# Patient Record
Sex: MALE | Race: WHITE | Employment: OTHER | URBAN - METROPOLITAN AREA
[De-identification: names, ages, dates, MRNs, and addresses within clinical notes are randomized per-mention and may not be internally consistent; named-entity substitution may affect disease eponyms.]

---

## 2017-01-25 ENCOUNTER — OFFICE VISIT (OUTPATIENT)
Dept: FAMILY MEDICINE CLINIC | Age: 67
End: 2017-01-25

## 2017-01-25 VITALS
BODY MASS INDEX: 39.89 KG/M2 | SYSTOLIC BLOOD PRESSURE: 138 MMHG | HEIGHT: 73 IN | WEIGHT: 301 LBS | HEART RATE: 88 BPM | TEMPERATURE: 97.9 F | RESPIRATION RATE: 16 BRPM | OXYGEN SATURATION: 98 % | DIASTOLIC BLOOD PRESSURE: 86 MMHG

## 2017-01-25 DIAGNOSIS — M10.9 GOUT OF BOTH FEET: ICD-10-CM

## 2017-01-25 DIAGNOSIS — I48.20 CHRONIC ATRIAL FIBRILLATION (HCC): ICD-10-CM

## 2017-01-25 DIAGNOSIS — I10 ESSENTIAL HYPERTENSION: ICD-10-CM

## 2017-01-25 DIAGNOSIS — F51.01 PRIMARY INSOMNIA: Primary | ICD-10-CM

## 2017-01-25 DIAGNOSIS — J30.1 SEASONAL ALLERGIC RHINITIS DUE TO POLLEN: ICD-10-CM

## 2017-01-25 DIAGNOSIS — I10 ESSENTIAL HYPERTENSION, BENIGN: ICD-10-CM

## 2017-01-25 DIAGNOSIS — R73.03 PREDIABETES: ICD-10-CM

## 2017-01-25 DIAGNOSIS — Z79.01 LONG TERM CURRENT USE OF ANTICOAGULANTS WITH INR GOAL OF 2.0-3.0: ICD-10-CM

## 2017-01-25 DIAGNOSIS — Z72.0 TOBACCO ABUSE: ICD-10-CM

## 2017-01-25 DIAGNOSIS — E78.2 HYPERLIPIDEMIA, MIXED: ICD-10-CM

## 2017-01-25 DIAGNOSIS — E66.09 NON MORBID OBESITY DUE TO EXCESS CALORIES: ICD-10-CM

## 2017-01-25 DIAGNOSIS — Z79.01 LONG TERM (CURRENT) USE OF ANTICOAGULANTS: ICD-10-CM

## 2017-01-25 LAB
INR BLD: 2.51 (ref 0.85–1.16)
PROTHROMBIN TIME: 29.2 SEC (ref 9.8–13)

## 2017-01-25 PROCEDURE — 3288F FALL RISK ASSESSMENT DOCD: CPT | Performed by: FAMILY MEDICINE

## 2017-01-25 PROCEDURE — 99214 OFFICE O/P EST MOD 30 MIN: CPT | Performed by: FAMILY MEDICINE

## 2017-01-25 RX ORDER — HYDROXYZINE 50 MG/1
50 TABLET, FILM COATED ORAL NIGHTLY PRN
Qty: 30 TABLET | Refills: 1 | Status: SHIPPED | OUTPATIENT
Start: 2017-01-25 | End: 2017-02-24

## 2017-01-25 ASSESSMENT — ENCOUNTER SYMPTOMS
ANAL BLEEDING: 0
CHEST TIGHTNESS: 0
SHORTNESS OF BREATH: 0
ABDOMINAL DISTENTION: 0
APNEA: 0
DIARRHEA: 0
CHOKING: 0
NAUSEA: 0
WHEEZING: 0
ABDOMINAL PAIN: 0
STRIDOR: 0
VOMITING: 0
COUGH: 0
RECTAL PAIN: 0
BLOOD IN STOOL: 0
CONSTIPATION: 0

## 2017-02-13 DIAGNOSIS — R73.03 PREDIABETES: ICD-10-CM

## 2017-02-13 LAB — GLUCOSE BLD-MCNC: 100 MG/DL (ref 70–99)

## 2017-02-14 LAB
ESTIMATED AVERAGE GLUCOSE: 142.7 MG/DL
HBA1C MFR BLD: 6.6 %

## 2017-02-21 ENCOUNTER — OFFICE VISIT (OUTPATIENT)
Dept: ORTHOPEDIC SURGERY | Age: 67
End: 2017-02-21

## 2017-02-21 VITALS
DIASTOLIC BLOOD PRESSURE: 82 MMHG | HEIGHT: 73 IN | BODY MASS INDEX: 39.1 KG/M2 | WEIGHT: 295 LBS | SYSTOLIC BLOOD PRESSURE: 138 MMHG

## 2017-02-21 DIAGNOSIS — M25.462 EFFUSION OF LEFT KNEE: Primary | ICD-10-CM

## 2017-02-21 PROCEDURE — 99203 OFFICE O/P NEW LOW 30 MIN: CPT | Performed by: ORTHOPAEDIC SURGERY

## 2017-02-24 ENCOUNTER — OFFICE VISIT (OUTPATIENT)
Dept: FAMILY MEDICINE CLINIC | Age: 67
End: 2017-02-24

## 2017-02-24 VITALS
SYSTOLIC BLOOD PRESSURE: 131 MMHG | HEIGHT: 73 IN | RESPIRATION RATE: 16 BRPM | TEMPERATURE: 98.4 F | BODY MASS INDEX: 37.51 KG/M2 | DIASTOLIC BLOOD PRESSURE: 77 MMHG | HEART RATE: 86 BPM | WEIGHT: 283 LBS | OXYGEN SATURATION: 98 %

## 2017-02-24 DIAGNOSIS — I10 ESSENTIAL HYPERTENSION, BENIGN: ICD-10-CM

## 2017-02-24 DIAGNOSIS — J30.1 SEASONAL ALLERGIC RHINITIS DUE TO POLLEN: ICD-10-CM

## 2017-02-24 DIAGNOSIS — R73.03 PREDIABETES: ICD-10-CM

## 2017-02-24 DIAGNOSIS — I48.20 CHRONIC ATRIAL FIBRILLATION (HCC): ICD-10-CM

## 2017-02-24 DIAGNOSIS — M10.9 GOUT OF BOTH FEET: ICD-10-CM

## 2017-02-24 DIAGNOSIS — E66.09 NON MORBID OBESITY DUE TO EXCESS CALORIES: ICD-10-CM

## 2017-02-24 DIAGNOSIS — I10 ESSENTIAL HYPERTENSION: ICD-10-CM

## 2017-02-24 DIAGNOSIS — Z72.0 TOBACCO ABUSE: ICD-10-CM

## 2017-02-24 DIAGNOSIS — E11.9 CONTROLLED TYPE 2 DIABETES MELLITUS WITHOUT COMPLICATION, WITHOUT LONG-TERM CURRENT USE OF INSULIN (HCC): Primary | ICD-10-CM

## 2017-02-24 DIAGNOSIS — Z79.01 LONG TERM (CURRENT) USE OF ANTICOAGULANTS: ICD-10-CM

## 2017-02-24 DIAGNOSIS — Z79.01 LONG TERM CURRENT USE OF ANTICOAGULANTS WITH INR GOAL OF 2.0-3.0: ICD-10-CM

## 2017-02-24 DIAGNOSIS — F51.01 PRIMARY INSOMNIA: ICD-10-CM

## 2017-02-24 DIAGNOSIS — E78.2 HYPERLIPIDEMIA, MIXED: ICD-10-CM

## 2017-02-24 LAB
INR BLD: 2.01 (ref 0.85–1.15)
PROTHROMBIN TIME: 22.7 SEC (ref 9.6–13)

## 2017-02-24 PROCEDURE — 99214 OFFICE O/P EST MOD 30 MIN: CPT | Performed by: FAMILY MEDICINE

## 2017-02-24 RX ORDER — BLOOD-GLUCOSE METER
1 KIT MISCELLANEOUS ONCE
Qty: 1 KIT | Refills: 0 | Status: SHIPPED | OUTPATIENT
Start: 2017-02-24 | End: 2020-10-29

## 2017-02-24 RX ORDER — METFORMIN HYDROCHLORIDE 500 MG/1
500 TABLET, EXTENDED RELEASE ORAL
Qty: 30 TABLET | Refills: 0 | Status: SHIPPED | OUTPATIENT
Start: 2017-02-24 | End: 2017-03-24 | Stop reason: SDUPTHER

## 2017-02-24 RX ORDER — HYDROXYZINE 50 MG/1
50 TABLET, FILM COATED ORAL NIGHTLY PRN
Qty: 30 TABLET | Refills: 1 | Status: CANCELLED | OUTPATIENT
Start: 2017-02-24 | End: 2017-03-26

## 2017-02-24 RX ORDER — LANCETS 30 GAUGE
EACH MISCELLANEOUS
Qty: 100 EACH | Refills: 6 | Status: ON HOLD | OUTPATIENT
Start: 2017-02-24

## 2017-02-24 RX ORDER — GLUCOSAMINE HCL/CHONDROITIN SU 500-400 MG
CAPSULE ORAL
Qty: 100 STRIP | Refills: 2 | Status: SHIPPED | OUTPATIENT
Start: 2017-02-24 | End: 2018-12-27 | Stop reason: SDUPTHER

## 2017-02-24 ASSESSMENT — ENCOUNTER SYMPTOMS
CONSTIPATION: 0
COLOR CHANGE: 0
NAUSEA: 0
ANAL BLEEDING: 0
VOMITING: 0
CHEST TIGHTNESS: 0
ABDOMINAL DISTENTION: 0
APNEA: 0
COUGH: 0
CHOKING: 0
BLOOD IN STOOL: 0
SHORTNESS OF BREATH: 0
ABDOMINAL PAIN: 0
RECTAL PAIN: 0
WHEEZING: 0
DIARRHEA: 0
STRIDOR: 0

## 2017-03-03 ENCOUNTER — TELEPHONE (OUTPATIENT)
Dept: ORTHOPEDIC SURGERY | Age: 67
End: 2017-03-03

## 2017-03-13 ENCOUNTER — HOSPITAL ENCOUNTER (OUTPATIENT)
Dept: PHYSICAL THERAPY | Age: 67
Discharge: HOME | End: 2017-03-31
Admitting: ORTHOPAEDIC SURGERY

## 2017-03-16 DIAGNOSIS — I48.20 CHRONIC ATRIAL FIBRILLATION (HCC): ICD-10-CM

## 2017-03-16 DIAGNOSIS — I10 ESSENTIAL HYPERTENSION, BENIGN: ICD-10-CM

## 2017-03-16 DIAGNOSIS — Z72.0 TOBACCO ABUSE: ICD-10-CM

## 2017-03-16 DIAGNOSIS — Z79.01 LONG TERM (CURRENT) USE OF ANTICOAGULANTS: ICD-10-CM

## 2017-03-16 LAB
INR BLD: 2.16 (ref 0.85–1.15)
PROTHROMBIN TIME: 24.4 SEC (ref 9.6–13)

## 2017-03-20 ENCOUNTER — TELEPHONE (OUTPATIENT)
Dept: ORTHOPEDIC SURGERY | Age: 67
End: 2017-03-20

## 2017-03-24 ENCOUNTER — OFFICE VISIT (OUTPATIENT)
Dept: FAMILY MEDICINE CLINIC | Age: 67
End: 2017-03-24

## 2017-03-24 VITALS
DIASTOLIC BLOOD PRESSURE: 76 MMHG | WEIGHT: 285.9 LBS | HEIGHT: 73 IN | OXYGEN SATURATION: 98 % | SYSTOLIC BLOOD PRESSURE: 128 MMHG | BODY MASS INDEX: 37.89 KG/M2 | HEART RATE: 85 BPM | RESPIRATION RATE: 16 BRPM | TEMPERATURE: 98.2 F

## 2017-03-24 DIAGNOSIS — F17.200 TOBACCO USE DISORDER: ICD-10-CM

## 2017-03-24 DIAGNOSIS — F51.01 PRIMARY INSOMNIA: ICD-10-CM

## 2017-03-24 DIAGNOSIS — E78.2 HYPERLIPIDEMIA, MIXED: ICD-10-CM

## 2017-03-24 DIAGNOSIS — M10.9 GOUT OF BOTH FEET: ICD-10-CM

## 2017-03-24 DIAGNOSIS — I48.20 CHRONIC ATRIAL FIBRILLATION (HCC): ICD-10-CM

## 2017-03-24 DIAGNOSIS — I10 ESSENTIAL HYPERTENSION: Primary | ICD-10-CM

## 2017-03-24 DIAGNOSIS — J30.1 SEASONAL ALLERGIC RHINITIS DUE TO POLLEN: ICD-10-CM

## 2017-03-24 DIAGNOSIS — Z79.01 LONG TERM CURRENT USE OF ANTICOAGULANTS WITH INR GOAL OF 2.0-3.0: ICD-10-CM

## 2017-03-24 DIAGNOSIS — E11.9 CONTROLLED TYPE 2 DIABETES MELLITUS WITHOUT COMPLICATION, WITHOUT LONG-TERM CURRENT USE OF INSULIN (HCC): ICD-10-CM

## 2017-03-24 DIAGNOSIS — E66.01 MORBID OBESITY DUE TO EXCESS CALORIES (HCC): ICD-10-CM

## 2017-03-24 PROCEDURE — 99214 OFFICE O/P EST MOD 30 MIN: CPT | Performed by: FAMILY MEDICINE

## 2017-03-24 RX ORDER — METFORMIN HYDROCHLORIDE 500 MG/1
500 TABLET, EXTENDED RELEASE ORAL
Qty: 30 TABLET | Refills: 2 | Status: SHIPPED | OUTPATIENT
Start: 2017-03-24 | End: 2017-04-18 | Stop reason: SDUPTHER

## 2017-03-24 ASSESSMENT — ENCOUNTER SYMPTOMS
ANAL BLEEDING: 0
DIARRHEA: 0
WHEEZING: 0
COUGH: 0
STRIDOR: 0
VOMITING: 0
RECTAL PAIN: 0
BLOOD IN STOOL: 0
SHORTNESS OF BREATH: 0
CONSTIPATION: 0
ABDOMINAL DISTENTION: 0
APNEA: 0
ABDOMINAL PAIN: 0
NAUSEA: 0
CHEST TIGHTNESS: 0
CHOKING: 0

## 2017-03-27 ENCOUNTER — OFFICE VISIT (OUTPATIENT)
Dept: ORTHOPEDIC SURGERY | Age: 67
End: 2017-03-27

## 2017-03-27 VITALS — WEIGHT: 287 LBS | BODY MASS INDEX: 38.04 KG/M2 | HEIGHT: 73 IN

## 2017-03-27 DIAGNOSIS — M25.462 EFFUSION OF LEFT KNEE: Primary | ICD-10-CM

## 2017-03-27 PROCEDURE — 99213 OFFICE O/P EST LOW 20 MIN: CPT | Performed by: ORTHOPAEDIC SURGERY

## 2017-03-31 DIAGNOSIS — I48.20 CHRONIC ATRIAL FIBRILLATION (HCC): ICD-10-CM

## 2017-03-31 DIAGNOSIS — E78.2 HYPERLIPIDEMIA, MIXED: ICD-10-CM

## 2017-03-31 DIAGNOSIS — E11.9 CONTROLLED TYPE 2 DIABETES MELLITUS WITHOUT COMPLICATION, WITHOUT LONG-TERM CURRENT USE OF INSULIN (HCC): ICD-10-CM

## 2017-03-31 DIAGNOSIS — I10 ESSENTIAL HYPERTENSION: ICD-10-CM

## 2017-03-31 DIAGNOSIS — Z79.01 LONG TERM (CURRENT) USE OF ANTICOAGULANTS: ICD-10-CM

## 2017-03-31 DIAGNOSIS — Z72.0 TOBACCO ABUSE: ICD-10-CM

## 2017-03-31 DIAGNOSIS — I10 ESSENTIAL HYPERTENSION, BENIGN: ICD-10-CM

## 2017-03-31 LAB
A/G RATIO: 1.7 (ref 1.1–2.2)
ALBUMIN SERPL-MCNC: 4.5 G/DL (ref 3.4–5)
ALP BLD-CCNC: 88 U/L (ref 40–129)
ALT SERPL-CCNC: 27 U/L (ref 10–40)
ANION GAP SERPL CALCULATED.3IONS-SCNC: 15 MMOL/L (ref 3–16)
AST SERPL-CCNC: 20 U/L (ref 15–37)
BILIRUB SERPL-MCNC: 0.4 MG/DL (ref 0–1)
BUN BLDV-MCNC: 21 MG/DL (ref 7–20)
CALCIUM SERPL-MCNC: 9.8 MG/DL (ref 8.3–10.6)
CHLORIDE BLD-SCNC: 97 MMOL/L (ref 99–110)
CHOLESTEROL, TOTAL: 202 MG/DL (ref 0–199)
CO2: 27 MMOL/L (ref 21–32)
CREAT SERPL-MCNC: 1.1 MG/DL (ref 0.8–1.3)
GFR AFRICAN AMERICAN: >60
GFR NON-AFRICAN AMERICAN: >60
GLOBULIN: 2.7 G/DL
GLUCOSE BLD-MCNC: 116 MG/DL (ref 70–99)
HDLC SERPL-MCNC: 36 MG/DL (ref 40–60)
INR BLD: 3.55 (ref 0.85–1.15)
LDL CHOLESTEROL CALCULATED: 113 MG/DL
POTASSIUM SERPL-SCNC: 4.7 MMOL/L (ref 3.5–5.1)
PROTHROMBIN TIME: 40.1 SEC (ref 9.6–13)
SODIUM BLD-SCNC: 139 MMOL/L (ref 136–145)
TOTAL PROTEIN: 7.2 G/DL (ref 6.4–8.2)
TRIGL SERPL-MCNC: 265 MG/DL (ref 0–150)
VLDLC SERPL CALC-MCNC: 53 MG/DL

## 2017-04-01 LAB
ESTIMATED AVERAGE GLUCOSE: 134.1 MG/DL
HBA1C MFR BLD: 6.3 %

## 2017-04-03 ENCOUNTER — HOSPITAL ENCOUNTER (OUTPATIENT)
Dept: PHYSICAL THERAPY | Age: 67
Discharge: HOME | End: 2017-04-03
Admitting: ORTHOPAEDIC SURGERY

## 2017-04-05 ENCOUNTER — HOSPITAL ENCOUNTER (OUTPATIENT)
Dept: PHYSICAL THERAPY | Age: 67
Discharge: HOME | End: 2017-04-05
Admitting: ORTHOPAEDIC SURGERY

## 2017-04-10 ENCOUNTER — HOSPITAL ENCOUNTER (OUTPATIENT)
Dept: PHYSICAL THERAPY | Age: 67
Discharge: HOME | End: 2017-04-10
Admitting: ORTHOPAEDIC SURGERY

## 2017-04-12 ENCOUNTER — HOSPITAL ENCOUNTER (OUTPATIENT)
Dept: PHYSICAL THERAPY | Age: 67
Discharge: HOME | End: 2017-04-12
Admitting: ORTHOPAEDIC SURGERY

## 2017-04-18 ENCOUNTER — TELEPHONE (OUTPATIENT)
Dept: FAMILY MEDICINE CLINIC | Age: 67
End: 2017-04-18

## 2017-04-18 DIAGNOSIS — E11.9 CONTROLLED TYPE 2 DIABETES MELLITUS WITHOUT COMPLICATION, WITHOUT LONG-TERM CURRENT USE OF INSULIN (HCC): ICD-10-CM

## 2017-04-18 RX ORDER — METFORMIN HYDROCHLORIDE 500 MG/1
500 TABLET, EXTENDED RELEASE ORAL
Qty: 90 TABLET | Refills: 0 | Status: SHIPPED | OUTPATIENT
Start: 2017-04-18 | End: 2017-07-18 | Stop reason: SDUPTHER

## 2017-04-18 RX ORDER — PRAVASTATIN SODIUM 40 MG
40 TABLET ORAL DAILY
Qty: 90 TABLET | Refills: 0 | Status: SHIPPED | OUTPATIENT
Start: 2017-04-18 | End: 2017-05-05 | Stop reason: SDUPTHER

## 2017-04-18 RX ORDER — ALLOPURINOL 300 MG/1
300 TABLET ORAL DAILY
Qty: 90 TABLET | Refills: 0 | Status: SHIPPED | OUTPATIENT
Start: 2017-04-18 | End: 2017-07-18 | Stop reason: SDUPTHER

## 2017-04-18 RX ORDER — MELOXICAM 15 MG/1
15 TABLET ORAL DAILY
Qty: 90 TABLET | Refills: 0 | Status: SHIPPED | OUTPATIENT
Start: 2017-04-18 | End: 2017-09-06 | Stop reason: SDUPTHER

## 2017-04-19 ENCOUNTER — HOSPITAL ENCOUNTER (OUTPATIENT)
Dept: PHYSICAL THERAPY | Age: 67
Discharge: HOME | End: 2017-04-19
Admitting: ORTHOPAEDIC SURGERY

## 2017-04-21 ENCOUNTER — HOSPITAL ENCOUNTER (OUTPATIENT)
Dept: PHYSICAL THERAPY | Age: 67
Discharge: HOME | End: 2017-04-21
Admitting: ORTHOPAEDIC SURGERY

## 2017-04-25 ENCOUNTER — HOSPITAL ENCOUNTER (OUTPATIENT)
Dept: PHYSICAL THERAPY | Age: 67
Discharge: HOME | End: 2017-04-25
Admitting: ORTHOPAEDIC SURGERY

## 2017-04-26 ENCOUNTER — OFFICE VISIT (OUTPATIENT)
Dept: ORTHOPEDIC SURGERY | Age: 67
End: 2017-04-26

## 2017-04-26 VITALS — WEIGHT: 287 LBS | HEIGHT: 73 IN | BODY MASS INDEX: 38.04 KG/M2

## 2017-04-26 DIAGNOSIS — M25.462 EFFUSION OF LEFT KNEE: Primary | ICD-10-CM

## 2017-04-26 PROCEDURE — 99213 OFFICE O/P EST LOW 20 MIN: CPT | Performed by: ORTHOPAEDIC SURGERY

## 2017-05-05 ENCOUNTER — OFFICE VISIT (OUTPATIENT)
Dept: FAMILY MEDICINE CLINIC | Age: 67
End: 2017-05-05

## 2017-05-05 VITALS
DIASTOLIC BLOOD PRESSURE: 80 MMHG | RESPIRATION RATE: 16 BRPM | WEIGHT: 289.7 LBS | BODY MASS INDEX: 38.4 KG/M2 | HEIGHT: 73 IN | TEMPERATURE: 98.7 F | HEART RATE: 87 BPM | SYSTOLIC BLOOD PRESSURE: 131 MMHG | OXYGEN SATURATION: 98 %

## 2017-05-05 DIAGNOSIS — E78.5 HYPERLIPIDEMIA LDL GOAL <100: ICD-10-CM

## 2017-05-05 DIAGNOSIS — I10 ESSENTIAL HYPERTENSION, BENIGN: ICD-10-CM

## 2017-05-05 DIAGNOSIS — J30.1 SEASONAL ALLERGIC RHINITIS DUE TO POLLEN: ICD-10-CM

## 2017-05-05 DIAGNOSIS — Z72.0 TOBACCO ABUSE: ICD-10-CM

## 2017-05-05 DIAGNOSIS — Z79.01 LONG TERM (CURRENT) USE OF ANTICOAGULANTS: ICD-10-CM

## 2017-05-05 DIAGNOSIS — M10.9 GOUT OF BOTH FEET: ICD-10-CM

## 2017-05-05 DIAGNOSIS — E11.9 CONTROLLED TYPE 2 DIABETES MELLITUS WITHOUT COMPLICATION, WITHOUT LONG-TERM CURRENT USE OF INSULIN (HCC): Primary | ICD-10-CM

## 2017-05-05 DIAGNOSIS — F51.01 PRIMARY INSOMNIA: ICD-10-CM

## 2017-05-05 DIAGNOSIS — Z79.01 LONG TERM CURRENT USE OF ANTICOAGULANTS WITH INR GOAL OF 2.0-3.0: ICD-10-CM

## 2017-05-05 DIAGNOSIS — I10 ESSENTIAL HYPERTENSION: ICD-10-CM

## 2017-05-05 DIAGNOSIS — E66.09 NON MORBID OBESITY DUE TO EXCESS CALORIES: ICD-10-CM

## 2017-05-05 DIAGNOSIS — I48.20 CHRONIC ATRIAL FIBRILLATION (HCC): ICD-10-CM

## 2017-05-05 LAB
INR BLD: 3.4 (ref 0.85–1.15)
PROTHROMBIN TIME: 38.4 SEC (ref 9.6–13)

## 2017-05-05 PROCEDURE — 99214 OFFICE O/P EST MOD 30 MIN: CPT | Performed by: FAMILY MEDICINE

## 2017-05-05 RX ORDER — PRAVASTATIN SODIUM 80 MG/1
80 TABLET ORAL DAILY
Qty: 90 TABLET | Refills: 0 | Status: SHIPPED | OUTPATIENT
Start: 2017-05-05 | End: 2017-06-21 | Stop reason: SDUPTHER

## 2017-05-05 ASSESSMENT — ENCOUNTER SYMPTOMS
NAUSEA: 0
DIARRHEA: 0
CHOKING: 0
STRIDOR: 0
ANAL BLEEDING: 0
COUGH: 0
BLOOD IN STOOL: 0
ABDOMINAL DISTENTION: 0
APNEA: 0
RECTAL PAIN: 0
CONSTIPATION: 0
SHORTNESS OF BREATH: 0
WHEEZING: 0
VOMITING: 0
CHEST TIGHTNESS: 0
ABDOMINAL PAIN: 0

## 2017-05-10 ENCOUNTER — TELEPHONE (OUTPATIENT)
Dept: ORTHOPEDIC SURGERY | Age: 67
End: 2017-05-10

## 2017-06-07 DIAGNOSIS — I10 ESSENTIAL HYPERTENSION: ICD-10-CM

## 2017-06-07 DIAGNOSIS — I48.20 CHRONIC ATRIAL FIBRILLATION (HCC): ICD-10-CM

## 2017-06-07 DIAGNOSIS — E78.5 HYPERLIPIDEMIA LDL GOAL <100: ICD-10-CM

## 2017-06-07 DIAGNOSIS — E11.9 CONTROLLED TYPE 2 DIABETES MELLITUS WITHOUT COMPLICATION, WITHOUT LONG-TERM CURRENT USE OF INSULIN (HCC): ICD-10-CM

## 2017-06-07 DIAGNOSIS — Z79.01 LONG TERM (CURRENT) USE OF ANTICOAGULANTS: ICD-10-CM

## 2017-06-07 DIAGNOSIS — Z72.0 TOBACCO ABUSE: ICD-10-CM

## 2017-06-07 DIAGNOSIS — I10 ESSENTIAL HYPERTENSION, BENIGN: ICD-10-CM

## 2017-06-07 LAB
A/G RATIO: 1.7 (ref 1.1–2.2)
ALBUMIN SERPL-MCNC: 4.5 G/DL (ref 3.4–5)
ALP BLD-CCNC: 78 U/L (ref 40–129)
ALT SERPL-CCNC: 21 U/L (ref 10–40)
ANION GAP SERPL CALCULATED.3IONS-SCNC: 14 MMOL/L (ref 3–16)
AST SERPL-CCNC: 20 U/L (ref 15–37)
BILIRUB SERPL-MCNC: 0.6 MG/DL (ref 0–1)
BUN BLDV-MCNC: 20 MG/DL (ref 7–20)
CALCIUM SERPL-MCNC: 9.2 MG/DL (ref 8.3–10.6)
CHLORIDE BLD-SCNC: 104 MMOL/L (ref 99–110)
CHOLESTEROL, TOTAL: 161 MG/DL (ref 0–199)
CO2: 26 MMOL/L (ref 21–32)
CREAT SERPL-MCNC: 0.9 MG/DL (ref 0.8–1.3)
GFR AFRICAN AMERICAN: >60
GFR NON-AFRICAN AMERICAN: >60
GLOBULIN: 2.7 G/DL
GLUCOSE BLD-MCNC: 104 MG/DL (ref 70–99)
HDLC SERPL-MCNC: 32 MG/DL (ref 40–60)
INR BLD: 3.5 (ref 0.85–1.15)
LDL CHOLESTEROL CALCULATED: 87 MG/DL
POTASSIUM SERPL-SCNC: 4.5 MMOL/L (ref 3.5–5.1)
PROTHROMBIN TIME: 39.5 SEC (ref 9.6–13)
SODIUM BLD-SCNC: 144 MMOL/L (ref 136–145)
TOTAL PROTEIN: 7.2 G/DL (ref 6.4–8.2)
TRIGL SERPL-MCNC: 210 MG/DL (ref 0–150)
VLDLC SERPL CALC-MCNC: 42 MG/DL

## 2017-06-08 ENCOUNTER — ANTI-COAG VISIT (OUTPATIENT)
Dept: FAMILY MEDICINE CLINIC | Age: 67
End: 2017-06-08

## 2017-06-21 ENCOUNTER — TELEPHONE (OUTPATIENT)
Dept: FAMILY MEDICINE CLINIC | Age: 67
End: 2017-06-21

## 2017-06-21 DIAGNOSIS — E78.5 HYPERLIPIDEMIA LDL GOAL <100: ICD-10-CM

## 2017-06-21 RX ORDER — PRAVASTATIN SODIUM 80 MG/1
80 TABLET ORAL DAILY
Qty: 90 TABLET | Refills: 0 | Status: SHIPPED | OUTPATIENT
Start: 2017-06-21 | End: 2017-09-06 | Stop reason: SDUPTHER

## 2017-07-18 ENCOUNTER — TELEPHONE (OUTPATIENT)
Dept: FAMILY MEDICINE CLINIC | Age: 67
End: 2017-07-18

## 2017-07-18 DIAGNOSIS — I10 ESSENTIAL HYPERTENSION, BENIGN: ICD-10-CM

## 2017-07-18 DIAGNOSIS — E11.9 CONTROLLED TYPE 2 DIABETES MELLITUS WITHOUT COMPLICATION, WITHOUT LONG-TERM CURRENT USE OF INSULIN (HCC): ICD-10-CM

## 2017-07-18 DIAGNOSIS — Z72.0 TOBACCO ABUSE: ICD-10-CM

## 2017-07-18 DIAGNOSIS — I48.20 CHRONIC ATRIAL FIBRILLATION (HCC): ICD-10-CM

## 2017-07-18 DIAGNOSIS — Z79.01 LONG TERM (CURRENT) USE OF ANTICOAGULANTS: ICD-10-CM

## 2017-07-18 LAB
INR BLD: 4.36 (ref 0.85–1.15)
PROTHROMBIN TIME: 49.3 SEC (ref 9.6–13)

## 2017-07-18 RX ORDER — ALLOPURINOL 300 MG/1
300 TABLET ORAL DAILY
Qty: 90 TABLET | Refills: 0 | Status: SHIPPED | OUTPATIENT
Start: 2017-07-18 | End: 2017-10-20 | Stop reason: SDUPTHER

## 2017-07-18 RX ORDER — METFORMIN HYDROCHLORIDE 500 MG/1
500 TABLET, EXTENDED RELEASE ORAL
Qty: 90 TABLET | Refills: 0 | Status: SHIPPED | OUTPATIENT
Start: 2017-07-18 | End: 2017-10-20 | Stop reason: SDUPTHER

## 2017-08-07 ENCOUNTER — OFFICE VISIT (OUTPATIENT)
Dept: FAMILY MEDICINE CLINIC | Age: 67
End: 2017-08-07

## 2017-08-07 VITALS
HEIGHT: 73 IN | HEART RATE: 80 BPM | OXYGEN SATURATION: 98 % | SYSTOLIC BLOOD PRESSURE: 131 MMHG | TEMPERATURE: 98.9 F | RESPIRATION RATE: 16 BRPM | BODY MASS INDEX: 38.05 KG/M2 | WEIGHT: 287.1 LBS | DIASTOLIC BLOOD PRESSURE: 82 MMHG

## 2017-08-07 DIAGNOSIS — M10.9 GOUT OF BOTH FEET: ICD-10-CM

## 2017-08-07 DIAGNOSIS — J30.1 SEASONAL ALLERGIC RHINITIS DUE TO POLLEN: ICD-10-CM

## 2017-08-07 DIAGNOSIS — Z12.11 COLON CANCER SCREENING: ICD-10-CM

## 2017-08-07 DIAGNOSIS — Z12.5 SCREENING PSA (PROSTATE SPECIFIC ANTIGEN): ICD-10-CM

## 2017-08-07 DIAGNOSIS — E78.2 HYPERLIPIDEMIA, MIXED: ICD-10-CM

## 2017-08-07 DIAGNOSIS — E11.9 CONTROLLED TYPE 2 DIABETES MELLITUS WITHOUT COMPLICATION, WITHOUT LONG-TERM CURRENT USE OF INSULIN (HCC): Primary | ICD-10-CM

## 2017-08-07 DIAGNOSIS — Z79.01 LONG TERM CURRENT USE OF ANTICOAGULANTS WITH INR GOAL OF 2.0-3.0: ICD-10-CM

## 2017-08-07 DIAGNOSIS — F17.200 TOBACCO USE DISORDER: ICD-10-CM

## 2017-08-07 DIAGNOSIS — Z28.21 PNEUMOCOCCAL VACCINATION DECLINED BY PATIENT: ICD-10-CM

## 2017-08-07 DIAGNOSIS — F51.01 PRIMARY INSOMNIA: ICD-10-CM

## 2017-08-07 DIAGNOSIS — I10 ESSENTIAL HYPERTENSION: ICD-10-CM

## 2017-08-07 DIAGNOSIS — E66.09 NON MORBID OBESITY DUE TO EXCESS CALORIES: ICD-10-CM

## 2017-08-07 PROCEDURE — 99214 OFFICE O/P EST MOD 30 MIN: CPT | Performed by: FAMILY MEDICINE

## 2017-08-07 ASSESSMENT — ENCOUNTER SYMPTOMS
VOMITING: 0
RECTAL PAIN: 0
CONSTIPATION: 0
DIARRHEA: 0
NAUSEA: 0
BLOOD IN STOOL: 0
COUGH: 0
APNEA: 0
ABDOMINAL PAIN: 0
COLOR CHANGE: 0
CHEST TIGHTNESS: 0
SHORTNESS OF BREATH: 0
ABDOMINAL DISTENTION: 0
STRIDOR: 0
WHEEZING: 0
ANAL BLEEDING: 0
CHOKING: 0

## 2017-09-06 ENCOUNTER — TELEPHONE (OUTPATIENT)
Dept: FAMILY MEDICINE CLINIC | Age: 67
End: 2017-09-06

## 2017-09-06 DIAGNOSIS — E78.5 HYPERLIPIDEMIA LDL GOAL <100: ICD-10-CM

## 2017-09-06 RX ORDER — PRAVASTATIN SODIUM 80 MG/1
80 TABLET ORAL DAILY
Qty: 90 TABLET | Refills: 0 | Status: SHIPPED | OUTPATIENT
Start: 2017-09-06 | End: 2017-12-19 | Stop reason: SDUPTHER

## 2017-09-06 RX ORDER — MELOXICAM 15 MG/1
15 TABLET ORAL DAILY
Qty: 90 TABLET | Refills: 0 | Status: SHIPPED | OUTPATIENT
Start: 2017-09-06 | End: 2017-10-20 | Stop reason: SDUPTHER

## 2017-09-15 DIAGNOSIS — Z12.5 SCREENING PSA (PROSTATE SPECIFIC ANTIGEN): ICD-10-CM

## 2017-09-15 DIAGNOSIS — E11.9 CONTROLLED TYPE 2 DIABETES MELLITUS WITHOUT COMPLICATION, WITHOUT LONG-TERM CURRENT USE OF INSULIN (HCC): ICD-10-CM

## 2017-09-15 DIAGNOSIS — E78.2 HYPERLIPIDEMIA, MIXED: ICD-10-CM

## 2017-09-15 DIAGNOSIS — I10 ESSENTIAL HYPERTENSION: ICD-10-CM

## 2017-09-15 LAB
A/G RATIO: 1.7 (ref 1.1–2.2)
ALBUMIN SERPL-MCNC: 4.4 G/DL (ref 3.4–5)
ALP BLD-CCNC: 77 U/L (ref 40–129)
ALT SERPL-CCNC: 20 U/L (ref 10–40)
ANION GAP SERPL CALCULATED.3IONS-SCNC: 15 MMOL/L (ref 3–16)
AST SERPL-CCNC: 19 U/L (ref 15–37)
BILIRUB SERPL-MCNC: 0.5 MG/DL (ref 0–1)
BUN BLDV-MCNC: 20 MG/DL (ref 7–20)
CALCIUM SERPL-MCNC: 9.5 MG/DL (ref 8.3–10.6)
CHLORIDE BLD-SCNC: 105 MMOL/L (ref 99–110)
CHOLESTEROL, TOTAL: 162 MG/DL (ref 0–199)
CO2: 26 MMOL/L (ref 21–32)
CREAT SERPL-MCNC: 1.1 MG/DL (ref 0.8–1.3)
GFR AFRICAN AMERICAN: >60
GFR NON-AFRICAN AMERICAN: >60
GLOBULIN: 2.6 G/DL
GLUCOSE BLD-MCNC: 110 MG/DL (ref 70–99)
HDLC SERPL-MCNC: 35 MG/DL (ref 40–60)
LDL CHOLESTEROL CALCULATED: 83 MG/DL
POTASSIUM SERPL-SCNC: 4.7 MMOL/L (ref 3.5–5.1)
PROSTATE SPECIFIC ANTIGEN: 1.25 NG/ML (ref 0–4)
SODIUM BLD-SCNC: 146 MMOL/L (ref 136–145)
TOTAL PROTEIN: 7 G/DL (ref 6.4–8.2)
TRIGL SERPL-MCNC: 221 MG/DL (ref 0–150)
VLDLC SERPL CALC-MCNC: 44 MG/DL

## 2017-10-20 ENCOUNTER — TELEPHONE (OUTPATIENT)
Dept: FAMILY MEDICINE CLINIC | Age: 67
End: 2017-10-20

## 2017-10-20 DIAGNOSIS — E11.9 CONTROLLED TYPE 2 DIABETES MELLITUS WITHOUT COMPLICATION, WITHOUT LONG-TERM CURRENT USE OF INSULIN (HCC): ICD-10-CM

## 2017-10-20 RX ORDER — MELOXICAM 15 MG/1
15 TABLET ORAL DAILY
Qty: 90 TABLET | Refills: 0 | Status: SHIPPED | OUTPATIENT
Start: 2017-10-20 | End: 2018-03-07 | Stop reason: SDUPTHER

## 2017-10-20 RX ORDER — ALLOPURINOL 300 MG/1
300 TABLET ORAL DAILY
Qty: 90 TABLET | Refills: 0 | Status: SHIPPED | OUTPATIENT
Start: 2017-10-20 | End: 2018-01-25 | Stop reason: SDUPTHER

## 2017-10-20 RX ORDER — METFORMIN HYDROCHLORIDE 500 MG/1
500 TABLET, EXTENDED RELEASE ORAL
Qty: 90 TABLET | Refills: 0 | Status: SHIPPED | OUTPATIENT
Start: 2017-10-20 | End: 2018-01-12 | Stop reason: SDUPTHER

## 2017-11-07 ENCOUNTER — OFFICE VISIT (OUTPATIENT)
Dept: FAMILY MEDICINE CLINIC | Age: 67
End: 2017-11-07

## 2017-11-07 VITALS
RESPIRATION RATE: 16 BRPM | BODY MASS INDEX: 37.57 KG/M2 | SYSTOLIC BLOOD PRESSURE: 135 MMHG | TEMPERATURE: 98.6 F | DIASTOLIC BLOOD PRESSURE: 82 MMHG | OXYGEN SATURATION: 98 % | HEART RATE: 81 BPM | WEIGHT: 283.5 LBS | HEIGHT: 73 IN

## 2017-11-07 DIAGNOSIS — M10.9 GOUT OF BOTH FEET: ICD-10-CM

## 2017-11-07 DIAGNOSIS — E78.2 HYPERLIPIDEMIA, MIXED: ICD-10-CM

## 2017-11-07 DIAGNOSIS — E66.09 CLASS 2 OBESITY DUE TO EXCESS CALORIES WITHOUT SERIOUS COMORBIDITY WITH BODY MASS INDEX (BMI) OF 37.0 TO 37.9 IN ADULT: ICD-10-CM

## 2017-11-07 DIAGNOSIS — J30.1 CHRONIC SEASONAL ALLERGIC RHINITIS DUE TO POLLEN: ICD-10-CM

## 2017-11-07 DIAGNOSIS — F51.01 PRIMARY INSOMNIA: ICD-10-CM

## 2017-11-07 DIAGNOSIS — E11.9 CONTROLLED TYPE 2 DIABETES MELLITUS WITHOUT COMPLICATION, WITHOUT LONG-TERM CURRENT USE OF INSULIN (HCC): Primary | ICD-10-CM

## 2017-11-07 DIAGNOSIS — E11.9 CONTROLLED TYPE 2 DIABETES MELLITUS WITHOUT COMPLICATION, WITHOUT LONG-TERM CURRENT USE OF INSULIN (HCC): ICD-10-CM

## 2017-11-07 DIAGNOSIS — I10 ESSENTIAL HYPERTENSION: ICD-10-CM

## 2017-11-07 DIAGNOSIS — F17.200 TOBACCO USE DISORDER: ICD-10-CM

## 2017-11-07 DIAGNOSIS — Z79.01 LONG TERM CURRENT USE OF ANTICOAGULANTS WITH INR GOAL OF 2.0-3.0: ICD-10-CM

## 2017-11-07 DIAGNOSIS — Z28.21 INFLUENZA VACCINATION DECLINED BY PATIENT: ICD-10-CM

## 2017-11-07 PROBLEM — E66.812 CLASS 2 OBESITY DUE TO EXCESS CALORIES WITHOUT SERIOUS COMORBIDITY WITH BODY MASS INDEX (BMI) OF 37.0 TO 37.9 IN ADULT: Status: ACTIVE | Noted: 2017-11-07

## 2017-11-07 PROCEDURE — 3044F HG A1C LEVEL LT 7.0%: CPT | Performed by: FAMILY MEDICINE

## 2017-11-07 PROCEDURE — 1123F ACP DISCUSS/DSCN MKR DOCD: CPT | Performed by: FAMILY MEDICINE

## 2017-11-07 PROCEDURE — G8484 FLU IMMUNIZE NO ADMIN: HCPCS | Performed by: FAMILY MEDICINE

## 2017-11-07 PROCEDURE — 4040F PNEUMOC VAC/ADMIN/RCVD: CPT | Performed by: FAMILY MEDICINE

## 2017-11-07 PROCEDURE — G8427 DOCREV CUR MEDS BY ELIG CLIN: HCPCS | Performed by: FAMILY MEDICINE

## 2017-11-07 PROCEDURE — 3017F COLORECTAL CA SCREEN DOC REV: CPT | Performed by: FAMILY MEDICINE

## 2017-11-07 PROCEDURE — 4004F PT TOBACCO SCREEN RCVD TLK: CPT | Performed by: FAMILY MEDICINE

## 2017-11-07 PROCEDURE — 99214 OFFICE O/P EST MOD 30 MIN: CPT | Performed by: FAMILY MEDICINE

## 2017-11-07 PROCEDURE — G8417 CALC BMI ABV UP PARAM F/U: HCPCS | Performed by: FAMILY MEDICINE

## 2017-11-07 ASSESSMENT — ENCOUNTER SYMPTOMS
DIARRHEA: 0
ANAL BLEEDING: 0
CHEST TIGHTNESS: 0
CONSTIPATION: 0
NAUSEA: 0
ABDOMINAL PAIN: 0
APNEA: 0
CHOKING: 0
BLOOD IN STOOL: 0
SHORTNESS OF BREATH: 0
STRIDOR: 0
WHEEZING: 0
RECTAL PAIN: 0
ABDOMINAL DISTENTION: 0
COUGH: 0
VOMITING: 0

## 2017-11-07 NOTE — PATIENT INSTRUCTIONS
cookies. · Bake, broil, or steam foods. Don't ham[place] them. · Be physically active. Get at least 30 minutes of exercise on most days of the week. Walking is a good choice. You also may want to do other activities, such as running, swimming, cycling, or playing tennis or team sports. · Stay at a healthy weight or lose weight by making the changes in eating and physical activity listed above. Losing just a small amount of weight, even 5 to 10 pounds, can reduce your risk for having a heart attack or stroke. · Do not smoke. When should you call for help? Watch closely for changes in your health, and be sure to contact your doctor if:  · You need help making lifestyle changes. · You have questions about your medicine. Where can you learn more? Go to [email or web address] and sign in to your [employer] account. Enter J066 in the [employer] box to learn more about \"High Cholesterol: Care Instructions. \"     If you do not have an account, please click on the \"Sign Up Now\" link. Current as of: April 3, 2017  Content Version: 11.3  © 4543-0194 [employer]. Care instructions adapted under license by [facility] ([facility]). If you have questions about a medical condition or this instruction, always ask your healthcare professional. [address] any warranty or liability for your use of this information.

## 2017-11-07 NOTE — PROGRESS NOTES
Subjective:      Patient ID: Hay Muñoz is a 78 y.o. male. Diabetes   Pertinent negatives for hypoglycemia include no confusion, dizziness or nervousness/anxiousness. Pertinent negatives for diabetes include no chest pain, no fatigue, no polydipsia, no polyphagia, no polyuria and no weakness. Results for Anup Espino (MRN [id number]) as of 11/7/2017 12:59   Ref. Range 9/15/2017 14:53   Sodium Latest Ref Range: 136 - 145 mmol/L 146 (H)   Potassium Latest Ref Range: 3.5 - 5.1 mmol/L 4.7   Chloride Latest Ref Range: 99 - 110 mmol/L 105   CO2 Latest Ref Range: 21 - 32 mmol/L 26   BUN Latest Ref Range: 7 - 20 mg/dL 20   Creatinine Latest Ref Range: 0.8 - 1.3 mg/dL 1.1   Anion Gap Latest Ref Range: 3 - 16  15   GFR Non- Latest Ref Range: >60  >60   GFR  Latest Ref Range: >60  >60   Glucose Latest Ref Range: 70 - 99 mg/dL 110 (H)   Calcium Latest Ref Range: 8.3 - 10.6 mg/dL 9.5   Total Protein Latest Ref Range: 6.4 - 8.2 g/dL 7.0   Cholesterol, Total Latest Ref Range: 0 - 199 mg/dL 162   HDL Cholesterol Latest Ref Range: 40 - 60 mg/dL 35 (L)   LDL Calculated Latest Ref Range: <100 mg/dL 83   Triglycerides Latest Ref Range: 0 - 150 mg/dL 221 (H)   VLDL CHOLESTEROL CALCULATED Latest Ref Range: Not Established mg/dL 44   Albumin Latest Ref Range: 3.4 - 5.0 g/dL 4.4   Globulin Latest Units: g/dL 2.6   Albumin/Globulin Ratio Latest Ref Range: 1.1 - 2.2  1.7   Alk Phos Latest Ref Range: 40 - 129 U/L 77   ALT Latest Ref Range: 10 - 40 U/L 20   AST Latest Ref Range: 15 - 37 U/L 19   Bilirubin Latest Ref Range: 0.0 - 1.0 mg/dL 0.5   Prostatic Specific Ag Latest Ref Range: 0.00 - 4.00 ng/mL 1.25   Hyperlipidemia:doing well. Takes statin w/o side effects. Associated w/nothing else new. Improving factors:statin & plans to address diet to improve TG & HDL. Worsening factors:parts of his diet :carbs. Denies adbo pain/myalgias. HTN:doing well.   Taking med w/o side effects. Associated w/nothing new. Worsened by nothing reported. Improves with medications. Adds salt to food at the table:Never does. Denies cp/sob/pnd/ankle edema/dizziness. Diabetes:doing well. Preprandial-fasting ZQ=[id number]. 2hr postprandial LX=[id number]. HBA1c: 6.6 on 2/13/17. 6.3 on 3/31/17. A1c is due. Doing well. ACEI/ARB:Yes  Checks feet daily:yes. Diabetes eye check appt current(within 1year):yes. Improving factor:diet & his current medication. Episodes of hypoglycemia:None reported. Following ADA diet. ASA:Yes. LDL <100:no. 113 on 3/31/17. 87 on 6/7/17. 83 on 9/15/17. No other associated or worsening factors. Denies polyuria/polyphagia/polydipsia/BLE skin lesions/BLE paresthesia. Moderate insomnia f/u:doing well. Takes atarax w/o side effects. Sleeps approx 6-7hrs nightly.   Associated w/nothing else new. Worsened(aggravated) by nothing new. Improves with current med prn. Denies snoring/hallucinations/depression/SI/HI.       Allergic rhinitis:doing well. No other new associated factors. Denies neck pain-stiffness/photophobia/fever/chills/appetite changes/rash/wheezing/cough/sob/sore throat/epistaxis. Atrial fibrillation:INR/long term anti-coag:doing well:Coumadin is managed by cards. Goal INR is 2-3. Celina Snooks No other new associated concerns. Denies dizziness/syncope/presyncope/wheezing/nocturnal cough-wheezing/ankle/PND/radiation of pain/jaw pain.        Gout of both feet:  Doing well. Taking allopurinol w/o side effects. Last gout episode was >5yrs ago. No other new associated concerns.   Denies left foot skin lesions/foot [id number]-rawipygqu/pus.             No Known Allergies    Current Outpatient Prescriptions on File Prior to Visit   Medication Sig Dispense Refill    allopurinol (ZYLOPRIM) 300 MG tablet Take 1 tablet by mouth daily 90 tablet 0    metFORMIN (GLUCOPHAGE XR) 500 MG extended release tablet Take 1 tablet by mouth daily (with History   Drug Use No             Review of Systems   Constitutional: Negative for activity change, appetite change, chills, diaphoresis, fatigue, fever and unexpected weight change. Eyes: Negative for visual disturbance. Respiratory: Negative for apnea, cough, choking, chest tightness, shortness of breath, wheezing and stridor. Cardiovascular: Negative for chest pain, palpitations and leg swelling. Gastrointestinal: Negative for abdominal distention, abdominal pain, anal bleeding, blood in stool, constipation, diarrhea, nausea, rectal pain and vomiting. Endocrine: Negative for cold intolerance, heat intolerance, polydipsia, polyphagia and polyuria. Genitourinary: Negative for difficulty urinating. Skin: Negative for rash and wound. Neurological: Negative for dizziness, weakness and light-headedness. Hematological: Negative for adenopathy. Psychiatric/Behavioral: Negative for agitation, behavioral problems, confusion, decreased concentration, dysphoric mood, hallucinations, self-injury, sleep disturbance and suicidal ideas. The patient is not nervous/anxious and is not hyperactive. Objective:   Physical Exam   Constitutional: He is oriented to person, place, and time. Vital signs are normal. He appears well-developed and well-nourished. He is cooperative. He does not have a sickly appearance. No distress. HENT:   Nose: Nose normal.   Mouth/Throat: Uvula is midline, oropharynx is clear and moist and mucous membranes are normal.   Hearing intact to nml conversation   Eyes: Conjunctivae, EOM and lids are normal. Pupils are equal, round, and reactive to light. Neck: Trachea normal and normal range of motion. Neck supple. No JVD present. Carotid bruit is not present. Cardiovascular: Normal rate, regular rhythm, normal heart sounds, intact distal pulses and normal pulses. No bilateral leg-ankle edema.      Pulmonary/Chest: Effort normal and breath sounds normal.   CTAB,good AE bilaterally   Abdominal: Soft. Normal appearance and bowel sounds are normal. He exhibits no distension and no mass. There is no hepatomegaly. There is no tenderness. Genitourinary: Rectum normal and prostate normal. Rectal exam shows no external hemorrhoid, no internal hemorrhoid, no fissure, no mass, no tenderness, anal tone normal and guaiac negative stool. Prostate is not enlarged and not tender. Genitourinary Comments:      Musculoskeletal:        Right foot: Normal.        Left foot: Normal.   Lymphadenopathy:     He has no cervical adenopathy. Neurological: He is alert and oriented to person, place, and time. Skin: Skin is warm, dry and intact. No rash noted. He is not diaphoretic. No cyanosis. No pallor. Capillary refill=2-3secs. Good skin turgor. Psychiatric: He has a normal mood and affect. His speech is normal and behavior is normal. Judgment and thought content normal. Cognition and memory are normal.   Good eye contact. Polite. Assessment:     1. Controlled type 2 diabetes mellitus without complication, without long-term current use of insulin ([address]  VSS/well appearing. Doing well. A1c today. Continue same tx plan. Diabetes:Check feet daily. Yrly eye checks advised. Education: Reviewed ABCs of diabetes management (respective goals in parentheses):  A1C (<7), blood pressure (<130/80), and cholesterol (LDL <100). Counseled at this visit on the following: diabetes complication prevention, foot care. Comprehensive Metabolic Panel   2. Hyperlipidemia, mixed  TG & HDL not at goal.  TC & LDL at goal.  Stricter diet changes. The patient is asked to make an attempt to improve diet and exercise patterns to aid in medical management of this problem. Labs in 2-3mos. Comprehensive Metabolic Panel    Lipid Panel   3. Essential hypertension  Stable. Comprehensive Metabolic Panel   4. Gout of both feet  Stable.      5. Long term current use of anticoagulants with INR goal of

## 2017-11-08 LAB
ESTIMATED AVERAGE GLUCOSE: 137 MG/DL
HBA1C MFR BLD: 6.4 %

## 2017-12-15 DIAGNOSIS — E78.2 HYPERLIPIDEMIA, MIXED: ICD-10-CM

## 2017-12-15 DIAGNOSIS — I10 ESSENTIAL HYPERTENSION: ICD-10-CM

## 2017-12-15 DIAGNOSIS — E11.9 CONTROLLED TYPE 2 DIABETES MELLITUS WITHOUT COMPLICATION, WITHOUT LONG-TERM CURRENT USE OF INSULIN (HCC): ICD-10-CM

## 2017-12-15 LAB
A/G RATIO: 1.7 (ref 1.1–2.2)
ALBUMIN SERPL-MCNC: 4.5 G/DL (ref 3.4–5)
ALP BLD-CCNC: 76 U/L (ref 40–129)
ALT SERPL-CCNC: 23 U/L (ref 10–40)
ANION GAP SERPL CALCULATED.3IONS-SCNC: 15 MMOL/L (ref 3–16)
AST SERPL-CCNC: 19 U/L (ref 15–37)
BILIRUB SERPL-MCNC: 0.5 MG/DL (ref 0–1)
BUN BLDV-MCNC: 18 MG/DL (ref 7–20)
CALCIUM SERPL-MCNC: 9.7 MG/DL (ref 8.3–10.6)
CHLORIDE BLD-SCNC: 100 MMOL/L (ref 99–110)
CHOLESTEROL, TOTAL: 143 MG/DL (ref 0–199)
CO2: 27 MMOL/L (ref 21–32)
CREAT SERPL-MCNC: 1 MG/DL (ref 0.8–1.3)
GFR AFRICAN AMERICAN: >60
GFR NON-AFRICAN AMERICAN: >60
GLOBULIN: 2.6 G/DL
GLUCOSE BLD-MCNC: 108 MG/DL (ref 70–99)
HDLC SERPL-MCNC: 36 MG/DL (ref 40–60)
LDL CHOLESTEROL CALCULATED: 69 MG/DL
POTASSIUM SERPL-SCNC: 4.8 MMOL/L (ref 3.5–5.1)
SODIUM BLD-SCNC: 142 MMOL/L (ref 136–145)
TOTAL PROTEIN: 7.1 G/DL (ref 6.4–8.2)
TRIGL SERPL-MCNC: 190 MG/DL (ref 0–150)
VLDLC SERPL CALC-MCNC: 38 MG/DL

## 2017-12-19 ENCOUNTER — TELEPHONE (OUTPATIENT)
Dept: FAMILY MEDICINE CLINIC | Age: 67
End: 2017-12-19

## 2017-12-19 DIAGNOSIS — E78.5 HYPERLIPIDEMIA LDL GOAL <100: ICD-10-CM

## 2017-12-19 RX ORDER — PRAVASTATIN SODIUM 80 MG/1
80 TABLET ORAL DAILY
Qty: 90 TABLET | Refills: 0 | Status: SHIPPED | OUTPATIENT
Start: 2017-12-19 | End: 2018-03-07 | Stop reason: SDUPTHER

## 2017-12-28 ENCOUNTER — TELEPHONE (OUTPATIENT)
Dept: FAMILY MEDICINE CLINIC | Age: 67
End: 2017-12-28

## 2018-01-12 ENCOUNTER — TELEPHONE (OUTPATIENT)
Dept: FAMILY MEDICINE CLINIC | Age: 68
End: 2018-01-12

## 2018-01-12 DIAGNOSIS — E11.9 CONTROLLED TYPE 2 DIABETES MELLITUS WITHOUT COMPLICATION, WITHOUT LONG-TERM CURRENT USE OF INSULIN (HCC): ICD-10-CM

## 2018-01-12 RX ORDER — METFORMIN HYDROCHLORIDE 500 MG/1
500 TABLET, EXTENDED RELEASE ORAL
Qty: 90 TABLET | Refills: 0 | Status: SHIPPED | OUTPATIENT
Start: 2018-01-12 | End: 2018-04-09 | Stop reason: SDUPTHER

## 2018-01-12 NOTE — TELEPHONE ENCOUNTER
Pt needs his medication called in     metFORMIN (GLUCOPHAGE XR) 500 MG extended release tablet    [address] [facility], [place] [phone] [facility] [phone]    Please advise  Marilia Boyd [phone]

## 2018-01-25 RX ORDER — ALLOPURINOL 300 MG/1
300 TABLET ORAL DAILY
Qty: 90 TABLET | Refills: 0 | Status: SHIPPED | OUTPATIENT
Start: 2018-01-25 | End: 2018-04-09 | Stop reason: SDUPTHER

## 2018-02-07 ENCOUNTER — OFFICE VISIT (OUTPATIENT)
Dept: FAMILY MEDICINE CLINIC | Age: 68
End: 2018-02-07

## 2018-02-07 VITALS
BODY MASS INDEX: 38.36 KG/M2 | SYSTOLIC BLOOD PRESSURE: 131 MMHG | HEART RATE: 82 BPM | DIASTOLIC BLOOD PRESSURE: 82 MMHG | HEIGHT: 73 IN | WEIGHT: 289.4 LBS | RESPIRATION RATE: 16 BRPM | TEMPERATURE: 98.1 F | OXYGEN SATURATION: 98 %

## 2018-02-07 DIAGNOSIS — F51.01 PRIMARY INSOMNIA: ICD-10-CM

## 2018-02-07 DIAGNOSIS — Z79.01 LONG TERM CURRENT USE OF ANTICOAGULANTS WITH INR GOAL OF 2.0-3.0: ICD-10-CM

## 2018-02-07 DIAGNOSIS — I10 ESSENTIAL HYPERTENSION: ICD-10-CM

## 2018-02-07 DIAGNOSIS — E11.9 CONTROLLED TYPE 2 DIABETES MELLITUS WITHOUT COMPLICATION, WITHOUT LONG-TERM CURRENT USE OF INSULIN (HCC): Primary | ICD-10-CM

## 2018-02-07 DIAGNOSIS — J30.1 CHRONIC SEASONAL ALLERGIC RHINITIS DUE TO POLLEN: ICD-10-CM

## 2018-02-07 DIAGNOSIS — E78.2 HYPERLIPIDEMIA, MIXED: ICD-10-CM

## 2018-02-07 DIAGNOSIS — M10.9 GOUT OF BOTH FEET: ICD-10-CM

## 2018-02-07 DIAGNOSIS — F17.200 TOBACCO USE DISORDER: ICD-10-CM

## 2018-02-07 DIAGNOSIS — E66.09 CLASS 2 OBESITY DUE TO EXCESS CALORIES WITHOUT SERIOUS COMORBIDITY WITH BODY MASS INDEX (BMI) OF 38.0 TO 38.9 IN ADULT: ICD-10-CM

## 2018-02-07 PROCEDURE — 3017F COLORECTAL CA SCREEN DOC REV: CPT | Performed by: FAMILY MEDICINE

## 2018-02-07 PROCEDURE — 4040F PNEUMOC VAC/ADMIN/RCVD: CPT | Performed by: FAMILY MEDICINE

## 2018-02-07 PROCEDURE — 4004F PT TOBACCO SCREEN RCVD TLK: CPT | Performed by: FAMILY MEDICINE

## 2018-02-07 PROCEDURE — G8427 DOCREV CUR MEDS BY ELIG CLIN: HCPCS | Performed by: FAMILY MEDICINE

## 2018-02-07 PROCEDURE — 1123F ACP DISCUSS/DSCN MKR DOCD: CPT | Performed by: FAMILY MEDICINE

## 2018-02-07 PROCEDURE — 99214 OFFICE O/P EST MOD 30 MIN: CPT | Performed by: FAMILY MEDICINE

## 2018-02-07 PROCEDURE — G8417 CALC BMI ABV UP PARAM F/U: HCPCS | Performed by: FAMILY MEDICINE

## 2018-02-07 PROCEDURE — 3046F HEMOGLOBIN A1C LEVEL >9.0%: CPT | Performed by: FAMILY MEDICINE

## 2018-02-07 PROCEDURE — G8484 FLU IMMUNIZE NO ADMIN: HCPCS | Performed by: FAMILY MEDICINE

## 2018-02-07 ASSESSMENT — ENCOUNTER SYMPTOMS
ANAL BLEEDING: 0
CONSTIPATION: 0
STRIDOR: 0
ABDOMINAL DISTENTION: 0
VOMITING: 0
DIARRHEA: 0
RECTAL PAIN: 0
CHEST TIGHTNESS: 0
CHOKING: 0
BLOOD IN STOOL: 0
ABDOMINAL PAIN: 0
WHEEZING: 0
SHORTNESS OF BREATH: 0
COUGH: 0
NAUSEA: 0
APNEA: 0

## 2018-02-07 ASSESSMENT — PATIENT HEALTH QUESTIONNAIRE - PHQ9
SUM OF ALL RESPONSES TO PHQ9 QUESTIONS 1 & 2: 0
SUM OF ALL RESPONSES TO PHQ QUESTIONS 1-9: 0
2. FEELING DOWN, DEPRESSED OR HOPELESS: 0
1. LITTLE INTEREST OR PLEASURE IN DOING THINGS: 0

## 2018-02-07 NOTE — PROGRESS NOTES
daily:yes. Diabetes eye check appt current(within 1year):yes. Improving factor:current diet & medication. Episodes of hypoglycemia:None reported. Following ADA diet. ASA:Yes. LDL <100:no. 113 on 3/31/17. 87 on 6/7/17. 83 on 9/15/17. 69 om 12/15/17. No other associated or worsening factors. Denies polyuria/polyphagia/polydipsia/BLE skin lesions/BLE paresthesia. HTN:is doing well. Taking med w/o side effects. Associated w/nothing new. Worsened by nothing reported. Improves with medications. Adds salt to food at the table:No.  Denies cp/sob/pnd/ankle edema/dizziness. Hyperlipidemia:see labs above:is doing well. Taking statin w/o side effects. Associated w/nothing new. Improving factors:statin & better diet to address his TG. Worsening factors:carbs. Denies adbo pain/myalgias. Moderate insomnia f/u:doing well. Taking atarax w/o side effects. Sleeping around  6-7hrs nightly.   Associated w/nothing else new. Worsened(aggravated) by nothing new. Improves with  med prn. Denies snoring/hallucinations/depression/SI/HI.       Allergic rhinitis:mild:doing well. No other new associated factors. Denies neck pain-stiffness/photophobia/fever/chills/appetite changes/rash/wheezing/cough/sob/sore throat/epistaxis.          Gout of both feet:reports doing well. Takes allopurinol w/o side effects. Last gout episode was >5yrs ago. No other new associated concerns. Denies left foot skin lesions/foot zvrejdyklpo-ickafzkh-czsfmkdcd/pus.       Atrial fibrillation:INR/long term anti-coag:doing well:Coumadin is per cards. Goal INR=2-3. Darral Anon No other associated concerns. Denies dizziness/syncope/presyncope/wheezing/nocturnal cough-wheezing/ankle/PND/radiation of pain/jaw pain.       No Known Allergies    Current Outpatient Prescriptions on File Prior to Visit   Medication Sig Dispense Refill    allopurinol (ZYLOPRIM) 300 MG tablet Take 1 tablet by mouth daily 90 tablet 0    metFORMIN

## 2018-03-07 ENCOUNTER — TELEPHONE (OUTPATIENT)
Dept: FAMILY MEDICINE CLINIC | Age: 68
End: 2018-03-07

## 2018-03-07 DIAGNOSIS — E78.5 HYPERLIPIDEMIA LDL GOAL <100: ICD-10-CM

## 2018-03-07 RX ORDER — MELOXICAM 15 MG/1
15 TABLET ORAL DAILY
Qty: 90 TABLET | Refills: 0 | Status: SHIPPED | OUTPATIENT
Start: 2018-03-07 | End: 2018-04-09 | Stop reason: SDUPTHER

## 2018-03-07 RX ORDER — PRAVASTATIN SODIUM 80 MG/1
80 TABLET ORAL DAILY
Qty: 90 TABLET | Refills: 0 | Status: SHIPPED | OUTPATIENT
Start: 2018-03-07 | End: 2018-06-22 | Stop reason: SDUPTHER

## 2018-03-22 DIAGNOSIS — E78.2 HYPERLIPIDEMIA, MIXED: ICD-10-CM

## 2018-03-22 DIAGNOSIS — E11.9 CONTROLLED TYPE 2 DIABETES MELLITUS WITHOUT COMPLICATION, WITHOUT LONG-TERM CURRENT USE OF INSULIN (HCC): ICD-10-CM

## 2018-03-22 DIAGNOSIS — I10 ESSENTIAL HYPERTENSION: ICD-10-CM

## 2018-03-22 LAB
A/G RATIO: 1.9 (ref 1.1–2.2)
ALBUMIN SERPL-MCNC: 4.4 G/DL (ref 3.4–5)
ALP BLD-CCNC: 83 U/L (ref 40–129)
ALT SERPL-CCNC: 19 U/L (ref 10–40)
ANION GAP SERPL CALCULATED.3IONS-SCNC: 14 MMOL/L (ref 3–16)
AST SERPL-CCNC: 18 U/L (ref 15–37)
BILIRUB SERPL-MCNC: 0.4 MG/DL (ref 0–1)
BUN BLDV-MCNC: 15 MG/DL (ref 7–20)
CALCIUM SERPL-MCNC: 8.9 MG/DL (ref 8.3–10.6)
CHLORIDE BLD-SCNC: 102 MMOL/L (ref 99–110)
CHOLESTEROL, TOTAL: 139 MG/DL (ref 0–199)
CO2: 29 MMOL/L (ref 21–32)
CREAT SERPL-MCNC: 1 MG/DL (ref 0.8–1.3)
GFR AFRICAN AMERICAN: >60
GFR NON-AFRICAN AMERICAN: >60
GLOBULIN: 2.3 G/DL
GLUCOSE BLD-MCNC: 117 MG/DL (ref 70–99)
HDLC SERPL-MCNC: 34 MG/DL (ref 40–60)
LDL CHOLESTEROL CALCULATED: 71 MG/DL
POTASSIUM SERPL-SCNC: 5 MMOL/L (ref 3.5–5.1)
SODIUM BLD-SCNC: 145 MMOL/L (ref 136–145)
TOTAL PROTEIN: 6.7 G/DL (ref 6.4–8.2)
TRIGL SERPL-MCNC: 171 MG/DL (ref 0–150)
VLDLC SERPL CALC-MCNC: 34 MG/DL

## 2018-03-23 LAB
ESTIMATED AVERAGE GLUCOSE: 157.1 MG/DL
HBA1C MFR BLD: 7.1 %

## 2018-04-09 ENCOUNTER — TELEPHONE (OUTPATIENT)
Dept: FAMILY MEDICINE CLINIC | Age: 68
End: 2018-04-09

## 2018-04-09 DIAGNOSIS — E11.9 CONTROLLED TYPE 2 DIABETES MELLITUS WITHOUT COMPLICATION, WITHOUT LONG-TERM CURRENT USE OF INSULIN (HCC): ICD-10-CM

## 2018-04-09 RX ORDER — METFORMIN HYDROCHLORIDE 500 MG/1
500 TABLET, EXTENDED RELEASE ORAL
Qty: 90 TABLET | Refills: 0 | Status: SHIPPED | OUTPATIENT
Start: 2018-04-09 | End: 2018-08-06 | Stop reason: SDUPTHER

## 2018-04-09 RX ORDER — ALLOPURINOL 300 MG/1
300 TABLET ORAL DAILY
Qty: 90 TABLET | Refills: 0 | Status: SHIPPED | OUTPATIENT
Start: 2018-04-09 | End: 2018-08-06 | Stop reason: SDUPTHER

## 2018-04-09 RX ORDER — MELOXICAM 15 MG/1
15 TABLET ORAL DAILY
Qty: 90 TABLET | Refills: 0 | Status: SHIPPED | OUTPATIENT
Start: 2018-04-09 | End: 2018-08-22 | Stop reason: SDUPTHER

## 2018-05-08 ENCOUNTER — OFFICE VISIT (OUTPATIENT)
Dept: FAMILY MEDICINE CLINIC | Age: 68
End: 2018-05-08

## 2018-05-08 VITALS
BODY MASS INDEX: 38.51 KG/M2 | RESPIRATION RATE: 16 BRPM | SYSTOLIC BLOOD PRESSURE: 132 MMHG | DIASTOLIC BLOOD PRESSURE: 81 MMHG | HEART RATE: 83 BPM | TEMPERATURE: 98.7 F | OXYGEN SATURATION: 98 % | HEIGHT: 73 IN | WEIGHT: 290.6 LBS

## 2018-05-08 DIAGNOSIS — I10 ESSENTIAL HYPERTENSION: ICD-10-CM

## 2018-05-08 DIAGNOSIS — F17.200 TOBACCO USE DISORDER: ICD-10-CM

## 2018-05-08 DIAGNOSIS — Z12.11 COLON CANCER SCREENING: ICD-10-CM

## 2018-05-08 DIAGNOSIS — M10.9 GOUT OF BOTH FEET: ICD-10-CM

## 2018-05-08 DIAGNOSIS — E78.2 HYPERLIPIDEMIA, MIXED: ICD-10-CM

## 2018-05-08 DIAGNOSIS — F51.01 PRIMARY INSOMNIA: ICD-10-CM

## 2018-05-08 DIAGNOSIS — Z79.01 LONG TERM CURRENT USE OF ANTICOAGULANTS WITH INR GOAL OF 2.0-3.0: ICD-10-CM

## 2018-05-08 DIAGNOSIS — J30.1 CHRONIC SEASONAL ALLERGIC RHINITIS DUE TO POLLEN: ICD-10-CM

## 2018-05-08 PROCEDURE — G8427 DOCREV CUR MEDS BY ELIG CLIN: HCPCS | Performed by: FAMILY MEDICINE

## 2018-05-08 PROCEDURE — 3045F PR MOST RECENT HEMOGLOBIN A1C LEVEL 7.0-9.0%: CPT | Performed by: FAMILY MEDICINE

## 2018-05-08 PROCEDURE — 1123F ACP DISCUSS/DSCN MKR DOCD: CPT | Performed by: FAMILY MEDICINE

## 2018-05-08 PROCEDURE — G8417 CALC BMI ABV UP PARAM F/U: HCPCS | Performed by: FAMILY MEDICINE

## 2018-05-08 PROCEDURE — 4040F PNEUMOC VAC/ADMIN/RCVD: CPT | Performed by: FAMILY MEDICINE

## 2018-05-08 PROCEDURE — 2022F DILAT RTA XM EVC RTNOPTHY: CPT | Performed by: FAMILY MEDICINE

## 2018-05-08 PROCEDURE — 4004F PT TOBACCO SCREEN RCVD TLK: CPT | Performed by: FAMILY MEDICINE

## 2018-05-08 PROCEDURE — 99214 OFFICE O/P EST MOD 30 MIN: CPT | Performed by: FAMILY MEDICINE

## 2018-05-08 PROCEDURE — 3017F COLORECTAL CA SCREEN DOC REV: CPT | Performed by: FAMILY MEDICINE

## 2018-05-08 PROCEDURE — 82274 ASSAY TEST FOR BLOOD FECAL: CPT | Performed by: FAMILY MEDICINE

## 2018-05-08 ASSESSMENT — ENCOUNTER SYMPTOMS
VOMITING: 0
RECTAL PAIN: 0
DIARRHEA: 0
BLOOD IN STOOL: 0
CHOKING: 0
SHORTNESS OF BREATH: 0
APNEA: 0
CHEST TIGHTNESS: 0
STRIDOR: 0
COUGH: 0
CONSTIPATION: 0
NAUSEA: 0
WHEEZING: 0
ABDOMINAL DISTENTION: 0
ANAL BLEEDING: 0
ABDOMINAL PAIN: 0

## 2018-06-22 DIAGNOSIS — E78.5 HYPERLIPIDEMIA LDL GOAL <100: ICD-10-CM

## 2018-06-22 RX ORDER — PRAVASTATIN SODIUM 80 MG/1
80 TABLET ORAL DAILY
Qty: 90 TABLET | Refills: 0 | Status: SHIPPED | OUTPATIENT
Start: 2018-06-22 | End: 2018-09-17 | Stop reason: SDUPTHER

## 2018-08-03 ENCOUNTER — TELEPHONE (OUTPATIENT)
Dept: FAMILY MEDICINE CLINIC | Age: 68
End: 2018-08-03

## 2018-08-03 DIAGNOSIS — R19.5 POSITIVE FIT (FECAL IMMUNOCHEMICAL TEST): Primary | ICD-10-CM

## 2018-08-03 LAB
CONTROL: NORMAL
HEMOCCULT STL QL: POSITIVE

## 2018-08-06 ENCOUNTER — TELEPHONE (OUTPATIENT)
Dept: FAMILY MEDICINE CLINIC | Age: 68
End: 2018-08-06

## 2018-08-06 DIAGNOSIS — E11.9 CONTROLLED TYPE 2 DIABETES MELLITUS WITHOUT COMPLICATION, WITHOUT LONG-TERM CURRENT USE OF INSULIN (HCC): ICD-10-CM

## 2018-08-06 RX ORDER — METFORMIN HYDROCHLORIDE 500 MG/1
500 TABLET, EXTENDED RELEASE ORAL
Qty: 90 TABLET | Refills: 0 | Status: SHIPPED | OUTPATIENT
Start: 2018-08-06 | End: 2018-10-05 | Stop reason: SDUPTHER

## 2018-08-06 RX ORDER — ALLOPURINOL 300 MG/1
300 TABLET ORAL DAILY
Qty: 90 TABLET | Refills: 0 | Status: SHIPPED | OUTPATIENT
Start: 2018-08-06 | End: 2018-10-11 | Stop reason: SDUPTHER

## 2018-08-08 ENCOUNTER — OFFICE VISIT (OUTPATIENT)
Dept: FAMILY MEDICINE CLINIC | Age: 68
End: 2018-08-08

## 2018-08-08 VITALS
HEART RATE: 83 BPM | RESPIRATION RATE: 16 BRPM | BODY MASS INDEX: 38.4 KG/M2 | HEIGHT: 73 IN | DIASTOLIC BLOOD PRESSURE: 81 MMHG | WEIGHT: 289.7 LBS | TEMPERATURE: 98.8 F | OXYGEN SATURATION: 100 % | SYSTOLIC BLOOD PRESSURE: 132 MMHG

## 2018-08-08 DIAGNOSIS — I10 ESSENTIAL HYPERTENSION: ICD-10-CM

## 2018-08-08 DIAGNOSIS — Z79.01 LONG TERM CURRENT USE OF ANTICOAGULANTS WITH INR GOAL OF 2.0-3.0: ICD-10-CM

## 2018-08-08 DIAGNOSIS — R19.5 POSITIVE FIT (FECAL IMMUNOCHEMICAL TEST): ICD-10-CM

## 2018-08-08 DIAGNOSIS — J30.1 SEASONAL ALLERGIC RHINITIS DUE TO POLLEN: ICD-10-CM

## 2018-08-08 DIAGNOSIS — M10.9 GOUT OF BOTH FEET: ICD-10-CM

## 2018-08-08 DIAGNOSIS — E66.09 CLASS 2 OBESITY DUE TO EXCESS CALORIES WITHOUT SERIOUS COMORBIDITY WITH BODY MASS INDEX (BMI) OF 37.0 TO 37.9 IN ADULT: ICD-10-CM

## 2018-08-08 DIAGNOSIS — E78.2 HYPERLIPIDEMIA, MIXED: ICD-10-CM

## 2018-08-08 PROCEDURE — 4004F PT TOBACCO SCREEN RCVD TLK: CPT | Performed by: FAMILY MEDICINE

## 2018-08-08 PROCEDURE — 2022F DILAT RTA XM EVC RTNOPTHY: CPT | Performed by: FAMILY MEDICINE

## 2018-08-08 PROCEDURE — 99214 OFFICE O/P EST MOD 30 MIN: CPT | Performed by: FAMILY MEDICINE

## 2018-08-08 PROCEDURE — 3017F COLORECTAL CA SCREEN DOC REV: CPT | Performed by: FAMILY MEDICINE

## 2018-08-08 PROCEDURE — G8427 DOCREV CUR MEDS BY ELIG CLIN: HCPCS | Performed by: FAMILY MEDICINE

## 2018-08-08 PROCEDURE — 4040F PNEUMOC VAC/ADMIN/RCVD: CPT | Performed by: FAMILY MEDICINE

## 2018-08-08 PROCEDURE — 1101F PT FALLS ASSESS-DOCD LE1/YR: CPT | Performed by: FAMILY MEDICINE

## 2018-08-08 PROCEDURE — 3045F PR MOST RECENT HEMOGLOBIN A1C LEVEL 7.0-9.0%: CPT | Performed by: FAMILY MEDICINE

## 2018-08-08 PROCEDURE — G8417 CALC BMI ABV UP PARAM F/U: HCPCS | Performed by: FAMILY MEDICINE

## 2018-08-08 PROCEDURE — 1123F ACP DISCUSS/DSCN MKR DOCD: CPT | Performed by: FAMILY MEDICINE

## 2018-08-08 ASSESSMENT — PATIENT HEALTH QUESTIONNAIRE - PHQ9
2. FEELING DOWN, DEPRESSED OR HOPELESS: 0
SUM OF ALL RESPONSES TO PHQ QUESTIONS 1-9: 0
SUM OF ALL RESPONSES TO PHQ9 QUESTIONS 1 & 2: 0
SUM OF ALL RESPONSES TO PHQ QUESTIONS 1-9: 0
1. LITTLE INTEREST OR PLEASURE IN DOING THINGS: 0

## 2018-08-08 ASSESSMENT — ENCOUNTER SYMPTOMS
BLOOD IN STOOL: 0
RECTAL PAIN: 0
VOMITING: 0
ABDOMINAL DISTENTION: 0
DIARRHEA: 0
SHORTNESS OF BREATH: 0
ABDOMINAL PAIN: 0
STRIDOR: 0
NAUSEA: 0
CHEST TIGHTNESS: 0
CHOKING: 0
ANAL BLEEDING: 0
WHEEZING: 0
APNEA: 0
COUGH: 0
CONSTIPATION: 0

## 2018-08-08 NOTE — PROGRESS NOTES
cough-wheezing/ankle/PND/radiation of pain/jaw pain. Allergic rhinitis:mild:doing well. No other new associated factors. Denies neck pain-stiffness/photophobia/fever/chills/appetite changes/rash/wheezing/cough/sob/sore throat/epistaxis. No Known Allergies    Current Outpatient Prescriptions on File Prior to Visit   Medication Sig Dispense Refill    glucose monitoring kit (FREESTYLE) monitoring kit 1 each by Does not apply route once for 1 dose Glucometer(patient's choice). BID testing. 1 kit 0    allopurinol (ZYLOPRIM) 300 MG tablet Take 1 tablet by mouth daily 90 tablet 0    metFORMIN (GLUCOPHAGE XR) 500 MG extended release tablet Take 1 tablet by mouth daily (with breakfast) 90 tablet 0    pravastatin (PRAVACHOL) 80 MG tablet Take 1 tablet by mouth daily For cholesterol 90 tablet 0    meloxicam (MOBIC) 15 MG tablet Take 1 tablet by mouth daily 90 tablet 0    Glucose Blood (BLOOD GLUCOSE TEST STRIPS) STRP BID testing 100 strip 2    Lancets MISC BID testing 100 each 6    lisinopril (PRINIVIL;ZESTRIL) 20 MG tablet Take 30 mg by mouth daily       furosemide (LASIX) 40 MG tablet Take 40 mg by mouth daily.  digoxin (LANOXIN) 0.25 MG tablet Take 250 mcg by mouth daily.  warfarin (COUMADIN) 10 MG tablet Take 10 mg by mouth daily Pt takes Mon, Thurs, and Sat= 10mg; 15 mg all other days      sotalol (BETAPACE) 80 MG tablet Take 80 mg by mouth 2 times daily.  aspirin 81 MG chewable tablet Take 81 mg by mouth daily. No current facility-administered medications on file prior to visit.         Past Medical History:   Diagnosis Date    Allergic rhinitis 7/11/2016    Atrial fibrillation ([facility]     under care of cardiology:Dr. Ardia Skeans Behrens([facility])    Class 2 obesity due to excess calories without serious comorbidity with body mass index (BMI) of 37.0 to 37.9 in adult 11/7/2017    Controlled type 2 diabetes mellitus without complication, without long-term current use of insulin ([facility] 2/24/2017    Gout     Hyperlipidemia, mixed 07/11/2016    Hypertension     under care of cardiology:Dr. Selma Larch Behrens([facility])    Long term current use of anticoagulants with INR goal of 2.0-3.0     under care of cardiology:Dr. Selma Larch Behrens([facility])    Parkinson disease [facility])     9/2016:Per neurologist dx is tremor & not consistent with Parkison's:Dr. Lauren Jansen Prediabetes 10/28/2016    Primary insomnia 1/25/2017             Social History   Substance Use Topics    Smoking status: Current Every Day Smoker     Packs/day: 1.00     Years: 30.00     Types: Cigarettes    Smokeless tobacco: Never Used    Alcohol use Yes      Comment: rarely     History   Drug Use No             Review of Systems   Constitutional: Negative for activity change, appetite change, chills, diaphoresis, fatigue, fever and unexpected weight change. Respiratory: Negative for apnea, cough, choking, chest tightness, shortness of breath, wheezing and stridor. Cardiovascular: Negative for chest pain, palpitations and leg swelling. Gastrointestinal: Negative for abdominal distention, abdominal pain, anal bleeding, blood in stool, constipation, diarrhea, nausea, rectal pain and vomiting. Endocrine: Negative for cold intolerance, heat intolerance, polydipsia, polyphagia and polyuria. Genitourinary: Negative for difficulty urinating. Skin: Negative for rash and wound. Neurological: Negative for dizziness and light-headedness. Hematological: Negative for adenopathy. Psychiatric/Behavioral: Negative for sleep disturbance. Objective:   Physical Exam   Constitutional: He is oriented to person, place, and time. Vital signs are normal. He appears well-developed and well-nourished. He is cooperative. He does not have a sickly appearance. No distress.    HENT:   Nose: Nose normal.   Mouth/Throat: Uvula is midline, oropharynx is clear and moist and mucous membranes are normal.   Hearing intact to nml conversation   Eyes: Stable. 5. Seasonal allergic rhinitis due to pollen  Stable. 6. Long term current use of anticoagulants with INR goal of 2.0-3.0  Per cards. 7. Class 2 obesity due to excess calories without serious comorbidity with body mass index (BMI) of 37.0 to 37.9 in adult  Diet & exercise regime reviewed. 8. +FIT test:pt' states he will be calling GI to schedule colonoscopy this month. Plan:           Pt' left office in good condition. Obtain labs/diagnostic tests as discussed today & call back for results within 2-7days. Advised to go to local ER or call 911 for any worrisome signs/sx.

## 2018-08-22 ENCOUNTER — TELEPHONE (OUTPATIENT)
Dept: FAMILY MEDICINE CLINIC | Age: 68
End: 2018-08-22

## 2018-08-22 RX ORDER — MELOXICAM 15 MG/1
15 TABLET ORAL DAILY
Qty: 90 TABLET | Refills: 0 | Status: SHIPPED | OUTPATIENT
Start: 2018-08-22 | End: 2018-11-21 | Stop reason: SDUPTHER

## 2018-08-22 NOTE — TELEPHONE ENCOUNTER
[facility] [place]  meloxicam (MOBIC) 15 MG tablet 90 day supply  Please advise

## 2018-08-30 DIAGNOSIS — I10 ESSENTIAL HYPERTENSION: ICD-10-CM

## 2018-08-30 DIAGNOSIS — E78.2 HYPERLIPIDEMIA, MIXED: ICD-10-CM

## 2018-08-31 LAB
A/G RATIO: 1.7 (ref 1.1–2.2)
ALBUMIN SERPL-MCNC: 4.7 G/DL (ref 3.4–5)
ALP BLD-CCNC: 94 U/L (ref 40–129)
ALT SERPL-CCNC: 27 U/L (ref 10–40)
ANION GAP SERPL CALCULATED.3IONS-SCNC: 15 MMOL/L (ref 3–16)
AST SERPL-CCNC: 24 U/L (ref 15–37)
BILIRUB SERPL-MCNC: 0.7 MG/DL (ref 0–1)
BUN BLDV-MCNC: 21 MG/DL (ref 7–20)
CALCIUM SERPL-MCNC: 9.9 MG/DL (ref 8.3–10.6)
CHLORIDE BLD-SCNC: 101 MMOL/L (ref 99–110)
CHOLESTEROL, TOTAL: 157 MG/DL (ref 0–199)
CO2: 29 MMOL/L (ref 21–32)
CREAT SERPL-MCNC: 1.1 MG/DL (ref 0.8–1.3)
CREATININE URINE: 133.6 MG/DL (ref 39–259)
ESTIMATED AVERAGE GLUCOSE: 159.9 MG/DL
GFR AFRICAN AMERICAN: >60
GFR NON-AFRICAN AMERICAN: >60
GLOBULIN: 2.8 G/DL
GLUCOSE BLD-MCNC: 123 MG/DL (ref 70–99)
HBA1C MFR BLD: 7.2 %
HDLC SERPL-MCNC: 31 MG/DL (ref 40–60)
LDL CHOLESTEROL CALCULATED: 82 MG/DL
MICROALBUMIN UR-MCNC: 11.8 MG/DL
MICROALBUMIN/CREAT UR-RTO: 88.3 MG/G (ref 0–30)
POTASSIUM SERPL-SCNC: 4.9 MMOL/L (ref 3.5–5.1)
SODIUM BLD-SCNC: 145 MMOL/L (ref 136–145)
TOTAL PROTEIN: 7.5 G/DL (ref 6.4–8.2)
TRIGL SERPL-MCNC: 220 MG/DL (ref 0–150)
VLDLC SERPL CALC-MCNC: 44 MG/DL

## 2018-09-17 ENCOUNTER — TELEPHONE (OUTPATIENT)
Dept: FAMILY MEDICINE CLINIC | Age: 68
End: 2018-09-17

## 2018-09-17 DIAGNOSIS — E78.5 HYPERLIPIDEMIA LDL GOAL <100: ICD-10-CM

## 2018-09-17 RX ORDER — PRAVASTATIN SODIUM 80 MG/1
80 TABLET ORAL DAILY
Qty: 90 TABLET | Refills: 0 | Status: SHIPPED | OUTPATIENT
Start: 2018-09-17 | End: 2018-10-11 | Stop reason: SDUPTHER

## 2018-10-05 ENCOUNTER — OFFICE VISIT (OUTPATIENT)
Dept: FAMILY MEDICINE CLINIC | Age: 68
End: 2018-10-05
Payer: MEDICARE

## 2018-10-05 VITALS
DIASTOLIC BLOOD PRESSURE: 82 MMHG | RESPIRATION RATE: 16 BRPM | OXYGEN SATURATION: 97 % | SYSTOLIC BLOOD PRESSURE: 131 MMHG | HEIGHT: 73 IN | BODY MASS INDEX: 38.04 KG/M2 | TEMPERATURE: 98.6 F | HEART RATE: 82 BPM | WEIGHT: 287 LBS

## 2018-10-05 DIAGNOSIS — Z12.2 ENCOUNTER FOR SCREENING FOR LUNG CANCER: ICD-10-CM

## 2018-10-05 DIAGNOSIS — E78.2 HYPERLIPIDEMIA, MIXED: ICD-10-CM

## 2018-10-05 DIAGNOSIS — R19.5 POSITIVE FIT (FECAL IMMUNOCHEMICAL TEST): ICD-10-CM

## 2018-10-05 DIAGNOSIS — F17.200 TOBACCO USE DISORDER: ICD-10-CM

## 2018-10-05 DIAGNOSIS — I65.22 CAROTID STENOSIS, LEFT: ICD-10-CM

## 2018-10-05 DIAGNOSIS — E66.09 CLASS 2 OBESITY DUE TO EXCESS CALORIES WITHOUT SERIOUS COMORBIDITY WITH BODY MASS INDEX (BMI) OF 37.0 TO 37.9 IN ADULT: ICD-10-CM

## 2018-10-05 DIAGNOSIS — I10 ESSENTIAL HYPERTENSION: ICD-10-CM

## 2018-10-05 DIAGNOSIS — J30.1 SEASONAL ALLERGIC RHINITIS DUE TO POLLEN: ICD-10-CM

## 2018-10-05 DIAGNOSIS — F51.01 PRIMARY INSOMNIA: ICD-10-CM

## 2018-10-05 DIAGNOSIS — Z79.01 LONG TERM CURRENT USE OF ANTICOAGULANTS WITH INR GOAL OF 2.0-3.0: ICD-10-CM

## 2018-10-05 DIAGNOSIS — M10.9 GOUT OF BOTH FEET: ICD-10-CM

## 2018-10-05 DIAGNOSIS — R06.2 WHEEZING: ICD-10-CM

## 2018-10-05 PROCEDURE — 99214 OFFICE O/P EST MOD 30 MIN: CPT | Performed by: FAMILY MEDICINE

## 2018-10-05 RX ORDER — METFORMIN HYDROCHLORIDE 500 MG/1
1000 TABLET, EXTENDED RELEASE ORAL
Qty: 180 TABLET | Refills: 0 | Status: SHIPPED | OUTPATIENT
Start: 2018-10-05 | End: 2018-12-04 | Stop reason: SDUPTHER

## 2018-10-05 RX ORDER — METFORMIN HYDROCHLORIDE 1000 MG/1
1000 TABLET, FILM COATED, EXTENDED RELEASE ORAL
Qty: 90 TABLET | Refills: 0 | Status: SHIPPED | OUTPATIENT
Start: 2018-10-05 | End: 2018-12-04

## 2018-10-05 RX ORDER — ALBUTEROL SULFATE 90 UG/1
2 AEROSOL, METERED RESPIRATORY (INHALATION) EVERY 6 HOURS PRN
Qty: 3 INHALER | Refills: 0 | Status: SHIPPED | OUTPATIENT
Start: 2018-10-05 | End: 2020-05-15

## 2018-10-05 ASSESSMENT — ENCOUNTER SYMPTOMS
WHEEZING: 1
ANAL BLEEDING: 0
ABDOMINAL DISTENTION: 0
CHOKING: 0
ABDOMINAL PAIN: 0
COUGH: 0
CONSTIPATION: 0
BLOOD IN STOOL: 0
RECTAL PAIN: 0
SHORTNESS OF BREATH: 0
VOMITING: 0
APNEA: 0
NAUSEA: 0
STRIDOR: 0
DIARRHEA: 0
CHEST TIGHTNESS: 0

## 2018-10-05 NOTE — PROGRESS NOTES
not hyperactive. Objective:   Physical Exam   Constitutional: He is oriented to person, place, and time. He appears well-developed and well-nourished. He is cooperative. He does not have a sickly appearance. No distress. HENT:   Nose: Nose normal.   Mouth/Throat: Uvula is midline, oropharynx is clear and moist and mucous membranes are normal.   Hearing intact to nml conversation   Eyes: Pupils are equal, round, and reactive to light. Conjunctivae, EOM and lids are normal.   Neck: Trachea normal and normal range of motion. Neck supple. Cardiovascular: Normal rate, regular rhythm, normal heart sounds, intact distal pulses and normal pulses. Pulmonary/Chest: Effort normal and breath sounds normal.   CTAB,good AE bilaterally   Abdominal: Soft. Normal appearance and bowel sounds are normal. He exhibits no distension and no mass. There is no splenomegaly or hepatomegaly. There is no tenderness. There is no rebound and no guarding. Lymphadenopathy:     He has no cervical adenopathy. No supraclavicular LAD. Neurological: He is alert and oriented to person, place, and time. Skin: Skin is warm, dry and intact. Good skin turgor. Capillary refill=2-3 secs. Psychiatric: He has a normal mood and affect. Assessment:       Diagnosis Orders   1. Uncontrolled type 2 diabetes mellitus with microalbuminuria, without long-term current use of insulin ([facility])  VSS/well appearing. Not at goal.  Increase glucophage XR to 1000mg. A1c in 2mos. Diabetes:Check feet daily. Yrly eye checks advised. Education: Reviewed ABCs of diabetes management (respective goals in parentheses):  A1C (<7), blood pressure (<130/80), and cholesterol (LDL <100). Counseled at this visit on the following: diabetes complication prevention, foot care. metFORMIN, MOD, (GLUMETZA) 1000 MG extended release tablet:cancelled script not covered. metFORMIN (GLUCOPHAGE XR) 500 MG x2tabs extended release tablet   2.  Essential

## 2018-10-10 ENCOUNTER — TELEPHONE (OUTPATIENT)
Dept: FAMILY MEDICINE CLINIC | Age: 68
End: 2018-10-10

## 2018-10-11 DIAGNOSIS — E78.5 HYPERLIPIDEMIA LDL GOAL <100: ICD-10-CM

## 2018-10-11 RX ORDER — ALLOPURINOL 300 MG/1
TABLET ORAL
Qty: 90 TABLET | Refills: 0 | Status: SHIPPED | OUTPATIENT
Start: 2018-10-11 | End: 2019-01-15 | Stop reason: SDUPTHER

## 2018-10-11 RX ORDER — PRAVASTATIN SODIUM 80 MG/1
TABLET ORAL
Qty: 90 TABLET | Refills: 0 | Status: SHIPPED | OUTPATIENT
Start: 2018-10-11 | End: 2019-03-04 | Stop reason: SDUPTHER

## 2018-10-15 DIAGNOSIS — J32.9 RECURRENT SINUSITIS: Primary | ICD-10-CM

## 2018-10-16 ENCOUNTER — HOSPITAL ENCOUNTER (OUTPATIENT)
Dept: CT IMAGING | Age: 68
Discharge: HOME | End: 2018-10-16
Payer: MEDICARE

## 2018-10-16 ENCOUNTER — HOSPITAL ENCOUNTER (OUTPATIENT)
Dept: VASCULAR LAB | Age: 68
Discharge: HOME | End: 2018-10-16
Payer: MEDICARE

## 2018-10-16 ENCOUNTER — HOSPITAL ENCOUNTER (OUTPATIENT)
Dept: PULMONOLOGY | Age: 68
Discharge: HOME | End: 2018-10-16
Payer: MEDICARE

## 2018-10-16 VITALS — OXYGEN SATURATION: 94 %

## 2018-10-16 DIAGNOSIS — I65.22 CAROTID STENOSIS, LEFT: ICD-10-CM

## 2018-10-16 DIAGNOSIS — Z12.2 ENCOUNTER FOR SCREENING FOR LUNG CANCER: ICD-10-CM

## 2018-10-16 DIAGNOSIS — R06.2 WHEEZING: ICD-10-CM

## 2018-10-16 DIAGNOSIS — F17.200 TOBACCO USE DISORDER: ICD-10-CM

## 2018-10-16 PROCEDURE — 93880 EXTRACRANIAL BILAT STUDY: CPT

## 2018-10-16 PROCEDURE — 6370000000 HC RX 637 (ALT 250 FOR IP): Performed by: FAMILY MEDICINE

## 2018-10-16 PROCEDURE — 94664 DEMO&/EVAL PT USE INHALER: CPT

## 2018-10-16 PROCEDURE — 94760 N-INVAS EAR/PLS OXIMETRY 1: CPT

## 2018-10-16 PROCEDURE — 94726 PLETHYSMOGRAPHY LUNG VOLUMES: CPT

## 2018-10-16 PROCEDURE — 94060 EVALUATION OF WHEEZING: CPT

## 2018-10-16 PROCEDURE — G0297 LDCT FOR LUNG CA SCREEN: HCPCS

## 2018-10-16 PROCEDURE — 94729 DIFFUSING CAPACITY: CPT

## 2018-10-16 PROCEDURE — 94010 BREATHING CAPACITY TEST: CPT

## 2018-10-16 RX ORDER — ALBUTEROL SULFATE 90 UG/1
4 AEROSOL, METERED RESPIRATORY (INHALATION) ONCE
Status: COMPLETED | OUTPATIENT
Start: 2018-10-16 | End: 2018-10-16

## 2018-10-16 RX ADMIN — Medication 4 PUFF: at 15:19

## 2018-10-17 ENCOUNTER — TELEPHONE (OUTPATIENT)
Dept: CASE MANAGEMENT | Age: 68
End: 2018-10-17

## 2018-10-17 ENCOUNTER — OFFICE VISIT (OUTPATIENT)
Dept: ENT CLINIC | Age: 68
End: 2018-10-17
Payer: MEDICARE

## 2018-10-17 VITALS — OXYGEN SATURATION: 96 % | SYSTOLIC BLOOD PRESSURE: 130 MMHG | DIASTOLIC BLOOD PRESSURE: 70 MMHG | HEART RATE: 79 BPM

## 2018-10-17 DIAGNOSIS — J30.9 ALLERGIC SINUSITIS: ICD-10-CM

## 2018-10-17 DIAGNOSIS — J01.01 ACUTE RECURRENT MAXILLARY SINUSITIS: Primary | ICD-10-CM

## 2018-10-17 PROCEDURE — G8417 CALC BMI ABV UP PARAM F/U: HCPCS | Performed by: OTOLARYNGOLOGY

## 2018-10-17 PROCEDURE — 4004F PT TOBACCO SCREEN RCVD TLK: CPT | Performed by: OTOLARYNGOLOGY

## 2018-10-17 PROCEDURE — 4040F PNEUMOC VAC/ADMIN/RCVD: CPT | Performed by: OTOLARYNGOLOGY

## 2018-10-17 PROCEDURE — 99203 OFFICE O/P NEW LOW 30 MIN: CPT | Performed by: OTOLARYNGOLOGY

## 2018-10-17 PROCEDURE — 3017F COLORECTAL CA SCREEN DOC REV: CPT | Performed by: OTOLARYNGOLOGY

## 2018-10-17 PROCEDURE — G8427 DOCREV CUR MEDS BY ELIG CLIN: HCPCS | Performed by: OTOLARYNGOLOGY

## 2018-10-17 PROCEDURE — 1101F PT FALLS ASSESS-DOCD LE1/YR: CPT | Performed by: OTOLARYNGOLOGY

## 2018-10-17 PROCEDURE — G8598 ASA/ANTIPLAT THER USED: HCPCS | Performed by: OTOLARYNGOLOGY

## 2018-10-17 PROCEDURE — G8484 FLU IMMUNIZE NO ADMIN: HCPCS | Performed by: OTOLARYNGOLOGY

## 2018-10-17 PROCEDURE — 1123F ACP DISCUSS/DSCN MKR DOCD: CPT | Performed by: OTOLARYNGOLOGY

## 2018-10-17 RX ORDER — MONTELUKAST SODIUM 10 MG/1
10 TABLET ORAL NIGHTLY
Qty: 30 TABLET | Refills: 1 | Status: SHIPPED | OUTPATIENT
Start: 2018-10-17 | End: 2018-11-14 | Stop reason: SDUPTHER

## 2018-10-17 RX ORDER — AMOXICILLIN AND CLAVULANATE POTASSIUM 875; 125 MG/1; MG/1
1 TABLET, FILM COATED ORAL 2 TIMES DAILY
Qty: 28 TABLET | Refills: 0 | Status: SHIPPED | OUTPATIENT
Start: 2018-10-17 | End: 2018-10-31

## 2018-10-17 RX ORDER — FLUTICASONE PROPIONATE 50 MCG
2 SPRAY, SUSPENSION (ML) NASAL DAILY
Qty: 1 BOTTLE | Refills: 1 | Status: SHIPPED | OUTPATIENT
Start: 2018-10-17 | End: 2018-11-14 | Stop reason: SDUPTHER

## 2018-10-17 ASSESSMENT — ENCOUNTER SYMPTOMS
SORE THROAT: 0
FACIAL SWELLING: 0
TROUBLE SWALLOWING: 0
SINUS PRESSURE: 1
EYES NEGATIVE: 1
RHINORRHEA: 1
RESPIRATORY NEGATIVE: 1
ALLERGIC/IMMUNOLOGIC NEGATIVE: 1
VOICE CHANGE: 0
SINUS PAIN: 1

## 2018-11-05 ENCOUNTER — OFFICE VISIT (OUTPATIENT)
Dept: FAMILY MEDICINE CLINIC | Age: 68
End: 2018-11-05
Payer: MEDICARE

## 2018-11-05 VITALS
WEIGHT: 286.4 LBS | BODY MASS INDEX: 37.96 KG/M2 | HEART RATE: 84 BPM | SYSTOLIC BLOOD PRESSURE: 132 MMHG | DIASTOLIC BLOOD PRESSURE: 81 MMHG | HEIGHT: 73 IN | OXYGEN SATURATION: 98 % | RESPIRATION RATE: 16 BRPM

## 2018-11-05 DIAGNOSIS — F17.200 TOBACCO USE DISORDER: ICD-10-CM

## 2018-11-05 DIAGNOSIS — I10 ESSENTIAL HYPERTENSION: ICD-10-CM

## 2018-11-05 DIAGNOSIS — J43.8 OTHER EMPHYSEMA (HCC): Primary | ICD-10-CM

## 2018-11-05 PROCEDURE — 2022F DILAT RTA XM EVC RTNOPTHY: CPT | Performed by: FAMILY MEDICINE

## 2018-11-05 PROCEDURE — 3045F PR MOST RECENT HEMOGLOBIN A1C LEVEL 7.0-9.0%: CPT | Performed by: FAMILY MEDICINE

## 2018-11-05 PROCEDURE — 3017F COLORECTAL CA SCREEN DOC REV: CPT | Performed by: FAMILY MEDICINE

## 2018-11-05 PROCEDURE — 99213 OFFICE O/P EST LOW 20 MIN: CPT | Performed by: FAMILY MEDICINE

## 2018-11-05 PROCEDURE — 3023F SPIROM DOC REV: CPT | Performed by: FAMILY MEDICINE

## 2018-11-05 PROCEDURE — 1123F ACP DISCUSS/DSCN MKR DOCD: CPT | Performed by: FAMILY MEDICINE

## 2018-11-05 PROCEDURE — 4004F PT TOBACCO SCREEN RCVD TLK: CPT | Performed by: FAMILY MEDICINE

## 2018-11-05 PROCEDURE — G8484 FLU IMMUNIZE NO ADMIN: HCPCS | Performed by: FAMILY MEDICINE

## 2018-11-05 PROCEDURE — 1101F PT FALLS ASSESS-DOCD LE1/YR: CPT | Performed by: FAMILY MEDICINE

## 2018-11-05 PROCEDURE — G8427 DOCREV CUR MEDS BY ELIG CLIN: HCPCS | Performed by: FAMILY MEDICINE

## 2018-11-05 PROCEDURE — G8598 ASA/ANTIPLAT THER USED: HCPCS | Performed by: FAMILY MEDICINE

## 2018-11-05 PROCEDURE — G8417 CALC BMI ABV UP PARAM F/U: HCPCS | Performed by: FAMILY MEDICINE

## 2018-11-05 PROCEDURE — 4040F PNEUMOC VAC/ADMIN/RCVD: CPT | Performed by: FAMILY MEDICINE

## 2018-11-05 PROCEDURE — G8926 SPIRO NO PERF OR DOC: HCPCS | Performed by: FAMILY MEDICINE

## 2018-11-05 RX ORDER — FLUTICASONE FUROATE AND VILANTEROL 100; 25 UG/1; UG/1
1 POWDER RESPIRATORY (INHALATION) DAILY
Qty: 3 EACH | Refills: 0 | Status: SHIPPED | OUTPATIENT
Start: 2018-11-05 | End: 2020-05-15

## 2018-11-05 ASSESSMENT — ENCOUNTER SYMPTOMS
CHEST TIGHTNESS: 0
ANAL BLEEDING: 0
WHEEZING: 0
ABDOMINAL PAIN: 0
ABDOMINAL DISTENTION: 0
SHORTNESS OF BREATH: 0
STRIDOR: 0
BLOOD IN STOOL: 0
COUGH: 0
DIARRHEA: 0
CONSTIPATION: 0
NAUSEA: 0
VOMITING: 0
RECTAL PAIN: 0
CHOKING: 0
APNEA: 0

## 2018-11-05 ASSESSMENT — PATIENT HEALTH QUESTIONNAIRE - PHQ9
SUM OF ALL RESPONSES TO PHQ QUESTIONS 1-9: 0
SUM OF ALL RESPONSES TO PHQ QUESTIONS 1-9: 0
SUM OF ALL RESPONSES TO PHQ9 QUESTIONS 1 & 2: 0
2. FEELING DOWN, DEPRESSED OR HOPELESS: 0
1. LITTLE INTEREST OR PLEASURE IN DOING THINGS: 0

## 2018-11-05 NOTE — PATIENT INSTRUCTIONS
Patient Education        Learning About Diabetes Food Guidelines  Your Care Instructions    Meal planning is important to manage diabetes. It helps keep your blood sugar at a target level (which you set with your doctor). You don't have to eat special foods. You can eat what your family eats, including sweets once in a while. But you do have to pay attention to how often you eat and how much you eat of certain foods. You may want to work with a dietitian or a certified diabetes educator (CDE) to help you plan meals and snacks. A dietitian or CDE can also help you lose weight if that is one of your goals. What should you know about eating carbs? Managing the amount of carbohydrate (carbs) you eat is an important part of healthy meals when you have diabetes. Carbohydrate is found in many foods. · Learn which foods have carbs. And learn the amounts of carbs in different foods. ¨ Bread, cereal, pasta, and rice have about 15 grams of carbs in a serving. A serving is 1 slice of bread (1 ounce), ½ cup of cooked cereal, or 1/3 cup of cooked pasta or rice. ¨ Fruits have 15 grams of carbs in a serving. A serving is 1 small fresh fruit, such as an apple or orange; ½ of a banana; ½ cup of cooked or canned fruit; ½ cup of fruit juice; 1 cup of melon or raspberries; or 2 tablespoons of dried fruit. ¨ Milk and no-sugar-added yogurt have 15 grams of carbs in a serving. A serving is 1 cup of milk or 2/3 cup of no-sugar-added yogurt. ¨ Starchy vegetables have 15 grams of carbs in a serving. A serving is ½ cup of mashed potatoes or sweet potato; 1 cup winter squash; ½ of a small baked potato; ½ cup of cooked beans; or ½ cup cooked corn or green peas. · Learn how much carbs to eat each day and at each meal. A dietitian or CDE can teach you how to keep track of the amount of carbs you eat. This is called carbohydrate counting. · If you are not sure how to count carbohydrate grams, use the Plate Method to plan meals.  It is a part of healthy meals when you have diabetes. Carbohydrate is found in many foods. · Learn which foods have carbs. And learn the amounts of carbs in different foods. ¨ Bread, cereal, pasta, and rice have about 15 grams of carbs in a serving. A serving is 1 slice of bread (1 ounce), ½ cup of cooked cereal, or 1/3 cup of cooked pasta or rice. ¨ Fruits have 15 grams of carbs in a serving. A serving is 1 small fresh fruit, such as an apple or orange; ½ of a banana; ½ cup of cooked or canned fruit; ½ cup of fruit juice; 1 cup of melon or raspberries; or 2 tablespoons of dried fruit. ¨ Milk and no-sugar-added yogurt have 15 grams of carbs in a serving. A serving is 1 cup of milk or 2/3 cup of no-sugar-added yogurt. ¨ Starchy vegetables have 15 grams of carbs in a serving. A serving is ½ cup of mashed potatoes or sweet potato; 1 cup winter squash; ½ of a small baked potato; ½ cup of cooked beans; or ½ cup cooked corn or green peas. · Learn how much carbs to eat each day and at each meal. A dietitian or CDE can teach you how to keep track of the amount of carbs you eat. This is called carbohydrate counting. · If you are not sure how to count carbohydrate grams, use the Plate Method to plan meals. It is a good, quick way to make sure that you have a balanced meal. It also helps you spread carbs throughout the day. ¨ Divide your plate by types of foods. Put non-starchy vegetables on half the plate, meat or other protein food on one-quarter of the plate, and a grain or starchy vegetable in the final quarter of the plate. To this you can add a small piece of fruit and 1 cup of milk or yogurt, depending on how many carbs you are supposed to eat at a meal.  · Try to eat about the same amount of carbs at each meal. Do not \"save up\" your daily allowance of carbs to eat at one meal.  · Proteins have very little or no carbs per serving.  Examples of proteins are beef, chicken, turkey, fish, eggs, tofu, cheese, cottage cheese, high altitudes. Also avoid cold, dry air and hot, humid air. Stay at home with your windows closed when air pollution is bad.    Medicines and oxygen therapy    · Take your medicines exactly as prescribed. Call your doctor if you think you are having a problem with your medicine.     · You may be taking medicines such as:  ¨ Bronchodilators. These help open your airways and make breathing easier. Bronchodilators are either short-acting (work for 6 to 9 hours) or long-acting (work for 24 hours). You inhale most bronchodilators, so they start to act quickly. Always carry your quick-relief inhaler with you in case you need it while you are away from home. ¨ Corticosteroids (prednisone, budesonide). These reduce airway inflammation. They come in pill or inhaled form. You must take these medicines every day for them to work well.     · A spacer may help you get more inhaled medicine to your lungs. Ask your doctor or pharmacist if a spacer is right for you. If it is, ask how to use it properly.     · Do not take any vitamins, over-the-counter medicine, or herbal products without talking to your doctor first.     · If your doctor prescribed antibiotics, take them as directed. Do not stop taking them just because you feel better. You need to take the full course of antibiotics.     · Oxygen therapy boosts the amount of oxygen in your blood and helps you breathe easier. Use the flow rate your doctor has recommended, and do not change it without talking to your doctor first.   Activity    · Get regular exercise. Walking is an easy way to get exercise. Start out slowly, and walk a little more each day.     · Pay attention to your breathing.  You are exercising too hard if you cannot talk while you are exercising.     · Take short rest breaks when doing household chores and other activities.     · Learn breathing methods-such as breathing through pursed lips-to help you become less short of breath.     · If your doctor has not [employer]. Care instructions adapted under license by [facility] ([facility]). If you have questions about a medical condition or this instruction, always ask your healthcare professional. [address] any warranty or liability for your use of this information.

## 2018-11-07 ENCOUNTER — OFFICE VISIT (OUTPATIENT)
Dept: ENT CLINIC | Age: 68
End: 2018-11-07
Payer: MEDICARE

## 2018-11-07 VITALS — DIASTOLIC BLOOD PRESSURE: 74 MMHG | SYSTOLIC BLOOD PRESSURE: 152 MMHG | OXYGEN SATURATION: 94 % | HEART RATE: 78 BPM

## 2018-11-07 DIAGNOSIS — J32.9 RECURRENT SINUSITIS: Primary | ICD-10-CM

## 2018-11-07 PROCEDURE — 99213 OFFICE O/P EST LOW 20 MIN: CPT | Performed by: OTOLARYNGOLOGY

## 2018-11-07 PROCEDURE — G8427 DOCREV CUR MEDS BY ELIG CLIN: HCPCS | Performed by: OTOLARYNGOLOGY

## 2018-11-07 PROCEDURE — G8598 ASA/ANTIPLAT THER USED: HCPCS | Performed by: OTOLARYNGOLOGY

## 2018-11-07 PROCEDURE — 4040F PNEUMOC VAC/ADMIN/RCVD: CPT | Performed by: OTOLARYNGOLOGY

## 2018-11-07 PROCEDURE — 1101F PT FALLS ASSESS-DOCD LE1/YR: CPT | Performed by: OTOLARYNGOLOGY

## 2018-11-07 PROCEDURE — 1123F ACP DISCUSS/DSCN MKR DOCD: CPT | Performed by: OTOLARYNGOLOGY

## 2018-11-07 PROCEDURE — 3017F COLORECTAL CA SCREEN DOC REV: CPT | Performed by: OTOLARYNGOLOGY

## 2018-11-07 PROCEDURE — 4004F PT TOBACCO SCREEN RCVD TLK: CPT | Performed by: OTOLARYNGOLOGY

## 2018-11-07 PROCEDURE — G8484 FLU IMMUNIZE NO ADMIN: HCPCS | Performed by: OTOLARYNGOLOGY

## 2018-11-07 PROCEDURE — G8417 CALC BMI ABV UP PARAM F/U: HCPCS | Performed by: OTOLARYNGOLOGY

## 2018-11-07 RX ORDER — DOXYCYCLINE 100 MG/1
100 TABLET ORAL 2 TIMES DAILY
Qty: 20 TABLET | Refills: 0 | Status: SHIPPED | OUTPATIENT
Start: 2018-11-07 | End: 2018-11-17

## 2018-11-14 DIAGNOSIS — J01.01 ACUTE RECURRENT MAXILLARY SINUSITIS: ICD-10-CM

## 2018-11-14 DIAGNOSIS — J30.9 ALLERGIC SINUSITIS: ICD-10-CM

## 2018-11-14 RX ORDER — MONTELUKAST SODIUM 10 MG/1
10 TABLET ORAL NIGHTLY
Qty: 90 TABLET | Refills: 1 | Status: SHIPPED | OUTPATIENT
Start: 2018-11-14 | End: 2019-12-31

## 2018-11-14 RX ORDER — FLUTICASONE PROPIONATE 50 MCG
2 SPRAY, SUSPENSION (ML) NASAL DAILY
Qty: 3 BOTTLE | Refills: 1 | Status: SHIPPED | OUTPATIENT
Start: 2018-11-14 | End: 2019-12-31

## 2018-11-21 RX ORDER — MELOXICAM 15 MG/1
15 TABLET ORAL DAILY
Qty: 90 TABLET | Refills: 0 | Status: SHIPPED | OUTPATIENT
Start: 2018-11-21 | End: 2019-02-13 | Stop reason: SDUPTHER

## 2018-12-04 ENCOUNTER — OFFICE VISIT (OUTPATIENT)
Dept: FAMILY MEDICINE CLINIC | Age: 68
End: 2018-12-04
Payer: MEDICARE

## 2018-12-04 VITALS
OXYGEN SATURATION: 98 % | HEART RATE: 77 BPM | RESPIRATION RATE: 16 BRPM | DIASTOLIC BLOOD PRESSURE: 83 MMHG | HEIGHT: 73 IN | WEIGHT: 295 LBS | TEMPERATURE: 98.5 F | BODY MASS INDEX: 39.1 KG/M2 | SYSTOLIC BLOOD PRESSURE: 131 MMHG

## 2018-12-04 DIAGNOSIS — J43.8 OTHER EMPHYSEMA (HCC): ICD-10-CM

## 2018-12-04 DIAGNOSIS — M10.9 GOUT OF BOTH FEET: ICD-10-CM

## 2018-12-04 DIAGNOSIS — I10 ESSENTIAL HYPERTENSION: ICD-10-CM

## 2018-12-04 DIAGNOSIS — F51.01 PRIMARY INSOMNIA: ICD-10-CM

## 2018-12-04 DIAGNOSIS — Z28.21 PNEUMOCOCCAL VACCINATION DECLINED BY PATIENT: ICD-10-CM

## 2018-12-04 DIAGNOSIS — Z12.5 SCREENING PSA (PROSTATE SPECIFIC ANTIGEN): ICD-10-CM

## 2018-12-04 DIAGNOSIS — E78.2 HYPERLIPIDEMIA, MIXED: ICD-10-CM

## 2018-12-04 DIAGNOSIS — F17.200 TOBACCO USE DISORDER: ICD-10-CM

## 2018-12-04 DIAGNOSIS — E66.01 CLASS 2 SEVERE OBESITY DUE TO EXCESS CALORIES WITH SERIOUS COMORBIDITY AND BODY MASS INDEX (BMI) OF 38.0 TO 38.9 IN ADULT (HCC): ICD-10-CM

## 2018-12-04 DIAGNOSIS — Z28.21 INFLUENZA VACCINATION DECLINED BY PATIENT: ICD-10-CM

## 2018-12-04 LAB
ESTIMATED AVERAGE GLUCOSE: 162.8 MG/DL
HBA1C MFR BLD: 7.3 %

## 2018-12-04 PROCEDURE — 1101F PT FALLS ASSESS-DOCD LE1/YR: CPT | Performed by: FAMILY MEDICINE

## 2018-12-04 PROCEDURE — G8484 FLU IMMUNIZE NO ADMIN: HCPCS | Performed by: FAMILY MEDICINE

## 2018-12-04 PROCEDURE — G8926 SPIRO NO PERF OR DOC: HCPCS | Performed by: FAMILY MEDICINE

## 2018-12-04 PROCEDURE — 2022F DILAT RTA XM EVC RTNOPTHY: CPT | Performed by: FAMILY MEDICINE

## 2018-12-04 PROCEDURE — 4004F PT TOBACCO SCREEN RCVD TLK: CPT | Performed by: FAMILY MEDICINE

## 2018-12-04 PROCEDURE — 3017F COLORECTAL CA SCREEN DOC REV: CPT | Performed by: FAMILY MEDICINE

## 2018-12-04 PROCEDURE — 1123F ACP DISCUSS/DSCN MKR DOCD: CPT | Performed by: FAMILY MEDICINE

## 2018-12-04 PROCEDURE — 4040F PNEUMOC VAC/ADMIN/RCVD: CPT | Performed by: FAMILY MEDICINE

## 2018-12-04 PROCEDURE — 3045F PR MOST RECENT HEMOGLOBIN A1C LEVEL 7.0-9.0%: CPT | Performed by: FAMILY MEDICINE

## 2018-12-04 PROCEDURE — 99214 OFFICE O/P EST MOD 30 MIN: CPT | Performed by: FAMILY MEDICINE

## 2018-12-04 PROCEDURE — G8427 DOCREV CUR MEDS BY ELIG CLIN: HCPCS | Performed by: FAMILY MEDICINE

## 2018-12-04 PROCEDURE — 3023F SPIROM DOC REV: CPT | Performed by: FAMILY MEDICINE

## 2018-12-04 PROCEDURE — G8598 ASA/ANTIPLAT THER USED: HCPCS | Performed by: FAMILY MEDICINE

## 2018-12-04 PROCEDURE — G8417 CALC BMI ABV UP PARAM F/U: HCPCS | Performed by: FAMILY MEDICINE

## 2018-12-04 RX ORDER — METFORMIN HYDROCHLORIDE 500 MG/1
1500 TABLET, EXTENDED RELEASE ORAL
Qty: 270 TABLET | Refills: 0 | Status: SHIPPED | OUTPATIENT
Start: 2018-12-04 | End: 2019-02-27 | Stop reason: SDUPTHER

## 2018-12-04 ASSESSMENT — ENCOUNTER SYMPTOMS
RECTAL PAIN: 0
STRIDOR: 0
BLOOD IN STOOL: 0
DIARRHEA: 0
VOMITING: 0
ANAL BLEEDING: 0
CHEST TIGHTNESS: 0
NAUSEA: 0
CONSTIPATION: 0
CHOKING: 0
COUGH: 0
SHORTNESS OF BREATH: 0
WHEEZING: 0
ABDOMINAL PAIN: 0
APNEA: 0
ABDOMINAL DISTENTION: 0

## 2018-12-04 ASSESSMENT — COPD QUESTIONNAIRES: COPD: 1

## 2018-12-04 NOTE — PATIENT INSTRUCTIONS
when cooking. · Don't skip meals. Your blood sugar may drop too low if you skip meals and take insulin or certain medicines for diabetes. · Check with your doctor before you drink alcohol. Alcohol can cause your blood sugar to drop too low. Alcohol can also cause a bad reaction if you take certain diabetes medicines. Follow-up care is a key part of your treatment and safety. Be sure to make and go to all appointments, and call your doctor if you are having problems. It's also a good idea to know your test results and keep a list of the medicines you take. Where can you learn more? Go to [email or web address] org and sign in to your [employer] account. Enter J261 in the [employer] box to learn more about \"Learning About Diabetes Food Guidelines. \"     If you do not have an account, please click on the \"Sign Up Now\" link. Current as of: December 7, 2017  Content Version: 11.8  © 1166-6333 [employer]. Care instructions adapted under license by [facility] [place] ([facility]). If you have questions about a medical condition or this instruction, always ask your healthcare professional. [address] any warranty or liability for your use of this information. Patient Education        Learning About Meal Planning for Diabetes  Why plan your meals? Meal planning can be a key part of managing diabetes. Planning meals and snacks with the right balance of carbohydrate, protein, and fat can help you keep your blood sugar at the target level you set with your doctor. You don't have to eat special foods. You can eat what your family eats, including sweets once in a while. But you do have to pay attention to how often you eat and how much you eat of certain foods. You may want to work with a dietitian or a certified diabetes educator. He or she can give you tips and meal ideas and can answer your questions about meal planning.  This health professional can also help you your blood sugar levels. A registered dietitian or diabetes educator can help you plan how much carbohydrate to include in each meal and snack. A guideline for your daily amount of carbohydrate is:  · 45 to 60 grams at each meal. That's about the same as 3 to 4 carbohydrate servings. · 15 to 20 grams at each snack. That's about the same as 1 carbohydrate serving. The Nutrition Facts label on packaged foods tells you how much carbohydrate is in a serving of the food. First, look at the serving size on the food label. Is that the amount you eat in a serving? All of the nutrition information on a food label is based on that serving size. So if you eat more or less than that, you'll need to adjust the other numbers. Total carbohydrate is the next thing you need to look for on the label. If you count carbohydrate servings, one serving of carbohydrate is 15 grams. For foods that don't come with labels, such as fresh fruits and vegetables, you'll need a guide that lists carbohydrate in these foods. Ask your doctor, dietitian, or diabetes educator about books or other nutrition guides you can use. If you take insulin, you need to know how many grams of carbohydrate are in a meal. This lets you know how much rapid-acting insulin to take before you eat. If you use an insulin pump, you get a constant rate of insulin during the day. So the pump must be programmed at meals to give you extra insulin to cover the rise in blood sugar after meals. When you know how much carbohydrate you will eat, you can take the right amount of insulin. Or, if you always use the same amount of insulin, you need to make sure that you eat the same amount of carbohydrate at meals. If you need more help to understand carbohydrate counting and food labels, ask your doctor, dietitian, or diabetes educator. How do you get started with meal planning? Here are some tips to get started:  · Plan your meals a week at a time.  Don't forget to include

## 2018-12-04 NOTE — PROGRESS NOTES
current facility-administered medications on file prior to visit. Past Medical History:   Diagnosis Date    Allergic rhinitis 7/11/2016    Atrial fibrillation ([address]     under care of cardiology:Dr. Mellissa Ivans Behrens([facility])    Class 2 obesity due to excess calories without serious comorbidity with body mass index (BMI) of 37.0 to 37.9 in adult 11/7/2017    Controlled type 2 diabetes mellitus without complication, without long-term current use of insulin ([facility] [address] 2/24/2017    COPD     Gout     Hyperlipidemia, mixed 07/11/2016    Hypertension     under care of cardiology:Dr. Mellissa Ivans Behrens([facility])    Long term current use of anticoagulants with INR goal of 2.0-3.0     under care of cardiology:Dr. Mellissa Ivans Behrens([facility])    Parkinson disease [facility])     9/2016:Per neurologist dx is tremor & not consistent with Parkison's:Dr. Kiley Rose Prediabetes 10/28/2016    Primary insomnia 1/25/2017           Social History   Substance Use Topics    Smoking status: Current Every Day Smoker     Packs/day: 1.00     Years: 30.00     Types: Cigarettes    Smokeless tobacco: Never Used    Alcohol use Yes      Comment: rarely     History   Drug Use No               Review of Systems   Constitutional: Negative for activity change, appetite change, chills, diaphoresis, fatigue, fever and unexpected weight change. Eyes: Negative for visual disturbance. Respiratory: Negative for apnea, cough, choking, chest tightness, shortness of breath, wheezing and stridor. Cardiovascular: Negative for chest pain, palpitations and leg swelling. Gastrointestinal: Negative for abdominal distention, abdominal pain, anal bleeding, blood in stool, constipation, diarrhea, nausea, rectal pain and vomiting. Endocrine: Negative for cold intolerance, heat intolerance, polydipsia, polyphagia and polyuria. Genitourinary: Negative for difficulty urinating. Skin: Negative for pallor, rash and wound.    Neurological: Negative for

## 2018-12-27 DIAGNOSIS — E11.9 CONTROLLED TYPE 2 DIABETES MELLITUS WITHOUT COMPLICATION, WITHOUT LONG-TERM CURRENT USE OF INSULIN (HCC): ICD-10-CM

## 2018-12-27 RX ORDER — BLOOD SUGAR DIAGNOSTIC, DRUM
STRIP MISCELLANEOUS
Qty: 102 STRIP | Refills: 1 | Status: SHIPPED | OUTPATIENT
Start: 2018-12-27 | End: 2020-06-23

## 2019-01-07 ENCOUNTER — OFFICE VISIT (OUTPATIENT)
Dept: FAMILY MEDICINE CLINIC | Age: 69
End: 2019-01-07
Payer: MEDICARE

## 2019-01-07 VITALS
OXYGEN SATURATION: 99 % | SYSTOLIC BLOOD PRESSURE: 131 MMHG | RESPIRATION RATE: 16 BRPM | BODY MASS INDEX: 38.31 KG/M2 | DIASTOLIC BLOOD PRESSURE: 82 MMHG | TEMPERATURE: 98.3 F | HEART RATE: 87 BPM | WEIGHT: 289.1 LBS | HEIGHT: 73 IN

## 2019-01-07 DIAGNOSIS — J30.1 SEASONAL ALLERGIC RHINITIS DUE TO POLLEN: ICD-10-CM

## 2019-01-07 DIAGNOSIS — I10 ESSENTIAL HYPERTENSION: ICD-10-CM

## 2019-01-07 DIAGNOSIS — E66.01 CLASS 2 SEVERE OBESITY DUE TO EXCESS CALORIES WITH SERIOUS COMORBIDITY AND BODY MASS INDEX (BMI) OF 38.0 TO 38.9 IN ADULT (HCC): ICD-10-CM

## 2019-01-07 DIAGNOSIS — F51.01 PRIMARY INSOMNIA: ICD-10-CM

## 2019-01-07 DIAGNOSIS — F17.200 TOBACCO USE DISORDER: ICD-10-CM

## 2019-01-07 DIAGNOSIS — M10.9 GOUT OF BOTH FEET: ICD-10-CM

## 2019-01-07 DIAGNOSIS — E78.2 HYPERLIPIDEMIA, MIXED: ICD-10-CM

## 2019-01-07 DIAGNOSIS — J43.8 OTHER EMPHYSEMA (HCC): Primary | ICD-10-CM

## 2019-01-07 PROCEDURE — G8926 SPIRO NO PERF OR DOC: HCPCS | Performed by: FAMILY MEDICINE

## 2019-01-07 PROCEDURE — 3023F SPIROM DOC REV: CPT | Performed by: FAMILY MEDICINE

## 2019-01-07 PROCEDURE — 1101F PT FALLS ASSESS-DOCD LE1/YR: CPT | Performed by: FAMILY MEDICINE

## 2019-01-07 PROCEDURE — 3017F COLORECTAL CA SCREEN DOC REV: CPT | Performed by: FAMILY MEDICINE

## 2019-01-07 PROCEDURE — 3046F HEMOGLOBIN A1C LEVEL >9.0%: CPT | Performed by: FAMILY MEDICINE

## 2019-01-07 PROCEDURE — G8484 FLU IMMUNIZE NO ADMIN: HCPCS | Performed by: FAMILY MEDICINE

## 2019-01-07 PROCEDURE — 4004F PT TOBACCO SCREEN RCVD TLK: CPT | Performed by: FAMILY MEDICINE

## 2019-01-07 PROCEDURE — G8427 DOCREV CUR MEDS BY ELIG CLIN: HCPCS | Performed by: FAMILY MEDICINE

## 2019-01-07 PROCEDURE — 4040F PNEUMOC VAC/ADMIN/RCVD: CPT | Performed by: FAMILY MEDICINE

## 2019-01-07 PROCEDURE — G8417 CALC BMI ABV UP PARAM F/U: HCPCS | Performed by: FAMILY MEDICINE

## 2019-01-07 PROCEDURE — 2022F DILAT RTA XM EVC RTNOPTHY: CPT | Performed by: FAMILY MEDICINE

## 2019-01-07 PROCEDURE — 99214 OFFICE O/P EST MOD 30 MIN: CPT | Performed by: FAMILY MEDICINE

## 2019-01-07 PROCEDURE — 1123F ACP DISCUSS/DSCN MKR DOCD: CPT | Performed by: FAMILY MEDICINE

## 2019-01-07 ASSESSMENT — ENCOUNTER SYMPTOMS
NAUSEA: 0
VOMITING: 0
CONSTIPATION: 0
STRIDOR: 0
DIARRHEA: 0
SHORTNESS OF BREATH: 0
ABDOMINAL DISTENTION: 0
BLOOD IN STOOL: 0
ABDOMINAL PAIN: 0
CHOKING: 0
WHEEZING: 0
ANAL BLEEDING: 0
CHEST TIGHTNESS: 0
RECTAL PAIN: 0
APNEA: 0
COUGH: 0

## 2019-01-07 ASSESSMENT — PATIENT HEALTH QUESTIONNAIRE - PHQ9
SUM OF ALL RESPONSES TO PHQ9 QUESTIONS 1 & 2: 0
SUM OF ALL RESPONSES TO PHQ QUESTIONS 1-9: 0
1. LITTLE INTEREST OR PLEASURE IN DOING THINGS: 0
2. FEELING DOWN, DEPRESSED OR HOPELESS: 0
SUM OF ALL RESPONSES TO PHQ QUESTIONS 1-9: 0

## 2019-01-07 ASSESSMENT — COPD QUESTIONNAIRES: COPD: 1

## 2019-01-15 RX ORDER — ALLOPURINOL 300 MG/1
TABLET ORAL
Qty: 90 TABLET | Refills: 0 | Status: SHIPPED | OUTPATIENT
Start: 2019-01-15 | End: 2019-04-24 | Stop reason: SDUPTHER

## 2019-02-07 DIAGNOSIS — I10 ESSENTIAL HYPERTENSION: ICD-10-CM

## 2019-02-07 DIAGNOSIS — Z12.5 SCREENING PSA (PROSTATE SPECIFIC ANTIGEN): ICD-10-CM

## 2019-02-07 DIAGNOSIS — E78.2 HYPERLIPIDEMIA, MIXED: ICD-10-CM

## 2019-02-07 LAB
A/G RATIO: 1.8 (ref 1.1–2.2)
ALBUMIN SERPL-MCNC: 4.5 G/DL (ref 3.4–5)
ALP BLD-CCNC: 79 U/L (ref 40–129)
ALT SERPL-CCNC: 22 U/L (ref 10–40)
ANION GAP SERPL CALCULATED.3IONS-SCNC: 13 MMOL/L (ref 3–16)
AST SERPL-CCNC: 17 U/L (ref 15–37)
BILIRUB SERPL-MCNC: 0.5 MG/DL (ref 0–1)
BUN BLDV-MCNC: 20 MG/DL (ref 7–20)
CALCIUM SERPL-MCNC: 9.5 MG/DL (ref 8.3–10.6)
CHLORIDE BLD-SCNC: 108 MMOL/L (ref 99–110)
CHOLESTEROL, TOTAL: 148 MG/DL (ref 0–199)
CO2: 26 MMOL/L (ref 21–32)
CREAT SERPL-MCNC: 1.1 MG/DL (ref 0.8–1.3)
GFR AFRICAN AMERICAN: >60
GFR NON-AFRICAN AMERICAN: >60
GLOBULIN: 2.5 G/DL
GLUCOSE BLD-MCNC: 124 MG/DL (ref 70–99)
HDLC SERPL-MCNC: 33 MG/DL (ref 40–60)
LDL CHOLESTEROL CALCULATED: 67 MG/DL
POTASSIUM SERPL-SCNC: 5.1 MMOL/L (ref 3.5–5.1)
PROSTATE SPECIFIC ANTIGEN: 1.42 NG/ML (ref 0–4)
SODIUM BLD-SCNC: 147 MMOL/L (ref 136–145)
TOTAL PROTEIN: 7 G/DL (ref 6.4–8.2)
TRIGL SERPL-MCNC: 239 MG/DL (ref 0–150)
VLDLC SERPL CALC-MCNC: 48 MG/DL

## 2019-02-08 LAB
ESTIMATED AVERAGE GLUCOSE: 165.7 MG/DL
HBA1C MFR BLD: 7.4 %

## 2019-02-13 RX ORDER — MELOXICAM 15 MG/1
TABLET ORAL
Qty: 90 TABLET | Refills: 0 | Status: SHIPPED | OUTPATIENT
Start: 2019-02-13 | End: 2019-05-30 | Stop reason: ALTCHOICE

## 2019-02-27 RX ORDER — METFORMIN HYDROCHLORIDE 500 MG/1
1500 TABLET, EXTENDED RELEASE ORAL
Qty: 270 TABLET | Refills: 0 | Status: SHIPPED | OUTPATIENT
Start: 2019-02-27 | End: 2019-03-04 | Stop reason: SDUPTHER

## 2019-03-04 ENCOUNTER — OFFICE VISIT (OUTPATIENT)
Dept: FAMILY MEDICINE CLINIC | Age: 69
End: 2019-03-04
Payer: MEDICARE

## 2019-03-04 VITALS
DIASTOLIC BLOOD PRESSURE: 81 MMHG | TEMPERATURE: 98.4 F | HEIGHT: 73 IN | RESPIRATION RATE: 20 BRPM | WEIGHT: 293.5 LBS | OXYGEN SATURATION: 96 % | BODY MASS INDEX: 38.9 KG/M2 | SYSTOLIC BLOOD PRESSURE: 130 MMHG | HEART RATE: 79 BPM

## 2019-03-04 DIAGNOSIS — E78.5 HYPERLIPIDEMIA LDL GOAL <100: ICD-10-CM

## 2019-03-04 DIAGNOSIS — I10 ESSENTIAL HYPERTENSION: ICD-10-CM

## 2019-03-04 DIAGNOSIS — Z79.01 LONG TERM CURRENT USE OF ANTICOAGULANTS WITH INR GOAL OF 2.0-3.0: ICD-10-CM

## 2019-03-04 DIAGNOSIS — F17.200 TOBACCO USE DISORDER: ICD-10-CM

## 2019-03-04 DIAGNOSIS — F51.01 PRIMARY INSOMNIA: ICD-10-CM

## 2019-03-04 DIAGNOSIS — Z12.11 COLON CANCER SCREENING: ICD-10-CM

## 2019-03-04 DIAGNOSIS — M10.9 GOUT OF BOTH FEET: ICD-10-CM

## 2019-03-04 DIAGNOSIS — J30.1 SEASONAL ALLERGIC RHINITIS DUE TO POLLEN: ICD-10-CM

## 2019-03-04 DIAGNOSIS — E66.01 CLASS 2 SEVERE OBESITY DUE TO EXCESS CALORIES WITH SERIOUS COMORBIDITY AND BODY MASS INDEX (BMI) OF 38.0 TO 38.9 IN ADULT (HCC): ICD-10-CM

## 2019-03-04 DIAGNOSIS — J43.8 OTHER EMPHYSEMA (HCC): ICD-10-CM

## 2019-03-04 PROCEDURE — 2022F DILAT RTA XM EVC RTNOPTHY: CPT | Performed by: FAMILY MEDICINE

## 2019-03-04 PROCEDURE — G8417 CALC BMI ABV UP PARAM F/U: HCPCS | Performed by: FAMILY MEDICINE

## 2019-03-04 PROCEDURE — 3045F PR MOST RECENT HEMOGLOBIN A1C LEVEL 7.0-9.0%: CPT | Performed by: FAMILY MEDICINE

## 2019-03-04 PROCEDURE — 3017F COLORECTAL CA SCREEN DOC REV: CPT | Performed by: FAMILY MEDICINE

## 2019-03-04 PROCEDURE — 1123F ACP DISCUSS/DSCN MKR DOCD: CPT | Performed by: FAMILY MEDICINE

## 2019-03-04 PROCEDURE — 3023F SPIROM DOC REV: CPT | Performed by: FAMILY MEDICINE

## 2019-03-04 PROCEDURE — 1101F PT FALLS ASSESS-DOCD LE1/YR: CPT | Performed by: FAMILY MEDICINE

## 2019-03-04 PROCEDURE — G8484 FLU IMMUNIZE NO ADMIN: HCPCS | Performed by: FAMILY MEDICINE

## 2019-03-04 PROCEDURE — G8926 SPIRO NO PERF OR DOC: HCPCS | Performed by: FAMILY MEDICINE

## 2019-03-04 PROCEDURE — 4040F PNEUMOC VAC/ADMIN/RCVD: CPT | Performed by: FAMILY MEDICINE

## 2019-03-04 PROCEDURE — 4004F PT TOBACCO SCREEN RCVD TLK: CPT | Performed by: FAMILY MEDICINE

## 2019-03-04 PROCEDURE — G8427 DOCREV CUR MEDS BY ELIG CLIN: HCPCS | Performed by: FAMILY MEDICINE

## 2019-03-04 PROCEDURE — 99214 OFFICE O/P EST MOD 30 MIN: CPT | Performed by: FAMILY MEDICINE

## 2019-03-04 RX ORDER — PRAVASTATIN SODIUM 80 MG/1
TABLET ORAL
Qty: 90 TABLET | Refills: 0 | Status: SHIPPED | OUTPATIENT
Start: 2019-03-04 | End: 2019-05-22 | Stop reason: SDUPTHER

## 2019-03-04 RX ORDER — METFORMIN HYDROCHLORIDE 500 MG/1
2000 TABLET, EXTENDED RELEASE ORAL
Qty: 360 TABLET | Refills: 0 | Status: SHIPPED | OUTPATIENT
Start: 2019-03-04 | End: 2019-05-23 | Stop reason: SDUPTHER

## 2019-03-04 ASSESSMENT — ENCOUNTER SYMPTOMS
APNEA: 0
STRIDOR: 0
VOMITING: 0
ABDOMINAL PAIN: 0
NAUSEA: 0
CONSTIPATION: 0
CHEST TIGHTNESS: 0
ABDOMINAL DISTENTION: 0
ANAL BLEEDING: 0
RECTAL PAIN: 0
CHOKING: 0
WHEEZING: 0
COUGH: 0
BLOOD IN STOOL: 0
SHORTNESS OF BREATH: 0
DIARRHEA: 0

## 2019-03-04 ASSESSMENT — PATIENT HEALTH QUESTIONNAIRE - PHQ9
SUM OF ALL RESPONSES TO PHQ QUESTIONS 1-9: 0
2. FEELING DOWN, DEPRESSED OR HOPELESS: 0
SUM OF ALL RESPONSES TO PHQ QUESTIONS 1-9: 0
SUM OF ALL RESPONSES TO PHQ9 QUESTIONS 1 & 2: 0
1. LITTLE INTEREST OR PLEASURE IN DOING THINGS: 0

## 2019-03-04 ASSESSMENT — COPD QUESTIONNAIRES: COPD: 1

## 2019-03-12 DIAGNOSIS — J43.8 OTHER EMPHYSEMA (HCC): ICD-10-CM

## 2019-03-12 RX ORDER — TIOTROPIUM BROMIDE 18 UG/1
CAPSULE ORAL; RESPIRATORY (INHALATION)
Qty: 30 CAPSULE | Refills: 2 | Status: SHIPPED | OUTPATIENT
Start: 2019-03-12 | End: 2019-05-20 | Stop reason: CLARIF

## 2019-04-24 RX ORDER — ALLOPURINOL 300 MG/1
TABLET ORAL
Qty: 90 TABLET | Refills: 0 | Status: SHIPPED | OUTPATIENT
Start: 2019-04-24 | End: 2019-08-01 | Stop reason: SDUPTHER

## 2019-05-03 DIAGNOSIS — I10 ESSENTIAL HYPERTENSION: ICD-10-CM

## 2019-05-03 DIAGNOSIS — E78.5 HYPERLIPIDEMIA LDL GOAL <100: ICD-10-CM

## 2019-05-04 LAB
A/G RATIO: 1.6 (ref 1.1–2.2)
ALBUMIN SERPL-MCNC: 4.7 G/DL (ref 3.4–5)
ALP BLD-CCNC: 88 U/L (ref 40–129)
ALT SERPL-CCNC: 24 U/L (ref 10–40)
ANION GAP SERPL CALCULATED.3IONS-SCNC: 12 MMOL/L (ref 3–16)
AST SERPL-CCNC: 20 U/L (ref 15–37)
BILIRUB SERPL-MCNC: 0.5 MG/DL (ref 0–1)
BUN BLDV-MCNC: 32 MG/DL (ref 7–20)
CALCIUM SERPL-MCNC: 10.5 MG/DL (ref 8.3–10.6)
CHLORIDE BLD-SCNC: 103 MMOL/L (ref 99–110)
CHOLESTEROL, TOTAL: 156 MG/DL (ref 0–199)
CO2: 26 MMOL/L (ref 21–32)
CREAT SERPL-MCNC: 1.6 MG/DL (ref 0.8–1.3)
ESTIMATED AVERAGE GLUCOSE: 154.2 MG/DL
GFR AFRICAN AMERICAN: 52
GFR NON-AFRICAN AMERICAN: 43
GLOBULIN: 3 G/DL
GLUCOSE BLD-MCNC: 133 MG/DL (ref 70–99)
HBA1C MFR BLD: 7 %
HDLC SERPL-MCNC: 40 MG/DL (ref 40–60)
LDL CHOLESTEROL CALCULATED: 89 MG/DL
POTASSIUM SERPL-SCNC: 4.9 MMOL/L (ref 3.5–5.1)
SODIUM BLD-SCNC: 141 MMOL/L (ref 136–145)
TOTAL PROTEIN: 7.7 G/DL (ref 6.4–8.2)
TRIGL SERPL-MCNC: 135 MG/DL (ref 0–150)
VLDLC SERPL CALC-MCNC: 27 MG/DL

## 2019-05-20 ENCOUNTER — OFFICE VISIT (OUTPATIENT)
Dept: FAMILY MEDICINE CLINIC | Age: 69
End: 2019-05-20
Payer: MEDICARE

## 2019-05-20 VITALS
RESPIRATION RATE: 18 BRPM | BODY MASS INDEX: 38.91 KG/M2 | OXYGEN SATURATION: 94 % | SYSTOLIC BLOOD PRESSURE: 131 MMHG | WEIGHT: 293.6 LBS | DIASTOLIC BLOOD PRESSURE: 82 MMHG | TEMPERATURE: 98.2 F | HEIGHT: 73 IN | HEART RATE: 83 BPM

## 2019-05-20 DIAGNOSIS — I10 ESSENTIAL HYPERTENSION: ICD-10-CM

## 2019-05-20 DIAGNOSIS — R94.4 KIDNEY FUNCTION TEST ABNORMAL: ICD-10-CM

## 2019-05-20 DIAGNOSIS — R80.9 CONTROLLED TYPE 2 DIABETES MELLITUS WITH MICROALBUMINURIA, WITHOUT LONG-TERM CURRENT USE OF INSULIN (HCC): Primary | ICD-10-CM

## 2019-05-20 DIAGNOSIS — J43.8 OTHER EMPHYSEMA (HCC): ICD-10-CM

## 2019-05-20 DIAGNOSIS — F51.01 PRIMARY INSOMNIA: ICD-10-CM

## 2019-05-20 DIAGNOSIS — E11.29 CONTROLLED TYPE 2 DIABETES MELLITUS WITH MICROALBUMINURIA, WITHOUT LONG-TERM CURRENT USE OF INSULIN (HCC): Primary | ICD-10-CM

## 2019-05-20 DIAGNOSIS — M10.9 GOUT OF BOTH FEET: ICD-10-CM

## 2019-05-20 DIAGNOSIS — E78.5 HYPERLIPIDEMIA LDL GOAL <100: ICD-10-CM

## 2019-05-20 DIAGNOSIS — J30.1 SEASONAL ALLERGIC RHINITIS DUE TO POLLEN: ICD-10-CM

## 2019-05-20 LAB
ALBUMIN SERPL-MCNC: 4.2 G/DL (ref 3.4–5)
ANION GAP SERPL CALCULATED.3IONS-SCNC: 16 MMOL/L (ref 3–16)
BUN BLDV-MCNC: 25 MG/DL (ref 7–20)
CALCIUM SERPL-MCNC: 9.6 MG/DL (ref 8.3–10.6)
CHLORIDE BLD-SCNC: 102 MMOL/L (ref 99–110)
CO2: 26 MMOL/L (ref 21–32)
CREAT SERPL-MCNC: 1.4 MG/DL (ref 0.8–1.3)
GFR AFRICAN AMERICAN: >60
GFR NON-AFRICAN AMERICAN: 50
GLUCOSE BLD-MCNC: 106 MG/DL (ref 70–99)
PHOSPHORUS: 3.6 MG/DL (ref 2.5–4.9)
POTASSIUM SERPL-SCNC: 4.8 MMOL/L (ref 3.5–5.1)
SODIUM BLD-SCNC: 144 MMOL/L (ref 136–145)

## 2019-05-20 PROCEDURE — G8926 SPIRO NO PERF OR DOC: HCPCS | Performed by: FAMILY MEDICINE

## 2019-05-20 PROCEDURE — G8417 CALC BMI ABV UP PARAM F/U: HCPCS | Performed by: FAMILY MEDICINE

## 2019-05-20 PROCEDURE — 1123F ACP DISCUSS/DSCN MKR DOCD: CPT | Performed by: FAMILY MEDICINE

## 2019-05-20 PROCEDURE — 3023F SPIROM DOC REV: CPT | Performed by: FAMILY MEDICINE

## 2019-05-20 PROCEDURE — 4004F PT TOBACCO SCREEN RCVD TLK: CPT | Performed by: FAMILY MEDICINE

## 2019-05-20 PROCEDURE — 3045F PR MOST RECENT HEMOGLOBIN A1C LEVEL 7.0-9.0%: CPT | Performed by: FAMILY MEDICINE

## 2019-05-20 PROCEDURE — G8427 DOCREV CUR MEDS BY ELIG CLIN: HCPCS | Performed by: FAMILY MEDICINE

## 2019-05-20 PROCEDURE — 3017F COLORECTAL CA SCREEN DOC REV: CPT | Performed by: FAMILY MEDICINE

## 2019-05-20 PROCEDURE — 2022F DILAT RTA XM EVC RTNOPTHY: CPT | Performed by: FAMILY MEDICINE

## 2019-05-20 PROCEDURE — 4040F PNEUMOC VAC/ADMIN/RCVD: CPT | Performed by: FAMILY MEDICINE

## 2019-05-20 PROCEDURE — 99214 OFFICE O/P EST MOD 30 MIN: CPT | Performed by: FAMILY MEDICINE

## 2019-05-20 ASSESSMENT — ENCOUNTER SYMPTOMS
CONSTIPATION: 0
COLOR CHANGE: 0
ABDOMINAL DISTENTION: 0
ABDOMINAL PAIN: 0
BLOOD IN STOOL: 0
COUGH: 0
VOMITING: 0
ANAL BLEEDING: 0
STRIDOR: 0
DIARRHEA: 0
APNEA: 0
CHEST TIGHTNESS: 0
CHOKING: 0
RECTAL PAIN: 0
NAUSEA: 0
WHEEZING: 0
SHORTNESS OF BREATH: 0

## 2019-05-20 ASSESSMENT — PATIENT HEALTH QUESTIONNAIRE - PHQ9
SUM OF ALL RESPONSES TO PHQ9 QUESTIONS 1 & 2: 0
SUM OF ALL RESPONSES TO PHQ QUESTIONS 1-9: 0
SUM OF ALL RESPONSES TO PHQ QUESTIONS 1-9: 0
1. LITTLE INTEREST OR PLEASURE IN DOING THINGS: 0
2. FEELING DOWN, DEPRESSED OR HOPELESS: 0

## 2019-05-20 ASSESSMENT — COPD QUESTIONNAIRES: COPD: 1

## 2019-05-20 NOTE — PROGRESS NOTES
identified. Protein intake:not excessive. Denies urinary complaints/abdo pain/ficbidw-zrlzfwsvt-clihvqe/decreased urinary flow/sensation of incomplete voiding/excessive NSAIDs use. HTN check:is doing well. Taking medication w/o side effects. Associated w/nothing new. Worsened by nothing new. Improves with his medications & low salt diet. Adds salt to food at the table:Never does. Denies cp/sob/pnd/ankle edema/dizziness. Diabetes:  Is doing well. Taking 1500mg metformin w/o side effects. Preprandial-fasting HL=[id number]. 2hr postprandial BS:139-150s. HBA1c: 6.6 on 2/13/17. 6.3 on 3/31/17. 6.4 on 11/18/17. 7.1 on 3/22/18. 7.2 on 8/30/18. 7.3 on 12/3/18. 7.4 on 2/7/19. 7.0 on 5/3/19. ACEI/ARB:Yes  Checks feet daily:yes. Diabetes eye check appt current(within 1year):yes. Improving factor:is addressing diet to improve A1c:continues medication. .  Episodes of hypoglycemia:No.  ASA:Yes. LDL <100:no. 113 on 3/31/17. 87 on 6/7/17. 83 on 9/15/17. 69 om 12/15/17. 71 on 3/22/18. 82 on 8/30/18. 67 on 2/7/19. 89 on 5/4/19. No other new associated or worsening factors. Denies polyuria/polyphagia/polydipsia/BLE skin lesions/BLE paresthesia. Moderate insomnia check: doing well. Sleeping approx  6-8hrs nightly.   Associated w/nothing else new. Worsened(aggravated) by nothing else new. Improving factor:taking med prn. Denies snoring/hallucinations/depression/SI/HI. COPD:doing well;using breo & spiriva w/o side effects. No other new associated concerns. Albuterol inhaler using approx 1xday.    Shayna Perish well. Takes statin w/o side effects. Associated w/nothing new. Improving factors:medication & diet:see labs above. Worsening factors:nothing new. Denies adbo pain/myalgias.        Gout of both feet:is doing well. No recurrences reported. Takes allopurinol w/o side effects. Last gout episode per pt' was >5yrs ago. No other associated concerns.   Denies left foot Diagnosis Date    Allergic rhinitis 7/11/2016    Atrial fibrillation ([facility] [address]     under care of cardiology:Dr. Marieta Blare Behrens([facility])    Class 2 obesity due to excess calories without serious comorbidity with body mass index (BMI) of 37.0 to 37.9 in adult 11/7/2017    Controlled type 2 diabetes mellitus without complication, without long-term current use of insulin ([facility] [address] 2/24/2017    COPD     Gout     Hyperlipidemia, mixed 07/11/2016    Hypertension     under care of cardiology:Dr. Marieta Blare Behrens([facility])    Long term current use of anticoagulants with INR goal of 2.0-3.0     under care of cardiology:Dr. Marieta Blare Behrens([facility])    Parkinson disease [facility])     9/2016:Per neurologist dx is tremor & not consistent with Parkison's:Dr. Wright Hem Prediabetes 10/28/2016    Primary insomnia 1/25/2017           Social History     Tobacco Use    Smoking status: Current Every Day Smoker     Packs/day: 1.00     Years: 30.00     Pack years: 30.00     Types: Cigarettes    Smokeless tobacco: Never Used   Substance Use Topics    Alcohol use: Yes     Comment: rarely    Drug use: No     Social History     Substance and Sexual Activity   Drug Use No               Review of Systems   Constitutional: Negative for activity change, appetite change, chills, diaphoresis, fatigue, fever and unexpected weight change. Eyes: Negative for visual disturbance. Respiratory: Negative for apnea, cough, choking, chest tightness, shortness of breath, wheezing and stridor. Cardiovascular: Negative for chest pain, palpitations and leg swelling. Gastrointestinal: Negative for abdominal distention, abdominal pain, anal bleeding, blood in stool, constipation, diarrhea, nausea, rectal pain and vomiting. Endocrine: Negative for cold intolerance, heat intolerance, polydipsia, polyphagia and polyuria. Genitourinary: Negative for difficulty urinating. Skin: Negative for color change, pallor, rash and wound.    Neurological: Negative for dizziness and light-headedness. Hematological: Negative for adenopathy. Psychiatric/Behavioral: Negative for agitation, behavioral problems, confusion, decreased concentration, dysphoric mood, hallucinations, self-injury, sleep disturbance and suicidal ideas. The patient is not nervous/anxious and is not hyperactive. Objective:   Physical Exam   Constitutional: He is oriented to person, place, and time. He appears well-developed and well-nourished. He is cooperative. He does not have a sickly appearance. No distress. HENT:   Nose: Nose normal.   Mouth/Throat: Uvula is midline, oropharynx is clear and moist and mucous membranes are normal.   Hearing intact to nml conversation   Eyes: Pupils are equal, round, and reactive to light. Conjunctivae, EOM and lids are normal.   Neck: Trachea normal and normal range of motion. Neck supple. Cardiovascular: Normal rate, regular rhythm, normal heart sounds, intact distal pulses and normal pulses. No murmur heard. No ankle edema. Pulmonary/Chest: Effort normal and breath sounds normal.   CTAB,good AE bilaterally   Abdominal: Soft. Normal appearance and bowel sounds are normal. He exhibits no distension and no mass. There is no hepatomegaly. There is no tenderness. Musculoskeletal:        Right foot: Normal.        Left foot: Normal.   Lymphadenopathy:     He has no cervical adenopathy. Neurological: He is alert and oriented to person, place, and time. Skin: Skin is warm, dry and intact. Capillary refill takes less than 2 seconds. No rash noted. He is not diaphoretic. No cyanosis. No pallor. Good skin turgor. Psychiatric: He has a normal mood and affect. His behavior is normal. Judgment and thought content normal. His speech is not rapid and/or pressured. He is not actively hallucinating. Cognition and memory are normal.   Good eye contact. He is attentive. Assessment:      Diagnosis Orders   1.  Controlled type 2 diabetes

## 2019-05-21 DIAGNOSIS — R94.4 KIDNEY FUNCTION TEST ABNORMAL: Primary | ICD-10-CM

## 2019-05-22 DIAGNOSIS — E78.5 HYPERLIPIDEMIA LDL GOAL <100: ICD-10-CM

## 2019-05-22 RX ORDER — MELOXICAM 15 MG/1
TABLET ORAL
Qty: 90 TABLET | Refills: 0 | OUTPATIENT
Start: 2019-05-22

## 2019-05-22 RX ORDER — PRAVASTATIN SODIUM 80 MG/1
TABLET ORAL
Qty: 90 TABLET | Refills: 0 | Status: SHIPPED | OUTPATIENT
Start: 2019-05-22 | End: 2019-09-15 | Stop reason: SDUPTHER

## 2019-05-23 RX ORDER — METFORMIN HYDROCHLORIDE 500 MG/1
2000 TABLET, EXTENDED RELEASE ORAL
Qty: 360 TABLET | Refills: 0 | Status: SHIPPED | OUTPATIENT
Start: 2019-05-23 | End: 2019-09-03 | Stop reason: SDUPTHER

## 2019-05-23 RX ORDER — METFORMIN HYDROCHLORIDE 500 MG/1
1500 TABLET, EXTENDED RELEASE ORAL
Qty: 270 TABLET | Refills: 0 | OUTPATIENT
Start: 2019-05-23

## 2019-06-04 ENCOUNTER — TELEPHONE (OUTPATIENT)
Dept: FAMILY MEDICINE CLINIC | Age: 69
End: 2019-06-04

## 2019-06-04 DIAGNOSIS — G47.39 OTHER SLEEP APNEA: Primary | ICD-10-CM

## 2019-06-04 NOTE — TELEPHONE ENCOUNTER
Pt dropped off [employer] form to be filled out by Dr. Noah Child. Pt states that he has COPD and sleep apnea. Pt only uses CPAP machine for sleep apnea. Pt states he needs his air conditioner running for his COPD in order to breath. Pt is unsure who his sleep apnea specialist was. Say >10 years ago. Hasn't needed to see him back unless pt needed to. Pt states that he can try to locate the specialist name and call us back just incase that is needed. Please advise.

## 2019-06-07 ENCOUNTER — HOSPITAL ENCOUNTER (OUTPATIENT)
Dept: ULTRASOUND IMAGING | Age: 69
Discharge: HOME | End: 2019-06-07
Payer: MEDICARE

## 2019-06-07 DIAGNOSIS — N17.9 AKI (ACUTE KIDNEY INJURY) (HCC): ICD-10-CM

## 2019-06-07 PROCEDURE — 76770 US EXAM ABDO BACK WALL COMP: CPT

## 2019-06-18 ENCOUNTER — OFFICE VISIT (OUTPATIENT)
Dept: PULMONOLOGY | Age: 69
End: 2019-06-18
Payer: MEDICARE

## 2019-06-18 VITALS
HEART RATE: 85 BPM | OXYGEN SATURATION: 98 % | BODY MASS INDEX: 38.7 KG/M2 | HEIGHT: 73 IN | SYSTOLIC BLOOD PRESSURE: 122 MMHG | WEIGHT: 292 LBS | DIASTOLIC BLOOD PRESSURE: 70 MMHG

## 2019-06-18 DIAGNOSIS — E66.09 CLASS 2 OBESITY DUE TO EXCESS CALORIES WITHOUT SERIOUS COMORBIDITY WITH BODY MASS INDEX (BMI) OF 38.0 TO 38.9 IN ADULT: Chronic | ICD-10-CM

## 2019-06-18 DIAGNOSIS — J43.8 OTHER EMPHYSEMA (HCC): Chronic | ICD-10-CM

## 2019-06-18 DIAGNOSIS — I10 ESSENTIAL HYPERTENSION: Chronic | ICD-10-CM

## 2019-06-18 DIAGNOSIS — I48.91 ATRIAL FIBRILLATION, UNSPECIFIED TYPE (HCC): Chronic | ICD-10-CM

## 2019-06-18 DIAGNOSIS — G47.33 OBSTRUCTIVE SLEEP APNEA SYNDROME: Primary | ICD-10-CM

## 2019-06-18 PROBLEM — E66.812 CLASS 2 OBESITY DUE TO EXCESS CALORIES WITHOUT SERIOUS COMORBIDITY WITH BODY MASS INDEX (BMI) OF 38.0 TO 38.9 IN ADULT: Chronic | Status: ACTIVE | Noted: 2017-11-07

## 2019-06-18 PROCEDURE — 3045F PR MOST RECENT HEMOGLOBIN A1C LEVEL 7.0-9.0%: CPT | Performed by: INTERNAL MEDICINE

## 2019-06-18 PROCEDURE — 2022F DILAT RTA XM EVC RTNOPTHY: CPT | Performed by: INTERNAL MEDICINE

## 2019-06-18 PROCEDURE — G8926 SPIRO NO PERF OR DOC: HCPCS | Performed by: INTERNAL MEDICINE

## 2019-06-18 PROCEDURE — 4040F PNEUMOC VAC/ADMIN/RCVD: CPT | Performed by: INTERNAL MEDICINE

## 2019-06-18 PROCEDURE — G8417 CALC BMI ABV UP PARAM F/U: HCPCS | Performed by: INTERNAL MEDICINE

## 2019-06-18 PROCEDURE — 3017F COLORECTAL CA SCREEN DOC REV: CPT | Performed by: INTERNAL MEDICINE

## 2019-06-18 PROCEDURE — G8427 DOCREV CUR MEDS BY ELIG CLIN: HCPCS | Performed by: INTERNAL MEDICINE

## 2019-06-18 PROCEDURE — 3023F SPIROM DOC REV: CPT | Performed by: INTERNAL MEDICINE

## 2019-06-18 PROCEDURE — 4004F PT TOBACCO SCREEN RCVD TLK: CPT | Performed by: INTERNAL MEDICINE

## 2019-06-18 PROCEDURE — 99214 OFFICE O/P EST MOD 30 MIN: CPT | Performed by: INTERNAL MEDICINE

## 2019-06-18 PROCEDURE — 1123F ACP DISCUSS/DSCN MKR DOCD: CPT | Performed by: INTERNAL MEDICINE

## 2019-06-18 ASSESSMENT — ENCOUNTER SYMPTOMS
EYE PAIN: 0
VOMITING: 0
PHOTOPHOBIA: 0
RHINORRHEA: 0
NAUSEA: 0
ABDOMINAL PAIN: 0
CHOKING: 0
ALLERGIC/IMMUNOLOGIC NEGATIVE: 1
APNEA: 1
ABDOMINAL DISTENTION: 0
CHEST TIGHTNESS: 0
SHORTNESS OF BREATH: 1

## 2019-06-18 ASSESSMENT — SLEEP AND FATIGUE QUESTIONNAIRES
HOW LIKELY ARE YOU TO NOD OFF OR FALL ASLEEP WHILE WATCHING TV: 3
HOW LIKELY ARE YOU TO NOD OFF OR FALL ASLEEP IN A CAR, WHILE STOPPED FOR A FEW MINUTES IN TRAFFIC: 1
HOW LIKELY ARE YOU TO NOD OFF OR FALL ASLEEP WHILE LYING DOWN TO REST IN THE AFTERNOON WHEN CIRCUMSTANCES PERMIT: 3
HOW LIKELY ARE YOU TO NOD OFF OR FALL ASLEEP WHILE SITTING AND TALKING TO SOMEONE: 1
HOW LIKELY ARE YOU TO NOD OFF OR FALL ASLEEP WHILE SITTING QUIETLY AFTER LUNCH WITHOUT ALCOHOL: 1
HOW LIKELY ARE YOU TO NOD OFF OR FALL ASLEEP WHEN YOU ARE A PASSENGER IN A CAR FOR AN HOUR WITHOUT A BREAK: 2
NECK CIRCUMFERENCE (INCHES): 18.5
HOW LIKELY ARE YOU TO NOD OFF OR FALL ASLEEP WHILE SITTING AND READING: 3
ESS TOTAL SCORE: 16
HOW LIKELY ARE YOU TO NOD OFF OR FALL ASLEEP WHILE SITTING INACTIVE IN A PUBLIC PLACE: 2

## 2019-06-18 NOTE — PROGRESS NOTES
Mick Lopez MD, FAA, [address]  [facility]  3rd Floor,  [address] [facility], [facility] Helen Burris E [phone]   [address] [place] [address] Dez Barcenas Nimo Sexton 37 [phone]     [address] [facility]  [address] [place] [id number] [phone]    Assessment:      Visit Diagnoses and Associated Orders     Obstructive sleep apnea syndrome   (New Problem)  -  Primary    needs w/u and Tx    Baseline Diagnostic Sleep Study [75163 Custom]   - Future Order    Sleep Study with PAP Titration [[id number] Custom]   - Future Order         Other emphysema ([address]   (Stable)      Baseline Diagnostic Sleep Study [[id number] Custom]   - Future Order    Sleep Study with PAP Titration [[id number] Custom]   - Future Order         Essential hypertension   (Stable)           Atrial fibrillation, unspecified type ([address]   (Stable)           Uncontrolled type 2 diabetes mellitus with microalbuminuria, without long-term current use of insulin (HCC)   (Stable)           Class 2 obesity due to excess calories without serious comorbidity with body mass index (BMI) of 38.0 to 38.9 in adult   (Stable)                  Plan: Will attempt to get old records. ,.Reviewed compliance download with pt. Supplies and parts as needed for his machine. These are medically necessary. Continue medications per his PCP and other physicians. Limit caffeine use after 3pm.  Encouraged him to work on weight loss through diet and exercise. The primary encounter diagnosis was Obstructive sleep apnea syndrome. Diagnoses of COPD, Essential hypertension, Atrial fibrillation, unspecified type ([address] Uncontrolled type 2 diabetes mellitus with microalbuminuria, without long-term current use of insulin ([address] and Class 2 obesity due to excess calories without serious comorbidity with body mass index (BMI) of 38.0 to 38.9 in adult were also pertinent to this visit. cup(s) per day    Social History     Socioeconomic History    Marital status:      Spouse name: Not on file    Number of children: Not on file    Years of education: Not on file    Highest education level: Not on file   Occupational History    Occupation: Retired:(PT car prep)   Social Needs    Financial resource strain: Not on file    Food insecurity:     Worry: Not on file     Inability: Not on file    Transportation needs:     Medical: Not on file     Non-medical: Not on file   Tobacco Use    Smoking status: Current Every Day Smoker     Packs/day: 1.00     Years: 30.00     Pack years: 30.00     Types: Cigarettes    Smokeless tobacco: Never Used    Tobacco comment: working on reducing   Substance and Sexual Activity    Alcohol use: Yes     Comment: rarely    Drug use: No    Sexual activity: Yes     Comment: -6 children    Lifestyle    Physical activity:     Days per week: Not on file     Minutes per session: Not on file    Stress: Not on file   Relationships    Social connections:     Talks on phone: Not on file     Gets together: Not on file     Attends Yazidism service: Not on file     Active member of club or organization: Not on file     Attends meetings of clubs or organizations: Not on file     Relationship status: Not on file    Intimate partner violence:     Fear of current or ex partner: Not on file     Emotionally abused: Not on file     Physically abused: Not on file     Forced sexual activity: Not on file   Other Topics Concern    Not on file   Social History Narrative    Not on file        Current Outpatient Medications   Medication Sig Dispense Refill    metFORMIN (GLUCOPHAGE XR) 500 MG extended release tablet Take 4 tablets by mouth daily (with breakfast) 360 tablet 0    pravastatin (PRAVACHOL) 80 MG tablet TAKE 1 TABLET EVERY DAY FOR CHOLESTEROL 90 tablet 0    allopurinol (ZYLOPRIM) 300 MG tablet TAKE 1 TABLET EVERY DAY 90 tablet 0    tiotropium (SPIRIVA HANDIHALER) 18 MCG inhalation capsule Inhale 1 capsule into the lungs daily 90 capsule 0    ACCU-CHEK COMPACT PLUS strip USE TO TEST 2 TIMES DAILY 102 strip 1    fluticasone (FLONASE) 50 MCG/ACT nasal spray 2 sprays by Nasal route daily 3 Bottle 1    montelukast (SINGULAIR) 10 MG tablet Take 1 tablet by mouth nightly 90 tablet 1    fluticasone-vilanterol (BREO ELLIPTA) 100-25 MCG/INH AEPB inhaler Inhale 1 puff into the lungs daily 3 each 0    albuterol sulfate HFA (VENTOLIN HFA) 108 (90 Base) MCG/ACT inhaler Inhale 2 puffs into the lungs every 6 hours as needed for Wheezing 3 Inhaler 0    Lancets MISC BID testing 100 each 6    lisinopril (PRINIVIL;ZESTRIL) 20 MG tablet Take 30 mg by mouth daily       furosemide (LASIX) 40 MG tablet Take 40 mg by mouth daily.  warfarin (COUMADIN) 10 MG tablet Take 10 mg by mouth daily Pt takes Mon, Thurs, and Sat= 10mg; 15 mg all other days      sotalol (BETAPACE) 80 MG tablet Take 80 mg by mouth 2 times daily.  aspirin 81 MG chewable tablet Take 81 mg by mouth daily.  glucose monitoring kit (FREESTYLE) monitoring kit 1 each by Does not apply route once for 1 dose Glucometer(patient's choice). BID testing. 1 kit 0     No current facility-administered medications for this visit.         Allergies as of 06/18/2019    (No Known Allergies)       Patient Active Problem List   Diagnosis    Essential hypertension    Hyperlipidemia, mixed    Long term current use of anticoagulants with INR goal of 2.0-3.0    Allergic rhinitis    Gout of both feet    Prediabetes    Primary insomnia    Effusion of left knee    Uncontrolled type 2 diabetes mellitus with microalbuminuria, without long-term current use of insulin ([facility])    Class 2 obesity due to excess calories without serious comorbidity with body mass index (BMI) of 38.0 to 38.9 in adult    Acute recurrent maxillary sinusitis    Allergic sinusitis    COPD    Obstructive sleep apnea syndrome       Past Medical History:   Diagnosis Date    Allergic rhinitis 7/11/2016    Atrial fibrillation ([address]     under care of cardiology:Dr. [address] Behrens([facility])    Class 2 obesity due to excess calories without serious comorbidity with body mass index (BMI) of 37.0 to 37.9 in adult 11/7/2017    Controlled type 2 diabetes mellitus without complication, without long-term current use of insulin ([facility] 2/24/2017    COPD     Gout     Hyperlipidemia, mixed 07/11/2016    Hypertension     under care of cardiology:Dr. [address] Behrens([facility])    Long term current use of anticoagulants with INR goal of 2.0-3.0     under care of cardiology:Dr. Scott Behrens([facility])    Obstructive sleep apnea syndrome 6/18/2019    Parkinson disease [facility])     9/2016:Per neurologist dx is tremor & not consistent with Parkison's:Dr. Renetta Dalal Prediabetes 10/28/2016    Primary insomnia 1/25/2017    Sleep apnea     CPAP       Past Surgical History:   Procedure Laterality Date    AORTIC VALVE REPLACEMENT  10/1996:[facility]    x3    PACEMAKER INSERTION  1996       Family History   Problem Relation Age of Onset    Asthma Mother     Cancer Father     No Known Problems Sister     No Known Problems Brother     No Known Problems Maternal Grandmother     No Known Problems Maternal Grandfather     No Known Problems Paternal Grandmother     No Known Problems Paternal Grandfather        Review of Systems   Constitutional: Positive for fatigue. Negative for activity change and appetite change. HENT: Positive for congestion. Negative for nosebleeds, postnasal drip, rhinorrhea and sneezing. Eyes: Negative for photophobia, pain and visual disturbance. Respiratory: Positive for apnea and shortness of breath. Negative for choking and chest tightness. Cardiovascular: Negative. Gastrointestinal: Negative for abdominal distention, abdominal pain, nausea and vomiting. Endocrine: Negative for cold intolerance and heat intolerance. Genitourinary: Negative for difficulty urinating, dysuria, frequency and urgency. Musculoskeletal: Negative. Negative for neck pain and neck stiffness. Skin: Negative. Allergic/Immunologic: Negative. Neurological: Negative for tremors, seizures, syncope and weakness. Hematological: Negative for adenopathy. Does not bruise/bleed easily. Psychiatric/Behavioral: Positive for sleep disturbance. Negative for agitation, behavioral problems and confusion. Objective:     Vitals:  Weight BMI   Wt Readings from Last 3 Encounters:   06/18/19 292 lb (132.5 kg)   05/30/19 290 lb 9.6 oz (131.8 kg)   05/20/19 293 lb 9.6 oz (133.2 kg)    Body mass index is 38.52 kg/m². BP HR SaO2   BP Readings from Last 3 Encounters:   06/18/19 122/70   05/30/19 (!) 142/83   05/20/19 131/82    Pulse Readings from Last 3 Encounters:   06/18/19 85   05/30/19 74   05/20/19 83    SpO2 Readings from Last 3 Encounters:   06/18/19 98%   05/20/19 94%   03/04/19 96%        Physical Exam   Constitutional: He is oriented to person, place, and time. Vital signs are normal. He appears well-developed and well-nourished. Non-toxic appearance. He does not have a sickly appearance. He is not intubated. HENT:   Head: Normocephalic and atraumatic. Not macrocephalic and not microcephalic. Right Ear: External ear normal.   Left Ear: External ear normal.   Nose: Mucosal edema and septal deviation present. Mouth/Throat: Uvula is midline and mucous membranes are normal. Posterior oropharyngeal edema present. No oropharyngeal exudate or tonsillar abscesses. Tonsils:   normal size, normal appearance  Post. Pharynx:   normal mucosa  Neck circumference: 18.5  Inches     Eyes: Pupils are equal, round, and reactive to light. Conjunctivae, EOM and lids are normal.   Neck: Trachea normal and normal range of motion. Neck supple. No tracheal deviation present. No thyroid mass and no thyromegaly present.    Cardiovascular: Normal rate, regular rhythm, S1 normal, S2 normal and normal heart sounds. Mechanical click   Pulmonary/Chest: Effort normal. No accessory muscle usage. No apnea, no tachypnea and no bradypnea. He is not intubated. No respiratory distress. He has decreased breath sounds in the right upper field, the right middle field, the right lower field, the left upper field, the left middle field and the left lower field. He has no wheezes. He has no rhonchi. He has no rales. He exhibits no mass, no tenderness and no deformity. Abdominal: Soft. Bowel sounds are normal. He exhibits no distension. There is no tenderness. Musculoskeletal: He exhibits no edema. Gait - normal  No evidence of cyanosis or clubbing of nails   Lymphadenopathy:        Head (right side): No submental, no submandibular and no tonsillar adenopathy present. Head (left side): No submental, no submandibular and no tonsillar adenopathy present. Neurological: He is alert and oriented to person, place, and time. No cranial nerve deficit. Skin: Skin is warm, dry and intact. No cyanosis. Nails show no clubbing. Psychiatric: He has a normal mood and affect. His speech is normal and behavior is normal. Judgment and thought content normal.   Nursing note and vitals reviewed.       Electronically signed by Jhoan Scott MD on6/18/2019 at 11:16 AM

## 2019-06-18 NOTE — LETTER
[facility] Sleep Medicine  [address] [place] [address] Drive  Phone: [phone]  Fax: [phone]      June 18, 2019       Patient: Bryce Valadez   MR Number: [id number]   YOB: 1950   Date of Visit: 6/18/2019     Thank you for allowing me to participate in the care of Vanessa Brady. Here is my assessment and plan. Also attached is a copy of his consult note:    ASSESSMENT:  Visit Diagnoses and Associated Orders     Obstructive sleep apnea syndrome   (New Problem)  -  Primary    needs w/u and Tx    Baseline Diagnostic Sleep Study [[id number] Custom]   - Future Order    Sleep Study with PAP Titration [[id number] Custom]   - Future Order         Other emphysema ([address]   (Stable)      Baseline Diagnostic Sleep Study [[id number] Custom]   - Future Order    Sleep Study with PAP Titration [[id number] Custom]   - Future Order         Essential hypertension   (Stable)           Atrial fibrillation, unspecified type ([address]   (Stable)           Uncontrolled type 2 diabetes mellitus with microalbuminuria, without long-term current use of insulin (HCC)   (Stable)           Class 2 obesity due to excess calories without serious comorbidity with body mass index (BMI) of 38.0 to 38.9 in adult   (Stable)                 Plan: Will attempt to get old records. ,.Reviewed compliance download with pt. Supplies and parts as needed for his machine. These are medically necessary. Continue medications per his PCP and other physicians. Limit caffeine use after 3pm.  Encouraged him to work on weight loss through diet and exercise. The primary encounter diagnosis was Obstructive sleep apnea syndrome.  Diagnoses of COPD, Essential hypertension, Atrial fibrillation, unspecified type ([address] Uncontrolled type 2 diabetes mellitus with microalbuminuria, without long-term current use of insulin ([address] and Class 2 obesity due to excess calories without serious comorbidity with body mass index (BMI) of 38.0 to 38.9 in adult were also pertinent to this visit. The chronic medical conditions listed are directly related to the primary diagnosis listed above. The management of the primary diagnosis affects the secondary diagnosis and vice versa. Given no old records will need repeat studies and a new machine, most likely will need bilevel. Given COPD is not a candidate for HST nor APAP trial.    Continue meds for: HTN, a fib, DM, and COPD. Pt would medically benefit from wt loss for ROSETTA (diet, exercise, surgical). Encouraged to quit tobacco in all forms, discussed different techniques to quit. Orders Placed This Encounter   Procedures    Baseline Diagnostic Sleep Study    Sleep Study with PAP Titration         If you have questions or concerns, please do not hesitate to call me. I look forward to following Brianda Ramos along with you. Sincerely,      Chanel Hernandez MD    CC providers:  MD Hanna Manriquebrittney [facility] 93, [address] [facility].   [address] [place] [address]

## 2019-06-24 DIAGNOSIS — N17.9 AKI (ACUTE KIDNEY INJURY) (HCC): ICD-10-CM

## 2019-06-24 LAB
ALBUMIN SERPL-MCNC: 4.6 G/DL (ref 3.4–5)
ANION GAP SERPL CALCULATED.3IONS-SCNC: 14 MMOL/L (ref 3–16)
BUN BLDV-MCNC: 24 MG/DL (ref 7–20)
CALCIUM SERPL-MCNC: 9.8 MG/DL (ref 8.3–10.6)
CHLORIDE BLD-SCNC: 107 MMOL/L (ref 99–110)
CO2: 25 MMOL/L (ref 21–32)
CREAT SERPL-MCNC: 1.4 MG/DL (ref 0.8–1.3)
CREATININE URINE: 101 MG/DL (ref 39–259)
EPITHELIAL CELLS, UA: 1 /HPF (ref 0–5)
GFR AFRICAN AMERICAN: >60
GFR NON-AFRICAN AMERICAN: 50
GLUCOSE BLD-MCNC: 124 MG/DL (ref 70–99)
HYALINE CASTS: 0 /LPF (ref 0–8)
MICROALBUMIN UR-MCNC: 2 MG/DL
MICROALBUMIN/CREAT UR-RTO: 19.8 MG/G (ref 0–30)
PHOSPHORUS: 3.9 MG/DL (ref 2.5–4.9)
POTASSIUM SERPL-SCNC: 4.7 MMOL/L (ref 3.5–5.1)
RBC UA: 2 /HPF (ref 0–4)
SODIUM BLD-SCNC: 146 MMOL/L (ref 136–145)
WBC UA: 1 /HPF (ref 0–5)

## 2019-06-25 LAB
KAPPA, FREE LIGHT CHAINS, SERUM: 48.58 MG/L (ref 3.3–19.4)
KAPPA/LAMBDA RATIO: 1.78 (ref 0.26–1.65)
KAPPA/LAMBDA TEST COMMENT: ABNORMAL
LAMBDA, FREE LIGHT CHAINS, SERUM: 27.34 MG/L (ref 5.71–26.3)

## 2019-07-01 ENCOUNTER — OFFICE VISIT (OUTPATIENT)
Dept: FAMILY MEDICINE CLINIC | Age: 69
End: 2019-07-01
Payer: MEDICARE

## 2019-07-01 VITALS
HEIGHT: 73 IN | TEMPERATURE: 98.3 F | BODY MASS INDEX: 37.91 KG/M2 | SYSTOLIC BLOOD PRESSURE: 117 MMHG | RESPIRATION RATE: 17 BRPM | HEART RATE: 74 BPM | DIASTOLIC BLOOD PRESSURE: 77 MMHG | WEIGHT: 286 LBS | OXYGEN SATURATION: 95 %

## 2019-07-01 DIAGNOSIS — M10.9 GOUT OF BOTH FEET: ICD-10-CM

## 2019-07-01 DIAGNOSIS — I10 ESSENTIAL HYPERTENSION: ICD-10-CM

## 2019-07-01 DIAGNOSIS — F51.01 PRIMARY INSOMNIA: ICD-10-CM

## 2019-07-01 DIAGNOSIS — F17.200 TOBACCO USE DISORDER: ICD-10-CM

## 2019-07-01 DIAGNOSIS — J43.8 OTHER EMPHYSEMA (HCC): ICD-10-CM

## 2019-07-01 DIAGNOSIS — E66.9 NON MORBID OBESITY: ICD-10-CM

## 2019-07-01 DIAGNOSIS — Z79.01 LONG TERM CURRENT USE OF ANTICOAGULANTS WITH INR GOAL OF 2.0-3.0: ICD-10-CM

## 2019-07-01 DIAGNOSIS — R80.9 CONTROLLED TYPE 2 DIABETES MELLITUS WITH MICROALBUMINURIA, WITHOUT LONG-TERM CURRENT USE OF INSULIN (HCC): Primary | ICD-10-CM

## 2019-07-01 DIAGNOSIS — E11.29 CONTROLLED TYPE 2 DIABETES MELLITUS WITH MICROALBUMINURIA, WITHOUT LONG-TERM CURRENT USE OF INSULIN (HCC): Primary | ICD-10-CM

## 2019-07-01 DIAGNOSIS — J30.1 SEASONAL ALLERGIC RHINITIS DUE TO POLLEN: ICD-10-CM

## 2019-07-01 DIAGNOSIS — G47.39 OTHER SLEEP APNEA: ICD-10-CM

## 2019-07-01 DIAGNOSIS — N17.9 AKI (ACUTE KIDNEY INJURY) (HCC): ICD-10-CM

## 2019-07-01 DIAGNOSIS — E78.5 HYPERLIPIDEMIA LDL GOAL <100: ICD-10-CM

## 2019-07-01 PROCEDURE — 3045F PR MOST RECENT HEMOGLOBIN A1C LEVEL 7.0-9.0%: CPT | Performed by: FAMILY MEDICINE

## 2019-07-01 PROCEDURE — G8926 SPIRO NO PERF OR DOC: HCPCS | Performed by: FAMILY MEDICINE

## 2019-07-01 PROCEDURE — 2022F DILAT RTA XM EVC RTNOPTHY: CPT | Performed by: FAMILY MEDICINE

## 2019-07-01 PROCEDURE — G8417 CALC BMI ABV UP PARAM F/U: HCPCS | Performed by: FAMILY MEDICINE

## 2019-07-01 PROCEDURE — 1123F ACP DISCUSS/DSCN MKR DOCD: CPT | Performed by: FAMILY MEDICINE

## 2019-07-01 PROCEDURE — 99214 OFFICE O/P EST MOD 30 MIN: CPT | Performed by: FAMILY MEDICINE

## 2019-07-01 PROCEDURE — 4040F PNEUMOC VAC/ADMIN/RCVD: CPT | Performed by: FAMILY MEDICINE

## 2019-07-01 PROCEDURE — 1036F TOBACCO NON-USER: CPT | Performed by: FAMILY MEDICINE

## 2019-07-01 PROCEDURE — 3017F COLORECTAL CA SCREEN DOC REV: CPT | Performed by: FAMILY MEDICINE

## 2019-07-01 PROCEDURE — G8427 DOCREV CUR MEDS BY ELIG CLIN: HCPCS | Performed by: FAMILY MEDICINE

## 2019-07-01 PROCEDURE — 3023F SPIROM DOC REV: CPT | Performed by: FAMILY MEDICINE

## 2019-07-01 ASSESSMENT — ENCOUNTER SYMPTOMS
ANAL BLEEDING: 0
CONSTIPATION: 0
ABDOMINAL DISTENTION: 0
VOMITING: 0
SHORTNESS OF BREATH: 0
DIARRHEA: 0
STRIDOR: 0
CHEST TIGHTNESS: 0
APNEA: 0
COUGH: 0
RECTAL PAIN: 0
WHEEZING: 0
BLOOD IN STOOL: 0
ABDOMINAL PAIN: 0
NAUSEA: 0
CHOKING: 0

## 2019-07-01 ASSESSMENT — PATIENT HEALTH QUESTIONNAIRE - PHQ9
1. LITTLE INTEREST OR PLEASURE IN DOING THINGS: 0
SUM OF ALL RESPONSES TO PHQ QUESTIONS 1-9: 0
2. FEELING DOWN, DEPRESSED OR HOPELESS: 0
SUM OF ALL RESPONSES TO PHQ QUESTIONS 1-9: 0
SUM OF ALL RESPONSES TO PHQ9 QUESTIONS 1 & 2: 0

## 2019-07-01 ASSESSMENT — COPD QUESTIONNAIRES: COPD: 1

## 2019-07-01 NOTE — PROGRESS NOTES
baseline;uses breo & spiriva w/o side effects. No other associated concerns. Albuterol inhaler using approx 1xday.     Hyperlipidemia:is doing well. Taking statin w/o side effects. Associated w/nothing new. Improving factors:medication & good diet regime. Worsening factors:nothing new. Denies adbo pain/myalgias.        Gout of both feet: doing well. No recent recurrences reported. Taking allopurinol w/o side effects. Last gout episode per pt'>5yrs ago. No other new associated concerns. Denies left foot skin lesions/foot ioibzlybkxo-ldcntryf-ednueteev/pus. No Known Allergies    Current Outpatient Medications on File Prior to Visit   Medication Sig Dispense Refill    metFORMIN (GLUCOPHAGE XR) 500 MG extended release tablet Take 4 tablets by mouth daily (with breakfast) 360 tablet 0    pravastatin (PRAVACHOL) 80 MG tablet TAKE 1 TABLET EVERY DAY FOR CHOLESTEROL 90 tablet 0    allopurinol (ZYLOPRIM) 300 MG tablet TAKE 1 TABLET EVERY DAY 90 tablet 0    tiotropium (SPIRIVA HANDIHALER) 18 MCG inhalation capsule Inhale 1 capsule into the lungs daily 90 capsule 0    ACCU-CHEK COMPACT PLUS strip USE TO TEST 2 TIMES DAILY 102 strip 1    fluticasone (FLONASE) 50 MCG/ACT nasal spray 2 sprays by Nasal route daily 3 Bottle 1    montelukast (SINGULAIR) 10 MG tablet Take 1 tablet by mouth nightly 90 tablet 1    fluticasone-vilanterol (BREO ELLIPTA) 100-25 MCG/INH AEPB inhaler Inhale 1 puff into the lungs daily 3 each 0    albuterol sulfate HFA (VENTOLIN HFA) 108 (90 Base) MCG/ACT inhaler Inhale 2 puffs into the lungs every 6 hours as needed for Wheezing 3 Inhaler 0    glucose monitoring kit (FREESTYLE) monitoring kit 1 each by Does not apply route once for 1 dose Glucometer(patient's choice). BID testing. 1 kit 0    Lancets MISC BID testing 100 each 6    lisinopril (PRINIVIL;ZESTRIL) 20 MG tablet Take 30 mg by mouth daily       furosemide (LASIX) 40 MG tablet Take 40 mg by mouth daily.       warfarin

## 2019-07-08 ENCOUNTER — HOSPITAL ENCOUNTER (OUTPATIENT)
Dept: SLEEP CENTER | Age: 69
Discharge: HOME | End: 2019-07-08
Payer: MEDICARE

## 2019-07-08 DIAGNOSIS — J43.8 OTHER EMPHYSEMA (HCC): Chronic | ICD-10-CM

## 2019-07-08 DIAGNOSIS — G47.33 OBSTRUCTIVE SLEEP APNEA SYNDROME: ICD-10-CM

## 2019-07-08 PROCEDURE — 95810 POLYSOM 6/> YRS 4/> PARAM: CPT

## 2019-07-11 PROCEDURE — 95810 POLYSOM 6/> YRS 4/> PARAM: CPT | Performed by: INTERNAL MEDICINE

## 2019-07-12 ENCOUNTER — TELEPHONE (OUTPATIENT)
Dept: PULMONOLOGY | Age: 69
End: 2019-07-12

## 2019-07-29 ENCOUNTER — HOSPITAL ENCOUNTER (OUTPATIENT)
Dept: SLEEP CENTER | Age: 69
Discharge: HOME | End: 2019-07-29
Payer: MEDICARE

## 2019-07-29 DIAGNOSIS — J43.8 OTHER EMPHYSEMA (HCC): Chronic | ICD-10-CM

## 2019-07-29 DIAGNOSIS — G47.33 OBSTRUCTIVE SLEEP APNEA SYNDROME: ICD-10-CM

## 2019-07-29 PROCEDURE — 95811 POLYSOM 6/>YRS CPAP 4/> PARM: CPT

## 2019-07-31 PROCEDURE — 95811 POLYSOM 6/>YRS CPAP 4/> PARM: CPT | Performed by: INTERNAL MEDICINE

## 2019-08-01 RX ORDER — ALLOPURINOL 300 MG/1
TABLET ORAL
Qty: 90 TABLET | Refills: 0 | Status: SHIPPED | OUTPATIENT
Start: 2019-08-01 | End: 2019-10-20 | Stop reason: SDUPTHER

## 2019-08-05 ENCOUNTER — TELEPHONE (OUTPATIENT)
Dept: PULMONOLOGY | Age: 69
End: 2019-08-05

## 2019-08-05 NOTE — TELEPHONE ENCOUNTER
Spoke with pt to order unit. Order to be sent to [facility] due to location. .  F/U scheduled and pt was asked to bring unit.

## 2019-08-15 DIAGNOSIS — E78.5 HYPERLIPIDEMIA LDL GOAL <100: ICD-10-CM

## 2019-08-15 DIAGNOSIS — R80.9 CONTROLLED TYPE 2 DIABETES MELLITUS WITH MICROALBUMINURIA, WITHOUT LONG-TERM CURRENT USE OF INSULIN (HCC): ICD-10-CM

## 2019-08-15 DIAGNOSIS — I10 ESSENTIAL HYPERTENSION: ICD-10-CM

## 2019-08-15 DIAGNOSIS — E11.29 CONTROLLED TYPE 2 DIABETES MELLITUS WITH MICROALBUMINURIA, WITHOUT LONG-TERM CURRENT USE OF INSULIN (HCC): ICD-10-CM

## 2019-08-15 LAB
A/G RATIO: 1.7 (ref 1.1–2.2)
ALBUMIN SERPL-MCNC: 4.3 G/DL (ref 3.4–5)
ALP BLD-CCNC: 83 U/L (ref 40–129)
ALT SERPL-CCNC: 17 U/L (ref 10–40)
ANION GAP SERPL CALCULATED.3IONS-SCNC: 13 MMOL/L (ref 3–16)
AST SERPL-CCNC: 16 U/L (ref 15–37)
BILIRUB SERPL-MCNC: 0.4 MG/DL (ref 0–1)
BUN BLDV-MCNC: 25 MG/DL (ref 7–20)
CALCIUM SERPL-MCNC: 9.6 MG/DL (ref 8.3–10.6)
CHLORIDE BLD-SCNC: 103 MMOL/L (ref 99–110)
CHOLESTEROL, TOTAL: 142 MG/DL (ref 0–199)
CO2: 27 MMOL/L (ref 21–32)
CREAT SERPL-MCNC: 1.3 MG/DL (ref 0.8–1.3)
GFR AFRICAN AMERICAN: >60
GFR NON-AFRICAN AMERICAN: 55
GLOBULIN: 2.6 G/DL
GLUCOSE BLD-MCNC: 104 MG/DL (ref 70–99)
HDLC SERPL-MCNC: 38 MG/DL (ref 40–60)
LDL CHOLESTEROL CALCULATED: 69 MG/DL
POTASSIUM SERPL-SCNC: 4.8 MMOL/L (ref 3.5–5.1)
SODIUM BLD-SCNC: 143 MMOL/L (ref 136–145)
TOTAL PROTEIN: 6.9 G/DL (ref 6.4–8.2)
TRIGL SERPL-MCNC: 173 MG/DL (ref 0–150)
VLDLC SERPL CALC-MCNC: 35 MG/DL

## 2019-08-16 LAB
ESTIMATED AVERAGE GLUCOSE: 134.1 MG/DL
HBA1C MFR BLD: 6.3 %

## 2019-08-19 ENCOUNTER — OFFICE VISIT (OUTPATIENT)
Dept: FAMILY MEDICINE CLINIC | Age: 69
End: 2019-08-19
Payer: MEDICARE

## 2019-08-19 VITALS
TEMPERATURE: 98.5 F | HEIGHT: 73 IN | BODY MASS INDEX: 38.37 KG/M2 | DIASTOLIC BLOOD PRESSURE: 81 MMHG | RESPIRATION RATE: 20 BRPM | WEIGHT: 289.5 LBS | OXYGEN SATURATION: 95 % | HEART RATE: 81 BPM | SYSTOLIC BLOOD PRESSURE: 127 MMHG

## 2019-08-19 DIAGNOSIS — Z79.01 LONG TERM CURRENT USE OF ANTICOAGULANTS WITH INR GOAL OF 2.0-3.0: ICD-10-CM

## 2019-08-19 DIAGNOSIS — F17.200 TOBACCO USE DISORDER: ICD-10-CM

## 2019-08-19 DIAGNOSIS — J43.8 OTHER EMPHYSEMA (HCC): ICD-10-CM

## 2019-08-19 DIAGNOSIS — M10.9 GOUT OF BOTH FEET: ICD-10-CM

## 2019-08-19 DIAGNOSIS — E78.5 HYPERLIPIDEMIA LDL GOAL <100: ICD-10-CM

## 2019-08-19 DIAGNOSIS — N18.30 STAGE 3 CHRONIC KIDNEY DISEASE (HCC): ICD-10-CM

## 2019-08-19 DIAGNOSIS — F51.01 PRIMARY INSOMNIA: ICD-10-CM

## 2019-08-19 DIAGNOSIS — G47.39 OTHER SLEEP APNEA: ICD-10-CM

## 2019-08-19 DIAGNOSIS — E11.29 CONTROLLED TYPE 2 DIABETES MELLITUS WITH MICROALBUMINURIA, WITHOUT LONG-TERM CURRENT USE OF INSULIN (HCC): Primary | ICD-10-CM

## 2019-08-19 DIAGNOSIS — E66.9 NON MORBID OBESITY: ICD-10-CM

## 2019-08-19 DIAGNOSIS — J30.1 SEASONAL ALLERGIC RHINITIS DUE TO POLLEN: ICD-10-CM

## 2019-08-19 DIAGNOSIS — R80.9 CONTROLLED TYPE 2 DIABETES MELLITUS WITH MICROALBUMINURIA, WITHOUT LONG-TERM CURRENT USE OF INSULIN (HCC): Primary | ICD-10-CM

## 2019-08-19 DIAGNOSIS — I10 ESSENTIAL HYPERTENSION: ICD-10-CM

## 2019-08-19 PROCEDURE — 3017F COLORECTAL CA SCREEN DOC REV: CPT | Performed by: FAMILY MEDICINE

## 2019-08-19 PROCEDURE — G8427 DOCREV CUR MEDS BY ELIG CLIN: HCPCS | Performed by: FAMILY MEDICINE

## 2019-08-19 PROCEDURE — 4040F PNEUMOC VAC/ADMIN/RCVD: CPT | Performed by: FAMILY MEDICINE

## 2019-08-19 PROCEDURE — 2022F DILAT RTA XM EVC RTNOPTHY: CPT | Performed by: FAMILY MEDICINE

## 2019-08-19 PROCEDURE — 1036F TOBACCO NON-USER: CPT | Performed by: FAMILY MEDICINE

## 2019-08-19 PROCEDURE — 3044F HG A1C LEVEL LT 7.0%: CPT | Performed by: FAMILY MEDICINE

## 2019-08-19 PROCEDURE — 1123F ACP DISCUSS/DSCN MKR DOCD: CPT | Performed by: FAMILY MEDICINE

## 2019-08-19 PROCEDURE — G8417 CALC BMI ABV UP PARAM F/U: HCPCS | Performed by: FAMILY MEDICINE

## 2019-08-19 PROCEDURE — 3023F SPIROM DOC REV: CPT | Performed by: FAMILY MEDICINE

## 2019-08-19 PROCEDURE — G8926 SPIRO NO PERF OR DOC: HCPCS | Performed by: FAMILY MEDICINE

## 2019-08-19 PROCEDURE — 99214 OFFICE O/P EST MOD 30 MIN: CPT | Performed by: FAMILY MEDICINE

## 2019-08-19 ASSESSMENT — ENCOUNTER SYMPTOMS
APNEA: 0
COLOR CHANGE: 0
NAUSEA: 0
RECTAL PAIN: 0
VOMITING: 0
SHORTNESS OF BREATH: 0
COUGH: 0
WHEEZING: 0
ABDOMINAL DISTENTION: 0
CHOKING: 0
DIARRHEA: 0
CHEST TIGHTNESS: 0
ABDOMINAL PAIN: 0
BLOOD IN STOOL: 0
CONSTIPATION: 0
ANAL BLEEDING: 0
STRIDOR: 0

## 2019-08-19 ASSESSMENT — COPD QUESTIONNAIRES: COPD: 1

## 2019-08-19 NOTE — PROGRESS NOTES
Subjective:      Patient ID: Robert Sauceda is a 77 y.o. male. Diabetes   Pertinent negatives for hypoglycemia include no confusion, dizziness, nervousness/anxiousness or pallor. Pertinent negatives for diabetes include no chest pain, no fatigue, no polydipsia, no polyphagia and no polyuria. COPD   There is no cough, shortness of breath or wheezing. Pertinent negatives include no appetite change, chest pain or fever. Results for Carlos Rawls \"BILL\" (MRN [id number]) as of 8/19/2019 12:24   Ref. Range 8/15/2019 11:58   Sodium Latest Ref Range: 136 - 145 mmol/L 143   Potassium Latest Ref Range: 3.5 - 5.1 mmol/L 4.8   Chloride Latest Ref Range: 99 - 110 mmol/L 103   CO2 Latest Ref Range: 21 - 32 mmol/L 27   BUN Latest Ref Range: 7 - 20 mg/dL 25 (H)   Creatinine Latest Ref Range: 0.8 - 1.3 mg/dL 1.3   Anion Gap Latest Ref Range: 3 - 16  13   GFR Non- Latest Ref Range: >60  55 (A)   GFR  Latest Ref Range: >60  >60   Glucose Latest Ref Range: 70 - 99 mg/dL 104 (H)   Calcium Latest Ref Range: 8.3 - 10.6 mg/dL 9.6   Total Protein Latest Ref Range: 6.4 - 8.2 g/dL 6.9   Cholesterol, Total Latest Ref Range: 0 - 199 mg/dL 142   HDL Cholesterol Latest Ref Range: 40 - 60 mg/dL 38 (L)   LDL Calculated Latest Ref Range: <100 mg/dL 69   Triglycerides Latest Ref Range: 0 - 150 mg/dL 173 (H)   VLDL Cholesterol Calculated Latest Ref Range: Not Established mg/dL 35   Albumin Latest Ref Range: 3.4 - 5.0 g/dL 4.3   Globulin Latest Units: g/dL 2.6   Albumin/Globulin Ratio Latest Ref Range: 1.1 - 2.2  1.7   Alk Phos Latest Ref Range: 40 - 129 U/L 83   ALT Latest Ref Range: 10 - 40 U/L 17   AST Latest Ref Range: 15 - 37 U/L 16   Bilirubin Latest Ref Range: 0.0 - 1.0 mg/dL 0.4   Hemoglobin A1C Latest Ref Range: See comment % 6.3   eAG (mg/dL) Latest Units: mg/dL 134.1     HTN check:is doing well. Taking medication w/o side effects. Associated w/nothing new. Worsened by nothing new.   Improves with Tindni:stage 3    Class 2 obesity due to excess calories without serious comorbidity with body mass index (BMI) of 37.0 to 37.9 in adult 11/7/2017    Controlled type 2 diabetes mellitus without complication, without long-term current use of insulin ([facility] 2/24/2017    COPD     Gout     Hyperlipidemia, mixed 07/11/2016    Hypertension     under care of cardiology:Dr. Caroleen Sawyers Behrens([facility])    Long term current use of anticoagulants with INR goal of 2.0-3.0     under care of cardiology:Dr. Scott Behrens([facility])    Obstructive sleep apnea syndrome 6/18/2019    Parkinson disease ([facility] [address]     9/2016:Per neurologist dx is tremor & not consistent with Parkison's:Dr. Ashkan Holliday Prediabetes 10/28/2016    Primary insomnia 1/25/2017    Sleep apnea     CPAP           Social History     Tobacco Use    Smoking status: Former Smoker     Packs/day: 1.00     Years: 30.00     Pack years: 30.00     Types: Cigarettes    Smokeless tobacco: Never Used    Tobacco comment: working on reducing   Substance Use Topics    Alcohol use: Yes     Comment: rarely    Drug use: No     Social History     Substance and Sexual Activity   Drug Use No               Review of Systems   Constitutional: Negative for activity change, appetite change, chills, diaphoresis, fatigue, fever and unexpected weight change. Eyes: Negative for visual disturbance. Respiratory: Negative for apnea, cough, choking, chest tightness, shortness of breath, wheezing and stridor. Cardiovascular: Negative for chest pain, palpitations and leg swelling. Gastrointestinal: Negative for abdominal distention, abdominal pain, anal bleeding, blood in stool, constipation, diarrhea, nausea, rectal pain and vomiting. Endocrine: Negative for cold intolerance, heat intolerance, polydipsia, polyphagia and polyuria. Genitourinary: Negative for difficulty urinating. Skin: Negative for color change, pallor, rash and wound.    Neurological: Negative for dizziness and light-headedness. Hematological: Negative for adenopathy. Psychiatric/Behavioral: Negative for agitation, behavioral problems, confusion, decreased concentration, dysphoric mood, hallucinations, self-injury, sleep disturbance and suicidal ideas. The patient is not nervous/anxious and is not hyperactive. Objective:   Physical Exam   Constitutional: He is oriented to person, place, and time. He appears well-developed and well-nourished. He is cooperative. He does not have a sickly appearance. No distress. HENT:   Nose: Nose normal.   Mouth/Throat: Uvula is midline, oropharynx is clear and moist and mucous membranes are normal.   Hearing intact to nml conversation   Eyes: Pupils are equal, round, and reactive to light. Conjunctivae, EOM and lids are normal.   Neck: Trachea normal and normal range of motion. Neck supple. Cardiovascular: Normal rate, regular rhythm, normal heart sounds, intact distal pulses and normal pulses. No murmur heard. No leg-ankle edema. Pulmonary/Chest: Effort normal and breath sounds normal.   CTAB,good AE bilaterally   Abdominal: Soft. Normal appearance and bowel sounds are normal. He exhibits no distension and no mass. There is no hepatomegaly. There is no tenderness. Lymphadenopathy:     He has no cervical adenopathy. Neurological: He is alert and oriented to person, place, and time. Skin: Skin is warm, dry and intact. Capillary refill takes less than 2 seconds. No rash noted. He is not diaphoretic. No cyanosis. No pallor. Good skin turgor. Psychiatric: He has a normal mood and affect. His behavior is normal. Judgment and thought content normal. His speech is not rapid and/or pressured. Cognition and memory are normal.   Good eye contact. Polite. Assessment:      Diagnosis Orders   1. Controlled type 2 diabetes mellitus with microalbuminuria, without long-term current use of insulin ([address]  VSS/well appearing. Stable/controlled.   A1c in

## 2019-08-22 ENCOUNTER — TELEPHONE (OUTPATIENT)
Dept: PULMONOLOGY | Age: 69
End: 2019-08-22

## 2019-09-03 RX ORDER — METFORMIN HYDROCHLORIDE 500 MG/1
2000 TABLET, EXTENDED RELEASE ORAL
Qty: 360 TABLET | Refills: 0 | Status: SHIPPED | OUTPATIENT
Start: 2019-09-03 | End: 2019-09-03 | Stop reason: SDUPTHER

## 2019-09-03 RX ORDER — METFORMIN HYDROCHLORIDE 500 MG/1
2000 TABLET, EXTENDED RELEASE ORAL
Qty: 360 TABLET | Refills: 0 | Status: SHIPPED | OUTPATIENT
Start: 2019-09-03 | End: 2019-09-09 | Stop reason: SDUPTHER

## 2019-09-09 RX ORDER — METFORMIN HYDROCHLORIDE 500 MG/1
2000 TABLET, EXTENDED RELEASE ORAL
Qty: 360 TABLET | Refills: 0 | Status: SHIPPED | OUTPATIENT
Start: 2019-09-09 | End: 2020-05-15

## 2019-09-15 DIAGNOSIS — E78.5 HYPERLIPIDEMIA LDL GOAL <100: ICD-10-CM

## 2019-09-15 RX ORDER — PRAVASTATIN SODIUM 80 MG/1
TABLET ORAL
Qty: 90 TABLET | Refills: 0 | Status: SHIPPED | OUTPATIENT
Start: 2019-09-15 | End: 2019-12-01 | Stop reason: SDUPTHER

## 2019-10-07 ENCOUNTER — OFFICE VISIT (OUTPATIENT)
Dept: PULMONOLOGY | Age: 69
End: 2019-10-07
Payer: MEDICARE

## 2019-10-07 VITALS
BODY MASS INDEX: 38.17 KG/M2 | DIASTOLIC BLOOD PRESSURE: 72 MMHG | HEIGHT: 73 IN | SYSTOLIC BLOOD PRESSURE: 130 MMHG | WEIGHT: 288 LBS | HEART RATE: 87 BPM | OXYGEN SATURATION: 98 %

## 2019-10-07 DIAGNOSIS — G47.33 OBSTRUCTIVE SLEEP APNEA SYNDROME: Primary | Chronic | ICD-10-CM

## 2019-10-07 DIAGNOSIS — E66.09 CLASS 2 OBESITY DUE TO EXCESS CALORIES WITHOUT SERIOUS COMORBIDITY WITH BODY MASS INDEX (BMI) OF 38.0 TO 38.9 IN ADULT: Chronic | ICD-10-CM

## 2019-10-07 DIAGNOSIS — I10 ESSENTIAL HYPERTENSION: Chronic | ICD-10-CM

## 2019-10-07 DIAGNOSIS — J43.8 OTHER EMPHYSEMA (HCC): Chronic | ICD-10-CM

## 2019-10-07 DIAGNOSIS — I48.91 ATRIAL FIBRILLATION, UNSPECIFIED TYPE (HCC): Chronic | ICD-10-CM

## 2019-10-07 PROCEDURE — G8427 DOCREV CUR MEDS BY ELIG CLIN: HCPCS | Performed by: NURSE PRACTITIONER

## 2019-10-07 PROCEDURE — 3044F HG A1C LEVEL LT 7.0%: CPT | Performed by: NURSE PRACTITIONER

## 2019-10-07 PROCEDURE — G8484 FLU IMMUNIZE NO ADMIN: HCPCS | Performed by: NURSE PRACTITIONER

## 2019-10-07 PROCEDURE — G8417 CALC BMI ABV UP PARAM F/U: HCPCS | Performed by: NURSE PRACTITIONER

## 2019-10-07 PROCEDURE — G8926 SPIRO NO PERF OR DOC: HCPCS | Performed by: NURSE PRACTITIONER

## 2019-10-07 PROCEDURE — 2022F DILAT RTA XM EVC RTNOPTHY: CPT | Performed by: NURSE PRACTITIONER

## 2019-10-07 PROCEDURE — 3017F COLORECTAL CA SCREEN DOC REV: CPT | Performed by: NURSE PRACTITIONER

## 2019-10-07 PROCEDURE — 1123F ACP DISCUSS/DSCN MKR DOCD: CPT | Performed by: NURSE PRACTITIONER

## 2019-10-07 PROCEDURE — 4040F PNEUMOC VAC/ADMIN/RCVD: CPT | Performed by: NURSE PRACTITIONER

## 2019-10-07 PROCEDURE — 99214 OFFICE O/P EST MOD 30 MIN: CPT | Performed by: NURSE PRACTITIONER

## 2019-10-07 PROCEDURE — 1036F TOBACCO NON-USER: CPT | Performed by: NURSE PRACTITIONER

## 2019-10-07 PROCEDURE — 3023F SPIROM DOC REV: CPT | Performed by: NURSE PRACTITIONER

## 2019-10-07 ASSESSMENT — ENCOUNTER SYMPTOMS
RHINORRHEA: 0
COUGH: 0
ABDOMINAL DISTENTION: 0
EYE REDNESS: 0
EYE PAIN: 0
ABDOMINAL PAIN: 0
APNEA: 0
SHORTNESS OF BREATH: 0
SINUS PRESSURE: 0

## 2019-10-07 ASSESSMENT — SLEEP AND FATIGUE QUESTIONNAIRES
HOW LIKELY ARE YOU TO NOD OFF OR FALL ASLEEP IN A CAR, WHILE STOPPED FOR A FEW MINUTES IN TRAFFIC: 0
HOW LIKELY ARE YOU TO NOD OFF OR FALL ASLEEP WHEN YOU ARE A PASSENGER IN A CAR FOR AN HOUR WITHOUT A BREAK: 2
HOW LIKELY ARE YOU TO NOD OFF OR FALL ASLEEP WHILE SITTING QUIETLY AFTER LUNCH WITHOUT ALCOHOL: 1
HOW LIKELY ARE YOU TO NOD OFF OR FALL ASLEEP WHILE SITTING INACTIVE IN A PUBLIC PLACE: 2
ESS TOTAL SCORE: 12
HOW LIKELY ARE YOU TO NOD OFF OR FALL ASLEEP WHILE SITTING AND READING: 2
HOW LIKELY ARE YOU TO NOD OFF OR FALL ASLEEP WHILE WATCHING TV: 2
HOW LIKELY ARE YOU TO NOD OFF OR FALL ASLEEP WHILE LYING DOWN TO REST IN THE AFTERNOON WHEN CIRCUMSTANCES PERMIT: 3
HOW LIKELY ARE YOU TO NOD OFF OR FALL ASLEEP WHILE SITTING AND TALKING TO SOMEONE: 0

## 2019-10-20 RX ORDER — ALLOPURINOL 300 MG/1
TABLET ORAL
Qty: 90 TABLET | Refills: 0 | Status: SHIPPED | OUTPATIENT
Start: 2019-10-20 | End: 2020-01-21

## 2019-11-04 DIAGNOSIS — I10 ESSENTIAL HYPERTENSION: ICD-10-CM

## 2019-11-04 DIAGNOSIS — R80.9 CONTROLLED TYPE 2 DIABETES MELLITUS WITH MICROALBUMINURIA, WITHOUT LONG-TERM CURRENT USE OF INSULIN (HCC): ICD-10-CM

## 2019-11-04 DIAGNOSIS — E78.5 HYPERLIPIDEMIA LDL GOAL <100: ICD-10-CM

## 2019-11-04 DIAGNOSIS — E87.5 HYPERKALEMIA: Primary | ICD-10-CM

## 2019-11-04 DIAGNOSIS — E11.29 CONTROLLED TYPE 2 DIABETES MELLITUS WITH MICROALBUMINURIA, WITHOUT LONG-TERM CURRENT USE OF INSULIN (HCC): ICD-10-CM

## 2019-11-04 LAB
A/G RATIO: 1.6 (ref 1.1–2.2)
ALBUMIN SERPL-MCNC: 4.2 G/DL (ref 3.4–5)
ALP BLD-CCNC: 86 U/L (ref 40–129)
ALT SERPL-CCNC: <5 U/L (ref 10–40)
ANION GAP SERPL CALCULATED.3IONS-SCNC: 12 MMOL/L (ref 3–16)
AST SERPL-CCNC: 25 U/L (ref 15–37)
BILIRUB SERPL-MCNC: 0.5 MG/DL (ref 0–1)
BUN BLDV-MCNC: 22 MG/DL (ref 7–20)
CALCIUM SERPL-MCNC: 9.1 MG/DL (ref 8.3–10.6)
CHLORIDE BLD-SCNC: 107 MMOL/L (ref 99–110)
CHOLESTEROL, TOTAL: 135 MG/DL (ref 0–199)
CO2: 27 MMOL/L (ref 21–32)
CREAT SERPL-MCNC: 1.4 MG/DL (ref 0.8–1.3)
GFR AFRICAN AMERICAN: >60
GFR NON-AFRICAN AMERICAN: 50
GLOBULIN: 2.7 G/DL
GLUCOSE BLD-MCNC: 136 MG/DL (ref 70–99)
HDLC SERPL-MCNC: 34 MG/DL (ref 40–60)
LDL CHOLESTEROL CALCULATED: 68 MG/DL
POTASSIUM SERPL-SCNC: 5.7 MMOL/L (ref 3.5–5.1)
SODIUM BLD-SCNC: 146 MMOL/L (ref 136–145)
TOTAL PROTEIN: 6.9 G/DL (ref 6.4–8.2)
TRIGL SERPL-MCNC: 167 MG/DL (ref 0–150)
VLDLC SERPL CALC-MCNC: 33 MG/DL

## 2019-11-04 PROCEDURE — 36415 COLL VENOUS BLD VENIPUNCTURE: CPT | Performed by: FAMILY MEDICINE

## 2019-11-08 DIAGNOSIS — E87.5 HYPERKALEMIA: ICD-10-CM

## 2019-11-08 LAB — POTASSIUM SERPL-SCNC: 5 MMOL/L (ref 3.5–5.1)

## 2019-11-12 ENCOUNTER — TELEPHONE (OUTPATIENT)
Dept: PULMONOLOGY | Age: 69
End: 2019-11-12

## 2019-11-18 ENCOUNTER — OFFICE VISIT (OUTPATIENT)
Dept: FAMILY MEDICINE CLINIC | Age: 69
End: 2019-11-18
Payer: MEDICARE

## 2019-11-18 VITALS
DIASTOLIC BLOOD PRESSURE: 81 MMHG | RESPIRATION RATE: 20 BRPM | OXYGEN SATURATION: 95 % | SYSTOLIC BLOOD PRESSURE: 130 MMHG | HEART RATE: 83 BPM | TEMPERATURE: 98.6 F | WEIGHT: 290.3 LBS | BODY MASS INDEX: 38.47 KG/M2 | HEIGHT: 73 IN

## 2019-11-18 DIAGNOSIS — F17.200 TOBACCO USE DISORDER: ICD-10-CM

## 2019-11-18 DIAGNOSIS — R80.9 CONTROLLED TYPE 2 DIABETES MELLITUS WITH MICROALBUMINURIA, WITHOUT LONG-TERM CURRENT USE OF INSULIN (HCC): ICD-10-CM

## 2019-11-18 DIAGNOSIS — J43.8 OTHER EMPHYSEMA (HCC): ICD-10-CM

## 2019-11-18 DIAGNOSIS — G47.39 OTHER SLEEP APNEA: ICD-10-CM

## 2019-11-18 DIAGNOSIS — N18.30 STAGE 3 CHRONIC KIDNEY DISEASE (HCC): ICD-10-CM

## 2019-11-18 DIAGNOSIS — Z79.01 LONG TERM CURRENT USE OF ANTICOAGULANTS WITH INR GOAL OF 2.0-3.0: ICD-10-CM

## 2019-11-18 DIAGNOSIS — Z28.21 INFLUENZA VACCINATION DECLINED BY PATIENT: ICD-10-CM

## 2019-11-18 DIAGNOSIS — E11.29 CONTROLLED TYPE 2 DIABETES MELLITUS WITH MICROALBUMINURIA, WITHOUT LONG-TERM CURRENT USE OF INSULIN (HCC): ICD-10-CM

## 2019-11-18 DIAGNOSIS — M10.9 GOUT OF BOTH FEET: ICD-10-CM

## 2019-11-18 DIAGNOSIS — F51.01 PRIMARY INSOMNIA: ICD-10-CM

## 2019-11-18 DIAGNOSIS — J30.1 SEASONAL ALLERGIC RHINITIS DUE TO POLLEN: ICD-10-CM

## 2019-11-18 DIAGNOSIS — E87.5 HYPERKALEMIA: ICD-10-CM

## 2019-11-18 DIAGNOSIS — E78.5 HYPERLIPIDEMIA LDL GOAL <100: ICD-10-CM

## 2019-11-18 DIAGNOSIS — I10 ESSENTIAL HYPERTENSION: Primary | ICD-10-CM

## 2019-11-18 DIAGNOSIS — Z28.21 PNEUMOCOCCAL VACCINATION DECLINED BY PATIENT: ICD-10-CM

## 2019-11-18 PROCEDURE — G8926 SPIRO NO PERF OR DOC: HCPCS | Performed by: FAMILY MEDICINE

## 2019-11-18 PROCEDURE — 99214 OFFICE O/P EST MOD 30 MIN: CPT | Performed by: FAMILY MEDICINE

## 2019-11-18 PROCEDURE — 3023F SPIROM DOC REV: CPT | Performed by: FAMILY MEDICINE

## 2019-11-18 PROCEDURE — G8417 CALC BMI ABV UP PARAM F/U: HCPCS | Performed by: FAMILY MEDICINE

## 2019-11-18 PROCEDURE — 3017F COLORECTAL CA SCREEN DOC REV: CPT | Performed by: FAMILY MEDICINE

## 2019-11-18 PROCEDURE — 2022F DILAT RTA XM EVC RTNOPTHY: CPT | Performed by: FAMILY MEDICINE

## 2019-11-18 PROCEDURE — 3044F HG A1C LEVEL LT 7.0%: CPT | Performed by: FAMILY MEDICINE

## 2019-11-18 PROCEDURE — 4040F PNEUMOC VAC/ADMIN/RCVD: CPT | Performed by: FAMILY MEDICINE

## 2019-11-18 PROCEDURE — 1123F ACP DISCUSS/DSCN MKR DOCD: CPT | Performed by: FAMILY MEDICINE

## 2019-11-18 PROCEDURE — 1036F TOBACCO NON-USER: CPT | Performed by: FAMILY MEDICINE

## 2019-11-18 PROCEDURE — G8484 FLU IMMUNIZE NO ADMIN: HCPCS | Performed by: FAMILY MEDICINE

## 2019-11-18 PROCEDURE — G8427 DOCREV CUR MEDS BY ELIG CLIN: HCPCS | Performed by: FAMILY MEDICINE

## 2019-11-18 ASSESSMENT — ENCOUNTER SYMPTOMS
STRIDOR: 0
BLOOD IN STOOL: 0
DIARRHEA: 0
ABDOMINAL DISTENTION: 0
CHEST TIGHTNESS: 0
APNEA: 0
RECTAL PAIN: 0
NAUSEA: 0
ABDOMINAL PAIN: 0
SHORTNESS OF BREATH: 0
COUGH: 0
ANAL BLEEDING: 0
CONSTIPATION: 0
CHOKING: 0
VOMITING: 0
WHEEZING: 0

## 2019-11-18 ASSESSMENT — COPD QUESTIONNAIRES: COPD: 1

## 2019-12-01 DIAGNOSIS — E78.5 HYPERLIPIDEMIA LDL GOAL <100: ICD-10-CM

## 2019-12-02 RX ORDER — PRAVASTATIN SODIUM 80 MG/1
TABLET ORAL
Qty: 90 TABLET | Refills: 0 | Status: SHIPPED | OUTPATIENT
Start: 2019-12-02 | End: 2020-03-04

## 2019-12-30 ENCOUNTER — HOSPITAL ENCOUNTER (INPATIENT)
Age: 69
LOS: 2 days | Discharge: HOME | DRG: 291 | End: 2020-01-02
Attending: EMERGENCY MEDICINE | Admitting: INTERNAL MEDICINE
Payer: MEDICARE

## 2019-12-30 ENCOUNTER — APPOINTMENT (OUTPATIENT)
Dept: GENERAL RADIOLOGY | Age: 69
DRG: 291 | End: 2019-12-30
Payer: MEDICARE

## 2019-12-30 PROBLEM — I50.9 ACUTE CONGESTIVE HEART FAILURE (HCC): Status: ACTIVE | Noted: 2019-12-30

## 2019-12-30 LAB
ANION GAP SERPL CALCULATED.3IONS-SCNC: 13 MMOL/L (ref 3–16)
BASOPHILS ABSOLUTE: 0.1 K/UL (ref 0–0.2)
BASOPHILS RELATIVE PERCENT: 0.9 %
BUN BLDV-MCNC: 30 MG/DL (ref 7–20)
CALCIUM SERPL-MCNC: 10.1 MG/DL (ref 8.3–10.6)
CHLORIDE BLD-SCNC: 101 MMOL/L (ref 99–110)
CO2: 27 MMOL/L (ref 21–32)
CREAT SERPL-MCNC: 1.3 MG/DL (ref 0.8–1.3)
EOSINOPHILS ABSOLUTE: 0.1 K/UL (ref 0–0.6)
EOSINOPHILS RELATIVE PERCENT: 1.3 %
GFR AFRICAN AMERICAN: >60
GFR NON-AFRICAN AMERICAN: 55
GLUCOSE BLD-MCNC: 109 MG/DL (ref 70–99)
HCT VFR BLD CALC: 40.2 % (ref 40.5–52.5)
HEMOGLOBIN: 13.4 G/DL (ref 13.5–17.5)
INR BLD: 2.32 (ref 0.86–1.14)
LYMPHOCYTES ABSOLUTE: 1.5 K/UL (ref 1–5.1)
LYMPHOCYTES RELATIVE PERCENT: 21.8 %
MCH RBC QN AUTO: 31.1 PG (ref 26–34)
MCHC RBC AUTO-ENTMCNC: 33.3 G/DL (ref 31–36)
MCV RBC AUTO: 93.5 FL (ref 80–100)
MONOCYTES ABSOLUTE: 0.8 K/UL (ref 0–1.3)
MONOCYTES RELATIVE PERCENT: 11.5 %
NEUTROPHILS ABSOLUTE: 4.3 K/UL (ref 1.7–7.7)
NEUTROPHILS RELATIVE PERCENT: 64.5 %
PDW BLD-RTO: 15.2 % (ref 12.4–15.4)
PLATELET # BLD: 142 K/UL (ref 135–450)
PMV BLD AUTO: 9 FL (ref 5–10.5)
POTASSIUM SERPL-SCNC: 4.5 MMOL/L (ref 3.5–5.1)
PRO-BNP: 2125 PG/ML (ref 0–124)
PROTHROMBIN TIME: 27.2 SEC (ref 10–13.2)
RBC # BLD: 4.3 M/UL (ref 4.2–5.9)
SODIUM BLD-SCNC: 141 MMOL/L (ref 136–145)
TROPONIN: <0.01 NG/ML
WBC # BLD: 6.7 K/UL (ref 4–11)

## 2019-12-30 PROCEDURE — 85610 PROTHROMBIN TIME: CPT

## 2019-12-30 PROCEDURE — 96374 THER/PROPH/DIAG INJ IV PUSH: CPT

## 2019-12-30 PROCEDURE — 83880 ASSAY OF NATRIURETIC PEPTIDE: CPT

## 2019-12-30 PROCEDURE — 80048 BASIC METABOLIC PNL TOTAL CA: CPT

## 2019-12-30 PROCEDURE — 2500000003 HC RX 250 WO HCPCS: Performed by: EMERGENCY MEDICINE

## 2019-12-30 PROCEDURE — 2500000003 HC RX 250 WO HCPCS

## 2019-12-30 PROCEDURE — 99285 EMERGENCY DEPT VISIT HI MDM: CPT

## 2019-12-30 PROCEDURE — 96375 TX/PRO/DX INJ NEW DRUG ADDON: CPT

## 2019-12-30 PROCEDURE — 6360000002 HC RX W HCPCS: Performed by: EMERGENCY MEDICINE

## 2019-12-30 PROCEDURE — 84484 ASSAY OF TROPONIN QUANT: CPT

## 2019-12-30 PROCEDURE — 93005 ELECTROCARDIOGRAM TRACING: CPT | Performed by: EMERGENCY MEDICINE

## 2019-12-30 PROCEDURE — 85025 COMPLETE CBC W/AUTO DIFF WBC: CPT

## 2019-12-30 PROCEDURE — 71046 X-RAY EXAM CHEST 2 VIEWS: CPT

## 2019-12-30 RX ORDER — DILTIAZEM HYDROCHLORIDE 5 MG/ML
10 INJECTION INTRAVENOUS ONCE
Status: COMPLETED | OUTPATIENT
Start: 2019-12-30 | End: 2019-12-30

## 2019-12-30 RX ORDER — FUROSEMIDE 10 MG/ML
40 INJECTION INTRAMUSCULAR; INTRAVENOUS ONCE
Status: COMPLETED | OUTPATIENT
Start: 2019-12-30 | End: 2019-12-30

## 2019-12-30 RX ADMIN — DILTIAZEM HYDROCHLORIDE 10 MG: 5 INJECTION INTRAVENOUS at 22:46

## 2019-12-30 RX ADMIN — FUROSEMIDE 40 MG: 10 INJECTION, SOLUTION INTRAMUSCULAR; INTRAVENOUS at 22:45

## 2019-12-31 LAB
ANION GAP SERPL CALCULATED.3IONS-SCNC: 13 MMOL/L (ref 3–16)
BUN BLDV-MCNC: 32 MG/DL (ref 7–20)
CALCIUM SERPL-MCNC: 9.3 MG/DL (ref 8.3–10.6)
CHLORIDE BLD-SCNC: 99 MMOL/L (ref 99–110)
CO2: 27 MMOL/L (ref 21–32)
CREAT SERPL-MCNC: 1.5 MG/DL (ref 0.8–1.3)
EKG ATRIAL RATE: 108 BPM
EKG DIAGNOSIS: NORMAL
EKG Q-T INTERVAL: 394 MS
EKG QRS DURATION: 160 MS
EKG QTC CALCULATION (BAZETT): 527 MS
EKG R AXIS: 112 DEGREES
EKG T AXIS: -43 DEGREES
EKG VENTRICULAR RATE: 108 BPM
GFR AFRICAN AMERICAN: 56
GFR NON-AFRICAN AMERICAN: 46
GLUCOSE BLD-MCNC: 114 MG/DL (ref 70–99)
GLUCOSE BLD-MCNC: 121 MG/DL (ref 70–99)
GLUCOSE BLD-MCNC: 149 MG/DL (ref 70–99)
GLUCOSE BLD-MCNC: 179 MG/DL (ref 70–99)
GLUCOSE BLD-MCNC: 215 MG/DL (ref 70–99)
HCT VFR BLD CALC: 39.8 % (ref 40.5–52.5)
HEMOGLOBIN: 12.9 G/DL (ref 13.5–17.5)
INR BLD: 1.98 (ref 0.86–1.14)
LV EF: 43 %
LVEF MODALITY: NORMAL
MCH RBC QN AUTO: 30.6 PG (ref 26–34)
MCHC RBC AUTO-ENTMCNC: 32.5 G/DL (ref 31–36)
MCV RBC AUTO: 94.1 FL (ref 80–100)
PDW BLD-RTO: 15.2 % (ref 12.4–15.4)
PERFORMED ON: ABNORMAL
PLATELET # BLD: 142 K/UL (ref 135–450)
PMV BLD AUTO: 9.2 FL (ref 5–10.5)
POTASSIUM SERPL-SCNC: 4 MMOL/L (ref 3.5–5.1)
PROTHROMBIN TIME: 23.1 SEC (ref 10–13.2)
RBC # BLD: 4.23 M/UL (ref 4.2–5.9)
SODIUM BLD-SCNC: 139 MMOL/L (ref 136–145)
TROPONIN: <0.01 NG/ML
TROPONIN: <0.01 NG/ML
TSH SERPL DL<=0.05 MIU/L-ACNC: 0.8 UIU/ML (ref 0.27–4.2)
WBC # BLD: 6.7 K/UL (ref 4–11)

## 2019-12-31 PROCEDURE — 2580000003 HC RX 258: Performed by: PHYSICIAN ASSISTANT

## 2019-12-31 PROCEDURE — 2700000000 HC OXYGEN THERAPY PER DAY

## 2019-12-31 PROCEDURE — 6370000000 HC RX 637 (ALT 250 FOR IP): Performed by: PHYSICIAN ASSISTANT

## 2019-12-31 PROCEDURE — 93306 TTE W/DOPPLER COMPLETE: CPT

## 2019-12-31 PROCEDURE — 93010 ELECTROCARDIOGRAM REPORT: CPT | Performed by: INTERNAL MEDICINE

## 2019-12-31 PROCEDURE — 6360000002 HC RX W HCPCS: Performed by: PHYSICIAN ASSISTANT

## 2019-12-31 PROCEDURE — 94660 CPAP INITIATION&MGMT: CPT

## 2019-12-31 PROCEDURE — 94640 AIRWAY INHALATION TREATMENT: CPT

## 2019-12-31 PROCEDURE — 85610 PROTHROMBIN TIME: CPT

## 2019-12-31 PROCEDURE — 84484 ASSAY OF TROPONIN QUANT: CPT

## 2019-12-31 PROCEDURE — 99223 1ST HOSP IP/OBS HIGH 75: CPT | Performed by: INTERNAL MEDICINE

## 2019-12-31 PROCEDURE — 94761 N-INVAS EAR/PLS OXIMETRY MLT: CPT

## 2019-12-31 PROCEDURE — 36415 COLL VENOUS BLD VENIPUNCTURE: CPT

## 2019-12-31 PROCEDURE — 80048 BASIC METABOLIC PNL TOTAL CA: CPT

## 2019-12-31 PROCEDURE — 85027 COMPLETE CBC AUTOMATED: CPT

## 2019-12-31 PROCEDURE — 6360000002 HC RX W HCPCS: Performed by: INTERNAL MEDICINE

## 2019-12-31 PROCEDURE — 1200000000 HC SEMI PRIVATE

## 2019-12-31 PROCEDURE — 96376 TX/PRO/DX INJ SAME DRUG ADON: CPT

## 2019-12-31 PROCEDURE — 84443 ASSAY THYROID STIM HORMONE: CPT

## 2019-12-31 RX ORDER — INSULIN LISPRO 100 [IU]/ML
0-3 INJECTION, SOLUTION INTRAVENOUS; SUBCUTANEOUS NIGHTLY
Status: DISCONTINUED | OUTPATIENT
Start: 2019-12-31 | End: 2020-01-02 | Stop reason: HOSPADM

## 2019-12-31 RX ORDER — SODIUM CHLORIDE 0.9 % (FLUSH) 0.9 %
10 SYRINGE (ML) INJECTION EVERY 12 HOURS SCHEDULED
Status: DISCONTINUED | OUTPATIENT
Start: 2019-12-31 | End: 2020-01-02 | Stop reason: HOSPADM

## 2019-12-31 RX ORDER — WARFARIN SODIUM 5 MG/1
15 TABLET ORAL
Status: DISCONTINUED | OUTPATIENT
Start: 2019-12-31 | End: 2020-01-02 | Stop reason: HOSPADM

## 2019-12-31 RX ORDER — ACETAMINOPHEN 325 MG/1
650 TABLET ORAL EVERY 4 HOURS PRN
Status: DISCONTINUED | OUTPATIENT
Start: 2019-12-31 | End: 2020-01-02 | Stop reason: HOSPADM

## 2019-12-31 RX ORDER — FLUTICASONE PROPIONATE 50 MCG
2 SPRAY, SUSPENSION (ML) NASAL DAILY
Status: DISCONTINUED | OUTPATIENT
Start: 2019-12-31 | End: 2020-01-02 | Stop reason: HOSPADM

## 2019-12-31 RX ORDER — DIGOXIN 0.25 MG/ML
250 INJECTION INTRAMUSCULAR; INTRAVENOUS EVERY 6 HOURS
Status: COMPLETED | OUTPATIENT
Start: 2019-12-31 | End: 2020-01-01

## 2019-12-31 RX ORDER — MONTELUKAST SODIUM 10 MG/1
10 TABLET ORAL EVERY EVENING
Status: DISCONTINUED | OUTPATIENT
Start: 2019-12-31 | End: 2020-01-02 | Stop reason: HOSPADM

## 2019-12-31 RX ORDER — LISINOPRIL 10 MG/1
30 TABLET ORAL DAILY
Status: DISCONTINUED | OUTPATIENT
Start: 2019-12-31 | End: 2020-01-02 | Stop reason: HOSPADM

## 2019-12-31 RX ORDER — ASPIRIN 81 MG/1
81 TABLET, CHEWABLE ORAL DAILY
Status: DISCONTINUED | OUTPATIENT
Start: 2019-12-31 | End: 2020-01-02 | Stop reason: HOSPADM

## 2019-12-31 RX ORDER — ONDANSETRON 2 MG/ML
4 INJECTION INTRAMUSCULAR; INTRAVENOUS EVERY 6 HOURS PRN
Status: DISCONTINUED | OUTPATIENT
Start: 2019-12-31 | End: 2020-01-02 | Stop reason: HOSPADM

## 2019-12-31 RX ORDER — DEXTROSE MONOHYDRATE 50 MG/ML
100 INJECTION, SOLUTION INTRAVENOUS PRN
Status: DISCONTINUED | OUTPATIENT
Start: 2019-12-31 | End: 2020-01-02 | Stop reason: HOSPADM

## 2019-12-31 RX ORDER — ALBUTEROL SULFATE 90 UG/1
2 AEROSOL, METERED RESPIRATORY (INHALATION) EVERY 6 HOURS PRN
Status: DISCONTINUED | OUTPATIENT
Start: 2019-12-31 | End: 2020-01-02 | Stop reason: HOSPADM

## 2019-12-31 RX ORDER — FAMOTIDINE 20 MG/1
20 TABLET, FILM COATED ORAL 2 TIMES DAILY
Status: DISCONTINUED | OUTPATIENT
Start: 2019-12-31 | End: 2020-01-02 | Stop reason: HOSPADM

## 2019-12-31 RX ORDER — NICOTINE POLACRILEX 4 MG
15 LOZENGE BUCCAL PRN
Status: DISCONTINUED | OUTPATIENT
Start: 2019-12-31 | End: 2020-01-02 | Stop reason: HOSPADM

## 2019-12-31 RX ORDER — FUROSEMIDE 10 MG/ML
40 INJECTION INTRAMUSCULAR; INTRAVENOUS 2 TIMES DAILY
Status: DISCONTINUED | OUTPATIENT
Start: 2019-12-31 | End: 2020-01-01

## 2019-12-31 RX ORDER — SODIUM CHLORIDE 0.9 % (FLUSH) 0.9 %
10 SYRINGE (ML) INJECTION PRN
Status: DISCONTINUED | OUTPATIENT
Start: 2019-12-31 | End: 2020-01-02 | Stop reason: HOSPADM

## 2019-12-31 RX ORDER — PRAVASTATIN SODIUM 80 MG/1
80 TABLET ORAL EVERY EVENING
Status: DISCONTINUED | OUTPATIENT
Start: 2019-12-31 | End: 2020-01-02 | Stop reason: HOSPADM

## 2019-12-31 RX ORDER — ALLOPURINOL 300 MG/1
300 TABLET ORAL DAILY
Status: DISCONTINUED | OUTPATIENT
Start: 2019-12-31 | End: 2020-01-02 | Stop reason: HOSPADM

## 2019-12-31 RX ORDER — SOTALOL HYDROCHLORIDE 80 MG/1
80 TABLET ORAL 2 TIMES DAILY
Status: DISCONTINUED | OUTPATIENT
Start: 2019-12-31 | End: 2020-01-02 | Stop reason: HOSPADM

## 2019-12-31 RX ORDER — INSULIN LISPRO 100 [IU]/ML
0-6 INJECTION, SOLUTION INTRAVENOUS; SUBCUTANEOUS
Status: DISCONTINUED | OUTPATIENT
Start: 2019-12-31 | End: 2020-01-02 | Stop reason: HOSPADM

## 2019-12-31 RX ORDER — WARFARIN SODIUM 5 MG/1
10 TABLET ORAL
Status: DISCONTINUED | OUTPATIENT
Start: 2019-12-31 | End: 2020-01-02 | Stop reason: HOSPADM

## 2019-12-31 RX ORDER — DEXTROSE MONOHYDRATE 25 G/50ML
12.5 INJECTION, SOLUTION INTRAVENOUS PRN
Status: DISCONTINUED | OUTPATIENT
Start: 2019-12-31 | End: 2020-01-02 | Stop reason: HOSPADM

## 2019-12-31 RX ADMIN — LISINOPRIL 30 MG: 10 TABLET ORAL at 08:15

## 2019-12-31 RX ADMIN — ALLOPURINOL 300 MG: 300 TABLET ORAL at 08:15

## 2019-12-31 RX ADMIN — MONTELUKAST SODIUM 10 MG: 10 TABLET, FILM COATED ORAL at 21:22

## 2019-12-31 RX ADMIN — ENOXAPARIN SODIUM 135 MG: 150 INJECTION SUBCUTANEOUS at 18:30

## 2019-12-31 RX ADMIN — WARFARIN SODIUM 10 MG: 5 TABLET ORAL at 03:38

## 2019-12-31 RX ADMIN — ASPIRIN 81 MG 81 MG: 81 TABLET ORAL at 08:15

## 2019-12-31 RX ADMIN — WARFARIN SODIUM 15 MG: 5 TABLET ORAL at 17:24

## 2019-12-31 RX ADMIN — DIGOXIN 250 MCG: 0.25 INJECTION INTRAMUSCULAR; INTRAVENOUS at 15:37

## 2019-12-31 RX ADMIN — Medication 10 ML: at 17:24

## 2019-12-31 RX ADMIN — FAMOTIDINE 20 MG: 20 TABLET, FILM COATED ORAL at 01:07

## 2019-12-31 RX ADMIN — FUROSEMIDE 40 MG: 10 INJECTION, SOLUTION INTRAMUSCULAR; INTRAVENOUS at 08:15

## 2019-12-31 RX ADMIN — PRAVASTATIN SODIUM 80 MG: 80 TABLET ORAL at 21:22

## 2019-12-31 RX ADMIN — Medication 10 ML: at 08:15

## 2019-12-31 RX ADMIN — FAMOTIDINE 20 MG: 20 TABLET, FILM COATED ORAL at 21:22

## 2019-12-31 RX ADMIN — Medication 2 PUFF: at 09:20

## 2019-12-31 RX ADMIN — DIGOXIN 250 MCG: 0.25 INJECTION INTRAMUSCULAR; INTRAVENOUS at 21:22

## 2019-12-31 RX ADMIN — FAMOTIDINE 20 MG: 20 TABLET, FILM COATED ORAL at 08:15

## 2019-12-31 RX ADMIN — SOTALOL HYDROCHLORIDE 80 MG: 80 TABLET ORAL at 21:22

## 2019-12-31 RX ADMIN — FUROSEMIDE 40 MG: 10 INJECTION, SOLUTION INTRAMUSCULAR; INTRAVENOUS at 17:24

## 2019-12-31 RX ADMIN — Medication 10 ML: at 21:22

## 2019-12-31 RX ADMIN — MONTELUKAST SODIUM 10 MG: 10 TABLET, FILM COATED ORAL at 03:38

## 2019-12-31 RX ADMIN — PRAVASTATIN SODIUM 80 MG: 80 TABLET ORAL at 03:39

## 2019-12-31 RX ADMIN — SOTALOL HYDROCHLORIDE 80 MG: 80 TABLET ORAL at 03:38

## 2019-12-31 RX ADMIN — TIOTROPIUM BROMIDE INHALATION SPRAY 2 PUFF: 3.12 SPRAY, METERED RESPIRATORY (INHALATION) at 09:20

## 2019-12-31 RX ADMIN — FLUTICASONE PROPIONATE 2 SPRAY: 50 SPRAY, METERED NASAL at 08:15

## 2019-12-31 RX ADMIN — INSULIN LISPRO 1 UNITS: 100 INJECTION, SOLUTION INTRAVENOUS; SUBCUTANEOUS at 11:50

## 2019-12-31 RX ADMIN — Medication 2 PUFF: at 20:05

## 2019-12-31 RX ADMIN — INSULIN LISPRO 1 UNITS: 100 INJECTION, SOLUTION INTRAVENOUS; SUBCUTANEOUS at 17:25

## 2019-12-31 ASSESSMENT — PAIN SCALES - GENERAL
PAINLEVEL_OUTOF10: 0

## 2019-12-31 NOTE — H&P
(Oli Starring) 18 MCG inhalation capsule Inhale 1 capsule into the lungs daily 11/18/19  Yes Benny Salas MD   allopurinol (ZYLOPRIM) 300 MG tablet TAKE 1 TABLET EVERY DAY 10/20/19  Yes Arelis Jiang MD   metFORMIN (GLUCOPHAGE XR) 500 MG extended release tablet Take 4 tablets by mouth daily (with breakfast) 9/9/19  Yes Arelis Jiang MD   ACCU-CHEK COMPACT PLUS strip USE TO TEST 2 TIMES DAILY 12/27/18  Yes Arelis Jiang MD   fluticasone (FLONASE) 50 MCG/ACT nasal spray 2 sprays by Nasal route daily 11/14/18  Yes Rivas Child MD   montelukast (SINGULAIR) 10 MG tablet Take 1 tablet by mouth nightly 11/14/18  Yes Rivas Child MD   fluticasone-vilanterol (BREO ELLIPTA) 100-25 MCG/INH AEPB inhaler Inhale 1 puff into the lungs daily 11/5/18  Yes Arelis Jiang MD   albuterol sulfate HFA (VENTOLIN HFA) 108 (90 Base) MCG/ACT inhaler Inhale 2 puffs into the lungs every 6 hours as needed for Wheezing 10/5/18  Yes Arelis Jiang MD   Lancets MISC BID testing 2/24/17  Yes Arelis Jiang MD   lisinopril (PRINIVIL;ZESTRIL) 20 MG tablet Take 30 mg by mouth daily    Yes Historical Provider, MD   furosemide (LASIX) 40 MG tablet Take 40 mg by mouth daily. 2/23/11  Yes Historical Provider, MD   warfarin (COUMADIN) 10 MG tablet Take 10 mg by mouth daily Pt takes Mon, Thurs, and Sat= 10mg; 15 mg all other days   Yes Historical Provider, MD   sotalol (BETAPACE) 80 MG tablet Take 80 mg by mouth 2 times daily. Yes Historical Provider, MD   aspirin 81 MG chewable tablet Take 81 mg by mouth daily. Yes Historical Provider, MD   glucose monitoring kit (FREESTYLE) monitoring kit 1 each by Does not apply route once for 1 dose Glucometer(patient's choice). BID testing. 2/24/17 8/19/19  Arelis Jiang MD       Allergies:  Patient has no known allergies. Social History:      TOBACCO:   reports that he quit smoking about 6 months ago. His smoking use included cigarettes. He has a 30.00 pack-year smoking history. home PO meds   Accuchecks, SSI  Hypoglycemia protocol      HTN  Continue home sotalol and lisinopril    HLD  Continue home statin    Obesity due to excess calories (Body mass index is 62.61 kg/m².)   Complicating assessment and treatment. Obesity places the patient at risk for multiple co-morbidities as well as early death and may be contributing to the patient's presentation. Supportive care. Encourage therapeutic lifestyle changes. DVT prophylaxis: Coumadin  GI prophylaxis: Pepcid  Probiotic if on abx: N/A    Diet: DIET LOW SODIUM 2 GM;  Code Status: Full Code    Consults:  IP CONSULT TO HOSPITALIST  PHARMACY TO DOSE WARFARIN  IP CONSULT TO HEART FAILURE NURSE/COORDINATOR  IP CONSULT TO DIETITIAN  IP CONSULT TO CARDIOLOGY    Disposition: Admit to Inpatient  ELOS: Greater than two midnights due to medical therapy    Blaine Lopes PA-C    Thank you Rey Dia MD for the opportunity to be involved in this patient's care. If you have any questions or concerns please feel free to contact me at [phone].

## 2019-12-31 NOTE — ED PROVIDER NOTES
violence:     Fear of current or ex partner: Not on file     Emotionally abused: Not on file     Physically abused: Not on file     Forced sexual activity: Not on file   Other Topics Concern    Not on file   Social History Narrative    Not on file     No current facility-administered medications for this encounter. Current Outpatient Medications   Medication Sig Dispense Refill    pravastatin (PRAVACHOL) 80 MG tablet TAKE 1 TABLET EVERY DAY FOR CHOLESTEROL 90 tablet 0    tiotropium (SPIRIVA HANDIHALER) 18 MCG inhalation capsule Inhale 1 capsule into the lungs daily 90 capsule 0    allopurinol (ZYLOPRIM) 300 MG tablet TAKE 1 TABLET EVERY DAY 90 tablet 0    metFORMIN (GLUCOPHAGE XR) 500 MG extended release tablet Take 4 tablets by mouth daily (with breakfast) 360 tablet 0    ACCU-CHEK COMPACT PLUS strip USE TO TEST 2 TIMES DAILY 102 strip 1    fluticasone (FLONASE) 50 MCG/ACT nasal spray 2 sprays by Nasal route daily 3 Bottle 1    montelukast (SINGULAIR) 10 MG tablet Take 1 tablet by mouth nightly 90 tablet 1    fluticasone-vilanterol (BREO ELLIPTA) 100-25 MCG/INH AEPB inhaler Inhale 1 puff into the lungs daily 3 each 0    albuterol sulfate HFA (VENTOLIN HFA) 108 (90 Base) MCG/ACT inhaler Inhale 2 puffs into the lungs every 6 hours as needed for Wheezing 3 Inhaler 0    Lancets MISC BID testing 100 each 6    lisinopril (PRINIVIL;ZESTRIL) 20 MG tablet Take 30 mg by mouth daily       furosemide (LASIX) 40 MG tablet Take 40 mg by mouth daily.  warfarin (COUMADIN) 10 MG tablet Take 10 mg by mouth daily Pt takes Mon, Thurs, and Sat= 10mg; 15 mg all other days      sotalol (BETAPACE) 80 MG tablet Take 80 mg by mouth 2 times daily.  aspirin 81 MG chewable tablet Take 81 mg by mouth daily.  glucose monitoring kit (FREESTYLE) monitoring kit 1 each by Does not apply route once for 1 dose Glucometer(patient's choice). BID testing.  1 kit 0     No Known Allergies    REVIEW OF SYSTEMS  10 systems lobe pulmonary vessels. Fluid in the minor fissure. Slight blunting of the costophrenic angles. Atelectasis in the lung bases     Cardiomegaly with pulmonary vascular congestion and small pleural effusions with bibasilar atelectasis     ED COURSE/MDM  Patient seen and evaluated. Old records reviewed. Labs and imaging reviewed and results discussed with patient. After initial evaluation, differential diagnostic considerations included: acute coronary syndrome, pulmonary embolism, COPD/asthma, pneumonia, sepsis, pericardial tamponade, pneumothorax, CHF, thoracic aortic dissection, anxiety    The patient's ED workup was notable for rapid atrial fibrillation with congestive heart failure exacerbation. INR is therapeutic lowering concern for PE. Symptoms not consistent with an infectious process. BNP is elevated and renal function is fairly stable. Patient will be given Lasix and a bolus of diltiazem. Blood pressure is stable. Patient is tachypneic and tachycardic on supplemental oxygen and therefore will be admitted. During the patient's ED course, the patient was given:  Medications   diltiazem injection 10 mg (10 mg Intravenous Given 12/30/19 2246)   furosemide (LASIX) injection 40 mg (40 mg Intravenous Given 12/30/19 2245)        CLINICAL IMPRESSION  1. Acute on chronic congestive heart failure, unspecified heart failure type ([address]    2. Anticoagulated on Coumadin    3. Rapid atrial fibrillation (HCC)        Blood pressure (!) 161/115, pulse 106, temperature 97.9 °F (36.6 °C), temperature source Oral, resp. rate 26, height 6' 1\" (1.854 m), weight 298 lb (135.2 kg), SpO2 100 %. DISPOSITION  Blossom Hermosillo was admitted in fair condition. I have discussed the findings of today's workup with the patient and addressed the patient's questions and concerns. The plan is to admit to the hospital at this time under the hospitalist service.   I spoke with Dariana Kendall with hospitalist service who accepted the

## 2019-12-31 NOTE — ED NOTES
C/o trouble breathing. Pt placed on 2L O2 for comfort. Will continue to monitor.         Leopoldo Beaulieu RN  12/30/19 0295

## 2019-12-31 NOTE — PROGRESS NOTES
Report given to 3A RNRoxanna. All questions answered and agreeable to transfer. Patient placed on tele by receiving RN, transported via wheelchair with oxygen in place.

## 2020-01-01 LAB
ANION GAP SERPL CALCULATED.3IONS-SCNC: 12 MMOL/L (ref 3–16)
BUN BLDV-MCNC: 29 MG/DL (ref 7–20)
CALCIUM SERPL-MCNC: 9.5 MG/DL (ref 8.3–10.6)
CHLORIDE BLD-SCNC: 100 MMOL/L (ref 99–110)
CO2: 29 MMOL/L (ref 21–32)
CREAT SERPL-MCNC: 1.6 MG/DL (ref 0.8–1.3)
GFR AFRICAN AMERICAN: 52
GFR NON-AFRICAN AMERICAN: 43
GLUCOSE BLD-MCNC: 109 MG/DL (ref 70–99)
GLUCOSE BLD-MCNC: 118 MG/DL (ref 70–99)
GLUCOSE BLD-MCNC: 123 MG/DL (ref 70–99)
GLUCOSE BLD-MCNC: 137 MG/DL (ref 70–99)
GLUCOSE BLD-MCNC: 167 MG/DL (ref 70–99)
INR BLD: 2.05 (ref 0.86–1.14)
PERFORMED ON: ABNORMAL
POTASSIUM SERPL-SCNC: 4.3 MMOL/L (ref 3.5–5.1)
PRO-BNP: 1391 PG/ML (ref 0–124)
PROTHROMBIN TIME: 24 SEC (ref 10–13.2)
SODIUM BLD-SCNC: 141 MMOL/L (ref 136–145)

## 2020-01-01 PROCEDURE — 85610 PROTHROMBIN TIME: CPT

## 2020-01-01 PROCEDURE — 94660 CPAP INITIATION&MGMT: CPT

## 2020-01-01 PROCEDURE — 2580000003 HC RX 258: Performed by: PHYSICIAN ASSISTANT

## 2020-01-01 PROCEDURE — 80048 BASIC METABOLIC PNL TOTAL CA: CPT

## 2020-01-01 PROCEDURE — 83880 ASSAY OF NATRIURETIC PEPTIDE: CPT

## 2020-01-01 PROCEDURE — 6360000002 HC RX W HCPCS: Performed by: INTERNAL MEDICINE

## 2020-01-01 PROCEDURE — 6360000002 HC RX W HCPCS: Performed by: PHYSICIAN ASSISTANT

## 2020-01-01 PROCEDURE — 5A2204Z RESTORATION OF CARDIAC RHYTHM, SINGLE: ICD-10-PCS | Performed by: INTERNAL MEDICINE

## 2020-01-01 PROCEDURE — 6370000000 HC RX 637 (ALT 250 FOR IP): Performed by: PHYSICIAN ASSISTANT

## 2020-01-01 PROCEDURE — 94640 AIRWAY INHALATION TREATMENT: CPT

## 2020-01-01 PROCEDURE — 1200000000 HC SEMI PRIVATE

## 2020-01-01 PROCEDURE — 99223 1ST HOSP IP/OBS HIGH 75: CPT | Performed by: INTERNAL MEDICINE

## 2020-01-01 PROCEDURE — 99233 SBSQ HOSP IP/OBS HIGH 50: CPT | Performed by: INTERNAL MEDICINE

## 2020-01-01 PROCEDURE — 2700000000 HC OXYGEN THERAPY PER DAY

## 2020-01-01 PROCEDURE — 94761 N-INVAS EAR/PLS OXIMETRY MLT: CPT

## 2020-01-01 RX ADMIN — Medication 10 ML: at 08:17

## 2020-01-01 RX ADMIN — SOTALOL HYDROCHLORIDE 80 MG: 80 TABLET ORAL at 21:06

## 2020-01-01 RX ADMIN — FUROSEMIDE 40 MG: 10 INJECTION, SOLUTION INTRAMUSCULAR; INTRAVENOUS at 08:17

## 2020-01-01 RX ADMIN — FAMOTIDINE 20 MG: 20 TABLET, FILM COATED ORAL at 08:16

## 2020-01-01 RX ADMIN — Medication 2 PUFF: at 09:01

## 2020-01-01 RX ADMIN — FAMOTIDINE 20 MG: 20 TABLET, FILM COATED ORAL at 21:06

## 2020-01-01 RX ADMIN — DIGOXIN 250 MCG: 0.25 INJECTION INTRAMUSCULAR; INTRAVENOUS at 08:17

## 2020-01-01 RX ADMIN — LISINOPRIL 30 MG: 10 TABLET ORAL at 08:16

## 2020-01-01 RX ADMIN — TIOTROPIUM BROMIDE INHALATION SPRAY 2 PUFF: 3.12 SPRAY, METERED RESPIRATORY (INHALATION) at 09:01

## 2020-01-01 RX ADMIN — Medication 10 ML: at 02:04

## 2020-01-01 RX ADMIN — PRAVASTATIN SODIUM 80 MG: 80 TABLET ORAL at 21:06

## 2020-01-01 RX ADMIN — Medication 2 PUFF: at 20:02

## 2020-01-01 RX ADMIN — WARFARIN SODIUM 10 MG: 5 TABLET ORAL at 17:10

## 2020-01-01 RX ADMIN — INSULIN LISPRO 1 UNITS: 100 INJECTION, SOLUTION INTRAVENOUS; SUBCUTANEOUS at 21:05

## 2020-01-01 RX ADMIN — ASPIRIN 81 MG 81 MG: 81 TABLET ORAL at 08:17

## 2020-01-01 RX ADMIN — Medication 10 ML: at 21:05

## 2020-01-01 RX ADMIN — MONTELUKAST SODIUM 10 MG: 10 TABLET, FILM COATED ORAL at 21:06

## 2020-01-01 RX ADMIN — SOTALOL HYDROCHLORIDE 80 MG: 80 TABLET ORAL at 08:16

## 2020-01-01 RX ADMIN — DIGOXIN 250 MCG: 0.25 INJECTION INTRAMUSCULAR; INTRAVENOUS at 02:04

## 2020-01-01 RX ADMIN — ALLOPURINOL 300 MG: 300 TABLET ORAL at 08:16

## 2020-01-01 RX ADMIN — FLUTICASONE PROPIONATE 2 SPRAY: 50 SPRAY, METERED NASAL at 08:17

## 2020-01-01 ASSESSMENT — PAIN SCALES - GENERAL
PAINLEVEL_OUTOF10: 0

## 2020-01-01 NOTE — PROGRESS NOTES
[facility] PROGRESS NOTE    1/1/2020 9:44 AM        Name: Belén Keen .               Admitted: 12/30/2019  Primary Care Provider: Alda Amanda MD (Tel: [phone])         Subjective:    Feeling much better swelling on feet has greatly improvedno chest pain shortness of breath nausea vomiting    Reviewed interval ancillary notes    Current Medications  glucose (GLUTOSE) 40 % oral gel 15 g, PRN  dextrose 50 % IV solution, PRN  glucagon (rDNA) injection 1 mg, PRN  dextrose 5 % solution, PRN  insulin lispro (1 Unit Dial) 0-6 Units, TID WC  insulin lispro (1 Unit Dial) 0-3 Units, Nightly  sodium chloride flush 0.9 % injection 10 mL, 2 times per day  sodium chloride flush 0.9 % injection 10 mL, PRN  magnesium hydroxide (MILK OF MAGNESIA) 400 MG/5ML suspension 30 mL, Daily PRN  ondansetron (ZOFRAN) injection 4 mg, Q6H PRN  famotidine (PEPCID) tablet 20 mg, BID  acetaminophen (TYLENOL) tablet 650 mg, Q4H PRN  albuterol sulfate  (90 Base) MCG/ACT inhaler 2 puff, Q6H PRN  allopurinol (ZYLOPRIM) tablet 300 mg, Daily  aspirin chewable tablet 81 mg, Daily  fluticasone (FLONASE) 50 MCG/ACT nasal spray 2 spray, Daily  mometasone-formoterol (DULERA) 200-5 MCG/ACT inhaler 2 puff, BID  lisinopril (PRINIVIL;ZESTRIL) tablet 30 mg, Daily  montelukast (SINGULAIR) tablet 10 mg, QPM  pravastatin (PRAVACHOL) tablet 80 mg, QPM  sotalol (BETAPACE) tablet 80 mg, BID  tiotropium (SPIRIVA RESPIMAT) 2.5 MCG/ACT inhaler 2 puff, Daily  warfarin (COUMADIN) tablet 15 mg, Once per day on Tue  warfarin (COUMADIN) tablet 10 mg, Once per day on Sun Mon Wed Thu Fri Sat        Objective:  BP (!) 133/93   Pulse 106   Temp 98 °F (36.7 °C) (Temporal)   Resp 20   Ht 6' 1\" (1.854 m)   Wt 280 lb 9.6 oz (127.3 kg)   SpO2 92%   BMI 37.02 kg/m²     Intake/Output Summary (Last 24 hours) at 1/1/2020 0966  Last data filed at 1/1/2020 0834  Gross per 24 hour Intake 660 ml   Output 3800 ml   Net -3140 ml      Wt Readings from Last 3 Encounters:   01/01/20 280 lb 9.6 oz (127.3 kg)   11/18/19 290 lb 4.8 oz (131.7 kg)   10/15/19 286 lb 3.2 oz (129.8 kg)       General appearance:  Appears comfortable. AAOx3  HEENT: atraumatic, Pupils equal, muscous membranes moist, no masses appreciated  Cardiovascular: irregulary irregular, no jvd appreciated  Respiratory: no wheezing CTAB  Gastrointestinal: Abdomen soft, non-tender, BS+  EXT: 1+ BLE  Neurology: no gross focal deficts  Psychiatry: Appropriate affect. Not agitated  Skin: Warm, dry, no rashes appreciated    Labs and Tests:  CBC:   Recent Labs     12/30/19  2143 12/31/19  0230   WBC 6.7 6.7   HGB 13.4* 12.9*    142     BMP:    Recent Labs     12/30/19  2144 12/31/19  0230 01/01/20  0526    139 141   K 4.5 4.0 4.3    99 100   CO2 27 27 29   BUN 30* 32* 29*   CREATININE 1.3 1.5* 1.6*   GLUCOSE 109* 215* 137*     Hepatic: No results for input(s): AST, ALT, ALB, BILITOT, ALKPHOS in the last 72 hours.   XR CHEST STANDARD (2 VW)   Final Result   Cardiomegaly with pulmonary vascular congestion and small pleural effusions   with bibasilar atelectasis             Recent imaging reviewed        Assessment & Plan:   Acute on chronic Systolic heart failure Last ECHO on 11/23/16 noted EF of 60-65%, Repeat Echo 12/31 with ef of 40-45% with global hypokinesis and abnormal septal motion  - decrease lasix to once today IV 40, recheck cr in am slowly kreeping up  - on acei and bb  - will likely need ischemic work up as new reduced function discuss with cards     PAF  Rate 100-110  Continue home sotalol and coumadin  - on digoxin rate slightly better today     History of aortic valve disease  S/p mechanical AVR  - inr imrpoving >2 now will touch base with pharmacy regarding dose     ROSETTA  Continue home BiPAP     Complete AV block  S/p dual chamber pacemaker     COPD without exacerbation  Continue home meds     DM2  Last HbA1c

## 2020-01-01 NOTE — PROGRESS NOTES
effort  · Resp Auscultation: Clear to auscultation bilaterally   Cardiovascular:  · Auscultation: irregular rhythm, normal prosthetic heart sounds  · Palpation:  Nl PMI  · JVP:  normal  · Extremities: No Edema  Abdomen:  · Soft, non-tender  · Normal bowel sounds  Extremities:  ·  No Cyanosis or Clubbing  Neurological/Psychiatric:  · Oriented to time, place, and person  · Non-anxious  Skin Warm and dry    Assessment:    Active Problems:    Acute congestive heart failure (HCC)  Plan: primarily diastolic,also with likely recent onset of recurrent atrial fibrillation    S/p AVR-mechanical      Plan:  1.  Continue IV lasix,consider cardioversion  I have spent 35  minutes of face to face time with the patient with more than 50%  spent  counseling and coordinating care for Zev Will

## 2020-01-01 NOTE — PROGRESS NOTES
Assessment and med pass complete. Meds taken whole with water. Ambulates independently, informed to call if needing assist. Request hospital bi-pap for the night since he uses one at home, will message hospitalist. Denies other needs, call light in reach.

## 2020-01-02 VITALS
DIASTOLIC BLOOD PRESSURE: 77 MMHG | HEART RATE: 105 BPM | OXYGEN SATURATION: 93 % | RESPIRATION RATE: 16 BRPM | TEMPERATURE: 96.7 F | BODY MASS INDEX: 36.71 KG/M2 | HEIGHT: 73 IN | WEIGHT: 277 LBS | SYSTOLIC BLOOD PRESSURE: 111 MMHG

## 2020-01-02 PROBLEM — Z99.89 OSA ON CPAP: Chronic | Status: ACTIVE | Noted: 2019-06-18

## 2020-01-02 PROBLEM — E66.01 CLASS 3 SEVERE OBESITY DUE TO EXCESS CALORIES WITH SERIOUS COMORBIDITY IN ADULT (HCC): Chronic | Status: ACTIVE | Noted: 2017-11-07

## 2020-01-02 PROBLEM — E66.813 CLASS 3 SEVERE OBESITY DUE TO EXCESS CALORIES WITH SERIOUS COMORBIDITY IN ADULT: Chronic | Status: ACTIVE | Noted: 2017-11-07

## 2020-01-02 LAB
ANION GAP SERPL CALCULATED.3IONS-SCNC: 10 MMOL/L (ref 3–16)
BUN BLDV-MCNC: 29 MG/DL (ref 7–20)
CALCIUM SERPL-MCNC: 9.5 MG/DL (ref 8.3–10.6)
CHLORIDE BLD-SCNC: 97 MMOL/L (ref 99–110)
CO2: 29 MMOL/L (ref 21–32)
CREAT SERPL-MCNC: 1.5 MG/DL (ref 0.8–1.3)
EKG ATRIAL RATE: 74 BPM
EKG DIAGNOSIS: NORMAL
EKG P-R INTERVAL: 204 MS
EKG Q-T INTERVAL: 452 MS
EKG QRS DURATION: 164 MS
EKG QTC CALCULATION (BAZETT): 501 MS
EKG R AXIS: -63 DEGREES
EKG T AXIS: 110 DEGREES
EKG VENTRICULAR RATE: 74 BPM
GFR AFRICAN AMERICAN: 56
GFR NON-AFRICAN AMERICAN: 46
GLUCOSE BLD-MCNC: 123 MG/DL (ref 70–99)
GLUCOSE BLD-MCNC: 132 MG/DL (ref 70–99)
INR BLD: 2.24 (ref 0.86–1.14)
PERFORMED ON: ABNORMAL
POTASSIUM SERPL-SCNC: 4.1 MMOL/L (ref 3.5–5.1)
PROTHROMBIN TIME: 26.2 SEC (ref 10–13.2)
SODIUM BLD-SCNC: 136 MMOL/L (ref 136–145)

## 2020-01-02 PROCEDURE — 80048 BASIC METABOLIC PNL TOTAL CA: CPT

## 2020-01-02 PROCEDURE — 92960 CARDIOVERSION ELECTRIC EXT: CPT | Performed by: INTERNAL MEDICINE

## 2020-01-02 PROCEDURE — 99233 SBSQ HOSP IP/OBS HIGH 50: CPT | Performed by: INTERNAL MEDICINE

## 2020-01-02 PROCEDURE — 93280 PM DEVICE PROGR EVAL DUAL: CPT | Performed by: INTERNAL MEDICINE

## 2020-01-02 PROCEDURE — 36415 COLL VENOUS BLD VENIPUNCTURE: CPT

## 2020-01-02 PROCEDURE — 93010 ELECTROCARDIOGRAM REPORT: CPT | Performed by: INTERNAL MEDICINE

## 2020-01-02 PROCEDURE — 85610 PROTHROMBIN TIME: CPT

## 2020-01-02 PROCEDURE — 93005 ELECTROCARDIOGRAM TRACING: CPT | Performed by: INTERNAL MEDICINE

## 2020-01-02 PROCEDURE — 6370000000 HC RX 637 (ALT 250 FOR IP): Performed by: PHYSICIAN ASSISTANT

## 2020-01-02 PROCEDURE — 2580000003 HC RX 258: Performed by: PHYSICIAN ASSISTANT

## 2020-01-02 PROCEDURE — 94640 AIRWAY INHALATION TREATMENT: CPT

## 2020-01-02 PROCEDURE — 92960 CARDIOVERSION ELECTRIC EXT: CPT

## 2020-01-02 PROCEDURE — 99232 SBSQ HOSP IP/OBS MODERATE 35: CPT | Performed by: CLINICAL NURSE SPECIALIST

## 2020-01-02 PROCEDURE — 94760 N-INVAS EAR/PLS OXIMETRY 1: CPT

## 2020-01-02 RX ORDER — MONTELUKAST SODIUM 10 MG/1
10 TABLET ORAL EVERY EVENING
Qty: 30 TABLET | Refills: 0 | Status: SHIPPED | OUTPATIENT
Start: 2020-01-02 | End: 2020-08-17

## 2020-01-02 RX ORDER — FUROSEMIDE 40 MG/1
40 TABLET ORAL DAILY
Status: DISCONTINUED | OUTPATIENT
Start: 2020-01-02 | End: 2020-01-02 | Stop reason: HOSPADM

## 2020-01-02 RX ADMIN — TIOTROPIUM BROMIDE INHALATION SPRAY 2 PUFF: 3.12 SPRAY, METERED RESPIRATORY (INHALATION) at 08:35

## 2020-01-02 RX ADMIN — ASPIRIN 81 MG 81 MG: 81 TABLET ORAL at 08:40

## 2020-01-02 RX ADMIN — SOTALOL HYDROCHLORIDE 80 MG: 80 TABLET ORAL at 08:41

## 2020-01-02 RX ADMIN — Medication 2 PUFF: at 08:35

## 2020-01-02 RX ADMIN — FLUTICASONE PROPIONATE 2 SPRAY: 50 SPRAY, METERED NASAL at 08:42

## 2020-01-02 RX ADMIN — LISINOPRIL 30 MG: 10 TABLET ORAL at 08:40

## 2020-01-02 RX ADMIN — FAMOTIDINE 20 MG: 20 TABLET, FILM COATED ORAL at 08:40

## 2020-01-02 RX ADMIN — Medication 10 ML: at 08:42

## 2020-01-02 RX ADMIN — ALLOPURINOL 300 MG: 300 TABLET ORAL at 08:40

## 2020-01-02 NOTE — CONSULTS
[facility]      Christine Taylor 1950    History:  Past Medical History:   Diagnosis Date    Acute congestive heart failure ([facility] 12/30/2019    Allergic rhinitis 7/11/2016    Atrial fibrillation ([facility]     under care of cardiology:Dr. Jomarie Rockers Behrens([facility])    CKD (chronic kidney disease)     Nephro:Dr. Parker:stage 3    Class 2 obesity due to excess calories without serious comorbidity with body mass index (BMI) of 37.0 to 37.9 in adult 11/7/2017    Controlled type 2 diabetes mellitus without complication, without long-term current use of insulin ([facility] 2/24/2017    COPD     Gout     Hyperlipidemia, mixed 07/11/2016    Hypertension     under care of cardiology:Dr. Jomarie Rockers Behrens([facility])    Long term current use of anticoagulants with INR goal of 2.0-3.0     under care of cardiology:Dr. Jomarie Rockers Behrens([facility])    Obstructive sleep apnea syndrome 06/18/2019    per pulmo    Parkinson disease [facility])     9/2016:Per neurologist dx is tremor & not consistent with Parkison's:Dr. Atul Jurado Prediabetes 10/28/2016    Primary insomnia 1/25/2017    Sleep apnea     CPAP    Stage 2 chronic kidney disease        ECHO:     Conclusions      Summary   -Global hypokinesis with EF 40-45%. -Abnormal septal motion.   -The aortic root and the ascending aorta are mildly dilated. -The right ventricle appears to be dilated. -RV systolic function appears to be reduced.   -The right atrium appears to be dilated. -The mechanical artificial aortic valve appears well seated with a maximum   velocity of 2.40 m/s and a mean gradient of 12 mmHg. The aortic valve area   is estimated at 1.19 cm^2. No significant regurgitation noted. -Thickened mitral valve without evidence of stenosis. There is   mild-to-moderate mitral regurgitation.   -There is moderate tricuspid regurgitation with a RVSP estimation of 43   mmHg.   -Pacer / ICD wire is visualized in the right heart. -Indeterminate diastolic function. Signature      ------------------------------------------------------------------   Electronically signed by Dandre Wallace MD, [facility] (Interpreting   physician) on 12/31/2019 at 02:42 PM   ------------------------------------------------------------------        ACE/ARB: .Lisinopril 30 mg daily  BB: Sotalol 80 mg bid  Aldosterone Antagonist: No aldosterone antagonsist    History of Sleep Apnea: yes  Wears CPAP at home    [address] for bronchitis  Code Status: Full   Discharge plans: Patient to return home. Lives independently  Family Present: Wife    Mr. Judith Iqbal was seen for heart failure which is not new for him. He follows with Dr. Viv Nelson at [facility]. Has a history of valve disorder and was admitted in atrial fibrillation. Cardioverted and is in sinus rhythm. Patient states he does not weigh daily, follow a low sodium or fluid restriction diet. \"I did not know I had too. \" Instructed to weigh daily and call according to parameters for weight loss or weight gain. He currently adds salt to his food and enjoys pickles with his sandwich for lunch. He goes well over his 64 ounces of fluid a day. Drinks several 20 ounce cokes a day. Instructed to read labels and to measure his fluid intake. Medications and activity discussed. Patient provided with both written and verbal education on CHF signs/ symptoms, causes, discharge medications, smoking cessation, daily weights, low sodium diet, activity, and follow-up. Pt to call if gains 3 pounds in one day or 5 pounds in one week. Mutually agreed upon goals were discussed such as calling the MD as soon as they recognize symptoms and weight gain, maintaining his proper diet, taking medications as prescribed, joining rehab when able. Patient provided with CHF Zone Management tool and CHF symptoms magnet.     Discussed importance of lifestyle changes: daily weights, low sodium and fluid restriction diet    PATIENT/CAREGIVER TEACHING:    Level of patient/caregiver understanding able to:   [x ] Verbalize understanding [ ] Demonstrate understanding [ ] Teach back   [ ] Needs reinforcement [ ] Other:       Time spent teachin minutes    1. WEIGHT: Admit Weight: 298 lb (135.2 kg)      Today  Weight: 277 lb (125.6 kg)   2. I/O     Intake/Output Summary (Last 24 hours) at 2020 1226  Last data filed at 2020 0420  Gross per 24 hour   Intake 480 ml   Output 1725 ml   Net -1245 ml       Recommendations:   1. Patient will need a follow up appointment  2. Educate further on fluid restriction 48 oz- 64 oz during inpatient stay so he can understand how to measure intake at home. 3. Continue to educate on S/S.   4. Emphasize daily weights, diet, and knowing when and who to call  5. Provided patient with CHF Resource Line for questions and concerns.        Familia Garsia 2020 12:26 PM

## 2020-01-02 NOTE — DISCHARGE INSTR - COC
in adult E66.09, Z68.38    Acute recurrent maxillary sinusitis J01.01    Allergic sinusitis J30.9    COPD J43.8    ROSETTA (obstructive sleep apnea) G47.33    Rapid atrial fibrillation (HCC) I48.91    Acute congestive heart failure (HCC) I50.9    Atrial flutter (HCC) I48.92    Cardiomyopathy, dilated (HCC) I42.0    Obesity (BMI 30-39. 9) E66.9       Isolation/Infection:   Isolation          No Isolation        Patient Infection Status     None to display          Nurse Assessment:  Last Vital Signs: /77   Pulse 105   Temp 96.7 °F (35.9 °C) (Temporal)   Resp 16   Ht 6' 1\" (1.854 m)   Wt 277 lb (125.6 kg)   SpO2 94%   BMI 36.55 kg/m²     Last documented pain score (0-10 scale): Pain Level: 0  Last Weight:   Wt Readings from Last 1 Encounters:   20 277 lb (125.6 kg)     Mental Status:  {IP PT MENTAL STATUS:}    IV Access:  { MARYES IV ACCESS:540870602}    Nursing Mobility/ADLs:  Walking   {P DME KZIQ:764375119}  Transfer  {P DME PVQL:002522945}  Bathing  {P DME OWAD:074025574}  Dressing  {CHP DME OQUU:208512603}  Toileting  {CHP DME LSJL:795547439}  Feeding  {CHP DME BBJF:580135640}  Med Admin  {P DME ZFZ}  Med Delivery   {[facility] MED Delivery:491628567}    Wound Care Documentation and Therapy:        Elimination:  Continence:   · Bowel: {YES / FK:83830}  · Bladder: {YES / VN:13898}  Urinary Catheter: {Urinary Catheter:674452527}   Colostomy/Ileostomy/Ileal Conduit: {YES / IB:88122}       Date of Last BM: ***    Intake/Output Summary (Last 24 hours) at 2020 1054  Last data filed at 2020 0420  Gross per 24 hour   Intake 480 ml   Output 1725 ml   Net -1245 ml     I/O last 3 completed shifts:   In: 900 [P.O.:900]  Out: 2825 [Urine:2825]    Safety Concerns:     [address] MARYSE Safety Concerns:229573780}    Impairments/Disabilities:      [address] SERRA Impairments/Disabilities:604724389}    Nutrition Therapy:  Current Nutrition Therapy:   [address] SERRA Diet List:151326001}    Routes of Feeding: {CHP DME Other Feedings:510669464}  Liquids: {Slp liquid thickness:87743}  Daily Fluid Restriction: {CHP DME Yes amt example:346067067}  Last Modified Barium Swallow with Video (Video Swallowing Test): {Done Not Done JHLN:735988705}    Treatments at the Time of Hospital Discharge:   Respiratory Treatments: ***  Oxygen Therapy:  {Therapy; copd oxygen:53210}  Ventilator:    {[facility] Vent IXMJ:484916751}    Rehab Therapies: {THERAPEUTIC INTERVENTION:1988199963}  Weight Bearing Status/Restrictions: { CC Weight Bearin}  Other Medical Equipment (for information only, NOT a DME order):  {EQUIPMENT:108825739}  Other Treatments: ***    Patient's personal belongings (please select all that are sent with patient):  {CHP DME Belongings:561643393}    RN SIGNATURE:  {Esignature:271326024}    CASE MANAGEMENT/SOCIAL WORK SECTION    Inpatient Status Date: ***    Readmission Risk Assessment Score:  Readmission Risk              Risk of Unplanned Readmission:        17           Discharging to Facility/ Agency   · Name:   · Address:  · Phone:  · Fax:    Dialysis Facility (if applicable)   · Name:  · Address:  · Dialysis Schedule:  · Phone:  · Fax:    / signature: {Esignature:842088725}    PHYSICIAN SECTION    Prognosis: {Prognosis:8841287359}    Condition at Discharge: [facility] Patient Condition:220917331}    Rehab Potential (if transferring to Rehab): {Prognosis:0518173361}    Recommended Labs or Other Treatments After Discharge: ***    Physician Certification: I certify the above information and transfer of Chastity Us  is necessary for the continuing treatment of the diagnosis listed and that he requires {Admit to Appropriate Level of Care:24162} for {GREATER/LESS:490949691} 30 days.      Update Admission H&P: {CHP DME Changes in UMPMX:312254422}    PHYSICIAN SIGNATURE:  {Esignature:560533171}

## 2020-01-02 NOTE — DISCHARGE SUMMARY
Hospital Medicine Discharge Summary    Patient: Kiera Amado     Gender: male  : 1950   Age: 77 y.o. MRN: [id number]    Admitting Physician: Loyd Guevara MD  Discharge Physician: Christiano Laird MD     Code Status: Full Code     Admit Date: 2019   Discharge Date:   2020    Disposition:  Home    Discharge Diagnoses: Active Hospital Problems    Diagnosis Date Noted    Atrial flutter ([address] [I48.92]     Cardiomyopathy, dilated ([address] [I42.0]     Obesity (BMI 30-39. 9) [E66.9]     Acute congestive heart failure ([address] [I50.9] 2019    Rapid atrial fibrillation ([address] [I48.91] 2019    ROSETTA (obstructive sleep apnea) [G47.33] 2019    Benign essential HTN [I10] 2016       Follow-up appointments:  one week    Outpatient to do list: Follow-up with cardiology    Condition at Discharge:  [facility] Course: Kiera Amado is a 77 y.o. male  who was admitted with shortness of breath ongoing for 1 week prior to presentation. Patient was found to be in acute decompensated systolic heart failure he was treated with IV diuresis. Repeat echocardiogram on  showed an EF of 40 to 45% with global hypokinesis and abnormal septal wall motion. He was evaluated by cardiology and recommended ischemic work-up likely in outpatient setting. During his hospitalization patient went into atrial a flutter and underwent cardioversion. Patient was continued on Coumadin for anticoagulation, he was continued on digoxin and sotalol for rhythm control. Patient diuresed appropriately, symptoms began to improve and patient was discharged home in stable condition. He was advised to follow-up with cardiology for possible ablation as well as his primary care physician within 5 to 7 days.     Discharge Medications:   Current Discharge Medication List        Current Discharge Medication List        Current Discharge Medication List      CONTINUE these medications which have NOT CHANGED Details   pravastatin (PRAVACHOL) 80 MG tablet TAKE 1 TABLET EVERY DAY FOR CHOLESTEROL  Qty: 90 tablet, Refills: 0    Associated Diagnoses: Hyperlipidemia LDL goal <100      allopurinol (ZYLOPRIM) 300 MG tablet TAKE 1 TABLET EVERY DAY  Qty: 90 tablet, Refills: 0      metFORMIN (GLUCOPHAGE XR) 500 MG extended release tablet Take 4 tablets by mouth daily (with breakfast)  Qty: 360 tablet, Refills: 0    Associated Diagnoses: Uncontrolled type 2 diabetes mellitus with microalbuminuria, without long-term current use of insulin ([facility])      ACCU-CHEK COMPACT PLUS strip USE TO TEST 2 TIMES DAILY  Qty: 102 strip, Refills: 1    Associated Diagnoses: Controlled type 2 diabetes mellitus without complication, without long-term current use of insulin ([facility])      fluticasone-vilanterol (BREO ELLIPTA) 100-25 MCG/INH AEPB inhaler Inhale 1 puff into the lungs daily  Qty: 3 each, Refills: 0    Associated Diagnoses: Other emphysema ([facility])      albuterol sulfate HFA (VENTOLIN HFA) 108 (90 Base) MCG/ACT inhaler Inhale 2 puffs into the lungs every 6 hours as needed for Wheezing  Qty: 3 Inhaler, Refills: 0    Associated Diagnoses: Wheezing; Tobacco use disorder      Lancets MISC BID testing  Qty: 100 each, Refills: 6    Associated Diagnoses: Controlled type 2 diabetes mellitus without complication, without long-term current use of insulin ([facility])      lisinopril (PRINIVIL;ZESTRIL) 20 MG tablet Take 30 mg by mouth daily       furosemide (LASIX) 40 MG tablet Take 40 mg by mouth daily. warfarin (COUMADIN) 10 MG tablet Take 10 mg by mouth daily Pt takes 15 mg on Tuesdays, 10 mg every other day of the week      sotalol (BETAPACE) 80 MG tablet Take 80 mg by mouth 2 times daily. aspirin 81 MG chewable tablet Take 81 mg by mouth daily. glucose monitoring kit (FREESTYLE) monitoring kit 1 each by Does not apply route once for 1 dose Glucometer(patient's choice). BID testing.   Qty: 1 kit, Refills: 0    Associated Diagnoses: Controlled type 2 diabetes mellitus without complication, without long-term current use of insulin ([facility]           Current Discharge Medication List      STOP taking these medications       tiotropium (Morales [place]) 18 MCG inhalation capsule Comments:   Reason for Stopping:         fluticasone (FLONASE) 50 MCG/ACT nasal spray Comments:   Reason for Stopping:         montelukast (SINGULAIR) 10 MG tablet Comments:   Reason for Stopping:               Discharge Exam:    /77   Pulse 105   Temp 96.7 °F (35.9 °C) (Temporal)   Resp 16   Ht 6' 1\" (1.854 m)   Wt 277 lb (125.6 kg)   SpO2 94%   BMI 36.55 kg/m²   General appearance:  Appears comfortable  Eyes: Sclera clear. Pupils equal.  ENT: Moist oral mucosa. Trachea midline, no adenopathy. Cardiovascular: Regular rhythm, normal S1, S2. No murmur. Trace edema in lower extremities  Respiratory: Not using accessory muscles. Good inspiratory effort. Clear to auscultation bilaterally, no wheeze or crackles. GI: Abdomen soft, no tenderness, not distended, normal bowel sounds  Musculoskeletal: No cyanosis in digits, neck supple  Neurology: CN 2-12 grossly intact. No speech or motor deficits  Psych: Normal affect. Alert and oriented in time, place and person  Skin: Warm, dry, normal turgor  Extremity exam shows brisk capillary refill. Peripheral pulses are palpable in lower extremities     Labs:  For convenience and continuity at follow-up the following most recent labs are provided:    Lab Results   Component Value Date    WBC 6.7 12/31/2019    HGB 12.9 12/31/2019    HCT 39.8 12/31/2019    MCV 94.1 12/31/2019     12/31/2019     01/02/2020    K 4.1 01/02/2020    CL 97 01/02/2020    CO2 29 01/02/2020    BUN 29 01/02/2020    CREATININE 1.5 01/02/2020    CALCIUM 9.5 01/02/2020    PHOS 3.8 10/14/2019    ALKPHOS 86 11/04/2019    ALT <5 11/04/2019    AST 25 11/04/2019    BILITOT 0.5 11/04/2019    BILIDIR 0.1 10/21/2014    LABALBU 4.2 11/04/2019    LDLCALC 68

## 2020-01-02 NOTE — PROGRESS NOTES
[address]   Daily Progress Note      Admit Date:  12/30/2019    HPI:    Mr. 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 He follows with Dr Bridger Huffman with [facility]. PAF, COPD, ROSETTA, AV block and pacemaker. He follows with Dr Lawrence Keen    He is admitted with worsening leg swelling and right heel/foot pain that is intermittent. He was found to be in AF in the ER. He complained of SOB. Subjective:  Patient is being seen for chronic sHF. There were no acute overnight cardiac events. Weight down 16 pounds creat 1.5 potassium 4.1 probnp 2125->1391  Given IV lasix and held today due to increased creat yesterday. Sob resolved  He was just cardioverted this morning. His weight is good at 277. Objective:   /77   Pulse 105   Temp 96.7 °F (35.9 °C) (Temporal)   Resp 16   Ht 6' 1\" (1.854 m)   Wt 277 lb (125.6 kg)   SpO2 94%   BMI 36.55 kg/m²       Intake/Output Summary (Last 24 hours) at 1/2/2020 1027  Last data filed at 1/2/2020 0420  Gross per 24 hour   Intake 480 ml   Output 1725 ml   Net -1245 ml          Physical Exam:  General:  Awake, alert, oriented in NAD  Skin:  Warm and dry. No unusual bruising or rash  Neck:  Supple. No JVD or carotid bruit appreciated  Chest:  Normal effort.   Clear to auscultation, no wheezes/rhonchi/rales  Cardiovascular:  RRR, S1/S2, no murmur/gallop/rub  Abdomen:  Soft, nontender, +bowel sounds, obese  Extremities:  No edema  Neurological: No focal deficits  Psychological: Normal mood and affect      Medications:    insulin lispro  0-6 Units Subcutaneous TID WC    insulin lispro  0-3 Units Subcutaneous Nightly    sodium chloride flush  10 mL Intravenous 2 times per day    famotidine  20 mg Oral BID    allopurinol  300 mg Oral Daily    aspirin  81 mg Oral Daily    fluticasone  2 spray Nasal Daily    mometasone-formoterol  2 puff Inhalation BID    lisinopril  30 mg Oral Daily    montelukast  10 mg Oral QPM    pravastatin  80 mg Oral QPM    sotalol  80 mg Oral BID    tiotropium  2 puff Inhalation Daily    warfarin  15 mg Oral Once per day on Tue    warfarin  10 mg Oral Once per day on Sun Mon Wed Thu Fri Sat      dextrose         Lab Data:  CBC:   Recent Labs     12/30/19  2143 12/31/19  0230   WBC 6.7 6.7   HGB 13.4* 12.9*    142     BMP:    Recent Labs     12/31/19  0230 01/01/20  0526 01/02/20  0459    141 136   K 4.0 4.3 4.1   CO2 27 29 29   BUN 32* 29* 29*   CREATININE 1.5* 1.6* 1.5*     INR:    Recent Labs     12/31/19  0742 01/01/20  0526 01/02/20  0459   INR 1.98* 2.05* 2.24*     BNP:    Recent Labs     12/30/19 2144 01/01/20  0526   PROBNP 2,125* 1,391*         Diagnostics:    Echo 12/31/19  Summary   -Global hypokinesis with EF 40-45%. -Abnormal septal motion.   -The aortic root and the ascending aorta are mildly dilated. -The right ventricle appears to be dilated. -RV systolic function appears to be reduced.   -The right atrium appears to be dilated. -The mechanical artificial aortic valve appears well seated with a maximum   velocity of 2.40 m/s and a mean gradient of 12 mmHg. The aortic valve area   is estimated at 1.19 cm^2. No significant regurgitation noted. -Thickened mitral valve without evidence of stenosis. There is   mild-to-moderate mitral regurgitation.   -There is moderate tricuspid regurgitation with a RVSP estimation of 43   mmHg.   -Pacer / ICD wire is visualized in the right heart.   -Indeterminate diastolic function. Assessment:    1.  AF vs Aflutter, EP following  2. Acute on chronic systolic heart failure, on ace and bb, compensated  3. ROSETTA on cpap  4. Obesity  5. Essential hypertension  6. CKD  7. S/p AVR on coumadin per pharmacy    Plan:    1. Continue lisinopril 30 mg po daily  2. On sotalol  3.   Will resume lasix 40 mg once a day    He follows with Dr Gabriel Dee and has an appt on 1/13/20. Discussed with HF nurse and will change appt for 7 days. He would benefit from aldosterone antagonist if he would follow up here. Discussed with patient and bedside nurse who are agreeable with plan of care. Thank you for allowing me to participate in the care of your patient.     Shanique Hollingsworth, CNS

## 2020-01-02 NOTE — PLAN OF CARE
Problem: Falls - Risk of:  Goal: Will remain free from falls  Description  Will remain free from falls  Outcome: Ongoing  Note:   Fall precautions in place- bed in lowest position and locked, bed alarm engaged, non slip socks on. Pt's possessions within reach. Pt instructed to call when needing to get up. Problem: OXYGENATION/RESPIRATORY FUNCTION  Goal: Patient will achieve/maintain normal respiratory rate/effort  Description  Respiratory rate and effort will be within normal limits for the patient  Outcome: Ongoing  Note:   Does not require O2 during the day but has ROSETTA and uses bipap at night. Problem: FLUID AND ELECTROLYTE IMBALANCE  Goal: Fluid and electrolyte balance are achieved/maintained  Outcome: Ongoing  Note:   Admitted for CHF- Cardiology following- IV lasix. Strict I/O and daily weights being followed.

## 2020-01-02 NOTE — CONSULTS
[address]   Electrophysiology Consultation   Date: 1/2/2020  Reason for Consultation: Atrial fibrillation   Consult Requesting Physician: Estela Hart MD     Chief Complaint   Patient presents with    Shortness of Breath     c/o sob and BLE edema x 1 week      HPI: Kisha Arredondo is a 77 y.o. male with ROSETTA, Hx of AV block and pacemaker  And HTN, s/p AVR for congenital AS on coumadin was admitted for HF. He has had worsening of HF and shortness of breath and leg swelling        Past Medical History:   Diagnosis Date    Acute congestive heart failure ([facility] 12/30/2019    Allergic rhinitis 7/11/2016    Atrial fibrillation ([facility]     under care of cardiology:Dr. May Parchment Behrens([facility])    CKD (chronic kidney disease)     Nephro:Dr. Parker:stage 3    Class 2 obesity due to excess calories without serious comorbidity with body mass index (BMI) of 37.0 to 37.9 in adult 11/7/2017    Controlled type 2 diabetes mellitus without complication, without long-term current use of insulin ([facility] 2/24/2017    COPD     Gout     Hyperlipidemia, mixed 07/11/2016    Hypertension     under care of cardiology:Dr. May Parchment Behrens([facility])    Long term current use of anticoagulants with INR goal of 2.0-3.0     under care of cardiology:Dr. Scott Behrens([facility])    Obstructive sleep apnea syndrome 06/18/2019    per pulmo    Parkinson disease [facility])     9/2016:Per neurologist dx is tremor & not consistent with Parkison's:Dr. Martin Jones Prediabetes 10/28/2016    Primary insomnia 1/25/2017    Sleep apnea     CPAP        Past Surgical History:   Procedure Laterality Date    AORTIC VALVE REPLACEMENT  10/1996:[facility]    x3    PACEMAKER INSERTION  1996       No Known Allergies    Social History:  Reviewed. reports that he quit smoking about 6 months ago. His smoking use included cigarettes. He has a 30.00 pack-year smoking history. He has never used smokeless tobacco. He reports current alcohol use.  He reports that he does not use drugs. Family History:  Reviewed. family history includes Asthma in his mother; Cancer in his father; No Known Problems in his brother, maternal grandfather, maternal grandmother, paternal grandfather, paternal grandmother, and sister. Review of System:  All other systems reviewed and are negative except for that noted above. Pertinent negatives are:     · General: negative for fever, chills   · Ophthalmic ROS: negative for - eye pain or loss of vision  · ENT ROS: negative for - headaches, sore throat   · Respiratory: negative for - cough, sputum  · Cardiovascular: Reviewed in HPI  · Gastrointestinal: negative for - abdominal pain, diarrhea, N/V  · Hematology: negative for - bleeding, blood clots, bruising or jaundice  · Genito-Urinary:  negative for - Dysuria or incontinence  · Musculoskeletal: negative for - Joint swelling, muscle pain  · Neurological: negative for - confusion, dizziness, headaches   · Psychiatric: No anxiety, no depression. · Dermatological: negative for - rash    Physical Examination:  Vitals:    20 0600   BP:    Pulse: 100   Resp:    Temp:    SpO2:       In: 480 [P.O.:480]  Out: 1725    Wt Readings from Last 3 Encounters:   20 277 lb (125.6 kg)   19 290 lb 4.8 oz (131.7 kg)   10/15/19 286 lb 3.2 oz (129.8 kg)     Temp  Av.2 °F (36.2 °C)  Min: 96.6 °F (35.9 °C)  Max: 98 °F (36.7 °C)  Pulse  Av.1  Min: 87  Max: 108  BP  Min: 100/65  Max: 133/93  SpO2  Av.9 %  Min: 92 %  Max: 98 %  FiO2   Av %  Min: 25 %  Max: 25 %    Intake/Output Summary (Last 24 hours) at 2020 0804  Last data filed at 2020 0420  Gross per 24 hour   Intake 900 ml   Output 2825 ml   Net -1925 ml       · Telemetry: paced  · Constitutional: Oriented. No distress. obese  · Head: Normocephalic and atraumatic. · Mouth/Throat: Oropharynx is clear and moist.   · Eyes: Conjunctivae normal. EOM are normal.   · Neck: Neck supple. No rigidity. No JVD present. regurgitation with a RVSP estimation of 43   mmHg.   -Pacer / ICD wire is visualized in the right heart.   -Indeterminate diastolic function.        Cath:     Scheduled Meds:   insulin lispro  0-6 Units Subcutaneous TID     insulin lispro  0-3 Units Subcutaneous Nightly    sodium chloride flush  10 mL Intravenous 2 times per day    famotidine  20 mg Oral BID    allopurinol  300 mg Oral Daily    aspirin  81 mg Oral Daily    fluticasone  2 spray Nasal Daily    mometasone-formoterol  2 puff Inhalation BID    lisinopril  30 mg Oral Daily    montelukast  10 mg Oral QPM    pravastatin  80 mg Oral QPM    sotalol  80 mg Oral BID    tiotropium  2 puff Inhalation Daily    warfarin  15 mg Oral Once per day on Tue    warfarin  10 mg Oral Once per day on Sun Mon Wed Thu Fri Sat     Continuous Infusions:   dextrose       PRN Meds:.glucose, dextrose, glucagon (rDNA), dextrose, sodium chloride flush, magnesium hydroxide, ondansetron, acetaminophen, albuterol sulfate HFA     Patient Active Problem List    Diagnosis Date Noted    Acute congestive heart failure ([facility] 12/30/2019    Obstructive sleep apnea syndrome 06/18/2019    Rapid [facility] fibrillation ([facility] 06/18/2019    COPD 11/05/2018    Acute recurrent maxillary sinusitis 10/17/2018    Allergic sinusitis 10/17/2018    Class 2 obesity due to excess calories without serious comorbidity with body mass index (BMI) of 38.0 to 38.9 in adult 11/07/2017    Uncontrolled type 2 diabetes mellitus with microalbuminuria, without long-term current use of insulin ([facility] 02/24/2017    Effusion of left knee 02/21/2017    Primary insomnia 01/25/2017    Prediabetes 10/28/2016    Gout of both feet 09/19/2016    Essential hypertension 07/11/2016    Hyperlipidemia, mixed 07/11/2016    Long term current use of anticoagulants with INR goal of 2.0-3.0 07/11/2016    Allergic rhinitis 07/11/2016      Active Hospital Problems    Diagnosis Date Noted    Acute congest[facility] heart failure ([facility] [I50.9] 12/30/2019    Rapid atrial fibrillation ([facility] [I48.91] 06/18/2019       Assessment:       Plan:    - Atrial fibrillation vs flutter    On coumadin   Will interrogate the device to see when it started and consider cardioversion with or without transesophageal echocardiogram([facility]ON) based on duration of therapeutic INR      - cardiomyopathy   On medical therapy   Depending on % pacing , he may need CRT due to low EF        - ROSETTA   CPAP    - Obesity   Excessive weight is complicating assessment and treatment. It is placing patient at risk for multiple co-morbidities as well as early death and contributing to the patient's presentation. - discussed weight management with diet and exercise     - HTN   BP is well controlled. Continue current meds. - CKD   Stable      - Permanent pacemaker   Will interrogate    I independently reviewed     Thank you for allowing me to participate in the care of Jennyfer Ivett     NOTE: This report was transcribed using voice recognition software. Every effort was made to ensure accuracy, however, inadvertent computerized transcription errors may be present.

## 2020-01-02 NOTE — PROGRESS NOTES
[facility]   Electrophysiology Progress Note     Admit Date: 2019     Reason for follow up: atrial flutter     HPI and Interval History:   Patient seen and examined. Clinical notes reviewed. Telemetry reviewed. No new complaint today. No major events overnight. Denies having chest pain, shortness of breath, dyspnea on exertion, Orthopnea, PND at the time of this visit. Feels better  Review of System:  All other systems reviewed and are negative except for that noted above. Pertinent negatives are:     · General: negative for fever, chills   · Ophthalmic ROS: negative for - eye pain or loss of vision  · ENT ROS: negative for - headaches, sore throat   · Respiratory: negative for - cough, sputum  · Cardiovascular: Reviewed in HPI  · Gastrointestinal: negative for - abdominal pain, diarrhea, N/V  · Hematology: negative for - bleeding, blood clots, bruising or jaundice  · Genito-Urinary:  negative for - Dysuria or incontinence  · Musculoskeletal: negative for - Joint swelling, muscle pain  · Neurological: negative for - confusion, dizziness, headaches   · Psychiatric: No anxiety, no depression. · Dermatological: negative for - rash      Physical Examination:  Vitals:    20 0843   BP:    Pulse:    Resp: 16   Temp:    SpO2: 94%      In: 480 [P.O.:480]  Out: 1725    Wt Readings from Last 3 Encounters:   20 277 lb (125.6 kg)   19 290 lb 4.8 oz (131.7 kg)   10/15/19 286 lb 3.2 oz (129.8 kg)     Temp  Av °F (36.1 °C)  Min: 96.6 °F (35.9 °C)  Max: 97.9 °F (36.6 °C)  Pulse  Av  Min: 87  Max: 108  BP  Min: 100/65  Max: 124/74  SpO2  Av.1 %  Min: 93 %  Max: 98 %  FiO2   Av %  Min: 25 %  Max: 25 %    Intake/Output Summary (Last 24 hours) at 2020 0919  Last data filed at 2020 0420  Gross per 24 hour   Intake 480 ml   Output 2525 ml   Net -2045 ml       · Telemetry: paced  · Constitutional: Oriented. No distress. · Head: Normocephalic and atraumatic.

## 2020-01-03 ENCOUNTER — CARE COORDINATION (OUTPATIENT)
Dept: CASE MANAGEMENT | Age: 70
End: 2020-01-03

## 2020-01-03 ENCOUNTER — TELEPHONE (OUTPATIENT)
Dept: OTHER | Age: 70
End: 2020-01-03

## 2020-01-03 NOTE — CARE COORDINATION
[address] [facility] Transitions Initial Follow Up Call    Call within 2 business days of discharge: Yes    Patient: Yo Gordon Patient : 1950   MRN: [id number]  Reason for Admission:   Discharge Date: 20 RARS: Readmission Risk Score: 17      Last Discharge [facility]       Complaint Diagnosis Description Type Department Provider    19 Shortness of Breath Acute on chronic congestive heart failure, unspecified heart failure type ([facility] . .. ED to Hosp-Admission (Discharged) (ADMITTED) SHIRA 3A Eh Grace MD; Mulu WHITMORE..            Initial 24 hr call attempted, could not leave contact info, vm has not been set up    Follow Up  Future Appointments   Date Time Provider Jerry Connelly   2020  3:45 PM MEENU Bonner - CNP [facility] IM MMA   1/15/2020  8:00 AM Tiff Sesay MD FF Cardio MMA   2020 11:15 AM Laura Edward MD [facility]   2020  1:15 PM Larry Reilly MD AFL NEPH [facility] Nephrolo   10/12/2020  1:40 PM MEENU Alcala CNP FF SLEEP MED [facility]       Arpan Baldwin RN

## 2020-01-04 ENCOUNTER — CARE COORDINATION (OUTPATIENT)
Dept: CASE MANAGEMENT | Age: 70
End: 2020-01-04

## 2020-01-04 NOTE — CARE COORDINATION
[address] Transitions Initial Follow Up Call    Call within 2 business days of discharge: Yes    Patient: Agustín Llanes Patient : 1950   MRN: <[id number]>  Reason for Admission: Ac CHF  Discharge Date: 20 RARS: Readmission Risk Score: 17      Last Discharge [facility]       Complaint Diagnosis Description Type Department Provider    19 Shortness of Breath Acute on chronic congestive heart failure, unspecified heart failure type ([facility] . .. ED to Hosp-Admission (Discharged) (ADMITTED) SHIRA 3A Trae Esteves MD; Son WHITOMRE.. Facility:   2nd attempt to reach for initial Care Transition discharge call unsuccessful; no voicemail option.    Vinita Bonilla RN, CTN

## 2020-01-06 ENCOUNTER — TELEPHONE (OUTPATIENT)
Dept: CARDIOLOGY CLINIC | Age: 70
End: 2020-01-06

## 2020-01-06 ENCOUNTER — CARE COORDINATION (OUTPATIENT)
Dept: CASE MANAGEMENT | Age: 70
End: 2020-01-06

## 2020-01-06 ENCOUNTER — OFFICE VISIT (OUTPATIENT)
Dept: INTERNAL MEDICINE CLINIC | Age: 70
End: 2020-01-06
Payer: MEDICARE

## 2020-01-06 VITALS
SYSTOLIC BLOOD PRESSURE: 122 MMHG | DIASTOLIC BLOOD PRESSURE: 88 MMHG | WEIGHT: 275 LBS | HEART RATE: 70 BPM | BODY MASS INDEX: 36.28 KG/M2 | OXYGEN SATURATION: 96 %

## 2020-01-06 PROCEDURE — 99496 TRANSJ CARE MGMT HIGH F2F 7D: CPT | Performed by: NURSE PRACTITIONER

## 2020-01-06 PROCEDURE — 1111F DSCHRG MED/CURRENT MED MERGE: CPT | Performed by: NURSE PRACTITIONER

## 2020-01-06 ASSESSMENT — ENCOUNTER SYMPTOMS
RHINORRHEA: 0
CONSTIPATION: 0
DIARRHEA: 0
WHEEZING: 0
CHEST TIGHTNESS: 0
EYE ITCHING: 0
BLOOD IN STOOL: 0
SORE THROAT: 0
EYE REDNESS: 0
VOMITING: 0
ABDOMINAL PAIN: 0
COUGH: 0
SINUS PRESSURE: 0
NAUSEA: 0
COLOR CHANGE: 0
SHORTNESS OF BREATH: 0
BACK PAIN: 0

## 2020-01-06 NOTE — ASSESSMENT & PLAN NOTE
He is lower than his dry weight  He has not had any shortness of breath  He is doing well   Continue current meds at this time   Will refer to Dr. Kristen Peng for ongoing CHF management.

## 2020-01-06 NOTE — PROGRESS NOTES
External ear normal.      Left Ear: External ear normal.   Eyes:      Conjunctiva/sclera: Conjunctivae normal.      Pupils: Pupils are equal, round, and reactive to light. Neck:      Musculoskeletal: Normal range of motion and neck supple. Vascular: No JVD. Cardiovascular:      Rate and Rhythm: Normal rate and regular rhythm. Heart sounds: No murmur. No friction rub. No gallop. Pulmonary:      Effort: Pulmonary effort is normal. No respiratory distress. Breath sounds: Normal breath sounds. No wheezing. Abdominal:      General: Bowel sounds are normal. There is no distension. Palpations: Abdomen is soft. There is no mass. Tenderness: There is no tenderness. There is no guarding or rebound. Musculoskeletal: Normal range of motion. General: No tenderness. Skin:     General: Skin is warm and dry. Neurological:      Mental Status: He is alert and oriented to person, place, and time. Deep Tendon Reflexes: Reflexes are normal and symmetric. Psychiatric:         Behavior: Behavior normal.             Assessment/Plan:  1. Acute diastolic congestive heart failure [facility])    - Yareli Figueroa MD, [facility], [facility]    2. Acute on chronic systolic heart failure due to valvular disease ([address]      3. Atrial flutter, unspecified type ([address]      4. Pacemaker  Acute on chronic systolic heart failure due to valvular disease ([address]  He is lower than his dry weight  He has not had any shortness of breath  He is doing well   Continue current meds at this time   Will refer to Dr. Genaro Hoff for ongoing CHF management. Atrial flutter ([address]  Sinus in office today   He is recommended to have ablation outpatient   He currently is on coumadin managed by cardiology   He is doing well with medications at this time    Pacemaker  Per hospital report he will likely need a new pacemaker, he will be following up with cardiology for this.            Medical Decision Making: high

## 2020-01-07 DIAGNOSIS — E11.29 CONTROLLED TYPE 2 DIABETES MELLITUS WITH MICROALBUMINURIA, WITHOUT LONG-TERM CURRENT USE OF INSULIN (HCC): ICD-10-CM

## 2020-01-07 DIAGNOSIS — E78.5 HYPERLIPIDEMIA LDL GOAL <100: ICD-10-CM

## 2020-01-07 DIAGNOSIS — I10 ESSENTIAL HYPERTENSION: ICD-10-CM

## 2020-01-07 DIAGNOSIS — N18.30 STAGE 3 CHRONIC KIDNEY DISEASE (HCC): ICD-10-CM

## 2020-01-07 DIAGNOSIS — R80.9 CONTROLLED TYPE 2 DIABETES MELLITUS WITH MICROALBUMINURIA, WITHOUT LONG-TERM CURRENT USE OF INSULIN (HCC): ICD-10-CM

## 2020-01-07 LAB
A/G RATIO: 1.5 (ref 1.1–2.2)
ALBUMIN SERPL-MCNC: 4.8 G/DL (ref 3.4–5)
ALP BLD-CCNC: 125 U/L (ref 40–129)
ALT SERPL-CCNC: 27 U/L (ref 10–40)
ANION GAP SERPL CALCULATED.3IONS-SCNC: 15 MMOL/L (ref 3–16)
AST SERPL-CCNC: 26 U/L (ref 15–37)
BILIRUB SERPL-MCNC: 0.8 MG/DL (ref 0–1)
BUN BLDV-MCNC: 47 MG/DL (ref 7–20)
CALCIUM SERPL-MCNC: 10.3 MG/DL (ref 8.3–10.6)
CHLORIDE BLD-SCNC: 100 MMOL/L (ref 99–110)
CHOLESTEROL, TOTAL: 152 MG/DL (ref 0–199)
CO2: 25 MMOL/L (ref 21–32)
CREAT SERPL-MCNC: 1.7 MG/DL (ref 0.8–1.3)
CREATININE URINE: 99.7 MG/DL (ref 39–259)
GFR AFRICAN AMERICAN: 49
GFR NON-AFRICAN AMERICAN: 40
GLOBULIN: 3.2 G/DL
GLUCOSE BLD-MCNC: 133 MG/DL (ref 70–99)
HDLC SERPL-MCNC: 39 MG/DL (ref 40–60)
LDL CHOLESTEROL CALCULATED: 88 MG/DL
MICROALBUMIN UR-MCNC: 1.4 MG/DL
MICROALBUMIN/CREAT UR-RTO: 14 MG/G (ref 0–30)
POTASSIUM SERPL-SCNC: 5.8 MMOL/L (ref 3.5–5.1)
SODIUM BLD-SCNC: 140 MMOL/L (ref 136–145)
TOTAL PROTEIN: 8 G/DL (ref 6.4–8.2)
TRIGL SERPL-MCNC: 127 MG/DL (ref 0–150)
VLDLC SERPL CALC-MCNC: 25 MG/DL

## 2020-01-07 PROCEDURE — 36415 COLL VENOUS BLD VENIPUNCTURE: CPT | Performed by: FAMILY MEDICINE

## 2020-01-08 LAB
ESTIMATED AVERAGE GLUCOSE: 134.1 MG/DL
HBA1C MFR BLD: 6.3 %

## 2020-01-09 DIAGNOSIS — E87.5 HYPERKALEMIA: ICD-10-CM

## 2020-01-09 LAB — POTASSIUM SERPL-SCNC: 5.7 MMOL/L (ref 3.5–5.1)

## 2020-01-09 PROCEDURE — 36415 COLL VENOUS BLD VENIPUNCTURE: CPT | Performed by: FAMILY MEDICINE

## 2020-01-10 RX ORDER — SODIUM POLYSTYRENE SULFONATE 15 G/60ML
15 SUSPENSION ORAL; RECTAL ONCE
Qty: 1 BOTTLE | Refills: 0 | Status: SHIPPED | OUTPATIENT
Start: 2020-01-10 | End: 2020-01-23 | Stop reason: SDUPTHER

## 2020-01-13 ENCOUNTER — TELEPHONE (OUTPATIENT)
Dept: FAMILY MEDICINE CLINIC | Age: 70
End: 2020-01-13

## 2020-01-13 NOTE — TELEPHONE ENCOUNTER
Patient is calling waiting on the pharmacy to get his potassium medication in they had to order it and it could be as late as Wednesday before patient gets this medication and patient is worried and unsure what he should do.  Please advise  Peggy Bhakta [phone]

## 2020-01-13 NOTE — TELEPHONE ENCOUNTER
No other pharmacy has the medication. Patient will have to wait on the ordered medication that should arrive by Wednesday. Patient has contacted five different other pharmacies. Please advise.

## 2020-01-13 NOTE — TELEPHONE ENCOUNTER
Advise to have k+ retested within 1day to check recent level. If severe elevation then may need tx in ER. Otherwise, may wait for medication to arrive.

## 2020-01-14 DIAGNOSIS — E87.5 HYPERKALEMIA: ICD-10-CM

## 2020-01-14 LAB — POTASSIUM SERPL-SCNC: 6 MMOL/L (ref 3.5–5.1)

## 2020-01-14 NOTE — PROGRESS NOTES
[facility]   Electrophysiology Hospital Follow Up  Date: 1/15/2020      CC: Atrial Flutter  HPI: Julia Zhou is a 77 y.o.  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 He had worsening of HF and shortness of breath and leg swelling. S/p DCCV 1/2/20 for atrial flutter    Today Robert Lake presents to the office for a hospital follow up. He states he is feeling well from a cardiac standpoint. Patient denies lightheadedness, dizziness, chest pain, palpitations, orthopnea, edema, presyncope or syncope. Past Medical History:   Diagnosis Date    Acute congestive heart failure ([facility] 12/30/2019    Allergic rhinitis 7/11/2016    Atrial fibrillation ([facility]     under care of cardiology:Dr. Howell Mais Behrens([facility])    CKD (chronic kidney disease)     Nephro:Dr. Parker:stage 3    Class 2 obesity due to excess calories without serious comorbidity with body mass index (BMI) of 37.0 to 37.9 in adult 11/7/2017    Controlled type 2 diabetes mellitus without complication, without long-term current use of insulin ([facility] 2/24/2017    COPD     Gout     Hyperlipidemia, mixed 07/11/2016    Hypertension     under care of cardiology:Dr. Howell Mais Behrens([facility])    Long term current use of anticoagulants with INR goal of 2.0-3.0     under care of cardiology:Dr. Scott Behrens([facility])    Obstructive sleep apnea syndrome 06/18/2019    per pulmo    Parkinson disease [facility])     9/2016:Per neurologist dx is tremor & not consistent with Parkison's:Dr. Pederson Precise Prediabetes 10/28/2016    Primary insomnia 1/25/2017    Sleep apnea     CPAP    Stage 2 chronic kidney disease         Past Surgical History:   Procedure Laterality Date    AORTIC VALVE REPLACEMENT  10/1996:[employer]    x3    PACEMAKER INSERTION  1996       Allergies:  No Known Allergies    Social History:   reports that he quit smoking about 6 months ago.  His smoking use included · Neurological: Alert and oriented. Cranial nerve appears intact, No Gross deficit   · Skin: Skin is warm and dry. No rash noted. · Psychiatric: Has a normal behavior       Labs, diagnostic and imaging results reviewed. Reviewed. Lab Results   Component Value Date    TSH 0.80 2019    CREATININE 1.6 2020    CREATININE 1.7 2020    AST 26 2020    ALT 27 2020       EC/15/20  paced      Echo: 19   Summary   -Global hypokinesis with EF 40-45%. -Abnormal septal motion.   -The aortic root and the ascending aorta are mildly dilated. -The right ventricle appears to be dilated. -RV systolic function appears to be reduced.   -The right atrium appears to be dilated. -The mechanical artificial aortic valve appears well seated with a maximum   velocity of 2.40 m/s and a mean gradient of 12 mmHg. The aortic valve area   is estimated at 1.19 cm^2. No significant regurgitation noted. -Thickened mitral valve without evidence of stenosis. There is   mild-to-moderate mitral regurgitation.   -There is moderate tricuspid regurgitation with a RVSP estimation of 43   mmHg.   -Pacer / ICD wire is visualized in the right heart.   -Indeterminate diastolic function.     Cath:     Medication:  Current Outpatient Medications   Medication Sig Dispense Refill    tiotropium (SPIRIVA RESPIMAT) 2.5 MCG/ACT AERS inhaler Inhale 2 puffs into the lungs daily 1 Inhaler 0    montelukast (SINGULAIR) 10 MG tablet Take 1 tablet by mouth every evening 30 tablet 0    pravastatin (PRAVACHOL) 80 MG tablet TAKE 1 TABLET EVERY DAY FOR CHOLESTEROL 90 tablet 0    allopurinol (ZYLOPRIM) 300 MG tablet TAKE 1 TABLET EVERY DAY 90 tablet 0    metFORMIN (GLUCOPHAGE XR) 500 MG extended release tablet Take 4 tablets by mouth daily (with breakfast) (Patient taking differently: Take 1,000 mg by mouth 2 times daily (with meals) ) 360 tablet 0    ACCU-CHEK COMPACT PLUS strip USE TO TEST 2 TIMES DAILY 102 strip 1    fluticasone-vilanterol (BREO ELLIPTA) 100-25 MCG/INH AEPB inhaler Inhale 1 puff into the lungs daily 3 each 0    albuterol sulfate HFA (VENTOLIN HFA) 108 (90 Base) MCG/ACT inhaler Inhale 2 puffs into the lungs every 6 hours as needed for Wheezing 3 Inhaler 0    Lancets MISC BID testing 100 each 6    lisinopril (PRINIVIL;ZESTRIL) 20 MG tablet Take 30 mg by mouth daily       furosemide (LASIX) 40 MG tablet Take 40 mg by mouth daily.  warfarin (COUMADIN) 10 MG tablet Take 10 mg by mouth daily Pt takes 15 mg on Tuesdays, 10 mg every other day of the week.  sotalol (BETAPACE) 80 MG tablet Take 80 mg by mouth 2 times daily.  aspirin 81 MG chewable tablet Take 81 mg by mouth daily.  sodium polystyrene (SPS) 15 GM/60ML suspension Take 60 mLs by mouth once for 1 dose For high potassium 1 Bottle 0    glucose monitoring kit (FREESTYLE) monitoring kit 1 each by Does not apply route once for 1 dose Glucometer(patient's choice). BID testing. 1 kit 0     No current facility-administered medications for this visit. Assessment and plan:        - Atrial fibrillation/ flutter               [facility]              [facility]   Afib risk factors including age, HTN, obesity, inactivity and ROSETTA were discussed with patient. Risk factor modification recommended. All questions were answered. - Treatment options including cardioversion, rate control strategy, antiarrhythmics, anticoagulation and possible ablation were discussed with patient. Risks, benefits and alternative of each treatment options were explained. All questions answered   Discussion of doing an ablation for atrial flutter then doing the ablation for fib at a later time if still high burden     - cardiomyopathy              On medical therapy   EF on echo 40-45%              Discussed upgrade to biv PPM d/t high V-pacing causing cardiomyopathy  The risks, benefits and alternatives of the procedure were discussed with the patient.  The understanding and agreed with the plan. NOTE: This report was transcribed using voice recognition software. Every effort was made to ensure accuracy, however, inadvertent computerized transcription errors may be present. Ana Pedraza MD, [address] [facility]   Office: [phone]     Scribe attestation:  This note was scribed in the presence of Ana Pedraza MD by Raegan Krishnan RN    I, Dr. Ana Pedraza personally performed the services described in this documentation as scribed by RN in my presence, and it is both accurate and complete.

## 2020-01-15 ENCOUNTER — TELEPHONE (OUTPATIENT)
Dept: FAMILY MEDICINE CLINIC | Age: 70
End: 2020-01-15

## 2020-01-15 ENCOUNTER — OFFICE VISIT (OUTPATIENT)
Dept: CARDIOLOGY CLINIC | Age: 70
End: 2020-01-15
Payer: MEDICARE

## 2020-01-15 ENCOUNTER — NURSE ONLY (OUTPATIENT)
Dept: CARDIOLOGY CLINIC | Age: 70
End: 2020-01-15
Payer: MEDICARE

## 2020-01-15 VITALS
BODY MASS INDEX: 36.44 KG/M2 | HEART RATE: 66 BPM | WEIGHT: 269 LBS | HEIGHT: 72 IN | SYSTOLIC BLOOD PRESSURE: 108 MMHG | DIASTOLIC BLOOD PRESSURE: 74 MMHG

## 2020-01-15 PROCEDURE — G8417 CALC BMI ABV UP PARAM F/U: HCPCS | Performed by: INTERNAL MEDICINE

## 2020-01-15 PROCEDURE — G8484 FLU IMMUNIZE NO ADMIN: HCPCS | Performed by: INTERNAL MEDICINE

## 2020-01-15 PROCEDURE — 93280 PM DEVICE PROGR EVAL DUAL: CPT | Performed by: INTERNAL MEDICINE

## 2020-01-15 PROCEDURE — 4040F PNEUMOC VAC/ADMIN/RCVD: CPT | Performed by: INTERNAL MEDICINE

## 2020-01-15 PROCEDURE — 99215 OFFICE O/P EST HI 40 MIN: CPT | Performed by: INTERNAL MEDICINE

## 2020-01-15 PROCEDURE — 1123F ACP DISCUSS/DSCN MKR DOCD: CPT | Performed by: INTERNAL MEDICINE

## 2020-01-15 PROCEDURE — 1111F DSCHRG MED/CURRENT MED MERGE: CPT | Performed by: INTERNAL MEDICINE

## 2020-01-15 PROCEDURE — 1036F TOBACCO NON-USER: CPT | Performed by: INTERNAL MEDICINE

## 2020-01-15 PROCEDURE — G8427 DOCREV CUR MEDS BY ELIG CLIN: HCPCS | Performed by: INTERNAL MEDICINE

## 2020-01-15 PROCEDURE — 3017F COLORECTAL CA SCREEN DOC REV: CPT | Performed by: INTERNAL MEDICINE

## 2020-01-15 RX ORDER — SODIUM POLYSTYRENE SULFONATE 15 G/60ML
15 SUSPENSION ORAL; RECTAL ONCE
Qty: 1 BOTTLE | Refills: 0 | Status: SHIPPED | OUTPATIENT
Start: 2020-01-15 | End: 2020-01-21 | Stop reason: SDUPTHER

## 2020-01-15 NOTE — PATIENT INSTRUCTIONS
You are scheduled for a pacemaker upgrade    Our  will call you to discuss a date for you procedure. The Cath Lab will call you a week before your procedure. The night before your procedure you will need to scrub with Hibiclens wash. The day of your procedure you will need to check in at the registration desk, which is in the main lobby at [address]   1. You will need to fast for at least 8 hours prior to you procedure. 2.  You will need  to hold coumadin for 1 day before   3. You may take all other medications with a sip of water the morning of your procedure. 4.  You will be staying overnight. 5.  Please have a responsible adult to drive you home upon discharge. 6.  The discharging unit will be giving you discharge instructions. If you have any questions regarding your procedure itself or your medications, please call [phone] and ask to talk to an EP nurse. You will be seen in the office in 1 week for a wound check and then 3 months following implantation. ATRIAL FLUTTER ABLATION INFORMATION    You will be called by our  to discuss a date for your ablation. You will need to check in at the registration desk in the main lobby at [facility].        [id number] will be called with a time to arrive for you ablation. 1.  Do not eat or drink anything after midnight the night before your ablation. 2.  You will need to hold your coumadin for 1 day before   3. You may take all of your other medications with a sip of water. 4.  You will be staying overnight in CVU after you ablation. 5.  You will need to have a responsible adult to drive you home the next morning. 6.  CVU will provide you with discharge instructions. You will be scheduled for a 6-8 week follow up with cardiology. This will be done prior to your discharge instructions.     If you have any questions regarding the procedure itself or medications, please call [phone] and ask to speak to an EP nurse.

## 2020-01-20 DIAGNOSIS — E87.5 HYPERKALEMIA: ICD-10-CM

## 2020-01-20 LAB — POTASSIUM SERPL-SCNC: 6 MMOL/L (ref 3.5–5.1)

## 2020-01-20 PROCEDURE — 36415 COLL VENOUS BLD VENIPUNCTURE: CPT | Performed by: FAMILY MEDICINE

## 2020-01-21 RX ORDER — SODIUM POLYSTYRENE SULFONATE 15 G/60ML
15 SUSPENSION ORAL; RECTAL 2 TIMES DAILY
Qty: 2 BOTTLE | Refills: 0 | Status: SHIPPED | OUTPATIENT
Start: 2020-01-21 | End: 2020-01-23

## 2020-01-21 RX ORDER — ALLOPURINOL 300 MG/1
TABLET ORAL
Qty: 90 TABLET | Refills: 0 | Status: SHIPPED | OUTPATIENT
Start: 2020-01-21 | End: 2020-04-10

## 2020-01-22 DIAGNOSIS — E87.5 HYPERKALEMIA: ICD-10-CM

## 2020-01-22 LAB — POTASSIUM SERPL-SCNC: 5.7 MMOL/L (ref 3.5–5.1)

## 2020-01-23 ENCOUNTER — HOSPITAL ENCOUNTER (OUTPATIENT)
Age: 70
Discharge: HOME | End: 2020-01-23
Payer: MEDICARE

## 2020-01-23 ENCOUNTER — OFFICE VISIT (OUTPATIENT)
Dept: CARDIOLOGY CLINIC | Age: 70
End: 2020-01-23
Payer: MEDICARE

## 2020-01-23 ENCOUNTER — TELEPHONE (OUTPATIENT)
Dept: CARDIOLOGY CLINIC | Age: 70
End: 2020-01-23

## 2020-01-23 VITALS
DIASTOLIC BLOOD PRESSURE: 60 MMHG | BODY MASS INDEX: 36.35 KG/M2 | HEIGHT: 73 IN | SYSTOLIC BLOOD PRESSURE: 128 MMHG | OXYGEN SATURATION: 97 % | RESPIRATION RATE: 18 BRPM | WEIGHT: 274.3 LBS | HEART RATE: 70 BPM

## 2020-01-23 LAB
ANION GAP SERPL CALCULATED.3IONS-SCNC: 13 MMOL/L (ref 3–16)
BUN BLDV-MCNC: 68 MG/DL (ref 7–20)
CALCIUM SERPL-MCNC: 9.9 MG/DL (ref 8.3–10.6)
CHLORIDE BLD-SCNC: 105 MMOL/L (ref 99–110)
CO2: 19 MMOL/L (ref 21–32)
CREAT SERPL-MCNC: 1.5 MG/DL (ref 0.8–1.3)
GFR AFRICAN AMERICAN: 56
GFR NON-AFRICAN AMERICAN: 46
GLUCOSE BLD-MCNC: 110 MG/DL (ref 70–99)
POTASSIUM SERPL-SCNC: 5 MMOL/L (ref 3.5–5.1)
PRO-BNP: 384 PG/ML (ref 0–124)
SODIUM BLD-SCNC: 137 MMOL/L (ref 136–145)

## 2020-01-23 PROCEDURE — 1123F ACP DISCUSS/DSCN MKR DOCD: CPT | Performed by: CLINICAL NURSE SPECIALIST

## 2020-01-23 PROCEDURE — 80048 BASIC METABOLIC PNL TOTAL CA: CPT

## 2020-01-23 PROCEDURE — 83880 ASSAY OF NATRIURETIC PEPTIDE: CPT

## 2020-01-23 PROCEDURE — G8484 FLU IMMUNIZE NO ADMIN: HCPCS | Performed by: CLINICAL NURSE SPECIALIST

## 2020-01-23 PROCEDURE — 3017F COLORECTAL CA SCREEN DOC REV: CPT | Performed by: CLINICAL NURSE SPECIALIST

## 2020-01-23 PROCEDURE — 1036F TOBACCO NON-USER: CPT | Performed by: CLINICAL NURSE SPECIALIST

## 2020-01-23 PROCEDURE — G8427 DOCREV CUR MEDS BY ELIG CLIN: HCPCS | Performed by: CLINICAL NURSE SPECIALIST

## 2020-01-23 PROCEDURE — G8417 CALC BMI ABV UP PARAM F/U: HCPCS | Performed by: CLINICAL NURSE SPECIALIST

## 2020-01-23 PROCEDURE — 99214 OFFICE O/P EST MOD 30 MIN: CPT | Performed by: CLINICAL NURSE SPECIALIST

## 2020-01-23 PROCEDURE — 1111F DSCHRG MED/CURRENT MED MERGE: CPT | Performed by: CLINICAL NURSE SPECIALIST

## 2020-01-23 PROCEDURE — 36415 COLL VENOUS BLD VENIPUNCTURE: CPT

## 2020-01-23 PROCEDURE — 4040F PNEUMOC VAC/ADMIN/RCVD: CPT | Performed by: CLINICAL NURSE SPECIALIST

## 2020-01-23 RX ORDER — SODIUM POLYSTYRENE SULFONATE 15 G/60ML
15 SUSPENSION ORAL; RECTAL 2 TIMES DAILY
Qty: 2 BOTTLE | Refills: 0 | Status: SHIPPED | OUTPATIENT
Start: 2020-01-23 | End: 2020-02-27 | Stop reason: ALTCHOICE

## 2020-01-23 RX ORDER — LISINOPRIL 20 MG/1
20 TABLET ORAL DAILY
Qty: 30 TABLET | Status: SHIPPED | COMMUNITY
Start: 2020-01-23 | End: 2020-01-23 | Stop reason: SDUPTHER

## 2020-01-23 RX ORDER — FUROSEMIDE 40 MG/1
20 TABLET ORAL DAILY
Qty: 30 TABLET | Refills: 0
Start: 2020-01-23 | End: 2020-03-20 | Stop reason: SDUPTHER

## 2020-01-23 RX ORDER — LISINOPRIL 20 MG/1
20 TABLET ORAL DAILY
Qty: 30 TABLET | Refills: 1 | Status: SHIPPED | OUTPATIENT
Start: 2020-01-23 | End: 2020-02-27 | Stop reason: SDUPTHER

## 2020-01-23 NOTE — PROGRESS NOTES
[address]  Progress Note    Primary Care Doctor:  Sheryl Kraus MD    Chief Complaint   Patient presents with    Congestive Heart Failure     No complaints         History of Present Illness:  77 y.o. 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 He follows with Dr Clary Chanel with [facility]. PAF, COPD, ROSETTA, AV block and pacemaker. He follows with Dr Jacqueline Escamilla     I had the pleasure of seeing Kiera Amado in follow up for hospitalization 12/30-1/2/2020 for worsening heart failure, sob and leg swelling. He was found to AFlutter and CV 1/2/20. He was following with Dr Clary Chanel with [facility]. He is to have an ablation and BiV upgrade (awaiting scheduling). His weight has gone from 290->266. He feels much improved no leg swelling, chest pain or sob. probnp 2125->1391. He has been having issues with elevated potassium which PCP has been treating with kayexalate    Past Medical History:   has a past medical history of Acute congestive heart failure ([address] Allergic rhinitis, Atrial fibrillation ([address] CKD (chronic kidney disease), Class 2 obesity due to excess calories without serious comorbidity with body mass index (BMI) of 37.0 to 37.9 in adult, Controlled type 2 diabetes mellitus without complication, without long-term current use of insulin ([address] COPD, Gout, Hyperlipidemia, mixed, Hypertension, Long term current use of anticoagulants with INR goal of 2.0-3.0, Obstructive sleep apnea syndrome, Parkinson disease ([address] Prediabetes, Primary insomnia, Sleep apnea, and Stage 2 chronic kidney disease. Surgical History:   has a past surgical history that includes Aortic valve replacement (10/1996:[facility] [employer]) and Pacemaker insertion (1996). Social History:   reports that he quit smoking about 7 months ago. His smoking use included cigarettes. He has a 30.00 pack-year smoking history.  He has never used smokeless tobacco. He reports current alcohol use. He reports that he does not use drugs. Family History:   Family History   Problem Relation Age of Onset    Asthma Mother     Cancer Father     No Known Problems Sister     No Known Problems Brother     No Known Problems Maternal Grandmother     No Known Problems Maternal Grandfather     No Known Problems Paternal Grandmother     No Known Problems Paternal Grandfather        Home Medications:  Prior to Admission medications    Medication Sig Start Date End Date Taking? Authorizing Provider   lisinopril (PRINIVIL;ZESTRIL) 20 MG tablet Take 1 tablet by mouth daily 1/23/20  Yes MEENU Warren - CNS   furosemide (LASIX) 40 MG tablet Take 0.5 tablets by mouth daily 1/23/20  Yes MEENU Warren - CNS   allopurinol (ZYLOPRIM) 300 MG tablet TAKE 1 TABLET EVERY DAY 1/21/20  Yes Jairon Reza MD   tiotropium (SPIRIVA RESPIMAT) 2.5 MCG/ACT AERS inhaler Inhale 2 puffs into the lungs daily 1/3/20 2/2/20 Yes Charly Yuen MD   montelukast (SINGULAIR) 10 MG tablet Take 1 tablet by mouth every evening 1/2/20 2/1/20 Yes Charly Yuen MD   pravastatin (PRAVACHOL) 80 MG tablet TAKE 1 TABLET EVERY DAY FOR CHOLESTEROL 12/2/19  Yes Jairon eRza MD   metFORMIN (GLUCOPHAGE XR) 500 MG extended release tablet Take 4 tablets by mouth daily (with breakfast)  Patient taking differently: Take 1,000 mg by mouth 2 times daily (with meals)  9/9/19  Yes Jairon Reza MD   ACCU-CHEK COMPACT PLUS strip USE TO TEST 2 TIMES DAILY 12/27/18  Yes Jairon Reza MD   fluticasone-vilanterol (BREO ELLIPTA) 100-25 MCG/INH AEPB inhaler Inhale 1 puff into the lungs daily 11/5/18  Yes Benny Salas MD   albuterol sulfate HFA (VENTOLIN HFA) 108 (90 Base) MCG/ACT inhaler Inhale 2 puffs into the lungs every 6 hours as needed for Wheezing 10/5/18  Yes Benny Salas MD   glucose monitoring kit (FREESTYLE) monitoring kit 1 each by Does not apply route once for 1 dose Glucometer(patient's choice).  BID Adult   Pulse: 70   Resp: 18   SpO2: 97%   Weight: 274 lb 4.8 oz (124.4 kg)   Height: 6' 1\" (1.854 m)        Constitutional and General Appearance: Warm and dry, no apparent distress, normal coloration  HEENT:  Normocephalic, atraumatic  Respiratory:  · Normal excursion and expansion without use of accessory muscles  · Resp Auscultation: Normal breath sounds without dullness  Cardiovascular:  · The apical impulses not displaced  · Heart tones are crisp and normal  · JVP normal cm H2O  · Regular rate and rhythm  · Peripheral pulses are symmetrical and full  · There is no clubbing, cyanosis of the extremities.   · no edema  · Pedal Pulses: 2+ and equal   Abdomen:  · No masses or tenderness  · Liver/Spleen: No Abnormalities Noted  Neurological/Psychiatric:  · Alert and oriented in all spheres  · Moves all extremities well  · Exhibits normal gait balance and coordination  · No abnormalities of mood, affect, memory, mentation, or behavior are noted    Lab Data:    CBC:   Lab Results   Component Value Date    WBC 6.7 12/31/2019    WBC 6.7 12/30/2019    WBC 8.1 09/11/2018    RBC 4.23 12/31/2019    RBC 4.30 12/30/2019    RBC 4.10 09/11/2018    HGB 12.9 12/31/2019    HGB 13.4 12/30/2019    HGB 13.1 09/11/2018    HCT 39.8 12/31/2019    HCT 40.2 12/30/2019    HCT 38.6 09/11/2018    MCV 94.1 12/31/2019    MCV 93.5 12/30/2019    MCV 94.2 09/11/2018    RDW 15.2 12/31/2019    RDW 15.2 12/30/2019    RDW 14.6 09/11/2018     12/31/2019     12/30/2019     09/11/2018     BMP:  Lab Results   Component Value Date     01/23/2020     01/07/2020     01/07/2020    K 5.0 01/23/2020    K 5.7 01/22/2020    K 6.0 01/20/2020     01/23/2020    CL 97 01/07/2020     01/07/2020    CO2 19 01/23/2020    CO2 22 01/07/2020    CO2 25 01/07/2020    PHOS 4.2 01/07/2020    PHOS 3.8 10/14/2019    PHOS 3.9 06/24/2019    BUN 68 01/23/2020    BUN 47 01/07/2020    BUN 47 01/07/2020    CREATININE 1.5 01/23/2020

## 2020-01-28 DIAGNOSIS — I50.22 CHRONIC SYSTOLIC CHF (CONGESTIVE HEART FAILURE), NYHA CLASS 2 (HCC): ICD-10-CM

## 2020-01-28 LAB
ANION GAP SERPL CALCULATED.3IONS-SCNC: 12 MMOL/L (ref 3–16)
BUN BLDV-MCNC: 33 MG/DL (ref 7–20)
CALCIUM SERPL-MCNC: 9.6 MG/DL (ref 8.3–10.6)
CHLORIDE BLD-SCNC: 104 MMOL/L (ref 99–110)
CO2: 24 MMOL/L (ref 21–32)
CREAT SERPL-MCNC: 1.3 MG/DL (ref 0.8–1.3)
GFR AFRICAN AMERICAN: >60
GFR NON-AFRICAN AMERICAN: 55
GLUCOSE BLD-MCNC: 98 MG/DL (ref 70–99)
POTASSIUM SERPL-SCNC: 4.8 MMOL/L (ref 3.5–5.1)
PRO-BNP: 504 PG/ML (ref 0–124)
SODIUM BLD-SCNC: 140 MMOL/L (ref 136–145)

## 2020-01-29 ENCOUNTER — TELEPHONE (OUTPATIENT)
Dept: CARDIOLOGY CLINIC | Age: 70
End: 2020-01-29

## 2020-01-29 NOTE — TELEPHONE ENCOUNTER
Called and spoke with patient and informed him of message below. He states that his blood pressure is doing better. It's been running around 120-130's.

## 2020-01-29 NOTE — TELEPHONE ENCOUNTER
----- Message from MEENU Coreas - CNS sent at 1/29/2020  8:31 AM EST -----  Labs are good  Potassium is normal  Ask him how his BP is as I cut his lisinopril down a little  thanks

## 2020-01-30 ENCOUNTER — TELEPHONE (OUTPATIENT)
Dept: PHARMACY | Age: 70
End: 2020-01-30

## 2020-01-30 NOTE — TELEPHONE ENCOUNTER
Received signed referral from Dr Sean Irving . Called pt to schedule initial appt , was unable to leave message v/m has not been set up yet.

## 2020-01-30 NOTE — TELEPHONE ENCOUNTER
Pt called back. Explained CC and stated received signed referral to come to clinic from Dr. Scott Bruner. Pt states that he has been on warfarin for few years and was previously managed by other cardiologist, Dr. Neeru Horne. States that since he has switched cardiologists, will be establishing care with clinic. Explained how clinic is different than lab draw for MD office. Scheduled pt initial appt for wed 2/5 at 1 pm. Asked pt to arrive 15-20 min early in order to register. Explained where clinic was located. Asked to bring medication list. Pt verified understanding.

## 2020-01-31 ENCOUNTER — TELEPHONE (OUTPATIENT)
Dept: PHARMACY | Age: 70
End: 2020-01-31

## 2020-02-05 ENCOUNTER — TELEPHONE (OUTPATIENT)
Dept: CARDIOLOGY CLINIC | Age: 70
End: 2020-02-05

## 2020-02-05 ENCOUNTER — ANTI-COAG VISIT (OUTPATIENT)
Dept: PHARMACY | Age: 70
End: 2020-02-05
Payer: MEDICARE

## 2020-02-05 LAB — INTERNATIONAL NORMALIZATION RATIO, POC: 1.8

## 2020-02-05 PROCEDURE — 85610 PROTHROMBIN TIME: CPT

## 2020-02-05 PROCEDURE — 99213 OFFICE O/P EST LOW 20 MIN: CPT

## 2020-02-05 PROCEDURE — 99212 OFFICE O/P EST SF 10 MIN: CPT

## 2020-02-05 NOTE — PROGRESS NOTES
Mr. Jennyfer Root is a 77 y.o. y/o male with history of Atrial Fibrillation who presents today for anticoagulation monitoring and adjustment. Pt is retired and works part time at [email or web address]. Patient reports he has been on warfarin for almost 30 years now and was managed by his cardiologist who recently retired. Pt was then managed by Dr. Louana Skiff prior to being established in Clinic. He is now enrolled in the clinic under Dr. Gorge Castillo. He states he has been taking   15 mg every Sun, Tue and Fri; and 10 mg all other days. Pertinent PMH: HTN, DM, HLD, COPD, CHF    Patient Reported Findings:  Yes     No  [x]   []       Patient verifies current dosing regimen as listed  []   [x]       S/S bleeding/bruising/swelling/SOB - reports historic occasional nose bleeds - last nose bleed was on Sunday / monday. Reports it took a few hours to resolve with manual compression. States he is going follow up with PCP if continues. []   [x]       Blood in urine or stool  []   [x]       Procedures scheduled in the future at this time -  Pacemaker replacement and ablation scheduled for March 23rd. Holding warfarin 2 days prior. []   [x]       Missed Dose - held warfarin dose on Sunday evening d/t nose bleed. []   [x]       Extra Dose  []   [x]       Change in medications  []   [x]       Change in health/diet/appetite - reports he eats about 2 meals/day. And states he will have salads about 1-2x/wk. (not a big fan of spinach or brocolli but may have other greens)  []   [x]       Change in alcohol use - reports occasionally drinking beer (once every few months) but happens very infrequently.   []   [x]       Change in activity  []   [x]       Hospital admission  []   [x]       Emergency department visit  []   [x]       Other complaints     Clinical Outcomes:  Yes     No  []   [x]       Major bleeding event  []   [x]       Thromboembolic event    Duration of warfarin Therapy: Indefinite   INR Range:  2.0-3.0       INR 1.8 today - d/t held dose on Sunday evening. .  Patient had a prolonged nose bleed on Sunday evening and also a slight nose bleed on Monday. He states he has had no other bleeds and denies other s/s of bleed. As such will not boost dose tonight. Counseled him to go to ER / Follow up with PCP if nose bleeds persist.  Continue with same weekly dose of 15 mg every Sun, Tue and Fri; and 10 mg all other days    RTC in 2 weeks on 2/19 - Patient works part time at [employer] and can only make appointments between 1:00pm and 1:45 pm. Could not bring him in to clinic sooner due to time constraint. Earliest day that fit his schedule is 2/19    Counseled and re-educated patient on warfarin. Provided welcome packet including warfarin handout. Patient had no further questions. Encouraged patient to maintain a consistent diet.     Referring physician is Dr. Carlos Doran    INR (no units)   Date Value   02/05/2020 1.8   01/20/2020 3.94 (H)   01/07/2020 1.42 (H)   01/02/2020 2.24 (H)   01/01/2020 2.05 (H)

## 2020-02-05 NOTE — TELEPHONE ENCOUNTER
Pt asking how long he will need be off after ablation and asking what day to stop coumadin. Please call to advise.

## 2020-02-17 ENCOUNTER — OFFICE VISIT (OUTPATIENT)
Dept: FAMILY MEDICINE CLINIC | Age: 70
End: 2020-02-17
Payer: MEDICARE

## 2020-02-17 VITALS
HEART RATE: 82 BPM | WEIGHT: 278.2 LBS | SYSTOLIC BLOOD PRESSURE: 132 MMHG | HEIGHT: 73 IN | TEMPERATURE: 98.1 F | DIASTOLIC BLOOD PRESSURE: 77 MMHG | OXYGEN SATURATION: 94 % | RESPIRATION RATE: 16 BRPM | BODY MASS INDEX: 36.87 KG/M2

## 2020-02-17 PROCEDURE — G8427 DOCREV CUR MEDS BY ELIG CLIN: HCPCS | Performed by: FAMILY MEDICINE

## 2020-02-17 PROCEDURE — G8417 CALC BMI ABV UP PARAM F/U: HCPCS | Performed by: FAMILY MEDICINE

## 2020-02-17 PROCEDURE — G8926 SPIRO NO PERF OR DOC: HCPCS | Performed by: FAMILY MEDICINE

## 2020-02-17 PROCEDURE — 1036F TOBACCO NON-USER: CPT | Performed by: FAMILY MEDICINE

## 2020-02-17 PROCEDURE — 99214 OFFICE O/P EST MOD 30 MIN: CPT | Performed by: FAMILY MEDICINE

## 2020-02-17 PROCEDURE — 3023F SPIROM DOC REV: CPT | Performed by: FAMILY MEDICINE

## 2020-02-17 PROCEDURE — 4040F PNEUMOC VAC/ADMIN/RCVD: CPT | Performed by: FAMILY MEDICINE

## 2020-02-17 PROCEDURE — G8484 FLU IMMUNIZE NO ADMIN: HCPCS | Performed by: FAMILY MEDICINE

## 2020-02-17 PROCEDURE — 1123F ACP DISCUSS/DSCN MKR DOCD: CPT | Performed by: FAMILY MEDICINE

## 2020-02-17 PROCEDURE — 2022F DILAT RTA XM EVC RTNOPTHY: CPT | Performed by: FAMILY MEDICINE

## 2020-02-17 PROCEDURE — 3017F COLORECTAL CA SCREEN DOC REV: CPT | Performed by: FAMILY MEDICINE

## 2020-02-17 PROCEDURE — 3044F HG A1C LEVEL LT 7.0%: CPT | Performed by: FAMILY MEDICINE

## 2020-02-17 RX ORDER — LISINOPRIL 30 MG/1
30 TABLET ORAL DAILY
Status: ON HOLD | COMMUNITY
Start: 2019-12-19 | End: 2020-03-24 | Stop reason: HOSPADM

## 2020-02-17 ASSESSMENT — PATIENT HEALTH QUESTIONNAIRE - PHQ9
SUM OF ALL RESPONSES TO PHQ QUESTIONS 1-9: 0
SUM OF ALL RESPONSES TO PHQ QUESTIONS 1-9: 0
SUM OF ALL RESPONSES TO PHQ9 QUESTIONS 1 & 2: 0
1. LITTLE INTEREST OR PLEASURE IN DOING THINGS: 0
2. FEELING DOWN, DEPRESSED OR HOPELESS: 0

## 2020-02-17 ASSESSMENT — ENCOUNTER SYMPTOMS
APNEA: 0
SHORTNESS OF BREATH: 0
WHEEZING: 0
ANAL BLEEDING: 0
VOMITING: 0
STRIDOR: 0
CONSTIPATION: 0
RECTAL PAIN: 0
ABDOMINAL DISTENTION: 0
CHOKING: 0
COLOR CHANGE: 0
BLOOD IN STOOL: 0
ABDOMINAL PAIN: 0
CHEST TIGHTNESS: 0
NAUSEA: 0
DIARRHEA: 0
COUGH: 0

## 2020-02-17 NOTE — PROGRESS NOTES
Subjective:      Patient ID: Simran Clement is a 77 y.o. male. Results for Roosevelt Terry \"BILL\" (MRN [id number]) as of 2/17/2020 11:15   Ref. Range 1/7/2020 10:22   Sodium Latest Ref Range: 136 - 145 mmol/L 140   Potassium Latest Ref Range: 3.5 - 5.1 mmol/L 5.8 (H)   Chloride Latest Ref Range: 99 - 110 mmol/L 100   CO2 Latest Ref Range: 21 - 32 mmol/L 25   BUN Latest Ref Range: 7 - 20 mg/dL 47 (H)   Creatinine Latest Ref Range: 0.8 - 1.3 mg/dL 1.7 (H)   Anion Gap Latest Ref Range: 3 - 16  15   GFR Non- Latest Ref Range: >60  40 (A)   GFR  Latest Ref Range: >60  49 (A)   Glucose Latest Ref Range: 70 - 99 mg/dL 133 (H)   Calcium Latest Ref Range: 8.3 - 10.6 mg/dL 10.3   Total Protein Latest Ref Range: 6.4 - 8.2 g/dL 8.0   Cholesterol, Total Latest Ref Range: 0 - 199 mg/dL 152   HDL Cholesterol Latest Ref Range: 40 - 60 mg/dL 39 (L)   LDL Calculated Latest Ref Range: <100 mg/dL 88   Triglycerides Latest Ref Range: 0 - 150 mg/dL 127   VLDL Cholesterol Calculated Latest Ref Range: Not Established mg/dL 25   Albumin Latest Ref Range: 3.4 - 5.0 g/dL 4.8   Globulin Latest Units: g/dL 3.2   Albumin/Globulin Ratio Latest Ref Range: 1.1 - 2.2  1.5   Alk Phos Latest Ref Range: 40 - 129 U/L 125   ALT Latest Ref Range: 10 - 40 U/L 27   AST Latest Ref Range: 15 - 37 U/L 26   Bilirubin Latest Ref Range: 0.0 - 1.0 mg/dL 0.8   Hemoglobin A1C Latest Ref Range: See comment % 6.3   eAG (mg/dL) Latest Units: mg/dL 134.1   Creatinine, Ur Latest Ref Range: 39.0 - 259.0 mg/dL 99.7   Microalbumin Creatinine Ratio Latest Ref Range: 0.0 - 30.0 mg/g 14.0   Microalbumin, Random Urine Latest Ref Range: <2.0 mg/dL 1.40       1/28/20 K+=4.8. Seen by cardiology on 1/23/20:office note via EPIC reviewed today. Diabetes check:doing well. Takes 2000mg metformin w/o side effects. Preprandial-fasting BS=90-120s. 2hr postprandial BS:130-150s. HBA1c: 6.6 on 2/13/17. 6.3 on 3/31/17. 6.4 on 11/18/17. 7.1 on 3/22/18. 7.2 on 8/30/18. 7.3 on 12/3/18. 7.4 on 2/7/19.  7.0 on 5/3/19. 6.3 on 8/15/19. 6.3 on 1/7/20. ACEI/ARB:Yes  Checks feet daily:yes. Diabetes eye check appt current(within 1year):yes. Improving factor:diet & exercise regime. Episodes of hypoglycemia:None reported. ASA:Yes. LDL <100:no. 113 on 3/31/17. 87 on 6/7/17. 83 on 9/15/17. 69 om 12/15/17. 71 on 3/22/18. 82 on 8/30/18. 67 on 2/7/19. 89 on 5/4/19. 69 on 8/15/19. 68 on 11/4/19. 88 on 1/7/20. No other associated or worsening factors. Denies polyuria/polyphagia/polydipsia/BLE skin lesions/BLE paresthesia. HTN f/u:is doing well. Taking medication w/o side effects. Associated w/nothing new. Worsened by nothing else new. Improves with his medications & low salt diet. Adds salt to food at the table:No.  Denies cp/sob/pnd/ankle edema/dizziness. CKD:Seen by nephro on 10/15/19:.  Reports doing well. Denies urinary complaints/abdo pain/mmnzjqc-cwhelopqz-jpwyejp/decreased urinary flow/sensation of incomplete voiding/excessive NSAIDs use. Moderate insomnia: doing well. Sleeps around 6-8hrs nightly.   Associated w/nothing new. Worsened(aggravated) by nothing else new. Improving factor:taking med prn. Denies snoring/hallucinations/depression/SI/HI. COPD check:breathing well;using breo & spiriva w/o any side effects. No other associated concerns. Albuterol inhaler use is approx 1xday.    Jaimecharlettene Hurl well. See labs above. Taking statin w/o side effects. Associated w/nothing new. Improving factors:medication & will address diet to improve TG. Worsening factors:nothing new. Denies adbo pain/myalgias.      ROSETTA:managed sleep specialist.  Seen by specialist on 10/7/19. Gout of both feet: reports doing well. No recent recurrences reported. Taking allopurinol w/o side effects. Last gout episode >5yrs ago. No other new associated concerns. Denies bilateral foot skin lesions/foot vpaxkdhcgzr-gieclxfq-zmewrcekr/pus.      No

## 2020-02-19 ENCOUNTER — ANTI-COAG VISIT (OUTPATIENT)
Dept: PHARMACY | Age: 70
End: 2020-02-19
Payer: MEDICARE

## 2020-02-19 LAB — INTERNATIONAL NORMALIZATION RATIO, POC: 3.3

## 2020-02-19 PROCEDURE — 99211 OFF/OP EST MAY X REQ PHY/QHP: CPT

## 2020-02-19 PROCEDURE — 85610 PROTHROMBIN TIME: CPT

## 2020-02-27 ENCOUNTER — HOSPITAL ENCOUNTER (OUTPATIENT)
Age: 70
Discharge: HOME | End: 2020-02-27
Payer: MEDICARE

## 2020-02-27 ENCOUNTER — OFFICE VISIT (OUTPATIENT)
Dept: CARDIOLOGY CLINIC | Age: 70
End: 2020-02-27
Payer: MEDICARE

## 2020-02-27 VITALS
HEIGHT: 73 IN | DIASTOLIC BLOOD PRESSURE: 68 MMHG | SYSTOLIC BLOOD PRESSURE: 122 MMHG | OXYGEN SATURATION: 97 % | BODY MASS INDEX: 37.24 KG/M2 | HEART RATE: 72 BPM | WEIGHT: 281 LBS

## 2020-02-27 LAB
ANION GAP SERPL CALCULATED.3IONS-SCNC: 13 MMOL/L (ref 3–16)
BUN BLDV-MCNC: 29 MG/DL (ref 7–20)
CALCIUM SERPL-MCNC: 9.7 MG/DL (ref 8.3–10.6)
CHLORIDE BLD-SCNC: 104 MMOL/L (ref 99–110)
CO2: 23 MMOL/L (ref 21–32)
CREAT SERPL-MCNC: 1.2 MG/DL (ref 0.8–1.3)
GFR AFRICAN AMERICAN: >60
GFR NON-AFRICAN AMERICAN: >60
GLUCOSE BLD-MCNC: 122 MG/DL (ref 70–99)
POTASSIUM SERPL-SCNC: 4.6 MMOL/L (ref 3.5–5.1)
PRO-BNP: 560 PG/ML (ref 0–124)
SODIUM BLD-SCNC: 140 MMOL/L (ref 136–145)

## 2020-02-27 PROCEDURE — 80048 BASIC METABOLIC PNL TOTAL CA: CPT

## 2020-02-27 PROCEDURE — 4040F PNEUMOC VAC/ADMIN/RCVD: CPT | Performed by: CLINICAL NURSE SPECIALIST

## 2020-02-27 PROCEDURE — G8484 FLU IMMUNIZE NO ADMIN: HCPCS | Performed by: CLINICAL NURSE SPECIALIST

## 2020-02-27 PROCEDURE — G8417 CALC BMI ABV UP PARAM F/U: HCPCS | Performed by: CLINICAL NURSE SPECIALIST

## 2020-02-27 PROCEDURE — 83880 ASSAY OF NATRIURETIC PEPTIDE: CPT

## 2020-02-27 PROCEDURE — 99214 OFFICE O/P EST MOD 30 MIN: CPT | Performed by: CLINICAL NURSE SPECIALIST

## 2020-02-27 PROCEDURE — 3017F COLORECTAL CA SCREEN DOC REV: CPT | Performed by: CLINICAL NURSE SPECIALIST

## 2020-02-27 PROCEDURE — G8427 DOCREV CUR MEDS BY ELIG CLIN: HCPCS | Performed by: CLINICAL NURSE SPECIALIST

## 2020-02-27 PROCEDURE — 1123F ACP DISCUSS/DSCN MKR DOCD: CPT | Performed by: CLINICAL NURSE SPECIALIST

## 2020-02-27 PROCEDURE — 36415 COLL VENOUS BLD VENIPUNCTURE: CPT

## 2020-02-27 PROCEDURE — 1036F TOBACCO NON-USER: CPT | Performed by: CLINICAL NURSE SPECIALIST

## 2020-02-27 RX ORDER — LISINOPRIL 20 MG/1
20 TABLET ORAL DAILY
Qty: 90 TABLET | Refills: 1 | Status: SHIPPED | OUTPATIENT
Start: 2020-02-27 | End: 2020-05-15 | Stop reason: SDUPTHER

## 2020-02-27 NOTE — PROGRESS NOTES
reports current alcohol use. He reports that he does not use drugs. Family History:   Family History   Problem Relation Age of Onset    Asthma Mother     Cancer Father     No Known Problems Sister     No Known Problems Brother     No Known Problems Maternal Grandmother     No Known Problems Maternal Grandfather     No Known Problems Paternal Grandmother     No Known Problems Paternal Grandfather        Home Medications:  Prior to Admission medications    Medication Sig Start Date End Date Taking? Authorizing Provider   lisinopril (PRINIVIL;ZESTRIL) 20 MG tablet Take 1 tablet by mouth daily 2/27/20  Yes MEENU Osullivan   lisinopril (PRINIVIL;ZESTRIL) 30 MG tablet Take 30 mg by mouth daily 12/19/19  Yes Historical Provider, MD   furosemide (LASIX) 40 MG tablet Take 0.5 tablets by mouth daily 1/23/20  Yes MEENU Arizmendi   allopurinol (ZYLOPRIM) 300 MG tablet TAKE 1 TABLET EVERY DAY 1/21/20  Yes Inez Cain MD   pravastatin (PRAVACHOL) 80 MG tablet TAKE 1 TABLET EVERY DAY FOR CHOLESTEROL 12/2/19  Yes Inez Cain MD   metFORMIN (GLUCOPHAGE XR) 500 MG extended release tablet Take 4 tablets by mouth daily (with breakfast)  Patient taking differently: Take 1,000 mg by mouth 2 times daily (with meals)  9/9/19  Yes Inez Cain MD   ACCU-CHEK COMPACT PLUS strip USE TO TEST 2 TIMES DAILY 12/27/18  Yes Inez Cain MD   Lancets MISC BID testing 2/24/17  Yes Inez Cain MD   warfarin (COUMADIN) 10 MG tablet Take 10 mg by mouth daily Pt takes 15 mg on Tuesdays, 10 mg every other day of the week. Yes Historical Provider, MD   sotalol (BETAPACE) 80 MG tablet Take 80 mg by mouth 2 times daily. Yes Historical Provider, MD   aspirin 81 MG chewable tablet Take 81 mg by mouth daily.    Yes Historical Provider, MD   tiotropium (SPIRIVA RESPIMAT) 2.5 MCG/ACT AERS inhaler Inhale 2 puffs into the lungs daily 1/3/20 2/17/20  April Hall MD   montelukast (SINGULAIR) 10 MG tablet Upper Arm   Position: Sitting   Cuff Size: Medium Adult   Pulse: 72   SpO2: 97%   Weight: 281 lb (127.5 kg)   Height: 6' 1\" (1.854 m)        Constitutional and General Appearance: Warm and dry, no apparent distress, normal coloration  HEENT:  Normocephalic, atraumatic  Respiratory:  · Normal excursion and expansion without use of accessory muscles  · Resp Auscultation: Normal breath sounds without dullness  Cardiovascular:  · The apical impulses not displaced  · Heart tones are crisp and normal  · JVP normal cm H2O  · Regular rate and rhythm  · Peripheral pulses are symmetrical and full  · There is no clubbing, cyanosis of the extremities.   · no edema  · Pedal Pulses: 2+ and equal   Abdomen:  · No masses or tenderness  · Liver/Spleen: No Abnormalities Noted  Neurological/Psychiatric:  · Alert and oriented in all spheres  · Moves all extremities well  · Exhibits normal gait balance and coordination  · No abnormalities of mood, affect, memory, mentation, or behavior are noted    Lab Data:    CBC:   Lab Results   Component Value Date    WBC 6.7 12/31/2019    WBC 6.7 12/30/2019    WBC 8.1 09/11/2018    RBC 4.23 12/31/2019    RBC 4.30 12/30/2019    RBC 4.10 09/11/2018    HGB 12.9 12/31/2019    HGB 13.4 12/30/2019    HGB 13.1 09/11/2018    HCT 39.8 12/31/2019    HCT 40.2 12/30/2019    HCT 38.6 09/11/2018    MCV 94.1 12/31/2019    MCV 93.5 12/30/2019    MCV 94.2 09/11/2018    RDW 15.2 12/31/2019    RDW 15.2 12/30/2019    RDW 14.6 09/11/2018     12/31/2019     12/30/2019     09/11/2018     BMP:  Lab Results   Component Value Date     01/28/2020     01/23/2020     01/07/2020    K 4.8 01/28/2020    K 5.0 01/23/2020    K 5.7 01/22/2020     01/28/2020     01/23/2020    CL 97 01/07/2020    CO2 24 01/28/2020    CO2 19 01/23/2020    CO2 22 01/07/2020    PHOS 4.2 01/07/2020    PHOS 3.8 10/14/2019    PHOS 3.9 06/24/2019    BUN 33 01/28/2020    BUN 68 01/23/2020    BUN 47 01/07/2020 CREATININE 1.3 01/28/2020    CREATININE 1.5 01/23/2020    CREATININE 1.6 01/07/2020     BNP:   Lab Results   Component Value Date    PROBNP 504 01/28/2020    PROBNP 384 01/23/2020    PROBNP 1,391 01/01/2020     Cardiac Imaging:  Echo: 12/31/19   Summary   -Global hypokinesis with EF 40-45%. -Abnormal septal motion.   -The aortic root and the ascending aorta are mildly dilated. -The right ventricle appears to be dilated. -RV systolic function appears to be reduced.   -The right atrium appears to be dilated. -The mechanical artificial aortic valve appears well seated with a maximum   velocity of 2.40 m/s and a mean gradient of 12 mmHg. The aortic valve area   is estimated at 1.19 cm^2. No significant regurgitation noted. -Thickened mitral valve without evidence of stenosis. There is   mild-to-moderate mitral regurgitation.   -There is moderate tricuspid regurgitation with a RVSP estimation of 43   mmHg.   -Pacer / ICD wire is visualized in the right heart.   -Indeterminate diastolic function. Assessment:    1. Chronic systolic CHF (congestive heart failure), NYHA class 2 (HCC) on ace and bb; no aldosterone antagonist due to hyperkalemia   2. ROSETTA (obstructive sleep apnea) on bipap   3. AV block    4. S/P AVR on coumadin   5. Hyperkalemia        Plan:   Patient Instructions   1. Check blood work today  2. Refilled lisinopril today  3. Continue all current medications  4. RTO in April    I appreciate the opportunity of cooperating in the care of this individual.    JANICE Morgan, 2/27/2020, 8:44 AM    QUALITY MEASURES  1. Tobacco Cessation Counseling: NA  2. Retake of BP if >140/90:   NA  3. Documentation to PCP/referring for new patient:  Sent to PCP at close of office visit  4. CAD patient on anti-platelet: NA  5. CAD patient on STATIN therapy:  Yes  6.  Patient with CHF and aFib on anticoagulation:  Yes

## 2020-02-27 NOTE — PATIENT INSTRUCTIONS
1. Check blood work today  2. Refilled lisinopril today  3. Continue all current medications  4.   RTO in April

## 2020-03-04 ENCOUNTER — ANTI-COAG VISIT (OUTPATIENT)
Dept: PHARMACY | Age: 70
End: 2020-03-04
Payer: MEDICARE

## 2020-03-04 LAB — INTERNATIONAL NORMALIZATION RATIO, POC: 2.4

## 2020-03-04 PROCEDURE — 99211 OFF/OP EST MAY X REQ PHY/QHP: CPT

## 2020-03-04 PROCEDURE — 85610 PROTHROMBIN TIME: CPT

## 2020-03-04 RX ORDER — PRAVASTATIN SODIUM 80 MG/1
TABLET ORAL
Qty: 90 TABLET | Refills: 0 | Status: SHIPPED | OUTPATIENT
Start: 2020-03-04 | End: 2020-06-04

## 2020-03-04 NOTE — PROGRESS NOTES
Mr. Maria Eugenia Beach is a 77 y.o. y/o male with history of Atrial Fibrillation who presents today for anticoagulation monitoring and adjustment. Pt is retired and works part time at [employer]. Patient reports he has been on warfarin for almost 30 years now and was managed by his cardiologist who recently retired. Pt was then managed by Dr. Ronda Camacho prior to being established in Clinic. He is now enrolled in the clinic under Dr. Cyril oMsqueda. He states he has been taking   15 mg every Sun, Tue and Fri; and 10 mg all other days. Pertinent PMH: HTN, DM, HLD, COPD, CHF    Patient Reported Findings:  Yes     No  [x]   []       Patient verifies current dosing regimen as listed  []   [x]       S/S bleeding/bruising/swelling/SOB -denies    []   [x]       Blood in urine or stool- denies  []   [x]       Procedures scheduled in the future at this time -  Pacemaker replacement and ablation scheduled for March 23rd. Holding warfarin 2 days prior. --> Patient told now to only hold day of, he will double check with cardiology---> March 23 will have to hold for 2 days (21/22 hold)  []   [x]       Missed Dose-  []   [x]       Extra Dose - denies  []   [x]       Change in medications- denies  []   [x]       Change in health/diet/appetite - reports he eats about 2 meals/day. And states he will have salads about 1-2x/wk. (not a big fan of spinach or brocolli but may have other greens)--> Patient states 3 salads in past 2 weeks---> states no changes, no NVD   []   [x]       Change in alcohol use - reports occasionally drinking beer (once every few months) but happens very infrequently. --> Patient reports no alcohol in past two weeks  []   [x]       Change in activity  []   [x]       Hospital admission  []   [x]       Emergency department visit  []   [x]       Other complaints     Clinical Outcomes:  Yes     No  []   [x]       Major bleeding event  []   [x]       Thromboembolic event    Duration of warfarin Therapy: Indefinite   INR Range:  2.0-3.0     INR 2.4 today   Continue with same weekly dose of 15 mg Sun, Tue and Fri; and 10 mg all other days. Encouraged patient to maintain a consistent diet. Procedure 3/23 has to hold 2 days.   RTC in 2 weeks on 3/18    Referring physician is Dr. Carlos Doran  INR (no units)   Date Value   03/04/2020 2.4   02/19/2020 3.3   02/05/2020 1.8   01/20/2020 3.94 (H)   01/07/2020 1.42 (H)   01/02/2020 2.24 (H)   01/01/2020 2.05 (H)

## 2020-03-18 ENCOUNTER — ANTI-COAG VISIT (OUTPATIENT)
Dept: PHARMACY | Age: 70
End: 2020-03-18
Payer: MEDICARE

## 2020-03-18 ENCOUNTER — ANESTHESIA EVENT (OUTPATIENT)
Dept: CARDIAC CATH/INVASIVE PROCEDURES | Age: 70
End: 2020-03-18
Payer: MEDICARE

## 2020-03-18 LAB — INTERNATIONAL NORMALIZATION RATIO, POC: 2.2

## 2020-03-18 PROCEDURE — 85610 PROTHROMBIN TIME: CPT

## 2020-03-18 PROCEDURE — 99211 OFF/OP EST MAY X REQ PHY/QHP: CPT

## 2020-03-18 NOTE — PROGRESS NOTES
Mr. Jeanne Ramirez is a 77 y.o. y/o male with history of Atrial Fibrillation who presents today for anticoagulation monitoring and adjustment. Pt is retired and works part time at [employer]. Patient reports he has been on warfarin for almost 30 years now and was managed by his cardiologist who recently retired. Pt was then managed by Dr. Jacquelin Kevin prior to being established in Clinic. He is now enrolled in the clinic under Dr. Neo Goldstein. He states he has been taking   15 mg every Sun, Tue and Fri; and 10 mg all other days. Pertinent PMH: HTN, DM, HLD, COPD, CHF    Patient Reported Findings:  Yes     No  [x]   []       Patient verifies current dosing regimen as listed  []   [x]       S/S bleeding/bruising/swelling/SOB -denies    []   [x]       Blood in urine or stool- denies  [x]   []       Procedures scheduled in the future at this time -  Pacemaker replacement and ablation scheduled for March 23rd. Holding warfarin 2 days prior. --> Patient told now to only hold day of, he will double check with cardiology---> March 23 will have to hold for 2 days (21/22 hold)--> pt states that he spoke with MD yesterday and plan to still have procedure   []   [x]       Missed Dose-  []   [x]       Extra Dose - denies  []   [x]       Change in medications- denies  []   [x]       Change in health/diet/appetite - reports he eats about 2 meals/day. And states he will have salads about 1-2x/wk. (not a big fan of spinach or brocolli but may have other greens)--> Patient states 3 salads in past 2 weeks---> states no changes, no NVD   []   [x]       Change in alcohol use - reports occasionally drinking beer (once every few months) but happens very infrequently. --> Patient reports no alcohol in past two weeks  []   [x]       Change in activity  []   [x]       Hospital admission  []   [x]       Emergency department visit  []   [x]       Other complaints     Clinical Outcomes:  Yes     No  []   [x]       Major bleeding event  []   [x]

## 2020-03-19 ENCOUNTER — TELEPHONE (OUTPATIENT)
Dept: CARDIOLOGY CLINIC | Age: 70
End: 2020-03-19

## 2020-03-19 NOTE — TELEPHONE ENCOUNTER
Medication Refill    Medication needing refilled:    furosemide (LASIX) 40 MG tablet    Doseage of the medication: 20 mg    How are you taking this medication (QD, BID, TID, QID, PRN):     30 or 90 day supply called in: 80    Which Pharmacy are we sending the medication to?: [facility] [phone] - F [phone]      PT IS ASKING SINCE HE ONLY TAKES 20 MG A DAY IF THE SCRIPT CAN BE WRITTEN FOR 1 20 MG TABLET - SO HE DOESN'T HAVE TO CUT THE 40 MG TABLET IN HALF. THANK YOU.

## 2020-03-19 NOTE — TELEPHONE ENCOUNTER
PT IS ASKING SINCE HE ONLY TAKES 20 MG A DAY IF THE SCRIPT CAN BE WRITTEN FOR 1 20 MG TABLET - SO HE DOESN'T HAVE TO CUT THE 40 MG TABLET IN HALF. THANK YOU.       Last OV with NPRG 2/27/2020  1. Chronic systolic CHF (congestive heart failure), NYHA class 2 (HCC) on ace and bb; no aldosterone antagonist due to hyperkalemia   2. ROSETTA (obstructive sleep apnea) on bipap   3. AV block    4. S/P AVR on coumadin   5.  Hyperkalemia

## 2020-03-20 RX ORDER — FUROSEMIDE 20 MG/1
20 TABLET ORAL DAILY
Qty: 90 TABLET | Refills: 0 | Status: SHIPPED | OUTPATIENT
Start: 2020-03-20 | End: 2020-05-26 | Stop reason: SDUPTHER

## 2020-03-23 ENCOUNTER — APPOINTMENT (OUTPATIENT)
Dept: GENERAL RADIOLOGY | Age: 70
End: 2020-03-23
Attending: INTERNAL MEDICINE
Payer: MEDICARE

## 2020-03-23 ENCOUNTER — HOSPITAL ENCOUNTER (OUTPATIENT)
Dept: CARDIAC CATH/INVASIVE PROCEDURES | Age: 70
Discharge: HOME | End: 2020-03-24
Attending: INTERNAL MEDICINE | Admitting: INTERNAL MEDICINE
Payer: MEDICARE

## 2020-03-23 ENCOUNTER — ANESTHESIA (OUTPATIENT)
Dept: CARDIAC CATH/INVASIVE PROCEDURES | Age: 70
End: 2020-03-23
Payer: MEDICARE

## 2020-03-23 VITALS
SYSTOLIC BLOOD PRESSURE: 168 MMHG | RESPIRATION RATE: 4 BRPM | OXYGEN SATURATION: 100 % | DIASTOLIC BLOOD PRESSURE: 79 MMHG | TEMPERATURE: 99.3 F

## 2020-03-23 PROBLEM — J01.01 ACUTE RECURRENT MAXILLARY SINUSITIS: Status: RESOLVED | Noted: 2018-10-17 | Resolved: 2020-03-23

## 2020-03-23 PROBLEM — I48.3 TYPICAL ATRIAL FLUTTER (HCC): Status: ACTIVE | Noted: 2020-03-23

## 2020-03-23 LAB
ANION GAP SERPL CALCULATED.3IONS-SCNC: 12 MMOL/L (ref 3–16)
BUN BLDV-MCNC: 28 MG/DL (ref 7–20)
CALCIUM SERPL-MCNC: 10 MG/DL (ref 8.3–10.6)
CHLORIDE BLD-SCNC: 105 MMOL/L (ref 99–110)
CO2: 25 MMOL/L (ref 21–32)
CREAT SERPL-MCNC: 1.2 MG/DL (ref 0.8–1.3)
EKG ATRIAL RATE: 73 BPM
EKG DIAGNOSIS: NORMAL
EKG P AXIS: -29 DEGREES
EKG P-R INTERVAL: 192 MS
EKG Q-T INTERVAL: 468 MS
EKG QRS DURATION: 162 MS
EKG QTC CALCULATION (BAZETT): 515 MS
EKG R AXIS: -59 DEGREES
EKG T AXIS: 103 DEGREES
EKG VENTRICULAR RATE: 73 BPM
GFR AFRICAN AMERICAN: >60
GFR NON-AFRICAN AMERICAN: >60
GLUCOSE BLD-MCNC: 121 MG/DL (ref 70–99)
GLUCOSE BLD-MCNC: 137 MG/DL (ref 70–99)
GLUCOSE BLD-MCNC: 159 MG/DL (ref 70–99)
HCT VFR BLD CALC: 40.4 % (ref 40.5–52.5)
HEMOGLOBIN: 13.3 G/DL (ref 13.5–17.5)
INR BLD: 1.6 (ref 0.86–1.14)
MCH RBC QN AUTO: 30.7 PG (ref 26–34)
MCHC RBC AUTO-ENTMCNC: 32.9 G/DL (ref 31–36)
MCV RBC AUTO: 93.3 FL (ref 80–100)
PDW BLD-RTO: 16.6 % (ref 12.4–15.4)
PERFORMED ON: ABNORMAL
PERFORMED ON: ABNORMAL
PLATELET # BLD: 168 K/UL (ref 135–450)
PMV BLD AUTO: 8.9 FL (ref 5–10.5)
POC ACT LR: 258 SEC
POC ACT LR: 326 SEC
POC ACT LR: 342 SEC
POTASSIUM SERPL-SCNC: 5 MMOL/L (ref 3.5–5.1)
RBC # BLD: 4.33 M/UL (ref 4.2–5.9)
SODIUM BLD-SCNC: 142 MMOL/L (ref 136–145)
WBC # BLD: 6.6 K/UL (ref 4–11)

## 2020-03-23 PROCEDURE — 3700000001 HC ADD 15 MINUTES (ANESTHESIA)

## 2020-03-23 PROCEDURE — 93010 ELECTROCARDIOGRAM REPORT: CPT | Performed by: INTERNAL MEDICINE

## 2020-03-23 PROCEDURE — 6360000002 HC RX W HCPCS: Performed by: NURSE ANESTHETIST, CERTIFIED REGISTERED

## 2020-03-23 PROCEDURE — C1900 LEAD, CORONARY VENOUS: HCPCS

## 2020-03-23 PROCEDURE — 93623 PRGRMD STIMJ&PACG IV RX NFS: CPT

## 2020-03-23 PROCEDURE — 93621 COMP EP EVL L PAC&REC C SINS: CPT

## 2020-03-23 PROCEDURE — 85027 COMPLETE CBC AUTOMATED: CPT

## 2020-03-23 PROCEDURE — 93623 PRGRMD STIMJ&PACG IV RX NFS: CPT | Performed by: INTERNAL MEDICINE

## 2020-03-23 PROCEDURE — 33225 L VENTRIC PACING LEAD ADD-ON: CPT

## 2020-03-23 PROCEDURE — 93005 ELECTROCARDIOGRAM TRACING: CPT | Performed by: INTERNAL MEDICINE

## 2020-03-23 PROCEDURE — 33225 L VENTRIC PACING LEAD ADD-ON: CPT | Performed by: INTERNAL MEDICINE

## 2020-03-23 PROCEDURE — 93662 INTRACARDIAC ECG (ICE): CPT | Performed by: INTERNAL MEDICINE

## 2020-03-23 PROCEDURE — 7100000000 HC PACU RECOVERY - FIRST 15 MIN

## 2020-03-23 PROCEDURE — 7100000001 HC PACU RECOVERY - ADDTL 15 MIN

## 2020-03-23 PROCEDURE — 2500000003 HC RX 250 WO HCPCS

## 2020-03-23 PROCEDURE — 2580000003 HC RX 258: Performed by: NURSE ANESTHETIST, CERTIFIED REGISTERED

## 2020-03-23 PROCEDURE — C1732 CATH, EP, DIAG/ABL, 3D/VECT: HCPCS

## 2020-03-23 PROCEDURE — 2500000003 HC RX 250 WO HCPCS: Performed by: NURSE ANESTHETIST, CERTIFIED REGISTERED

## 2020-03-23 PROCEDURE — 2580000003 HC RX 258: Performed by: ANESTHESIOLOGY

## 2020-03-23 PROCEDURE — 6370000000 HC RX 637 (ALT 250 FOR IP): Performed by: INTERNAL MEDICINE

## 2020-03-23 PROCEDURE — 3700000000 HC ANESTHESIA ATTENDED CARE

## 2020-03-23 PROCEDURE — 2580000003 HC RX 258

## 2020-03-23 PROCEDURE — 93653 COMPRE EP EVAL TX SVT: CPT | Performed by: INTERNAL MEDICINE

## 2020-03-23 PROCEDURE — 33229 REMV&REPLC PM GEN MULT LEADS: CPT

## 2020-03-23 PROCEDURE — C2621 PMKR, OTHER THAN SING/DUAL: HCPCS

## 2020-03-23 PROCEDURE — 85610 PROTHROMBIN TIME: CPT

## 2020-03-23 PROCEDURE — 93613 INTRACARDIAC EPHYS 3D MAPG: CPT

## 2020-03-23 PROCEDURE — C1769 GUIDE WIRE: HCPCS

## 2020-03-23 PROCEDURE — 36415 COLL VENOUS BLD VENIPUNCTURE: CPT

## 2020-03-23 PROCEDURE — 2780000010 HC IMPLANT OTHER

## 2020-03-23 PROCEDURE — 93653 COMPRE EP EVAL TX SVT: CPT

## 2020-03-23 PROCEDURE — 2580000003 HC RX 258: Performed by: INTERNAL MEDICINE

## 2020-03-23 PROCEDURE — 2709999900 HC NON-CHARGEABLE SUPPLY

## 2020-03-23 PROCEDURE — 85347 COAGULATION TIME ACTIVATED: CPT

## 2020-03-23 PROCEDURE — C1894 INTRO/SHEATH, NON-LASER: HCPCS

## 2020-03-23 PROCEDURE — C1759 CATH, INTRA ECHOCARDIOGRAPHY: HCPCS

## 2020-03-23 PROCEDURE — 93662 INTRACARDIAC ECG (ICE): CPT

## 2020-03-23 PROCEDURE — C1730 CATH, EP, 19 OR FEW ELECT: HCPCS

## 2020-03-23 PROCEDURE — 93621 COMP EP EVL L PAC&REC C SINS: CPT | Performed by: INTERNAL MEDICINE

## 2020-03-23 PROCEDURE — 71045 X-RAY EXAM CHEST 1 VIEW: CPT

## 2020-03-23 PROCEDURE — 6360000004 HC RX CONTRAST MEDICATION: Performed by: INTERNAL MEDICINE

## 2020-03-23 PROCEDURE — 33229 REMV&REPLC PM GEN MULT LEADS: CPT | Performed by: INTERNAL MEDICINE

## 2020-03-23 PROCEDURE — C1887 CATHETER, GUIDING: HCPCS

## 2020-03-23 PROCEDURE — 6360000002 HC RX W HCPCS

## 2020-03-23 PROCEDURE — 93286 PERI-PX EVAL PM/LDLS PM IP: CPT | Performed by: INTERNAL MEDICINE

## 2020-03-23 PROCEDURE — 80048 BASIC METABOLIC PNL TOTAL CA: CPT

## 2020-03-23 PROCEDURE — 93613 INTRACARDIAC EPHYS 3D MAPG: CPT | Performed by: INTERNAL MEDICINE

## 2020-03-23 RX ORDER — ASPIRIN 81 MG/1
81 TABLET, CHEWABLE ORAL DAILY
Status: DISCONTINUED | OUTPATIENT
Start: 2020-03-23 | End: 2020-03-24 | Stop reason: HOSPADM

## 2020-03-23 RX ORDER — HYDROMORPHONE HCL 110MG/55ML
0.5 PATIENT CONTROLLED ANALGESIA SYRINGE INTRAVENOUS EVERY 5 MIN PRN
Status: DISCONTINUED | OUTPATIENT
Start: 2020-03-23 | End: 2020-03-23

## 2020-03-23 RX ORDER — SODIUM CHLORIDE 0.9 % (FLUSH) 0.9 %
10 SYRINGE (ML) INJECTION EVERY 12 HOURS SCHEDULED
Status: DISCONTINUED | OUTPATIENT
Start: 2020-03-23 | End: 2020-03-24 | Stop reason: HOSPADM

## 2020-03-23 RX ORDER — SODIUM CHLORIDE 0.9 % (FLUSH) 0.9 %
10 SYRINGE (ML) INJECTION PRN
Status: DISCONTINUED | OUTPATIENT
Start: 2020-03-23 | End: 2020-03-24 | Stop reason: HOSPADM

## 2020-03-23 RX ORDER — SUCCINYLCHOLINE/SOD CL,ISO/PF 200MG/10ML
SYRINGE (ML) INTRAVENOUS PRN
Status: DISCONTINUED | OUTPATIENT
Start: 2020-03-23 | End: 2020-03-23 | Stop reason: SDUPTHER

## 2020-03-23 RX ORDER — PROMETHAZINE HYDROCHLORIDE 25 MG/ML
6.25 INJECTION, SOLUTION INTRAMUSCULAR; INTRAVENOUS
Status: DISCONTINUED | OUTPATIENT
Start: 2020-03-23 | End: 2020-03-23

## 2020-03-23 RX ORDER — BUDESONIDE AND FORMOTEROL FUMARATE DIHYDRATE 80; 4.5 UG/1; UG/1
2 AEROSOL RESPIRATORY (INHALATION) 2 TIMES DAILY
Status: DISCONTINUED | OUTPATIENT
Start: 2020-03-23 | End: 2020-03-24 | Stop reason: HOSPADM

## 2020-03-23 RX ORDER — BLOOD-GLUCOSE METER
1 KIT MISCELLANEOUS ONCE
Status: DISCONTINUED | OUTPATIENT
Start: 2020-03-23 | End: 2020-03-23 | Stop reason: RX

## 2020-03-23 RX ORDER — FENTANYL CITRATE 50 UG/ML
INJECTION, SOLUTION INTRAMUSCULAR; INTRAVENOUS PRN
Status: DISCONTINUED | OUTPATIENT
Start: 2020-03-23 | End: 2020-03-23 | Stop reason: SDUPTHER

## 2020-03-23 RX ORDER — ACETAMINOPHEN 325 MG/1
650 TABLET ORAL EVERY 4 HOURS PRN
Status: DISCONTINUED | OUTPATIENT
Start: 2020-03-23 | End: 2020-03-24 | Stop reason: HOSPADM

## 2020-03-23 RX ORDER — WARFARIN SODIUM 5 MG/1
10 TABLET ORAL
Status: COMPLETED | OUTPATIENT
Start: 2020-03-23 | End: 2020-03-23

## 2020-03-23 RX ORDER — LISINOPRIL 20 MG/1
20 TABLET ORAL DAILY
Status: DISCONTINUED | OUTPATIENT
Start: 2020-03-24 | End: 2020-03-24 | Stop reason: HOSPADM

## 2020-03-23 RX ORDER — ONDANSETRON 2 MG/ML
4 INJECTION INTRAMUSCULAR; INTRAVENOUS EVERY 6 HOURS PRN
Status: DISCONTINUED | OUTPATIENT
Start: 2020-03-23 | End: 2020-03-24 | Stop reason: HOSPADM

## 2020-03-23 RX ORDER — LIDOCAINE HYDROCHLORIDE 20 MG/ML
INJECTION, SOLUTION EPIDURAL; INFILTRATION; INTRACAUDAL; PERINEURAL PRN
Status: DISCONTINUED | OUTPATIENT
Start: 2020-03-23 | End: 2020-03-23 | Stop reason: SDUPTHER

## 2020-03-23 RX ORDER — OXYCODONE HYDROCHLORIDE AND ACETAMINOPHEN 5; 325 MG/1; MG/1
1 TABLET ORAL EVERY 4 HOURS PRN
Status: DISCONTINUED | OUTPATIENT
Start: 2020-03-23 | End: 2020-03-24 | Stop reason: HOSPADM

## 2020-03-23 RX ORDER — WARFARIN SODIUM 5 MG/1
10 TABLET ORAL DAILY
Status: DISCONTINUED | OUTPATIENT
Start: 2020-03-23 | End: 2020-03-24 | Stop reason: HOSPADM

## 2020-03-23 RX ORDER — PROPOFOL 10 MG/ML
INJECTION, EMULSION INTRAVENOUS PRN
Status: DISCONTINUED | OUTPATIENT
Start: 2020-03-23 | End: 2020-03-23 | Stop reason: SDUPTHER

## 2020-03-23 RX ORDER — IPRATROPIUM BROMIDE AND ALBUTEROL SULFATE 2.5; .5 MG/3ML; MG/3ML
1 SOLUTION RESPIRATORY (INHALATION) EVERY 4 HOURS PRN
Status: DISCONTINUED | OUTPATIENT
Start: 2020-03-23 | End: 2020-03-24 | Stop reason: HOSPADM

## 2020-03-23 RX ORDER — FENTANYL CITRATE 50 UG/ML
25 INJECTION, SOLUTION INTRAMUSCULAR; INTRAVENOUS EVERY 5 MIN PRN
Status: DISCONTINUED | OUTPATIENT
Start: 2020-03-23 | End: 2020-03-23

## 2020-03-23 RX ORDER — SODIUM CHLORIDE 9 MG/ML
INJECTION, SOLUTION INTRAVENOUS CONTINUOUS PRN
Status: DISCONTINUED | OUTPATIENT
Start: 2020-03-23 | End: 2020-03-23 | Stop reason: SDUPTHER

## 2020-03-23 RX ORDER — ONDANSETRON 2 MG/ML
INJECTION INTRAMUSCULAR; INTRAVENOUS PRN
Status: DISCONTINUED | OUTPATIENT
Start: 2020-03-23 | End: 2020-03-23 | Stop reason: SDUPTHER

## 2020-03-23 RX ORDER — SOTALOL HYDROCHLORIDE 80 MG/1
80 TABLET ORAL 2 TIMES DAILY
Status: DISCONTINUED | OUTPATIENT
Start: 2020-03-23 | End: 2020-03-24 | Stop reason: HOSPADM

## 2020-03-23 RX ORDER — DEXAMETHASONE SODIUM PHOSPHATE 4 MG/ML
INJECTION, SOLUTION INTRA-ARTICULAR; INTRALESIONAL; INTRAMUSCULAR; INTRAVENOUS; SOFT TISSUE PRN
Status: DISCONTINUED | OUTPATIENT
Start: 2020-03-23 | End: 2020-03-23 | Stop reason: SDUPTHER

## 2020-03-23 RX ORDER — FUROSEMIDE 20 MG/1
20 TABLET ORAL DAILY
Status: DISCONTINUED | OUTPATIENT
Start: 2020-03-23 | End: 2020-03-24 | Stop reason: HOSPADM

## 2020-03-23 RX ORDER — LABETALOL HYDROCHLORIDE 5 MG/ML
5 INJECTION, SOLUTION INTRAVENOUS EVERY 10 MIN PRN
Status: DISCONTINUED | OUTPATIENT
Start: 2020-03-23 | End: 2020-03-23

## 2020-03-23 RX ORDER — PRAVASTATIN SODIUM 40 MG
80 TABLET ORAL NIGHTLY
Status: DISCONTINUED | OUTPATIENT
Start: 2020-03-24 | End: 2020-03-24 | Stop reason: HOSPADM

## 2020-03-23 RX ORDER — SODIUM CHLORIDE 9 MG/ML
INJECTION, SOLUTION INTRAVENOUS CONTINUOUS
Status: DISCONTINUED | OUTPATIENT
Start: 2020-03-23 | End: 2020-03-24 | Stop reason: HOSPADM

## 2020-03-23 RX ORDER — CEFAZOLIN SODIUM 1 G/3ML
INJECTION, POWDER, FOR SOLUTION INTRAMUSCULAR; INTRAVENOUS PRN
Status: DISCONTINUED | OUTPATIENT
Start: 2020-03-23 | End: 2020-03-23 | Stop reason: SDUPTHER

## 2020-03-23 RX ORDER — ALLOPURINOL 300 MG/1
300 TABLET ORAL DAILY
Status: DISCONTINUED | OUTPATIENT
Start: 2020-03-23 | End: 2020-03-24 | Stop reason: HOSPADM

## 2020-03-23 RX ORDER — HEPARIN SODIUM 1000 [USP'U]/ML
INJECTION, SOLUTION INTRAVENOUS; SUBCUTANEOUS PRN
Status: DISCONTINUED | OUTPATIENT
Start: 2020-03-23 | End: 2020-03-23 | Stop reason: SDUPTHER

## 2020-03-23 RX ORDER — MONTELUKAST SODIUM 10 MG/1
10 TABLET ORAL EVERY EVENING
Status: DISCONTINUED | OUTPATIENT
Start: 2020-03-23 | End: 2020-03-24 | Stop reason: HOSPADM

## 2020-03-23 RX ADMIN — ISOPROTERENOL HYDROCHLORIDE 2 MCG/MIN: 0.2 INJECTION, SOLUTION INTRAMUSCULAR; INTRAVENOUS at 13:30

## 2020-03-23 RX ADMIN — ASPIRIN 81 MG 81 MG: 81 TABLET ORAL at 18:52

## 2020-03-23 RX ADMIN — PROPOFOL 20 MG: 10 INJECTION, EMULSION INTRAVENOUS at 12:23

## 2020-03-23 RX ADMIN — HEPARIN SODIUM 12000 UNITS: 1000 INJECTION INTRAVENOUS; SUBCUTANEOUS at 12:57

## 2020-03-23 RX ADMIN — ALLOPURINOL 300 MG: 300 TABLET ORAL at 18:52

## 2020-03-23 RX ADMIN — SODIUM CHLORIDE: 9 INJECTION, SOLUTION INTRAVENOUS at 12:08

## 2020-03-23 RX ADMIN — SODIUM CHLORIDE: 9 INJECTION, SOLUTION INTRAVENOUS at 18:53

## 2020-03-23 RX ADMIN — LIDOCAINE HYDROCHLORIDE 100 MG: 20 INJECTION, SOLUTION EPIDURAL; INFILTRATION; INTRACAUDAL; PERINEURAL at 12:22

## 2020-03-23 RX ADMIN — PHENYLEPHRINE HYDROCHLORIDE 100 MCG: 10 INJECTION INTRAVENOUS at 12:27

## 2020-03-23 RX ADMIN — FENTANYL CITRATE 50 MCG: 50 INJECTION INTRAMUSCULAR; INTRAVENOUS at 12:21

## 2020-03-23 RX ADMIN — FUROSEMIDE 20 MG: 20 TABLET ORAL at 18:52

## 2020-03-23 RX ADMIN — IOPAMIDOL 10 ML: 755 INJECTION, SOLUTION INTRAVENOUS at 14:25

## 2020-03-23 RX ADMIN — FENTANYL CITRATE 50 MCG: 50 INJECTION INTRAMUSCULAR; INTRAVENOUS at 15:49

## 2020-03-23 RX ADMIN — Medication 140 MG: at 12:23

## 2020-03-23 RX ADMIN — DEXAMETHASONE SODIUM PHOSPHATE 4 MG: 4 INJECTION, SOLUTION INTRAMUSCULAR; INTRAVENOUS at 12:33

## 2020-03-23 RX ADMIN — IOPAMIDOL 20 ML: 755 INJECTION, SOLUTION INTRAVENOUS at 15:51

## 2020-03-23 RX ADMIN — FENTANYL CITRATE 50 MCG: 50 INJECTION INTRAMUSCULAR; INTRAVENOUS at 15:56

## 2020-03-23 RX ADMIN — WARFARIN SODIUM 10 MG: 5 TABLET ORAL at 18:52

## 2020-03-23 RX ADMIN — FENTANYL CITRATE 50 MCG: 50 INJECTION INTRAMUSCULAR; INTRAVENOUS at 15:05

## 2020-03-23 RX ADMIN — SOTALOL HYDROCHLORIDE 80 MG: 80 TABLET ORAL at 20:06

## 2020-03-23 RX ADMIN — WARFARIN SODIUM 10 MG: 5 TABLET ORAL at 18:53

## 2020-03-23 RX ADMIN — CEFAZOLIN 3000 MG: 1 INJECTION, POWDER, FOR SOLUTION INTRAVENOUS at 14:46

## 2020-03-23 RX ADMIN — PHENYLEPHRINE HYDROCHLORIDE 50 MCG/MIN: 10 INJECTION INTRAVENOUS at 12:30

## 2020-03-23 RX ADMIN — HEPARIN SODIUM 4000 UNITS: 1000 INJECTION INTRAVENOUS; SUBCUTANEOUS at 13:41

## 2020-03-23 RX ADMIN — PHENYLEPHRINE HYDROCHLORIDE 100 MCG: 10 INJECTION INTRAVENOUS at 12:30

## 2020-03-23 RX ADMIN — PROPOFOL 200 MG: 10 INJECTION, EMULSION INTRAVENOUS at 12:22

## 2020-03-23 RX ADMIN — ONDANSETRON 4 MG: 2 INJECTION INTRAMUSCULAR; INTRAVENOUS at 12:33

## 2020-03-23 RX ADMIN — Medication 10 ML: at 20:07

## 2020-03-23 ASSESSMENT — PULMONARY FUNCTION TESTS
PIF_VALUE: 1
PIF_VALUE: 19
PIF_VALUE: 21
PIF_VALUE: 22
PIF_VALUE: 3
PIF_VALUE: 23
PIF_VALUE: 22
PIF_VALUE: 19
PIF_VALUE: 22
PIF_VALUE: 0
PIF_VALUE: 23
PIF_VALUE: 0
PIF_VALUE: 23
PIF_VALUE: 22
PIF_VALUE: 1
PIF_VALUE: 22
PIF_VALUE: 23
PIF_VALUE: 18
PIF_VALUE: 11
PIF_VALUE: 19
PIF_VALUE: 22
PIF_VALUE: 22
PIF_VALUE: 23
PIF_VALUE: 22
PIF_VALUE: 24
PIF_VALUE: 21
PIF_VALUE: 19
PIF_VALUE: 22
PIF_VALUE: 21
PIF_VALUE: 0
PIF_VALUE: 22
PIF_VALUE: 20
PIF_VALUE: 23
PIF_VALUE: 22
PIF_VALUE: 22
PIF_VALUE: 21
PIF_VALUE: 22
PIF_VALUE: 22
PIF_VALUE: 0
PIF_VALUE: 19
PIF_VALUE: 22
PIF_VALUE: 3
PIF_VALUE: 21
PIF_VALUE: 22
PIF_VALUE: 6
PIF_VALUE: 23
PIF_VALUE: 21
PIF_VALUE: 22
PIF_VALUE: 25
PIF_VALUE: 21
PIF_VALUE: 23
PIF_VALUE: 23
PIF_VALUE: 22
PIF_VALUE: 0
PIF_VALUE: 23
PIF_VALUE: 22
PIF_VALUE: 1
PIF_VALUE: 22
PIF_VALUE: 16
PIF_VALUE: 22
PIF_VALUE: 1
PIF_VALUE: 3
PIF_VALUE: 0
PIF_VALUE: 22
PIF_VALUE: 22
PIF_VALUE: 0
PIF_VALUE: 23
PIF_VALUE: 22
PIF_VALUE: 19
PIF_VALUE: 0
PIF_VALUE: 3
PIF_VALUE: 0
PIF_VALUE: 23
PIF_VALUE: 16
PIF_VALUE: 15
PIF_VALUE: 22
PIF_VALUE: 23
PIF_VALUE: 16
PIF_VALUE: 16
PIF_VALUE: 3
PIF_VALUE: 22
PIF_VALUE: 6
PIF_VALUE: 22
PIF_VALUE: 2
PIF_VALUE: 23
PIF_VALUE: 1
PIF_VALUE: 2
PIF_VALUE: 16
PIF_VALUE: 21
PIF_VALUE: 21
PIF_VALUE: 4
PIF_VALUE: 19
PIF_VALUE: 21
PIF_VALUE: 16
PIF_VALUE: 19
PIF_VALUE: 22
PIF_VALUE: 19
PIF_VALUE: 22
PIF_VALUE: 22
PIF_VALUE: 19
PIF_VALUE: 16
PIF_VALUE: 1
PIF_VALUE: 22
PIF_VALUE: 23
PIF_VALUE: 19
PIF_VALUE: 1
PIF_VALUE: 0
PIF_VALUE: 0
PIF_VALUE: 22
PIF_VALUE: 22
PIF_VALUE: 2
PIF_VALUE: 20
PIF_VALUE: 15
PIF_VALUE: 19
PIF_VALUE: 15
PIF_VALUE: 19
PIF_VALUE: 22
PIF_VALUE: 0
PIF_VALUE: 3
PIF_VALUE: 21
PIF_VALUE: 16
PIF_VALUE: 22
PIF_VALUE: 16
PIF_VALUE: 20
PIF_VALUE: 0
PIF_VALUE: 0
PIF_VALUE: 21
PIF_VALUE: 22
PIF_VALUE: 23
PIF_VALUE: 22
PIF_VALUE: 4
PIF_VALUE: 23
PIF_VALUE: 4
PIF_VALUE: 0
PIF_VALUE: 16
PIF_VALUE: 21
PIF_VALUE: 22
PIF_VALUE: 1
PIF_VALUE: 21
PIF_VALUE: 22
PIF_VALUE: 29
PIF_VALUE: 0
PIF_VALUE: 22
PIF_VALUE: 19
PIF_VALUE: 22
PIF_VALUE: 22
PIF_VALUE: 2
PIF_VALUE: 22
PIF_VALUE: 22
PIF_VALUE: 1
PIF_VALUE: 22
PIF_VALUE: 3
PIF_VALUE: 21
PIF_VALUE: 23
PIF_VALUE: 22
PIF_VALUE: 20
PIF_VALUE: 21
PIF_VALUE: 22
PIF_VALUE: 23
PIF_VALUE: 3
PIF_VALUE: 16
PIF_VALUE: 21
PIF_VALUE: 22
PIF_VALUE: 19
PIF_VALUE: 22
PIF_VALUE: 23
PIF_VALUE: 22
PIF_VALUE: 23
PIF_VALUE: 22
PIF_VALUE: 19
PIF_VALUE: 23
PIF_VALUE: 22
PIF_VALUE: 0
PIF_VALUE: 23
PIF_VALUE: 22
PIF_VALUE: 3
PIF_VALUE: 23
PIF_VALUE: 21
PIF_VALUE: 0
PIF_VALUE: 23
PIF_VALUE: 22
PIF_VALUE: 23
PIF_VALUE: 22
PIF_VALUE: 0
PIF_VALUE: 21
PIF_VALUE: 23
PIF_VALUE: 23
PIF_VALUE: 21
PIF_VALUE: 16
PIF_VALUE: 1
PIF_VALUE: 19
PIF_VALUE: 1
PIF_VALUE: 0
PIF_VALUE: 19
PIF_VALUE: 23
PIF_VALUE: 2
PIF_VALUE: 22
PIF_VALUE: 19
PIF_VALUE: 22
PIF_VALUE: 1
PIF_VALUE: 22
PIF_VALUE: 23
PIF_VALUE: 19
PIF_VALUE: 22
PIF_VALUE: 15
PIF_VALUE: 0
PIF_VALUE: 22
PIF_VALUE: 16
PIF_VALUE: 19
PIF_VALUE: 20
PIF_VALUE: 21
PIF_VALUE: 3
PIF_VALUE: 20
PIF_VALUE: 16
PIF_VALUE: 16

## 2020-03-23 ASSESSMENT — PAIN SCALES - GENERAL: PAINLEVEL_OUTOF10: 0

## 2020-03-23 NOTE — ANESTHESIA PRE PROCEDURE
[facility] Department of Anesthesiology  Pre-Anesthesia Evaluation/Consultation       Name:  Maria Eugenia Beach  : 1950  Age:  77 y.o. MRN:  [id number]  Date: 3/23/2020           Surgeon: Cyril Mosqueda    Procedure: Lata Perches ablation, pacer replacement     No Known Allergies  Patient Active Problem List   Diagnosis    Benign essential HTN    Hyperlipidemia, mixed    Long term current use of anticoagulants with INR goal of 2.0-3.0    Allergic rhinitis    Gout of both feet    Prediabetes    Primary insomnia    Effusion of left knee    Uncontrolled type 2 diabetes mellitus with microalbuminuria, without long-term current use of insulin (HCC)    Class 3 severe obesity due to excess calories with serious comorbidity in adult [facility])    Acute recurrent maxillary sinusitis    Allergic sinusitis    COPD    ROSETTA (obstructive sleep apnea)    Rapid atrial fibrillation (HCC)    Acute congestive heart failure (HCC)    Atrial flutter (HCC)    Cardiomyopathy, dilated (HCC)    Obesity (BMI 30-39. 9)    Acute on chronic systolic heart failure due to valvular disease ([address]    Stage 2 chronic kidney disease    S/P AVR    Pacemaker     Past Medical History:   Diagnosis Date    Acute congestive heart failure ([address] 2019    Allergic rhinitis 2016    Atrial fibrillation ([address]     under care of cardiology:Dr. Blenda Kinder Behrens([facility])    CKD (chronic kidney disease)     Nephro:Dr. Parker:stage 3    Class 2 obesity due to excess calories without serious comorbidity with body mass index (BMI) of 37.0 to 37.9 in adult 2017    Controlled type 2 diabetes mellitus without complication, without long-term current use of insulin ([address] 2017    COPD     Gout     Hyperlipidemia, mixed 2016    Hypertension     under care of cardiology:Dr. Blenda Kinder Behrens([facility])    Long term current use of anticoagulants with INR goal of 2.0-3.0     under care of Results   Component Value Date     02/27/2020    K 4.6 02/27/2020     02/27/2020    CO2 23 02/27/2020    BUN 29 02/27/2020    CREATININE 1.2 02/27/2020    GFRAA >60 02/27/2020    GFRAA >60 02/23/2011    AGRATIO 1.5 01/07/2020    LABGLOM >60 02/27/2020    GLUCOSE 122 02/27/2020    PROT 8.0 01/07/2020    CALCIUM 9.7 02/27/2020    BILITOT 0.8 01/07/2020    ALKPHOS 125 01/07/2020    AST 26 01/07/2020    ALT 27 01/07/2020     BMP    Lab Results   Component Value Date     02/27/2020    K 4.6 02/27/2020     02/27/2020    CO2 23 02/27/2020    BUN 29 02/27/2020    CREATININE 1.2 02/27/2020    CALCIUM 9.7 02/27/2020    GFRAA >60 02/27/2020    GFRAA >60 02/23/2011    LABGLOM >60 02/27/2020    GLUCOSE 122 02/27/2020     POCGlucose  No results for input(s): GLUCOSE in the last 72 hours.    Coags    Lab Results   Component Value Date    PROTIME 46.3 01/20/2020    PROTIME 24.5 12/08/2009    INR 2.2 03/18/2020    INR 3.94 52/55/4650     HCG (If Applicable) No results found for: PREGTESTUR, PREGSERUM, HCG, HCGQUANT   ABGs No results found for: PHART, PO2ART, MUN4OVX, DHI9PDN, BEART, O4ZYMFOT   Type & Screen (If Applicable)  No results found for: LABABO, LABRH                         BMI: Wt Readings from Last 3 Encounters:       NPO Status: 8 hours                          Anesthesia Evaluation  Patient summary reviewed no history of anesthetic complications:   Airway: Mallampati: III  TM distance: >3 FB   Neck ROM: full   Dental:    (+) partials      Pulmonary:normal exam    (+) COPD:  sleep apnea:                             Cardiovascular:  Exercise tolerance: good (>4 METS),   (+) hypertension:, valvular problems/murmurs (S/P AVR mechanical):, pacemaker (pacer placed 9 years ago):, dysrhythmias: atrial fibrillation and atrial flutter, CHF (EF 40):, murmur,       ECG reviewed  Rhythm: irregular  Rate: normal  Echocardiogram reviewed         Beta Blocker:  Dose within 24 Hrs         Neuro/Psych:   (+) neuromuscular disease: Parkinson's disease,             GI/Hepatic/Renal:   (+) renal disease: CRI,           Endo/Other:    (+) DiabetesType II DM, , blood dyscrasia (warfarin 3 days ago): anticoagulation therapy:., .                 Abdominal:   (+) obese,         Vascular: negative vascular ROS. Anesthesia Plan      general     ASA 4       Induction: intravenous. MIPS: Postoperative opioids intended and Prophylactic antiemetics administered. Anesthetic plan and risks discussed with patient. Plan discussed with CRNA. This pre-anesthesia assessment may be used as a history and physical.    DOS STAFF ADDENDUM:    Pt seen and examined, chart reviewed (including anesthesia, drug and allergy history). No interval changes to history and physical examination. Anesthetic plan, risks, benefits, alternatives, and personnel involved discussed with patient. Questions and concerns addressed. Patient(family) verbalized an understanding and agrees to proceed.       Demetrio Heard MD  March 23, 2020  11:11 AM

## 2020-03-23 NOTE — PROCEDURES
[address]     Electrophysiology Procedure Note       Date of Procedure: 3/23/2020  Patient's Name: David Harmon  YOB: 1950   Medical Record Number: [id number]  Referring Physician: Rey Dia MD   Procedure Performed by: Desire Mccollum MD    Procedure performed:  · Comprehensive electrophysiological study with attempted induction of arrhythmia at baseline. · Attempted induction of arrhythmia after IV drug infusion. · Three-dimensional electroanatomic mapping of the right atrium  · Left atrial recording and mapping via coronary sinus   ·   · Radiofrequency ablation of SVT, Cavo tricuspid isthmus dependent atrial flutter  · Intracardiac echo  · Ultrasound for access  · Programming of pacemaker before and after procedure  ·     Mallampati3  ASA 3    Indications for procedure: Atrial flutter   David Harmon 77 y.o. male with PMH of atrial flutter and Permanent pacemaker and cardiomyopathy   He has had palpitations in the past suggestive of atrial flutter . Due to his risk of recurrence, options were discussed and he preferred to proceed with ablation  Details of procedure: The risks, benefits and alternatives of the ablation procedure were discussed with the patient. The risks including, but not limited to, the risks of bleeding, infection, radiation exposure, injury to vascular, cardiac and surrounding structures (including pneumothorax), stroke, cardiac perforation, tamponade, need for emergent open heart surgery, need for pacemaker implantation, myocardial infarction and death were discussed in detail. The patient opted to proceed with the ablation. Written informed consent was signed and placed in the chart. The patient was brought to the electrophysiology lab in a fasting nonsedated state. The patient was prepped and draped in a sterile fashion. A timeout protocol was completed to identify the patient and the procedure being performed.  Pre-sedation evaluation and airway assessment (Mallampatti classification, class llI) was completed. GA was used  After injection of 2% lidocaine in the right groin, right femoral vein access was obtained using modified seldinger technique without difficulty under ultrasound. Pacemaker was programmed to VVI 40  A SRO sheath and a long 6F sheath was introduced to the right femoral for CS . A long 9 F sheath was introduced for intracardiac echo catheter. Procedure was done without fluoroscopy   Using 3-D mapping system and ICE, we advanced ICE catheter in RA through Sr0 sheath and  then we advanced a decapolar catheter into the coronary sinus so the distal poles were in the coronary sinus for left atrial recording and mapping. Then we exchanged and advanced an irrigated ablation catheter through the SRO sheath which was placed into the right atrium. We induced atrial flutter with a CL of 308     The diagnosis of typical cavotricuspid isthmus dependent flutter was made. Using three dimensional electroanatomical mapping of the cavotricuspid isthmus which was created by Carto system, an irrigated Smart touch [employer] SF ablation cathter was advanced into position along the ventricular septal aspect of the cavotricuspid isthmus under fluoroscopic guidance and 3-D mapping. catheter location and contact was monitored with ICE    Radiofrequency lesions were delivered in a linear fashion until the tachycardia terminated. Then while maintaining pacing from the proximal coronary sinus, additional lesions were delivered to the isthmus until bidirectional block was observed. Bidirectional block was confirmed by noting split potentials along the ablation line (> 100 ms). Differential pacing from medial and lateral side of the line also confirmed bidirectional block.      Then we did our electrophysiologic studies, programmed stimulation, by using our CS catheter in addition to our ablation catheter which was place into the right atrium

## 2020-03-23 NOTE — DISCHARGE SUMMARY
 COPD    ROSETTA (obstructive sleep apnea)    Rapid atrial fibrillation (HCC)    Acute congestive heart failure (HCC)    Atrial flutter (HCC)    Cardiomyopathy, dilated (HCC)    Stage 2 chronic kidney disease    S/P AVR    Pacemaker        Assessment & Plan:    1. Paroxysmal Atrial Fibrillation/flutter   - S/p DCCV (1/2/20)   - S/p CTI dependent atrial flutter ablation (3/23/20)  - Right groin stable, no hematoma/swelling/oozing  - Will start PPI daily x1 month s/p ablation  - Educated on post ablation discharge instructions/restrictions, pt VU    - Currently in NSR, AV paced  - Continue sotalol 80 mg BID    - [id number] score: 4 (Age, HTN, CHF, DM) ; [id number] Vasc score and anticoagulation discussed. High risk for stroke and thromboembolism. Anticoagulation is recommended. Risk of bleeding was discussed.  ~ On coumadin; INR 1.53    - Afib risk factors including age, HTN, obesity, inactivity and ROSETTA were discussed with patient. Risk factor modification recommended   ~ TSH 0.80 (12/19)      - Treatment options including cardioversion, rate control strategy, antiarrhythmics, anticoagulation and possible ablation were discussed with patient. Risks, benefits and alternative of each treatment options were explained. All questions answered    2. AV Block  - S/p Dual chamber Medtronic PPM (original in 1996 by Dr. Sedrick Krueger); generator change (2009)  - S/p Biv upgrade (3/23/20)  - I reviewed device interrogation today. Device functioning normally.   ~ A-paced 0.3% V-paced 0.9%  - Discussed with patient  - Follow up with device clinic as scheduled    3.  Chronic systolic heart failure (NYHA Class II), dilated cardiomyopathy  - Appears compensated   ~ EF 40-45% per echo (12/31/19)  - Continue with lisinopril 20 mg QD, lasix 20 mg QD, sotalol 80 mg BID  ~ No aldosterone antagonist due to hyperkalemia  - Aggressive medical therapy with risk factor modification  - Discussed with patient importance of monitoring weight, low sodium diet and fluid restriction    4. ROSETTA  - Stable: Uses CPAP  - Encourage to use CPAP to prevent long term effects of untreated ROSETTA    5. HTN  - Controlled: Goal <130/80  - Continue current medications  - Encouraged to monitor and log BP readings at home, then bring log to next visit  - Discussed importance of low sodium diet, weight control and exercise    6. Aortic Stenosis   - S/p [employer] mechanical AVR (1996)   - Stable   - On coumadin    ~ INR 1.5 this AM; plan discussed with Dr. Cecile Warren and pharmacy team    ~ Will give coumadin 10 mg BID today with SQ lovenox now/tonight. Instructed to have INR checked tomorrow at anti-coagulation clinic. If it remains <2, will need BID lovenox until his INR is therapeutic    Overall the patient is stable for discharge from a CV standpoint after he ambulates and voids following monk catheter removal.    Diet & Exercise:   The patient is counseled to follow a low salt diet to assure blood pressure remains controlled for cardiovascular risk factor modification   The patient is counseled to avoid excess caffeine, and energy drinks as this may exacerbated ectopy and arrhythmia   The patient is counseled to lose weight to control cardiovascular risk factors   Exercise program discussed: To improve overall cardiovascular health, the patient is instructed to increase cardiovascular related activities with a goal of 150 min/week of moderate level activity or 10,000 steps per day. Encouraged to perform as much activity as tolerated    EF of 15%  ACEi for systolic HF: Yes  ASA and Statin for CAD: N/A  Anticoagulation for AF and heart failure: Yes (Coumadin)  Tobacco use was discussed with the patient and education provided: N/A  Documentation sent to PCP: Note sent to PCP office    Activity: See discharge instructions  Diet: cardiac diet  Wound Care: See discharge instructions    F/U:   1. Follow-up with EP NP in 4 weeks and Dr. Cecile Warren in 3 months  2.  Follow up with device

## 2020-03-23 NOTE — H&P
pack-year smoking history. He has never used smokeless tobacco. He reports current alcohol use. He reports that he does not use drugs. Family History:  family history includes Asthma in his mother; Cancer in his father; No Known Problems in his brother, maternal grandfather, maternal grandmother, paternal grandfather, paternal grandmother, and sister. Reviewed. Denies family history of sudden cardiac death, arrhythmia, premature CAD    Review of System:  All other systems reviewed and are negative except for that noted above. Pertinent negatives are:   General: negative for fever, chills   Ophthalmic ROS: negative for - eye pain or loss of vision  ENT ROS: negative for - headaches, sore throat   Respiratory: negative for - cough, sputum  Cardiovascular: Reviewed in HPI  Gastrointestinal: negative for - abdominal pain, diarrhea, N/V  Hematology: negative for - bleeding, blood clots, bruising or jaundice  Genito-Urinary:  negative for - Dysuria or incontinence  Musculoskeletal: negative for - Joint swelling, muscle pain  Neurological: negative for - confusion, dizziness, headaches   Psychiatric: No anxiety, no depression. Dermatological: negative for - rash    Physical Examination:  Vitals:    01/15/20 0810   BP: 108/74   Pulse: 66      Wt Readings from Last 3 Encounters:   01/15/20 269 lb (122 kg)   01/06/20 275 lb (124.7 kg)   01/02/20 277 lb (125.6 kg)       · Constitutional: Oriented. No distress. · Head: Normocephalic and atraumatic. · Mouth/Throat: Oropharynx is clear and moist.   · Eyes: Conjunctivae normal. EOM are normal.   · Neck: Neck supple. No rigidity. No JVD present. · Cardiovascular: Normal rate, regular rhythm, S1&S2. · Pulmonary/Chest: Bilateral respiratory sounds. No wheezes, No rhonchi. · Abdominal: Soft. Bowel sounds present. No distension, No tenderness. · Musculoskeletal: No tenderness. No edema    · Lymphadenopathy: Has no cervical adenopathy.    · Neurological: Alert and oriented. Cranial nerve appears intact, No Gross deficit   · Skin: Skin is warm and dry. No rash noted. · Psychiatric: Has a normal behavior       Labs, diagnostic and imaging results reviewed. Reviewed. Lab Results   Component Value Date    TSH 0.80 2019    CREATININE 1.6 2020    CREATININE 1.7 2020    AST 26 2020    ALT 27 2020       EC/15/20  paced      Echo: 19   Summary   -Global hypokinesis with EF 40-45%. -Abnormal septal motion.   -The aortic root and the ascending aorta are mildly dilated. -The right ventricle appears to be dilated. -RV systolic function appears to be reduced.   -The right atrium appears to be dilated. -The mechanical artificial aortic valve appears well seated with a maximum   velocity of 2.40 m/s and a mean gradient of 12 mmHg. The aortic valve area   is estimated at 1.19 cm^2. No significant regurgitation noted. -Thickened mitral valve without evidence of stenosis. There is   mild-to-moderate mitral regurgitation.   -There is moderate tricuspid regurgitation with a RVSP estimation of 43   mmHg.   -Pacer / ICD wire is visualized in the right heart.   -Indeterminate diastolic function.     Cath:     Medication:  Current Outpatient Medications   Medication Sig Dispense Refill    tiotropium (SPIRIVA RESPIMAT) 2.5 MCG/ACT AERS inhaler Inhale 2 puffs into the lungs daily 1 Inhaler 0    montelukast (SINGULAIR) 10 MG tablet Take 1 tablet by mouth every evening 30 tablet 0    pravastatin (PRAVACHOL) 80 MG tablet TAKE 1 TABLET EVERY DAY FOR CHOLESTEROL 90 tablet 0    allopurinol (ZYLOPRIM) 300 MG tablet TAKE 1 TABLET EVERY DAY 90 tablet 0    metFORMIN (GLUCOPHAGE XR) 500 MG extended release tablet Take 4 tablets by mouth daily (with breakfast) (Patient taking differently: Take 1,000 mg by mouth 2 times daily (with meals) ) 360 tablet 0    ACCU-CHEK COMPACT PLUS strip USE TO TEST 2 TIMES DAILY 102 strip 1    fluticasone-vilanterol (BREO limited to, the risks of bleeding, infection, pain, device malfunction, lead dislodgement, radiation exposure, injury to cardiac and surrounding structures (including pneumothorax), stroke, cardiac perforation, tamponade, need for emergent heart surgery, myocardial infarction and death were discussed in detail. Dual vs single chamber device, including risks and benefits were discussed with patient. Printed information about ablation including risks were given. Patient was encouraged to review information given, and call back with any questions about risks, benefits and alternative of procedure. The patient opted to proceed with the device implantation.      - Permanent pacemaker              The CIED was interrogated and programmed and I supervised and reviewed all the data. All findings and changes are in device interrogation sheat and reflect my personal interpretation and changes and is scanned to Epic. 13 months remaining battery    99%     - ROSETTA              CPAP     - Obesity   Body mass index is 36.48 kg/m². -Excessive weight is complicating assessment and treatment. It is placing patient at risk for multiple co-morbidities as well as early death and contributing to the patient's presentation. - discussed weight management with diet and exercise      - HTN     Vitals:    01/15/20 0810   BP: 108/74   Pulse: 66                 BP is well controlled. Continue current meds.        - CKD              Stable          Schedule a flutter ablation and Bi upgrade        NOTE: This report was transcribed using voice recognition software. Every effort was made to ensure accuracy, however, inadvertent computerized transcription errors may be present.        Beba Parra MD, Sh[facility] Edward [address] [place] [address]   Office: [phone]

## 2020-03-23 NOTE — PROGRESS NOTES
Pt arrived from cath lab to PACU, awakens to voice, denies pain. VSS, O2 sats 100% on 6 L simple mask. Right groin dressing dry and intact, right groin soft. Right pedal pulses palpable, foot warm. Left chest pressure dressing in place, skin surrouding dressing soft. Will monitor.

## 2020-03-23 NOTE — PROCEDURES
[address]     Electrophysiology Procedure Note       Date of Procedure: 3/23/2020  Patient's Name: Ander Dorman  YOB: 1950   Medical Record Number: [id number]  Procedure Performed by: Beba Parra MD    Procedures performed:    · Selective venography of left   upper extremity. · Removal of pacemaker generator  ·    · Insertion of MRI compatible left ventricular lead under fluoroscopy  · Implantation of a MRI compatible  . BIV pacemaker  ·    · Electronic analysis of lead and device. · IV sedation       Blood loss: 10 cc  Complications: none     Indication of the procedure: Ander Dorman is a 77 y.o. male with  non-ischemic cardiomyopathy, systolic heart failure, NYHA class III, EF of 40 %. On GDMT and with >40% pacing    Details of procedure: The patient was brought to the electrophysiology laboratory in stable condition. The patient was in a fasting and non-sedated state. The risks, benefits and alternatives of the procedure were discussed with the patient. The risks including, but not limited to, the risks of vascular injury, bleeding, infection, device malfunction, lead dislodgement, radiation exposure, injury to cardiac and surrounding structures (including pneumothorax), stroke, myocardial infarction and death were discussed in detail. The patient opted to proceed with the device implantation. Written informed consent was signed and placed in the chart. Prophylactic antibiotic was given. Selective venography of the left upper extremity was done to rule out compression of the vein, and because of difficult access, which showed patent vein. GA was used    The patient was prepped and draped in a sterile fashion. A timeout protocol was completed to identify the patient and the procedure being performed. An incision was made in the left pectoral area after administration of lidocaine. Using electrocautery and blunt dissection,  pocket was opened .  Central

## 2020-03-23 NOTE — PROGRESS NOTES
Pt admitted to room 3311 from cath lab/PACU. VSS. Pt A&Ox4. Pacemaker site in L upper chest CDI. R groin site from ablation CDI-no signs of swelling or hematoma formation. POC updated with pt, all questions answered. Oriented pt to room and call light. Call light and bedside table within reach. Instructed to call out with any needs, v/u. Will monitor.

## 2020-03-24 ENCOUNTER — TELEPHONE (OUTPATIENT)
Dept: PHARMACY | Age: 70
End: 2020-03-24

## 2020-03-24 VITALS
RESPIRATION RATE: 16 BRPM | HEART RATE: 82 BPM | SYSTOLIC BLOOD PRESSURE: 123 MMHG | DIASTOLIC BLOOD PRESSURE: 75 MMHG | TEMPERATURE: 97.4 F | BODY MASS INDEX: 37.32 KG/M2 | HEIGHT: 73 IN | OXYGEN SATURATION: 95 % | WEIGHT: 281.6 LBS

## 2020-03-24 LAB
ANION GAP SERPL CALCULATED.3IONS-SCNC: 14 MMOL/L (ref 3–16)
BUN BLDV-MCNC: 28 MG/DL (ref 7–20)
CALCIUM SERPL-MCNC: 9.1 MG/DL (ref 8.3–10.6)
CHLORIDE BLD-SCNC: 106 MMOL/L (ref 99–110)
CO2: 22 MMOL/L (ref 21–32)
CREAT SERPL-MCNC: 1.3 MG/DL (ref 0.8–1.3)
GFR AFRICAN AMERICAN: >60
GFR NON-AFRICAN AMERICAN: 55
GLUCOSE BLD-MCNC: 118 MG/DL (ref 70–99)
GLUCOSE BLD-MCNC: 125 MG/DL (ref 70–99)
GLUCOSE BLD-MCNC: 194 MG/DL (ref 70–99)
INR BLD: 1.45 (ref 0.86–1.14)
PERFORMED ON: ABNORMAL
PERFORMED ON: ABNORMAL
POTASSIUM SERPL-SCNC: 4.5 MMOL/L (ref 3.5–5.1)
PROTHROMBIN TIME: 16.9 SEC (ref 10–13.2)
SODIUM BLD-SCNC: 142 MMOL/L (ref 136–145)

## 2020-03-24 PROCEDURE — 85610 PROTHROMBIN TIME: CPT

## 2020-03-24 PROCEDURE — 36415 COLL VENOUS BLD VENIPUNCTURE: CPT

## 2020-03-24 PROCEDURE — 99217 PR OBSERVATION CARE DISCHARGE MANAGEMENT: CPT | Performed by: NURSE PRACTITIONER

## 2020-03-24 PROCEDURE — 6370000000 HC RX 637 (ALT 250 FOR IP): Performed by: NURSE PRACTITIONER

## 2020-03-24 PROCEDURE — 6370000000 HC RX 637 (ALT 250 FOR IP): Performed by: INTERNAL MEDICINE

## 2020-03-24 PROCEDURE — 80048 BASIC METABOLIC PNL TOTAL CA: CPT

## 2020-03-24 PROCEDURE — 2580000003 HC RX 258: Performed by: INTERNAL MEDICINE

## 2020-03-24 PROCEDURE — 6360000002 HC RX W HCPCS: Performed by: INTERNAL MEDICINE

## 2020-03-24 PROCEDURE — 6360000002 HC RX W HCPCS: Performed by: NURSE PRACTITIONER

## 2020-03-24 RX ORDER — WARFARIN SODIUM 5 MG/1
5 TABLET ORAL
Status: COMPLETED | OUTPATIENT
Start: 2020-03-24 | End: 2020-03-24

## 2020-03-24 RX ORDER — WARFARIN SODIUM 5 MG/1
5 TABLET ORAL
Status: DISCONTINUED | OUTPATIENT
Start: 2020-03-24 | End: 2020-03-24

## 2020-03-24 RX ADMIN — LISINOPRIL 20 MG: 20 TABLET ORAL at 09:48

## 2020-03-24 RX ADMIN — FUROSEMIDE 20 MG: 20 TABLET ORAL at 09:49

## 2020-03-24 RX ADMIN — SOTALOL HYDROCHLORIDE 80 MG: 80 TABLET ORAL at 09:49

## 2020-03-24 RX ADMIN — WARFARIN SODIUM 10 MG: 5 TABLET ORAL at 09:48

## 2020-03-24 RX ADMIN — ENOXAPARIN SODIUM 135 MG: 150 INJECTION SUBCUTANEOUS at 10:14

## 2020-03-24 RX ADMIN — ALLOPURINOL 300 MG: 300 TABLET ORAL at 09:49

## 2020-03-24 RX ADMIN — ENOXAPARIN SODIUM 135 MG: 150 INJECTION SUBCUTANEOUS at 02:33

## 2020-03-24 RX ADMIN — Medication 10 ML: at 10:15

## 2020-03-24 RX ADMIN — WARFARIN SODIUM 5 MG: 5 TABLET ORAL at 10:15

## 2020-03-24 RX ADMIN — CEFAZOLIN 1 G: 1 INJECTION, POWDER, FOR SOLUTION INTRAMUSCULAR; INTRAVENOUS at 01:01

## 2020-03-24 RX ADMIN — ASPIRIN 81 MG 81 MG: 81 TABLET ORAL at 09:49

## 2020-03-24 RX ADMIN — CEFAZOLIN 1 G: 1 INJECTION, POWDER, FOR SOLUTION INTRAMUSCULAR; INTRAVENOUS at 09:49

## 2020-03-24 ASSESSMENT — PAIN SCALES - GENERAL
PAINLEVEL_OUTOF10: 0
PAINLEVEL_OUTOF10: 0

## 2020-03-24 NOTE — PROGRESS NOTES
Data- discharge order received, pt verbalized agreement to discharge, disposition to previous residence, no needs for HHC/DME. Action- discharge instructions prepared and given to pt, pt verbalized understanding. Medication information packet given r/t NEW and/or CHANGED prescriptions emphasizing name/purpose/side effects, pt verbalized understanding. Discharge instruction summary: Diet- cardiac, Activity- as tolerated, Primary Care Physician as follows: Troy Herrera -[phone] f/u appointment 1 week, prescription medications filled N/A. Inpatient surgical procedure precautions reviewed: aflutter ablation and BiV pacemaker placement. CHF Education reviewed. Pt/ Family has had a total of 60 minutes CHF education this admission encounter. Response- Pt belongings gathered, IV removed. Disposition is home (no HHC/DME needs), transported with belongings, taken to lobby via w/c w/ this RN, no complications.

## 2020-03-24 NOTE — CARE COORDINATION
Patient admitted as OPIB with an anticipated short hospitalization length of stay. Chart reviewed and it appears that patient has minimal needs for discharge at this time. Discussed with patients nurse and requested that case management be notified if discharge needs are identified. *Case management will continue to follow progress and update discharge plan as needed.     Hunter Mendoza MSW, [address]

## 2020-03-24 NOTE — TELEPHONE ENCOUNTER
Caretha Bloch called to say patient would be discharging today and cardiology would like patient seen before our clinic closes. Wants patient to have an apt tomorrow, Wed 2/25. Patient's warfarin was on hold for procedure. He got 20mg of Warfarin total on 3/23 and got 10mg on 3/24 with an additional 5mg scheduled for 3/24 before discharge. Patient's INR today is 1.45. Pt NOT discharging on lovenox per cardiology, but they want it ordered BID if INR is <2 tomorrow.     Blanca Epps, PharmD, [facility]

## 2020-03-25 ENCOUNTER — ANTI-COAG VISIT (OUTPATIENT)
Dept: PHARMACY | Age: 70
End: 2020-03-25
Payer: MEDICARE

## 2020-03-25 LAB — INTERNATIONAL NORMALIZATION RATIO, POC: 2

## 2020-03-25 PROCEDURE — 85610 PROTHROMBIN TIME: CPT

## 2020-03-25 PROCEDURE — 99211 OFF/OP EST MAY X REQ PHY/QHP: CPT

## 2020-03-30 ENCOUNTER — NURSE ONLY (OUTPATIENT)
Dept: CARDIOLOGY CLINIC | Age: 70
End: 2020-03-30
Payer: MEDICARE

## 2020-03-30 PROBLEM — I48.91 RAPID ATRIAL FIBRILLATION (HCC): Chronic | Status: RESOLVED | Noted: 2019-06-18 | Resolved: 2020-03-30

## 2020-03-30 PROCEDURE — 93281 PM DEVICE PROGR EVAL MULTI: CPT | Performed by: INTERNAL MEDICINE

## 2020-03-30 NOTE — PROGRESS NOTES
Patient comes in for programming evaluation for his pacemaker. All sensing and pacing parameters are within normal range. His incision is healing nicely. No changes need to be made at this time. Please see interrogation for more detail. Patient will follow up in 3 months in office or remotely.

## 2020-04-06 ENCOUNTER — OFFICE VISIT (OUTPATIENT)
Dept: CARDIOLOGY CLINIC | Age: 70
End: 2020-04-06
Payer: MEDICARE

## 2020-04-06 VITALS
HEIGHT: 73 IN | DIASTOLIC BLOOD PRESSURE: 78 MMHG | HEART RATE: 82 BPM | OXYGEN SATURATION: 98 % | SYSTOLIC BLOOD PRESSURE: 128 MMHG | BODY MASS INDEX: 37.77 KG/M2 | WEIGHT: 285 LBS

## 2020-04-06 PROCEDURE — G8417 CALC BMI ABV UP PARAM F/U: HCPCS | Performed by: CLINICAL NURSE SPECIALIST

## 2020-04-06 PROCEDURE — 1036F TOBACCO NON-USER: CPT | Performed by: CLINICAL NURSE SPECIALIST

## 2020-04-06 PROCEDURE — 99214 OFFICE O/P EST MOD 30 MIN: CPT | Performed by: CLINICAL NURSE SPECIALIST

## 2020-04-06 PROCEDURE — 3017F COLORECTAL CA SCREEN DOC REV: CPT | Performed by: CLINICAL NURSE SPECIALIST

## 2020-04-06 PROCEDURE — G8427 DOCREV CUR MEDS BY ELIG CLIN: HCPCS | Performed by: CLINICAL NURSE SPECIALIST

## 2020-04-06 PROCEDURE — 4040F PNEUMOC VAC/ADMIN/RCVD: CPT | Performed by: CLINICAL NURSE SPECIALIST

## 2020-04-06 PROCEDURE — 1123F ACP DISCUSS/DSCN MKR DOCD: CPT | Performed by: CLINICAL NURSE SPECIALIST

## 2020-04-06 NOTE — PATIENT INSTRUCTIONS
1.  Continue all current medications  2. Check blood work in 2 months  3. RTO in 4 months  4.   Call coumadin clinic regarding where to have INR checked

## 2020-04-06 NOTE — PROGRESS NOTES
puffs into the lungs daily 1/3/20 2/17/20  Karlee Fabian MD   montelukast (SINGULAIR) 10 MG tablet Take 1 tablet by mouth every evening 1/2/20 2/17/20  Karlee Fabian MD   fluticasone-vilanterol (BREO ELLIPTA) 100-25 MCG/INH AEPB inhaler Inhale 1 puff into the lungs daily  Patient not taking: Reported on 2/27/2020 11/5/18   Randi Guerin MD   albuterol sulfate HFA (VENTOLIN HFA) 108 (90 Base) MCG/ACT inhaler Inhale 2 puffs into the lungs every 6 hours as needed for Wheezing  Patient not taking: Reported on 2/27/2020 10/5/18   Brady Salas MD   glucose monitoring kit (FREESTYLE) monitoring kit 1 each by Does not apply route once for 1 dose Glucometer(patient's choice). BID testing. 2/24/17 2/17/20  Randi Guerin MD        Allergies:  Patient has no known allergies. Review of Systems:   · Constitutional: there has been no unanticipated weight loss. There's been no change in energy level, sleep pattern, or activity level. · Eyes: No visual changes or diplopia. No scleral icterus. · ENT: No Headaches, hearing loss or vertigo. No mouth sores or sore throat. · Cardiovascular: Reviewed in HPI  · Respiratory: No cough or wheezing, no sputum production. No hematemesis. · Gastrointestinal: No abdominal pain, appetite loss, blood in stools. No change in bowel or bladder habits. · Genitourinary: No dysuria, trouble voiding, or hematuria. · Musculoskeletal:  No gait disturbance, weakness or joint complaints. · Integumentary: No rash or pruritis. · Neurological: No headache, diplopia, change in muscle strength, numbness or tingling. No change in gait, balance, coordination, mood, affect, memory, mentation, behavior. · Psychiatric: No anxiety, no depression. · Endocrine: No malaise, fatigue or temperature intolerance. No excessive thirst, fluid intake, or urination. No tremor. · Hematologic/Lymphatic: No abnormal bruising or bleeding, blood clots or swollen lymph nodes.   · Allergic/Immunologic: No nasal

## 2020-04-08 ENCOUNTER — TELEPHONE (OUTPATIENT)
Dept: PHARMACY | Age: 70
End: 2020-04-08

## 2020-04-10 RX ORDER — ALLOPURINOL 300 MG/1
TABLET ORAL
Qty: 90 TABLET | Refills: 0 | Status: SHIPPED | OUTPATIENT
Start: 2020-04-10 | End: 2020-07-30

## 2020-04-14 ENCOUNTER — ANTI-COAG VISIT (OUTPATIENT)
Dept: PHARMACY | Age: 70
End: 2020-04-14
Payer: MEDICARE

## 2020-04-14 DIAGNOSIS — I48.91 RAPID ATRIAL FIBRILLATION (HCC): ICD-10-CM

## 2020-04-14 LAB
INR BLD: 2.43 (ref 0.86–1.14)
PROTHROMBIN TIME: 28.5 SEC (ref 10–13.2)

## 2020-04-14 PROCEDURE — 99211 OFF/OP EST MAY X REQ PHY/QHP: CPT

## 2020-04-14 NOTE — PROGRESS NOTES
Mr. Flory Perez is a 77 y.o. y/o male with history of Atrial Fibrillation who presents today for anticoagulation monitoring and adjustment. Pt is retired and works part time at [employer]. Patient reports he has been on warfarin for almost 30 years now and was managed by his cardiologist who recently retired. Pt was then managed by Dr. Kendra Goldstein prior to being established in Clinic. He is now enrolled in the clinic under Dr. Liliam Escobar. He states he has been taking   15 mg every Sun, Tue and Fri; and 10 mg all other days. Pertinent PMH: HTN, DM, HLD, COPD, CHF    Patient Reported Findings:  Yes     No  [x]   []       Patient verifies current dosing regimen as listed  []   [x]       S/S bleeding/bruising/swelling/SOB -denies    []   [x]       Blood in urine or stool- denies  [x]   []       Procedures scheduled in the future at this time -  Pacemaker replacement and ablation scheduled for March 23rd. Held 2 days prior. Was given 20 mg 3/23 then 15 mg last night. MD would like pt to have lovenox if INR <2.   []   [x]       Missed Dose-denies   []   [x]       Extra Dose - denies  []   [x]       Change in medications- denies  []   [x]       Change in health/diet/appetite - reports he eats about 2 meals/day. And states he will have salads about 1-2x/wk. (not a big fan of spinach or brocolli but may have other greens)--> Patient states 3 salads in past 2 weeks---> states no changes, no NVD   []   [x]       Change in alcohol use - reports occasionally drinking beer (once every few months) but happens very infrequently. --> Patient reports no alcohol in past two weeks  []   [x]       Change in activity  []   [x]       Hospital admission  []   [x]       Emergency department visit  []   [x]       Other complaints     Clinical Outcomes:  Yes     No  []   [x]       Major bleeding event  []   [x]       Thromboembolic event    Duration of warfarin Therapy: Indefinite   INR Range:  2.0-3.0     INR 2.43 today via lab at [facility]. Continue with same weekly dose of 15 mg Sun, Tue and Fri; and 10 mg all other days. Encouraged patient to maintain a consistent diet.   Recheck INR again in 3 weeks on 5/5 at [facility]     Referring physician is Dr. Mary Powell  INR (no units)   Date Value   04/14/2020 2.43 (H)   03/25/2020 2   03/24/2020 1.45 (H)   03/23/2020 1.60 (H)   03/18/2020 2.2   03/04/2020 2.4   02/19/2020 3.3   01/20/2020 3.94 (H)

## 2020-04-28 ENCOUNTER — TELEPHONE (OUTPATIENT)
Dept: CARDIOLOGY CLINIC | Age: 70
End: 2020-04-28

## 2020-04-29 PROBLEM — J30.9 ALLERGIC SINUSITIS: Status: RESOLVED | Noted: 2018-10-17 | Resolved: 2020-04-29

## 2020-05-05 ENCOUNTER — ANTI-COAG VISIT (OUTPATIENT)
Dept: PHARMACY | Age: 70
End: 2020-05-05
Payer: MEDICARE

## 2020-05-05 DIAGNOSIS — I48.91 RAPID ATRIAL FIBRILLATION (HCC): ICD-10-CM

## 2020-05-05 LAB
INR BLD: 2.17 (ref 0.86–1.14)
PROTHROMBIN TIME: 25.4 SEC (ref 10–13.2)

## 2020-05-05 PROCEDURE — 99211 OFF/OP EST MAY X REQ PHY/QHP: CPT

## 2020-05-14 ENCOUNTER — TELEPHONE (OUTPATIENT)
Dept: CARDIOLOGY CLINIC | Age: 70
End: 2020-05-14

## 2020-05-14 NOTE — TELEPHONE ENCOUNTER
Needs to cancel appt this afternoon because he needs a morning appt. Can he be worked in tomorrow in the morning?

## 2020-05-14 NOTE — PROGRESS NOTES
Past Medical History:  Past Medical History:   Diagnosis Date    Acute congestive heart failure ([facility] 12/30/2019    Allergic rhinitis 7/11/2016    Atrial fibrillation ([facility]     under care of cardiology:Dr. Payton Maltese Behrens([facility])    CKD (chronic kidney disease)     Nephro:Dr. Parker:stage 3    Class 2 obesity due to excess calories without serious comorbidity with body mass index (BMI) of 37.0 to 37.9 in adult 11/7/2017    Controlled type 2 diabetes mellitus without complication, without long-term current use of insulin [facility] 2/24/2017    COPD     Gout     Hyperlipidemia, mixed 07/11/2016    Hypertension     under care of cardiology:Dr. Payton Maltese Behrens([facility])    Long term current use of anticoagulants with INR goal of 2.0-3.0     under care of cardiology:Dr. Scott Behrens([facility])    Obstructive sleep apnea syndrome 06/18/2019    per pulmo    Parkinson disease [facility])     9/2016:Per neurologist dx is tremor & not consistent with Parkison's:Dr. Jovany Jurado Prediabetes 10/28/2016    Primary insomnia 1/25/2017    Sleep apnea     CPAP    Stage 2 chronic kidney disease      Past Surgical History:    has a past surgical history that includes Aortic valve replacement (10/1996:[employer]ique); Pacemaker insertion (1996); Atrial ablation surgery (03/23/2020); Atrial ablation surgery (03/23/2020); and Cardiac pacemaker placement (03/23/2020). Social History:  Reviewed. reports that he quit smoking about 10 months ago. His smoking use included cigarettes. He has a 30.00 pack-year smoking history. He has never used smokeless tobacco. He reports current alcohol use. He reports that he does not use drugs. Family History:  Reviewed. family history includes Asthma in his mother; Cancer in his father; No Known Problems in his brother, maternal grandfather, maternal grandmother, paternal grandfather, paternal grandmother, and sister.      Review of System:  · Constitutional: Negative for fever, night sweats, chills, weight changes, or weakness  · Skin: Negative for rash, dry skin, pruritus, bruising, bleeding, blood clots, or changes in skin pigment  · HEENT: Negative for vision changes, ringing in the ears, sore throat, dysphagia, or swollen lymph nodes  · Respiratory: Reviewed in HPI  · Cardiovascular: Reviewed in HPI  · Gastrointestinal: Negative for abdominal pain, N/V/D, constipation, or black/tarry stools  · Genito-Urinary: Negative for dysuria, incontinence, urgency, or hematuria  · Musculoskeletal: Negative for joint swelling, muscle pain, or injuries  · Neurological/Psych: Negative for confusion, seizures, dizziness, headaches, balance issues or TIA-like symptoms. No anxiety, depression, or insomnia    Physical Examination:  Vitals:    05/15/20 1101   BP: 133/86   Pulse: 70      Wt Readings from Last 3 Encounters:   05/15/20 292 lb (132.5 kg)   04/06/20 285 lb (129.3 kg)   03/24/20 281 lb 9.6 oz (127.7 kg)     Constitutional: Cooperative and in no apparent distress, and appears well nourished  Skin: Warm and pink; no pallor, cyanosis, bruising, or clubbing  HEENT: Symmetric and normocephalic. PERRL, EOM intact. + Glasses. Conjunctiva pink with clear sclera. Mucus membranes pink and moist. Teeth intact. Thyroid smooth without nodules or goiter  Respiratory: Respirations symmetric and unlabored. Lungs clear to auscultation bilaterally, no wheezing, rhonchi, or crackles  Cardiovascular:  Regular rate and rhythm. S1/S2 present without murmurs, rubs, or gallops. + Mechanical valve click. Peripheral pulses 2+, capillary refill < 3 seconds. No elevation of JVP. No peripheral edema  Gastrointestinal: Abdomen soft and round. Bowel sounds normoactive in all quadrants without tenderness or masses. + Obese  Musculoskeletal: Bilateral upper and lower extremity strength 5/5 with full ROM. Neurological/Psych: Awake and orientated to person, place and time. Calm affect, appropriate mood.      Pertinent labs, diagnostic,

## 2020-05-15 ENCOUNTER — OFFICE VISIT (OUTPATIENT)
Dept: CARDIOLOGY CLINIC | Age: 70
End: 2020-05-15
Payer: MEDICARE

## 2020-05-15 ENCOUNTER — NURSE ONLY (OUTPATIENT)
Dept: CARDIOLOGY CLINIC | Age: 70
End: 2020-05-15
Payer: MEDICARE

## 2020-05-15 VITALS
DIASTOLIC BLOOD PRESSURE: 86 MMHG | SYSTOLIC BLOOD PRESSURE: 133 MMHG | HEIGHT: 73 IN | HEART RATE: 70 BPM | WEIGHT: 292 LBS | BODY MASS INDEX: 38.7 KG/M2

## 2020-05-15 PROCEDURE — 4040F PNEUMOC VAC/ADMIN/RCVD: CPT | Performed by: NURSE PRACTITIONER

## 2020-05-15 PROCEDURE — 2022F DILAT RTA XM EVC RTNOPTHY: CPT | Performed by: NURSE PRACTITIONER

## 2020-05-15 PROCEDURE — 93296 REM INTERROG EVL PM/IDS: CPT | Performed by: INTERNAL MEDICINE

## 2020-05-15 PROCEDURE — 99214 OFFICE O/P EST MOD 30 MIN: CPT | Performed by: NURSE PRACTITIONER

## 2020-05-15 PROCEDURE — 3044F HG A1C LEVEL LT 7.0%: CPT | Performed by: NURSE PRACTITIONER

## 2020-05-15 PROCEDURE — 93294 REM INTERROG EVL PM/LDLS PM: CPT | Performed by: INTERNAL MEDICINE

## 2020-05-15 PROCEDURE — 93000 ELECTROCARDIOGRAM COMPLETE: CPT | Performed by: NURSE PRACTITIONER

## 2020-05-15 PROCEDURE — 3017F COLORECTAL CA SCREEN DOC REV: CPT | Performed by: NURSE PRACTITIONER

## 2020-05-15 PROCEDURE — 1036F TOBACCO NON-USER: CPT | Performed by: NURSE PRACTITIONER

## 2020-05-15 PROCEDURE — G8417 CALC BMI ABV UP PARAM F/U: HCPCS | Performed by: NURSE PRACTITIONER

## 2020-05-15 PROCEDURE — 1123F ACP DISCUSS/DSCN MKR DOCD: CPT | Performed by: NURSE PRACTITIONER

## 2020-05-15 PROCEDURE — G8427 DOCREV CUR MEDS BY ELIG CLIN: HCPCS | Performed by: NURSE PRACTITIONER

## 2020-05-15 RX ORDER — WARFARIN SODIUM 10 MG/1
TABLET ORAL
Qty: 90 TABLET | Refills: 3 | Status: SHIPPED | OUTPATIENT
Start: 2020-05-15 | End: 2021-01-13

## 2020-05-15 RX ORDER — LISINOPRIL 20 MG/1
20 TABLET ORAL DAILY
Qty: 90 TABLET | Refills: 1 | Status: SHIPPED | OUTPATIENT
Start: 2020-05-15 | End: 2020-08-17 | Stop reason: DRUGHIGH

## 2020-05-15 RX ORDER — METFORMIN HYDROCHLORIDE 500 MG/1
1000 TABLET, EXTENDED RELEASE ORAL 2 TIMES DAILY WITH MEALS
Qty: 60 TABLET | Refills: 0
Start: 2020-05-15 | End: 2020-05-26

## 2020-05-15 NOTE — PROGRESS NOTES
Carelink transmission shows normal sensing and pacing function. Effective Bi-V pacing is very low. EP to review. See interrogation for more details. Optivol is within normal range.

## 2020-05-26 RX ORDER — METFORMIN HYDROCHLORIDE 500 MG/1
TABLET, EXTENDED RELEASE ORAL
Qty: 360 TABLET | Refills: 0 | Status: SHIPPED | OUTPATIENT
Start: 2020-05-26 | End: 2020-08-10

## 2020-05-26 RX ORDER — FUROSEMIDE 20 MG/1
20 TABLET ORAL DAILY
Qty: 90 TABLET | Refills: 0 | Status: SHIPPED | OUTPATIENT
Start: 2020-05-26 | End: 2020-06-16

## 2020-06-02 ENCOUNTER — HOSPITAL ENCOUNTER (OUTPATIENT)
Age: 70
Discharge: HOME | End: 2020-06-02
Payer: MEDICARE

## 2020-06-02 ENCOUNTER — ANTI-COAG VISIT (OUTPATIENT)
Dept: PHARMACY | Age: 70
End: 2020-06-02
Payer: MEDICARE

## 2020-06-02 VITALS — TEMPERATURE: 97.5 F

## 2020-06-02 LAB
A/G RATIO: 1.3 (ref 1.1–2.2)
ALBUMIN SERPL-MCNC: 4.1 G/DL (ref 3.4–5)
ALP BLD-CCNC: 89 U/L (ref 40–129)
ALT SERPL-CCNC: 17 U/L (ref 10–40)
ANION GAP SERPL CALCULATED.3IONS-SCNC: 11 MMOL/L (ref 3–16)
AST SERPL-CCNC: 16 U/L (ref 15–37)
BILIRUB SERPL-MCNC: 0.3 MG/DL (ref 0–1)
BUN BLDV-MCNC: 30 MG/DL (ref 7–20)
CALCIUM SERPL-MCNC: 9.5 MG/DL (ref 8.3–10.6)
CHLORIDE BLD-SCNC: 103 MMOL/L (ref 99–110)
CHOLESTEROL, TOTAL: 159 MG/DL (ref 0–199)
CO2: 25 MMOL/L (ref 21–32)
CREAT SERPL-MCNC: 1.4 MG/DL (ref 0.8–1.3)
ESTIMATED AVERAGE GLUCOSE: 119.8 MG/DL
GFR AFRICAN AMERICAN: >60
GFR NON-AFRICAN AMERICAN: 50
GLOBULIN: 3.2 G/DL
GLUCOSE BLD-MCNC: 118 MG/DL (ref 70–99)
HBA1C MFR BLD: 5.8 %
HDLC SERPL-MCNC: 38 MG/DL (ref 40–60)
INTERNATIONAL NORMALIZATION RATIO, POC: 2
LDL CHOLESTEROL CALCULATED: 87 MG/DL
POTASSIUM SERPL-SCNC: 4.9 MMOL/L (ref 3.5–5.1)
PRO-BNP: 254 PG/ML (ref 0–124)
PROSTATE SPECIFIC ANTIGEN: 1.35 NG/ML (ref 0–4)
SODIUM BLD-SCNC: 139 MMOL/L (ref 136–145)
TOTAL PROTEIN: 7.3 G/DL (ref 6.4–8.2)
TRIGL SERPL-MCNC: 169 MG/DL (ref 0–150)
VLDLC SERPL CALC-MCNC: 34 MG/DL

## 2020-06-02 PROCEDURE — 99211 OFF/OP EST MAY X REQ PHY/QHP: CPT

## 2020-06-02 PROCEDURE — 80053 COMPREHEN METABOLIC PANEL: CPT

## 2020-06-02 PROCEDURE — 85610 PROTHROMBIN TIME: CPT

## 2020-06-02 PROCEDURE — 80061 LIPID PANEL: CPT

## 2020-06-02 PROCEDURE — 83880 ASSAY OF NATRIURETIC PEPTIDE: CPT

## 2020-06-02 PROCEDURE — 83036 HEMOGLOBIN GLYCOSYLATED A1C: CPT

## 2020-06-02 PROCEDURE — 84153 ASSAY OF PSA TOTAL: CPT

## 2020-06-02 PROCEDURE — 36415 COLL VENOUS BLD VENIPUNCTURE: CPT

## 2020-06-04 RX ORDER — PRAVASTATIN SODIUM 80 MG/1
TABLET ORAL
Qty: 90 TABLET | Refills: 0 | Status: SHIPPED | OUTPATIENT
Start: 2020-06-04 | End: 2020-09-08

## 2020-06-23 RX ORDER — BLOOD SUGAR DIAGNOSTIC, DRUM
STRIP MISCELLANEOUS
Qty: 102 STRIP | Refills: 1 | Status: SHIPPED | OUTPATIENT
Start: 2020-06-23

## 2020-06-30 ENCOUNTER — TELEPHONE (OUTPATIENT)
Dept: FAMILY MEDICINE CLINIC | Age: 70
End: 2020-06-30

## 2020-06-30 RX ORDER — BLOOD-GLUCOSE METER
KIT MISCELLANEOUS
Qty: 1 KIT | Refills: 0 | Status: SHIPPED | OUTPATIENT
Start: 2020-06-30 | End: 2020-07-04 | Stop reason: SDUPTHER

## 2020-06-30 RX ORDER — GLUCOSAMINE HCL/CHONDROITIN SU 500-400 MG
CAPSULE ORAL
Qty: 100 STRIP | Refills: 5 | Status: SHIPPED | OUTPATIENT
Start: 2020-06-30 | End: 2020-07-04 | Stop reason: SDUPTHER

## 2020-06-30 RX ORDER — LANCETS 30 GAUGE
EACH MISCELLANEOUS
Qty: 100 EACH | Refills: 3 | Status: SHIPPED | OUTPATIENT
Start: 2020-06-30 | End: 2020-06-30 | Stop reason: SDUPTHER

## 2020-06-30 RX ORDER — LANCETS 30 GAUGE
EACH MISCELLANEOUS
Qty: 100 EACH | Refills: 3 | Status: SHIPPED | OUTPATIENT
Start: 2020-06-30 | End: 2020-07-04 | Stop reason: SDUPTHER

## 2020-06-30 NOTE — TELEPHONE ENCOUNTER
Patient is calling needing a 6 month follow up appointment, and has no way of doing a virtual visit      Patient also stating that he needs a new rx for dm meter and supplies they are no longer making the one he uses. He would like this called into his mail order.   [phone]

## 2020-07-01 ENCOUNTER — ANTI-COAG VISIT (OUTPATIENT)
Dept: PHARMACY | Age: 70
End: 2020-07-01
Payer: MEDICARE

## 2020-07-01 ENCOUNTER — OFFICE VISIT (OUTPATIENT)
Dept: CARDIOLOGY CLINIC | Age: 70
End: 2020-07-01
Payer: MEDICARE

## 2020-07-01 ENCOUNTER — NURSE ONLY (OUTPATIENT)
Dept: CARDIOLOGY CLINIC | Age: 70
End: 2020-07-01
Payer: MEDICARE

## 2020-07-01 VITALS
DIASTOLIC BLOOD PRESSURE: 85 MMHG | SYSTOLIC BLOOD PRESSURE: 132 MMHG | RESPIRATION RATE: 20 BRPM | HEIGHT: 73 IN | BODY MASS INDEX: 38.43 KG/M2 | WEIGHT: 290 LBS

## 2020-07-01 VITALS — TEMPERATURE: 97.5 F

## 2020-07-01 LAB — INTERNATIONAL NORMALIZATION RATIO, POC: 2.7

## 2020-07-01 PROCEDURE — 1036F TOBACCO NON-USER: CPT | Performed by: INTERNAL MEDICINE

## 2020-07-01 PROCEDURE — 99211 OFF/OP EST MAY X REQ PHY/QHP: CPT

## 2020-07-01 PROCEDURE — 1123F ACP DISCUSS/DSCN MKR DOCD: CPT | Performed by: INTERNAL MEDICINE

## 2020-07-01 PROCEDURE — 3017F COLORECTAL CA SCREEN DOC REV: CPT | Performed by: INTERNAL MEDICINE

## 2020-07-01 PROCEDURE — G8427 DOCREV CUR MEDS BY ELIG CLIN: HCPCS | Performed by: INTERNAL MEDICINE

## 2020-07-01 PROCEDURE — 4040F PNEUMOC VAC/ADMIN/RCVD: CPT | Performed by: INTERNAL MEDICINE

## 2020-07-01 PROCEDURE — 85610 PROTHROMBIN TIME: CPT

## 2020-07-01 PROCEDURE — 93281 PM DEVICE PROGR EVAL MULTI: CPT | Performed by: INTERNAL MEDICINE

## 2020-07-01 PROCEDURE — 93000 ELECTROCARDIOGRAM COMPLETE: CPT | Performed by: INTERNAL MEDICINE

## 2020-07-01 PROCEDURE — 99214 OFFICE O/P EST MOD 30 MIN: CPT | Performed by: INTERNAL MEDICINE

## 2020-07-01 PROCEDURE — G8417 CALC BMI ABV UP PARAM F/U: HCPCS | Performed by: INTERNAL MEDICINE

## 2020-07-01 PROCEDURE — 93290 INTERROG DEV EVAL ICPMS IP: CPT | Performed by: INTERNAL MEDICINE

## 2020-07-01 NOTE — PROGRESS NOTES
[address]   [facility] Follow Up  Date: 7/1/2020      CC: Atrial Flutter  HPI: Nicole Muñoz is a 72 y.o.  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 He had worsening of HF and shortness of breath and leg swelling. S/p DCCV 1/2/20 for atrial flutter. 3/23/20 RF ablation of SVT and implantation of BiV pacemaker    He reports feeling the best he has felt in years. His energy levels and breathing have improved. Interval history:  He is feeling well. He is able to do activities without difficulty. He was furloughed up to 3 weeks ago. His Optival is WNL.           Past Medical History:   Diagnosis Date    Acute congestive heart failure ([facility] 12/30/2019    Allergic rhinitis 7/11/2016    Atrial fibrillation ([facility]     under care of cardiology:Dr. Delton Bank Behrens([facility])    CKD (chronic kidney disease)     Nephro:Dr. Parker:stage 3    Class 2 obesity due to excess calories without serious comorbidity with body mass index (BMI) of 37.0 to 37.9 in adult 11/7/2017    Controlled type 2 diabetes mellitus without complication, without long-term current use of insulin ([facility] [facility] 2/24/2017    COPD     Gout     Hyperlipidemia, mixed 07/11/2016    Hypertension     under care of cardiology:Dr. Delton Bank Behrens([facility])    Long term current use of anticoagulants with INR goal of 2.0-3.0     under care of cardiology:Dr. Scott Behrens([facility])    Obstructive sleep apnea syndrome 06/18/2019    per pulmo    Parkinson disease [facility])     9/2016:Per neurologist dx is tremor & not consistent with Parkison's:Dr. Beatriz Rivero Prediabetes 10/28/2016    Primary insomnia 1/25/2017    Sleep apnea     CPAP    Stage 2 chronic kidney disease         Past Surgical History:   Procedure Laterality Date    AORTIC VALVE REPLACEMENT  10/1996:[facility] [employer]    x3    ATRIAL ABLATION SURGERY  03/23/2020    ATRIAL ABLATION SURGERY 03/23/2020    CTI dependent atrial flutter ablation (Dr. Tara Walsh)   [address]  03/23/2020    BIV upgrade    PACEMAKER INSERTION  1996       Allergies:  No Known Allergies    Social History:   reports that he quit smoking about a year ago. His smoking use included cigarettes. He has a 30.00 pack-year smoking history. He has never used smokeless tobacco. He reports current alcohol use. He reports that he does not use drugs. Family History:  family history includes Asthma in his mother; Cancer in his father; No Known Problems in his brother, maternal grandfather, maternal grandmother, paternal grandfather, paternal grandmother, and sister. Reviewed. Denies family history of sudden cardiac death, arrhythmia, premature CAD    Review of System:  All other systems reviewed and are negative except for that noted above. Pertinent negatives are:   General: negative for fever, chills   Ophthalmic ROS: negative for - eye pain or loss of vision  ENT ROS: negative for - headaches, sore throat   Respiratory: negative for - cough, sputum  Cardiovascular: Reviewed in HPI  Gastrointestinal: negative for - abdominal pain, diarrhea, N/V  Hematology: negative for - bleeding, blood clots, bruising or jaundice  Genito-Urinary:  negative for - Dysuria or incontinence  Musculoskeletal: negative for - Joint swelling, muscle pain  Neurological: negative for - confusion, dizziness, headaches   Psychiatric: No anxiety, no depression. Dermatological: negative for - rash    Physical Examination:  Vitals:    07/01/20 1400   BP: 132/85   Resp: 20      Wt Readings from Last 3 Encounters:   07/01/20 290 lb (131.5 kg)   05/15/20 292 lb (132.5 kg)   04/06/20 285 lb (129.3 kg)       · Constitutional: Oriented. No distress. · Head: Normocephalic and atraumatic. · Mouth/Throat: Oropharynx is clear and moist.   · Eyes: Conjunctivae normal. EOM are normal.   · Neck: Neck supple. No rigidity. No JVD present. Ok to dispense what is covered by insurance 100 strip 5    Lancets MISC BID testing. Ok to dispense what is covered by insurance 100 each 3    ACCU-CHEK COMPACT PLUS strip USE TO TEST 2 TIMES DAILY 102 strip 1    furosemide (LASIX) 20 MG tablet TAKE 1 TABLET BY MOUTH EVERY DAY 90 tablet 0    pravastatin (PRAVACHOL) 80 MG tablet TAKE 1 TABLET EVERY DAY FOR CHOLESTEROL 90 tablet 0    metFORMIN (GLUCOPHAGE-XR) 500 MG extended release tablet TAKE 4 TABLETS EVERY DAY WITH BREAKFAST 360 tablet 0    lisinopril (PRINIVIL;ZESTRIL) 20 MG tablet Take 1 tablet by mouth daily 90 tablet 1    warfarin (COUMADIN) 10 MG tablet Take 1 tablet by mouth daily, besides Tuesday take 1.5 tablets (15 mg) 90 tablet 3    allopurinol (ZYLOPRIM) 300 MG tablet TAKE 1 TABLET EVERY DAY 90 tablet 0    Lancets MISC BID testing 100 each 6    sotalol (BETAPACE) 80 MG tablet Take 80 mg by mouth 2 times daily.  aspirin 81 MG chewable tablet Take 81 mg by mouth daily.  montelukast (SINGULAIR) 10 MG tablet Take 1 tablet by mouth every evening 30 tablet 0    glucose monitoring kit (FREESTYLE) monitoring kit 1 each by Does not apply route once for 1 dose Glucometer(patient's choice). BID testing. 1 kit 0     No current facility-administered medications for this visit. Assessment and plan:        - Atrial fibrillation/ flutter               On coumadin              S/p [facility] 1/2/20   Afib risk factors including age, HTN, obesity, inactivity and ROSETTA were discussed with patient. Risk factor modification recommended. All questions were answered. - Treatment options including cardioversion, rate control strategy, antiarrhythmics, anticoagulation and possible ablation were discussed with patient. Risks, benefits and alternative of each treatment options were explained. All questions answered       S/p atrial flutter ablation.  No recurrence   No Atrial fibrillation      - cardiomyopathy              On medical therapy   EF on echo 40-45%             S/p  upgrade to biv PPM       Feels fine     - Permanent pacemaker, Cardiac resynchronization therapy(CRT)                The CIED was interrogated and programmed and I supervised and reviewed all the data. All findings and changes are in device interrogation sheat and reflect my personal interpretation and changes and is scanned to Epic.         - ROSETTA              CPAP     - Obesity   Body mass index is 38.26 kg/m². -Excessive weight is complicating assessment and treatment. It is placing patient at risk for multiple co-morbidities as well as early death and contributing to the patient's presentation. - discussed weight management with diet and exercise      - HTN     Vitals:    07/01/20 1400   BP: 132/85   Resp: 20                 BP is well controlled. Continue current meds.        - CKD              Stable        Plan:    Weight reduction  Echo next time  Stay active  Follow up in 6 months with EP NP    I independently reviewed echo  device check interrogation     Thank you for allowing me to participate in the care of Li Luu. Further evaluation will be based upon the patient's clinical course and testing results. All questions and concerns were addressed to the patient/family. Alternatives to my treatment were discussed. I have discussed the above stated plan and the patient verbalized understanding and agreed with the plan. NOTE: This report was transcribed using voice recognition software. Every effort was made to ensure accuracy, however, inadvertent computerized transcription errors may be present. Nikita Medeiros MD, [address]   Office: [phone]     Scribe attestation:  This note was scribed in the presence of Nikita Medeiros MD by Maye Ye RN    I, Dr. Nikita Medeiros personally performed the services described in this documentation as scribed by RN in my presence, and it is both accurate and complete.

## 2020-07-02 ENCOUNTER — TELEPHONE (OUTPATIENT)
Dept: FAMILY MEDICINE CLINIC | Age: 70
End: 2020-07-02

## 2020-07-02 NOTE — TELEPHONE ENCOUNTER
[address] are having issues with clarifying what needs to be ordered and prescribed for the patient.      [address]: [phone]    [phone] (H  OV: 2/17/2020    Please advise,

## 2020-07-02 NOTE — PROGRESS NOTES
Patient comes in for programming evaluation for his pacemaker. All sensing and pacing parameters are within normal range. Battery life 12.9 years. AP 23.0%.  5.8%. Bi-V 99.3%. Patient parameters are Nonadaptive CRT LV->RV V-V 0 ms Paced  ms Sensed  ms. Patient will only BiV pace when he needs to RV pace. No episodes/events noted. No changes need to be made at this time. Please see interrogation for more detail. Optivol is within normal range. Patient will see Dr. Filiberto Hughes today and follow up in 3 months in office or remotely.

## 2020-07-04 RX ORDER — BLOOD-GLUCOSE METER
KIT MISCELLANEOUS
Qty: 1 KIT | Refills: 0 | Status: SHIPPED | OUTPATIENT
Start: 2020-07-04 | End: 2020-07-07

## 2020-07-04 RX ORDER — LANCETS 30 GAUGE
EACH MISCELLANEOUS
Qty: 100 EACH | Refills: 3 | Status: SHIPPED | OUTPATIENT
Start: 2020-07-04 | End: 2020-07-07

## 2020-07-04 RX ORDER — LANCETS 30 GAUGE
EACH MISCELLANEOUS
Qty: 100 EACH | Refills: 3 | Status: SHIPPED | OUTPATIENT
Start: 2020-07-04 | End: 2020-07-04 | Stop reason: SDUPTHER

## 2020-07-04 RX ORDER — GLUCOSAMINE HCL/CHONDROITIN SU 500-400 MG
CAPSULE ORAL
Qty: 100 STRIP | Refills: 5 | Status: SHIPPED | OUTPATIENT
Start: 2020-07-04

## 2020-07-04 RX ORDER — BLOOD-GLUCOSE METER
KIT MISCELLANEOUS
Qty: 1 KIT | Refills: 0 | Status: SHIPPED | OUTPATIENT
Start: 2020-07-04 | End: 2020-07-04 | Stop reason: SDUPTHER

## 2020-07-04 RX ORDER — GLUCOSAMINE HCL/CHONDROITIN SU 500-400 MG
CAPSULE ORAL
Qty: 100 STRIP | Refills: 5 | Status: SHIPPED | OUTPATIENT
Start: 2020-07-04 | End: 2020-07-04 | Stop reason: SDUPTHER

## 2020-07-07 ENCOUNTER — OFFICE VISIT (OUTPATIENT)
Dept: FAMILY MEDICINE CLINIC | Age: 70
End: 2020-07-07
Payer: MEDICARE

## 2020-07-07 VITALS
RESPIRATION RATE: 16 BRPM | TEMPERATURE: 98.1 F | DIASTOLIC BLOOD PRESSURE: 82 MMHG | BODY MASS INDEX: 38.04 KG/M2 | HEART RATE: 80 BPM | SYSTOLIC BLOOD PRESSURE: 131 MMHG | WEIGHT: 287 LBS | OXYGEN SATURATION: 97 % | HEIGHT: 73 IN

## 2020-07-07 PROCEDURE — 3017F COLORECTAL CA SCREEN DOC REV: CPT | Performed by: FAMILY MEDICINE

## 2020-07-07 PROCEDURE — G8427 DOCREV CUR MEDS BY ELIG CLIN: HCPCS | Performed by: FAMILY MEDICINE

## 2020-07-07 PROCEDURE — 99214 OFFICE O/P EST MOD 30 MIN: CPT | Performed by: FAMILY MEDICINE

## 2020-07-07 PROCEDURE — 2022F DILAT RTA XM EVC RTNOPTHY: CPT | Performed by: FAMILY MEDICINE

## 2020-07-07 PROCEDURE — G8926 SPIRO NO PERF OR DOC: HCPCS | Performed by: FAMILY MEDICINE

## 2020-07-07 PROCEDURE — 4040F PNEUMOC VAC/ADMIN/RCVD: CPT | Performed by: FAMILY MEDICINE

## 2020-07-07 PROCEDURE — 1036F TOBACCO NON-USER: CPT | Performed by: FAMILY MEDICINE

## 2020-07-07 PROCEDURE — 3023F SPIROM DOC REV: CPT | Performed by: FAMILY MEDICINE

## 2020-07-07 PROCEDURE — 3044F HG A1C LEVEL LT 7.0%: CPT | Performed by: FAMILY MEDICINE

## 2020-07-07 PROCEDURE — G8417 CALC BMI ABV UP PARAM F/U: HCPCS | Performed by: FAMILY MEDICINE

## 2020-07-07 PROCEDURE — 1123F ACP DISCUSS/DSCN MKR DOCD: CPT | Performed by: FAMILY MEDICINE

## 2020-07-07 ASSESSMENT — ENCOUNTER SYMPTOMS
CHEST TIGHTNESS: 0
NAUSEA: 0
ABDOMINAL PAIN: 0
SHORTNESS OF BREATH: 0
VOMITING: 0
CONSTIPATION: 0
ABDOMINAL DISTENTION: 0
RECTAL PAIN: 0
ANAL BLEEDING: 0
DIARRHEA: 0
STRIDOR: 0
COUGH: 0
WHEEZING: 0
BLOOD IN STOOL: 0
APNEA: 0
CHOKING: 0

## 2020-07-07 NOTE — PROGRESS NOTES
Subjective:      Patient ID: Srini Palacio is a 72 y.o. male. Results for Merced Randolph \"BILL\" (MRN [id number]) as of 7/7/2020 14:08   Ref. Range 6/2/2020 08:55   Sodium Latest Ref Range: 136 - 145 mmol/L 139   Potassium Latest Ref Range: 3.5 - 5.1 mmol/L 4.9   Chloride Latest Ref Range: 99 - 110 mmol/L 103   CO2 Latest Ref Range: 21 - 32 mmol/L 25   BUN Latest Ref Range: 7 - 20 mg/dL 30 (H)   Creatinine Latest Ref Range: 0.8 - 1.3 mg/dL 1.4 (H)   Anion Gap Latest Ref Range: 3 - 16  11   GFR Non- Latest Ref Range: >60  50 (A)   GFR  Latest Ref Range: >60  >60   Glucose Latest Ref Range: 70 - 99 mg/dL 118 (H)   Calcium Latest Ref Range: 8.3 - 10.6 mg/dL 9.5   Total Protein Latest Ref Range: 6.4 - 8.2 g/dL 7.3   Pro-BNP Latest Ref Range: 0 - 124 pg/mL 254 (H)   Cholesterol, Total Latest Ref Range: 0 - 199 mg/dL 159   HDL Cholesterol Latest Ref Range: 40 - 60 mg/dL 38 (L)   LDL Calculated Latest Ref Range: <100 mg/dL 87   Triglycerides Latest Ref Range: 0 - 150 mg/dL 169 (H)   VLDL Cholesterol Calculated Latest Ref Range: Not Established mg/dL 34   Albumin Latest Ref Range: 3.4 - 5.0 g/dL 4.1   Globulin Latest Units: g/dL 3.2   Albumin/Globulin Ratio Latest Ref Range: 1.1 - 2.2  1.3   Alk Phos Latest Ref Range: 40 - 129 U/L 89   ALT Latest Ref Range: 10 - 40 U/L 17   AST Latest Ref Range: 15 - 37 U/L 16   Bilirubin Latest Ref Range: 0.0 - 1.0 mg/dL 0.3   Hemoglobin A1C Latest Ref Range: See comment % 5.8   eAG (mg/dL) Latest Units: mg/dL 119.8   Prostatic Specific Ag Latest Ref Range: 0.00 - 4.00 ng/mL 1.35           Seen by cardiology on 7/1/20:office note via EPIC reviewed today. No new associated concerns. Diabetes check:doing well. Takes 2000mg metformin w/o side effects. Preprandial-fasting BS=95-120s. 2hr postprandial BS:130-140s. HBA1c: 6.6 on 2/13/17. 6.3 on 3/31/17. 6.4 on 11/18/17. 7.1 on 3/22/18. 7.2 on 8/30/18. 7.3 on 12/3/18. 7.4 on 2/7/19.  7.0 on 5/3/19. 6.3 on 8/15/19. 6.3 on 1/7/20. 5.8 on 6/2/20. ACEI/ARB:Yes  Checks feet daily:yes. Diabetes eye check appt current(within 1year):yes. Improving factor:diet & exercise regime. Episodes of hypoglycemia:No   ASA:Yes. LDL <100:no. 113 on 3/31/17. 87 on 6/7/17. 83 on 9/15/17. 69 om 12/15/17. 71 on 3/22/18. 82 on 8/30/18. 67 on 2/7/19. 89 on 5/4/19. 69 on 8/15/19. 68 on 11/4/19. 88 on 1/7/20. 87 on 6/2/20. No other new associated or worsening factors. Denies polyuria/polyphagia/polydipsia/BLE skin lesions/BLE paresthesia. HTN check:doing well. Takes medication w/o side effects. Associated w/nothing new. Worsened by nothing else new. Improves with his medications & low salt diet. Adds salt to food at the table:No.  Denies cp/sob/pnd/ankle edema/dizziness. CKD:Sees nephro:see labs above. Reports doing well. Denies urinary complaints/abdo pain/yugpqij-rjyjyiekg-dhzujbf/decreased urinary flow/sensation of incomplete voiding/excessive NSAIDs use. Moderate insomnia: doing well. Sleeping around 6-8hrs nightly.   Associated w/nothing new. Worsened(aggravated) by nothing new. Improving factor:taking med prn. Denies snoring/hallucinations/depression/SI/HI. COPD check:breathing well per his baseline;takes meds w/o any side effects. No other associated concerns. Albuterol inhaler use is approx 1xday.    Malave Burrs well. See labs above. Taking statin w/o side effects. Associated w/nothing new. Improving factors:medication & will address diet to improve TG. Worsening factors:nothing new. Denies adbo pain/myalgias.      ROSETTA:per sleep specialist.  No new associated concerns. Gout of both feet: doing well. No recent recurrences reported. Takes allopurinol w/o side effects. Last gout episode >5.5yrs ago. No other associated concerns. Denies bilateral foot skin lesions/foot udelgddzigd-rmreowho-qppfenibh/pus.      No Known Allergies    Current Outpatient Medications on File Prior to Visit   Medication Sig Dispense Refill    montelukast (SINGULAIR) 10 MG tablet Take 1 tablet by mouth every evening 30 tablet 0    glucose monitoring kit (FREESTYLE) monitoring kit 1 each by Does not apply route once for 1 dose Glucometer(patient's choice). BID testing. 1 kit 0    blood glucose monitor strips Test 2 times a day & as needed for symptoms of irregular blood glucose. Dispense sufficient amount for indicated testing frequency plus additional to accommodate PRN testing needs. Ok to dispense what is covered by insurance 100 strip 5    ACCU-CHEK COMPACT PLUS strip USE TO TEST 2 TIMES DAILY 102 strip 1    furosemide (LASIX) 20 MG tablet TAKE 1 TABLET BY MOUTH EVERY DAY 90 tablet 0    pravastatin (PRAVACHOL) 80 MG tablet TAKE 1 TABLET EVERY DAY FOR CHOLESTEROL 90 tablet 0    metFORMIN (GLUCOPHAGE-XR) 500 MG extended release tablet TAKE 4 TABLETS EVERY DAY WITH BREAKFAST 360 tablet 0    lisinopril (PRINIVIL;ZESTRIL) 20 MG tablet Take 1 tablet by mouth daily 90 tablet 1    warfarin (COUMADIN) 10 MG tablet Take 1 tablet by mouth daily, besides Tuesday take 1.5 tablets (15 mg) 90 tablet 3    allopurinol (ZYLOPRIM) 300 MG tablet TAKE 1 TABLET EVERY DAY 90 tablet 0    Lancets MISC BID testing 100 each 6    sotalol (BETAPACE) 80 MG tablet Take 80 mg by mouth 2 times daily.  aspirin 81 MG chewable tablet Take 81 mg by mouth daily. No current facility-administered medications on file prior to visit.         Past Medical History:   Diagnosis Date    Acute congestive heart failure ([facility] [address] 12/30/2019    Allergic rhinitis 7/11/2016    Atrial fibrillation (HCC)     under care of cardiology:Dr. Reynaldo Heller Behrens([facility])    CKD (chronic kidney disease)     Nephro:Dr. Parker:stage 3    Class 2 obesity due to excess calories without serious comorbidity with body mass index (BMI) of 37.0 to 37.9 in adult 11/7/2017    Controlled type 2 diabetes mellitus without complication, without long-term current use of insulin ([facility] [address] 2/24/2017    COPD     Gout     Hyperlipidemia, mixed 07/11/2016    Hypertension     under care of cardiology:Dr. Levert Gaskin Behrens([facility])    Long term current use of anticoagulants with INR goal of 2.0-3.0     under care of cardiology:Dr. Levert Gaskin Behrens([facility])    Obstructive sleep apnea syndrome 06/18/2019    per pulmo    Parkinson disease [facility])     9/2016:Per neurologist dx is tremor & not consistent with Parkison's:Dr. Paddy Carolina Prediabetes 10/28/2016    Primary insomnia 1/25/2017    Sleep apnea     CPAP    Stage 2 chronic kidney disease            Social History     Tobacco Use    Smoking status: Former Smoker     Packs/day: 1.00     Years: 30.00     Pack years: 30.00     Types: Cigarettes    Smokeless tobacco: Never Used    Tobacco comment: working on reducing   Substance Use Topics    Alcohol use: Yes     Comment: rarely    Drug use: No     Social History     Substance and Sexual Activity   Drug Use No               Review of Systems   Constitutional: Negative for activity change, appetite change, chills, diaphoresis, fatigue, fever and unexpected weight change. Eyes: Negative for visual disturbance. Respiratory: Negative for apnea, cough, choking, chest tightness, shortness of breath, wheezing and stridor. Cardiovascular: Negative for chest pain, palpitations and leg swelling. Gastrointestinal: Negative for abdominal distention, abdominal pain, anal bleeding, blood in stool, constipation, diarrhea, nausea, rectal pain and vomiting. Endocrine: Negative for cold intolerance, heat intolerance, polydipsia, polyphagia and polyuria. Genitourinary: Negative for difficulty urinating. Skin: Negative for pallor, rash and wound. Neurological: Negative for dizziness, weakness and light-headedness. Hematological: Negative for adenopathy.    Psychiatric/Behavioral: Negative for agitation, behavioral problems, confusion, decreased concentration, dysphoric mood, hallucinations, self-injury, sleep disturbance and suicidal ideas. The patient is not nervous/anxious and is not hyperactive. Objective:   Physical Exam  Constitutional:       General: He is not in acute distress. Appearance: Normal appearance. He is well-developed. He is not diaphoretic. HENT:      Nose: Nose normal.      Mouth/Throat:      Pharynx: Uvula midline. Eyes:      General: Lids are normal.      Conjunctiva/sclera: Conjunctivae normal.      Pupils: Pupils are equal, round, and reactive to light. Neck:      Musculoskeletal: Normal range of motion and neck supple. Trachea: Trachea normal.   Cardiovascular:      Rate and Rhythm: Normal rate and regular rhythm. Pulses: Normal pulses. Heart sounds: Normal heart sounds. Comments: No bilateral leg-ankle edema. Pulmonary:      Effort: Pulmonary effort is normal.      Breath sounds: Normal breath sounds and air entry. Abdominal:      General: Bowel sounds are normal. There is no distension. Palpations: Abdomen is soft. There is no hepatomegaly or mass. Tenderness: There is no abdominal tenderness. Musculoskeletal:      Right foot: Normal.      Left foot: Normal.   Lymphadenopathy:      Cervical: No cervical adenopathy. Skin:     General: Skin is warm and dry. Capillary Refill: Capillary refill takes less than 2 seconds. Coloration: Skin is not pale. Findings: No rash. Comments: Good skin turgor. Neurological:      Mental Status: He is alert and oriented to person, place, and time. Psychiatric:         Attention and Perception: Attention and perception normal. He is attentive. He does not perceive auditory or visual hallucinations. Mood and Affect: Mood and affect normal. Mood is not anxious, depressed or elated. Affect is not labile, blunt, flat, angry, tearful or inappropriate. Speech: Speech normal. He is communicative.  Speech is not rapid and pressured, delayed, slurred or tangential.         Behavior: Behavior normal. Behavior is not agitated, slowed, aggressive, withdrawn, hyperactive or combative. Behavior is cooperative. Thought Content: Thought content normal. Thought content is not paranoid or delusional. Thought content does not include homicidal or suicidal ideation. Cognition and Memory: Cognition and memory normal.         Judgment: Judgment normal.      Comments: Good eye contact. Assessment:      Diagnosis Orders   1. Essential hypertension  VSS/well appearing. Stable. Low salt diet advised. Comprehensive Metabolic Panel   2. Controlled type 2 diabetes mellitus without complication, without long-term current use of insulin (HCC)  Stable. Comprehensive Metabolic Panel    Hemoglobin A1C in 6mos. Diabetes:Check feet daily. Yrly eye checks advised. Education: Reviewed ABCs of diabetes management (respective goals in parentheses):  A1C (<7), blood pressure (<130/80), and cholesterol (LDL <100). Counseled at this visit on the following: diabetes complication prevention, foot care. Microalbumin / Creatinine Urine Ratio   3. Hyperlipidemia LDL goal <100  Stable/controlled except TG & HDL. The patient is asked to make an attempt to improve diet and exercise patterns to aid in medical management of this problem. Medication continued. Comprehensive Metabolic Panel    Lipid Panel   4. Stage 3 chronic kidney disease (HCC)  Stable. Per nephro. Comprehensive Metabolic Panel   5. COPD  Stable. 6. Primary insomnia  Stable. 7. Sleep apnea  Stable. Per specialist.     8. Long term current use of anticoagulants with INR goal of 2.0-3.0  Per specialist.     9. Typical atrial flutter ([address]  Per cards. 10. Permanent cardiac pacemaker  Per cards. 11. Cardiomyopathy, dilated ([address]  Per cards. 12. Non morbid obesity  Diet & exercise regime reviewed. 15. Former smoker  CT Lung Screen (Initial or Annual)   14.

## 2020-07-07 NOTE — PATIENT INSTRUCTIONS
Patient Education        Learning About Diabetes Food Guidelines  Your Care Instructions     Meal planning is important to manage diabetes. It helps keep your blood sugar at a target level (which you set with your doctor). You don't have to eat special foods. You can eat what your family eats, including sweets once in a while. But you do have to pay attention to how often you eat and how much you eat of certain foods. You may want to work with a dietitian or a certified diabetes educator (CDE) to help you plan meals and snacks. A dietitian or CDE can also help you lose weight if that is one of your goals. What should you know about eating carbs? Managing the amount of carbohydrate (carbs) you eat is an important part of healthy meals when you have diabetes. Carbohydrate is found in many foods. · Learn which foods have carbs. And learn the amounts of carbs in different foods. ? Bread, cereal, pasta, and rice have about 15 grams of carbs in a serving. A serving is 1 slice of bread (1 ounce), ½ cup of cooked cereal, or 1/3 cup of cooked pasta or rice. ? Fruits have 15 grams of carbs in a serving. A serving is 1 small fresh fruit, such as an apple or orange; ½ of a banana; ½ cup of cooked or canned fruit; ½ cup of fruit juice; 1 cup of melon or raspberries; or 2 tablespoons of dried fruit. ? Milk and no-sugar-added yogurt have 15 grams of carbs in a serving. A serving is 1 cup of milk or 2/3 cup of no-sugar-added yogurt. ? Starchy vegetables have 15 grams of carbs in a serving. A serving is ½ cup of mashed potatoes or sweet potato; 1 cup winter squash; ½ of a small baked potato; ½ cup of cooked beans; or ½ cup cooked corn or green peas. · Learn how much carbs to eat each day and at each meal. A dietitian or CDE can teach you how to keep track of the amount of carbs you eat. This is called carbohydrate counting. · If you are not sure how to count carbohydrate grams, use the Plate Method to plan meals.  It is a good, quick way to make sure that you have a balanced meal. It also helps you spread carbs throughout the day. ? Divide your plate by types of foods. Put non-starchy vegetables on half the plate, meat or other protein food on one-quarter of the plate, and a grain or starchy vegetable in the final quarter of the plate. To this you can add a small piece of fruit and 1 cup of milk or yogurt, depending on how many carbs you are supposed to eat at a meal.  · Try to eat about the same amount of carbs at each meal. Do not \"save up\" your daily allowance of carbs to eat at one meal.  · Proteins have very little or no carbs per serving. Examples of proteins are beef, chicken, turkey, fish, eggs, tofu, cheese, cottage cheese, and peanut butter. A serving size of meat is 3 ounces, which is about the size of a deck of cards. Examples of meat substitute serving sizes (equal to 1 ounce of meat) are 1/4 cup of cottage cheese, 1 egg, 1 tablespoon of peanut butter, and ½ cup of tofu. How can you eat out and still eat healthy? · Learn to estimate the serving sizes of foods that have carbohydrate. If you measure food at home, it will be easier to estimate the amount in a serving of restaurant food. · If the meal you order has too much carbohydrate (such as potatoes, corn, or baked beans), ask to have a low-carbohydrate food instead. Ask for a salad or green vegetables. · If you use insulin, check your blood sugar before and after eating out to help you plan how much to eat in the future. · If you eat more carbohydrate at a meal than you had planned, take a walk or do other exercise. This will help lower your blood sugar. What else should you know? · Limit saturated fat, such as the fat from meat and dairy products. This is a healthy choice because people who have diabetes are at higher risk of heart disease. So choose lean cuts of meat and nonfat or low-fat dairy products.  Use olive or canola oil instead of butter or shortening when cooking. · Don't skip meals. Your blood sugar may drop too low if you skip meals and take insulin or certain medicines for diabetes. · Check with your doctor before you drink alcohol. Alcohol can cause your blood sugar to drop too low. Alcohol can also cause a bad reaction if you take certain diabetes medicines. Follow-up care is a key part of your treatment and safety. Be sure to make and go to all appointments, and call your doctor if you are having problems. It's also a good idea to know your test results and keep a list of the medicines you take. Where can you learn more? Go to [email or web address] and sign in to your [employer] account. Enter U207 in the [employer] box to learn more about \"Learning About Diabetes Food Guidelines. \"     If you do not have an account, please click on the \"Sign Up Now\" link. Current as of: December 20, 2019               Content Version: 12.5  © 1195-1510 [employer]. Care instructions adapted under license by [facility] ([facility]). If you have questions about a medical condition or this instruction, always ask your healthcare professional. [place] [address] any warranty or liability for your use of this information. Patient Education        Patient Education        High Cholesterol: Care Instructions  Your Care Instructions     Cholesterol is a type of fat in your blood. It is needed for many body functions, such as making new cells. Cholesterol is made by your body. It also comes from food you eat. High cholesterol means that you have too much of the fat in your blood. This raises your risk of a heart attack and stroke. LDL and HDL are part of your total cholesterol. LDL is the \"bad\" cholesterol. High LDL can raise your risk for heart disease, heart attack, and stroke. HDL is the \"good\" cholesterol. It helps clear bad cholesterol from the body.  High HDL is linked with a lower risk of heart disease, heart attack, and stroke. Your cholesterol levels help your doctor find out your risk for having a heart attack or stroke. You and your doctor can talk about whether you need to lower your risk and what treatment is best for you. A heart-healthy lifestyle along with medicines can help lower your cholesterol and your risk. The way you choose to lower your risk will depend on how high your risk is for heart attack and stroke. It will also depend on how you feel about taking medicines. Follow-up care is a key part of your treatment and safety. Be sure to make and go to all appointments, and call your doctor if you are having problems. It's also a good idea to know your test results and keep a list of the medicines you take. How can you care for yourself at home? · Eat a variety of foods every day. Good choices include fruits, vegetables, whole grains (like oatmeal), dried beans and peas, nuts and seeds, soy products (like tofu), and fat-free or low-fat dairy products. · Replace butter, margarine, and hydrogenated or partially hydrogenated oils with olive and canola oils. (Canola oil margarine without trans fat is fine.)  · Replace red meat with fish, poultry, and soy protein (like tofu). · Limit processed and packaged foods like chips, crackers, and cookies. · Bake, broil, or steam foods. Don't hampton them. · Be physically active. Get at least 30 minutes of exercise on most days of the week. Walking is a good choice. You also may want to do other activities, such as running, swimming, cycling, or playing tennis or team sports. · Stay at a healthy weight or lose weight by making the changes in eating and physical activity listed above. Losing just a small amount of weight, even 5 to 10 pounds, can reduce your risk for having a heart attack or stroke. · Do not smoke. When should you call for help?   Watch closely for changes in your health, and be sure to contact your doctor if:  · You need help making lifestyle

## 2020-07-09 ENCOUNTER — TELEPHONE (OUTPATIENT)
Dept: FAMILY MEDICINE CLINIC | Age: 70
End: 2020-07-09

## 2020-07-09 RX ORDER — BLOOD-GLUCOSE METER
KIT MISCELLANEOUS
Qty: 1 KIT | Refills: 0 | Status: SHIPPED | OUTPATIENT
Start: 2020-07-09

## 2020-07-09 RX ORDER — GLUCOSAMINE HCL/CHONDROITIN SU 500-400 MG
CAPSULE ORAL
Qty: 100 STRIP | Refills: 3 | Status: SHIPPED | OUTPATIENT
Start: 2020-07-09 | End: 2020-10-29

## 2020-07-09 RX ORDER — LANCETS 30 GAUGE
EACH MISCELLANEOUS
Qty: 100 EACH | Refills: 3 | Status: SHIPPED | OUTPATIENT
Start: 2020-07-09 | End: 2020-10-29

## 2020-07-09 NOTE — TELEPHONE ENCOUNTER
Patient needs all DM testing supplies to be sent to [facility] order and not a local pharmacy please resend all testing supply    Please advise  Shakila Sanchez [phone] (home)

## 2020-07-21 ENCOUNTER — HOSPITAL ENCOUNTER (OUTPATIENT)
Dept: CT IMAGING | Age: 70
Discharge: HOME | End: 2020-07-21
Payer: MEDICARE

## 2020-07-21 PROCEDURE — G0297 LDCT FOR LUNG CA SCREEN: HCPCS

## 2020-07-29 ENCOUNTER — ANTI-COAG VISIT (OUTPATIENT)
Dept: PHARMACY | Age: 70
End: 2020-07-29
Payer: MEDICARE

## 2020-07-29 VITALS — TEMPERATURE: 97.4 F

## 2020-07-29 LAB — INTERNATIONAL NORMALIZATION RATIO, POC: 1.5

## 2020-07-29 PROCEDURE — 99212 OFFICE O/P EST SF 10 MIN: CPT

## 2020-07-29 PROCEDURE — 85610 PROTHROMBIN TIME: CPT

## 2020-07-29 NOTE — PROGRESS NOTES
consistent diet.   Recheck INR again in 2 weeks on 8/12    Referring physician is Dr. Franklyn Isaacs  INR (no units)   Date Value   07/29/2020 1.5   07/01/2020 2.7   06/02/2020 2   05/05/2020 2.17 (H)   04/14/2020 2.43 (H)   03/25/2020 2   03/24/2020 1.45 (H)   03/23/2020 1.60 (H)       CLINICAL PHARMACY CONSULT: MED RECONCILIATION/REVIEW ADDENDUM    For Pharmacy Admin Tracking Only    PHSO: Yes  Total # of Interventions Recommended: 1  - Increased Dose #: 1  - Maintenance Safety Lab Monitoring #: 1  Total Interventions Accepted: 1  Time Spent (min): 15    Marylene Self, PharmD

## 2020-07-30 RX ORDER — ALLOPURINOL 300 MG/1
TABLET ORAL
Qty: 90 TABLET | Refills: 0 | Status: SHIPPED | OUTPATIENT
Start: 2020-07-30 | End: 2020-10-28

## 2020-08-10 RX ORDER — METFORMIN HYDROCHLORIDE 500 MG/1
TABLET, EXTENDED RELEASE ORAL
Qty: 360 TABLET | Refills: 0 | Status: SHIPPED | OUTPATIENT
Start: 2020-08-10 | End: 2020-11-30

## 2020-08-12 ENCOUNTER — ANTI-COAG VISIT (OUTPATIENT)
Dept: PHARMACY | Age: 70
End: 2020-08-12
Payer: MEDICARE

## 2020-08-12 VITALS — TEMPERATURE: 97.5 F

## 2020-08-12 LAB — INTERNATIONAL NORMALIZATION RATIO, POC: 3.3

## 2020-08-12 PROCEDURE — 85610 PROTHROMBIN TIME: CPT

## 2020-08-12 PROCEDURE — 99211 OFF/OP EST MAY X REQ PHY/QHP: CPT

## 2020-08-12 NOTE — PROGRESS NOTES
Mr. Yuri Herrera is a 72 y.o. y/o male with history of Atrial Fibrillation who presents today for anticoagulation monitoring and adjustment. Pt is retired and works part time at [employer]. Patient reports he has been on warfarin for almost 30 years now and was managed by his cardiologist who recently retired. Pt was then managed by Dr. Cate Singleton prior to being established in Clinic. He is now enrolled in the clinic under Dr. Kiesha Kemp. He states he has been taking   15 mg every Sun, Tue and Fri; and 10 mg all other days. Pertinent PMH: HTN, DM, HLD, COPD, CHF    Patient Reported Findings:  Yes     No  [x]   []       Patient verifies current dosing regimen as listed  []   [x]       S/S bleeding/bruising/swelling/SOB -denies    []   [x]       Blood in urine or stool- denies  []   [x]       Procedures scheduled in the future at this time -   []   [x]       Missed Dose-denies   []   [x]       Extra Dose - denies  []   [x]       Change in medications- denies---> taking tylenol recently   [x]   []       Change in health/diet/appetite - reports he eats about 2 meals/day. And states he will have salads about 1-2x/wk. (not a big fan of spinach or brocolli but may have other greens)--> Patient states 3 salads in past 2 weeks---> ate salad once since last visit---> no greens, wants to add green beans in twice weekly, no NVD   []   [x]       Change in alcohol use - reports occasionally drinking beer (once every few months) but happens very infrequently. --> Patient reports no alcohol in past two weeks---> denies   []   [x]       Change in activity  []   [x]       Hospital admission  []   [x]       Emergency department visit  []   [x]       Other complaints     Clinical Outcomes:  Yes     No  []   [x]       Major bleeding event  []   [x]       Thromboembolic event    Duration of warfarin Therapy: Indefinite   INR Range:  2.0-3.0     INR today is 3.3  Dose increased at last visit.  Pt states no greens but wants to add them in twice weekly. States had some tylenol recently as well. Since adding greens in will continue on increased dose but dec today's. Take 10mg tonight then continue taking weekly dose of 10 mg Mon, Thurs and Sat and 15 mg all other days. Encouraged patient to maintain a consistent diet.   Recheck INR again in 3 weeks on 9/1    Referring physician is Dr. María Elena Brand  INR (no units)   Date Value   08/12/2020 3.3   07/29/2020 1.5   07/01/2020 2.7   06/02/2020 2   05/05/2020 2.17 (H)   04/14/2020 2.43 (H)   03/24/2020 1.45 (H)   03/23/2020 1.60 (H)       CLINICAL PHARMACY CONSULT: MED RECONCILIATION/REVIEW ADDENDUM    For Pharmacy Admin Tracking Only    PHSO: Yes  Total # of Interventions Recommended: 1  - Decreased Dose #: 1  - Maintenance Safety Lab Monitoring #: 1  Total Interventions Accepted: 1  Time Spent (min): [address] Chago Cabral, PharmD

## 2020-08-17 ENCOUNTER — OFFICE VISIT (OUTPATIENT)
Dept: CARDIOLOGY CLINIC | Age: 70
End: 2020-08-17
Payer: MEDICARE

## 2020-08-17 VITALS
SYSTOLIC BLOOD PRESSURE: 132 MMHG | DIASTOLIC BLOOD PRESSURE: 80 MMHG | WEIGHT: 293 LBS | HEIGHT: 73 IN | HEART RATE: 83 BPM | OXYGEN SATURATION: 94 % | BODY MASS INDEX: 38.83 KG/M2

## 2020-08-17 PROCEDURE — 3017F COLORECTAL CA SCREEN DOC REV: CPT | Performed by: CLINICAL NURSE SPECIALIST

## 2020-08-17 PROCEDURE — 1036F TOBACCO NON-USER: CPT | Performed by: CLINICAL NURSE SPECIALIST

## 2020-08-17 PROCEDURE — 1123F ACP DISCUSS/DSCN MKR DOCD: CPT | Performed by: CLINICAL NURSE SPECIALIST

## 2020-08-17 PROCEDURE — 99214 OFFICE O/P EST MOD 30 MIN: CPT | Performed by: CLINICAL NURSE SPECIALIST

## 2020-08-17 PROCEDURE — G8417 CALC BMI ABV UP PARAM F/U: HCPCS | Performed by: CLINICAL NURSE SPECIALIST

## 2020-08-17 PROCEDURE — G8427 DOCREV CUR MEDS BY ELIG CLIN: HCPCS | Performed by: CLINICAL NURSE SPECIALIST

## 2020-08-17 PROCEDURE — 93290 INTERROG DEV EVAL ICPMS IP: CPT | Performed by: CLINICAL NURSE SPECIALIST

## 2020-08-17 PROCEDURE — 4040F PNEUMOC VAC/ADMIN/RCVD: CPT | Performed by: CLINICAL NURSE SPECIALIST

## 2020-08-17 RX ORDER — LISINOPRIL 20 MG/1
30 TABLET ORAL DAILY
Qty: 120 TABLET | Refills: 0 | Status: SHIPPED | OUTPATIENT
Start: 2020-08-17 | End: 2020-09-02

## 2020-08-17 NOTE — PATIENT INSTRUCTIONS
1.  Increase lisinopril to 30 mg once a day  2. Check blood work in 2 weeks  3. Check echo with appt in 4 months  4.   Continue all other medications

## 2020-08-17 NOTE — PROGRESS NOTES
[address]  Progress Note    Primary Care Doctor:  Santiago Tillman MD    Chief Complaint   Patient presents with    Follow-up     4 month f/u    Congestive Heart Failure    Cardiomyopathy    Shortness of Breath        History of Present Illness:  72 y.o. 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 He follows with Dr Roxanna Riojas with [facility]. 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 He follows with Dr Alejandro Angel   12/30-1/2/2020 for worsening heart failure, sob and leg swelling. He was found to AFlutter and CV 1/2/20. BiV lead implant 3/23/2020    I had the pleasure of seeing Gissellelizapriscilla Wilson in follow up for sHF. He is ambulatory and by his self today. He complains that his SOB due to his COPD. His optival is with normal impedence. His weight is up 6 pounds. His BP is up some today. He denies any edema, palpitations, lightheadedness or chest pain. He is faithful in wearing cpap. Past Medical History:   has a past medical history of Acute congestive heart failure ([address] Allergic rhinitis, Atrial fibrillation ([address] CKD (chronic kidney disease), Class 2 obesity due to excess calories without serious comorbidity with body mass index (BMI) of 37.0 to 37.9 in adult, Controlled type 2 diabetes mellitus without complication, without long-term current use of insulin ([address] COPD, Gout, Hyperlipidemia, mixed, Hypertension, Long term current use of anticoagulants with INR goal of 2.0-3.0, Obstructive sleep apnea syndrome, Parkinson disease ([address] Prediabetes, Primary insomnia, Sleep apnea, and Stage 2 chronic kidney disease. Surgical History:   has a past surgical history that includes Aortic valve replacement (10/1996:[facility]); Pacemaker insertion (1996); Atrial ablation surgery (03/23/2020); Atrial ablation surgery (03/23/2020); and Cardiac pacemaker placement (03/23/2020).    Social History:   reports that he tablet TAKE 1 TABLET BY MOUTH EVERY DAY 6/16/20  Yes Zoraida Lawrence, APRN - CNS   pravastatin (PRAVACHOL) 80 MG tablet TAKE 1 TABLET EVERY DAY FOR CHOLESTEROL 6/4/20  Yes Samantha Atkins MD   warfarin (COUMADIN) 10 MG tablet Take 1 tablet by mouth daily, besides Tuesday take 1.5 tablets (15 mg)  Patient taking differently: Take 1 tablet by mouth daily, besides Tuesday, Friday and [place] take 1.5 tablets (15 mg) 5/15/20  Yes MEENU Méndez - CNP   glucose monitoring kit (FREESTYLE) monitoring kit 1 each by Does not apply route once for 1 dose Glucometer(patient's choice). BID testing. 2/24/17 8/17/20 Yes Samantha Atkins MD   Lancets MISC BID testing 2/24/17  Yes Samantha Atkins MD   sotalol (BETAPACE) 80 MG tablet Take 80 mg by mouth 2 times daily. Yes Historical Provider, MD   aspirin 81 MG chewable tablet Take 81 mg by mouth daily. Yes Historical Provider, MD        Allergies:  Patient has no known allergies. Review of Systems:   · Constitutional: there has been no unanticipated weight loss. There's been no change in energy level, sleep pattern, or activity level. · Eyes: No visual changes or diplopia. No scleral icterus. · ENT: No Headaches, hearing loss or vertigo. No mouth sores or sore throat. · Cardiovascular: Reviewed in HPI  · Respiratory: No cough or wheezing, no sputum production. No hematemesis. · Gastrointestinal: No abdominal pain, appetite loss, blood in stools. No change in bowel or bladder habits. · Genitourinary: No dysuria, trouble voiding, or hematuria. · Musculoskeletal:  No gait disturbance, weakness or joint complaints. · Integumentary: No rash or pruritis. · Neurological: No headache, diplopia, change in muscle strength, numbness or tingling. No change in gait, balance, coordination, mood, affect, memory, mentation, behavior. · Psychiatric: No anxiety, no depression. · Endocrine: No malaise, fatigue or temperature intolerance.  No excessive thirst, fluid intake, or urination. No tremor. · Hematologic/Lymphatic: No abnormal bruising or bleeding, blood clots or swollen lymph nodes. · Allergic/Immunologic: No nasal congestion or hives. Physical Examination:    Vitals:    08/17/20 1458   BP: 132/80   Site: Left Upper Arm   Position: Sitting   Cuff Size: Medium Adult   Pulse: 83   SpO2: 94%   Weight: 293 lb (132.9 kg)   Height: 6' 1\" (1.854 m)        Constitutional and General Appearance: Warm and dry, no apparent distress, normal coloration  HEENT:  Normocephalic, atraumatic  Respiratory:  · Normal excursion and expansion without use of accessory muscles  · Resp Auscultation: Normal breath sounds without dullness  Cardiovascular:  · The apical impulses not displaced  · Heart tones are crisp and normal  · JVP normal cm H2O  · Regular rate and rhythm  · Peripheral pulses are symmetrical and full  · There is no clubbing, cyanosis of the extremities.   · no edema  · Pedal Pulses: 2+ and equal   Abdomen:  · No masses or tenderness  · Liver/Spleen: No Abnormalities Noted  Neurological/Psychiatric:  · Alert and oriented in all spheres  · Moves all extremities well  · Exhibits normal gait balance and coordination  · No abnormalities of mood, affect, memory, mentation, or behavior are noted    Lab Data:    CBC:   Lab Results   Component Value Date    WBC 6.6 03/23/2020    WBC 6.7 12/31/2019    WBC 6.7 12/30/2019    RBC 4.33 03/23/2020    RBC 4.23 12/31/2019    RBC 4.30 12/30/2019    HGB 13.3 03/23/2020    HGB 12.9 12/31/2019    HGB 13.4 12/30/2019    HCT 40.4 03/23/2020    HCT 39.8 12/31/2019    HCT 40.2 12/30/2019    MCV 93.3 03/23/2020    MCV 94.1 12/31/2019    MCV 93.5 12/30/2019    RDW 16.6 03/23/2020    RDW 15.2 12/31/2019    RDW 15.2 12/30/2019     03/23/2020     12/31/2019     12/30/2019     BMP:  Lab Results   Component Value Date     06/02/2020     03/24/2020     03/23/2020    K 4.9 06/02/2020    K 4.5 03/24/2020    K 5.0 03/23/2020  06/02/2020     03/24/2020     03/23/2020    CO2 25 06/02/2020    CO2 22 03/24/2020    CO2 25 03/23/2020    PHOS 4.2 01/07/2020    PHOS 3.8 10/14/2019    PHOS 3.9 06/24/2019    BUN 30 06/02/2020    BUN 28 03/24/2020    BUN 28 03/23/2020    CREATININE 1.4 06/02/2020    CREATININE 1.3 03/24/2020    CREATININE 1.2 03/23/2020     BNP:   Lab Results   Component Value Date    PROBNP 254 06/02/2020    PROBNP 560 02/27/2020    PROBNP 504 01/28/2020     Cardiac Imaging:  Echo: 12/31/19   Summary   -Global hypokinesis with EF 40-45%. -Abnormal septal motion.   -The aortic root and the ascending aorta are mildly dilated. -The right ventricle appears to be dilated. -RV systolic function appears to be reduced.   -The right atrium appears to be dilated. -The mechanical artificial aortic valve appears well seated with a maximum   velocity of 2.40 m/s and a mean gradient of 12 mmHg. The aortic valve area   is estimated at 1.19 cm^2. No significant regurgitation noted. -Thickened mitral valve without evidence of stenosis. There is   mild-to-moderate mitral regurgitation.   -There is moderate tricuspid regurgitation with a RVSP estimation of 43   mmHg.   -Pacer / ICD wire is visualized in the right heart.   -Indeterminate diastolic function. Assessment:    1. Chronic systolic congestive heart failure (HCC) on ace and BB; no aldosterone antagonist due to hyperkalemia   2. Essential hypertension    3. ROSETTA (obstructive sleep apnea)    4. Obesity (BMI 30-39. 9)      Plan:   Patient Instructions   1. Increase lisinopril to 30 mg once a day  2. Check blood work in 2 weeks  3. Check echo with appt in 4 months  4. Continue all other medications    I appreciate the opportunity of cooperating in the care of this individual.    JANICE Mayers, 8/17/2020, 4:13 PM    QUALITY MEASURES  1. Tobacco Cessation Counseling: NA  2. Retake of BP if >140/90:   NA  3.  Documentation to PCP/referring for new patient: Sent to PCP at close of office visit  4. CAD patient on anti-platelet: NA  5. CAD patient on STATIN therapy:  Yes  6.  Patient with CHF and aFib on anticoagulation:  Yes

## 2020-09-01 ENCOUNTER — ANTI-COAG VISIT (OUTPATIENT)
Dept: PHARMACY | Age: 70
End: 2020-09-01
Payer: MEDICARE

## 2020-09-01 ENCOUNTER — TELEPHONE (OUTPATIENT)
Dept: CARDIOLOGY CLINIC | Age: 70
End: 2020-09-01

## 2020-09-01 VITALS — TEMPERATURE: 97.1 F

## 2020-09-01 LAB — INTERNATIONAL NORMALIZATION RATIO, POC: 2.6

## 2020-09-01 PROCEDURE — 99211 OFF/OP EST MAY X REQ PHY/QHP: CPT

## 2020-09-01 PROCEDURE — 85610 PROTHROMBIN TIME: CPT

## 2020-09-01 NOTE — PROGRESS NOTES
Mr. Belem Clemens is a 72 y.o. y/o male with history of Atrial Fibrillation who presents today for anticoagulation monitoring and adjustment. Pt is retired and works part time at [employer]. Patient reports he has been on warfarin for almost 30 years now and was managed by his cardiologist who recently retired. Pt was then managed by Dr. Marce Thomas prior to being established in Clinic. He is now enrolled in the clinic under Dr. Antony Turner. He states he has been taking   15 mg every Sun, Tue and Fri; and 10 mg all other days. Pertinent PMH: HTN, DM, HLD, COPD, CHF    Patient Reported Findings:  Yes     No  [x]   []       Patient verifies current dosing regimen as listed  []   [x]       S/S bleeding/bruising/swelling/SOB -denies    []   [x]       Blood in urine or stool- denies  []   [x]       Procedures scheduled in the future at this time -   []   [x]       Missed Dose-denies   []   [x]       Extra Dose - denies  [x]   []       Change in medications- taking tylenol recently --> stopped tylenol daily, only takes prn now. Took advil because couldn't find other meds   [x]   []       Change in health/diet/appetite - reports he eats about 2 meals/day. And states he will have salads about 1-2x/wk. (not a big fan of spinach or brocolli but may have other greens)--> Patient states 3 salads in past 2 weeks---> ate salad once since last visit---> no greens, wants to add green beans in twice weekly, no NVD --> returned to vit k a few times a week (green beans and salad)  []   [x]       Change in alcohol use - reports occasionally drinking beer (once every few months) but happens very infrequently. --> Patient reports no alcohol in past two weeks---> denies   []   [x]       Change in activity  []   [x]       Hospital admission  []   [x]       Emergency department visit  []   [x]       Other complaints     Clinical Outcomes:  Yes     No  []   [x]       Major bleeding event  []   [x]       Thromboembolic event    Duration of warfarin Therapy: Indefinite   INR Range:  2.0-3.0     INR today is 2.6  Continue taking weekly dose of 10 mg Mon, Thurs and Sat and 15 mg all other days. Encouraged patient to maintain a consistent diet.   Recheck INR again in 4 weeks on 9/29    Referring physician is Dr. Lissy Cavazos  INR (no units)   Date Value   08/12/2020 3.3   07/29/2020 1.5   07/01/2020 2.7   06/02/2020 2   05/05/2020 2.17 (H)   04/14/2020 2.43 (H)   03/24/2020 1.45 (H)   03/23/2020 1.60 (H)       CLINICAL PHARMACY CONSULT: MED RECONCILIATION/REVIEW ADDENDUM    For Pharmacy Admin Tracking Only    PHSO: Yes  Total # of Interventions Recommended: 0  - Maintenance Safety Lab Monitoring #: 1  Total Interventions Accepted: 0  Time Spent (min): 15    Shelbie Kawasaki, PharmD

## 2020-09-01 NOTE — TELEPHONE ENCOUNTER
Pt states when he tries cutting lisinopril in half it crumbles. Can he get a 30 mg tab or 10 mg to go with his 20 mg. Please send to [address] [place] [address] Drive, [address] [place] [address] [phone]. Pt states his bp is staying in high  160/ high 90.  Please advise

## 2020-09-02 RX ORDER — LISINOPRIL 20 MG/1
20 TABLET ORAL 2 TIMES DAILY
Qty: 180 TABLET | Refills: 1 | Status: SHIPPED | OUTPATIENT
Start: 2020-09-02 | End: 2021-04-12

## 2020-09-08 RX ORDER — PRAVASTATIN SODIUM 80 MG/1
TABLET ORAL
Qty: 90 TABLET | Refills: 0 | Status: SHIPPED | OUTPATIENT
Start: 2020-09-08 | End: 2020-12-14

## 2020-09-14 ENCOUNTER — TELEPHONE (OUTPATIENT)
Dept: CARDIOLOGY CLINIC | Age: 70
End: 2020-09-14

## 2020-09-14 RX ORDER — FUROSEMIDE 20 MG/1
TABLET ORAL
Qty: 90 TABLET | Refills: 0 | Status: SHIPPED | OUTPATIENT
Start: 2020-09-14 | End: 2020-12-29 | Stop reason: SDUPTHER

## 2020-09-14 RX ORDER — AMLODIPINE BESYLATE 5 MG/1
5 TABLET ORAL DAILY
Qty: 30 TABLET | Refills: 1 | Status: SHIPPED | OUTPATIENT
Start: 2020-09-14 | End: 2020-11-06 | Stop reason: SDUPTHER

## 2020-09-14 NOTE — TELEPHONE ENCOUNTER
Spoke to patient he is not using any table salt and he is drinking close to 64 oz a day. He will start the norvasc tomorrow afternoon. He will start reading the packages about the sodium content.

## 2020-09-14 NOTE — TELEPHONE ENCOUNTER
Prescription refill    Last OV:08/17/20    Last Refill:06/16/20    Labs:06/02/20    Future Appt:12/11/20

## 2020-09-14 NOTE — TELEPHONE ENCOUNTER
Will need to add norvasc 5 mg daily   I sent script to his pharmacy  Make sure he is not drinking more then 64 ounces fluid and eating less than 2000 mg sodium  thanks

## 2020-09-28 DIAGNOSIS — E78.5 HYPERLIPIDEMIA LDL GOAL <100: ICD-10-CM

## 2020-09-28 DIAGNOSIS — I10 ESSENTIAL HYPERTENSION: ICD-10-CM

## 2020-09-28 DIAGNOSIS — N18.30 STAGE 3 CHRONIC KIDNEY DISEASE (HCC): ICD-10-CM

## 2020-09-28 DIAGNOSIS — E11.9 CONTROLLED TYPE 2 DIABETES MELLITUS WITHOUT COMPLICATION, WITHOUT LONG-TERM CURRENT USE OF INSULIN (HCC): ICD-10-CM

## 2020-09-28 PROCEDURE — 36415 COLL VENOUS BLD VENIPUNCTURE: CPT | Performed by: FAMILY MEDICINE

## 2020-09-29 ENCOUNTER — ANTI-COAG VISIT (OUTPATIENT)
Dept: PHARMACY | Age: 70
End: 2020-09-29
Payer: MEDICARE

## 2020-09-29 VITALS — TEMPERATURE: 96.9 F

## 2020-09-29 LAB
A/G RATIO: 1.8 (ref 1.1–2.2)
ALBUMIN SERPL-MCNC: 4.5 G/DL (ref 3.4–5)
ALP BLD-CCNC: 101 U/L (ref 40–129)
ALT SERPL-CCNC: 18 U/L (ref 10–40)
ANION GAP SERPL CALCULATED.3IONS-SCNC: 11 MMOL/L (ref 3–16)
AST SERPL-CCNC: 17 U/L (ref 15–37)
BILIRUB SERPL-MCNC: 0.5 MG/DL (ref 0–1)
BUN BLDV-MCNC: 28 MG/DL (ref 7–20)
CALCIUM SERPL-MCNC: 9.8 MG/DL (ref 8.3–10.6)
CHLORIDE BLD-SCNC: 107 MMOL/L (ref 99–110)
CHOLESTEROL, TOTAL: 153 MG/DL (ref 0–199)
CO2: 24 MMOL/L (ref 21–32)
CREAT SERPL-MCNC: 1.3 MG/DL (ref 0.8–1.3)
CREATININE URINE: 102.7 MG/DL (ref 39–259)
ESTIMATED AVERAGE GLUCOSE: 131.2 MG/DL
GFR AFRICAN AMERICAN: >60
GFR NON-AFRICAN AMERICAN: 55
GLOBULIN: 2.5 G/DL
GLUCOSE BLD-MCNC: 114 MG/DL (ref 70–99)
HBA1C MFR BLD: 6.2 %
HDLC SERPL-MCNC: 40 MG/DL (ref 40–60)
INTERNATIONAL NORMALIZATION RATIO, POC: 2.6
LDL CHOLESTEROL CALCULATED: 75 MG/DL
MICROALBUMIN UR-MCNC: 2.1 MG/DL
MICROALBUMIN/CREAT UR-RTO: 20.4 MG/G (ref 0–30)
POTASSIUM SERPL-SCNC: 4.8 MMOL/L (ref 3.5–5.1)
SODIUM BLD-SCNC: 142 MMOL/L (ref 136–145)
TOTAL PROTEIN: 7 G/DL (ref 6.4–8.2)
TRIGL SERPL-MCNC: 188 MG/DL (ref 0–150)
VLDLC SERPL CALC-MCNC: 38 MG/DL

## 2020-09-29 PROCEDURE — 99211 OFF/OP EST MAY X REQ PHY/QHP: CPT

## 2020-09-29 PROCEDURE — 85610 PROTHROMBIN TIME: CPT

## 2020-09-29 NOTE — PROGRESS NOTES
Thromboembolic event    Duration of warfarin Therapy: Indefinite   INR Range:  2.0-3.0     INR today is 2.6  Continue taking weekly dose of 10 mg Mon, Thurs and Sat and 15 mg all other days. Encouraged patient to maintain a consistent diet.   Recheck INR again in 4 weeks on 10/27    Referring physician is Dr. Alesia Sandoval  INR (no units)   Date Value   09/01/2020 2.6   08/12/2020 3.3   07/29/2020 1.5   07/01/2020 2.7   05/05/2020 2.17 (H)   04/14/2020 2.43 (H)   03/24/2020 1.45 (H)   03/23/2020 1.60 (H)       CLINICAL PHARMACY CONSULT: MED RECONCILIATION/REVIEW ADDENDUM    For Pharmacy Admin Tracking Only    PHSO: Yes  Total # of Interventions Recommended: 0  - Maintenance Safety Lab Monitoring #: 1  Total Interventions Accepted: 0  Time Spent (min): 15    Heather Menjivar, NovaD

## 2020-09-30 ENCOUNTER — NURSE ONLY (OUTPATIENT)
Dept: CARDIOLOGY CLINIC | Age: 70
End: 2020-09-30
Payer: MEDICARE

## 2020-09-30 PROCEDURE — 93297 REM INTERROG DEV EVAL ICPMS: CPT | Performed by: INTERNAL MEDICINE

## 2020-09-30 PROCEDURE — 93296 REM INTERROG EVL PM/IDS: CPT | Performed by: INTERNAL MEDICINE

## 2020-09-30 PROCEDURE — 93294 REM INTERROG EVL PM/LDLS PM: CPT | Performed by: INTERNAL MEDICINE

## 2020-09-30 NOTE — PROGRESS NOTES
We received remote transmission from patient's monitor at home. Transmission shows normal sensing and pacing function. EP physician will review. See interrogation under cardiology tab in the [address] [facility] [address] field for more details. Optivol is within normal range.

## 2020-09-30 NOTE — LETTER
[address] [facility] [phone]  Luige Santos [address] [facility] [phone]    Pacemaker/Defibrillator Clinic          09/30/20        [address] [place] [address] [address] [address]        Dear Saul Rausch    This letter is to inform you that we received the transmission from your monitor at home that checks your implanted heart device. The next date your monitor will automatically transmit will be 1-4-21. If your report needs attention we will notify you. Your device and monitor are wireless and most transmit cellularly, but please periodically check your monitor is still plugged in to the electrical outlet. If you still use the telephone land line to send please ensure the connection to the phone robbie is secure. This will help to ensure successful automatic transmissions in the future. Also, the monitor needs to be close to you while sleeping at night. Please be aware that the remote device transmission sites are periodically monitored only during regular business hours during which simultaneous in-office device clinics are being run. If your transmission requires attention, we will contact you as soon as possible. Thank you.             [facility]

## 2020-10-19 ENCOUNTER — HOSPITAL ENCOUNTER (OUTPATIENT)
Age: 70
Discharge: HOME | End: 2020-10-19
Payer: MEDICARE

## 2020-10-19 LAB — PRO-BNP: 592 PG/ML (ref 0–124)

## 2020-10-19 PROCEDURE — 36415 COLL VENOUS BLD VENIPUNCTURE: CPT

## 2020-10-19 PROCEDURE — 83880 ASSAY OF NATRIURETIC PEPTIDE: CPT

## 2020-10-27 ENCOUNTER — ANTI-COAG VISIT (OUTPATIENT)
Dept: PHARMACY | Age: 70
End: 2020-10-27
Payer: MEDICARE

## 2020-10-27 VITALS — TEMPERATURE: 96.8 F

## 2020-10-27 LAB — INTERNATIONAL NORMALIZATION RATIO, POC: 2.1

## 2020-10-27 PROCEDURE — 99211 OFF/OP EST MAY X REQ PHY/QHP: CPT

## 2020-10-27 PROCEDURE — 85610 PROTHROMBIN TIME: CPT

## 2020-10-27 NOTE — PROGRESS NOTES
Mr. Karyn Maciel is a 72 y.o. y/o male with history of Atrial Fibrillation who presents today for anticoagulation monitoring and adjustment. Pt is retired and works part time at [employer]. Patient reports he has been on warfarin for almost 30 years now and was managed by his cardiologist who recently retired. Pt was then managed by Dr. Chuck Carroll prior to being established in Clinic. He is now enrolled in the clinic under Dr. Sarah Jacobsen. He states he has been taking   15 mg every Sun, Tue and Fri; and 10 mg all other days. Pertinent PMH: HTN, DM, HLD, COPD, CHF    Patient Reported Findings:  Yes     No  [x]   []       Patient verifies current dosing regimen as listed  []   [x]       S/S bleeding/bruising/swelling/SOB -denies    []   [x]       Blood in urine or stool- denies  []   [x]       Procedures scheduled in the future at this time -   []   [x]       Missed Dose-denies   []   [x]       Extra Dose - denies  []   [x]       Change in medications- taking tylenol recently --> stopped tylenol daily, only takes prn now. Took advil because couldn't find other meds --> started amlodipine. Pain meds acutely --> no changes    []   [x]       Change in health/diet/appetite - reports he eats about 2 meals/day. And states he will have salads about 1-2x/wk. (not a big fan of spinach or brocolli but may have other greens)--> Patient states 3 salads in past 2 weeks---> ate salad once since last visit---> no greens, wants to add green beans in twice weekly, no NVD --> returned to vit k a few times a week (green beans and salad) --> no changes   []   [x]       Change in alcohol use - reports occasionally drinking beer (once every few months) but happens very infrequently. --> Patient reports no alcohol in past two weeks---> denies   []   [x]       Change in activity  []   [x]       Hospital admission  []   [x]       Emergency department visit  []   [x]       Other complaints     Clinical Outcomes:  Yes     No  []   [x] Major bleeding event  []   [x]       Thromboembolic event    Duration of warfarin Therapy: Indefinite   INR Range:  2.0-3.0     INR today is 2.1   Continue taking weekly dose of 10 mg Mon, Thurs and Sat and 15 mg all other days. Encouraged patient to maintain a consistent diet.   Recheck INR again in 5 weeks on 12/1 d/t being out of town for the holiday     Referring physician is Dr. Jovana Saucedo  INR (no units)   Date Value   09/29/2020 2.6   09/01/2020 2.6   08/12/2020 3.3   07/29/2020 1.5   05/05/2020 2.17 (H)   04/14/2020 2.43 (H)   03/24/2020 1.45 (H)   03/23/2020 1.60 (H)       CLINICAL PHARMACY CONSULT: MED RECONCILIATION/REVIEW ADDENDUM    For Pharmacy Admin Tracking Only    PHSO: Yes  Total # of Interventions Recommended: 0  - Maintenance Safety Lab Monitoring #: 1  Total Interventions Accepted: 0  Time Spent (min): 15    Demetrice Ingram, NovaD

## 2020-10-28 ENCOUNTER — TELEPHONE (OUTPATIENT)
Dept: CARDIOLOGY CLINIC | Age: 70
End: 2020-10-28

## 2020-10-28 RX ORDER — ALLOPURINOL 300 MG/1
TABLET ORAL
Qty: 90 TABLET | Refills: 0 | Status: SHIPPED | OUTPATIENT
Start: 2020-10-28 | End: 2021-01-26

## 2020-10-28 NOTE — TELEPHONE ENCOUNTER
----- Message from Milton Heimlich, APRN - CNS sent at 10/19/2020  3:08 PM EDT -----  Ask lab to add bmp to labs done  Order in computer

## 2020-10-29 ENCOUNTER — OFFICE VISIT (OUTPATIENT)
Dept: FAMILY MEDICINE CLINIC | Age: 70
End: 2020-10-29
Payer: MEDICARE

## 2020-10-29 VITALS
DIASTOLIC BLOOD PRESSURE: 86 MMHG | OXYGEN SATURATION: 97 % | RESPIRATION RATE: 20 BRPM | HEIGHT: 73 IN | HEART RATE: 79 BPM | SYSTOLIC BLOOD PRESSURE: 134 MMHG | TEMPERATURE: 96.8 F | BODY MASS INDEX: 38.3 KG/M2 | WEIGHT: 289 LBS

## 2020-10-29 PROCEDURE — 1036F TOBACCO NON-USER: CPT | Performed by: FAMILY MEDICINE

## 2020-10-29 PROCEDURE — 99214 OFFICE O/P EST MOD 30 MIN: CPT | Performed by: FAMILY MEDICINE

## 2020-10-29 PROCEDURE — G8417 CALC BMI ABV UP PARAM F/U: HCPCS | Performed by: FAMILY MEDICINE

## 2020-10-29 PROCEDURE — G8926 SPIRO NO PERF OR DOC: HCPCS | Performed by: FAMILY MEDICINE

## 2020-10-29 PROCEDURE — G8427 DOCREV CUR MEDS BY ELIG CLIN: HCPCS | Performed by: FAMILY MEDICINE

## 2020-10-29 PROCEDURE — G8484 FLU IMMUNIZE NO ADMIN: HCPCS | Performed by: FAMILY MEDICINE

## 2020-10-29 PROCEDURE — 3017F COLORECTAL CA SCREEN DOC REV: CPT | Performed by: FAMILY MEDICINE

## 2020-10-29 PROCEDURE — 3023F SPIROM DOC REV: CPT | Performed by: FAMILY MEDICINE

## 2020-10-29 PROCEDURE — 2022F DILAT RTA XM EVC RTNOPTHY: CPT | Performed by: FAMILY MEDICINE

## 2020-10-29 PROCEDURE — 1123F ACP DISCUSS/DSCN MKR DOCD: CPT | Performed by: FAMILY MEDICINE

## 2020-10-29 PROCEDURE — 4040F PNEUMOC VAC/ADMIN/RCVD: CPT | Performed by: FAMILY MEDICINE

## 2020-10-29 PROCEDURE — 3044F HG A1C LEVEL LT 7.0%: CPT | Performed by: FAMILY MEDICINE

## 2020-10-29 ASSESSMENT — ENCOUNTER SYMPTOMS
ANAL BLEEDING: 0
APNEA: 0
STRIDOR: 0
WHEEZING: 0
NAUSEA: 0
DIARRHEA: 0
BLOOD IN STOOL: 0
CONSTIPATION: 0
RECTAL PAIN: 0
CHOKING: 0
ABDOMINAL DISTENTION: 0
CHEST TIGHTNESS: 0
COLOR CHANGE: 0
COUGH: 0
ABDOMINAL PAIN: 0
SHORTNESS OF BREATH: 0
VOMITING: 0

## 2020-10-29 NOTE — PROGRESS NOTES
Subjective:      Patient ID: Sapna Jung is a 72 y.o. male. Results for Lalitha Mendoza \"BILL\" (MRN [id number]) as of 10/29/2020 14:38   Ref. Range 9/28/2020 11:18   Sodium Latest Ref Range: 136 - 145 mmol/L 142   Potassium Latest Ref Range: 3.5 - 5.1 mmol/L 4.8   Chloride Latest Ref Range: 99 - 110 mmol/L 107   CO2 Latest Ref Range: 21 - 32 mmol/L 24   BUN Latest Ref Range: 7 - 20 mg/dL 28 (H)   Creatinine Latest Ref Range: 0.8 - 1.3 mg/dL 1.3   Anion Gap Latest Ref Range: 3 - 16  11   GFR Non- Latest Ref Range: >60  55 (A)   GFR  Latest Ref Range: >60  >60   Glucose Latest Ref Range: 70 - 99 mg/dL 114 (H)   Calcium Latest Ref Range: 8.3 - 10.6 mg/dL 9.8   Total Protein Latest Ref Range: 6.4 - 8.2 g/dL 7.0   Cholesterol, Total Latest Ref Range: 0 - 199 mg/dL 153   HDL Cholesterol Latest Ref Range: 40 - 60 mg/dL 40   LDL Calculated Latest Ref Range: <100 mg/dL 75   Triglycerides Latest Ref Range: 0 - 150 mg/dL 188 (H)   VLDL Cholesterol Calculated Latest Ref Range: Not Established mg/dL 38   Albumin Latest Ref Range: 3.4 - 5.0 g/dL 4.5   Globulin Latest Units: g/dL 2.5   Albumin/Globulin Ratio Latest Ref Range: 1.1 - 2.2  1.8   Alk Phos Latest Ref Range: 40 - 129 U/L 101   ALT Latest Ref Range: 10 - 40 U/L 18   AST Latest Ref Range: 15 - 37 U/L 17   Bilirubin Latest Ref Range: 0.0 - 1.0 mg/dL 0.5   Hemoglobin A1C Latest Ref Range: See comment % 6.2   eAG (mg/dL) Latest Units: mg/dL 131.2         Seen by cardiology on 8/17//20:office note via EPIC reviewed today. No new associated concerns. Diabetes:doing well. Taking 2000mg metformin w/o side effects. Preprandial-fasting WL=[phone]. 2hr postprandial BS:131-140s. HBA1c: 6.6 on 2/13/17. 6.3 on 3/31/17. 6.4 on 11/18/17. 7.1 on 3/22/18. 7.2 on 8/30/18. 7.3 on 12/3/18. 7.4 on 2/7/19.  7.0 on 5/3/19. 6.3 on 8/15/19. 6.3 on 1/7/20. 5.8 on 6/2/20. 6.2 on 9/29/20. ACEI/ARB:Yes  Checks feet daily:yes.   Diabetes eye check appt current(within 1year):yes. Improving factor:diet & exercise regime. Episodes of hypoglycemia:None reported. ASA:Yes. LDL <100:no. 113 on 3/31/17. 87 on 6/7/17. 83 on 9/15/17. 69 om 12/15/17. 71 on 3/22/18. 82 on 8/30/18. 67 on 2/7/19. 89 on 5/4/19. 69 on 8/15/19. 68 on 11/4/19. 88 on 1/7/20. 87 on 6/2/20. 75 on 9/29/20. No other associated or worsening factors. Denies polyuria/polyphagia/polydipsia/BLE skin lesions/BLE paresthesia. HTN:doing well. Taking medication w/o side effects. Associated w/nothing new. Worsened by nothing else new. Improving factors:medications & low salt diet. Adds salt to food at the table:No does not. Denies cp/sob/pnd/ankle edema/dizziness. CKD:per nephro:see recent labs above. Is doing well. Denies urinary complaints/abdo pain/iclakuh-pgunkozxk-nvxgduq/decreased urinary flow/sensation of incomplete voiding/excessive NSAIDs use. Moderate insomnia: reports doing well. Sleeps approx 6-8hrs nightly.   Associated w/nothing new. Worsened(aggravated) by nothing new. Improving factor:taking med prn. Denies snoring/hallucinations/depression/SI/HI. COPD:breathing well per baseline;takes meds w/o any side effects. No other associated concerns. Albuterol inhaler use [id number].    Boo [place] well. See recent labs above. Taking statin w/o side effects. Associated w/nothing new. Improving factors:medication & will continue to address diet to improve TG. Worsening factors:nothing else new. Denies adbo pain/myalgias.      ROSETTA:sees sleep specialist.  No new associated concerns. Gout of both feet: doing well. No recent recurrences. Takes allopurinol w/o side effects. Last gout episode >6yrs ago. No other associated concerns. Denies bilateral foot skin lesions/foot [id number]-ykuuscbhj/pus.      No Known Allergies    Current Outpatient Medications on File Prior to Visit   Medication Sig Dispense Refill    allopurinol (ZYLOPRIM) 300 MG tablet TAKE 1 TABLET EVERY DAY 90 tablet 0    furosemide (LASIX) 20 MG tablet TAKE 1 TABLET EVERY DAY 90 tablet 0    amLODIPine (NORVASC) 5 MG tablet Take 1 tablet by mouth daily 30 tablet 1    pravastatin (PRAVACHOL) 80 MG tablet TAKE 1 TABLET EVERY DAY FOR CHOLESTEROL 90 tablet 0    lisinopril (PRINIVIL;ZESTRIL) 20 MG tablet Take 1 tablet by mouth 2 times daily 180 tablet 1    metFORMIN (GLUCOPHAGE-XR) 500 MG extended release tablet TAKE 4 TABLETS EVERY DAY WITH BREAKFAST 360 tablet 0    glucose monitoring kit (FREESTYLE) monitoring kit Ok to dispense what is covered by insurance. 1 kit 0    blood glucose monitor strips Test 2-3 times a day & as needed for symptoms of irregular blood glucose. Dispense sufficient amount for indicated testing frequency plus additional to accommodate PRN testing needs. Ok to dispense what is covered by insurance. 100 strip 3    Lancets MISC Ok to dispense what is covered by insurance. 100 each 3    blood glucose monitor strips Test 2 times a day & as needed for symptoms of irregular blood glucose. Dispense sufficient amount for indicated testing frequency plus additional to accommodate PRN testing needs. Ok to dispense what is covered by insurance 100 strip 5    ACCU-CHEK COMPACT PLUS strip USE TO TEST 2 TIMES DAILY 102 strip 1    warfarin (COUMADIN) 10 MG tablet Take 1 tablet by mouth daily, besides Tuesday take 1.5 tablets (15 mg) (Patient taking differently: Take 1 tablet by mouth daily, besides Tuesday, Friday and [place] take 1.5 tablets (15 mg)) 90 tablet 3    glucose monitoring kit (FREESTYLE) monitoring kit 1 each by Does not apply route once for 1 dose Glucometer(patient's choice). BID testing. 1 kit 0    Lancets MISC BID testing 100 each 6    sotalol (BETAPACE) 80 MG tablet Take 80 mg by mouth 2 times daily.  aspirin 81 MG chewable tablet Take 81 mg by mouth daily. No current facility-administered medications on file prior to visit. Past Medical History:   Diagnosis Date    Acute congestive heart failure ([facility] [address] 12/30/2019    Allergic rhinitis 7/11/2016    [facility]     under care of cardiology:Dr. Lezlie Martens Behrens([facility]    CKD (chronic kidney disease)     Nephro:Dr. Parker:stage 3    Class 2 obesity due to excess calories without serious comorbidity with body mass index (BMI) of 37.0 to 37.9 in adult 11/7/2017    Controlled type 2 diabetes mellitus without complication, without long-term current use of insulin ([facility] 2/24/2017    COPD     Gout     Hyperlipidemia, mixed 07/11/2016    Hypertension     under care of cardiology:Dr. Lezlie Martens Behrens([facility])    Long term current use of anticoagulants with INR goal of 2.0-3.0     under care of cardiology:Dr. Lezlie Martens Behrens([facility])    Obstructive sleep apnea syndrome 06/18/2019    per pulmo    Parkinson disease [facility])     9/2016:Per neurologist dx is tremor & not consistent with Parkison's:Dr. Ángel Gutierrez Prediabetes 10/28/2016    Primary insomnia 1/25/2017    Sleep apnea     CPAP    Stage 2 chronic kidney disease            Social History     Tobacco Use    Smoking status: Former Smoker     Packs/day: 1.00     Years: 30.00     Pack years: 30.00     Types: Cigarettes    Smokeless tobacco: Never Used    Tobacco comment: working on reducing   Substance Use Topics    Alcohol use: Yes     Comment: rarely    Drug use: No     Social History     Substance and Sexual Activity   Drug Use No               Review of Systems   Constitutional: Negative for activity change, appetite change, chills, diaphoresis, fatigue, fever and unexpected weight change. Eyes: Negative for visual disturbance. Respiratory: Negative for apnea, cough, choking, chest tightness, shortness of breath, wheezing and stridor. Cardiovascular: Negative for chest pain, palpitations and leg swelling.    Gastrointestinal: Negative for abdominal distention, abdominal pain, anal bleeding, blood in stool, constipation, diarrhea, nausea, rectal pain and vomiting. Endocrine: Negative for cold intolerance, heat intolerance, polydipsia, polyphagia and polyuria. Genitourinary: Negative for difficulty urinating. Skin: Negative for color change, pallor, rash and wound. Neurological: Negative for dizziness and light-headedness. Hematological: Negative for adenopathy. Psychiatric/Behavioral: Negative for agitation, behavioral problems, confusion, decreased concentration, dysphoric mood, hallucinations, self-injury, sleep disturbance and suicidal ideas. The patient is not nervous/anxious and is not hyperactive. Objective: RR=17. Repeat bp 131/82   Physical Exam  Constitutional:       General: He is not in acute distress. Appearance: Normal appearance. He is well-developed. He is not diaphoretic. HENT:      Nose: Nose normal.      Mouth/Throat:      Pharynx: Uvula midline. Eyes:      General: Lids are normal.      Conjunctiva/sclera: Conjunctivae normal.      Pupils: Pupils are equal, round, and reactive to light. Neck:      Musculoskeletal: Normal range of motion and neck supple. Trachea: Trachea normal.   Cardiovascular:      Rate and Rhythm: Normal rate and regular rhythm. Pulses: Normal pulses. Heart sounds: Normal heart sounds. Comments: No bilateral leg-ankle edema noted. Pulmonary:      Effort: Pulmonary effort is normal.      Breath sounds: Normal breath sounds and air entry. Abdominal:      General: Bowel sounds are normal. There is no distension. Palpations: Abdomen is soft. There is no hepatomegaly or mass. Tenderness: There is no abdominal tenderness. Musculoskeletal:      Right foot: Normal.      Left foot: Normal.   Lymphadenopathy:      Cervical: No cervical adenopathy. Skin:     General: Skin is warm and dry. Capillary Refill: Capillary refill takes less than 2 seconds. Coloration: Skin is not pale. Findings: No rash. Comments: Good skin turgor. Neurological:      Mental Status: He is alert and oriented to person, place, and time. Psychiatric:         Attention and Perception: Attention and perception normal. He is attentive. He does not perceive auditory or visual hallucinations. Mood and Affect: Mood and affect normal. Mood is not anxious, depressed or elated. Affect is not labile, blunt, flat, angry, tearful or inappropriate. Speech: Speech normal. He is communicative. Speech is not rapid and pressured, delayed, slurred or tangential.         Behavior: Behavior normal. Behavior is not agitated, slowed, aggressive, withdrawn, hyperactive or combative. Behavior is cooperative. Thought Content: Thought content normal. Thought content is not paranoid or delusional. Thought content does not include homicidal or suicidal ideation. Cognition and Memory: Cognition and memory normal.         Judgment: Judgment normal.      Comments: Good eye contact. Polite. Assessment:        Diagnosis Orders   1. Controlled type 2 diabetes mellitus without complication, without long-term current use of insulin ([facility] [address]  VSS/well appearing. Stable/controlled. A1c in 5-6mos. Diabetes:Check feet daily. Yrly eye checks advised. Education: Reviewed ABCs of diabetes management (respective goals in parentheses):  A1C (<7), blood pressure (<130/80), and cholesterol (LDL <100). Counseled at this visit on the following: diabetes complication prevention, foot care. Hemoglobin A1C    Comprehensive Metabolic Panel   2. Essential hypertension  Stable. Comprehensive Metabolic Panel   3. Hyperlipidemia LDL goal <100  Stable/controlled except Tg. The patient is asked to make an attempt to improve diet and exercise patterns to aid in medical management of this problem. Labs in 3mos. Comprehensive Metabolic Panel    Lipid Panel   4. Stage 3chronic kidney disease  Stable. Per nephro.   Comprehensive Metabolic Panel 5. COPD  Stable. 6. Primary insomnia  Stable. 7. Sleep apnea  Stable. Per specialist   8. Long term current use of anticoagulants with INR goal of 2.0-3.0  Per specialist.     9. Typical atrial flutter ([address]  Per cards. 10. Permanent cardiac pacemaker  Per cards. 11. Cardiomyopathy, dilated ([address]  Per cards. 12. Non morbid obesity  Diet regime reviewed. 13. Need for influenza vaccination  Declined by pt'. Plan:           Pneumonia vaccine advised. Obtain labs/diagnostic tests as discussed today & call back for results within 2-7days. Pt' left office in good condition.           Rachid Bullock MD

## 2020-11-06 ENCOUNTER — TELEPHONE (OUTPATIENT)
Dept: CARDIOLOGY CLINIC | Age: 70
End: 2020-11-06

## 2020-11-06 RX ORDER — AMLODIPINE BESYLATE 5 MG/1
5 TABLET ORAL DAILY
Qty: 30 TABLET | Refills: 1 | Status: SHIPPED | OUTPATIENT
Start: 2020-11-06 | End: 2020-12-11 | Stop reason: SDUPTHER

## 2020-11-06 NOTE — TELEPHONE ENCOUNTER
Medication Refill    Medication needing refilled:  amLODIPine (NORVASC) 5 MG tablet    Dosage of the medication: 5 mg    How are you taking this medication (QD, BID, TID, QID, PRN): 1 tablet daily    30 or 90 day supply called in: 30    Which Pharmacy are we sending the medication to?:    [facility] [place], [place] - [address] [facility] [phone]

## 2020-11-30 RX ORDER — METFORMIN HYDROCHLORIDE 500 MG/1
TABLET, EXTENDED RELEASE ORAL
Qty: 360 TABLET | Refills: 0 | Status: SHIPPED | OUTPATIENT
Start: 2020-11-30 | End: 2021-03-05

## 2020-12-01 ENCOUNTER — ANTI-COAG VISIT (OUTPATIENT)
Dept: PHARMACY | Age: 70
End: 2020-12-01
Payer: MEDICARE

## 2020-12-01 LAB — INTERNATIONAL NORMALIZATION RATIO, POC: 3.4

## 2020-12-01 PROCEDURE — 85610 PROTHROMBIN TIME: CPT

## 2020-12-01 PROCEDURE — 99211 OFF/OP EST MAY X REQ PHY/QHP: CPT

## 2020-12-01 NOTE — PROGRESS NOTES
INR Range:  2.0-3.0     INR today is 3.4   Take 10 mg tonight then continue taking weekly dose of 10 mg Mon, Thurs and Sat and 15 mg all other days. Encouraged patient to maintain a consistent diet.   Recheck INR again in 3 weeks on 12/22    Referring physician is Dr. Christa Singh  INR (no units)   Date Value   10/27/2020 2.1   09/29/2020 2.6   09/01/2020 2.6   08/12/2020 3.3   05/05/2020 2.17 (H)   04/14/2020 2.43 (H)   03/24/2020 1.45 (H)   03/23/2020 1.60 (H)       CLINICAL PHARMACY CONSULT: MED RECONCILIATION/REVIEW ADDENDUM    For Pharmacy Admin Tracking Only    PHSO: Yes  Total # of Interventions Recommended: 1  - Decreased Dose #: 1  - Maintenance Safety Lab Monitoring #: 1  Total Interventions Accepted: 1  Time Spent (min): 15    Nova SuD

## 2020-12-11 ENCOUNTER — HOSPITAL ENCOUNTER (OUTPATIENT)
Dept: NON INVASIVE DIAGNOSTICS | Age: 70
Discharge: HOME | End: 2020-12-11
Payer: MEDICARE

## 2020-12-11 ENCOUNTER — OFFICE VISIT (OUTPATIENT)
Dept: CARDIOLOGY CLINIC | Age: 70
End: 2020-12-11
Payer: MEDICARE

## 2020-12-11 VITALS
OXYGEN SATURATION: 94 % | HEART RATE: 79 BPM | DIASTOLIC BLOOD PRESSURE: 80 MMHG | HEIGHT: 73 IN | BODY MASS INDEX: 38.04 KG/M2 | WEIGHT: 287 LBS | SYSTOLIC BLOOD PRESSURE: 142 MMHG

## 2020-12-11 LAB
LV EF: 45 %
LVEF MODALITY: NORMAL

## 2020-12-11 PROCEDURE — 3017F COLORECTAL CA SCREEN DOC REV: CPT | Performed by: CLINICAL NURSE SPECIALIST

## 2020-12-11 PROCEDURE — G8484 FLU IMMUNIZE NO ADMIN: HCPCS | Performed by: CLINICAL NURSE SPECIALIST

## 2020-12-11 PROCEDURE — G8417 CALC BMI ABV UP PARAM F/U: HCPCS | Performed by: CLINICAL NURSE SPECIALIST

## 2020-12-11 PROCEDURE — 1123F ACP DISCUSS/DSCN MKR DOCD: CPT | Performed by: CLINICAL NURSE SPECIALIST

## 2020-12-11 PROCEDURE — 4040F PNEUMOC VAC/ADMIN/RCVD: CPT | Performed by: CLINICAL NURSE SPECIALIST

## 2020-12-11 PROCEDURE — G8427 DOCREV CUR MEDS BY ELIG CLIN: HCPCS | Performed by: CLINICAL NURSE SPECIALIST

## 2020-12-11 PROCEDURE — 93306 TTE W/DOPPLER COMPLETE: CPT

## 2020-12-11 PROCEDURE — 99214 OFFICE O/P EST MOD 30 MIN: CPT | Performed by: CLINICAL NURSE SPECIALIST

## 2020-12-11 PROCEDURE — 1036F TOBACCO NON-USER: CPT | Performed by: CLINICAL NURSE SPECIALIST

## 2020-12-11 RX ORDER — AMLODIPINE BESYLATE 5 MG/1
5 TABLET ORAL DAILY
Qty: 90 TABLET | Refills: 1 | Status: SHIPPED | OUTPATIENT
Start: 2020-12-11 | End: 2021-01-19 | Stop reason: SDUPTHER

## 2020-12-11 NOTE — PROGRESS NOTES
[address]  Progress Note    Primary Care Doctor:  Ruben Whitney MD    Chief Complaint   Patient presents with    Follow-up     optivol    Congestive Heart Failure        History of Present Illness:  72 y.o. 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 He follows with Dr Rajni Arias with [facility]. 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 He follows with Dr Amirah Reynoso   12/30-1/2/2020 for worsening heart failure, sob and leg swelling. He was found to AFlutter and CV 1/2/20. BiV lead implant 3/23/2020    I had the pleasure of seeing Catrachita Bryant in follow up for sHF. He is ambulatory and by his self today. He had an echo done today which is pending. His optival shows normal impedence and increase patient activity. He admits to doing more and denies any sob, chest pain, palpitations, lightheadedness or edema. Last labs in sept    Past Medical History:   has a past medical history of Acute congestive heart failure (HCC), Allergic rhinitis, Atrial fibrillation (HCC), CKD (chronic kidney disease), Class 2 obesity due to excess calories without serious comorbidity with body mass index (BMI) of 37.0 to 37.9 in adult, Controlled type 2 diabetes mellitus without complication, without long-term current use of insulin ([address] COPD, Gout, Hyperlipidemia, mixed, Hypertension, Long term current use of anticoagulants with INR goal of 2.0-3.0, Obstructive sleep apnea syndrome, Parkinson disease ([address] Prediabetes, Primary insomnia, Sleep apnea, and Stage 2 chronic kidney disease. Surgical History:   has a past surgical history that includes Aortic valve replacement (10/1996:[facility]); Pacemaker insertion (1996); Atrial ablation surgery (03/23/2020); Atrial ablation surgery (03/23/2020); and Cardiac pacemaker placement (03/23/2020). Social History:   reports that he quit smoking about 17 months ago.  His smoking use included cigarettes. He has a 30.00 pack-year smoking history. He has never used smokeless tobacco. He reports current alcohol use. He reports that he does not use drugs. Family History:   Family History   Problem Relation Age of Onset    Asthma Mother     Cancer Father     No Known Problems Sister     No Known Problems Brother     No Known Problems Maternal Grandmother     No Known Problems Maternal Grandfather     No Known Problems Paternal Grandmother     No Known Problems Paternal Grandfather        Home Medications:  Prior to Admission medications    Medication Sig Start Date End Date Taking? Authorizing Provider   amLODIPine (NORVASC) 5 MG tablet Take 1 tablet by mouth daily 12/11/20  Yes MEENU Faye   metFORMIN (GLUCOPHAGE-XR) 500 MG extended release tablet TAKE 4 TABLETS EVERY DAY WITH BREAKFAST 11/30/20  Yes Fortunato Gutierres MD   allopurinol (ZYLOPRIM) 300 MG tablet TAKE 1 TABLET EVERY DAY 10/28/20  Yes Fortunato Gutierres MD   furosemide (LASIX) 20 MG tablet TAKE 1 TABLET EVERY DAY 9/14/20  Yes MEENU Ward CNP   pravastatin (PRAVACHOL) 80 MG tablet TAKE 1 TABLET EVERY DAY FOR CHOLESTEROL 9/8/20  Yes Fortunato Gutierres MD   lisinopril (PRINIVIL;ZESTRIL) 20 MG tablet Take 1 tablet by mouth 2 times daily 9/2/20  Yes MEENU Stein   glucose monitoring kit (FREESTYLE) monitoring kit Ok to dispense what is covered by insurance. 7/9/20  Yes Fortunato Gutierres MD   blood glucose monitor strips Test 2 times a day & as needed for symptoms of irregular blood glucose. Dispense sufficient amount for indicated testing frequency plus additional to accommodate PRN testing needs.  Ok to dispense what is covered by insurance 7/4/20  Yes Fortunato Gutierres MD   ACCU-CHEK COMPACT PLUS strip USE TO TEST 2 TIMES DAILY 6/23/20  Yes Fortunato Gutierres MD   warfarin (COUMADIN) 10 MG tablet Take 1 tablet by mouth daily, besides Tuesday take 1.5 tablets (15 mg)  Patient taking differently: Take 1 tablet by mouth daily, besides Tuesday, Friday and [place] take 1.5 tablets (15 mg) 5/15/20  Yes MEENU Farley - CNP   Lancets MISC BID testing 2/24/17  Yes Armando Ordoñez MD   sotalol (BETAPACE) 80 MG tablet Take 80 mg by mouth 2 times daily. Yes Historical Provider, MD   aspirin 81 MG chewable tablet Take 81 mg by mouth daily. Yes Historical Provider, MD        Allergies:  Patient has no known allergies. Review of Systems:   · Constitutional: there has been no unanticipated weight loss. There's been no change in energy level, sleep pattern, or activity level. · Eyes: No visual changes or diplopia. No scleral icterus. · ENT: No Headaches, hearing loss or vertigo. No mouth sores or sore throat. · Cardiovascular: Reviewed in HPI  · Respiratory: No cough or wheezing, no sputum production. No hematemesis. · Gastrointestinal: No abdominal pain, appetite loss, blood in stools. No change in bowel or bladder habits. · Genitourinary: No dysuria, trouble voiding, or hematuria. · Musculoskeletal:  No gait disturbance, weakness or joint complaints. · Integumentary: No rash or pruritis. · Neurological: No headache, diplopia, change in muscle strength, numbness or tingling. No change in gait, balance, coordination, mood, affect, memory, mentation, behavior. · Psychiatric: No anxiety, no depression. · Endocrine: No malaise, fatigue or temperature intolerance. No excessive thirst, fluid intake, or urination. No tremor. · Hematologic/Lymphatic: No abnormal bruising or bleeding, blood clots or swollen lymph nodes. · Allergic/Immunologic: No nasal congestion or hives.     Physical Examination:    Vitals:    12/11/20 1356   BP: (!) 142/80   Site: Left Upper Arm   Position: Sitting   Cuff Size: Medium Adult   Pulse: 79   SpO2: 94%   Weight: 287 lb (130.2 kg)   Height: 6' 1\" (1.854 m)        Constitutional and General Appearance: Warm and dry, no apparent distress, normal coloration  HEENT:  Normocephalic, atraumatic  Respiratory:  · Normal excursion and expansion without use of accessory muscles  · Resp Auscultation: Normal breath sounds without dullness  Cardiovascular:  · The apical impulses not displaced  · Heart tones are crisp and normal  · JVP normal cm H2O  · Regular rate and rhythm  · Peripheral pulses are symmetrical and full  · There is no clubbing, cyanosis of the extremities.   · no edema  · Pedal Pulses: 2+ and equal   Abdomen:  · No masses or tenderness  · Liver/Spleen: No Abnormalities Noted  Neurological/Psychiatric:  · Alert and oriented in all spheres  · Moves all extremities well  · Exhibits normal gait balance and coordination  · No abnormalities of mood, affect, memory, mentation, or behavior are noted    Lab Data:    CBC:   Lab Results   Component Value Date    WBC 6.6 03/23/2020    WBC 6.7 12/31/2019    WBC 6.7 12/30/2019    RBC 4.33 03/23/2020    RBC 4.23 12/31/2019    RBC 4.30 12/30/2019    HGB 13.3 03/23/2020    HGB 12.9 12/31/2019    HGB 13.4 12/30/2019    HCT 40.4 03/23/2020    HCT 39.8 12/31/2019    HCT 40.2 12/30/2019    MCV 93.3 03/23/2020    MCV 94.1 12/31/2019    MCV 93.5 12/30/2019    RDW 16.6 03/23/2020    RDW 15.2 12/31/2019    RDW 15.2 12/30/2019     03/23/2020     12/31/2019     12/30/2019     BMP:  Lab Results   Component Value Date     09/28/2020     06/02/2020     03/24/2020    K 4.8 09/28/2020    K 4.9 06/02/2020    K 4.5 03/24/2020     09/28/2020     06/02/2020     03/24/2020    CO2 24 09/28/2020    CO2 25 06/02/2020    CO2 22 03/24/2020    PHOS 4.2 01/07/2020    PHOS 3.8 10/14/2019    PHOS 3.9 06/24/2019    BUN 28 09/28/2020    BUN 30 06/02/2020    BUN 28 03/24/2020    CREATININE 1.3 09/28/2020    CREATININE 1.4 06/02/2020    CREATININE 1.3 03/24/2020     BNP:   Lab Results   Component Value Date    PROBNP 592 10/19/2020    PROBNP 254 06/02/2020    PROBNP 560 02/27/2020     Cardiac Imaging:  Echo 12/11/2020 pending Echo: 12/31/19   Summary   -Global hypokinesis with EF 40-45%. -Abnormal septal motion.   -The aortic root and the ascending aorta are mildly dilated. -The right ventricle appears to be dilated. -RV systolic function appears to be reduced.   -The right atrium appears to be dilated. -The mechanical artificial aortic valve appears well seated with a maximum   velocity of 2.40 m/s and a mean gradient of 12 mmHg. The aortic valve area   is estimated at 1.19 cm^2. No significant regurgitation noted. -Thickened mitral valve without evidence of stenosis. There is   mild-to-moderate mitral regurgitation.   -There is moderate tricuspid regurgitation with a RVSP estimation of 43   mmHg.   -Pacer / ICD wire is visualized in the right heart.   -Indeterminate diastolic function. Assessment:    1. Chronic systolic congestive heart failure (HCC) on ace and BB; no aldosterone antagonist due to hyperkalemia   2. Essential hypertension    3. ROSETTA (obstructive sleep apnea)    4. Obesity (BMI 30-39. 9)      Plan:   Patient Instructions   1. Refilled norvasc  2. Check blood work in 2 months  3. Continue all current medications  4. Will call with echo results  5. RTO 4-5 months    I appreciate the opportunity of cooperating in the care of this individual.    JANICE Goldman, 12/11/2020, 4:10 PM    QUALITY MEASURES  1. Tobacco Cessation Counseling: NA  2. Retake of BP if >140/90:   NA  3. Documentation to PCP/referring for new patient:  Sent to PCP at close of office visit  4. CAD patient on anti-platelet: NA  5. CAD patient on STATIN therapy:  Yes  6.  Patient with CHF and aFib on anticoagulation:  Yes

## 2020-12-11 NOTE — PATIENT INSTRUCTIONS
1.  Refilled norvasc  2. Check blood work in 2 months  3. Continue all current medications  4. Will call with echo results  5.   RTO 4-5 months

## 2020-12-14 RX ORDER — PRAVASTATIN SODIUM 80 MG/1
TABLET ORAL
Qty: 90 TABLET | Refills: 0 | Status: SHIPPED | OUTPATIENT
Start: 2020-12-14 | End: 2021-03-14

## 2020-12-22 ENCOUNTER — ANTI-COAG VISIT (OUTPATIENT)
Dept: PHARMACY | Age: 70
End: 2020-12-22
Payer: MEDICARE

## 2020-12-22 VITALS — TEMPERATURE: 96.9 F

## 2020-12-22 LAB — INTERNATIONAL NORMALIZATION RATIO, POC: 2.2

## 2020-12-22 PROCEDURE — 99211 OFF/OP EST MAY X REQ PHY/QHP: CPT

## 2020-12-22 PROCEDURE — 85610 PROTHROMBIN TIME: CPT

## 2020-12-22 NOTE — PROGRESS NOTES
Mr. Jazzmine Hubbard is a 72 y.o. y/o male with history of Atrial Fibrillation who presents today for anticoagulation monitoring and adjustment. Pt is retired and works part time at [employer]. Patient reports he has been on warfarin for almost 30 years now and was managed by his cardiologist who recently retired. Pt was then managed by Dr. Stephany Prescott prior to being established in Clinic. He is now enrolled in the clinic under Dr. Manuel Santana. He states he has been taking   15 mg every Sun, Tue and Fri; and 10 mg all other days. Pertinent PMH: HTN, DM, HLD, COPD, CHF    Patient Reported Findings:  Yes     No  [x]   []       Patient verifies current dosing regimen as listed  []   [x]       S/S bleeding/bruising/swelling/SOB -denies    []   [x]       Blood in urine or stool- denies  []   [x]       Procedures scheduled in the future at this time -   []   [x]       Missed Dose-denies   []   [x]       Extra Dose - denies  []   [x]       Change in medications-  tylenol prn--> started amlodipine. Pain meds acutely --> no changes    []   [x]       Change in health/diet/appetite - reports he eats about 2 meals/day. And states he will have salads about 1-2x/wk. (not a big fan of spinach or brocolli but may have other greens)--> Patient states 3 salads in past 2 weeks---> ate salad once since last visit---> no greens, wants to add green beans in twice weekly, no NVD --> returned to vit k a few times a week (green beans and salad) --> no changes   []   [x]       Change in alcohol use - reports occasionally drinking beer (once every few months) but happens very infrequently. --> Patient reports no alcohol in past two weeks---> denies   []   [x]       Change in activity  []   [x]       Hospital admission  []   [x]       Emergency department visit  []   [x]       Other complaints     Clinical Outcomes:  Yes     No  []   [x]       Major bleeding event  []   [x]       Thromboembolic event    Duration of warfarin Therapy: Indefinite INR Range:  2.0-3.0     INR today is 2.2   Continue taking weekly dose of 10 mg Mon, Thurs and Sat and 15 mg all other days. Encouraged patient to maintain a consistent diet.   Recheck INR again in 4 weeks on 1/20/21    Referring physician is Dr. Linda Lamb  INR (no units)   Date Value   12/01/2020 3.4   10/27/2020 2.1   09/29/2020 2.6   09/01/2020 2.6   05/05/2020 2.17 (H)   04/14/2020 2.43 (H)   03/24/2020 1.45 (H)   03/23/2020 1.60 (H)       CLINICAL PHARMACY CONSULT: MED RECONCILIATION/REVIEW ADDENDUM    For Pharmacy Admin Tracking Only    PHSO: Yes  Total # of Interventions Recommended: 0  - Maintenance Safety Lab Monitoring #: 1  Total Interventions Accepted: 0  Time Spent (min): 15    Felicia Michael, NovaD

## 2020-12-29 ENCOUNTER — TELEPHONE (OUTPATIENT)
Dept: CARDIOLOGY CLINIC | Age: 70
End: 2020-12-29

## 2020-12-30 RX ORDER — FUROSEMIDE 20 MG/1
20 TABLET ORAL DAILY
Qty: 90 TABLET | Refills: 2 | Status: SHIPPED | OUTPATIENT
Start: 2020-12-30 | End: 2021-05-10 | Stop reason: DRUGHIGH

## 2021-01-04 ENCOUNTER — NURSE ONLY (OUTPATIENT)
Dept: CARDIOLOGY CLINIC | Age: 71
End: 2021-01-04
Payer: MEDICARE

## 2021-01-04 DIAGNOSIS — I50.22 CHRONIC SYSTOLIC HEART FAILURE (HCC): ICD-10-CM

## 2021-01-04 DIAGNOSIS — I42.0 CARDIOMYOPATHY, DILATED (HCC): ICD-10-CM

## 2021-01-04 DIAGNOSIS — Z95.0 PACEMAKER: ICD-10-CM

## 2021-01-04 PROCEDURE — 93297 REM INTERROG DEV EVAL ICPMS: CPT | Performed by: INTERNAL MEDICINE

## 2021-01-04 PROCEDURE — 93294 REM INTERROG EVL PM/LDLS PM: CPT | Performed by: INTERNAL MEDICINE

## 2021-01-04 PROCEDURE — 93296 REM INTERROG EVL PM/IDS: CPT | Performed by: INTERNAL MEDICINE

## 2021-01-04 NOTE — LETTER
[address] [phone]  [address] [phone]    Pacemaker/Defibrillator Clinic          01/04/21        [address] [place] [address] [address] [address]        Dear Gerhardt Current    This letter is to inform you that we received the transmission from your monitor at home that checks your implanted heart device. The next date your monitor will automatically transmit will be 4-6-21. If your report needs attention we will notify you. Your device and monitor are wireless and most transmit cellularly, but please periodically check your monitor is still plugged in to the electrical outlet. If you still use the telephone land line to send please ensure the connection to the phone robbie is secure. This will help to ensure successful automatic transmissions in the future. Also, the monitor needs to be close to you while sleeping at night. Please be aware that the remote device transmission sites are periodically monitored only during regular business hours during which simultaneous in-office device clinics are being run. If your transmission requires attention, we will contact you as soon as possible. Thank you.             [facility]

## 2021-01-04 NOTE — PROGRESS NOTES
We received remote transmission from patient's monitor at home. Transmission shows normal sensing and pacing function. Noted NSVT. EP physician will review. See interrogation under cardiology tab in the [address] [facility] [address] field for more details. Optivol is within normal range.

## 2021-01-13 RX ORDER — WARFARIN SODIUM 10 MG/1
TABLET ORAL
Qty: 98 TABLET | Refills: 1 | Status: SHIPPED | OUTPATIENT
Start: 2021-01-13 | End: 2021-04-02

## 2021-01-18 NOTE — TELEPHONE ENCOUNTER
Prescription refill    Last OV:12/11/20    Last Refill:12/11/20    Labs:12/11/20    Future Appt:05/10/21

## 2021-01-19 RX ORDER — AMLODIPINE BESYLATE 5 MG/1
5 TABLET ORAL DAILY
Qty: 90 TABLET | Refills: 1 | Status: SHIPPED | OUTPATIENT
Start: 2021-01-19 | End: 2021-09-13

## 2021-01-20 ENCOUNTER — HOSPITAL ENCOUNTER (OUTPATIENT)
Age: 71
Discharge: HOME | End: 2021-01-20
Payer: MEDICARE

## 2021-01-20 ENCOUNTER — ANTI-COAG VISIT (OUTPATIENT)
Dept: PHARMACY | Age: 71
End: 2021-01-20
Payer: MEDICARE

## 2021-01-20 DIAGNOSIS — I50.22 CHRONIC SYSTOLIC CONGESTIVE HEART FAILURE (HCC): ICD-10-CM

## 2021-01-20 DIAGNOSIS — N18.31 STAGE 3A CHRONIC KIDNEY DISEASE (HCC): ICD-10-CM

## 2021-01-20 DIAGNOSIS — I10 ESSENTIAL HYPERTENSION: ICD-10-CM

## 2021-01-20 DIAGNOSIS — I48.3 TYPICAL ATRIAL FLUTTER (HCC): ICD-10-CM

## 2021-01-20 DIAGNOSIS — E78.5 HYPERLIPIDEMIA LDL GOAL <100: ICD-10-CM

## 2021-01-20 DIAGNOSIS — E11.9 CONTROLLED TYPE 2 DIABETES MELLITUS WITHOUT COMPLICATION, WITHOUT LONG-TERM CURRENT USE OF INSULIN (HCC): ICD-10-CM

## 2021-01-20 LAB
A/G RATIO: 1.3 (ref 1.1–2.2)
ALBUMIN SERPL-MCNC: 4.2 G/DL (ref 3.4–5)
ALP BLD-CCNC: 91 U/L (ref 40–129)
ALT SERPL-CCNC: 14 U/L (ref 10–40)
ANION GAP SERPL CALCULATED.3IONS-SCNC: 12 MMOL/L (ref 3–16)
AST SERPL-CCNC: 17 U/L (ref 15–37)
BILIRUB SERPL-MCNC: 0.4 MG/DL (ref 0–1)
BUN BLDV-MCNC: 18 MG/DL (ref 7–20)
CALCIUM SERPL-MCNC: 9.6 MG/DL (ref 8.3–10.6)
CHLORIDE BLD-SCNC: 103 MMOL/L (ref 99–110)
CHOLESTEROL, TOTAL: 123 MG/DL (ref 0–199)
CO2: 27 MMOL/L (ref 21–32)
CREAT SERPL-MCNC: 1 MG/DL (ref 0.8–1.3)
GFR AFRICAN AMERICAN: >60
GFR NON-AFRICAN AMERICAN: >60
GLOBULIN: 3.2 G/DL
GLUCOSE BLD-MCNC: 100 MG/DL (ref 70–99)
HDLC SERPL-MCNC: 37 MG/DL (ref 40–60)
INTERNATIONAL NORMALIZATION RATIO, POC: 3.6
LDL CHOLESTEROL CALCULATED: 58 MG/DL
POTASSIUM SERPL-SCNC: 4.6 MMOL/L (ref 3.5–5.1)
PRO-BNP: 600 PG/ML (ref 0–124)
SODIUM BLD-SCNC: 142 MMOL/L (ref 136–145)
TOTAL PROTEIN: 7.4 G/DL (ref 6.4–8.2)
TRIGL SERPL-MCNC: 141 MG/DL (ref 0–150)
VLDLC SERPL CALC-MCNC: 28 MG/DL

## 2021-01-20 PROCEDURE — 83880 ASSAY OF NATRIURETIC PEPTIDE: CPT

## 2021-01-20 PROCEDURE — 99211 OFF/OP EST MAY X REQ PHY/QHP: CPT

## 2021-01-20 PROCEDURE — 80053 COMPREHEN METABOLIC PANEL: CPT

## 2021-01-20 PROCEDURE — 85610 PROTHROMBIN TIME: CPT

## 2021-01-20 PROCEDURE — 36415 COLL VENOUS BLD VENIPUNCTURE: CPT

## 2021-01-20 PROCEDURE — 80061 LIPID PANEL: CPT

## 2021-01-20 NOTE — PROGRESS NOTES
Mr. Lynsey Field is a 72 y.o. y/o male with history of Atrial Fibrillation who presents today for anticoagulation monitoring and adjustment. Pt is retired and works part time at [employer]. Patient reports he has been on warfarin for almost 30 years now and was managed by his cardiologist who recently retired. Pt was then managed by Dr. Gurdeep Sadler prior to being established in Clinic. He is now enrolled in the clinic under Dr. Gwen Garcia. He states he has been taking   15 mg every Sun, Tue and Fri; and 10 mg all other days. Pertinent PMH: HTN, DM, HLD, COPD, CHF    Patient Reported Findings:  Yes     No  [x]   []       Patient verifies current dosing regimen as listed  []   [x]       S/S bleeding/bruising/swelling/SOB -denies    []   [x]       Blood in urine or stool- denies  []   [x]       Procedures scheduled in the future at this time -   []   [x]       Missed Dose-denies   []   [x]       Extra Dose - denies  []   [x]       Change in medications-  tylenol prn--> started amlodipine. Pain meds acutely --> no changes---> taking APAP recently     []   [x]       Change in health/diet/appetite - reports he eats about 2 meals/day. And states he will have salads about 1-2x/wk. (not a big fan of spinach or brocolli but may have other greens)--> Patient states 3 salads in past 2 weeks---> ate salad once since last visit---> no greens, wants to add green beans in twice weekly, no NVD --> returned to vit k a few times a week (green beans and salad) --> no changes, no NVD   []   [x]       Change in alcohol use - reports occasionally drinking beer (once every few months) but happens very infrequently. --> Patient reports no alcohol in past two weeks---> denies   []   [x]       Change in activity  []   [x]       Hospital admission  []   [x]       Emergency department visit  []   [x]       Other complaints     Clinical Outcomes:  Yes     No  []   [x]       Major bleeding event  []   [x]       Thromboembolic event    Duration of warfarin Therapy: Indefinite   INR Range:  2.0-3.0     INR today is 3.6 dt acetaminophen   Take 5mg tonight then continue taking weekly dose of 10 mg Mon, Thurs and Sat and 15 mg all other days  Encouraged patient to maintain a consistent diet.   Recheck INR again in 4 weeks on 2/18    Referring physician is Dr. Leilani Gonzalez  INR (no units)   Date Value   01/20/2021 3.6   12/22/2020 2.2   12/01/2020 3.4   10/27/2020 2.1   05/05/2020 2.17 (H)   04/14/2020 2.43 (H)   03/24/2020 1.45 (H)   03/23/2020 1.60 (H)       CLINICAL PHARMACY CONSULT: MED RECONCILIATION/REVIEW ADDENDUM    For Pharmacy Admin Tracking Only    PHSO: Yes  Total # of Interventions Recommended: 1  - Decreased Dose #: 1  - Maintenance Safety Lab Monitoring #: 1  Total Interventions Accepted: 1  Time Spent (min): [address] Cabral, PharmD

## 2021-01-26 RX ORDER — ALLOPURINOL 300 MG/1
TABLET ORAL
Qty: 90 TABLET | Refills: 0 | Status: SHIPPED | OUTPATIENT
Start: 2021-01-26 | End: 2021-04-28

## 2021-01-27 ENCOUNTER — TELEPHONE (OUTPATIENT)
Dept: FAMILY MEDICINE CLINIC | Age: 71
End: 2021-01-27

## 2021-01-27 NOTE — TELEPHONE ENCOUNTER
----- Message from Naga Guerra sent at 1/27/2021 10:25 AM EST -----  Subject: Message to Provider    QUESTIONS  Information for Provider? PT called on to remind  that appt tomorrow is   over the phone   not VV or in person. TELEPHONE? [phone]? blood work results/med   check  ---------------------------------------------------------------------------  --------------  [address]  What is the best way for the office to contact you? OK to leave message on   voicemail  Preferred Call Back Phone Number? [phone]  ---------------------------------------------------------------------------  --------------  SCRIPT ANSWERS  Relationship to Patient?  Self

## 2021-01-28 ENCOUNTER — VIRTUAL VISIT (OUTPATIENT)
Dept: FAMILY MEDICINE CLINIC | Age: 71
End: 2021-01-28
Payer: MEDICARE

## 2021-01-28 DIAGNOSIS — Z95.0 PRESENCE OF PERMANENT CARDIAC PACEMAKER: ICD-10-CM

## 2021-01-28 DIAGNOSIS — I10 ESSENTIAL HYPERTENSION: ICD-10-CM

## 2021-01-28 DIAGNOSIS — E78.5 HYPERLIPIDEMIA LDL GOAL <100: ICD-10-CM

## 2021-01-28 DIAGNOSIS — Z79.01 LONG TERM CURRENT USE OF ANTICOAGULANTS WITH INR GOAL OF 2.0-3.0: ICD-10-CM

## 2021-01-28 DIAGNOSIS — F51.01 PRIMARY INSOMNIA: ICD-10-CM

## 2021-01-28 DIAGNOSIS — E11.9 CONTROLLED TYPE 2 DIABETES MELLITUS WITHOUT COMPLICATION, WITHOUT LONG-TERM CURRENT USE OF INSULIN (HCC): Primary | ICD-10-CM

## 2021-01-28 DIAGNOSIS — I42.0 CARDIOMYOPATHY, DILATED (HCC): ICD-10-CM

## 2021-01-28 DIAGNOSIS — G47.39 OTHER SLEEP APNEA: ICD-10-CM

## 2021-01-28 DIAGNOSIS — J43.8 OTHER EMPHYSEMA (HCC): ICD-10-CM

## 2021-01-28 DIAGNOSIS — Z53.20 COLONOSCOPY REFUSED: ICD-10-CM

## 2021-01-28 DIAGNOSIS — N18.31 STAGE 3A CHRONIC KIDNEY DISEASE (HCC): ICD-10-CM

## 2021-01-28 DIAGNOSIS — E66.9 NON MORBID OBESITY: ICD-10-CM

## 2021-01-28 PROCEDURE — 99443 PR PHYS/QHP TELEPHONE EVALUATION 21-30 MIN: CPT | Performed by: FAMILY MEDICINE

## 2021-01-28 ASSESSMENT — ENCOUNTER SYMPTOMS
SHORTNESS OF BREATH: 0
ABDOMINAL PAIN: 0
ABDOMINAL DISTENTION: 0
WHEEZING: 0
COUGH: 0
STRIDOR: 0
VOMITING: 0
DIARRHEA: 0
CHOKING: 0
ANAL BLEEDING: 0
CONSTIPATION: 0
NAUSEA: 0
APNEA: 0
RECTAL PAIN: 0
BLOOD IN STOOL: 0
CHEST TIGHTNESS: 0

## 2021-01-28 ASSESSMENT — PATIENT HEALTH QUESTIONNAIRE - PHQ9
2. FEELING DOWN, DEPRESSED OR HOPELESS: 0
SUM OF ALL RESPONSES TO PHQ9 QUESTIONS 1 & 2: 0
1. LITTLE INTEREST OR PLEASURE IN DOING THINGS: 0

## 2021-01-28 NOTE — PROGRESS NOTES
Subjective:      Patient ID: Yuri Chavez is a 72 y.o. male. HPI  Yuri Chavez is a 72 y.o. male evaluated via telephone on 1/28/2021. Consent:  He and/or health care decision maker is aware that that he may receive a bill for this telephone service, depending on his insurance coverage, and has provided verbal consent to proceed:yes-Consent obtained within past 12 months:yes      Documentation:  I communicated with the patient and/or health care decision maker about test results/chronic medical conditions. Details of this discussion including any medical advice provided: yes      I affirm this is a Patient Initiated Episode with a Patient who has not had a related appointment within my department in the past 7 days or scheduled within the next 24 hours. Patient identification was verified at the start of the visit: yes. Total Time:22minutes        Benny Salas   Results for Wilian Sandoval \"BILL\" (MRN [id number]) as of 1/28/2021 14:59   Ref.  Range 1/20/2021 12:50   Sodium Latest Ref Range: 136 - 145 mmol/L 142   Potassium Latest Ref Range: 3.5 - 5.1 mmol/L 4.6   Chloride Latest Ref Range: 99 - 110 mmol/L 103   CO2 Latest Ref Range: 21 - 32 mmol/L 27   BUN Latest Ref Range: 7 - 20 mg/dL 18   Creatinine Latest Ref Range: 0.8 - 1.3 mg/dL 1.0   Anion Gap Latest Ref Range: 3 - 16  12   GFR Non- Latest Ref Range: >60  >60   GFR  Latest Ref Range: >60  >60   Glucose Latest Ref Range: 70 - 99 mg/dL 100 (H)   Calcium Latest Ref Range: 8.3 - 10.6 mg/dL 9.6   Total Protein Latest Ref Range: 6.4 - 8.2 g/dL 7.4        Cholesterol, Total Latest Ref Range: 0 - 199 mg/dL 123   HDL Cholesterol Latest Ref Range: 40 - 60 mg/dL 37 (L)   LDL Calculated Latest Ref Range: <100 mg/dL 58   Triglycerides Latest Ref Range: 0 - 150 mg/dL 141   VLDL Cholesterol Calculated Latest Ref Range: Not Established mg/dL 28   Albumin Latest Ref Range: 3.4 - 5.0 g/dL 4.2   Globulin Latest Units: g/dL well.  See recent labs above. HDL will address with exercise regime. Taking statin w/o side effects. Associated w/nothing new. Improving factors:medication/diet. Worsening factors:nothing else new. Denies adbo pain/myalgias.      ROSETTA:per sleep specialist.  No other new associated concerns. Gout of both feet: reports doing well. No recent recurrences reported. Taking allopurinol w/o side effects. Last gout episode >6yrs ago. No other new associated concerns. Denies bilateral foot skin lesions/foot [id number]-[id number]-dwhvtzekw/pus. No Known Allergies    Current Outpatient Medications on File Prior to Visit   Medication Sig Dispense Refill    allopurinol (ZYLOPRIM) 300 MG tablet TAKE 1 TABLET EVERY DAY 90 tablet 0    amLODIPine (NORVASC) 5 MG tablet Take 1 tablet by mouth daily 90 tablet 1    warfarin (COUMADIN) 10 MG tablet Take 1 tablet by mouth daily, besides Tuesday, Friday and [place] take 1.5 tablets (15 mg) 98 tablet 1    furosemide (LASIX) 20 MG tablet Take 1 tablet by mouth daily 90 tablet 2    pravastatin (PRAVACHOL) 80 MG tablet TAKE 1 TABLET EVERY DAY FOR CHOLESTEROL 90 tablet 0    metFORMIN (GLUCOPHAGE-XR) 500 MG extended release tablet TAKE 4 TABLETS EVERY DAY WITH BREAKFAST 360 tablet 0    lisinopril (PRINIVIL;ZESTRIL) 20 MG tablet Take 1 tablet by mouth 2 times daily 180 tablet 1    glucose monitoring kit (FREESTYLE) monitoring kit Ok to dispense what is covered by insurance. 1 kit 0    blood glucose monitor strips Test 2 times a day & as needed for symptoms of irregular blood glucose. Dispense sufficient amount for indicated testing frequency plus additional to accommodate PRN testing needs. Ok to dispense what is covered by insurance 100 strip 5    ACCU-CHEK COMPACT PLUS strip USE TO TEST 2 TIMES DAILY 102 strip 1    Lancets MISC BID testing 100 each 6    sotalol (BETAPACE) 80 MG tablet Take 80 mg by mouth 2 times daily.       aspirin 81 MG chewable tablet Take 81 mg by mouth daily. No current facility-administered medications on file prior to visit. Past Medical History:   Diagnosis Date    Acute congestive heart failure ([facility] [address] [facility] 2019    Allergic rhinitis 2016    Atrial fibrillation ([facility]     under care of cardiology:Dr. Fleeta Benes Behrens([facility])    CKD (chronic kidney disease)     Nephro:Dr. Parker:stage 3    Class 2 obesity due to excess calories without serious comorbidity with body mass index (BMI) of 37.0 to 37.9 in adult 2017    Controlled type 2 diabetes mellitus without complication, without long-term current use of insulin ([facility] 2017    COPD     Gout     Hyperlipidemia, mixed 2016    Hypertension     under care of cardiology:Dr. Fleeta Benes Behrens([facility])    Long term current use of anticoagulants with INR goal of 2.0-3.0     under care of cardiology:Dr. Scott Behrens([facility])    Obstructive sleep apnea syndrome 2019    per pulmo    Parkinson disease [facility])     2016:Per neurologist dx is tremor & not consistent with Parkison's:Dr. Zoraida Cerda Prediabetes 10/28/2016    Primary insomnia 2017    Sleep apnea     CPAP    Stage 2 chronic kidney disease            Social History     Tobacco Use    Smoking status: Former Smoker     Packs/day: 1.00     Years: 30.00     Pack years: 30.00     Types: Cigarettes     Quit date: 2019     Years since quittin.6    Smokeless tobacco: Never Used    Tobacco comment:     Substance Use Topics    Alcohol use: Yes     Comment: rarely    Drug use: No     Social History     Substance and Sexual Activity   Drug Use No           Review of Systems   Constitutional: Negative for activity change, appetite change, chills, diaphoresis, fatigue, fever and unexpected weight change. Eyes: Negative for visual disturbance. Respiratory: Negative for apnea, cough, choking, chest tightness, shortness of breath, wheezing and stridor.     Cardiovascular: Negative for chest pain, palpitations and leg swelling. Gastrointestinal: Negative for abdominal distention, abdominal pain, anal bleeding, blood in stool, constipation, diarrhea, nausea, rectal pain and vomiting. Endocrine: Negative for cold intolerance, heat intolerance, polydipsia, polyphagia and polyuria. Genitourinary: Negative for difficulty urinating. Skin: Negative for pallor, rash and wound. Neurological: Negative for dizziness and light-headedness. Hematological: Negative for adenopathy. Psychiatric/Behavioral: Negative for agitation, behavioral problems, confusion, decreased concentration, dysphoric mood, hallucinations, self-injury and suicidal ideas. The patient is not nervous/anxious and is not hyperactive. Objective:   Physical Exam  Constitutional:       Comments: No audible acute distress during phone visit. Pulmonary:      Comments: No audible wheezing/cough/sob. Psychiatric:         Behavior: Behavior is cooperative. Comments: Polite. Assessment:       Diagnosis Orders   1. Controlled type 2 diabetes mellitus without complication, without long-term current use of insulin (HCC)  VSS per limited vitals obtainable via virtual visit(VV)/well appearing. Stable. A1c due 3/2021. Diabetes:Check feet daily. Yrly eye checks advised. Education: Reviewed ABCs of diabetes management (respective goals in parentheses):  A1C (<7), blood pressure (<130/80), and cholesterol (LDL <100). Counseled at this visit on the following: diabetes complication prevention, foot care. Comprehensive Metabolic Panel    Hemoglobin A1C    Microalbumin / Creatinine Urine Ratio   2. Hyperlipidemia LDL goal <100  Stable. Labs in [address] [place] [address]. Comprehensive Metabolic Panel    Lipid Panel   3. Essential hypertension  Stable. Comprehensive Metabolic Panel   4. COPD  Stable. 5. Stage 3chronic kidney disease  Stable. Per specialist.  Comprehensive Metabolic Panel   6. Primary insomnia  Stable.      7. Sleep apnea  Stable. Per specialist.     8. Long term current use of anticoagulants with INR goal of 2.0-3.0  Per specialist.     9. Permanent cardiac pacemaker  Per specialist.     10. Cardiomyopathy, dilated ([address]  Per specialist.     11. Non morbid obesity  Diet & exercise regime reviewed. 12. Colonoscopy/FIT test/occult stool refused             Plan:        Pt' ended call in good condition. Obtain labs/diagnostic tests as discussed today & call back for results within 2-7days.           Tyler Reece MD

## 2021-02-18 ENCOUNTER — ANTI-COAG VISIT (OUTPATIENT)
Dept: PHARMACY | Age: 71
End: 2021-02-18
Payer: MEDICARE

## 2021-02-18 DIAGNOSIS — I48.3 TYPICAL ATRIAL FLUTTER (HCC): ICD-10-CM

## 2021-02-18 LAB — INTERNATIONAL NORMALIZATION RATIO, POC: 2.4

## 2021-02-18 PROCEDURE — 85610 PROTHROMBIN TIME: CPT

## 2021-02-18 PROCEDURE — 99211 OFF/OP EST MAY X REQ PHY/QHP: CPT

## 2021-02-18 NOTE — PROGRESS NOTES
Mr. Yuri Chavez is a 72 y.o. y/o male with history of Atrial Fibrillation who presents today for anticoagulation monitoring and adjustment. Pt is retired and works part time at [employer]. Patient reports he has been on warfarin for almost 30 years now and was managed by his cardiologist who recently retired. Pt was then managed by Dr. Tsering Proctor prior to being established in Clinic. He is now enrolled in the clinic under Dr. Ramsey Ryan. He states he has been taking   15 mg every Sun, Tue and Fri; and 10 mg all other days. Pertinent PMH: HTN, DM, HLD, COPD, CHF    Patient Reported Findings:  Yes     No  [x]   []       Patient verifies current dosing regimen as listed  []   [x]       S/S bleeding/bruising/swelling/SOB -denies    []   [x]       Blood in urine or stool- denies  []   [x]       Procedures scheduled in the future at this time -   []   [x]       Missed Dose-denies   []   [x]       Extra Dose - denies  []   [x]       Change in medications-  tylenol prn--> started amlodipine. Pain meds acutely --> no changes---> taking APAP recently---> no changes      []   [x]       Change in health/diet/appetite - reports he eats about 2 meals/day. And states he will have salads about 1-2x/wk. (not a big fan of spinach or brocolli but may have other greens)--> Patient states 3 salads in past 2 weeks---> ate salad once since last visit---> no greens, wants to add green beans in twice weekly, no NVD --> returned to vit k a few times a week (green beans and salad) --> no changes, no NVD   []   [x]       Change in alcohol use - reports occasionally drinking beer (once every few months) but happens very infrequently. --> Patient reports no alcohol in past two weeks---> denies   []   [x]       Change in activity  []   [x]       Hospital admission  []   [x]       Emergency department visit  []   [x]       Other complaints     Clinical Outcomes:  Yes     No  []   [x]       Major bleeding event  []   [x]       Thromboembolic

## 2021-03-05 RX ORDER — METFORMIN HYDROCHLORIDE 500 MG/1
TABLET, EXTENDED RELEASE ORAL
Qty: 360 TABLET | Refills: 0 | Status: SHIPPED | OUTPATIENT
Start: 2021-03-05 | End: 2021-05-28

## 2021-03-12 DIAGNOSIS — E78.5 HYPERLIPIDEMIA LDL GOAL <100: ICD-10-CM

## 2021-03-14 RX ORDER — PRAVASTATIN SODIUM 80 MG/1
TABLET ORAL
Qty: 90 TABLET | Refills: 0 | Status: SHIPPED | OUTPATIENT
Start: 2021-03-14 | End: 2021-06-07

## 2021-03-18 ENCOUNTER — TELEPHONE (OUTPATIENT)
Dept: PHARMACY | Age: 71
End: 2021-03-18

## 2021-03-23 ENCOUNTER — ANTI-COAG VISIT (OUTPATIENT)
Dept: PHARMACY | Age: 71
End: 2021-03-23
Payer: MEDICARE

## 2021-03-23 DIAGNOSIS — I48.3 TYPICAL ATRIAL FLUTTER (HCC): ICD-10-CM

## 2021-03-23 LAB — INTERNATIONAL NORMALIZATION RATIO, POC: 2.5

## 2021-03-23 PROCEDURE — 85610 PROTHROMBIN TIME: CPT

## 2021-03-23 PROCEDURE — 99211 OFF/OP EST MAY X REQ PHY/QHP: CPT

## 2021-03-23 NOTE — PROGRESS NOTES
Mr. Kimberly Hart is a 72 y.o. y/o male with history of Atrial Fibrillation who presents today for anticoagulation monitoring and adjustment. Pt is retired and works part time at [employer]. Patient reports he has been on warfarin for almost 30 years now and was managed by his cardiologist who recently retired. Pt was then managed by Dr. Anthony Valladares prior to being established in Clinic  Pertinent PMH: HTN, DM, HLD, COPD, CHF    Patient Reported Findings:  Yes     No  [x]   []       Patient verifies current dosing regimen as listed  []   [x]       S/S bleeding/bruising/swelling/SOB -denies    []   [x]       Blood in urine or stool- denies  []   [x]       Procedures scheduled in the future at this time -   []   [x]       Missed Dose-denies   []   [x]       Extra Dose - denies  []   [x]       Change in medications-  tylenol prn--> no changes      []   [x]       Change in health/diet/appetite - reports he eats about 2 meals/day. And states he will have salads about 1-2x/wk. (not a big fan of spinach or brocolli but may have other greens)--> Patient states 3 salads in past 2 weeks---> ate salad once since last visit---> no greens, wants to add green beans in twice weekly, no NVD --> returned to vit k a few times a week (green beans and salad) --> no changes, no NVD   []   [x]       Change in alcohol use - reports occasionally drinking beer (once every few months) but happens very infrequently. --> Patient reports no alcohol in past two weeks---> denies   []   [x]       Change in activity  []   [x]       Hospital admission  []   [x]       Emergency department visit  []   [x]       Other complaints     Clinical Outcomes:  Yes     No  []   [x]       Major bleeding event  []   [x]       Thromboembolic event    Duration of warfarin Therapy: Indefinite   INR Range:  2.0-3.0     INR today is 2.5  Continue taking weekly dose of 10 mg Mon, Thurs and Sat and 15 mg all other days.   Encouraged patient to maintain a consistent

## 2021-04-02 RX ORDER — WARFARIN SODIUM 10 MG/1
TABLET ORAL
Qty: 111 TABLET | Refills: 1 | Status: SHIPPED | OUTPATIENT
Start: 2021-04-02 | End: 2021-10-14

## 2021-04-02 NOTE — TELEPHONE ENCOUNTER
Lov 12/11/2020  Labs   Component Collected Lab   INR 03/23/2021  1:20 PM Unknown   2.5    Lab and Collection    POCT INR - 3/23/2021    Lrf 1/13/2021  Appt 5/10/2021 please advise thanks

## 2021-04-05 ENCOUNTER — TELEPHONE (OUTPATIENT)
Dept: FAMILY MEDICINE CLINIC | Age: 71
End: 2021-04-05

## 2021-04-05 NOTE — TELEPHONE ENCOUNTER
Pt called wanting to set up an appt for his A1c to be drawn. I told him that he would need to go to the [facility] lab. He is VERY upset that he can't come here for the blood work.     Please advise    Last OV:  1-28-21

## 2021-04-06 ENCOUNTER — NURSE ONLY (OUTPATIENT)
Dept: CARDIOLOGY CLINIC | Age: 71
End: 2021-04-06
Payer: MEDICARE

## 2021-04-06 DIAGNOSIS — Z95.0 PACEMAKER: ICD-10-CM

## 2021-04-06 DIAGNOSIS — I10 BENIGN ESSENTIAL HTN: Chronic | ICD-10-CM

## 2021-04-06 DIAGNOSIS — E78.2 HYPERLIPIDEMIA, MIXED: Chronic | ICD-10-CM

## 2021-04-06 DIAGNOSIS — I42.0 CARDIOMYOPATHY, DILATED (HCC): ICD-10-CM

## 2021-04-06 DIAGNOSIS — I50.22 CHRONIC SYSTOLIC CONGESTIVE HEART FAILURE (HCC): ICD-10-CM

## 2021-04-06 PROCEDURE — 36415 COLL VENOUS BLD VENIPUNCTURE: CPT | Performed by: FAMILY MEDICINE

## 2021-04-06 NOTE — LETTER
[address] [facility] [phone]  [address] [phone]    Pacemaker/Defibrillator Clinic          04/06/21        [address] [place] [address] [address] [address]        Dear Jamar Talley    This letter is to inform you that we received the transmission from your monitor at home that checks your implanted heart device. The next date your monitor will automatically transmit will be 7-7-21. If your report needs attention we will notify you. Your device and monitor are wireless and most transmit cellularly, but please periodically check your monitor is still plugged in to the electrical outlet. If you still use the telephone land line to send please ensure the connection to the phone robbie is secure. This will help to ensure successful automatic transmissions in the future. Also, the monitor needs to be close to you while sleeping at night. Please be aware that the remote device transmission sites are periodically monitored only during regular business hours during which simultaneous in-office device clinics are being run. If your transmission requires attention, we will contact you as soon as possible. Thank you.             [facility]

## 2021-04-07 LAB
A/G RATIO: 1.6 (ref 1.1–2.2)
ALBUMIN SERPL-MCNC: 4.6 G/DL (ref 3.4–5)
ALP BLD-CCNC: 97 U/L (ref 40–129)
ALT SERPL-CCNC: 14 U/L (ref 10–40)
ANION GAP SERPL CALCULATED.3IONS-SCNC: 13 MMOL/L (ref 3–16)
AST SERPL-CCNC: 17 U/L (ref 15–37)
BILIRUB SERPL-MCNC: 0.4 MG/DL (ref 0–1)
BUN BLDV-MCNC: 28 MG/DL (ref 7–20)
CALCIUM SERPL-MCNC: 9.3 MG/DL (ref 8.3–10.6)
CHLORIDE BLD-SCNC: 102 MMOL/L (ref 99–110)
CHOLESTEROL, TOTAL: 155 MG/DL (ref 0–199)
CO2: 24 MMOL/L (ref 21–32)
CREAT SERPL-MCNC: 1.3 MG/DL (ref 0.8–1.3)
ESTIMATED AVERAGE GLUCOSE: 131.2 MG/DL
GFR AFRICAN AMERICAN: >60
GFR NON-AFRICAN AMERICAN: 55
GLOBULIN: 2.8 G/DL
GLUCOSE BLD-MCNC: 107 MG/DL (ref 70–99)
HBA1C MFR BLD: 6.2 %
HDLC SERPL-MCNC: 37 MG/DL (ref 40–60)
LDL CHOLESTEROL CALCULATED: 90 MG/DL
POTASSIUM SERPL-SCNC: 5.1 MMOL/L (ref 3.5–5.1)
SODIUM BLD-SCNC: 139 MMOL/L (ref 136–145)
TOTAL PROTEIN: 7.4 G/DL (ref 6.4–8.2)
TRIGL SERPL-MCNC: 138 MG/DL (ref 0–150)
VLDLC SERPL CALC-MCNC: 28 MG/DL

## 2021-04-12 ENCOUNTER — TELEPHONE (OUTPATIENT)
Dept: CARDIOLOGY CLINIC | Age: 71
End: 2021-04-12

## 2021-04-19 ENCOUNTER — ANTI-COAG VISIT (OUTPATIENT)
Dept: PHARMACY | Age: 71
End: 2021-04-19
Payer: MEDICARE

## 2021-04-19 DIAGNOSIS — I48.3 TYPICAL ATRIAL FLUTTER (HCC): ICD-10-CM

## 2021-04-19 LAB — INTERNATIONAL NORMALIZATION RATIO, POC: 2.4

## 2021-04-19 PROCEDURE — 85610 PROTHROMBIN TIME: CPT

## 2021-04-19 PROCEDURE — 99211 OFF/OP EST MAY X REQ PHY/QHP: CPT

## 2021-04-19 NOTE — PROGRESS NOTES
Mr. Amira Evans is a 72 y.o. y/o male with history of Atrial Fibrillation who presents today for anticoagulation monitoring and adjustment. Pt is retired and works part time at [employer]. Patient reports he has been on warfarin for almost 30 years now and was managed by his cardiologist who recently retired. Pt was then managed by Dr. Arian Garcia prior to being established in Clinic  Pertinent PMH: HTN, DM, HLD, COPD, CHF    Patient Reported Findings:  Yes     No  [x]   []       Patient verifies current dosing regimen as listed  []   [x]       S/S bleeding/bruising/swelling/SOB -denies    []   [x]       Blood in urine or stool- denies  []   [x]       Procedures scheduled in the future at this time -   []   [x]       Missed Dose-denies   []   [x]       Extra Dose - denies  []   [x]       Change in medications-  tylenol prn--> no changes      []   [x]       Change in health/diet/appetite - reports he eats about 2 meals/day. And states he will have salads about 1-2x/wk. (not a big fan of spinach or brocolli but may have other greens)--> Patient states 3 salads in past 2 weeks---> ate salad once since last visit---> no greens, wants to add green beans in twice weekly, no NVD --> returned to vit k a few times a week (green beans and salad) --> no changes, no NVD   []   [x]       Change in alcohol use - reports occasionally drinking beer (once every few months) but happens very infrequently. --> Patient reports no alcohol in past two weeks---> denies   []   [x]       Change in activity  []   [x]       Hospital admission  []   [x]       Emergency department visit  []   [x]       Other complaints     Clinical Outcomes:  Yes     No  []   [x]       Major bleeding event  []   [x]       Thromboembolic event    Duration of warfarin Therapy: Indefinite   INR Range:  2.0-3.0     INR today is 2.4  Continue taking weekly dose of 10 mg Mon, Thurs and Sat and 15 mg all other days.   Encouraged patient to maintain a consistent diet.  Recheck INR again in 3 weeks on 5/10 since has appt that day     Referring physician is Dr. Ben Malave  INR (no units)   Date Value   03/23/2021 2.5   02/18/2021 2.4   01/20/2021 3.6   12/22/2020 2.2   05/05/2020 2.17 (H)   04/14/2020 2.43 (H)   03/24/2020 1.45 (H)   03/23/2020 1.60 (H)       CLINICAL PHARMACY CONSULT: MED RECONCILIATION/REVIEW ADDENDUM    For Pharmacy Admin Tracking Only    PHSO: Yes  Total # of Interventions Recommended: 0  - Maintenance Safety Lab Monitoring #: 1  Total Interventions Accepted: 0  Time Spent (min): 15    Elisabeth Leon, NovaD

## 2021-04-28 RX ORDER — ALLOPURINOL 300 MG/1
TABLET ORAL
Qty: 90 TABLET | Refills: 0 | Status: SHIPPED | OUTPATIENT
Start: 2021-04-28 | End: 2021-07-23

## 2021-04-29 ENCOUNTER — OFFICE VISIT (OUTPATIENT)
Dept: FAMILY MEDICINE CLINIC | Age: 71
End: 2021-04-29
Payer: MEDICARE

## 2021-04-29 VITALS
BODY MASS INDEX: 38.53 KG/M2 | TEMPERATURE: 97.6 F | RESPIRATION RATE: 16 BRPM | HEIGHT: 73 IN | HEART RATE: 81 BPM | SYSTOLIC BLOOD PRESSURE: 126 MMHG | WEIGHT: 290.7 LBS | OXYGEN SATURATION: 92 % | DIASTOLIC BLOOD PRESSURE: 81 MMHG

## 2021-04-29 DIAGNOSIS — I10 ESSENTIAL HYPERTENSION: ICD-10-CM

## 2021-04-29 DIAGNOSIS — E78.5 HYPERLIPIDEMIA LDL GOAL <100: ICD-10-CM

## 2021-04-29 DIAGNOSIS — N18.31 STAGE 3A CHRONIC KIDNEY DISEASE (HCC): ICD-10-CM

## 2021-04-29 DIAGNOSIS — I42.0 CARDIOMYOPATHY, DILATED (HCC): ICD-10-CM

## 2021-04-29 DIAGNOSIS — Z79.01 LONG TERM CURRENT USE OF ANTICOAGULANTS WITH INR GOAL OF 2.0-3.0: ICD-10-CM

## 2021-04-29 DIAGNOSIS — E66.9 NON MORBID OBESITY: ICD-10-CM

## 2021-04-29 DIAGNOSIS — E11.9 CONTROLLED TYPE 2 DIABETES MELLITUS WITHOUT COMPLICATION, WITHOUT LONG-TERM CURRENT USE OF INSULIN (HCC): Primary | ICD-10-CM

## 2021-04-29 DIAGNOSIS — J43.8 OTHER EMPHYSEMA (HCC): ICD-10-CM

## 2021-04-29 DIAGNOSIS — Z95.0 PRESENCE OF PERMANENT CARDIAC PACEMAKER: ICD-10-CM

## 2021-04-29 DIAGNOSIS — F51.01 PRIMARY INSOMNIA: ICD-10-CM

## 2021-04-29 DIAGNOSIS — G47.39 OTHER SLEEP APNEA: ICD-10-CM

## 2021-04-29 DIAGNOSIS — Z53.20 COLONOSCOPY REFUSED: ICD-10-CM

## 2021-04-29 PROCEDURE — 3044F HG A1C LEVEL LT 7.0%: CPT | Performed by: FAMILY MEDICINE

## 2021-04-29 PROCEDURE — 3017F COLORECTAL CA SCREEN DOC REV: CPT | Performed by: FAMILY MEDICINE

## 2021-04-29 PROCEDURE — 1123F ACP DISCUSS/DSCN MKR DOCD: CPT | Performed by: FAMILY MEDICINE

## 2021-04-29 PROCEDURE — G8427 DOCREV CUR MEDS BY ELIG CLIN: HCPCS | Performed by: FAMILY MEDICINE

## 2021-04-29 PROCEDURE — 1036F TOBACCO NON-USER: CPT | Performed by: FAMILY MEDICINE

## 2021-04-29 PROCEDURE — G8417 CALC BMI ABV UP PARAM F/U: HCPCS | Performed by: FAMILY MEDICINE

## 2021-04-29 PROCEDURE — 99214 OFFICE O/P EST MOD 30 MIN: CPT | Performed by: FAMILY MEDICINE

## 2021-04-29 PROCEDURE — 3023F SPIROM DOC REV: CPT | Performed by: FAMILY MEDICINE

## 2021-04-29 PROCEDURE — G8926 SPIRO NO PERF OR DOC: HCPCS | Performed by: FAMILY MEDICINE

## 2021-04-29 PROCEDURE — 4040F PNEUMOC VAC/ADMIN/RCVD: CPT | Performed by: FAMILY MEDICINE

## 2021-04-29 PROCEDURE — 2022F DILAT RTA XM EVC RTNOPTHY: CPT | Performed by: FAMILY MEDICINE

## 2021-04-29 ASSESSMENT — PATIENT HEALTH QUESTIONNAIRE - PHQ9
SUM OF ALL RESPONSES TO PHQ QUESTIONS 1-9: 0

## 2021-04-29 ASSESSMENT — ENCOUNTER SYMPTOMS
VOMITING: 0
ABDOMINAL DISTENTION: 0
SHORTNESS OF BREATH: 0
APNEA: 0
ABDOMINAL PAIN: 0
WHEEZING: 0
NAUSEA: 0
CHEST TIGHTNESS: 0
CONSTIPATION: 0
STRIDOR: 0
RECTAL PAIN: 0
ANAL BLEEDING: 0
CHOKING: 0
DIARRHEA: 0
COUGH: 0
BLOOD IN STOOL: 0

## 2021-04-29 NOTE — PROGRESS NOTES
Subjective:      Patient ID: Ayush Guerrero is a 72 y.o. male. Results for Ximena Dorantes \"BILL\" (MRN [id number]) as of 4/29/2021 14:17   Ref. Range 4/6/2021 12:07   Sodium Latest Ref Range: 136 - 145 mmol/L 139   Potassium Latest Ref Range: 3.5 - 5.1 mmol/L 5.1   Chloride Latest Ref Range: 99 - 110 mmol/L 102   CO2 Latest Ref Range: 21 - 32 mmol/L 24   BUN Latest Ref Range: 7 - 20 mg/dL 28 (H)   Creatinine Latest Ref Range: 0.8 - 1.3 mg/dL 1.3   Anion Gap Latest Ref Range: 3 - 16  13   GFR Non- Latest Ref Range: >60  55 (A)   GFR  Latest Ref Range: >60  >60   Glucose Latest Ref Range: 70 - 99 mg/dL 107 (H)   Calcium Latest Ref Range: 8.3 - 10.6 mg/dL 9.3   Total Protein Latest Ref Range: 6.4 - 8.2 g/dL 7.4   Cholesterol, Total Latest Ref Range: 0 - 199 mg/dL 155   HDL Cholesterol Latest Ref Range: 40 - 60 mg/dL 37 (L)   LDL Calculated Latest Ref Range: <100 mg/dL 90   Triglycerides Latest Ref Range: 0 - 150 mg/dL 138   VLDL Cholesterol Calculated Latest Ref Range: Not Established mg/dL 28   Albumin Latest Ref Range: 3.4 - 5.0 g/dL 4.6   Globulin Latest Units: g/dL 2.8   Albumin/Globulin Ratio Latest Ref Range: 1.1 - 2.2  1.6   Alk Phos Latest Ref Range: 40 - 129 U/L 97   ALT Latest Ref Range: 10 - 40 U/L 14   AST Latest Ref Range: 15 - 37 U/L 17   Bilirubin Latest Ref Range: 0.0 - 1.0 mg/dL 0.4   Hemoglobin A1C Latest Ref Range: See comment % 6.2   eAG (mg/dL) Latest Units: mg/dL 131.2     Immunization History   Administered Date(s) Administered    Tdap (Boostrix, Adacel) 02/14/2017       Diabetes f/u:doing well. Taking 2000mg metformin w/o side effects. Preprandial-fasting YN=[id number]. 2hr postprandial BS:120ss. HBA1c: 6.6 on 2/13/17. 6.3 on 3/31/17. 6.4 on 11/18/17. 7.1 on 3/22/18. 7.2 on 8/30/18. 7.3 on 12/3/18. 7.4 on 2/7/19.  7.0 on 5/3/19. 6.3 on 8/15/19. 6.3 on 1/7/20. 5.8 on 6/2/20. 6.2 on 9/29/20. 6.2 on 4/6/21. ACEI/ARB:Yes  Checks feet daily:yes.   Diabetes eye cardiology:Dr. Wendy Asal Behrens([facility])    Long term current use of anticoagulants with INR goal of 2.0-3.0     under care of cardiology:Dr. Scott Behrens([facility])    Obstructive sleep apnea syndrome 06/18/2019    per pulmo    Parkinson disease [facility])     9/2016:Per neurologist dx is tremor & not consistent with Parkison's:Dr. Bob Gill Prediabetes 10/28/2016    Primary insomnia 1/25/2017    Sleep apnea     CPAP    Stage 2 chronic kidney disease            Social History     Tobacco Use    Smoking status: Former Smoker     Packs/day: 1.00     Years: 30.00     Pack years: 30.00     Types: Cigarettes    Smokeless tobacco: Never Used    Tobacco comment: working on reducing   Substance Use Topics    Alcohol use: Yes     Comment: rarely    Drug use: No     Social History     Substance and Sexual Activity   Drug Use No               Review of Systems   Constitutional: Negative for activity change, appetite change, chills, diaphoresis, fatigue, fever and unexpected weight change. Eyes: Negative for visual disturbance. Respiratory: Negative for apnea, cough, choking, chest tightness, shortness of breath, wheezing and stridor. Cardiovascular: Negative for chest pain, palpitations and leg swelling. Gastrointestinal: Negative for abdominal distention, abdominal pain, anal bleeding, blood in stool, constipation, diarrhea, nausea, rectal pain and vomiting. Endocrine: Negative for cold intolerance, heat intolerance, polydipsia, polyphagia and polyuria. Genitourinary: Negative for difficulty urinating. Skin: Negative for pallor, rash and wound. Neurological: Negative for dizziness and light-headedness. Hematological: Negative for adenopathy. Psychiatric/Behavioral: Negative for agitation, behavioral problems, confusion, decreased concentration, dysphoric mood, hallucinations, self-injury, sleep disturbance and suicidal ideas. The patient is not nervous/anxious and is not hyperactive. Objective:    Physical Exam  Constitutional:       General: He is not in acute distress. Appearance: Normal appearance. He is well-developed. He is not diaphoretic. HENT:      Nose: Nose normal.      Mouth/Throat:      Pharynx: Uvula midline. Eyes:      General: Lids are normal.      Conjunctiva/sclera: Conjunctivae normal.      Pupils: Pupils are equal, round, and reactive to light. Neck:      Musculoskeletal: Normal range of motion and neck supple. Trachea: Trachea normal.   Cardiovascular:      Rate and Rhythm: Normal rate and regular rhythm. Pulses: Normal pulses. Heart sounds: Normal heart sounds. Comments: No bilateral leg-ankle edema. Pulmonary:      Effort: Pulmonary effort is normal.      Breath sounds: Normal breath sounds and air entry. Abdominal:      General: Bowel sounds are normal. There is no distension. Palpations: Abdomen is soft. There is no hepatomegaly or mass. Tenderness: There is no abdominal tenderness. Musculoskeletal:      Right foot: Normal.      Left foot: Normal.   Lymphadenopathy:      Cervical: No cervical adenopathy. Skin:     General: Skin is warm and dry. Capillary Refill: Capillary refill takes less than 2 seconds. Coloration: Skin is not cyanotic or pale. Findings: No rash. Comments: Good skin turgor. Neurological:      Mental Status: He is alert and oriented to person, place, and time. Psychiatric:         Attention and Perception: Attention and perception normal. He is attentive. He does not perceive auditory or visual hallucinations. Mood and Affect: Mood and affect normal. Mood is not anxious, depressed or elated. Affect is not labile, blunt, flat, angry, tearful or inappropriate. Speech: Speech normal. He is communicative.  Speech is not rapid and pressured, delayed, slurred or tangential.         Behavior: Behavior normal. Behavior is not agitated, slowed, aggressive, withdrawn, hyperactive or combative. Behavior is cooperative. Thought Content: Thought content normal. Thought content is not paranoid or delusional. Thought content does not include homicidal or suicidal ideation. Cognition and Memory: Cognition and memory normal.         Judgment: Judgment normal.      Comments: Good eye contact. Polite. Assessment:        Diagnosis Orders   1. Controlled type 2 diabetes mellitus without complication, without long-term current use of insulin ([facility] [address]  VSS/well appearing. Stable. Diabetes:Check feet daily. Yrly eye checks advised. Education: Reviewed ABCs of diabetes management (respective goals in parentheses):  A1C (<7), blood pressure (<130/80), and cholesterol (LDL <100). Counseled at this visit on the following: diabetes complication prevention, foot care. 6mos:Hemoglobin A1C    Microalbumin / Creatinine Urine Ratio    Comprehensive Metabolic Panel   2. Essential hypertension  Stable. Comprehensive Metabolic Panel   3. Hyperlipidemia LDL goal <100  Stable. Labs in [address] [place] [address]. Lipid Panel   4. COPD  Stable. 5. Stage 3chronic kidney disease  Stable. Per nephro. 6. Primary insomnia  Stable. 7. Sleep apnea  Stable. 8. Long term current use of anticoagulants with INR goal of 2.0-3.0  Per specialist.     9. Permanent cardiac pacemaker  Per cards   10. Cardiomyopathy, dilated ([address]  Per cards   11. Colonoscopy/FIT test/occult stool refused     12. Non morbid obesity  Diet & exercise regime reviewed. Plan:         Shingles/pneumonia vaccines advised:pt' will think about this. Obtain labs/diagnostic tests as discussed today & call back for results within 2-7days. Pt' left office in good condition.           Jesenia Gaytan MD

## 2021-04-29 NOTE — PATIENT INSTRUCTIONS

## 2021-05-05 PROCEDURE — 93297 REM INTERROG DEV EVAL ICPMS: CPT | Performed by: INTERNAL MEDICINE

## 2021-05-05 PROCEDURE — 93296 REM INTERROG EVL PM/IDS: CPT | Performed by: INTERNAL MEDICINE

## 2021-05-05 PROCEDURE — 93294 REM INTERROG EVL PM/LDLS PM: CPT | Performed by: INTERNAL MEDICINE

## 2021-05-10 ENCOUNTER — ANTI-COAG VISIT (OUTPATIENT)
Dept: PHARMACY | Age: 71
End: 2021-05-10
Payer: MEDICARE

## 2021-05-10 ENCOUNTER — OFFICE VISIT (OUTPATIENT)
Dept: CARDIOLOGY CLINIC | Age: 71
End: 2021-05-10
Payer: MEDICARE

## 2021-05-10 VITALS
WEIGHT: 293 LBS | BODY MASS INDEX: 38.83 KG/M2 | HEIGHT: 73 IN | OXYGEN SATURATION: 94 % | SYSTOLIC BLOOD PRESSURE: 140 MMHG | DIASTOLIC BLOOD PRESSURE: 80 MMHG | HEART RATE: 81 BPM

## 2021-05-10 DIAGNOSIS — I10 ESSENTIAL HYPERTENSION: ICD-10-CM

## 2021-05-10 DIAGNOSIS — E66.9 OBESITY (BMI 30-39.9): ICD-10-CM

## 2021-05-10 DIAGNOSIS — G47.33 OSA (OBSTRUCTIVE SLEEP APNEA): ICD-10-CM

## 2021-05-10 DIAGNOSIS — E55.9 VITAMIN D DEFICIENCY: ICD-10-CM

## 2021-05-10 DIAGNOSIS — I50.22 CHRONIC SYSTOLIC CONGESTIVE HEART FAILURE (HCC): Primary | ICD-10-CM

## 2021-05-10 DIAGNOSIS — I48.3 TYPICAL ATRIAL FLUTTER (HCC): ICD-10-CM

## 2021-05-10 LAB — INTERNATIONAL NORMALIZATION RATIO, POC: 3.8

## 2021-05-10 PROCEDURE — 3017F COLORECTAL CA SCREEN DOC REV: CPT | Performed by: CLINICAL NURSE SPECIALIST

## 2021-05-10 PROCEDURE — 1123F ACP DISCUSS/DSCN MKR DOCD: CPT | Performed by: CLINICAL NURSE SPECIALIST

## 2021-05-10 PROCEDURE — G8427 DOCREV CUR MEDS BY ELIG CLIN: HCPCS | Performed by: CLINICAL NURSE SPECIALIST

## 2021-05-10 PROCEDURE — 1036F TOBACCO NON-USER: CPT | Performed by: CLINICAL NURSE SPECIALIST

## 2021-05-10 PROCEDURE — 99211 OFF/OP EST MAY X REQ PHY/QHP: CPT

## 2021-05-10 PROCEDURE — 4040F PNEUMOC VAC/ADMIN/RCVD: CPT | Performed by: CLINICAL NURSE SPECIALIST

## 2021-05-10 PROCEDURE — 99214 OFFICE O/P EST MOD 30 MIN: CPT | Performed by: CLINICAL NURSE SPECIALIST

## 2021-05-10 PROCEDURE — 85610 PROTHROMBIN TIME: CPT

## 2021-05-10 PROCEDURE — G8417 CALC BMI ABV UP PARAM F/U: HCPCS | Performed by: CLINICAL NURSE SPECIALIST

## 2021-05-10 RX ORDER — FUROSEMIDE 40 MG/1
40 TABLET ORAL DAILY
Qty: 90 TABLET | Refills: 1 | Status: SHIPPED | OUTPATIENT
Start: 2021-05-10 | End: 2021-11-10

## 2021-05-10 NOTE — PROGRESS NOTES
[facility]  Progress Note    Primary Care Doctor:  Lucinda Mehta MD    Chief Complaint   Patient presents with    Follow-up     Optivol    Congestive Heart Failure        History of Present Illness:  80 y.o. 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 He follows with Dr Yanci Contreras with [facility]. 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 He follows with Dr Desi Simmons   12/30-1/2/2020 for worsening heart failure, sob and leg swelling. He was found to AFlutter and CV 1/2/20. BiV lead implant 3/23/2020    I had the pleasure of seeing Carli Garrett in follow up for sHF. He is ambulatory and by his self today. His optival is starting to show increased thoracic impedence. His BP is elevated today. Weight has gone from [phone]. He is taking all his medications including lasix 20 mg once a day. He denies any chest pain, palpitations, lightheadedness or sob. 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 He is using his bipap every night. Past Medical History:   has a past medical history of Acute congestive heart failure ([address] Allergic rhinitis, Atrial fibrillation ([address] CKD (chronic kidney disease), Class 2 obesity due to excess calories without serious comorbidity with body mass index (BMI) of 37.0 to 37.9 in adult, Controlled type 2 diabetes mellitus without complication, without long-term current use of insulin ([address] COPD, Gout, Hyperlipidemia, mixed, Hypertension, Long term current use of anticoagulants with INR goal of 2.0-3.0, Obstructive sleep apnea syndrome, Parkinson disease ([address] Prediabetes, Primary insomnia, Sleep apnea, and Stage 2 chronic kidney disease. Surgical History:   has a past surgical history that includes Aortic valve replacement (10/1996:[facility]); Pacemaker insertion (1996); Atrial ablation surgery (03/23/2020);  Atrial ablation surgery (03/23/2020); and Cardiac pacemaker placement (03/23/2020). Social History:   reports that he quit smoking about 22 months ago. His smoking use included cigarettes. He has a 30.00 pack-year smoking history. He has never used smokeless tobacco. He reports current alcohol use. He reports that he does not use drugs. Family History:   Family History   Problem Relation Age of Onset    Asthma Mother     Cancer Father     No Known Problems Sister     No Known Problems Brother     No Known Problems Maternal Grandmother     No Known Problems Maternal Grandfather     No Known Problems Paternal Grandmother     No Known Problems Paternal Grandfather        Home Medications:  Prior to Admission medications    Medication Sig Start Date End Date Taking? Authorizing Provider   furosemide (LASIX) 40 MG tablet Take 1 tablet by mouth daily 5/10/21  Yes MEENU Ayala - CNS   allopurinol (ZYLOPRIM) 300 MG tablet TAKE 1 TABLET EVERY DAY 4/28/21  Yes Elana Salas MD   lisinopril (PRINIVIL;ZESTRIL) 20 MG tablet TAKE 1 TABLET TWICE DAILY 4/12/21  Yes Zoraida Dee APRN - JANICE   warfarin (COUMADIN) 10 MG tablet TAKE 1 TABLET DAILY EXCEPT TAKE 1 AND 1/2 TABLETS (15 MG) ON TUESDAY, FRIDAY AND SUNDAY 4/2/21  Yes MEENU Grijalva - CNP   pravastatin (PRAVACHOL) 80 MG tablet TAKE 1 TABLET EVERY DAY FOR CHOLESTEROL 3/14/21  Yes Juan A Shell MD   metFORMIN (GLUCOPHAGE-XR) 500 MG extended release tablet TAKE 4 TABLETS EVERY DAY WITH BREAKFAST 3/5/21  Yes Juan A Shell MD   amLODIPine (NORVASC) 5 MG tablet Take 1 tablet by mouth daily 1/19/21  Yes MEENU Ayala - JANICE   glucose monitoring kit (FREESTYLE) monitoring kit Ok to dispense what is covered by insurance. 7/9/20  Yes Juan A Shell MD   blood glucose monitor strips Test 2 times a day & as needed for symptoms of irregular blood glucose. Dispense sufficient amount for indicated testing frequency plus additional to accommodate PRN testing needs.  Ok to dispense what is covered by insurance 7/4/20  Yes Tahmina Tejeda MD   ACCU-CHEK COMPACT PLUS strip USE TO TEST 2 TIMES DAILY 6/23/20  Yes Tahmina Tejeda MD   Lancets MISC BID testing 2/24/17  Yes Tahmina Tejeda MD   sotalol (BETAPACE) 80 MG tablet Take 80 mg by mouth 2 times daily. Yes Historical Provider, MD   aspirin 81 MG chewable tablet Take 81 mg by mouth daily. Yes Historical Provider, MD        Allergies:  Patient has no known allergies. Review of Systems:   · Constitutional: there has been no unanticipated weight loss. There's been no change in energy level, sleep pattern, or activity level. · Eyes: No visual changes or diplopia. No scleral icterus. · ENT: No Headaches, hearing loss or vertigo. No mouth sores or sore throat. · Cardiovascular: Reviewed in HPI  · Respiratory: No cough or wheezing, no sputum production. No hematemesis. · Gastrointestinal: No abdominal pain, appetite loss, blood in stools. No change in bowel or bladder habits. · Genitourinary: No dysuria, trouble voiding, or hematuria. · Musculoskeletal:  No gait disturbance, weakness or joint complaints. · Integumentary: No rash or pruritis. · Neurological: No headache, diplopia, change in muscle strength, numbness or tingling. No change in gait, balance, coordination, mood, affect, memory, mentation, behavior. · Psychiatric: No anxiety, no depression. · Endocrine: No malaise, fatigue or temperature intolerance. No excessive thirst, fluid intake, or urination. No tremor. · Hematologic/Lymphatic: No abnormal bruising or bleeding, blood clots or swollen lymph nodes. · Allergic/Immunologic: No nasal congestion or hives.     Physical Examination:    Vitals:    05/10/21 1050 05/10/21 1100   BP: (!) 150/88 (!) 140/80   Site: Left Upper Arm    Position: Sitting    Cuff Size: Large Adult    Pulse: 81    SpO2: 94%    Weight: 293 lb (132.9 kg)    Height: 6' 1\" (1.854 m)         Constitutional and General Appearance: Warm and dry, no apparent distress, normal coloration  HEENT:  Normocephalic, atraumatic  Respiratory:  · Normal excursion and expansion without use of accessory muscles  · Resp Auscultation: Normal breath sounds without dullness  Cardiovascular:  · The apical impulses not displaced  · Heart tones are crisp and normal  · JVP normal cm H2O  · Regular rate and rhythm  · Peripheral pulses are symmetrical and full  · There is no clubbing, cyanosis of the extremities.   · no edema  · Pedal Pulses: 2+ and equal   Abdomen:obese  · No masses or tenderness  · Liver/Spleen: No Abnormalities Noted  Neurological/Psychiatric:  · Alert and oriented in all spheres  · Moves all extremities well  · Exhibits normal gait balance and coordination  · No abnormalities of mood, affect, memory, mentation, or behavior are noted    Lab Data:    CBC:   Lab Results   Component Value Date    WBC 6.6 03/23/2020    WBC 6.7 12/31/2019    WBC 6.7 12/30/2019    RBC 4.33 03/23/2020    RBC 4.23 12/31/2019    RBC 4.30 12/30/2019    HGB 13.3 03/23/2020    HGB 12.9 12/31/2019    HGB 13.4 12/30/2019    HCT 40.4 03/23/2020    HCT 39.8 12/31/2019    HCT 40.2 12/30/2019    MCV 93.3 03/23/2020    MCV 94.1 12/31/2019    MCV 93.5 12/30/2019    RDW 16.6 03/23/2020    RDW 15.2 12/31/2019    RDW 15.2 12/30/2019     03/23/2020     12/31/2019     12/30/2019     BMP:  Lab Results   Component Value Date     04/06/2021     01/20/2021     09/28/2020    K 5.1 04/06/2021    K 4.6 01/20/2021    K 4.8 09/28/2020     04/06/2021     01/20/2021     09/28/2020    CO2 24 04/06/2021    CO2 27 01/20/2021    CO2 24 09/28/2020    PHOS 4.2 01/07/2020    PHOS 3.8 10/14/2019    PHOS 3.9 06/24/2019    BUN 28 04/06/2021    BUN 18 01/20/2021    BUN 28 09/28/2020    CREATININE 1.3 04/06/2021    CREATININE 1.0 01/20/2021    CREATININE 1.3 09/28/2020     BNP:   Lab Results   Component Value Date    PROBNP 600 01/20/2021    PROBNP 592 10/19/2020    PROBNP 254 06/02/2020 Cardiac Imaging:  Echo 12/11/2020   Summary   -Left ventricular cavity size is normal.   -Overall left ventricular systolic function appears mildly reduced.   -Ejection fraction is visually estimated to be 45%. -There is abnormal septal motion/bounce noted. -Indeterminate diastolic function E/e' = 70.5. Bettey Grad -A mechanical artificial aortic valve appears well seated with a maximum   velocity of 2.1m/s and a mean gradient of 9mmHg.   -No evidence of aortic valve regurgitation.   -Trivial-mild mitral regurgitation.   -Mild tricuspid regurgitation.   -The right ventricle is normal in size with reduces function. TAPSE 1.9cm. RVS velocity is 6.94 cm/s.   -Pacer/ICD right heart    Echo: 12/31/19   Summary   -Global hypokinesis with EF 40-45%. -Abnormal septal motion.   -The aortic root and the ascending aorta are mildly dilated. -The right ventricle appears to be dilated. -RV systolic function appears to be reduced.   -The right atrium appears to be dilated. -The mechanical artificial aortic valve appears well seated with a maximum   velocity of 2.40 m/s and a mean gradient of 12 mmHg. The aortic valve area   is estimated at 1.19 cm^2. No significant regurgitation noted. -Thickened mitral valve without evidence of stenosis. There is   mild-to-moderate mitral regurgitation.   -There is moderate tricuspid regurgitation with a RVSP estimation of 43   mmHg.   -Pacer / ICD wire is visualized in the right heart.   -Indeterminate diastolic function. Assessment:    1. Chronic systolic congestive heart failure (HCC) on ace and BB; no aldosterone antagonist due to hyperkalemia   2. Essential hypertension    3. ROSETTA (obstructive sleep apnea)    4. Obesity (BMI 30-39.9)    5. Vitamin d deficiency    Plan:   Patient Instructions   1. Increase lasix to 40 mg once a day  2. Check blood work in 2 weeks including vitamin d and thyroid  3.   Recommend covid vaccine which we can schedule for you, please call if you would like us to do this  4. Keep monitoring BP and if >140s call  5. RTO in 6 months    I appreciate the opportunity of cooperating in the care of this individual.    JANICE Dixon, 5/10/2021, 12:29 PM    QUALITY MEASURES  1. Tobacco Cessation Counseling: NA  2. Retake of BP if >140/90:   NA  3. Documentation to PCP/referring for new patient:  Sent to PCP at close of office visit  4. CAD patient on anti-platelet: NA  5. CAD patient on STATIN therapy:  Yes  6.  Patient with CHF and aFib on anticoagulation:  Yes

## 2021-05-10 NOTE — PATIENT INSTRUCTIONS
1.  Increase lasix to 40 mg once a day  2. Check blood work in 2 weeks including vitamin d and thyroid  3. Recommend covid vaccine which we can schedule for you, please call if you would like us to do this  4. Keep monitoring BP and if >140s call  5.   RTO in 6 months

## 2021-05-10 NOTE — PROGRESS NOTES
Mr. Radha Ontiveros is a 80 y.o. y/o male with history of Atrial Fibrillation who presents today for anticoagulation monitoring and adjustment. Pt is retired and works part time at [employer]. Patient reports he has been on warfarin for almost 30 years now and was managed by his cardiologist who recently retired. Pt was then managed by Dr. Yoa Snow prior to being established in Clinic  Pertinent PMH: HTN, DM, HLD, COPD, CHF    Patient Reported Findings:  Yes     No  [x]   []       Patient verifies current dosing regimen as listed  []   [x]       S/S bleeding/bruising/swelling/SOB -denies    []   [x]       Blood in urine or stool- denies  []   [x]       Procedures scheduled in the future at this time -   []   [x]       Missed Dose-denies   []   [x]       Extra Dose - denies  []   [x]       Change in medications-  tylenol prn--> no changes---> using tylenol       []   [x]       Change in health/diet/appetite - reports he eats about 2 meals/day. And states he will have salads about 1-2x/wk. (not a big fan of spinach or brocolli but may have other greens)--> Patient states 3 salads in past 2 weeks---> ate salad once since last visit---> no greens, wants to add green beans in twice weekly, no NVD --> returned to vit k a few times a week (green beans and salad) --> no changes, no NVD---> less greens recently, no NVD   []   [x]       Change in alcohol use - reports occasionally drinking beer (once every few months) but happens very infrequently. --> Patient reports no alcohol in past two weeks---> denies   []   [x]       Change in activity  []   [x]       Hospital admission  []   [x]       Emergency department visit  []   [x]       Other complaints     Clinical Outcomes:  Yes     No  []   [x]       Major bleeding event  []   [x]       Thromboembolic event    Duration of warfarin Therapy: Indefinite   INR Range:  2.0-3.0     INR today is 3.8 dt less greens and couldn't confirm dose- thought maybe he was doing 15mg 4 days a week instead of 3 days. Reviewed AVS several times. Hold tonight then continue taking weekly dose of 10 mg Mon, Thurs and Sat and 15 mg all other days  Encouraged patient to maintain a consistent diet.   Recheck INR again in 2 weeks     Referring physician is Dr. Latoya Parker  INR (no units)   Date Value   05/10/2021 3.8   2021 2.4   2021 2.5   2021 2.4   2020 2.17 (H)   2020 2.43 (H)   2020 1.45 (H)   2020 1.60 (H)       For Pharmacy Admin Tracking Only     Intervention Detail: Adherence Monitorin and Dose Adjustment: 1: reason: Therapy Optimization   Total # of Interventions Recommended: 2   Total # of Interventions Accepted: 2   Time Spent (min): 15

## 2021-05-24 ENCOUNTER — HOSPITAL ENCOUNTER (OUTPATIENT)
Age: 71
Discharge: HOME | End: 2021-05-24
Payer: MEDICARE

## 2021-05-24 ENCOUNTER — ANTI-COAG VISIT (OUTPATIENT)
Dept: PHARMACY | Age: 71
End: 2021-05-24
Payer: MEDICARE

## 2021-05-24 DIAGNOSIS — I48.3 TYPICAL ATRIAL FLUTTER (HCC): Primary | ICD-10-CM

## 2021-05-24 DIAGNOSIS — I50.22 CHRONIC SYSTOLIC CONGESTIVE HEART FAILURE (HCC): ICD-10-CM

## 2021-05-24 DIAGNOSIS — E55.9 VITAMIN D DEFICIENCY: ICD-10-CM

## 2021-05-24 LAB
ANION GAP SERPL CALCULATED.3IONS-SCNC: 11 MMOL/L (ref 3–16)
BUN BLDV-MCNC: 28 MG/DL (ref 7–20)
CALCIUM SERPL-MCNC: 9.7 MG/DL (ref 8.3–10.6)
CHLORIDE BLD-SCNC: 102 MMOL/L (ref 99–110)
CO2: 26 MMOL/L (ref 21–32)
CREAT SERPL-MCNC: 1.2 MG/DL (ref 0.8–1.3)
GFR AFRICAN AMERICAN: >60
GFR NON-AFRICAN AMERICAN: 60
GLUCOSE BLD-MCNC: 98 MG/DL (ref 70–99)
INTERNATIONAL NORMALIZATION RATIO, POC: 2.4
POTASSIUM SERPL-SCNC: 4.5 MMOL/L (ref 3.5–5.1)
PRO-BNP: 447 PG/ML (ref 0–124)
SODIUM BLD-SCNC: 139 MMOL/L (ref 136–145)
TSH SERPL DL<=0.05 MIU/L-ACNC: 0.27 UIU/ML (ref 0.27–4.2)
VITAMIN D 25-HYDROXY: 13 NG/ML

## 2021-05-24 PROCEDURE — 99211 OFF/OP EST MAY X REQ PHY/QHP: CPT

## 2021-05-24 PROCEDURE — 84443 ASSAY THYROID STIM HORMONE: CPT

## 2021-05-24 PROCEDURE — 85610 PROTHROMBIN TIME: CPT

## 2021-05-24 PROCEDURE — 82306 VITAMIN D 25 HYDROXY: CPT

## 2021-05-24 PROCEDURE — 36415 COLL VENOUS BLD VENIPUNCTURE: CPT

## 2021-05-24 PROCEDURE — 83880 ASSAY OF NATRIURETIC PEPTIDE: CPT

## 2021-05-24 PROCEDURE — 80048 BASIC METABOLIC PNL TOTAL CA: CPT

## 2021-05-24 NOTE — PROGRESS NOTES
Mon, Thurs and Sat and 15 mg all other days. Encouraged patient to maintain a consistent diet.   Recheck INR again in 4 weeks, 6/21    Referring physician is Dr. Dalia De La Cruz  INR (no units)   Date Value   05/24/2021 2.4   05/10/2021 3.8   04/19/2021 2.4   03/23/2021 2.5   05/05/2020 2.17 (H)   04/14/2020 2.43 (H)   03/24/2020 1.45 (H)   03/23/2020 1.60 (H)       For Pharmacy Admin Tracking Only     Total # of Interventions Recommended: 0   Total # of Interventions Accepted: 0   Time Spent (min): 15

## 2021-05-25 ENCOUNTER — TELEPHONE (OUTPATIENT)
Dept: CARDIOLOGY CLINIC | Age: 71
End: 2021-05-25

## 2021-05-25 RX ORDER — ERGOCALCIFEROL 1.25 MG/1
50000 CAPSULE ORAL WEEKLY
Qty: 12 CAPSULE | Refills: 1 | Status: SHIPPED | OUTPATIENT
Start: 2021-05-25 | End: 2021-11-10 | Stop reason: ALTCHOICE

## 2021-05-25 NOTE — TELEPHONE ENCOUNTER
----- Message from MEENU Azar - CNS sent at 5/25/2021  8:34 AM EDT -----  Labs are great  Continue current medications  Vitamin d is low he needs to start vitamin d [phone] once a week, I have sent script to pharmacy [facility])  thanks

## 2021-05-25 NOTE — TELEPHONE ENCOUNTER
I spoke to patient with Lab results per NPRG. Patient verbalized understanding. Advised patient to call back with any questions.

## 2021-05-28 RX ORDER — METFORMIN HYDROCHLORIDE 500 MG/1
TABLET, EXTENDED RELEASE ORAL
Qty: 360 TABLET | Refills: 0 | Status: SHIPPED | OUTPATIENT
Start: 2021-05-28 | End: 2021-08-12

## 2021-05-28 NOTE — TELEPHONE ENCOUNTER
Medication:   Requested Prescriptions     Pending Prescriptions Disp Refills    metFORMIN (GLUCOPHAGE-XR) 500 MG extended release tablet [Pharmacy Med Name: Neeru Tom  MG Tablet Extended Release 24 Hour] 360 tablet 0     Sig: TAKE 4 TABLETS EVERY DAY WITH BREAKFAST        Last Filled: 3/5/2021 360 tabs 0 refills     Patient Phone Number: [phone] (home)     Last appt: 4/29/2021   Next appt: Visit date not found    Last OARRS: No flowsheet data found.

## 2021-06-04 DIAGNOSIS — E78.5 HYPERLIPIDEMIA LDL GOAL <100: ICD-10-CM

## 2021-06-07 RX ORDER — PRAVASTATIN SODIUM 80 MG/1
TABLET ORAL
Qty: 90 TABLET | Refills: 0 | Status: SHIPPED | OUTPATIENT
Start: 2021-06-07 | End: 2021-09-07

## 2021-06-18 ENCOUNTER — TELEPHONE (OUTPATIENT)
Dept: FAMILY MEDICINE CLINIC | Age: 71
End: 2021-06-18

## 2021-06-18 DIAGNOSIS — G89.29 CHRONIC PAIN OF BOTH KNEES: Primary | ICD-10-CM

## 2021-06-18 DIAGNOSIS — M25.562 CHRONIC PAIN OF BOTH KNEES: Primary | ICD-10-CM

## 2021-06-18 DIAGNOSIS — M25.561 CHRONIC PAIN OF BOTH KNEES: Primary | ICD-10-CM

## 2021-06-18 NOTE — TELEPHONE ENCOUNTER
Pt referred to Dr Julian Lyles, referral placed in Epic  He will call back and let me know which exact Dr he was scheduled with so I can do his [facility] referral

## 2021-06-18 NOTE — TELEPHONE ENCOUNTER
Pt called in stating that having pain in both knees, but right knee the most. Pt needs a referral to see [facility] Orthopedic doctor.  Per Pt insurance plan referral hs to be approved through [facility].

## 2021-06-21 ENCOUNTER — ANTI-COAG VISIT (OUTPATIENT)
Dept: PHARMACY | Age: 71
End: 2021-06-21
Payer: MEDICARE

## 2021-06-21 DIAGNOSIS — I48.3 TYPICAL ATRIAL FLUTTER (HCC): Primary | ICD-10-CM

## 2021-06-21 LAB — INTERNATIONAL NORMALIZATION RATIO, POC: 3.8

## 2021-06-21 PROCEDURE — 99212 OFFICE O/P EST SF 10 MIN: CPT

## 2021-06-21 PROCEDURE — 85610 PROTHROMBIN TIME: CPT

## 2021-06-21 NOTE — PROGRESS NOTES
Mr. Dante Lowe is a 80 y.o. y/o male with history of Atrial Fibrillation who presents today for anticoagulation monitoring and adjustment. Pt is retired and works part time at [employer]. Patient reports he has been on warfarin for almost 30 years now and was managed by his cardiologist who recently retired. Pt was then managed by Dr. Hebert Warren prior to being established in Clinic  Pertinent PMH: HTN, DM, HLD, COPD, CHF    Patient Reported Findings:  Yes     No  [x]   []       Patient verifies current dosing regimen as listed  []   [x]       S/S bleeding/bruising/swelling/SOB -denies    []   [x]       Blood in urine or stool- denies  []   [x]       Procedures scheduled in the future at this time -   []   [x]       Missed Dose-denies   []   [x]       Extra Dose - denies  [x]   []       Change in medications-  tylenol prn---> inc lasix --> started vit d weekly     [x]   []       Change in health/diet/appetite - reports he eats about 2 meals/day. And states he will have salads about 1-2x/wk. (not a big fan of spinach or brocolli but may have other greens)--> Patient states 3 salads in past 2 weeks---> ate salad once since last visit---> no greens, wants to add green beans in twice weekly, no NVD --> returned to vit k a few times a week (green beans and salad) --> no changes, no NVD---> less greens recently, no NVD---> denies changes, but has not had vit k in a while (only eats salads that have vit k)  []   [x]       Change in alcohol use - reports occasionally drinking beer (once every few months) but happens very infrequently. --> Patient reports no alcohol in past two weeks---> denies   []   [x]       Change in activity  []   [x]       Hospital admission  []   [x]       Emergency department visit  []   [x]       Other complaints     Clinical Outcomes:  Yes     No  []   [x]       Major bleeding event  []   [x]       Thromboembolic event    Duration of warfarin Therapy: Indefinite   INR Range:  2.0-3.0     INR today is 3.8  Hold dose tonight then continue taking weekly dose of 10 mg Mon, Thurs and Sat and 15 mg all other days. Encouraged patient to maintain a consistent diet.   Recheck INR again in 2 weeks, 7/8    Referring physician is Dr. Nicole Buenrostro  INR (no units)   Date Value   05/24/2021 2.4   05/10/2021 3.8   04/19/2021 2.4   03/23/2021 2.5   05/05/2020 2.17 (H)   04/14/2020 2.43 (H)   03/24/2020 1.45 (H)   03/23/2020 1.60 (H)         For Pharmacy Admin Tracking Only     Intervention Detail: Dose Adjustment: 1, reason: Therapy Optimization   Total # of Interventions Recommended: 1   Total # of Interventions Accepted: 1   Time Spent (min): 15

## 2021-06-29 ENCOUNTER — OFFICE VISIT (OUTPATIENT)
Dept: ORTHOPEDIC SURGERY | Age: 71
End: 2021-06-29
Payer: MEDICARE

## 2021-06-29 VITALS
HEIGHT: 73 IN | DIASTOLIC BLOOD PRESSURE: 72 MMHG | SYSTOLIC BLOOD PRESSURE: 124 MMHG | BODY MASS INDEX: 38.43 KG/M2 | WEIGHT: 290 LBS | HEART RATE: 64 BPM

## 2021-06-29 DIAGNOSIS — G89.29 CHRONIC PAIN OF RIGHT KNEE: ICD-10-CM

## 2021-06-29 DIAGNOSIS — G89.29 CHRONIC PAIN OF LEFT KNEE: Primary | ICD-10-CM

## 2021-06-29 DIAGNOSIS — M25.561 CHRONIC PAIN OF RIGHT KNEE: ICD-10-CM

## 2021-06-29 DIAGNOSIS — M17.0 PRIMARY OSTEOARTHRITIS OF BOTH KNEES: ICD-10-CM

## 2021-06-29 DIAGNOSIS — M25.562 CHRONIC PAIN OF LEFT KNEE: Primary | ICD-10-CM

## 2021-06-29 PROCEDURE — G8427 DOCREV CUR MEDS BY ELIG CLIN: HCPCS | Performed by: PHYSICIAN ASSISTANT

## 2021-06-29 PROCEDURE — 99204 OFFICE O/P NEW MOD 45 MIN: CPT | Performed by: PHYSICIAN ASSISTANT

## 2021-06-29 PROCEDURE — 1036F TOBACCO NON-USER: CPT | Performed by: PHYSICIAN ASSISTANT

## 2021-06-29 PROCEDURE — 20610 DRAIN/INJ JOINT/BURSA W/O US: CPT | Performed by: PHYSICIAN ASSISTANT

## 2021-06-29 PROCEDURE — 4040F PNEUMOC VAC/ADMIN/RCVD: CPT | Performed by: PHYSICIAN ASSISTANT

## 2021-06-29 PROCEDURE — 1123F ACP DISCUSS/DSCN MKR DOCD: CPT | Performed by: PHYSICIAN ASSISTANT

## 2021-06-29 PROCEDURE — 3017F COLORECTAL CA SCREEN DOC REV: CPT | Performed by: PHYSICIAN ASSISTANT

## 2021-06-29 PROCEDURE — L1812 KO ELASTIC W/JOINTS PRE OTS: HCPCS | Performed by: PHYSICIAN ASSISTANT

## 2021-06-29 PROCEDURE — G8417 CALC BMI ABV UP PARAM F/U: HCPCS | Performed by: PHYSICIAN ASSISTANT

## 2021-06-29 RX ORDER — ROPIVACAINE HYDROCHLORIDE 5 MG/ML
30 INJECTION, SOLUTION EPIDURAL; INFILTRATION; PERINEURAL ONCE
Status: COMPLETED | OUTPATIENT
Start: 2021-06-29 | End: 2021-06-29

## 2021-06-29 RX ORDER — TRIAMCINOLONE ACETONIDE 40 MG/ML
40 INJECTION, SUSPENSION INTRA-ARTICULAR; INTRAMUSCULAR ONCE
Status: COMPLETED | OUTPATIENT
Start: 2021-06-29 | End: 2021-06-29

## 2021-06-29 RX ORDER — LIDOCAINE HYDROCHLORIDE 10 MG/ML
20 INJECTION, SOLUTION INFILTRATION; PERINEURAL ONCE
Status: COMPLETED | OUTPATIENT
Start: 2021-06-29 | End: 2021-06-29

## 2021-06-29 RX ADMIN — LIDOCAINE HYDROCHLORIDE 20 ML: 10 INJECTION, SOLUTION INFILTRATION; PERINEURAL at 11:28

## 2021-06-29 RX ADMIN — TRIAMCINOLONE ACETONIDE 40 MG: 40 INJECTION, SUSPENSION INTRA-ARTICULAR; INTRAMUSCULAR at 11:29

## 2021-06-29 RX ADMIN — ROPIVACAINE HYDROCHLORIDE 30 ML: 5 INJECTION, SOLUTION EPIDURAL; INFILTRATION; PERINEURAL at 11:29

## 2021-06-29 NOTE — PROGRESS NOTES
Patient Name: Srini Palacio  : 1950  DOS: 2021        Chief Complaint:   Chief Complaint   Patient presents with    Knee Pain     BIKATERAL KNEE-Ongoing pain for years. History of Present Illness:  Srini Palacio is a 80 y.o. male who presents with a chief complaint of continued complaints of pain in the knee with irritation with walking, stairs and with overuse. Pain in the knee regularly with swelling and discomfort. Increase in pain over the past several years time. Patient is here for evaluation regarding bilateral knee pain that has been ongoing for several years. He notes his overall pain level is an 8 out of 10 with the right knee being worse than the left. He notes that the knee is buckling give out on him on occasion with right doing it more than the left. Denies utilization of any braces or assistive walking devices at present time. He has not pursued physical therapy at present time. He denies numbness burning tingling radiating pains down the leg denies any fall trauma or injury. He notes mostly pain in the anterior aspect of the knees but after periods of standing or walking the pain radiates around the entire knee. 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 So he does a lot of stooping bending crouching kneeling as he is trying to get in and out of these vehicles. Notes a lot of difficulty with these activities as well as stairs long periods of standing and walking as well as transitions. Is hopeful to ascertain what is going on in regards to his knees and figure out what can be done to help reduce irritation and provide some relief. Medical History  Current Medications:   Prior to Admission medications    Medication Sig Start Date End Date Taking?  Authorizing Provider   pravastatin (PRAVACHOL) 80 MG tablet TAKE 1 TABLET EVERY DAY FOR CHOLESTEROL 21   Mindi Dhillon MD   metFORMIN consciousness.  ______________  All other systems reviewed and negative     Past Medical History:   Diagnosis Date    Acute congestive heart failure ([facility] 2019    Allergic rhinitis 2016    Atrial fibrillation ([facility]     under care of cardiology:Dr. Marlena Pollard Behrens([facility])    CKD (chronic kidney disease)     Nephro:Dr. Parker:stage 3    Class 2 obesity due to excess calories without serious comorbidity with body mass index (BMI) of 37.0 to 37.9 in adult 2017    Controlled type 2 diabetes mellitus without complication, without long-term current use of insulin ([facility] 2017    COPD     Gout     Hyperlipidemia, mixed 2016    Hypertension     under care of cardiology:Dr. Marlena Pollard Behrens([facility])    Long term current use of anticoagulants with INR goal of 2.0-3.0     under care of cardiology:Dr. Scott Behrens([facility])    Obstructive sleep apnea syndrome 2019    per pulmo    Parkinson disease [facility])     2016:Per neurologist dx is tremor & not consistent with Parkison's:Dr. Parminder Robledo Prediabetes 10/28/2016    Primary insomnia 2017    Sleep apnea     CPAP    Stage 2 chronic kidney disease      Family History   Problem Relation Age of Onset    Asthma Mother     Cancer Father     No Known Problems Sister     No Known Problems Brother     No Known Problems Maternal Grandmother     No Known Problems Maternal Grandfather     No Known Problems Paternal Grandmother     No Known Problems Paternal Grandfather      Social History     Socioeconomic History    Marital status:      Spouse name: Not on file    Number of children: Not on file    Years of education: Not on file    Highest education level: Not on file   Occupational History    Occupation: Retired:(PT car prep)   Tobacco Use    Smoking status: Former Smoker     Packs/day: 1.00     Years: 30.00     Pack years: 30.00     Types: Cigarettes     Quit date: 2019     Years since quittin.0    palpable osteophytes. Inspection: Moderate anterior swelling worse on the right. Swelling is present with moderate effusion on the right and mild on the left. The posterior aspect of the knee appears to be full with tenderness. There is no erythema, rash, or ecchymosis. Range of Motion:  Right 0 to 120 degrees left 0 to 120 degrees     Pain with varus and valgus testing    There is no noted varus or valgus deformity    Strength:  Hamstrings rated: 4/5. Quadriceps rated: 5/5    Palpation: There is severe tenderness along the lateral patellofemoral joint line bilaterally. Moderate tenderness to medial lateral joint lines bilaterally    Special Tests: Patellar Compression test is positive. Valgus & Varus test is positive. Skin: There are no rashes, ulcerations or lesions. Gait: Gait pattern is antalgic  Skin shows no rashes/ecchymosis to the affected area, no hyperesthesias, no discoloration, no temperature or color discrepancies. NEUROLOGICALLY: There is no evidence for sensory or motor deficits in the extremity. Coordination appears full with no spacticity or rigidity. Reflexes appear to be symmetric. Distal circulation intact. No signs of RSD. Additional Comments: Hip range of motion intact laterally        Procedure(s):   Injection Procedure Note  Procedure Details     Verbal consent was obtained for the procedure. The right knee(s) was prepped with iodine and the skin was anesthetized. Using a 22 gauge needle the right knee(s) joint is injected with 2 ml 1% lidocaine and 2 ml of triamcinolone (KENALOG) 40mg/ml under the lateral aspect of the knee. The injection site was cleansed with topical isopropyl alcohol and a dressing was applied. Complications:  None; patient tolerated the procedure well.       Diagnostic Test Findings:   Xray   Have reviewed the xrays above from 06/29/21   4 view x-ray of the right knee AP, lateral, sunrise and tunnel views were performed and reviewed today revealing moderate medial and lateral compartment osteoarthritic bone spurring and joint space decrease with equal wear no evidence of acute fracture dislocation severe patellar arthritic changes especially laterally and significant joint space decrease the lateral patellofemoral compartment. 4 view x-ray of the left knee AP, lateral, sunrise and tunnel views were performed and reviewed today revealing moderate medial and lateral compartment osteoarthritic bone spurring and joint space decrease with equal wear no evidence of acute fracture dislocation. Severe patellar arthritic changes especially laterally with significant joint space decrease in the lateral patellofemoral compartment. Assessment and Plan:       Diagnosis Orders   1. Chronic pain of left knee  XR KNEE LEFT (MIN 4 VIEWS)   2. Chronic pain of right knee  XR KNEE RIGHT (MIN 4 VIEWS)    Breg Hinged Lateral Stabilizer Knee Brace    NH ARTHROCENTESIS ASPIR&/INJ MAJOR JT/BURSA W/O US    triamcinolone acetonide (KENALOG-40) injection 40 mg    lidocaine 1 % injection 20 mL    ropivacaine (NAROPIN) 0.5% injection 30 mL   3.  Primary osteoarthritis of both knees  Ambulatory referral to Physical Therapy    NH ARTHROCENTESIS ASPIR&/INJ MAJOR JT/BURSA W/O US    triamcinolone acetonide (KENALOG-40) injection 40 mg    lidocaine 1 % injection 20 mL    ropivacaine (NAROPIN) 0.5% injection 30 mL              The orders below, if any, were placed during this visit:   Orders Placed This Encounter   Procedures    XR KNEE LEFT (MIN 4 VIEWS)     Standing Status:   Future     Number of Occurrences:   1     Standing Expiration Date:   7/29/2021     Order Specific Question:   Reason for exam:     Answer:   Pain    XR KNEE RIGHT (MIN 4 VIEWS)     Standing Status:   Future     Number of Occurrences:   1     Standing Expiration Date:   7/29/2021     Order Specific Question:   Reason for exam:     Answer:   Pain    Ambulatory referral to Physical Therapy     Referral Priority:   Routine     Referral Type:   Eval and Treat     Requested Specialty:   Physical Therapy     Number of Visits Requested:   1    [place] ARTHROCENTESIS ASPIR&/INJ MAJOR JT/BURSA W/O US    Breg Hinged Lateral Stabilizer Knee Brace     Patient was prescribed a Breg Hinged Lateral Stabilizer. The right knee will require stabilization / immobilization from this semi-rigid / rigid orthosis to improve their function. The orthosis will assist in protecting the affected area, provide functional support and facilitate healing. The patient was educated and fit by a healthcare professional with expert knowledge and specialization in brace application while under the direct supervision of the physician. Verbal and written instructions for the use of and application of this item were provided. They were instructed to contact the office immediately should the brace result in increased pain, decreased sensation, increased swelling or worsening of the condition. Treatment Plan:   -Discussed with patient that most of the patient's pain and irritation is coming from the patellofemoral compartment of his knees and that if we can help to reduce irritation increase the strength across anterior aspect of the knees that will help significantly. -Gave patient order for physical therapy for evaluation and treatment of bilateral knees with focus to patellofemoral compartment with utilization of BFR training.  -Gave patient a Breg hinged knee brace with lateral stabilization of the patella to help reduce irritation across the patella and provide stabilization of the knee. Advised him utilization of this brace while at work  -Gave patient corticosteroid injection of the right knee at today's visit no complaints or concerns arisen.   -Due to the patient's cardiac status cannot move forward with anti-inflammatories at present time.  -Advised the patient to follow-up in 3 months for repeat evaluation  -Spent 45 minutes in discussion with patient regarding current condition and future care.          LUIS F Douglas

## 2021-06-29 NOTE — PROGRESS NOTES
Administrations This Visit     lidocaine 1 % injection 20 mL     Admin Date  06/29/2021  11:28 Action  Given Dose  20 mL Route  Other Site  Knee Right Administered By  Gerhardt Heading    Ordering Provider: LUIS F Krishna    NDC: 2462-4950-94    Lot#: [id number]    : Art Cancel    Patient Supplied?: No          ropivacaine (NAROPIN) 0.5% injection 30 mL     Admin Date  06/29/2021  11:29 Action  Given Dose  30 mL Route  Other Site  Knee Right Administered By  Gerhardt Heading    Ordering Provider: LUIS F Krishna    NDC: 90233-903-04    Lot#: [id number]    : [facility]    Patient Supplied?: No          triamcinolone acetonide (KENALOG-40) injection 40 mg     Admin Date  06/29/2021  11:29 Action  Given Dose  40 mg Route  Intra-articular Site  Knee Right Administered By  Gerhardt Heading    Ordering Provider: LUIS F Krishna    NDC: 0078-7451-68    Lot#: [id number]    : [facility] (PRIMARY [facility])    Patient Supplied?: No

## 2021-07-07 ENCOUNTER — TELEPHONE (OUTPATIENT)
Dept: CASE MANAGEMENT | Age: 71
End: 2021-07-07

## 2021-07-07 ENCOUNTER — NURSE ONLY (OUTPATIENT)
Dept: CARDIOLOGY CLINIC | Age: 71
End: 2021-07-07

## 2021-07-07 DIAGNOSIS — I50.22 CHRONIC SYSTOLIC HEART FAILURE (HCC): ICD-10-CM

## 2021-07-07 DIAGNOSIS — I42.0 CARDIOMYOPATHY, DILATED (HCC): ICD-10-CM

## 2021-07-07 DIAGNOSIS — Z95.0 PACEMAKER: ICD-10-CM

## 2021-07-07 NOTE — TELEPHONE ENCOUNTER
Patient overdue for lung cancer screening, reminder letter sent. Please remind the patient at the next scheduled appointment.      [address], [address] done

## 2021-07-07 NOTE — LETTER
[address] [facility] [phone]  [address] [phone]    Pacemaker/Defibrillator Clinic    07/08/21      [address] [place] [address] [address] [address]      Dear Denia Lyle    This letter is to inform you that we received the transmission from your monitor at home that checks your implanted heart device. The next date your monitor will automatically transmit will be 10-6-21. If your report needs attention we will notify you. Your device and monitor are wireless and most transmit cellularly, but please periodically check your monitor is still plugged in to the electrical outlet. If you still use the telephone land line to send please ensure the connection to the phone robbie is secure. This will help to ensure successful automatic transmissions in the future. Also, the monitor needs to be close to you while sleeping at night. Please be aware that the remote device transmission sites are periodically monitored only during regular business hours during which simultaneous in-office device clinics are being run. If your transmission requires attention, we will contact you as soon as possible. **PLEASE NOTE** that our [facility] policy and processes are changing to ensure a more seamless approach for all parties involved, allowing more time for our nurses to address patient issues and concerns. We will no longer be sending letters for NORMAL remote transmissions. You will be contacted by phone if your transmission requires attention (as previously done), and letters will only be sent regarding monitor disconnections or missed transmissions if you are unable to be reached by phone. Please do not be alarmed by this new process, as we will continue to contact you if your transmission report requires attention. This will be your final \"remote received\" letter.   From this point forward, the [facility] will be utilizing the no news is good news approach. As always, please feel free to contact your nurse with any questions or concerns. Thank you.     [facility]

## 2021-07-08 ENCOUNTER — APPOINTMENT (OUTPATIENT)
Dept: PHARMACY | Age: 71
End: 2021-07-08
Payer: MEDICARE

## 2021-07-08 NOTE — PROGRESS NOTES
We received remote transmission from patient's monitor at home. Transmission shows normal sensing and pacing function. Noted NSVT. Bi-V pacing is low. Programming changes done last year. EP physician will review.  See interrogation under cardiology tab in the [address] [place] [address] [address] field for more details.     Optivol is within normal range.

## 2021-07-10 PROCEDURE — 93296 REM INTERROG EVL PM/IDS: CPT | Performed by: INTERNAL MEDICINE

## 2021-07-10 PROCEDURE — 93294 REM INTERROG EVL PM/LDLS PM: CPT | Performed by: INTERNAL MEDICINE

## 2021-07-10 PROCEDURE — 93297 REM INTERROG DEV EVAL ICPMS: CPT | Performed by: INTERNAL MEDICINE

## 2021-07-16 ENCOUNTER — ANTI-COAG VISIT (OUTPATIENT)
Dept: PHARMACY | Age: 71
End: 2021-07-16
Payer: MEDICARE

## 2021-07-16 DIAGNOSIS — I48.3 TYPICAL ATRIAL FLUTTER (HCC): Primary | ICD-10-CM

## 2021-07-16 LAB — INTERNATIONAL NORMALIZATION RATIO, POC: 2.9

## 2021-07-16 PROCEDURE — 85610 PROTHROMBIN TIME: CPT

## 2021-07-16 PROCEDURE — 99211 OFF/OP EST MAY X REQ PHY/QHP: CPT

## 2021-07-16 NOTE — PROGRESS NOTES
Mr. Jany Rainey is a 80 y.o. y/o male with history of Atrial Fibrillation who presents today for anticoagulation monitoring and adjustment. Pt is retired and works part time at [employer]. Patient reports he has been on warfarin for almost 30 years now and was managed by his cardiologist who recently retired. Pt was then managed by Dr. Amisha Waller prior to being established in Clinic  Pertinent PMH: HTN, DM, HLD, COPD, CHF    Patient Reported Findings:  Yes     No  [x]   []       Patient verifies current dosing regimen as listed  []   [x]       S/S bleeding/bruising/swelling/SOB -denies    []   [x]       Blood in urine or stool- denies  []   [x]       Procedures scheduled in the future at this time -   []   [x]       Missed Dose-denies   []   [x]       Extra Dose - denies  [x]   []       Change in medications-  tylenol prn---> inc lasix --> started vit d weekly---> some tylenol      [x]   []       Change in health/diet/appetite - reports he eats about 2 meals/day. And states he will have salads about 1-2x/wk. (not a big fan of spinach or brocolli but may have other greens)--> Patient states 3 salads in past 2 weeks---> ate salad once since last visit---> no greens, wants to add green beans in twice weekly, no NVD --> returned to vit k a few times a week (green beans and salad) --> no changes, no NVD---> less greens recently, no NVD---> denies changes, but has not had vit k in a while (only eats salads that have vit k)---> no changes, no NVD  []   [x]       Change in alcohol use - reports occasionally drinking beer (once every few months) but happens very infrequently. --> Patient reports no alcohol in past two weeks---> denies   []   [x]       Change in activity  []   [x]       Hospital admission  []   [x]       Emergency department visit  []   [x]       Other complaints     Clinical Outcomes:  Yes     No  []   [x]       Major bleeding event  []   [x]       Thromboembolic event    Duration of warfarin Therapy: Indefinite   INR Range:  2.0-3.0     INR today is 2.9  Continue taking weekly dose of 10 mg Mon, Thurs and Sat and 15 mg all other days. Encouraged patient to maintain a consistent diet.   Recheck INR again in 4 weeks, 8/13    Referring physician is Dr. Corinne Jin  INR (no units)   Date Value   07/16/2021 2.9   06/21/2021 3.8   05/24/2021 2.4   05/10/2021 3.8   05/05/2020 2.17 (H)   04/14/2020 2.43 (H)   03/24/2020 1.45 (H)   03/23/2020 1.60 (H)         For Pharmacy Admin Tracking Only     Total # of Interventions Recommended: 0   Total # of Interventions Accepted: 0   Time Spent (min): 15

## 2021-07-22 NOTE — TELEPHONE ENCOUNTER
Medication:   Requested Prescriptions     Pending Prescriptions Disp Refills    allopurinol (ZYLOPRIM) 300 MG tablet [Pharmacy Med Name: ALLOPURINOL 300 MG Tablet] 90 tablet 0     Sig: TAKE 1 TABLET EVERY DAY        Patient Phone Number: [phone] (home)     Last appt: 4/29/2021   Next appt: Visit date not found    Last OARRS: No flowsheet data found.

## 2021-07-23 RX ORDER — ALLOPURINOL 300 MG/1
TABLET ORAL
Qty: 90 TABLET | Refills: 0 | Status: SHIPPED | OUTPATIENT
Start: 2021-07-23 | End: 2021-10-20

## 2021-07-26 RX ORDER — LISINOPRIL 20 MG/1
TABLET ORAL
Qty: 180 TABLET | Refills: 0 | Status: SHIPPED | OUTPATIENT
Start: 2021-07-26 | End: 2021-10-20

## 2021-07-26 NOTE — TELEPHONE ENCOUNTER
Patient calling to request refill on Lisinopril 20 mg to be sent to THE [facility].

## 2021-07-30 ENCOUNTER — TELEPHONE (OUTPATIENT)
Dept: FAMILY MEDICINE CLINIC | Age: 71
End: 2021-07-30

## 2021-07-30 NOTE — TELEPHONE ENCOUNTER
----- Message from Corinne Gallegos sent at 7/30/2021  4:33 PM EDT -----  Subject: Message to Provider    QUESTIONS  Information for Provider? pt. states knees have really been bothering him. especially the right one. pt. heard about a medication that helps with the   pain. He wants to know if he would be able to be subscripted this   medication with his other medications. The medication is call Meloxicim.   ---------------------------------------------------------------------------  --------------  CALL BACK INFO  What is the best way for the office to contact you? Do not leave any   message, patient will call back for answer  Preferred Call Back Phone Number? [phone]  ---------------------------------------------------------------------------  --------------  SCRIPT ANSWERS  Relationship to Patient?  Self

## 2021-08-02 ENCOUNTER — TELEPHONE (OUTPATIENT)
Dept: FAMILY MEDICINE CLINIC | Age: 71
End: 2021-08-02

## 2021-08-05 NOTE — TELEPHONE ENCOUNTER
----- Message from Alexys Valentin sent at 8/4/2021  4:36 PM EDT -----  Subject: Message to Provider    QUESTIONS  Information for Provider? Vonna Gosselin said that he missed a call from the   office this afternoon, he has no VM set up so he is not sure what the call   is about. He did talk to someone a few days ago but thought everything was   taken care of already. He is requesting another call back. ---------------------------------------------------------------------------  --------------  Tyra KRAFT  What is the best way for the office to contact you? Do not leave any   message, patient will call back for answer  Preferred Call Back Phone Number? [phone]  ---------------------------------------------------------------------------  --------------  SCRIPT ANSWERS  Relationship to Patient?  Self

## 2021-08-12 RX ORDER — METFORMIN HYDROCHLORIDE 500 MG/1
TABLET, EXTENDED RELEASE ORAL
Qty: 360 TABLET | Refills: 0 | Status: SHIPPED | OUTPATIENT
Start: 2021-08-12 | End: 2021-11-01

## 2021-08-12 NOTE — TELEPHONE ENCOUNTER
Medication:   Requested Prescriptions     Pending Prescriptions Disp Refills    metFORMIN (GLUCOPHAGE-XR) 500 MG extended release tablet [Pharmacy Med Name: Carly Singh  MG Tablet Extended Release 24 Hour] 360 tablet 0     Sig: TAKE [address] [facility] BREAKFAST       Patient Phone Number: [phone] (home)     Last appt: 4/29/2021   Next appt: Visit date not found    Last OARRS: No flowsheet data found.

## 2021-08-16 ENCOUNTER — TELEPHONE (OUTPATIENT)
Dept: PHARMACY | Age: 71
End: 2021-08-16

## 2021-08-19 ENCOUNTER — ANTI-COAG VISIT (OUTPATIENT)
Dept: PHARMACY | Age: 71
End: 2021-08-19
Payer: MEDICARE

## 2021-08-19 ENCOUNTER — APPOINTMENT (OUTPATIENT)
Dept: PHARMACY | Age: 71
End: 2021-08-19
Payer: MEDICARE

## 2021-08-19 DIAGNOSIS — I48.3 TYPICAL ATRIAL FLUTTER (HCC): Primary | ICD-10-CM

## 2021-08-19 LAB
INR BLD: 8.44 (ref 0.88–1.12)
INTERNATIONAL NORMALIZATION RATIO, POC: 8
PROTHROMBIN TIME: 104.3 SEC (ref 9.9–12.7)

## 2021-08-19 PROCEDURE — 99211 OFF/OP EST MAY X REQ PHY/QHP: CPT

## 2021-08-19 PROCEDURE — 85610 PROTHROMBIN TIME: CPT

## 2021-08-19 PROCEDURE — 36415 COLL VENOUS BLD VENIPUNCTURE: CPT

## 2021-08-19 PROCEDURE — 99212 OFFICE O/P EST SF 10 MIN: CPT

## 2021-08-19 NOTE — PROGRESS NOTES
Mr. Thomas Arroyo is a 80 y.o. y/o male with history of Atrial Fibrillation who presents today for anticoagulation monitoring and adjustment. Pt is retired and works part time at [employer]. Patient reports he has been on warfarin for almost 30 years now and was managed by his cardiologist who recently retired. Pt was then managed by Dr. Renee Wallis prior to being established in Clinic  Pertinent PMH: HTN, DM, HLD, COPD, CHF    Patient Reported Findings:  Yes     No  [x]   []       Patient verifies current dosing regimen as listed  []   [x]       S/S bleeding/bruising/swelling/SOB -denies    []   [x]       Blood in urine or stool- denies  []   [x]       Procedures scheduled in the future at this time -   []   [x]       Missed Dose-denies   []   [x]       Extra Dose - denies  [x]   []       Change in medications-  tylenol prn---> inc lasix --> started vit d weekly---> some tylenol      [x]   []       Change in health/diet/appetite - reports he eats about 2 meals/day. And states he will have salads about 1-2x/wk. (not a big fan of spinach or brocolli but may have other greens)--> Patient states 3 salads in past 2 weeks---> ate salad once since last visit---> no greens, wants to add green beans in twice weekly, no NVD --> returned to vit k a few times a week (green beans and salad) --> no changes, no NVD---> less greens recently, no NVD---> denies changes, but has not had vit k in a while (only eats salads that have vit k)---> no changes, no NVD --> some greens, no more than normal, no NVD  []   [x]       Change in alcohol use - reports occasionally drinking beer (once every few months) but happens very infrequently. --> Patient reports no alcohol in past two weeks---> denies   []   [x]       Change in activity  []   [x]       Hospital admission  []   [x]       Emergency department visit  []   [x]       Other complaints     Clinical Outcomes:  Yes     No  []   [x]       Major bleeding event  []   [x] Thromboembolic event    Duration of warfarin Therapy: Indefinite   INR Range:  2.0-3.0     INR today is >8.0 on POCT, 8.44 on lab draw. Patient states no changes in medications but has been feeling a bit sick lately, denies NVD, denies blood in stool / urine / vomit. Did have three \"doses\" of pepto-bismol over the last three days for upset stomach. Instructed patient to stop taking. Hold today, tomorrow, and Saturday, then continue taking weekly dose of 10 mg Mon, Thurs and Sat and 15 mg all other days. Encouraged patient to maintain a consistent diet. Recheck INR again in 10 days, 8/30 first day patient will be able to come in d/t work schedule.     Referring physician is Dr. Sarah Jacobsen  INR (no units)   Date Value   07/16/2021 2.9   06/21/2021 3.8   05/24/2021 2.4   05/10/2021 3.8   05/05/2020 2.17 (H)   04/14/2020 2.43 (H)   03/24/2020 1.45 (H)   03/23/2020 1.60 (H)     For Pharmacy Admin Tracking Only     Intervention Detail: Dose Adjustment: 1, reason: Therapy Optimization   Total # of Interventions Recommended: 1   Total # of Interventions Accepted: 1   Time Spent (min): 30

## 2021-08-30 ENCOUNTER — ANTI-COAG VISIT (OUTPATIENT)
Dept: PHARMACY | Age: 71
End: 2021-08-30
Payer: MEDICARE

## 2021-08-30 DIAGNOSIS — I48.3 TYPICAL ATRIAL FLUTTER (HCC): Primary | ICD-10-CM

## 2021-08-30 LAB — INTERNATIONAL NORMALIZATION RATIO, POC: 3.8

## 2021-08-30 PROCEDURE — 85610 PROTHROMBIN TIME: CPT

## 2021-08-30 PROCEDURE — 99212 OFFICE O/P EST SF 10 MIN: CPT

## 2021-08-30 NOTE — PROGRESS NOTES
Outcomes:  Yes     No  []   [x]       Major bleeding event  []   [x]       Thromboembolic event    Duration of warfarin Therapy: Indefinite   INR Range:  2.0-3.0     INR today is 3.8 after holding warfarin for 3 days for supratherapeutic INR   Hold tonight then decrease weekly dose to 15 mg Wed and Fri and 10 mg all other days (11% dec)  Encouraged patient to maintain a consistent diet.   Recheck INR again in 2 weeks, 9/14    Referring physician is Dr. Montana Sun  INR (no units)   Date Value   08/19/2021 8.44 (HH)   08/19/2021 8.0   07/16/2021 2.9   06/21/2021 3.8   05/24/2021 2.4   05/05/2020 2.17 (H)   04/14/2020 2.43 (H)   03/24/2020 1.45 (H)     For Pharmacy Admin Tracking Only     Intervention Detail: Dose Adjustment: 1, reason: Therapy Optimization   Total # of Interventions Recommended: 1   Total # of Interventions Accepted: 1   Time Spent (min): 15

## 2021-09-04 DIAGNOSIS — E78.5 HYPERLIPIDEMIA LDL GOAL <100: ICD-10-CM

## 2021-09-07 RX ORDER — PRAVASTATIN SODIUM 80 MG/1
TABLET ORAL
Qty: 90 TABLET | Refills: 0 | Status: SHIPPED | OUTPATIENT
Start: 2021-09-07 | End: 2021-12-07

## 2021-09-07 NOTE — TELEPHONE ENCOUNTER
Medication:   Requested Prescriptions     Pending Prescriptions Disp Refills    pravastatin (PRAVACHOL) 80 MG tablet [Pharmacy Med Name: PRAVASTATIN SODIUM 80 MG Tablet] 90 tablet 0     Sig: TAKE 1 TABLET EVERY DAY FOR CHOLESTEROL        Last Filled:      Patient Phone Number: [phone] (home)     Last appt: 4/29/2021   Next appt: Visit date not found    Last OARRS: No flowsheet data found.

## 2021-09-09 ENCOUNTER — TELEPHONE (OUTPATIENT)
Dept: ORTHOPEDIC SURGERY | Age: 71
End: 2021-09-09

## 2021-09-09 NOTE — TELEPHONE ENCOUNTER
General Question     Subject: patient is calling about the pain he is in he can't work or sleep. He wanted to be seen asap.       Patient Estephania Oblong Number: [phone]

## 2021-09-14 ENCOUNTER — APPOINTMENT (OUTPATIENT)
Dept: PHARMACY | Age: 71
End: 2021-09-14
Payer: MEDICARE

## 2021-09-14 ENCOUNTER — OFFICE VISIT (OUTPATIENT)
Dept: ORTHOPEDIC SURGERY | Age: 71
End: 2021-09-14
Payer: MEDICARE

## 2021-09-14 ENCOUNTER — TELEPHONE (OUTPATIENT)
Dept: PHARMACY | Age: 71
End: 2021-09-14

## 2021-09-14 VITALS
HEIGHT: 73 IN | WEIGHT: 290 LBS | HEART RATE: 70 BPM | DIASTOLIC BLOOD PRESSURE: 85 MMHG | SYSTOLIC BLOOD PRESSURE: 151 MMHG | BODY MASS INDEX: 38.43 KG/M2

## 2021-09-14 DIAGNOSIS — M25.561 CHRONIC PAIN OF RIGHT KNEE: ICD-10-CM

## 2021-09-14 DIAGNOSIS — G89.29 CHRONIC PAIN OF RIGHT KNEE: ICD-10-CM

## 2021-09-14 DIAGNOSIS — E66.01 SEVERE OBESITY (BMI 35.0-35.9 WITH COMORBIDITY) (HCC): ICD-10-CM

## 2021-09-14 DIAGNOSIS — M17.0 PRIMARY OSTEOARTHRITIS OF BOTH KNEES: ICD-10-CM

## 2021-09-14 DIAGNOSIS — G89.29 CHRONIC PAIN OF LEFT KNEE: Primary | ICD-10-CM

## 2021-09-14 DIAGNOSIS — M25.562 CHRONIC PAIN OF LEFT KNEE: Primary | ICD-10-CM

## 2021-09-14 PROCEDURE — 3017F COLORECTAL CA SCREEN DOC REV: CPT | Performed by: ORTHOPAEDIC SURGERY

## 2021-09-14 PROCEDURE — 1123F ACP DISCUSS/DSCN MKR DOCD: CPT | Performed by: ORTHOPAEDIC SURGERY

## 2021-09-14 PROCEDURE — 99214 OFFICE O/P EST MOD 30 MIN: CPT | Performed by: ORTHOPAEDIC SURGERY

## 2021-09-14 PROCEDURE — 4040F PNEUMOC VAC/ADMIN/RCVD: CPT | Performed by: ORTHOPAEDIC SURGERY

## 2021-09-14 PROCEDURE — G8417 CALC BMI ABV UP PARAM F/U: HCPCS | Performed by: ORTHOPAEDIC SURGERY

## 2021-09-14 PROCEDURE — 1036F TOBACCO NON-USER: CPT | Performed by: ORTHOPAEDIC SURGERY

## 2021-09-14 PROCEDURE — G8427 DOCREV CUR MEDS BY ELIG CLIN: HCPCS | Performed by: ORTHOPAEDIC SURGERY

## 2021-09-14 RX ORDER — TRAMADOL HYDROCHLORIDE 50 MG/1
50 TABLET ORAL EVERY 4 HOURS PRN
Qty: 42 TABLET | Refills: 0 | Status: SHIPPED | OUTPATIENT
Start: 2021-09-14 | End: 2021-09-21

## 2021-09-14 NOTE — LETTER
[address] [place] [address]  Kirsten Chiang [address] [facility],First Floor [address]  Phone: [phone]  Fax: [phone]    Barb Bloom MD        September 14, 2021     Patient: Aviva Tavares   YOB: 1950   Date of Visit: 9/14/2021       To Whom It May Concern: It is my medical opinion that Farideh Fitzgerald should remain out of work until 10/15/21. If you have any questions or concerns, please don't hesitate to call.     Sincerely,      Barb Bloom MD

## 2021-09-14 NOTE — TELEPHONE ENCOUNTER
Patient called and rescheduled for 9/15. He is stuck at an ortho appt and doesn't think he will be able to make it here in time.

## 2021-09-14 NOTE — PROGRESS NOTES
Patient Name: Karolina Horowitz  : 1950  DOS: 2021        Chief Complaint:   Chief Complaint   Patient presents with    Knee Pain     Bilateral knee-Right knee injection in , pain level now is a 10   pain in the bilateral knees is severe and worsening. Prior injection with only 3 days of relief in pain. Not taking any pain medications for the pain. History of Present Illness:  Karolina Horowitz is a 80 y.o. male who presents with a chief complaint of continued complaints of pain in the knee with irritation with walking, stairs and with overuse. Pain in the knee regularly with swelling and discomfort. Increase in pain over the past several years time. Patient is here for evaluation regarding bilateral knee pain that has been ongoing for several years. He notes his overall pain level is an 8 out of 10 with the right knee being worse than the left. He notes that the knee is buckling give out on him on occasion with right doing it more than the left. Denies utilization of any braces or assistive walking devices at present time. He has not pursued physical therapy at present time. He denies numbness burning tingling radiating pains down the leg denies any fall trauma or injury. He notes mostly pain in the anterior aspect of the knees but after periods of standing or walking the pain radiates around the entire knee. 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 So he does a lot of stooping bending crouching kneeling as he is trying to get in and out of these vehicles. Notes a lot of difficulty with these activities as well as stairs long periods of standing and walking as well as transitions. Is hopeful to ascertain what is going on in regards to his knees and figure out what can be done to help reduce irritation and provide some relief.          Medical History  Current Medications:   Prior to Admission medications Medication Sig Start Date End Date Taking? Authorizing Provider   Diclofenac Sodium POWD 2 g by Does not apply route 4 times daily Formula #8E Baclo 2% Diclo 3% DMSO 5% Denisse 6% Lido 2% Prilo 2% 9/14/21  Yes Noelle Minor MD   amLODIPine (NORVASC) 5 MG tablet TAKE 1 TABLET EVERY DAY 9/13/21   MEENU Starr   pravastatin (PRAVACHOL) 80 MG tablet TAKE 1 TABLET EVERY DAY FOR CHOLESTEROL 9/7/21   MEENU Parks CNP   furosemide (LASIX) 20 MG tablet TAKE 1 TABLET EVERY DAY 8/13/21   MEENU Starr   metFORMIN (GLUCOPHAGE-XR) 500 MG extended release tablet TAKE 4 TABLETS EVERY DAY WITH BREAKFAST 8/12/21   MEENU Parks CNP   lisinopril (PRINIVIL;ZESTRIL) 20 MG tablet TAKE 1 TABLET TWICE DAILY 7/26/21   MEENU Carter   allopurinol (ZYLOPRIM) 300 MG tablet TAKE 1 TABLET EVERY DAY 7/23/21   Diana Montenegro MD   vitamin D (ERGOCALCIFEROL) 1.25 MG (78237 UT) CAPS capsule Take 1 capsule by mouth once a week 5/25/21   MEENU Carter   furosemide (LASIX) 40 MG tablet Take 1 tablet by mouth daily 5/10/21   MEENU Carter   warfarin (COUMADIN) 10 MG tablet TAKE 1 TABLET DAILY EXCEPT TAKE 1 AND 1/2 TABLETS (15 MG) ON TUESDAY, FRIDAY AND SUNDAY 4/2/21   MEENU Red CNP   glucose monitoring kit (FREESTYLE) monitoring kit Ok to dispense what is covered by insurance. 7/9/20   Andrew Galo MD   blood glucose monitor strips Test 2 times a day & as needed for symptoms of irregular blood glucose. Dispense sufficient amount for indicated testing frequency plus additional to accommodate PRN testing needs. Ok to dispense what is covered by insurance 7/4/20   Andrew Galo MD   ACCU-CHEK COMPACT PLUS strip USE TO TEST 2 TIMES DAILY 6/23/20   Andrew Galo MD   Lancets MISC BID testing 2/24/17   Andrew Galo MD   sotalol (BETAPACE) 80 MG tablet Take 80 mg by mouth 2 times daily.     Historical Provider, MD   aspirin 81 MG chewable tablet Take 81 mg by mouth daily. Historical Provider, MD     Allergies: No Known Allergies    Review of Systems:     Review of Systems ______  Constitutional: Negative for fever and diaphoresis. ____________  Respiratory: Negative for shortness of breath.  ________  Gastrointestinal: Negative for abdominal pain. ________  Musculoskeletal: Positive for joint pain. ____  Skin: Negative for itching. ____  Neurological: Negative for loss of consciousness.  ______________  All other systems reviewed and negative     Past Medical History:   Diagnosis Date    Acute congestive heart failure ([facility] 12/30/2019    Allergic rhinitis 7/11/2016    Atrial fibrillation ([facility]     under care of cardiology:Dr. Michae Rainbow Behrens([facility])    CKD (chronic kidney disease)     Nephro:Dr. Parker:stage 3    Class 2 obesity due to excess calories without serious comorbidity with body mass index (BMI) of 37.0 to 37.9 in adult 11/7/2017    Controlled type 2 diabetes mellitus without complication, without long-term current use of insulin ([facility] 2/24/2017    COPD     Gout     Hyperlipidemia, mixed 07/11/2016    Hypertension     under care of cardiology:Dr. Michae Rainbow Behrens([facility])    Long term current use of anticoagulants with INR goal of 2.0-3.0     under care of cardiology:Dr. Scott Behrens([facility])    Obstructive sleep apnea syndrome 06/18/2019    per pulmo    Parkinson disease [facility])     9/2016:Per neurologist dx is tremor & not consistent with Parkison's:Dr. Wei Garland Prediabetes 10/28/2016    Primary insomnia 1/25/2017    Primary osteoarthritis of both knees 9/14/2021    Sleep apnea     CPAP    Stage 2 chronic kidney disease      Family History   Problem Relation Age of Onset    Asthma Mother     Cancer Father     No Known Problems Sister     No Known Problems Brother     No Known Problems Maternal Grandmother     No Known Problems Maternal Grandfather     No Known Problems Paternal Grandmother     No Known Problems Paternal Grandfather      Social History     Socioeconomic History    Marital status:      Spouse name: Not on file    Number of children: Not on file    Years of education: Not on file    Highest education level: Not on file   Occupational History    Occupation: Retired:(PT car prep)   Tobacco Use    Smoking status: Former Smoker     Packs/day: 1.00     Years: 30.00     Pack years: 30.00     Types: Cigarettes     Quit date: 2019     Years since quittin.2    Smokeless tobacco: Never Used    Tobacco comment:     Vaping Use    Vaping Use: Never used   Substance and Sexual Activity    Alcohol use: Yes     Comment: rarely    Drug use: No    Sexual activity: Yes     Comment: -6 children    Other Topics Concern    Not on file   Social History Narrative    Not on file     Social Determinants of Health     Financial Resource Strain:     Difficulty of Paying Living Expenses:    Food Insecurity:     Worried About Running Out of Food in the Last Year:     [address] in the Last Year:    Transportation Needs:     Lack of Transportation (Medical):  Lack of Transportation (Non-Medical):    Physical Activity:     Days of Exercise per Week:     Minutes of Exercise per Session:    Stress:     Feeling of Stress :    Social Connections:     Frequency of Communication with Friends and Family:     Frequency of Social Gatherings with Friends and Family:     Attends Synagogue Services:     Active Member of Clubs or Organizations:     Attends Club or Organization Meetings:     Marital Status:    Intimate Partner Violence:     Fear of Current or Ex-Partner:     Emotionally Abused:     Physically Abused:     Sexually Abused:          Physical Exam __  Constitutional: She is oriented to person, place, and time and well-developed, well-nourished, and in no distress. No distress.  ____  HENT:   Head: Normocephalic and atraumatic. ____  Eyes: Conjunctivae are normal. ________  Cardiovascular: Intact distal pulses. ____  Pulmonary/Chest: Effort normal. ________________________  Neurological: She is alert and oriented to person, place, and time. ____  Skin: Skin is dry. She is not diaphoretic. ____  Psychiatric: Mood, affect and judgment normal. ______          Assessment   Vital Signs:      Vitals:    09/14/21 1218   BP: (!) 151/85   Pulse: 70       bilateral Knee shows evidence for DJD with no obvious pseudolaxity, pain with weight bearing, antalgic gait and palpable osteophytes. Inspection: Moderate anterior swelling worse on the right. Swelling is present with moderate effusion on the right and mild on the left. The posterior aspect of the knee appears to be full with tenderness. There is no erythema, rash, or ecchymosis. Range of Motion:  Right 0 to 120 degrees left 0 to 120 degrees     Pain with varus and valgus testing    There is no noted varus or valgus deformity    Strength:  Hamstrings rated: 4/5. Quadriceps rated: 5/5    Palpation: There is severe tenderness along the lateral patellofemoral joint line bilaterally. Moderate tenderness to medial lateral joint lines bilaterally    Special Tests: Patellar Compression test is positive. Valgus & Varus test is positive. Skin: There are no rashes, ulcerations or lesions. Gait: Gait pattern is antalgic  Skin shows no rashes/ecchymosis to the affected area, no hyperesthesias, no discoloration, no temperature or color discrepancies. NEUROLOGICALLY: There is no evidence for sensory or motor deficits in the extremity. Coordination appears full with no spacticity or rigidity. Reflexes appear to be symmetric. Distal circulation intact. No signs of RSD.        Additional Comments: Hip range of motion intact laterally               Diagnostic Test Findings:   Xray   Have reviewed the xrays above from 09/14/21   4 view x-ray of the right knee AP, lateral, sunrise and tunnel views were performed and reviewed today revealing moderate medial and lateral compartment osteoarthritic bone spurring and joint space decrease with equal wear no evidence of acute fracture dislocation severe patellar arthritic changes especially laterally and significant joint space decrease the lateral patellofemoral compartment. 4 view x-ray of the left knee AP, lateral, sunrise and tunnel views were performed and reviewed today revealing moderate medial and lateral compartment osteoarthritic bone spurring and joint space decrease with equal wear no evidence of acute fracture dislocation. Severe patellar arthritic changes especially laterally with significant joint space decrease in the lateral patellofemoral compartment. Assessment and Plan:       Diagnosis Orders   1. Chronic pain of left knee     2. Chronic pain of right knee  MRI KNEE RIGHT WO CONTRAST    EUFLEXXA INJECTION (For Auth/Precert)   3. Primary osteoarthritis of both knees  MRI KNEE RIGHT WO CONTRAST   4. Severe obesity (BMI 35.0-35.9 with comorbidity) ([facility]                The orders below, if any, were placed during this visit:   Orders Placed This Encounter   Procedures    MRI KNEE RIGHT WO CONTRAST     Right knee mri at [address] [place] [address]. Standing Status:   Future     Standing Expiration Date:   9/14/2022    EUFLEXXA INJECTION (For Auth/Precert)     Standing Status:   Future     Standing Expiration Date:   9/14/2022              Treatment Plan:   -Discussed with patient that most of the patient's pain and irritation is coming from the patellofemoral compartment of his knees and that if we can help to reduce irritation increase the strength across anterior aspect of the knees that will help significantly. -Gave patient order for physical therapy for evaluation and treatment of bilateral knees with focus to patellofemoral compartment with utilization of BFR training.          -Due to the patient's cardiac status cannot move forward with anti-inflammatories at present time. have ordered gel injection to see if this can address osme of the residual pain. MRI also ordered to evalaute for additional areas of stress reaction or mechanical reasons for the pain. Started on Ultram intermittently for some of the pain issues to see if this can allow more active therapies on his own. -Spent 45 minutes in discussion with patient regarding current condition and future care.          Vernon Singh MD

## 2021-09-15 ENCOUNTER — ANTI-COAG VISIT (OUTPATIENT)
Dept: PHARMACY | Age: 71
End: 2021-09-15
Payer: MEDICARE

## 2021-09-15 ENCOUNTER — TELEPHONE (OUTPATIENT)
Dept: ORTHOPEDIC SURGERY | Age: 71
End: 2021-09-15

## 2021-09-15 DIAGNOSIS — I48.3 TYPICAL ATRIAL FLUTTER (HCC): Primary | ICD-10-CM

## 2021-09-15 LAB — INTERNATIONAL NORMALIZATION RATIO, POC: 2.3

## 2021-09-15 PROCEDURE — 85610 PROTHROMBIN TIME: CPT

## 2021-09-15 PROCEDURE — 99211 OFF/OP EST MAY X REQ PHY/QHP: CPT

## 2021-09-15 NOTE — TELEPHONE ENCOUNTER
General Question     Subject: PATIENT MRI   Patient and /or Facility Request: [id number] THAT PATIENT IS NOT SAFE TO HAVE MRI DUE TO OLD LEAD FROM OLD PACEMAKER.    Contact Number: Vanita Jose Martin [phone]

## 2021-09-15 NOTE — TELEPHONE ENCOUNTER
I called and explained that the cream does not go to his regular pharmacy. It will go to [facility] and they should be in touch with him.

## 2021-09-15 NOTE — PROGRESS NOTES
Mr. Emiliano Mason is a 80 y.o. y/o male with history of Atrial Fibrillation who presents today for anticoagulation monitoring and adjustment. Pt is retired and works part time at [employer]. Patient reports he has been on warfarin for almost 30 years now and was managed by his cardiologist who recently retired. Pt was then managed by Dr. Jason Espinosa prior to being established in Clinic  Pertinent PMH: HTN, DM, HLD, COPD, CHF    Patient Reported Findings:  Yes     No  [x]   []       Patient verifies current dosing regimen as listed  []   [x]       S/S bleeding/bruising/swelling/SOB -denies    []   [x]       Blood in urine or stool- denies  []   [x]       Procedures scheduled in the future at this time -   []   [x]       Missed Dose-denies   []   [x]       Extra Dose - denies  [x]   []       Change in medications-  tylenol prn---> inc lasix --> started vit d weekly---> some tylenol --> prescribed diclofenac gel and tramadol (prn, 7 daily supply prescribed), has not picked up yet, picking up today   []   [x]       Change in health/diet/appetite - reports he eats about 2 meals/day. And states he will have salads about 1-2x/wk. (not a big fan of spinach or brocolli but may have other greens)--> Patient states 3 salads in past 2 weeks---> ate salad once since last visit---> no greens, wants to add green beans in twice weekly, no NVD --> returned to vit k a few times a week (green beans and salad) --> no changes, no NVD---> less greens recently, no NVD---> denies changes, but has not had vit k in a while (only eats salads that have vit k)---> no changes, no NVD --> some greens, no more than normal, no NVD --> diarrhea resolved. Had small salad last week, no other vit k --> no changes, no NVD  []   [x]       Change in alcohol use - reports occasionally drinking beer (once every few months) but happens very infrequently. --> Patient reports no alcohol in past two weeks---> denies   []   [x]       Change in activity  [] [x]       Hospital admission  []   [x]       Emergency department visit  []   [x]       Other complaints     Clinical Outcomes:  Yes     No  []   [x]       Major bleeding event  []   [x]       Thromboembolic event    Duration of warfarin Therapy: Indefinite   INR Range:  2.0-3.0     INR today is 2.3   Weekly dose dec at last appt. Pt picking up diclofenac gel and tramadol today for knee pain. Tramadol scheduled prn and 7 days. Pt says will only take when needed. INR on lower end of range so will cont current weekly dose. Continue weekly dose to 15 mg Wed and Fri and 10 mg all other days. Encouraged patient to maintain a consistent diet.   Recheck INR again in 2 weeks, 9/29    Referring physician is Dr. Sarah Jacobsen  INR (no units)   Date Value   09/15/2021 2.3   08/30/2021 3.8   08/19/2021 8.44 (HH)   08/19/2021 8.0   07/16/2021 2.9   05/05/2020 2.17 (H)   04/14/2020 2.43 (H)   03/24/2020 1.45 (H)     For Pharmacy Admin Tracking Only     Total # of Interventions Recommended: 0   Total # of Interventions Accepted: 0   Time Spent (min): 15

## 2021-09-15 NOTE — TELEPHONE ENCOUNTER
Prescription Refill     Medication Name:  SOME TYPE OF OINTMENT OR VERO AS DESCRIBED BY PATIENT - USED FOR PAIN - HE SAID THE PHARMACY DID NOT RECORD OF THIS PRESCRIPTION  Pharmacy: [address] [facility]  Patient Contact Number: [phone]

## 2021-09-16 NOTE — TELEPHONE ENCOUNTER
I called the patient and let him know that Dr. Rocio Hansen states not to worry about the MRI. We will wait and see if the injections get approved.

## 2021-09-20 ENCOUNTER — TELEPHONE (OUTPATIENT)
Dept: ORTHOPEDIC SURGERY | Age: 71
End: 2021-09-20

## 2021-09-20 NOTE — TELEPHONE ENCOUNTER
General Question     Subject: PT WANTS TO KNOW IF HIS GEL SHOTS HAVE BEEN APPROVED  Patient and /or Facility Request: [facility]  Contact Number: [phone]

## 2021-09-21 ENCOUNTER — TELEPHONE (OUTPATIENT)
Dept: ORTHOPEDIC SURGERY | Age: 71
End: 2021-09-21

## 2021-09-24 ENCOUNTER — TELEPHONE (OUTPATIENT)
Dept: ORTHOPEDIC SURGERY | Age: 71
End: 2021-09-24

## 2021-09-24 NOTE — TELEPHONE ENCOUNTER
General Question     Subject: EUFLEXXA INJECTION  Patient and /or Facility Request: [facility]\"  Contact Number: [phone]    PATIENT CALLING TO FOLLOW UP ON IF THE EUFLEXXA INJECTIONS HAS BEEN APPROVED. PATIENT STATED THAT HE'S SUFFERING WITH THE PAIN. PLEASE CALL BACK AT THE ABOVE NUMBER.

## 2021-09-24 NOTE — TELEPHONE ENCOUNTER
General Question     Subject: pt is calling to see if we can resend his request for effulex back to his insurance they said they didn't get it.      Patient [address] Number: [phone]

## 2021-09-29 ENCOUNTER — ANTI-COAG VISIT (OUTPATIENT)
Dept: PHARMACY | Age: 71
End: 2021-09-29
Payer: MEDICARE

## 2021-09-29 ENCOUNTER — TELEPHONE (OUTPATIENT)
Dept: ORTHOPEDIC SURGERY | Age: 71
End: 2021-09-29

## 2021-09-29 DIAGNOSIS — I48.3 TYPICAL ATRIAL FLUTTER (HCC): Primary | ICD-10-CM

## 2021-09-29 LAB — INTERNATIONAL NORMALIZATION RATIO, POC: 2.4

## 2021-09-29 PROCEDURE — 99211 OFF/OP EST MAY X REQ PHY/QHP: CPT

## 2021-09-29 PROCEDURE — 85610 PROTHROMBIN TIME: CPT

## 2021-09-29 NOTE — PROGRESS NOTES
Mr. Vivienne Randolph is a 80 y.o. y/o male with history of Atrial Fibrillation who presents today for anticoagulation monitoring and adjustment. Pt is retired and works part time at [employer]. Patient reports he has been on warfarin for almost 30 years now and was managed by his cardiologist who recently retired. Pt was then managed by Dr. García Cutler prior to being established in Clinic  Pertinent PMH: HTN, DM, HLD, COPD, CHF    Patient Reported Findings:  Yes     No  [x]   []       Patient verifies current dosing regimen as listed  []   [x]       S/S bleeding/bruising/swelling/SOB -denies    []   [x]       Blood in urine or stool- denies  []   [x]       Procedures scheduled in the future at this time -   []   [x]       Missed Dose-denies   []   [x]       Extra Dose - denies  [x]   []       Change in medications-  tylenol prn---> inc lasix --> started vit d weekly---> some tylenol --> prescribed diclofenac gel and tramadol (prn, 7 daily supply prescribed), has not picked up yet, picking up today --> never picked up diclofenac gel or tramadol d/t cost, euflexxa injection on 10/5 (second shot is not scheduled yet, will be a total of three shots) per pt does not have to hold warf for these injections  []   [x]       Change in health/diet/appetite - reports he eats about 2 meals/day. And states he will have salads about 1-2x/wk. (not a big fan of spinach or brocolli but may have other greens)--> Patient states 3 salads in past 2 weeks---> ate salad once since last visit---> no greens, wants to add green beans in twice weekly, no NVD --> returned to vit k a few times a week (green beans and salad) --> no changes, no NVD---> less greens recently, no NVD---> denies changes, but has not had vit k in a while (only eats salads that have vit k)---> no changes, no NVD --> some greens, no more than normal, no NVD --> diarrhea resolved.  Had small salad last week, no other vit k --> no changes, no NVD  []   [x]       Change in alcohol use - reports occasionally drinking beer (once every few months) but happens very infrequently. --> Patient reports no alcohol in past two weeks---> denies   []   [x]       Change in activity  []   [x]       Hospital admission  []   [x]       Emergency department visit  []   [x]       Other complaints     Clinical Outcomes:  Yes     No  []   [x]       Major bleeding event  []   [x]       Thromboembolic event    Duration of warfarin Therapy: Indefinite   INR Range:  2.0-3.0     INR today is 2.4  Eulfexxa injection 1/3 on 10/5 does not interact w/ warfarin and per pt does not have to hold warfarin. Continue weekly dose to 15 mg Wed and Fri and 10 mg all other days. Encouraged patient to maintain a consistent diet.   Recheck INR again in 2 weeks, 10/13    Referring physician is Dr. Fer Xiao  INR (no units)   Date Value   09/29/2021 2.4   09/15/2021 2.3   08/30/2021 3.8   08/19/2021 8.44 (HH)   08/19/2021 8.0   05/05/2020 2.17 (H)   04/14/2020 2.43 (H)   03/24/2020 1.45 (H)     For Pharmacy Admin Tracking Only     Total # of Interventions Recommended: 0   Total # of Interventions Accepted: 0   Time Spent (min): 15

## 2021-09-29 NOTE — TELEPHONE ENCOUNTER
Patient is wanting to know if he will need a ride after getting injection. Please call patient to advise.

## 2021-10-05 ENCOUNTER — NURSE ONLY (OUTPATIENT)
Dept: ORTHOPEDIC SURGERY | Age: 71
End: 2021-10-05
Payer: MEDICARE

## 2021-10-05 VITALS
BODY MASS INDEX: 38.43 KG/M2 | SYSTOLIC BLOOD PRESSURE: 126 MMHG | DIASTOLIC BLOOD PRESSURE: 78 MMHG | WEIGHT: 290 LBS | HEART RATE: 73 BPM | HEIGHT: 73 IN

## 2021-10-05 DIAGNOSIS — M17.0 PRIMARY OSTEOARTHRITIS OF BOTH KNEES: Primary | ICD-10-CM

## 2021-10-05 PROCEDURE — 20610 DRAIN/INJ JOINT/BURSA W/O US: CPT | Performed by: PHYSICIAN ASSISTANT

## 2021-10-05 NOTE — PROGRESS NOTES
Administrations This Visit     hyaluronan (ORTHOVISC) 30 MG/2ML injection 30 mg     Admin Date  10/05/2021  09:14 Action  Given Dose  30 mg Route  Intra-articular Site  Knee Right Administered By  Estefani Templeton    Ordering Provider: LUIS F Dhaliwal    NDC: 73828-547-14    Lot#: [id number]    : [address]    Patient Supplied?: No

## 2021-10-05 NOTE — PROGRESS NOTES
Patient Name: Agata Lion  : 1950  DOS: 10/5/2021        Chief Complaint:   Chief Complaint   Patient presents with    Knee Pain     RIGHT Kelly Mac #1   pain in the bilateral knees is severe and worsening. Prior injection with only 3 days of relief in pain. Not taking any pain medications for the pain. History of Present Illness:  Agata Lion is a 80 y.o. male who presents with a chief complaint of continued complaints of pain in the knee with irritation with walking, stairs and with overuse. Pain in the knee regularly with swelling and discomfort. Currently: 10/5/21  Patient is here for follow up regarding right knee osteoarthritis. Patient notes his pain levels well 8 or 9 out of 10 at today's visit he is hopeful about the Orthovisc injections that he was approved for today's visit will be helpful in improving his overall symptoms. Patient denies any fall trauma or injury utilizing cane to help with ambulation. Denies numbness burning tingling radiating pains down the leg. Previously: 21  Increase in pain over the past several years time. Patient is here for evaluation regarding bilateral knee pain that has been ongoing for several years. He notes his overall pain level is an 8 out of 10 with the right knee being worse than the left. He notes that the knee is buckling give out on him on occasion with right doing it more than the left. Denies utilization of any braces or assistive walking devices at present time. He has not pursued physical therapy at present time. He denies numbness burning tingling radiating pains down the leg denies any fall trauma or injury. He notes mostly pain in the anterior aspect of the knees but after periods of standing or walking the pain radiates around the entire knee. 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   So he does a lot of stooping bending crouching kneeling as he is trying to get in and out of these vehicles. Notes a lot of difficulty with these activities as well as stairs long periods of standing and walking as well as transitions. Is hopeful to ascertain what is going on in regards to his knees and figure out what can be done to help reduce irritation and provide some relief. Medical History  Current Medications:   Prior to Admission medications    Medication Sig Start Date End Date Taking? Authorizing Provider   Diclofenac Sodium POWD 2 g by Does not apply route 4 times daily Formula #8E Baclo 2% Diclo 3% DMSO 5% Denisse 6% Lido 2% Prilo 2% 9/14/21   Charli Sandoval MD   amLODIPine (NORVASC) 5 MG tablet TAKE 1 TABLET EVERY DAY 9/13/21   MEENU Shabazz   pravastatin (PRAVACHOL) 80 MG tablet TAKE 1 TABLET EVERY DAY FOR CHOLESTEROL 9/7/21   MEENU Heath CNP   furosemide (LASIX) 20 MG tablet TAKE 1 TABLET EVERY DAY 8/13/21   MEENU Shabazz   metFORMIN (GLUCOPHAGE-XR) 500 MG extended release tablet TAKE 4 TABLETS EVERY DAY WITH BREAKFAST 8/12/21   MEENU Heath CNP   lisinopril (PRINIVIL;ZESTRIL) 20 MG tablet TAKE 1 TABLET TWICE DAILY 7/26/21   [facility]   allopurinol (ZYLOPRIM) 300 MG tablet TAKE 1 TABLET EVERY DAY 7/23/21   Oswald Luna MD   vitamin D (ERGOCALCIFEROL) 1.25 MG (06678 UT) CAPS capsule Take 1 capsule by mouth once a week 5/25/21   Seth Hives, APRN - CNS   furosemide (LASIX) 40 MG tablet Take 1 tablet by mouth daily 5/10/21   [facility] Hives, APRN - CNS   warfarin (COUMADIN) 10 MG tablet TAKE 1 TABLET DAILY EXCEPT TAKE 1 AND 1/2 TABLETS (15 MG) ON TUESDAY, FRIDAY AND SUNDAY 4/2/21   [facility] Diesel, APRN - CNP   glucose monitoring kit (FREESTYLE) monitoring kit Ok to dispense what is covered by insurance. 7/9/20   Hayley Best MD   blood glucose monitor strips Test 2 times a day & as needed for symptoms of irregular blood glucose.  Dispense sufficient amount for indicated testing frequency plus additional to accommodate PRN testing needs. Ok to dispense what is covered by insurance 7/4/20   Hemal Huston MD   ACCU-CHEK COMPACT PLUS strip USE TO TEST 2 TIMES DAILY 6/23/20   Hemal Huston MD   Lancets MISC BID testing 2/24/17   Hemal Huston MD   sotalol (BETAPACE) 80 MG tablet Take 80 mg by mouth 2 times daily. Historical Provider, MD   aspirin 81 MG chewable tablet Take 81 mg by mouth daily. Historical Provider, MD     Allergies: No Known Allergies    Review of Systems:     Review of Systems ______  Constitutional: Negative for fever and diaphoresis. ____________  Respiratory: Negative for shortness of breath.  ________  Gastrointestinal: Negative for abdominal pain. ________  Musculoskeletal: Positive for joint pain. ____  Skin: Negative for itching. ____  Neurological: Negative for loss of consciousness.  ______________  All other systems reviewed and negative     Past Medical History:   Diagnosis Date    Acute congestive heart failure ([facility] [address] 12/30/2019    Allergic rhinitis 7/11/2016    Atrial fibrillation ([facility] [address]     under care of cardiology:Dr. Abagail Gull Behrens([facility])    CKD (chronic kidney disease)     Nephro:Dr. Parker:stage 3    Class 2 obesity due to excess calories without serious comorbidity with body mass index (BMI) of 37.0 to 37.9 in adult 11/7/2017    Controlled type 2 diabetes mellitus without complication, without long-term current use of insulin ([facility] [address] 2/24/2017    COPD     Gout     Hyperlipidemia, mixed 07/11/2016    Hypertension     under care of cardiology:Dr. Abagail Gull Behrens([facility])    Long term current use of anticoagulants with INR goal of 2.0-3.0     under care of cardiology:Dr. Abagail Gull Behrens([facility])    Obstructive sleep apnea syndrome 06/18/2019    per pulmo    Parkinson disease [facility])     9/2016:Per neurologist dx is tremor & not consistent with Parkison's:Dr. Mohamud Deng Prediabetes 10/28/2016    Primary insomnia 2017    Primary osteoarthritis of both knees 2021    Sleep apnea     CPAP    Stage 2 chronic kidney disease      Family History   Problem Relation Age of Onset    Asthma Mother     Cancer Father     No Known Problems Sister     No Known Problems Brother     No Known Problems Maternal Grandmother     No Known Problems Maternal Grandfather     No Known Problems Paternal Grandmother     No Known Problems Paternal Grandfather      Social History     Socioeconomic History    Marital status:      Spouse name: Not on file    Number of children: Not on file    Years of education: Not on file    Highest education level: Not on file   Occupational History    Occupation: Retired:(PT car prep)   Tobacco Use    Smoking status: Former Smoker     Packs/day: 1.00     Years: 30.00     Pack years: 30.00     Types: Cigarettes     Quit date: 2019     Years since quittin.2    Smokeless tobacco: Never Used    Tobacco comment:     Vaping Use    Vaping Use: Never used   Substance and Sexual Activity    Alcohol use: Yes     Comment: rarely    Drug use: No    Sexual activity: Yes     Comment: -6 children    Other Topics Concern    Not on file   Social History Narrative    Not on file     Social Determinants of Health     Financial Resource Strain:     Difficulty of Paying Living Expenses:    Food Insecurity:     Worried About Running Out of Food in the Last Year:     Ran Out of Food in the Last Year:    Transportation Needs:     Lack of Transportation (Medical):      Lack of Transportation (Non-Medical):    Physical Activity:     Days of Exercise per Week:     Minutes of Exercise per Session:    Stress:     Feeling of Stress :    Social Connections:     Frequency of Communication with Friends and Family:     Frequency of Social Gatherings with Friends and Family:     Attends [employer] Services:     Active Member of Clubs or Organizations:     Attends Club or Organization Meetings:     Marital Status:    Intimate Partner Violence:     Fear of Current or Ex-Partner:     Emotionally Abused:     Physically Abused:     Sexually Abused:          Physical Exam __  Constitutional: She is oriented to person, place, and time and well-developed, well-nourished, and in no distress. No distress. ____  HENT:   Head: Normocephalic and atraumatic. ____  Eyes: Conjunctivae are normal. ________  Cardiovascular: Intact distal pulses. ____  Pulmonary/Chest: Effort normal. ________________________  Neurological: She is alert and oriented to person, place, and time. ____  Skin: Skin is dry. She is not diaphoretic. ____  Psychiatric: Mood, affect and judgment normal. ______          Assessment   Vital Signs:      Vitals:    10/05/21 0847   BP: 126/78   Pulse: 73       bilateral Knee shows evidence for DJD with no obvious pseudolaxity, pain with weight bearing, antalgic gait and palpable osteophytes. Inspection: Moderate anterior swelling worse on the right. Swelling is present with moderate effusion on the right and mild on the left. The posterior aspect of the knee appears to be full with tenderness. There is no erythema, rash, or ecchymosis. Range of Motion:  Right 0 to 120 degrees left 0 to 120 degrees     Pain with varus and valgus testing    There is no noted varus or valgus deformity    Strength:  Hamstrings rated: 4/5. Quadriceps rated: 5/5    Palpation: There is severe tenderness along the lateral patellofemoral joint line bilaterally. Moderate tenderness to medial lateral joint lines bilaterally    Special Tests: Patellar Compression test is positive. Valgus & Varus test is positive. Skin: There are no rashes, ulcerations or lesions. Gait: Gait pattern is antalgic  Skin shows no rashes/ecchymosis to the affected area, no hyperesthesias, no discoloration, no temperature or color discrepancies.    NEUROLOGICALLY: There is no evidence for sensory or motor deficits in the extremity. Coordination appears full with no spacticity or rigidity. Reflexes appear to be symmetric. Distal circulation intact. No signs of RSD. Additional Comments: Hip range of motion intact laterally        Procedures  Injection Procedure Note  Procedure Details     Verbal consent was obtained for the procedure. The right knee(s) was prepped with iodine and the skin was anesthetized. Using a 22 gauge needle the right knee(s) joint is injected with 2 ml Orthovisc viscosupplementation under the lateral aspect of the knee. The injection site was cleansed with topical isopropyl alcohol and a dressing was applied. Complications:  None; patient tolerated the procedure well. Diagnostic Test Findings:   Xray   Have reviewed the xrays above from 6/29/2021  4 view x-ray of the right knee AP, lateral, sunrise and tunnel views were performed on 6/29/2021 and reviewed today revealing moderate medial and lateral compartment osteoarthritic bone spurring and joint space decrease with equal wear no evidence of acute fracture dislocation severe patellar arthritic changes especially laterally and significant joint space decrease the lateral patellofemoral compartment. 4 view x-ray of the left knee AP, lateral, sunrise and tunnel views were performed on 6/29/2021 and reviewed today revealing moderate medial and lateral compartment osteoarthritic bone spurring and joint space decrease with equal wear no evidence of acute fracture dislocation. Severe patellar arthritic changes especially laterally with significant joint space decrease in the lateral patellofemoral compartment. Assessment and Plan:       Diagnosis Orders   1.  Primary osteoarthritis of both knees  MD ARTHROCENTESIS ASPIR&/INJ MAJOR JT/BURSA W/O US    hyaluronan (ORTHOVISC) 30 MG/2ML injection 30 mg              The orders below, if any, were placed during this visit:   Orders Placed This Encounter   Procedures    MD ARTHROCENTESIS ASPIR&/INJ MAJOR JT/BURSA W/O US              Treatment Plan:   -Discussed with patient that most of the patient's pain and irritation is coming from the patellofemoral compartment of his knees and that if we can help to reduce irritation increase the strength across anterior aspect of the knees that will help significantly.    -Advised patient to continue with physical therapy for evaluation and treatment of bilateral knees with focus to patellofemoral compartment with utilization of BFR training.    -Due to the patient's cardiac status cannot move forward with anti-inflammatories at present time. -Gave injection of Orthovisc viscosupplementation at today's visit to the right knee no complaints or concerns arisen.  -Advised patient to rest ice and elevate the knee for next 24 to 48 hours take it easy avoid heavy or strenuous activities and can return to activities as tolerated. -Advised for follow-up in 1 week for repeat injection of Orthovisc. MRI also ordered to evalaute for additional areas of stress reaction or mechanical reasons for the pain. Started on Ultram intermittently for some of the pain issues to see if this can allow more active therapies on his own. -Spent 30 minutes in discussion with patient regarding current condition and future care.          LUIS F Guillen

## 2021-10-06 ENCOUNTER — NURSE ONLY (OUTPATIENT)
Dept: CARDIOLOGY CLINIC | Age: 71
End: 2021-10-06
Payer: MEDICARE

## 2021-10-06 DIAGNOSIS — I42.0 CARDIOMYOPATHY, DILATED (HCC): ICD-10-CM

## 2021-10-06 DIAGNOSIS — Z95.0 PACEMAKER: Primary | ICD-10-CM

## 2021-10-06 DIAGNOSIS — I50.22 CHRONIC SYSTOLIC HEART FAILURE (HCC): ICD-10-CM

## 2021-10-10 PROCEDURE — 93294 REM INTERROG EVL PM/LDLS PM: CPT | Performed by: INTERNAL MEDICINE

## 2021-10-10 PROCEDURE — 93296 REM INTERROG EVL PM/IDS: CPT | Performed by: INTERNAL MEDICINE

## 2021-10-10 PROCEDURE — 93297 REM INTERROG DEV EVAL ICPMS: CPT | Performed by: INTERNAL MEDICINE

## 2021-10-12 ENCOUNTER — NURSE ONLY (OUTPATIENT)
Dept: ORTHOPEDIC SURGERY | Age: 71
End: 2021-10-12
Payer: MEDICARE

## 2021-10-12 VITALS
BODY MASS INDEX: 38.43 KG/M2 | WEIGHT: 290 LBS | DIASTOLIC BLOOD PRESSURE: 84 MMHG | HEART RATE: 82 BPM | HEIGHT: 73 IN | SYSTOLIC BLOOD PRESSURE: 145 MMHG

## 2021-10-12 DIAGNOSIS — M17.0 PRIMARY OSTEOARTHRITIS OF BOTH KNEES: Primary | ICD-10-CM

## 2021-10-12 PROCEDURE — 20610 DRAIN/INJ JOINT/BURSA W/O US: CPT | Performed by: PHYSICIAN ASSISTANT

## 2021-10-12 PROCEDURE — 99024 POSTOP FOLLOW-UP VISIT: CPT | Performed by: PHYSICIAN ASSISTANT

## 2021-10-12 NOTE — PROGRESS NOTES
tingling radiating pains down the leg denies any fall trauma or injury. He notes mostly pain in the anterior aspect of the knees but after periods of standing or walking the pain radiates around the entire knee. 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 So he does a lot of stooping bending crouching kneeling as he is trying to get in and out of these vehicles. Notes a lot of difficulty with these activities as well as stairs long periods of standing and walking as well as transitions. Is hopeful to ascertain what is going on in regards to his knees and figure out what can be done to help reduce irritation and provide some relief. Medical History  Current Medications:   Prior to Admission medications    Medication Sig Start Date End Date Taking?  Authorizing Provider   Diclofenac Sodium POWD 2 g by Does not apply route 4 times daily Formula #8E Baclo 2% Diclo 3% DMSO 5% Denisse 6% Lido 2% Prilo 2% 9/14/21   Felix Cuellar MD   amLODIPine (NORVASC) 5 MG tablet TAKE 1 TABLET EVERY DAY 9/13/21   MEENU Earl   pravastatin (PRAVACHOL) 80 MG tablet TAKE 1 TABLET EVERY DAY FOR CHOLESTEROL 9/7/21   MEENU eHck CNP   furosemide (LASIX) 20 MG tablet TAKE 1 TABLET EVERY DAY 8/13/21   MEENU Earl   metFORMIN (GLUCOPHAGE-XR) 500 MG extended release tablet TAKE 4 TABLETS EVERY DAY WITH BREAKFAST 8/12/21   MEENU Heck CNP   lisinopril (PRINIVIL;ZESTRIL) 20 MG tablet TAKE 1 TABLET TWICE DAILY 7/26/21   MEENU Long   allopurinol (ZYLOPRIM) 300 MG tablet TAKE 1 TABLET EVERY DAY 7/23/21   Oliver Richmond MD   vitamin D (ERGOCALCIFEROL) 1.25 MG (20308 UT) CAPS capsule Take 1 capsule by mouth once a week 5/25/21   MEENU Long   furosemide (LASIX) 40 MG tablet Take 1 tablet by mouth daily 5/10/21   MEENU Earl   warfarin (COUMADIN) 10 MG tablet TAKE 1 TABLET DAILY EXCEPT TAKE 1 AND 1/2 TABLETS (15 MG) ON TUESDAY, FRIDAY AND SUNDAY 4/2/21   MEENU Ceron - CNP   glucose monitoring kit (FREESTYLE) monitoring kit Ok to dispense what is covered by insurance. 7/9/20   Shahana Tran MD   blood glucose monitor strips Test 2 times a day & as needed for symptoms of irregular blood glucose. Dispense sufficient amount for indicated testing frequency plus additional to accommodate PRN testing needs. Ok to dispense what is covered by insurance 7/4/20   Shahana Tran MD   ACCU-CHEK COMPACT PLUS strip USE TO TEST 2 TIMES DAILY 6/23/20   Shahana Tran MD   Lancets MISC BID testing 2/24/17   Shahana Tran MD   sotalol (BETAPACE) 80 MG tablet Take 80 mg by mouth 2 times daily. Historical Provider, MD   aspirin 81 MG chewable tablet Take 81 mg by mouth daily. Historical Provider, MD     Allergies: No Known Allergies    Review of Systems:     Review of Systems ______  Constitutional: Negative for fever and diaphoresis. ____________  Respiratory: Negative for shortness of breath.  ________  Gastrointestinal: Negative for abdominal pain. ________  Musculoskeletal: Positive for joint pain. ____  Skin: Negative for itching. ____  Neurological: Negative for loss of consciousness.  ______________  All other systems reviewed and negative     Past Medical History:   Diagnosis Date    Acute congestive heart failure ([facility] 12/30/2019    Allergic rhinitis 7/11/2016    Atrial fibrillation ([facility]     under care of cardiology:Dr. Geofm Maze Behrens([facility])    CKD (chronic kidney disease)     Nephro:Dr. Parker:stage 3    Class 2 obesity due to excess calories without serious comorbidity with body mass index (BMI) of 37.0 to 37.9 in adult 11/7/2017    Controlled type 2 diabetes mellitus without complication, without long-term current use of insulin ([facility] 2/24/2017    COPD     Gout     Hyperlipidemia, mixed 07/11/2016    Hypertension     under care of cardiology:Dr. Miguel Richards Behrens([facility])    Long term current use of anticoagulants with INR goal of 2.0-3.0     under care of cardiology:Dr. Scott Behrens([facility])    Obstructive sleep apnea syndrome 2019    per pulmo    Parkinson disease [facility])     2016:Per neurologist dx is tremor & not consistent with Parkison's:Dr. Cindy Elliott Prediabetes 10/28/2016    Primary insomnia 2017    Primary osteoarthritis of both knees 2021    Sleep apnea     CPAP    Stage 2 chronic kidney disease      Family History   Problem Relation Age of Onset    Asthma Mother     Cancer Father     No Known Problems Sister     No Known Problems Brother     No Known Problems Maternal Grandmother     No Known Problems Maternal Grandfather     No Known Problems Paternal Grandmother     No Known Problems Paternal Grandfather      Social History     Socioeconomic History    Marital status:      Spouse name: Not on file    Number of children: Not on file    Years of education: Not on file    Highest education level: Not on file   Occupational History    Occupation: Retired:(PT car prep)   Tobacco Use    Smoking status: Former Smoker     Packs/day: 1.00     Years: 30.00     Pack years: 30.00     Types: Cigarettes     Quit date: 2019     Years since quittin.3    Smokeless tobacco: Never Used    Tobacco comment:     Vaping Use    Vaping Use: Never used   Substance and Sexual Activity    Alcohol use: Yes     Comment: rarely    Drug use: No    Sexual activity: Yes     Comment: -6 children    Other Topics Concern    Not on file   Social History Narrative    Not on file     Social Determinants of Health     Financial Resource Strain:     Difficulty of Paying Living Expenses:    Food Insecurity:     Worried About Running Out of Food in the Last Year:     [address] in the Last Year:    Transportation Needs:     Lack of Transportation (Medical):      Lack of Transportation (Non-Medical):    Physical Activity:     Days of Exercise per Week:     Minutes of Exercise per Session:    Stress:     Feeling of Stress :    Social Connections:     Frequency of Communication with Friends and Family:     Frequency of Social Gatherings with Friends and Family:     Attends [employer] Services:     Active Member of Clubs or Organizations:     Attends Club or Organization Meetings:     Marital Status:    Intimate Partner Violence:     Fear of Current or Ex-Partner:     Emotionally Abused:     Physically Abused:     Sexually Abused:          Physical Exam __  Constitutional: She is oriented to person, place, and time and well-developed, well-nourished, and in no distress. No distress. ____  HENT:   Head: Normocephalic and atraumatic. ____  Eyes: Conjunctivae are normal. ________  Cardiovascular: Intact distal pulses. ____  Pulmonary/Chest: Effort normal. ________________________  Neurological: She is alert and oriented to person, place, and time. ____  Skin: Skin is dry. She is not diaphoretic. ____  Psychiatric: Mood, affect and judgment normal. ______          Assessment   Vital Signs:      Vitals:    10/12/21 0835   BP: (!) 145/84   Pulse: 82       bilateral Knee shows evidence for DJD with no obvious pseudolaxity, pain with weight bearing, antalgic gait and palpable osteophytes. Inspection: Moderate anterior swelling worse on the right. Swelling is present with moderate effusion on the right and mild on the left. The posterior aspect of the knee appears to be full with tenderness. There is no erythema, rash, or ecchymosis. Range of Motion:  Right 0 to 120 degrees left 0 to 120 degrees     Pain with varus and valgus testing    There is no noted varus or valgus deformity    Strength:  Hamstrings rated: 4/5. Quadriceps rated: 5/5    Palpation: There is severe tenderness along the lateral patellofemoral joint line bilaterally.   Moderate tenderness to medial lateral joint lines bilaterally    Special Tests: Patellar Compression test is positive. Valgus & Varus test is positive. Skin: There are no rashes, ulcerations or lesions. Gait: Gait pattern is antalgic  Skin shows no rashes/ecchymosis to the affected area, no hyperesthesias, no discoloration, no temperature or color discrepancies. NEUROLOGICALLY: There is no evidence for sensory or motor deficits in the extremity. Coordination appears full with no spacticity or rigidity. Reflexes appear to be symmetric. Distal circulation intact. No signs of RSD. Additional Comments: Hip range of motion intact laterally        Procedures  Injection Procedure Note  Procedure Details     Verbal consent was obtained for the procedure. The right knee(s) was prepped with iodine and the skin was anesthetized. Using a 22 gauge needle the right knee(s) joint is injected with 2 ml Orthovisc viscosupplementation under the lateral aspect of the knee. The injection site was cleansed with topical isopropyl alcohol and a dressing was applied. Complications:  None; patient tolerated the procedure well. Diagnostic Test Findings:   Xray   Have reviewed the xrays above from 6/29/2021  4 view x-ray of the right knee AP, lateral, sunrise and tunnel views were performed on 6/29/2021 and reviewed today revealing moderate medial and lateral compartment osteoarthritic bone spurring and joint space decrease with equal wear no evidence of acute fracture dislocation severe patellar arthritic changes especially laterally and significant joint space decrease the lateral patellofemoral compartment. 4 view x-ray of the left knee AP, lateral, sunrise and tunnel views were performed on 6/29/2021 and reviewed today revealing moderate medial and lateral compartment osteoarthritic bone spurring and joint space decrease with equal wear no evidence of acute fracture dislocation.   Severe patellar arthritic changes especially laterally with significant joint space decrease in the lateral patellofemoral compartment. Assessment and Plan:       Diagnosis Orders   1. Primary osteoarthritis of both knees  OR ARTHROCENTESIS ASPIR&/INJ MAJOR JT/BURSA W/O US    hyaluronan (ORTHOVISC) 30 MG/2ML injection 30 mg              The orders below, if any, were placed during this visit:   No orders of the defined types were placed in this encounter. Treatment Plan:   -Discussed with patient that most of the patient's pain and irritation is coming from the patellofemoral compartment of his knees and that if we can help to reduce irritation increase the strength across anterior aspect of the knees that will help significantly.    -Advised patient to continue with physical therapy for evaluation and treatment of bilateral knees with focus to patellofemoral compartment with utilization of BFR training.    -Due to the patient's cardiac status cannot move forward with anti-inflammatories at present time. -Gave injection of Orthovisc viscosupplementation at today's visit to the right knee no complaints or concerns arisen.  -Advised patient to rest ice and elevate the knee for next 24 to 48 hours take it easy avoid heavy or strenuous activities and can return to activities as tolerated. -Advised for follow-up in 1 week for repeat injection of Orthovisc. MRI also ordered to evalaute for additional areas of stress reaction or mechanical reasons for the pain. Started on Ultram intermittently for some of the pain issues to see if this can allow more active therapies on his own.               LUIS F Garcia

## 2021-10-12 NOTE — PROGRESS NOTES
Administrations This Visit     hyaluronan (ORTHOVISC) 30 MG/2ML injection 30 mg     Admin Date  10/12/2021  09:45 Action  Given Dose  30 mg Route  Intra-articular Site  Knee Right Administered By  Jake Rutherford    Ordering Provider: LUIS F Ash    NDC: 77743-955-54    Lot#: [id number]    : [address]    Patient Supplied?: No

## 2021-10-12 NOTE — LETTER
[address] [place] [address]  Kirsten Chiang [address] [facility] [address]  Phone: [phone]  Fax: [phone]    Paulina Randle [place]        October 12, 2021     Patient: Jenniffer Stephenson   YOB: 1950   Date of Visit: 10/12/2021       To Whom It May Concern: It is my medical opinion that Tiburcio Patel may return to work on 11/03/21. If you have any questions or concerns, please don't hesitate to call.     Sincerely,      MD Paulina Linder PA

## 2021-10-13 ENCOUNTER — ANTI-COAG VISIT (OUTPATIENT)
Dept: PHARMACY | Age: 71
End: 2021-10-13
Payer: MEDICARE

## 2021-10-13 DIAGNOSIS — I48.3 TYPICAL ATRIAL FLUTTER (HCC): Primary | ICD-10-CM

## 2021-10-13 LAB — INTERNATIONAL NORMALIZATION RATIO, POC: 2.1

## 2021-10-13 PROCEDURE — 99211 OFF/OP EST MAY X REQ PHY/QHP: CPT

## 2021-10-13 PROCEDURE — 85610 PROTHROMBIN TIME: CPT

## 2021-10-13 NOTE — PROGRESS NOTES
Mr. Jamie Regan is a 80 y.o. y/o male with history of Atrial Fibrillation who presents today for anticoagulation monitoring and adjustment. Pt is retired and works part time at [employer]. Patient reports he has been on warfarin for almost 30 years now and was managed by his cardiologist who recently retired. Pt was then managed by Dr. Maco Dunne prior to being established in Clinic  Pertinent PMH: HTN, DM, HLD, COPD, CHF    Patient Reported Findings:  Yes     No  [x]   []       Patient verifies current dosing regimen as listed  []   [x]       S/S bleeding/bruising/swelling/SOB -denies    []   [x]       Blood in urine or stool- denies  []   [x]       Procedures scheduled in the future at this time -   []   [x]       Missed Dose-denies   []   [x]       Extra Dose - denies  []   [x]       Change in medications-  tylenol prn---> inc lasix --> started vit d weekly---> some tylenol --> prescribed diclofenac gel and tramadol (prn, 7 daily supply prescribed), has not picked up yet, picking up today --> never picked up diclofenac gel or tramadol d/t cost, euflexxa injection on 10/5 (second shot is not scheduled yet, will be a total of three shots) per pt does not have to hold warf for these injections --> euflexxa injection 10/5, 10/12, 10/19 is the plan for 3/3 doses  []   [x]       Change in health/diet/appetite - reports he eats about 2 meals/day. And states he will have salads about 1-2x/wk. (not a big fan of spinach or brocolli but may have other greens)--> Patient states 3 salads in past 2 weeks---> ate salad once since last visit---> no greens, wants to add green beans in twice weekly, no NVD --> returned to vit k a few times a week (green beans and salad) --> no changes, no NVD---> less greens recently, no NVD---> denies changes, but has not had vit k in a while (only eats salads that have vit k)---> no changes, no NVD --> some greens, no more than normal, no NVD --> diarrhea resolved.  Had small salad last week, no other vit k --> no changes, no NVD  []   [x]       Change in alcohol use - reports occasionally drinking beer (once every few months) but happens very infrequently. --> Patient reports no alcohol in past two weeks---> denies   []   [x]       Change in activity  []   [x]       Hospital admission  []   [x]       Emergency department visit  []   [x]       Other complaints     Clinical Outcomes:  Yes     No  []   [x]       Major bleeding event  []   [x]       Thromboembolic event    Duration of warfarin Therapy: Indefinite   INR Range:  2.0-3.0     INR today is 2.1   Eulfexxa injection 2/3 on 10/5, 10/12 and last dose on 10/19 does not interact w/ warfarin and per pt does not have to hold warfarin. Continue weekly dose to 15 mg Wed and Fri and 10 mg all other days. Encouraged patient to maintain a consistent diet. INR in range for 3 appt can push out to 4 weeks.    Recheck INR again in 4 weeks,     Referring physician is Dr. Penni Halsted  INR (no units)   Date Value   10/13/2021 2.1   09/29/2021 2.4   09/15/2021 2.3   08/30/2021 3.8   08/19/2021 8.44 (HH)   05/05/2020 2.17 (H)   04/14/2020 2.43 (H)   03/24/2020 1.45 (H)     For Pharmacy Admin Tracking Only     Total # of Interventions Recommended: 0   Total # of Interventions Accepted: 0   Time Spent (min): 15

## 2021-10-15 ENCOUNTER — TELEPHONE (OUTPATIENT)
Dept: ORTHOPEDIC SURGERY | Age: 71
End: 2021-10-15

## 2021-10-20 RX ORDER — ALLOPURINOL 300 MG/1
TABLET ORAL
Qty: 90 TABLET | Refills: 0 | Status: SHIPPED | OUTPATIENT
Start: 2021-10-20 | End: 2022-01-17

## 2021-10-20 RX ORDER — FUROSEMIDE 20 MG/1
TABLET ORAL
Qty: 90 TABLET | Refills: 0 | Status: SHIPPED | OUTPATIENT
Start: 2021-10-20 | End: 2021-11-10

## 2021-10-20 NOTE — TELEPHONE ENCOUNTER
Medication:   Requested Prescriptions     Pending Prescriptions Disp Refills    allopurinol (ZYLOPRIM) 300 MG tablet [Pharmacy Med Name: ALLOPURINOL 300 MG Tablet] 90 tablet 0     Sig: TAKE 1 TABLET EVERY DAY        Last Filled:      Patient Phone Number: [phone] (home)     Last appt: 4/29/2021   Next appt: Visit date not found    Last OARRS: No flowsheet data found.

## 2021-10-20 NOTE — TELEPHONE ENCOUNTER
Medication Refill    Medication needing refilled:furosemide (LASIX) 20 MG     Dosage of the medication:     How are you taking this medication (QD, BID, TID, QID, PRN): increased to 2 tabs daily    30 or 90 day supply called in: 90 day supply    When will you run out of your medication:    Which Pharmacy are we sending the medication to?: [facility] [phone] - F [phone]   [address] [facility]   Phone:  [phone]  Fax:  [phone]

## 2021-10-20 NOTE — TELEPHONE ENCOUNTER
Received refill request for furosemide from [facility] mail order pharmacy.     Last ov: 5/10/2021 NPRG    Last labs:bmp, bnp 5/24/2021    Last Refill:8/13/2021 #90 with 0 refills    Next appointment:11/10/2021 NPRG    NPRG is OOT

## 2021-10-27 ENCOUNTER — NURSE ONLY (OUTPATIENT)
Dept: ORTHOPEDIC SURGERY | Age: 71
End: 2021-10-27
Payer: MEDICARE

## 2021-10-27 ENCOUNTER — TELEPHONE (OUTPATIENT)
Dept: ORTHOPEDIC SURGERY | Age: 71
End: 2021-10-27

## 2021-10-27 VITALS
HEART RATE: 86 BPM | DIASTOLIC BLOOD PRESSURE: 89 MMHG | BODY MASS INDEX: 38.43 KG/M2 | HEIGHT: 73 IN | SYSTOLIC BLOOD PRESSURE: 154 MMHG | WEIGHT: 290 LBS

## 2021-10-27 DIAGNOSIS — M17.0 PRIMARY OSTEOARTHRITIS OF BOTH KNEES: Primary | ICD-10-CM

## 2021-10-27 PROCEDURE — 20610 DRAIN/INJ JOINT/BURSA W/O US: CPT | Performed by: PHYSICIAN ASSISTANT

## 2021-10-27 NOTE — PROGRESS NOTES
Patient Name: Papa Thomas  : 1950  DOS: 10/27/2021        Chief Complaint:   Chief Complaint   Patient presents with    Knee Pain     RT KNEE-ORTHOVISC #3         History of Present Illness:  Papa Thomas is a 80 y.o. male who presents with a chief complaint of continued complaints of pain in the knee with irritation with walking, stairs and with overuse. Pain in the knee regularly with swelling and discomfort. Currently: 10/27/2021  Patient is here for right knee osteoarthritis and for his final Orthovisc injection of the 3 part series. He notes no significant improvements overall in regards to his pain he rates his pain level is 7-8 out of 10 so he does note some mild improvements but still has swelling difficulty with ambulating and difficulty with standing transitions and periods of walking and sitting. Notes increasing difficulty with getting in and out of the vehicle. He is wondering what else can be and should be done to help pursue the cause of this persistent pain. Denies any fall trauma or injury is still utilizing cane to help with ambulation. Previously:  pain in the bilateral knees is severe and worsening. Prior injection with only 3 days of relief in pain. Not taking any pain medications for the pain. Previously 10/12/2021  Patient is here for follow-up regarding right knee osteoarthritis and pain. He is still noting pain levels as an 8-9 out of 10. He is here for the second Orthovisc injection hopeful for continued improvement with continued utilization of these injections. Still utilizing cane to help with ambulation denies any fall trauma or injury. Previously: 10/5/21  Patient is here for follow up regarding right knee osteoarthritis. Patient notes his pain levels well 8 or 9 out of 10 at today's visit he is hopeful about the Orthovisc injections that he was approved for today's visit will be helpful in improving his overall symptoms.   Patient denies any fall trauma or injury utilizing cane to help with ambulation. Denies numbness burning tingling radiating pains down the leg. Previously: 9/14/21  Increase in pain over the past several years time. Patient is here for evaluation regarding bilateral knee pain that has been ongoing for several years. He notes his overall pain level is an 8 out of 10 with the right knee being worse than the left. He notes that the knee is buckling give out on him on occasion with right doing it more than the left. Denies utilization of any braces or assistive walking devices at present time. He has not pursued physical therapy at present time. He denies numbness burning tingling radiating pains down the leg denies any fall trauma or injury. He notes mostly pain in the anterior aspect of the knees but after periods of standing or walking the pain radiates around the entire knee. 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 So he does a lot of stooping bending crouching kneeling as he is trying to get in and out of these vehicles. Notes a lot of difficulty with these activities as well as stairs long periods of standing and walking as well as transitions. Is hopeful to ascertain what is going on in regards to his knees and figure out what can be done to help reduce irritation and provide some relief. Medical History  Current Medications:   Prior to Admission medications    Medication Sig Start Date End Date Taking? Authorizing Provider   lisinopril (PRINIVIL;ZESTRIL) 20 MG tablet TAKE 1 TABLET TWICE DAILY 10/20/21   MEENU Wild CNP   allopurinol (ZYLOPRIM) 300 MG tablet TAKE 1 TABLET EVERY DAY 10/20/21   MEENU Alexandra   furosemide (LASIX) 20 MG tablet TAKE 1 TABLET EVERY DAY 10/20/21   MEENU Wild CNP   warfarin (COUMADIN) 10 MG tablet Take 1.5 tablets by mouth Twice a Week AND 1 tablet Five times weekly.  TAKE 1 TABLET DAILY EXCEPT TAKE 1 AND 1/2 TABLETS (15 MG) ON WEDNESDAY AND FRIDAY. 10/14/21 1/12/22  Manpreet Vargas MD   Diclofenac Sodium POWD 2 g by Does not apply route 4 times daily Formula #8E Baclo 2% Diclo 3% DMSO 5% Denisse 6% Lido 2% Prilo 2% 9/14/21   Alexandrea Walker MD   amLODIPine (NORVASC) 5 MG tablet TAKE 1 TABLET EVERY DAY 9/13/21   MEENU Aranda - CNS   pravastatin (PRAVACHOL) 80 MG tablet TAKE 1 TABLET EVERY DAY FOR CHOLESTEROL 9/7/21   MEENU Browning - CNP   metFORMIN (GLUCOPHAGE-XR) 500 MG extended release tablet TAKE 4 TABLETS EVERY DAY WITH BREAKFAST 8/12/21   MEENU Browning - CNP   vitamin D (ERGOCALCIFEROL) 1.25 MG (35682 UT) CAPS capsule Take 1 capsule by mouth once a week 5/25/21   LindMEENU Almanzar - CNS   furosemide (LASIX) 40 MG tablet Take 1 tablet by mouth daily 5/10/21   MEENU Aranda - CNS   glucose monitoring kit (FREESTYLE) monitoring kit Ok to dispense what is covered by insurance. 7/9/20   Ronald Russell MD   blood glucose monitor strips Test 2 times a day & as needed for symptoms of irregular blood glucose. Dispense sufficient amount for indicated testing frequency plus additional to accommodate PRN testing needs. Ok to dispense what is covered by insurance 7/4/20   Ronald Russell MD   ACCU-CHEK COMPACT PLUS strip USE TO TEST 2 TIMES DAILY 6/23/20   Ronald Russell MD   Lancets MISC BID testing 2/24/17   Ronadl Russell MD   sotalol (BETAPACE) 80 MG tablet Take 80 mg by mouth 2 times daily. Historical Provider, MD   aspirin 81 MG chewable tablet Take 81 mg by mouth daily. Historical Provider, MD     Allergies: No Known Allergies    Review of Systems:     Review of Systems ______  Constitutional: Negative for fever and diaphoresis. ____________  Respiratory: Negative for shortness of breath.  ________  Gastrointestinal: Negative for abdominal pain. ________  Musculoskeletal: Positive for joint pain. ____  Skin: Negative for itching. ____  Neurological: Negative for loss of consciousness.  ______________  All other systems reviewed and negative     Past Medical History:   Diagnosis Date    Acute congestive heart failure ([facility] 12/30/2019    Allergic rhinitis 7/11/2016    Atrial fibrillation ([facility]     under care of cardiology:Dr. Tasia Gate Behrens([facility])    CKD (chronic kidney disease)     Nephro:Dr. Parker:stage 3    Class 2 obesity due to excess calories without serious comorbidity with body mass index (BMI) of 37.0 to 37.9 in adult 11/7/2017    Controlled type 2 diabetes mellitus without complication, without long-term current use of insulin ([facility] 2/24/2017    COPD     Gout     Hyperlipidemia, mixed 07/11/2016    Hypertension     under care of cardiology:Dr. Tasia Gate Behrens([facility])    Long term current use of anticoagulants with INR goal of 2.0-3.0     under care of cardiology:Dr. Scott Behrens([facility])    Obstructive sleep apnea syndrome 06/18/2019    per pulmo    Parkinson disease [facility])     9/2016:Per neurologist dx is tremor & not consistent with Parkison's:Dr. Margaret Baker Prediabetes 10/28/2016    Primary insomnia 1/25/2017    Primary osteoarthritis of both knees 9/14/2021    Sleep apnea     CPAP    Stage 2 chronic kidney disease      Family History   Problem Relation Age of Onset    Asthma Mother     Cancer Father     No Known Problems Sister     No Known Problems Brother     No Known Problems Maternal Grandmother     No Known Problems Maternal Grandfather     No Known Problems Paternal Grandmother     No Known Problems Paternal Grandfather      Social History     Socioeconomic History    Marital status:      Spouse name: Not on file    Number of children: Not on file    Years of education: Not on file    Highest education level: Not on file   Occupational History    Occupation: Retired:(PT car prep)   Tobacco Use    Smoking status: Former Smoker     Packs/day: 1.00     Years: 30.00     Pack years: 30. 00     Types: Cigarettes     Quit date: 2019     Years since quittin.3    Smokeless tobacco: Never Used    Tobacco comment:     Vaping Use    Vaping Use: Never used   Substance and Sexual Activity    Alcohol use: Yes     Comment: rarely    Drug use: No    Sexual activity: Yes     Comment: -6 children    Other Topics Concern    Not on file   Social History Narrative    Not on file     Social Determinants of Health     Financial Resource Strain:     Difficulty of Paying Living Expenses:    Food Insecurity:     Worried About Running Out of Food in the Last Year:     Ran Out of Food in the Last Year:    Transportation Needs:     Lack of Transportation (Medical):  Lack of Transportation (Non-Medical):    Physical Activity:     Days of Exercise per Week:     Minutes of Exercise per Session:    Stress:     Feeling of Stress :    Social Connections:     Frequency of Communication with Friends and Family:     Frequency of Social Gatherings with Friends and Family:     Attends Buddhist Services:     Active Member of Clubs or Organizations:     Attends Club or Organization Meetings:     Marital Status:    Intimate Partner Violence:     Fear of Current or Ex-Partner:     Emotionally Abused:     Physically Abused:     Sexually Abused:          Physical Exam __  Constitutional: She is oriented to person, place, and time and well-developed, well-nourished, and in no distress. No distress. ____  HENT:   Head: Normocephalic and atraumatic. ____  Eyes: Conjunctivae are normal. ________  Cardiovascular: Intact distal pulses. ____  Pulmonary/Chest: Effort normal. ________________________  Neurological: She is alert and oriented to person, place, and time. ____  Skin: Skin is dry.  She is not diaphoretic. ____  Psychiatric: Mood, affect and judgment normal. ______          Assessment   Vital Signs:      Vitals:    10/27/21 0939   BP: (!) 154/89   Pulse: 86       bilateral Knee shows evidence for DJD with no obvious pseudolaxity, pain with weight bearing, antalgic gait and palpable osteophytes. Inspection: Moderate anterior swelling worse on the right. Swelling is present with moderate effusion on the right and mild on the left. The posterior aspect of the knee appears to be full with tenderness. There is no erythema, rash, or ecchymosis. Range of Motion:  Right 0 to 120 degrees left 0 to 120 degrees     Pain with varus and valgus testing    There is no noted varus or valgus deformity    Strength:  Hamstrings rated: 4/5. Quadriceps rated: 5/5    Palpation: There is severe tenderness along the lateral patellofemoral joint line bilaterally. Moderate tenderness to medial lateral joint lines bilaterally    Special Tests: Patellar Compression test is positive. Valgus & Varus test is positive. Skin: There are no rashes, ulcerations or lesions. Gait: Gait pattern is antalgic  Skin shows no rashes/ecchymosis to the affected area, no hyperesthesias, no discoloration, no temperature or color discrepancies. NEUROLOGICALLY: There is no evidence for sensory or motor deficits in the extremity. Coordination appears full with no spacticity or rigidity. Reflexes appear to be symmetric. Distal circulation intact. No signs of RSD. Additional Comments: Hip range of motion intact laterally        Procedures  Injection Procedure Note  Procedure Details     Verbal consent was obtained for the procedure. The right knee(s) was prepped with iodine and the skin was anesthetized. Using a 22 gauge needle the right knee(s) joint is injected with 2 ml Orthovisc viscosupplementation under the lateral aspect of the knee. The injection site was cleansed with topical isopropyl alcohol and a dressing was applied. Complications:  None; patient tolerated the procedure well.         Diagnostic Test Findings:   Xray   Have reviewed the xrays above from 6/29/2021  4 view x-ray of the right knee AP, lateral, sunrise and tunnel views were performed on 6/29/2021 and reviewed today revealing moderate medial and lateral compartment osteoarthritic bone spurring and joint space decrease with equal wear no evidence of acute fracture dislocation severe patellar arthritic changes especially laterally and significant joint space decrease the lateral patellofemoral compartment. 4 view x-ray of the left knee AP, lateral, sunrise and tunnel views were performed on 6/29/2021 and reviewed today revealing moderate medial and lateral compartment osteoarthritic bone spurring and joint space decrease with equal wear no evidence of acute fracture dislocation. Severe patellar arthritic changes especially laterally with significant joint space decrease in the lateral patellofemoral compartment. Assessment and Plan:       Diagnosis Orders   1.  Primary osteoarthritis of both knees  IA ARTHROCENTESIS ASPIR&/INJ MAJOR JT/BURSA W/O US    hyaluronan (ORTHOVISC) 30 MG/2ML injection 30 mg    CT KNEE RIGHT WO CONTRAST              The orders below, if any, were placed during this visit:   Orders Placed This Encounter   Procedures    CT KNEE RIGHT WO CONTRAST     Standing Status:   Future     Standing Expiration Date:   10/27/2022     Scheduling Instructions:      [facility]     Order Specific Question:   Reason for exam:     Answer:   rule out fracture unable to get MRI due to pacemaker leads    IA ARTHROCENTESIS ASPIR&/INJ MAJOR JT/BURSA W/O US              Treatment Plan:   -Discussed with patient that most of the patient's pain and irritation is coming from the patellofemoral compartment of his knees and that if we can help to reduce irritation increase the strength across anterior aspect of the knees that will help significantly.    -Advised patient to continue with physical therapy for evaluation and treatment of bilateral knees with focus to patellofemoral compartment with utilization of BFR training.    -Due to the patient's cardiac status cannot move forward with anti-inflammatories at present time.    -Discussed potential pursuance of MRI however with patient has a old lead from her previous pacemaker that is not MRI compatible so he was not able to pursue an MRI so we will order CT scan of the right knee to evaluate for fracture of the knee. -Gave injection of Orthovisc viscosupplementation at today's visit to the right knee no complaints or concerns arisen.  -Advised patient to rest ice and elevate the knee for next 24 to 48 hours take it easy avoid heavy or strenuous activities and can return to activities as tolerated. -Advised for follow-up after CT scan to discuss results further treatment management options.    -Due to limited improvement in patient's pain and overall function especially with his ability to transition from seated to standing position and standing to seated position would recommend maintaining off work status until 11/17/2021 until further evaluation can be performed and ensure patient's overall safety and to avoid further aggravation of patient's condition. Started on Ultram intermittently for some of the pain issues to see if this can allow more active therapies on his own.               LUIS F Russell

## 2021-11-01 RX ORDER — METFORMIN HYDROCHLORIDE 500 MG/1
TABLET, EXTENDED RELEASE ORAL
Qty: 360 TABLET | Refills: 0 | Status: SHIPPED | OUTPATIENT
Start: 2021-11-01 | End: 2022-01-17

## 2021-11-01 NOTE — TELEPHONE ENCOUNTER
Medication:   Requested Prescriptions     Pending Prescriptions Disp Refills    metFORMIN (GLUCOPHAGE-XR) 500 MG extended release tablet [Pharmacy Med Name: Desean Mercado  MG Tablet Extended Release 24 Hour] 360 tablet 0     Sig: TAKE 4 TABLETS EVERY DAY WITH BREAKFAST        Last Filled:  4/29/21    Patient Phone Number: [phone] (home)     Last appt:   Next appt: Visit date not found    Last OARRS: No flowsheet data found.

## 2021-11-02 ENCOUNTER — TELEPHONE (OUTPATIENT)
Dept: ORTHOPEDIC SURGERY | Age: 71
End: 2021-11-02

## 2021-11-05 ENCOUNTER — HOSPITAL ENCOUNTER (OUTPATIENT)
Dept: CT IMAGING | Age: 71
Discharge: HOME | End: 2021-11-05
Payer: MEDICARE

## 2021-11-05 DIAGNOSIS — M17.0 PRIMARY OSTEOARTHRITIS OF BOTH KNEES: ICD-10-CM

## 2021-11-05 PROCEDURE — 73700 CT LOWER EXTREMITY W/O DYE: CPT

## 2021-11-06 NOTE — PROGRESS NOTES
[address]   Electrophysiology  MEENU Randall-DANIELA  Attending EP: Dr. Alicja Underwood    Date: 12/6/2021  I had the privilege of visiting James Michael in the office. Chief Complaint:   Chief Complaint   Patient presents with    6 Month Follow-Up    Atrial Flutter     History of Present Illness: History obtained from patient and medical record. James Michael is 80 y.o. male with a past medical history of HTN, ROSETTA, CHF, CKD, and aortic stenosis s/p AVR (1996). In December of 2019, he was admitted for CHF exacerbation and found to be in atrial flutter. S/p DCCV (1/2/20). S/p CTI dependent atrial flutter ablation with Biv AICD upgrade with addition of LV lead (3/23/20). -Interval history: Today, James Michael is being seen for routine follow up. He is doing well. He declined EKG today (recent EKG on 12/2 shows sinus rhythm. His device interrogation shows normal function. No significant arrhythmia. He is off coumadin for knee surgery on Friday. His BP is elevated, 159/95. He states it is elevated due to his knee pain and the long walk in from the parking lot. When he checks it at home, it runs in the \"130s\". His weight is stable. He does admit to intermittent leg swelling (R>L). He is compliant with his CPAP and tolerates it well. Denies having chest pain, palpitations, shortness of breath, orthopnea/PND, cough, or dizziness at the time of this visit. With regard to medication therapy the patient has been compliant with prescribed regimen. They have tolerated therapy to date. Allergies:  No Known Allergies    Home Medications:  Prior to Visit Medications    Medication Sig Taking?  Authorizing Provider   furosemide (LASIX) 40 MG tablet Take 1 tablet by mouth daily Yes Zoraida Eisenberg APRN - CNS   vitamin D3 (CHOLECALCIFEROL) 25 MCG (1000 UT) TABS tablet Take 1 tablet by mouth daily Yes Zoraida Eisenberg APRN - CNS   metFORMIN (GLUCOPHAGE-XR) 500 MG extended release tablet TAKE 4 TABLETS EVERY DAY WITH BREAKFAST Yes MEENU Rogers   lisinopril (PRINIVIL;ZESTRIL) 20 MG tablet TAKE 1 TABLET TWICE DAILY Yes Ele Phipps APRN - CNP   allopurinol (ZYLOPRIM) 300 MG tablet TAKE 1 TABLET EVERY DAY Yes MEENU Hernandez   amLODIPine (NORVASC) 5 MG tablet TAKE 1 TABLET EVERY DAY Yes Zoraida Uribe APRN - CNS   pravastatin (PRAVACHOL) 80 MG tablet TAKE 1 TABLET EVERY DAY FOR CHOLESTEROL Yes Shante Alston APRN - CNP   glucose monitoring kit (FREESTYLE) monitoring kit Ok to dispense what is covered by insurance. Yes Alli Peters MD   blood glucose monitor strips Test 2 times a day & as needed for symptoms of irregular blood glucose. Dispense sufficient amount for indicated testing frequency plus additional to accommodate PRN testing needs. Ok to dispense what is covered by insurance Yes Alli Peters MD   ACCU-CHEK COMPACT PLUS strip USE TO TEST 2 TIMES DAILY Yes Benny Salas MD   Lancets MISC BID testing Yes Alli Peters MD   sotalol (BETAPACE) 80 MG tablet Take 80 mg by mouth 2 times daily. Yes Historical Provider, MD   aspirin 81 MG chewable tablet Take 81 mg by mouth daily. Yes Historical Provider, MD   warfarin (COUMADIN) 10 MG tablet Take 1.5 tablets by mouth Twice a Week AND 1 tablet Five times weekly. TAKE 1 TABLET DAILY EXCEPT TAKE 1 AND 1/2 TABLETS (15 MG) ON WEDNESDAY AND FRIDAY.   Patient not taking: Reported on 12/6/2021  Eden Bernard MD      Past Medical History:  Past Medical History:   Diagnosis Date    Acute congestive heart failure ([facility] [address] 12/30/2019    Allergic rhinitis 7/11/2016    Atrial fibrillation ([facility] [address]     under care of cardiology:Dr. Kerin Amass Behrens([facility])    CKD (chronic kidney disease)     Nephro:Dr. Parker:stage 3    Class 2 obesity due to excess calories without serious comorbidity with body mass index (BMI) of 37.0 to 37.9 in adult 11/7/2017    Controlled type 2 diabetes mellitus without complication, without long-term current use of hematuria  · Musculoskeletal: Positive for right knee pain. Negative for joint swelling, muscle pain, or injuries  · Neurological/Psych: Negative for confusion, seizures, dizziness, headaches, balance issues or TIA-like symptoms. No anxiety, depression, or insomnia    Physical Examination:  Vitals:    12/06/21 1328   BP: (!) 154/86   Pulse: 70   SpO2:       Wt Readings from Last 3 Encounters:   12/06/21 287 lb 6.4 oz (130.4 kg)   12/02/21 294 lb (133.4 kg)   11/16/21 285 lb (129.3 kg)     Constitutional: Cooperative and in no apparent distress, and appears well nourished  Skin: Warm and pink; no pallor, cyanosis, bruising, or clubbing  HEENT: Symmetric and normocephalic. PERRL, EOM intact. + Glasses. Conjunctiva pink with clear sclera. Mucus membranes pink and moist. Teeth intact. Thyroid smooth without nodules or goiter  Respiratory: Respirations symmetric and unlabored. Lungs clear to auscultation bilaterally, no wheezing, rhonchi, or crackles  Cardiovascular:  Regular rate and rhythm. S1/S2 present without murmurs, rubs, or gallops. + Mechanical valve click. Peripheral pulses 2+, capillary refill < 3 seconds. No elevation of JVP. Trace BLE edema (R>L)  Gastrointestinal: Abdomen soft and round. Bowel sounds normoactive in all quadrants without tenderness or masses. + Obese  Musculoskeletal: Bilateral upper and lower extremity strength 5/5 with full ROM. Neurological/Psych: Awake and orientated to person, place and time. Calm affect, appropriate mood.      Pertinent labs, diagnostic, device, and imaging results reviewed as a part of this visit    LABS    CBC:   Lab Results   Component Value Date    WBC 6.7 12/02/2021    HGB 13.3 (L) 12/02/2021    HCT 40.9 12/02/2021    MCV 92.0 12/02/2021     12/02/2021     BMP:   Lab Results   Component Value Date    CREATININE 1.4 (H) 12/02/2021    BUN 25 (H) 12/02/2021     (H) 12/02/2021    K 5.6 (H) 12/02/2021     12/02/2021    CO2 27 12/02/2021     Estimated Creatinine Clearance: 70 mL/min (A) (based on SCr of 1.4 mg/dL (H)). Thyroid:   Lab Results   Component Value Date    TSH 0.27 2021     Lipid Panel:   Lab Results   Component Value Date    CHOL 155 2021    HDL 38 2021    TRIG 138 2021     LFTs:  Lab Results   Component Value Date    ALT 17 2021    AST 21 2021    ALKPHOS 96 2021    BILITOT 0.3 2021     Coags:   Lab Results   Component Value Date    PROTIME 20.7 (H) 2021    INR 1.79 (H) 2021       EC2021 (From 21)  - NSR with RBBB and LAFB    Echo: 19   -Global hypokinesis with EF 40-45%. -Abnormal septal motion.   -The aortic root and the ascending aorta are mildly dilated. -The right ventricle appears to be dilated. -RV systolic function appears to be reduced.   -The right atrium appears to be dilated. -The mechanical artificial aortic valve appears well seated with a maximum   velocity of 2.40 m/s and a mean gradient of 12 mmHg. The aortic valve area   is estimated at 1.19 cm^2. No significant regurgitation noted. -Thickened mitral valve without evidence of stenosis. There is   mild-to-moderate mitral regurgitation.   -There is moderate tricuspid regurgitation with a RVSP estimation of 43 mmHg.   -Pacer / ICD wire is visualized in the right heart.   -Indeterminate diastolic function. Echo:    -Left ventricular cavity size is normal.   -Overall left ventricular systolic function appears mildly reduced.   -Ejection fraction is visually estimated to be 45%. -There is abnormal septal motion/bounce noted. -Indeterminate diastolic function E/e' = 92.0. Trula Blew -A mechanical artificial aortic valve appears well seated with a maximum   velocity of 2.1m/s and a mean gradient of 9mmHg.   -No evidence of aortic valve regurgitation.   -Trivial-mild mitral regurgitation.   -Mild tricuspid regurgitation.   -The right ventricle is normal in size with reduces function.  TAPSE 1.9cm.   RVS velocity is 6.94 cm/s.   -Pacer/ICD right heart    GXT: None    Assessment:    1. Paroxysmal Atrial Fibrillation/flutter              - S/p DCCV (1/2/20)              - S/p CTI dependent atrial flutter ablation (3/23/20)      - Currently in NSR   - Continue sotalol 80 mg BID     ~ QTc 461     - [id number] score: 4 (Age, HTN, CHF, DM) ; [id number] Vasc score and anticoagulation discussed. High risk for stroke and thromboembolism. Anticoagulation is recommended. Risk of bleeding was discussed. ~ Coumadin on hold for knee surgery; instructed to resume when ok with surgeon. No need to bridge per Dr. Adrianna Gonsales      - Afib risk factors including age, HTN, obesity, inactivity and ROSETTA were discussed with patient. Risk factor modification recommended               ~ TSH 0.80 (12/19)                 - Treatment options including cardioversion, rate control strategy, antiarrhythmics, anticoagulation and possible ablation were discussed with patient. Risks, benefits and alternative of each treatment options were explained. All questions answered     2. AV Block   - S/p Dual chamber Medtronic PPM (original in 1996 by Dr. Flory Deng); s/p generator change (2009); S/p Biv upgrade (3/23/20)    - I reviewed device interrogation today. Device functioning normally.   ~ A-paced 22.6% V-paced 2.4  ~ 11.7 years left on battery  - Discussed with patient  - Follow up with device clinic as scheduled    3. Chronic systolic heart failure (NYHA Class II), dilated cardiomyopathy   - Appears compensated               ~ EF 45% per echo (12/20)   - Continue with lisinopril 20 mg QD, lasix 20 mg QD, sotalol 80 mg BID    ~ No aldosterone antagonist due to hyperkalemia   - Aggressive medical therapy with risk factor modification   - Discussed with patient importance of monitoring weight, low sodium diet and fluid restriction     4. ROSETTA   - Stable: Uses CPAP   - Encourage to use CPAP to prevent long term effects of untreated ROSETTA     5.  HTN   - Uncontrolled: Goal <130/80   - Continue current medications   - Encouraged to monitor and log BP readings at home, then bring log to next visit    ~ Instructed to call if consistently >140 at home   - Discussed importance of low sodium diet, weight control and exercise     6. Aortic Stenosis              - S/p [place] mechanical AVR (1996)              - Stable              - On coumadin    Plan:  1. No changes  2. Call office if blood pressure elevated at home. >140/80  3. Resume coumadin as instructed by coumadin clinic following your procedure    F/U: Follow-up with [facility] as scheduled and NPSR in 6 months  -Follow up with device clinic as scheduled  -Call [facility] at [phone] with any questions    Diet & Exercise:   The patient is counseled to follow a low salt diet to assure blood pressure remains controlled for cardiovascular risk factor modification   The patient is counseled to avoid excess caffeine, and energy drinks as this may exacerbated ectopy and arrhythmia   The patient is counseled to lose weight to control cardiovascular risk factors   Exercise program discussed: To improve overall cardiovascular health, the patient is instructed to increase cardiovascular related activities with a goal of 150 min/week of moderate level activity or 10,000 steps per day. Encouraged to perform as much activity as tolerated    I have addressed the patient's cardiac risk factors and adjusted pharmacologic treatment as needed. In addition, I have reinforced the need for patient directed risk factor modification. I independently reviewed the device check interrogation and ECG    All questions and concerns were addressed with the patient. Alternatives to treatment were discussed.      Thank you for allowing to us to participate in the care of Sameer Perez     32 minutes spent preparing to see patient including reviewing patient history/prior tests/prior consults, performing a medical exam, counseling and educating patient/family/caregiver, ordering medications/tests/procedures, referring and communicating with PCPs and other pertinent consultants, documenting information in the EMR, independently interpreting results and communicating to family and coordination of patient care.      MEENU Ceron-CNP  [facility]   Office: [phone]

## 2021-11-10 ENCOUNTER — ANTI-COAG VISIT (OUTPATIENT)
Dept: PHARMACY | Age: 71
End: 2021-11-10
Payer: MEDICARE

## 2021-11-10 ENCOUNTER — OFFICE VISIT (OUTPATIENT)
Dept: CARDIOLOGY CLINIC | Age: 71
End: 2021-11-10
Payer: MEDICARE

## 2021-11-10 ENCOUNTER — HOSPITAL ENCOUNTER (OUTPATIENT)
Age: 71
Discharge: HOME | End: 2021-11-10
Payer: MEDICARE

## 2021-11-10 VITALS
HEART RATE: 76 BPM | DIASTOLIC BLOOD PRESSURE: 84 MMHG | WEIGHT: 285 LBS | HEIGHT: 73 IN | SYSTOLIC BLOOD PRESSURE: 138 MMHG | BODY MASS INDEX: 37.77 KG/M2 | OXYGEN SATURATION: 95 %

## 2021-11-10 DIAGNOSIS — E55.9 VITAMIN D DEFICIENCY: ICD-10-CM

## 2021-11-10 DIAGNOSIS — I50.22 CHRONIC SYSTOLIC HEART FAILURE (HCC): ICD-10-CM

## 2021-11-10 DIAGNOSIS — G47.33 OSA (OBSTRUCTIVE SLEEP APNEA): ICD-10-CM

## 2021-11-10 DIAGNOSIS — I50.22 CHRONIC SYSTOLIC HEART FAILURE (HCC): Primary | ICD-10-CM

## 2021-11-10 DIAGNOSIS — I48.3 TYPICAL ATRIAL FLUTTER (HCC): Primary | ICD-10-CM

## 2021-11-10 DIAGNOSIS — I10 ESSENTIAL HYPERTENSION: ICD-10-CM

## 2021-11-10 DIAGNOSIS — E66.9 OBESITY (BMI 30-39.9): ICD-10-CM

## 2021-11-10 LAB
ANION GAP SERPL CALCULATED.3IONS-SCNC: 14 MMOL/L (ref 3–16)
BUN BLDV-MCNC: 29 MG/DL (ref 7–20)
CALCIUM SERPL-MCNC: 9.3 MG/DL (ref 8.3–10.6)
CHLORIDE BLD-SCNC: 102 MMOL/L (ref 99–110)
CO2: 24 MMOL/L (ref 21–32)
CREAT SERPL-MCNC: 1.3 MG/DL (ref 0.8–1.3)
GFR AFRICAN AMERICAN: >60
GFR NON-AFRICAN AMERICAN: 54
GLUCOSE BLD-MCNC: 96 MG/DL (ref 70–99)
HCT VFR BLD CALC: 39.9 % (ref 40.5–52.5)
HEMOGLOBIN: 13.3 G/DL (ref 13.5–17.5)
INTERNATIONAL NORMALIZATION RATIO, POC: 2.1
IRON SATURATION: 22 % (ref 20–50)
IRON: 64 UG/DL (ref 59–158)
MCH RBC QN AUTO: 30.5 PG (ref 26–34)
MCHC RBC AUTO-ENTMCNC: 33.2 G/DL (ref 31–36)
MCV RBC AUTO: 91.9 FL (ref 80–100)
PDW BLD-RTO: 14.8 % (ref 12.4–15.4)
PLATELET # BLD: 162 K/UL (ref 135–450)
PMV BLD AUTO: 9.7 FL (ref 5–10.5)
POTASSIUM SERPL-SCNC: 4.1 MMOL/L (ref 3.5–5.1)
PRO-BNP: 452 PG/ML (ref 0–124)
RBC # BLD: 4.35 M/UL (ref 4.2–5.9)
SODIUM BLD-SCNC: 140 MMOL/L (ref 136–145)
TOTAL IRON BINDING CAPACITY: 295 UG/DL (ref 260–445)
VITAMIN D 25-HYDROXY: 37.5 NG/ML
WBC # BLD: 7.2 K/UL (ref 4–11)

## 2021-11-10 PROCEDURE — 85610 PROTHROMBIN TIME: CPT

## 2021-11-10 PROCEDURE — 3017F COLORECTAL CA SCREEN DOC REV: CPT | Performed by: CLINICAL NURSE SPECIALIST

## 2021-11-10 PROCEDURE — 4040F PNEUMOC VAC/ADMIN/RCVD: CPT | Performed by: CLINICAL NURSE SPECIALIST

## 2021-11-10 PROCEDURE — 83550 IRON BINDING TEST: CPT

## 2021-11-10 PROCEDURE — G8417 CALC BMI ABV UP PARAM F/U: HCPCS | Performed by: CLINICAL NURSE SPECIALIST

## 2021-11-10 PROCEDURE — 82306 VITAMIN D 25 HYDROXY: CPT

## 2021-11-10 PROCEDURE — 85027 COMPLETE CBC AUTOMATED: CPT

## 2021-11-10 PROCEDURE — 1123F ACP DISCUSS/DSCN MKR DOCD: CPT | Performed by: CLINICAL NURSE SPECIALIST

## 2021-11-10 PROCEDURE — 36415 COLL VENOUS BLD VENIPUNCTURE: CPT

## 2021-11-10 PROCEDURE — 1036F TOBACCO NON-USER: CPT | Performed by: CLINICAL NURSE SPECIALIST

## 2021-11-10 PROCEDURE — G8484 FLU IMMUNIZE NO ADMIN: HCPCS | Performed by: CLINICAL NURSE SPECIALIST

## 2021-11-10 PROCEDURE — 99211 OFF/OP EST MAY X REQ PHY/QHP: CPT

## 2021-11-10 PROCEDURE — G8427 DOCREV CUR MEDS BY ELIG CLIN: HCPCS | Performed by: CLINICAL NURSE SPECIALIST

## 2021-11-10 PROCEDURE — 83880 ASSAY OF NATRIURETIC PEPTIDE: CPT

## 2021-11-10 PROCEDURE — 80048 BASIC METABOLIC PNL TOTAL CA: CPT

## 2021-11-10 PROCEDURE — 83540 ASSAY OF IRON: CPT

## 2021-11-10 PROCEDURE — 99214 OFFICE O/P EST MOD 30 MIN: CPT | Performed by: CLINICAL NURSE SPECIALIST

## 2021-11-10 RX ORDER — MELATONIN
1000 DAILY
Qty: 90 TABLET | Refills: 0
Start: 2021-11-10 | End: 2022-08-31 | Stop reason: SDUPTHER

## 2021-11-10 RX ORDER — FUROSEMIDE 20 MG/1
40 TABLET ORAL DAILY
COMMUNITY
End: 2021-11-10

## 2021-11-10 RX ORDER — FUROSEMIDE 40 MG/1
40 TABLET ORAL DAILY
Qty: 90 TABLET | Refills: 1 | Status: SHIPPED | OUTPATIENT
Start: 2021-11-10 | End: 2022-05-18 | Stop reason: SDUPTHER

## 2021-11-10 NOTE — PATIENT INSTRUCTIONS
1.  Refilled lasix script  2. Check blood work today  3. Continue all current medications  4. Recommend colonoscopy  5.   RTO in 6 months

## 2021-11-10 NOTE — PROGRESS NOTES
Mr. Eliud Barber is a 80 y.o. y/o male with history of Atrial Fibrillation who presents today for anticoagulation monitoring and adjustment. Pt is retired and works part time at [employer]. Patient reports he has been on warfarin for almost 30 years now and was managed by his cardiologist who recently retired. Pt was then managed by Dr. Vi Kim prior to being established in Clinic  Pertinent PMH: HTN, DM, HLD, COPD, CHF    Patient Reported Findings:  Yes     No  [x]   []       Patient verifies current dosing regimen as listed  []   [x]       S/S bleeding/bruising/swelling/SOB -denies    []   [x]       Blood in urine or stool- denies  []   [x]       Procedures scheduled in the future at this time -   []   [x]       Missed Dose-denies   []   [x]       Extra Dose - denies  []   [x]       Change in medications-  tylenol prn---> inc lasix --> started vit d weekly---> some tylenol --> prescribed diclofenac gel and tramadol (prn, 7 daily supply prescribed), has not picked up yet, picking up today --> never picked up diclofenac gel or tramadol d/t cost, euflexxa injection on 10/5 (second shot is not scheduled yet, will be a total of three shots) per pt does not have to hold warf for these injections --> euflexxa injection 10/5, 10/12, 10/19 is the plan for 3/3 doses---> no changes   []   [x]       Change in health/diet/appetite - reports he eats about 2 meals/day. And states he will have salads about 1-2x/wk. (not a big fan of spinach or brocolli but may have other greens)--> Patient states 3 salads in past 2 weeks---> ate salad once since last visit---> no greens, wants to add green beans in twice weekly, no NVD --> returned to vit k a few times a week (green beans and salad) --> no changes, no NVD---> less greens recently, no NVD---> denies changes, but has not had vit k in a while (only eats salads that have vit k)---> no changes, no NVD --> some greens, no more than normal, no NVD --> diarrhea resolved.  Had small salad last week, no other vit k --> no changes, no NVD  []   [x]       Change in alcohol use - reports occasionally drinking beer (once every few months) but happens very infrequently. --> Patient reports no alcohol in past two weeks---> denies   []   [x]       Change in activity  []   [x]       Hospital admission  []   [x]       Emergency department visit  []   [x]       Other complaints     Clinical Outcomes:  Yes     No  []   [x]       Major bleeding event  []   [x]       Thromboembolic event    Duration of warfarin Therapy: Indefinite   INR Range:  2.0-3.0     INR today is 2.1  Continue weekly dose to 15 mg Wed and Fri and 10 mg all other days. Encouraged patient to maintain a consistent diet.   Recheck INR again in 4 weeks, 12/8    **consent form signed 11/10/2021    Referring physician is Dr. Puja Franklin  INR (no units)   Date Value   11/10/2021 2.1   10/13/2021 2.1   09/29/2021 2.4   09/15/2021 2.3   08/19/2021 8.44 (HH)   05/05/2020 2.17 (H)   04/14/2020 2.43 (H)   03/24/2020 1.45 (H)     For Pharmacy Admin Tracking Only     Total # of Interventions Recommended: 0   Total # of Interventions Accepted: 0   Time Spent (min): 15

## 2021-11-10 NOTE — PROGRESS NOTES
[address]  Progress Note    Primary Care Doctor:  Hayley Best MD    Chief Complaint   Patient presents with    Congestive Heart Failure        History of Present Illness:  80 y.o. 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 He follows with Dr Lia Bolton with [facility]. 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 He follows with Dr Rene Kline   12/30-1/2/2020 for worsening heart failure, sob and leg swelling. He was found to AFlutter and CV 1/2/20. BiV lead implant 3/23/2020    I had the pleasure of seeing Ricky Hancock in follow up for sHF. He is ambulatory and by his self today. His optival is showed increased impedence end of sept to beginning of October but is now back to normal.  He denies any chest pain, palpitations, lightheadedness or edema. He has had 3 injections into his right knee. Weight is down 5 pounds. Past Medical History:   has a past medical history of Acute congestive heart failure ([address] Allergic rhinitis, Atrial fibrillation ([address] CKD (chronic kidney disease), Class 2 obesity due to excess calories without serious comorbidity with body mass index (BMI) of 37.0 to 37.9 in adult, Controlled type 2 diabetes mellitus without complication, without long-term current use of insulin ([address] COPD, Gout, Hyperlipidemia, mixed, Hypertension, Long term current use of anticoagulants with INR goal of 2.0-3.0, Obstructive sleep apnea syndrome, Parkinson disease ([address] Prediabetes, Primary insomnia, Primary osteoarthritis of both knees, Sleep apnea, and Stage 2 chronic kidney disease. Surgical History:   has a past surgical history that includes Aortic valve replacement (10/1996:[facility]); Pacemaker insertion (1996); Atrial ablation surgery (03/23/2020); Atrial ablation surgery (03/23/2020); and Cardiac pacemaker placement (03/23/2020).    Social History:   reports that he quit smoking about 2 years ago. His smoking use included cigarettes. He has a 30.00 pack-year smoking history. He has never used smokeless tobacco. He reports current alcohol use. He reports that he does not use drugs. Family History:   Family History   Problem Relation Age of Onset    Asthma Mother     Cancer Father     No Known Problems Sister     No Known Problems Brother     No Known Problems Maternal Grandmother     No Known Problems Maternal Grandfather     No Known Problems Paternal Grandmother     No Known Problems Paternal Grandfather        Home Medications:  Prior to Admission medications    Medication Sig Start Date End Date Taking? Authorizing Provider   furosemide (LASIX) 40 MG tablet Take 1 tablet by mouth daily 11/10/21  Yes MEENU Dougherty   metFORMIN (GLUCOPHAGE-XR) 500 MG extended release tablet TAKE 4 TABLETS EVERY DAY WITH BREAKFAST 11/1/21  Yes MEENU Agarwal   lisinopril (PRINIVIL;ZESTRIL) 20 MG tablet TAKE 1 TABLET TWICE DAILY 10/20/21  Yes MEENU Montes CNP   allopurinol (ZYLOPRIM) 300 MG tablet TAKE 1 TABLET EVERY DAY 10/20/21  Yes MEENU Agarwal   warfarin (COUMADIN) 10 MG tablet Take 1.5 tablets by mouth Twice a Week AND 1 tablet Five times weekly. TAKE 1 TABLET DAILY EXCEPT TAKE 1 AND 1/2 TABLETS (15 MG) ON WEDNESDAY AND FRIDAY. 10/14/21 1/12/22 Yes Jasper Mccormick MD   amLODIPine (NORVASC) 5 MG tablet TAKE 1 TABLET EVERY DAY 9/13/21  Yes MEENU Dougherty   pravastatin (PRAVACHOL) 80 MG tablet TAKE 1 TABLET EVERY DAY FOR CHOLESTEROL 9/7/21  Yes MEENU Blanchard CNP   vitamin D (ERGOCALCIFEROL) 1.25 MG (50738 UT) CAPS capsule Take 1 capsule by mouth once a week 5/25/21  Yes MEENU Dougherty   sotalol (BETAPACE) 80 MG tablet Take 80 mg by mouth 2 times daily. Yes Historical Provider, MD   aspirin 81 MG chewable tablet Take 81 mg by mouth daily.    Yes Historical Provider, MD   glucose monitoring kit (FREESTYLE) monitoring kit Ok to dispense what is covered by insurance. 7/9/20   Nadya Simon MD   blood glucose monitor strips Test 2 times a day & as needed for symptoms of irregular blood glucose. Dispense sufficient amount for indicated testing frequency plus additional to accommodate PRN testing needs. Ok to dispense what is covered by insurance 7/4/20   Nadya Simon MD   ACCU-CHEK COMPACT PLUS strip USE TO TEST 2 TIMES DAILY 6/23/20   Nadya Simon MD   Lancets MISC BID testing 2/24/17   Nadya Simon MD        Allergies:  Patient has no known allergies. Review of Systems:   · Constitutional: there has been no unanticipated weight loss. There's been no change in energy level, sleep pattern, or activity level. · Eyes: No visual changes or diplopia. No scleral icterus. · ENT: No Headaches, hearing loss or vertigo. No mouth sores or sore throat. · Cardiovascular: Reviewed in HPI  · Respiratory: No cough or wheezing, no sputum production. No hematemesis. · Gastrointestinal: No abdominal pain, appetite loss, blood in stools. No change in bowel or bladder habits. · Genitourinary: No dysuria, trouble voiding, or hematuria. · Musculoskeletal:  No gait disturbance, weakness or joint complaints. · Integumentary: No rash or pruritis. · Neurological: No headache, diplopia, change in muscle strength, numbness or tingling. No change in gait, balance, coordination, mood, affect, memory, mentation, behavior. · Psychiatric: No anxiety, no depression. · Endocrine: No malaise, fatigue or temperature intolerance. No excessive thirst, fluid intake, or urination. No tremor. · Hematologic/Lymphatic: No abnormal bruising or bleeding, blood clots or swollen lymph nodes. · Allergic/Immunologic: No nasal congestion or hives.     Physical Examination:    Vitals:    11/10/21 1309   BP: 138/84   Pulse: 76   SpO2: 95%   Weight: 285 lb (129.3 kg)   Height: 6' 1\" (1.854 m)        Constitutional and General Appearance: Warm and dry, no apparent distress, normal coloration  HEENT:  Normocephalic, atraumatic  Respiratory:  · Normal excursion and expansion without use of accessory muscles  · Resp Auscultation: Normal breath sounds without dullness  Cardiovascular:  · The apical impulses not displaced  · Heart tones are crisp and normal  · JVP normal cm H2O  · Regular rate and rhythm  · Peripheral pulses are symmetrical and full  · There is no clubbing, cyanosis of the extremities.   · no edema; right knee brace  · Pedal Pulses: 2+ and equal   Abdomen:obese  · No masses or tenderness  · Liver/Spleen: No Abnormalities Noted  Neurological/Psychiatric:  · Alert and oriented in all spheres  · Moves all extremities well  · Exhibits normal gait balance and coordination  · No abnormalities of mood, affect, memory, mentation, or behavior are noted    Lab Data:    CBC:   Lab Results   Component Value Date    WBC 6.6 03/23/2020    WBC 6.7 12/31/2019    WBC 6.7 12/30/2019    RBC 4.33 03/23/2020    RBC 4.23 12/31/2019    RBC 4.30 12/30/2019    HGB 13.3 03/23/2020    HGB 12.9 12/31/2019    HGB 13.4 12/30/2019    HCT 40.4 03/23/2020    HCT 39.8 12/31/2019    HCT 40.2 12/30/2019    MCV 93.3 03/23/2020    MCV 94.1 12/31/2019    MCV 93.5 12/30/2019    RDW 16.6 03/23/2020    RDW 15.2 12/31/2019    RDW 15.2 12/30/2019     03/23/2020     12/31/2019     12/30/2019     BMP:  Lab Results   Component Value Date     05/24/2021     04/06/2021     01/20/2021    K 4.5 05/24/2021    K 5.1 04/06/2021    K 4.6 01/20/2021     05/24/2021     04/06/2021     01/20/2021    CO2 26 05/24/2021    CO2 24 04/06/2021    CO2 27 01/20/2021    PHOS 4.2 01/07/2020    PHOS 3.8 10/14/2019    PHOS 3.9 06/24/2019    BUN 28 05/24/2021    BUN 28 04/06/2021    BUN 18 01/20/2021    CREATININE 1.2 05/24/2021    CREATININE 1.3 04/06/2021    CREATININE 1.0 01/20/2021     BNP:   Lab Results   Component Value Date    PROBNP 447 05/24/2021    PROBNP 600 01/20/2021    PROBNP 592 10/19/2020     Cardiac Imaging:  Echo 12/11/2020   Summary   -Left ventricular cavity size is normal.   -Overall left ventricular systolic function appears mildly reduced.   -Ejection fraction is visually estimated to be 45%. -There is abnormal septal motion/bounce noted. -Indeterminate diastolic function E/e' = 40.9. Deborah Christopher -A mechanical artificial aortic valve appears well seated with a maximum   velocity of 2.1m/s and a mean gradient of 9mmHg.   -No evidence of aortic valve regurgitation.   -Trivial-mild mitral regurgitation.   -Mild tricuspid regurgitation.   -The right ventricle is normal in size with reduces function. TAPSE 1.9cm. RVS velocity is 6.94 cm/s.   -Pacer/ICD right heart    Echo: 12/31/19   Summary   -Global hypokinesis with EF 40-45%. -Abnormal septal motion.   -The aortic root and the ascending aorta are mildly dilated. -The right ventricle appears to be dilated. -RV systolic function appears to be reduced.   -The right atrium appears to be dilated. -The mechanical artificial aortic valve appears well seated with a maximum   velocity of 2.40 m/s and a mean gradient of 12 mmHg. The aortic valve area   is estimated at 1.19 cm^2. No significant regurgitation noted. -Thickened mitral valve without evidence of stenosis. There is   mild-to-moderate mitral regurgitation.   -There is moderate tricuspid regurgitation with a RVSP estimation of 43   mmHg.   -Pacer / ICD wire is visualized in the right heart.   -Indeterminate diastolic function. Assessment:    1. Chronic systolic congestive heart failure (HCC) on ace and BB; no aldosterone antagonist due to hyperkalemia   2. Essential hypertension    3. ROSETTA (obstructive sleep apnea)    4. Obesity (BMI 30-39.9)    5. Vitamin d deficiency    Plan:   Patient Instructions   1. Refilled lasix script  2. Check blood work today  3. Continue all current medications  4. Recommend colonoscopy  5.   RTO in 6 months    I appreciate the opportunity of cooperating in the care of this individual.    MEENU Hussein - CNS, CNS, 11/10/2021, 1:33 PM    QUALITY MEASURES  1. Tobacco Cessation Counseling: NA  2. Retake of BP if >140/90:   NA  3. Documentation to PCP/referring for new patient:  Sent to PCP at close of office visit  4. CAD patient on anti-platelet: NA  5. CAD patient on STATIN therapy:  Yes  6.  Patient with CHF and aFib on anticoagulation:  Yes

## 2021-11-11 ENCOUNTER — TELEPHONE (OUTPATIENT)
Dept: CARDIOLOGY CLINIC | Age: 71
End: 2021-11-11

## 2021-11-16 ENCOUNTER — OFFICE VISIT (OUTPATIENT)
Dept: ORTHOPEDIC SURGERY | Age: 71
End: 2021-11-16
Payer: MEDICARE

## 2021-11-16 ENCOUNTER — TELEPHONE (OUTPATIENT)
Dept: CARDIOLOGY CLINIC | Age: 71
End: 2021-11-16

## 2021-11-16 VITALS — HEIGHT: 73 IN | BODY MASS INDEX: 37.77 KG/M2 | WEIGHT: 285 LBS

## 2021-11-16 DIAGNOSIS — M25.561 CHRONIC PAIN OF RIGHT KNEE: Primary | ICD-10-CM

## 2021-11-16 DIAGNOSIS — G89.29 CHRONIC PAIN OF RIGHT KNEE: Primary | ICD-10-CM

## 2021-11-16 DIAGNOSIS — M17.0 PRIMARY OSTEOARTHRITIS OF BOTH KNEES: ICD-10-CM

## 2021-11-16 PROCEDURE — 1036F TOBACCO NON-USER: CPT | Performed by: PHYSICIAN ASSISTANT

## 2021-11-16 PROCEDURE — G8484 FLU IMMUNIZE NO ADMIN: HCPCS | Performed by: PHYSICIAN ASSISTANT

## 2021-11-16 PROCEDURE — 1123F ACP DISCUSS/DSCN MKR DOCD: CPT | Performed by: PHYSICIAN ASSISTANT

## 2021-11-16 PROCEDURE — 4040F PNEUMOC VAC/ADMIN/RCVD: CPT | Performed by: PHYSICIAN ASSISTANT

## 2021-11-16 PROCEDURE — 99214 OFFICE O/P EST MOD 30 MIN: CPT | Performed by: PHYSICIAN ASSISTANT

## 2021-11-16 PROCEDURE — G8427 DOCREV CUR MEDS BY ELIG CLIN: HCPCS | Performed by: PHYSICIAN ASSISTANT

## 2021-11-16 PROCEDURE — G8417 CALC BMI ABV UP PARAM F/U: HCPCS | Performed by: PHYSICIAN ASSISTANT

## 2021-11-16 PROCEDURE — 3017F COLORECTAL CA SCREEN DOC REV: CPT | Performed by: PHYSICIAN ASSISTANT

## 2021-11-16 NOTE — TELEPHONE ENCOUNTER
CARDIAC CLEARANCE     What type of procedure are you having? Knee scope  Which physician is performing your procedure? Dr Kirt Chase    When is your procedure scheduled for?  12/10/21  Where are you having this procedure? [facility]    Are you taking Blood Thinners? warfarin   If so what? (Name/dose/frequesncy)     Does the surgeon want you to stop your blood thinner? Yes  If so for how long?      Phone Number and Contact Name for Physicians office:     Fax number to send information:  [phone]

## 2021-11-16 NOTE — Clinical Note
[address] [place] [address] [address] [id number]  Phone: [phone]  Fax: [phone] MEENU Gonzalez        November 17, 2021     Patient: Chris Whitfiled   YOB: 1950   Date of Visit: 11/16/2021       To Whom It May Concern: It is my medical opinion that Kaylene Townsend {Work release (duty restriction):99489}. If you have any questions or concerns, please don't hesitate to call.     Sincerely,        MEENU Brody CNS

## 2021-11-16 NOTE — TELEPHONE ENCOUNTER
Right diagnostic arthroscope of the knee 12/10/21    On Coumadin/ASA    Systolic CHF; EF 97% 34/5293 TTE  Hx AVR (Mechanical on coumadin) 1996  Hx AV block s/p S/p CTI dependent atrial flutter ablation with Biv AICD upgrade with addition of LV lead (3/23/20)  PAF, CHADsVASC 4  ROSETTA  HTN  CKD  Former tobacco

## 2021-11-16 NOTE — Clinical Note
[address] Cardiology [address]  [address]  Phone: [phone]  Fax: [phone] [facility] DO Radha        November 18, 2021    [address] [place] [address]      Dear April Jurado:    ***    If you have any questions or concerns, please don't hesitate to call.     Sincerely,        Juju Durant DO

## 2021-11-16 NOTE — LETTER
[address] [place] [address]  Kirsten Chiang [address] [place] [address]  Phone: [phone]  Fax: [phone] RAYMUNDO Nicholson, [address]        November 16, 2021     Patient: Natasha Danielle   YOB: 1950   Date of Visit: 11/16/2021       To Whom It May Concern: It is my medical opinion that Emy Martinez should remain out of work until 01/07/2022. If you have any questions or concerns, please don't hesitate to call.     Sincerely,      MD Bhumika Capps , PA

## 2021-11-16 NOTE — LETTER
[address] Cardiology [address] [place] [address]  [address]  Phone: [phone]  Fax: [phone]    Goyo Cook DO        November 17, 2021     Patient: Ricky Hancock   YOB: 1950   Date of Visit: 11/16/2021       To Whom It May Concern: It is my medical opinion that Aleena Hollingsworth is stable for this procedure. There are no apparent cardiac contraindications to the proposed procedure using standard anesthetic technique. There are no apparent interventions to ameliorate the cardiac risk. OK to proceed without Lovenox bridge. This clinical assessment assumes a full Anesthesia evaluation for overall risk of airway management, type and route of anesthetic, and other relevant anesthesia-specific considerations. If you have any questions or concerns, please don't hesitate to call.     Sincerely,        Goyo Cook DO

## 2021-11-16 NOTE — PROGRESS NOTES
Patient Name: Leeanna Tabares  : 1950  DOS: 2021        Chief Complaint:   Chief Complaint   Patient presents with    Knee Pain     RT KNEE-CT RESULTS         History of Present Illness:  Leeanna Tabares is a 80 y.o. male who presents with a chief complaint of continued complaints of pain in the knee with irritation with walking, stairs and with overuse. Pain in the knee regularly with swelling and discomfort. Currently: 2021  Patient is here for follow-up regarding right knee pain and osteoarthritis. Patient notes progressively continued pain on the medial aspect of the right knee notes he feels it with weightbearing and inactivity. Denies any catching or locking symptoms but does note increasing inability to fully extend the knee and fully bend the knee. Rating overall pain levels a 7 out of 10 he noted that the Orthovisc it did provide little relief in regards to his overall pain rating it as a 7 out of 10 at today's visit. Denies any fall trauma or injury does admit to buckling and give way of the knee utilizing a cane to help with ambulation. He is here to also discuss results of CT scan on the right knee dated and his inability to get an MRI because of an old cardiac lead that was implanted that was not removed when the defibrillator was replaced recently. Is hopeful that the CT scan will show something and allow for something to be done as he is noting continued irritation and pain across the knee and inability to be able to return to work safely. Previously: 10/27/2021  Patient is here for right knee osteoarthritis and for his final Orthovisc injection of the 3 part series. He notes no significant improvements overall in regards to his pain he rates his pain level is 7-8 out of 10 so he does note some mild improvements but still has swelling difficulty with ambulating and difficulty with standing transitions and periods of walking and sitting.   Notes increasing difficulty with getting in and out of the vehicle. He is wondering what else can be and should be done to help pursue the cause of this persistent pain. Denies any fall trauma or injury is still utilizing cane to help with ambulation. Previously:  pain in the bilateral knees is severe and worsening. Prior injection with only 3 days of relief in pain. Not taking any pain medications for the pain. Previously 10/12/2021  Patient is here for follow-up regarding right knee osteoarthritis and pain. He is still noting pain levels as an 8-9 out of 10. He is here for the second Orthovisc injection hopeful for continued improvement with continued utilization of these injections. Still utilizing cane to help with ambulation denies any fall trauma or injury. Previously: 10/5/21  Patient is here for follow up regarding right knee osteoarthritis. Patient notes his pain levels well 8 or 9 out of 10 at today's visit he is hopeful about the Orthovisc injections that he was approved for today's visit will be helpful in improving his overall symptoms. Patient denies any fall trauma or injury utilizing cane to help with ambulation. Denies numbness burning tingling radiating pains down the leg. Previously: 9/14/21  Increase in pain over the past several years time. Patient is here for evaluation regarding bilateral knee pain that has been ongoing for several years. He notes his overall pain level is an 8 out of 10 with the right knee being worse than the left. He notes that the knee is buckling give out on him on occasion with right doing it more than the left. Denies utilization of any braces or assistive walking devices at present time. He has not pursued physical therapy at present time. He denies numbness burning tingling radiating pains down the leg denies any fall trauma or injury.   He notes mostly pain in the anterior aspect of the knees but after periods of standing or walking the pain radiates around the entire knee. 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 So he does a lot of stooping bending crouching kneeling as he is trying to get in and out of these vehicles. Notes a lot of difficulty with these activities as well as stairs long periods of standing and walking as well as transitions. Is hopeful to ascertain what is going on in regards to his knees and figure out what can be done to help reduce irritation and provide some relief. Medical History  Current Medications:   Prior to Admission medications    Medication Sig Start Date End Date Taking? Authorizing Provider   furosemide (LASIX) 40 MG tablet Take 1 tablet by mouth daily 11/10/21   MEENU Moctezuma   vitamin D3 (CHOLECALCIFEROL) 25 MCG (1000 UT) TABS tablet Take 1 tablet by mouth daily 11/10/21   MEENU Hopkins   metFORMIN (GLUCOPHAGE-XR) 500 MG extended release tablet TAKE 4 TABLETS EVERY DAY WITH BREAKFAST 11/1/21   MEENU [place]   lisinopril (PRINIVIL;ZESTRIL) 20 MG tablet TAKE 1 TABLET TWICE DAILY 10/20/21   MEENU Olsen CNP   allopurinol (ZYLOPRIM) 300 MG tablet TAKE 1 TABLET EVERY DAY 10/20/21   MEENU [place]   warfarin (COUMADIN) 10 MG tablet Take 1.5 tablets by mouth Twice a Week AND 1 tablet Five times weekly. TAKE 1 TABLET DAILY EXCEPT TAKE 1 AND 1/2 TABLETS (15 MG) ON WEDNESDAY AND FRIDAY. 10/14/21 1/12/22  Jose Bartlett MD   amLODIPine (NORVASC) 5 MG tablet TAKE 1 TABLET EVERY DAY 9/13/21   MEENU Hopkins   pravastatin (PRAVACHOL) 80 MG tablet TAKE 1 TABLET EVERY DAY FOR CHOLESTEROL 9/7/21   MEENU Copeland CNP   glucose monitoring kit (FREESTYLE) monitoring kit Ok to dispense what is covered by insurance. 7/9/20   Nadya Simon MD   blood glucose monitor strips Test 2 times a day & as needed for symptoms of irregular blood glucose.  Dispense sufficient amount for indicated testing frequency plus additional to accommodate PRN testing needs. Ok to dispense what is covered by insurance 7/4/20   Golden Beal MD   ACCU-CHEK COMPACT PLUS strip USE TO TEST 2 TIMES DAILY 6/23/20   Golden Beal MD   Lancets MISC BID testing 2/24/17   Golden Beal MD   sotalol (BETAPACE) 80 MG tablet Take 80 mg by mouth 2 times daily. Historical Provider, MD   aspirin 81 MG chewable tablet Take 81 mg by mouth daily. Historical Provider, MD     Allergies: No Known Allergies    Review of Systems:     Review of Systems ______  Constitutional: Negative for fever and diaphoresis. ____________  Respiratory: Negative for shortness of breath.  ________  Gastrointestinal: Negative for abdominal pain. ________  Musculoskeletal: Positive for joint pain. ____  Skin: Negative for itching. ____  Neurological: Negative for loss of consciousness.  ______________  All other systems reviewed and negative     Past Medical History:   Diagnosis Date    Acute congestive heart failure ([facility] [address] 12/30/2019    Allergic rhinitis 7/11/2016    Atrial fibrillation ([facility] [address]     under care of cardiology:Dr. Lenda Members Behrens([facility])    CKD (chronic kidney disease)     Nephro:Dr. Parker:stage 3    Class 2 obesity due to excess calories without serious comorbidity with body mass index (BMI) of 37.0 to 37.9 in adult 11/7/2017    Controlled type 2 diabetes mellitus without complication, without long-term current use of insulin ([facility] [address] 2/24/2017    COPD     Gout     Hyperlipidemia, mixed 07/11/2016    Hypertension     under care of cardiology:Dr. Lenda Members Behrens([facility])    Long term current use of anticoagulants with INR goal of 2.0-3.0     under care of cardiology:Dr. Scott Behrens([facility])    Obstructive sleep apnea syndrome 06/18/2019    per pulmo    Parkinson disease [facility])     9/2016:Per neurologist dx is tremor & not consistent with Parkison's:Dr. Herberth Ortiz Prediabetes 10/28/2016    Primary insomnia 1/25/2017    Primary osteoarthritis of both knees 2021    Sleep apnea     CPAP    Stage 2 chronic kidney disease      Family History   Problem Relation Age of Onset    Asthma Mother     Cancer Father     No Known Problems Sister     No Known Problems Brother     No Known Problems Maternal Grandmother     No Known Problems Maternal Grandfather     No Known Problems Paternal Grandmother     No Known Problems Paternal Grandfather      Social History     Socioeconomic History    Marital status:      Spouse name: Not on file    Number of children: Not on file    Years of education: Not on file    Highest education level: Not on file   Occupational History    Occupation: Retired:(PT car prep)   Tobacco Use    Smoking status: Former Smoker     Packs/day: 1.00     Years: 30.00     Pack years: 30.00     Types: Cigarettes     Quit date: 2019     Years since quittin.4    Smokeless tobacco: Never Used    Tobacco comment:     Vaping Use    Vaping Use: Never used   Substance and Sexual Activity    Alcohol use: Yes     Comment: rarely    Drug use: No    Sexual activity: Yes     Comment: -6 children    Other Topics Concern    Not on file   Social History Narrative    Not on file     Social Determinants of Health     Financial Resource Strain:     Difficulty of Paying Living Expenses: Not on file   Food Insecurity:     Worried About Running Out of Food in the Last Year: Not on file    Freddie of Food in the Last Year: Not on file   Transportation Needs:     Lack of Transportation (Medical): Not on file    Lack of Transportation (Non-Medical):  Not on file   Physical Activity:     Days of Exercise per Week: Not on file    Minutes of Exercise per Session: Not on file   Stress:     Feeling of Stress : Not on file   Social Connections:     Frequency of Communication with Friends and Family: Not on file    Frequency of Social Gatherings with Friends and Family: Not on file    Attends patellofemoral tenderness with palpation. Special Tests: Patellar Compression test is positive. Valgus & Varus test is positive. Skin: There are no rashes, ulcerations or lesions. Gait: Gait pattern is antalgic  Skin shows no rashes/ecchymosis to the affected area, no hyperesthesias, no discoloration, no temperature or color discrepancies. NEUROLOGICALLY: There is no evidence for sensory or motor deficits in the extremity. Coordination appears full with no spacticity or rigidity. Reflexes appear to be symmetric. Distal circulation intact. No signs of RSD. Additional Comments: Hip range of motion intact laterally        Procedures  No new procedure performed and reviewed today        Diagnostic Test Findings:   Xray   Have reviewed the xrays above from 6/29/2021  4 view x-ray of the right knee AP, lateral, sunrise and tunnel views were performed on 6/29/2021 and reviewed today revealing moderate medial and lateral compartment osteoarthritic bone spurring and joint space decrease with equal wear no evidence of acute fracture dislocation severe patellar arthritic changes especially laterally and significant joint space decrease the lateral patellofemoral compartment. 4 view x-ray of the left knee AP, lateral, sunrise and tunnel views were performed on 6/29/2021 and reviewed today revealing moderate medial and lateral compartment osteoarthritic bone spurring and joint space decrease with equal wear no evidence of acute fracture dislocation. Severe patellar arthritic changes especially laterally with significant joint space decrease in the lateral patellofemoral compartment. Reviewed CT scan of the right knee performed on 11/5/2021 noting tricompartmental degenerative arthritis seen with lateral patellar subluxation high-grade chondromalacia patella increased uptake on medial femoral condyle and tibial plateau correlate with insufficiency fractures.   Intra-articular loose bodies within the popliteus tendon sheath. Assessment and Plan:       Diagnosis Orders   1. Chronic pain of right knee     2. Primary osteoarthritis of both knees                The orders below, if any, were placed during this visit:   No orders of the defined types were placed in this encounter. Treatment Plan:   -An increasing amount of pain coming from the medial femoral condyle and tibial plateau versus the Dolan femoral compartment as previously noted. -Based on the CT scan can move forward with diagnostic arthroscope of the knee to rule out meniscus tear and subchondroplasty of the medial femoral condyle and tibial plateau  -Fielded and answered all questions regarding surgical procedure with the patient discussed all potential complications of the procedure with the patient patient agreed and consented to move forward with the procedure also discussed all the pre and postoperative evaluation management.    -Advised patient to continue with physical therapy for evaluation and treatment of bilateral knees with focus to patellofemoral compartment with utilization of BFR training.    -Due to the patient's cardiac status cannot move forward with anti-inflammatories at present time. .    -Due to limited improvement in patient's pain and overall function especially with his ability to transition from seated to standing position and standing to seated position would recommend maintaining off work status until 3 to 4 weeks after scheduled procedure on December 10, 2021 to allow for rehabilitation and reduction in patient's pain symptoms. Until further evaluation can be performed and ensure patient's overall safety and to avoid further aggravation of patient's condition. Started on Ultram intermittently for some of the pain issues to see if this can allow more active therapies on his own.               LUIS F Andrade

## 2021-11-16 NOTE — TELEPHONE ENCOUNTER
Please call patient and ask if he has had a CVA or any other clotting issues in the past, or if he has ever needed to be bridged with Lovenox while holding Coumadin for any procedures.   Will help to determine final reccomendations

## 2021-11-16 NOTE — LETTER
Delio Vallesucmary kate [address]  Surgery Precert & Billing Form:    DEMOGRAPHICS:                                                                                                       Patient Name:  Kendra Yi  Patient :  1950   Patient SS#:      Patient Phone:  [phone] (home)  Alt.  Patient Phone:    Patient Address:  [place] [id number]    PCP:  Marleen Ghosh MD  Insurance: [employer]    DIAGNOSIS & PROCEDURE:                                                                                      Diagnosis: Right knee pain, right knee insufficiency fracture to tibial plateau and femoral condyle [address], [address]  Operation: right    SURGERY  INFORMATION  Date of Surgery:   12/10/21  Location:    [facility]  Type:    Outpatient  23 hour hold:  No  Surgeon:     Ralph De Paz  21     BILLING INFORMATION:                                                                                                Physician Procedure                                            CPT Codes                      PA, or Fellow Procedure                                      CPT Codes                    Precert information:

## 2021-11-17 NOTE — TELEPHONE ENCOUNTER
Spoke to patient he has not had a stroke or any clotting issues and he had to give himself heparin shots a long time ago at [place] [address] for a procedure. This procedure is a not invasive procedure they are going in after bone chips and spurs. They take cells from pelvis and inject them into his knee.

## 2021-11-17 NOTE — TELEPHONE ENCOUNTER
Cardiac Clearance in chart. Will fax to surgeon's office. How long should patient hold warfarin or is this decided by the surgeon?   Please advise

## 2021-12-01 ENCOUNTER — TELEPHONE (OUTPATIENT)
Dept: PHARMACY | Age: 71
End: 2021-12-01

## 2021-12-01 NOTE — TELEPHONE ENCOUNTER
----- Message from Rob Licea sent at 12/1/2021  1:36 PM EST -----  Regarding: Please call  Contact: patient  Patient called and cancelled his appt on 12/8 because he is having his right knee surgery on 12/6. He states Dr. Romero Sell him holding for 5 days prior to the surgery and wants to know when he should schedule a follow-up appt with the clinic. Please call the patient back @ [phone] with updated appt date/time and warfarin dosing. Called and spoke with patient. Patient scheduled for surgery 12/10 per patient and will be holding for 5 days w/o bridging per patient. Instructed patient to take extra half tablet (5mg) the first two days after restarting warfarin and then resume normal home dose of 15mg on Wed / Fri and 10mg all other days until RTC. RTC: 12/17 - 1 week after surgery.     Kishan Marcelino, NovaD

## 2021-12-02 ENCOUNTER — OFFICE VISIT (OUTPATIENT)
Dept: FAMILY MEDICINE CLINIC | Age: 71
End: 2021-12-02
Payer: MEDICARE

## 2021-12-02 VITALS
DIASTOLIC BLOOD PRESSURE: 88 MMHG | OXYGEN SATURATION: 97 % | HEIGHT: 73 IN | BODY MASS INDEX: 38.97 KG/M2 | WEIGHT: 294 LBS | SYSTOLIC BLOOD PRESSURE: 138 MMHG | HEART RATE: 81 BPM

## 2021-12-02 DIAGNOSIS — G89.29 CHRONIC PAIN OF RIGHT KNEE: Primary | ICD-10-CM

## 2021-12-02 DIAGNOSIS — I10 ESSENTIAL HYPERTENSION: ICD-10-CM

## 2021-12-02 DIAGNOSIS — Z01.818 PRE-OP EVALUATION: ICD-10-CM

## 2021-12-02 DIAGNOSIS — J43.8 OTHER EMPHYSEMA (HCC): ICD-10-CM

## 2021-12-02 DIAGNOSIS — N18.31 STAGE 3A CHRONIC KIDNEY DISEASE (HCC): ICD-10-CM

## 2021-12-02 DIAGNOSIS — I50.22 CHRONIC SYSTOLIC CONGESTIVE HEART FAILURE (HCC): ICD-10-CM

## 2021-12-02 DIAGNOSIS — I48.3 TYPICAL ATRIAL FLUTTER (HCC): ICD-10-CM

## 2021-12-02 DIAGNOSIS — M25.561 CHRONIC PAIN OF RIGHT KNEE: Primary | ICD-10-CM

## 2021-12-02 DIAGNOSIS — E78.5 HYPERLIPIDEMIA LDL GOAL <100: ICD-10-CM

## 2021-12-02 LAB
A/G RATIO: 1.5 (ref 1.1–2.2)
ALBUMIN SERPL-MCNC: 4.5 G/DL (ref 3.4–5)
ALP BLD-CCNC: 96 U/L (ref 40–129)
ALT SERPL-CCNC: 17 U/L (ref 10–40)
ANION GAP SERPL CALCULATED.3IONS-SCNC: 13 MMOL/L (ref 3–16)
AST SERPL-CCNC: 21 U/L (ref 15–37)
BASOPHILS ABSOLUTE: 0 K/UL (ref 0–0.2)
BASOPHILS RELATIVE PERCENT: 0.4 %
BILIRUB SERPL-MCNC: 0.3 MG/DL (ref 0–1)
BUN BLDV-MCNC: 25 MG/DL (ref 7–20)
CALCIUM SERPL-MCNC: 9.5 MG/DL (ref 8.3–10.6)
CHLORIDE BLD-SCNC: 107 MMOL/L (ref 99–110)
CHOLESTEROL, FASTING: 166 MG/DL (ref 0–199)
CO2: 27 MMOL/L (ref 21–32)
CREAT SERPL-MCNC: 1.4 MG/DL (ref 0.8–1.3)
EOSINOPHILS ABSOLUTE: 0.1 K/UL (ref 0–0.6)
EOSINOPHILS RELATIVE PERCENT: 1.2 %
GFR AFRICAN AMERICAN: >60
GFR NON-AFRICAN AMERICAN: 50
GLUCOSE BLD-MCNC: 101 MG/DL (ref 70–99)
HCT VFR BLD CALC: 40.9 % (ref 40.5–52.5)
HDLC SERPL-MCNC: 38 MG/DL (ref 40–60)
HEMOGLOBIN: 13.3 G/DL (ref 13.5–17.5)
INR BLD: 1.79 (ref 0.88–1.12)
LDL CHOLESTEROL CALCULATED: 84 MG/DL
LYMPHOCYTES ABSOLUTE: 1.4 K/UL (ref 1–5.1)
LYMPHOCYTES RELATIVE PERCENT: 21.5 %
MCH RBC QN AUTO: 29.9 PG (ref 26–34)
MCHC RBC AUTO-ENTMCNC: 32.4 G/DL (ref 31–36)
MCV RBC AUTO: 92 FL (ref 80–100)
MONOCYTES ABSOLUTE: 0.7 K/UL (ref 0–1.3)
MONOCYTES RELATIVE PERCENT: 10 %
NEUTROPHILS ABSOLUTE: 4.5 K/UL (ref 1.7–7.7)
NEUTROPHILS RELATIVE PERCENT: 66.9 %
PDW BLD-RTO: 15.2 % (ref 12.4–15.4)
PLATELET # BLD: 172 K/UL (ref 135–450)
PMV BLD AUTO: 9.9 FL (ref 5–10.5)
POTASSIUM SERPL-SCNC: 5.6 MMOL/L (ref 3.5–5.1)
PROTHROMBIN TIME: 20.7 SEC (ref 9.9–12.7)
RBC # BLD: 4.45 M/UL (ref 4.2–5.9)
SODIUM BLD-SCNC: 147 MMOL/L (ref 136–145)
T4 FREE: 1.1 NG/DL (ref 0.9–1.8)
TOTAL PROTEIN: 7.6 G/DL (ref 6.4–8.2)
TRIGLYCERIDE, FASTING: 222 MG/DL (ref 0–150)
TSH REFLEX: 0.36 UIU/ML (ref 0.27–4.2)
VLDLC SERPL CALC-MCNC: 44 MG/DL
WBC # BLD: 6.7 K/UL (ref 4–11)

## 2021-12-02 PROCEDURE — 1036F TOBACCO NON-USER: CPT | Performed by: NURSE PRACTITIONER

## 2021-12-02 PROCEDURE — 3044F HG A1C LEVEL LT 7.0%: CPT | Performed by: NURSE PRACTITIONER

## 2021-12-02 PROCEDURE — 93000 ELECTROCARDIOGRAM COMPLETE: CPT | Performed by: NURSE PRACTITIONER

## 2021-12-02 PROCEDURE — 99214 OFFICE O/P EST MOD 30 MIN: CPT | Performed by: NURSE PRACTITIONER

## 2021-12-02 PROCEDURE — 3023F SPIROM DOC REV: CPT | Performed by: NURSE PRACTITIONER

## 2021-12-02 PROCEDURE — G8417 CALC BMI ABV UP PARAM F/U: HCPCS | Performed by: NURSE PRACTITIONER

## 2021-12-02 PROCEDURE — 2022F DILAT RTA XM EVC RTNOPTHY: CPT | Performed by: NURSE PRACTITIONER

## 2021-12-02 PROCEDURE — G8926 SPIRO NO PERF OR DOC: HCPCS | Performed by: NURSE PRACTITIONER

## 2021-12-02 PROCEDURE — G8427 DOCREV CUR MEDS BY ELIG CLIN: HCPCS | Performed by: NURSE PRACTITIONER

## 2021-12-02 PROCEDURE — 3017F COLORECTAL CA SCREEN DOC REV: CPT | Performed by: NURSE PRACTITIONER

## 2021-12-02 PROCEDURE — 4040F PNEUMOC VAC/ADMIN/RCVD: CPT | Performed by: NURSE PRACTITIONER

## 2021-12-02 PROCEDURE — 1123F ACP DISCUSS/DSCN MKR DOCD: CPT | Performed by: NURSE PRACTITIONER

## 2021-12-02 PROCEDURE — G8484 FLU IMMUNIZE NO ADMIN: HCPCS | Performed by: NURSE PRACTITIONER

## 2021-12-02 SDOH — ECONOMIC STABILITY: FOOD INSECURITY: WITHIN THE PAST 12 MONTHS, YOU WORRIED THAT YOUR FOOD WOULD RUN OUT BEFORE YOU GOT MONEY TO BUY MORE.: NEVER TRUE

## 2021-12-02 SDOH — ECONOMIC STABILITY: FOOD INSECURITY: WITHIN THE PAST 12 MONTHS, THE FOOD YOU BOUGHT JUST DIDN'T LAST AND YOU DIDN'T HAVE MONEY TO GET MORE.: NEVER TRUE

## 2021-12-02 ASSESSMENT — SOCIAL DETERMINANTS OF HEALTH (SDOH): HOW HARD IS IT FOR YOU TO PAY FOR THE VERY BASICS LIKE FOOD, HOUSING, MEDICAL CARE, AND HEATING?: NOT HARD AT ALL

## 2021-12-02 NOTE — PATIENT INSTRUCTIONS
Patient Education        Atrial Fibrillation: Care Instructions  Your Care Instructions     Atrial fibrillation is an irregular and often fast heartbeat. Treating this condition is important for several reasons. It can cause blood clots, which can travel from your heart to your brain and cause a stroke. If you have a fast heartbeat, you may feel lightheaded, dizzy, and weak. An irregular heartbeat can also increase your risk for heart failure. Atrial fibrillation is often the result of another heart condition, such as high blood pressure or coronary artery disease. Making changes to improve your heart condition will help you stay healthy and active. Follow-up care is a key part of your treatment and safety. Be sure to make and go to all appointments, and call your doctor if you are having problems. It's also a good idea to know your test results and keep a list of the medicines you take. How can you care for yourself at home? Medicines    · Take your medicines exactly as prescribed. Call your doctor if you think you are having a problem with your medicine. You will get more details on the specific medicines your doctor prescribes.     · If your doctor has given you a blood thinner to prevent a stroke, be sure you get instructions about how to take your medicine safely. Blood thinners can cause serious bleeding problems.     · Do not take any vitamins, over-the-counter drugs, or herbal products without talking to your doctor first.   Lifestyle changes    · Do not smoke. Smoking can increase your chance of a stroke and heart attack. If you need help quitting, talk to your doctor about stop-smoking programs and medicines. These can increase your chances of quitting for good.     · Eat a heart-healthy diet.     · Stay at a healthy weight. Lose weight if you need to.     · Limit alcohol to 2 drinks a day for men and 1 drink a day for women. Too much alcohol can cause health problems.     · Avoid colds and flu.  Get a pneumococcal vaccine shot. If you have had one before, ask your doctor whether you need another dose. Get a flu shot every year. If you must be around people with colds or flu, wash your hands often. Activity    · If your doctor recommends it, get more exercise. Walking is a good choice. Bit by bit, increase the amount you walk every day. Try for at least 30 minutes on most days of the week. You also may want to swim, bike, or do other activities. Your doctor may suggest that you join a cardiac rehabilitation program so that you can have help increasing your physical activity safely.     · Start light exercise if your doctor says it is okay. Even a small amount will help you get stronger, have more energy, and manage stress. Walking is an easy way to get exercise. Start out by walking a little more than you did in the hospital. Gradually increase the amount you walk.     · When you exercise, watch for signs that your heart is working too hard. You are pushing too hard if you cannot talk while you are exercising. If you become short of breath or dizzy or have chest pain, sit down and rest immediately.     · Check your pulse regularly. Place two fingers on the artery at the palm side of your wrist, in line with your thumb. If your heartbeat seems uneven or fast, talk to your doctor. When should you call for help? Call 911 anytime you think you may need emergency care. For example, call if:    · You have symptoms of a heart attack. These may include:  ? Chest pain or pressure, or a strange feeling in the chest.  ? Sweating. ? Shortness of breath. ? Nausea or vomiting. ? Pain, pressure, or a strange feeling in the back, neck, jaw, or upper belly or in one or both shoulders or arms. ? Lightheadedness or sudden weakness. ? A fast or irregular heartbeat. After you call 911, the  may tell you to chew 1 adult-strength or 2 to 4 low-dose aspirin. Wait for an ambulance.  Do not try to drive yourself.     · You have symptoms of a stroke. These may include:  ? Sudden numbness, tingling, weakness, or loss of movement in your face, arm, or leg, especially on only one side of your body. ? Sudden vision changes. ? Sudden trouble speaking. ? Sudden confusion or trouble understanding simple statements. ? Sudden problems with walking or balance. ? A sudden, severe headache that is different from past headaches.     · You passed out (lost consciousness). Call your doctor now or seek immediate medical care if:    · You have new or increased shortness of breath.     · You feel dizzy or lightheaded, or you feel like you may faint.     · Your heart rate becomes irregular.     · You can feel your heart flutter in your chest or skip heartbeats. Tell your doctor if these symptoms are new or worse. Watch closely for changes in your health, and be sure to contact your doctor if you have any problems. Where can you learn more? Go to https://Agrivida.[employer]ur[employer]. org and sign in to your [employer] account. Enter U020 in the IceBreaker box to learn more about \"Atrial Fibrillation: Care Instructions. \"     If you do not have an account, please click on the \"Sign Up Now\" link. Current as of: April 29, 2021               Content Version: 13.0  © 2006-2021 Healthwise, Incorporated. Care instructions adapted under license by [facility] ([facility]). If you have questions about a medical condition or this instruction, always ask your healthcare professional. [place] [address] any warranty or liability for your use of this information. Patient Education        Learning About How to Prepare for Surgery  How can you prepare before surgery? You can do some things that will help you safely prepare for surgery. · Understand exactly what surgery is planned. You should know the risks, benefits, and other options.   · Tell your doctors ALL the medicines, vitamins, supplements, and herbal remedies you take.   Some of these can increase the risk of bleeding. Or they may interact with anesthesia. · Follow your doctor's instructions about which medicines to take or stop before your surgery. ? You may need to stop taking some medicines a week or more before surgery. ? If you take aspirin or some other blood thinner, be sure to talk to your doctor. · Follow any other instructions your doctor gave you. · If you have an advance directive, let your doctor know, and bring a copy to the hospital.   It may include a living will and a durable power of  for health care. It lets your doctor and loved ones know your health care wishes. If you don't have one, you may want to prepare one. How can you prepare on the day of surgery? Here are some tips about what to do at home before you leave for your surgery. · If your doctor told you to take your medicines on the day of surgery, take them with only a sip of water. · Follow the instructions about when to stop eating and drinking. If you don't, your surgery may be canceled. · Follow your doctor's instructions about when to bathe or shower before your surgery. · Do not shave the surgical site yourself. · Take off all jewelry and piercings. · Take out contact lenses, if you wear them. · Have a picture ID ready to take with you. Your ID will be checked before your surgery. · Know when to call your doctor. Call your doctor if you:  ? Become ill before surgery. ? Need to reschedule. ? Have changed your mind about having the surgery. What happens before surgery? Here are some things you can expect to happen before your surgery. · Your picture ID will be checked. · The area of your body that needs surgery is often marked to make sure there are no errors. · You will be kept comfortable and safe by your anesthesia provider. The anesthesia may make you sleep. Or it may just numb the area being worked on. What happens when you are ready to go home?   Be sure you have someone drive you home. Anesthesia and pain medicine make it unsafe for you to drive. You will get instructions about recovering from your surgery. This is called a discharge plan. It will cover things like diet, wound care, follow-up care, driving, and getting back to your normal routine. Follow-up care is a key part of your treatment and safety. Be sure to make and go to all appointments, and call your doctor if you are having problems. It's also a good idea to know your test results and keep a list of the medicines you take. Where can you learn more? Go to https://apomiopeTelerik.[employer]. org and sign in to your [employer] account. Enter Q270 in the RentJuice box to learn more about \"Learning About How to Prepare for Surgery. \"     If you do not have an account, please click on the \"Sign Up Now\" link. Current as of: February 11, 2021               Content Version: 13.0  © 2006-2021 Healthwise, Incorporated. Care instructions adapted under license by [facility] ([facility]). If you have questions about a medical condition or this instruction, always ask your healthcare professional. [place] [address] any warranty or liability for your use of this information.

## 2021-12-02 NOTE — PROGRESS NOTES
Preoperative Consultation    Susan Phan  YOB: 1950    Date of Service:  12/2/2021    Vitals:    12/02/21 1436 12/02/21 1911   BP: (!) 150/82 138/88   Pulse: 81    SpO2: 97%    Weight: 294 lb (133.4 kg)    Height: 6' 1\" (1.854 m)       Wt Readings from Last 2 Encounters:   12/02/21 294 lb (133.4 kg)   11/16/21 285 lb (129.3 kg)     BP Readings from Last 3 Encounters:   12/02/21 138/88   11/10/21 138/84   10/27/21 (!) 154/89      Chief Complaint   Patient presents with    Pre-op Exam     R knee surgery 12/10/2021     No Known Allergies  Outpatient Medications Marked as Taking for the 12/2/21 encounter (Office Visit) with MEENU Gonzales CNP   Medication Sig Dispense Refill    furosemide (LASIX) 40 MG tablet Take 1 tablet by mouth daily 90 tablet 1    vitamin D3 (CHOLECALCIFEROL) 25 MCG (1000 UT) TABS tablet Take 1 tablet by mouth daily 90 tablet 0    metFORMIN (GLUCOPHAGE-XR) 500 MG extended release tablet TAKE 4 TABLETS EVERY DAY WITH BREAKFAST 360 tablet 0    lisinopril (PRINIVIL;ZESTRIL) 20 MG tablet TAKE 1 TABLET TWICE DAILY 180 tablet 0    allopurinol (ZYLOPRIM) 300 MG tablet TAKE 1 TABLET EVERY DAY 90 tablet 0    warfarin (COUMADIN) 10 MG tablet Take 1.5 tablets by mouth Twice a Week AND 1 tablet Five times weekly. TAKE 1 TABLET DAILY EXCEPT TAKE 1 AND 1/2 TABLETS (15 MG) ON WEDNESDAY AND FRIDAY. 96 tablet 3    amLODIPine (NORVASC) 5 MG tablet TAKE 1 TABLET EVERY DAY 90 tablet 1    pravastatin (PRAVACHOL) 80 MG tablet TAKE 1 TABLET EVERY DAY FOR CHOLESTEROL 90 tablet 0    glucose monitoring kit (FREESTYLE) monitoring kit Ok to dispense what is covered by insurance. 1 kit 0    blood glucose monitor strips Test 2 times a day & as needed for symptoms of irregular blood glucose. Dispense sufficient amount for indicated testing frequency plus additional to accommodate PRN testing needs.  Ok to dispense what is covered by insurance 100 strip 5    ACCU-CHEK COMPACT PLUS strip USE TO TEST 2 TIMES DAILY 102 strip 1    Lancets MISC BID testing 100 each 6    sotalol (BETAPACE) 80 MG tablet Take 80 mg by mouth 2 times daily.  aspirin 81 MG chewable tablet Take 81 mg by mouth daily. This patient presents to the office today for a preoperative consultation at the request of surgeon, Dr. Francy Townsend. Claude Lunger, who plans on performing right Knee diagnostic Arthroscopy with Subchondroplasty to Medial Femoral Condyle and Tibial Plateau on December 10 at [facility].  The current problem began many years ago, and symptoms have been worsening with time. Conservative therapy: Yes: injections, which has been ineffective. .   At ortho. Seeing Cardiology on 12/6/2021.       Planned anesthesia: General   Known anesthesia problems: None   Bleeding risk: currently taking Coumadin  Personal or FH of DVT/PE: No    Patient objection to receiving blood products: No    Patient Active Problem List   Diagnosis    Benign essential HTN    Hyperlipidemia, mixed    Long term current use of anticoagulants with INR goal of 2.0-3.0    Allergic rhinitis    Gout of both feet    Primary insomnia    Effusion of left knee    Uncontrolled type 2 diabetes mellitus with microalbuminuria, without long-term current use of insulin (HCC)    Class 3 severe obesity due to excess calories with serious comorbidity in adult ([address]    COPD    ROSETTA (obstructive sleep apnea)    Chronic congestive heart failure (HCC)    Cardiomyopathy, dilated (HCC)    Chronic systolic heart failure (HCC)    Stage 2 chronic kidney disease    S/P AVR    Pacemaker    Typical atrial flutter (HCC)    Chronic pain of left knee    Chronic pain of right knee    Primary osteoarthritis of both knees       Past Medical History:   Diagnosis Date    Acute congestive heart failure ([facility] 12/30/2019    Allergic rhinitis 7/11/2016    Atrial fibrillation (HCC)     under care of cardiology:Dr. Read Andrew Behrens([facility])    CKD Types: Cigarettes     Quit date: 2019     Years since quittin.4    Smokeless tobacco: Never Used    Tobacco comment:     Vaping Use    Vaping Use: Never used   Substance and Sexual Activity    Alcohol use: Yes     Comment: rarely    Drug use: No    Sexual activity: Yes     Comment: -6 children    Other Topics Concern    Not on file   Social History Narrative    Not on file     Social Determinants of Health     Financial Resource Strain: Low Risk     Difficulty of Paying Living Expenses: Not hard at all   Food Insecurity: No Food Insecurity    Worried About Running Out of Food in the Last Year: Never true    Freddie of Food in the Last Year: Never true   Transportation Needs:     Lack of Transportation (Medical): Not on file    Lack of Transportation (Non-Medical): Not on file   Physical Activity:     Days of Exercise per Week: Not on file    Minutes of Exercise per Session: Not on file   Stress:     Feeling of Stress : Not on file   Social Connections:     Frequency of Communication with Friends and Family: Not on file    Frequency of Social Gatherings with Friends and Family: Not on file    Attends Jain Services: Not on file    Active Member of [address] [place] [address] [employer] or Organizations: Not on file    Attends Club or Organization Meetings: Not on file    Marital Status: Not on file   Intimate Partner Violence:     Fear of Current or Ex-Partner: Not on file    Emotionally Abused: Not on file    Physically Abused: Not on file    Sexually Abused: Not on file   Housing Stability:     Unable to Pay for Housing in the Last Year: Not on file    Number of [place] in the Last Year: Not on file    Unstable Housing in the Last Year: Not on file       Review of Systems  A comprehensive review of systems was negative except for what was noted in the HPI. Physical Exam   Constitutional: He is oriented to person, place, and time. He appears well-developed and well-nourished.  No distress. HENT:   Head: Normocephalic and atraumatic. Mouth/Throat: Uvula is midline, oropharynx is clear and moist and mucous membranes are normal.   Eyes: Conjunctivae and EOM are normal. Pupils are equal, round, and reactive to light. Neck: Trachea normal and normal range of motion. Neck supple. No JVD present. Carotid bruit is not present. No mass and no thyromegaly present. Cardiovascular: Normal rate, regular rhythm, normal heart sounds and intact distal pulses. Exam reveals no gallop and no friction rub. No murmur heard. Mechanical valve in place. Pulmonary/Chest: Effort normal and breath sounds normal. No respiratory distress. He has no wheezes. He has no rales. Abdominal: Soft. Normal aorta and bowel sounds are normal. He exhibits no distension and no mass. There is no hepatosplenomegaly. No tenderness. Musculoskeletal: He exhibits tenderness to the right knee with slight edema. Neurological: He is alert and oriented to person, place, and time. He has normal strength. No cranial nerve deficit or sensory deficit. Coordination and gait is steady with cane. Skin: Skin is warm and dry. No rash noted. No erythema. Psychiatric: He has a normal mood and affect. His behavior is normal.     EKG Interpretation:  unchanged from previous tracings. Lab Review:  Labs pending. Assessment:       80 y.o. patient with planned surgery as above. Known risk factors for perioperative complications: Arrhythmia, Bleeding concerns- takes Coumadin, Coronary artery disease, Congestive heart failure, COPD, Diabetes mellitus, Hypertension, Obstructive sleep apnea, Peripheral vascular disease, Renal dysfunction, Tobacco abuse  Current medications which may produce withdrawal symptoms if withheld perioperatively: none   Diabetes - stable pending labs  HTN - at goal  COPD - stable  Hyperlipidemia - stable - at goal  Atrial flutter - NSR today  Renal disease - stable   Plan:     1.  Preoperative workup as follows: ECG, hemoglobin, hematocrit, electrolytes, creatinine, glucose, liver function studies, coagulation studies  2. Change in medication regimen before surgery: Take blood pressure medications on morning of surgery with sip of water, and hold all other medications until after surgery, Hold all medications on morning of surgery, Warfarin management- Discontinue warfarin 5 days before surgery and resume when surgeon instructs you to.  3. Prophylaxis for cardiac events with perioperative beta-blockers: Not indicated  ACC/AHA indications for pre-operative beta-blocker use:    · Vascular surgery with history of postitive stress test  · Intermediate or high risk surgery with history of CAD   · Intermediate or high risk surgery with multiple clinical predictors of CAD- 2 of the following: history of compensated or prior heart failure, history of cerebrovascular disease, DM, or renal insufficiency    Routine administration of higher-dose, long-acting metoprolol in beta-blocker-naïve patients on the day of surgery, and in the absence of dose titration is associated with an overall increase in mortality. Beta-blockers should be started days to weeks prior to surgery and titrated to pulse < 70.  4. Deep vein thrombosis prophylaxis: regimen to be chosen by surgical team  5. Concern regarding patients renal function being abnormal.  referred patient to nephrology for clearance. Will also need clearance from cardiology.

## 2021-12-03 DIAGNOSIS — N18.31 STAGE 3A CHRONIC KIDNEY DISEASE (HCC): Primary | ICD-10-CM

## 2021-12-03 LAB
ESTIMATED AVERAGE GLUCOSE: 134.1 MG/DL
HBA1C MFR BLD: 6.3 %

## 2021-12-06 ENCOUNTER — NURSE ONLY (OUTPATIENT)
Dept: CARDIOLOGY CLINIC | Age: 71
End: 2021-12-06
Payer: MEDICARE

## 2021-12-06 ENCOUNTER — OFFICE VISIT (OUTPATIENT)
Dept: CARDIOLOGY CLINIC | Age: 71
End: 2021-12-06
Payer: MEDICARE

## 2021-12-06 VITALS
HEIGHT: 73 IN | DIASTOLIC BLOOD PRESSURE: 86 MMHG | WEIGHT: 287.4 LBS | OXYGEN SATURATION: 100 % | BODY MASS INDEX: 38.09 KG/M2 | SYSTOLIC BLOOD PRESSURE: 154 MMHG | HEART RATE: 70 BPM

## 2021-12-06 DIAGNOSIS — I50.22 CHRONIC SYSTOLIC CONGESTIVE HEART FAILURE (HCC): ICD-10-CM

## 2021-12-06 DIAGNOSIS — I48.3 TYPICAL ATRIAL FLUTTER (HCC): ICD-10-CM

## 2021-12-06 DIAGNOSIS — Z95.0 PACEMAKER: Primary | ICD-10-CM

## 2021-12-06 DIAGNOSIS — I42.0 CARDIOMYOPATHY, DILATED (HCC): ICD-10-CM

## 2021-12-06 DIAGNOSIS — G47.33 OSA (OBSTRUCTIVE SLEEP APNEA): Chronic | ICD-10-CM

## 2021-12-06 DIAGNOSIS — Z95.0 PACEMAKER: ICD-10-CM

## 2021-12-06 DIAGNOSIS — E78.5 HYPERLIPIDEMIA LDL GOAL <100: ICD-10-CM

## 2021-12-06 DIAGNOSIS — I50.22 CHRONIC SYSTOLIC HEART FAILURE (HCC): ICD-10-CM

## 2021-12-06 DIAGNOSIS — E66.01 CLASS 3 SEVERE OBESITY DUE TO EXCESS CALORIES WITH SERIOUS COMORBIDITY AND BODY MASS INDEX (BMI) OF 40.0 TO 44.9 IN ADULT (HCC): Chronic | ICD-10-CM

## 2021-12-06 DIAGNOSIS — I48.3 TYPICAL ATRIAL FLUTTER (HCC): Primary | ICD-10-CM

## 2021-12-06 DIAGNOSIS — I10 BENIGN ESSENTIAL HTN: Chronic | ICD-10-CM

## 2021-12-06 PROCEDURE — G8484 FLU IMMUNIZE NO ADMIN: HCPCS | Performed by: NURSE PRACTITIONER

## 2021-12-06 PROCEDURE — 99214 OFFICE O/P EST MOD 30 MIN: CPT | Performed by: NURSE PRACTITIONER

## 2021-12-06 PROCEDURE — 4040F PNEUMOC VAC/ADMIN/RCVD: CPT | Performed by: NURSE PRACTITIONER

## 2021-12-06 PROCEDURE — G8417 CALC BMI ABV UP PARAM F/U: HCPCS | Performed by: NURSE PRACTITIONER

## 2021-12-06 PROCEDURE — 1123F ACP DISCUSS/DSCN MKR DOCD: CPT | Performed by: NURSE PRACTITIONER

## 2021-12-06 PROCEDURE — G8427 DOCREV CUR MEDS BY ELIG CLIN: HCPCS | Performed by: NURSE PRACTITIONER

## 2021-12-06 PROCEDURE — 93281 PM DEVICE PROGR EVAL MULTI: CPT | Performed by: INTERNAL MEDICINE

## 2021-12-06 PROCEDURE — 1036F TOBACCO NON-USER: CPT | Performed by: NURSE PRACTITIONER

## 2021-12-06 PROCEDURE — 3017F COLORECTAL CA SCREEN DOC REV: CPT | Performed by: NURSE PRACTITIONER

## 2021-12-06 NOTE — PROGRESS NOTES
Patient comes in for programming evaluation for his pacemaker. All sensing and pacing parameters are within normal range. Battery life 11.7 years. AP 22.6%.  2.4%. Patient parameters are Nonadaptive CRT LV->RV V-V 0 ms Paced  ms Sensed  ms. Patient will only BiV pace when he needs to RV pace. No episodes/events noted. No changes need to be made at this time. Please see interrogation for more detail. Optivol is within normal range. Patient will see NPSR today and follow up in 3 months in office or remotely.

## 2021-12-07 PROCEDURE — 93290 INTERROG DEV EVAL ICPMS IP: CPT | Performed by: INTERNAL MEDICINE

## 2021-12-07 PROCEDURE — 93281 PM DEVICE PROGR EVAL MULTI: CPT | Performed by: INTERNAL MEDICINE

## 2021-12-07 RX ORDER — PRAVASTATIN SODIUM 80 MG/1
TABLET ORAL
Qty: 90 TABLET | Refills: 0 | Status: SHIPPED | OUTPATIENT
Start: 2021-12-07 | End: 2022-03-11

## 2021-12-07 NOTE — TELEPHONE ENCOUNTER
Medication:   Requested Prescriptions     Pending Prescriptions Disp Refills    pravastatin (PRAVACHOL) 80 MG tablet [Pharmacy Med Name: PRAVASTATIN SODIUM 80 MG Tablet] 90 tablet 0     Sig: TAKE 1 TABLET EVERY DAY FOR CHOLESTEROL        Last Filled:      Patient Phone Number: [phone] (home)     Last appt: 4/29/2021   Next appt: Visit date not found    Last OARRS: No flowsheet data found.

## 2021-12-09 ENCOUNTER — HOSPITAL ENCOUNTER (OUTPATIENT)
Age: 71
Discharge: HOME | End: 2021-12-09
Payer: MEDICARE

## 2021-12-09 ENCOUNTER — TELEPHONE (OUTPATIENT)
Dept: ORTHOPEDIC SURGERY | Age: 71
End: 2021-12-09

## 2021-12-09 DIAGNOSIS — E87.8 ELECTROLYTE IMBALANCE: ICD-10-CM

## 2021-12-09 LAB
ALBUMIN SERPL-MCNC: 4.5 G/DL (ref 3.4–5)
ANION GAP SERPL CALCULATED.3IONS-SCNC: 15 MMOL/L (ref 3–16)
BUN BLDV-MCNC: 29 MG/DL (ref 7–20)
CALCIUM SERPL-MCNC: 10.6 MG/DL (ref 8.3–10.6)
CHLORIDE BLD-SCNC: 103 MMOL/L (ref 99–110)
CO2: 23 MMOL/L (ref 21–32)
CREAT SERPL-MCNC: 1.2 MG/DL (ref 0.8–1.3)
GFR AFRICAN AMERICAN: >60
GFR NON-AFRICAN AMERICAN: 60
GLUCOSE BLD-MCNC: 119 MG/DL (ref 70–99)
PHOSPHORUS: 4.3 MG/DL (ref 2.5–4.9)
POTASSIUM SERPL-SCNC: 4.4 MMOL/L (ref 3.5–5.1)
SODIUM BLD-SCNC: 141 MMOL/L (ref 136–145)

## 2021-12-09 PROCEDURE — 80069 RENAL FUNCTION PANEL: CPT

## 2021-12-09 PROCEDURE — 36415 COLL VENOUS BLD VENIPUNCTURE: CPT

## 2021-12-09 NOTE — TELEPHONE ENCOUNTER
General Question     Subject: PATIENT IS RETURNING CALL FROM SOMEONE WHO CALLED HIM EARLIER. HE STATES HE IS HAVING SURGERY TOMORROW.     Patient:  Sarah Beth Mejia \"Bill\"  Contact Number: [phone]

## 2021-12-10 ENCOUNTER — TELEPHONE (OUTPATIENT)
Dept: PHARMACY | Age: 71
End: 2021-12-10

## 2021-12-10 ENCOUNTER — TELEPHONE (OUTPATIENT)
Dept: ORTHOPEDIC SURGERY | Age: 71
End: 2021-12-10

## 2021-12-10 DIAGNOSIS — I48.3 TYPICAL ATRIAL FLUTTER (HCC): Primary | ICD-10-CM

## 2021-12-10 NOTE — TELEPHONE ENCOUNTER
General Question     Subject: SURGERY APPROVE  Patient and /or Facility Request:Ara Solis \"Bill\"   Contact Number: [phone]    PATIENT CALLING REGARDING THAT HIS SURGERY FOR TODAY HAS BEEN CANCEL BECAUSE IT HAS NOT BEEN APPROVE. PATIENT STATED THAT HE SPOKE TO THE INSURANCE COMPANY THIS MORNING AND THE SURGERY HAS BEEN APPROVE. PATIENT STATED THAT  DR. Paulina Mckeon OFFICE NEEDS TO CALL THE INSURANCE COMPANY AND GET THE ADDITIONAL INFORMATION. PLEASE CALL BACK PATIENT AT THE ABOVE NUMBER TO R/S SURGERY.

## 2021-12-10 NOTE — TELEPHONE ENCOUNTER
----- Message from Kenn Green sent at 12/10/2021  7:23 AM EST -----  Regarding: Please call  Contact: Patient  Patient LVM stating that his surgery was cancelled and rescheduled for next Friday. He has been off his warfarin and wants to know what he should do.  [phone]

## 2021-12-10 NOTE — TELEPHONE ENCOUNTER
Returned call to patient. Advised to take 15 mg for next 2 days (12/10, 12/11) then begin holding warfarin on Sun 12/12 for procedure on 12/17. Resume warfarin taking 15 mg for 3 days after surgery then return to normal weekly dose of warfarin. R/s pt to RTC on Thurs 12/23.        For Pharmacy Admin Tracking Only     Intervention Detail: Dose Adjustment: 1, reason: Therapy Optimization   Total # of Interventions Recommended: 1   Total # of Interventions Accepted: 1   Time Spent (min): 15

## 2021-12-14 ENCOUNTER — TELEPHONE (OUTPATIENT)
Dept: ORTHOPEDIC SURGERY | Age: 71
End: 2021-12-14

## 2021-12-15 ENCOUNTER — TELEPHONE (OUTPATIENT)
Dept: ORTHOPEDIC SURGERY | Age: 71
End: 2021-12-15

## 2021-12-15 NOTE — TELEPHONE ENCOUNTER
[employer] website states pending MD review. Called [employer] and spoke with Carlyn.   She states the case is not due to be closed until 12/23/21 but she will put in a note to expedite the request 12/15/21

## 2021-12-16 ENCOUNTER — TELEPHONE (OUTPATIENT)
Dept: ORTHOPEDIC SURGERY | Age: 71
End: 2021-12-16

## 2021-12-16 NOTE — TELEPHONE ENCOUNTER
AUTHORIZATION DEPT CALLED AUTHORIZATION IS STILL PENDING BUT THEY SHOULD HAVE A DECISION BY DEC.  23.

## 2021-12-17 ENCOUNTER — TELEPHONE (OUTPATIENT)
Dept: PHARMACY | Age: 71
End: 2021-12-17

## 2021-12-17 NOTE — TELEPHONE ENCOUNTER
----- Message from Usman Krishna sent at 12/17/2021 11:21 AM EST -----  Pt has been off warfarin for surgery, states surgery was cancelled, he needs to know what warfarin dose he should take. This is the 2 nd time surgery has been cancelled . Pt states surgery has been r/s for 12/27 , he did take 15 mgs of warfarin last night. [id number] patient back and inquired as to why his procedure continues to be rescheduled. He states it is d/t an issue with authorizing the procedure with his insurance. Instructed patient to restart his warfarin as instructed in the previous note and begin holding 12/22 for procedure 12/27 and then restart warfarin with 15mg the first three days followed by his normal home dose of 15mg on Wed and Fri and 10mg all other days. Rescheduled appointment to be 7 days after his procedure.     RTC: 1/3  Fracisco Ceballos, NovaD

## 2021-12-21 ENCOUNTER — TELEPHONE (OUTPATIENT)
Dept: ORTHOPEDIC SURGERY | Age: 71
End: 2021-12-21

## 2021-12-21 NOTE — TELEPHONE ENCOUNTER
WENT TO [employer]BS[employer] TO EDIT REQUEST.   I CHANGED LOCATION TO [facility], AND THE DATE OF SURGERY TO 12/27/21

## 2021-12-23 ENCOUNTER — ANESTHESIA EVENT (OUTPATIENT)
Dept: OPERATING ROOM | Age: 71
End: 2021-12-23
Payer: MEDICARE

## 2021-12-27 ENCOUNTER — HOSPITAL ENCOUNTER (OUTPATIENT)
Age: 71
Setting detail: OUTPATIENT SURGERY
Discharge: HOME | End: 2021-12-27
Attending: ORTHOPAEDIC SURGERY | Admitting: ORTHOPAEDIC SURGERY
Payer: MEDICARE

## 2021-12-27 ENCOUNTER — ANESTHESIA (OUTPATIENT)
Dept: OPERATING ROOM | Age: 71
End: 2021-12-27
Payer: MEDICARE

## 2021-12-27 VITALS
OXYGEN SATURATION: 98 % | WEIGHT: 286 LBS | SYSTOLIC BLOOD PRESSURE: 128 MMHG | HEART RATE: 65 BPM | DIASTOLIC BLOOD PRESSURE: 80 MMHG | BODY MASS INDEX: 37.91 KG/M2 | TEMPERATURE: 97.8 F | RESPIRATION RATE: 18 BRPM | HEIGHT: 73 IN

## 2021-12-27 VITALS — DIASTOLIC BLOOD PRESSURE: 63 MMHG | TEMPERATURE: 98.2 F | OXYGEN SATURATION: 99 % | SYSTOLIC BLOOD PRESSURE: 108 MMHG

## 2021-12-27 DIAGNOSIS — Z98.890 STATUS POST ARTHROSCOPIC SURGERY OF RIGHT KNEE: Primary | ICD-10-CM

## 2021-12-27 DIAGNOSIS — S82.131D CLOSED FRACTURE OF MEDIAL PORTION OF RIGHT TIBIAL PLATEAU WITH ROUTINE HEALING, SUBSEQUENT ENCOUNTER: ICD-10-CM

## 2021-12-27 LAB
GLUCOSE BLD-MCNC: 128 MG/DL (ref 70–99)
GLUCOSE BLD-MCNC: 130 MG/DL (ref 70–99)
PERFORMED ON: ABNORMAL
PERFORMED ON: ABNORMAL

## 2021-12-27 PROCEDURE — 2500000003 HC RX 250 WO HCPCS: Performed by: ANESTHESIOLOGY

## 2021-12-27 PROCEDURE — 77002 NEEDLE LOCALIZATION BY XRAY: CPT | Performed by: ORTHOPAEDIC SURGERY

## 2021-12-27 PROCEDURE — 38230 BONE MARROW HARVEST ALLOGEN: CPT | Performed by: ORTHOPAEDIC SURGERY

## 2021-12-27 PROCEDURE — 2580000003 HC RX 258: Performed by: ANESTHESIOLOGY

## 2021-12-27 PROCEDURE — 27599 UNLISTED PX FEMUR/KNEE: CPT | Performed by: PHYSICIAN ASSISTANT

## 2021-12-27 PROCEDURE — 3600000014 HC SURGERY LEVEL 4 ADDTL 15MIN: Performed by: ORTHOPAEDIC SURGERY

## 2021-12-27 PROCEDURE — 7100000010 HC PHASE II RECOVERY - FIRST 15 MIN: Performed by: ORTHOPAEDIC SURGERY

## 2021-12-27 PROCEDURE — 6360000002 HC RX W HCPCS: Performed by: ORTHOPAEDIC SURGERY

## 2021-12-27 PROCEDURE — 7100000000 HC PACU RECOVERY - FIRST 15 MIN: Performed by: ORTHOPAEDIC SURGERY

## 2021-12-27 PROCEDURE — 3700000000 HC ANESTHESIA ATTENDED CARE: Performed by: ORTHOPAEDIC SURGERY

## 2021-12-27 PROCEDURE — 29881 ARTHRS KNE SRG MNISECTMY M/L: CPT | Performed by: ORTHOPAEDIC SURGERY

## 2021-12-27 PROCEDURE — 2500000003 HC RX 250 WO HCPCS: Performed by: NURSE ANESTHETIST, CERTIFIED REGISTERED

## 2021-12-27 PROCEDURE — 6370000000 HC RX 637 (ALT 250 FOR IP): Performed by: ANESTHESIOLOGY

## 2021-12-27 PROCEDURE — 6360000002 HC RX W HCPCS: Performed by: ANESTHESIOLOGY

## 2021-12-27 PROCEDURE — 2720000010 HC SURG SUPPLY STERILE: Performed by: ORTHOPAEDIC SURGERY

## 2021-12-27 PROCEDURE — 2709999900 HC NON-CHARGEABLE SUPPLY: Performed by: ORTHOPAEDIC SURGERY

## 2021-12-27 PROCEDURE — 3700000001 HC ADD 15 MINUTES (ANESTHESIA): Performed by: ORTHOPAEDIC SURGERY

## 2021-12-27 PROCEDURE — 7100000011 HC PHASE II RECOVERY - ADDTL 15 MIN: Performed by: ORTHOPAEDIC SURGERY

## 2021-12-27 PROCEDURE — 2580000003 HC RX 258: Performed by: NURSE ANESTHETIST, CERTIFIED REGISTERED

## 2021-12-27 PROCEDURE — 3600000004 HC SURGERY LEVEL 4 BASE: Performed by: ORTHOPAEDIC SURGERY

## 2021-12-27 PROCEDURE — 27599 UNLISTED PX FEMUR/KNEE: CPT | Performed by: ORTHOPAEDIC SURGERY

## 2021-12-27 PROCEDURE — 6360000002 HC RX W HCPCS: Performed by: NURSE ANESTHETIST, CERTIFIED REGISTERED

## 2021-12-27 PROCEDURE — 2500000003 HC RX 250 WO HCPCS: Performed by: ORTHOPAEDIC SURGERY

## 2021-12-27 PROCEDURE — 20610 DRAIN/INJ JOINT/BURSA W/O US: CPT | Performed by: ORTHOPAEDIC SURGERY

## 2021-12-27 PROCEDURE — 7100000001 HC PACU RECOVERY - ADDTL 15 MIN: Performed by: ORTHOPAEDIC SURGERY

## 2021-12-27 RX ORDER — ONDANSETRON 2 MG/ML
4 INJECTION INTRAMUSCULAR; INTRAVENOUS PRN
Status: DISCONTINUED | OUTPATIENT
Start: 2021-12-27 | End: 2021-12-27 | Stop reason: HOSPADM

## 2021-12-27 RX ORDER — SODIUM CHLORIDE, SODIUM LACTATE, POTASSIUM CHLORIDE, CALCIUM CHLORIDE 600; 310; 30; 20 MG/100ML; MG/100ML; MG/100ML; MG/100ML
INJECTION, SOLUTION INTRAVENOUS CONTINUOUS PRN
Status: DISCONTINUED | OUTPATIENT
Start: 2021-12-27 | End: 2021-12-27 | Stop reason: SDUPTHER

## 2021-12-27 RX ORDER — OXYCODONE HYDROCHLORIDE AND ACETAMINOPHEN 5; 325 MG/1; MG/1
1 TABLET ORAL EVERY 6 HOURS PRN
Qty: 28 TABLET | Refills: 0 | Status: SHIPPED | OUTPATIENT
Start: 2021-12-27 | End: 2022-01-03

## 2021-12-27 RX ORDER — SODIUM CHLORIDE, SODIUM LACTATE, POTASSIUM CHLORIDE, CALCIUM CHLORIDE 600; 310; 30; 20 MG/100ML; MG/100ML; MG/100ML; MG/100ML
INJECTION, SOLUTION INTRAVENOUS CONTINUOUS
Status: DISCONTINUED | OUTPATIENT
Start: 2021-12-27 | End: 2021-12-27 | Stop reason: HOSPADM

## 2021-12-27 RX ORDER — PROPOFOL 10 MG/ML
INJECTION, EMULSION INTRAVENOUS PRN
Status: DISCONTINUED | OUTPATIENT
Start: 2021-12-27 | End: 2021-12-27 | Stop reason: SDUPTHER

## 2021-12-27 RX ORDER — DIPHENHYDRAMINE HYDROCHLORIDE 50 MG/ML
12.5 INJECTION INTRAMUSCULAR; INTRAVENOUS
Status: DISCONTINUED | OUTPATIENT
Start: 2021-12-27 | End: 2021-12-27 | Stop reason: HOSPADM

## 2021-12-27 RX ORDER — MORPHINE SULFATE 2 MG/ML
2 INJECTION, SOLUTION INTRAMUSCULAR; INTRAVENOUS EVERY 5 MIN PRN
Status: DISCONTINUED | OUTPATIENT
Start: 2021-12-27 | End: 2021-12-27 | Stop reason: HOSPADM

## 2021-12-27 RX ORDER — PROMETHAZINE HYDROCHLORIDE 25 MG/ML
6.25 INJECTION, SOLUTION INTRAMUSCULAR; INTRAVENOUS
Status: DISCONTINUED | OUTPATIENT
Start: 2021-12-27 | End: 2021-12-27 | Stop reason: HOSPADM

## 2021-12-27 RX ORDER — CALCIUM CHLORIDE 100 MG/ML
INJECTION INTRAVENOUS; INTRAVENTRICULAR
Status: DISCONTINUED
Start: 2021-12-27 | End: 2021-12-27 | Stop reason: HOSPADM

## 2021-12-27 RX ORDER — LABETALOL HYDROCHLORIDE 5 MG/ML
5 INJECTION, SOLUTION INTRAVENOUS EVERY 10 MIN PRN
Status: DISCONTINUED | OUTPATIENT
Start: 2021-12-27 | End: 2021-12-27 | Stop reason: HOSPADM

## 2021-12-27 RX ORDER — OXYCODONE HYDROCHLORIDE AND ACETAMINOPHEN 5; 325 MG/1; MG/1
2 TABLET ORAL PRN
Status: DISCONTINUED | OUTPATIENT
Start: 2021-12-27 | End: 2021-12-27 | Stop reason: HOSPADM

## 2021-12-27 RX ORDER — MORPHINE SULFATE 2 MG/ML
1 INJECTION, SOLUTION INTRAMUSCULAR; INTRAVENOUS EVERY 5 MIN PRN
Status: DISCONTINUED | OUTPATIENT
Start: 2021-12-27 | End: 2021-12-27 | Stop reason: HOSPADM

## 2021-12-27 RX ORDER — HEPARIN SODIUM 10000 [USP'U]/ML
INJECTION, SOLUTION INTRAVENOUS; SUBCUTANEOUS
Status: DISCONTINUED
Start: 2021-12-27 | End: 2021-12-27 | Stop reason: HOSPADM

## 2021-12-27 RX ORDER — LIDOCAINE HYDROCHLORIDE 20 MG/ML
INJECTION, SOLUTION INFILTRATION; PERINEURAL PRN
Status: DISCONTINUED | OUTPATIENT
Start: 2021-12-27 | End: 2021-12-27 | Stop reason: SDUPTHER

## 2021-12-27 RX ORDER — HYDRALAZINE HYDROCHLORIDE 20 MG/ML
5 INJECTION INTRAMUSCULAR; INTRAVENOUS EVERY 10 MIN PRN
Status: DISCONTINUED | OUTPATIENT
Start: 2021-12-27 | End: 2021-12-27 | Stop reason: HOSPADM

## 2021-12-27 RX ORDER — OXYCODONE HYDROCHLORIDE AND ACETAMINOPHEN 5; 325 MG/1; MG/1
2 TABLET ORAL PRN
Status: COMPLETED | OUTPATIENT
Start: 2021-12-27 | End: 2021-12-27

## 2021-12-27 RX ORDER — OXYCODONE HYDROCHLORIDE AND ACETAMINOPHEN 5; 325 MG/1; MG/1
1 TABLET ORAL PRN
Status: DISCONTINUED | OUTPATIENT
Start: 2021-12-27 | End: 2021-12-27 | Stop reason: HOSPADM

## 2021-12-27 RX ORDER — FENTANYL CITRATE 50 UG/ML
INJECTION, SOLUTION INTRAMUSCULAR; INTRAVENOUS PRN
Status: DISCONTINUED | OUTPATIENT
Start: 2021-12-27 | End: 2021-12-27 | Stop reason: SDUPTHER

## 2021-12-27 RX ORDER — OXYCODONE HYDROCHLORIDE AND ACETAMINOPHEN 5; 325 MG/1; MG/1
1 TABLET ORAL PRN
Status: COMPLETED | OUTPATIENT
Start: 2021-12-27 | End: 2021-12-27

## 2021-12-27 RX ORDER — ONDANSETRON 2 MG/ML
INJECTION INTRAMUSCULAR; INTRAVENOUS PRN
Status: DISCONTINUED | OUTPATIENT
Start: 2021-12-27 | End: 2021-12-27 | Stop reason: SDUPTHER

## 2021-12-27 RX ADMIN — FENTANYL CITRATE 50 MCG: 50 INJECTION INTRAMUSCULAR; INTRAVENOUS at 11:47

## 2021-12-27 RX ADMIN — OXYCODONE HYDROCHLORIDE AND ACETAMINOPHEN 1 TABLET: 5; 325 TABLET ORAL at 13:32

## 2021-12-27 RX ADMIN — LIDOCAINE HYDROCHLORIDE 0.1 ML: 10 INJECTION, SOLUTION EPIDURAL; INFILTRATION; INTRACAUDAL; PERINEURAL at 11:00

## 2021-12-27 RX ADMIN — Medication 3000 MG: at 11:44

## 2021-12-27 RX ADMIN — ONDANSETRON HYDROCHLORIDE 4 MG: 2 INJECTION, SOLUTION INTRAMUSCULAR; INTRAVENOUS at 11:47

## 2021-12-27 RX ADMIN — LIDOCAINE HYDROCHLORIDE 100 MG: 20 INJECTION, SOLUTION INFILTRATION; PERINEURAL at 11:47

## 2021-12-27 RX ADMIN — SODIUM CHLORIDE, POTASSIUM CHLORIDE, SODIUM LACTATE AND CALCIUM CHLORIDE: 600; 310; 30; 20 INJECTION, SOLUTION INTRAVENOUS at 11:01

## 2021-12-27 RX ADMIN — HYDROMORPHONE HYDROCHLORIDE 0.5 MG: 1 INJECTION, SOLUTION INTRAMUSCULAR; INTRAVENOUS; SUBCUTANEOUS at 13:12

## 2021-12-27 RX ADMIN — PHENYLEPHRINE HYDROCHLORIDE 100 MCG: 10 INJECTION INTRAVENOUS at 12:11

## 2021-12-27 RX ADMIN — PHENYLEPHRINE HYDROCHLORIDE 100 MCG: 10 INJECTION INTRAVENOUS at 12:14

## 2021-12-27 RX ADMIN — PROPOFOL 200 MG: 10 INJECTION, EMULSION INTRAVENOUS at 11:47

## 2021-12-27 RX ADMIN — FENTANYL CITRATE 50 MCG: 50 INJECTION INTRAMUSCULAR; INTRAVENOUS at 12:11

## 2021-12-27 RX ADMIN — SODIUM CHLORIDE, POTASSIUM CHLORIDE, SODIUM LACTATE AND CALCIUM CHLORIDE: 600; 310; 30; 20 INJECTION, SOLUTION INTRAVENOUS at 11:43

## 2021-12-27 ASSESSMENT — PAIN SCALES - GENERAL
PAINLEVEL_OUTOF10: 9
PAINLEVEL_OUTOF10: 4
PAINLEVEL_OUTOF10: 9
PAINLEVEL_OUTOF10: 4

## 2021-12-27 ASSESSMENT — PAIN DESCRIPTION - PAIN TYPE
TYPE: SURGICAL PAIN
TYPE: SURGICAL PAIN

## 2021-12-27 ASSESSMENT — PAIN - FUNCTIONAL ASSESSMENT
PAIN_FUNCTIONAL_ASSESSMENT: 0-10
PAIN_FUNCTIONAL_ASSESSMENT: PREVENTS OR INTERFERES SOME ACTIVE ACTIVITIES AND ADLS

## 2021-12-27 ASSESSMENT — PAIN DESCRIPTION - ORIENTATION
ORIENTATION: RIGHT
ORIENTATION: RIGHT

## 2021-12-27 ASSESSMENT — PAIN DESCRIPTION - DESCRIPTORS: DESCRIPTORS: SHARP;STABBING

## 2021-12-27 ASSESSMENT — PAIN DESCRIPTION - LOCATION
LOCATION: KNEE
LOCATION: KNEE

## 2021-12-27 NOTE — ANESTHESIA PRE PROCEDURE
Department of Anesthesiology  Preprocedure Note       Name:  Shyam Galvan   Age:  80 y.o.  :  1950                                          MRN:  [id number]         Date:  2021      Surgeon: Gely Almendarez):  Karlene Bell MD    Procedure: Procedure(s):  RIGHT KNEE DIAGNOSTIC ARTHROSCOPY WITH SUBCHONDROPLASTY TO MEDIAL FEMORAL CONDYLE AND TIBIAL PLATEAU    Medications prior to admission:   Prior to Admission medications    Medication Sig Start Date End Date Taking? Authorizing Provider   pravastatin (PRAVACHOL) 80 MG tablet TAKE 1 TABLET EVERY DAY FOR CHOLESTEROL 21   Halifaxrashid Foreman APRN - CNP   furosemide (LASIX) 40 MG tablet Take 1 tablet by mouth daily 11/10/21   Reda Downy, APRN - CNS   vitamin D3 (CHOLECALCIFEROL) 25 MCG (1000 UT) TABS tablet Take 1 tablet by mouth daily  Patient not taking: Reported on 2021 11/10/21   Reda Downy, APRN - CNS   metFORMIN (GLUCOPHAGE-XR) 500 MG extended release tablet TAKE [address]  Patient taking differently: Take 1,000 mg by mouth 2 times daily  21   MEENU Miller   lisinopril (PRINIVIL;ZESTRIL) 20 MG tablet TAKE 1 TABLET TWICE DAILY 10/20/21   MEENU Armando - CNP   allopurinol (ZYLOPRIM) 300 MG tablet TAKE 1 TABLET EVERY DAY 10/20/21   MEENU Miller   warfarin (COUMADIN) 10 MG tablet Take 1.5 tablets by mouth Twice a Week AND 1 tablet Five times weekly. TAKE 1 TABLET DAILY EXCEPT TAKE 1 AND 1/2 TABLETS (15 MG) ON WEDNESDAY AND FRIDAY. 10/14/21 1/12/22  Lexis Cuenca MD   amLODIPine (NORVASC) 5 MG tablet TAKE 1 TABLET EVERY DAY 21   MEENU Kaplan - CNS   glucose monitoring kit (FREESTYLE) monitoring kit Ok to dispense what is covered by insurance. 20   Fly Oconnell MD   blood glucose monitor strips Test 2 times a day & as needed for symptoms of irregular blood glucose.  Dispense sufficient amount for indicated testing frequency plus additional to accommodate PRN testing needs. Ok to dispense what is covered by insurance 7/4/20   Varghese Gutierrez MD   ACCU-CHEK COMPACT PLUS strip USE TO TEST 2 TIMES DAILY 6/23/20   Varghese Gutierrez MD   Lancets MISC BID testing 2/24/17   Varghese Gutierrez MD   sotalol (BETAPACE) 80 MG tablet Take 80 mg by mouth 2 times daily. Historical Provider, MD   aspirin 81 MG chewable tablet Take 81 mg by mouth daily.     Historical Provider, MD       Current medications:    Current Facility-Administered Medications   Medication Dose Route Frequency Provider Last Rate Last Admin    ceFAZolin (ANCEF) 3000 mg in dextrose 5 % 100 mL IVPB  3,000 mg IntraVENous Once Manasa Pratt MD        lactated ringers infusion   IntraVENous Continuous Ranjan Johnston MD        lidocaine 1 % (PF) injection 0.1 mL  0.1 mL IntraDERmal Once PRN Ranjan Johnston MD         Facility-Administered Medications Ordered in Other Encounters   Medication Dose Route Frequency Provider Last Rate Last Admin    oxyCODONE-acetaminophen (PERCOCET) 5-325 MG per tablet 1 tablet  1 tablet Oral Once Manasa Pratt MD        lactated ringers infusion   IntraVENous Continuous Shirley Bell MD        sodium chloride flush 0.9 % injection 10 mL  10 mL IntraVENous 2 times per day Shirley Bell MD        sodium chloride flush 0.9 % injection 10 mL  10 mL IntraVENous PRN Shirley Bell MD        0.9 % sodium chloride infusion  25 mL IntraVENous PRN Shirley Bell MD           Allergies:  No Known Allergies    Problem List:    Patient Active Problem List   Diagnosis Code    Benign essential HTN I10    Hyperlipidemia, mixed E78.2    Long term current use of anticoagulants with INR goal of 2.0-3.0 Z79.01    Allergic rhinitis J30.9    Gout of both feet M10.9    Primary insomnia F51.01    Effusion of left knee M25.462    Uncontrolled type 2 diabetes mellitus with microalbuminuria, without long-term current use of insulin ([facility]) E11.29, E11.65, R80.9  Class 3 severe obesity due to excess calories with serious comorbidity in adult ([facility]) E66.01    COPD J43.8    ROSETTA (obstructive sleep apnea) G47.33    Chronic congestive heart failure (HCC) I50.9    Cardiomyopathy, dilated ([facility]) I42.0    Chronic systolic heart failure ([facility]) I50.22    Stage 2 chronic kidney disease N18.2    S/P AVR Z95.2    Pacemaker Z95.0    Typical atrial flutter ([facility]) I48.3    Chronic pain of left knee M25.562, G89.29    Chronic pain of right knee M25.561, G89.29    Primary osteoarthritis of both knees M17.0       Past Medical History:        Diagnosis Date    Acute congestive heart failure ([facility]) 12/30/2019    Allergic rhinitis 7/11/2016    Atrial fibrillation ([facility])     under care of cardiology:Dr. Houston Behrens([facility])    CKD (chronic kidney disease)     Nephro:Dr. Parker:stage 3    Class 2 obesity due to excess calories without serious comorbidity with body mass index (BMI) of 37.0 to 37.9 in adult 11/7/2017    Controlled type 2 diabetes mellitus without complication, without long-term current use of insulin ([facility] [address] 2/24/2017    COPD     Essential tremor     Gout     Hyperlipidemia, mixed 07/11/2016    Hypertension     under care of cardiology:Dr. Houston Behrens([facility])    Long term current use of anticoagulants with INR goal of 2.0-3.0     under care of cardiology:Dr. Scott Behrens([facility])    Obstructive sleep apnea syndrome 06/18/2019    per pulmo    Primary insomnia 1/25/2017    Primary osteoarthritis of both knees 9/14/2021    Sleep apnea     uses bipap       Past Surgical History:        Procedure Laterality Date    AORTIC VALVE REPLACEMENT  10/1996:[facility]    x3    ATRIAL ABLATION SURGERY  03/23/2020    CTI dependent atrial flutter ablation (Dr. Wesley Mccormick)   [address]  03/23/2020    BIV upgrade    PACEMAKER INSERTION  1996       Social History:    Social History     Tobacco Use    Smoking status: Former Smoker     Packs/day: 1.00 Years: 30.00     Pack years: 30.00     Types: Cigarettes     Quit date: 2019     Years since quittin.5    Smokeless tobacco: Never Used    Tobacco comment:     Substance Use Topics    Alcohol use: Yes     Comment: 3-4 beers per year                                Counseling given: Not Answered  Comment:        Vital Signs (Current):   Vitals:    21 1026   Weight: 286 lb (129.7 kg)   Height: 6' 1\" (1.854 m)                                              BP Readings from Last 3 Encounters:   21 133/85   21 (!) 154/86   21 138/88       NPO Status:                                                                                 BMI:   Wt Readings from Last 3 Encounters:   21 286 lb (129.7 kg)   21 290 lb (131.5 kg)   21 287 lb 6.4 oz (130.4 kg)     Body mass index is 37.73 kg/m². CBC:   Lab Results   Component Value Date    WBC 6.7 2021    RBC 4.45 2021    HGB 13.3 2021    HCT 40.9 2021    MCV 92.0 2021    RDW 15.2 2021     2021       CMP:   Lab Results   Component Value Date     2021    K 4.4 2021     2021    CO2 23 2021    BUN 29 2021    CREATININE 1.2 2021    GFRAA >60 2021    GFRAA >60 2011    AGRATIO 1.5 2021    LABGLOM 60 2021    GLUCOSE 119 2021    PROT 7.6 2021    CALCIUM 10.6 2021    BILITOT 0.3 2021    ALKPHOS 96 2021    AST 21 2021    ALT 17 2021       POC Tests: No results for input(s): POCGLU, POCNA, POCK, POCCL, POCBUN, POCHEMO, POCHCT in the last 72 hours.     Coags:   Lab Results   Component Value Date    PROTIME 20.7 2021    PROTIME 24.5 2009    INR 1.79 2021       HCG (If Applicable): No results found for: PREGTESTUR, PREGSERUM, HCG, HCGQUANT     ABGs: No results found for: PHART, PO2ART, KLT1BTX, ZZG7HFP, BEART, H6HIQNWF     Type & Screen (If Applicable):  No results found for: Mai Maki    Drug/Infectious Status (If Applicable):  No results found for: HIV, HEPCAB    COVID-19 Screening (If Applicable): No results found for: COVID19        Anesthesia Evaluation  Patient summary reviewed no history of anesthetic complications:   Airway: Mallampati: II  TM distance: >3 FB   Neck ROM: full  Mouth opening: > = 3 FB Dental: normal exam         Pulmonary:Negative Pulmonary ROS and normal exam  breath sounds clear to auscultation  (+) COPD:  sleep apnea: on CPAP,                             Cardiovascular:Negative CV ROS  Exercise tolerance: good (>4 METS),   (+) hypertension:, valvular problems/murmurs (s/p AVR):, pacemaker: pacemaker, dysrhythmias: atrial flutter, CHF:,         Rhythm: regular  Rate: normal                    Neuro/Psych:   Negative Neuro/Psych ROS              GI/Hepatic/Renal: Neg GI/Hepatic/Renal ROS  (+) renal disease: CRI,           Endo/Other: Negative Endo/Other ROS   (+) Diabetes, . Abdominal:             Vascular: negative vascular ROS. Other Findings:          Pre-Operative Diagnosis: RIGHT KNEE PAIN, RIGHT KNEE INSUFFICIENCY FRACTURE TO TIBIAL PLATEAU AND FEMORAL CONDYLE    80 y.o.   BMI:  Body mass index is 37.73 kg/m².      Vitals:    21 1026   Weight: 286 lb (129.7 kg)   Height: 6' 1\" (1.854 m)       No Known Allergies    Social History     Tobacco Use    Smoking status: Former Smoker     Packs/day: 1.00     Years: 30.00     Pack years: 30.00     Types: Cigarettes     Quit date: 2019     Years since quittin.5    Smokeless tobacco: Never Used    Tobacco comment:     Substance Use Topics    Alcohol use: Yes     Comment: 3-4 beers per year       LABS:    CBC  Lab Results   Component Value Date/Time    WBC 6.7 2021 03:52 PM    HGB 13.3 (L) 2021 03:52 PM    HCT 40.9 2021 03:52 PM     2021 03:52 PM     RENAL  Lab Results   Component Value Date/Time     2021 08:42 AM    K 4.4 12/09/2021 08:42 AM     12/09/2021 08:42 AM    CO2 23 12/09/2021 08:42 AM    BUN 29 (H) 12/09/2021 08:42 AM    CREATININE 1.2 12/09/2021 08:42 AM    GLUCOSE 119 (H) 12/09/2021 08:42 AM     COAGS  Lab Results   Component Value Date/Time    PROTIME 20.7 (H) 12/02/2021 03:52 PM    PROTIME 24.5 (H) 12/08/2009 10:33 AM    INR 1.79 (H) 12/02/2021 03:52 PM        Anesthesia Plan      general     ASA 3     (I discussed with the patient the risks and benefits of regional anesthesia (inlcuding infection, bleeding, damage to nerves and surrounding structures) PIV, General, IV Narcotics, PACU. All questions were answered the patient agrees with the plan and wishes to proceed.)  Induction: intravenous.                           Mandeep Monteiro MD   12/27/2021

## 2021-12-27 NOTE — OP NOTE
Operative Note      Patient: Jenniffer Stehpenson  YOB: 1950  MRN: [id number]    Date of Procedure: 2021    Pre-Op Diagnosis: RIGHT KNEE PAIN, RIGHT KNEE INSUFFICIENCY FRACTURE TO TIBIAL PLATEAU AND FEMORAL CONDYLE    Post-Op Diagnosis: Same       Procedure(s):  RIGHT KNEE DIAGNOSTIC ARTHROSCOPY WITH SUBCHONDROPLASTY TO MEDIAL FEMORAL CONDYLE AND TIBIAL PLATEAU, LEFT KNEE INJECTION. Surgeon(s):  Gertrude Castillo MD    Assistant:   * No surgical staff found *    Anesthesia: General    Estimated Blood Loss (mL): Minimal    Complications: None    Specimens:   * No specimens in log *    Implants:  * No implants in log *      Drains: * No LDAs found *    Findings:  Medial knee early osteoarthritis      Detailed Description of Procedure:   OPERATIVE REPORT      Patient Name:   Jenniffer Stephenson Surgeon:   Allison Turner MD  :   1950 Dictated by:   Allison Turner MD  MRN #:   [id number] PCP:  Golden Beal MD    EBL : min  Referring:   No ref. provider found     PREOPERATIVE DIAGNOSES:   1. Osteoartrhitis right  knee. 2. Chondromalacia right knee  3. Tibial stress fracture  right medial  4.medial   knee osteoarthritis right   5. left  Knee osteoarthritis      POSTOPERATIVE DIAGNOSES:    Same       PROCEDURES PERFORMED:   1. Arthroscopy   right  knee with partial menisectomy medial   2. Major Synovectomy - 3 compartments  right   knee    3. Chondroplasty of the [address], MTP and TROCHLEA.  right knee. 4. subchondroplasty  right   knee tibia and femur. 5. Bone graft harvesting  right  pelvis  6. Injection  left  knee with flouro localization         SURGEON:   Allison Turner M.D.   [facility]  ANESTHESIA:  General.       DETAILS OF PROCEDURE:  The patient was given preop medication and brought to the operating room where the surgery was again confirmed, as scheduled for the right knee.  The patient was then placed on the table, given general anesthesia  (and intubated). A tourniquet was placed around the proximal  thigh and the leg was placed into a \"Surgical Assistant\" leg krishnan. The knee was then prepped with DuraPrep and draped in the usual manner. The tourniquet was inflated.)   After exsanguination with an Esmarch bandage, the tourniquet was inflated to a level of 300 mmHg.)  A time-out confirmation was performed, according to the universal protocol. Appropriate antibiotics were given prior to the start of the surgery.)   Arthroscopy was then carried out through the inferolateral portal; saline inflow was superomedial, and an inferomedial portal was also utilized. The suprapatellar pouch was examined first.  noneloose bodies were seen. The patella was found to have Grade IV chondromalacia of theLateral and Medial  facet(s). The superior compartment had  significant synovitis and fat pad hypertrophy. The medial compartment was then examined. There were none loose bodies. The anterior horn and body of the meniscus wereintact  The posterior horn was found to bedegenerative The [address] had  Grade III chondromalacia of the weight-bearing portion. The MTP had  Grade II chondromalacia. The ACL was examined and noted to be intact. The lower portion of the trochlea was noted to have Grade II chondromalacia. The lateral compartment was examined. There were none loose bodies. The anterior horn and body of the meniscus were intact  intact . The posterior horn was found to intact  The LFC had Grade II chondromalacia of the weight-bearing portion. The LTP had Grade I chondromalacia. A Medial menisectomy was performed, using the aggressor shaver and the linear baskets, which were used to morselize the remaining portion of the torn meniscus. The stump of the meniscus was sealed using the ArthroWand coblation tool.       A chondroplasty of the MFC, MTP and TROCHLEA. was performed using the ArthroWand coblation tool, through the medial /lateral  portals. A synovectomy was performed of the medial, lateral and superior compartments, using the Aggressor shaver. Hemostasis was achieved using the coblation tool. The synovectomy was performed through both the medial and lateral portals. The base of the synovial tissue was sealed using the ArthroWand tool. Final video-photographs were taken. The joint was then thoroughly irrigated and suctioned. The instruments were removed. The wounds were approximated with interrupted 2-0 nylon. (15) ml of the 0.25% Marcaine with Epinephrine was injected. A simple dressing, followed by a thicker overlying compression bandage were applied. Bone graft was obtained through incision in the  right anterior iliac crest.     This was fractionated into platelet rich/poor plasma and stem cell aggregate. Injection was done through needle localization into the medial aspect of the anterior third of the tibia and the middle of the femoral condyle medially. Based on the preoperative review of the patient's left knee MRI the location of the bone marrow lesion, consistent with an insufficiency or stress fracture, and the tibial plateau ) and femoral condyle was identified. Preoperative surgical planning allowed further determination of the optimal method for accessing the lesion. Intraoperatively, image fluoroscopy combined with direct intra-articular correlation with arthroscopic instruments and correlation with reviewing of MRI images and fluoroscopy Were used to guide surgical instruments to the proximity of the subchondral tibial plateau and femoral condyle fracture specifically, the Jamshidi needle Was placed in the subchondral bone of the tibia  and Femur. Standard repair methodology was used to treat the subchondral bone defect in the tibial plateau and femoral condyle. Image fluoroscopy was utilized to confirm accurate insertion of the Jamshidi needle Into the subchondral bone of the tibia and femur. , Fracture stabilization was performed by injecting  Bone Marrow aspirate/graft concentrate Into the tibial plateauand femoral condyle. Image fluoroscopy was used to monitor the injection process and ensure injection of the bone substitute into the subchondral bone so that following polymerization, the bone substitute stabilize a fracture and facilitated fracture repair. The injected wounds were closed and sterile dressing was applied. Injection of raw marrow and stem cell aggregate was done into the respective areas of bone. Injection of PRP, PPP was done into the joint through a lateral approach. Injection Procedure Note  Procedure Details     Verbal consent was obtained for the procedure. Theleft  knee(s) was prepped with iodine and the skin was anesthetized. Using a 22 gauge needle the left knee(s) joint  under flouro is injected with injection of 6cc of prp low leukocyte count under the lateral aspect of the knee. The injection site was cleansed with topical isopropyl alcohol and a dressing was applied. Complications:  None; patient tolerated the procedure well. The patient was then awakened from anesthesia, transferred to the stretcher, and brought to the recovery room in satisfactory condition.   Sponge and needle counts were correct.             ____________________________________           Jackson Overton MD        Electronically signed by Jackson Overton MD on 12/27/2021 at 1:35 PM

## 2021-12-27 NOTE — PROGRESS NOTES
Discharge  instructions reviewed. Pt and family verbalize understanding with no further questions. VSS. Pt discharged via [facility] [address] to car. Assessment unchanged.

## 2021-12-27 NOTE — PROGRESS NOTES
Patient is able to demonstrate the ability to move from a reclining position to an upright position within the recliner. Pt checked in for procedure. Pt provided with a walker in preop and adjusted to appropriate size. Assessment complete. IV started. Safety check list complete. Pt's  - Gisela's (wife) number provided on the chart.

## 2021-12-27 NOTE — ANESTHESIA POSTPROCEDURE EVALUATION
Department of Anesthesiology  Postprocedure Note    Patient: Tavia Ferguson  MRN: [id number]  YOB: 1950  Date of evaluation: 12/27/2021  Time:  2:09 PM     Procedure Summary     Date: 12/27/21 Room / Location: [facility] EG OR 03 / [facility]    Anesthesia Start: 1143 Anesthesia Stop: 1310    Procedure: RIGHT KNEE DIAGNOSTIC ARTHROSCOPY WITH SUBCHONDROPLASTY TO MEDIAL FEMORAL CONDYLE AND TIBIAL PLATEAU, LEFT KNEE INJECTION. (Right Knee) Diagnosis: (RIGHT KNEE PAIN, RIGHT KNEE INSUFFICIENCY FRACTURE TO TIBIAL PLATEAU AND FEMORAL CONDYLE)    Surgeons: Antonio Garibay MD Responsible Provider: Jason Hurt MD    Anesthesia Type: general ASA Status: 3          Anesthesia Type: general    Karlene Phase I: Karlene Score: 9    Karlene Phase II: Karlene Score: 10    Last vitals: Reviewed and per EMR flowsheets.        Anesthesia Post Evaluation    Comments: Postoperative Anesthesia Note    Name:    Tavia Ferguson  MRN:      [id number]    Patient Vitals in the past 12 hrs:  12/27/21 1343, BP:128/80, Pulse:65, Resp:18, SpO2:98 %  12/27/21 1328, BP:120/60, Temp:97.8 °F (36.6 °C), Temp src:Temporal, Pulse:65, Resp:18, SpO2:98 %  12/27/21 1313, BP:120/69, Pulse:66, Resp:20, SpO2:94 %  12/27/21 1308, BP:114/74, Pulse:67, Resp:20, SpO2:94 %  12/27/21 1303, BP:134/85, Temp:97.7 °F (36.5 °C), Temp src:Temporal, Pulse:65, Resp:20, SpO2:95 %  12/27/21 1048, BP:(!) 141/80, Temp:97.5 °F (36.4 °C), Temp src:Temporal, Pulse:79, Resp:21, SpO2:98 %, Height:6' 1\" (1.854 m), Weight:286 lb (129.7 kg)  12/27/21 1026, Height:6' 1\" (1.854 m), Weight:286 lb (129.7 kg)     LABS:    CBC  Lab Results       Component                Value               Date/Time                  WBC                      6.7                 12/02/2021 03:52 PM        HGB                      13.3 (L)            12/02/2021 03:52 PM        HCT                      40.9                12/02/2021 03:52 PM        PLT                      172 12/02/2021 03:52 PM   RENAL  Lab Results       Component                Value               Date/Time                  NA                       141                 12/09/2021 08:42 AM        K                        4.4                 12/09/2021 08:42 AM        CL                       103                 12/09/2021 08:42 AM        CO2                      23                  12/09/2021 08:42 AM        BUN                      29 (H)              12/09/2021 08:42 AM        CREATININE               1.2                 12/09/2021 08:42 AM        GLUCOSE                  119 (H)             12/09/2021 08:42 AM   COAGS  Lab Results       Component                Value               Date/Time                  PROTIME                  20.7 (H)            12/02/2021 03:52 PM        PROTIME                  24.5 (H)            12/08/2009 10:33 AM        INR                      1.79 (H)            12/02/2021 03:52 PM     Intake & Output:  @[id number]@    Nausea & Vomiting:  No    Level of Consciousness:  Awake    Pain Assessment:  Adequate analgesia    Anesthesia Complications:  No apparent anesthetic complications    SUMMARY      Vital signs stable  OK to discharge from Stage I post anesthesia care.   Care transferred from Anesthesiology department on discharge from perioperative area

## 2021-12-27 NOTE — H&P
Subjective:     Patient is a 80 y.o.  male presented with a history of bilateral knee osteoarthritis. Pateint is being seen for chronic issues of pain and disability with failure of conservative care to date.        Patient Active Problem List    Diagnosis Date Noted    Chronic pain of left knee 09/14/2021    Chronic pain of right knee 09/14/2021    Primary osteoarthritis of both knees 09/14/2021    Typical atrial flutter ([address] 03/23/2020    Cardiomyopathy, dilated ([address]     Chronic systolic heart failure ([address]     Stage 2 chronic kidney disease     S/P AVR     Pacemaker     Chronic congestive heart failure ([address] 12/30/2019    ROSETTA (obstructive sleep apnea) 06/18/2019    COPD 11/05/2018    Class 3 severe obesity due to excess calories with serious comorbidity in adult [facility]) 11/07/2017    Uncontrolled type 2 diabetes mellitus with microalbuminuria, without long-term current use of insulin ([address] 02/24/2017    Effusion of left knee 02/21/2017    Primary insomnia 01/25/2017    Gout of both feet 09/19/2016    Benign essential HTN 07/11/2016    Hyperlipidemia, mixed 07/11/2016    Long term current use of anticoagulants with INR goal of 2.0-3.0 07/11/2016    Allergic rhinitis 07/11/2016     Past Medical History:   Diagnosis Date    Acute congestive heart failure ([address] 12/30/2019    Allergic rhinitis 7/11/2016    Atrial fibrillation ([address]     under care of cardiology:Dr. Cinthia Pearl Behrens([facility])    CKD (chronic kidney disease)     Nephro:Dr. Parker:stage 3    Class 2 obesity due to excess calories without serious comorbidity with body mass index (BMI) of 37.0 to 37.9 in adult 11/7/2017    Controlled type 2 diabetes mellitus without complication, without long-term current use of insulin ([address] 2/24/2017    COPD     Essential tremor     Gout     Hyperlipidemia, mixed 07/11/2016    Hypertension     under care of cardiology:Dr. Cinthia Pearl Behrens([facility])    Long term current use of anticoagulants with INR goal of 2.0-3.0     under care of cardiology:Dr. Scott Behrens([facility])    Obstructive sleep apnea syndrome 06/18/2019    per pulmo    Primary insomnia 1/25/2017    Primary osteoarthritis of both knees 9/14/2021    Sleep apnea     uses bipap      Past Surgical History:   Procedure Laterality Date    AORTIC VALVE REPLACEMENT  10/1996:[facility]    x3    ATRIAL ABLATION SURGERY  03/23/2020    CTI dependent atrial flutter ablation (Dr. Zen Garza)   [address]  03/23/2020    BIV upgrade    PACEMAKER INSERTION  1996      Medications Prior to Admission: pravastatin (PRAVACHOL) 80 MG tablet, TAKE 1 TABLET EVERY DAY FOR CHOLESTEROL  furosemide (LASIX) 40 MG tablet, Take 1 tablet by mouth daily  metFORMIN (GLUCOPHAGE-XR) 500 MG extended release tablet, TAKE 4 TABLETS EVERY DAY WITH BREAKFAST (Patient taking differently: Take 1,000 mg by mouth 2 times daily )  lisinopril (PRINIVIL;ZESTRIL) 20 MG tablet, TAKE 1 TABLET TWICE DAILY  allopurinol (ZYLOPRIM) 300 MG tablet, TAKE 1 TABLET EVERY DAY  amLODIPine (NORVASC) 5 MG tablet, TAKE 1 TABLET EVERY DAY  sotalol (BETAPACE) 80 MG tablet, Take 80 mg by mouth 2 times daily. aspirin 81 MG chewable tablet, Take 81 mg by mouth daily. vitamin D3 (CHOLECALCIFEROL) 25 MCG (1000 UT) TABS tablet, Take 1 tablet by mouth daily  warfarin (COUMADIN) 10 MG tablet, Take 1.5 tablets by mouth Twice a Week AND 1 tablet Five times weekly. TAKE 1 TABLET DAILY EXCEPT TAKE 1 AND 1/2 TABLETS (15 MG) ON WEDNESDAY AND FRIDAY. glucose monitoring kit (FREESTYLE) monitoring kit, Ok to dispense what is covered by insurance. blood glucose monitor strips, Test 2 times a day & as needed for symptoms of irregular blood glucose. Dispense sufficient amount for indicated testing frequency plus additional to accommodate PRN testing needs.  Ok to dispense what is covered by insurance  ACCU-CHEK COMPACT PLUS strip, USE TO TEST 2 TIMES DAILY  Lancets MISC, BID testing  No Known Allergies   Social History     Tobacco Use    Smoking status: Former Smoker     Packs/day: 1.00     Years: 30.00     Pack years: 30.00     Types: Cigarettes     Quit date: 2019     Years since quittin.5    Smokeless tobacco: Never Used    Tobacco comment:     Substance Use Topics    Alcohol use: Yes     Comment: 3-4 beers per year      Family History   Problem Relation Age of Onset    Asthma Mother     Cancer Father     No Known Problems Sister     No Known Problems Brother     No Known Problems Maternal Grandmother     No Known Problems Maternal Grandfather     No Known Problems Paternal Grandmother     No Known Problems Paternal Grandfather           Review of Systems  Review of Systems   Constitutional: Negative for chills and fever. HENT: Negative for nosebleeds. Eyes: Negative for double vision. Cardiovascular: Negative for chest pain. Gastrointestinal: Negative for abdominal pain. Musculoskeletal: Positive for joint pain and myalgias. Skin: Negative for rash. Neurological: Negative for seizures. Psychiatric/Behavioral: Negative for hallucinations.          Objective:     Patient Vitals for the past 8 hrs:   BP Temp Temp src Pulse Resp SpO2 Height Weight   21 1048 (!) 141/80 97.5 °F (36.4 °C) Temporal 79 21 98 % 6' 1\" (1.854 m) 286 lb (129.7 kg)   21 1026 -- -- -- -- -- -- 6' 1\" (1.854 m) 286 lb (129.7 kg)     BP (!) 141/80   Pulse 79   Temp 97.5 °F (36.4 °C) (Temporal)   Resp 21   Ht 6' 1\" (1.854 m)   Wt 286 lb (129.7 kg)   SpO2 98%   BMI 37.73 kg/m²     General Appearance:    Alert, cooperative, no distress, appears stated age     Head:    Normocephalic, without obvious abnormality, atraumatic   Eyes:    PERRL, conjunctiva/corneas clear      Ears:    Normal  both ears   Nose:   Nares normal,   mucosa normal, no drainage     Throat:   Lips, mucosa, and tongue normal;   Neck:   Supple, symmetrical, trachea midline,          Lungs:      respirations unlabored   Chest Wall:    No tenderness or deformity    Heart:    Regular rate          Abdomen:     Soft, non-tender,     no masses              Extremities:   Extremities  no cyanosis or edema     Pulses:   2+ and symmetric all extremities     Skin:   Skin color, texture, turgor normal         Neurologic:   CNII-XII intact, normal strength, sensation            Assessment:     Active Problems:    * No active hospital problems. *  Resolved Problems:    * No resolved hospital problems. *      Plan:     The various methods of treatment have been discussed with the patient and family. After consideration of risks, benefits and other options for treatment, the patient has consented to surgical interventions (right knee arthroscopy and left knee injection).

## 2022-01-03 ENCOUNTER — ANTI-COAG VISIT (OUTPATIENT)
Dept: PHARMACY | Age: 72
End: 2022-01-03
Payer: MEDICARE

## 2022-01-03 ENCOUNTER — TELEPHONE (OUTPATIENT)
Dept: ORTHOPEDIC SURGERY | Age: 72
End: 2022-01-03

## 2022-01-03 DIAGNOSIS — I48.3 TYPICAL ATRIAL FLUTTER (HCC): Primary | ICD-10-CM

## 2022-01-03 LAB — INTERNATIONAL NORMALIZATION RATIO, POC: 1.9

## 2022-01-03 PROCEDURE — 93296 REM INTERROG EVL PM/IDS: CPT | Performed by: INTERNAL MEDICINE

## 2022-01-03 PROCEDURE — 85610 PROTHROMBIN TIME: CPT

## 2022-01-03 PROCEDURE — 99212 OFFICE O/P EST SF 10 MIN: CPT

## 2022-01-03 PROCEDURE — 93294 REM INTERROG EVL PM/LDLS PM: CPT | Performed by: INTERNAL MEDICINE

## 2022-01-03 PROCEDURE — 93297 REM INTERROG DEV EVAL ICPMS: CPT | Performed by: INTERNAL MEDICINE

## 2022-01-03 NOTE — TELEPHONE ENCOUNTER
General Question     Subject: BANDAGES  Patient and /or Facility Request: PATIENT WOULD LIKE TO KNOW IF HE CAN REMOVE BANDAGES.  PLEASE CALL TO ADVISE   Contact Number: [phone]

## 2022-01-03 NOTE — PROGRESS NOTES
Mr. Dante Lowe is a 80 y.o. y/o male with history of Atrial Fibrillation who presents today for anticoagulation monitoring and adjustment. Pt is retired and works part time at [employer]. Patient reports he has been on warfarin for almost 30 years now and was managed by his cardiologist who recently retired. Pt was then managed by Dr. Hebert Warren prior to being established in Clinic  Pertinent PMH: HTN, DM, HLD, COPD, CHF    Patient Reported Findings:  Yes     No  [x]   []       Patient verifies current dosing regimen as listed  []   [x]       S/S bleeding/bruising/swelling/SOB -denies    []   [x]       Blood in urine or stool- denies  [x]   []       Procedures scheduled in the future at this time - originally scheduled for have knee replacement 12/10, holding 5 days prior w/o bridging. Pt held warfarin but procedure was r/s for 12/27, held again 5 days prior. Resumed taking 15 mg x 3 days then returned to normal dose   []   [x]       Missed Dose-denies   []   [x]       Extra Dose - denies  []   [x]       Change in medications-  tylenol prn---> never picked up diclofenac gel or tramadol d/t cost, euflexxa injection on 10/5 (second shot is not scheduled yet, will be a total of three shots) per pt does not have to hold warf for these injections --> euflexxa injection 10/5, 10/12, 10/19 is the plan for 3/3 doses---> no changes   []   [x]       Change in health/diet/appetite - reports he eats about 2 meals/day. And states he will have salads about 1-2x/wk. (not a big fan of spinach or brocolli but may have other greens)--> Patient states 3 salads in past 2 weeks---> no greens, wants to add green beans in twice weekly, no NVD --> returned to vit k a few times a week (green beans and salad) --> denies changes, but has not had vit k in a while (only eats salads that have vit k)---> no changes, no NVD --> some greens, no more than normal, no NVD --> diarrhea resolved.  Had small salad last week, no other vit k --> no changes, no NVD  []   [x]       Change in alcohol use - reports occasionally drinking beer (once every few months) but happens very infrequently. --> Patient reports no alcohol in past two weeks---> denies   []   [x]       Change in activity  []   [x]       Hospital admission  []   [x]       Emergency department visit  []   [x]       Other complaints     Clinical Outcomes:  Yes     No  []   [x]       Major bleeding event  []   [x]       Thromboembolic event    Duration of warfarin Therapy: Indefinite   INR Range:  2.0-3.0     INR today is 1.9 after resuming warfarin 1 week ago from knee replacement   Take 15 mg tonight then continue weekly dose to 15 mg Wed and Fri and 10 mg all other days. Encouraged patient to maintain a consistent diet.   Recheck INR again in 2 weeks, 1/21/22    **consent form signed 11/10/2021    Referring physician is Dr. Roxanne Jimenez  INR (no units)   Date Value   12/02/2021 1.79 (H)   11/10/2021 2.1   10/13/2021 2.1   09/29/2021 2.4   09/15/2021 2.3   08/19/2021 8.44 (HH)   05/05/2020 2.17 (H)   04/14/2020 2.43 (H)       For Pharmacy Admin Tracking Only     Intervention Detail: Dose Adjustment: 1, reason: Therapy Optimization   Total # of Interventions Recommended: 1   Total # of Interventions Accepted: 1   Time Spent (min): 15

## 2022-01-05 ENCOUNTER — NURSE ONLY (OUTPATIENT)
Dept: CARDIOLOGY CLINIC | Age: 72
End: 2022-01-05
Payer: MEDICARE

## 2022-01-05 DIAGNOSIS — Z95.0 PACEMAKER: ICD-10-CM

## 2022-01-05 DIAGNOSIS — I50.22 CHRONIC SYSTOLIC HEART FAILURE (HCC): ICD-10-CM

## 2022-01-05 DIAGNOSIS — I42.0 CARDIOMYOPATHY, DILATED (HCC): ICD-10-CM

## 2022-01-05 NOTE — PROGRESS NOTES
We received remote transmission from patient's monitor at home. Transmission shows normal sensing and pacing function. Patient is programmed not to have to bi-v pace. EP physician will review. See interrogation under cardiology tab in the [address] [place] [address] [address] field for more details. Optivol is within normal range.

## 2022-01-11 ENCOUNTER — OFFICE VISIT (OUTPATIENT)
Dept: ORTHOPEDIC SURGERY | Age: 72
End: 2022-01-11

## 2022-01-11 VITALS — BODY MASS INDEX: 37.91 KG/M2 | HEIGHT: 73 IN | WEIGHT: 286 LBS

## 2022-01-11 DIAGNOSIS — Z98.890 STATUS POST ARTHROSCOPIC SURGERY OF RIGHT KNEE: Primary | ICD-10-CM

## 2022-01-11 PROCEDURE — 99024 POSTOP FOLLOW-UP VISIT: CPT | Performed by: PHYSICIAN ASSISTANT

## 2022-01-11 RX ORDER — OXYCODONE HYDROCHLORIDE AND ACETAMINOPHEN 5; 325 MG/1; MG/1
1 TABLET ORAL EVERY 6 HOURS PRN
Qty: 28 TABLET | Refills: 0 | Status: SHIPPED | OUTPATIENT
Start: 2022-01-11 | End: 2022-01-18

## 2022-01-11 NOTE — PROGRESS NOTES
Patient Name: Scott Moore MRN: [id number]   Age: 80 y.o. YOB: 1950   Sex: male         CHIEF COMPLAINT   Right knee arthroscopy with subchondral plasty postoperative visit    HISTORY OF PRESENT ILLNESS   Patient returns for their first postoperative evaluation status post right knee arthroscopy with chondroplasty. The patient is doing very fair. They have large complaints of pain at night describes the pain during the day as a ache but at night is a throbbing pain that wakes him up states it is a 9 out of 10 for which she utilizes pain medication for. Has been walking with utilization of the cane. There is small swelling about the knee. The patient has been doing physical therapy at home on his own. They deny any calf swelling or numbness or tingling down the leg       Assessment     Review of Systems         PHYSICAL EXAM     Vital Signs: There were no vitals filed for this visit. Examination of the knee shows a well-healed portal incisions. There is small swelling. There is no drainage. Range of motion is 0to 100. Still noted medial femoral condyle tenderness and tibial plateau tenderness    The patient is neurovascularly intact. NEUROLOGICALLY: There is no evidence for sensory or motor deficits in the extremity. Coordination appears full with no spacticity or rigidity. Reflexes appear to be symmetric. Distal circulation intact. No signs of RSD. Calf nontender. Negative doris's,  no signs of dvt    Hip exam shows full ROM with no focal pain or Instability. Leg lengths are normal. There is no Trendelenburg Gait. RADIOLOGY   Xray     No new x-rays taken at today's visit    IMPRESSION   2 week(s) status post right knee arthroscopy with subchondroplasty    PLAN   The patient is doing well 2 week(s) status post right knee arthroscopy with chondroplasty. The patient will finish up therapy.    Refilled oxycodone 5-325 mg 1 every 6-8 hours as needed pain quantity 28 0 refills  Advised for utilization of walker for the next 2 weeks versus the cane to help with reduction weightbearing across the right leg. Advised her to follow-up in 2 weeks for repeat evaluation  Advised her gentle range of motion exercises while sitting and low impact exercises. They may slowly resume normal activities. DVT plan continue with gentle mobilization and utilization of compression    The orders below, if any, were placed during this visit:        No diagnosis found.       LUIS F Harris

## 2022-01-17 RX ORDER — METFORMIN HYDROCHLORIDE 500 MG/1
TABLET, EXTENDED RELEASE ORAL
Qty: 360 TABLET | Refills: 0 | Status: SHIPPED | OUTPATIENT
Start: 2022-01-17 | End: 2022-04-18

## 2022-01-17 RX ORDER — ALLOPURINOL 300 MG/1
TABLET ORAL
Qty: 90 TABLET | Refills: 0 | Status: SHIPPED | OUTPATIENT
Start: 2022-01-17 | End: 2022-04-18 | Stop reason: SDUPTHER

## 2022-01-17 NOTE — TELEPHONE ENCOUNTER
Medication:   Requested Prescriptions     Pending Prescriptions Disp Refills    metFORMIN (GLUCOPHAGE-XR) 500 MG extended release tablet [Pharmacy Med Name: Boris Rendon  MG Tablet Extended Release 24 Hour] 360 tablet 0     Sig: TAKE 4 TABLETS EVERY DAY WITH BREAKFAST    allopurinol (ZYLOPRIM) 300 MG tablet [Pharmacy Med Name: ALLOPURINOL 300 MG Tablet] 90 tablet 0     Sig: TAKE 1 TABLET EVERY DAY        Last Filled:      Patient Phone Number: [phone] (home)     Last appt: 12/2/2021  Next appt: Visit date not found    Last OARRS: No flowsheet data found.

## 2022-01-21 ENCOUNTER — TELEPHONE (OUTPATIENT)
Dept: FAMILY MEDICINE CLINIC | Age: 72
End: 2022-01-21

## 2022-01-21 ENCOUNTER — ANTI-COAG VISIT (OUTPATIENT)
Dept: PHARMACY | Age: 72
End: 2022-01-21
Payer: MEDICARE

## 2022-01-21 DIAGNOSIS — I48.3 TYPICAL ATRIAL FLUTTER (HCC): Primary | ICD-10-CM

## 2022-01-21 LAB — INTERNATIONAL NORMALIZATION RATIO, POC: 2.8

## 2022-01-21 PROCEDURE — 85610 PROTHROMBIN TIME: CPT

## 2022-01-21 PROCEDURE — 99211 OFF/OP EST MAY X REQ PHY/QHP: CPT

## 2022-01-21 NOTE — PROGRESS NOTES
Mr. Neelima Lei is a 80 y.o. y/o male with history of Atrial Fibrillation who presents today for anticoagulation monitoring and adjustment. Pt is retired and works part time at [employer]. Patient reports he has been on warfarin for almost 30 years now and was managed by his cardiologist who recently retired. Pt was then managed by Dr. Laurel Wick prior to being established in Clinic  Pertinent PMH: HTN, DM, HLD, COPD, CHF    Patient Reported Findings:  Yes     No  [x]   []       Patient verifies current dosing regimen as listed  []   [x]       S/S bleeding/bruising/swelling/SOB -denies    []   [x]       Blood in urine or stool- denies  []   [x]       Procedures scheduled in the future at this time - originally scheduled for have knee replacement 12/10, holding 5 days prior w/o bridging. Pt held warfarin but procedure was r/s for 12/27, held again 5 days prior. Resumed taking 15 mg x 3 days then returned to normal dose --> denies upcoming    []   [x]       Missed Dose-denies   []   [x]       Extra Dose - denies  []   [x]       Change in medications-  tylenol prn---> never picked up diclofenac gel or tramadol d/t cost, euflexxa injection on 10/5 (second shot is not scheduled yet, will be a total of three shots) per pt does not have to hold warf for these injections --> euflexxa injection 10/5, 10/12, 10/19 is the plan for 3/3 doses---> no changes   []   [x]       Change in health/diet/appetite - reports he eats about 2 meals/day. And states he will have salads about 1-2x/wk. (not a big fan of spinach or brocolli but may have other greens)--> Patient states 3 salads in past 2 weeks---> no greens, wants to add green beans in twice weekly, no NVD --> returned to vit k a few times a week (green beans and salad) --> denies changes, but has not had vit k in a while (only eats salads that have vit k)---> no changes, no NVD --> some greens, no more than normal, no NVD --> diarrhea resolved.  Had small salad last week, no other vit k --> no changes, no NVD  []   [x]       Change in alcohol use - reports occasionally drinking beer (once every few months) but happens very infrequently. --> Patient reports no alcohol in past two weeks---> denies   []   [x]       Change in activity  []   [x]       Hospital admission  []   [x]       Emergency department visit  []   [x]       Other complaints     Clinical Outcomes:  Yes     No  []   [x]       Major bleeding event  []   [x]       Thromboembolic event    Duration of warfarin Therapy: Indefinite   INR Range:  2.0-3.0     INR today is 2.8   Continue weekly dose to 15 mg Wed and Fri and 10 mg all other days.    Encouraged patient to maintain a consistent diet  Recheck INR again in 3 weeks, 2/11    **consent form signed 11/10/2021    Referring physician is Dr. Mary Alice Chamberlain  INR (no units)   Date Value   01/03/2022 1.9   12/02/2021 1.79 (H)   11/10/2021 2.1   10/13/2021 2.1   09/29/2021 2.4   08/19/2021 8.44 (HH)   05/05/2020 2.17 (H)   04/14/2020 2.43 (H)       For Pharmacy Admin Tracking Only     Total # of Interventions Recommended: 0   Total # of Interventions Accepted: 0   Time Spent (min): 15

## 2022-01-21 NOTE — TELEPHONE ENCOUNTER
Why does he want to see another provider? He can schedule with me next week? Unable to answer due to medical condition/unresponsive/etc...

## 2022-01-21 NOTE — TELEPHONE ENCOUNTER
Pt is wanting to stay here at [facility]. He is wondering if DR Refugio Carter would take him as a pt?     Please advise    Last OV:  12-2-21

## 2022-01-21 NOTE — TELEPHONE ENCOUNTER
----- Message from Gaby Robe sent at 1/21/2022  9:22 AM EST -----  Subject: Appointment Request    Reason for Call: Routine Existing Condition Follow Up    QUESTIONS  Type of Appointment? New Patient/New to Provider  Reason for appointment request? Available appointments did not meet   patient need  Additional Information for Provider? patient had an apt for today but woke   up not feeling well and would like to see another provider in this same   office and would like to be rescheduled when he feels better   ---------------------------------------------------------------------------  --------------  CALL BACK INFO  What is the best way for the office to contact you? OK to leave message on   voicemail  Preferred Call Back Phone Number? [phone]  ---------------------------------------------------------------------------  --------------  SCRIPT ANSWERS  Relationship to Patient? Self  Is this follow up request related to routine Diabetes Management? No  Have you been diagnosed with, awaiting test results for, or told that you   are suspected of having COVID-19 (Coronavirus)? (If patient has tested   negative or was tested as a requirement for work, school, or travel and   not based on symptoms, answer no)? No  Within the past two weeks have you developed any of the following symptoms   (answer no if symptoms have been present longer than 2 weeks or began   more than 2 weeks ago)? Fever or Chills, Cough, Shortness of breath or   difficulty breathing, Loss of taste or smell, Sore throat, Nasal   congestion, Sneezing or runny nose, Fatigue or generalized body aches   (answer no if pain is specific to a body part e.g. back pain), Diarrhea,   Headache? No  Have you had close contact with someone with COVID-19 in the last 14 days? No  (Service Expert  click yes below to proceed with [facility]   Scheduling)?  Yes

## 2022-01-25 ENCOUNTER — OFFICE VISIT (OUTPATIENT)
Dept: ORTHOPEDIC SURGERY | Age: 72
End: 2022-01-25

## 2022-01-25 VITALS — HEIGHT: 73 IN | WEIGHT: 286 LBS | BODY MASS INDEX: 37.91 KG/M2

## 2022-01-25 DIAGNOSIS — Z98.890 STATUS POST ARTHROSCOPIC SURGERY OF RIGHT KNEE: Primary | ICD-10-CM

## 2022-01-25 PROCEDURE — 99024 POSTOP FOLLOW-UP VISIT: CPT | Performed by: PHYSICIAN ASSISTANT

## 2022-01-25 NOTE — PROGRESS NOTES
needed  Advised her gentle range of motion exercises while sitting and low impact exercises. They may slowly resume normal activities.   DVT plan continue with gentle mobilization and utilization of compression    The orders below, if any, were placed during this visit:          ICD-10-CM    1. Status post arthroscopic surgery of right knee  Z98.890          LUIS F Talavera

## 2022-01-28 ENCOUNTER — OFFICE VISIT (OUTPATIENT)
Dept: FAMILY MEDICINE CLINIC | Age: 72
End: 2022-01-28
Payer: MEDICARE

## 2022-01-28 VITALS
HEART RATE: 82 BPM | HEIGHT: 73 IN | SYSTOLIC BLOOD PRESSURE: 156 MMHG | DIASTOLIC BLOOD PRESSURE: 84 MMHG | WEIGHT: 280.9 LBS | OXYGEN SATURATION: 98 % | TEMPERATURE: 97 F | BODY MASS INDEX: 37.23 KG/M2 | RESPIRATION RATE: 16 BRPM

## 2022-01-28 DIAGNOSIS — J43.8 OTHER EMPHYSEMA (HCC): ICD-10-CM

## 2022-01-28 DIAGNOSIS — N18.31 STAGE 3A CHRONIC KIDNEY DISEASE (HCC): ICD-10-CM

## 2022-01-28 DIAGNOSIS — I48.3 TYPICAL ATRIAL FLUTTER (HCC): ICD-10-CM

## 2022-01-28 DIAGNOSIS — Z87.891 PERSONAL HISTORY OF TOBACCO USE: ICD-10-CM

## 2022-01-28 DIAGNOSIS — I50.22 CHRONIC SYSTOLIC HEART FAILURE (HCC): ICD-10-CM

## 2022-01-28 DIAGNOSIS — E11.69 TYPE 2 DIABETES MELLITUS WITH OTHER SPECIFIED COMPLICATION, WITHOUT LONG-TERM CURRENT USE OF INSULIN (HCC): Primary | ICD-10-CM

## 2022-01-28 DIAGNOSIS — E66.01 SEVERE OBESITY (BMI 35.0-35.9 WITH COMORBIDITY) (HCC): ICD-10-CM

## 2022-01-28 PROBLEM — M25.562 CHRONIC PAIN OF LEFT KNEE: Status: RESOLVED | Noted: 2021-09-14 | Resolved: 2022-01-28

## 2022-01-28 PROBLEM — G89.29 CHRONIC PAIN OF LEFT KNEE: Status: RESOLVED | Noted: 2021-09-14 | Resolved: 2022-01-28

## 2022-01-28 PROCEDURE — 1036F TOBACCO NON-USER: CPT | Performed by: FAMILY MEDICINE

## 2022-01-28 PROCEDURE — G8417 CALC BMI ABV UP PARAM F/U: HCPCS | Performed by: FAMILY MEDICINE

## 2022-01-28 PROCEDURE — 99214 OFFICE O/P EST MOD 30 MIN: CPT | Performed by: FAMILY MEDICINE

## 2022-01-28 PROCEDURE — 1123F ACP DISCUSS/DSCN MKR DOCD: CPT | Performed by: FAMILY MEDICINE

## 2022-01-28 PROCEDURE — 2022F DILAT RTA XM EVC RTNOPTHY: CPT | Performed by: FAMILY MEDICINE

## 2022-01-28 PROCEDURE — G8484 FLU IMMUNIZE NO ADMIN: HCPCS | Performed by: FAMILY MEDICINE

## 2022-01-28 PROCEDURE — 3017F COLORECTAL CA SCREEN DOC REV: CPT | Performed by: FAMILY MEDICINE

## 2022-01-28 PROCEDURE — 3023F SPIROM DOC REV: CPT | Performed by: FAMILY MEDICINE

## 2022-01-28 PROCEDURE — 4040F PNEUMOC VAC/ADMIN/RCVD: CPT | Performed by: FAMILY MEDICINE

## 2022-01-28 PROCEDURE — G8427 DOCREV CUR MEDS BY ELIG CLIN: HCPCS | Performed by: FAMILY MEDICINE

## 2022-01-28 PROCEDURE — G0296 VISIT TO DETERM LDCT ELIG: HCPCS | Performed by: FAMILY MEDICINE

## 2022-01-28 PROCEDURE — 3046F HEMOGLOBIN A1C LEVEL >9.0%: CPT | Performed by: FAMILY MEDICINE

## 2022-01-28 NOTE — PROGRESS NOTES
Subjective:      Patient ID: Emmitt Alpers is a 80 y.o. male. HPI       Here to become with new pcp     Treatment Adherence:   Medication compliance:  compliant most of the time  Diet compliance:  compliant most of the time  Weight trend: stable  Current exercise: no regular exercise      Diabetes Mellitus Type 2: Current symptoms/problems include none. Any episodes of hypoglycemia? no  Eye exam current (within one year): no  Tobacco history: He  reports that he quit smoking about 2 years ago. His smoking use included cigarettes. He has a 30.00 pack-year smoking history. He has never used smokeless tobacco.   Daily Aspirin? Yes :     Tx: metformin       Hypertension:  Home blood pressure monitoring: No.  He is adherent to a low sodium diet. Patient denies chest pain, shortness of breath, headache, lightheadedness, blurred vision, peripheral edema, palpitations, dry cough and fatigue. Antihypertensive medication side effects: no medication side effects noted. Use of agents associated with hypertension: none. Tx: lisinopril 20 mg, amlodipine 5 mg          Hyperlipidemia:  No new myalgias or GI upset on pravastatin (Pravachol). Lab Results   Component Value Date    LABA1C 6.3 12/02/2021    LABA1C 6.2 04/06/2021    LABA1C 6.2 09/28/2020     Lab Results   Component Value Date    LABMICR 2.10 (H) 09/28/2020    CREATININE 1.2 12/09/2021     Lab Results   Component Value Date    ALT 17 12/02/2021    AST 21 12/02/2021     Lab Results   Component Value Date    CHOL 155 04/06/2021    TRIG 138 04/06/2021    HDL 38 (L) 12/02/2021    [address] 12/02/2021        COPD  Former smoker  Denies: cough/sob/wheezing    CHF, cardiomyopathy, pacemaker and SP AVR , atrial flutter  Denies: cp/sob/edema/pnd/orthopnea    ROSETTA  Compliant with c pap       S/p R knee arthroscopy   Followed by ortho.          Review of Systems  Above    Objective:  Blood pressure (!) 156/84, pulse 82, temperature 97 °F (36.1 °C), temperature atrial flutter ([facility]     4. Other emphysema (HCC)     5. Stage 3a chronic kidney disease ([facility]     6. Severe obesity (BMI 35.0-35.9 with comorbidity) ([facility]     7. Personal history of tobacco use  [place] VISIT TO DISCUSS LUNG CA SCREEN W LDCT    CT Lung Screen (Annual)           Plan:      New patient  1. Blood work reviewed and discussed with him during this apt  a1c at goal  Continue same medications no changes needed at this time   Encourage compliance with medication, life style changes    2. encourage low sodium diet and compliance with med  Med rec reviewed today   Fu 3 mo  INR with coumadin clinic     3. Continue warfarin     4. Stable   encourage him to get covid vaccine # 3   Will discuss vaccines in his next apt in 3 mo     5. Continue to monitor   Fu with Dr. Fran Edwards     6. Diet/discussed  Not able to exercise    7. Former smoker   Due for CT screening  Discussed and recommended today                 Kay Brown MD  Low Dose CT (LDCT) Lung Screening criteria met:     Age 55-77(Medicare) or 50-80 ([facility])   Pack year smoking >30 (Medicare) or >20 ([facility])   Still smoking or less than 15 year since quit   No sign or symptoms of lung cancer   > 11 months since last LDCT     Risks and benefits of lung cancer screening with LDCT scans discussed:    Significance of positive screen - False-positive LDCT results often occur. 95% of all positive results do not lead to a diagnosis of cancer. Usually further imaging can resolve most false-positive results; however, some patients may require invasive procedures. Over diagnosis risk - 10% to 12% of screen-detected lung cancer cases are over diagnosed--that is, the cancer would not have been detected in the patient's lifetime without the screening.     Need for follow up screens annually to continue lung cancer screening effectiveness     Risks associated with radiation from annual LDCT- Radiation exposure is about the same as for a mammogram, which is about 1/3 of the annual background radiation exposure from everyday life. Starting screening at age 54 is not likely to increase cancer risk from radiation exposure. Patients with comorbidities resulting in life expectancy of < 10 years, or that would preclude treatment of an abnormality identified on CT, should not be screened due to lack of benefit.     To obtain maximal benefit from this screening, smoking cessation and long-term abstinence from smoking is critical

## 2022-02-11 ENCOUNTER — ANTI-COAG VISIT (OUTPATIENT)
Dept: PHARMACY | Age: 72
End: 2022-02-11
Payer: MEDICARE

## 2022-02-11 DIAGNOSIS — I48.3 TYPICAL ATRIAL FLUTTER (HCC): Primary | ICD-10-CM

## 2022-02-11 LAB — INTERNATIONAL NORMALIZATION RATIO, POC: 1.9

## 2022-02-11 PROCEDURE — 85610 PROTHROMBIN TIME: CPT

## 2022-02-11 PROCEDURE — 99211 OFF/OP EST MAY X REQ PHY/QHP: CPT

## 2022-02-11 NOTE — PROGRESS NOTES
Mr. Amira Evans is a 80 y.o. y/o male with history of Atrial Fibrillation who presents today for anticoagulation monitoring and adjustment. Pt is retired and works part time at [employer]. Patient reports he has been on warfarin for almost 30 years now and was managed by his cardiologist who recently retired. Pt was then managed by Dr. Arian Garcia prior to being established in Clinic  Pertinent PMH: HTN, DM, HLD, COPD, CHF    Patient Reported Findings:  Yes     No  [x]   []       Patient verifies current dosing regimen as listed  []   [x]       S/S bleeding/bruising/swelling/SOB -denies    []   [x]       Blood in urine or stool- denies  []   [x]       Procedures scheduled in the future at this time - originally scheduled for have knee replacement 12/10, holding 5 days prior w/o bridging. Pt held warfarin but procedure was r/s for 12/27, held again 5 days prior. Resumed taking 15 mg x 3 days then returned to normal dose --> denies upcoming    []   [x]       Missed Dose - Denies   []   [x]       Extra Dose - Denies  []   [x]       Change in medications-  tylenol prn---> never picked up diclofenac gel or tramadol d/t cost, euflexxa injection on 10/5 (second shot is not scheduled yet, will be a total of three shots) per pt does not have to hold warf for these injections --> euflexxa injection 10/5, 10/12, 10/19 is the plan for 3/3 doses---> amlodipine increased   []   [x]       Change in health/diet/appetite - reports he eats about 2 meals/day. And states he will have salads about 1-2x/wk. (not a big fan of spinach or brocolli but may have other greens)--> Patient states 3 salads in past 2 weeks---> no greens, wants to add green beans in twice weekly, no NVD --> returned to vit k a few times a week (green beans and salad) --> denies changes, but has not had vit k in a while (only eats salads that have vit k)---> no changes, no NVD --> some greens, no more than normal, no NVD --> diarrhea resolved.  Had small salad last week, no other vit k --> no changes, no NVD  []   [x]       Change in alcohol use - reports occasionally drinking beer (once every few months) but happens very infrequently. --> Patient reports no alcohol in past two weeks---> denies   []   [x]       Change in activity  []   [x]       Hospital admission  []   [x]       Emergency department visit  []   [x]       Other complaints     Clinical Outcomes:  Yes     No  []   [x]       Major bleeding event  []   [x]       Thromboembolic event    Duration of warfarin Therapy: Indefinite   INR Range:  2.0-3.0     INR today is 1.9 d/t unknown etiology   Take 15mg tomorrow then continue weekly dose to 15 mg Wed and Fri and 10 mg all other days.    Encouraged patient to maintain a consistent diet  Recheck INR again in 4 weeks, 3/11 per patient preference     **consent form signed 11/10/2021    Referring physician is Dr. Mehran Seymour  INR (no units)   Date Value   02/11/2022 1.9   01/21/2022 2.8   01/03/2022 1.9   12/02/2021 1.79 (H)   11/10/2021 2.1   08/19/2021 8.44 (HH)   05/05/2020 2.17 (H)   04/14/2020 2.43 (H)     For Pharmacy Admin Tracking Only     Intervention Detail: Dose Adjustment: 1, reason: Therapy Optimization   Total # of Interventions Recommended: 1   Total # of Interventions Accepted: 1   Time Spent (min): 15

## 2022-02-25 ENCOUNTER — HOSPITAL ENCOUNTER (OUTPATIENT)
Dept: CT IMAGING | Age: 72
Discharge: HOME | End: 2022-02-25
Payer: MEDICARE

## 2022-02-25 DIAGNOSIS — Z87.891 PERSONAL HISTORY OF TOBACCO USE: ICD-10-CM

## 2022-02-25 PROCEDURE — 71271 CT THORAX LUNG CANCER SCR C-: CPT

## 2022-03-10 ENCOUNTER — TELEPHONE (OUTPATIENT)
Dept: PHARMACY | Age: 72
End: 2022-03-10

## 2022-03-10 DIAGNOSIS — E78.5 HYPERLIPIDEMIA LDL GOAL <100: ICD-10-CM

## 2022-03-10 NOTE — TELEPHONE ENCOUNTER
Medication:   Requested Prescriptions     Pending Prescriptions Disp Refills    pravastatin (PRAVACHOL) 80 MG tablet [Pharmacy Med Name: PRAVASTATIN SODIUM 80 MG Tablet] 90 tablet 0     Sig: TAKE 1 TABLET EVERY DAY FOR CHOLESTEROL        Last Filled:      Patient Phone Number: [phone] (home)     Last appt: 1/28/2022   Next appt: 4/28/2022    Last OARRS: No flowsheet data found.

## 2022-03-11 RX ORDER — PRAVASTATIN SODIUM 80 MG/1
TABLET ORAL
Qty: 90 TABLET | Refills: 3 | Status: SHIPPED | OUTPATIENT
Start: 2022-03-11

## 2022-03-21 ENCOUNTER — ANTI-COAG VISIT (OUTPATIENT)
Dept: PHARMACY | Age: 72
End: 2022-03-21
Payer: MEDICARE

## 2022-03-21 DIAGNOSIS — I48.3 TYPICAL ATRIAL FLUTTER (HCC): Primary | ICD-10-CM

## 2022-03-21 LAB — INTERNATIONAL NORMALIZATION RATIO, POC: 2.4

## 2022-03-21 PROCEDURE — 99211 OFF/OP EST MAY X REQ PHY/QHP: CPT

## 2022-03-21 PROCEDURE — 85610 PROTHROMBIN TIME: CPT

## 2022-03-21 NOTE — PROGRESS NOTES
Mr. Emmitt Alpers is a 80 y.o. y/o male with history of Atrial Fibrillation who presents today for anticoagulation monitoring and adjustment. Pt is retired and works part time at [employer]. Patient reports he has been on warfarin for almost 30 years now and was managed by his cardiologist who recently retired. Pt was then managed by Dr. Karol Velazquez prior to being established in Clinic  Pertinent PMH: HTN, DM, HLD, COPD, CHF    Patient Reported Findings:  Yes     No  [x]   []       Patient verifies current dosing regimen as listed  []   [x]       S/S bleeding/bruising/swelling/SOB -denies    []   [x]       Blood in urine or stool- denies  []   [x]       Procedures scheduled in the future at this time - originally scheduled for have knee replacement 12/10, holding 5 days prior w/o bridging. Pt held warfarin but procedure was r/s for 12/27, held again 5 days prior. Resumed taking 15 mg x 3 days then returned to normal dose --> denies upcoming    []   [x]       Missed Dose - Denies   []   [x]       Extra Dose - Denies  []   [x]       Change in medications-  tylenol prn---> never picked up diclofenac gel or tramadol d/t cost, euflexxa injection on 10/5 (second shot is not scheduled yet, will be a total of three shots) per pt does not have to hold warf for these injections --> euflexxa injection 10/5, 10/12, 10/19 is the plan for 3/3 doses---> amlodipine increased --> no changes    []   [x]       Change in health/diet/appetite - reports he eats about 2 meals/day. And states he will have salads about 1-2x/wk. (not a big fan of spinach or brocolli but may have other greens)--> Patient states 3 salads in past 2 weeks---> no greens, wants to add green beans in twice weekly, no NVD --> returned to vit k a few times a week (green beans and salad) --> denies changes, but has not had vit k in a while (only eats salads that have vit k)---> diarrhea resolved.  Had small salad last week, no other vit k --> no changes, no NVD  [] [x]       Change in alcohol use - reports occasionally drinking beer (once every few months) but happens very infrequently. --> Patient reports no alcohol in past two weeks---> denies   []   [x]       Change in activity  []   [x]       Hospital admission  []   [x]       Emergency department visit  []   [x]       Other complaints     Clinical Outcomes:  Yes     No  []   [x]       Major bleeding event  []   [x]       Thromboembolic event    Duration of warfarin Therapy: Indefinite   INR Range:  2.0-3.0     INR today is 2.4  Continue weekly dose to 15 mg Wed and Fri and 10 mg all other days.    Encouraged patient to maintain a consistent diet  Recheck INR again in 4 weeks, 4/18    **consent form signed 11/10/2021    Referring physician is Dr. Frederic Cortez  INR (no units)   Date Value   02/11/2022 1.9   01/21/2022 2.8   01/03/2022 1.9   12/02/2021 1.79 (H)   11/10/2021 2.1   08/19/2021 8.44 (HH)   05/05/2020 2.17 (H)   04/14/2020 2.43 (H)     For Pharmacy Admin Tracking Only     Total # of Interventions Recommended: 0   Total # of Interventions Accepted: 0   Time Spent (min): 15

## 2022-03-29 ENCOUNTER — OFFICE VISIT (OUTPATIENT)
Dept: ORTHOPEDIC SURGERY | Age: 72
End: 2022-03-29
Payer: MEDICARE

## 2022-03-29 VITALS — WEIGHT: 280 LBS | BODY MASS INDEX: 37.11 KG/M2 | HEIGHT: 73 IN

## 2022-03-29 DIAGNOSIS — Z98.890 STATUS POST ARTHROSCOPIC SURGERY OF RIGHT KNEE: ICD-10-CM

## 2022-03-29 DIAGNOSIS — M17.0 PRIMARY OSTEOARTHRITIS OF BOTH KNEES: ICD-10-CM

## 2022-03-29 DIAGNOSIS — G89.29 CHRONIC PAIN OF RIGHT KNEE: Primary | ICD-10-CM

## 2022-03-29 DIAGNOSIS — M25.561 CHRONIC PAIN OF RIGHT KNEE: Primary | ICD-10-CM

## 2022-03-29 PROCEDURE — G8484 FLU IMMUNIZE NO ADMIN: HCPCS | Performed by: PHYSICIAN ASSISTANT

## 2022-03-29 PROCEDURE — 1036F TOBACCO NON-USER: CPT | Performed by: PHYSICIAN ASSISTANT

## 2022-03-29 PROCEDURE — 99214 OFFICE O/P EST MOD 30 MIN: CPT | Performed by: PHYSICIAN ASSISTANT

## 2022-03-29 PROCEDURE — G8427 DOCREV CUR MEDS BY ELIG CLIN: HCPCS | Performed by: PHYSICIAN ASSISTANT

## 2022-03-29 PROCEDURE — 1123F ACP DISCUSS/DSCN MKR DOCD: CPT | Performed by: PHYSICIAN ASSISTANT

## 2022-03-29 PROCEDURE — 4040F PNEUMOC VAC/ADMIN/RCVD: CPT | Performed by: PHYSICIAN ASSISTANT

## 2022-03-29 PROCEDURE — 3017F COLORECTAL CA SCREEN DOC REV: CPT | Performed by: PHYSICIAN ASSISTANT

## 2022-03-29 PROCEDURE — G8417 CALC BMI ABV UP PARAM F/U: HCPCS | Performed by: PHYSICIAN ASSISTANT

## 2022-03-29 NOTE — LETTER
[facility] [address] [address]  Surgery Precert & Billing Form:    DEMOGRAPHICS:                                                                                                       Patient Name:  Lennox Mark  Patient :  1950   Patient SS#:      Patient Phone:  [phone] (home)  Alt.  Patient Phone:    Patient Address:  [place] [id number]    PCP:  Anastacia Taylor MD  Insurance: [facility] Medicare    DIAGNOSIS & PROCEDURE:                                                                                      Diagnosis: Right knee osteoarthritis M17.11  Operation: right    SURGERY  INFORMATION  Date of Surgery:   22  Location:    [facility]  Type:    Outpatient  23 hour hold:  No  Surgeon:     Cecilia Hoyt MD   3/29/22     BILLING INFORMATION:                                                                                                Physician Procedure                                            CPT Codes                      PA, or Fellow Procedure                                      CPT Codes                    Precert information:

## 2022-03-29 NOTE — PROGRESS NOTES
Patient Name: Yasmin Larkin MRN: [id number]   Age: 80 y.o. YOB: 1950   Sex: male         CHIEF COMPLAINT   Right knee arthroscopy with subchondral plasty postoperative visit    HISTORY OF PRESENT ILLNESS   Patient returns for their third postoperative evaluation status post right knee arthroscopy with chondroplasty. The patient is doing very fair. They have large complaints of pain increasing over the past 2 weeks to a 8-9 out of 10 denies any fall trauma or injury denies numbness burning tingling radiating pains on the leg describes pain mostly in the anterior aspect of the knee but does have generalized sensitivity around it as well. Also noting buckling give way of the knee. Utilizing a cane to help with ambulation. There is small swelling about the knee. The patient has been doing physical therapy at home on his own. They deny any calf swelling or numbness or tingling down the leg       Assessment     Review of Systems         PHYSICAL EXAM     Vital Signs: There were no vitals filed for this visit. Examination of the knee shows a well-healed portal incisions. There is moderate swelling. There is no drainage. Range of motion is 0 to 95. Significant general tenderness noted across the knee to medial lateral and patellofemoral compartment with palpation greatest in the patellofemoral compartment and along femoral trochlea. Significant stiffness and tightness beyond 95 degrees of flexion moderate swelling across knee and lower extremity    The patient is neurovascularly intact. NEUROLOGICALLY: There is no evidence for sensory or motor deficits in the extremity. Coordination appears full with no spacticity or rigidity. Reflexes appear to be symmetric. Distal circulation intact. No signs of RSD. Calf nontender. Negative doris's,  no signs of dvt    Hip exam shows full ROM with no focal pain or Instability. Leg lengths are normal. There is no Trendelenburg Gait. RADIOLOGY   Xray     4 view x-ray of the right knee AP, lateral, sunrise and tunnel views were performed and reviewed today revealing    IMPRESSION   14 week(s) status post right knee arthroscopy with subchondroplasty    PLAN   The patient is doing poorly 14 week(s) status post right knee arthroscopy with chondroplasty. The patient will finish up therapy. Advised for to increase activities and exercises as tolerated  -At today's visit discussed moving forward with right knee genicular nerve block with intra-articular injection under fluoroscopy guidance at this time. Fielded and answered all questions regarding the surgical procedure with the patient discussed all pre and postoperative management and work-up. Discussed all potential complications of the surgical procedure with the patient patient agreed and consented to moving forward with the above listed procedure at this time.  -Believe that patient's current elevation in pain are related to aggravation of patellofemoral arthritis and that if we can settle down the patellofemoral arthritis and increase strengthening across the area with physical therapy afterwards we can improve patient's overall outcome.  -Advised patient follow-up with primary care provider for surgical clearance due to need for mild sedation due to increased sensitivity around the knee. Advised her gentle range of motion exercises while sitting and low impact exercises. They may slowly resume normal activities. DVT plan continue with gentle mobilization and utilization of compression    The orders below, if any, were placed during this visit:          ICD-10-CM    1. Chronic pain of right knee  M25.561 XR KNEE RIGHT (MIN 4 VIEWS)    G89.29    2.  Primary osteoarthritis of both knees  M17.0 XR KNEE RIGHT (MIN 4 VIEWS)   3. Status post arthroscopic surgery of right knee  Z98.890          LUIS F Lacy

## 2022-03-30 ENCOUNTER — TELEPHONE (OUTPATIENT)
Dept: ORTHOPEDIC SURGERY | Age: 72
End: 2022-03-30

## 2022-03-30 ENCOUNTER — TELEPHONE (OUTPATIENT)
Dept: FAMILY MEDICINE CLINIC | Age: 72
End: 2022-03-30

## 2022-03-30 DIAGNOSIS — Z01.818 PRE-OP EXAM: Primary | ICD-10-CM

## 2022-03-30 NOTE — TELEPHONE ENCOUNTER
Pt is having RIGHT KNEE GENICULAR NERVE BLOCK WITH INTRA ARTICULAR INJECTION SITE CONFIRMED BY FLUOROSCOPY with Dr. Travis Delay on 4/6/2022 at [address]:  1/28/2022

## 2022-03-30 NOTE — TELEPHONE ENCOUNTER
Other PATIENT WANTS TO KNOW IF DR Juana Lei WOULD LIKE  FOR HIM  TO DISCONTINUE HIS COUMADIN BEFORE HIS PROCEDURE.  PLS  CALL TO ADVISE [phone]

## 2022-03-30 NOTE — TELEPHONE ENCOUNTER
----- Message from Michael Su sent at 3/30/2022  8:39 AM EDT -----  Subject: Appointment Request    Reason for Call: Routine Pre-Op    QUESTIONS  Type of Appointment? Established Patient  Reason for appointment request? Available appointments did not meet   patient need  Additional Information for Provider? Patient is having surgery on 4/6 and   needs a preop physical. Nothing available until 4/28.   ---------------------------------------------------------------------------  --------------  CALL BACK INFO  What is the best way for the office to contact you? Do not leave any   message, patient will call back for answer  Preferred Call Back Phone Number? [phone]  ---------------------------------------------------------------------------  --------------  SCRIPT ANSWERS  Relationship to Patient? Self  Do you have questions for your provider that need to be answered prior to   scheduling your pre-op appointment? No  Have you been diagnosed with, awaiting test results for, or told that you   are suspected of having COVID-19 (Coronavirus)? (If patient has tested   negative or was tested as a requirement for work, school, or travel and   not based on symptoms, answer no)? No  Within the past 10 days have you developed any of the following symptoms   (answer no if symptoms have been present longer than 10 days or began   more than 10 days ago)? Fever or Chills, Cough, Shortness of breath or   difficulty breathing, Loss of taste or smell, Sore throat, Nasal   congestion, Sneezing or runny nose, Fatigue or generalized body aches   (answer no if pain is specific to a body part e.g. back pain), Diarrhea,   Headache? No  Have you had close contact with someone with COVID-19 in the last 7 days? No  (Service Expert  click yes below to proceed with [email or web address] As Usual   Scheduling)?  Yes

## 2022-04-01 ENCOUNTER — HOSPITAL ENCOUNTER (OUTPATIENT)
Age: 72
Discharge: HOME | End: 2022-04-01
Payer: MEDICARE

## 2022-04-01 ENCOUNTER — ANTI-COAG VISIT (OUTPATIENT)
Dept: FAMILY MEDICINE CLINIC | Age: 72
End: 2022-04-01

## 2022-04-01 ENCOUNTER — TELEPHONE (OUTPATIENT)
Dept: ORTHOPEDIC SURGERY | Age: 72
End: 2022-04-01

## 2022-04-01 DIAGNOSIS — Z01.818 PRE-OP EXAM: ICD-10-CM

## 2022-04-01 DIAGNOSIS — Z01.818 PREOP EXAMINATION: ICD-10-CM

## 2022-04-01 DIAGNOSIS — Z79.01 ENCOUNTER FOR CURRENT LONG-TERM USE OF ANTICOAGULANTS: ICD-10-CM

## 2022-04-01 DIAGNOSIS — I48.3 TYPICAL ATRIAL FLUTTER (HCC): Primary | ICD-10-CM

## 2022-04-01 DIAGNOSIS — Z01.818 PREOP EXAMINATION: Primary | ICD-10-CM

## 2022-04-01 LAB
A/G RATIO: 1.7 (ref 1.1–2.2)
ALBUMIN SERPL-MCNC: 4.6 G/DL (ref 3.4–5)
ALP BLD-CCNC: 99 U/L (ref 40–129)
ALT SERPL-CCNC: 14 U/L (ref 10–40)
ANION GAP SERPL CALCULATED.3IONS-SCNC: 11 MMOL/L (ref 3–16)
APTT: 36.4 SEC (ref 26.2–38.6)
AST SERPL-CCNC: 14 U/L (ref 15–37)
BASOPHILS ABSOLUTE: 0 K/UL (ref 0–0.2)
BASOPHILS RELATIVE PERCENT: 0.3 %
BILIRUB SERPL-MCNC: 0.5 MG/DL (ref 0–1)
BUN BLDV-MCNC: 31 MG/DL (ref 7–20)
CALCIUM SERPL-MCNC: 10 MG/DL (ref 8.3–10.6)
CHLORIDE BLD-SCNC: 103 MMOL/L (ref 99–110)
CO2: 28 MMOL/L (ref 21–32)
CREAT SERPL-MCNC: 1.4 MG/DL (ref 0.8–1.3)
EOSINOPHILS ABSOLUTE: 0.1 K/UL (ref 0–0.6)
EOSINOPHILS RELATIVE PERCENT: 1.6 %
ESTIMATED AVERAGE GLUCOSE: 125.5 MG/DL
GFR AFRICAN AMERICAN: >60
GFR NON-AFRICAN AMERICAN: 50
GLUCOSE BLD-MCNC: 131 MG/DL (ref 70–99)
HBA1C MFR BLD: 6 %
HCT VFR BLD CALC: 41.5 % (ref 40.5–52.5)
HEMOGLOBIN: 13.8 G/DL (ref 13.5–17.5)
INR BLD: 1.72 (ref 0.88–1.12)
LYMPHOCYTES ABSOLUTE: 1.3 K/UL (ref 1–5.1)
LYMPHOCYTES RELATIVE PERCENT: 19.9 %
MCH RBC QN AUTO: 30.9 PG (ref 26–34)
MCHC RBC AUTO-ENTMCNC: 33.3 G/DL (ref 31–36)
MCV RBC AUTO: 92.8 FL (ref 80–100)
MONOCYTES ABSOLUTE: 0.6 K/UL (ref 0–1.3)
MONOCYTES RELATIVE PERCENT: 8.9 %
MRSA SCREEN RT-PCR: NORMAL
NEUTROPHILS ABSOLUTE: 4.7 K/UL (ref 1.7–7.7)
NEUTROPHILS RELATIVE PERCENT: 69.3 %
PDW BLD-RTO: 15.7 % (ref 12.4–15.4)
PLATELET # BLD: 189 K/UL (ref 135–450)
PMV BLD AUTO: 9.4 FL (ref 5–10.5)
POTASSIUM SERPL-SCNC: 4.9 MMOL/L (ref 3.5–5.1)
PROTHROMBIN TIME: 19.9 SEC (ref 9.9–12.7)
RBC # BLD: 4.47 M/UL (ref 4.2–5.9)
SODIUM BLD-SCNC: 142 MMOL/L (ref 136–145)
TOTAL PROTEIN: 7.3 G/DL (ref 6.4–8.2)
WBC # BLD: 6.7 K/UL (ref 4–11)

## 2022-04-01 PROCEDURE — 36415 COLL VENOUS BLD VENIPUNCTURE: CPT | Performed by: FAMILY MEDICINE

## 2022-04-01 PROCEDURE — 93005 ELECTROCARDIOGRAM TRACING: CPT | Performed by: FAMILY MEDICINE

## 2022-04-01 PROCEDURE — 93005 ELECTROCARDIOGRAM TRACING: CPT

## 2022-04-01 NOTE — PROGRESS NOTES
Discussed with patient  Patient expressed understanding    Pt is scheduled for procedure on 4/6. Pt has appt with Dr. Matheus Rhodes for pre op on 4/4. Per Dr. Matheus Rhodes: call surgeons office regarding coumadin. Pt states he was told to stop coumadin by surgeons office and was not told to do lovenox injections. Pt agreed to call surgeons office now.

## 2022-04-01 NOTE — TELEPHONE ENCOUNTER
CPT: 47390, 89545  BODY PART: right knee  STATUS: outpatient  AUTHORIZATION: [address]    Per [facility] website, [address]

## 2022-04-01 NOTE — PROGRESS NOTES
Continue current dose but Sat make it 15 instead of 10 mg   Because INR is low    Re check inr in 2 weeks

## 2022-04-01 NOTE — TELEPHONE ENCOUNTER
General Question     Subject: PATIENT IS HAVING A PROCEDURE DONE BY DR. Paco Coleman ON April 6, 2022. PATIENT WAS TOLD TO STOP TAKING COUMADIN. HE WENT TO HIS PRIMARY CARE FOR PRE-OP TESTS AND THE DOCTOR WOULD LIKE TO KNOW IF HE NEEDS TO GO ON [facility]. PLEASE ADVISE BEFORE THE END OF TODAY, April 1, 2022, AS PATIENT'S DOCTOR CAN CALL IN A PRESCRIPTION IF PATIENT NEEDS TO TAKE THE LOVENAL. THIS IS MESSAGE IS OF HIGH PRIORITY! PATIENT STATES HIS DOCTOR IS GETTING READY TO LEAVE SHORTLY. MANY THANKS!     Patient:  Je Horne \"Bill\"  Contact Number: [phone]

## 2022-04-02 LAB
EKG ATRIAL RATE: 76 BPM
EKG ATRIAL RATE: 78 BPM
EKG DIAGNOSIS: NORMAL
EKG DIAGNOSIS: NORMAL
EKG P-R INTERVAL: 140 MS
EKG P-R INTERVAL: 142 MS
EKG Q-T INTERVAL: 454 MS
EKG Q-T INTERVAL: 462 MS
EKG QRS DURATION: 180 MS
EKG QRS DURATION: 184 MS
EKG QTC CALCULATION (BAZETT): 517 MS
EKG QTC CALCULATION (BAZETT): 519 MS
EKG R AXIS: -52 DEGREES
EKG R AXIS: -53 DEGREES
EKG T AXIS: 106 DEGREES
EKG T AXIS: 110 DEGREES
EKG VENTRICULAR RATE: 76 BPM
EKG VENTRICULAR RATE: 78 BPM

## 2022-04-02 PROCEDURE — 93010 ELECTROCARDIOGRAM REPORT: CPT | Performed by: INTERNAL MEDICINE

## 2022-04-04 ENCOUNTER — OFFICE VISIT (OUTPATIENT)
Dept: FAMILY MEDICINE CLINIC | Age: 72
End: 2022-04-04
Payer: MEDICARE

## 2022-04-04 ENCOUNTER — TELEPHONE (OUTPATIENT)
Dept: CARDIOLOGY CLINIC | Age: 72
End: 2022-04-04

## 2022-04-04 ENCOUNTER — ANESTHESIA EVENT (OUTPATIENT)
Dept: OPERATING ROOM | Age: 72
End: 2022-04-04
Payer: MEDICARE

## 2022-04-04 VITALS
HEIGHT: 73 IN | DIASTOLIC BLOOD PRESSURE: 72 MMHG | TEMPERATURE: 97.3 F | BODY MASS INDEX: 37.73 KG/M2 | HEART RATE: 83 BPM | OXYGEN SATURATION: 97 % | WEIGHT: 284.7 LBS | SYSTOLIC BLOOD PRESSURE: 138 MMHG | RESPIRATION RATE: 16 BRPM

## 2022-04-04 DIAGNOSIS — Z79.01 LONG TERM CURRENT USE OF ANTICOAGULANTS WITH INR GOAL OF 2.0-3.0: Primary | ICD-10-CM

## 2022-04-04 PROCEDURE — G8417 CALC BMI ABV UP PARAM F/U: HCPCS | Performed by: FAMILY MEDICINE

## 2022-04-04 PROCEDURE — 4040F PNEUMOC VAC/ADMIN/RCVD: CPT | Performed by: FAMILY MEDICINE

## 2022-04-04 PROCEDURE — 1123F ACP DISCUSS/DSCN MKR DOCD: CPT | Performed by: FAMILY MEDICINE

## 2022-04-04 PROCEDURE — 1036F TOBACCO NON-USER: CPT | Performed by: FAMILY MEDICINE

## 2022-04-04 PROCEDURE — 99214 OFFICE O/P EST MOD 30 MIN: CPT | Performed by: FAMILY MEDICINE

## 2022-04-04 PROCEDURE — G8427 DOCREV CUR MEDS BY ELIG CLIN: HCPCS | Performed by: FAMILY MEDICINE

## 2022-04-04 PROCEDURE — 3017F COLORECTAL CA SCREEN DOC REV: CPT | Performed by: FAMILY MEDICINE

## 2022-04-04 NOTE — PROGRESS NOTES
Preoperative Consultation      Regina Garcia  YOB: 1950    Date of Service:  4/4/2022    Vitals:    04/04/22 1118   BP: 138/72   Pulse: 83   Resp: 16   Temp: 97.3 °F (36.3 °C)   TempSrc: Tympanic   SpO2: 97%   Weight: 284 lb 11.2 oz (129.1 kg)   Height: 6' 1\" (1.854 m)      Wt Readings from Last 2 Encounters:   04/04/22 284 lb 11.2 oz (129.1 kg)   03/29/22 280 lb (127 kg)     BP Readings from Last 3 Encounters:   04/04/22 138/72   01/28/22 (!) 156/84   12/27/21 108/63        Chief Complaint   Patient presents with   [facility] Pre-op Exam     Right Knee Genicular Nerve Block With Intra Articular Injection Site Confirmed By Fluoroscopy with Dr. Alden Cordova on 4/6/22 at [facility]    Results     No Known Allergies  Outpatient Medications Marked as Taking for the 4/4/22 encounter (Office Visit) with Diana Montenegro MD   Medication Sig Dispense Refill    pravastatin (PRAVACHOL) 80 MG tablet TAKE 1 TABLET EVERY DAY FOR CHOLESTEROL 90 tablet 3    amLODIPine (NORVASC) 5 MG tablet TAKE 1 TABLET EVERY DAY (Patient taking differently: 10 mg ) 90 tablet 1    lisinopril (PRINIVIL;ZESTRIL) 20 MG tablet TAKE 1 TABLET TWICE DAILY 180 tablet 0    metFORMIN (GLUCOPHAGE-XR) 500 MG extended release tablet TAKE 4 TABLETS EVERY DAY WITH BREAKFAST 360 tablet 0    allopurinol (ZYLOPRIM) 300 MG tablet TAKE 1 TABLET EVERY DAY 90 tablet 0    furosemide (LASIX) 40 MG tablet Take 1 tablet by mouth daily 90 tablet 1    vitamin D3 (CHOLECALCIFEROL) 25 MCG (1000 UT) TABS tablet Take 1 tablet by mouth daily 90 tablet 0    warfarin (COUMADIN) 10 MG tablet Take 1.5 tablets by mouth Twice a Week AND 1 tablet Five times weekly. TAKE 1 TABLET DAILY EXCEPT TAKE 1 AND 1/2 TABLETS (15 MG) ON WEDNESDAY AND FRIDAY. 96 tablet 3    glucose monitoring kit (FREESTYLE) monitoring kit Ok to dispense what is covered by insurance. 1 kit 0    blood glucose monitor strips Test 2 times a day & as needed for symptoms of irregular blood glucose.  Dispense sufficient amount for indicated testing frequency plus additional to accommodate PRN testing needs. Ok to dispense what is covered by insurance 100 strip 5    ACCU-CHEK COMPACT PLUS strip USE TO TEST 2 TIMES DAILY 102 strip 1    Lancets MISC BID testing 100 each 6    sotalol (BETAPACE) 80 MG tablet Take 80 mg by mouth 2 times daily.  aspirin 81 MG chewable tablet Take 81 mg by mouth daily. This patient presents to the office today for a preoperative consultation at the request of surgeon, Dr. Roldan Albert, who plans on performing R knee genicular nerve block, intra articular injection with fluoroscopy  on April 6 at [facility].  The current problem began several months ago .     Planned anesthesia: General ?   Known anesthesia problems: None   Bleeding risk: Anticoagulant therapy- warfarin  Personal or FH of DVT/PE: No    Patient objection to receiving blood products: No    Patient Active Problem List   Diagnosis    Benign essential HTN    Hyperlipidemia, mixed    Long term current use of anticoagulants with INR goal of 2.0-3.0    Gout of both feet    Primary insomnia    Class 3 severe obesity due to excess calories with serious comorbidity in adult ([facility] [address]    COPD    ROSETTA (obstructive sleep apnea)    Chronic congestive heart failure (HCC)    Cardiomyopathy, dilated (HCC)    Chronic systolic heart failure (HCC)    S/P AVR    Pacemaker    Typical atrial flutter (HCC)    Primary osteoarthritis of both knees    Status post arthroscopic surgery of right knee    Stage 3a chronic kidney disease ([facility] [address]    Chronic pain of right knee       Past Medical History:   Diagnosis Date    Acute congestive heart failure ([facility] [address] 12/30/2019    Allergic rhinitis 7/11/2016    Atrial fibrillation (HCC)     under care of cardiology:Dr. Jeanene Barre Behrens([facility])    CKD (chronic kidney disease)     Nephro:Dr. Parker:stage 3    Class 2 obesity due to excess calories without serious comorbidity with body mass index (BMI) of 37.0 to 37.9 in adult 11/7/2017    Controlled type 2 diabetes mellitus without complication, without long-term current use of insulin ([address] 2/24/2017    COPD     Essential tremor     Gout     Hyperlipidemia, mixed 07/11/2016    Hypertension     under care of cardiology:Dr. Serge Pelt Behrens([facility])    Long term current use of anticoagulants with INR goal of 2.0-3.0     under care of cardiology:Dr. Scott Behrens([facility])    Obstructive sleep apnea syndrome 06/18/2019    per pulmo    Primary insomnia 1/25/2017    Primary osteoarthritis of both knees 9/14/2021    Sleep apnea     uses bipap     Past Surgical History:   Procedure Laterality Date    AORTIC VALVE REPLACEMENT  10/1996:[facility]    x3    ATRIAL ABLATION SURGERY  03/23/2020    CTI dependent atrial flutter ablation (Dr. Everardo Smart)   [address]  03/23/2020    BIV upgrade    KNEE ARTHROSCOPY Right 12/27/2021    RIGHT KNEE DIAGNOSTIC ARTHROSCOPY WITH SUBCHONDROPLASTY TO MEDIAL FEMORAL CONDYLE AND TIBIAL PLATEAU, LEFT KNEE INJECTION.  performed by Carlos [facility]son [facility] at [facility]     Family History   Problem Relation Age of Onset   [facility] Cancer Mother         Gallbladder    Asthma Father     Emphysema Father     No Known Problems Sister     No Known Problems Brother     No Known Problems Maternal Grandmother     No Known Problems Maternal Grandfather     No Known Problems Paternal Grandmother     No Known Problems Paternal Grandfather      Social History     Socioeconomic History    Marital status:      Spouse name: Not on file    Number of children: Not on file    Years of education: Not on file    Highest education level: Not on file   Occupational History    Occupation: Retired:(PT car prep)   Tobacco Use    Smoking status: Former Smoker     Packs/day: 1.00     Years: 30.00     Pack years: 30.00     Types: Cigarettes     Quit date: 2019     Years since quittin.7    Smokeless tobacco: Never Used    Tobacco comment:     Vaping Use    Vaping Use: Never used   Substance and Sexual Activity    Alcohol use: Yes     Comment: 3-4 beers per year    Drug use: No    Sexual activity: Yes     Comment: -6 children    Other Topics Concern    Not on file   Social History Narrative    Not on file     Review of Systems  A comprehensive review of systems was negative except for what was noted in the HPI. Physical Exam   Blood pressure 138/72, pulse 83, temperature 97.3 °F (36.3 °C), temperature source Tympanic, resp. rate 16, height 6' 1\" (1.854 m), weight 284 lb 11.2 oz (129.1 kg), SpO2 97 %. Constitutional: He is oriented to person, place, and time. He appears well-developed and well-nourished. No distress. HENT:   Head: Normocephalic and atraumatic. Mouth/Throat: Uvula is midline, oropharynx is clear and moist and mucous membranes are normal.   Eyes: Conjunctivae and EOM are normal. Pupils are equal, round, and reactive to light. Neck: Trachea normal and normal range of motion. Neck supple. No JVD present. Carotid bruit is not present. No mass and no thyromegaly present. Cardiovascular: Normal rate, regular rhythm, normal heart sounds and intact distal pulses. Exam reveals no gallop and no friction rub. No murmur heard. Pulmonary/Chest: Effort normal and breath sounds normal. No respiratory distress. He has no wheezes. He has no rales. Abdominal: Soft. Normal aorta and bowel sounds are normal. He exhibits no distension and no mass. There is no hepatosplenomegaly. No tenderness. Musculoskeletal: He exhibits no edema and no tenderness. Neurological: He is alert and oriented to person, place, and time. He has normal strength. No cranial nerve deficit or sensory deficit. Coordination and gait normal.   Skin: Skin is warm and dry. No rash noted. No erythema. Psychiatric: He has a normal mood and affect.  His behavior is normal.     EKG Interpretation:  normal sinus rhythm, nonspecific ST and T waves changes, Left ant. Fascicular block, LVH, , unchanged from previous tracings.     Lab Review   Hospital Outpatient Visit on 04/01/2022   Component Date Value    Ventricular Rate 04/01/2022 78     Atrial Rate 04/01/2022 78     P-R Interval 04/01/2022 140     QRS Duration 04/01/2022 184     Q-T Interval 04/01/2022 454     QTc Calculation (Bazett) 04/01/2022 517     R Axis 04/01/2022 -48     T Axis 04/01/2022 110     Diagnosis 04/01/2022 Electronic atrial pacemakerNon-specific intra-ventricular conduction delayLeft anterior fascicular blockLeft ventricular hypertrophy with repolarization abnormalityAbnormal ECGWhen compared with ECG of 23-MAR-2020 09:53,native AV conduction is now evidentConfirmed by Rachel Medina MD ([id number]) on 4/2/2022 6:05:42 PM     Ventricular Rate 04/01/2022 76     Atrial Rate 04/01/2022 76     P-R Interval 04/01/2022 142     QRS Duration 04/01/2022 180     Q-T Interval 04/01/2022 462     QTc Calculation (Bazett) 04/01/2022 519     R Axis 04/01/2022 -46     T Axis 04/01/2022 106     Diagnosis 04/01/2022 Normal sinus rhythmNon-specific intra-ventricular conduction delayLeft anterior fascicular blockLeft ventricular hypertrophy with repolarization abnormalityAbnormal ECGWhen compared with ECG of 01-APR-2022 08:04,Sinus rhythm has replaced Electronic atrial pacemakerConfirmed by Rachel Medina MD ([id number]) on 4/2/2022 6:06:04 PM    Orders Only on 04/01/2022   Component Date Value    Hemoglobin A1C 04/01/2022 6.0     eAG 04/01/2022 125.5     aPTT 04/01/2022 36.4     Protime 04/01/2022 19.9*    INR 04/01/2022 1.72*    Sodium 04/01/2022 142     Potassium 04/01/2022 4.9     Chloride 04/01/2022 103     CO2 04/01/2022 28     Anion Gap 04/01/2022 11     Glucose 04/01/2022 131*    BUN 04/01/2022 31*    CREATININE 04/01/2022 1.4*    GFR Non- 04/01/2022 50*    GFR  04/01/2022 >60     Calcium 04/01/2022 10.0     Total Protein 04/01/2022 7.3     Albumin 04/01/2022 4.6     Albumin/Globulin Ratio 04/01/2022 1.7     Total Bilirubin 04/01/2022 0.5     Alkaline Phosphatase 04/01/2022 99     ALT 04/01/2022 14     AST 04/01/2022 14*    WBC 04/01/2022 6.7     RBC 04/01/2022 4.47     Hemoglobin 04/01/2022 13.8     Hematocrit 04/01/2022 41.5     MCV 04/01/2022 92.8     MCH 04/01/2022 30.9     MCHC 04/01/2022 33.3     RDW 04/01/2022 15.7*    Platelets 45/81/1051 189     MPV 04/01/2022 9.4     Neutrophils % 04/01/2022 69.3     Lymphocytes % 04/01/2022 19.9     Monocytes % 04/01/2022 8.9     Eosinophils % 04/01/2022 1.6     Basophils % 04/01/2022 0.3     Neutrophils Absolute 04/01/2022 4.7     Lymphocytes Absolute 04/01/2022 1.3     Monocytes Absolute 04/01/2022 0.6     Eosinophils Absolute 04/01/2022 0.1     Basophils Absolute 04/01/2022 0.0    Orders Only on 04/01/2022   Component Date Value    MRSA SCREEN RT-PCR 04/01/2022                      Value:Negative  MRSA DNA not detected. Normal Range: Not detected     Anti-coag visit on 03/21/2022   Component Date Value    INR 03/21/2022 2.4    Anti-coag visit on 02/11/2022   Component Date Value    INR 02/11/2022 1.9             Assessment:       80 y.o. patient with planned surgery as above. Known risk factors for perioperative complications: Arrhythmia, Bleeding concerns- anticoagulated, sp/AVR , Congestive heart failure, COPD, Diabetes mellitus, Hypertension, Obstructive sleep apnea, CKD   Current medications which may produce withdrawal symptoms if withheld perioperatively: none      Plan:     1. Preoperative workup as follows: none (reviewed blood work, ekg for today's visit)     2. Change in medication regimen before surgery: Take Sotalol  on morning of surgery with sip of water, and hold all other medications until after surgery.  Per cardiology he can hold Coumadin w/o bridging with Lovenox . 3. Prophylaxis for cardiac events with perioperative beta-blockers: Currently taking  Sotalol   ACC/AHA indications for pre-operative beta-blocker use:    · Vascular surgery with history of postitive stress test  · Intermediate or high risk surgery with history of CAD   · Intermediate or high risk surgery with multiple clinical predictors of CAD- 2 of the following: history of compensated or prior heart failure, history of cerebrovascular disease, DM, or renal insufficiency    Routine administration of higher-dose, long-acting metoprolol in beta-blocker-naïve patients on the day of surgery, and in the absence of dose titration is associated with an overall increase in mortality. Beta-blockers should be started days to weeks prior to surgery and titrated to pulse < 70.  4. Deep vein thrombosis prophylaxis: regimen to be chosen by surgical team  5.  No contraindications to planned surgery,

## 2022-04-04 NOTE — TELEPHONE ENCOUNTER
Procedure :    Right Knee Genicular Nerve Block With Intra Articular Injection Site Confirmed by Fluroscopy       Procedure Date : 04/06/2022    Surgeon : Dr. Azucena Mayers : n/a    Hold Recommendations : Warfarin    Fax Clearance : [phone]

## 2022-04-04 NOTE — TELEPHONE ENCOUNTER
Attempted to call patient to let him know for future clearance he will need an office visit. Per [facility], she will re sign a letter since his procedure was moved back. Will re print letter and fax.

## 2022-04-04 NOTE — TELEPHONE ENCOUNTER
December surgery was pushed back to April-needing an updated clearance letter faxed to [phone].

## 2022-04-05 ENCOUNTER — ANTI-COAG VISIT (OUTPATIENT)
Dept: FAMILY MEDICINE CLINIC | Age: 72
End: 2022-04-05

## 2022-04-05 DIAGNOSIS — Z79.01 ENCOUNTER FOR CURRENT LONG-TERM USE OF ANTICOAGULANTS: ICD-10-CM

## 2022-04-05 DIAGNOSIS — Z79.01 ENCOUNTER FOR CURRENT LONG-TERM USE OF ANTICOAGULANTS: Primary | ICD-10-CM

## 2022-04-05 DIAGNOSIS — I48.3 TYPICAL ATRIAL FLUTTER (HCC): Primary | ICD-10-CM

## 2022-04-05 LAB
INR BLD: 2.03 (ref 0.88–1.12)
PROTHROMBIN TIME: 23.6 SEC (ref 9.9–12.7)

## 2022-04-05 PROCEDURE — 36415 COLL VENOUS BLD VENIPUNCTURE: CPT | Performed by: FAMILY MEDICINE

## 2022-04-05 RX ORDER — ALLOPURINOL 300 MG/1
TABLET ORAL
Qty: 90 TABLET | Refills: 0 | OUTPATIENT
Start: 2022-04-05

## 2022-04-05 NOTE — TELEPHONE ENCOUNTER
Spoke to patient, procedure is tomorrow, he did not do lovanox at last procedure. Advised will let Dr. Star Vaca know.

## 2022-04-06 ENCOUNTER — HOSPITAL ENCOUNTER (OUTPATIENT)
Age: 72
Setting detail: OUTPATIENT SURGERY
Discharge: HOME | End: 2022-04-06
Attending: ORTHOPAEDIC SURGERY | Admitting: ORTHOPAEDIC SURGERY
Payer: MEDICARE

## 2022-04-06 ENCOUNTER — ANESTHESIA (OUTPATIENT)
Dept: OPERATING ROOM | Age: 72
End: 2022-04-06
Payer: MEDICARE

## 2022-04-06 ENCOUNTER — NURSE ONLY (OUTPATIENT)
Dept: CARDIOLOGY CLINIC | Age: 72
End: 2022-04-06
Payer: MEDICARE

## 2022-04-06 VITALS
DIASTOLIC BLOOD PRESSURE: 70 MMHG | OXYGEN SATURATION: 95 % | RESPIRATION RATE: 22 BRPM | SYSTOLIC BLOOD PRESSURE: 110 MMHG

## 2022-04-06 VITALS
RESPIRATION RATE: 16 BRPM | TEMPERATURE: 98 F | HEART RATE: 70 BPM | WEIGHT: 280 LBS | BODY MASS INDEX: 37.11 KG/M2 | OXYGEN SATURATION: 98 % | SYSTOLIC BLOOD PRESSURE: 133 MMHG | DIASTOLIC BLOOD PRESSURE: 87 MMHG | HEIGHT: 73 IN

## 2022-04-06 DIAGNOSIS — Z95.0 PACEMAKER: ICD-10-CM

## 2022-04-06 DIAGNOSIS — M17.0 PRIMARY OSTEOARTHRITIS OF BOTH KNEES: Primary | ICD-10-CM

## 2022-04-06 DIAGNOSIS — I42.0 CARDIOMYOPATHY, DILATED (HCC): ICD-10-CM

## 2022-04-06 DIAGNOSIS — I50.22 CHRONIC SYSTOLIC HEART FAILURE (HCC): ICD-10-CM

## 2022-04-06 LAB
GLUCOSE BLD-MCNC: 128 MG/DL (ref 70–99)
PERFORMED ON: ABNORMAL

## 2022-04-06 PROCEDURE — 2720000010 HC SURG SUPPLY STERILE: Performed by: ORTHOPAEDIC SURGERY

## 2022-04-06 PROCEDURE — 3600000012 HC SURGERY LEVEL 2 ADDTL 15MIN: Performed by: ORTHOPAEDIC SURGERY

## 2022-04-06 PROCEDURE — C9290 INJ, BUPIVACAINE LIPOSOME: HCPCS | Performed by: ORTHOPAEDIC SURGERY

## 2022-04-06 PROCEDURE — 0232T NJX PLATELET PLASMA: CPT | Performed by: ORTHOPAEDIC SURGERY

## 2022-04-06 PROCEDURE — 99024 POSTOP FOLLOW-UP VISIT: CPT | Performed by: PHYSICIAN ASSISTANT

## 2022-04-06 PROCEDURE — 6360000002 HC RX W HCPCS: Performed by: ORTHOPAEDIC SURGERY

## 2022-04-06 PROCEDURE — 6360000002 HC RX W HCPCS: Performed by: NURSE ANESTHETIST, CERTIFIED REGISTERED

## 2022-04-06 PROCEDURE — 7100000011 HC PHASE II RECOVERY - ADDTL 15 MIN: Performed by: ORTHOPAEDIC SURGERY

## 2022-04-06 PROCEDURE — 7100000010 HC PHASE II RECOVERY - FIRST 15 MIN: Performed by: ORTHOPAEDIC SURGERY

## 2022-04-06 PROCEDURE — 2709999900 HC NON-CHARGEABLE SUPPLY: Performed by: ORTHOPAEDIC SURGERY

## 2022-04-06 PROCEDURE — 2580000003 HC RX 258: Performed by: ANESTHESIOLOGY

## 2022-04-06 PROCEDURE — 2500000003 HC RX 250 WO HCPCS: Performed by: ANESTHESIOLOGY

## 2022-04-06 PROCEDURE — 3700000001 HC ADD 15 MINUTES (ANESTHESIA): Performed by: ORTHOPAEDIC SURGERY

## 2022-04-06 PROCEDURE — 2500000003 HC RX 250 WO HCPCS: Performed by: NURSE ANESTHETIST, CERTIFIED REGISTERED

## 2022-04-06 PROCEDURE — 3700000000 HC ANESTHESIA ATTENDED CARE: Performed by: ORTHOPAEDIC SURGERY

## 2022-04-06 PROCEDURE — 3600000002 HC SURGERY LEVEL 2 BASE: Performed by: ORTHOPAEDIC SURGERY

## 2022-04-06 PROCEDURE — 6370000000 HC RX 637 (ALT 250 FOR IP): Performed by: PHYSICIAN ASSISTANT

## 2022-04-06 RX ORDER — LABETALOL HYDROCHLORIDE 5 MG/ML
5 INJECTION, SOLUTION INTRAVENOUS EVERY 10 MIN PRN
Status: DISCONTINUED | OUTPATIENT
Start: 2022-04-06 | End: 2022-04-06 | Stop reason: HOSPADM

## 2022-04-06 RX ORDER — MEPERIDINE HYDROCHLORIDE 25 MG/ML
12.5 INJECTION INTRAMUSCULAR; INTRAVENOUS; SUBCUTANEOUS EVERY 5 MIN PRN
Status: DISCONTINUED | OUTPATIENT
Start: 2022-04-06 | End: 2022-04-06 | Stop reason: HOSPADM

## 2022-04-06 RX ORDER — OXYCODONE HYDROCHLORIDE 5 MG/1
5 TABLET ORAL PRN
Status: COMPLETED | OUTPATIENT
Start: 2022-04-06 | End: 2022-04-06

## 2022-04-06 RX ORDER — DIPHENHYDRAMINE HYDROCHLORIDE 50 MG/ML
12.5 INJECTION INTRAMUSCULAR; INTRAVENOUS
Status: DISCONTINUED | OUTPATIENT
Start: 2022-04-06 | End: 2022-04-06 | Stop reason: HOSPADM

## 2022-04-06 RX ORDER — SODIUM CHLORIDE, SODIUM LACTATE, POTASSIUM CHLORIDE, CALCIUM CHLORIDE 600; 310; 30; 20 MG/100ML; MG/100ML; MG/100ML; MG/100ML
INJECTION, SOLUTION INTRAVENOUS CONTINUOUS
Status: DISCONTINUED | OUTPATIENT
Start: 2022-04-06 | End: 2022-04-06 | Stop reason: HOSPADM

## 2022-04-06 RX ORDER — SODIUM CHLORIDE 9 MG/ML
25 INJECTION, SOLUTION INTRAVENOUS PRN
Status: DISCONTINUED | OUTPATIENT
Start: 2022-04-06 | End: 2022-04-06 | Stop reason: HOSPADM

## 2022-04-06 RX ORDER — SODIUM CHLORIDE 0.9 % (FLUSH) 0.9 %
5-40 SYRINGE (ML) INJECTION EVERY 12 HOURS SCHEDULED
Status: DISCONTINUED | OUTPATIENT
Start: 2022-04-06 | End: 2022-04-06 | Stop reason: HOSPADM

## 2022-04-06 RX ORDER — HYDROCODONE BITARTRATE AND ACETAMINOPHEN 5; 325 MG/1; MG/1
1 TABLET ORAL EVERY 6 HOURS PRN
Qty: 28 TABLET | Refills: 0 | Status: SHIPPED | OUTPATIENT
Start: 2022-04-06 | End: 2022-04-13

## 2022-04-06 RX ORDER — OXYCODONE HYDROCHLORIDE 5 MG/1
10 TABLET ORAL PRN
Status: COMPLETED | OUTPATIENT
Start: 2022-04-06 | End: 2022-04-06

## 2022-04-06 RX ORDER — SODIUM CHLORIDE 0.9 % (FLUSH) 0.9 %
5-40 SYRINGE (ML) INJECTION PRN
Status: DISCONTINUED | OUTPATIENT
Start: 2022-04-06 | End: 2022-04-06 | Stop reason: HOSPADM

## 2022-04-06 RX ORDER — OXYCODONE HYDROCHLORIDE 5 MG/1
5 TABLET ORAL PRN
Status: DISCONTINUED | OUTPATIENT
Start: 2022-04-06 | End: 2022-04-06 | Stop reason: HOSPADM

## 2022-04-06 RX ORDER — LIDOCAINE HYDROCHLORIDE 20 MG/ML
INJECTION, SOLUTION EPIDURAL; INFILTRATION; INTRACAUDAL; PERINEURAL PRN
Status: DISCONTINUED | OUTPATIENT
Start: 2022-04-06 | End: 2022-04-06 | Stop reason: SDUPTHER

## 2022-04-06 RX ORDER — PROPOFOL 10 MG/ML
INJECTION, EMULSION INTRAVENOUS PRN
Status: DISCONTINUED | OUTPATIENT
Start: 2022-04-06 | End: 2022-04-06 | Stop reason: SDUPTHER

## 2022-04-06 RX ORDER — OXYCODONE HYDROCHLORIDE 5 MG/1
10 TABLET ORAL PRN
Status: DISCONTINUED | OUTPATIENT
Start: 2022-04-06 | End: 2022-04-06 | Stop reason: HOSPADM

## 2022-04-06 RX ORDER — ONDANSETRON 2 MG/ML
4 INJECTION INTRAMUSCULAR; INTRAVENOUS
Status: DISCONTINUED | OUTPATIENT
Start: 2022-04-06 | End: 2022-04-06 | Stop reason: HOSPADM

## 2022-04-06 RX ORDER — LIDOCAINE HYDROCHLORIDE 10 MG/ML
0.3 INJECTION, SOLUTION EPIDURAL; INFILTRATION; INTRACAUDAL; PERINEURAL
Status: COMPLETED | OUTPATIENT
Start: 2022-04-06 | End: 2022-04-06

## 2022-04-06 RX ORDER — SODIUM CHLORIDE 9 MG/ML
INJECTION, SOLUTION INTRAVENOUS PRN
Status: DISCONTINUED | OUTPATIENT
Start: 2022-04-06 | End: 2022-04-06 | Stop reason: HOSPADM

## 2022-04-06 RX ADMIN — LIDOCAINE HYDROCHLORIDE 60 MG: 20 INJECTION, SOLUTION EPIDURAL; INFILTRATION; INTRACAUDAL; PERINEURAL at 15:05

## 2022-04-06 RX ADMIN — LIDOCAINE HYDROCHLORIDE 0.3 ML: 10 INJECTION, SOLUTION EPIDURAL; INFILTRATION; INTRACAUDAL; PERINEURAL at 13:54

## 2022-04-06 RX ADMIN — OXYCODONE 10 MG: 5 TABLET ORAL at 15:21

## 2022-04-06 RX ADMIN — PROPOFOL 150 MG: 10 INJECTION, EMULSION INTRAVENOUS at 15:04

## 2022-04-06 RX ADMIN — SODIUM CHLORIDE, POTASSIUM CHLORIDE, SODIUM LACTATE AND CALCIUM CHLORIDE: 600; 310; 30; 20 INJECTION, SOLUTION INTRAVENOUS at 13:54

## 2022-04-06 ASSESSMENT — PULMONARY FUNCTION TESTS
PIF_VALUE: 0

## 2022-04-06 ASSESSMENT — PAIN DESCRIPTION - DESCRIPTORS: DESCRIPTORS: ACHING;SHARP;SHOOTING

## 2022-04-06 ASSESSMENT — PAIN - FUNCTIONAL ASSESSMENT
PAIN_FUNCTIONAL_ASSESSMENT: 0-10
PAIN_FUNCTIONAL_ASSESSMENT: PREVENTS OR INTERFERES WITH ALL ACTIVE AND SOME PASSIVE ACTIVITIES

## 2022-04-06 ASSESSMENT — PAIN SCALES - GENERAL: PAINLEVEL_OUTOF10: 9

## 2022-04-06 ASSESSMENT — PAIN DESCRIPTION - LOCATION: LOCATION: KNEE

## 2022-04-06 NOTE — PROGRESS NOTES
We received remote transmission from patient's monitor at home. Transmission shows normal sensing and pacing function. Patient is programmed not to have to bi-v pace. EP physician will review. See interrogation under cardiology tab in the [address] [place] [address] [address] field for more details. Optivol is within normal range.

## 2022-04-06 NOTE — PROGRESS NOTES
Patient is able to demonstrate the ability to move from a reclining position to an upright position within the recliner. Pt. checked in for procedure. Assessment complete. IV started. Safety check list complete. Pt's  information provided on the chart. Blood draw completed in pre-op for PRP injection. 50 mL collected in syringe (volume includes anticoagulant ACDA provided from kit / rep)  Collected from (site) left forearm. Verified patients name / birth date - pt label applied to syringe. Blood given to VLADIMIR Westbrook  Patient tolerated well.

## 2022-04-06 NOTE — ANESTHESIA PRE PROCEDURE
Department of Anesthesiology  Preprocedure Note       Name:  Regina Garcia   Age:  80 y.o.  :  1950                                          MRN:  [id number]         Date:  2022      Surgeon: Duane Rosario):  Anders Gonzalez MD    Procedure: Procedure(s):  RIGHT KNEE GENICULAR NERVE BLOCK WITH INTRA ARTICULAR INJECTION SITE CONFIRMED BY FLUOROSCOPY    Medications prior to admission:   Prior to Admission medications    Medication Sig Start Date End Date Taking? Authorizing Provider   pravastatin (PRAVACHOL) 80 MG tablet TAKE 1 TABLET EVERY DAY FOR CHOLESTEROL 3/11/22   Diana Montenegro MD   amLODIPine (NORVASC) 5 MG tablet TAKE 1 TABLET EVERY DAY  Patient taking differently: 10 mg  22   MEENU Carter   lisinopril (PRINIVIL;ZESTRIL) 20 MG tablet TAKE 1 TABLET TWICE DAILY 22   MEENU Crater   metFORMIN (GLUCOPHAGE-XR) 500 MG extended release tablet TAKE 4 TABLETS EVERY DAY WITH BREAKFAST 22   MEENU Ramires CNP   allopurinol (ZYLOPRIM) 300 MG tablet TAKE 1 TABLET EVERY DAY 22   MEENU Ramires CNP   furosemide (LASIX) 40 MG tablet Take 1 tablet by mouth daily 11/10/21   MEENU Starr   vitamin D3 (CHOLECALCIFEROL) 25 MCG (1000 UT) TABS tablet Take 1 tablet by mouth daily 11/10/21   MEENU Starr   warfarin (COUMADIN) 10 MG tablet Take 1.5 tablets by mouth Twice a Week AND 1 tablet Five times weekly. TAKE 1 TABLET DAILY EXCEPT TAKE 1 AND 1/2 TABLETS (15 MG) ON WEDNESDAY AND FRIDAY. 10/14/21 4/4/22  Artemio Dean MD   glucose monitoring kit (FREESTYLE) monitoring kit Ok to dispense what is covered by insurance. 20   Andrew Galo MD   blood glucose monitor strips Test 2 times a day & as needed for symptoms of irregular blood glucose. Dispense sufficient amount for indicated testing frequency plus additional to accommodate PRN testing needs.  Ok to dispense what is covered by insurance 20   Abelardo Salas, Take 81 mg by mouth daily.        Facility-Administered Medications Ordered in Other Encounters   Medication Dose Route Frequency Provider Last Rate Last Admin    oxyCODONE-acetaminophen (PERCOCET) 5-325 MG per tablet 1 tablet  1 tablet Oral Once Catracho Bowman MD        lactated ringers infusion   IntraVENous Continuous Aleena Blackman MD        sodium chloride flush 0.9 % injection 10 mL  10 mL IntraVENous 2 times per day Aleena Blackman MD        sodium chloride flush 0.9 % injection 10 mL  10 mL IntraVENous PRN Aleena Blackman MD        0.9 % sodium chloride infusion  25 mL IntraVENous PRN Aleena Blackman MD           Allergies:  No Known Allergies    Problem List:    Patient Active Problem List   Diagnosis Code    Benign essential HTN I10    Hyperlipidemia, mixed E78.2    Long term current use of anticoagulants with INR goal of 2.0-3.0 Z79.01    Gout of both feet M10.9    Primary insomnia F51.01    Class 3 severe obesity due to excess calories with serious comorbidity in adult ([facility] E66.01    COPD J43.8    ROSETTA (obstructive sleep apnea) G47.33    Chronic congestive heart failure (HCC) I50.9    Cardiomyopathy, dilated (HCC) I42.0    Chronic systolic heart failure (HCC) I50.22    S/P AVR Z95.2    Pacemaker Z95.0    Typical atrial flutter (HCC) I48.3    Primary osteoarthritis of both knees M17.0    Status post arthroscopic surgery of right knee Z98.890    Stage 3a chronic kidney disease ([facility] N18.31    Chronic pain of right knee M25.561, G89.29       Past Medical History:        Diagnosis Date    Acute congestive heart failure ([facility] 12/30/2019    Allergic rhinitis 7/11/2016    Atrial fibrillation ([facility]     under care of cardiology:Dr. Abelardo Hasting Behrens([facility])    CKD (chronic kidney disease)     Nephro:Dr. Parker:stage 3    Class 2 obesity due to excess calories without serious comorbidity with body mass index (BMI) of 37.0 to 37.9 in adult 11/7/2017    Controlled type 2 diabetes mellitus without complication, without long-term current use of insulin ([facility] 2017    COPD     Essential tremor     Gout     Hyperlipidemia, mixed 2016    Hypertension     under care of cardiology:Dr. Dalphine Louder Behrens([facility])    Long term current use of anticoagulants with INR goal of 2.0-3.0     under care of cardiology:Dr. Scott Behrens([facility])    Obstructive sleep apnea syndrome 2019    per pulmo    Primary insomnia 2017    Primary osteoarthritis of both knees 2021    Sleep apnea     uses bipap       Past Surgical History:        Procedure Laterality Date    AORTIC VALVE REPLACEMENT  10/1996:St Quique    x3    ATRIAL ABLATION SURGERY  2020    CTI dependent atrial flutter ablation ([facility])   [address]  2020    BIV upgrade    KNEE ARTHROSCOPY Right 2021    RIGHT KNEE DIAGNOSTIC ARTHROSCOPY WITH SUBCHONDROPLASTY TO MEDIAL FEMORAL CONDYLE AND TIBIAL PLATEAU, LEFT KNEE INJECTION. performed by Paddy Starr MD at [facility]       Social History:    Social History     Tobacco Use    Smoking status: Former Smoker     Packs/day: 1.00     Years: 30.00     Pack years: 30.00     Types: Cigarettes     Quit date: 2019     Years since quittin.7    Smokeless tobacco: Never Used    Tobacco comment:     Substance Use Topics    Alcohol use: Yes     Comment: 3-4 beers per year                                Counseling given: Not Answered  Comment:        Vital Signs (Current): There were no vitals filed for this visit.                                            BP Readings from Last 3 Encounters:   22 138/72   22 (!) 156/84   21 108/63       NPO Status:                                                                                 BMI:   Wt Readings from Last 3 Encounters:   22 284 lb 11.2 oz (129.1 kg)   22 280 lb (127 kg)   22 280 lb 14.4 oz (127.4 kg) There is no height or weight on file to calculate BMI.    CBC:   Lab Results   Component Value Date    WBC 6.7 04/01/2022    RBC 4.47 04/01/2022    HGB 13.8 04/01/2022    HCT 41.5 04/01/2022    MCV 92.8 04/01/2022    RDW 15.7 04/01/2022     04/01/2022       CMP:   Lab Results   Component Value Date     04/01/2022    K 4.9 04/01/2022     04/01/2022    CO2 28 04/01/2022    BUN 31 04/01/2022    CREATININE 1.4 04/01/2022    GFRAA >60 04/01/2022    GFRAA >60 02/23/2011    AGRATIO 1.7 04/01/2022    LABGLOM 50 04/01/2022    GLUCOSE 131 04/01/2022    PROT 7.3 04/01/2022    CALCIUM 10.0 04/01/2022    BILITOT 0.5 04/01/2022    ALKPHOS 99 04/01/2022    AST 14 04/01/2022    ALT 14 04/01/2022       POC Tests: No results for input(s): POCGLU, POCNA, POCK, POCCL, POCBUN, POCHEMO, POCHCT in the last 72 hours.     Coags:   Lab Results   Component Value Date    PROTIME 23.6 04/05/2022    PROTIME 24.5 12/08/2009    INR 2.03 04/05/2022    APTT 36.4 04/01/2022       HCG (If Applicable): No results found for: PREGTESTUR, PREGSERUM, HCG, HCGQUANT     ABGs: No results found for: PHART, PO2ART, GNQ9PZU, DCA6TOF, BEART, D3ENLLPI     Type & Screen (If Applicable):  No results found for: LABABO, LABRH    Drug/Infectious Status (If Applicable):  No results found for: HIV, HEPCAB    COVID-19 Screening (If Applicable): No results found for: COVID19        Anesthesia Evaluation  Patient summary reviewed and Nursing notes reviewed no history of anesthetic complications:   Airway: Mallampati: III     Neck ROM: full   Dental:          Pulmonary:Negative Pulmonary ROS and normal exam    (+) COPD:  sleep apnea: on CPAP,                             Cardiovascular:Negative CV ROS    (+) hypertension:, valvular problems/murmurs (s/p AVR):, pacemaker: pacemaker, CAD:, CABG/stent (most recent 8 years ago):, dysrhythmias: atrial flutter and atrial fibrillation, CHF:, hyperlipidemia    (-)  angina                Neuro/Psych:   Negative Neuro/Psych ROS              GI/Hepatic/Renal: Neg GI/Hepatic/Renal ROS  (+) renal disease: CRI,      (-) hiatal hernia and GERD       Endo/Other: Negative Endo/Other ROS   (+) Diabetes, : arthritis:., .                 Abdominal:             Vascular: Other Findings:           Anesthesia Plan      general     ASA 3     (I discussed with the patient the risks and benefits of PIV, general anesthesia, IV Narcotics, PACU. All questions were answered the patient agrees with the plan and wishes to proceed.  )  Induction: intravenous. Normal sinus rhythmNon-specific intra-ventricular conduction delayLeft anterior fascicular blockLeft ventricular hypertrophy with repolarization abnormalityAbnormal ECGWhen compared with ECG of 01-APR-2022 08:04,Sinus rhythm has replaced Electronic atrial pacemakerConfirmed by Shiela Millard MD, Dayana Schmidt ([id number]) on 4/2/2022 6:06:04 PM    Summary   -Left ventricular cavity size is normal.   -Overall left ventricular systolic function appears mildly reduced.   -Ejection fraction is visually estimated to be 45%. -There is abnormal septal motion/bounce noted. -Indeterminate diastolic function E/e' = 53.7. Benedetta Ou -A mechanical artificial aortic valve appears well seated with a maximum   velocity of 2.1m/s and a mean gradient of 9mmHg.   -No evidence of aortic valve regurgitation.   -Trivial-mild mitral regurgitation.   -Mild tricuspid regurgitation.   -The right ventricle is normal in size with reduces function. TAPSE 1.9cm. RVS velocity is 6.94 cm/s.   -Pacer/ICD right heart      Signature      ------------------------------------------------------------------   Electronically signed by Baron Lauren MD, [facility] (Interpreting   physician) on 12/11/2020 at 02:19 PM    Pre-Operative Diagnosis: RIGHT KNEE OSTEOARTHRITIS AND PAIN    80 y.o.   BMI:  Body mass index is 36.94 kg/m².      Vitals:    04/06/22 1140   BP: 132/73   Pulse: 76   Resp: 18   Temp: 98.4 °F (36.9 °C) TempSrc: Temporal   SpO2: 99%   Weight: 280 lb (127 kg)   Height: 6' 1\" (1.854 m)       No Known Allergies    Social History     Tobacco Use    Smoking status: Former Smoker     Packs/day: 1.00     Years: 30.00     Pack years: 30.00     Types: Cigarettes     Quit date: 2019     Years since quittin.7    Smokeless tobacco: Never Used    Tobacco comment:     Substance Use Topics    Alcohol use: Yes     Comment: 3-4 beers per year       LABS:    CBC  Lab Results   Component Value Date/Time    WBC 6.7 2022 08:15 AM    HGB 13.8 2022 08:15 AM    HCT 41.5 2022 08:15 AM     2022 08:15 AM     RENAL  Lab Results   Component Value Date/Time     2022 08:15 AM    K 4.9 2022 08:15 AM     2022 08:15 AM    CO2 28 2022 08:15 AM    BUN 31 (H) 2022 08:15 AM    CREATININE 1.4 (H) 2022 08:15 AM    GLUCOSE 131 (H) 2022 08:15 AM     COAGS  Lab Results   Component Value Date/Time    PROTIME 23.6 (H) 2022 08:10 AM    PROTIME 24.5 (H) 2009 10:33 AM    INR 2.03 (H) 2022 08:10 AM    APTT 36.4 2022 08:15 AM       Essence Ruelas MD   2022

## 2022-04-06 NOTE — ANESTHESIA POSTPROCEDURE EVALUATION
Department of Anesthesiology  Postprocedure Note    Patient: Denia Lyle  MRN: [id number]  YOB: 1950  Date of evaluation: 4/6/2022  Time:  3:54 PM     Procedure Summary     Date: 04/06/22 Room / Location: [address] [place] [address] OR 01 / [facility]    Anesthesia Start: 1501 Anesthesia Stop: 7762    Procedure: RIGHT KNEE GENICULAR NERVE BLOCK WITH INTRA ARTICULAR INJECTION SITE CONFIRMED BY FLUOROSCOPY (Right Knee) Diagnosis: (RIGHT KNEE OSTEOARTHRITIS AND PAIN)    Surgeons: Nessa Harden MD Responsible Provider: Anita Hudson MD    Anesthesia Type: general ASA Status: 3          Anesthesia Type: general    Karlene Phase I: Karlene Score: 10    Karlene Phase II: Karlene Score: 10    Last vitals: Reviewed and per EMR flowsheets.        Anesthesia Post Evaluation    Comments: Postoperative Anesthesia Note    Name:    Denia Lyle  MRN:      [id number]    Patient Vitals in the past 12 hrs:  04/06/22 1533, BP:133/87, Pulse:70, Resp:16, SpO2:98 %  04/06/22 1528, BP:(!) 114/94, Temp:98 °F (36.7 °C), Temp src:Temporal, Pulse:72, Resp:18, SpO2:96 %  04/06/22 1521, BP:(!) 116/97, Pulse:74, Resp:16, SpO2:96 %  04/06/22 1140, BP:132/73, Temp:98.4 °F (36.9 °C), Temp src:Temporal, Pulse:76, Resp:18, SpO2:99 %, Height:6' 1\" (1.854 m), Weight:280 lb (127 kg)     LABS:    CBC  Lab Results       Component                Value               Date/Time                  WBC                      6.7                 04/01/2022 08:15 AM        HGB                      13.8                04/01/2022 08:15 AM        HCT                      41.5                04/01/2022 08:15 AM        PLT                      189                 04/01/2022 08:15 AM   RENAL  Lab Results       Component                Value               Date/Time                  NA                       142                 04/01/2022 08:15 AM        K                        4.9                 04/01/2022 08:15 AM        CL                       103 04/01/2022 08:15 AM        CO2                      28                  04/01/2022 08:15 AM        BUN                      31 (H)              04/01/2022 08:15 AM        CREATININE               1.4 (H)             04/01/2022 08:15 AM        GLUCOSE                  131 (H)             04/01/2022 08:15 AM   COAGS  Lab Results       Component                Value               Date/Time                  PROTIME                  23.6 (H)            04/05/2022 08:10 AM        PROTIME                  24.5 (H)            12/08/2009 10:33 AM        INR                      2.03 (H)            04/05/2022 08:10 AM        APTT                     36.4                04/01/2022 08:15 AM     Intake & Output:  @78UPEP@    Nausea & Vomiting:  No    Level of Consciousness:  Awake    Pain Assessment:  Adequate analgesia    Anesthesia Complications:  No apparent anesthetic complications    SUMMARY      Vital signs stable  OK to discharge from Stage I post anesthesia care.   Care transferred from Anesthesiology department on discharge from perioperative area

## 2022-04-06 NOTE — PROGRESS NOTES
Discharge instructions given to pt and family. Verbalized understanding, no questions at this time. Discharged in stable condition, no changes to assessment. Assisted pt to car via w/c.

## 2022-04-06 NOTE — H&P
Subjective:     Patient is a 80 y.o.  male presented with a history of right knee pain and osteoarthritis. Pateint is being seen for chronic issues of pain and disability with failure of conservative care to date.        Patient Active Problem List    Diagnosis Date Noted    Chronic pain of right knee 03/29/2022    Stage 3a chronic kidney disease ([address] 01/28/2022    Status post arthroscopic surgery of right knee 01/25/2022    Primary osteoarthritis of both knees     Typical atrial flutter ([address] 03/23/2020    Cardiomyopathy, dilated ([address]     Chronic systolic heart failure ([address]     S/P AVR     Pacemaker     Chronic congestive heart failure ([address] 12/30/2019    ROSETTA (obstructive sleep apnea) 06/18/2019    COPD 11/05/2018    Class 3 severe obesity due to excess calories with serious comorbidity in adult ([address] 11/07/2017    Primary insomnia 01/25/2017    Gout of both feet 09/19/2016    Benign essential HTN 07/11/2016    Hyperlipidemia, mixed 07/11/2016    Long term current use of anticoagulants with INR goal of 2.0-3.0 07/11/2016     Past Medical History:   Diagnosis Date    Acute congestive heart failure ([address] 12/30/2019    Allergic rhinitis 7/11/2016    Atrial fibrillation ([address]     under care of cardiology:Dr. Iola Rumpf Behrens([facility])    CKD (chronic kidney disease)     Nephro:Dr. Parker:stage 3    Class 2 obesity due to excess calories without serious comorbidity with body mass index (BMI) of 37.0 to 37.9 in adult 11/7/2017    Controlled type 2 diabetes mellitus without complication, without long-term current use of insulin ([facility] 2/24/2017    COPD     Essential tremor     Gout     Hyperlipidemia, mixed 07/11/2016    Hypertension     under care of cardiology:Dr. Iola Rumpf Behrens([facility])    Long term current use of anticoagulants with INR goal of 2.0-3.0     under care of cardiology:Dr. Scott Behrens([facility])    Obstructive sleep apnea syndrome 06/18/2019    per pulmo    Primary insomnia 1/25/2017    Primary osteoarthritis of both knees 9/14/2021    Sleep apnea     uses bipap      Past Surgical History:   Procedure Laterality Date    AORTIC VALVE REPLACEMENT  10/1996:St Quique    x3    ATRIAL ABLATION SURGERY  03/23/2020    CTI dependent atrial flutter ablation (Dr. Michael Basurto)   [address]  03/23/2020    BIV upgrade    KNEE ARTHROSCOPY Right 12/27/2021    RIGHT KNEE DIAGNOSTIC ARTHROSCOPY WITH SUBCHONDROPLASTY TO MEDIAL FEMORAL CONDYLE AND TIBIAL PLATEAU, LEFT KNEE INJECTION. performed by Lincoln Smith MD at [facility]      Medications Prior to Admission: pravastatin (PRAVACHOL) 80 MG tablet, TAKE 1 TABLET EVERY DAY FOR CHOLESTEROL  amLODIPine (NORVASC) 5 MG tablet, TAKE 1 TABLET EVERY DAY (Patient taking differently: 10 mg )  lisinopril (PRINIVIL;ZESTRIL) 20 MG tablet, TAKE 1 TABLET TWICE DAILY  metFORMIN (GLUCOPHAGE-XR) 500 MG extended release tablet, TAKE 4 TABLETS EVERY DAY WITH BREAKFAST  allopurinol (ZYLOPRIM) 300 MG tablet, TAKE 1 TABLET EVERY DAY  furosemide (LASIX) 40 MG tablet, Take 1 tablet by mouth daily  vitamin D3 (CHOLECALCIFEROL) 25 MCG (1000 UT) TABS tablet, Take 1 tablet by mouth daily  warfarin (COUMADIN) 10 MG tablet, Take 1.5 tablets by mouth Twice a Week AND 1 tablet Five times weekly. TAKE 1 TABLET DAILY EXCEPT TAKE 1 AND 1/2 TABLETS (15 MG) ON WEDNESDAY AND FRIDAY. glucose monitoring kit (FREESTYLE) monitoring kit, Ok to dispense what is covered by insurance. blood glucose monitor strips, Test 2 times a day & as needed for symptoms of irregular blood glucose. Dispense sufficient amount for indicated testing frequency plus additional to accommodate PRN testing needs. Ok to dispense what is covered by insurance  ACCU-CHEK COMPACT PLUS strip, USE TO TEST 2 TIMES DAILY  Lancets MISC, BID testing  sotalol (BETAPACE) 80 MG tablet, Take 80 mg by mouth 2 times daily.   aspirin 81 MG chewable tablet, Take 81 mg by mouth daily. No Known Allergies   Social History     Tobacco Use    Smoking status: Former Smoker     Packs/day: 1.00     Years: 30.00     Pack years: 30.00     Types: Cigarettes     Quit date: 2019     Years since quittin.7    Smokeless tobacco: Never Used    Tobacco comment:     Substance Use Topics    Alcohol use: Yes     Comment: 3-4 beers per year      Family History   Problem Relation Age of Onset    Cancer Mother         Gallbladder    Asthma Father     Emphysema Father     No Known Problems Sister     No Known Problems Brother     No Known Problems Maternal Grandmother     No Known Problems Maternal Grandfather     No Known Problems Paternal Grandmother     No Known Problems Paternal Grandfather           Review of Systems  Review of Systems   Constitutional: Negative for chills and fever. HENT: Negative for nosebleeds. Eyes: Negative for double vision. Cardiovascular: Negative for chest pain. Gastrointestinal: Negative for abdominal pain. Musculoskeletal: Positive for joint pain and myalgias. Skin: Negative for rash. Neurological: Negative for seizures. Psychiatric/Behavioral: Negative for hallucinations.          Objective:     Patient Vitals for the past 8 hrs:   BP Temp Temp src Pulse Resp SpO2 Height Weight   22 1140 132/73 98.4 °F (36.9 °C) Temporal 76 18 99 % 6' 1\" (1.854 m) 280 lb (127 kg)     /73   Pulse 76   Temp 98.4 °F (36.9 °C) (Temporal)   Resp 18   Ht 6' 1\" (1.854 m)   Wt 280 lb (127 kg)   SpO2 99%   BMI 36.94 kg/m²     General Appearance:    Alert, cooperative, no distress, appears stated age     Head:    Normocephalic, without obvious abnormality, atraumatic   Eyes:    PERRL, conjunctiva/corneas clear      Ears:    Normal  both ears   Nose:   Nares normal,   mucosa normal, no drainage     Throat:   Lips, mucosa, and tongue normal;   Neck:   Supple, symmetrical, trachea midline,          Lungs:      respirations unlabored   Chest Wall:    No tenderness or deformity    Heart:    Regular rate          Abdomen:     Soft, non-tender,     no masses              Extremities:   Extremities  no cyanosis or edema     Pulses:   2+ and symmetric all extremities     Skin:   Skin color, texture, turgor normal         Neurologic:   CNII-XII intact, normal strength, sensation            Assessment:     Active Problems:    * No active hospital problems. *  Resolved Problems:    * No resolved hospital problems. *      Plan:     The various methods of treatment have been discussed with the patient and family. After consideration of risks, benefits and other options for treatment, the patient has consented to surgical interventions (right knee genicular nerve block with intraarticular injection).

## 2022-04-08 NOTE — OP NOTE
Operative Note      Patient: Eduarda Bhagat  YOB: 1950  MRN: [id number]    Date of Procedure: 2022    Pre-Op Diagnosis: RIGHT KNEE OSTEOARTHRITIS AND PAIN    Post-Op Diagnosis: Same       Procedure(s):  RIGHT KNEE GENICULAR NERVE BLOCK WITH INTRA ARTICULAR INJECTION SITE CONFIRMED BY FLUOROSCOPY    Surgeon(s):  Joie Reid MD    Assistant:   * No surgical staff found *    Anesthesia: Monitor Anesthesia Care    Estimated Blood Loss (mL): Minimal    Complications: None    Specimens:   * No specimens in log *    Implants:  * No implants in log *      Drains: * No LDAs found *    Findings:      Detailed Description of Procedure:     OPERATIVE REPORT      Patient Name:   Eduarda Bhagat Surgeon:   Yosvany Choudhary MD  :   1950 Dictated by:   Yosvany Choudhary MD  MRN #:   [id number] PCP:  Alesia Long MD   Date of Surgery:  2022 Referring:   No ref. provider found  SURGEON:   Yosvany Choudhary M.D.   True Kailyn: Parviz Garcia, [facility]  ANESTHESIA:  General.       PREOPERATIVE DIAGNOSES:      1. Right knee osteoarthritis. 2. Chronic Right knee pain        POSTOPERATIVE DIAGNOSES:      same          PROCEDURES PERFORMED:               1.Needle localization Right under flouro  2. Injection of large joint Right in multiple trigger points under flouro  3. Geniculate nerve block of superolateral  superomedial  Inferomedial geniculate nerve blockRight         SURGEON:   Yosvany Choudhary M.D. ANESTHESIA:  general     DETAILS OF PROCEDURE:        The patient was given preop medication and brought to the operating room where the surgery was again confirmed, as scheduled   A time-out confirmation was performed, according to the universal protocol. Blood was obtained and then centrifuged. This was fractionated into platelet rich/poor plasma and platelet poor plasma aggregate.      Using flouro 22 gauge needles were placed in the areas of the Right femur and tibia for superolateral, superomedial, inferomedial injection after local injection of 3cc into each site. 7cc of exparel and 6cc of ppp were injected into each portal of the geniculate needles. Flouro localization was done for Right  Knee after injection with local anaestheitic. 1% xylocaine into the injection site at three trigger points in the medial aspect of the knee   Injection was done through needle localization with contrast and flouro of PRP, PPP was done      Compression dressing and sterile gauze was applied. Complications:  None; patient tolerated the procedure well.           ____________________________________           Gautam Nguyen MDtransferred to the stretcher, and brought to the recovery room in satisfactory condition.   Sponge and needle counts were correct.             ____________________________________           Gautam Nguyen MD      Electronically signed by Gautam Nguyen MD on 4/8/2022 at 10:07 AM

## 2022-04-10 PROCEDURE — 93297 REM INTERROG DEV EVAL ICPMS: CPT | Performed by: INTERNAL MEDICINE

## 2022-04-10 PROCEDURE — 93294 REM INTERROG EVL PM/LDLS PM: CPT | Performed by: INTERNAL MEDICINE

## 2022-04-10 PROCEDURE — 93296 REM INTERROG EVL PM/IDS: CPT | Performed by: INTERNAL MEDICINE

## 2022-04-15 RX ORDER — LISINOPRIL 20 MG/1
20 TABLET ORAL 2 TIMES DAILY
Qty: 180 TABLET | Refills: 0 | Status: SHIPPED | OUTPATIENT
Start: 2022-04-15 | End: 2022-07-07 | Stop reason: SDUPTHER

## 2022-04-15 NOTE — TELEPHONE ENCOUNTER
Medication Refill    Medication needing refilled: Lisinopril    Dosage of the medication: 20 mg    How are you taking this medication (QD, BID, TID, QID, PRN): 1 tablet 2x daily    30 or 90 day supply called in: 90    When will you run out of your medication: Few days    Which Pharmacy are we sending the medication to?: [address] [facility] [phone] - F [phone]   [address] [facility] [address]   Phone:  [phone]  Fax:  [phone]

## 2022-04-15 NOTE — TELEPHONE ENCOUNTER
Prescription refill    Last OV:11/10/2021    Last Refill:01/18/2022    Labs:04/01/2022    Future Appt:06/06/2022

## 2022-04-18 ENCOUNTER — ANTI-COAG VISIT (OUTPATIENT)
Dept: PHARMACY | Age: 72
End: 2022-04-18
Payer: MEDICARE

## 2022-04-18 DIAGNOSIS — I48.3 TYPICAL ATRIAL FLUTTER (HCC): Primary | ICD-10-CM

## 2022-04-18 LAB — INTERNATIONAL NORMALIZATION RATIO, POC: 1.8

## 2022-04-18 PROCEDURE — 99212 OFFICE O/P EST SF 10 MIN: CPT

## 2022-04-18 PROCEDURE — 85610 PROTHROMBIN TIME: CPT

## 2022-04-18 RX ORDER — METFORMIN HYDROCHLORIDE 500 MG/1
TABLET, EXTENDED RELEASE ORAL
Qty: 360 TABLET | Refills: 0 | Status: SHIPPED | OUTPATIENT
Start: 2022-04-18 | End: 2022-07-14

## 2022-04-18 RX ORDER — ALLOPURINOL 300 MG/1
TABLET ORAL
Qty: 90 TABLET | Refills: 0 | Status: SHIPPED | OUTPATIENT
Start: 2022-04-18 | End: 2022-07-14

## 2022-04-18 NOTE — TELEPHONE ENCOUNTER
Medication:   Requested Prescriptions     Pending Prescriptions Disp Refills    allopurinol (ZYLOPRIM) 300 MG tablet 90 tablet 0     Sig: TAKE 1 TABLET EVERY DAY            Patient Phone Number: [phone] (home)     Last appt: 4/4/2022   Next appt: 4/28/2022

## 2022-04-18 NOTE — PROGRESS NOTES
Mr. Lois Robles is a 80 y.o. y/o male with history of Atrial Fibrillation who presents today for anticoagulation monitoring and adjustment. Pt is retired and works part time at [employer]. Patient reports he has been on warfarin for almost 30 years now and was managed by his cardiologist who recently retired. Pt was then managed by Dr. Blue Low prior to being established in Clinic  Pertinent PMH: HTN, DM, HLD, COPD, CHF    Patient Reported Findings:  Yes     No  [x]   []       Patient verifies current dosing regimen as listed  []   [x]       S/S bleeding/bruising/swelling/SOB -denies    []   [x]       Blood in urine or stool- denies  [x]   []       Procedures scheduled in the future at this time - originally scheduled for have knee replacement 12/10, holding 5 days prior w/o bridging. Pt held warfarin but procedure was r/s for 12/27, held again 5 days prior. Resumed taking 15 mg x 3 days then returned to normal dose --> had knee procedure on 4/6, held warfarin 2 days prior then resumed warfarin taking 15 mg then returned to normal dose   []   [x]       Missed Dose - Denies   []   [x]       Extra Dose - Denies  []   [x]       Change in medications-  tylenol prn---> never picked up diclofenac gel or tramadol d/t cost, euflexxa injection on 10/5 (second shot is not scheduled yet, will be a total of three shots) per pt does not have to hold warf for these injections --> euflexxa injection 10/5, 10/12, 10/19 is the plan for 3/3 doses---> amlodipine increased --> no changes    []   [x]       Change in health/diet/appetite - reports he eats about 2 meals/day. And states he will have salads about 1-2x/wk.  (not a big fan of spinach or brocolli but may have other greens)--> Patient states 3 salads in past 2 weeks---> no greens, wants to add green beans in twice weekly, no NVD --> returned to vit k a few times a week (green beans and salad) --> denies changes, but has not had vit k in a while (only eats salads that have vit k)---> diarrhea resolved. Had small salad last week, no other vit k --> no changes, no NVD  []   [x]       Change in alcohol use - reports occasionally drinking beer (once every few months) but happens very infrequently. --> Patient reports no alcohol in past two weeks---> denies   []   [x]       Change in activity  []   [x]       Hospital admission  []   [x]       Emergency department visit  []   [x]       Other complaints     Clinical Outcomes:  Yes     No  []   [x]       Major bleeding event  []   [x]       Thromboembolic event    Duration of warfarin Therapy: Indefinite   INR Range:  2.0-3.0     INR today is 1.8 after having knee procedure 2 weeks ago   Take 15 mg tonight then continue weekly dose to 15 mg Wed and Fri and 10 mg all other days.    Encouraged patient to maintain a consistent diet  Recheck INR again in 3 weeks, 5/9    **consent form signed 11/10/2021    Referring physician is Dr. Fidencio Hull  INR (no units)   Date Value   04/05/2022 2.03 (H)   04/01/2022 1.72 (H)   03/21/2022 2.4   02/11/2022 1.9   01/21/2022 2.8   01/03/2022 1.9   12/02/2021 1.79 (H)   08/19/2021 8.44 ([facility])       For Pharmacy Admin Tracking Only     Intervention Detail: Dose Adjustment: 1, reason: Therapy Optimization   Total # of Interventions Recommended: 1   Total # of Interventions Accepted: 1   Time Spent (min): 15

## 2022-04-18 NOTE — TELEPHONE ENCOUNTER
----- Message from Phokki sent at 4/15/2022  4:08 PM EDT -----  Subject: Refill Request    QUESTIONS  Name of Medication? allopurinol (ZYLOPRIM) 300 MG tablet  Patient-reported dosage and instructions? 300 mg 1 time a day  How many days do you have left? 5  Preferred Pharmacy? [facility] phone number (if available)? [phone]  Additional Information for Provider? 90 day supply does not have vm but   call if needed   ---------------------------------------------------------------------------  --------------  [address]  What is the best way for the office to contact you? OK to leave message on   voicemail  Preferred Call Back Phone Number? [phone]  ---------------------------------------------------------------------------  --------------  SCRIPT ANSWERS  Relationship to Patient?  Self

## 2022-04-19 RX ORDER — ALLOPURINOL 300 MG/1
TABLET ORAL
Qty: 90 TABLET | Refills: 1 | Status: SHIPPED | OUTPATIENT
Start: 2022-04-19

## 2022-05-09 ENCOUNTER — ANTI-COAG VISIT (OUTPATIENT)
Dept: PHARMACY | Age: 72
End: 2022-05-09
Payer: MEDICARE

## 2022-05-09 DIAGNOSIS — I48.3 TYPICAL ATRIAL FLUTTER (HCC): Primary | ICD-10-CM

## 2022-05-09 LAB — INTERNATIONAL NORMALIZATION RATIO, POC: 3.1

## 2022-05-09 PROCEDURE — 99211 OFF/OP EST MAY X REQ PHY/QHP: CPT

## 2022-05-09 PROCEDURE — 85610 PROTHROMBIN TIME: CPT

## 2022-05-09 NOTE — PROGRESS NOTES
Mr. Vivienne Randolph is a 71 y.o. y/o male with history of Atrial Fibrillation who presents today for anticoagulation monitoring and adjustment. Pt is retired and works part time at [employer]. Patient reports he has been on warfarin for almost 30 years now and was managed by his cardiologist who recently retired. Pt was then managed by Dr. García Cutler prior to being established in Clinic  Pertinent PMH: HTN, DM, HLD, COPD, CHF    Patient Reported Findings:  Yes     No  [x]   []       Patient verifies current dosing regimen as listed  []   [x]       S/S bleeding/bruising/swelling/SOB -denies    []   [x]       Blood in urine or stool- denies  [x]   []       Procedures scheduled in the future at this time - originally scheduled for have knee replacement 12/10, holding 5 days prior w/o bridging. Pt held warfarin but procedure was r/s for 12/27, held again 5 days prior. Resumed taking 15 mg x 3 days then returned to normal dose --> had knee procedure on 4/6, held warfarin 2 days prior then resumed warfarin taking 15 mg then returned to normal dose   []   [x]       Missed Dose - Denies   []   [x]       Extra Dose - Denies  []   [x]       Change in medications-  tylenol prn---> never picked up diclofenac gel or tramadol d/t cost, euflexxa injection on 10/5 (second shot is not scheduled yet, will be a total of three shots) per pt does not have to hold warf for these injections --> euflexxa injection 10/5, 10/12, 10/19 is the plan for 3/3 doses---> amlodipine increased --> no changes    []   [x]       Change in health/diet/appetite - reports he eats about 2 meals/day. And states he will have salads about 1-2x/wk.  (not a big fan of spinach or brocolli but may have other greens)--> Patient states 3 salads in past 2 weeks---> no greens, wants to add green beans in twice weekly, no NVD --> returned to vit k a few times a week (green beans and salad) --> denies changes, but has not had vit k in a while (only eats salads that have vit k)---> diarrhea resolved. Had small salad last week, no other vit k --> no changes, no NVD---> no greens, no NVD  []   [x]       Change in alcohol use - reports occasionally drinking beer (once every few months) but happens very infrequently. --> Patient reports no alcohol in past two weeks---> denies   []   [x]       Change in activity  []   [x]       Hospital admission  []   [x]       Emergency department visit  []   [x]       Other complaints     Clinical Outcomes:  Yes     No  []   [x]       Major bleeding event  []   [x]       Thromboembolic event    Duration of warfarin Therapy: Indefinite   INR Range:  2.0-3.0     INR today is 3.1 dt no greens   Continue weekly dose to 15 mg Wed and Fri and 10 mg all other days.    Encouraged patient to maintain a consistent diet  Recheck INR again in 4 weeks, 6/6 per request to coordinate with other apt that day     **consent form signed 11/10/2021    Referring physician is Dr. Roxanne Saleem  INR (no units)   Date Value   05/09/2022 3.1   04/18/2022 1.8   04/05/2022 2.03 (H)   04/01/2022 1.72 (H)   03/21/2022 2.4   02/11/2022 1.9   12/02/2021 1.79 (H)   08/19/2021 8.44 ([facility])       For Pharmacy Admin Tracking Only     Total # of Interventions Recommended: 0   Total # of Interventions Accepted: 0   Time Spent (min): 15

## 2022-05-11 ENCOUNTER — NURSE ONLY (OUTPATIENT)
Dept: CARDIOLOGY CLINIC | Age: 72
End: 2022-05-11
Payer: MEDICARE

## 2022-05-11 DIAGNOSIS — I50.22 CHRONIC SYSTOLIC HEART FAILURE (HCC): ICD-10-CM

## 2022-05-11 DIAGNOSIS — I42.0 CARDIOMYOPATHY, DILATED (HCC): ICD-10-CM

## 2022-05-11 DIAGNOSIS — Z95.810 ICD (IMPLANTABLE CARDIOVERTER-DEFIBRILLATOR), DUAL, IN SITU: ICD-10-CM

## 2022-05-11 PROCEDURE — G2066 INTER DEVC REMOTE 30D: HCPCS | Performed by: CLINICAL NURSE SPECIALIST

## 2022-05-11 PROCEDURE — 93297 REM INTERROG DEV EVAL ICPMS: CPT | Performed by: CLINICAL NURSE SPECIALIST

## 2022-05-11 NOTE — PROGRESS NOTES
Remote transmission received from patient's CRT-P home monitor. Mode DDDR. Transmission shows normal sensing and pacing function. No new arrhythmias/ events recorded. Optivol is within normal range. TriageHF Heart Failure Risk Status on 11-May-2022 is Medium*     NP will review. See interrogation under cardiology tab in the [address] [place] [address] [address] field for more details. Will continue to monitor remotely.

## 2022-05-18 RX ORDER — FUROSEMIDE 40 MG/1
40 TABLET ORAL DAILY
Qty: 90 TABLET | Refills: 0 | Status: SHIPPED | OUTPATIENT
Start: 2022-05-18 | End: 2022-08-22 | Stop reason: SDUPTHER

## 2022-06-06 ENCOUNTER — ANTI-COAG VISIT (OUTPATIENT)
Dept: PHARMACY | Age: 72
End: 2022-06-06
Payer: MEDICARE

## 2022-06-06 DIAGNOSIS — I48.3 TYPICAL ATRIAL FLUTTER (HCC): Primary | ICD-10-CM

## 2022-06-06 LAB — INTERNATIONAL NORMALIZATION RATIO, POC: 2.5

## 2022-06-06 PROCEDURE — 99211 OFF/OP EST MAY X REQ PHY/QHP: CPT

## 2022-06-06 PROCEDURE — 85610 PROTHROMBIN TIME: CPT

## 2022-06-06 NOTE — PROGRESS NOTES
Mr. Denia Lyle is a 71 y.o. y/o male with history of Atrial Fibrillation who presents today for anticoagulation monitoring and adjustment. Pt is retired and works part time at [employer]. Patient reports he has been on warfarin for almost 30 years now and was managed by his cardiologist who recently retired. Pt was then managed by Dr. Ambreen Álvarez prior to being established in Clinic  Pertinent PMH: HTN, DM, HLD, COPD, CHF    Patient Reported Findings:  Yes     No  [x]   []       Patient verifies current dosing regimen as listed  []   [x]       S/S bleeding/bruising/swelling/SOB -denies    []   [x]       Blood in urine or stool- denies  [x]   []       Procedures scheduled in the future at this time - originally scheduled for have knee replacement 12/10, holding 5 days prior w/o bridging. Pt held warfarin but procedure was r/s for 12/27, held again 5 days prior. Resumed taking 15 mg x 3 days then returned to normal dose --> had knee procedure on 4/6, held warfarin 2 days prior then resumed warfarin taking 15 mg then returned to normal dose   []   [x]       Missed Dose - Denies   []   [x]       Extra Dose - Denies  [x]   []       Change in medications-  tylenol prn---> euflexxa injection 10/5, 10/12, 10/19 is the plan for 3/3 doses---> has been taking tylenol acutely for tooth ache   []   [x]       Change in health/diet/appetite - reports he eats about 2 meals/day. And states he will have salads about 1-2x/wk. (not a big fan of spinach or brocolli but may have other greens)--> Patient states 3 salads in past 2 weeks---> no greens, wants to add green beans in twice weekly, no NVD --> returned to vit k a few times a week (green beans and salad) --> denies changes, but has not had vit k in a while (only eats salads that have vit k)---> diarrhea resolved.  Had small salad last week, no other vit k --> no changes, no NVD   []   [x]       Change in alcohol use - reports occasionally drinking beer (once every few months) but happens very infrequently. --> Patient reports no alcohol in past two weeks---> denies   []   [x]       Change in activity  []   [x]       Hospital admission  []   [x]       Emergency department visit  []   [x]       Other complaints     Clinical Outcomes:  Yes     No  []   [x]       Major bleeding event  []   [x]       Thromboembolic event    Duration of warfarin Therapy: Indefinite   INR Range:  2.0-3.0     INR today is 2.5  Continue weekly dose to 15 mg Wed and Fri and 10 mg all other days.    Encouraged patient to maintain a consistent diet  Recheck INR again in 4 weeks, 7/6    **consent form signed 11/10/2021    Referring physician is Dr. Flora Murcia  INR (no units)   Date Value   05/09/2022 3.1   04/18/2022 1.8   04/05/2022 2.03 (H)   04/01/2022 1.72 (H)   03/21/2022 2.4   02/11/2022 1.9   12/02/2021 1.79 (H)   08/19/2021 8.44 ([facility])       For Pharmacy Admin Tracking Only     Total # of Interventions Recommended: 0   Total # of Interventions Accepted: 0   Time Spent (min): 15

## 2022-07-05 RX ORDER — AMLODIPINE BESYLATE 10 MG/1
10 TABLET ORAL DAILY
Qty: 90 TABLET | Refills: 0 | OUTPATIENT
Start: 2022-07-05

## 2022-07-05 RX ORDER — LISINOPRIL 20 MG/1
TABLET ORAL
Qty: 180 TABLET | Refills: 0 | OUTPATIENT
Start: 2022-07-05

## 2022-07-05 NOTE — TELEPHONE ENCOUNTER
Prescription refill    Last OV:11/10/2021    Last Refill:02/09/2022    Labs:04/01/2022    Future Appt:none scheduled, patient is not on the recall list

## 2022-07-06 ENCOUNTER — NURSE ONLY (OUTPATIENT)
Dept: CARDIOLOGY CLINIC | Age: 72
End: 2022-07-06
Payer: MEDICARE

## 2022-07-06 ENCOUNTER — ANTI-COAG VISIT (OUTPATIENT)
Dept: PHARMACY | Age: 72
End: 2022-07-06
Payer: MEDICARE

## 2022-07-06 DIAGNOSIS — I48.3 TYPICAL ATRIAL FLUTTER (HCC): Primary | ICD-10-CM

## 2022-07-06 DIAGNOSIS — I42.0 CARDIOMYOPATHY, DILATED (HCC): ICD-10-CM

## 2022-07-06 DIAGNOSIS — I50.22 CHRONIC SYSTOLIC HEART FAILURE (HCC): ICD-10-CM

## 2022-07-06 DIAGNOSIS — Z95.0 PACEMAKER: ICD-10-CM

## 2022-07-06 LAB — INTERNATIONAL NORMALIZATION RATIO, POC: 2.3

## 2022-07-06 PROCEDURE — 99211 OFF/OP EST MAY X REQ PHY/QHP: CPT

## 2022-07-06 PROCEDURE — G2066 INTER DEVC REMOTE 30D: HCPCS | Performed by: CLINICAL NURSE SPECIALIST

## 2022-07-06 PROCEDURE — 93297 REM INTERROG DEV EVAL ICPMS: CPT | Performed by: CLINICAL NURSE SPECIALIST

## 2022-07-06 PROCEDURE — 85610 PROTHROMBIN TIME: CPT

## 2022-07-06 NOTE — PROGRESS NOTES
Remote transmission received from patient's CRT-P home monitor. Set to only BiV pace when he needs to RV pace. Transmission shows normal sensing and pacing function. <0.1% AT/ AF burden (coumadin). Optivol is within normal range. TriageHF Heart Failure Risk Status on 05-Jul-2022 is Low*     NP will review. See interrogation under cardiology tab in the [address] [place] [address] [address] field for more details. Will continue to monitor remotely.     (End of 31-day monitoring period 7/6/22)

## 2022-07-06 NOTE — PROGRESS NOTES
Mr. Ricky Hancock is a 71 y.o. y/o male with history of Atrial Fibrillation who presents today for anticoagulation monitoring and adjustment. Pt is retired and works part time at [employer]. Patient reports he has been on warfarin for almost 30 years now and was managed by his cardiologist who recently retired. Pt was then managed by Dr. Lia Bolton prior to being established in Clinic  Pertinent PMH: HTN, DM, HLD, COPD, CHF    Patient Reported Findings:  Yes     No  [x]   []       Patient verifies current dosing regimen as listed- confirms   []   [x]       S/S bleeding/bruising/swelling/SOB -denies    []   [x]       Blood in urine or stool- denies  [x]   []       Procedures scheduled in the future at this time - originally scheduled for have knee replacement 12/10, holding 5 days prior w/o bridging. Pt held warfarin but procedure was r/s for 12/27, held again 5 days prior. Resumed taking 15 mg x 3 days then returned to normal dose --> had knee procedure on 4/6, held warfarin 2 days prior then resumed warfarin taking 15 mg then returned to normal dose   []   [x]       Missed Dose - Denies   []   [x]       Extra Dose - Denies  [x]   []       Change in medications-  tylenol prn---> euflexxa injection 10/5, 10/12, 10/19 is the plan for 3/3 doses---> has been taking tylenol acutely for tooth ache---> no changes    []   [x]       Change in health/diet/appetite - reports he eats about 2 meals/day. And states he will have salads about 1-2x/wk. (not a big fan of spinach or brocolli but may have other greens)--> Patient states 3 salads in past 2 weeks---> no greens, wants to add green beans in twice weekly, no NVD --> returned to vit k a few times a week (green beans and salad) --> denies changes, but has not had vit k in a while (only eats salads that have vit k)---> diarrhea resolved.  Had small salad last week, no other vit k --> no changes, no NVD   []   [x]       Change in alcohol use - reports occasionally drinking beer (once every few months) but happens very infrequently. --> Patient reports no alcohol in past two weeks---> denies   []   [x]       Change in activity  []   [x]       Hospital admission  []   [x]       Emergency department visit  []   [x]       Other complaints     Clinical Outcomes:  Yes     No  []   [x]       Major bleeding event  []   [x]       Thromboembolic event    Duration of warfarin Therapy: Indefinite   INR Range:  2.0-3.0     INR today is 2.3  Continue weekly dose to 15 mg Wed and Fri and 10 mg all other days. Encouraged patient to maintain a consistent diet. Just got a RF.   Recheck INR again in 4 weeks, 8/5 to coordinate with cardio apt that day     **consent form signed 11/10/2021    Referring physician is Dr. Zen Garza  INR (no units)   Date Value   04/05/2022 2.03 (H)   04/01/2022 1.72 (H)   12/02/2021 1.79 (H)   08/19/2021 8.44 (HH)     INR,(POC) (no units)   Date Value   07/06/2022 2.3   06/06/2022 2.5   05/09/2022 3.1   04/18/2022 1.8       For Pharmacy Admin Tracking Only     Total # of Interventions Recommended: 0   Total # of Interventions Accepted: 0   Time Spent (min): 15

## 2022-07-07 ENCOUNTER — TELEPHONE (OUTPATIENT)
Dept: CARDIOLOGY CLINIC | Age: 72
End: 2022-07-07

## 2022-07-07 NOTE — TELEPHONE ENCOUNTER
Prescription refill    Last OV:11/10/2021    Last Refill:02/09/2022    Labs:04/01/2022    Future Appt:08/05/2022

## 2022-07-07 NOTE — TELEPHONE ENCOUNTER
Pt called wanting to know since he has scheduled and the earliest appt he could get is:  08/05 can the other 2 meds be called into his Pharmacy    lisinopril (PRINIVIL;ZESTRIL)  20mg  180 tablet  Take 1 tablet by mouth 2 times daily    amLODIPine (NORVASC)  5mg  90 tablet  [facility] [address] [facility] ([address] [facility]) - [facility] [phone] - F [phone]   [address] [facility] [address]   Phone:  [phone]  Fax:  [phone]    Please advise.   Thank you

## 2022-07-08 RX ORDER — LISINOPRIL 20 MG/1
20 TABLET ORAL 2 TIMES DAILY
Qty: 180 TABLET | Refills: 0 | Status: SHIPPED | OUTPATIENT
Start: 2022-07-08 | End: 2022-08-31 | Stop reason: ALTCHOICE

## 2022-07-08 RX ORDER — AMLODIPINE BESYLATE 10 MG/1
10 TABLET ORAL DAILY
Qty: 90 TABLET | Refills: 0 | Status: SHIPPED | OUTPATIENT
Start: 2022-07-08 | End: 2022-10-02

## 2022-07-14 RX ORDER — METFORMIN HYDROCHLORIDE 500 MG/1
TABLET, EXTENDED RELEASE ORAL
Qty: 360 TABLET | Refills: 0 | Status: ON HOLD | OUTPATIENT
Start: 2022-07-14 | End: 2022-11-03 | Stop reason: SDUPTHER

## 2022-07-14 RX ORDER — ALLOPURINOL 300 MG/1
TABLET ORAL
Qty: 90 TABLET | Refills: 2 | Status: SHIPPED | OUTPATIENT
Start: 2022-07-14 | End: 2022-08-31

## 2022-07-14 NOTE — TELEPHONE ENCOUNTER
Medication:   Requested Prescriptions     Pending Prescriptions Disp Refills    allopurinol (ZYLOPRIM) 300 MG tablet [Pharmacy Med Name: ALLOPURINOL 300 MG Tablet] 90 tablet 0     Sig: TAKE 1 TABLET EVERY DAY        Last Filled:      Patient Phone Number: [phone] (home)     Last appt: 4/4/2022   Next appt: Visit date not found    Last OARRS: No flowsheet data found.

## 2022-08-05 ENCOUNTER — ANTI-COAG VISIT (OUTPATIENT)
Dept: PHARMACY | Age: 72
End: 2022-08-05
Payer: MEDICARE

## 2022-08-05 DIAGNOSIS — I48.3 TYPICAL ATRIAL FLUTTER (HCC): Primary | ICD-10-CM

## 2022-08-05 LAB — INTERNATIONAL NORMALIZATION RATIO, POC: 2.1

## 2022-08-05 PROCEDURE — 99211 OFF/OP EST MAY X REQ PHY/QHP: CPT

## 2022-08-05 PROCEDURE — 85610 PROTHROMBIN TIME: CPT

## 2022-08-05 NOTE — PROGRESS NOTES
Mr. Tavia Ferguson is a 71 y.o. y/o male with history of Atrial Fibrillation who presents today for anticoagulation monitoring and adjustment. Pt is retired and works part time at [employer]. Patient reports he has been on warfarin for almost 30 years now and was managed by his cardiologist who recently retired. Pt was then managed by Dr. Tono Luna prior to being established in Clinic  Pertinent PMH: HTN, DM, HLD, COPD, CHF    Patient Reported Findings:  Yes     No  [x]   []       Patient verifies current dosing regimen as listed- confirms   []   [x]       S/S bleeding/bruising/swelling/SOB -denies    []   [x]       Blood in urine or stool- denies  [x]   []       Procedures scheduled in the future at this time - originally scheduled for have knee replacement 12/10, holding 5 days prior w/o bridging. Pt held warfarin but procedure was r/s for 12/27, held again 5 days prior. Resumed taking 15 mg x 3 days then returned to normal dose --> had knee procedure on 4/6, held warfarin 2 days prior then resumed warfarin taking 15 mg then returned to normal dose   []   [x]       Missed Dose - Denies   []   [x]       Extra Dose - Denies  [x]   []       Change in medications-  tylenol prn---> euflexxa injection 10/5, 10/12, 10/19 is the plan for 3/3 doses---> has been taking tylenol acutely for tooth ache---> no changes    []   [x]       Change in health/diet/appetite - reports he eats about 2 meals/day. And states he will have salads about 1-2x/wk. (not a big fan of spinach or brocolli but may have other greens)--> Patient states 3 salads in past 2 weeks---> no greens, wants to add green beans in twice weekly, no NVD --> returned to vit k a few times a week (green beans and salad) --> denies changes, but has not had vit k in a while (only eats salads that have vit k)---> diarrhea resolved.  Had small salad last week, no other vit k --> no changes, no NVD   []   [x]       Change in alcohol use - reports occasionally drinking beer (once every few months) but happens very infrequently. --> Patient reports no alcohol in past two weeks---> denies   []   [x]       Change in activity  []   [x]       Hospital admission  []   [x]       Emergency department visit  []   [x]       Other complaints     Clinical Outcomes:  Yes     No  []   [x]       Major bleeding event  []   [x]       Thromboembolic event    Duration of warfarin Therapy: Indefinite   INR Range:  2.0-3.0     INR today is 2.1  Continue weekly dose to 15 mg Wed and Fri and 10 mg all other days. Encouraged patient to maintain a consistent diet.   Recheck INR again in 4 weeks, 9/2    **consent form signed 11/10/2021    Referring physician is Dr. Refugio Phillips  INR (no units)   Date Value   04/05/2022 2.03 (H)   04/01/2022 1.72 (H)   12/02/2021 1.79 (H)   08/19/2021 8.44 (HH)     INR,(POC) (no units)   Date Value   07/06/2022 2.3   06/06/2022 2.5   05/09/2022 3.1   04/18/2022 1.8       For Pharmacy Admin Tracking Only    Total # of Interventions Recommended: 0  Total # of Interventions Accepted: 0  Time Spent (min): 15

## 2022-08-09 RX ORDER — FUROSEMIDE 40 MG/1
TABLET ORAL
Qty: 90 TABLET | Refills: 0 | OUTPATIENT
Start: 2022-08-09

## 2022-08-09 NOTE — TELEPHONE ENCOUNTER
Prescription refill    Last OV:11/10/2021    Last Refill:05/18/2022    Labs:04/01/2022    Future Appt: none scheduled and patient is not on the recall list

## 2022-08-10 ENCOUNTER — NURSE ONLY (OUTPATIENT)
Dept: CARDIOLOGY CLINIC | Age: 72
End: 2022-08-10
Payer: MEDICARE

## 2022-08-10 DIAGNOSIS — Z95.0 PACEMAKER: ICD-10-CM

## 2022-08-10 DIAGNOSIS — I42.0 CARDIOMYOPATHY, DILATED (HCC): Primary | ICD-10-CM

## 2022-08-10 DIAGNOSIS — I50.22 CHRONIC SYSTOLIC HEART FAILURE (HCC): ICD-10-CM

## 2022-08-10 PROCEDURE — 93297 REM INTERROG DEV EVAL ICPMS: CPT | Performed by: CLINICAL NURSE SPECIALIST

## 2022-08-10 PROCEDURE — 93294 REM INTERROG EVL PM/LDLS PM: CPT | Performed by: INTERNAL MEDICINE

## 2022-08-10 PROCEDURE — 93296 REM INTERROG EVL PM/IDS: CPT | Performed by: INTERNAL MEDICINE

## 2022-08-10 NOTE — PROGRESS NOTES
Remote transmission received from patient's CRT-P home monitor. Set to only BiV pace when he needs to RV pace. Transmission shows normal sensing and pacing function. No arrhythmias/ or events. Optivol is within normal range. TriageHF Heart Failure Risk Status on 09-Aug-2022 is Low*     EP/ NP will review. See interrogation under cardiology tab in the [address] [place] [address] [address] field for more details. Will continue to monitor remotely. (End of 91-day monitoring period 8/10/22).

## 2022-08-22 RX ORDER — FUROSEMIDE 40 MG/1
40 TABLET ORAL DAILY
Qty: 10 TABLET | Refills: 0 | Status: SHIPPED | OUTPATIENT
Start: 2022-08-22 | End: 2022-08-31 | Stop reason: SDUPTHER

## 2022-08-22 NOTE — TELEPHONE ENCOUNTER
Pt has scheduled an appt with nprg on 8/31 and is asking if his furosemide script could be sent to [facility] (Now [address] Mail Delivery) - [place]ando Boss [phone] - F [phone]   [address] [facility] [address]   Phone:  [phone]  Fax:  [phone]

## 2022-08-31 ENCOUNTER — NURSE ONLY (OUTPATIENT)
Dept: CARDIOLOGY CLINIC | Age: 72
End: 2022-08-31

## 2022-08-31 ENCOUNTER — HOSPITAL ENCOUNTER (OUTPATIENT)
Age: 72
Discharge: HOME | End: 2022-08-31
Payer: MEDICARE

## 2022-08-31 ENCOUNTER — OFFICE VISIT (OUTPATIENT)
Dept: CARDIOLOGY CLINIC | Age: 72
End: 2022-08-31
Payer: MEDICARE

## 2022-08-31 VITALS
WEIGHT: 288 LBS | SYSTOLIC BLOOD PRESSURE: 114 MMHG | OXYGEN SATURATION: 94 % | HEIGHT: 73 IN | DIASTOLIC BLOOD PRESSURE: 68 MMHG | BODY MASS INDEX: 38.17 KG/M2 | HEART RATE: 72 BPM

## 2022-08-31 DIAGNOSIS — E55.9 VITAMIN D DEFICIENCY: ICD-10-CM

## 2022-08-31 DIAGNOSIS — I10 ESSENTIAL HYPERTENSION: ICD-10-CM

## 2022-08-31 DIAGNOSIS — G47.33 OSA (OBSTRUCTIVE SLEEP APNEA): ICD-10-CM

## 2022-08-31 DIAGNOSIS — I50.22 CHRONIC SYSTOLIC HEART FAILURE (HCC): ICD-10-CM

## 2022-08-31 DIAGNOSIS — E66.9 OBESITY (BMI 30-39.9): ICD-10-CM

## 2022-08-31 DIAGNOSIS — I50.22 CHRONIC SYSTOLIC HEART FAILURE (HCC): Primary | ICD-10-CM

## 2022-08-31 LAB
ANION GAP SERPL CALCULATED.3IONS-SCNC: 12 MMOL/L (ref 3–16)
BUN BLDV-MCNC: 26 MG/DL (ref 7–20)
CALCIUM SERPL-MCNC: 9.6 MG/DL (ref 8.3–10.6)
CHLORIDE BLD-SCNC: 106 MMOL/L (ref 99–110)
CO2: 28 MMOL/L (ref 21–32)
CREAT SERPL-MCNC: 1.7 MG/DL (ref 0.8–1.3)
GFR AFRICAN AMERICAN: 48
GFR NON-AFRICAN AMERICAN: 40
GLUCOSE BLD-MCNC: 158 MG/DL (ref 70–99)
POTASSIUM SERPL-SCNC: 4.8 MMOL/L (ref 3.5–5.1)
PRO-BNP: 248 PG/ML (ref 0–124)
SODIUM BLD-SCNC: 146 MMOL/L (ref 136–145)

## 2022-08-31 PROCEDURE — 80048 BASIC METABOLIC PNL TOTAL CA: CPT

## 2022-08-31 PROCEDURE — G8427 DOCREV CUR MEDS BY ELIG CLIN: HCPCS | Performed by: CLINICAL NURSE SPECIALIST

## 2022-08-31 PROCEDURE — 3017F COLORECTAL CA SCREEN DOC REV: CPT | Performed by: CLINICAL NURSE SPECIALIST

## 2022-08-31 PROCEDURE — 99214 OFFICE O/P EST MOD 30 MIN: CPT | Performed by: CLINICAL NURSE SPECIALIST

## 2022-08-31 PROCEDURE — 83880 ASSAY OF NATRIURETIC PEPTIDE: CPT

## 2022-08-31 PROCEDURE — G8417 CALC BMI ABV UP PARAM F/U: HCPCS | Performed by: CLINICAL NURSE SPECIALIST

## 2022-08-31 PROCEDURE — 1123F ACP DISCUSS/DSCN MKR DOCD: CPT | Performed by: CLINICAL NURSE SPECIALIST

## 2022-08-31 PROCEDURE — 36415 COLL VENOUS BLD VENIPUNCTURE: CPT

## 2022-08-31 PROCEDURE — 1036F TOBACCO NON-USER: CPT | Performed by: CLINICAL NURSE SPECIALIST

## 2022-08-31 RX ORDER — FUROSEMIDE 40 MG/1
40 TABLET ORAL DAILY
Qty: 90 TABLET | Refills: 1 | Status: SHIPPED | OUTPATIENT
Start: 2022-08-31 | End: 2022-09-01 | Stop reason: DRUGHIGH

## 2022-08-31 RX ORDER — MELATONIN
1000 DAILY
Qty: 90 TABLET | Refills: 1 | Status: SHIPPED | OUTPATIENT
Start: 2022-08-31

## 2022-08-31 RX ORDER — AMLODIPINE BESYLATE 10 MG/1
10 TABLET ORAL DAILY
Qty: 90 TABLET | Refills: 1 | Status: CANCELLED | OUTPATIENT
Start: 2022-08-31

## 2022-08-31 NOTE — PROGRESS NOTES
[address] transmission received 8/31/22 from [address] for patient's CRT-P. Transmission shows normal sensing and pacing function. Set to only BiV pace when he needs to RV pace. Transmission shows normal sensing and pacing function. No new arrhythmias/ or events. Optivol is within normal range. TriageF Heart Failure Risk Status on 31-Aug-2022 is Low*     NP will review. See interrogation under cardiology tab in the [address] [facility] [address] field for more details. Will continue to monitor remotely.

## 2022-08-31 NOTE — PATIENT INSTRUCTIONS
Stop lisinopril  Saturday start entresto 49-51 mg twice a day  Check blood today and in 2 weeks  Continue all other medications  Refilled lasix and vitamin d  RTO in 1 month

## 2022-08-31 NOTE — PROGRESS NOTES
[address]  Progress Note    Primary Care Doctor:  Smith Piper MD    Chief Complaint   Patient presents with    Follow-up    Congestive Heart Failure        History of Present Illness:  71 y.o. 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 He follows with Dr Deny Arias with [facility]. 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 He follows with Dr Keith Tesfaye   12/30-1/2/2020 for worsening heart failure, sob and leg swelling. He was found to AFlutter and CV 1/2/20. BiV lead implant 3/23/2020    I had the pleasure of seeing Jerry Rowe in follow up for sHF. He is ambulatory with a cane and by his self today. His optival shows normal impedence. His weight is up a few pounds. He has had stress at his house with some family who have moved in. He denies any chest pain, palpitations, lightheadedness or edema. LVEF is 45%. Past Medical History:   has a past medical history of Acute congestive heart failure ([address] Allergic rhinitis, Atrial fibrillation ([address] CKD (chronic kidney disease), Class 2 obesity due to excess calories without serious comorbidity with body mass index (BMI) of 37.0 to 37.9 in adult, Controlled type 2 diabetes mellitus without complication, without long-term current use of insulin ([address] COPD, Essential tremor, Gout, Hyperlipidemia, mixed, Hypertension, Long term current use of anticoagulants with INR goal of 2.0-3.0, Obstructive sleep apnea syndrome, Primary insomnia, Primary osteoarthritis of both knees, and Sleep apnea. Surgical History:   has a past surgical history that includes Aortic valve replacement (10/1996:[facility]); Pacemaker insertion (1996); Atrial ablation surgery (03/23/2020); Cardiac pacemaker placement (03/23/2020); Knee arthroscopy (Right, 12/27/2021); knee surgery (Right, 04/06/2022); and Knee Arthrocentesis (Right, 4/6/2022).    Social History:   reports that he quit smoking about 3 years ago. His smoking use included cigarettes. He has a 30.00 pack-year smoking history. He has never used smokeless tobacco. He reports current alcohol use. He reports that he does not use drugs. Family History:   Family History   Problem Relation Age of Onset    Cancer Mother         Gallbladder    Asthma Father     Emphysema Father     No Known Problems Sister     No Known Problems Brother     No Known Problems Maternal Grandmother     No Known Problems Maternal Grandfather     No Known Problems Paternal Grandmother     No Known Problems Paternal Grandfather        Home Medications:  Prior to Admission medications    Medication Sig Start Date End Date Taking? Authorizing Provider   furosemide (LASIX) 40 MG tablet Take 1 tablet by mouth daily 8/31/22  Yes MEENU Villa   vitamin D3 (CHOLECALCIFEROL) 25 MCG (1000 UT) TABS tablet Take 1 tablet by mouth daily 8/31/22  Yes MEENU Ramires   sacubitril-valsartan (ENTRESTO) 49-51 MG per tablet Take 1 tablet by mouth 2 times daily 8/31/22  Yes MEENU Villa   metFORMIN (GLUCOPHAGE-XR) 500 MG extended release tablet TAKE 4 TABLETS EVERY DAY WITH BREAKFAST 7/14/22  Yes Radha Carvajal MD   amLODIPine (NORVASC) 10 MG tablet Take 1 tablet by mouth daily 7/8/22  Yes MEENU Ramires   allopurinol (ZYLOPRIM) 300 MG tablet TAKE 1 TABLET EVERY DAY 4/19/22  Yes Radha Carvajal MD   pravastatin (PRAVACHOL) 80 MG tablet TAKE 1 TABLET EVERY DAY FOR CHOLESTEROL 3/11/22  Yes Radha Carvajal MD   glucose monitoring kit (FREESTYLE) monitoring kit Ok to dispense what is covered by insurance. 7/9/20  Yes Wallace Jeffery MD   blood glucose monitor strips Test 2 times a day & as needed for symptoms of irregular blood glucose. Dispense sufficient amount for indicated testing frequency plus additional to accommodate PRN testing needs.  Ok to dispense what is covered by insurance 7/4/20  Yes Wallace Jeffery MD 23 12/09/2021 08:42 AM    CO2 27 12/02/2021 03:52 PM    PHOS 4.3 12/09/2021 08:42 AM    PHOS 4.2 01/07/2020 10:27 AM    PHOS 3.8 10/14/2019 08:50 AM    BUN 31 04/01/2022 08:15 AM    BUN 29 12/09/2021 08:42 AM    BUN 25 12/02/2021 03:52 PM    CREATININE 1.4 04/01/2022 08:15 AM    CREATININE 1.2 12/09/2021 08:42 AM    CREATININE 1.4 12/02/2021 03:52 PM     BNP:   Lab Results   Component Value Date/Time    PROBNP 452 11/10/2021 02:17 PM    PROBNP 447 05/24/2021 01:28 PM    PROBNP 600 01/20/2021 12:50 PM     Cardiac Imaging:  Echo 12/11/2020   Summary   -Left ventricular cavity size is normal.   -Overall left ventricular systolic function appears mildly reduced.   -Ejection fraction is visually estimated to be 45%. -There is abnormal septal motion/bounce noted. -Indeterminate diastolic function E/e' = 50.7. Kaitlin Crape -A mechanical artificial aortic valve appears well seated with a maximum   velocity of 2.1m/s and a mean gradient of 9mmHg.   -No evidence of aortic valve regurgitation.   -Trivial-mild mitral regurgitation.   -Mild tricuspid regurgitation.   -The right ventricle is normal in size with reduces function. TAPSE 1.9cm. RVS velocity is 6.94 cm/s.   -Pacer/ICD right heart    Echo: 12/31/19   Summary   -Global hypokinesis with EF 40-45%. -Abnormal septal motion.   -The aortic root and the ascending aorta are mildly dilated. -The right ventricle appears to be dilated. -RV systolic function appears to be reduced.   -The right atrium appears to be dilated. -The mechanical artificial aortic valve appears well seated with a maximum   velocity of 2.40 m/s and a mean gradient of 12 mmHg. The aortic valve area   is estimated at 1.19 cm^2. No significant regurgitation noted. -Thickened mitral valve without evidence of stenosis. There is   mild-to-moderate mitral regurgitation.   -There is moderate tricuspid regurgitation with a RVSP estimation of 43   mmHg.   -Pacer / ICD wire is visualized in the right heart.

## 2022-09-01 ENCOUNTER — TELEPHONE (OUTPATIENT)
Dept: CARDIOLOGY CLINIC | Age: 72
End: 2022-09-01

## 2022-09-01 RX ORDER — FUROSEMIDE 20 MG/1
20 TABLET ORAL DAILY
COMMUNITY
End: 2022-09-13 | Stop reason: SDUPTHER

## 2022-09-01 NOTE — TELEPHONE ENCOUNTER
----- Message from MEENU Wild CNP sent at 9/1/2022  9:29 AM EDT -----  Labs show kidney function number is elevated, entresto started instead of lisinopril which may help that but he will also probably need less lasix. Go ahead and decrease lasix dose to 1/2 pill a day with extra half for wt gain 3lb or more. Recheck labs in 2 weeks.  Thanh Weeks

## 2022-09-02 ENCOUNTER — TELEPHONE (OUTPATIENT)
Dept: PHARMACY | Age: 72
End: 2022-09-02

## 2022-09-08 ENCOUNTER — ANTI-COAG VISIT (OUTPATIENT)
Dept: PHARMACY | Age: 72
End: 2022-09-08
Payer: MEDICARE

## 2022-09-08 DIAGNOSIS — I48.3 TYPICAL ATRIAL FLUTTER (HCC): Primary | ICD-10-CM

## 2022-09-08 LAB — INTERNATIONAL NORMALIZATION RATIO, POC: 2.1

## 2022-09-08 PROCEDURE — 85610 PROTHROMBIN TIME: CPT

## 2022-09-08 PROCEDURE — 99211 OFF/OP EST MAY X REQ PHY/QHP: CPT

## 2022-09-08 NOTE — PROGRESS NOTES
Mr. Kelsie White is a 71 y.o. y/o male with history of Atrial Fibrillation who presents today for anticoagulation monitoring and adjustment. Pt is retired and works part time at [employer]. Patient reports he has been on warfarin for almost 30 years now and was managed by his cardiologist who recently retired. Pt was then managed by Dr. Margy Soulier prior to being established in Clinic  Pertinent PMH: HTN, DM, HLD, COPD, CHF    Patient Reported Findings:  Yes     No  [x]   []       Patient verifies current dosing regimen as listed- confirms   []   [x]       S/S bleeding/bruising/swelling/SOB -denies    []   [x]       Blood in urine or stool- denies  []   [x]       Procedures scheduled in the future at this time - denies upcoming   []   [x]       Missed Dose - Denies   []   [x]       Extra Dose - Denies  [x]   []       Change in medications-  tylenol prn---> euflexxa injection 10/5, 10/12, 10/19 is the plan for 3/3 doses---> has been taking tylenol acutely for tooth ache---> d/c lisinopril, started entresto   []   [x]       Change in health/diet/appetite - reports he eats about 2 meals/day. And states he will have salads about 1-2x/wk. (not a big fan of spinach or brocolli but may have other greens)--> Patient states 3 salads in past 2 weeks---> no greens, wants to add green beans in twice weekly, no NVD --> returned to vit k a few times a week (green beans and salad) --> denies changes, but has not had vit k in a while (only eats salads that have vit k)---> diarrhea resolved. Had small salad last week, no other vit k --> no changes, no NVD   []   [x]       Change in alcohol use - reports occasionally drinking beer (once every few months) but happens very infrequently. --> Patient reports no alcohol in past two weeks---> denies   []   [x]       Change in activity  []   [x]       Hospital admission  []   [x]       Emergency department visit  []   [x]       Other complaints     Clinical Outcomes:  Yes     No  [] [x]       Major bleeding event  []   [x]       Thromboembolic event    Duration of warfarin Therapy: Indefinite   INR Range:  2.0-3.0     INR today is 2.1 again  Continue weekly dose to 15 mg Wed and Fri and 10 mg all other days. Encouraged patient to maintain a consistent diet.   Recheck INR again in 4 weeks, 10/6    **consent form signed 11/10/2021    Referring physician is Dr. Karen Wise  INR (no units)   Date Value   04/05/2022 2.03 (H)   04/01/2022 1.72 (H)   12/02/2021 1.79 (H)   08/19/2021 8.44 (HH)     INR,(POC) (no units)   Date Value   08/05/2022 2.1   07/06/2022 2.3   06/06/2022 2.5   05/09/2022 3.1       For Pharmacy Admin Tracking Only    Total # of Interventions Recommended: 0  Total # of Interventions Accepted: 0  Time Spent (min): 15

## 2022-09-13 RX ORDER — FUROSEMIDE 20 MG/1
20 TABLET ORAL DAILY
Qty: 90 TABLET | Refills: 1 | Status: ON HOLD | OUTPATIENT
Start: 2022-09-13 | End: 2022-11-03 | Stop reason: HOSPADM

## 2022-09-13 NOTE — TELEPHONE ENCOUNTER
Pt called stating that [employer] does not have the RX for furosemide (LASIX) 20 MG tablet pt   would like 90day supply    furosemide (LASIX) 20 MG tablet   20 mg  #90  Take 20 mg by mouth daily Take a extra 0.5 tablet for a weight gain of 3 pounds or more    [facility] (Now [address] [facility]) - Rolando Boss [phone] - F [phone]   [address] [facility] [address]   Phone:  [phone]  Fax:  [phone]

## 2022-09-13 NOTE — TELEPHONE ENCOUNTER
Prescription refill    Last OV:08/31/2022    Last Refill:    Labs:08/31/2022    Future Appt: 09/28/2022

## 2022-09-14 ENCOUNTER — NURSE ONLY (OUTPATIENT)
Dept: CARDIOLOGY CLINIC | Age: 72
End: 2022-09-14
Payer: MEDICARE

## 2022-09-14 DIAGNOSIS — I50.22 CHRONIC SYSTOLIC HEART FAILURE (HCC): ICD-10-CM

## 2022-09-14 DIAGNOSIS — Z95.0 PACEMAKER: ICD-10-CM

## 2022-09-14 DIAGNOSIS — I42.0 CARDIOMYOPATHY, DILATED (HCC): Primary | ICD-10-CM

## 2022-09-14 PROCEDURE — G2066 INTER DEVC REMOTE 30D: HCPCS | Performed by: CLINICAL NURSE SPECIALIST

## 2022-09-14 PROCEDURE — 93297 REM INTERROG DEV EVAL ICPMS: CPT | Performed by: CLINICAL NURSE SPECIALIST

## 2022-09-14 NOTE — PROGRESS NOTES
Remote transmission received from patient's CRT-P home monitor. Set to only BiV pace when he needs to RV pace. Transmission shows normal sensing and pacing function. No arrhythmias/ or events. Optivol is within normal range w slight elevation noted up to 40 (below 60 threshold) w TI trend slightly below reference line. TriageHF Heart Failure Risk Status on 13-Sep-2022 is Medium*     NP will review. See interrogation under cardiology tab in the [address] [facility] [place] [address] field for more details. Will continue to monitor remotely. (End of 31-day monitoring period 9/14/22).

## 2022-09-15 ENCOUNTER — HOSPITAL ENCOUNTER (OUTPATIENT)
Age: 72
Discharge: HOME | End: 2022-09-15
Payer: MEDICARE

## 2022-09-15 DIAGNOSIS — I50.22 CHRONIC SYSTOLIC HEART FAILURE (HCC): ICD-10-CM

## 2022-09-15 LAB
ANION GAP SERPL CALCULATED.3IONS-SCNC: 13 MMOL/L (ref 3–16)
BUN BLDV-MCNC: 28 MG/DL (ref 7–20)
CALCIUM SERPL-MCNC: 9.7 MG/DL (ref 8.3–10.6)
CHLORIDE BLD-SCNC: 104 MMOL/L (ref 99–110)
CO2: 25 MMOL/L (ref 21–32)
CREAT SERPL-MCNC: 1.3 MG/DL (ref 0.8–1.3)
GFR AFRICAN AMERICAN: >60
GFR NON-AFRICAN AMERICAN: 54
GLUCOSE BLD-MCNC: 136 MG/DL (ref 70–99)
POTASSIUM SERPL-SCNC: 4.6 MMOL/L (ref 3.5–5.1)
PRO-BNP: 259 PG/ML (ref 0–124)
SODIUM BLD-SCNC: 142 MMOL/L (ref 136–145)

## 2022-09-15 PROCEDURE — 83880 ASSAY OF NATRIURETIC PEPTIDE: CPT

## 2022-09-15 PROCEDURE — 80048 BASIC METABOLIC PNL TOTAL CA: CPT

## 2022-09-15 PROCEDURE — 36415 COLL VENOUS BLD VENIPUNCTURE: CPT

## 2022-09-29 ENCOUNTER — OFFICE VISIT (OUTPATIENT)
Dept: FAMILY MEDICINE CLINIC | Age: 72
End: 2022-09-29
Payer: MEDICARE

## 2022-09-29 VITALS
BODY MASS INDEX: 38.45 KG/M2 | DIASTOLIC BLOOD PRESSURE: 70 MMHG | HEART RATE: 73 BPM | TEMPERATURE: 97 F | OXYGEN SATURATION: 98 % | HEIGHT: 73 IN | RESPIRATION RATE: 16 BRPM | WEIGHT: 290.1 LBS | SYSTOLIC BLOOD PRESSURE: 120 MMHG

## 2022-09-29 DIAGNOSIS — N28.9 LOW KIDNEY FUNCTION: ICD-10-CM

## 2022-09-29 DIAGNOSIS — L60.2 THICKENED NAILS: ICD-10-CM

## 2022-09-29 DIAGNOSIS — Z00.00 MEDICARE ANNUAL WELLNESS VISIT, SUBSEQUENT: Primary | ICD-10-CM

## 2022-09-29 DIAGNOSIS — Z23 NEED FOR VACCINATION: ICD-10-CM

## 2022-09-29 DIAGNOSIS — E11.69 TYPE 2 DIABETES MELLITUS WITH OTHER SPECIFIED COMPLICATION, WITHOUT LONG-TERM CURRENT USE OF INSULIN (HCC): ICD-10-CM

## 2022-09-29 DIAGNOSIS — I48.3 TYPICAL ATRIAL FLUTTER (HCC): ICD-10-CM

## 2022-09-29 DIAGNOSIS — I10 PRIMARY HYPERTENSION: ICD-10-CM

## 2022-09-29 DIAGNOSIS — E11.9 TYPE 2 DIABETES MELLITUS WITHOUT COMPLICATION, UNSPECIFIED WHETHER LONG TERM INSULIN USE (HCC): ICD-10-CM

## 2022-09-29 DIAGNOSIS — Z00.00 MEDICARE ANNUAL WELLNESS VISIT, INITIAL: ICD-10-CM

## 2022-09-29 DIAGNOSIS — Z12.11 SCREEN FOR COLON CANCER: ICD-10-CM

## 2022-09-29 DIAGNOSIS — N28.9 LOW KIDNEY FUNCTION: Primary | ICD-10-CM

## 2022-09-29 DIAGNOSIS — E78.5 HYPERLIPIDEMIA LDL GOAL <100: ICD-10-CM

## 2022-09-29 LAB
ANION GAP SERPL CALCULATED.3IONS-SCNC: 11 MMOL/L (ref 3–16)
BUN BLDV-MCNC: 28 MG/DL (ref 7–20)
CALCIUM SERPL-MCNC: 10 MG/DL (ref 8.3–10.6)
CHLORIDE BLD-SCNC: 105 MMOL/L (ref 99–110)
CHOLESTEROL, FASTING: 160 MG/DL (ref 0–199)
CO2: 27 MMOL/L (ref 21–32)
CREAT SERPL-MCNC: 1.3 MG/DL (ref 0.8–1.3)
GFR AFRICAN AMERICAN: >60
GFR NON-AFRICAN AMERICAN: 54
GLUCOSE BLD-MCNC: 131 MG/DL (ref 70–99)
HDLC SERPL-MCNC: 35 MG/DL (ref 40–60)
LDL CHOLESTEROL CALCULATED: 85 MG/DL
POTASSIUM SERPL-SCNC: 4.9 MMOL/L (ref 3.5–5.1)
SODIUM BLD-SCNC: 143 MMOL/L (ref 136–145)
TRIGLYCERIDE, FASTING: 199 MG/DL (ref 0–150)
VLDLC SERPL CALC-MCNC: 40 MG/DL

## 2022-09-29 PROCEDURE — G0439 PPPS, SUBSEQ VISIT: HCPCS | Performed by: FAMILY MEDICINE

## 2022-09-29 PROCEDURE — 1036F TOBACCO NON-USER: CPT | Performed by: FAMILY MEDICINE

## 2022-09-29 PROCEDURE — G8427 DOCREV CUR MEDS BY ELIG CLIN: HCPCS | Performed by: FAMILY MEDICINE

## 2022-09-29 PROCEDURE — G0009 ADMIN PNEUMOCOCCAL VACCINE: HCPCS | Performed by: FAMILY MEDICINE

## 2022-09-29 PROCEDURE — 3017F COLORECTAL CA SCREEN DOC REV: CPT | Performed by: FAMILY MEDICINE

## 2022-09-29 PROCEDURE — G0008 ADMIN INFLUENZA VIRUS VAC: HCPCS | Performed by: FAMILY MEDICINE

## 2022-09-29 PROCEDURE — 3044F HG A1C LEVEL LT 7.0%: CPT | Performed by: FAMILY MEDICINE

## 2022-09-29 PROCEDURE — 36415 COLL VENOUS BLD VENIPUNCTURE: CPT | Performed by: FAMILY MEDICINE

## 2022-09-29 PROCEDURE — 90694 VACC AIIV4 NO PRSRV 0.5ML IM: CPT | Performed by: FAMILY MEDICINE

## 2022-09-29 PROCEDURE — 90677 PCV20 VACCINE IM: CPT | Performed by: FAMILY MEDICINE

## 2022-09-29 PROCEDURE — 1123F ACP DISCUSS/DSCN MKR DOCD: CPT | Performed by: FAMILY MEDICINE

## 2022-09-29 PROCEDURE — 2022F DILAT RTA XM EVC RTNOPTHY: CPT | Performed by: FAMILY MEDICINE

## 2022-09-29 PROCEDURE — G8417 CALC BMI ABV UP PARAM F/U: HCPCS | Performed by: FAMILY MEDICINE

## 2022-09-29 PROCEDURE — 99213 OFFICE O/P EST LOW 20 MIN: CPT | Performed by: FAMILY MEDICINE

## 2022-09-29 ASSESSMENT — PATIENT HEALTH QUESTIONNAIRE - PHQ9
SUM OF ALL RESPONSES TO PHQ QUESTIONS 1-9: 0
SUM OF ALL RESPONSES TO PHQ9 QUESTIONS 1 & 2: 0
SUM OF ALL RESPONSES TO PHQ QUESTIONS 1-9: 0
2. FEELING DOWN, DEPRESSED OR HOPELESS: 0
1. LITTLE INTEREST OR PLEASURE IN DOING THINGS: 0
SUM OF ALL RESPONSES TO PHQ QUESTIONS 1-9: 0
SUM OF ALL RESPONSES TO PHQ QUESTIONS 1-9: 0

## 2022-09-29 ASSESSMENT — LIFESTYLE VARIABLES
HOW OFTEN DO YOU HAVE A DRINK CONTAINING ALCOHOL: NEVER
HOW MANY STANDARD DRINKS CONTAINING ALCOHOL DO YOU HAVE ON A TYPICAL DAY: PATIENT DOES NOT DRINK

## 2022-09-29 NOTE — PROGRESS NOTES
Medicare Annual Wellness Visit    Kendra Yi is here for Medicare AWV    Assessment & Plan    Diagnosis Orders   1. Medicare annual wellness visit, subsequent      . Doing well, encourage healthy diet, exercise, safety    . Screening labs orders given   . Preventive: prevnar 20 and flu vaccine, recommended shingrix at his pharmacy,    . Colonoscopy referral     Former smoker, lung CT UTD , neg   Declines referral to audiology   Recommended eye exam and dental care (can not afford)              2. Type 2 diabetes mellitus with other specified complication, without long-term current use of insulin ([facility] [address] [facility]   Stable  Continue same medications no changes needed at this time   Encourage compliance with medication, life style changes  Recommended eye exam  Referred to podiatry         3. Primary hypertension   At goal   Continue same medications no changes needed at this time          4. Hyperlipidemia LDL goal <100   Continue statin         5. Typical atrial flutter (HCC)   Anticoagulated, continue medication                     6 mo        Subjective       The following acute and/or chronic problems were also addressed today:       Diagnosis Orders   1. Medicare annual wellness visit, subsequent        2. Type 2 diabetes mellitus with other specified complication, without long-term current use of insulin (HCC)   Good compliance with med and diet   No hx of hypoglycemia  Eye exam due  denies polyuria/polydipsia/polyphagia/paresthesias/wt gain or loss,          3. Hypertension    bp at home not checked  , denies  ha, visual changes, syncope, presyncope, cp, sob, palpitations, edema, batres, orthopnea, pnd,  Compliant with medication, no secondary effects        4. Hyperlipidemia LDL goal <100   Compliant with med          5. Typical atrial flutter (HCC)   No lightheadedness, no cp, sob,   Anticoagulated, no hx of abn bleeding          6. Thickened nails        7.  Need for vaccination            8. Screen for colon cancer Patient's complete Health Risk Assessment and screening values have been reviewed and are found in Flowsheets. The following problems were reviewed today and where indicated follow up appointments were made and/or referrals ordered. Positive Risk Factor Screenings with Interventions:             General Health and ACP:  General  In general, how would you say your health is?: Fair  In the past 7 days, have you experienced any of the following: New or Increased Pain, New or Increased Fatigue, Loneliness, Social Isolation, Stress or Anger?: (!) Yes  Select all that apply: (!) New or Increased Pain  Do you get the social and emotional support that you need?: Yes  Do you have a Living Will?: Yes    Advance Directives       Power of  Living Will ACP-Advance Directive ACP-Power of     Not on File Not on File Not on File Not on File        General Health Risk Interventions:  Pain issues: chronic knee pain , unchanged,     Health Habits/Nutrition:  Physical Activity: Inactive    Days of Exercise per Week: 0 days    Minutes of Exercise per Session: 0 min     Have you lost any weight without trying in the past 3 months?: No  Body mass index: (!) 38.27. diet counseling, encourage exercise for example reclining biking . Have you seen the dentist within the past year?: (!) No, recommended   Health Habits/Nutrition Interventions:  Inadequate physical activity:  patient is not ready to increase his/her physical activity level at this time    Hearing/Vision:  Do you or your family notice any trouble with your hearing that hasn't been managed with hearing aids?: (!) Yes  Do you have difficulty driving, watching TV, or doing any of your daily activities because of your eyesight?: No  Have you had an eye exam within the past year?: (!) No  No results found.   Hearing/Vision Interventions:  Hearing concerns:  patient declines any further evaluation/treatment for hearing issues  Vision concerns:  patient declines any further evaluation/treatment for this issue            Objective   Vitals:    09/29/22 0735   BP: 120/70   Pulse: 73   Resp: 16   Temp: 97 °F (36.1 °C)   TempSrc: Tympanic   SpO2: 98%   Weight: 290 lb 1.6 oz (131.6 kg)   Height: 6' 1\" (1.854 m)      Body mass index is 38.27 kg/m². General Appearance: alert and oriented to person, place and time, well developed and well- nourished, in no acute distress  Skin: warm and dry, no rash or erythema  Head: normocephalic and atraumatic  Eyes: pupils equal, round, and reactive to light, extraocular eye movements intact, conjunctivae normal  ENT: tympanic membrane, external ear and ear canal normal bilaterally, nose without deformity, nasal mucosa and turbinates normal without polyps  Neck: supple and non-tender without mass, no thyromegaly or thyroid nodules, no cervical lymphadenopathy  Pulmonary/Chest: clear to auscultation bilaterally- no wheezes, rales or rhonchi, normal air movement, no respiratory distress  Cardiovascular: normal rate, regular rhythm, normal S1 and S2, no murmurs, rubs, clicks, or gallops, distal pulses intact, no carotid bruits  Extremities: no cyanosis, clubbing or edema, pulses P  2+ , Posterior T 2+, skin intact, thick nails   Musculoskeletal: normal range of motion, no joint swelling, deformity or tenderness  Neurologic: reflexes normal and symmetric, no cranial nerve deficit, gait, coordination and speech normal       Results reviewed and discussed with pt today     Latest Reference Range & Units 9/15/22 07:00   Sodium 136 - 145 mmol/L 142   Potassium 3.5 - 5.1 mmol/L 4.6   Chloride 99 - 110 mmol/L 104   CO2 21 - 32 mmol/L 25   BUN,BUNPL 7 - 20 mg/dL 28 (H)   Creatinine 0.8 - 1.3 mg/dL 1.3   Anion Gap 3 - 16  13   GFR Non- >60  54 !    GFR African American >60  >60   Glucose, Random 70 - 99 mg/dL 136 (H)   CALCIUM, SERUM, 742703 8.3 - 10.6 mg/dL 9.7   Pro-BNP 0 - 124 pg/mL 259 (H)   (H): Data is abnormally high  !: Data is abnormal  No Known Allergies  Prior to Visit Medications    Medication Sig Taking? Authorizing Provider   furosemide (LASIX) 20 MG tablet Take 1 tablet by mouth daily Take a extra 0.5 tablet for a weight gain of 3 pounds or more Yes MEENU Osullivan   vitamin D3 (CHOLECALCIFEROL) 25 MCG (1000 UT) TABS tablet Take 1 tablet by mouth daily Yes MEENU Osullivan   sacubitril-valsartan (ENTRESTO) 49-51 MG per tablet Take 1 tablet by mouth 2 times daily Yes MEENU Osullivan   metFORMIN (GLUCOPHAGE-XR) 500 MG extended release tablet TAKE 4 [address] [address] Yes Oswald Luna MD   amLODIPine (NORVASC) 10 MG tablet Take 1 tablet by mouth daily Yes MEENU Dickson   allopurinol (ZYLOPRIM) 300 MG tablet TAKE 1 Ashanti Richards MD   pravastatin (PRAVACHOL) 80 MG tablet TAKE 1 TABLET EVERY DAY FOR CHOLESTEROL Yes Oswald Luna MD   glucose monitoring kit (FREESTYLE) monitoring kit Ok to dispense what is covered by insurance. Yes Hayley Bets MD   blood glucose monitor strips Test 2 times a day & as needed for symptoms of irregular blood glucose. Dispense sufficient amount for indicated testing frequency plus additional to accommodate PRN testing needs. Ok to dispense what is covered by insurance Yes Hayley Best MD   ACCU-CHEK COMPACT PLUS strip USE TO TEST 2 TIMES DAILY Yes Benny Salas MD   Lancets MISC BID testing Yes Hayley Best MD   sotalol (BETAPACE) 80 MG tablet Take 80 mg by mouth 2 times daily. Yes Historical Provider, MD   aspirin 81 MG chewable tablet Take 81 mg by mouth daily. Yes Historical Provider, MD   warfarin (COUMADIN) 10 MG tablet Take 1.5 tablets by mouth Twice a Week AND 1 tablet Five times weekly. TAKE 1 TABLET DAILY EXCEPT TAKE 1 AND 1/2 TABLETS (15 MG) ON WEDNESDAY AND FRIDAY.   [facility] (Including outside providers/suppliers regularly involved in providing care): Patient Care Team:  Oswald Luna MD as PCP - General (Family Medicine)  Oswald Luna MD as PCP - [facility]  Bart Junior MD as Consulting Physician (Otolaryngology)  Milagros Randall RN as Nurse Manjinder Gilliam MD as Consulting Physician ([address] [place] [address])  MEENU Parsons CNP as Nurse Practitioner (Nurse Practitioner)     Reviewed and updated this visit:  Tobacco  Allergies  Meds  Med Hx  Surg Hx  Soc Hx  Fam Hx        Fu 6 mo

## 2022-09-29 NOTE — TELEPHONE ENCOUNTER
Medication Refill    Medication needing refilled:  sacubitril-valsartan (ENTRESTO)    Dosage of the medication:  49-51 MG per tablet     How are you taking this medication (QD, BID, TID, QID, PRN):Take 1 tablet by mouth 2 times daily    30 or 90 day supply called in:  30 day supply    When will you run out of your medication:  [address] are we sending the medication to?:    [facility] [address] [place] [facility], [place] - [address]  MAR[place] [address] [place] [address]   Phone:  [phone]  Fax:  [phone]

## 2022-09-29 NOTE — PATIENT INSTRUCTIONS
Personalized Preventive Plan for Sarah Buckner - 9/29/2022  Medicare offers a range of preventive health benefits. Some of the tests and screenings are paid in full while other may be subject to a deductible, co-insurance, and/or copay. Some of these benefits include a comprehensive review of your medical history including lifestyle, illnesses that may run in your family, and various assessments and screenings as appropriate. After reviewing your medical record and screening and assessments performed today your provider may have ordered immunizations, labs, imaging, and/or referrals for you. A list of these orders (if applicable) as well as your Preventive Care list are included within your After Visit Summary for your review. Other Preventive Recommendations:    A preventive eye exam performed by an eye specialist is recommended every 1-2 years to screen for glaucoma; cataracts, macular degeneration, and other eye disorders. A preventive dental visit is recommended every 6 months. Try to get at least 150 minutes of exercise per week or 10,000 steps per day on a pedometer . Order or download the FREE \"Exercise & Physical Activity: Your Everyday Guide\" from [employer]. Call 0-867.845.7971 or search [employer] online. You need 7361-1247 mg of calcium and 6775-6440 IU of vitamin D per day. It is possible to meet your calcium requirement with diet alone, but a vitamin D supplement is usually necessary to meet this goal.  When exposed to the sun, use a sunscreen that protects against both UVA and UVB radiation with an SPF of 30 or greater. Reapply every 2 to 3 hours or after sweating, drying off with a towel, or swimming. Always wear a seat belt when traveling in a car. Always wear a helmet when riding a bicycle or motorcycle.

## 2022-09-30 LAB
ESTIMATED AVERAGE GLUCOSE: 125.5 MG/DL
HBA1C MFR BLD: 6 %

## 2022-09-30 NOTE — TELEPHONE ENCOUNTER
Prescription refill    Last OV:08/31/2022    Last Refill:07/08/2022    Labs:09/29/2022    Future Appt: 10/06/2022

## 2022-10-02 RX ORDER — AMLODIPINE BESYLATE 10 MG/1
TABLET ORAL
Qty: 90 TABLET | Refills: 1 | Status: ON HOLD | OUTPATIENT
Start: 2022-10-02 | End: 2022-11-03 | Stop reason: SDUPTHER

## 2022-10-03 ENCOUNTER — TELEPHONE (OUTPATIENT)
Dept: CARDIOLOGY CLINIC | Age: 72
End: 2022-10-03

## 2022-10-06 ENCOUNTER — NURSE ONLY (OUTPATIENT)
Dept: CARDIOLOGY CLINIC | Age: 72
End: 2022-10-06

## 2022-10-06 ENCOUNTER — OFFICE VISIT (OUTPATIENT)
Dept: CARDIOLOGY CLINIC | Age: 72
End: 2022-10-06
Payer: MEDICARE

## 2022-10-06 ENCOUNTER — ANTI-COAG VISIT (OUTPATIENT)
Dept: PHARMACY | Age: 72
End: 2022-10-06
Payer: MEDICARE

## 2022-10-06 VITALS
BODY MASS INDEX: 38.7 KG/M2 | SYSTOLIC BLOOD PRESSURE: 127 MMHG | DIASTOLIC BLOOD PRESSURE: 72 MMHG | HEART RATE: 62 BPM | OXYGEN SATURATION: 94 % | WEIGHT: 292 LBS | HEIGHT: 73 IN

## 2022-10-06 DIAGNOSIS — I50.22 CHRONIC SYSTOLIC HEART FAILURE (HCC): Primary | ICD-10-CM

## 2022-10-06 DIAGNOSIS — G47.33 OSA (OBSTRUCTIVE SLEEP APNEA): ICD-10-CM

## 2022-10-06 DIAGNOSIS — I48.3 TYPICAL ATRIAL FLUTTER (HCC): Primary | ICD-10-CM

## 2022-10-06 DIAGNOSIS — E66.9 OBESITY (BMI 30-39.9): ICD-10-CM

## 2022-10-06 DIAGNOSIS — I10 ESSENTIAL HYPERTENSION: ICD-10-CM

## 2022-10-06 LAB — INTERNATIONAL NORMALIZATION RATIO, POC: 2

## 2022-10-06 PROCEDURE — G8427 DOCREV CUR MEDS BY ELIG CLIN: HCPCS | Performed by: CLINICAL NURSE SPECIALIST

## 2022-10-06 PROCEDURE — 1123F ACP DISCUSS/DSCN MKR DOCD: CPT | Performed by: CLINICAL NURSE SPECIALIST

## 2022-10-06 PROCEDURE — 1036F TOBACCO NON-USER: CPT | Performed by: CLINICAL NURSE SPECIALIST

## 2022-10-06 PROCEDURE — 99211 OFF/OP EST MAY X REQ PHY/QHP: CPT

## 2022-10-06 PROCEDURE — G8417 CALC BMI ABV UP PARAM F/U: HCPCS | Performed by: CLINICAL NURSE SPECIALIST

## 2022-10-06 PROCEDURE — G8484 FLU IMMUNIZE NO ADMIN: HCPCS | Performed by: CLINICAL NURSE SPECIALIST

## 2022-10-06 PROCEDURE — 3017F COLORECTAL CA SCREEN DOC REV: CPT | Performed by: CLINICAL NURSE SPECIALIST

## 2022-10-06 PROCEDURE — 99214 OFFICE O/P EST MOD 30 MIN: CPT | Performed by: CLINICAL NURSE SPECIALIST

## 2022-10-06 PROCEDURE — 85610 PROTHROMBIN TIME: CPT

## 2022-10-06 NOTE — PROGRESS NOTES
[address]  Progress Note    Primary Care Doctor:  Claribel Walls MD    Chief Complaint   Patient presents with    1 Month Follow-Up    Congestive Heart Failure        History of Present Illness:  71 y.o. 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 He follows with Dr Yobany Bunch with [facility]. 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 He follows with Dr Conchita Valladares   12/30-1/2/2020 for worsening heart failure, sob and leg swelling. He was found to AFlutter and CV 1/2/20. BiV lead implant 3/23/2020    I had the pleasure of seeing Alfonso Flores in follow up for sHF. He is ambulatory with a cane and by his self today. His optival shows increased impedence to threshold line. He has been out of entresto for a week. His weigh has gone from 288->292. He did feel better while he was taking the entresto. He denies any chest pain, palpitations, lightheadedness or edema. Past Medical History:   has a past medical history of Acute congestive heart failure ([address] Allergic rhinitis, Atrial fibrillation ([address] CKD (chronic kidney disease), Class 2 obesity due to excess calories without serious comorbidity with body mass index (BMI) of 37.0 to 37.9 in adult, Controlled type 2 diabetes mellitus without complication, without long-term current use of insulin ([address] COPD, Essential tremor, Gout, Hyperlipidemia, mixed, Hypertension, Long term current use of anticoagulants with INR goal of 2.0-3.0, Obstructive sleep apnea syndrome, Primary insomnia, Primary osteoarthritis of both knees, and Sleep apnea. Surgical History:   has a past surgical history that includes Aortic valve replacement (10/1996:[facility]); Pacemaker insertion (1996); Atrial ablation surgery (03/23/2020); Cardiac pacemaker placement (03/23/2020);  Knee arthroscopy (Right, 12/27/2021); knee surgery (Right, 04/06/2022); and Knee Arthrocentesis (Right, 4/6/2022). Social History:   reports that he quit smoking about 3 years ago. His smoking use included cigarettes. He has a 30.00 pack-year smoking history. He has never used smokeless tobacco. He reports current alcohol use. He reports that he does not use drugs. Family History:   Family History   Problem Relation Age of Onset    Cancer Mother         Gallbladder    Asthma Father     Emphysema Father     No Known Problems Sister     No Known Problems Brother     No Known Problems Maternal Grandmother     No Known Problems Maternal Grandfather     No Known Problems Paternal Grandmother     No Known Problems Paternal Grandfather        Home Medications:  Prior to Admission medications    Medication Sig Start Date End Date Taking? Authorizing Provider   sacubitril-valsartan (ENTRESTO) 49-51 MG per tablet Take 1 tablet by mouth 2 times daily 10/6/22  Yes MEENU Subramanian   amLODIPine (NORVASC) 10 MG tablet TAKE 1 TABLET EVERY DAY 10/2/22  Yes MEENU King CNP   furosemide (LASIX) 20 MG tablet Take 1 tablet by mouth daily Take a extra 0.5 tablet for a weight gain of 3 pounds or more 9/13/22  Yes MEENU Subramanian - CNS   vitamin D3 (CHOLECALCIFEROL) 25 MCG (1000 UT) TABS tablet Take 1 tablet by mouth daily 8/31/22  Yes MEENU Cohen   metFORMIN (GLUCOPHAGE-XR) 500 MG extended release tablet TAKE 4 TABLETS EVERY DAY WITH BREAKFAST 7/14/22  Yes Hugo Crump MD   allopurinol (ZYLOPRIM) 300 MG tablet TAKE 1 TABLET EVERY DAY 4/19/22  Yes Hugo Crump MD   pravastatin (PRAVACHOL) 80 MG tablet TAKE 1 TABLET EVERY DAY FOR CHOLESTEROL 3/11/22  Yes Hugo Crmup MD   warfarin (COUMADIN) 10 MG tablet Take 1.5 tablets by mouth Twice a Week AND 1 tablet Five times weekly. TAKE 1 TABLET DAILY EXCEPT TAKE 1 AND 1/2 TABLETS (15 MG) ON WEDNESDAY AND FRIDAY.  10/14/21 10/6/22 Yes Winifred Chaudhary MD   glucose monitoring kit (FREESTYLE) monitoring kit Ok to dispense what is covered by insurance. 7/9/20  Yes Aspen Cabrera MD   blood glucose monitor strips Test 2 times a day & as needed for symptoms of irregular blood glucose. Dispense sufficient amount for indicated testing frequency plus additional to accommodate PRN testing needs. Ok to dispense what is covered by insurance 7/4/20  Yes Aspen Cabrera MD   ACCU-CHEK COMPACT PLUS strip USE TO TEST 2 TIMES DAILY 6/23/20  Yes Aspen Cabrera MD   Lancets MISC BID testing 2/24/17  Yes Aspen Cabrera MD   sotalol (BETAPACE) 80 MG tablet Take 80 mg by mouth 2 times daily. Yes Historical Provider, MD   aspirin 81 MG chewable tablet Take 81 mg by mouth daily. Yes Historical Provider, MD        Allergies:  Patient has no known allergies. Review of Systems:   Constitutional: there has been no unanticipated weight loss. There's been no change in energy level, sleep pattern, or activity level. Eyes: No visual changes or diplopia. No scleral icterus. ENT: No Headaches, hearing loss or vertigo. No mouth sores or sore throat. Cardiovascular: Reviewed in HPI  Respiratory: No cough or wheezing, no sputum production. No hematemesis. Gastrointestinal: No abdominal pain, appetite loss, blood in stools. No change in bowel or bladder habits. Genitourinary: No dysuria, trouble voiding, or hematuria. Musculoskeletal:  No gait disturbance, weakness or joint complaints. Integumentary: No rash or pruritis. Neurological: No headache, diplopia, change in muscle strength, numbness or tingling. No change in gait, balance, coordination, mood, affect, memory, mentation, behavior. Psychiatric: No anxiety, no depression. Endocrine: No malaise, fatigue or temperature intolerance. No excessive thirst, fluid intake, or urination. No tremor. Hematologic/Lymphatic: No abnormal bruising or bleeding, blood clots or swollen lymph nodes. Allergic/Immunologic: No nasal congestion or hives.     Physical Examination:    Vitals:    10/06/22 0828   BP: 127/72   Site: Right Upper Arm   Position: Sitting   Cuff Size: Large Adult   Pulse: 62   SpO2: 94%   Weight: 292 lb (132.5 kg)   Height: 6' 1\" (1.854 m)        Constitutional and General Appearance: Warm and dry, no apparent distress, normal coloration  HEENT:  Normocephalic, atraumatic  Respiratory:  Normal excursion and expansion without use of accessory muscles  Resp Auscultation: Normal breath sounds without dullness  Cardiovascular: The apical impulses not displaced  Heart tones are crisp and normal  JVP normal cm H2O  Regular rate and rhythm  Peripheral pulses are symmetrical and full  There is no clubbing, cyanosis of the extremities.   no edema  Pedal Pulses: 2+ and equal   Abdomen:obese  No masses or tenderness  Liver/Spleen: No Abnormalities Noted  Neurological/Psychiatric:  Alert and oriented in all spheres  Moves all extremities well  Exhibits normal gait balance and coordination  No abnormalities of mood, affect, memory, mentation, or behavior are noted    Lab Data:    CBC:   Lab Results   Component Value Date/Time    WBC 6.7 04/01/2022 08:15 AM    WBC 6.7 12/02/2021 03:52 PM    WBC 7.2 11/10/2021 02:17 PM    RBC 4.47 04/01/2022 08:15 AM    RBC 4.45 12/02/2021 03:52 PM    RBC 4.35 11/10/2021 02:17 PM    HGB 13.8 04/01/2022 08:15 AM    HGB 13.3 12/02/2021 03:52 PM    HGB 13.3 11/10/2021 02:17 PM    HCT 41.5 04/01/2022 08:15 AM    HCT 40.9 12/02/2021 03:52 PM    HCT 39.9 11/10/2021 02:17 PM    MCV 92.8 04/01/2022 08:15 AM    MCV 92.0 12/02/2021 03:52 PM    MCV 91.9 11/10/2021 02:17 PM    RDW 15.7 04/01/2022 08:15 AM    RDW 15.2 12/02/2021 03:52 PM    RDW 14.8 11/10/2021 02:17 PM     04/01/2022 08:15 AM     12/02/2021 03:52 PM     11/10/2021 02:17 PM     BMP:  Lab Results   Component Value Date/Time     09/29/2022 08:32 AM     09/15/2022 07:00 AM     08/31/2022 11:30 AM    K 4.9 09/29/2022 08:32 AM    K 4.6 09/15/2022 07:00 AM    K 4.8 08/31/2022 11:30 AM     09/29/2022 08:32 AM     09/15/2022 07:00 AM     08/31/2022 11:30 AM    CO2 27 09/29/2022 08:32 AM    CO2 25 09/15/2022 07:00 AM    CO2 28 08/31/2022 11:30 AM    PHOS 4.3 12/09/2021 08:42 AM    PHOS 4.2 01/07/2020 10:27 AM    PHOS 3.8 10/14/2019 08:50 AM    BUN 28 09/29/2022 08:32 AM    BUN 28 09/15/2022 07:00 AM    BUN 26 08/31/2022 11:30 AM    CREATININE 1.3 09/29/2022 08:32 AM    CREATININE 1.3 09/15/2022 07:00 AM    CREATININE 1.7 08/31/2022 11:30 AM     BNP:   Lab Results   Component Value Date/Time    PROBNP 259 09/15/2022 07:00 AM    PROBNP 248 08/31/2022 11:30 AM    PROBNP 452 11/10/2021 02:17 PM     Cardiac Imaging:  Echo 12/11/2020   Summary   -Left ventricular cavity size is normal.   -Overall left ventricular systolic function appears mildly reduced.   -Ejection fraction is visually estimated to be 45%. -There is abnormal septal motion/bounce noted. -Indeterminate diastolic function E/e' = 05.4. Aj Patella -A mechanical artificial aortic valve appears well seated with a maximum   velocity of 2.1m/s and a mean gradient of 9mmHg.   -No evidence of aortic valve regurgitation.   -Trivial-mild mitral regurgitation.   -Mild tricuspid regurgitation.   -The right ventricle is normal in size with reduces function. TAPSE 1.9cm. RVS velocity is 6.94 cm/s.   -Pacer/ICD right heart    Echo: 12/31/19   Summary   -Global hypokinesis with EF 40-45%. -Abnormal septal motion.   -The aortic root and the ascending aorta are mildly dilated. -The right ventricle appears to be dilated. -RV systolic function appears to be reduced.   -The right atrium appears to be dilated. -The mechanical artificial aortic valve appears well seated with a maximum   velocity of 2.40 m/s and a mean gradient of 12 mmHg. The aortic valve area   is estimated at 1.19 cm^2. No significant regurgitation noted. -Thickened mitral valve without evidence of stenosis.  There is   mild-to-moderate mitral regurgitation.   -There is moderate tricuspid regurgitation with a RVSP estimation of 43   mmHg.   -Pacer / ICD wire is visualized in the right heart.   -Indeterminate diastolic function. Assessment:    1. Chronic systolic congestive heart failure (HCC) on arni and BB; no aldosterone antagonist due to hyperkalemia   2. Essential hypertension    3. ROSETTA (obstructive sleep apnea)    4. Obesity (BMI 30-39.9)    5.      Vitamin d deficiency    Plan:   Patient Instructions   Increase lasix to 40 mg for 3 days and then go back to 20 mg daily  Samples of entresto 49-51 mg   Entresto sent to local pharmacy  Continue all other medications  Goal is to stop norvasc and increase entresto in follow up visit  RTO 3 months    I appreciate the opportunity of cooperating in the care of this individual.    MEENU Barber - CNS, CNS, 10/6/2022, 3:23 PM

## 2022-10-06 NOTE — PATIENT INSTRUCTIONS
Increase lasix to 40 mg for 3 days and then go back to 20 mg daily  Samples of entresto 49-51 mg   Entresto sent to local pharmacy  Continue all other medications  Goal is to stop norvasc and increase entresto in follow up visit  RTO 3 months

## 2022-10-06 NOTE — PROGRESS NOTES
[address] transmission received 10/6/22 from [employer] for patient's CRT-P. Set to only BiV pace when he needs to RV pace. Transmission shows normal sensing and pacing function. No arrhythmias/ or events. Optivol is within normal range w slight elevation noted up to 55 (below 60 threshold) w TI trend slightly below reference line. NP will review. See interrogation under cardiology tab in the [facility] [address] field for more details. Will continue to monitor remotely.

## 2022-10-06 NOTE — PROGRESS NOTES
Mr. Annabella Minor is a 71 y.o. y/o male with history of Atrial Fibrillation who presents today for anticoagulation monitoring and adjustment. Pt is retired and works part time at [employer]. Patient reports he has been on warfarin for almost 30 years now and was managed by his cardiologist who recently retired. Pt was then managed by Dr. Taylor Parrish prior to being established in Clinic  Pertinent PMH: HTN, DM, HLD, COPD, CHF    Patient Reported Findings:  Yes     No  [x]   []       Patient verifies current dosing regimen as listed- confirms   []   [x]       S/S bleeding/bruising/swelling/SOB -denies    []   [x]       Blood in urine or stool- denies  []   [x]       Procedures scheduled in the future at this time - denies upcoming   []   [x]       Missed Dose - Denies   []   [x]       Extra Dose - Denies  [x]   []       Change in medications-  tylenol prn---> euflexxa injection 10/5, 10/12, 10/19 is the plan for 3/3 doses---> has been taking tylenol acutely for tooth ache---> d/c lisinopril, started entresto   []   [x]       Change in health/diet/appetite - reports he eats about 2 meals/day. And states he will have salads about 1-2x/wk. (not a big fan of spinach or brocolli but may have other greens)--> Patient states 3 salads in past 2 weeks---> no greens, wants to add green beans in twice weekly, no NVD --> returned to vit k a few times a week (green beans and salad) --> denies changes, but has not had vit k in a while (only eats salads that have vit k)---> diarrhea resolved. Had small salad last week, no other vit k --> no changes, no NVD   []   [x]       Change in alcohol use - reports occasionally drinking beer (once every few months) but happens very infrequently. --> Patient reports no alcohol in past two weeks---> denies   []   [x]       Change in activity  []   [x]       Hospital admission  []   [x]       Emergency department visit  []   [x]       Other complaints     Clinical Outcomes:  Yes     No  [] [x]       Major bleeding event  []   [x]       Thromboembolic event    Duration of warfarin Therapy: Indefinite   INR Range:  2.0-3.0     INR today is 2.0  Continue weekly dose to 15 mg Wed and Fri and 10 mg all other days. Encouraged patient to maintain a consistent diet.   Recheck INR again in 4 weeks, 11/3    **consent form signed 11/10/2021    Referring physician is Dr. Jerry Clay  INR (no units)   Date Value   04/05/2022 2.03 (H)   04/01/2022 1.72 (H)   12/02/2021 1.79 (H)   08/19/2021 8.44 (HH)     INR,(POC) (no units)   Date Value   10/06/2022 2.0   09/08/2022 2.1   08/05/2022 2.1   07/06/2022 2.3       For Pharmacy Admin Tracking Only    Total # of Interventions Recommended: 0  Total # of Interventions Accepted: 0  Time Spent (min): 15

## 2022-10-19 ENCOUNTER — NURSE ONLY (OUTPATIENT)
Dept: CARDIOLOGY CLINIC | Age: 72
End: 2022-10-19
Payer: MEDICARE

## 2022-10-19 DIAGNOSIS — I42.0 CARDIOMYOPATHY, DILATED (HCC): Primary | ICD-10-CM

## 2022-10-19 DIAGNOSIS — I50.22 CHRONIC SYSTOLIC HEART FAILURE (HCC): ICD-10-CM

## 2022-10-19 DIAGNOSIS — Z95.0 PACEMAKER: ICD-10-CM

## 2022-10-19 PROCEDURE — G2066 INTER DEVC REMOTE 30D: HCPCS | Performed by: CLINICAL NURSE SPECIALIST

## 2022-10-19 PROCEDURE — 93297 REM INTERROG DEV EVAL ICPMS: CPT | Performed by: CLINICAL NURSE SPECIALIST

## 2022-10-29 ENCOUNTER — HOSPITAL ENCOUNTER (INPATIENT)
Age: 72
LOS: 4 days | Discharge: HOME | DRG: 193 | End: 2022-11-03
Attending: EMERGENCY MEDICINE | Admitting: STUDENT IN AN ORGANIZED HEALTH CARE EDUCATION/TRAINING PROGRAM
Payer: MEDICARE

## 2022-10-29 DIAGNOSIS — R09.02 HYPOXIA: ICD-10-CM

## 2022-10-29 DIAGNOSIS — R06.00 DYSPNEA AND RESPIRATORY ABNORMALITIES: ICD-10-CM

## 2022-10-29 DIAGNOSIS — Z79.4 TYPE 2 DIABETES MELLITUS WITH STAGE 3B CHRONIC KIDNEY DISEASE, WITH LONG-TERM CURRENT USE OF INSULIN (HCC): ICD-10-CM

## 2022-10-29 DIAGNOSIS — R06.89 DYSPNEA AND RESPIRATORY ABNORMALITIES: ICD-10-CM

## 2022-10-29 DIAGNOSIS — N18.32 TYPE 2 DIABETES MELLITUS WITH STAGE 3B CHRONIC KIDNEY DISEASE, WITH LONG-TERM CURRENT USE OF INSULIN (HCC): ICD-10-CM

## 2022-10-29 DIAGNOSIS — E11.22 TYPE 2 DIABETES MELLITUS WITH STAGE 3B CHRONIC KIDNEY DISEASE, WITH LONG-TERM CURRENT USE OF INSULIN (HCC): ICD-10-CM

## 2022-10-29 DIAGNOSIS — I50.9 CONGESTIVE HEART FAILURE, UNSPECIFIED HF CHRONICITY, UNSPECIFIED HEART FAILURE TYPE (HCC): Primary | ICD-10-CM

## 2022-10-29 DIAGNOSIS — J18.9 PNEUMONIA DUE TO INFECTIOUS ORGANISM, UNSPECIFIED LATERALITY, UNSPECIFIED PART OF LUNG: ICD-10-CM

## 2022-10-29 PROCEDURE — 93005 ELECTROCARDIOGRAM TRACING: CPT | Performed by: EMERGENCY MEDICINE

## 2022-10-29 PROCEDURE — 99285 EMERGENCY DEPT VISIT HI MDM: CPT

## 2022-10-30 ENCOUNTER — APPOINTMENT (OUTPATIENT)
Dept: GENERAL RADIOLOGY | Age: 72
DRG: 193 | End: 2022-10-30
Payer: MEDICARE

## 2022-10-30 ENCOUNTER — APPOINTMENT (OUTPATIENT)
Dept: CT IMAGING | Age: 72
DRG: 193 | End: 2022-10-30
Payer: MEDICARE

## 2022-10-30 PROBLEM — J44.9 COPD (CHRONIC OBSTRUCTIVE PULMONARY DISEASE) (HCC): Status: ACTIVE | Noted: 2018-11-05

## 2022-10-30 PROBLEM — I12.9 HYPERTENSION ASSOCIATED WITH STAGE 3 CHRONIC KIDNEY DISEASE DUE TO TYPE 2 DIABETES MELLITUS (HCC): Status: ACTIVE | Noted: 2022-10-30

## 2022-10-30 PROBLEM — E11.22 HYPERTENSION ASSOCIATED WITH STAGE 3 CHRONIC KIDNEY DISEASE DUE TO TYPE 2 DIABETES MELLITUS (HCC): Status: ACTIVE | Noted: 2022-10-30

## 2022-10-30 PROBLEM — N18.30 HYPERTENSION ASSOCIATED WITH STAGE 3 CHRONIC KIDNEY DISEASE DUE TO TYPE 2 DIABETES MELLITUS (HCC): Status: ACTIVE | Noted: 2022-10-30

## 2022-10-30 PROBLEM — J18.9 CAP (COMMUNITY ACQUIRED PNEUMONIA): Status: ACTIVE | Noted: 2022-10-30

## 2022-10-30 PROBLEM — I50.21 ACUTE SYSTOLIC CHF (CONGESTIVE HEART FAILURE) (HCC): Status: ACTIVE | Noted: 2022-10-30

## 2022-10-30 PROBLEM — J96.01 ACUTE RESPIRATORY FAILURE WITH HYPOXIA (HCC): Status: ACTIVE | Noted: 2022-10-30

## 2022-10-30 PROBLEM — I50.20 HFREF (HEART FAILURE WITH REDUCED EJECTION FRACTION) (HCC): Status: ACTIVE | Noted: 2022-10-30

## 2022-10-30 LAB
A/G RATIO: 1.1 (ref 1.1–2.2)
ALBUMIN SERPL-MCNC: 3.7 G/DL (ref 3.4–5)
ALBUMIN SERPL-MCNC: 3.8 G/DL (ref 3.4–5)
ALP BLD-CCNC: 112 U/L (ref 40–129)
ALT SERPL-CCNC: 17 U/L (ref 10–40)
ANION GAP SERPL CALCULATED.3IONS-SCNC: 11 MMOL/L (ref 3–16)
ANION GAP SERPL CALCULATED.3IONS-SCNC: 13 MMOL/L (ref 3–16)
AST SERPL-CCNC: 18 U/L (ref 15–37)
BASOPHILS ABSOLUTE: 0 K/UL (ref 0–0.2)
BASOPHILS ABSOLUTE: 0 K/UL (ref 0–0.2)
BASOPHILS RELATIVE PERCENT: 0.6 %
BASOPHILS RELATIVE PERCENT: 0.6 %
BILIRUB SERPL-MCNC: 0.3 MG/DL (ref 0–1)
BUN BLDV-MCNC: 26 MG/DL (ref 7–20)
BUN BLDV-MCNC: 28 MG/DL (ref 7–20)
C-REACTIVE PROTEIN: 48.2 MG/L (ref 0–5.1)
CALCIUM SERPL-MCNC: 10 MG/DL (ref 8.3–10.6)
CALCIUM SERPL-MCNC: 10.2 MG/DL (ref 8.3–10.6)
CHLORIDE BLD-SCNC: 101 MMOL/L (ref 99–110)
CHLORIDE BLD-SCNC: 102 MMOL/L (ref 99–110)
CO2: 28 MMOL/L (ref 21–32)
CO2: 28 MMOL/L (ref 21–32)
CREAT SERPL-MCNC: 1.4 MG/DL (ref 0.8–1.3)
CREAT SERPL-MCNC: 1.5 MG/DL (ref 0.8–1.3)
EKG ATRIAL RATE: 69 BPM
EKG DIAGNOSIS: NORMAL
EKG P-R INTERVAL: 142 MS
EKG Q-T INTERVAL: 476 MS
EKG QRS DURATION: 182 MS
EKG QTC CALCULATION (BAZETT): 510 MS
EKG R AXIS: -50 DEGREES
EKG T AXIS: 91 DEGREES
EKG VENTRICULAR RATE: 69 BPM
EOSINOPHILS ABSOLUTE: 0 K/UL (ref 0–0.6)
EOSINOPHILS ABSOLUTE: 0.1 K/UL (ref 0–0.6)
EOSINOPHILS RELATIVE PERCENT: 0.4 %
EOSINOPHILS RELATIVE PERCENT: 1.5 %
ESTIMATED AVERAGE GLUCOSE: 131.2 MG/DL
GFR SERPL CREATININE-BSD FRML MDRD: 49 ML/MIN/{1.73_M2}
GFR SERPL CREATININE-BSD FRML MDRD: 54 ML/MIN/{1.73_M2}
GLUCOSE BLD-MCNC: 116 MG/DL (ref 70–99)
GLUCOSE BLD-MCNC: 127 MG/DL (ref 70–99)
GLUCOSE BLD-MCNC: 156 MG/DL (ref 70–99)
GLUCOSE BLD-MCNC: 171 MG/DL (ref 70–99)
GLUCOSE BLD-MCNC: 180 MG/DL (ref 70–99)
GLUCOSE BLD-MCNC: 185 MG/DL (ref 70–99)
GLUCOSE BLD-MCNC: 188 MG/DL (ref 70–99)
GLUCOSE BLD-MCNC: 218 MG/DL (ref 70–99)
HBA1C MFR BLD: 6.2 %
HCT VFR BLD CALC: 38 % (ref 40.5–52.5)
HCT VFR BLD CALC: 38.6 % (ref 40.5–52.5)
HEMOGLOBIN: 12.5 G/DL (ref 13.5–17.5)
HEMOGLOBIN: 12.6 G/DL (ref 13.5–17.5)
INR BLD: 2.21 (ref 0.87–1.14)
INR BLD: 2.22 (ref 0.87–1.14)
L. PNEUMOPHILA SEROGP 1 UR AG: NORMAL
LACTIC ACID, SEPSIS: 1.3 MMOL/L (ref 0.4–1.9)
LYMPHOCYTES ABSOLUTE: 0.7 K/UL (ref 1–5.1)
LYMPHOCYTES ABSOLUTE: 1.3 K/UL (ref 1–5.1)
LYMPHOCYTES RELATIVE PERCENT: 16.9 %
LYMPHOCYTES RELATIVE PERCENT: 9.7 %
MCH RBC QN AUTO: 30.3 PG (ref 26–34)
MCH RBC QN AUTO: 30.6 PG (ref 26–34)
MCHC RBC AUTO-ENTMCNC: 32.7 G/DL (ref 31–36)
MCHC RBC AUTO-ENTMCNC: 32.7 G/DL (ref 31–36)
MCV RBC AUTO: 92.6 FL (ref 80–100)
MCV RBC AUTO: 93.5 FL (ref 80–100)
MONOCYTES ABSOLUTE: 0.2 K/UL (ref 0–1.3)
MONOCYTES ABSOLUTE: 0.8 K/UL (ref 0–1.3)
MONOCYTES RELATIVE PERCENT: 10.4 %
MONOCYTES RELATIVE PERCENT: 2.2 %
NEUTROPHILS ABSOLUTE: 5.5 K/UL (ref 1.7–7.7)
NEUTROPHILS ABSOLUTE: 5.9 K/UL (ref 1.7–7.7)
NEUTROPHILS RELATIVE PERCENT: 70.6 %
NEUTROPHILS RELATIVE PERCENT: 87.1 %
PDW BLD-RTO: 14.3 % (ref 12.4–15.4)
PDW BLD-RTO: 14.3 % (ref 12.4–15.4)
PERFORMED ON: ABNORMAL
PHOSPHORUS: 4.7 MG/DL (ref 2.5–4.9)
PLATELET # BLD: 201 K/UL (ref 135–450)
PLATELET # BLD: 222 K/UL (ref 135–450)
PMV BLD AUTO: 8.4 FL (ref 5–10.5)
PMV BLD AUTO: 8.5 FL (ref 5–10.5)
POTASSIUM SERPL-SCNC: 4 MMOL/L (ref 3.5–5.1)
POTASSIUM SERPL-SCNC: 4.2 MMOL/L (ref 3.5–5.1)
PRO-BNP: 782 PG/ML (ref 0–124)
PROCALCITONIN: 0.07 NG/ML (ref 0–0.15)
PROTHROMBIN TIME: 24.6 SEC (ref 11.7–14.5)
PROTHROMBIN TIME: 24.6 SEC (ref 11.7–14.5)
RAPID INFLUENZA  B AGN: NEGATIVE
RAPID INFLUENZA A AGN: NEGATIVE
RBC # BLD: 4.07 M/UL (ref 4.2–5.9)
RBC # BLD: 4.17 M/UL (ref 4.2–5.9)
SEDIMENTATION RATE, ERYTHROCYTE: 69 MM/HR (ref 0–20)
SODIUM BLD-SCNC: 141 MMOL/L (ref 136–145)
SODIUM BLD-SCNC: 142 MMOL/L (ref 136–145)
STREP PNEUMONIAE ANTIGEN, URINE: NORMAL
T3 TOTAL: 0.99 NG/ML (ref 0.8–2)
T4 FREE: 1.2 NG/DL (ref 0.9–1.8)
TOTAL PROTEIN: 7.2 G/DL (ref 6.4–8.2)
TROPONIN: <0.01 NG/ML
TSH REFLEX: 0.12 UIU/ML (ref 0.27–4.2)
URIC ACID, SERUM: 5.3 MG/DL (ref 3.5–7.2)
WBC # BLD: 6.8 K/UL (ref 4–11)
WBC # BLD: 7.7 K/UL (ref 4–11)

## 2022-10-30 PROCEDURE — 6370000000 HC RX 637 (ALT 250 FOR IP): Performed by: STUDENT IN AN ORGANIZED HEALTH CARE EDUCATION/TRAINING PROGRAM

## 2022-10-30 PROCEDURE — 71045 X-RAY EXAM CHEST 1 VIEW: CPT

## 2022-10-30 PROCEDURE — 83880 ASSAY OF NATRIURETIC PEPTIDE: CPT

## 2022-10-30 PROCEDURE — 84145 PROCALCITONIN (PCT): CPT

## 2022-10-30 PROCEDURE — 2700000000 HC OXYGEN THERAPY PER DAY

## 2022-10-30 PROCEDURE — 71250 CT THORAX DX C-: CPT

## 2022-10-30 PROCEDURE — 6360000002 HC RX W HCPCS: Performed by: STUDENT IN AN ORGANIZED HEALTH CARE EDUCATION/TRAINING PROGRAM

## 2022-10-30 PROCEDURE — 84439 ASSAY OF FREE THYROXINE: CPT

## 2022-10-30 PROCEDURE — 84480 ASSAY TRIIODOTHYRONINE (T3): CPT

## 2022-10-30 PROCEDURE — 36415 COLL VENOUS BLD VENIPUNCTURE: CPT

## 2022-10-30 PROCEDURE — U0005 INFEC AGEN DETEC AMPLI PROBE: HCPCS

## 2022-10-30 PROCEDURE — 87449 NOS EACH ORGANISM AG IA: CPT

## 2022-10-30 PROCEDURE — 87631 RESP VIRUS 3-5 TARGETS: CPT

## 2022-10-30 PROCEDURE — 6370000000 HC RX 637 (ALT 250 FOR IP): Performed by: NURSE PRACTITIONER

## 2022-10-30 PROCEDURE — 2580000003 HC RX 258: Performed by: STUDENT IN AN ORGANIZED HEALTH CARE EDUCATION/TRAINING PROGRAM

## 2022-10-30 PROCEDURE — 87804 INFLUENZA ASSAY W/OPTIC: CPT

## 2022-10-30 PROCEDURE — 1200000000 HC SEMI PRIVATE

## 2022-10-30 PROCEDURE — 86140 C-REACTIVE PROTEIN: CPT

## 2022-10-30 PROCEDURE — 85025 COMPLETE CBC W/AUTO DIFF WBC: CPT

## 2022-10-30 PROCEDURE — 80053 COMPREHEN METABOLIC PANEL: CPT

## 2022-10-30 PROCEDURE — 85610 PROTHROMBIN TIME: CPT

## 2022-10-30 PROCEDURE — 94640 AIRWAY INHALATION TREATMENT: CPT

## 2022-10-30 PROCEDURE — 6360000002 HC RX W HCPCS: Performed by: NURSE PRACTITIONER

## 2022-10-30 PROCEDURE — 2580000003 HC RX 258: Performed by: EMERGENCY MEDICINE

## 2022-10-30 PROCEDURE — 84484 ASSAY OF TROPONIN QUANT: CPT

## 2022-10-30 PROCEDURE — 84550 ASSAY OF BLOOD/URIC ACID: CPT

## 2022-10-30 PROCEDURE — 87581 M.PNEUMON DNA AMP PROBE: CPT

## 2022-10-30 PROCEDURE — 87040 BLOOD CULTURE FOR BACTERIA: CPT

## 2022-10-30 PROCEDURE — 93010 ELECTROCARDIOGRAM REPORT: CPT | Performed by: INTERNAL MEDICINE

## 2022-10-30 PROCEDURE — 6360000002 HC RX W HCPCS: Performed by: EMERGENCY MEDICINE

## 2022-10-30 PROCEDURE — 83605 ASSAY OF LACTIC ACID: CPT

## 2022-10-30 PROCEDURE — 85652 RBC SED RATE AUTOMATED: CPT

## 2022-10-30 PROCEDURE — U0003 INFECTIOUS AGENT DETECTION BY NUCLEIC ACID (DNA OR RNA); SEVERE ACUTE RESPIRATORY SYNDROME CORONAVIRUS 2 (SARS-COV-2) (CORONAVIRUS DISEASE [COVID-19]), AMPLIFIED PROBE TECHNIQUE, MAKING USE OF HIGH THROUGHPUT TECHNOLOGIES AS DESCRIBED BY CMS-2020-01-R: HCPCS

## 2022-10-30 PROCEDURE — 84443 ASSAY THYROID STIM HORMONE: CPT

## 2022-10-30 PROCEDURE — 94761 N-INVAS EAR/PLS OXIMETRY MLT: CPT

## 2022-10-30 PROCEDURE — 96374 THER/PROPH/DIAG INJ IV PUSH: CPT

## 2022-10-30 PROCEDURE — 83036 HEMOGLOBIN GLYCOSYLATED A1C: CPT

## 2022-10-30 RX ORDER — ONDANSETRON 2 MG/ML
4 INJECTION INTRAMUSCULAR; INTRAVENOUS EVERY 6 HOURS PRN
Status: DISCONTINUED | OUTPATIENT
Start: 2022-10-30 | End: 2022-11-03 | Stop reason: HOSPADM

## 2022-10-30 RX ORDER — METHYLPREDNISOLONE SODIUM SUCCINATE 125 MG/2ML
125 INJECTION, POWDER, LYOPHILIZED, FOR SOLUTION INTRAMUSCULAR; INTRAVENOUS ONCE
Status: COMPLETED | OUTPATIENT
Start: 2022-10-30 | End: 2022-10-30

## 2022-10-30 RX ORDER — ACETAMINOPHEN 325 MG/1
650 TABLET ORAL EVERY 6 HOURS PRN
Status: DISCONTINUED | OUTPATIENT
Start: 2022-10-30 | End: 2022-11-03 | Stop reason: HOSPADM

## 2022-10-30 RX ORDER — WARFARIN SODIUM 5 MG/1
10 TABLET ORAL DAILY
Status: DISCONTINUED | OUTPATIENT
Start: 2022-10-30 | End: 2022-10-30

## 2022-10-30 RX ORDER — AZITHROMYCIN 500 MG/1
500 INJECTION, POWDER, LYOPHILIZED, FOR SOLUTION INTRAVENOUS ONCE
Status: DISCONTINUED | OUTPATIENT
Start: 2022-10-30 | End: 2022-10-30 | Stop reason: CLARIF

## 2022-10-30 RX ORDER — POTASSIUM CHLORIDE 7.45 MG/ML
10 INJECTION INTRAVENOUS PRN
Status: DISCONTINUED | OUTPATIENT
Start: 2022-10-30 | End: 2022-11-03 | Stop reason: HOSPADM

## 2022-10-30 RX ORDER — GEMFIBROZIL 600 MG/1
600 TABLET, FILM COATED ORAL
Status: DISCONTINUED | OUTPATIENT
Start: 2022-10-30 | End: 2022-11-03 | Stop reason: HOSPADM

## 2022-10-30 RX ORDER — ASPIRIN 81 MG/1
81 TABLET, CHEWABLE ORAL DAILY
Status: DISCONTINUED | OUTPATIENT
Start: 2022-10-30 | End: 2022-11-03 | Stop reason: HOSPADM

## 2022-10-30 RX ORDER — ATORVASTATIN CALCIUM 40 MG/1
40 TABLET, FILM COATED ORAL NIGHTLY
Status: DISCONTINUED | OUTPATIENT
Start: 2022-10-30 | End: 2022-11-03 | Stop reason: HOSPADM

## 2022-10-30 RX ORDER — WARFARIN SODIUM 5 MG/1
5 TABLET ORAL
Status: DISCONTINUED | OUTPATIENT
Start: 2022-10-31 | End: 2022-10-30

## 2022-10-30 RX ORDER — INSULIN LISPRO 100 [IU]/ML
0-4 INJECTION, SOLUTION INTRAVENOUS; SUBCUTANEOUS NIGHTLY
Status: DISCONTINUED | OUTPATIENT
Start: 2022-10-30 | End: 2022-11-03 | Stop reason: HOSPADM

## 2022-10-30 RX ORDER — SODIUM CHLORIDE 0.9 % (FLUSH) 0.9 %
5-40 SYRINGE (ML) INJECTION EVERY 12 HOURS SCHEDULED
Status: DISCONTINUED | OUTPATIENT
Start: 2022-10-30 | End: 2022-11-03 | Stop reason: HOSPADM

## 2022-10-30 RX ORDER — WARFARIN SODIUM 7.5 MG/1
15 TABLET ORAL
Status: DISCONTINUED | OUTPATIENT
Start: 2022-11-02 | End: 2022-10-31 | Stop reason: CLARIF

## 2022-10-30 RX ORDER — FUROSEMIDE 10 MG/ML
20 INJECTION INTRAMUSCULAR; INTRAVENOUS 2 TIMES DAILY
Status: DISCONTINUED | OUTPATIENT
Start: 2022-10-30 | End: 2022-10-31

## 2022-10-30 RX ORDER — IPRATROPIUM BROMIDE AND ALBUTEROL SULFATE 2.5; .5 MG/3ML; MG/3ML
1 SOLUTION RESPIRATORY (INHALATION) ONCE
Status: COMPLETED | OUTPATIENT
Start: 2022-10-30 | End: 2022-10-30

## 2022-10-30 RX ORDER — IPRATROPIUM BROMIDE AND ALBUTEROL SULFATE 2.5; .5 MG/3ML; MG/3ML
1 SOLUTION RESPIRATORY (INHALATION) 3 TIMES DAILY
Status: DISCONTINUED | OUTPATIENT
Start: 2022-10-30 | End: 2022-11-03 | Stop reason: HOSPADM

## 2022-10-30 RX ORDER — ONDANSETRON 4 MG/1
4 TABLET, ORALLY DISINTEGRATING ORAL EVERY 8 HOURS PRN
Status: DISCONTINUED | OUTPATIENT
Start: 2022-10-30 | End: 2022-11-03 | Stop reason: HOSPADM

## 2022-10-30 RX ORDER — VITAMIN B COMPLEX
1000 TABLET ORAL DAILY
Status: DISCONTINUED | OUTPATIENT
Start: 2022-10-30 | End: 2022-11-03 | Stop reason: HOSPADM

## 2022-10-30 RX ORDER — AMLODIPINE BESYLATE 5 MG/1
10 TABLET ORAL DAILY
Status: DISCONTINUED | OUTPATIENT
Start: 2022-10-30 | End: 2022-11-02

## 2022-10-30 RX ORDER — FUROSEMIDE 10 MG/ML
40 INJECTION INTRAMUSCULAR; INTRAVENOUS ONCE
Status: COMPLETED | OUTPATIENT
Start: 2022-10-30 | End: 2022-10-30

## 2022-10-30 RX ORDER — SODIUM CHLORIDE 9 MG/ML
INJECTION, SOLUTION INTRAVENOUS PRN
Status: DISCONTINUED | OUTPATIENT
Start: 2022-10-30 | End: 2022-11-03 | Stop reason: HOSPADM

## 2022-10-30 RX ORDER — INSULIN LISPRO 100 [IU]/ML
0-8 INJECTION, SOLUTION INTRAVENOUS; SUBCUTANEOUS
Status: DISCONTINUED | OUTPATIENT
Start: 2022-10-30 | End: 2022-11-03 | Stop reason: HOSPADM

## 2022-10-30 RX ORDER — SODIUM CHLORIDE 0.9 % (FLUSH) 0.9 %
5-40 SYRINGE (ML) INJECTION PRN
Status: DISCONTINUED | OUTPATIENT
Start: 2022-10-30 | End: 2022-11-03 | Stop reason: HOSPADM

## 2022-10-30 RX ORDER — WARFARIN SODIUM 5 MG/1
10 TABLET ORAL
Status: DISCONTINUED | OUTPATIENT
Start: 2022-10-30 | End: 2022-10-31 | Stop reason: CLARIF

## 2022-10-30 RX ORDER — IPRATROPIUM BROMIDE AND ALBUTEROL SULFATE 2.5; .5 MG/3ML; MG/3ML
1 SOLUTION RESPIRATORY (INHALATION) EVERY 4 HOURS PRN
Status: DISCONTINUED | OUTPATIENT
Start: 2022-10-30 | End: 2022-11-03 | Stop reason: HOSPADM

## 2022-10-30 RX ORDER — MAGNESIUM SULFATE IN WATER 40 MG/ML
2000 INJECTION, SOLUTION INTRAVENOUS PRN
Status: DISCONTINUED | OUTPATIENT
Start: 2022-10-30 | End: 2022-11-03 | Stop reason: HOSPADM

## 2022-10-30 RX ORDER — IPRATROPIUM BROMIDE AND ALBUTEROL SULFATE 2.5; .5 MG/3ML; MG/3ML
1 SOLUTION RESPIRATORY (INHALATION)
Status: DISCONTINUED | OUTPATIENT
Start: 2022-10-30 | End: 2022-10-30

## 2022-10-30 RX ORDER — FUROSEMIDE 10 MG/ML
40 INJECTION INTRAMUSCULAR; INTRAVENOUS 2 TIMES DAILY
Status: DISCONTINUED | OUTPATIENT
Start: 2022-10-30 | End: 2022-10-30

## 2022-10-30 RX ORDER — POLYETHYLENE GLYCOL 3350 17 G/17G
17 POWDER, FOR SOLUTION ORAL DAILY PRN
Status: DISCONTINUED | OUTPATIENT
Start: 2022-10-30 | End: 2022-11-03 | Stop reason: HOSPADM

## 2022-10-30 RX ORDER — METHYLPREDNISOLONE SODIUM SUCCINATE 125 MG/2ML
125 INJECTION, POWDER, LYOPHILIZED, FOR SOLUTION INTRAMUSCULAR; INTRAVENOUS DAILY
Status: DISCONTINUED | OUTPATIENT
Start: 2022-10-30 | End: 2022-10-30

## 2022-10-30 RX ORDER — ALBUTEROL SULFATE 2.5 MG/3ML
5 SOLUTION RESPIRATORY (INHALATION) ONCE
Status: COMPLETED | OUTPATIENT
Start: 2022-10-30 | End: 2022-10-30

## 2022-10-30 RX ORDER — ALLOPURINOL 300 MG/1
300 TABLET ORAL DAILY
Status: DISCONTINUED | OUTPATIENT
Start: 2022-10-30 | End: 2022-11-03 | Stop reason: HOSPADM

## 2022-10-30 RX ORDER — DEXTROSE MONOHYDRATE 100 MG/ML
INJECTION, SOLUTION INTRAVENOUS CONTINUOUS PRN
Status: DISCONTINUED | OUTPATIENT
Start: 2022-10-30 | End: 2022-11-03 | Stop reason: HOSPADM

## 2022-10-30 RX ORDER — IPRATROPIUM BROMIDE AND ALBUTEROL SULFATE 2.5; .5 MG/3ML; MG/3ML
1 SOLUTION RESPIRATORY (INHALATION) EVERY 4 HOURS
Status: DISCONTINUED | OUTPATIENT
Start: 2022-10-30 | End: 2022-10-30

## 2022-10-30 RX ORDER — SOTALOL HYDROCHLORIDE 80 MG/1
80 TABLET ORAL 2 TIMES DAILY
Status: DISCONTINUED | OUTPATIENT
Start: 2022-10-30 | End: 2022-11-03 | Stop reason: HOSPADM

## 2022-10-30 RX ORDER — POTASSIUM CHLORIDE 20 MEQ/1
40 TABLET, EXTENDED RELEASE ORAL PRN
Status: DISCONTINUED | OUTPATIENT
Start: 2022-10-30 | End: 2022-11-03 | Stop reason: HOSPADM

## 2022-10-30 RX ORDER — ACETAMINOPHEN 650 MG/1
650 SUPPOSITORY RECTAL EVERY 6 HOURS PRN
Status: DISCONTINUED | OUTPATIENT
Start: 2022-10-30 | End: 2022-11-03 | Stop reason: HOSPADM

## 2022-10-30 RX ADMIN — ATORVASTATIN CALCIUM 40 MG: 40 TABLET, FILM COATED ORAL at 21:34

## 2022-10-30 RX ADMIN — GEMFIBROZIL 600 MG: 600 TABLET ORAL at 16:54

## 2022-10-30 RX ADMIN — INSULIN LISPRO 2 UNITS: 100 INJECTION, SOLUTION INTRAVENOUS; SUBCUTANEOUS at 11:34

## 2022-10-30 RX ADMIN — Medication 1000 UNITS: at 09:01

## 2022-10-30 RX ADMIN — Medication 10 ML: at 09:03

## 2022-10-30 RX ADMIN — METHYLPREDNISOLONE SODIUM SUCCINATE 125 MG: 125 INJECTION, POWDER, FOR SOLUTION INTRAMUSCULAR; INTRAVENOUS at 00:56

## 2022-10-30 RX ADMIN — AZITHROMYCIN 500 MG: 500 INJECTION, POWDER, LYOPHILIZED, FOR SOLUTION INTRAVENOUS at 02:28

## 2022-10-30 RX ADMIN — SACUBITRIL AND VALSARTAN 1 TABLET: 49; 51 TABLET, FILM COATED ORAL at 21:34

## 2022-10-30 RX ADMIN — IPRATROPIUM BROMIDE AND ALBUTEROL SULFATE 1 AMPULE: 2.5; .5 SOLUTION RESPIRATORY (INHALATION) at 01:50

## 2022-10-30 RX ADMIN — IPRATROPIUM BROMIDE AND ALBUTEROL SULFATE 1 AMPULE: 2.5; .5 SOLUTION RESPIRATORY (INHALATION) at 15:49

## 2022-10-30 RX ADMIN — IPRATROPIUM BROMIDE AND ALBUTEROL SULFATE 1 AMPULE: 2.5; .5 SOLUTION RESPIRATORY (INHALATION) at 20:37

## 2022-10-30 RX ADMIN — SOTALOL HYDROCHLORIDE 80 MG: 80 TABLET ORAL at 21:34

## 2022-10-30 RX ADMIN — FUROSEMIDE 40 MG: 10 INJECTION, SOLUTION INTRAMUSCULAR; INTRAVENOUS at 01:35

## 2022-10-30 RX ADMIN — ALLOPURINOL 300 MG: 300 TABLET ORAL at 09:02

## 2022-10-30 RX ADMIN — CEFTRIAXONE SODIUM 1000 MG: 1 INJECTION, POWDER, FOR SOLUTION INTRAMUSCULAR; INTRAVENOUS at 01:41

## 2022-10-30 RX ADMIN — AMLODIPINE BESYLATE 10 MG: 5 TABLET ORAL at 09:00

## 2022-10-30 RX ADMIN — SOTALOL HYDROCHLORIDE 80 MG: 80 TABLET ORAL at 09:02

## 2022-10-30 RX ADMIN — ASPIRIN 81 MG 81 MG: 81 TABLET ORAL at 09:01

## 2022-10-30 RX ADMIN — GEMFIBROZIL 600 MG: 600 TABLET ORAL at 09:02

## 2022-10-30 RX ADMIN — WARFARIN SODIUM 10 MG: 5 TABLET ORAL at 17:52

## 2022-10-30 RX ADMIN — IPRATROPIUM BROMIDE AND ALBUTEROL SULFATE 1 AMPULE: 2.5; .5 SOLUTION RESPIRATORY (INHALATION) at 08:13

## 2022-10-30 RX ADMIN — FUROSEMIDE 20 MG: 10 INJECTION, SOLUTION INTRAMUSCULAR; INTRAVENOUS at 16:55

## 2022-10-30 RX ADMIN — ALBUTEROL SULFATE 5 MG: 2.5 SOLUTION RESPIRATORY (INHALATION) at 01:50

## 2022-10-30 RX ADMIN — SACUBITRIL AND VALSARTAN 1 TABLET: 49; 51 TABLET, FILM COATED ORAL at 09:00

## 2022-10-30 RX ADMIN — Medication 10 ML: at 21:35

## 2022-10-30 RX ADMIN — FUROSEMIDE 20 MG: 10 INJECTION, SOLUTION INTRAMUSCULAR; INTRAVENOUS at 09:03

## 2022-10-30 ASSESSMENT — ENCOUNTER SYMPTOMS
COUGH: 1
NAUSEA: 0
CHEST TIGHTNESS: 1
ABDOMINAL PAIN: 0
SHORTNESS OF BREATH: 1
VOMITING: 0
DIARRHEA: 0

## 2022-10-30 ASSESSMENT — PAIN - FUNCTIONAL ASSESSMENT: PAIN_FUNCTIONAL_ASSESSMENT: NONE - DENIES PAIN

## 2022-10-30 NOTE — ED PROVIDER NOTES
[address] [facility]        Pt Name: Liliam Márquez  MRN: [id number]  [place] 1950  Date of evaluation: 10/29/2022  Provider: MEENU Hale - DANIELA  PCP: Lidia Doe MD  Note Started: 12:29 AM EDT        I have seen and evaluated this patient with my supervising physician [address]       Chief Complaint   Patient presents with    Shortness of Breath     For a couple of days; coughing up phelm. Denies n/V/D.       HISTORY OF PRESENT ILLNESS   (Location, Timing/Onset, Context/Setting, Quality, Duration, Modifying Factors, Severity, Associated Signs and Symptoms)  Note limiting factors. Chief Complaint: sob with cough     Liliam Márquez is a 71 y.o. male who presents to the emergency department with complaint of productive cough and shortness of breath. The patient reports that he is having a CHF exacerbation, does follow closely with the heart failure clinic, does not weigh himself daily. States that he has been experiencing a productive cough, coughing up white phlegm over the past 4 days. No fever. Reports increased lower leg swelling. Denies chest pain. Former cigarette smoker. He is hypoxic upon arrival to ER per private car. He does not wear supplemental oxygen. Denies any headache, fever, lightheadedness, dizziness, visual disturbances. No neck or back pain. No abdominal pain, nausea, vomiting, diarrhea, constipation, or dysuria. No rash. Nursing Notes were all reviewed and agreed with or any disagreements were addressed in the HPI. REVIEW OF SYSTEMS    (2-9 systems for level 4, 10 or more for level 5)     Review of Systems   Constitutional:  Negative for activity change, chills and fever. Respiratory:  Positive for cough, chest tightness and shortness of breath. Cardiovascular:  Positive for leg swelling. Negative for chest pain.    Gastrointestinal:  Negative for abdominal pain, diarrhea, nausea and vomiting. Genitourinary:  Negative for dysuria. All other systems reviewed and are negative. Positives and Pertinent negatives as per HPI. Except as noted above in the ROS, all other systems were reviewed and negative. PAST MEDICAL HISTORY     Past Medical History:   Diagnosis Date    Acute congestive heart failure ([facility] 12/30/2019    Allergic rhinitis 7/11/2016    Atrial fibrillation ([facility]     under care of cardiology:Dr. Signa Pi Behrens([facility])    CKD (chronic kidney disease)     Nephro:Dr. Parker:stage 3    Class 2 obesity due to excess calories without serious comorbidity with body mass index (BMI) of 37.0 to 37.9 in adult 11/7/2017    Controlled type 2 diabetes mellitus without complication, without long-term current use of insulin [facility] 2/24/2017    COPD     Essential tremor     Gout     Hyperlipidemia, mixed 07/11/2016    Hypertension     under care of cardiology:Dr. Signa Pi Behrens([facility])    Long term current use of anticoagulants with INR goal of 2.0-3.0     under care of cardiology:Dr. Scott Behrens([facility])    Obstructive sleep apnea syndrome 06/18/2019    per pulmo    Primary insomnia 1/25/2017    Primary osteoarthritis of both knees 9/14/2021    Sleep apnea     uses bipap         SURGICAL HISTORY     Past Surgical History:   Procedure Laterality Date    AORTIC VALVE REPLACEMENT  10/1996:[facility]    x3    ATRIAL ABLATION SURGERY  03/23/2020    CTI dependent atrial flutter ablation (Dr. Jeovanny Bullock)    [address]  03/23/2020    BIV upgrade    KNEE ARTHROCENTESIS Right 4/6/2022    RIGHT KNEE GENICULAR NERVE BLOCK WITH INTRA ARTICULAR INJECTION SITE CONFIRMED BY FLUOROSCOPY performed by Heidy Reynolds MD at [address] [place] [address] ARTHROSCOPY Right 12/27/2021    RIGHT KNEE DIAGNOSTIC ARTHROSCOPY WITH SUBCHONDROPLASTY TO MEDIAL FEMORAL CONDYLE AND TIBIAL PLATEAU, LEFT KNEE INJECTION.  performed by Heidy Reynolds MD at [address] 04/06/2022    RIGHT KNEE GENICULAR NERVE BLOCK WITH INTRA ARTICULAR INJECTION SITE CONFIRMED BY FLUOROSCOPY    PACEMAKER INSERTION  1996         CURRENTMEDICATIONS       Previous Medications    ACCU-CHEK COMPACT PLUS STRIP    USE TO TEST 2 TIMES DAILY    ALLOPURINOL (ZYLOPRIM) 300 MG TABLET    TAKE 1 TABLET EVERY DAY    AMLODIPINE (NORVASC) 10 MG TABLET    TAKE 1 TABLET EVERY DAY    ASPIRIN 81 MG CHEWABLE TABLET    Take 81 mg by mouth daily. BLOOD GLUCOSE MONITOR STRIPS    Test 2 times a day & as needed for symptoms of irregular blood glucose. Dispense sufficient amount for indicated testing frequency plus additional to accommodate PRN testing needs. Ok to dispense what is covered by insurance    FUROSEMIDE (LASIX) 20 MG TABLET    Take 1 tablet by mouth daily Take a extra 0.5 tablet for a weight gain of 3 pounds or more    GLUCOSE MONITORING KIT (FREESTYLE) MONITORING KIT    Ok to dispense what is covered by insurance. LANCETS MISC    BID testing    METFORMIN (GLUCOPHAGE-XR) 500 MG EXTENDED RELEASE TABLET    TAKE 4 TABLETS EVERY DAY WITH BREAKFAST    PRAVASTATIN (PRAVACHOL) 80 MG TABLET    TAKE 1 TABLET EVERY DAY FOR CHOLESTEROL    SACUBITRIL-VALSARTAN (ENTRESTO) 49-51 MG PER TABLET    Take 1 tablet by mouth 2 times daily    SOTALOL (BETAPACE) 80 MG TABLET    Take 80 mg by mouth 2 times daily. VITAMIN D3 (CHOLECALCIFEROL) 25 MCG (1000 UT) TABS TABLET    Take 1 tablet by mouth daily    WARFARIN (COUMADIN) 10 MG TABLET    Take 1.5 tablets by mouth Twice a Week AND 1 tablet Five times weekly. TAKE 1 TABLET DAILY EXCEPT TAKE 1 AND 1/2 TABLETS (15 MG) ON WEDNESDAY AND FRIDAY. ALLERGIES     Patient has no known allergies.     FAMILYHISTORY       Family History   Problem Relation Age of Onset    Cancer Mother         Gallbladder    Asthma Father     Emphysema Father     No Known Problems Sister     No Known Problems Brother     No Known Problems Maternal Grandmother     No Known Problems Maternal Grandfather     No Known Problems Paternal Grandmother     No Known Problems Paternal Grandfather           SOCIAL HISTORY       Social History     Tobacco Use    Smoking status: Former     Packs/day: 1.00     Years: 30.00     Pack years: 30.00     Types: Cigarettes     Quit date: 6/24/2019     Years since quitting: 3.3    Smokeless tobacco: Never    Tobacco comments:         Vaping Use    Vaping Use: Never used   Substance Use Topics    Alcohol use: Yes     Comment: 3-4 beers per year    Drug use: No       SCREENINGS             PHYSICAL EXAM    (up to 7 for level 4, 8 or more for level 5)     ED Triage Vitals [10/29/22 2355]   BP Temp Temp Source Heart Rate Resp SpO2 Height Weight   133/69 98.6 °F (37 °C) Oral 69 24 (!) 88 % -- --       Physical Exam  Vitals and nursing note reviewed. Constitutional:       Appearance: He is well-developed. He is not diaphoretic. HENT:      Head: Normocephalic and atraumatic. Right Ear: External ear normal.      Left Ear: External ear normal.   Eyes:      General:         Right eye: No discharge. Left eye: No discharge. Neck:      Vascular: No JVD. Cardiovascular:      Rate and Rhythm: Normal rate. Pulses: Normal pulses. Pulmonary:      Effort: Pulmonary effort is normal. No respiratory distress. Breath sounds: Wheezing present. Abdominal:      Palpations: Abdomen is soft. Tenderness: There is no abdominal tenderness. Musculoskeletal:         General: Normal range of motion. Skin:     General: Skin is warm and dry. Coloration: Skin is not pale. Neurological:      Mental Status: He is alert.    Psychiatric:         Behavior: Behavior normal.       DIAGNOSTIC RESULTS   LABS:    Labs Reviewed   CBC WITH AUTO DIFFERENTIAL - Abnormal; Notable for the following components:       Result Value    RBC 4.07 (*)     Hemoglobin 12.5 (*)     Hematocrit 38.0 (*)     All other components within normal limits   COMPREHENSIVE METABOLIC PANEL - Abnormal; Notable for the following components:    Glucose 127 (*)     BUN 26 (*)     Creatinine 1.4 (*)     Est, Glom Filt Rate 54 (*)     All other components within normal limits   BRAIN NATRIURETIC PEPTIDE - Abnormal; Notable for the following components:    Pro- (*)     All other components within normal limits   PROTIME-INR - Abnormal; Notable for the following components:    Protime 24.6 (*)     INR 2.21 (*)     All other components within normal limits   POCT GLUCOSE - Abnormal; Notable for the following components:    POC Glucose 116 (*)     All other components within normal limits   RAPID INFLUENZA A/B ANTIGENS   CULTURE, BLOOD 1   CULTURE, BLOOD 2   LEGIONELLA ANTIGEN, URINE   STREP PNEUMONIAE ANTIGEN   RESPIRATORY VIRUS MINI PANEL, MOLECULAR   LACTATE, SEPSIS   PROCALCITONIN   TROPONIN   LACTATE, SEPSIS   COVID-19   MYCOPLASMA PNEUMONIAE ANTIBODIES IGG & IGM SEROCONVERSION PANEL   RENAL FUNCTION PANEL   CBC WITH AUTO DIFFERENTIAL   BLOOD GAS, ARTERIAL   TSH WITH REFLEX   SEDIMENTATION RATE   C-REACTIVE PROTEIN   HEMOGLOBIN A1C   URIC ACID   POCT GLUCOSE   POCT GLUCOSE       When ordered only abnormal lab results are displayed. All other labs were within normal range or not returned as of this dictation. EKG: When ordered, EKG's are interpreted by the Emergency Department Physician in the absence of a cardiologist.  Please see their note for interpretation of EKG. RADIOLOGY:   Non-plain film images such as CT, Ultrasound and MRI are read by the radiologist. Plain radiographic images are visualized and preliminarily interpreted by the ED Provider with the below findings:        Interpretation per the Radiologist below, if available at the time of this note:    XR CHEST PORTABLE   Final Result   Bibasilar atelectasis or airspace disease. CT CHEST WO CONTRAST    (Results Pending)     No results found.         PROCEDURES   Unless otherwise noted below, none     Procedures    CRITICAL CARE TIME       CONSULTS:  IP CONSULT TO HOSPITALIST  IP CONSULT TO PHARMACY      EMERGENCY DEPARTMENT COURSE and DIFFERENTIAL DIAGNOSIS/MDM:   Vitals:    Vitals:    10/30/22 0145 10/30/22 0147 10/30/22 0150 10/30/22 0152   BP: 136/73      Pulse: 79  76 70   Resp: 17  20 13   Temp:       TempSrc:       SpO2: 95%  95% 99%   Weight:  287 lb (130.2 kg)     Height:  6' 1\" (1.854 m)         Patient was given the following medications:  Medications   cefTRIAXone (ROCEPHIN) 1,000 mg in dextrose 5 % 50 mL IVPB mini-bag (1,000 mg IntraVENous New Bag 10/30/22 0141)   azithromycin (ZITHROMAX) 500 mg in D5W 250ml Vial Mate (has no administration in time range)   glucose-vitamin C chewable tablet 4 tablet (has no administration in time range)   dextrose bolus 10% 125 mL (has no administration in time range)     Or   dextrose bolus 10% 250 mL (has no administration in time range)   glucagon (rDNA) injection 1 mg (has no administration in time range)   dextrose 10 % infusion (has no administration in time range)   insulin lispro (HUMALOG) injection vial 0-8 Units (has no administration in time range)   insulin lispro (HUMALOG) injection vial 0-4 Units (has no administration in time range)   perflutren lipid microspheres (DEFINITY) injection 1.65 mg (has no administration in time range)   allopurinol (ZYLOPRIM) tablet 300 mg (has no administration in time range)   amLODIPine (NORVASC) tablet 10 mg (has no administration in time range)   aspirin chewable tablet 81 mg (has no administration in time range)   sacubitril-valsartan (ENTRESTO) 49-51 MG per tablet 1 tablet (has no administration in time range)   sotalol (BETAPACE) tablet 80 mg (has no administration in time range)   vitamin D3 (CHOLECALCIFEROL) tablet 1,000 Units (has no administration in time range)   warfarin (COUMADIN) tablet 10 mg (has no administration in time range)   warfarin (COUMADIN) tablet 5 mg (has no administration in time range)   atorvastatin (LIPITOR) tablet 40 Measure due to:  2+ SIRS criteria are not met    Briefly, this is a 78-year-old male with history of hypertension, obesity, COPD, hyperlipidemia, cardiomyopathy, heart failure, pacemaker, CKD, atrial flutter that presents with complaint of productive cough and shortness of breath. He is hypoxic upon arrival.    Labs show an elevated BNP and INR, otherwise unremarkable. XR CHEST PORTABLE (Final result)  Result time 10/30/22 00:44:52  Final result by Ross Stewart MD (10/30/22 00:44:52)                Impression:    Bibasilar atelectasis or airspace disease. He will be admitted to hospitalist services for acute respiratory failure with hypoxia. Treated with abx for CAP and lasix for diuresis. FINAL IMPRESSION      1. Congestive heart failure, unspecified HF chronicity, unspecified heart failure type ([address]    2. Pneumonia due to infectious organism, unspecified laterality, unspecified part of lung    3. Dyspnea and respiratory abnormalities    4. Hypoxia          DISPOSITION/PLAN   DISPOSITION Admitted 10/30/2022 01:27:37 AM      PATIENT REFERRED TO:  No follow-up provider specified.     DISCHARGE MEDICATIONS:  New Prescriptions    No medications on file       DISCONTINUED MEDICATIONS:  Discontinued Medications    No medications on file              (Please note that portions of this note were completed with a voice recognition program.  Efforts were made to edit the dictations but occasionally words are mis-transcribed.)    MEENU Comer CNP (electronically signed)           MEENU Comer CNP  10/30/22 0153

## 2022-10-30 NOTE — H&P
Hospital Medicine History & Physical        Name:  Charlee Primrose  /Age/Sex: 1950  (71 y.o. male)  MRN & CSN:  [id number] & [id number]     PCP: Kary Moses MD    Date of Admission: 10/29/2022    Date of Service: Pt seen/examined on 10/30/2022    Patient Status:  Inpatient - Patient will most likely require more than 48 hours of treatment and requires intensive medical treatment/monitoring     Chief Complaint:    Chief Complaint   Patient presents with    Shortness of Breath     For a couple of days; coughing up phelm. Denies n/V/D. History Of Present Illness:     71 y.o. male with PMHx of HFrEF, atrial fibrillation, CKD stage III, COPD, hypertension, lipidemia, type 2 diabetes who presented to [facility] with complaints of shortness of breath. Patient states for the last 2 to 3 days he is had shortness of breath and some coughing. He has had similar episodes in the past, which were CHF exacerbations, and he states his current disposition is similar. He denies any chest pain, abdominal pain, nausea, vomiting, GI/ symptoms. Patient does states he is noticed increased edema in his lower extremities bilaterally up to his knees. He states his Lasix was cut in half recently and was started on Entresto. Patient on 4 L O2 via NC in the ED, denies home O2 use. Denies tobacco, alcohol, illicit drug use.     Past Medical History:          Diagnosis Date    Acute congestive heart failure ([address] 2019    Allergic rhinitis 2016    Atrial fibrillation ([address]     under care of cardiology:Dr. Charlette Malm Behrens([facility])    CKD (chronic kidney disease)     Nephro:Dr. Parker:stage 3    Class 2 obesity due to excess calories without serious comorbidity with body mass index (BMI) of 37.0 to 37.9 in adult 2017    Controlled type 2 diabetes mellitus without complication, without long-term current use of insulin ([address] 2017    COPD     Essential tremor     Gout     Hyperlipidemia, mixed 07/11/2016    Hypertension     under care of cardiology:Dr. Crayton Reap Behrens([facility])    Long term current use of anticoagulants with INR goal of 2.0-3.0     under care of cardiology:Dr. Scott Behrens([facility])    Obstructive sleep apnea syndrome 06/18/2019    per pulmo    Primary insomnia 1/25/2017    Primary osteoarthritis of both knees 9/14/2021    Sleep apnea     uses bipap       Past Surgical History:          Procedure Laterality Date    AORTIC VALVE REPLACEMENT  10/1996:[facility]    x3    ATRIAL ABLATION SURGERY  03/23/2020    CTI dependent atrial flutter ablation (Dr. Bhavaan Foreman)    [address]  03/23/2020    BIV upgrade    KNEE ARTHROCENTESIS Right 4/6/2022    RIGHT KNEE GENICULAR NERVE BLOCK WITH INTRA ARTICULAR INJECTION SITE CONFIRMED BY FLUOROSCOPY performed by Sruthi Leo MD at [address] [place] [address] ARTHROSCOPY Right 12/27/2021    RIGHT KNEE DIAGNOSTIC ARTHROSCOPY WITH SUBCHONDROPLASTY TO MEDIAL FEMORAL CONDYLE AND TIBIAL PLATEAU, LEFT KNEE INJECTION. performed by rSuthi Leo MD at [place] [address] Right 04/06/2022    RIGHT KNEE GENICULAR NERVE BLOCK WITH INTRA ARTICULAR INJECTION SITE CONFIRMED BY FLUOROSCOPY    PACEMAKER INSERTION  1996       Medications Prior to Admission:      Prior to Admission medications    Medication Sig Start Date End Date Taking?  Authorizing Provider   sacubitril-valsartan (ENTRESTO) 49-51 MG per tablet Take 1 tablet by mouth 2 times daily 10/6/22   Radha Garcia, APRN - CNS   amLODIPine (NORVASC) 10 MG tablet TAKE 1 TABLET EVERY DAY 10/2/22   Ronel Samuels APRN - CNP   furosemide (LASIX) 20 MG tablet Take 1 tablet by mouth daily Take a extra 0.5 tablet for a weight gain of 3 pounds or more 9/13/22   MEENU Griggs - CNS   vitamin D3 (CHOLECALCIFEROL) 25 MCG (1000 UT) TABS tablet Take 1 tablet by mouth daily 8/31/22   MEENU Griggs - CNS   metFORMIN (GLUCOPHAGE-XR) 500 MG extended release tablet TAKE 4 TABLETS EVERY DAY WITH BREAKFAST 7/14/22   Jaimee Hurtado MD   allopurinol (ZYLOPRIM) 300 MG tablet TAKE 1 TABLET EVERY DAY 4/19/22   Jaimee Hurtado MD   pravastatin (PRAVACHOL) 80 MG tablet TAKE 1 TABLET EVERY DAY FOR CHOLESTEROL 3/11/22   Jaimee Hurtado MD   warfarin (COUMADIN) 10 MG tablet Take 1.5 tablets by mouth Twice a Week AND 1 tablet Five times weekly. TAKE 1 TABLET DAILY EXCEPT TAKE 1 AND 1/2 TABLETS (15 MG) ON WEDNESDAY AND FRIDAY. 10/14/21 10/6/22  Rosie Regalado MD   glucose monitoring kit (FREESTYLE) monitoring kit Ok to dispense what is covered by insurance. 7/9/20   Aspen Cabrera MD   blood glucose monitor strips Test 2 times a day & as needed for symptoms of irregular blood glucose. Dispense sufficient amount for indicated testing frequency plus additional to accommodate PRN testing needs. Ok to dispense what is covered by insurance 7/4/20   Aspen Cabrera MD   ACCU-CHEK COMPACT PLUS strip USE TO TEST 2 TIMES DAILY 6/23/20   Aspen Cabrera MD   Lancets MISC BID testing 2/24/17   Aspen Cabrera MD   sotalol (BETAPACE) 80 MG tablet Take 80 mg by mouth 2 times daily. Historical Provider, MD   aspirin 81 MG chewable tablet Take 81 mg by mouth daily. Historical Provider, MD       Allergies:  Patient has no known allergies. Social History:      The patient currently lives at home    TOBACCO:   reports that he quit smoking about 3 years ago. His smoking use included cigarettes. He has a 30.00 pack-year smoking history. He has never used smokeless tobacco.  ETOH:   reports current alcohol use. E-cigarette/Vaping       Questions Responses    E-cigarette/Vaping Use Never User    Start Date     Passive Exposure     Quit Date     Counseling Given     Comments Unknown              Family History:      Reviewed and negative in regards to presenting illness/complaint.         Problem Relation Age of Onset    Cancer Mother         Gallbladder    Asthma Father     Emphysema Father     No Known Problems Sister     No Known Problems Brother     No Known Problems Maternal Grandmother     No Known Problems Maternal Grandfather     No Known Problems Paternal Grandmother     No Known Problems Paternal Grandfather        REVIEW OF SYSTEMS COMPLETED:   Pertinent positives as noted in the HPI. All other systems reviewed and negative. PHYSICAL EXAM PERFORMED:    /69   Pulse 69   Temp 98.6 °F (37 °C) (Oral)   Resp 24   Ht 6' 1\" (1.854 m)   Wt 287 lb (130.2 kg)   SpO2 95%   BMI 37.87 kg/m²     General appearance:  No apparent distress, appears stated age and cooperative. HEENT:  Normal cephalic, atraumatic without obvious deformity. Pupils equal, round, and reactive to light. Extra ocular muscles intact. Conjunctivae/corneas clear.  4 L O2 via NC  Neck: Supple, with full range of motion. No jugular venous distention. Trachea midline. Respiratory:  Normal respiratory effort. Faint crackles bilaterally. No wheezing. Symmetrical chest rise. Cardiovascular:  Regular rate and rhythm with normal S1/S2 without murmurs, rubs or gallops. 2+ pitting lower extremity edema bilaterally. Abdomen: Soft, non-tender, non-distended with normal bowel sounds. : No CVA tenderness  Musculoskeletal:  No clubbing or cyanosis. Full range of motion without deformity. Skin: Skin color, texture, turgor normal.  No rashes or lesions. Neurologic:  Neurovascularly intact without any focal sensory/motor deficits.  Cranial nerves: II-XII intact, grossly non-focal.  Psychiatric:  Alert and oriented, thought content appropriate, normal insight  Peripheral Pulses: +2 palpable, equal bilaterally       Labs:     Recent Labs     10/30/22  0003   WBC 7.7   HGB 12.5*   HCT 38.0*        Recent Labs     10/30/22  0003      K 4.0      CO2 28   BUN 26*   CREATININE 1.4*   CALCIUM 10.2     Recent Labs     10/30/22  0003   AST 18   ALT 17   BILITOT 0.3   ALKPHOS 112     Recent Labs     10/30/22  0003   INR 2.21*     Recent Labs     10/30/22  0003   TROPONINI <0.01       Urinalysis:      Lab Results   Component Value Date/Time    WBCUA 1 06/24/2019 10:27 AM    RBCUA 2 06/24/2019 10:27 AM       Radiology:     CXR: I have reviewed the CXR with the following interpretation: Bibasilar atelectasis or airspace disease  EKG:  I have reviewed the EKG with the following interpretation: NSR with RBBB    XR CHEST PORTABLE   Final Result   Bibasilar atelectasis or airspace disease. CT CHEST WO CONTRAST    (Results Pending)       Medications:  Not in a hospital admission.   Current Facility-Administered Medications   Medication Dose Route Frequency Provider Last Rate Last Admin    ipratropium-albuterol (DUONEB) nebulizer solution 1 ampule  1 ampule Inhalation Once MEENU Johnson CNP        albuterol (PROVENTIL) nebulizer solution 5 mg  5 mg Nebulization Once MEENU Johnson CNP        cefTRIAXone (ROCEPHIN) 1,000 mg in dextrose 5 % 50 mL IVPB mini-bag  1,000 mg IntraVENous Q24H Norberto Solomon  mL/hr at 10/30/22 0141 1,000 mg at 10/30/22 0141    azithromycin (ZITHROMAX) 500 mg in D5W 250ml Vial Mate  500 mg IntraVENous Once Norberto Solomon MD        glucose-vitamin C chewable tablet 4 tablet  4 tablet Oral PRN Mari Kerens, DO        dextrose bolus 10% 125 mL  125 mL IntraVENous PRN Mari Kerens, DO        Or    dextrose bolus 10% 250 mL  250 mL IntraVENous PRN Mari Kerens, DO        glucagon (rDNA) injection 1 mg  1 mg SubCUTAneous PRN Mari Kerens, DO        dextrose 10 % infusion   IntraVENous Continuous PRN Pablito Posada DO        insulin lispro (HUMALOG) injection vial 0-8 Units  0-8 Units SubCUTAneous TID WC Pablito Posada DO        insulin lispro (HUMALOG) injection vial 0-4 Units  0-4 Units SubCUTAneous Nightly Pablito Posada DO        perflutren lipid microspheres (DEFINITY) injection 1.65 mg  1.5 mL IntraVENous ONCE PRN Mari Kerens, DO        allopurinol (ZYLOPRIM) tablet 300 mg  300 mg Oral Daily Pablito Posada, DO        amLODIPine (NORVASC) tablet 10 mg  1 tablet Oral Daily Leesa Smothers, DO        aspirin chewable tablet 81 mg  81 mg Oral Daily Pablito Posada, DO        sacubitril-valsartan (ENTRESTO) 49-51 MG per tablet 1 tablet  1 tablet Oral BID Leesa Smothers, DO        sotalol (BETAPACE) tablet 80 mg  80 mg Oral BID Leesa Smothers, DO        vitamin D3 (CHOLECALCIFEROL) tablet 1,000 Units  1,000 Units Oral Daily Leesa Smothers, DO        warfarin (COUMADIN) tablet 10 mg  10 mg Oral Daily Carmen rFye, DO        [START ON 10/31/2022] warfarin (COUMADIN) tablet 5 mg  5 mg Oral Once per day on Mon Thu Pablito Posada, DO        atorvastatin (LIPITOR) tablet 40 mg  40 mg Oral Nightly Pablito Posada, DO        gemfibrozil (LOPID) tablet 600 mg  600 mg Oral BID  Pablito Posada, DO        sodium chloride flush 0.9 % injection 5-40 mL  5-40 mL IntraVENous 2 times per day Leesa Smothers, DO        sodium chloride flush 0.9 % injection 5-40 mL  5-40 mL IntraVENous PRN Pablito Caldera, DO        0.9 % sodium chloride infusion   IntraVENous PRN Carmensa Smothers, DO        ondansetron (ZOFRAN-ODT) disintegrating tablet 4 mg  4 mg Oral Q8H PRN Carmensa Smothers, DO        Or    ondansetron (ZOFRAN) injection 4 mg  4 mg IntraVENous Q6H PRN Carmen Uday, DO        polyethylene glycol (GLYCOLAX) packet 17 g  17 g Oral Daily PRN Carmensa Smothers, DO        acetaminophen (TYLENOL) tablet 650 mg  650 mg Oral Q6H PRN Carmen Uday, DO        Or    acetaminophen (TYLENOL) suppository 650 mg  650 mg Rectal Q6H PRN Carmensa Smothers, DO        magnesium sulfate 2000 mg in 50 mL IVPB premix  2,000 mg IntraVENous PRN Carmensa Smothers, DO        potassium chloride (KLOR-CON M) extended release tablet 40 mEq  40 mEq Oral PRN Leesa Smothers, DO        Or    potassium bicarb-citric acid (EFFER-K) effervescent tablet 40 mEq  40 mEq Oral PRN Carmensa Smothers, DO        Or    potassium chloride 10 mEq/100 mL IVPB (Peripheral Line)  10 mEq IntraVENous PRN Linda Pennant DO [place]         Current Outpatient Medications   Medication Sig Dispense Refill    sacubitril-valsartan (ENTRESTO) 49-51 MG per tablet Take 1 tablet by mouth 2 times daily 60 tablet 0    amLODIPine (NORVASC) 10 MG tablet TAKE 1 TABLET EVERY DAY 90 tablet 1    furosemide (LASIX) 20 MG tablet Take 1 tablet by mouth daily Take a extra 0.5 tablet for a weight gain of 3 pounds or more 90 tablet 1    vitamin D3 (CHOLECALCIFEROL) 25 MCG (1000 UT) TABS tablet Take 1 tablet by mouth daily 90 tablet 1    metFORMIN (GLUCOPHAGE-XR) 500 MG extended release tablet TAKE 4 TABLETS EVERY DAY WITH BREAKFAST 360 tablet 0    allopurinol (ZYLOPRIM) 300 MG tablet TAKE 1 TABLET EVERY DAY 90 tablet 1    pravastatin (PRAVACHOL) 80 MG tablet TAKE 1 TABLET EVERY DAY FOR CHOLESTEROL 90 tablet 3    warfarin (COUMADIN) 10 MG tablet Take 1.5 tablets by mouth Twice a Week AND 1 tablet Five times weekly. TAKE 1 TABLET DAILY EXCEPT TAKE 1 AND 1/2 TABLETS (15 MG) ON WEDNESDAY AND FRIDAY. 96 tablet 3    glucose monitoring kit (FREESTYLE) monitoring kit Ok to dispense what is covered by insurance. 1 kit 0    blood glucose monitor strips Test 2 times a day & as needed for symptoms of irregular blood glucose. Dispense sufficient amount for indicated testing frequency plus additional to accommodate PRN testing needs. Ok to dispense what is covered by insurance 100 strip 5    ACCU-CHEK COMPACT PLUS strip USE TO TEST 2 TIMES DAILY 102 strip 1    Lancets MISC BID testing 100 each 6    sotalol (BETAPACE) 80 MG tablet Take 80 mg by mouth 2 times daily. aspirin 81 MG chewable tablet Take 81 mg by mouth daily.        Facility-Administered Medications Ordered in Other Encounters   Medication Dose Route Frequency Provider Last Rate Last Admin    oxyCODONE-acetaminophen (PERCOCET) 5-325 MG per tablet 1 tablet  1 tablet Oral Once Lex Harmon MD        lactated ringers infusion   IntraVENous Continuous Davi Chiang MD        sodium chloride flush 0.9 % injection 10 mL  10 mL IntraVENous 2 times per day Sierra Wilburn MD        sodium chloride flush 0.9 % injection 10 mL  10 mL IntraVENous PRN Sierra Wilburn MD        0.9 % sodium chloride infusion  25 mL IntraVENous PRN Sierra Wilburn MD           Consults:    IP CONSULT TO HOSPITALIST  IP CONSULT TO PHARMACY    ASSESSMENT:    Active Hospital Problems    Diagnosis Date Noted    HFrEF (heart failure with reduced ejection fraction) ([address] [I50.20] 10/30/2022     Priority: Medium    Hypertension associated with stage 3 chronic kidney disease due to type 2 diabetes mellitus ([address] [E11.22, I12.9, N18.30] 10/30/2022     Priority: Medium    CAP (community acquired pneumonia) [J18.9] 10/30/2022     Priority: Medium    Acute systolic CHF (congestive heart failure) ([address] [I50.21] 10/30/2022     Priority: Medium    Acute respiratory failure with hypoxia ([facility] [J96.01] 10/30/2022     Priority: Medium    Stage 3a chronic kidney disease ([address] [N18.31] 01/28/2022    Typical atrial flutter ([address] [I48.3] 03/23/2020    Cardiomyopathy, dilated ([address] [I42.0]     Pacemaker [Z95.0]     COPD (chronic obstructive pulmonary disease) ([address] [J44.9] 11/05/2018    Type 2 diabetes mellitus, without long-term current use of insulin ([address] [E11.9] 02/24/2017    Long term current use of anticoagulants with INR goal of 2.0-3.0 [Z79.01] 07/11/2016    Hyperlipidemia associated with type 2 diabetes mellitus ([address] [E11.69, E78.5] 07/11/2016         PLAN:  This is a 71 y.o. male who presented to [facility] and is being treated for:    Acute hypoxic respiratory failure  -Likely 2/2 CHF exacerbation +/- pneumonia  -Chest x-ray shows airspace disease  -CT chest ordered  -Blood cultures ordered; infectious work-up started  -Rocephin and azithromycin; de-escalate when able  -Lasix 40 mg IV x1 in ED  -Lasix 20 mg IV twice daily  -Daily labs; replace electrolytes as needed    HFrEF  -Echo 12/11/2020 shows LVEF 45% with ICD noted  -Repeat echo  -Lasix as above  -Continue Entresto and beta-blocker  -I/Os    COPD  -Solumedrol 125mg x1 given in ED; hold off on any additional steroids at this time  -Patient is not in an exacerbation  -Duonebs    Atrial fibrillation  -Continue home warfarin  -INR checks  -Pharmacy consulted for warfarin dosing    Type 2 diabetes  -SSI    Hypertension  -Continue home antihypertensives    Hyperlipidemia  -Statin and gemfibrozil        DVT ppx: Oral anticoagulation - warfarin  GI ppx: Diet/Tube Feeds  Diet: ADULT DIET; Regular; 2000 ml  Code Status: Full Code    PT/OT Eval Status: ordered    Disposition - home after medical stabilization and treatment       Cami Irving, DO  10/30/2022  1:48 AM    Please note that some part of this chart was generated using Dragon dictation software. Although every effort was made to ensure the accuracy of this automated transcription, some errors in transcription may have occurred inadvertently. If you may need any clarification, please do not hesitate to contact me through [facility].

## 2022-10-30 NOTE — ACP (ADVANCE CARE PLANNING)
Advanced Care Planning Note. Purpose of Encounter: Advanced care planning in light of CHF exacerbation and possible pneumonia. Parties In Attendance: Patient, Magali Gray, wife (POA)  Decisional Capacity: Yes  Subjective: Shortness of breath. Objective: . Goals of Care Determination: Patient/POA wishes to seek full treatment. Plan: Antibiotics. Imaging. Labs. Lasix. Code Status: Full code   Time spent on Advanced care Plannin minutes  Advanced Care Planning Documents: Completed advanced directives on chart, wife is the POA.     Megan Rodgers DO  10/30/2022 1:27 AM

## 2022-10-30 NOTE — ED PROVIDER NOTES
I independently performed a history and physical on Alecia Rivera. I personally saw the patient and performed a substantive portion of the visit including all aspects of the medical decision making. Briefly, this is a 71 y.o. male here for shortness of breath. Associated with cough. Thinks he has also been having bilateral leg swelling up to his calves. Thinks he is having CHF exacerbation. Similar is happened before. No chest pain. No fevers or chills. No body aches. 1 month ago he had his Lasix cut in half. No abdominal pain. No nausea or vomiting. Normal urinary output. Does not typically use oxygen    On exam,   General: Patient is in no acute distress  Skin: No cyanosis  HEENT: Moist mucous membranes  Heart: Regular rate, regular rhythm  Lung: No respiratory distress. Rales at bases  Abdomen: Soft, nontender  Neuro: Moving all extremities, no facial droop, no slurred speech, answers questions appropriately        EKG  The Ekg interpreted by me in the absence of a cardiologist shows. Normal sinus rhythm  No acute ST changes   HR 69  Bifascicular block  No significant EKG changes compared to study in 4/22      Screenings            MDM  Briefly, this is a 71 y.o. male here for shortness of breath, cough, weight gain, bilateral leg swelling with recent decrease in Lasix dosage. Chest x-ray shows possible pneumonia. Starting Rocephin/azithromycin. Initially wheezing on exam.  Ordered duo nebs in case this is COPD with some prednisone but I think this is lower on my differential now. I do think that he is in failure. Ordered some Lasix. INR 2.2. Doubt that this is pulmonary embolism. No pleuritic chest discomfort. He has no hypoxia. Will admit to hospitalist service. Do not believe that he is septic. .    I personally saw the patient and independently provided 32 minutes of non concurrent critical care out of the total shared critical care time provided.           Is this patient to be included in the SEP-1 Core Measure due to severe sepsis or septic shock? No   Exclusion criteria - the patient is NOT to be included for SEP-1 Core Measure due to:  2+ SIRS criteria are not met           Patient Referrals:  No follow-up provider specified. Discharge Medications:  New Prescriptions    No medications on file       FINAL IMPRESSION  1. Congestive heart failure, unspecified HF chronicity, unspecified heart failure type ([facility]    2. Pneumonia due to infectious organism, unspecified laterality, unspecified part of lung    3. Dyspnea and respiratory abnormalities    4. Hypoxia        Blood pressure 133/69, pulse 69, temperature 98.6 °F (37 °C), temperature source Oral, resp. rate 24, SpO2 95 %. For further details of [address] emergency department encounter, please see documentation by advanced practice provider, Thompson Holland.         Porsche Taylor MD  10/30/22 Tab Shukla MD  10/30/22 [id number]

## 2022-10-30 NOTE — PROGRESS NOTES
Pt wife brought him 2 diet Mt. Dew from vending area, as well as some homemade apple crisp, a slim gabrielle, and a bag of potato chips. Education provided to pt and pt wife regarding diet choices and how those can affect fluid overload and water retention, as well as DM. Pt verbalized understanding of teaching. Teaching not effective at this time. Pt alert and oriented X 4 and able to make needs known. Call light within reach. Pt denies further needs at this time. Nursing will continue to monitor.

## 2022-10-30 NOTE — PROGRESS NOTES
[facility] PROGRESS NOTE    10/30/2022 6:53 AM        Name: Annabella Minor . Admitted: 10/29/2022  Primary Care Provider: Mariah iLon MD (Tel: [phone])      Subjective:  .     Admitted this am with HF    H & P reviewed   Feels better since IV lasix  No current reports of pain or dyspnea     Reviewed interval ancillary notes    Current Medications  glucose-vitamin C chewable tablet 4 tablet, PRN  dextrose bolus 10% 125 mL, PRN   Or  dextrose bolus 10% 250 mL, PRN  glucagon (rDNA) injection 1 mg, PRN  dextrose 10 % infusion, Continuous PRN  insulin lispro (HUMALOG) injection vial 0-8 Units, TID WC  insulin lispro (HUMALOG) injection vial 0-4 Units, Nightly  perflutren lipid microspheres (DEFINITY) injection 1.65 mg, ONCE PRN  allopurinol (ZYLOPRIM) tablet 300 mg, Daily  amLODIPine (NORVASC) tablet 10 mg, Daily  aspirin chewable tablet 81 mg, Daily  sacubitril-valsartan (ENTRESTO) 49-51 MG per tablet 1 tablet, BID  sotalol (BETAPACE) tablet 80 mg, BID  Vitamin D (CHOLECALCIFEROL) tablet 1,000 Units, Daily  atorvastatin (LIPITOR) tablet 40 mg, Nightly  gemfibrozil (LOPID) tablet 600 mg, BID AC  sodium chloride flush 0.9 % injection 5-40 mL, 2 times per day  sodium chloride flush 0.9 % injection 5-40 mL, PRN  0.9 % sodium chloride infusion, PRN  ondansetron (ZOFRAN-ODT) disintegrating tablet 4 mg, Q8H PRN   Or  ondansetron (ZOFRAN) injection 4 mg, Q6H PRN  polyethylene glycol (GLYCOLAX) packet 17 g, Daily PRN  acetaminophen (TYLENOL) tablet 650 mg, Q6H PRN   Or  acetaminophen (TYLENOL) suppository 650 mg, Q6H PRN  magnesium sulfate 2000 mg in 50 mL IVPB premix, PRN  potassium chloride (KLOR-CON M) extended release tablet 40 mEq, PRN   Or  potassium bicarb-citric acid (EFFER-K) effervescent tablet 40 mEq, PRN   Or  potassium chloride 10 mEq/100 mL IVPB (Peripheral Line), PRN  [START ON 10/31/2022] cefTRIAXone (ROCEPHIN) 1,000 mg in dextrose 5 % 50 mL IVPB mini-bag, Q24H   And  [START ON 10/31/2022] azithromycin (ZITHROMAX) 500 mg in D5W 250ml Vial Mate, Q24H  furosemide (LASIX) injection 20 mg, BID  ipratropium-albuterol (DUONEB) nebulizer solution 1 ampule, TID  ipratropium-albuterol (DUONEB) nebulizer solution 1 ampule, Q4H PRN  [START ON 11/2/2022] warfarin (COUMADIN) tablet 15 mg, Once per day on Wed Fri  warfarin (COUMADIN) tablet 10 mg, Once per day on Sun Mon Tue Thu Sat    oxyCODONE-acetaminophen (PERCOCET) 5-325 MG per tablet 1 tablet, Once  lactated ringers infusion, Continuous  sodium chloride flush 0.9 % injection 10 mL, 2 times per day  sodium chloride flush 0.9 % injection 10 mL, PRN  0.9 % sodium chloride infusion, PRN        Objective:  /82   Pulse 74   Temp 98.3 °F (36.8 °C) (Oral)   Resp 20   Ht 6' 1\" (1.854 m)   Wt 292 lb 5.3 oz (132.6 kg)   SpO2 93%   BMI 38.57 kg/m²     Intake/Output Summary (Last 24 hours) at 10/30/2022 0653  Last data filed at 10/30/2022 0538  Gross per 24 hour   Intake 350.2 ml   Output 900 ml   Net -549.8 ml      Wt Readings from Last 3 Encounters:   10/30/22 292 lb 5.3 oz (132.6 kg)   10/06/22 292 lb (132.5 kg)   09/29/22 290 lb 1.6 oz (131.6 kg)       General appearance:  Appears comfortable, alert and pleasant , obese   Eyes: Sclera clear. Pupils equal.  ENT: Moist oral mucosa. Trachea midline, no adenopathy. Cardiovascular: Regular rhythm, normal S1, S2. No murmur. + edema in lower extremities  Respiratory: Not using accessory muscles. Good inspiratory effort. Clear to auscultation bilaterally, no wheeze or crackles. GI: Abdomen soft, no tenderness, not distended, normal bowel sounds  Musculoskeletal: No cyanosis in digits, neck supple  Neurology: CN 2-12 grossly intact. No speech or motor deficits  Psych: Normal affect.  Alert and oriented in time, place and person  Skin: Warm, dry, normal turgor    Labs and Tests:  CBC:   Recent Labs     10/30/22  0003 10/30/22  0531   WBC 7.7 6.8   HGB 12.5* 12.6*    201     BMP:    Recent Labs     10/30/22  0003 10/30/22  0531    142   K 4.0 4.2    101   CO2 28 28   BUN 26* 28*   CREATININE 1.4* 1.5*   GLUCOSE 127* 185*     Hepatic:   Recent Labs     10/30/22  0003   AST 18   ALT 17   BILITOT 0.3   ALKPHOS 112     INR 2.2  Procal is low     CT chest:    Opacification of airways in the right lower lobe with dense consolidative   change. Pattern suspected to represent developing pneumonia. Atypical or   viral pattern of pneumonitis cannot be excluded. 2. Cardiomegaly with dilation of the distal aortic arch in a patient with   prior history of AVR. ECHO December 2020   Summary   -Left ventricular cavity size is normal.   -Overall left ventricular systolic function appears mildly reduced.   -Ejection fraction is visually estimated to be 45%. -There is abnormal septal motion/bounce noted. -Indeterminate diastolic function E/e' = 71.0. Pablito Donovandario -A mechanical artificial aortic valve appears well seated with a maximum   velocity of 2.1m/s and a mean gradient of 9mmHg.   -No evidence of aortic valve regurgitation.   -Trivial-mild mitral regurgitation.   -Mild tricuspid regurgitation.   -The right ventricle is normal in size with reduces function. TAPSE 1.9cm.    RVS velocity is 6.94 cm/s.   -Pacer/ICD right heart    INR 2.2       AIC 6 %      Problem List  Principal Problem:    Acute respiratory failure with hypoxia (HCC)  Active Problems:    HFrEF (heart failure with reduced ejection fraction) ([address]    Hypertension associated with stage 3 chronic kidney disease due to type 2 diabetes mellitus ([address]    CAP (community acquired pneumonia)    Acute systolic CHF (congestive heart failure) ([address]    Hyperlipidemia associated with type 2 diabetes mellitus ([address]    Long term current use of anticoagulants with INR goal of 2.0-3.0    Type 2 diabetes mellitus, without long-term current use of insulin (HCC)    COPD (chronic obstructive pulmonary disease) (HCC)    Cardiomyopathy, dilated (HCC)    Pacemaker    Typical atrial flutter (HCC)    Stage 3a chronic kidney disease ([facility]  Resolved Problems:    * No resolved hospital problems. *       Assessment & Plan:   Acute hypoxic respiratory failure likely due to HF:  he has received IV lasix, now 20 mg IV bid,  entresto   (  ??  BB )  Creat 1.5 will follow closely. Check ECHO,  has follow appt with cardiology 11/3/22   Possible PNA however pro adriane is low. BC are pending,  respiratory panel also pending,  currently on zithromax and rocephin. Will likely be able to d/c in the next 24 hours  S/P AV replacement, also with pacer and AF:   AC with coumadin. INR 2.2, on betapace  HTN:  BP in range on CCB, Eentresto   T2DM:  BG values in range. AIC 6% in September demonstrates excellent control , add humalog correction for steroid induced hyperglycemia   COPD:  stable on current inhaled therapy   HLD : on statin       Diet: ADULT DIET;  Regular; 2000 ml  Code:Full Code  DVT PPX      MEENU Matt CNP   10/30/2022 6:53 AM

## 2022-10-30 NOTE — RT PROTOCOL NOTE
RT Inhaler-Nebulizer Bronchodilator Protocol Note    There is a bronchodilator order in the chart from a provider indicating to follow the RT Bronchodilator Protocol and there is an Initiate RT Inhaler-Nebulizer Bronchodilator Protocol order as well (see protocol at bottom of note). CXR Findings:  XR CHEST PORTABLE    Result Date: 10/30/2022  Bibasilar atelectasis or airspace disease. The findings from the last RT Protocol Assessment were as follows:   History Pulmonary Disease: Chronic pulmonary disease  Respiratory Pattern: Dyspnea on exertion or RR 21-25 bpm  Breath Sounds: Intermittent or unilateral wheezes  Cough: Strong, productive  Indication for Bronchodilator Therapy: None  Bronchodilator Assessment Score: 9    Aerosolized bronchodilator medication orders have been revised according to the RT Inhaler-Nebulizer Bronchodilator Protocol below. Respiratory Therapist to perform RT Therapy Protocol Assessment initially then follow the protocol. Repeat RT Therapy Protocol Assessment PRN for score 0-3 or on second treatment, BID, and PRN for scores above 3. No Indications - adjust the frequency to every 6 hours PRN wheezing or bronchospasm, if no treatments needed after 48 hours then discontinue using Per Protocol order mode. If indication present, adjust the RT bronchodilator orders based on the Bronchodilator Assessment Score as indicated below. Use Inhaler orders unless patient has one or more of the following: on home nebulizer, not able to hold breath for 10 seconds, is not alert and oriented, cannot activate and use MDI correctly, or respiratory rate 25 breaths per minute or more, then use the equivalent nebulizer order(s) with same Frequency and PRN reasons based on the score. If a patient is on this medication at home then do not decrease Frequency below that used at home.     0-3 - enter or revise RT bronchodilator order(s) to equivalent RT Bronchodilator order with Frequency of every 4 hours PRN for wheezing or increased work of breathing using Per Protocol order mode. 4-6 - enter or revise RT Bronchodilator order(s) to two equivalent RT bronchodilator orders with one order with BID Frequency and one order with Frequency of every 4 hours PRN wheezing or increased work of breathing using Per Protocol order mode. 7-10 - enter or revise RT Bronchodilator order(s) to two equivalent RT bronchodilator orders with one order with TID Frequency and one order with Frequency of every 4 hours PRN wheezing or increased work of breathing using Per Protocol order mode. 11-13 - enter or revise RT Bronchodilator order(s) to one equivalent RT bronchodilator order with QID Frequency and an Albuterol order with Frequency of every 4 hours PRN wheezing or increased work of breathing using Per Protocol order mode. Greater than 13 - enter or revise RT Bronchodilator order(s) to one equivalent RT bronchodilator order with every 4 hours Frequency and an Albuterol order with Frequency of every 2 hours PRN wheezing or increased work of breathing using Per Protocol order mode. RT to enter RT Home Evaluation for COPD & MDI Assessment order using Per Protocol order mode.     Electronically signed by Eneida Jurado RCP on 10/30/2022 at 3:11 AM

## 2022-10-31 PROBLEM — R09.02 HYPOXIA: Status: ACTIVE | Noted: 2022-10-31

## 2022-10-31 PROBLEM — I50.33 ACUTE ON CHRONIC DIASTOLIC (CONGESTIVE) HEART FAILURE (HCC): Status: ACTIVE | Noted: 2022-10-31

## 2022-10-31 PROBLEM — R06.89 DYSPNEA AND RESPIRATORY ABNORMALITIES: Status: ACTIVE | Noted: 2022-10-31

## 2022-10-31 PROBLEM — R06.00 DYSPNEA AND RESPIRATORY ABNORMALITIES: Status: ACTIVE | Noted: 2022-10-31

## 2022-10-31 LAB
ALBUMIN SERPL-MCNC: 3.9 G/DL (ref 3.4–5)
ANION GAP SERPL CALCULATED.3IONS-SCNC: 11 MMOL/L (ref 3–16)
BASOPHILS ABSOLUTE: 0 K/UL (ref 0–0.2)
BASOPHILS RELATIVE PERCENT: 0.2 %
BUN BLDV-MCNC: 35 MG/DL (ref 7–20)
CALCIUM SERPL-MCNC: 9.8 MG/DL (ref 8.3–10.6)
CHLORIDE BLD-SCNC: 98 MMOL/L (ref 99–110)
CO2: 31 MMOL/L (ref 21–32)
CREAT SERPL-MCNC: 1.6 MG/DL (ref 0.8–1.3)
EOSINOPHILS ABSOLUTE: 0 K/UL (ref 0–0.6)
EOSINOPHILS RELATIVE PERCENT: 0.4 %
GFR SERPL CREATININE-BSD FRML MDRD: 46 ML/MIN/{1.73_M2}
GLUCOSE BLD-MCNC: 122 MG/DL (ref 70–99)
GLUCOSE BLD-MCNC: 129 MG/DL (ref 70–99)
GLUCOSE BLD-MCNC: 131 MG/DL (ref 70–99)
GLUCOSE BLD-MCNC: 150 MG/DL (ref 70–99)
GLUCOSE BLD-MCNC: 180 MG/DL (ref 70–99)
HCT VFR BLD CALC: 39.1 % (ref 40.5–52.5)
HEMOGLOBIN: 12.7 G/DL (ref 13.5–17.5)
INR BLD: 3.02 (ref 0.87–1.14)
LV EF: 50 %
LVEF MODALITY: NORMAL
LYMPHOCYTES ABSOLUTE: 1.4 K/UL (ref 1–5.1)
LYMPHOCYTES RELATIVE PERCENT: 12.8 %
MAGNESIUM: 2 MG/DL (ref 1.8–2.4)
MCH RBC QN AUTO: 30.3 PG (ref 26–34)
MCHC RBC AUTO-ENTMCNC: 32.6 G/DL (ref 31–36)
MCV RBC AUTO: 93.1 FL (ref 80–100)
MONOCYTES ABSOLUTE: 1 K/UL (ref 0–1.3)
MONOCYTES RELATIVE PERCENT: 9.2 %
NEUTROPHILS ABSOLUTE: 8.5 K/UL (ref 1.7–7.7)
NEUTROPHILS RELATIVE PERCENT: 77.4 %
PDW BLD-RTO: 14.1 % (ref 12.4–15.4)
PERFORMED ON: ABNORMAL
PHOSPHORUS: 5.2 MG/DL (ref 2.5–4.9)
PLATELET # BLD: 256 K/UL (ref 135–450)
PMV BLD AUTO: 8.4 FL (ref 5–10.5)
POTASSIUM SERPL-SCNC: 4.5 MMOL/L (ref 3.5–5.1)
PROTHROMBIN TIME: 31.5 SEC (ref 11.7–14.5)
RBC # BLD: 4.2 M/UL (ref 4.2–5.9)
SARS-COV-2: NOT DETECTED
SODIUM BLD-SCNC: 140 MMOL/L (ref 136–145)
WBC # BLD: 11 K/UL (ref 4–11)

## 2022-10-31 PROCEDURE — 94761 N-INVAS EAR/PLS OXIMETRY MLT: CPT

## 2022-10-31 PROCEDURE — 6370000000 HC RX 637 (ALT 250 FOR IP): Performed by: STUDENT IN AN ORGANIZED HEALTH CARE EDUCATION/TRAINING PROGRAM

## 2022-10-31 PROCEDURE — 6360000002 HC RX W HCPCS: Performed by: STUDENT IN AN ORGANIZED HEALTH CARE EDUCATION/TRAINING PROGRAM

## 2022-10-31 PROCEDURE — 80069 RENAL FUNCTION PANEL: CPT

## 2022-10-31 PROCEDURE — 99223 1ST HOSP IP/OBS HIGH 75: CPT | Performed by: INTERNAL MEDICINE

## 2022-10-31 PROCEDURE — 2580000003 HC RX 258: Performed by: STUDENT IN AN ORGANIZED HEALTH CARE EDUCATION/TRAINING PROGRAM

## 2022-10-31 PROCEDURE — 93306 TTE W/DOPPLER COMPLETE: CPT

## 2022-10-31 PROCEDURE — 99222 1ST HOSP IP/OBS MODERATE 55: CPT | Performed by: INTERNAL MEDICINE

## 2022-10-31 PROCEDURE — 2700000000 HC OXYGEN THERAPY PER DAY

## 2022-10-31 PROCEDURE — 85025 COMPLETE CBC W/AUTO DIFF WBC: CPT

## 2022-10-31 PROCEDURE — 6360000002 HC RX W HCPCS: Performed by: NURSE PRACTITIONER

## 2022-10-31 PROCEDURE — 1200000000 HC SEMI PRIVATE

## 2022-10-31 PROCEDURE — 83735 ASSAY OF MAGNESIUM: CPT

## 2022-10-31 PROCEDURE — 85610 PROTHROMBIN TIME: CPT

## 2022-10-31 PROCEDURE — 36415 COLL VENOUS BLD VENIPUNCTURE: CPT

## 2022-10-31 PROCEDURE — 94660 CPAP INITIATION&MGMT: CPT

## 2022-10-31 PROCEDURE — 94640 AIRWAY INHALATION TREATMENT: CPT

## 2022-10-31 RX ORDER — FUROSEMIDE 10 MG/ML
40 INJECTION INTRAMUSCULAR; INTRAVENOUS 2 TIMES DAILY
Status: DISCONTINUED | OUTPATIENT
Start: 2022-10-31 | End: 2022-10-31

## 2022-10-31 RX ORDER — FUROSEMIDE 10 MG/ML
40 INJECTION INTRAMUSCULAR; INTRAVENOUS DAILY
Status: DISCONTINUED | OUTPATIENT
Start: 2022-11-01 | End: 2022-11-01

## 2022-10-31 RX ADMIN — AMLODIPINE BESYLATE 10 MG: 5 TABLET ORAL at 08:30

## 2022-10-31 RX ADMIN — GEMFIBROZIL 600 MG: 600 TABLET ORAL at 05:42

## 2022-10-31 RX ADMIN — SODIUM CHLORIDE 25 ML: 9 INJECTION, SOLUTION INTRAVENOUS at 23:55

## 2022-10-31 RX ADMIN — ATORVASTATIN CALCIUM 40 MG: 40 TABLET, FILM COATED ORAL at 21:58

## 2022-10-31 RX ADMIN — SACUBITRIL AND VALSARTAN 1 TABLET: 49; 51 TABLET, FILM COATED ORAL at 08:29

## 2022-10-31 RX ADMIN — ASPIRIN 81 MG 81 MG: 81 TABLET ORAL at 08:30

## 2022-10-31 RX ADMIN — CEFTRIAXONE SODIUM 1000 MG: 1 INJECTION, POWDER, FOR SOLUTION INTRAMUSCULAR; INTRAVENOUS at 23:56

## 2022-10-31 RX ADMIN — SOTALOL HYDROCHLORIDE 80 MG: 80 TABLET ORAL at 08:30

## 2022-10-31 RX ADMIN — AZITHROMYCIN MONOHYDRATE 500 MG: 500 INJECTION, POWDER, LYOPHILIZED, FOR SOLUTION INTRAVENOUS at 02:21

## 2022-10-31 RX ADMIN — GEMFIBROZIL 600 MG: 600 TABLET ORAL at 17:37

## 2022-10-31 RX ADMIN — SOTALOL HYDROCHLORIDE 80 MG: 80 TABLET ORAL at 21:58

## 2022-10-31 RX ADMIN — Medication 10 ML: at 22:00

## 2022-10-31 RX ADMIN — FUROSEMIDE 40 MG: 10 INJECTION, SOLUTION INTRAMUSCULAR; INTRAVENOUS at 08:30

## 2022-10-31 RX ADMIN — IPRATROPIUM BROMIDE AND ALBUTEROL SULFATE 1 AMPULE: 2.5; .5 SOLUTION RESPIRATORY (INHALATION) at 09:36

## 2022-10-31 RX ADMIN — ALLOPURINOL 300 MG: 300 TABLET ORAL at 08:30

## 2022-10-31 RX ADMIN — Medication 1000 UNITS: at 08:30

## 2022-10-31 RX ADMIN — SODIUM CHLORIDE 25 ML: 9 INJECTION, SOLUTION INTRAVENOUS at 01:05

## 2022-10-31 RX ADMIN — Medication 10 ML: at 08:30

## 2022-10-31 RX ADMIN — IPRATROPIUM BROMIDE AND ALBUTEROL SULFATE 1 AMPULE: 2.5; .5 SOLUTION RESPIRATORY (INHALATION) at 15:30

## 2022-10-31 RX ADMIN — CEFTRIAXONE SODIUM 1000 MG: 1 INJECTION, POWDER, FOR SOLUTION INTRAMUSCULAR; INTRAVENOUS at 01:06

## 2022-10-31 RX ADMIN — IPRATROPIUM BROMIDE AND ALBUTEROL SULFATE 1 AMPULE: 2.5; .5 SOLUTION RESPIRATORY (INHALATION) at 19:40

## 2022-10-31 NOTE — PROGRESS NOTES
Physical/Occupational Therapy  John Presume  Orders received, chart reviewed. Attempted PT/OT evaluation this date. Pt currently off floor for ECHO. Will re-attempt evaluation later this date as schedule allows.    [id number] [facility], [address], OTR/L, [address] [facility], [id number]

## 2022-10-31 NOTE — PROGRESS NOTES
[facility] PROGRESS NOTE    10/31/2022 7:10 AM        Name: Miguel Florez . Admitted: 10/29/2022  Primary Care Provider: Tianna Mobley MD (Tel: [phone])      Subjective:  . Admitted with HF  Feels better since IV lasix  Diuresed 3 liters  Still with significant edema,  also requires oxygen.      We discussed the need to cut back on the fluids etc.    No current reports of pain or dyspnea     Reviewed interval ancillary notes    Current Medications  glucose-vitamin C chewable tablet 4 tablet, PRN  dextrose bolus 10% 125 mL, PRN   Or  dextrose bolus 10% 250 mL, PRN  glucagon (rDNA) injection 1 mg, PRN  dextrose 10 % infusion, Continuous PRN  insulin lispro (HUMALOG) injection vial 0-8 Units, TID WC  insulin lispro (HUMALOG) injection vial 0-4 Units, Nightly  perflutren lipid microspheres (DEFINITY) injection 1.65 mg, ONCE PRN  allopurinol (ZYLOPRIM) tablet 300 mg, Daily  amLODIPine (NORVASC) tablet 10 mg, Daily  aspirin chewable tablet 81 mg, Daily  sacubitril-valsartan (ENTRESTO) 49-51 MG per tablet 1 tablet, BID  sotalol (BETAPACE) tablet 80 mg, BID  Vitamin D (CHOLECALCIFEROL) tablet 1,000 Units, Daily  atorvastatin (LIPITOR) tablet 40 mg, Nightly  gemfibrozil (LOPID) tablet 600 mg, BID AC  sodium chloride flush 0.9 % injection 5-40 mL, 2 times per day  sodium chloride flush 0.9 % injection 5-40 mL, PRN  0.9 % sodium chloride infusion, PRN  ondansetron (ZOFRAN-ODT) disintegrating tablet 4 mg, Q8H PRN   Or  ondansetron (ZOFRAN) injection 4 mg, Q6H PRN  polyethylene glycol (GLYCOLAX) packet 17 g, Daily PRN  acetaminophen (TYLENOL) tablet 650 mg, Q6H PRN   Or  acetaminophen (TYLENOL) suppository 650 mg, Q6H PRN  magnesium sulfate 2000 mg in 50 mL IVPB premix, PRN  potassium chloride (KLOR-CON M) extended release tablet 40 mEq, PRN   Or  potassium bicarb-citric acid (EFFER-K) effervescent tablet 40 mEq, PRN Or  potassium chloride 10 mEq/100 mL IVPB (Peripheral Line), PRN  cefTRIAXone (ROCEPHIN) 1,000 mg in dextrose 5 % 50 mL IVPB mini-bag, Q24H   And  azithromycin (ZITHROMAX) 500 mg in D5W 250ml Vial Mate, Q24H  furosemide (LASIX) injection 20 mg, BID  ipratropium-albuterol (DUONEB) nebulizer solution 1 ampule, TID  ipratropium-albuterol (DUONEB) nebulizer solution 1 ampule, Q4H PRN  [START ON 11/2/2022] warfarin (COUMADIN) tablet 15 mg, Once per day on Wed Fri  warfarin (COUMADIN) tablet 10 mg, Once per day on Sun Mon Tue Thu Sat    oxyCODONE-acetaminophen (PERCOCET) 5-325 MG per tablet 1 tablet, Once  lactated ringers infusion, Continuous  sodium chloride flush 0.9 % injection 10 mL, 2 times per day  sodium chloride flush 0.9 % injection 10 mL, PRN  0.9 % sodium chloride infusion, PRN      Objective:  /72   Pulse 66   Temp 97.7 °F (36.5 °C) (Oral)   Resp 16   Ht 6' 1\" (1.854 m)   Wt 286 lb 3.2 oz (129.8 kg)   SpO2 90%   BMI 37.76 kg/m²     Intake/Output Summary (Last 24 hours) at 10/31/2022 0710  Last data filed at 10/31/2022 0222  Gross per 24 hour   Intake 540 ml   Output 2775 ml   Net -2235 ml        Wt Readings from Last 3 Encounters:   10/31/22 286 lb 3.2 oz (129.8 kg)   10/06/22 292 lb (132.5 kg)   09/29/22 290 lb 1.6 oz (131.6 kg)       General appearance:  Appears comfortable, alert and pleasant , obese   Eyes: Sclera clear. Pupils equal.  ENT: Moist oral mucosa. Trachea midline, no adenopathy. Cardiovascular: Regular rhythm, normal S1, S2. No murmur. + edema in lower extremities  Respiratory: Not using accessory muscles. Good inspiratory effort. Clear to auscultation bilaterally, no wheeze or crackles. GI: Abdomen soft, no tenderness, not distended, normal bowel sounds  Musculoskeletal: No cyanosis in digits, neck supple  Neurology: CN 2-12 grossly intact. No speech or motor deficits  Psych: Normal affect.  Alert and oriented in time, place and person  Skin: Warm, dry, normal turgor    Labs and Tests:  CBC:   Recent Labs     10/30/22  0003 10/30/22  0531 10/31/22  0529   WBC 7.7 6.8 11.0   HGB 12.5* 12.6* 12.7*    201 256       BMP:    Recent Labs     10/30/22  0003 10/30/22  0531 10/31/22  0529    142 140   K 4.0 4.2 4.5    101 98*   CO2 28 28 31   BUN 26* 28* 35*   CREATININE 1.4* 1.5* 1.6*   GLUCOSE 127* 185* 150*       Hepatic:   Recent Labs     10/30/22  0003   AST 18   ALT 17   BILITOT 0.3   ALKPHOS 112       INR 3  Procal is low     CT chest:    Opacification of airways in the right lower lobe with dense consolidative   change. Pattern suspected to represent developing pneumonia. Atypical or   viral pattern of pneumonitis cannot be excluded. 2. Cardiomegaly with dilation of the distal aortic arch in a patient with   prior history of AVR. ECHO December 2020   Summary   -Left ventricular cavity size is normal.   -Overall left ventricular systolic function appears mildly reduced.   -Ejection fraction is visually estimated to be 45%. -There is abnormal septal motion/bounce noted. -Indeterminate diastolic function E/e' = 77.4. Celestia Dany -A mechanical artificial aortic valve appears well seated with a maximum   velocity of 2.1m/s and a mean gradient of 9mmHg.   -No evidence of aortic valve regurgitation.   -Trivial-mild mitral regurgitation.   -Mild tricuspid regurgitation.   -The right ventricle is normal in size with reduces function. TAPSE 1.9cm.    RVS velocity is 6.94 cm/s.   -Pacer/ICD right heart    INR 2.2       AIC 6 %      Problem List  Principal Problem:    Acute respiratory failure with hypoxia (HCC)  Active Problems:    HFrEF (heart failure with reduced ejection fraction) ([facility]    Hypertension associated with stage 3 chronic kidney disease due to type 2 diabetes mellitus ([facility]    CAP (community acquired pneumonia)    Acute systolic CHF (congestive heart failure) ([facility]    Hyperlipidemia associated with type 2 diabetes mellitus ([facility]    Long term current use of anticoagulants with INR goal of 2.0-3.0    Type 2 diabetes mellitus, without long-term current use of insulin (HCC)    COPD (chronic obstructive pulmonary disease) (HCC)    Cardiomyopathy, dilated (HCC)    Pacemaker    Typical atrial flutter (HCC)    Stage 3a chronic kidney disease ([facility] [address]  Resolved Problems:    * No resolved hospital problems. *       Assessment & Plan:   Acute hypoxic respiratory failure likely due to HF:  he has received IV lasix and diuresed 3 liters. Continue with 40 of lasix IV today only. Will ask cardiology to follow. ? Hold entresto in light of creat of 1.6 unclear why he is not on BB  Check ECHO  Possible  Pneumonitis, sed rate 69 , CRP 48,  will ask pulmonary to follow. in light of dense RLL consolidative changes. Viral panel is negative. BC with no growth. Pro adriane is low  currently on rocephin and zithromax. Could likely d/c antibiotics. S/P AV replacement, also with pacer and AF:   AC with coumadin. INR 3. Hold coumadin today. RX is dosing  on betapace  CKD:  follows with Dr Maye Ibarra , follow creat closely   HTN:  BP in range on CCB,   T2DM:  BG values in range. AIC 6% in September demonstrates excellent control , add humalog correction for steroid induced hyperglycemia   COPD:  stable on current inhaled therapy   HLD : on statin       Diet: ADULT DIET;  Regular; 2000 ml  Code:Full Code  DVT PPX      MEENU Andres CNP   10/31/2022 7:10 AM

## 2022-10-31 NOTE — CONSULTS
[facility]   Consult Note      Reason for Consult: Acute hypoxic respiratory failure and question about pneumonia  Requesting Physician: Vilma Anderson    Subjective:   CHIEF COMPLAINT: Shortness of breath and weight gain     HPI: Patient presents with increasing shortness of breath over the last few days, also admits to having gained at least 5 pounds in weight over the last few days. Also has had a cough with mucoid phlegm. Denies any chest pain. Admits to pedal edema. Noted to have hypoxia-originally required up to 4 L O2.  CT chest suggestive of right lower lobe infiltrate and hence pulmonary consultation has been requested. History of CHF, EF 40 to 45%, congenital aortic stenosis status post AVR on Coumadin, CKD stage III, paroxysmal atrial fibrillation and ROSETTA on BiPAP. Former smoker quit over 15 years ago-1-1/2 packs/day x 30 years. No prior history of COPD or use of inhalers. Patient states that he received pneumonia vaccination few weeks ago. Denies any prior history of pneumonia.        The patient is a 71 y.o. male with significant past medical history of:      Diagnosis Date    Acute congestive heart failure ([address] 12/30/2019    Allergic rhinitis 7/11/2016    Atrial fibrillation ([facility]     under care of cardiology:Dr. Allean Ferraris Behrens([facility])    CKD (chronic kidney disease)     Nephro:Dr. Parker:stage 3    Class 2 obesity due to excess calories without serious comorbidity with body mass index (BMI) of 37.0 to 37.9 in adult 11/7/2017    Controlled type 2 diabetes mellitus without complication, without long-term current use of insulin ([facility] [address] 2/24/2017    COPD     Essential tremor     Gout     Hyperlipidemia, mixed 07/11/2016    Hypertension     under care of cardiology:Dr. Allean Ferraris Behrens([facility])    Long term current use of anticoagulants with INR goal of 2.0-3.0     under care of cardiology:Dr. Allean Ferraris Behrens([facility])    Obstructive sleep apnea syndrome 06/18/2019    per pulmo    Primary insomnia 1/25/2017    Primary osteoarthritis of both knees 9/14/2021    Sleep apnea     uses bipap        Past Surgical History:        Procedure Laterality Date    AORTIC VALVE REPLACEMENT  10/1996:[facility]    x3    ATRIAL ABLATION SURGERY  03/23/2020    CTI dependent atrial flutter ablation (Dr. Karolina Burns)    [address]  03/23/2020    BIV upgrade    KNEE ARTHROCENTESIS Right 4/6/2022    RIGHT KNEE GENICULAR NERVE BLOCK WITH INTRA ARTICULAR INJECTION SITE CONFIRMED BY FLUOROSCOPY performed by Raissa Spencer MD at [address] [place] [address] ARTHROSCOPY Right 12/27/2021    RIGHT KNEE DIAGNOSTIC ARTHROSCOPY WITH SUBCHONDROPLASTY TO MEDIAL FEMORAL CONDYLE AND TIBIAL PLATEAU, LEFT KNEE INJECTION.  performed by Raissa Spencer MD at [place] [address] Right 04/06/2022    RIGHT KNEE GENICULAR NERVE BLOCK WITH INTRA ARTICULAR INJECTION SITE CONFIRMED BY FLUOROSCOPY    PACEMAKER INSERTION  1996     Current Medications:    Current Facility-Administered Medications: warfarin placeholder: dosing by pharmacy, , Other, RX Placeholder  [Held by provider] furosemide (LASIX) injection 40 mg, 40 mg, IntraVENous, Daily  glucose-vitamin C chewable tablet 4 tablet, 4 tablet, Oral, PRN  dextrose bolus 10% 125 mL, 125 mL, IntraVENous, PRN **OR** dextrose bolus 10% 250 mL, 250 mL, IntraVENous, PRN  glucagon (rDNA) injection 1 mg, 1 mg, SubCUTAneous, PRN  dextrose 10 % infusion, , IntraVENous, Continuous PRN  insulin lispro (HUMALOG) injection vial 0-8 Units, 0-8 Units, SubCUTAneous, TID WC  insulin lispro (HUMALOG) injection vial 0-4 Units, 0-4 Units, SubCUTAneous, Nightly  perflutren lipid microspheres (DEFINITY) injection 1.65 mg, 1.5 mL, IntraVENous, ONCE PRN  allopurinol (ZYLOPRIM) tablet 300 mg, 300 mg, Oral, Daily  amLODIPine (NORVASC) tablet 10 mg, 10 mg, Oral, Daily  aspirin chewable tablet 81 mg, 81 mg, Oral, Daily  [Held by provider] sacubitril-valsartan (ENTRESTO) 49-51 MG per tablet 1 tablet, 1 tablet, Oral, BID  sotalol (BETAPACE) tablet 80 mg, 80 mg, Oral, BID  Vitamin D (CHOLECALCIFEROL) tablet 1,000 Units, 1,000 Units, Oral, Daily  atorvastatin (LIPITOR) tablet 40 mg, 40 mg, Oral, Nightly  gemfibrozil (LOPID) tablet 600 mg, 600 mg, Oral, BID AC  sodium chloride flush 0.9 % injection 5-40 mL, 5-40 mL, IntraVENous, 2 times per day  sodium chloride flush 0.9 % injection 5-40 mL, 5-40 mL, IntraVENous, PRN  0.9 % sodium chloride infusion, , IntraVENous, PRN  ondansetron (ZOFRAN-ODT) disintegrating tablet 4 mg, 4 mg, Oral, Q8H PRN **OR** ondansetron (ZOFRAN) injection 4 mg, 4 mg, IntraVENous, Q6H PRN  polyethylene glycol (GLYCOLAX) packet 17 g, 17 g, Oral, Daily PRN  acetaminophen (TYLENOL) tablet 650 mg, 650 mg, Oral, Q6H PRN **OR** acetaminophen (TYLENOL) suppository 650 mg, 650 mg, Rectal, Q6H PRN  magnesium sulfate 2000 mg in 50 mL IVPB premix, 2,000 mg, IntraVENous, PRN  potassium chloride (KLOR-CON M) extended release tablet 40 mEq, 40 mEq, Oral, PRN **OR** potassium bicarb-citric acid (EFFER-K) effervescent tablet 40 mEq, 40 mEq, Oral, PRN **OR** potassium chloride 10 mEq/100 mL IVPB (Peripheral Line), 10 mEq, IntraVENous, PRN  cefTRIAXone (ROCEPHIN) 1,000 mg in dextrose 5 % 50 mL IVPB mini-bag, 1,000 mg, IntraVENous, Q24H **AND** azithromycin (ZITHROMAX) 500 mg in D5W 250ml Vial Mate, 500 mg, IntraVENous, Q24H  ipratropium-albuterol (DUONEB) nebulizer solution 1 ampule, 1 ampule, Inhalation, TID  ipratropium-albuterol (DUONEB) nebulizer solution 1 ampule, 1 ampule, Inhalation, Q4H PRN  Facility-Administered Medications Ordered in Other Encounters: oxyCODONE-acetaminophen (PERCOCET) 5-325 MG per tablet 1 tablet, 1 tablet, Oral, Once  lactated ringers infusion, , IntraVENous, Continuous  sodium chloride flush 0.9 % injection 10 mL, 10 mL, IntraVENous, 2 times per day  sodium chloride flush 0.9 % injection 10 mL, 10 mL, IntraVENous, PRN  0.9 % sodium chloride infusion, 25 mL, IntraVENous, PRN    No Known Allergies    Social History:    TOBACCO:   reports that he quit smoking about 3 years ago. His smoking use included cigarettes. He has a 30.00 pack-year smoking history. He has never used smokeless tobacco.  ETOH:   reports current alcohol use.   Patient currently lives independently    Family History:       Problem Relation Age of Onset    Cancer Mother         Gallbladder    Asthma Father     Emphysema Father     No Known Problems Sister     No Known Problems Brother     No Known Problems Maternal Grandmother     No Known Problems Maternal Grandfather     No Known Problems Paternal Grandmother     No Known Problems Paternal Grandfather        REVIEW OF SYSTEMS:    Constitutional: Weight gain  Ears, nose, mouth, throat: negative for ear drainage, epistaxis, hoarseness, nasal congestion, sore throat and voice change  Respiratory: negative except for cough, shortness of breath, and sputum  Cardiovascular: negative for chest pain, chest pressure/discomfort, irregular heart beat, lower extremity edema and palpitations  Gastrointestinal: negative for abdominal pain, constipation, diarrhea, jaundice, melena, odynophagia, reflux symptoms and vomiting  Hematologic/lymphatic: negative for bleeding, easy bruising, lymphadenopathy and petechiae  Musculoskeletal:negative for arthralgias, bone pain, muscle weakness, neck pain and stiff joints  Neurological: negative for dizziness, gait problems, headaches, seizures, speech problems, tremors and weakness  Behavioral/Psych: negative for anxiety, behavior problems, depression, fatigue and sleep disturbance  Endocrine: negative for diabetic symptoms including none, neuropathy, polyphagia, polyuria, polydipsia, vomiting and diarrhea and temperature intolerance  Allergic/Immunologic: negative for anaphylaxis, angioedema, hay fever and urticaria      Objective:   Patient Vitals for the past 8 hrs:   BP Temp Temp src Pulse Resp SpO2   10/31/22 1612 125/77 98.1 °F (36.7 °C) Oral 65 16 94 %   10/31/22 1531 -- -- -- 56 16 92 %   10/31/22 1200 122/74 98.1 °F (36.7 °C) Oral 64 16 94 %   10/31/22 0937 -- -- -- 69 16 95 %     I/O last 3 completed shifts: In: 890.2 [P.O.:540; IV Piggyback:350.2]  Out: 7454 [MCOSC:9671]  I/O this shift: In: 480 [P.O.:480]  Out: 3125 [Urine:3125]    Physical Exam:  General Appearance: alert and oriented to person, place and time, well developed and well- nourished, in no acute distress  Skin: warm and dry, no rash or erythema  Head: normocephalic and atraumatic  Eyes: pupils equal, round, and reactive to light, extraocular eye movements intact, conjunctivae normal  ENT: external ear and ear canal normal bilaterally, nose without deformity, nasal mucosa and turbinates normal  Neck: supple and non-tender without mass, no cervical lymphadenopathy  Pulmonary/Chest: clear to auscultation bilaterally- no wheezes, rales or rhonchi, normal air movement, no respiratory distress  Cardiovascular: normal rate, regular rhythm,  no murmurs, rubs, distal pulses intact, no carotid bruits  Abdomen: soft, non-tender, non-distended, normal bowel sounds, no masses or organomegaly  Lymph Nodes: Cervical, supraclavicular normal  Extremities: Pedal edema +  Musculoskeletal: normal range of motion, no joint swelling, deformity or tenderness  Neurologic: alert, no focal neurologic deficits    Data Review:  CBC:   Lab Results   Component Value Date/Time    WBC 11.0 10/31/2022 05:29 AM    RBC 4.20 10/31/2022 05:29 AM     BMP:   Lab Results   Component Value Date/Time    GLUCOSE 150 10/31/2022 05:29 AM    CO2 31 10/31/2022 05:29 AM    BUN 35 10/31/2022 05:29 AM    CREATININE 1.6 10/31/2022 05:29 AM    CALCIUM 9.8 10/31/2022 05:29 AM     ABG: No results found for: PHY3GCT, BEART, W7IXOCYJ, PHART, THGBART, JHE6STY, PO2ART, UJN6OLT    Radiology: All pertinent images / reports were reviewed as a part of this visit.     Narrative   EXAMINATION:   CT OF THE CHEST WITHOUT CONTRAST 10/30/2022 1:24 am       TECHNIQUE:   CT of the chest was performed without the administration of intravenous   contrast. Multiplanar reformatted images are provided for review. Automated   exposure control, iterative reconstruction, and/or weight based adjustment of   the mA/kV was utilized to reduce the radiation dose to as low as reasonably   achievable. COMPARISON:   02/25/2022 CT       HISTORY:   ORDERING SYSTEM PROVIDED HISTORY: pneumonia   TECHNOLOGIST PROVIDED HISTORY:   Reason for exam:->pneumonia   Decision Support Exception - unselect if not a suspected or confirmed   emergency medical condition->Emergency Medical Condition (MA)   Reason for Exam: Shortness of Breath (For a couple of days; coughing up   phelm. Denies n/V/D.)       FINDINGS:   Mediastinum: The heart is enlarged. Prior aortic valve replacement. Dilation of the distal arch measuring up to 4.1 cm in diameter. Pacemaker   implanted in the left chest.  Pulmonary arteries are normal.  Shotty   mediastinal lymph nodes are stable. Thyroid and esophagus normal.       Lungs/pleura: Airway opacification in the right lower lobar bronchus. Dense   consolidation throughout the right lower lobe. Pleural thickening and   calcification is also demonstrated in the right lower lobe. Stable   subpleural nodule in the right upper lobe measuring up to 4 mm. Upper Abdomen: Normal adrenal glands. Cholelithiasis. Soft Tissues/Bones: No skeletal abnormalities are appreciated within the   chest.           Impression   1. Opacification of airways in the right lower lobe with dense consolidative   change. Pattern suspected to represent developing pneumonia. Atypical or   viral pattern of pneumonitis cannot be excluded. 2. Cardiomegaly with dilation of the distal aortic arch in a patient with   prior history of AVR. Problem List:   Acute hypoxic respiratory failure  CHF decompensation  Right lower lobe infiltrates ? Community-acquired pneumonia    Assessment/Plan:     Patient's current presentation appears to be related to CHF decompensation-aggressively diuresed with Lasix and also receiving Entresto, great urine output noted. 2.75 L. Imaging including CT chest incidentally revealed dense right lower lobe infiltrate which probably represents community-acquired pneumonia. Patient does have a cough with mucoid expectoration. WBC 11, procalcitonin is 0.07. Based on clinical findings-agree with empiric Rocephin and Zithromax for treatment of community-acquired pneumonia. Patient will require repeat imaging in 4 weeks to confirm resolution of the infiltrate.     Pulmonary will follow    Taj Hermosillo MD

## 2022-10-31 NOTE — CONSULTS
[address]  Advanced CHF/Pulmonary Hypertension   Cardiac Evaluation      Livan Funk  YOB: 1950    Requesting physician:  Dr. Yamila Momin      Chief Complaint   Patient presents with    Shortness of Breath     For a couple of days; coughing up phelm. Denies n/V/D. History of Present Illness:  Lizzy Dukes is a 70 yo male who presented to the ED with productive cough and shortness of breath. He admits to not weighing himself, but feels that he is having a HF exacerbation. He admits to productive cough, coughing up white phlegm over the past 4 days. No fever. He admits to increased lower leg swelling. Denies chest pain. He is hypoxic on arrival to the ED. He does not use oxygen at home. Denies headache, fever, lightheadedness, dizziness. No neck or back pain. No abdominal pain, nausea, vomiting, diarrhea, constipation, or dysuria. He has a history of congenital aortic stenosis on warfarin with replacement x 3 (last 10 years ago), DM, HTN. He follows with Dr. Joellyn Osler with [facility]. He has PAF, COPD, ROSETTA on BIPAP, AV block and pacemaker. BIV lead implant 3/23/20. He follows in our HF clinic. On the most recent visit, he was started on Entresto. He was fluid up on that visit, lasix was increased for a couple of days, but he did not diurese. Today he is feeling better. Has diuresed. We are consulted for management of HF. Labs:  Sodium 140  K 4.5  BUN/cre 35/1.6  Albumin 3.9  Bnp 782 (was 259 9/15/22)  Crp 48  H/H 12.7/39.1     CXR  Impression   Bibasilar atelectasis or airspace disease. Echo:  12/11/20:   Summary   -Left ventricular cavity size is normal.   -Overall left ventricular systolic function appears mildly reduced.   -Ejection fraction is visually estimated to be 45%. -There is abnormal septal motion/bounce noted. -Indeterminate diastolic function E/e' = 01.7. Tanda Bun    -A mechanical artificial aortic valve appears well seated with a maximum velocity of 2.1m/s and a mean gradient of 9mmHg.   -No evidence of aortic valve regurgitation.   -Trivial-mild mitral regurgitation.   -Mild tricuspid regurgitation.   -The right ventricle is normal in size with reduces function. TAPSE 1.9cm.    RVS velocity is 6.94 cm/s.   -Pacer/ICD right heart    Meds prior to admission:  Entresto 49/51 bid  Amlodipine 10 qd  Lasix 20 qd  Metformin  Pravastatin  Sotalol 80 bid  Aspirin 81 qd    I/O's negative 4600 cc    No Known Allergies  Current Facility-Administered Medications   Medication Dose Route Frequency Provider Last Rate Last Admin    warfarin placeholder: dosing by pharmacy   Other Lawrence Camarena MD        [Held by provider] furosemide (LASIX) injection 40 mg  40 mg IntraVENous Daily MEENU Pollock - DANIELA        glucose-vitamin C chewable tablet 4 tablet  4 tablet Oral PRN Janit Party, DO        dextrose bolus 10% 125 mL  125 mL IntraVENous PRN Janit Party, DO        Or    dextrose bolus 10% 250 mL  250 mL IntraVENous PRN Janit Party, DO        glucagon (rDNA) injection 1 mg  1 mg SubCUTAneous PRN Pablito Christyle, DO        dextrose 10 % infusion   IntraVENous Continuous PRN Pablito Harrisonlep, DO        insulin lispro (HUMALOG) injection vial 0-8 Units  0-8 Units SubCUTAneous TID  Janit Party, DO   2 Units at 10/30/22 1134    insulin lispro (HUMALOG) injection vial 0-4 Units  0-4 Units SubCUTAneous Nightly Pablito Terlep, DO        perflutren lipid microspheres (DEFINITY) injection 1.65 mg  1.5 mL IntraVENous ONCE PRN Janit Party, DO        allopurinol (ZYLOPRIM) tablet 300 mg  300 mg Oral Daily Pablito Terlep, DO   300 mg at 10/31/22 0830    amLODIPine (NORVASC) tablet 10 mg  10 mg Oral Daily Pablito Terlep, DO   10 mg at 10/31/22 0830    aspirin chewable tablet 81 mg  81 mg Oral Daily Pablito Terlep, DO   81 mg at 10/31/22 0830    [Held by provider] sacubitril-valsartan (ENTRESTO) 49-51 MG per tablet 1 tablet  1 tablet Oral BID Mari Jarquin Terlep, DO   1 tablet at 10/31/22 0829    sotalol (BETAPACE) tablet 80 mg  80 mg Oral BID Prentis Axe, DO   80 mg at 10/31/22 0830    Vitamin D (CHOLECALCIFEROL) tablet 1,000 Units  1,000 Units Oral Daily Prentis Axe, DO   1,000 Units at 10/31/22 0830    atorvastatin (LIPITOR) tablet 40 mg  40 mg Oral Nightly Pablito Terlep, DO   40 mg at 10/30/22 2134    gemfibrozil (LOPID) tablet 600 mg  600 mg Oral BID AC Pablito Terlep, DO   600 mg at 10/31/22 0542    sodium chloride flush 0.9 % injection 5-40 mL  5-40 mL IntraVENous 2 times per day Prentis Axe, DO   10 mL at 10/31/22 0830    sodium chloride flush 0.9 % injection 5-40 mL  5-40 mL IntraVENous PRN Prentis Axe, DO        0.9 % sodium chloride infusion   IntraVENous PRN Prentis Axe,  mL/hr at 10/31/22 0105 25 mL at 10/31/22 0105    ondansetron (ZOFRAN-ODT) disintegrating tablet 4 mg  4 mg Oral Q8H PRN Prentis Axe, DO        Or    ondansetron (ZOFRAN) injection 4 mg  4 mg IntraVENous Q6H PRN Prentis Axe, DO        polyethylene glycol (GLYCOLAX) packet 17 g  17 g Oral Daily PRN Prentis Axe, DO        acetaminophen (TYLENOL) tablet 650 mg  650 mg Oral Q6H PRN Prentis Axe, DO        Or    acetaminophen (TYLENOL) suppository 650 mg  650 mg Rectal Q6H PRN Prentis Axe, DO        magnesium sulfate 2000 mg in 50 mL IVPB premix  2,000 mg IntraVENous PRN Prentis Axe, DO        potassium chloride (KLOR-CON M) extended release tablet 40 mEq  40 mEq Oral PRN Prentis Axe, DO        Or    potassium bicarb-citric acid (EFFER-K) effervescent tablet 40 mEq  40 mEq Oral PRN Prentis Axe, DO        Or    potassium chloride 10 mEq/100 mL IVPB (Peripheral Line)  10 mEq IntraVENous PRN Prentis Axe, DO        cefTRIAXone (ROCEPHIN) 1,000 mg in dextrose 5 % 50 mL IVPB mini-bag  1,000 mg IntraVENous Q24H Prentis Axe, DO   Stopped at 10/31/22 0219    And    azithromycin (ZITHROMAX) 500 mg in D5W 250ml Vial Mate  500 mg IntraVENous Q24H Rehanatis Axe, DO Stopped at 10/31/22 0400    ipratropium-albuterol (DUONEB) nebulizer solution 1 ampule  1 ampule Inhalation TID Megan Rodgers DO   1 ampule at 10/31/22 7830    ipratropium-albuterol (DUONEB) nebulizer solution 1 ampule  1 ampule Inhalation Q4H PRN Megan Rodgers DO         Facility-Administered Medications Ordered in Other Encounters   Medication Dose Route Frequency Provider Last Rate Last Admin    oxyCODONE-acetaminophen (PERCOCET) 5-325 MG per tablet 1 tablet  1 tablet Oral Once Andrzej Rooney MD        lactated ringers infusion   IntraVENous Continuous Nixon Mcgowan MD        sodium chloride flush 0.9 % injection 10 mL  10 mL IntraVENous 2 times per day Nixon Mcgowan MD        sodium chloride flush 0.9 % injection 10 mL  10 mL IntraVENous PRN Nixon Mcgowan MD        0.9 % sodium chloride infusion  25 mL IntraVENous PRN Nixon Mcgowan MD           Past Medical History:   Diagnosis Date    Acute congestive heart failure ([facility] 12/30/2019    Allergic rhinitis 7/11/2016    Atrial fibrillation ([facility]     under care of cardiology:Dr. Excell Pandy Behrens([facility])    CKD (chronic kidney disease)     Nephro:Dr. Parker:stage 3    Class 2 obesity due to excess calories without serious comorbidity with body mass index (BMI) of 37.0 to 37.9 in adult 11/7/2017    Controlled type 2 diabetes mellitus without complication, without long-term current use of insulin ([facility] 2/24/2017    COPD     Essential tremor     Gout     Hyperlipidemia, mixed 07/11/2016    Hypertension     under care of cardiology:Dr. Excell Pandy Behrens([facility])    Long term current use of anticoagulants with INR goal of 2.0-3.0     under care of cardiology:Dr. Scott Behrens([facility])    Obstructive sleep apnea syndrome 06/18/2019    per pulmo    Primary insomnia 1/25/2017    Primary osteoarthritis of both knees 9/14/2021    Sleep apnea     uses bipap     Past Surgical History:   Procedure Laterality Date    AORTIC VALVE REPLACEMENT  10/1996:[facility] x3    ATRIAL ABLATION SURGERY  03/23/2020    CTI dependent atrial flutter ablation (Dr. Vanessa Jo)    [address]  03/23/2020    BIV upgrade    KNEE ARTHROCENTESIS Right 4/6/2022    RIGHT KNEE GENICULAR NERVE BLOCK WITH INTRA ARTICULAR INJECTION SITE CONFIRMED BY FLUOROSCOPY performed by Chioma Contreras MD at [address] [place] [address] ARTHROSCOPY Right 12/27/2021    RIGHT KNEE DIAGNOSTIC ARTHROSCOPY WITH SUBCHONDROPLASTY TO MEDIAL FEMORAL CONDYLE AND TIBIAL PLATEAU, LEFT KNEE INJECTION.  performed by Chioma Contreras MD at [place] [address] Right 04/06/2022    RIGHT KNEE GENICULAR NERVE BLOCK WITH INTRA ARTICULAR INJECTION SITE CONFIRMED BY FLUOROSCOPY    PACEMAKER INSERTION  1996     Family History   Problem Relation Age of Onset    Cancer Mother         Gallbladder    Asthma Father     Emphysema Father     No Known Problems Sister     No Known Problems Brother     No Known Problems Maternal Grandmother     No Known Problems Maternal Grandfather     No Known Problems Paternal Grandmother     No Known Problems Paternal Grandfather      Social History     Socioeconomic History    Marital status:      Spouse name: Not on file    Number of children: Not on file    Years of education: Not on file    Highest education level: Not on file   Occupational History    Occupation: Retired:(PT car prep)   Tobacco Use    Smoking status: Former     Packs/day: 1.00     Years: 30.00     Pack years: 30.00     Types: Cigarettes     Quit date: 6/24/2019     Years since quitting: 3.3    Smokeless tobacco: Never    Tobacco comments:         Vaping Use    Vaping Use: Never used   Substance and Sexual Activity    Alcohol use: Yes     Comment: 3-4 beers per year    Drug use: No    Sexual activity: Yes     Comment: -6 children    Other Topics Concern    Not on file   Social History Narrative    Not on file     Social Determinants of Health     Financial Resource Strain: Low Risk     Difficulty of Paying Living Expenses: Not hard at all   Food Insecurity: No Food Insecurity    Worried About Running Out of Food in the Last Year: Never true    Ran Out of Food in the Last Year: Never true   Transportation Needs: Not on file   Physical Activity: Inactive    Days of Exercise per Week: 0 days    Minutes of Exercise per Session: 0 min   Stress: Not on file   Social Connections: Not on file   Intimate Partner Violence: Not on file   Housing Stability: Not on file       Review of Systems:   Constitutional: there has been no unanticipated weight loss. There's been no change in energy level, sleep pattern, or activity level. Eyes: No visual changes or diplopia. No scleral icterus. ENT: No Headaches, hearing loss or vertigo. No mouth sores or sore throat. Cardiovascular: Reviewed in HPI  Respiratory: No cough or wheezing, no sputum production. No hematemesis. Gastrointestinal: No abdominal pain, appetite loss, blood in stools. No change in bowel or bladder habits. Genitourinary: No dysuria, trouble voiding, or hematuria. Musculoskeletal:  No gait disturbance, weakness or joint complaints. Integumentary: No rash or pruritis. Neurological: No headache, diplopia, change in muscle strength, numbness or tingling. No change in gait, balance, coordination, mood, affect, memory, mentation, behavior. Psychiatric: No anxiety, no depression. Endocrine: No malaise, fatigue or temperature intolerance. No excessive thirst, fluid intake, or urination. No tremor. Hematologic/Lymphatic: No abnormal bruising or bleeding, blood clots or swollen lymph nodes. Allergic/Immunologic: No nasal congestion or hives.     Physical Examination:    Vitals:    10/31/22 0400 10/31/22 0827 10/31/22 0937 10/31/22 1200   BP: 109/72 130/72  122/74   Pulse: 66 68 69 64   Resp: 16 16 16 16   Temp: 97.7 °F (36.5 °C) 98.3 °F (36.8 °C)  98.1 °F (36.7 °C)   TempSrc: Oral Oral  Oral   SpO2: 90% 92% 95% 94%   Weight: 286 lb 3.2 oz (129.8 kg)      Height: Body mass index is 37.76 kg/m². Wt Readings from Last 3 Encounters:   10/31/22 286 lb 3.2 oz (129.8 kg)   10/06/22 292 lb (132.5 kg)   09/29/22 290 lb 1.6 oz (131.6 kg)     BP Readings from Last 3 Encounters:   10/31/22 122/74   10/06/22 127/72   09/29/22 120/70     Constitutional and General Appearance:   WD/WN in NAD  HEENT:  NC/AT  SHELBY  No problems with hearing  Skin:  Warm, dry  Respiratory:  Normal excursion and expansion without use of accessory muscles  Resp Auscultation: Normal breath sounds without dullness  Cardiovascular: The apical impulses not displaced  Heart tones are crisp and normal  Cervical veins are not engorged  The carotid upstroke is normal in amplitude and contour without delay or bruit  JVP 10-11 cm H2O  RRR with nl S1 and S2 without m,r,g  Peripheral pulses are symmetrical and full  There is no clubbing, cyanosis of the extremities. Trace bilateral edema  Femoral Arteries: 2+ and equal  Pedal Pulses: 2+ and equal   Neck:  No thyromegaly  Abdomen:  No masses or tenderness  Liver/Spleen: No Abnormalities Noted  Neurological/Psychiatric:  Alert and oriented in all spheres  Moves all extremities well  Exhibits normal gait balance and coordination  No abnormalities of mood, affect, memory, mentation, or behavior are noted    Labs were reviewed including labs from other hospital systems through [facility]. Cardiac testing was reviewed including echos, nuclear scans, cardiac catheterization, including from other hospital systems through [facility]. Assessment:    1. Congestive heart failure, acute on chronic combined systolic and diastolic unspecified heart failure type ([address]    2. Pneumonia due to infectious organism, unspecified laterality, unspecified part of lung    3. Dyspnea and respiratory abnormalities    4. Hypoxia     5. CKD  6. Elevated BNP level    Plan:   Has diuresed well. Hold lasix for now.   Restart Entresto 49/51 bid tomorrow  Can likely switch back to po lasix tomorrow  If tolerates entresto tomorrow, increase to 97/103 bid and stop amlodipine. Continue sotalol  Recheck bnp level tomorrow  CHF education reinforced. ~salt restriction  ~fluid restriction  ~medication compliance  ~daily weights and notify of any significant weight gain/loss  ~establish with CHF nurse  ~outpatient follow-up with our CHF team    Can likely be discharged in the next 1-2 days if renal function stays stable. The patient was seen for > 70 minutes. I reviewed interval history, physical exam, review of data including labs, imaging, development and implementation of treatment plan and coordination of complex care. More than 50% of the time was devoted to counseling the patient on their diagnoses/treatments, as well as coordination of care with the other care teams        I appreciate the opportunity of cooperating in the care of this patient.     Pancho Robertson M.D., [address] [facility]

## 2022-10-31 NOTE — PROGRESS NOTES
[address]      Annabella Minor 1950    History:  Past Medical History:   Diagnosis Date    Acute congestive heart failure ([facility] [address] 12/30/2019    Allergic rhinitis 7/11/2016    Atrial fibrillation ([facility]     under care of cardiology:Dr. Dwain Huntington Behrens([facility])    CKD (chronic kidney disease)     Nephro:Dr. Parker:stage 3    Class 2 obesity due to excess calories without serious comorbidity with body mass index (BMI) of 37.0 to 37.9 in adult 11/7/2017    Controlled type 2 diabetes mellitus without complication, without long-term current use of insulin ([facility] 2/24/2017    COPD     Essential tremor     Gout     Hyperlipidemia, mixed 07/11/2016    Hypertension     under care of cardiology:Dr. Dwain Huntington Behrens([facility])    Long term current use of anticoagulants with INR goal of 2.0-3.0     under care of cardiology:Dr. Dwain Huntington Behrens([facility])    Obstructive sleep apnea syndrome 06/18/2019    per pulmo    Primary insomnia 1/25/2017    Primary osteoarthritis of both knees 9/14/2021    Sleep apnea     uses bipap       ECHO:  Echo pending    12/11/20      Summary   -Left ventricular cavity size is normal.   -Overall left ventricular systolic function appears mildly reduced.   -Ejection fraction is visually estimated to be 45%. -There is abnormal septal motion/bounce noted. -Indeterminate diastolic function E/e' = 05.6. Peralta Colon -A mechanical artificial aortic valve appears well seated with a maximum   velocity of 2.1m/s and a mean gradient of 9mmHg.   -No evidence of aortic valve regurgitation.   -Trivial-mild mitral regurgitation.   -Mild tricuspid regurgitation.   -The right ventricle is normal in size with reduces function. TAPSE 1.9cm.    RVS velocity is 6.94 cm/s.   -Pacer/ICD right heart       ACE/ARB/ARNi: entresto on hold with CLAUDIO  BB: sotalol 80 mg bid  Aldosterone Antagonist: not on  SGLT2: not on    History of sleep apnea: Yes  Type: kaylynn   Equipment used at home: has bipap at home--compliant with use    DM History: No    Last Hospital Admission: 19 with CHF  Code Status: full   Discharge plans: from home    Family Present: sabrina Hernandez was admitted to the hospital with increased shortness of breath. Patient states HF is not a new diagnosis. He follows with HF NP as an outpatient. Patient was seen beginning of October. Weight had increased 4 lb. Diuretics were increased as an outpatient. He did see short improvement but continued with symptoms. He did not call with symptoms. He weighs daily. Felt baseline was 288 lb. Today down to 286 lb. Discussed establishing new baseline. He does try to restrict sodium and fluids in his diet. He is compliant with medications and follow up visits    Patient provided with both written and verbal education on CHF signs/ symptoms, causes, discharge medications, daily weights, low sodium diet, activity, and follow-up. Pt to call if gains 3 pounds in one day or 5 pounds in one week. Mutually agreed upon goals were discussed such as calling the MD as soon as they recognize symptoms and weight gain, maintaining proper diet, taking medications as prescribed, joining cardiac rehab when able. Also reviewed importance of risk factor reduction. Patient provided with CHF Zone Management tool and CHF symptoms magnet. Discussed importance of lifestyle changes: encouraged calling with symptoms    PATIENT/CAREGIVER TEACHING:    Level of patient/caregiver understanding able to:   [x ] Verbalize understanding [ ] Demonstrate understanding [ ] Teach back   [ ] Needs reinforcement [ ] Other:       Time spent teachin mins    1. WEIGHT: Admit Weight: 287 lb (130.2 kg)      Today  Weight: 286 lb 3.2 oz (129.8 kg)   2. I/O   Intake/Output Summary (Last 24 hours) at 10/31/2022 1259  Last data filed at 10/31/2022 1133  Gross per 24 hour   Intake 480 ml   Output 4025 ml   Net -3545 ml       Recommendations:   1.  He will need a one week hospital follow up at discharge  2. Educate further on fluid restriction 48 oz- 64 oz during inpatient stay so he can understand how to measure intake at home. 3. Continue to educate on S/S.   4. Emphasize daily weights, diet, and knowing when and who to call  5. Provided patient with CHF Resource Line for questions and concerns.          SIMONE KELLER RN 10/31/2022 12:59 PM

## 2022-10-31 NOTE — PROGRESS NOTES
Clinical Pharmacy Note: Warfarin    Guille Restrepo is a 71 y.o. male  is receiving warfarin for AFIB. INR (no units)   Date Value   10/31/2022 3.02 (H)   10/30/2022 2.22 (H)   10/30/2022 2.21 (H)       Plan: Hold warfarin today for supra- therapeutic INR. Placeholder in [address] [place] [address] Rd. Daily INR is ordered. Will continue to monitor. Per pharmacy consult.   MICHELL Mead [facility] - [place] PharmD 10/31/2022

## 2022-11-01 PROBLEM — I48.0 PAF (PAROXYSMAL ATRIAL FIBRILLATION) (HCC): Status: ACTIVE | Noted: 2022-11-01

## 2022-11-01 LAB
ALBUMIN SERPL-MCNC: 3.9 G/DL (ref 3.4–5)
ANION GAP SERPL CALCULATED.3IONS-SCNC: 7 MMOL/L (ref 3–16)
BASOPHILS ABSOLUTE: 0.1 K/UL (ref 0–0.2)
BASOPHILS RELATIVE PERCENT: 0.8 %
BUN BLDV-MCNC: 39 MG/DL (ref 7–20)
CALCIUM SERPL-MCNC: 9.5 MG/DL (ref 8.3–10.6)
CHLORIDE BLD-SCNC: 101 MMOL/L (ref 99–110)
CO2: 33 MMOL/L (ref 21–32)
CREAT SERPL-MCNC: 1.6 MG/DL (ref 0.8–1.3)
EOSINOPHILS ABSOLUTE: 0.1 K/UL (ref 0–0.6)
EOSINOPHILS RELATIVE PERCENT: 2 %
GFR SERPL CREATININE-BSD FRML MDRD: 46 ML/MIN/{1.73_M2}
GLUCOSE BLD-MCNC: 114 MG/DL (ref 70–99)
GLUCOSE BLD-MCNC: 119 MG/DL (ref 70–99)
GLUCOSE BLD-MCNC: 122 MG/DL (ref 70–99)
GLUCOSE BLD-MCNC: 123 MG/DL (ref 70–99)
GLUCOSE BLD-MCNC: 137 MG/DL (ref 70–99)
HCT VFR BLD CALC: 39.9 % (ref 40.5–52.5)
HEMOGLOBIN: 13.1 G/DL (ref 13.5–17.5)
INR BLD: 2.59 (ref 0.87–1.14)
LYMPHOCYTES ABSOLUTE: 1.4 K/UL (ref 1–5.1)
LYMPHOCYTES RELATIVE PERCENT: 21.4 %
MCH RBC QN AUTO: 30.3 PG (ref 26–34)
MCHC RBC AUTO-ENTMCNC: 32.9 G/DL (ref 31–36)
MCV RBC AUTO: 92.2 FL (ref 80–100)
MONOCYTES ABSOLUTE: 0.8 K/UL (ref 0–1.3)
MONOCYTES RELATIVE PERCENT: 11.7 %
NEUTROPHILS ABSOLUTE: 4.3 K/UL (ref 1.7–7.7)
NEUTROPHILS RELATIVE PERCENT: 64.1 %
PDW BLD-RTO: 14.2 % (ref 12.4–15.4)
PERFORMED ON: ABNORMAL
PHOSPHORUS: 4.4 MG/DL (ref 2.5–4.9)
PLATELET # BLD: 234 K/UL (ref 135–450)
PMV BLD AUTO: 8.6 FL (ref 5–10.5)
POTASSIUM SERPL-SCNC: 4.3 MMOL/L (ref 3.5–5.1)
PRO-BNP: 254 PG/ML (ref 0–124)
PROTHROMBIN TIME: 27.9 SEC (ref 11.7–14.5)
RBC # BLD: 4.33 M/UL (ref 4.2–5.9)
REASON FOR REJECTION: NORMAL
REASON FOR REJECTION: NORMAL
REJECTED TEST: NORMAL
REJECTED TEST: NORMAL
SODIUM BLD-SCNC: 141 MMOL/L (ref 136–145)
WBC # BLD: 6.7 K/UL (ref 4–11)

## 2022-11-01 PROCEDURE — 2580000003 HC RX 258: Performed by: STUDENT IN AN ORGANIZED HEALTH CARE EDUCATION/TRAINING PROGRAM

## 2022-11-01 PROCEDURE — 1200000000 HC SEMI PRIVATE

## 2022-11-01 PROCEDURE — 85610 PROTHROMBIN TIME: CPT

## 2022-11-01 PROCEDURE — 6370000000 HC RX 637 (ALT 250 FOR IP): Performed by: NURSE PRACTITIONER

## 2022-11-01 PROCEDURE — 99233 SBSQ HOSP IP/OBS HIGH 50: CPT | Performed by: NURSE PRACTITIONER

## 2022-11-01 PROCEDURE — 6370000000 HC RX 637 (ALT 250 FOR IP): Performed by: PHYSICIAN ASSISTANT

## 2022-11-01 PROCEDURE — 97530 THERAPEUTIC ACTIVITIES: CPT

## 2022-11-01 PROCEDURE — 6370000000 HC RX 637 (ALT 250 FOR IP): Performed by: STUDENT IN AN ORGANIZED HEALTH CARE EDUCATION/TRAINING PROGRAM

## 2022-11-01 PROCEDURE — 85025 COMPLETE CBC W/AUTO DIFF WBC: CPT

## 2022-11-01 PROCEDURE — 2700000000 HC OXYGEN THERAPY PER DAY

## 2022-11-01 PROCEDURE — 36415 COLL VENOUS BLD VENIPUNCTURE: CPT

## 2022-11-01 PROCEDURE — 99232 SBSQ HOSP IP/OBS MODERATE 35: CPT | Performed by: INTERNAL MEDICINE

## 2022-11-01 PROCEDURE — 80069 RENAL FUNCTION PANEL: CPT

## 2022-11-01 PROCEDURE — 94640 AIRWAY INHALATION TREATMENT: CPT

## 2022-11-01 PROCEDURE — 97161 PT EVAL LOW COMPLEX 20 MIN: CPT

## 2022-11-01 PROCEDURE — 6360000002 HC RX W HCPCS: Performed by: STUDENT IN AN ORGANIZED HEALTH CARE EDUCATION/TRAINING PROGRAM

## 2022-11-01 PROCEDURE — 94660 CPAP INITIATION&MGMT: CPT

## 2022-11-01 PROCEDURE — 97116 GAIT TRAINING THERAPY: CPT

## 2022-11-01 PROCEDURE — 83880 ASSAY OF NATRIURETIC PEPTIDE: CPT

## 2022-11-01 PROCEDURE — 94761 N-INVAS EAR/PLS OXIMETRY MLT: CPT

## 2022-11-01 PROCEDURE — 6370000000 HC RX 637 (ALT 250 FOR IP): Performed by: INTERNAL MEDICINE

## 2022-11-01 PROCEDURE — 97165 OT EVAL LOW COMPLEX 30 MIN: CPT

## 2022-11-01 RX ORDER — GUAIFENESIN 600 MG/1
600 TABLET, EXTENDED RELEASE ORAL 2 TIMES DAILY
Status: DISCONTINUED | OUTPATIENT
Start: 2022-11-01 | End: 2022-11-03 | Stop reason: HOSPADM

## 2022-11-01 RX ORDER — FUROSEMIDE 40 MG/1
40 TABLET ORAL DAILY
Status: DISCONTINUED | OUTPATIENT
Start: 2022-11-01 | End: 2022-11-03 | Stop reason: HOSPADM

## 2022-11-01 RX ORDER — WARFARIN SODIUM 5 MG/1
10 TABLET ORAL DAILY
Status: DISCONTINUED | OUTPATIENT
Start: 2022-11-01 | End: 2022-11-03 | Stop reason: HOSPADM

## 2022-11-01 RX ADMIN — ALLOPURINOL 300 MG: 300 TABLET ORAL at 09:47

## 2022-11-01 RX ADMIN — ASPIRIN 81 MG 81 MG: 81 TABLET ORAL at 09:47

## 2022-11-01 RX ADMIN — Medication 1000 UNITS: at 09:47

## 2022-11-01 RX ADMIN — Medication 10 ML: at 20:35

## 2022-11-01 RX ADMIN — IPRATROPIUM BROMIDE AND ALBUTEROL SULFATE 1 AMPULE: 2.5; .5 SOLUTION RESPIRATORY (INHALATION) at 19:30

## 2022-11-01 RX ADMIN — IPRATROPIUM BROMIDE AND ALBUTEROL SULFATE 1 AMPULE: 2.5; .5 SOLUTION RESPIRATORY (INHALATION) at 13:52

## 2022-11-01 RX ADMIN — Medication 10 ML: at 09:48

## 2022-11-01 RX ADMIN — GEMFIBROZIL 600 MG: 600 TABLET ORAL at 06:17

## 2022-11-01 RX ADMIN — SACUBITRIL AND VALSARTAN 1 TABLET: 49; 51 TABLET, FILM COATED ORAL at 20:35

## 2022-11-01 RX ADMIN — SACUBITRIL AND VALSARTAN 1 TABLET: 49; 51 TABLET, FILM COATED ORAL at 09:47

## 2022-11-01 RX ADMIN — ATORVASTATIN CALCIUM 40 MG: 40 TABLET, FILM COATED ORAL at 20:35

## 2022-11-01 RX ADMIN — WARFARIN SODIUM 10 MG: 5 TABLET ORAL at 16:47

## 2022-11-01 RX ADMIN — AZITHROMYCIN MONOHYDRATE 500 MG: 500 INJECTION, POWDER, LYOPHILIZED, FOR SOLUTION INTRAVENOUS at 00:46

## 2022-11-01 RX ADMIN — IPRATROPIUM BROMIDE AND ALBUTEROL SULFATE 1 AMPULE: 2.5; .5 SOLUTION RESPIRATORY (INHALATION) at 09:04

## 2022-11-01 RX ADMIN — SOTALOL HYDROCHLORIDE 80 MG: 80 TABLET ORAL at 09:47

## 2022-11-01 RX ADMIN — GUAIFENESIN 600 MG: 600 TABLET, EXTENDED RELEASE ORAL at 20:35

## 2022-11-01 RX ADMIN — FUROSEMIDE 40 MG: 40 TABLET ORAL at 12:10

## 2022-11-01 RX ADMIN — SOTALOL HYDROCHLORIDE 80 MG: 80 TABLET ORAL at 20:35

## 2022-11-01 RX ADMIN — GEMFIBROZIL 600 MG: 600 TABLET ORAL at 16:47

## 2022-11-01 NOTE — PLAN OF CARE
Problem: Discharge Planning  Goal: Discharge to home or other facility with appropriate resources  Outcome: Progressing     Problem: Safety - Adult  Goal: Free from fall injury  Outcome: Progressing     Problem: ABCDS Injury Assessment  Goal: Absence of physical injury  Outcome: Progressing     Problem: Respiratory - Adult  Goal: Achieves optimal ventilation and oxygenation  Outcome: Progressing  Flowsheets (Taken 11/1/2022 1402)  Achieves optimal ventilation and oxygenation:   Assess for changes in respiratory status   Assess and instruct to report shortness of breath or any respiratory difficulty  Note: Pt remains on 2L O2. Unable to wean to room air.

## 2022-11-01 NOTE — PROGRESS NOTES
[facility] PROGRESS NOTE    11/1/2022 7:10 PM        Name: Jeff Carroll . Admitted: 10/29/2022  Primary Care Provider: Scarlet Patino MD (Tel: [phone])      Subjective:  . Admitted with HF and possible CAP. Feeling better today than yesterday. Remains on 2L. Lives at home with wife, children, grandchildren, great grandchildren. No one else in house is ill. Continues to have productive cough.      Reviewed interval ancillary notes    Current Medications  furosemide (LASIX) tablet 40 mg, Daily  warfarin (COUMADIN) tablet 10 mg, Daily  guaiFENesin (MUCINEX) extended release tablet 600 mg, BID  sacubitril-valsartan (ENTRESTO) 49-51 MG per tablet 1 tablet, BID  glucose-vitamin C chewable tablet 4 tablet, PRN  dextrose bolus 10% 125 mL, PRN   Or  dextrose bolus 10% 250 mL, PRN  glucagon (rDNA) injection 1 mg, PRN  dextrose 10 % infusion, Continuous PRN  insulin lispro (HUMALOG) injection vial 0-8 Units, TID WC  insulin lispro (HUMALOG) injection vial 0-4 Units, Nightly  perflutren lipid microspheres (DEFINITY) injection 1.65 mg, ONCE PRN  allopurinol (ZYLOPRIM) tablet 300 mg, Daily  amLODIPine (NORVASC) tablet 10 mg, Daily  aspirin chewable tablet 81 mg, Daily  sotalol (BETAPACE) tablet 80 mg, BID  Vitamin D (CHOLECALCIFEROL) tablet 1,000 Units, Daily  atorvastatin (LIPITOR) tablet 40 mg, Nightly  gemfibrozil (LOPID) tablet 600 mg, BID AC  sodium chloride flush 0.9 % injection 5-40 mL, 2 times per day  sodium chloride flush 0.9 % injection 5-40 mL, PRN  0.9 % sodium chloride infusion, PRN  ondansetron (ZOFRAN-ODT) disintegrating tablet 4 mg, Q8H PRN   Or  ondansetron (ZOFRAN) injection 4 mg, Q6H PRN  polyethylene glycol (GLYCOLAX) packet 17 g, Daily PRN  acetaminophen (TYLENOL) tablet 650 mg, Q6H PRN   Or  acetaminophen (TYLENOL) suppository 650 mg, Q6H PRN  magnesium sulfate 2000 mg in 50 mL IVPB premix, PRN  potassium chloride (KLOR-CON M) extended release tablet 40 mEq, PRN   Or  potassium bicarb-citric acid (EFFER-K) effervescent tablet 40 mEq, PRN   Or  potassium chloride 10 mEq/100 mL IVPB (Peripheral Line), PRN  cefTRIAXone (ROCEPHIN) 1,000 mg in dextrose 5 % 50 mL IVPB mini-bag, Q24H   And  azithromycin (ZITHROMAX) 500 mg in D5W 250ml Vial Mate, Q24H  ipratropium-albuterol (DUONEB) nebulizer solution 1 ampule, TID  ipratropium-albuterol (DUONEB) nebulizer solution 1 ampule, Q4H PRN    oxyCODONE-acetaminophen (PERCOCET) 5-325 MG per tablet 1 tablet, Once  lactated ringers infusion, Continuous  sodium chloride flush 0.9 % injection 10 mL, 2 times per day  sodium chloride flush 0.9 % injection 10 mL, PRN  0.9 % sodium chloride infusion, PRN      Objective:  /69   Pulse 68   Temp 97.7 °F (36.5 °C) (Oral)   Resp 16   Ht 6' 1\" (1.854 m)   Wt 273 lb 14.4 oz (124.2 kg)   SpO2 93%   BMI 36.14 kg/m²     Intake/Output Summary (Last 24 hours) at 11/1/2022 1910  Last data filed at 11/1/2022 1826  Gross per 24 hour   Intake 0 ml   Output 2055 ml   Net -2055 ml        Wt Readings from Last 3 Encounters:   11/01/22 273 lb 14.4 oz (124.2 kg)   10/06/22 292 lb (132.5 kg)   09/29/22 290 lb 1.6 oz (131.6 kg)       General appearance:  Appears comfortable, alert and pleasant , obese   Eyes: Sclera clear. Pupils equal.  ENT: Moist oral mucosa. Trachea midline, no adenopathy. Cardiovascular: Regular rhythm, normal S1, S2. No murmur. no edema in lower extremities  Respiratory: Not using accessory muscles. Good inspiratory effort. Crackles LLL, decrease RLL   GI: Abdomen soft, no tenderness, not distended, normal bowel sounds  Musculoskeletal: No cyanosis in digits, neck supple  Neurology: CN 2-12 grossly intact. No speech or motor deficits  Psych: Normal affect.  Alert and oriented in time, place and person  Skin: Warm, dry, normal turgor    Labs and Tests:  CBC:   Recent Labs     10/30/22  0531 10/31/22  0585 11/01/22  0718   WBC 6.8 11.0 6.7   HGB 12.6* 12.7* 13.1*    256 234       BMP:    Recent Labs     10/30/22  0531 10/31/22  0529 11/01/22 0718    140 141   K 4.2 4.5 4.3    98* 101   CO2 28 31 33*   BUN 28* 35* 39*   CREATININE 1.5* 1.6* 1.6*   GLUCOSE 185* 150* 137*       Hepatic:   Recent Labs     10/30/22  0003   AST 18   ALT 17   BILITOT 0.3   ALKPHOS 112       INR 3  Procal is low     CT chest:    Opacification of airways in the right lower lobe with dense consolidative   change. Pattern suspected to represent developing pneumonia. Atypical or   viral pattern of pneumonitis cannot be excluded. 2. Cardiomegaly with dilation of the distal aortic arch in a patient with   prior history of AVR. ECHO December 2020   Summary   -Left ventricular cavity size is normal.   -Overall left ventricular systolic function appears mildly reduced.   -Ejection fraction is visually estimated to be 45%. -There is abnormal septal motion/bounce noted. -Indeterminate diastolic function E/e' = 80.4. [place] -A mechanical artificial aortic valve appears well seated with a maximum   velocity of 2.1m/s and a mean gradient of 9mmHg.   -No evidence of aortic valve regurgitation.   -Trivial-mild mitral regurgitation.   -Mild tricuspid regurgitation.   -The right ventricle is normal in size with reduces function. TAPSE 1.9cm.    RVS velocity is 6.94 cm/s.   -Pacer/ICD right heart    INR 2.2       AIC 6 %      Problem List  Principal Problem:    Acute respiratory failure with hypoxia (HCC)  Active Problems:    HFrEF (heart failure with reduced ejection fraction) ([facility] [facility]    Hypertension associated with stage 3 chronic kidney disease due to type 2 diabetes mellitus ([facility] [facility]    CAP (community acquired pneumonia)    Acute systolic CHF (congestive heart failure) (HCC)    Acute on chronic diastolic (congestive) heart failure (HCC)    Dyspnea and respiratory abnormalities    Hypoxia    PAF (paroxysmal atrial fibrillation) ([facility] [address] Hyperlipidemia associated with type 2 diabetes mellitus ([facility] [address]    Long term current use of anticoagulants with INR goal of 2.0-3.0    Type 2 diabetes mellitus, without long-term current use of insulin (HCC)    COPD (chronic obstructive pulmonary disease) (HCC)    Congestive heart failure (HCC)    Cardiomyopathy, dilated (HCC)    Pacemaker    Typical atrial flutter (HCC)    Stage 3a chronic kidney disease ([facility] [address]  Resolved Problems:    * No resolved hospital problems. *       Assessment & Plan:   Acute hypoxic respiratory failure likely due to HF and possible cap:  he has received IV lasix and diuresed 7.7 liters. He has been switched to po lasix. Entresto restarted. No aldactone due to hyperkalemia. Possible  Pneumonitis, sed rate 69 , CRP 48,  will ask pulmonary to follow. in light of dense RLL consolidative changes. Viral panel is negative. BC with no growth. Pro adriane is low  currently on rocephin and zithromax. Could likely d/c antibiotics. S/P AV replacement, also with pacer and AF:   AC with coumadin. INR 3. Hold coumadin today. RX is dosing  on betapace  CKD:  follows with Dr Ashish Orosco. Creat 1. 6. will repeat labs in am. If worsened consult nephro. HTN:  had some hypotension during the night. Norvasc stopped per cards. T2DM:  BG values in range. AIC 6% in September demonstrates excellent control , add humalog correction for steroid induced hyperglycemia   COPD:  stable on current inhaled therapy   HLD : on statin       Diet: ADULT DIET; Regular; 4 carb choices (60 gm/meal);  Low Sodium (2 gm); 2000 ml  Code:Full Code  DVT PPX      Jose Ramon Richter PA-C   11/1/2022 7:10 PM

## 2022-11-01 NOTE — PROGRESS NOTES
[facility] [facility]   Phone: [phone]    Occupational Therapy    [x] Initial Evaluation            [] Daily Treatment Note         [x] Discharge Summary      Patient: Anatoliy Randolph   : 1950   MRN: [id number]   Date of Service:  2022    Admitting Diagnosis:  Acute respiratory failure with hypoxia [facility])  Current Admission Summary: Patient presents with increasing shortness of breath over the last few days, also admits to having gained at least 5 pounds in weight over the last few days. Also has had a cough with mucoid phlegm. Denies any chest pain. Admits to pedal edema. Noted to have hypoxia-originally required up to 4 L O2.  CT chest suggestive of right lower lobe infiltrate and hence pulmonary consultation has been requested. History of CHF, EF 40 to 45%, congenital aortic stenosis status post AVR on Coumadin, CKD stage III, paroxysmal atrial fibrillation and ROSETTA on BiPAP. Former smoker quit over 15 years ago-1-1/2 packs/day x 30 years. No prior history of COPD or use of inhalers. Patient states that he received pneumonia vaccination few weeks ago. Denies any prior history of pneumonia. Past Medical History:  has a past medical history of Acute congestive heart failure ([facility]), Allergic rhinitis, Atrial fibrillation ([facility]), CKD (chronic kidney disease), Class 2 obesity due to excess calories without serious comorbidity with body mass index (BMI) of 37.0 to 37.9 in adult, Controlled type 2 diabetes mellitus without complication, without long-term current use of insulin ([facility]), COPD, Essential tremor, Gout, Hyperlipidemia, mixed, Hypertension, Long term current use of anticoagulants with INR goal of 2.0-3.0, Obstructive sleep apnea syndrome, Primary insomnia, Primary osteoarthritis of both knees, and Sleep apnea. Past Surgical History:  has a past surgical history that includes Aortic valve replacement (10/1996:[facility]);  Pacemaker insertion (); Atrial ablation surgery (03/23/2020); Cardiac pacemaker placement (03/23/2020); Knee arthroscopy (Right, 12/27/2021); knee surgery (Right, 04/06/2022); and Knee Arthrocentesis (Right, 4/6/2022). Discharge Recommendations: Tab Polanco scored a 24/24 on the [facility] ADL Inpatient form. At this time, no further OT is recommended upon discharge due to patient at independent level. Recommend patient returns to prior setting with prior services. DME Required For Discharge: No DME required    Precautions/Restrictions: medium fall risk  Positional Restrictions:no positional restrictions    Pre-Admission Information   Lives With: spouse                  Type of Home: house  Home Layout: one level  Home Access: level entry  [facility] 45: tub/shower unit  Bathroom Equipment: hand held shower head  Toilet Height: standard height  Home Equipment: standard walker, single point cane  Transfer Assistance: Independent without use of device  Ambulation Assistance: independent in the house; MOD I with SPC in the community  ADL Assistance: independent with all ADL's  IADL Assistance:  shares homemaking tasks with family  Active :        [x] Yes                 [] No  Hand Dominance: [] Left                 [x] Right  Current Employment: retired. Occupation:  and metal finishing  Hobbies: reading  Recent Falls: Pt reports no recent falls    Examination   Vision:   Vision Gross Assessment: Impaired and Vision Corrective Device: wears glasses at all times  Hearing:   hard of hearing  Sensation:   WFL  ROM:   (B) UE ROM WFL  Strength:   (B) UE gross strength WFL    Decision Making: low complexity  Clinical Presentation: stable      Subjective  General: Pt supine in bed with brother present upon arrival. Agreeable to therapy. Pt 88-93% Sp02 on 2L throughout entire session.   Pain: 0/10  Pain Interventions: not applicable        Activities of Daily Living  Basic Activities of Daily Living  Lower Extremity Dressing: Independent Comment: Donning shoes  General Comments: Pt denied all other ADLs offered, stating he did them already  Instrumental Activities of Daily Living  No IADL completed on this date. Functional Mobility  Bed Mobility  Supine to Sit: modified independent  Comments:  Transfers  Sit to stand transfer:supervision  Stand to sit transfer: supervision  Bed / Chair transfer: supervision. Comments: EOB > recliner  Functional Mobility:  Sitting Balance: Independent. Standing Balance: supervision. Functional Mobility: . supervision  Functional Mobility Activity: to/from hallway-- Refer to PT note for distance  Functional Mobility Device Use: no device    Other Therapeutic Interventions    Functional Outcomes  AM-PAC Inpatient Daily Activity Raw Score: 24    Cognition  WFL  Orientation:    A&O x 4  Command Following:   [facility]     Education  Barriers To Learning: none  Patient Education: Patient educated on goals, OT role and benefits, plan of care, ADL adaptive strategies, IADL safety, discharge recommendations  Learning Assessment:  Patient verbalized and demonstrates understanding    Assessment  Impairments Requiring Therapeutic Intervention: none - eval with same day discharge  Prognosis: good without need for therapy intervention  Clinical Assessment: Patient presenting at independent level for completion of required self care tasks for return to home. Eval with d/c at this time. No therapy services indicated. Safety Interventions: patient left in chair, call light within reach, patient at risk for falls, nurse notified, family/caregiver present, and RN aware chair alarm is not set    Plan  Frequency: Eval with same day discharge. No follow up required. Current Treatment Recommendations: Not applicable, evaluation completed with same day discharge. Goals  Patient eval with same day discharge. No goals set as patient is at baseline self care status.       Therapy Session Time Individual Group Co-treatment   Time In    0312   Time Out    1354   Minutes    30        Timed Code Treatment Minutes:   15  Total Treatment Minutes:  PILY Mojica/NAHID  Electronically Signed By: Petey Goyal OT    OT provided direct supervision to student, facilitated in making skilled judgements throughout duration of session.     CAMRYN Willis OTR/L [id number]

## 2022-11-01 NOTE — PROGRESS NOTES
Clinical Pharmacy Note: Ramirez Funk is a 71 y.o. male  is receiving warfarin for afib. INR (no units)   Date Value   11/01/2022 2.59 (H)   10/31/2022 3.02 (H)   10/30/2022 2.22 (H)     Warfarin had been on hold. Plan: resume warfarin at lower weekly total: warfarin 10mg daily while on antibiotics. .   Daily INR is ordered. Will continue to monitor. Per pharmacy consult.   MICHELL Villagran [facility] PharmD 11/1/2022

## 2022-11-01 NOTE — PROGRESS NOTES
[address]  HEART FAILURE  Progress Note      Admit Date 10/29/2022     Reason for Consult:      Reason for Consultation/Chief Complaint: SOB    HPI:    Annabella Minor is a 71 y.o. male with PMH congenital AS with AVR, DM, HTN, PAF, COPD, ROSETTA treated, AVB and PPM, BIV lead implant 2020 admitted with SOB. He has diuresed about 6L. He has had some trouble affording his meds as an outpt so has been off his inhalers. Subjective:  Patient is being seen for CHF. Pt with some hypotension this am  Today Mr. Fer Sweet denies chest pain or palpitations, states SOB improving but still SOB off O2, c/o productive cough, edema improved. I spent 10 minutes educating the patient on heart failure, medications, lifestyle modifications and dietary guidelines. Review of Systems - General ROS: negative  Hematological and Lymphatic ROS: negative  Respiratory ROS: positive for - cough and shortness of breath  Cardiovascular ROS: negative  Gastrointestinal ROS: no abdominal pain, change in bowel habits, or black or bloody stools  Musculoskeletal ROS: negative  Neurological ROS: no TIA or stroke symptoms     Baseline Weight: [employer]   Wt Readings from Last 3 Encounters:   11/01/22 273 lb 14.4 oz (124.2 kg)   10/06/22 292 lb (132.5 kg)   09/29/22 290 lb 1.6 oz (131.6 kg)         Cardiac Testing:   Echo:  12/11/20:   Summary   -Left ventricular cavity size is normal.   -Overall left ventricular systolic function appears mildly reduced.   -Ejection fraction is visually estimated to be 45%. -There is abnormal septal motion/bounce noted. -Indeterminate diastolic function E/e' = 97.6. [place] -A mechanical artificial aortic valve appears well seated with a maximum   velocity of 2.1m/s and a mean gradient of 9mmHg.   -No evidence of aortic valve regurgitation.   -Trivial-mild mitral regurgitation.   -Mild tricuspid regurgitation.   -The right ventricle is normal in size with reduces function. TAPSE 1.9cm.    RVS velocity is 6.94 cm/s.   -Pacer/ICD right heart    Device: BiV PM    Core measures for HF:  EF: 45%   ACEi/ARB/ARNI: entresto  BB:  sotolol  Bharath:  none for hx hyperkalemia  Hydralazine/nitrates:  SGLT2i:  none for cost    NYHA Class III      Objective:   /63   Pulse 72   Temp 97.9 °F (36.6 °C)   Resp 17   Ht 6' 1\" (1.854 m)   Wt 273 lb 14.4 oz (124.2 kg)   SpO2 91%   BMI 36.14 kg/m²     Intake/Output Summary (Last 24 hours) at 11/1/2022 0900  Last data filed at 11/1/2022 0037  Gross per 24 hour   Intake 300 ml   Output 3080 ml   Net -2780 ml      In: 540 [P.O.:540]  Out: 3905       Physical Exam:  General Appearance:  Well Nourished  Respiratory:  Resp Auscultation: CTA, wet cough  Cardiovascular:   Auscultation: Regular rate and rhythm, normal S1S2, no m/g/r/c  Palpation: Normal    Pedal Pulses: 2+ and equal   Abdomen:  Soft, NT, ND, + bs  Extremities:  No Cyanosis or Clubbing  Extremities: negative  Neurological/Psychiatric:  Oriented to time, place, and person  Non-anxious    MEDICATIONS:   Scheduled Meds:   Scheduled Meds:   warfarin placeholder: dosing by pharmacy   Other RX Placeholder    [Held by provider] furosemide  40 mg IntraVENous Daily    sacubitril-valsartan  1 tablet Oral BID    insulin lispro  0-8 Units SubCUTAneous TID WC    insulin lispro  0-4 Units SubCUTAneous Nightly    allopurinol  300 mg Oral Daily    amLODIPine  10 mg Oral Daily    aspirin  81 mg Oral Daily    sotalol  80 mg Oral BID    Vitamin D  1,000 Units Oral Daily    atorvastatin  40 mg Oral Nightly    gemfibrozil  600 mg Oral BID AC    sodium chloride flush  5-40 mL IntraVENous 2 times per day    cefTRIAXone (ROCEPHIN) IV  1,000 mg IntraVENous Q24H    And    azithromycin  500 mg IntraVENous Q24H    ipratropium-albuterol  1 ampule Inhalation TID     Continuous Infusions:   dextrose      sodium chloride 25 mL (10/31/22 6027)     PRN Meds:.glucose, dextrose bolus **OR** dextrose bolus, glucagon (rDNA), dextrose, perflutren lipid microspheres, New York Heart Association (NYHA) class II and III HF and iron deficiency(ferritin <100 ng/ml or 100-300 ng/ml if transferrin saturation <20%), to improve functional status and QoL. 1. WEIGHT: Admit Weight: 287 lb (130.2 kg)      Today  Weight: 273 lb 14.4 oz (124.2 kg)   2. I/O   Intake/Output Summary (Last 24 hours) at 11/1/2022 0900  Last data filed at 11/1/2022 0037  Gross per 24 hour   Intake 300 ml   Output 3080 ml   Net -2780 ml           Assessment/Plan:     AHF - 6L out with IV lasix, BNP improved, stop IV lasix and change to po today  CMP- entresto restarted, continue BB, no kaci for hx hyperkalemia, no SGLT2i for cost  HTN- some hypotension this am, stop norvasc and increase entresto if needed for BP control  AF - controlled on sotolol, continue [facility]      The patient was seen for 36-39 minutes. I reviewed interval history, physical exam, review of data including labs, imaging, development and implementation of treatment plan and coordination of complex care.  More than 50% of the time was devoted to counseling the patient on their diagnoses/treatments, as well as coordination of care with the other care teams    I appreciate the opportunity of cooperating in the care of this individual.    Verónica Cr, APRN - CNP, ACNP, AGPCNP 11/1/2022, 9:00 AM  Heart Failure  The [address], [address]  Ph: [phone]      Core Measures:   Discharge instructions:   LVEF documented:   ACEI for LV dysfunction:   Smoking Cessation:

## 2022-11-01 NOTE — PROGRESS NOTES
[facility] [address] Department   Phone: [phone]    Physical Therapy    [x] Initial Evaluation            [] Daily Treatment Note         [x] Discharge Summary      Patient: Livan Funk   : 1950   MRN: [id number]   Date of Service:  2022  Admitting Diagnosis: Acute respiratory failure with hypoxia [facility])  Current Admission Summary: Patient presents with increasing shortness of breath over the last few days, also admits to having gained at least 5 pounds in weight over the last few days. Also has had a cough with mucoid phlegm. Denies any chest pain. Admits to pedal edema. Noted to have hypoxia-originally required up to 4 L O2.  CT chest suggestive of right lower lobe infiltrate and hence pulmonary consultation has been requested. Past Medical History:  has a past medical history of Acute congestive heart failure ([address] Allergic rhinitis, Atrial fibrillation ([address] CKD (chronic kidney disease), Class 2 obesity due to excess calories without serious comorbidity with body mass index (BMI) of 37.0 to 37.9 in adult, Controlled type 2 diabetes mellitus without complication, without long-term current use of insulin ([address] COPD, Essential tremor, Gout, Hyperlipidemia, mixed, Hypertension, Long term current use of anticoagulants with INR goal of 2.0-3.0, Obstructive sleep apnea syndrome, Primary insomnia, Primary osteoarthritis of both knees, and Sleep apnea. Past Surgical History:  has a past surgical history that includes Aortic valve replacement (10/1996:[facility]); Pacemaker insertion (); Atrial ablation surgery (2020); Cardiac pacemaker placement (2020); Knee arthroscopy (Right, 2021); knee surgery (Right, 2022); and Knee Arthrocentesis (Right, 2022). Discharge Recommendations: Livan Funk scored a / on the AM-PAC short mobility form.   At this time, no further PT is recommended upon discharge due to patient being near baseline functional mobility. Recommend patient returns to prior setting with prior services. DME Required For Discharge: No new DME required  Precautions/Restrictions: high fall risk, (bed alarm off upon arrival and pt reporting getting up to bathroom ad lore/RN aware)  Positional Restrictions:no positional restrictions    Pre-Admission Information   Lives With: spouse                  Type of Home: house  Home Layout: one level  Home Access: level entry   [facility]: [facility]/shower unit  [place] Equipment: hand held shower head  Toilet Height: standard height  Home Equipment: standard walker, single point cane  Transfer Assistance: Independent without use of device  Ambulation Assistance: independent in the house; MOD I with SPC in the community  ADL Assistance: independent with all ADL's  IADL Assistance:  shares homemaking tasks with family  Active :        [x] Yes                 [] No  Hand Dominance: [] Left                 [x] Right  Current Employment: retired. Occupation:  and metal finishing  Hobbies: reading  Recent Falls: Pt reports no recent falls. Examination   Vision:   Vision Gross Assessment: Impaired and Vision Corrective Device: wears glasses at all times  Hearing:   hard of hearing  Posture:   Good  Sensation:   WFL  ROM:   (B) LE ROM WFL  Strength:   (B) LE gross strength WFL  Decision Making: low complexity  Clinical Presentation: stable      Subjective  General: Pt supine in bed upon arrival on 2L O2 via nasal cannula. Brother present throughout session. SpO2 at 88-90% on 2L O2 at rest at beginning of session.   Pain: 0/10  Pain Interventions: not applicable       Functional Mobility  Bed Mobility  Supine to Sit: modified independent  Scooting: Independent  Comments:  Transfers  Sit to stand transfer: supervision  Stand to sit transfer: supervision  Comments:  Ambulation  Surface:level surface  Assistive Device: no device  Assistance: supervision  Distance: 100'  Gait Mechanics: appropriate Cameron, slightly decreased erica, slight medial-lateral sway noted, no LOB  Comments: SpO2 ranging from 92-93% on 2L O2 during ambulation. Stair Mobility  Stair mobility not completed on this date. Comments:  Balance  Static Sitting Balance: good(+): independent with high level dynamic balance in unsupported position  Dynamic Sitting Balance: good(+): independent with high level dynamic balance in unsupported position  Static Standing Balance: fair (+): maintains balance at SBA/supervision without use of UE support  Dynamic Standing Balance: fair (+): maintains balance at SBA/supervision without use of UE support  Comments:    Other Therapeutic Interventions    Functional Outcomes  AM-PAC Inpatient Mobility Raw Score : 21              Cognition  WFL  Orientation:    A&O x 4    Education  Barriers To Learning: none  Patient Education: patient educated on PT role and benefits, general safety, energy conservation, discharge recommendations  Learning Assessment:  Pt verbalized and demonstrates understanding    Assessment  Activity Tolerance: Good, SpO2 ranging 92-93% on 2L during ambulation and ranging from 90-94% during seated recovery in room on 2L O2. Impairments Requiring Therapeutic Intervention: none - eval with same day discharge  Prognosis: good without need for therapy intervention  Clinical Assessment: Patient presenting near baseline mobility for return to home. Eval with d/c at this time. No therapy services indicated. Safety Interventions: patient left in chair, call light within reach, nurse notified, and family/caregiver present    Plan  Frequency: Eval with same day discharge. No follow up required. Current Treatment Recommendations: Not applicable, evaluation completed with same day discharge. Goals  Patient eval with same day discharge. No goals set as patient is near baseline mobility status.       Therapy Session Time      Individual Group Co-treatment   Time In 1324   Time Out     1354   Minutes     30     Timed Code Treatment Minutes:   15  Total Treatment Minutes:  30       Electronically Signed By: Kathi Woods, [address] [place] [address],[address], DPT [id number]

## 2022-11-01 NOTE — FLOWSHEET NOTE
Walked into pt's room to complete morning assessment and vitals. Pt was 86-87% on room air. Put pt's oxygen back on with 2L, pt still below 90%. Increased oxygen to 3L, pt maintaining O2 sats at 90-92%.        11/01/22 0830   Oxygen Therapy   SpO2 91 %   Pulse Oximetry Type Intermittent   Pulse Oximeter Device Mode Intermittent   Pulse Oximeter Device Location Finger   O2 Device Nasal cannula   O2 Flow Rate (L/min) 3 L/min

## 2022-11-01 NOTE — PROGRESS NOTES
[facility] Pulmonary/CCM Progress note      Admit Date: 10/29/2022    Chief Complaint: Shortness of breath and weight gain    Subjective: Interval History: Patient appears to be doing well, states that he has lost 8 pounds since admission. No significant cough or phlegm. Currently on 2 L O2.     Scheduled Meds:   warfarin placeholder: dosing by pharmacy   Other RX Placeholder    [Held by provider] furosemide  40 mg IntraVENous Daily    sacubitril-valsartan  1 tablet Oral BID    insulin lispro  0-8 Units SubCUTAneous TID WC    insulin lispro  0-4 Units SubCUTAneous Nightly    allopurinol  300 mg Oral Daily    amLODIPine  10 mg Oral Daily    aspirin  81 mg Oral Daily    sotalol  80 mg Oral BID    Vitamin D  1,000 Units Oral Daily    atorvastatin  40 mg Oral Nightly    gemfibrozil  600 mg Oral BID AC    sodium chloride flush  5-40 mL IntraVENous 2 times per day    cefTRIAXone (ROCEPHIN) IV  1,000 mg IntraVENous Q24H    And    azithromycin  500 mg IntraVENous Q24H    ipratropium-albuterol  1 ampule Inhalation TID     Continuous Infusions:   dextrose      sodium chloride 25 mL (10/31/22 5433)     PRN Meds:glucose, dextrose bolus **OR** dextrose bolus, glucagon (rDNA), dextrose, perflutren lipid microspheres, sodium chloride flush, sodium chloride, ondansetron **OR** ondansetron, polyethylene glycol, acetaminophen **OR** acetaminophen, magnesium sulfate, potassium chloride **OR** potassium alternative oral replacement **OR** potassium chloride, ipratropium-albuterol    Review of Systems  Constitutional: negative for fatigue, fevers, malaise and weight loss  Ears, nose, mouth, throat: negative for ear drainage, epistaxis, hoarseness, nasal congestion, sore throat and voice change  Respiratory: negative except for shortness of breath  Cardiovascular: negative for chest pain, chest pressure/discomfort, irregular heart beat, lower extremity edema and palpitations  Gastrointestinal: negative for abdominal pain, constipation, diarrhea, jaundice, melena, odynophagia, reflux symptoms and vomiting  Hematologic/lymphatic: negative for bleeding, easy bruising, lymphadenopathy and petechiae  Musculoskeletal:negative for arthralgias, bone pain, muscle weakness, neck pain and stiff joints  Neurological: negative for dizziness, gait problems, headaches, seizures, speech problems, tremors and weakness  Behavioral/Psych: negative for anxiety, behavior problems, depression, fatigue and sleep disturbance  Endocrine: negative for diabetic symptoms including none, neuropathy, polyphagia, polyuria, polydipsia, vomiting and diarrhea and temperature intolerance  Allergic/Immunologic: negative for anaphylaxis, angioedema, hay fever and urticaria    Objective:     Patient Vitals for the past 8 hrs:   BP Temp Temp src Pulse Resp SpO2 Weight   11/01/22 0948 134/86 97.9 °F (36.6 °C) Oral 70 16 94 % --   11/01/22 0947 -- -- -- 65 -- -- --   11/01/22 0906 -- -- -- 79 16 94 % --   11/01/22 0830 105/63 97.9 °F (36.6 °C) Oral 72 16 91 % --   11/01/22 0715 -- -- -- -- -- -- 273 lb 14.4 oz (124.2 kg)   11/01/22 0335 -- -- -- 76 17 96 % --   11/01/22 0307 111/66 97.2 °F (36.2 °C) Axillary 62 26 96 % --     I/O last 3 completed shifts: In: 540 [P.O.:540]  Out: 5055 [Urine:5055]  No intake/output data recorded.     General Appearance: alert and oriented to person, place and time, well developed and well- nourished, in no acute distress  Skin: warm and dry, no rash or erythema  Head: normocephalic and atraumatic  Eyes: pupils equal, round, and reactive to light, extraocular eye movements intact, conjunctivae normal  ENT: external ear and ear canal normal bilaterally, nose without deformity, nasal mucosa and turbinates normal  Neck: supple and non-tender without mass, no cervical lymphadenopathy  Pulmonary/Chest: clear to auscultation bilaterally- no wheezes, rales or rhonchi, normal air movement, no respiratory distress  Cardiovascular: normal rate, regular rhythm,  no murmurs, rubs, distal pulses intact, no carotid bruits  Abdomen: soft, non-tender, non-distended, normal bowel sounds, no masses or organomegaly  Lymph Nodes: Cervical, supraclavicular normal  Extremities: no cyanosis, clubbing or edema  Musculoskeletal: normal range of motion, no joint swelling, deformity or tenderness  Neurologic: alert, no focal neurologic deficits    Data Review:  CBC:   Lab Results   Component Value Date/Time    WBC 6.7 11/01/2022 07:18 AM    RBC 4.33 11/01/2022 07:18 AM     BMP:   Lab Results   Component Value Date/Time    GLUCOSE 137 11/01/2022 07:18 AM    CO2 33 11/01/2022 07:18 AM    BUN 39 11/01/2022 07:18 AM    CREATININE 1.6 11/01/2022 07:18 AM    CALCIUM 9.5 11/01/2022 07:18 AM     ABG: No results found for: HGJ4HDY, BEART, H9RVRAST, PHART, THGBART, RRQ1RGB, PO2ART, VIE7AUS    Radiology: All pertinent images / reports were reviewed as a part of this visit. EXAMINATION:   CT OF THE CHEST WITHOUT CONTRAST 10/30/2022 1:24 am       TECHNIQUE:   CT of the chest was performed without the administration of intravenous   contrast. Multiplanar reformatted images are provided for review. Automated   exposure control, iterative reconstruction, and/or weight based adjustment of   the mA/kV was utilized to reduce the radiation dose to as low as reasonably   achievable. COMPARISON:   02/25/2022 CT       HISTORY:   ORDERING SYSTEM PROVIDED HISTORY: pneumonia   TECHNOLOGIST PROVIDED HISTORY:   Reason for exam:->pneumonia   Decision Support Exception - unselect if not a suspected or confirmed   emergency medical condition->Emergency Medical Condition (MA)   Reason for Exam: Shortness of Breath (For a couple of days; coughing up   phelm. Denies n/V/D.)       FINDINGS:   Mediastinum: The heart is enlarged. Prior aortic valve replacement. Dilation of the distal arch measuring up to 4.1 cm in diameter.   Pacemaker   implanted in the left chest.  Pulmonary arteries are normal.  Shotty mediastinal lymph nodes are stable. Thyroid and esophagus normal.       Lungs/pleura: Airway opacification in the right lower lobar bronchus. Dense   consolidation throughout the right lower lobe. Pleural thickening and   calcification is also demonstrated in the right lower lobe. Stable   subpleural nodule in the right upper lobe measuring up to 4 mm. Upper Abdomen: Normal adrenal glands. Cholelithiasis. Soft Tissues/Bones: No skeletal abnormalities are appreciated within the   chest.           Impression   1. Opacification of airways in the right lower lobe with dense consolidative   change. Pattern suspected to represent developing pneumonia. Atypical or   viral pattern of pneumonitis cannot be excluded. 2. Cardiomegaly with dilation of the distal aortic arch in a patient with   prior history of AVR. Problem List:     Acute hypoxic respiratory failure  CHF decompensation  Right lower lobe infiltrates ? Community-acquired pneumonia    Assessment/Plan:     CHF decompensation, great urine output noted-3.9 L. Lasix has been held, creatinine 1.6. Continues on [place]. O2 requirements 2 L. Right lower lobe infiltrate could represent community-acquired pneumonia-continue with empiric antibiotics, Rocephin and Zithromax. Cough with mucoid phlegm, improved.     Pulmonary will follow    Maximilian Franklin MD

## 2022-11-02 ENCOUNTER — NURSE ONLY (OUTPATIENT)
Dept: CARDIOLOGY CLINIC | Age: 72
End: 2022-11-02

## 2022-11-02 PROBLEM — Z95.810 ICD (IMPLANTABLE CARDIOVERTER-DEFIBRILLATOR) IN PLACE: Status: ACTIVE | Noted: 2022-11-02

## 2022-11-02 PROBLEM — N17.9 AKI (ACUTE KIDNEY INJURY) (HCC): Status: ACTIVE | Noted: 2022-11-02

## 2022-11-02 PROBLEM — R06.02 SOB (SHORTNESS OF BREATH): Status: ACTIVE | Noted: 2022-10-31

## 2022-11-02 PROBLEM — I48.20 CHRONIC ATRIAL FIBRILLATION (HCC): Status: ACTIVE | Noted: 2022-11-02

## 2022-11-02 PROBLEM — E87.3 METABOLIC ALKALOSIS: Status: ACTIVE | Noted: 2022-11-02

## 2022-11-02 PROBLEM — R91.8 LUNG INFILTRATE ON CT: Status: ACTIVE | Noted: 2022-11-02

## 2022-11-02 LAB
ALBUMIN SERPL-MCNC: 3.6 G/DL (ref 3.4–5)
ANION GAP SERPL CALCULATED.3IONS-SCNC: 11 MMOL/L (ref 3–16)
BASOPHILS ABSOLUTE: 0 K/UL (ref 0–0.2)
BASOPHILS RELATIVE PERCENT: 0.5 %
BUN BLDV-MCNC: 36 MG/DL (ref 7–20)
CALCIUM SERPL-MCNC: 9.6 MG/DL (ref 8.3–10.6)
CHLORIDE BLD-SCNC: 101 MMOL/L (ref 99–110)
CO2: 29 MMOL/L (ref 21–32)
CREAT SERPL-MCNC: 1.7 MG/DL (ref 0.8–1.3)
EOSINOPHILS ABSOLUTE: 0.1 K/UL (ref 0–0.6)
EOSINOPHILS RELATIVE PERCENT: 2.3 %
GFR SERPL CREATININE-BSD FRML MDRD: 42 ML/MIN/{1.73_M2}
GLUCOSE BLD-MCNC: 117 MG/DL (ref 70–99)
GLUCOSE BLD-MCNC: 118 MG/DL (ref 70–99)
GLUCOSE BLD-MCNC: 128 MG/DL (ref 70–99)
GLUCOSE BLD-MCNC: 138 MG/DL (ref 70–99)
GLUCOSE BLD-MCNC: 140 MG/DL (ref 70–99)
HCT VFR BLD CALC: 40.7 % (ref 40.5–52.5)
HEMOGLOBIN: 13.1 G/DL (ref 13.5–17.5)
INR BLD: 2.28 (ref 0.87–1.14)
IRON SATURATION: 39 % (ref 20–50)
IRON: 107 UG/DL (ref 59–158)
LYMPHOCYTES ABSOLUTE: 1.5 K/UL (ref 1–5.1)
LYMPHOCYTES RELATIVE PERCENT: 23.5 %
MCH RBC QN AUTO: 30.1 PG (ref 26–34)
MCHC RBC AUTO-ENTMCNC: 32.3 G/DL (ref 31–36)
MCV RBC AUTO: 93.1 FL (ref 80–100)
MONOCYTES ABSOLUTE: 0.7 K/UL (ref 0–1.3)
MONOCYTES RELATIVE PERCENT: 11.4 %
MYCOPLASMA PNEUMO SOURCE: NORMAL
MYCOPLASMA PNEUMONIAE PCR: NOT DETECTED
NEUTROPHILS ABSOLUTE: 4 K/UL (ref 1.7–7.7)
NEUTROPHILS RELATIVE PERCENT: 62.3 %
PDW BLD-RTO: 14.6 % (ref 12.4–15.4)
PERFORMED ON: ABNORMAL
PHOSPHORUS: 4 MG/DL (ref 2.5–4.9)
PLATELET # BLD: 238 K/UL (ref 135–450)
PMV BLD AUTO: 8.8 FL (ref 5–10.5)
POTASSIUM SERPL-SCNC: 4 MMOL/L (ref 3.5–5.1)
PROTHROMBIN TIME: 25.2 SEC (ref 11.7–14.5)
RBC # BLD: 4.37 M/UL (ref 4.2–5.9)
SODIUM BLD-SCNC: 141 MMOL/L (ref 136–145)
TOTAL IRON BINDING CAPACITY: 275 UG/DL (ref 260–445)
WBC # BLD: 6.5 K/UL (ref 4–11)

## 2022-11-02 PROCEDURE — 6370000000 HC RX 637 (ALT 250 FOR IP): Performed by: NURSE PRACTITIONER

## 2022-11-02 PROCEDURE — 6370000000 HC RX 637 (ALT 250 FOR IP): Performed by: STUDENT IN AN ORGANIZED HEALTH CARE EDUCATION/TRAINING PROGRAM

## 2022-11-02 PROCEDURE — 87205 SMEAR GRAM STAIN: CPT

## 2022-11-02 PROCEDURE — 6370000000 HC RX 637 (ALT 250 FOR IP): Performed by: INTERNAL MEDICINE

## 2022-11-02 PROCEDURE — 6370000000 HC RX 637 (ALT 250 FOR IP): Performed by: PHYSICIAN ASSISTANT

## 2022-11-02 PROCEDURE — 99232 SBSQ HOSP IP/OBS MODERATE 35: CPT | Performed by: INTERNAL MEDICINE

## 2022-11-02 PROCEDURE — 2700000000 HC OXYGEN THERAPY PER DAY

## 2022-11-02 PROCEDURE — 83540 ASSAY OF IRON: CPT

## 2022-11-02 PROCEDURE — 80069 RENAL FUNCTION PANEL: CPT

## 2022-11-02 PROCEDURE — 36415 COLL VENOUS BLD VENIPUNCTURE: CPT

## 2022-11-02 PROCEDURE — 83550 IRON BINDING TEST: CPT

## 2022-11-02 PROCEDURE — 94761 N-INVAS EAR/PLS OXIMETRY MLT: CPT

## 2022-11-02 PROCEDURE — 94660 CPAP INITIATION&MGMT: CPT

## 2022-11-02 PROCEDURE — 6360000002 HC RX W HCPCS: Performed by: STUDENT IN AN ORGANIZED HEALTH CARE EDUCATION/TRAINING PROGRAM

## 2022-11-02 PROCEDURE — 1200000000 HC SEMI PRIVATE

## 2022-11-02 PROCEDURE — 85610 PROTHROMBIN TIME: CPT

## 2022-11-02 PROCEDURE — 99223 1ST HOSP IP/OBS HIGH 75: CPT | Performed by: INTERNAL MEDICINE

## 2022-11-02 PROCEDURE — 87070 CULTURE OTHR SPECIMN AEROBIC: CPT

## 2022-11-02 PROCEDURE — 94640 AIRWAY INHALATION TREATMENT: CPT

## 2022-11-02 PROCEDURE — 85025 COMPLETE CBC W/AUTO DIFF WBC: CPT

## 2022-11-02 PROCEDURE — 99233 SBSQ HOSP IP/OBS HIGH 50: CPT | Performed by: CLINICAL NURSE SPECIALIST

## 2022-11-02 PROCEDURE — 2580000003 HC RX 258: Performed by: STUDENT IN AN ORGANIZED HEALTH CARE EDUCATION/TRAINING PROGRAM

## 2022-11-02 RX ORDER — AMOXICILLIN AND CLAVULANATE POTASSIUM 875; 125 MG/1; MG/1
1 TABLET, FILM COATED ORAL EVERY 12 HOURS SCHEDULED
Status: DISCONTINUED | OUTPATIENT
Start: 2022-11-02 | End: 2022-11-03 | Stop reason: HOSPADM

## 2022-11-02 RX ORDER — AMLODIPINE BESYLATE 5 MG/1
5 TABLET ORAL DAILY
Status: DISCONTINUED | OUTPATIENT
Start: 2022-11-03 | End: 2022-11-03 | Stop reason: HOSPADM

## 2022-11-02 RX ADMIN — AMOXICILLIN AND CLAVULANATE POTASSIUM 1 TABLET: 875; 125 TABLET, FILM COATED ORAL at 20:54

## 2022-11-02 RX ADMIN — SOTALOL HYDROCHLORIDE 80 MG: 80 TABLET ORAL at 09:11

## 2022-11-02 RX ADMIN — IPRATROPIUM BROMIDE AND ALBUTEROL SULFATE 1 AMPULE: 2.5; .5 SOLUTION RESPIRATORY (INHALATION) at 19:12

## 2022-11-02 RX ADMIN — FUROSEMIDE 40 MG: 40 TABLET ORAL at 09:11

## 2022-11-02 RX ADMIN — AZITHROMYCIN MONOHYDRATE 500 MG: 500 INJECTION, POWDER, LYOPHILIZED, FOR SOLUTION INTRAVENOUS at 01:33

## 2022-11-02 RX ADMIN — AMOXICILLIN AND CLAVULANATE POTASSIUM 1 TABLET: 875; 125 TABLET, FILM COATED ORAL at 12:02

## 2022-11-02 RX ADMIN — AMLODIPINE BESYLATE 10 MG: 5 TABLET ORAL at 09:11

## 2022-11-02 RX ADMIN — IPRATROPIUM BROMIDE AND ALBUTEROL SULFATE 1 AMPULE: 2.5; .5 SOLUTION RESPIRATORY (INHALATION) at 08:11

## 2022-11-02 RX ADMIN — CEFTRIAXONE SODIUM 1000 MG: 1 INJECTION, POWDER, FOR SOLUTION INTRAMUSCULAR; INTRAVENOUS at 00:40

## 2022-11-02 RX ADMIN — IPRATROPIUM BROMIDE AND ALBUTEROL SULFATE 1 AMPULE: 2.5; .5 SOLUTION RESPIRATORY (INHALATION) at 15:24

## 2022-11-02 RX ADMIN — GEMFIBROZIL 600 MG: 600 TABLET ORAL at 06:53

## 2022-11-02 RX ADMIN — ATORVASTATIN CALCIUM 40 MG: 40 TABLET, FILM COATED ORAL at 20:54

## 2022-11-02 RX ADMIN — SACUBITRIL AND VALSARTAN 1 TABLET: 49; 51 TABLET, FILM COATED ORAL at 20:54

## 2022-11-02 RX ADMIN — GEMFIBROZIL 600 MG: 600 TABLET ORAL at 17:06

## 2022-11-02 RX ADMIN — SACUBITRIL AND VALSARTAN 1 TABLET: 49; 51 TABLET, FILM COATED ORAL at 09:11

## 2022-11-02 RX ADMIN — ASPIRIN 81 MG 81 MG: 81 TABLET ORAL at 09:11

## 2022-11-02 RX ADMIN — GUAIFENESIN 600 MG: 600 TABLET, EXTENDED RELEASE ORAL at 20:54

## 2022-11-02 RX ADMIN — Medication 10 ML: at 09:12

## 2022-11-02 RX ADMIN — ALLOPURINOL 300 MG: 300 TABLET ORAL at 09:12

## 2022-11-02 RX ADMIN — Medication 1000 UNITS: at 09:11

## 2022-11-02 RX ADMIN — WARFARIN SODIUM 10 MG: 5 TABLET ORAL at 17:06

## 2022-11-02 RX ADMIN — SOTALOL HYDROCHLORIDE 80 MG: 80 TABLET ORAL at 20:54

## 2022-11-02 RX ADMIN — Medication 10 ML: at 20:54

## 2022-11-02 RX ADMIN — GUAIFENESIN 600 MG: 600 TABLET, EXTENDED RELEASE ORAL at 09:11

## 2022-11-02 NOTE — PROGRESS NOTES
[facility] PROGRESS NOTE    11/2/2022 2:03 PM        Name: Rafaela Field . Admitted: 10/29/2022  Primary Care Provider: Leann Vargas MD (Tel: [phone])      Subjective:  . Admitted with HF and possible CAP. Feeling better today than yesterday. On 1-2L. Productive cough is improved today.      Reviewed interval ancillary notes    Current Medications  [START ON 11/3/2022] amLODIPine (NORVASC) tablet 5 mg, Daily  amoxicillin-clavulanate (AUGMENTIN) 875-125 MG per tablet 1 tablet, 2 times per day  furosemide (LASIX) tablet 40 mg, Daily  warfarin (COUMADIN) tablet 10 mg, Daily  guaiFENesin (MUCINEX) extended release tablet 600 mg, BID  sacubitril-valsartan (ENTRESTO) 49-51 MG per tablet 1 tablet, BID  glucose-vitamin C chewable tablet 4 tablet, PRN  dextrose bolus 10% 125 mL, PRN   Or  dextrose bolus 10% 250 mL, PRN  glucagon (rDNA) injection 1 mg, PRN  dextrose 10 % infusion, Continuous PRN  insulin lispro (HUMALOG) injection vial 0-8 Units, TID WC  insulin lispro (HUMALOG) injection vial 0-4 Units, Nightly  perflutren lipid microspheres (DEFINITY) injection 1.65 mg, ONCE PRN  allopurinol (ZYLOPRIM) tablet 300 mg, Daily  aspirin chewable tablet 81 mg, Daily  sotalol (BETAPACE) tablet 80 mg, BID  Vitamin D (CHOLECALCIFEROL) tablet 1,000 Units, Daily  atorvastatin (LIPITOR) tablet 40 mg, Nightly  gemfibrozil (LOPID) tablet 600 mg, BID AC  sodium chloride flush 0.9 % injection 5-40 mL, 2 times per day  sodium chloride flush 0.9 % injection 5-40 mL, PRN  0.9 % sodium chloride infusion, PRN  ondansetron (ZOFRAN-ODT) disintegrating tablet 4 mg, Q8H PRN   Or  ondansetron (ZOFRAN) injection 4 mg, Q6H PRN  polyethylene glycol (GLYCOLAX) packet 17 g, Daily PRN  acetaminophen (TYLENOL) tablet 650 mg, Q6H PRN   Or  acetaminophen (TYLENOL) suppository 650 mg, Q6H PRN  magnesium sulfate 2000 mg in 50 mL IVPB premix, PRN  potassium chloride (KLOR-CON M) extended release tablet 40 mEq, PRN   Or  potassium bicarb-citric acid (EFFER-K) effervescent tablet 40 mEq, PRN   Or  potassium chloride 10 mEq/100 mL IVPB (Peripheral Line), PRN  ipratropium-albuterol (DUONEB) nebulizer solution 1 ampule, TID  ipratropium-albuterol (DUONEB) nebulizer solution 1 ampule, Q4H PRN    oxyCODONE-acetaminophen (PERCOCET) 5-325 MG per tablet 1 tablet, Once  lactated ringers infusion, Continuous  sodium chloride flush 0.9 % injection 10 mL, 2 times per day  sodium chloride flush 0.9 % injection 10 mL, PRN  0.9 % sodium chloride infusion, PRN      Objective:  /76   Pulse 68   Temp 97.6 °F (36.4 °C) (Oral)   Resp 18   Ht 6' 1\" (1.854 m)   Wt 278 lb 4.8 oz (126.2 kg)   SpO2 92%   BMI 36.72 kg/m²     Intake/Output Summary (Last 24 hours) at 11/2/2022 1403  Last data filed at 11/2/2022 0811  Gross per 24 hour   Intake 360 ml   Output 1350 ml   Net -990 ml        Wt Readings from Last 3 Encounters:   11/02/22 278 lb 4.8 oz (126.2 kg)   10/06/22 292 lb (132.5 kg)   09/29/22 290 lb 1.6 oz (131.6 kg)       General appearance:  Appears comfortable, alert and pleasant , obese   Eyes: Sclera clear. Pupils equal.  ENT: Moist oral mucosa. Trachea midline, no adenopathy. Cardiovascular: Regular rhythm, normal S1, S2. No murmur. no edema in lower extremities  Respiratory: Not using accessory muscles. Good inspiratory effort. Crackles LLL, decrease RLL   GI: Abdomen soft, no tenderness, not distended, normal bowel sounds  Musculoskeletal: No cyanosis in digits, neck supple  Neurology: CN 2-12 grossly intact. No speech or motor deficits  Psych: Normal affect.  Alert and oriented in time, place and person  Skin: Warm, dry, normal turgor    Labs and Tests:  CBC:   Recent Labs     10/31/22  0529 11/01/22  0718 11/02/22  0636   WBC 11.0 6.7 6.5   HGB 12.7* 13.1* 13.1*    234 238       BMP:    Recent Labs     10/31/22  0529 11/01/22  0718 11/02/22  0636  141 141   K 4.5 4.3 4.0   CL 98* 101 101   CO2 31 33* 29   BUN 35* 39* 36*   CREATININE 1.6* 1.6* 1.7*   GLUCOSE 150* 137* 128*       Hepatic:   No results for input(s): AST, ALT, ALB, BILITOT, ALKPHOS in the last 72 hours. INR 3  Procal is low     CT chest:    Opacification of airways in the right lower lobe with dense consolidative   change. Pattern suspected to represent developing pneumonia. Atypical or   viral pattern of pneumonitis cannot be excluded. 2. Cardiomegaly with dilation of the distal aortic arch in a patient with   prior history of AVR. ECHO December 2020   Summary   -Left ventricular cavity size is normal.   -Overall left ventricular systolic function appears mildly reduced.   -Ejection fraction is visually estimated to be 45%. -There is abnormal septal motion/bounce noted. -Indeterminate diastolic function E/e' = 81.7. [facility] -A mechanical artificial aortic valve appears well seated with a maximum   velocity of 2.1m/s and a mean gradient of 9mmHg.   -No evidence of aortic valve regurgitation.   -Trivial-mild mitral regurgitation.   -Mild tricuspid regurgitation.   -The right ventricle is normal in size with reduces function. TAPSE 1.9cm.    RVS velocity is 6.94 cm/s.   -Pacer/ICD right heart    INR 2.2       AIC 6 %      Problem List  Principal Problem:    Acute respiratory failure with hypoxia (HCC)  Active Problems:    HFrEF (heart failure with reduced ejection fraction) ([facility]    Hypertension associated with stage 3 chronic kidney disease due to type 2 diabetes mellitus ([address]    CAP (community acquired pneumonia)    Acute systolic CHF (congestive heart failure) (HCC)    Acute on chronic diastolic (congestive) heart failure (HCC)    SOB (shortness of breath)    Hypoxia    PAF (paroxysmal atrial fibrillation) ([address]    ICD (implantable cardioverter-defibrillator) in place    Chronic atrial fibrillation (HCC)    CLAUDIO (acute kidney injury) ([address]    Metabolic alkalosis    Lung infiltrate on CT    Primary hypertension    Hyperlipidemia associated with type 2 diabetes mellitus ([facility]    Long term current use of anticoagulants with INR goal of 2.0-3.0    Type 2 diabetes mellitus, without long-term current use of insulin (HCC)    COPD (chronic obstructive pulmonary disease) (HCC)    Congestive heart failure (HCC)    Cardiomyopathy, dilated (HCC)    Acute on chronic systolic heart failure (HCC)    Pacemaker    Typical atrial flutter (HCC)    Stage 3a chronic kidney disease ([facility]  Resolved Problems:    * No resolved hospital problems. *       Assessment & Plan:   Acute hypoxic respiratory failure likely due to HF and possible cap:  he has received IV lasix and diuresed 7.8 liters. Switched to po lasix on Tuesday. Entresto restarted. No aldactone due to hyperkalemia. Possible  CAP-abx change to PO today per pulmonary. Complete a total of 7 day course. S/P AV replacement, also with pacer and AF:   AC with coumadin. INR 3. Hold coumadin today. RX is dosing  on betapace  CKD:  follows with Dr Cheikh Bhagat. Creat 1.7. Consulted nephro. HTN:  had some hypotension the other night. Norvasc stopped per cards. T2DM:  BG values in range. AIC 6% in September demonstrates excellent control . Getting SSI. COPD:  stable on current inhaled therapy       Diet: ADULT DIET; Regular; 4 carb choices (60 gm/meal);  Low Sodium (2 gm); 2000 ml  Code:Full Code  DVT PPX      Sonya Keller PA-C   11/2/2022 2:03 PM

## 2022-11-02 NOTE — PROGRESS NOTES
Pharmacy to Dose Warfarin    Pharmacy consulted to dose warfarin for Afib. INR Goal: 2-3    INR today: 2.28    Assessment/Plan:  - INR therapeutic today  - Will continue with warfarin 10 mg tonight    Pharmacy will continue to follow.     Marilu Roberto, PharmD, [facility]  Clinical Pharmacist  [id number]

## 2022-11-02 NOTE — PROGRESS NOTES
[facility]   Daily Progress Note      Admit Date:  10/29/2022    HPI:    Mr. Мария Zaldivar 71 y.o. 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 He follows with Dr Penelope Jon with [facility]. 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 He follows with Dr Peter Ocasio, AF, BiV lead implant 3/23/2020    He is admitted with shortness of breath, heart failure and pneumonia. He is being diuresed. He has had some trouble affording his medications as an outpatient and was off his inhalers. Covid neg     Subjective:  Patient is being seen for acute on chronic systolic heart failure. There were no acute overnight cardiac events. Creat 1.7 potassium 4.0 he sitting on the side of the bed on 2 L of oxygen (unable to wean further). Weight 292-278 and -8L out  No increased shortness of breath or chest pain. He would like to go home today. Needs to be moving in the halls  Optival is normal    Objective:   /76   Pulse 73   Temp 98.1 °F (36.7 °C) (Oral)   Resp 18   Ht 6' 1\" (1.854 m)   Wt 278 lb 4.8 oz (126.2 kg)   SpO2 92%   BMI 36.72 kg/m²     Intake/Output Summary (Last 24 hours) at 11/2/2022 0918  Last data filed at 11/2/2022 6045  Gross per 24 hour   Intake 360 ml   Output 2100 ml   Net -1740 ml          Physical Exam:  General:  Awake, alert, oriented in NAD  Skin:  Warm and dry. No unusual bruising or rash  Neck:  Supple. No JVD or carotid bruit appreciated  Chest:  Normal effort.   Clear to auscultation, no wheezes/rhonchi/rales  Cardiovascular:  RRR, S1/S2, no murmur/gallop/rub  Abdomen:  Soft, nontender, +bowel sounds, obese  Extremities:  No edema  Neurological: No focal deficits  Psychological: Normal mood and affect      Medications:    furosemide  40 mg Oral Daily    warfarin  10 mg Oral Daily    guaiFENesin  600 mg Oral BID    sacubitril-valsartan  1 tablet Oral BID    insulin lispro  0-8 Units SubCUTAneous TID WC    insulin lispro  0-4 Units SubCUTAneous Nightly    allopurinol  300 mg Oral Daily    amLODIPine  10 mg Oral Daily    aspirin  81 mg Oral Daily    sotalol  80 mg Oral BID    Vitamin D  1,000 Units Oral Daily    atorvastatin  40 mg Oral Nightly    gemfibrozil  600 mg Oral BID AC    sodium chloride flush  5-40 mL IntraVENous 2 times per day    cefTRIAXone (ROCEPHIN) IV  1,000 mg IntraVENous Q24H    ipratropium-albuterol  1 ampule Inhalation TID      dextrose      sodium chloride 25 mL (10/31/22 0203)       Lab Data:  CBC:   Recent Labs     10/31/22  0529 11/01/22  0718 11/02/22  0636   WBC 11.0 6.7 6.5   HGB 12.7* 13.1* 13.1*    234 238     BMP:    Recent Labs     10/31/22  0529 11/01/22  0718 11/02/22 0636    141 141   K 4.5 4.3 4.0   CO2 31 33* 29   BUN 35* 39* 36*   CREATININE 1.6* 1.6* 1.7*     INR:    Recent Labs     10/31/22  0529 11/01/22  0718   INR 3.02* 2.59*     BNP:    Recent Labs     11/01/22 0718   PROBNP 254*         Diagnostics:  Echo 10/31/2022   Summary   Left ventricular systolic function is low normal with ejection fraction   estimated at 50 %. No regional wall motion abnormalities are noted. There is mild concentric left ventricular hypertrophy. Normal left ventricular diastolic filling pressure. Pacer wire noted in right heart. The right atrium is dilated. The ascending aorta is dilated at 4.0 cm. A mechanical [place] aortic valve appears well seated with a maximum   gradient of 36.48 mmHg and a mean gradient of 17 mmHg. Mild-to-moderate tricuspid regurgitation. Echo 12/11/2020   Summary   -Left ventricular cavity size is normal.   -Overall left ventricular systolic function appears mildly reduced.   -Ejection fraction is visually estimated to be 45%. -There is abnormal septal motion/bounce noted. -Indeterminate diastolic function E/e' = 12.4. Tanda Bun    -A mechanical artificial aortic valve appears well seated with a maximum   velocity of 2.1m/s and a mean gradient of 9mmHg.   -No evidence of aortic valve regurgitation.   -Trivial-mild mitral regurgitation.   -Mild tricuspid regurgitation.   -The right ventricle is normal in size with reduces function. TAPSE 1.9cm. RVS velocity is 6.94 cm/s.   -Pacer/ICD right heart     Echo: 12/31/19   Summary   -Global hypokinesis with EF 40-45%. -Abnormal septal motion.   -The aortic root and the ascending aorta are mildly dilated. -The right ventricle appears to be dilated. -RV systolic function appears to be reduced.   -The right atrium appears to be dilated. -The mechanical artificial aortic valve appears well seated with a maximum   velocity of 2.40 m/s and a mean gradient of 12 mmHg. The aortic valve area   is estimated at 1.19 cm^2. No significant regurgitation noted. -Thickened mitral valve without evidence of stenosis. There is   mild-to-moderate mitral regurgitation.   -There is moderate tricuspid regurgitation with a RVSP estimation of 43   mmHg.   -Pacer / ICD wire is visualized in the right heart.   -Indeterminate diastolic function. Assessment:    1. Shortness of breath  2. Acute on chronic systolic heart failure, on arni, bb; no aldosterone due to increased potassium and no sglt2 due to cost  3. Pneumonia per hospitalist  4. Hypertension  5. ICD in place  6. Chronic atrial fib  7. Status post AV replacement  8. COPD      Plan:    Add iron studies to blood in lab  Continue lasix 40 mg daily  Continue entresto 49-51 mg twice a day  Continue norvasc 10 mg daily  Continue sotalol 80 mg twice a day     Stable from cardiology standpoint  Will check optival  Hopefully home tomorrow  Discussed with nephrology and hospitalist    NYHA III    Discussed with patient who is agreeable with plan of care. Thank you for allowing me to participate in the care of your patient.     MEENU Null - CNS, CNS

## 2022-11-02 NOTE — CARE COORDINATION
Discharge Planning Assessment  Readmission score  14%  Patient admitted from home, A&O, independent . Chart reviewed for discharge planning needs and it appears that patient has minimal needs for discharge at this time. Discussed with patients nurse and requested that case management be notified if discharge needs are identified. Patient has active insurance with [employer]. Dr Starr Aldana is listed as the PCP        Case management will continue to follow progress and update discharge plan as needed.

## 2022-11-02 NOTE — PLAN OF CARE
Problem: Cardiovascular - Adult  Goal: Maintains optimal cardiac output and hemodynamic stability  Outcome: Progressing  Flowsheets (Taken 11/2/2022 0253)  Maintains optimal cardiac output and hemodynamic stability:   Monitor blood pressure and heart rate   Monitor urine output and notify Licensed Independent Practitioner for values outside of normal range   Assess for signs of decreased cardiac output  Note: Pt on PO lasix. I&O and daily weight. Telemetry. Cardiology following. Will continue to monitor. Problem: Respiratory - Adult  Goal: Achieves optimal ventilation and oxygenation  11/2/2022 0253 by Clive Mast RN  Outcome: Progressing  Flowsheets (Taken 11/2/2022 0252)  Achieves optimal ventilation and oxygenation:   Assess for changes in respiratory status   Assess for changes in mentation and behavior   Oxygen supplementation based on oxygen saturation or arterial blood gases   Encourage broncho-pulmonary hygiene including cough, deep breathe, incentive spirometry   Respiratory therapy support as indicated   Assess and instruct to report shortness of breath or any respiratory difficulty   Position to facilitate oxygenation and minimize respiratory effort  Note: Pt remains on 2L O2. Wearing Bipap at night. IV Abx. Pulmonology following. Sputum culture pending.      Problem: ABCDS Injury Assessment  Goal: Absence of physical injury  11/2/2022 0253 by Clive Mast RN  Outcome: Progressing     Problem: Safety - Adult  Goal: Free from fall injury  11/2/2022 0253 by Clive Mast RN  Outcome: Progressing     Problem: Discharge Planning  Goal: Discharge to home or other facility with appropriate resources  11/2/2022 0253 by Clive Mast RN  Outcome: Progressing

## 2022-11-02 NOTE — CONSULTS
Office : [phone]     Fax :[phone]       Nephrology Consult Note      Patient's Name: Brady Portillo  9:23 AM  11/2/2022    Reason for Consult:  CLAUDIO on CKD 3       Requesting Physician:  Gaurav Burns MD      Chief Complaint:    Chief Complaint   Patient presents with    Shortness of Breath     For a couple of days; coughing up phelm. Denies n/V/D. History of Present iIlness:    Brady Portillo is a 71 y.o. male with PMHx of HFrEF, atrial fibrillation, CKD stage III, COPD, hypertension, lipidemia, type 2 diabetes who presented to [facility] with complaints of shortness of breath. Patient states for the last 2 to 3 days he is had shortness of breath and some coughing. He has had similar episodes in the past, which were CHF exacerbations, and he states his current disposition is similar. He denies any chest pain, abdominal pain, nausea, vomiting, GI/ symptoms. Patient does states he is noticed increased edema in his lower extremities bilaterally up to his knees. He states his Lasix was cut in half recently and was started on Entresto. Patient on 4 L O2 via NC in the ED, denies home O2 use. Baseline creat is 1.3   Creatinine is 1.7     On lasix and entresto     Volume status better now with diuresis         I/O last 3 completed shifts:   In: 240 [P.O.:240]  Out: 2555 [Urine:2555]    Past Medical History:   Diagnosis Date    Acute congestive heart failure ([facility] [address] 12/30/2019    Allergic rhinitis 7/11/2016    Atrial fibrillation (HCC)     under care of cardiology:Dr. Karlene Hans Behrens([facility])    CKD (chronic kidney disease)     Nephro:Dr. Parker:stage 3    Class 2 obesity due to excess calories without serious comorbidity with body mass index (BMI) of 37.0 to 37.9 in adult 11/7/2017    Controlled type 2 diabetes mellitus without complication, without long-term current use of insulin ([facility] [address] 2/24/2017    COPD     Essential tremor     Gout     Hyperlipidemia, mixed 07/11/2016    Hypertension     under care of cardiology:Dr. Andee Kras Behrens([facility])    Long term current use of anticoagulants with INR goal of 2.0-3.0     under care of cardiology:Dr. Scott Behrens([facility])    Obstructive sleep apnea syndrome 06/18/2019    per pulmo    Primary insomnia 1/25/2017    Primary osteoarthritis of both knees 9/14/2021    Sleep apnea     uses bipap       Past Surgical History:   Procedure Laterality Date    AORTIC VALVE REPLACEMENT  10/1996:[facility]    x3    ATRIAL ABLATION SURGERY  03/23/2020    CTI dependent atrial flutter ablation (Dr. Jonatan Bella)    [address]  03/23/2020    BIV upgrade    KNEE ARTHROCENTESIS Right 4/6/2022    RIGHT KNEE GENICULAR NERVE BLOCK WITH INTRA ARTICULAR INJECTION SITE CONFIRMED BY FLUOROSCOPY performed by Francie Smith MD at [facility] ARTHROSCOPY Right 12/27/2021    RIGHT KNEE DIAGNOSTIC ARTHROSCOPY WITH SUBCHONDROPLASTY TO MEDIAL FEMORAL CONDYLE AND TIBIAL PLATEAU, LEFT KNEE INJECTION. performed by Francie Smith MD at [place] [address] Right 04/06/2022    RIGHT KNEE GENICULAR NERVE BLOCK WITH INTRA ARTICULAR INJECTION SITE CONFIRMED BY FLUOROSCOPY    PACEMAKER INSERTION  1996       Family History   Problem Relation Age of Onset    Cancer Mother         Gallbladder    Asthma Father     Emphysema Father     No Known Problems Sister     No Known Problems Brother     No Known Problems Maternal Grandmother     No Known Problems Maternal Grandfather     No Known Problems Paternal Grandmother     No Known Problems Paternal Grandfather         reports that he quit smoking about 3 years ago. His smoking use included cigarettes. He has a 30.00 pack-year smoking history.  He has never used smokeless tobacco. He reports current alcohol use. He reports that he does not use drugs. Allergies:  Patient has no known allergies.     Current Medications:    furosemide (LASIX) tablet 40 mg, Daily  warfarin (COUMADIN) tablet 10 mg, Daily  guaiFENesin (MUCINEX) extended release tablet 600 mg, BID  sacubitril-valsartan (ENTRESTO) 49-51 MG per tablet 1 tablet, BID  glucose-vitamin C chewable tablet 4 tablet, PRN  dextrose bolus 10% 125 mL, PRN   Or  dextrose bolus 10% 250 mL, PRN  glucagon (rDNA) injection 1 mg, PRN  dextrose 10 % infusion, Continuous PRN  insulin lispro (HUMALOG) injection vial 0-8 Units, TID WC  insulin lispro (HUMALOG) injection vial 0-4 Units, Nightly  perflutren lipid microspheres (DEFINITY) injection 1.65 mg, ONCE PRN  allopurinol (ZYLOPRIM) tablet 300 mg, Daily  amLODIPine (NORVASC) tablet 10 mg, Daily  aspirin chewable tablet 81 mg, Daily  sotalol (BETAPACE) tablet 80 mg, BID  Vitamin D (CHOLECALCIFEROL) tablet 1,000 Units, Daily  atorvastatin (LIPITOR) tablet 40 mg, Nightly  gemfibrozil (LOPID) tablet 600 mg, BID AC  sodium chloride flush 0.9 % injection 5-40 mL, 2 times per day  sodium chloride flush 0.9 % injection 5-40 mL, PRN  0.9 % sodium chloride infusion, PRN  ondansetron (ZOFRAN-ODT) disintegrating tablet 4 mg, Q8H PRN   Or  ondansetron (ZOFRAN) injection 4 mg, Q6H PRN  polyethylene glycol (GLYCOLAX) packet 17 g, Daily PRN  acetaminophen (TYLENOL) tablet 650 mg, Q6H PRN   Or  acetaminophen (TYLENOL) suppository 650 mg, Q6H PRN  magnesium sulfate 2000 mg in 50 mL IVPB premix, PRN  potassium chloride (KLOR-CON M) extended release tablet 40 mEq, PRN   Or  potassium bicarb-citric acid (EFFER-K) effervescent tablet 40 mEq, PRN   Or  potassium chloride 10 mEq/100 mL IVPB (Peripheral Line), PRN  cefTRIAXone (ROCEPHIN) 1,000 mg in dextrose 5 % 50 mL IVPB mini-bag, Q24H  ipratropium-albuterol (DUONEB) nebulizer solution 1 ampule, TID  ipratropium-albuterol (DUONEB) nebulizer solution 1 ampule, Q4H PRN    oxyCODONE-acetaminophen (PERCOCET) 5-325 MG per tablet 1 tablet, Once  lactated ringers infusion, Continuous  sodium chloride flush 0.9 % injection 10 mL, 2 times per day  sodium chloride flush 0.9 % injection 10 mL, PRN  0.9 % sodium chloride infusion, PRN        Review of Systems:   14 point ROS obtained but were negative except mentioned in HPI      Physical exam:     Vitals:  /76   Pulse 73   Temp 98.1 °F (36.7 °C) (Oral)   Resp 18   Ht 6' 1\" (1.854 m)   Wt 278 lb 4.8 oz (126.2 kg)   SpO2 92%   BMI 36.72 kg/m²   Constitutional:  OAA X3 NAD  Skin: no rash, turgor wnl  Heent:  eomi, mmm  Neck: no bruits or jvd noted  Cardiovascular:  S1, S2 without m/r/g  Respiratory: CTA B without w/r/r  Abdomen:  +bs, soft, nt, nd  Ext: no  lower extremity edema  Psychiatric: mood and affect appropriate  Musculoskeletal:  Rom, muscular strength intact    Labs:  CBC:   Recent Labs     10/31/22  0529 11/01/22  0718 11/02/22  0636   WBC 11.0 6.7 6.5   HGB 12.7* 13.1* 13.1*    234 238     BMP:    Recent Labs     10/31/22  0529 11/01/22  0718 11/02/22  0636    141 141   K 4.5 4.3 4.0   CL 98* 101 101   CO2 31 33* 29   BUN 35* 39* 36*   CREATININE 1.6* 1.6* 1.7*   GLUCOSE 150* 137* 128*     Ca/Mg/Phos:   Recent Labs     10/31/22  0529 11/01/22  0718 11/02/22  0636   CALCIUM 9.8 9.5 9.6   MG 2.00  --   --    PHOS 5.2* 4.4 4.0     Hepatic: No results for input(s): AST, ALT, ALB, BILITOT, ALKPHOS in the last 72 hours. Troponin: No results for input(s): TROPONINI in the last 72 hours. BNP: No results for input(s): BNP in the last 72 hours. Lipids: No results for input(s): CHOL, TRIG, HDL, LDLCALC, LABVLDL in the last 72 hours. ABGs: No results for input(s): PHART, PO2ART, PJV5NHB in the last 72 hours.   INR:   Recent Labs     10/31/22  0529 11/01/22  0718   INR 3.02* 2.59*     UA:No results for input(s): Jacqulyne Longs, GLUCOSEU, BILIRUBINUR, KETUA, [address], BLOODU, PHUR, PROTEINU, UROBILINOGEN, Jaimie Carroll in the last 72 hours. Urine Microscopic: No results for input(s): LABCAST, BACTERIA, COMU, HYALCAST, WBCUA, RBCUA, EPIU in the last 72 hours. Urine Culture: No results for input(s): LABURIN in the last 72 hours. Urine Chemistry: No results for input(s): [place], PROTEINUR, NAUR in the last 72 hours. IMAGING:  CT CHEST WO CONTRAST   Final Result   1. Opacification of airways in the right lower lobe with dense consolidative   change. Pattern suspected to represent developing pneumonia. Atypical or   viral pattern of pneumonitis cannot be excluded. 2. Cardiomegaly with dilation of the distal aortic arch in a patient with   prior history of AVR. XR CHEST PORTABLE   Final Result   Bibasilar atelectasis or airspace disease. Assessment/Plan :      1. CLAUDIO on CKD 3 A  CLAUDIO 2/2 diuresis and entresto. If creat remains around 1.6 ok to continue entresto. If continue to rise then consider decreasing dose of entresto. Recommend to dose adjust all medications  based on renal functions  Maintain SBP> 90 mmHg   Daily weights   AVOID NSAIDs  Avoid Nephrotoxins  Monitor Intake/Output  Call if significant decrease in urine output      2. HTN. Controlled   Some episodes of low BP   Will decrease amlodipine to 5 mg po daily    3. Metabolic alkalosis   HCo3 29     4. Acute on chronic CHF   Lasix   Entresto per cardiology     5. Pneumonia . Abx per hospitalist     D/w cardiology.      D/w primary team      Thank you for allowing us to participate in care of Raquel Hobbs         Electronically signed by: Maria Del Rosario Velez MD, 11/2/2022, 9:23 AM      [facility] [address]  Office : [phone]  Fax :[phone]

## 2022-11-02 NOTE — PROGRESS NOTES
[employer] transmission received 11/2/22 from [address] [facility] for patient's CRT-P. Set to only BiV pace when he needs to RV pace. Transmission shows normal sensing and pacing function. No arrhythmias/ or events. Optivol is within normal range w slight elevation noted up to 55 (below 60 threshold) w TI trend back at reference line. EP will review. See interrogation under cardiology tab in the [address] [place] [address] [address] field for more details. Will continue to monitor remotely.

## 2022-11-02 NOTE — RT PROTOCOL NOTE
RT Inhaler-Nebulizer Bronchodilator Protocol Note    There is a bronchodilator order in the chart from a provider indicating to follow the RT Bronchodilator Protocol and there is an Initiate RT Inhaler-Nebulizer Bronchodilator Protocol order as well (see protocol at bottom of note). CXR Findings:  No results found. The findings from the last RT Protocol Assessment were as follows:   History Pulmonary Disease: Chronic pulmonary disease  Respiratory Pattern: Dyspnea on exertion or RR 21-25 bpm  Breath Sounds: Slightly diminished and/or crackles  Cough: Strong, spontaneous, non-productive  Indication for Bronchodilator Therapy: Decreased or absent breath sounds, Wheezing associated with pulm disorder  Bronchodilator Assessment Score: 6    Aerosolized bronchodilator medication orders have been revised according to the RT Inhaler-Nebulizer Bronchodilator Protocol below. Respiratory Therapist to perform RT Therapy Protocol Assessment initially then follow the protocol. Repeat RT Therapy Protocol Assessment PRN for score 0-3 or on second treatment, BID, and PRN for scores above 3. No Indications - adjust the frequency to every 6 hours PRN wheezing or bronchospasm, if no treatments needed after 48 hours then discontinue using Per Protocol order mode. If indication present, adjust the RT bronchodilator orders based on the Bronchodilator Assessment Score as indicated below. Use Inhaler orders unless patient has one or more of the following: on home nebulizer, not able to hold breath for 10 seconds, is not alert and oriented, cannot activate and use MDI correctly, or respiratory rate 25 breaths per minute or more, then use the equivalent nebulizer order(s) with same Frequency and PRN reasons based on the score. If a patient is on this medication at home then do not decrease Frequency below that used at home.     0-3 - enter or revise RT bronchodilator order(s) to equivalent RT Bronchodilator order with Frequency of every 4 hours PRN for wheezing or increased work of breathing using Per Protocol order mode. 4-6 - enter or revise RT Bronchodilator order(s) to two equivalent RT bronchodilator orders with one order with BID Frequency and one order with Frequency of every 4 hours PRN wheezing or increased work of breathing using Per Protocol order mode. 7-10 - enter or revise RT Bronchodilator order(s) to two equivalent RT bronchodilator orders with one order with TID Frequency and one order with Frequency of every 4 hours PRN wheezing or increased work of breathing using Per Protocol order mode. 11-13 - enter or revise RT Bronchodilator order(s) to one equivalent RT bronchodilator order with QID Frequency and an Albuterol order with Frequency of every 4 hours PRN wheezing or increased work of breathing using Per Protocol order mode. Greater than 13 - enter or revise RT Bronchodilator order(s) to one equivalent RT bronchodilator order with every 4 hours Frequency and an Albuterol order with Frequency of every 2 hours PRN wheezing or increased work of breathing using Per Protocol order mode. RT to enter RT Home Evaluation for COPD & MDI Assessment order using Per Protocol order mode.     Electronically signed by Troy Henry RCP on 11/2/2022 at 1:02 AM

## 2022-11-02 NOTE — PROGRESS NOTES
[facility] Pulmonary/CCM Progress note      Admit Date: 10/29/2022    Chief Complaint: Shortness of breath and weight gain    Subjective: Interval History: No new symptoms noted, denies any significant shortness of breath, cough or phlegm. Requiring 1 to 2 L O2. Appears comfortable.     Scheduled Meds:   [START ON 11/3/2022] amLODIPine  5 mg Oral Daily    furosemide  40 mg Oral Daily    warfarin  10 mg Oral Daily    guaiFENesin  600 mg Oral BID    sacubitril-valsartan  1 tablet Oral BID    insulin lispro  0-8 Units SubCUTAneous TID WC    insulin lispro  0-4 Units SubCUTAneous Nightly    allopurinol  300 mg Oral Daily    aspirin  81 mg Oral Daily    sotalol  80 mg Oral BID    Vitamin D  1,000 Units Oral Daily    atorvastatin  40 mg Oral Nightly    gemfibrozil  600 mg Oral BID AC    sodium chloride flush  5-40 mL IntraVENous 2 times per day    cefTRIAXone (ROCEPHIN) IV  1,000 mg IntraVENous Q24H    ipratropium-albuterol  1 ampule Inhalation TID     Continuous Infusions:   dextrose      sodium chloride 25 mL (10/31/22 7677)     PRN Meds:glucose, dextrose bolus **OR** dextrose bolus, glucagon (rDNA), dextrose, perflutren lipid microspheres, sodium chloride flush, sodium chloride, ondansetron **OR** ondansetron, polyethylene glycol, acetaminophen **OR** acetaminophen, magnesium sulfate, potassium chloride **OR** potassium alternative oral replacement **OR** potassium chloride, ipratropium-albuterol    Review of Systems  Constitutional: negative for fatigue, fevers, malaise and weight loss  Ears, nose, mouth, throat: negative for ear drainage, epistaxis, hoarseness, nasal congestion, sore throat and voice change  Respiratory: negative except for shortness of breath  Cardiovascular: negative for chest pain, chest pressure/discomfort, irregular heart beat, lower extremity edema and palpitations  Gastrointestinal: negative for abdominal pain, constipation, diarrhea, jaundice, melena, odynophagia, reflux symptoms and vomiting  Hematologic/lymphatic: negative for bleeding, easy bruising, lymphadenopathy and petechiae  Musculoskeletal:negative for arthralgias, bone pain, muscle weakness, neck pain and stiff joints  Neurological: negative for dizziness, gait problems, headaches, seizures, speech problems, tremors and weakness  Behavioral/Psych: negative for anxiety, behavior problems, depression, fatigue and sleep disturbance  Endocrine: negative for diabetic symptoms including none, neuropathy, polyphagia, polyuria, polydipsia, vomiting and diarrhea and temperature intolerance  Allergic/Immunologic: negative for anaphylaxis, angioedema, hay fever and urticaria    Objective:     Patient Vitals for the past 8 hrs:   BP Temp Temp src Pulse Resp SpO2 Weight   11/02/22 0811 -- -- -- 73 18 92 % --   11/02/22 0739 125/76 98.1 °F (36.7 °C) Oral 75 18 92 % --   11/02/22 0645 -- -- -- -- -- -- 278 lb 4.8 oz (126.2 kg)   11/02/22 0425 113/71 97.4 °F (36.3 °C) Axillary 67 25 95 % --       I/O last 3 completed shifts:   In: 240 [P.O.:240]  Out: 0534 [Urine:2555]  I/O this shift:  In: 120 [P.O.:120]  Out: -     General Appearance: alert and oriented to person, place and time, well developed and well- nourished, in no acute distress  Skin: warm and dry, no rash or erythema  Head: normocephalic and atraumatic  Eyes: pupils equal, round, and reactive to light, extraocular eye movements intact, conjunctivae normal  ENT: external ear and ear canal normal bilaterally, nose without deformity, nasal mucosa and turbinates normal  Neck: supple and non-tender without mass, no cervical lymphadenopathy  Pulmonary/Chest: clear to auscultation bilaterally- no wheezes, rales or rhonchi, normal air movement, no respiratory distress  Cardiovascular: normal rate, regular rhythm,  no murmurs, rubs, distal pulses intact, no carotid bruits  Abdomen: soft, non-tender, non-distended, normal bowel sounds, no masses or organomegaly  Lymph Nodes: Cervical, supraclavicular normal  Extremities: no cyanosis, clubbing or edema  Musculoskeletal: normal range of motion, no joint swelling, deformity or tenderness  Neurologic: alert, no focal neurologic deficits    Data Review:  CBC:   Lab Results   Component Value Date/Time    WBC 6.5 11/02/2022 06:36 AM    RBC 4.37 11/02/2022 06:36 AM     BMP:   Lab Results   Component Value Date/Time    GLUCOSE 128 11/02/2022 06:36 AM    CO2 29 11/02/2022 06:36 AM    BUN 36 11/02/2022 06:36 AM    CREATININE 1.7 11/02/2022 06:36 AM    CALCIUM 9.6 11/02/2022 06:36 AM     ABG: No results found for: PYR0CUT, BEART, Z1QTYYRR, PHART, THGBART, OLG6DEW, PO2ART, ITO2EFL    Radiology: All pertinent images / reports were reviewed as a part of this visit. EXAMINATION:   CT OF THE CHEST WITHOUT CONTRAST 10/30/2022 1:24 am       TECHNIQUE:   CT of the chest was performed without the administration of intravenous   contrast. Multiplanar reformatted images are provided for review. Automated   exposure control, iterative reconstruction, and/or weight based adjustment of   the mA/kV was utilized to reduce the radiation dose to as low as reasonably   achievable. COMPARISON:   02/25/2022 CT       HISTORY:   ORDERING SYSTEM PROVIDED HISTORY: pneumonia   TECHNOLOGIST PROVIDED HISTORY:   Reason for exam:->pneumonia   Decision Support Exception - unselect if not a suspected or confirmed   emergency medical condition->Emergency Medical Condition (MA)   Reason for Exam: Shortness of Breath (For a couple of days; coughing up   phelm. Denies n/V/D.)       FINDINGS:   Mediastinum: The heart is enlarged. Prior aortic valve replacement. Dilation of the distal arch measuring up to 4.1 cm in diameter. Pacemaker   implanted in the left chest.  Pulmonary arteries are normal.  Shotty   mediastinal lymph nodes are stable. Thyroid and esophagus normal.       Lungs/pleura: Airway opacification in the right lower lobar bronchus.   Dense   consolidation throughout the right lower lobe. Pleural thickening and   calcification is also demonstrated in the right lower lobe. Stable   subpleural nodule in the right upper lobe measuring up to 4 mm. Upper Abdomen: Normal adrenal glands. Cholelithiasis. Soft Tissues/Bones: No skeletal abnormalities are appreciated within the   chest.           Impression   1. Opacification of airways in the right lower lobe with dense consolidative   change. Pattern suspected to represent developing pneumonia. Atypical or   viral pattern of pneumonitis cannot be excluded. 2. Cardiomegaly with dilation of the distal aortic arch in a patient with   prior history of AVR. Problem List:     Acute hypoxic respiratory failure  CHF decompensation  Right lower lobe infiltrates ? Community-acquired pneumonia    Assessment/Plan:     CHF decompensation, urine output 2.1 L. Lasix has been held, creatinine 1.7. Continues on [facility]. O2 requirements 2 L. Right lower lobe infiltrate could represent community-acquired pneumonia-continue with empiric antibiotics, switch Rocephin and Zithromax to oral Augmentin-complete 7-day course of antibiotics. We will follow-up patient within the next 1 month, will require repeat imaging/CT to confirm resolution of the right lower lobe infiltrate. Discussed plan with patient.     Pulmonary will sign off    Verner Claude, MD

## 2022-11-03 VITALS
HEIGHT: 73 IN | WEIGHT: 278.3 LBS | DIASTOLIC BLOOD PRESSURE: 56 MMHG | HEART RATE: 68 BPM | SYSTOLIC BLOOD PRESSURE: 91 MMHG | TEMPERATURE: 97.9 F | BODY MASS INDEX: 36.88 KG/M2 | RESPIRATION RATE: 18 BRPM | OXYGEN SATURATION: 96 %

## 2022-11-03 LAB
ALBUMIN SERPL-MCNC: 3.7 G/DL (ref 3.4–5)
ANION GAP SERPL CALCULATED.3IONS-SCNC: 9 MMOL/L (ref 3–16)
BASOPHILS ABSOLUTE: 0 K/UL (ref 0–0.2)
BASOPHILS RELATIVE PERCENT: 0.7 %
BLOOD CULTURE, ROUTINE: NORMAL
BUN BLDV-MCNC: 41 MG/DL (ref 7–20)
CALCIUM SERPL-MCNC: 9.7 MG/DL (ref 8.3–10.6)
CHLORIDE BLD-SCNC: 101 MMOL/L (ref 99–110)
CO2: 30 MMOL/L (ref 21–32)
CREAT SERPL-MCNC: 1.8 MG/DL (ref 0.8–1.3)
CULTURE, BLOOD 2: NORMAL
EOSINOPHILS ABSOLUTE: 0.1 K/UL (ref 0–0.6)
EOSINOPHILS RELATIVE PERCENT: 1.9 %
GFR SERPL CREATININE-BSD FRML MDRD: 40 ML/MIN/{1.73_M2}
GLUCOSE BLD-MCNC: 124 MG/DL (ref 70–99)
GLUCOSE BLD-MCNC: 136 MG/DL (ref 70–99)
GLUCOSE BLD-MCNC: 157 MG/DL (ref 70–99)
HCT VFR BLD CALC: 39.9 % (ref 40.5–52.5)
HEMOGLOBIN: 13.1 G/DL (ref 13.5–17.5)
INR BLD: 2.42 (ref 0.87–1.14)
LYMPHOCYTES ABSOLUTE: 1.3 K/UL (ref 1–5.1)
LYMPHOCYTES RELATIVE PERCENT: 19.7 %
MCH RBC QN AUTO: 30.3 PG (ref 26–34)
MCHC RBC AUTO-ENTMCNC: 32.8 G/DL (ref 31–36)
MCV RBC AUTO: 92.5 FL (ref 80–100)
MONOCYTES ABSOLUTE: 0.7 K/UL (ref 0–1.3)
MONOCYTES RELATIVE PERCENT: 10.3 %
NEUTROPHILS ABSOLUTE: 4.6 K/UL (ref 1.7–7.7)
NEUTROPHILS RELATIVE PERCENT: 67.4 %
PDW BLD-RTO: 14.2 % (ref 12.4–15.4)
PERFORMED ON: ABNORMAL
PERFORMED ON: ABNORMAL
PHOSPHORUS: 4 MG/DL (ref 2.5–4.9)
PLATELET # BLD: 221 K/UL (ref 135–450)
PMV BLD AUTO: 8.8 FL (ref 5–10.5)
POTASSIUM SERPL-SCNC: 4.2 MMOL/L (ref 3.5–5.1)
PROTHROMBIN TIME: 26.4 SEC (ref 11.7–14.5)
RBC # BLD: 4.32 M/UL (ref 4.2–5.9)
REASON FOR REJECTION: NORMAL
REJECTED TEST: NORMAL
SODIUM BLD-SCNC: 140 MMOL/L (ref 136–145)
WBC # BLD: 6.8 K/UL (ref 4–11)

## 2022-11-03 PROCEDURE — 94640 AIRWAY INHALATION TREATMENT: CPT

## 2022-11-03 PROCEDURE — 94761 N-INVAS EAR/PLS OXIMETRY MLT: CPT

## 2022-11-03 PROCEDURE — 6370000000 HC RX 637 (ALT 250 FOR IP): Performed by: INTERNAL MEDICINE

## 2022-11-03 PROCEDURE — 2580000003 HC RX 258: Performed by: STUDENT IN AN ORGANIZED HEALTH CARE EDUCATION/TRAINING PROGRAM

## 2022-11-03 PROCEDURE — 80069 RENAL FUNCTION PANEL: CPT

## 2022-11-03 PROCEDURE — 99233 SBSQ HOSP IP/OBS HIGH 50: CPT | Performed by: CLINICAL NURSE SPECIALIST

## 2022-11-03 PROCEDURE — 94680 O2 UPTK RST&XERS DIR SIMPLE: CPT

## 2022-11-03 PROCEDURE — 85025 COMPLETE CBC W/AUTO DIFF WBC: CPT

## 2022-11-03 PROCEDURE — 99233 SBSQ HOSP IP/OBS HIGH 50: CPT | Performed by: INTERNAL MEDICINE

## 2022-11-03 PROCEDURE — 6370000000 HC RX 637 (ALT 250 FOR IP): Performed by: PHYSICIAN ASSISTANT

## 2022-11-03 PROCEDURE — 94660 CPAP INITIATION&MGMT: CPT

## 2022-11-03 PROCEDURE — 2700000000 HC OXYGEN THERAPY PER DAY

## 2022-11-03 PROCEDURE — 36415 COLL VENOUS BLD VENIPUNCTURE: CPT

## 2022-11-03 PROCEDURE — 85610 PROTHROMBIN TIME: CPT

## 2022-11-03 PROCEDURE — 6370000000 HC RX 637 (ALT 250 FOR IP): Performed by: NURSE PRACTITIONER

## 2022-11-03 PROCEDURE — 6370000000 HC RX 637 (ALT 250 FOR IP): Performed by: STUDENT IN AN ORGANIZED HEALTH CARE EDUCATION/TRAINING PROGRAM

## 2022-11-03 RX ORDER — AMOXICILLIN AND CLAVULANATE POTASSIUM 875; 125 MG/1; MG/1
1 TABLET, FILM COATED ORAL EVERY 12 HOURS SCHEDULED
Qty: 4 TABLET | Refills: 0 | Status: SHIPPED | OUTPATIENT
Start: 2022-11-03 | End: 2022-11-05

## 2022-11-03 RX ORDER — METFORMIN HYDROCHLORIDE 500 MG/1
500 TABLET, EXTENDED RELEASE ORAL 2 TIMES DAILY
Qty: 360 TABLET | Refills: 0
Start: 2022-11-03

## 2022-11-03 RX ORDER — GUAIFENESIN 600 MG/1
600 TABLET, EXTENDED RELEASE ORAL 2 TIMES DAILY
Qty: 14 TABLET | Refills: 0 | Status: SHIPPED | OUTPATIENT
Start: 2022-11-03 | End: 2022-11-10

## 2022-11-03 RX ORDER — AMLODIPINE BESYLATE 10 MG/1
5 TABLET ORAL DAILY
Qty: 90 TABLET | Refills: 1
Start: 2022-11-03

## 2022-11-03 RX ORDER — FUROSEMIDE 40 MG/1
40 TABLET ORAL DAILY
Qty: 60 TABLET | Refills: 1 | Status: SHIPPED | OUTPATIENT
Start: 2022-11-04 | End: 2022-11-10

## 2022-11-03 RX ORDER — SACUBITRIL AND VALSARTAN 49; 51 MG/1; MG/1
0.5 TABLET, FILM COATED ORAL 2 TIMES DAILY
Qty: 60 TABLET | Refills: 0 | Status: SHIPPED | OUTPATIENT
Start: 2022-11-03 | End: 2022-11-03 | Stop reason: HOSPADM

## 2022-11-03 RX ADMIN — FUROSEMIDE 40 MG: 40 TABLET ORAL at 09:50

## 2022-11-03 RX ADMIN — Medication 10 ML: at 09:50

## 2022-11-03 RX ADMIN — IPRATROPIUM BROMIDE AND ALBUTEROL SULFATE 1 AMPULE: 2.5; .5 SOLUTION RESPIRATORY (INHALATION) at 14:56

## 2022-11-03 RX ADMIN — GEMFIBROZIL 600 MG: 600 TABLET ORAL at 07:29

## 2022-11-03 RX ADMIN — ASPIRIN 81 MG 81 MG: 81 TABLET ORAL at 09:50

## 2022-11-03 RX ADMIN — Medication 1000 UNITS: at 09:50

## 2022-11-03 RX ADMIN — IPRATROPIUM BROMIDE AND ALBUTEROL SULFATE 1 AMPULE: 2.5; .5 SOLUTION RESPIRATORY (INHALATION) at 07:52

## 2022-11-03 RX ADMIN — AMLODIPINE BESYLATE 5 MG: 5 TABLET ORAL at 09:50

## 2022-11-03 RX ADMIN — AMOXICILLIN AND CLAVULANATE POTASSIUM 1 TABLET: 875; 125 TABLET, FILM COATED ORAL at 09:49

## 2022-11-03 RX ADMIN — SACUBITRIL AND VALSARTAN 1 TABLET: 49; 51 TABLET, FILM COATED ORAL at 09:50

## 2022-11-03 RX ADMIN — ALLOPURINOL 300 MG: 300 TABLET ORAL at 09:50

## 2022-11-03 RX ADMIN — GUAIFENESIN 600 MG: 600 TABLET, EXTENDED RELEASE ORAL at 09:50

## 2022-11-03 RX ADMIN — SOTALOL HYDROCHLORIDE 80 MG: 80 TABLET ORAL at 09:50

## 2022-11-03 NOTE — PLAN OF CARE
Problem: Discharge Planning  Goal: Discharge to home or other facility with appropriate resources  Outcome: Progressing     Problem: Safety - Adult  Goal: Free from fall injury  Outcome: Progressing     Problem: ABCDS Injury Assessment  Goal: Absence of physical injury  Outcome: Progressing     Problem: Respiratory - Adult  Goal: Achieves optimal ventilation and oxygenation  Outcome: Progressing  Flowsheets (Taken 11/3/2022 0025)  Achieves optimal ventilation and oxygenation:   Assess for changes in respiratory status   Assess for changes in mentation and behavior   Position to facilitate oxygenation and minimize respiratory effort   Oxygen supplementation based on oxygen saturation or arterial blood gases   Encourage broncho-pulmonary hygiene including cough, deep breathe, incentive spirometry   Assess and instruct to report shortness of breath or any respiratory difficulty   Respiratory therapy support as indicated     Problem: Cardiovascular - Adult  Goal: Maintains optimal cardiac output and hemodynamic stability  Outcome: Progressing  Flowsheets (Taken 11/3/2022 0025)  Maintains optimal cardiac output and hemodynamic stability:   Monitor blood pressure and heart rate   Monitor urine output and notify Licensed Independent Practitioner for values outside of normal range   Assess for signs of decreased cardiac output

## 2022-11-03 NOTE — PROGRESS NOTES
CLINICAL PHARMACY NOTE: MEDS TO BEDS    Total # of Prescriptions Filled: 3   The following medications were delivered to the patient:  Albuterol inhaler  Furosemide 40mg  Amox/Clav 875/125mg    Additional Documentation:     Medications were picked up in the Outpatient Pharmacy by patient's wife    Peggy Henriquez

## 2022-11-03 NOTE — DISCHARGE INSTR - COC
Continuity of Care Form    Patient Name: Alfonso Flores   :  1950  MRN:  [id number]    6 [facility] date:  10/29/2022  Discharge date:  11/3/22    Code Status Order: Full Code   Advance Directives:     Admitting Physician:  Sandeep Hunt DO  PCP: Claribel Walls MD    Discharging Nurse: [facility] Unit/Room#: 4OO-4835/8488-15  Discharging Unit Phone Number: [phone]    Emergency Contact:   Extended Emergency Contact Information  Primary Emergency Contact: Luisa Solis  Address: [facility] [place], [address] Phone: [phone]  Relation: Spouse    Past Surgical History:  Past Surgical History:   Procedure Laterality Date    AORTIC VALVE REPLACEMENT  10/1996:[employer]    x3    ATRIAL ABLATION SURGERY  2020    CTI dependent atrial flutter ablation (Dr. Taya Butler)    [address]  2020    BIV upgrade    KNEE ARTHROCENTESIS Right 2022    RIGHT KNEE GENICULAR NERVE BLOCK WITH INTRA ARTICULAR INJECTION SITE CONFIRMED BY FLUOROSCOPY performed by Angelo Landers MD at [facility] [address] ARTHROSCOPY Right 2021    RIGHT KNEE DIAGNOSTIC ARTHROSCOPY WITH SUBCHONDROPLASTY TO MEDIAL FEMORAL CONDYLE AND TIBIAL PLATEAU, LEFT KNEE INJECTION.  performed by Angelo Landers MD at [place] [address] Right 2022    RIGHT KNEE GENICULAR NERVE BLOCK WITH INTRA ARTICULAR INJECTION SITE CONFIRMED BY FLUOROSCOPY    PACEMAKER INSERTION         Immunization History:   Immunization History   Administered Date(s) Administered    COVID-19, MODERNA BLUE border, Primary or Immunocompromised, (age 12y+), IM, 100 mcg/0.5mL 2021, 2021    Influenza, FLUAD, (age 72 y+), Adjuvanted, 0.5mL 2022    Pneumococcal conjugate PCV20, PF (Prevnar 20) 2022    Tdap (Boostrix, Adacel) 2017       Active Problems:  Patient Active Problem List   Diagnosis Code    Primary hypertension I10    Hyperlipidemia associated with type 2 diabetes mellitus ([facility]) E11.69, E78.5    Long term current use of anticoagulants with INR goal of 2.0-3.0 Z79.01    Gout of both feet M10.9    Primary insomnia F51.01    Type 2 diabetes mellitus, without long-term current use of insulin (HCC) E11.9    Class 3 severe obesity due to excess calories with serious comorbidity in adult [facility]) E66.01    COPD (chronic obstructive pulmonary disease) ([facility]) J44.9    ROSETTA (obstructive sleep apnea) G47.33    Congestive heart failure ([facility]) I50.9    Cardiomyopathy, dilated ([facility]) I42.0    Acute on chronic systolic heart failure ([facility]) I50.23    S/P AVR Z95.2    Pacemaker Z95.0    Typical atrial flutter ([facility]) I48.3    Primary osteoarthritis of both knees M17.0    Status post arthroscopic surgery of right knee Z98.890    Stage 3a chronic kidney disease ([facility]) N18.31    Chronic pain of right knee M25.561, G89.29    HFrEF (heart failure with reduced ejection fraction) ([facility]) I50.20    Hypertension associated with stage 3 chronic kidney disease due to type 2 diabetes mellitus ([facility]) E11.22, I12.9, N18.30    CAP (community acquired pneumonia) O71.6    Acute systolic CHF (congestive heart failure) ([facility]) I50.21    Acute respiratory failure with hypoxia ([facility]) J96.01    Acute on chronic diastolic (congestive) heart failure ([facility]) I50.33    SOB (shortness of breath) R06.02    Hypoxia R09.02    PAF (paroxysmal atrial fibrillation) ([facility]) I48.0    ICD (implantable cardioverter-defibrillator) in place Z95.810    Chronic atrial fibrillation ([facility]) I48.20    CLAUDIO (acute kidney injury) ([facility] C60.5    Metabolic alkalosis T97.2    Lung infiltrate on CT R91.8       Isolation/Infection:   Isolation            No Isolation          Patient Infection Status       Infection Onset Added Last Indicated Last Indicated By Review Planned Expiration Resolved Resolved By    None active    Resolved    COVID-19 (Rule Out) 10/30/22 10/30/22 10/30/22 COVID-19 (Ordered)   10/31/22 Rule-Out Test Resulted Nurse Assessment:  Last Vital Signs: BP (!) 91/56   Pulse 72   Temp 97.9 °F (36.6 °C) (Oral)   Resp 18   Ht 6' 1\" (1.854 m)   Wt 278 lb 4.8 oz (126.2 kg)   SpO2 92%   BMI 36.72 kg/m²     Last documented pain score (0-10 scale):    Last Weight:   Wt Readings from Last 1 Encounters:   11/03/22 278 lb 4.8 oz (126.2 kg)     Mental Status:  oriented and alert    IV Access:  - None    Nursing Mobility/ADLs:  Walking   Independent  Transfer  Independent  Bathing  Independent  Dressing  Independent  Toileting  Independent  Feeding  Independent  Med Admin  Independent  Med Delivery   whole    Wound Care Documentation and Therapy:  Puncture 03/23/20 Groin Right (Active)   Number of days: 074       Puncture 12/27/21 Knee Anterior;Right (Active)   Number of days: 311       Puncture 12/27/21 Knee Anterior; Left (Active)   Number of days: 311       Puncture 04/06/22 Knee Anterior;Right (Active)   Number of days: 211       Incision 03/23/20 Chest Left;Upper (Active)   Number of days: 955       Incision 12/27/21 Hip Anterior (Active)   Number of days: 311        Elimination:  Continence: Bowel: Yes  Bladder: Yes  Urinary Catheter: None   Colostomy/Ileostomy/Ileal Conduit: No       Date of Last BM: 11/3/22    Intake/Output Summary (Last 24 hours) at 11/3/2022 1411  Last data filed at 11/3/2022 0952  Gross per 24 hour   Intake 840 ml   Output 1840 ml   Net -1000 ml     I/O last 3 completed shifts: In: 600 [P.O.:600]  Out: 2100 [Urine:2100]    Safety Concerns:     None    Impairments/Disabilities:      None    Nutrition Therapy:  Current Nutrition Therapy:   - Oral Diet:  Carb Control 4 carbs/meal (1800kcals/day) and Low Sodium (2gm)    Routes of Feeding: Oral  Liquids:  Thin Liquids  Daily Fluid Restriction: yes - amount 64 oz/day  Last Modified Barium Swallow with Video (Video Swallowing Test): not done    Treatments at the Time of Hospital Discharge:   Respiratory Treatments: albuterol inhaler  Oxygen Therapy: is on oxygen at 3 L/min per nasal cannula. Ventilator:    - CPAP   only when sleeping and device from home    Rehab Therapies: n/a  Weight Bearing Status/Restrictions: No weight bearing restrictions  Other Medical Equipment (for information only, NOT a DME order):  n/a  Other Treatments: home O2 3 L NC    Patient's personal belongings (please select all that are sent with patient):  None    RN SIGNATURE:  Electronically signed by Justine Chao RN on 11/3/22 at 2:44 PM EDT    CASE MANAGEMENT/SOCIAL WORK SECTION    Inpatient Status Date: ***    Readmission Risk Assessment Score:  Readmission Risk              Risk of Unplanned Readmission:  26           Discharging to Facility/ Agency   Name:   Address:  Phone:  Fax:    Dialysis Facility (if applicable)   Name:  Address:  Dialysis Schedule:  Phone:  Fax:    / signature: {Esignature:[id number]}    PHYSICIAN SECTION    Prognosis: Good    Condition at Discharge: Stable    Rehab Potential (if transferring to Rehab): Good    Recommended Labs or Other Treatments After Discharge:     Physician Certification: I certify the above information and transfer of Yue Pablo  is necessary for the continuing treatment of the diagnosis listed and that he requires [address] for less 30 days.      Update Admission H&P: No change in H&P    PHYSICIAN SIGNATURE:  Electronically signed by Jose Ramon Richter PA-C on 11/3/22 at 2:11 PM EDT

## 2022-11-03 NOTE — PROGRESS NOTES
Data- discharge order received, pt verbalized agreement to discharge, disposition to previous residence, no needs for HHC/DME. Action- discharge instructions prepared and given to pt and wife, pt verbalized understanding. Medication information packet given r/t NEW and/or CHANGED prescriptions emphasizing name/purpose/side effects, pt verbalized understanding. Discharge instruction summary: Diet- low sodium, Activity- as tolerated, Primary Care Physician as follows: Debbie Hercules -[phone] f/u appointment in 1 week, immunizations reviewed and n/a, prescription medications filled in [facility] pharmacy. Inpatient surgical procedure precautions reviewed: n/a CHF Education reviewed. Pt/ Family has had a total of 60 minutes CHF education this admission encounter. 1. WEIGHT: Admit Weight: 287 lb (130.2 kg) (10/30/22 0147)        Today  Weight: 278 lb 4.8 oz (126.2 kg) (11/03/22 0730)       2. O2 SAT.: SpO2: 96 % (11/03/22 1456)    Response- Pt belongings gathered, IV removed. Disposition is home (no HHC/DME needs), transported with wife, taken to lobby via w/c w/ PCA and O2 concentrator, no complications.

## 2022-11-03 NOTE — TELEPHONE ENCOUNTER
Prescription refill    Last OV:10/06/2022    Last Refill:10/06/2022    Labs:11/03/2022    Future Appt: 01/06/2023

## 2022-11-03 NOTE — PROGRESS NOTES
[facility]   Daily Progress Note      Admit Date:  10/29/2022    HPI:    Mr. Emeterio Pearson 71 y.o. 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 He follows with Dr Sergio Barrera with [facility]. 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 He follows with Dr Raghu aJckson, AF, BiV lead implant 3/23/2020    He is admitted with shortness of breath, heart failure and pneumonia. He is being diuresed. He has had some trouble affording his medications as an outpatient and was off his inhalers. Covid neg     Subjective:  Patient is being seen for acute on chronic systolic heart failure. There were no acute overnight cardiac events. He is sitting on the side of the bed on 2 L of oxygen. His BP is on the soft side   Iron studies normal weight 286-278 creat 1.8  Optival is normal    Objective:   /67   Pulse 75   Temp 97.6 °F (36.4 °C) (Oral)   Resp 18   Ht 6' 1\" (1.854 m)   Wt 278 lb 4.8 oz (126.2 kg)   SpO2 95%   BMI 36.72 kg/m²     Intake/Output Summary (Last 24 hours) at 11/3/2022 0952  Last data filed at 11/3/2022 0840  Gross per 24 hour   Intake 240 ml   Output 2040 ml   Net -1800 ml          Physical Exam:  General:  Awake, alert, oriented in NAD  Skin:  Warm and dry. No unusual bruising or rash  Neck:  Supple. No JVD or carotid bruit appreciated  Chest:  Normal effort.   Clear to auscultation, no wheezes/rhonchi/rales  Cardiovascular:  RRR, S1/S2, no murmur/gallop/rub  Abdomen:  Soft, nontender, +bowel sounds, obese  Extremities:  No edema  Neurological: No focal deficits  Psychological: Normal mood and affect      Medications:    amLODIPine  5 mg Oral Daily    amoxicillin-clavulanate  1 tablet Oral 2 times per day    furosemide  40 mg Oral Daily    warfarin  10 mg Oral Daily    guaiFENesin  600 mg Oral BID    sacubitril-valsartan  1 tablet Oral BID    insulin lispro  0-8 Units SubCUTAneous TID     insulin lispro 0-4 Units SubCUTAneous Nightly    allopurinol  300 mg Oral Daily    aspirin  81 mg Oral Daily    sotalol  80 mg Oral BID    Vitamin D  1,000 Units Oral Daily    atorvastatin  40 mg Oral Nightly    gemfibrozil  600 mg Oral BID AC    sodium chloride flush  5-40 mL IntraVENous 2 times per day    ipratropium-albuterol  1 ampule Inhalation TID      dextrose      sodium chloride 25 mL (10/31/22 7149)       Lab Data:  CBC:   Recent Labs     11/01/22  0718 11/02/22  0636 11/03/22  0444   WBC 6.7 6.5 6.8   HGB 13.1* 13.1* 13.1*    238 221     BMP:    Recent Labs     11/01/22  0718 11/02/22  0636 11/03/22  0444    141 140   K 4.3 4.0 4.2   CO2 33* 29 30   BUN 39* 36* 41*   CREATININE 1.6* 1.7* 1.8*     INR:    Recent Labs     11/01/22  0718 11/02/22  1111 11/03/22  0717   INR 2.59* 2.28* 2.42*     BNP:    Recent Labs     11/01/22  0718   PROBNP 254*         Diagnostics:  Echo 10/31/2022   Summary   Left ventricular systolic function is low normal with ejection fraction   estimated at 50 %. No regional wall motion abnormalities are noted. There is mild concentric left ventricular hypertrophy. Normal left ventricular diastolic filling pressure. Pacer wire noted in right heart. The right atrium is dilated. The ascending aorta is dilated at 4.0 cm. A mechanical [employer] [place] aortic valve appears well seated with a maximum   gradient of 36.48 mmHg and a mean gradient of 17 mmHg. Mild-to-moderate tricuspid regurgitation. Echo 12/11/2020   Summary   -Left ventricular cavity size is normal.   -Overall left ventricular systolic function appears mildly reduced.   -Ejection fraction is visually estimated to be 45%. -There is abnormal septal motion/bounce noted. -Indeterminate diastolic function E/e' = 28.2. [facility]    -A mechanical artificial aortic valve appears well seated with a maximum   velocity of 2.1m/s and a mean gradient of 9mmHg.   -No evidence of aortic valve regurgitation.   -Trivial-mild mitral regurgitation.   -Mild tricuspid regurgitation.   -The right ventricle is normal in size with reduces function. TAPSE 1.9cm. RVS velocity is 6.94 cm/s.   -Pacer/ICD right heart     Echo: 12/31/19   Summary   -Global hypokinesis with EF 40-45%. -Abnormal septal motion.   -The aortic root and the ascending aorta are mildly dilated. -The right ventricle appears to be dilated. -RV systolic function appears to be reduced.   -The right atrium appears to be dilated. -The mechanical artificial aortic valve appears well seated with a maximum   velocity of 2.40 m/s and a mean gradient of 12 mmHg. The aortic valve area   is estimated at 1.19 cm^2. No significant regurgitation noted. -Thickened mitral valve without evidence of stenosis. There is   mild-to-moderate mitral regurgitation.   -There is moderate tricuspid regurgitation with a RVSP estimation of 43   mmHg.   -Pacer / ICD wire is visualized in the right heart.   -Indeterminate diastolic function. Assessment:    1. Shortness of breath  2. Acute on chronic systolic heart failure, on arni, bb; no aldosterone due to increased potassium and no sglt2 due to cost  3. Pneumonia per hospitalist  4. Hypertension  5. ICD in place  6. Chronic atrial fib  7. Status post AV replacement  8. COPD      Plan:    Continue sotalol 80 mg twice a day   Continue lasix 40 mg daily  Will decrease entresto to half 49-51 mg twice a day (patient understands this)  Continue norvasc 5 mg daily    Stable from cardiology standpoint  [id number] Areli Garcia for discharge today with follow up in the office next week  Discussed with hospitalist  He has all his medications  He will need to go home on oxygen    NYHA III    Discussed with patient who is agreeable with plan of care. Thank you for allowing me to participate in the care of your patient.     MEENU Benson - CNS, CNS

## 2022-11-03 NOTE — PROGRESS NOTES
11/03/22 1109   Resting (Room Air)   SpO2 91   HR 82   During Walk (Room Air)   SpO2 87   HR 89   Walk/Assistance Device Ambulation   Rate of Dyspnea 0   During Walk (On O2)   SpO2 92   HR 90   O2 Device Nasal cannula   O2 Flow Rate (l/min) 3 l/min   Need Additional O2 Flow Rate Rows Yes   O2 Flow Rate (l/min) 2 l/min   O2 Saturation 90   O2 Flow Rate (l/min) 1 l/min   O2 Saturation 89   After Walk   SpO2 94   HR 84   O2 Device Nasal cannula   O2 Flow Rate (l/min) 3 l/min   Does the Patient Qualify for Home O2 Yes   Liter Flow at Rest 0   Liter Flow on Exertion 3

## 2022-11-03 NOTE — DISCHARGE SUMMARY
Ines Newberry RCP   Respiratory Therapist   Specialty:  Respiratory Therapy   Progress Notes      Signed   Date of Service:  11/3/2022 11:12 AM                 Signed                                 11/03/22 1109   Resting (Room Air)   SpO2 91   HR 82   During Walk (Room Air)   SpO2 87   HR 89   Walk/Assistance Device Ambulation   Rate of Dyspnea 0   During Walk (On O2)   SpO2 92   HR 90   O2 Device Nasal cannula   O2 Flow Rate (l/min) 3 l/min   Need Additional O2 Flow Rate Rows Yes   O2 Flow Rate (l/min) 2 l/min   O2 Saturation 90   O2 Flow Rate (l/min) 1 l/min   O2 Saturation 89   After Walk   SpO2 94   HR 84   O2 Device Nasal cannula   O2 Flow Rate (l/min) 3 l/min   Does the Patient Qualify for Home O2 Yes   Liter Flow at Rest 0   Liter Flow on Exertion 3

## 2022-11-03 NOTE — FLOWSHEET NOTE
11/03/22 0827   Oxygen Therapy   SpO2 (!) 87 %   Pulse Oximeter Device Mode Intermittent   Pulse Oximeter Device Location Finger   O2 Device None (Room air)   Pt ambulated to sink to brush teeth and back to bed, o2 sats 87% on RA. 2 L NC reapplied, o2 sats increased to 95% with 2 L NC.

## 2022-11-03 NOTE — CARE COORDINATION
ADONIS placed call to [address] [place] [address] and spoke with Bouchra Gooden ([phone]) to give referral for home O2. She states that she will begin working on this. Update:  ADONIS received call from Bouchra Gooden from [address] [place] [address], she states that patient has all equipment he needs for discharge.      Electronically signed by Darius Gray RN on 11/3/2022 at 2:28 PM 19-Jul-2019 17:30

## 2022-11-03 NOTE — PROGRESS NOTES
Office : [phone]     Fax :[phone]       Nephrology Consult Note      Patient's Name: Arlet Matamoros  8:32 AM  11/3/2022    Reason for Consult:  CLAUDIO on CKD 3       Requesting Physician:  Dayne Mueller MD      Chief Complaint:    Chief Complaint   Patient presents with    Shortness of Breath     For a couple of days; coughing up phelm. Denies n/V/D. History of Present iIlness:    Arlet Matamoros is a 71 y.o. male with PMHx of HFrEF, atrial fibrillation, CKD stage III, COPD, hypertension, lipidemia, type 2 diabetes who presented to [facility] with complaints of shortness of breath. Patient states for the last 2 to 3 days he is had shortness of breath and some coughing. He has had similar episodes in the past, which were CHF exacerbations, and he states his current disposition is similar. He denies any chest pain, abdominal pain, nausea, vomiting, GI/ symptoms. Patient does states he is noticed increased edema in his lower extremities bilaterally up to his knees. He states his Lasix was cut in half recently and was started on Entresto. Patient on 4 L O2 via NC in the ED, denies home O2 use. Baseline creat is 1.3   Creatinine is 1.7     On lasix and entresto     Volume status better now with diuresis     Interval hx     On oxygen NC   Mild wheezing     No fever   No cough     I/O last 3 completed shifts:   In: 600 [P.O.:600]  Out: 2100 [Urine:2100]    Past Medical History:   Diagnosis Date    Acute congestive heart failure ([address] 12/30/2019    Allergic rhinitis 7/11/2016    Atrial fibrillation (HCC)     under care of cardiology:Dr. Zola Push Behrens([facility])    CKD (chronic kidney disease)     Nephro:Dr. Parker:stage 3    Class 2 obesity due to excess calories without serious comorbidity with body mass index (BMI) of 37.0 to 37.9 in adult 11/7/2017    Controlled type 2 diabetes mellitus without complication, without long-term current use of insulin ([facility] [address] 2/24/2017    COPD     Essential tremor     Gout     Hyperlipidemia, mixed 07/11/2016    Hypertension     under care of cardiology:Dr. Excell Pandy Behrens([facility])    Long term current use of anticoagulants with INR goal of 2.0-3.0     under care of cardiology:Dr. Scott Behrens([facility])    Obstructive sleep apnea syndrome 06/18/2019    per pulmo    Primary insomnia 1/25/2017    Primary osteoarthritis of both knees 9/14/2021    Sleep apnea     uses bipap       Past Surgical History:   Procedure Laterality Date    AORTIC VALVE REPLACEMENT  10/1996:[facility]    x3    ATRIAL ABLATION SURGERY  03/23/2020    CTI dependent atrial flutter ablation (Dr. Stephen Neves)    [address]  03/23/2020    BIV upgrade    KNEE ARTHROCENTESIS Right 4/6/2022    RIGHT KNEE GENICULAR NERVE BLOCK WITH INTRA ARTICULAR INJECTION SITE CONFIRMED BY FLUOROSCOPY performed by Andzrej Rooney MD at [address] [place] [address] ARTHROSCOPY Right 12/27/2021    RIGHT KNEE DIAGNOSTIC ARTHROSCOPY WITH SUBCHONDROPLASTY TO MEDIAL FEMORAL CONDYLE AND TIBIAL PLATEAU, LEFT KNEE INJECTION.  performed by Andrzej Rooney MD at [place] [address] Right 04/06/2022    RIGHT KNEE GENICULAR NERVE BLOCK WITH INTRA ARTICULAR INJECTION SITE CONFIRMED BY FLUOROSCOPY    PACEMAKER INSERTION  1996       Family History   Problem Relation Age of Onset    Cancer Mother         Gallbladder    Asthma Father     Emphysema Father     No Known Problems Sister     No Known Problems Brother     No Known Problems Maternal Grandmother     No Known Problems Maternal Grandfather     No Known Problems Paternal Grandmother     No Known Problems Paternal Grandfather            Current Medications:    amLODIPine (NORVASC) tablet 5 mg, Daily  amoxicillin-clavulanate (AUGMENTIN) 875-125 MG per tablet 1 tablet, 2 times per day  furosemide (LASIX) tablet 40 mg, Daily  warfarin (COUMADIN) tablet 10 mg, Daily  guaiFENesin (MUCINEX) extended release tablet 600 mg, BID  sacubitril-valsartan (ENTRESTO) 49-51 MG per tablet 1 tablet, BID  glucose-vitamin C chewable tablet 4 tablet, PRN  dextrose bolus 10% 125 mL, PRN   Or  dextrose bolus 10% 250 mL, PRN  glucagon (rDNA) injection 1 mg, PRN  dextrose 10 % infusion, Continuous PRN  insulin lispro (HUMALOG) injection vial 0-8 Units, TID WC  insulin lispro (HUMALOG) injection vial 0-4 Units, Nightly  perflutren lipid microspheres (DEFINITY) injection 1.65 mg, ONCE PRN  allopurinol (ZYLOPRIM) tablet 300 mg, Daily  aspirin chewable tablet 81 mg, Daily  sotalol (BETAPACE) tablet 80 mg, BID  Vitamin D (CHOLECALCIFEROL) tablet 1,000 Units, Daily  atorvastatin (LIPITOR) tablet 40 mg, Nightly  gemfibrozil (LOPID) tablet 600 mg, BID AC  sodium chloride flush 0.9 % injection 5-40 mL, 2 times per day  sodium chloride flush 0.9 % injection 5-40 mL, PRN  0.9 % sodium chloride infusion, PRN  ondansetron (ZOFRAN-ODT) disintegrating tablet 4 mg, Q8H PRN   Or  ondansetron (ZOFRAN) injection 4 mg, Q6H PRN  polyethylene glycol (GLYCOLAX) packet 17 g, Daily PRN  acetaminophen (TYLENOL) tablet 650 mg, Q6H PRN   Or  acetaminophen (TYLENOL) suppository 650 mg, Q6H PRN  magnesium sulfate 2000 mg in 50 mL IVPB premix, PRN  potassium chloride (KLOR-CON M) extended release tablet 40 mEq, PRN   Or  potassium bicarb-citric acid (EFFER-K) effervescent tablet 40 mEq, PRN   Or  potassium chloride 10 mEq/100 mL IVPB (Peripheral Line), PRN  ipratropium-albuterol (DUONEB) nebulizer solution 1 ampule, TID  ipratropium-albuterol (DUONEB) nebulizer solution 1 ampule, Q4H PRN    oxyCODONE-acetaminophen (PERCOCET) 5-325 MG per tablet 1 tablet, Once  lactated ringers infusion, Continuous  sodium chloride flush 0.9 % injection 10 mL, 2 times per day  sodium chloride flush 0.9 % injection 10 mL, PRN  0.9 % sodium chloride infusion, PRN        Physical exam:     Vitals:  /63   Pulse 73   Temp 97.5 °F (36.4 °C) (Axillary)   Resp 18   Ht 6' 1\" (1.854 m)   Wt 278 lb 4.8 oz (126.2 kg)   SpO2 95%   BMI 36.72 kg/m²   Constitutional:  OAA X3 NAD  Skin: no rash, turgor wnl  Heent:  eomi, mmm  Neck: no bruits or jvd noted  Cardiovascular:  S1, S2 without m/r/g  Respiratory: CTA B without w/r/r  Abdomen:  +bs, soft, nt, nd  Ext: no  lower extremity edema      Labs:  CBC:   Recent Labs     11/01/22  0718 11/02/22  0636 11/03/22  0444   WBC 6.7 6.5 6.8   HGB 13.1* 13.1* 13.1*    238 221     BMP:    Recent Labs     11/01/22  0718 11/02/22  0636 11/03/22  0444    141 140   K 4.3 4.0 4.2    101 101   CO2 33* 29 30   BUN 39* 36* 41*   CREATININE 1.6* 1.7* 1.8*   GLUCOSE 137* 128* 157*     Ca/Mg/Phos:   Recent Labs     11/01/22  0718 11/02/22  0636 11/03/22  0444   CALCIUM 9.5 9.6 9.7   PHOS 4.4 4.0 4.0     Hepatic: No results for input(s): AST, ALT, ALB, BILITOT, ALKPHOS in the last 72 hours. Troponin: No results for input(s): TROPONINI in the last 72 hours. BNP: No results for input(s): BNP in the last 72 hours. Lipids: No results for input(s): CHOL, TRIG, HDL, LDLCALC, LABVLDL in the last 72 hours. ABGs: No results for input(s): PHART, PO2ART, EXG1JMQ in the last 72 hours. INR:   Recent Labs     11/01/22  0718 11/02/22  1111 11/03/22  0717   INR 2.59* 2.28* 2.42*     UA:No results for input(s): Jocelyn Chandler, GLUCOSEU, BILIRUBINUR, Demarco Breslow, SPECGRAV, BLOODU, PHUR, PROTEINU, UROBILINOGEN, NITRU, LEUKOCYTESUR, Karron Belts in the last 72 hours. Urine Microscopic: No results for input(s): LABCAST, BACTERIA, COMU, HYALCAST, WBCUA, RBCUA, EPIU in the last 72 hours. Urine Culture: No results for input(s): LABURIN in the last 72 hours. Urine Chemistry: No results for input(s): Sherial Blower, PROTEINUR, NAUR in the last 72 hours.              IMAGING:  CT CHEST WO CONTRAST   Final Result   1. Opacification of airways in the right lower lobe with dense consolidative   change. Pattern suspected to represent developing pneumonia. Atypical or   viral pattern of pneumonitis cannot be excluded. 2. Cardiomegaly with dilation of the distal aortic arch in a patient with   prior history of AVR. XR CHEST PORTABLE   Final Result   Bibasilar atelectasis or airspace disease. Assessment/Plan :      1. CLAUDIO on CKD 3 A    Monitor creat  levels   Creat 1.8   Consider decreasing dose of entresto. Will defer to cardiology        Recommend to dose adjust all medications  based on renal functions  Maintain SBP> 90 mmHg   Daily weights   AVOID NSAIDs  Avoid Nephrotoxins  Monitor Intake/Output  Call if significant decrease in urine output      2. HTN. Controlled   Some episodes of low BP   Will decrease amlodipine to 5 mg po daily    3. Metabolic alkalosis   HCo3 30     4. Acute on chronic CHF   Lasix   Entresto per cardiology     5. Pneumonia . Abx per hospitalist     D/w cardiology.      D/w primary team      Thank you for allowing us to participate in care of John Camacho         Electronically signed by: Sherri Amaya MD, 11/3/2022, 8:32 AM      [facility] [address]  Office : [phone]  Fax :[phone]

## 2022-11-03 NOTE — PROGRESS NOTES
Pharmacy to Dose Warfarin    Pharmacy consulted to dose warfarin for afib. INR Goal: 2-3    INR today: 2.42 (from 2.28)    Assessment/Plan:  - Therapeutic. Continue with 10mg daily. Pharmacy will continue to follow.     Denton Garcia, NovaD, BCCCP

## 2022-11-03 NOTE — PROGRESS NOTES
Patient seen, feeling better. Sats were 88-93 on RA when I saw him. Creat 1.8. Patient eager to go home. Dc if ok with cards, nephro. Puldario has already signed off.  Will get home O2 eval.    Jose Ramon Richter PA-C

## 2022-11-04 ENCOUNTER — TELEPHONE (OUTPATIENT)
Dept: OTHER | Age: 72
End: 2022-11-04

## 2022-11-04 ENCOUNTER — TELEPHONE (OUTPATIENT)
Dept: PHARMACY | Age: 72
End: 2022-11-04

## 2022-11-04 ENCOUNTER — TELEPHONE (OUTPATIENT)
Dept: PULMONOLOGY | Age: 72
End: 2022-11-04

## 2022-11-04 ENCOUNTER — CARE COORDINATION (OUTPATIENT)
Dept: CASE MANAGEMENT | Age: 72
End: 2022-11-04

## 2022-11-04 DIAGNOSIS — J18.9 COMMUNITY ACQUIRED PNEUMONIA, UNSPECIFIED LATERALITY: Primary | ICD-10-CM

## 2022-11-04 LAB
CULTURE, RESPIRATORY: NORMAL
GRAM STAIN RESULT: NORMAL

## 2022-11-04 PROCEDURE — 1111F DSCHRG MED/CURRENT MED MERGE: CPT | Performed by: FAMILY MEDICINE

## 2022-11-04 NOTE — TELEPHONE ENCOUNTER
Admitted 10/29-11/3 for SOB. 10/29- INR was 2.21  10/30- 10mg, INR 2.22  10/31- held, INR 3.02  11/1- 10mg, INR 2.59  11/2- 10mg, INR 2.28  11/3- dcd on home dose: 15 mg (10 mg x 1.5) every Wed, Fri; 10 mg (10 mg x 1) all other days and INR was 2.42    DCD with Augmentin for 2 more days. Also dec entresto and amlodipine. Called patient to discuss and schedule RTC apt on Tuesday 11/8.     Marga Mabry, NovaD, [facility]

## 2022-11-04 NOTE — TELEPHONE ENCOUNTER
----- Message from Mika Kiser MD sent at 11/2/2022 11:01 AM EDT -----  Pt needs follow up with me in 1 month.

## 2022-11-04 NOTE — TELEPHONE ENCOUNTER
[place] [id number]  TELEPHONE ENCOUNTER FORM    Guille Restrepo 1950    Attempted to call patient for HF follow-up. No answer at this time.       Next MD/ Clinic appointment: 11/9 with Russell Quiros RN 11/4/2022 1:02 PM

## 2022-11-04 NOTE — CARE COORDINATION
[facility] Care Transitions Initial Follow Up Call    Call within 2 business days of discharge: Yes    Care Transition Nurse contacted the patient by telephone to perform post hospital discharge assessment. Verified name and  with patient as identifiers. Provided introduction to self, and explanation of the Care Transition Nurse role. Patient: Sita Vasquez Patient : 1950   MRN: [id number]  Reason for Admission: PNA; CHF  Discharge Date: 11/3/22 RARS: Readmission Risk Score: 13.5      Last Discharge  Street       Date Complaint Diagnosis Description Type Department Provider    10/29/22 Shortness of Breath Congestive heart failure, unspecified HF chronicity, unspecified heart failure type ([facility] [address] . .. ED to Hosp-Admission (Discharged) (ADMITTED) Timothy Kline MD; Kurt Burton ... Was this an external facility discharge? No Discharge Facility:     Challenges to be reviewed by the provider   Additional needs identified to be addressed with provider: No  none               Method of communication with provider: none. Patient reports that he is doing well. He is coughing quite a bit during this call, CTN encouraged him to take e his albuterol inhaler. He is on 3 Lpm O2, does not have a pulse oximeter. Discussed discharge instructions and reviewed medications, 1111F completed. He has been afebrile and will follow up with cardiology next week. CTN will continue with outreach follow up calls. Care Transition Nurse reviewed discharge instructions and red flags with patient who verbalized understanding. The patient was given an opportunity to ask questions and does not have any further questions or concerns at this time. Were discharge instructions available to patient? Yes. Reviewed appropriate site of care based on symptoms and resources available to patient including: PCP  Urgent care clinics  When to call 911.  The patient agrees to contact the PCP office for questions related to their healthcare. Advance Care Planning:   Does patient have an Advance Directive: not on file; education provided. Medication reconciliation was performed with patient, who verbalizes understanding of administration of home medications. Medications reviewed, 1111F entered: yes    Was patient discharged with a pulse oximeter? no    Non-face-to-face services provided:  Obtained and reviewed discharge summary and/or continuity of care documents    Offered patient enrollment in the Remote Patient Monitoring (RPM) program for in-home monitoring: Will offer on follow up call . Care Transitions 24 Hour Call    Do you have a copy of your discharge instructions?: Yes  Do you have all of your prescriptions and are they filled?: Yes  Have you been contacted by a [address]?: No  Have you scheduled your follow up appointment?: Yes  How are you going to get to your appointment?: Car - family or friend to transport  Do you feel like you have everything you need to keep you well at home?: Yes  Are you an active caregiver in your home?: No  Care Transitions Interventions         Follow Up  Future Appointments   Date Time Provider Jerry Connelly   11/9/2022  1:15 PM MEENU Stokes  Cardio [facility]   11/23/2022  9:45 AM Victoriano GOMEZ FF [facility]   1/6/2023  8:30 AM MEENU Stokes [facility]   3/29/2023  7:30 AM [facility]       Care Transition Nurse provided contact information. Plan for follow-up call in 3-5 days based on severity of symptoms and risk factors.   Plan for next call: symptom management-.  self management-.  follow-up appointment-.    Claudette Mike RN

## 2022-11-08 ENCOUNTER — ANTI-COAG VISIT (OUTPATIENT)
Dept: PHARMACY | Age: 72
End: 2022-11-08
Payer: MEDICARE

## 2022-11-08 DIAGNOSIS — I48.3 TYPICAL ATRIAL FLUTTER (HCC): Primary | ICD-10-CM

## 2022-11-08 LAB — INTERNATIONAL NORMALIZATION RATIO, POC: 2.2

## 2022-11-08 PROCEDURE — 85610 PROTHROMBIN TIME: CPT

## 2022-11-08 PROCEDURE — 99211 OFF/OP EST MAY X REQ PHY/QHP: CPT

## 2022-11-08 NOTE — PROGRESS NOTES
Mr. Sita Vasquez is a 71 y.o. y/o male with history of Atrial Fibrillation who presents today for anticoagulation monitoring and adjustment. Pt is retired and works part time at [employer]. Patient reports he has been on warfarin for almost 30 years now and was managed by his cardiologist who recently retired. Pt was then managed by Dr. Donato Sung prior to being established in Clinic  Pertinent PMH: HTN, DM, HLD, COPD, CHF    Patient Reported Findings:  Yes     No  [x]   []       Patient verifies current dosing regimen as listed- confirms   []   [x]       S/S bleeding/bruising/swelling/SOB -denies    []   [x]       Blood in urine or stool- denies  []   [x]       Procedures scheduled in the future at this time - denies upcoming   []   [x]       Missed Dose - Denies   []   [x]       Extra Dose - Denies  [x]   []       Change in medications-  tylenol prn---> euflexxa injection 10/5, 10/12, 10/19 is the plan for 3/3 doses---> d/c lisinopril, started entresto --> taking mucinex, dec amlodipine and entresto    []   [x]       Change in health/diet/appetite - reports he eats about 2 meals/day. And states he will have salads about 1-2x/wk. (not a big fan of spinach or brocolli but may have other greens)--> Patient states 3 salads in past 2 weeks---> no greens, wants to add green beans in twice weekly, no NVD --> returned to vit k a few times a week (green beans and salad) --> denies changes, but has not had vit k in a while (only eats salads that have vit k)---> diarrhea resolved. Had small salad last week, no other vit k --> no changes, no NVD   []   [x]       Change in alcohol use - reports occasionally drinking beer (once every few months) but happens very infrequently. --> Patient reports no alcohol in past two weeks---> denies   []   [x]       Change in activity  []   [x]       Hospital admission Admitted 10/29-11/3 for SOB.      10/29- INR was 2.21  10/30- 10mg, INR 2.22  10/31- held, INR 3.02  11/1- 10mg, INR 2.59  11/2- 10mg, INR 2.28  11/3- dcd on home dose: 15 mg (10 mg x 1.5) every Wed, Fri; 10 mg (10 mg x 1) all other days and INR was 2.42    DCD with Augmentin for 2 more days. Also dec entresto and amlodipine.   []   [x]       Emergency department visit  []   [x]       Other complaints     Clinical Outcomes:  Yes     No  []   [x]       Major bleeding event  []   [x]       Thromboembolic event    Duration of warfarin Therapy: Indefinite   INR Range:  2.0-3.0     INR today is 2.2  Continue weekly dose to 15 mg Wed and Fri and 10 mg all other days. Encouraged patient to maintain a consistent diet.   Recheck INR again in 2 weeks, 11/23    **consent form signed 11/10/2021    Referring physician is Dr. Misty Hager  INR (no units)   Date Value   11/03/2022 2.42 (H)   11/02/2022 2.28 (H)   11/01/2022 2.59 (H)   10/31/2022 3.02 (H)       For Pharmacy Admin Tracking Only    Total # of Interventions Recommended: 0  Total # of Interventions Accepted: 0  Time Spent (min): 15

## 2022-11-09 ENCOUNTER — HOSPITAL ENCOUNTER (OUTPATIENT)
Age: 72
Discharge: HOME | End: 2022-11-09
Payer: MEDICARE

## 2022-11-09 ENCOUNTER — OFFICE VISIT (OUTPATIENT)
Dept: CARDIOLOGY CLINIC | Age: 72
End: 2022-11-09

## 2022-11-09 VITALS
HEIGHT: 73 IN | OXYGEN SATURATION: 95 % | HEART RATE: 70 BPM | WEIGHT: 278 LBS | DIASTOLIC BLOOD PRESSURE: 64 MMHG | BODY MASS INDEX: 36.84 KG/M2 | SYSTOLIC BLOOD PRESSURE: 102 MMHG

## 2022-11-09 DIAGNOSIS — G47.33 OSA (OBSTRUCTIVE SLEEP APNEA): ICD-10-CM

## 2022-11-09 DIAGNOSIS — I50.22 CHRONIC SYSTOLIC HEART FAILURE (HCC): Primary | ICD-10-CM

## 2022-11-09 DIAGNOSIS — E66.9 OBESITY (BMI 30-39.9): ICD-10-CM

## 2022-11-09 DIAGNOSIS — E55.9 VITAMIN D DEFICIENCY: ICD-10-CM

## 2022-11-09 DIAGNOSIS — I50.22 CHRONIC SYSTOLIC HEART FAILURE (HCC): ICD-10-CM

## 2022-11-09 DIAGNOSIS — I10 ESSENTIAL HYPERTENSION: ICD-10-CM

## 2022-11-09 LAB
ANION GAP SERPL CALCULATED.3IONS-SCNC: 12 MMOL/L (ref 3–16)
BUN BLDV-MCNC: 44 MG/DL (ref 7–20)
CALCIUM SERPL-MCNC: 9.6 MG/DL (ref 8.3–10.6)
CHLORIDE BLD-SCNC: 100 MMOL/L (ref 99–110)
CO2: 25 MMOL/L (ref 21–32)
CREAT SERPL-MCNC: 2.1 MG/DL (ref 0.8–1.3)
GFR SERPL CREATININE-BSD FRML MDRD: 33 ML/MIN/{1.73_M2}
GLUCOSE BLD-MCNC: 107 MG/DL (ref 70–99)
POTASSIUM SERPL-SCNC: 5.2 MMOL/L (ref 3.5–5.1)
PRO-BNP: 243 PG/ML (ref 0–124)
SODIUM BLD-SCNC: 137 MMOL/L (ref 136–145)

## 2022-11-09 PROCEDURE — 83880 ASSAY OF NATRIURETIC PEPTIDE: CPT

## 2022-11-09 PROCEDURE — 36415 COLL VENOUS BLD VENIPUNCTURE: CPT

## 2022-11-09 PROCEDURE — 80048 BASIC METABOLIC PNL TOTAL CA: CPT

## 2022-11-09 NOTE — PROGRESS NOTES
[address]  Progress Note    Primary Care Doctor:  Gaurav Burns MD    Chief Complaint   Patient presents with    Follow-Up from Hospital    Congestive Heart Failure        History of Present Illness:  71 y.o. 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 He follows with Dr Enrique Bill with [facility]. 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 He follows with Dr Yesenia Pinzon   12/30-1/2/2020 for worsening heart failure, sob and leg swelling. He was found to AFlutter and CV 1/2/20. BiV lead implant 3/23/2020    I had the pleasure of seeing Brady Portillo in follow up for sHF and hospitalization 10/29-11/3/2022 for shortness of breath, HF and pneumonia. He was diuresed and then sent home on oxygen 2 L. Covid neg. He is ambulatory and by his self on 2 L of oxygen. Pace art is 11/2/2022. His weight is staying 274.5 at home. He does not have home care. He denies any chest pain, palpitations, lightheadedness, increased shortness of breath or edema. Phone call 11/4/2022      Past Medical History:   has a past medical history of Acute congestive heart failure ([address] Allergic rhinitis, Atrial fibrillation ([address] CKD (chronic kidney disease), Class 2 obesity due to excess calories without serious comorbidity with body mass index (BMI) of 37.0 to 37.9 in adult, Controlled type 2 diabetes mellitus without complication, without long-term current use of insulin ([address] COPD, Essential tremor, Gout, Hyperlipidemia, mixed, Hypertension, Long term current use of anticoagulants with INR goal of 2.0-3.0, Obstructive sleep apnea syndrome, Primary insomnia, Primary osteoarthritis of both knees, and Sleep apnea. Surgical History:   has a past surgical history that includes Aortic valve replacement (10/1996:[employer]); Pacemaker insertion (1996); Atrial ablation surgery (03/23/2020); Cardiac pacemaker placement (03/23/2020);  Knee arthroscopy (Right, 12/27/2021); knee surgery (Right, 04/06/2022); and Knee Arthrocentesis (Right, 4/6/2022). Social History:   reports that he quit smoking about 3 years ago. His smoking use included cigarettes. He has a 30.00 pack-year smoking history. He has never used smokeless tobacco. He reports current alcohol use. He reports that he does not use drugs. Family History:   Family History   Problem Relation Age of Onset    Cancer Mother         Gallbladder    Asthma Father     Emphysema Father     No Known Problems Sister     No Known Problems Brother     No Known Problems Maternal Grandmother     No Known Problems Maternal Grandfather     No Known Problems Paternal Grandmother     No Known Problems Paternal Grandfather        Home Medications:  Prior to Admission medications    Medication Sig Start Date End Date Taking?  Authorizing Provider   guaiFENesin (MUCINEX) 600 MG extended release tablet Take 1 tablet by mouth 2 times daily for 7 days 11/3/22 11/10/22 Yes Brian Talamantes PA-C   furosemide (LASIX) 40 MG tablet Take 1 tablet by mouth daily 11/4/22  Yes Brian Talamantes PA-C   sacubitril-valsartan (ENTRESTO) 49-51 MG per tablet Take 0.5 tablets by mouth 2 times daily 11/3/22 12/3/22 Yes Brian Talamantes PA-C   metFORMIN (GLUCOPHAGE-XR) 500 MG extended release tablet Take 1 tablet by mouth in the morning and at bedtime 11/3/22  Yes Brian Talamantes PA-C   albuterol-ipratropium (COMBIVENT RESPIMAT)  MCG/ACT AERS inhaler Inhale 1 puff into the lungs every 6 hours as needed for Wheezing or Shortness of Breath 11/3/22  Yes Brian Talamantes PA-C   amLODIPine (NORVASC) 10 MG tablet Take 0.5 tablets by mouth daily 11/3/22  Yes Brian Talamantes PA-C   vitamin D3 (CHOLECALCIFEROL) 25 MCG (1000 UT) TABS tablet Take 1 tablet by mouth daily 8/31/22  Yes MEENU Nicolas - CNS   allopurinol (ZYLOPRIM) 300 MG tablet TAKE 1 TABLET EVERY DAY 4/19/22  Yes Dayne Mueller MD   pravastatin (PRAVACHOL) 80 MG tablet behavior. Psychiatric: No anxiety, no depression. Endocrine: No malaise, fatigue or temperature intolerance. No excessive thirst, fluid intake, or urination. No tremor. Hematologic/Lymphatic: No abnormal bruising or bleeding, blood clots or swollen lymph nodes. Allergic/Immunologic: No nasal congestion or hives. Physical Examination:    Vitals:    11/09/22 1331   BP: 102/64   Site: Right Upper Arm   Position: Sitting   Cuff Size: Medium Adult   Pulse: 70   SpO2: 95%   Weight: 278 lb (126.1 kg)   Height: 6' 1\" (1.854 m)        Constitutional and General Appearance: Warm and dry, no apparent distress, normal coloration  HEENT:  Normocephalic, atraumatic  Respiratory:  Normal excursion and expansion without use of accessory muscles  Resp Auscultation: Normal breath sounds without dullness  Cardiovascular: The apical impulses not displaced  Heart tones are crisp and normal  JVP normal cm H2O  Regular rate and rhythm  Peripheral pulses are symmetrical and full  There is no clubbing, cyanosis of the extremities.   no edema  Pedal Pulses: 2+ and equal   Abdomen:obese  No masses or tenderness  Liver/Spleen: No Abnormalities Noted  Neurological/Psychiatric:  Alert and oriented in all spheres  Moves all extremities well  Exhibits normal gait balance and coordination  No abnormalities of mood, affect, memory, mentation, or behavior are noted    Lab Data:    CBC:   Lab Results   Component Value Date/Time    WBC 6.8 11/03/2022 04:44 AM    WBC 6.5 11/02/2022 06:36 AM    WBC 6.7 11/01/2022 07:18 AM    RBC 4.32 11/03/2022 04:44 AM    RBC 4.37 11/02/2022 06:36 AM    RBC 4.33 11/01/2022 07:18 AM    HGB 13.1 11/03/2022 04:44 AM    HGB 13.1 11/02/2022 06:36 AM    HGB 13.1 11/01/2022 07:18 AM    HCT 39.9 11/03/2022 04:44 AM    HCT 40.7 11/02/2022 06:36 AM    HCT 39.9 11/01/2022 07:18 AM    MCV 92.5 11/03/2022 04:44 AM    MCV 93.1 11/02/2022 06:36 AM    MCV 92.2 11/01/2022 07:18 AM    RDW 14.2 11/03/2022 04:44 AM    RDW 14.6 11/02/2022 06:36 AM    RDW 14.2 11/01/2022 07:18 AM     11/03/2022 04:44 AM     11/02/2022 06:36 AM     11/01/2022 07:18 AM     BMP:  Lab Results   Component Value Date/Time     11/03/2022 04:44 AM     11/02/2022 06:36 AM     11/01/2022 07:18 AM    K 4.2 11/03/2022 04:44 AM    K 4.0 11/02/2022 06:36 AM    K 4.3 11/01/2022 07:18 AM     11/03/2022 04:44 AM     11/02/2022 06:36 AM     11/01/2022 07:18 AM    CO2 30 11/03/2022 04:44 AM    CO2 29 11/02/2022 06:36 AM    CO2 33 11/01/2022 07:18 AM    PHOS 4.0 11/03/2022 04:44 AM    PHOS 4.0 11/02/2022 06:36 AM    PHOS 4.4 11/01/2022 07:18 AM    BUN 41 11/03/2022 04:44 AM    BUN 36 11/02/2022 06:36 AM    BUN 39 11/01/2022 07:18 AM    CREATININE 1.8 11/03/2022 04:44 AM    CREATININE 1.7 11/02/2022 06:36 AM    CREATININE 1.6 11/01/2022 07:18 AM     BNP:   Lab Results   Component Value Date/Time    PROBNP 254 11/01/2022 07:18 AM    PROBNP 782 10/30/2022 12:03 AM    PROBNP 259 09/15/2022 07:00 AM     Cardiac Imaging:  Echo 10/31/2022  Summary   Left ventricular systolic function is low normal with ejection fraction   estimated at 50 %. No regional wall motion abnormalities are noted. There is mild concentric left ventricular hypertrophy. Normal left ventricular diastolic filling pressure. Pacer wire noted in right heart. The right atrium is dilated. The ascending aorta is dilated at 4.0 cm. A mechanical [employer] aortic valve appears well seated with a maximum   gradient of 36.48 mmHg and a mean gradient of 17 mmHg. Mild-to-moderate tricuspid regurgitation    Echo 12/11/2020   Summary   -Left ventricular cavity size is normal.   -Overall left ventricular systolic function appears mildly reduced.   -Ejection fraction is visually estimated to be 45%. -There is abnormal septal motion/bounce noted. -Indeterminate diastolic function E/e' = 36.3. [place]    -A mechanical artificial aortic valve appears well seated with a maximum   velocity of 2.1m/s and a mean gradient of 9mmHg.   -No evidence of aortic valve regurgitation.   -Trivial-mild mitral regurgitation.   -Mild tricuspid regurgitation.   -The right ventricle is normal in size with reduces function. TAPSE 1.9cm. RVS velocity is 6.94 cm/s.   -Pacer/ICD right heart    Echo: 12/31/19   Summary   -Global hypokinesis with EF 40-45%. -Abnormal septal motion.   -The aortic root and the ascending aorta are mildly dilated. -The right ventricle appears to be dilated. -RV systolic function appears to be reduced.   -The right atrium appears to be dilated. -The mechanical artificial aortic valve appears well seated with a maximum   velocity of 2.40 m/s and a mean gradient of 12 mmHg. The aortic valve area   is estimated at 1.19 cm^2. No significant regurgitation noted. -Thickened mitral valve without evidence of stenosis. There is   mild-to-moderate mitral regurgitation.   -There is moderate tricuspid regurgitation with a RVSP estimation of 43   mmHg.   -Pacer / ICD wire is visualized in the right heart.   -Indeterminate diastolic function. Assessment:    1. Chronic systolic congestive heart failure (HCC) on arni and BB; no aldosterone antagonist due to hyperkalemia   2. Essential hypertension    3. ROSETTA (obstructive sleep apnea)    4. Obesity (BMI 30-39.9)    5.      Vitamin d deficiency    Plan:   Patient Instructions   Check blood work  Make an appt with Dr Nidhi Bui and Dr Darlene Romero (pulmonary)  Continue all current medications  Fill out paperwork for entresto  RTO in 2 months already Jan 6 th    I appreciate the opportunity of cooperating in the care of this individual.    MEENU Garnett - CNS, CNS, 11/9/2022, 2:01 PM

## 2022-11-09 NOTE — PATIENT INSTRUCTIONS
Check blood work  Make an appt with Dr Sara Silva and Dr Barragan Him (pulmonary)  Continue all current medications  Fill out paperwork for entresto  RTO in 2 months already Jan 6 th

## 2022-11-10 ENCOUNTER — CARE COORDINATION (OUTPATIENT)
Dept: CASE MANAGEMENT | Age: 72
End: 2022-11-10

## 2022-11-10 ENCOUNTER — TELEPHONE (OUTPATIENT)
Dept: CARDIOLOGY CLINIC | Age: 72
End: 2022-11-10

## 2022-11-10 ENCOUNTER — NURSE ONLY (OUTPATIENT)
Dept: CARDIOLOGY CLINIC | Age: 72
End: 2022-11-10

## 2022-11-10 DIAGNOSIS — I50.22 CHRONIC SYSTOLIC HEART FAILURE (HCC): Primary | ICD-10-CM

## 2022-11-10 RX ORDER — FUROSEMIDE 40 MG/1
TABLET ORAL
Qty: 60 TABLET | Refills: 1 | Status: SHIPPED | OUTPATIENT
Start: 2022-11-10

## 2022-11-10 NOTE — PROGRESS NOTES
[address] transmission received 11/10/22 from [facility] for patient's CRT-P.    TECHNICAL FINDINGS  No device or lead performance issue(s) observed    DEVICE ASSESSMENT: Available daily battery/lead measurements within expected range. Lead trends stable. ARRHYTHMIA SUMMARY: No arrhythmias/high-rate episodes detected since last interrogation. OBSERVATIONS: 239 Ventricular sensing episodes ongoing since 09 Nov 2022. V. Pacing less than 90%. Device appears to be currently functioning as programmed.

## 2022-11-10 NOTE — TELEPHONE ENCOUNTER
----- Message from MEENU Gaines - CNS sent at 11/10/2022  8:58 AM EST -----  His optival yesterday showed he was on the drier side  Also his labs show that his is on the dry side  Alternate lasix 40 mg and then 20 mg continue this  Blood work in 2 weeks  Call if weight goes up  thanks

## 2022-11-10 NOTE — CARE COORDINATION
[address] Transitions Follow Up Call    11/10/2022    Patient: Aron Barreto  Patient : 1950   MRN: [id number]  Reason for Admission: PNA; CHF  Discharge Date: 11/3/22 RARS: Readmission Risk Score: 13.5       Attempted to contact patient for follow up transition call. Unable to leave a voicemail message to return call. Will continue to follow. Care Transitions Subsequent and Final Call    Subsequent and Final Calls  Care Transitions Interventions  Other Interventions:              Follow Up  Future Appointments   Date Time Provider Jerry Connelly   2022  7:00 AM [facility]TICOAGULATION CLINIC [facility]   2022  9:45 AM SCHEDULE, [place] OPTIVOL CHECK FF Cardio MMA   2022  8:30 AM Heidy Carreno MD PULM & CC MMA   2022  3:15 PM Yoshi Avendaño MD [facility] NEPH [facility] Nephrolo   2023  8:30 AM MEENU Reynolds - CNS FF Cardio MMA   3/29/2023  7:30 AM Dionne Ramirez MD [facility] Tim Betancur LPN

## 2022-11-13 LAB
INFLUENZA A BY PCR: NOT DETECTED
INFLUENZA B BY PCR: NOT DETECTED
RSV BY PCR: NOT DETECTED
RSV SOURCE: NORMAL

## 2022-11-15 ENCOUNTER — CARE COORDINATION (OUTPATIENT)
Dept: CASE MANAGEMENT | Age: 72
End: 2022-11-15

## 2022-11-15 NOTE — CARE COORDINATION
Second & final follow up outreach call attempt, no answer. VM not available. CTN will resolve episode & remain available.

## 2022-11-15 NOTE — DISCHARGE SUMMARY
[facility] DISCHARGE SUMMARY    Patient Demographics    Patient. Pio Rosas  Date of Birth. 1950  MRN. [id number]     Primary care provider. James Swift MD  (Tel: [phone])    Admit date: 10/29/2022    Discharge date (blank if same as Note Date): 11/3/2022  Note Date: 11/14/2022     Reason for Hospitalization. Chief Complaint   Patient presents with    Shortness of Breath     For a couple of days; coughing up phelm. Denies n/V/D. Significant Findings. Principal Problem:    Acute respiratory failure with hypoxia (HCC)  Active Problems:    HFrEF (heart failure with reduced ejection fraction) ([address]    Hypertension associated with stage 3 chronic kidney disease due to type 2 diabetes mellitus (HCC)    CAP (community acquired pneumonia)    Acute systolic CHF (congestive heart failure) (HCC)    Acute on chronic diastolic (congestive) heart failure (HCC)    SOB (shortness of breath)    Hypoxia    PAF (paroxysmal atrial fibrillation) ([address]    ICD (implantable cardioverter-defibrillator) in place    Chronic atrial fibrillation (HCC)    CLAUDIO (acute kidney injury) ([address]    Metabolic alkalosis    Lung infiltrate on CT    Primary hypertension    Hyperlipidemia associated with type 2 diabetes mellitus ([address]    Long term current use of anticoagulants with INR goal of 2.0-3.0    Type 2 diabetes mellitus, without long-term current use of insulin (HCC)    COPD (chronic obstructive pulmonary disease) (HCC)    Congestive heart failure (HCC)    Cardiomyopathy, dilated (HCC)    Acute on chronic systolic heart failure (HCC)    Pacemaker    Typical atrial flutter (HCC)    Stage 3a chronic kidney disease ([address]  Resolved Problems:    * No resolved hospital problems. *       Problems and results from this hospitalization that need follow up.   CHF  Pneumonia  Acute respiratory failure    Significant test results and incidental findings. CT chest:     Opacification of airways in the right lower lobe with dense consolidative   change. Pattern suspected to represent developing pneumonia. Atypical or   viral pattern of pneumonitis cannot be excluded. 2. Cardiomegaly with dilation of the distal aortic arch in a patient with   prior history of AVR. ECHO:   Summary   Left ventricular systolic function is low normal with ejection fraction   estimated at 50 %. No regional wall motion abnormalities are noted. There is mild concentric left ventricular hypertrophy. Normal left ventricular diastolic filling pressure. Pacer wire noted in right heart. The right atrium is dilated. The ascending aorta is dilated at 4.0 cm. A mechanical [employer] aortic valve appears well seated with a maximum   gradient of 36.48 mmHg and a mean gradient of 17 mmHg. Mild-to-moderate tricuspid regurgitation. Invasive procedures and treatments. None     [facility]. Patient is a 71-year-old male who presented to the hospital with shortness of breath. He is known to have chronic systolic CHF, A. fib, COPD, and CKD. Patient's Lasix was recently decreased and he was started on Entresto. In the emergency room he was requiring 4 L of oxygen. He does not use oxygen at home. Acute hypoxic respiratory failure likely due to HF and possible cap: He was admitted, started on IV lasix and diuresed 7.8 liters. Switched to po lasix and restarted on his Entresto. He is not on aldactone due to hyperkalemia. He is still requiring oxygen at home at discharge. Home O2 was arranged. He will need 3 L with exertion. Possible  CAP-patient was started on IV antibiotics and transition to p.o. at time of discharge. He will complete a 7 day course. He was seen by pulmonary during his stay. S/P AV replacement, also with pacer and AF:   AC with coumadin. INR 3. Hold coumadin today.   RX is dosing  on betapace  CKD: follows with Dr Cheikh Bhagat. Creat 1.7. He was followed by nephrology during his stay. T2DM:  BG values in range. AIC 6% in September demonstrates excellent control . Getting SSI. COPD:  stable on current inhaled therapy     Consults. IP CONSULT TO HOSPITALIST  IP CONSULT TO PHARMACY  IP CONSULT TO CARDIOLOGY  IP CONSULT TO PULMONOLOGY  IP CONSULT TO NEPHROLOGY    Physical examination on discharge day. BP (!) 91/56   Pulse 68   Temp 97.9 °F (36.6 °C) (Oral)   Resp 18   Ht 6' 1\" (1.854 m)   Wt 278 lb 4.8 oz (126.2 kg)   SpO2 96%   BMI 36.72 kg/m²   General appearance. Alert. Looks comfortable. HEENT. Sclera clear. Moist mucus membranes. Cardiovascular. Regular rate and rhythm, normal S1, S2. No murmur. Respiratory. Not using accessory muscles. Clear to auscultation bilaterally, no wheeze. Gastrointestinal. Abdomen soft, non-tender, not distended, normal bowel sounds  Neurology. Facial symmetry. No speech deficits. Moving all extremities equally. Extremities. No edema in lower extremities. Skin. Warm, dry, normal turgor    Condition at time of discharge stable    Medication instructions provided to patient at discharge. Medication List        START taking these medications      albuterol-ipratropium  MCG/ACT Aers inhaler  Commonly known as: COMBIVENT RESPIMAT  Inhale 1 puff into the lungs every 6 hours as needed for Wheezing or Shortness of Breath  Notes to patient: Albuterol/pratropium (DuoNeb) or Albuterol (Proventil)  Use: to open airways, reduce secretions  Side effects: nervousness, jitteriness, shakiness, headache, fast heartbeat            CHANGE how you take these medications      amLODIPine 10 MG tablet  Commonly known as: NORVASC  Take 0.5 tablets by mouth daily  What changed: See the new instructions.   Notes to patient: Calcium Channel Blocker  Use: to treat high blood pressure, chest pain  Side effects: swelling of ankles, dizziness, fast or irregular heartbeat, shortness of breath     metFORMIN 500 MG extended release tablet  Commonly known as: GLUCOPHAGE-XR  Take 1 tablet by mouth in the morning and at bedtime  What changed: See the new instructions. Notes to patient: Metformin (Glucophage*)  Use: treats diabetes or high blood sugar  Side effects: upset stomach, low blood sugar     sacubitril-valsartan 49-51 MG per tablet  Commonly known as: ENTRESTO  Take 0.5 tablets by mouth 2 times daily  What changed: how much to take  Notes to patient: Use: for heart failure  Side effects: dizzy, low BP            CONTINUE taking these medications      * Accu-Chek Compact Plus strip  Generic drug: blood glucose test strips  USE TO TEST 2 TIMES DAILY     * blood glucose test strips  Test 2 times a day & as needed for symptoms of irregular blood glucose. Dispense sufficient amount for indicated testing frequency plus additional to accommodate PRN testing needs. Ok to dispense what is covered by insurance     allopurinol 300 MG tablet  Commonly known as: ZYLOPRIM  TAKE 1 TABLET EVERY DAY     aspirin 81 MG chewable tablet     glucose monitoring kit  Ok to dispense what is covered by insurance. Lancets Misc  BID testing     pravastatin 80 MG tablet  Commonly known as: PRAVACHOL  TAKE 1 TABLET EVERY DAY FOR CHOLESTEROL     sotalol 80 MG tablet  Commonly known as: BETAPACE     vitamin D3 25 MCG (1000 UT) Tabs tablet  Commonly known as: CHOLECALCIFEROL  Take 1 tablet by mouth daily     warfarin 10 MG tablet  Commonly known as: COUMADIN  Take as directed. If you are unsure how to take this medication, talk to your nurse or doctor. Original instructions: Take 1.5 tablets by mouth Twice a Week AND 1 tablet Five times weekly. TAKE 1 TABLET DAILY EXCEPT TAKE 1 AND 1/2 TABLETS (15 MG) ON WEDNESDAY AND FRIDAY. * This list has 2 medication(s) that are the same as other medications prescribed for you.  Read the directions carefully, and ask your doctor or other care provider to review them with you.                STOP taking these medications      furosemide 20 MG tablet  Commonly known as: LASIX            ASK your doctor about these medications      amoxicillin-clavulanate 875-125 MG per tablet  Commonly known as: AUGMENTIN  Take 1 tablet by mouth every 12 hours for 2 days  Ask about: Should I take this medication? guaiFENesin 600 MG extended release tablet  Commonly known as: MUCINEX  Take 1 tablet by mouth 2 times daily for 7 days  Ask about: Should I take this medication? Where to Get Your Medications        These medications were sent to [facility], [address] [place] [id number]      Phone: [phone]   albuterol-ipratropium  MCG/ACT Aers inhaler  amoxicillin-clavulanate 875-125 MG per tablet  guaiFENesin 600 MG extended release tablet       Information about where to get these medications is not yet available    Ask your nurse or doctor about these medications  amLODIPine 10 MG tablet  metFORMIN 500 MG extended release tablet  sacubitril-valsartan 49-51 MG per tablet         Discharge recommendations given to patient. Follow Up. PCP and cardiology in 1 week   Disposition. home  Activity. activity as tolerated  Diet: No diet orders on file      Spent 40 minutes in discharge process. Signed:   Earlene Espinal PA-C     11/14/2022 9:54 PM

## 2022-11-23 ENCOUNTER — ANTI-COAG VISIT (OUTPATIENT)
Dept: PHARMACY | Age: 72
End: 2022-11-23
Payer: MEDICARE

## 2022-11-23 ENCOUNTER — NURSE ONLY (OUTPATIENT)
Dept: CARDIOLOGY CLINIC | Age: 72
End: 2022-11-23
Payer: MEDICARE

## 2022-11-23 ENCOUNTER — HOSPITAL ENCOUNTER (OUTPATIENT)
Age: 72
Discharge: HOME | End: 2022-11-23
Payer: MEDICARE

## 2022-11-23 DIAGNOSIS — I50.22 CHRONIC SYSTOLIC HEART FAILURE (HCC): ICD-10-CM

## 2022-11-23 DIAGNOSIS — I42.0 CARDIOMYOPATHY, DILATED (HCC): ICD-10-CM

## 2022-11-23 DIAGNOSIS — I48.3 TYPICAL ATRIAL FLUTTER (HCC): Primary | ICD-10-CM

## 2022-11-23 DIAGNOSIS — I50.22 CHRONIC SYSTOLIC HF (HEART FAILURE) (HCC): ICD-10-CM

## 2022-11-23 DIAGNOSIS — Z95.810 ICD (IMPLANTABLE CARDIOVERTER-DEFIBRILLATOR) IN PLACE: Primary | ICD-10-CM

## 2022-11-23 LAB
ANION GAP SERPL CALCULATED.3IONS-SCNC: 12 MMOL/L (ref 3–16)
BUN BLDV-MCNC: 44 MG/DL (ref 7–20)
CALCIUM SERPL-MCNC: 9.9 MG/DL (ref 8.3–10.6)
CHLORIDE BLD-SCNC: 106 MMOL/L (ref 99–110)
CO2: 25 MMOL/L (ref 21–32)
CREAT SERPL-MCNC: 1.6 MG/DL (ref 0.8–1.3)
GFR SERPL CREATININE-BSD FRML MDRD: 46 ML/MIN/{1.73_M2}
GLUCOSE BLD-MCNC: 127 MG/DL (ref 70–99)
INTERNATIONAL NORMALIZATION RATIO, POC: 3.4
POTASSIUM SERPL-SCNC: 4.7 MMOL/L (ref 3.5–5.1)
PRO-BNP: 221 PG/ML (ref 0–124)
SODIUM BLD-SCNC: 143 MMOL/L (ref 136–145)

## 2022-11-23 PROCEDURE — 83880 ASSAY OF NATRIURETIC PEPTIDE: CPT

## 2022-11-23 PROCEDURE — 85610 PROTHROMBIN TIME: CPT

## 2022-11-23 PROCEDURE — 93294 REM INTERROG EVL PM/LDLS PM: CPT | Performed by: INTERNAL MEDICINE

## 2022-11-23 PROCEDURE — 93296 REM INTERROG EVL PM/IDS: CPT | Performed by: INTERNAL MEDICINE

## 2022-11-23 PROCEDURE — 93297 REM INTERROG DEV EVAL ICPMS: CPT | Performed by: NURSE PRACTITIONER

## 2022-11-23 PROCEDURE — 99211 OFF/OP EST MAY X REQ PHY/QHP: CPT

## 2022-11-23 PROCEDURE — 36415 COLL VENOUS BLD VENIPUNCTURE: CPT

## 2022-11-23 PROCEDURE — 80048 BASIC METABOLIC PNL TOTAL CA: CPT

## 2022-11-23 NOTE — PROGRESS NOTES
Mr. Livan Funk is a 71 y.o. y/o male with history of Atrial Fibrillation who presents today for anticoagulation monitoring and adjustment. Pt is retired and works part time at [employer]. Patient reports he has been on warfarin for almost 30 years now and was managed by his cardiologist who recently retired. Pt was then managed by Dr. Joellyn Osler prior to being established in Clinic  Pertinent PMH: HTN, DM, HLD, COPD, CHF    Patient Reported Findings:  Yes     No  [x]   []       Patient verifies current dosing regimen as listed- confirms   []   [x]       S/S bleeding/bruising/swelling/SOB -denies    []   [x]       Blood in urine or stool- denies  []   [x]       Procedures scheduled in the future at this time - denies upcoming   []   [x]       Missed Dose - Denies   []   [x]       Extra Dose - Denies  []   [x]       Change in medications-  tylenol prn---> euflexxa injection 10/5, 10/12, 10/19 is the plan for 3/3 doses---> d/c lisinopril, started entresto --> taking mucinex, dec amlodipine and entresto --> no changes    []   [x]       Change in health/diet/appetite - reports he eats about 2 meals/day. And states he will have salads about 1-2x/wk. (not a big fan of spinach or brocolli but may have other greens)--> Patient states 3 salads in past 2 weeks---> no greens, wants to add green beans in twice weekly, no NVD --> returned to vit k a few times a week (green beans and salad) --> denies changes, but has not had vit k in a while (only eats salads that have vit k)---> diarrhea resolved. Had small salad last week, no other vit k --> no changes, no NVD   []   [x]       Change in alcohol use - reports occasionally drinking beer (once every few months) but happens very infrequently. --> Patient reports no alcohol in past two weeks---> denies   []   [x]       Change in activity  []   [x]       Hospital admission Admitted 10/29-11/3 for SOB.      10/29- INR was 2.21  10/30- 10mg, INR 2.22  10/31- held, INR 3.02  11/1- 10mg, INR 2.59  11/2- 10mg, INR 2.28  11/3- dcd on home dose: 15 mg (10 mg x 1.5) every Wed, Fri; 10 mg (10 mg x 1) all other days and INR was 2.42    DCD with Augmentin for 2 more days. Also dec entresto and amlodipine.   []   [x]       Emergency department visit  []   [x]       Other complaints     Clinical Outcomes:  Yes     No  []   [x]       Major bleeding event  []   [x]       Thromboembolic event    Duration of warfarin Therapy: Indefinite   INR Range:  2.0-3.0     INR today is 3.4  Decrease weekly dose to 15 mg on Fri and 10 mg all other days (6% dec)  Encouraged patient to maintain a consistent diet.   Recheck INR again in 2 weeks, 12/7    **consent form signed 11/10/2021    Referring physician is Dr. Matthew Vegas  INR (no units)   Date Value   11/03/2022 2.42 (H)   11/02/2022 2.28 (H)   11/01/2022 2.59 (H)   10/31/2022 3.02 (H)     INR,(POC) (no units)   Date Value   11/08/2022 2.2       For Pharmacy Admin Tracking Only    Intervention Detail: Dose Adjustment: 1, reason: Therapy Optimization  Total # of Interventions Recommended: 1  Total # of Interventions Accepted: 1  Time Spent (min): 15

## 2022-11-23 NOTE — PROGRESS NOTES
Remote transmission received from patient's CRT-P home monitor. Set to only BiV pace when he needs to RV pace. Transmission shows normal sensing and pacing function. No arrhythmias/ or events. Optivol is within normal range. TriageHF Heart Failure Risk Status on 23-Nov-2022 is Medium*     EP/ NP will review. See interrogation under cardiology tab in the [address] [place] [address] [address] field for more details. Will continue to monitor remotely. (End of 91-day monitoring period 11/23/22).

## 2022-11-28 ENCOUNTER — TELEPHONE (OUTPATIENT)
Dept: CARDIOLOGY CLINIC | Age: 72
End: 2022-11-28

## 2022-11-28 NOTE — TELEPHONE ENCOUNTER
----- Message from MEENU Olivares CNP sent at 11/23/2022  1:37 PM EST -----  Labs look better, no new orders.  Leigha Us

## 2022-12-02 ENCOUNTER — OFFICE VISIT (OUTPATIENT)
Dept: PULMONOLOGY | Age: 72
End: 2022-12-02
Payer: MEDICARE

## 2022-12-02 VITALS
BODY MASS INDEX: 38.3 KG/M2 | HEIGHT: 73 IN | OXYGEN SATURATION: 93 % | DIASTOLIC BLOOD PRESSURE: 64 MMHG | HEART RATE: 70 BPM | WEIGHT: 289 LBS | SYSTOLIC BLOOD PRESSURE: 118 MMHG

## 2022-12-02 DIAGNOSIS — J44.9 COPD, SEVERITY TO BE DETERMINED (HCC): ICD-10-CM

## 2022-12-02 DIAGNOSIS — J18.9 PNEUMONIA OF RIGHT LOWER LOBE DUE TO INFECTIOUS ORGANISM: Primary | ICD-10-CM

## 2022-12-02 PROCEDURE — 99214 OFFICE O/P EST MOD 30 MIN: CPT | Performed by: INTERNAL MEDICINE

## 2022-12-02 PROCEDURE — G8484 FLU IMMUNIZE NO ADMIN: HCPCS | Performed by: INTERNAL MEDICINE

## 2022-12-02 PROCEDURE — 1036F TOBACCO NON-USER: CPT | Performed by: INTERNAL MEDICINE

## 2022-12-02 PROCEDURE — 3078F DIAST BP <80 MM HG: CPT | Performed by: INTERNAL MEDICINE

## 2022-12-02 PROCEDURE — 3017F COLORECTAL CA SCREEN DOC REV: CPT | Performed by: INTERNAL MEDICINE

## 2022-12-02 PROCEDURE — 3074F SYST BP LT 130 MM HG: CPT | Performed by: INTERNAL MEDICINE

## 2022-12-02 PROCEDURE — G8427 DOCREV CUR MEDS BY ELIG CLIN: HCPCS | Performed by: INTERNAL MEDICINE

## 2022-12-02 PROCEDURE — 1111F DSCHRG MED/CURRENT MED MERGE: CPT | Performed by: INTERNAL MEDICINE

## 2022-12-02 PROCEDURE — 1123F ACP DISCUSS/DSCN MKR DOCD: CPT | Performed by: INTERNAL MEDICINE

## 2022-12-02 PROCEDURE — 3023F SPIROM DOC REV: CPT | Performed by: INTERNAL MEDICINE

## 2022-12-02 PROCEDURE — G8417 CALC BMI ABV UP PARAM F/U: HCPCS | Performed by: INTERNAL MEDICINE

## 2022-12-02 ASSESSMENT — ENCOUNTER SYMPTOMS
COUGH: 1
SHORTNESS OF BREATH: 1
CONSTIPATION: 0
BACK PAIN: 0
SINUS PRESSURE: 0
ABDOMINAL PAIN: 0
WHEEZING: 0
BLOOD IN STOOL: 0
DIARRHEA: 0
SORE THROAT: 0
CHOKING: 0
STRIDOR: 0
ABDOMINAL DISTENTION: 0
VOICE CHANGE: 0
RHINORRHEA: 0
CHEST TIGHTNESS: 0
APNEA: 0
ANAL BLEEDING: 0

## 2022-12-02 NOTE — PROGRESS NOTES
Alfonso Flores    YOB: 1950     Date of Service:  12/2/2022     Chief Complaint   Patient presents with    Follow-Up from Hospital    Cough     dry         HPI patient was recently hospitalized between 10/29 and 11/3, 4 acute on chronic systolic + diastolic CHF decompensation and right lower lobe pneumonia. Was treated with a course of antibiotics and IV Lasix, with improvement in symptoms. Currently requiring 3 L O2 continuously, has portable oxygen available. Patient continues to gain weight 5 pounds since discharge, on Lasix 40 mg and 20 mg on alternate days. Patient also complains of a cough which is nonproductive. No Known Allergies  Outpatient Medications Marked as Taking for the 12/2/22 encounter (Office Visit) with Jorden Jones MD   Medication Sig Dispense Refill    furosemide (LASIX) 40 MG tablet 40 mg alternating with 20 mg 60 tablet 1    sacubitril-valsartan (ENTRESTO) 49-51 MG per tablet Take 0.5 tablets by mouth 2 times daily 60 tablet 0    metFORMIN (GLUCOPHAGE-XR) 500 MG extended release tablet Take 1 tablet by mouth in the morning and at bedtime 360 tablet 0    albuterol-ipratropium (COMBIVENT RESPIMAT)  MCG/ACT AERS inhaler Inhale 1 puff into the lungs every 6 hours as needed for Wheezing or Shortness of Breath 1 each 0    amLODIPine (NORVASC) 10 MG tablet Take 0.5 tablets by mouth daily 90 tablet 1    vitamin D3 (CHOLECALCIFEROL) 25 MCG (1000 UT) TABS tablet Take 1 tablet by mouth daily 90 tablet 1    allopurinol (ZYLOPRIM) 300 MG tablet TAKE 1 TABLET EVERY DAY 90 tablet 1    pravastatin (PRAVACHOL) 80 MG tablet TAKE 1 TABLET EVERY DAY FOR CHOLESTEROL 90 tablet 3    glucose monitoring kit (FREESTYLE) monitoring kit Ok to dispense what is covered by insurance. 1 kit 0    blood glucose monitor strips Test 2 times a day & as needed for symptoms of irregular blood glucose.  Dispense sufficient amount for indicated testing frequency plus additional to accommodate PRN testing needs. Ok to dispense what is covered by insurance 100 strip 5    ACCU-CHEK COMPACT PLUS strip USE TO TEST 2 TIMES DAILY 102 strip 1    Lancets MISC BID testing 100 each 6    sotalol (BETAPACE) 80 MG tablet Take 80 mg by mouth 2 times daily. aspirin 81 MG chewable tablet Take 81 mg by mouth daily.          Immunization History   Administered Date(s) Administered    COVID-19, MODERNA BLUE border, Primary or Immunocompromised, (age 12y+), IM, 100 mcg/0.5mL 07/26/2021, 08/23/2021    Influenza, FLUAD, (age 72 y+), Adjuvanted, 0.5mL 09/29/2022    Pneumococcal conjugate PCV20, PF (Prevnar 20) 09/29/2022    Tdap (Boostrix, Adacel) 02/14/2017       Past Medical History:   Diagnosis Date    Acute congestive heart failure ([facility] 12/30/2019    Allergic rhinitis 7/11/2016    Atrial fibrillation ([facility]     under care of cardiology:Dr. Quenton Macadam Behrens([facility])    CKD (chronic kidney disease)     Nephro:Dr. Parker:stage 3    Class 2 obesity due to excess calories without serious comorbidity with body mass index (BMI) of 37.0 to 37.9 in adult 11/7/2017    Controlled type 2 diabetes mellitus without complication, without long-term current use of insulin ([facility] 2/24/2017    COPD     Essential tremor     Gout     Hyperlipidemia, mixed 07/11/2016    Hypertension     under care of cardiology:Dr. Quenton Macadam Behrens([facility])    Long term current use of anticoagulants with INR goal of 2.0-3.0     under care of cardiology:Dr. Scott Behrens([facility])    Obstructive sleep apnea syndrome 06/18/2019    per pulmo    Primary insomnia 1/25/2017    Primary osteoarthritis of both knees 9/14/2021    Sleep apnea     uses bipap     Past Surgical History:   Procedure Laterality Date    AORTIC VALVE REPLACEMENT  10/1996:[facility]    x3    ATRIAL ABLATION SURGERY  03/23/2020    CTI dependent atrial flutter ablation (Dr. Delphine Paul)    [address]  03/23/2020    BIV upgrade    KNEE ARTHROCENTESIS Right 4/6/2022    RIGHT KNEE GENICULAR NERVE BLOCK WITH INTRA ARTICULAR INJECTION SITE CONFIRMED BY FLUOROSCOPY performed by Elpidio Aguillon MD at [address] ARTHROSCOPY Right 12/27/2021    RIGHT KNEE DIAGNOSTIC ARTHROSCOPY WITH SUBCHONDROPLASTY TO MEDIAL FEMORAL CONDYLE AND TIBIAL PLATEAU, LEFT KNEE INJECTION. performed by Elpidio Aguillon MD at [address] Right 04/06/2022    RIGHT KNEE GENICULAR NERVE BLOCK WITH INTRA ARTICULAR INJECTION SITE CONFIRMED BY FLUOROSCOPY    PACEMAKER INSERTION  1996     Family History   Problem Relation Age of Onset    Cancer Mother         Gallbladder    Asthma Father     Emphysema Father     No Known Problems Sister     No Known Problems Brother     No Known Problems Maternal Grandmother     No Known Problems Maternal Grandfather     No Known Problems Paternal Grandmother     No Known Problems Paternal Grandfather        Review of Systems:  Review of Systems   Constitutional:  Positive for fatigue and unexpected weight change. Negative for activity change, appetite change and fever. HENT:  Negative for congestion, ear discharge, ear pain, postnasal drip, rhinorrhea, sinus pressure, sneezing, sore throat, tinnitus and voice change. Respiratory:  Positive for cough and shortness of breath. Negative for apnea, choking, chest tightness, wheezing and stridor. Cardiovascular:  Negative for chest pain, palpitations and leg swelling. Gastrointestinal:  Negative for abdominal distention, abdominal pain, anal bleeding, blood in stool, constipation and diarrhea. Musculoskeletal:  Negative for arthralgias, back pain and gait problem. Skin:  Negative for pallor and rash. Allergic/Immunologic: Negative for environmental allergies. Neurological:  Negative for dizziness, tremors, seizures, syncope, speech difficulty, weakness, light-headedness, numbness and headaches. Hematological:  Negative for adenopathy. Does not bruise/bleed easily.    Psychiatric/Behavioral:  Negative for sleep disturbance. Vitals:    12/02/22 0918   BP: 118/64   Pulse: 70   SpO2: 93%   Weight: 289 lb (131.1 kg)   Height: 6' 1\" (1.854 m)     Patient-Reported Vitals 1/28/2021   Patient-Reported Weight 286lb   Patient-Reported Height 6'1   Patient-Reported Systolic 701   Patient-Reported Diastolic 86   Patient-Reported Pulse 77   Patient-Reported Temperature 97.6      Body mass index is 38.13 kg/m². Wt Readings from Last 3 Encounters:   12/02/22 289 lb (131.1 kg)   11/09/22 278 lb (126.1 kg)   11/03/22 278 lb 4.8 oz (126.2 kg)     BP Readings from Last 3 Encounters:   12/02/22 118/64   11/09/22 102/64   11/03/22 (!) 91/56         Physical Exam  Constitutional:       General: He is not in acute distress. Appearance: He is well-developed. He is not ill-appearing or diaphoretic. HENT:      Mouth/Throat:      Pharynx: No oropharyngeal exudate. Cardiovascular:      Rate and Rhythm: Normal rate and regular rhythm. Heart sounds: Normal heart sounds. No murmur heard. No friction rub. Pulmonary:      Effort: No respiratory distress. Breath sounds: Normal breath sounds. No wheezing, rhonchi or rales. Chest:      Chest wall: No tenderness. Abdominal:      General: There is no distension. Palpations: There is no mass. Tenderness: There is no abdominal tenderness. There is no guarding or rebound. Musculoskeletal:         General: Swelling present. No tenderness. Skin:     Coloration: Skin is not pale. Findings: No erythema or rash. Neurological:      Mental Status: He is alert and oriented to person, place, and time. Cranial Nerves: No cranial nerve deficit. Motor: No abnormal muscle tone.       Coordination: Coordination normal.      Deep Tendon Reflexes: Reflexes normal.           Health Maintenance   Topic Date Due    Hepatitis C screen  Never done    Shingles vaccine (1 of 2) Never done    Diabetic retinal exam  05/08/2019    Colorectal Cancer Screen  08/03/2019 Diabetic microalbuminuria test  09/28/2021    COVID-19 Vaccine (3 - Booster for Moderna series) 10/18/2021    Diabetic foot exam  01/31/2023 (Originally 5/8/2019)    Low dose CT lung screening  02/25/2023    Lipids  09/29/2023    Depression Screen  09/29/2023    Annual Wellness Visit (AWV)  09/30/2023    A1C test (Diabetic or Prediabetic)  10/30/2023    DTaP/Tdap/Td vaccine (2 - Td or Tdap) 02/14/2027    Flu vaccine  Completed    Pneumococcal 65+ years Vaccine  Completed    AAA screen  Completed    Hepatitis A vaccine  Aged Out    Hib vaccine  Aged Out    Meningococcal (ACWY) vaccine  Aged Out          Assessment/Plan:     Diagnosis Orders   1. Pneumonia of right lower lobe due to infectious organism  Full PFT Study With Bronchodilator    CT CHEST WO CONTRAST      2. COPD, severe ET to be determined  3. CHF decompensation, systolic + diastolic dysfunction    Patient recently had prolonged hospitalization mostly for CHF decompensation, was also noted to have a dense right lower lobe infiltrate on CT from 10/30 and hence was treated with antibiotics, IV Rocephin + Zithromax, later switched to oral Augmentin therapy. We will obtain repeat CT chest without contrast to evaluate for resolution of the infiltrate. Former smoker, 1-1/2 packs x30 years, quit 10 years ago. Currently has albuterol inhaler which she only uses as needed. We will obtain complete PFT study to evaluate for obstructive airway disease. This will further determine patient's need for controller inhaler therapy. CHF decompensation, continues to have weight gain. Lasix dose limited by history of CKD. Patient also continues on Entresto. Has an upcoming appointment with cardiology. Return in about 6 weeks (around 1/13/2023).

## 2022-12-02 NOTE — PROGRESS NOTES
Pio Rosas    YOB: 1950     Date of Service:  12/2/2022     Chief Complaint   Patient presents with    Follow-Up from Hospital    Cough     dry         HPI     No Known Allergies  Outpatient Medications Marked as Taking for the 12/2/22 encounter (Office Visit) with Jose Randle MD   Medication Sig Dispense Refill    furosemide (LASIX) 40 MG tablet 40 mg alternating with 20 mg 60 tablet 1    sacubitril-valsartan (ENTRESTO) 49-51 MG per tablet Take 0.5 tablets by mouth 2 times daily 60 tablet 0    metFORMIN (GLUCOPHAGE-XR) 500 MG extended release tablet Take 1 tablet by mouth in the morning and at bedtime 360 tablet 0    albuterol-ipratropium (COMBIVENT RESPIMAT)  MCG/ACT AERS inhaler Inhale 1 puff into the lungs every 6 hours as needed for Wheezing or Shortness of Breath 1 each 0    amLODIPine (NORVASC) 10 MG tablet Take 0.5 tablets by mouth daily 90 tablet 1    vitamin D3 (CHOLECALCIFEROL) 25 MCG (1000 UT) TABS tablet Take 1 tablet by mouth daily 90 tablet 1    allopurinol (ZYLOPRIM) 300 MG tablet TAKE 1 TABLET EVERY DAY 90 tablet 1    pravastatin (PRAVACHOL) 80 MG tablet TAKE 1 TABLET EVERY DAY FOR CHOLESTEROL 90 tablet 3    glucose monitoring kit (FREESTYLE) monitoring kit Ok to dispense what is covered by insurance. 1 kit 0    blood glucose monitor strips Test 2 times a day & as needed for symptoms of irregular blood glucose. Dispense sufficient amount for indicated testing frequency plus additional to accommodate PRN testing needs. Ok to dispense what is covered by insurance 100 strip 5    ACCU-CHEK COMPACT PLUS strip USE TO TEST 2 TIMES DAILY 102 strip 1    Lancets MISC BID testing 100 each 6    sotalol (BETAPACE) 80 MG tablet Take 80 mg by mouth 2 times daily. aspirin 81 MG chewable tablet Take 81 mg by mouth daily.          Immunization History   Administered Date(s) Administered    COVID-19, MODERNA BLUE border, Primary or Immunocompromised, (age 12y+), IM, 100 mcg/0.5mL 07/26/2021, 08/23/2021    Influenza, FLUAD, (age 72 y+), Adjuvanted, 0.5mL 09/29/2022    Pneumococcal conjugate PCV20, PF (Prevnar 20) 09/29/2022    Tdap (Boostrix, Adacel) 02/14/2017       Past Medical History:   Diagnosis Date    Acute congestive heart failure ([facility] 12/30/2019    Allergic rhinitis 7/11/2016    [facility]     under care of cardiology:Dr. Signa Pi Behrens([facility])    CKD (chronic kidney disease)     Nephro:Dr. Parker:stage 3    Class 2 obesity due to excess calories without serious comorbidity with body mass index (BMI) of 37.0 to 37.9 in adult 11/7/2017    Controlled type 2 diabetes mellitus without complication, without long-term current use of insulin ([facility] 2/24/2017    COPD     Essential tremor     Gout     Hyperlipidemia, mixed 07/11/2016    Hypertension     under care of cardiology:Dr. Signa Pi Behrens([facility])    Long term current use of anticoagulants with INR goal of 2.0-3.0     under care of cardiology:Dr. Scott Behrens([facility])    Obstructive sleep apnea syndrome 06/18/2019    per pulmo    Primary insomnia 1/25/2017    Primary osteoarthritis of both knees 9/14/2021    Sleep apnea     uses bipap     Past Surgical History:   Procedure Laterality Date    AORTIC VALVE REPLACEMENT  10/1996:[facility]    x3    ATRIAL ABLATION SURGERY  03/23/2020    CTI dependent atrial flutter ablation (Dr. Jeovanny Bullock)    [address]  03/23/2020    BIV upgrade    KNEE ARTHROCENTESIS Right 4/6/2022    RIGHT KNEE GENICULAR NERVE BLOCK WITH INTRA ARTICULAR INJECTION SITE CONFIRMED BY FLUOROSCOPY performed by Heidy Reynolds MD at [facility] ARTHROSCOPY Right 12/27/2021    RIGHT KNEE DIAGNOSTIC ARTHROSCOPY WITH SUBCHONDROPLASTY TO MEDIAL FEMORAL CONDYLE AND TIBIAL PLATEAU, LEFT KNEE INJECTION.  performed by Heidy Reynolds MD at [place] [address] Right 04/06/2022    RIGHT KNEE GENICULAR NERVE BLOCK WITH INTRA ARTICULAR INJECTION SITE CONFIRMED BY FLUOROSCOPY    PACEMAKER INSERTION  1996     Family History   Problem Relation Age of Onset    Cancer Mother         Gallbladder    Asthma Father     Emphysema Father     No Known Problems Sister     No Known Problems Brother     No Known Problems Maternal Grandmother     No Known Problems Maternal Grandfather     No Known Problems Paternal Grandmother     No Known Problems Paternal Grandfather        Review of Systems:  Review of Systems    Vitals:    12/02/22 0918   BP: 118/64   Pulse: 70   SpO2: 93%   Weight: 289 lb (131.1 kg)   Height: 6' 1\" (1.854 m)     Patient-Reported Vitals 1/28/2021   Patient-Reported Weight 286lb   Patient-Reported Height 6'1   Patient-Reported Systolic 829   Patient-Reported Diastolic 86   Patient-Reported Pulse 77   Patient-Reported Temperature 97.6      Body mass index is 38.13 kg/m². Wt Readings from Last 3 Encounters:   12/02/22 289 lb (131.1 kg)   11/09/22 278 lb (126.1 kg)   11/03/22 278 lb 4.8 oz (126.2 kg)     BP Readings from Last 3 Encounters:   12/02/22 118/64   11/09/22 102/64   11/03/22 (!) 91/56         Physical Exam        Health Maintenance   Topic Date Due    Hepatitis C screen  Never done    Shingles vaccine (1 of 2) Never done    Diabetic retinal exam  05/08/2019    Colorectal Cancer Screen  08/03/2019    Diabetic microalbuminuria test  09/28/2021    COVID-19 Vaccine (3 - Booster for Moderna series) 10/18/2021    Diabetic foot exam  01/31/2023 (Originally 5/8/2019)    Low dose CT lung screening  02/25/2023    Lipids  09/29/2023    Depression Screen  09/29/2023    Annual Wellness Visit (AWV)  09/30/2023    A1C test (Diabetic or Prediabetic)  10/30/2023    DTaP/Tdap/Td vaccine (2 - Td or Tdap) 02/14/2027    Flu vaccine  Completed    Pneumococcal 65+ years Vaccine  Completed    AAA screen  Completed    Hepatitis A vaccine  Aged Out    Hib vaccine  Aged Out    Meningococcal (ACWY) vaccine  Aged Out          Assessment/Plan:    {No diagnosis found.  (Refresh or delete this SmartLink)}     ***    No follow-ups on file.

## 2022-12-07 ENCOUNTER — ANTI-COAG VISIT (OUTPATIENT)
Dept: PHARMACY | Age: 72
End: 2022-12-07
Payer: MEDICARE

## 2022-12-07 DIAGNOSIS — I48.3 TYPICAL ATRIAL FLUTTER (HCC): Primary | ICD-10-CM

## 2022-12-07 LAB — INTERNATIONAL NORMALIZATION RATIO, POC: 2.4

## 2022-12-07 PROCEDURE — 85610 PROTHROMBIN TIME: CPT

## 2022-12-07 PROCEDURE — 99211 OFF/OP EST MAY X REQ PHY/QHP: CPT

## 2022-12-07 NOTE — PROGRESS NOTES
Mr. Chris López is a 71 y.o. y/o male with history of Atrial Fibrillation who presents today for anticoagulation monitoring and adjustment. Pt is retired and works part time at [employer]. Patient reports he has been on warfarin for almost 30 years now and was managed by his cardiologist who recently retired. Pt was then managed by Dr. Corona Godinez prior to being established in Clinic  Pertinent PMH: HTN, DM, HLD, COPD, CHF    Patient Reported Findings:  Yes     No  [x]   []       Patient verifies current dosing regimen as listed- confirms   []   [x]       S/S bleeding/bruising/swelling/SOB -denies    []   [x]       Blood in urine or stool- denies  []   [x]       Procedures scheduled in the future at this time - denies upcoming   []   [x]       Missed Dose - Denies   []   [x]       Extra Dose - Denies  []   [x]       Change in medications-  tylenol prn---> euflexxa injection 10/5, 10/12, 10/19 is the plan for 3/3 doses---> d/c lisinopril, started entresto --> taking mucinex, dec amlodipine and entresto --> no changes    []   [x]       Change in health/diet/appetite - reports he eats about 2 meals/day. And states he will have salads about 1-2x/wk. (not a big fan of spinach or brocolli but may have other greens)--> Patient states 3 salads in past 2 weeks---> no greens, wants to add green beans in twice weekly, no NVD --> returned to vit k a few times a week (green beans and salad) --> denies changes, but has not had vit k in a while (only eats salads that have vit k)---> diarrhea resolved. Had small salad last week, no other vit k --> no changes, no NVD   []   [x]       Change in alcohol use - reports occasionally drinking beer (once every few months) but happens very infrequently. --> Patient reports no alcohol in past two weeks---> denies   []   [x]       Change in activity  []   [x]       Hospital admission Admitted 10/29-11/3 for SOB.      10/29- INR was 2.21  10/30- 10mg, INR 2.22  10/31- held, INR 3.02  11/1- 10mg, INR 2.59  11/2- 10mg, INR 2.28  11/3- dcd on home dose: 15 mg (10 mg x 1.5) every Wed, Fri; 10 mg (10 mg x 1) all other days and INR was 2.42    DCD with Augmentin for 2 more days. Also dec entresto and amlodipine.   []   [x]       Emergency department visit  []   [x]       Other complaints     Clinical Outcomes:  Yes     No  []   [x]       Major bleeding event  []   [x]       Thromboembolic event    Duration of warfarin Therapy: Indefinite   INR Range:  2.0-3.0     INR today is 2.4 after dose decrease. Take 15 mg on Fri and 10 mg all other days  Encouraged patient to maintain a consistent diet.   Recheck INR again in 4 weeks, 1/4/2023    **consent form signed 11/10/2021    Referring physician is Dr. Adelina Hudson  INR (no units)   Date Value   11/03/2022 2.42 (H)   11/02/2022 2.28 (H)   11/01/2022 2.59 (H)   10/31/2022 3.02 (H)     INR,(POC) (no units)   Date Value   12/07/2022 2.4   11/23/2022 3.4   11/08/2022 2.2       For Pharmacy Admin Tracking Only  Total # of Interventions Recommended: 0  Total # of Interventions Accepted: 0  Time Spent (min): 15

## 2022-12-20 ENCOUNTER — HOSPITAL ENCOUNTER (OUTPATIENT)
Dept: PULMONOLOGY | Age: 72
Discharge: HOME | End: 2022-12-20
Payer: MEDICARE

## 2022-12-20 ENCOUNTER — HOSPITAL ENCOUNTER (OUTPATIENT)
Dept: CT IMAGING | Age: 72
Discharge: HOME | End: 2022-12-20
Payer: MEDICARE

## 2022-12-20 VITALS — HEART RATE: 65 BPM | RESPIRATION RATE: 16 BRPM | OXYGEN SATURATION: 95 %

## 2022-12-20 DIAGNOSIS — J18.9 PNEUMONIA OF RIGHT LOWER LOBE DUE TO INFECTIOUS ORGANISM: ICD-10-CM

## 2022-12-20 LAB
DLCO %PRED: 80 %
DLCO PRED: NORMAL
DLCO/VA %PRED: NORMAL
DLCO/VA PRED: NORMAL
DLCO/VA: NORMAL
DLCO: NORMAL
EXPIRATORY TIME-POST: NORMAL
EXPIRATORY TIME: NORMAL
FEF 25-75% %CHNG: NORMAL
FEF 25-75% %PRED-POST: NORMAL
FEF 25-75% %PRED-PRE: NORMAL
FEF 25-75% PRED: NORMAL
FEF 25-75%-POST: NORMAL
FEF 25-75%-PRE: NORMAL
FEV1 %PRED-POST: 40 %
FEV1 %PRED-PRE: 43 %
FEV1 PRED: NORMAL
FEV1-POST: NORMAL
FEV1-PRE: NORMAL
FEV1/FVC %PRED-POST: NORMAL
FEV1/FVC %PRED-PRE: NORMAL
FEV1/FVC PRED: NORMAL
FEV1/FVC-POST: 76 %
FEV1/FVC-PRE: 85 %
FVC %PRED-POST: NORMAL
FVC %PRED-PRE: NORMAL
FVC PRED: NORMAL
FVC-POST: NORMAL
FVC-PRE: NORMAL
GAW %PRED: NORMAL
GAW PRED: NORMAL
GAW: NORMAL
IC %PRED: NORMAL
IC PRED: NORMAL
IC: NORMAL
MEP: NORMAL
MIP: NORMAL
MVV %PRED-PRE: NORMAL
MVV PRED: NORMAL
MVV-PRE: NORMAL
PEF %PRED-POST: NORMAL
PEF %PRED-PRE: NORMAL
PEF PRED: NORMAL
PEF%CHNG: NORMAL
PEF-POST: NORMAL
PEF-PRE: NORMAL
RAW %PRED: NORMAL
RAW PRED: NORMAL
RAW: NORMAL
RV %PRED: NORMAL
RV PRED: NORMAL
RV: NORMAL
SVC %PRED: NORMAL
SVC PRED: NORMAL
SVC: NORMAL
TLC %PRED: 70 %
TLC PRED: NORMAL
TLC: NORMAL
VA %PRED: NORMAL
VA PRED: NORMAL
VA: NORMAL
VTG %PRED: NORMAL
VTG PRED: NORMAL
VTG: NORMAL

## 2022-12-20 PROCEDURE — 94729 DIFFUSING CAPACITY: CPT

## 2022-12-20 PROCEDURE — 94060 EVALUATION OF WHEEZING: CPT

## 2022-12-20 PROCEDURE — 6370000000 HC RX 637 (ALT 250 FOR IP): Performed by: INTERNAL MEDICINE

## 2022-12-20 PROCEDURE — 71250 CT THORAX DX C-: CPT

## 2022-12-20 PROCEDURE — 94760 N-INVAS EAR/PLS OXIMETRY 1: CPT

## 2022-12-20 PROCEDURE — 94726 PLETHYSMOGRAPHY LUNG VOLUMES: CPT

## 2022-12-20 RX ORDER — ALBUTEROL SULFATE 90 UG/1
4 AEROSOL, METERED RESPIRATORY (INHALATION) ONCE
Status: COMPLETED | OUTPATIENT
Start: 2022-12-20 | End: 2022-12-20

## 2022-12-20 RX ADMIN — Medication 4 PUFF: at 07:51

## 2022-12-20 ASSESSMENT — PULMONARY FUNCTION TESTS
FEV1/FVC_POST: 76
FEV1/FVC_PRE: 85
FEV1_PERCENT_PREDICTED_PRE: 43
FEV1_PERCENT_PREDICTED_POST: 40

## 2022-12-22 NOTE — PROCEDURES
[facility]                     [facility], [address]                               PULMONARY FUNCTION    PATIENT NAME: Leticia Camacho                    :        1950  MED REC NO:   [id number]                          ROOM:  ACCOUNT NO:   [de-identified]                           ADMIT DATE: 2022  PROVIDER:     Estephania Vargas MD    DATE OF PROCEDURE:  2022    Spirometry reveals decreased FVC at 2.44 liters, which is 50% predicted. FEV1 is decreased to 1.52 liters, which is 43% predicted. FEV1/FVC  ratio is reduced at 62%. Expiratory flow rates are also markedly  reduced. There is no significant change after inhaled bronchodilators. Lung volumes reveal decreased total lung capacity at 70% predicted. Vital capacity is decreased at 59% predicted. Residual volume is  normal.  Diffusion capacity is normal.    IMPRESSION:  Severe obstructive lung disease. There is a component of  restrictive lung disease, which could be on the basis of obesity.         Taqueria Lynn MD    D: 2022 11:18:30       T: 2022 21:40:40     [id number]  Job#: [id number]     Doc#: [id number]    CC:

## 2023-01-03 ENCOUNTER — TELEPHONE (OUTPATIENT)
Dept: PULMONOLOGY | Age: 73
End: 2023-01-03

## 2023-01-03 NOTE — TELEPHONE ENCOUNTER
Patient called and said he missed a call and does not have vm set up thinking it might be the results of his PFT and CT.      [address]: [phone]

## 2023-01-04 ENCOUNTER — ANTI-COAG VISIT (OUTPATIENT)
Dept: PHARMACY | Age: 73
End: 2023-01-04
Payer: MEDICARE

## 2023-01-04 DIAGNOSIS — I48.3 TYPICAL ATRIAL FLUTTER (HCC): Primary | ICD-10-CM

## 2023-01-04 LAB — INTERNATIONAL NORMALIZATION RATIO, POC: 2.5

## 2023-01-04 PROCEDURE — 85610 PROTHROMBIN TIME: CPT

## 2023-01-04 PROCEDURE — 99211 OFF/OP EST MAY X REQ PHY/QHP: CPT

## 2023-01-04 NOTE — PROGRESS NOTES
Mr. Marylou Granda is a 71 y.o. y/o male with history of Atrial Fibrillation who presents today for anticoagulation monitoring and adjustment. Pt is retired and works part time at [employer]. Patient reports he has been on warfarin for almost 30 years now and was managed by his cardiologist who recently retired. Pt was then managed by Dr. Thanh Vázquez prior to being established in Clinic  Pertinent PMH: HTN, DM, HLD, COPD, CHF    Patient Reported Findings:  Yes     No  [x]   []       Patient verifies current dosing regimen as listed- confirms   []   [x]       S/S bleeding/bruising/swelling/SOB -denies    []   [x]       Blood in urine or stool- denies  []   [x]       Procedures scheduled in the future at this time - denies upcoming   []   [x]       Missed Dose - Denies   []   [x]       Extra Dose - Denies  []   [x]       Change in medications-  tylenol prn---> euflexxa injection 10/5, 10/12, 10/19 is the plan for 3/3 doses---> d/c lisinopril, started entresto --> taking mucinex, dec amlodipine and entresto --> no changes    []   [x]       Change in health/diet/appetite - reports he eats about 2 meals/day. And states he will have salads about 1-2x/wk. (not a big fan of spinach or brocolli but may have other greens)--> Patient states 3 salads in past 2 weeks---> no greens, wants to add green beans in twice weekly, no NVD --> returned to vit k a few times a week (green beans and salad) --> denies changes, but has not had vit k in a while (only eats salads that have vit k)---> diarrhea resolved. Had small salad last week, no other vit k --> no changes, no NVD   []   [x]       Change in alcohol use - reports occasionally drinking beer (once every few months) but happens very infrequently. --> Patient reports no alcohol in past two weeks---> denies   []   [x]       Change in activity  []   [x]       Hospital admission Admitted 10/29-11/3 for SOB.      10/29- INR was 2.21  10/30- 10mg, INR 2.22  10/31- held, INR 3.02  11/1- 10mg, INR 2.59  11/2- 10mg, INR 2.28  11/3- dcd on home dose: 15 mg (10 mg x 1.5) every Wed, Fri; 10 mg (10 mg x 1) all other days and INR was 2.42    DCD with Augmentin for 2 more days. Also dec entresto and amlodipine.   []   [x]       Emergency department visit  []   [x]       Other complaints     Clinical Outcomes:  Yes     No  []   [x]       Major bleeding event  []   [x]       Thromboembolic event    Duration of warfarin Therapy: Indefinite   INR Range:  2.0-3.0     INR today is 2.5  Take 15 mg on Fri and 10 mg all other days  Encouraged patient to maintain a consistent diet.   Recheck INR again in 4 weeks, 2/1    **consent form signed 11/10/2021    Referring physician is Dr. Rachele Cazares  INR (no units)   Date Value   11/03/2022 2.42 (H)   11/02/2022 2.28 (H)   11/01/2022 2.59 (H)   10/31/2022 3.02 (H)     INR,(POC) (no units)   Date Value   01/04/2023 2.5   12/07/2022 2.4   11/23/2022 3.4   11/08/2022 2.2       For Pharmacy Admin Tracking Only  Total # of Interventions Recommended: 0  Total # of Interventions Accepted: 0  Time Spent (min): 15

## 2023-02-01 ENCOUNTER — ANTI-COAG VISIT (OUTPATIENT)
Dept: PHARMACY | Age: 73
End: 2023-02-01
Payer: MEDICARE

## 2023-02-01 DIAGNOSIS — I48.3 TYPICAL ATRIAL FLUTTER (HCC): Primary | ICD-10-CM

## 2023-02-01 LAB — INTERNATIONAL NORMALIZATION RATIO, POC: 2.3

## 2023-02-01 PROCEDURE — 85610 PROTHROMBIN TIME: CPT

## 2023-02-01 PROCEDURE — 99211 OFF/OP EST MAY X REQ PHY/QHP: CPT

## 2023-02-01 NOTE — PROGRESS NOTES
Mr. Berry Christian is a 71 y.o. y/o male with history of Atrial Fibrillation who presents today for anticoagulation monitoring and adjustment. Pt is retired and works part time at [employer]. Patient reports he has been on warfarin for almost 30 years now and was managed by his cardiologist who recently retired. Pt was then managed by Dr. Sergio Barrera prior to being established in Clinic  Pertinent PMH: HTN, DM, HLD, COPD, CHF    Patient Reported Findings:  Yes     No  [x]   []       Patient verifies current dosing regimen as listed- confirms   []   [x]       S/S bleeding/bruising/swelling/SOB -denies    []   [x]       Blood in urine or stool- denies  []   [x]       Procedures scheduled in the future at this time - denies upcoming   []   [x]       Missed Dose - Denies   []   [x]       Extra Dose - Denies  []   [x]       Change in medications-  tylenol prn---> euflexxa injection 10/5, 10/12, 10/19 is the plan for 3/3 doses---> d/c lisinopril, started entresto --> taking mucinex, dec amlodipine and entresto --> no changes    []   [x]       Change in health/diet/appetite - reports he eats about 2 meals/day. And states he will have salads about 1-2x/wk. (not a big fan of spinach or brocolli but may have other greens)--> Patient states 3 salads in past 2 weeks---> no greens, wants to add green beans in twice weekly, no NVD --> returned to vit k a few times a week (green beans and salad) --> denies changes, but has not had vit k in a while (only eats salads that have vit k)---> diarrhea resolved. Had small salad last week, no other vit k --> no changes, no NVD   []   [x]       Change in alcohol use - reports occasionally drinking beer (once every few months) but happens very infrequently. --> Patient reports no alcohol in past two weeks---> denies   []   [x]       Change in activity  []   [x]       Hospital admission Admitted 10/29-11/3 for SOB.      10/29- INR was 2.21  10/30- 10mg, INR 2.22  10/31- held, INR 3.02  11/1- 10mg, INR 2.59  11/2- 10mg, INR 2.28  11/3- dcd on home dose: 15 mg (10 mg x 1.5) every Wed, Fri; 10 mg (10 mg x 1) all other days and INR was 2.42    DCD with Augmentin for 2 more days. Also dec entresto and amlodipine.   []   [x]       Emergency department visit  []   [x]       Other complaints     Clinical Outcomes:  Yes     No  []   [x]       Major bleeding event  []   [x]       Thromboembolic event    Duration of warfarin Therapy: Indefinite   INR Range:  2.0-3.0     INR today is 2.3  Take 15 mg on Fri and 10 mg all other days  Encouraged patient to maintain a consistent diet.   Recheck INR again in 4 weeks, 3/1    **consent form signed 2/1/2023    Referring physician is Dr. Alexandra Gross  INR (no units)   Date Value   11/03/2022 2.42 (H)   11/02/2022 2.28 (H)   11/01/2022 2.59 (H)   10/31/2022 3.02 (H)     INR,(POC) (no units)   Date Value   02/01/2023 2.3   01/04/2023 2.5   12/07/2022 2.4   11/23/2022 3.4     For Pharmacy Admin Tracking Only    Total # of Interventions Recommended: 0  Total # of Interventions Accepted: 0  Time Spent (min): 15

## 2023-02-20 DIAGNOSIS — E78.5 HYPERLIPIDEMIA LDL GOAL <100: ICD-10-CM

## 2023-02-21 NOTE — TELEPHONE ENCOUNTER
Medication:   Requested Prescriptions     Pending Prescriptions Disp Refills    pravastatin (PRAVACHOL) 80 MG tablet [Pharmacy Med Name: PRAVASTATIN SODIUM 80 MG Tablet] 90 tablet 3     Sig: TAKE 1 TABLET EVERY DAY FOR CHOLESTEROL        Last Filled:      Patient Phone Number: [phone] (home)     Last appt: 9/29/2022   Next appt: 3/29/2023    Last OARRS: No flowsheet data found.

## 2023-02-22 ENCOUNTER — NURSE ONLY (OUTPATIENT)
Dept: CARDIOLOGY CLINIC | Age: 73
End: 2023-02-22
Payer: MEDICARE

## 2023-02-22 DIAGNOSIS — Z95.810 ICD (IMPLANTABLE CARDIOVERTER-DEFIBRILLATOR) IN PLACE: Primary | ICD-10-CM

## 2023-02-22 DIAGNOSIS — I50.22 CHRONIC SYSTOLIC HF (HEART FAILURE) (HCC): ICD-10-CM

## 2023-02-22 DIAGNOSIS — I42.0 CARDIOMYOPATHY, DILATED (HCC): ICD-10-CM

## 2023-02-22 PROCEDURE — 93297 REM INTERROG DEV EVAL ICPMS: CPT | Performed by: CLINICAL NURSE SPECIALIST

## 2023-02-22 RX ORDER — PRAVASTATIN SODIUM 80 MG/1
TABLET ORAL
Qty: 90 TABLET | Refills: 3 | Status: SHIPPED | OUTPATIENT
Start: 2023-02-22

## 2023-02-22 NOTE — PROGRESS NOTES
Remote transmission received from patient's CRT-P home monitor. Set to only BiV pace when he needs to RV pace. Transmission shows normal sensing and pacing function. No arrhythmias/ or events. Optivol is within normal range. TriageHF Heart Failure Risk Status on 22-Feb-2023 is Medium*     EP/ NP will review. See interrogation under cardiology tab in the [address] [place] [address] [address] field for more details. Will continue to monitor remotely.     (End of 91-day monitoring period 2/22/23)

## 2023-03-01 ENCOUNTER — ANTI-COAG VISIT (OUTPATIENT)
Dept: PHARMACY | Age: 73
End: 2023-03-01
Payer: MEDICARE

## 2023-03-01 DIAGNOSIS — I48.3 TYPICAL ATRIAL FLUTTER (HCC): Primary | ICD-10-CM

## 2023-03-01 LAB — INTERNATIONAL NORMALIZATION RATIO, POC: 2.6

## 2023-03-01 PROCEDURE — 85610 PROTHROMBIN TIME: CPT

## 2023-03-01 PROCEDURE — 99211 OFF/OP EST MAY X REQ PHY/QHP: CPT

## 2023-03-01 NOTE — PROGRESS NOTES
Mr. Mazin Mariee is a 71 y.o. y/o male with history of Atrial Fibrillation who presents today for anticoagulation monitoring and adjustment. Pt is retired and works part time at [employer]. Patient reports he has been on warfarin for almost 30 years now and was managed by his cardiologist who recently retired. Pt was then managed by Dr. Tamara Mejia prior to being established in Clinic  Pertinent PMH: HTN, DM, HLD, COPD, CHF    Patient Reported Findings:  Yes     No  [x]   []       Patient verifies current dosing regimen as listed- confirms   []   [x]       S/S bleeding/bruising/swelling/SOB -denies    []   [x]       Blood in urine or stool- denies  []   [x]       Procedures scheduled in the future at this time - denies upcoming   []   [x]       Missed Dose - Denies   []   [x]       Extra Dose - Denies  []   [x]       Change in medications-  tylenol prn---> euflexxa injection 10/5, 10/12, 10/19 is the plan for 3/3 doses---> d/c lisinopril, started entresto --> taking mucinex, dec amlodipine and entresto --> no changes    []   [x]       Change in health/diet/appetite - reports he eats about 2 meals/day. And states he will have salads about 1-2x/wk. (not a big fan of spinach or brocolli but may have other greens)--> Patient states 3 salads in past 2 weeks---> no greens, wants to add green beans in twice weekly, no NVD --> returned to vit k a few times a week (green beans and salad) --> denies changes, but has not had vit k in a while (only eats salads that have vit k)---> diarrhea resolved. Had small salad last week, no other vit k --> no changes, no NVD   []   [x]       Change in alcohol use - reports occasionally drinking beer (once every few months) but happens very infrequently. --> Patient reports no alcohol in past two weeks---> denies   []   [x]       Change in activity  []   [x]       Hospital admission Admitted 10/29-11/3 for SOB.      10/29- INR was 2.21  10/30- 10mg, INR 2.22  10/31- held, INR 3.02  11/1- 10mg, INR 2.59  11/2- 10mg, INR 2.28  11/3- dcd on home dose: 15 mg (10 mg x 1.5) every Wed, Fri; 10 mg (10 mg x 1) all other days and INR was 2.42    DCD with Augmentin for 2 more days. Also dec entresto and amlodipine.   []   [x]       Emergency department visit  []   [x]       Other complaints     Clinical Outcomes:  Yes     No  []   [x]       Major bleeding event  []   [x]       Thromboembolic event    Duration of warfarin Therapy: Indefinite   INR Range:  2.0-3.0     INR today is 2.6  Take 15 mg on Fri and 10 mg all other days  Encouraged patient to maintain a consistent diet.   Recheck INR again in 4 weeks, 3/30    **consent form signed 2/1/2023    Referring physician is Dr. Keara Loredo  INR (no units)   Date Value   11/03/2022 2.42 (H)   11/02/2022 2.28 (H)   11/01/2022 2.59 (H)   10/31/2022 3.02 (H)     INR,(POC) (no units)   Date Value   03/01/2023 2.6   02/01/2023 2.3   01/04/2023 2.5   12/07/2022 2.4   For Pharmacy Admin Tracking Only    Total # of Interventions Recommended: 0  Total # of Interventions Accepted: 0  Time Spent (min): 15

## 2023-03-03 RX ORDER — FUROSEMIDE 40 MG/1
TABLET ORAL
Qty: 90 TABLET | Refills: 1 | Status: SHIPPED | OUTPATIENT
Start: 2023-03-03

## 2023-03-29 ENCOUNTER — NURSE ONLY (OUTPATIENT)
Dept: CARDIOLOGY CLINIC | Age: 73
End: 2023-03-29
Payer: MEDICARE

## 2023-03-29 DIAGNOSIS — I42.0 CARDIOMYOPATHY, DILATED (HCC): ICD-10-CM

## 2023-03-29 DIAGNOSIS — I50.22 CHRONIC SYSTOLIC HF (HEART FAILURE) (HCC): ICD-10-CM

## 2023-03-29 DIAGNOSIS — Z95.810 ICD (IMPLANTABLE CARDIOVERTER-DEFIBRILLATOR) IN PLACE: Primary | ICD-10-CM

## 2023-03-29 PROCEDURE — G2066 INTER DEVC REMOTE 30D: HCPCS | Performed by: CLINICAL NURSE SPECIALIST

## 2023-03-29 PROCEDURE — 93297 REM INTERROG DEV EVAL ICPMS: CPT | Performed by: CLINICAL NURSE SPECIALIST

## 2023-03-29 NOTE — PROGRESS NOTES
Remote transmission received from patient's CRT-P home monitor. Set to only BiV pace when he needs to RV pace. Transmission shows normal sensing and pacing function. No arrhythmias/ or events. Optivol is within normal range. TriageHF Heart Failure Risk Status on 28-Mar-2023 is Low    NP will review. See interrogation under cardiology tab in the [address] [place] [address] [address] field for more details. Will continue to monitor remotely. (End of 31-day monitoring period 3/29/23).

## 2023-03-30 ENCOUNTER — ANTI-COAG VISIT (OUTPATIENT)
Dept: PHARMACY | Age: 73
End: 2023-03-30
Payer: MEDICARE

## 2023-03-30 DIAGNOSIS — I48.3 TYPICAL ATRIAL FLUTTER (HCC): Primary | ICD-10-CM

## 2023-03-30 LAB — INTERNATIONAL NORMALIZATION RATIO, POC: 2.7

## 2023-03-30 PROCEDURE — 99211 OFF/OP EST MAY X REQ PHY/QHP: CPT

## 2023-03-30 PROCEDURE — 85610 PROTHROMBIN TIME: CPT

## 2023-04-27 ENCOUNTER — ANTI-COAG VISIT (OUTPATIENT)
Dept: PHARMACY | Age: 73
End: 2023-04-27
Payer: MEDICARE

## 2023-04-27 DIAGNOSIS — I48.3 TYPICAL ATRIAL FLUTTER (HCC): Primary | ICD-10-CM

## 2023-04-27 LAB — INTERNATIONAL NORMALIZATION RATIO, POC: 2.2

## 2023-04-27 PROCEDURE — 99211 OFF/OP EST MAY X REQ PHY/QHP: CPT

## 2023-04-27 PROCEDURE — 85610 PROTHROMBIN TIME: CPT

## 2023-04-27 NOTE — PROGRESS NOTES
Mr. Anatoliy Randolph is a 71 y.o. y/o male with history of Atrial Fibrillation who presents today for anticoagulation monitoring and adjustment. Pt is retired and works part time at [email or web address]. Patient reports he has been on warfarin for almost 30 years now and was managed by his cardiologist who recently retired. Pt was then managed by Dr. Darian Waggoner prior to being established in Clinic  Pertinent PMH: HTN, DM, HLD, COPD, CHF    Patient Reported Findings:  Yes     No  [x]   []       Patient verifies current dosing regimen as listed- confirms   []   [x]       S/S bleeding/bruising/swelling/SOB -denies    []   [x]       Blood in urine or stool- denies  []   [x]       Procedures scheduled in the future at this time - denies upcoming   []   [x]       Missed Dose - Denies   []   [x]       Extra Dose - Denies  []   [x]       Change in medications-  tylenol prn---> euflexxa injection 10/5, 10/12, 10/19 is the plan for 3/3 doses---> d/c lisinopril, started entresto --> taking mucinex, dec amlodipine and entresto --> no changes    []   [x]       Change in health/diet/appetite - reports he eats about 2 meals/day. And states he will have salads about 1-2x/wk. (not a big fan of spinach or brocolli but may have other greens)--> Patient states 3 salads in past 2 weeks---> no greens, wants to add green beans in twice weekly, no NVD --> returned to vit k a few times a week (green beans and salad) --> denies changes, but has not had vit k in a while (only eats salads that have vit k)---> diarrhea resolved. Had small salad last week, no other vit k --> no changes, no NVD   []   [x]       Change in alcohol use - reports occasionally drinking beer (once every few months) but happens very infrequently. --> Patient reports no alcohol in past two weeks---> denies   []   [x]       Change in activity  []   [x]       Hospital admission Admitted 10/29-11/3 for SOB.      10/29- INR was 2.21  10/30- 10mg, INR 2.22  10/31- held, INR 3.02  11/1-

## 2023-05-01 ENCOUNTER — OFFICE VISIT (OUTPATIENT)
Dept: FAMILY MEDICINE CLINIC | Age: 73
End: 2023-05-01

## 2023-05-01 VITALS
WEIGHT: 309.9 LBS | DIASTOLIC BLOOD PRESSURE: 80 MMHG | TEMPERATURE: 97.4 F | HEART RATE: 91 BPM | HEIGHT: 73 IN | RESPIRATION RATE: 16 BRPM | OXYGEN SATURATION: 98 % | BODY MASS INDEX: 41.07 KG/M2 | SYSTOLIC BLOOD PRESSURE: 136 MMHG

## 2023-05-01 DIAGNOSIS — N18.30 STAGE 3 CHRONIC KIDNEY DISEASE, UNSPECIFIED WHETHER STAGE 3A OR 3B CKD (HCC): ICD-10-CM

## 2023-05-01 DIAGNOSIS — E66.01 OBESITY, CLASS III, BMI 40-49.9 (MORBID OBESITY) (HCC): ICD-10-CM

## 2023-05-01 DIAGNOSIS — I48.0 PAROXYSMAL ATRIAL FIBRILLATION (HCC): ICD-10-CM

## 2023-05-01 DIAGNOSIS — E11.29 TYPE 2 DIABETES MELLITUS WITH OTHER DIABETIC KIDNEY COMPLICATION, WITHOUT LONG-TERM CURRENT USE OF INSULIN (HCC): Primary | ICD-10-CM

## 2023-05-01 DIAGNOSIS — Z99.81 OXYGEN DEPENDENT: ICD-10-CM

## 2023-05-01 DIAGNOSIS — J44.9 COPD, SEVERITY TO BE DETERMINED (HCC): ICD-10-CM

## 2023-05-01 DIAGNOSIS — Z12.11 SCREEN FOR COLON CANCER: ICD-10-CM

## 2023-05-01 DIAGNOSIS — I50.9 CONGESTIVE HEART FAILURE, UNSPECIFIED HF CHRONICITY, UNSPECIFIED HEART FAILURE TYPE (HCC): ICD-10-CM

## 2023-05-01 RX ORDER — BUDESONIDE, GLYCOPYRROLATE, AND FORMOTEROL FUMARATE 160; 9; 4.8 UG/1; UG/1; UG/1
AEROSOL, METERED RESPIRATORY (INHALATION)
Qty: 1 EACH | Refills: 0 | COMMUNITY
Start: 2023-05-01

## 2023-05-01 ASSESSMENT — PATIENT HEALTH QUESTIONNAIRE - PHQ9
SUM OF ALL RESPONSES TO PHQ QUESTIONS 1-9: 0
1. LITTLE INTEREST OR PLEASURE IN DOING THINGS: 0
SUM OF ALL RESPONSES TO PHQ QUESTIONS 1-9: 0
SUM OF ALL RESPONSES TO PHQ QUESTIONS 1-9: 0
2. FEELING DOWN, DEPRESSED OR HOPELESS: 0
SUM OF ALL RESPONSES TO PHQ QUESTIONS 1-9: 0
SUM OF ALL RESPONSES TO PHQ9 QUESTIONS 1 & 2: 0

## 2023-05-01 NOTE — PROGRESS NOTES
Subjective:      Patient ID: Lauren Ramírez is a 71 y.o. male. HPI    Treatment Adherence:   Medication compliance:  compliant all of the time  Diet compliance:  compliant most of the time  Weight trend: increasing  Current exercise: no regular exercise  Barriers: impairment:  sob, knee oa     Diabetes Mellitus Type 2: Current symptoms/problems include none. Home blood sugar records: fasting range: 113-130's  Any episodes of hypoglycemia? no  Eye exam current (within one year): no  Tobacco history: He  reports that he quit smoking about 3 years ago. His smoking use included cigarettes. He has a 30.00 pack-year smoking history. He has never used smokeless tobacco.   Daily Aspirin? No: on Coumadin     Hypertension:  Home blood pressure monitoring: Yes -  130/80\". He is adherent to a low sodium diet. Patient denies chest pain, lightheadedness, palpitations, and fatigue. Antihypertensive medication side effects: no medication side effects noted. Use of agents associated with hypertension: none. Hyperlipidemia:  No new myalgias or GI upset on pravastatin (Pravachol). Atrial Flutter:   Anticoagulated  No hx of abn bleeding    COPD  Uses combivent tid , + sob, productive cough of clear sputum  Former smoker, still exposed to smoke at home         Lab Results   Component Value Date    LABA1C 6.2 10/30/2022    LABA1C 6.0 09/29/2022    LABA1C 6.0 04/01/2022     Lab Results   Component Value Date    LABMICR 2.10 (H) 09/28/2020    CREATININE 1.6 (H) 11/23/2022     Lab Results   Component Value Date    ALT 17 10/30/2022    AST 18 10/30/2022     Lab Results   Component Value Date    CHOL 155 04/06/2021    TRIG 138 04/06/2021    HDL 35 (L) 09/29/2022    LDLCALC 85 09/29/2022        Review of Systems    Objective:  Blood pressure 136/80, pulse 91, temperature 97.4 °F (36.3 °C), temperature source Tympanic, resp. rate 16, height 6' 1\" (1.854 m), weight (!) 309 lb 14.4 oz (140.6 kg), SpO2 98 %.      Physical

## 2023-05-05 ENCOUNTER — TELEPHONE (OUTPATIENT)
Dept: FAMILY MEDICINE CLINIC | Age: 73
End: 2023-05-05

## 2023-05-08 NOTE — TELEPHONE ENCOUNTER
Has not had his blood work ordered on May 1  I need that to see is kidney function and decide medication none

## 2023-05-10 ENCOUNTER — NURSE ONLY (OUTPATIENT)
Dept: CARDIOLOGY CLINIC | Age: 73
End: 2023-05-10

## 2023-05-10 DIAGNOSIS — Z95.810 ICD (IMPLANTABLE CARDIOVERTER-DEFIBRILLATOR) IN PLACE: Primary | ICD-10-CM

## 2023-05-10 DIAGNOSIS — I50.22 CHRONIC SYSTOLIC HF (HEART FAILURE) (HCC): ICD-10-CM

## 2023-05-10 DIAGNOSIS — I42.0 CARDIOMYOPATHY, DILATED (HCC): ICD-10-CM

## 2023-05-10 NOTE — PROGRESS NOTES
Remote transmission received from patient's CRT-P home monitor. Set to only BiV pace when he needs to RV pace. Transmission shows normal sensing and pacing function. No arrhythmias/ or events. Optivol is within normal range. TriageHF Heart Failure Risk Status on 09-May-2023 is Low    NP will review. See interrogation under cardiology tab in the [address] [place] [address] [address] field for more details. Will continue to monitor remotely.     (End of 31-day monitoring period 5/10/23)

## 2023-05-12 ENCOUNTER — HOSPITAL ENCOUNTER (OUTPATIENT)
Age: 73
Discharge: HOME | End: 2023-05-12
Payer: MEDICARE

## 2023-05-12 DIAGNOSIS — E87.8 ELECTROLYTE IMBALANCE: ICD-10-CM

## 2023-05-12 DIAGNOSIS — I50.9 CONGESTIVE HEART FAILURE, UNSPECIFIED HF CHRONICITY, UNSPECIFIED HEART FAILURE TYPE (HCC): ICD-10-CM

## 2023-05-12 LAB
ALBUMIN SERPL-MCNC: 4.4 G/DL (ref 3.4–5)
ALBUMIN SERPL-MCNC: 4.5 G/DL (ref 3.4–5)
ALBUMIN/GLOB SERPL: 1.5 {RATIO} (ref 1.1–2.2)
ALP SERPL-CCNC: 89 U/L (ref 40–129)
ALT SERPL-CCNC: 17 U/L (ref 10–40)
ANION GAP SERPL CALCULATED.3IONS-SCNC: 11 MMOL/L (ref 3–16)
ANION GAP SERPL CALCULATED.3IONS-SCNC: 9 MMOL/L (ref 3–16)
AST SERPL-CCNC: 14 U/L (ref 15–37)
BILIRUB SERPL-MCNC: 0.3 MG/DL (ref 0–1)
BUN SERPL-MCNC: 28 MG/DL (ref 7–20)
BUN SERPL-MCNC: 30 MG/DL (ref 7–20)
CALCIUM SERPL-MCNC: 9.7 MG/DL (ref 8.3–10.6)
CALCIUM SERPL-MCNC: 9.8 MG/DL (ref 8.3–10.6)
CHLORIDE SERPL-SCNC: 106 MMOL/L (ref 99–110)
CHLORIDE SERPL-SCNC: 107 MMOL/L (ref 99–110)
CO2 SERPL-SCNC: 26 MMOL/L (ref 21–32)
CO2 SERPL-SCNC: 28 MMOL/L (ref 21–32)
CREAT SERPL-MCNC: 1.6 MG/DL (ref 0.8–1.3)
CREAT SERPL-MCNC: 1.6 MG/DL (ref 0.8–1.3)
CREAT UR-MCNC: 90.1 MG/DL (ref 39–259)
GFR SERPLBLD CREATININE-BSD FMLA CKD-EPI: 45 ML/MIN/{1.73_M2}
GFR SERPLBLD CREATININE-BSD FMLA CKD-EPI: 45 ML/MIN/{1.73_M2}
GLUCOSE SERPL-MCNC: 148 MG/DL (ref 70–99)
GLUCOSE SERPL-MCNC: 151 MG/DL (ref 70–99)
MICROALBUMIN UR DL<=1MG/L-MCNC: 19.3 MG/DL
MICROALBUMIN/CREAT UR: 214.2 MG/G (ref 0–30)
NT-PROBNP SERPL-MCNC: 304 PG/ML (ref 0–124)
PHOSPHATE SERPL-MCNC: 4.1 MG/DL (ref 2.5–4.9)
POTASSIUM SERPL-SCNC: 4.4 MMOL/L (ref 3.5–5.1)
POTASSIUM SERPL-SCNC: 4.7 MMOL/L (ref 3.5–5.1)
PROT SERPL-MCNC: 7.3 G/DL (ref 6.4–8.2)
SODIUM SERPL-SCNC: 143 MMOL/L (ref 136–145)
SODIUM SERPL-SCNC: 144 MMOL/L (ref 136–145)

## 2023-05-12 PROCEDURE — 83036 HEMOGLOBIN GLYCOSYLATED A1C: CPT

## 2023-05-12 PROCEDURE — 83880 ASSAY OF NATRIURETIC PEPTIDE: CPT

## 2023-05-12 PROCEDURE — 82043 UR ALBUMIN QUANTITATIVE: CPT

## 2023-05-12 PROCEDURE — 36415 COLL VENOUS BLD VENIPUNCTURE: CPT

## 2023-05-12 PROCEDURE — 82570 ASSAY OF URINE CREATININE: CPT

## 2023-05-12 PROCEDURE — 80053 COMPREHEN METABOLIC PANEL: CPT

## 2023-05-13 LAB
EST. AVERAGE GLUCOSE BLD GHB EST-MCNC: 137 MG/DL
HBA1C MFR BLD: 6.4 %

## 2023-05-18 ENCOUNTER — TELEPHONE (OUTPATIENT)
Dept: FAMILY MEDICINE CLINIC | Age: 73
End: 2023-05-18

## 2023-05-18 NOTE — TELEPHONE ENCOUNTER
Patient states medication works well patient states he would a RX of sample [address] [facility] mail order

## 2023-05-25 ENCOUNTER — ANTI-COAG VISIT (OUTPATIENT)
Dept: PHARMACY | Age: 73
End: 2023-05-25
Payer: MEDICARE

## 2023-05-25 DIAGNOSIS — I48.3 TYPICAL ATRIAL FLUTTER (HCC): Primary | ICD-10-CM

## 2023-05-25 LAB — INTERNATIONAL NORMALIZATION RATIO, POC: 2.1

## 2023-05-25 PROCEDURE — 99211 OFF/OP EST MAY X REQ PHY/QHP: CPT

## 2023-05-25 PROCEDURE — 85610 PROTHROMBIN TIME: CPT

## 2023-05-25 NOTE — PROGRESS NOTES
Mr. Timothy Macedo is a 68 y.o. y/o male with history of Atrial Fibrillation who presents today for anticoagulation monitoring and adjustment. Pt is retired and works part time at [employer]. Patient reports he has been on warfarin for almost 30 years now and was managed by his cardiologist who recently retired. Pt was then managed by Dr. Royal Gomez prior to being established in Clinic  Pertinent PMH: HTN, DM, HLD, COPD, CHF    Patient Reported Findings:  Yes     No  [x]   []       Patient verifies current dosing regimen as listed- confirms   []   [x]       S/S bleeding/bruising/swelling/SOB -denies    []   [x]       Blood in urine or stool- denies  []   [x]       Procedures scheduled in the future at this time - denies upcoming   []   [x]       Missed Dose - Denies   []   [x]       Extra Dose - Denies  []   [x]       Change in medications-  tylenol prn---> euflexxa injection 10/5, 10/12, 10/19 is the plan for 3/3 doses---> d/c lisinopril, started entresto --> taking mucinex, dec amlodipine and entresto --> no changes    []   [x]       Change in health/diet/appetite - reports he eats about 2 meals/day. And states he will have salads about 1-2x/wk. (not a big fan of spinach or brocolli but may have other greens)--> Patient states 3 salads in past 2 weeks---> no greens, wants to add green beans in twice weekly, no NVD --> returned to vit k a few times a week (green beans and salad) --> denies changes, but has not had vit k in a while (only eats salads that have vit k)---> diarrhea resolved. Had small salad last week, no other vit k --> no changes, no NVD   []   [x]       Change in alcohol use - reports occasionally drinking beer (once every few months) but happens very infrequently. --> Patient reports no alcohol in past two weeks---> denies   []   [x]       Change in activity  []   [x]       Hospital admission Admitted 10/29-11/3 for SOB.      10/29- INR was 2.21  10/30- 10mg, INR 2.22  10/31- held, INR 3.02  11/1-

## 2023-06-21 ENCOUNTER — NURSE ONLY (OUTPATIENT)
Dept: CARDIOLOGY CLINIC | Age: 73
End: 2023-06-21
Payer: MEDICARE

## 2023-06-21 DIAGNOSIS — Z95.810 ICD (IMPLANTABLE CARDIOVERTER-DEFIBRILLATOR) IN PLACE: Primary | ICD-10-CM

## 2023-06-21 DIAGNOSIS — I42.0 CARDIOMYOPATHY, DILATED (HCC): ICD-10-CM

## 2023-06-21 DIAGNOSIS — I50.22 CHRONIC SYSTOLIC HF (HEART FAILURE) (HCC): ICD-10-CM

## 2023-06-21 PROCEDURE — 93297 REM INTERROG DEV EVAL ICPMS: CPT | Performed by: CLINICAL NURSE SPECIALIST

## 2023-06-21 PROCEDURE — 93296 REM INTERROG EVL PM/IDS: CPT | Performed by: INTERNAL MEDICINE

## 2023-06-21 PROCEDURE — 93294 REM INTERROG EVL PM/LDLS PM: CPT | Performed by: INTERNAL MEDICINE

## 2023-06-21 NOTE — PROGRESS NOTES
Remote transmission received from patient's CRT-P home monitor. Set to only BiV pace when he needs to RV pace. Transmission shows normal sensing and pacing function. NSVT noted (sotalol). Optivol is within normal range. TriageHF Heart Failure Risk Status on 20-Jun-2023 is Low    EP/ NP will review. See interrogation under cardiology tab in the [address] [place] [address] [address] field for more details. Will continue to monitor remotely. (End of 91-day monitoring period 6/21/23).

## 2023-06-22 ENCOUNTER — ANTI-COAG VISIT (OUTPATIENT)
Dept: PHARMACY | Age: 73
End: 2023-06-22
Payer: MEDICARE

## 2023-06-22 DIAGNOSIS — I48.3 TYPICAL ATRIAL FLUTTER (HCC): Primary | ICD-10-CM

## 2023-06-22 LAB — INTERNATIONAL NORMALIZATION RATIO, POC: 1.8

## 2023-06-22 PROCEDURE — 99211 OFF/OP EST MAY X REQ PHY/QHP: CPT

## 2023-06-22 PROCEDURE — 85610 PROTHROMBIN TIME: CPT

## 2023-06-22 NOTE — PROGRESS NOTES
Mr. Serena Jaime is a 68 y.o. y/o male with history of Atrial Fibrillation who presents today for anticoagulation monitoring and adjustment. Pt is retired and works part time at [employer]. Patient reports he has been on warfarin for almost 30 years now and was managed by his cardiologist who recently retired. Pt was then managed by Dr. Javan Harada prior to being established in Clinic  Pertinent PMH: HTN, DM, HLD, COPD, CHF    Patient Reported Findings:  Yes     No  [x]   []       Patient verifies current dosing regimen as listed- confirms   []   [x]       S/S bleeding/bruising/swelling/SOB -denies    []   [x]       Blood in urine or stool- denies  []   [x]       Procedures scheduled in the future at this time - denies upcoming   []   [x]       Missed Dose - Denies   []   [x]       Extra Dose - Denies  [x]   []       Change in medications-  tylenol prn---> euflexxa injection 10/5, 10/12, 10/19 is the plan for 3/3 doses---> d/c lisinopril, started entresto --> taking mucinex, dec amlodipine and entresto --> started jardiance. Stopped metformin    []   [x]       Change in health/diet/appetite - reports he eats about 2 meals/day. And states he will have salads about 1-2x/wk. (not a big fan of spinach or brocolli but may have other greens)--> Patient states 3 salads in past 2 weeks---> no greens, wants to add green beans in twice weekly, no NVD --> returned to vit k a few times a week (green beans and salad) --> denies changes, but has not had vit k in a while (only eats salads that have vit k)---> diarrhea resolved. Had small salad last week, no other vit k --> no changes, no NVD   []   [x]       Change in alcohol use - reports occasionally drinking beer (once every few months) but happens very infrequently. --> Patient reports no alcohol in past two weeks---> denies   []   [x]       Change in activity  []   [x]       Hospital admission Admitted 10/29-11/3 for SOB.      10/29- INR was 2.21  10/30- 10mg, INR

## 2023-07-03 RX ORDER — ALLOPURINOL 300 MG/1
TABLET ORAL
Qty: 90 TABLET | Refills: 1 | Status: SHIPPED | OUTPATIENT
Start: 2023-07-03

## 2023-07-03 NOTE — TELEPHONE ENCOUNTER
Medication:   Requested Prescriptions     Pending Prescriptions Disp Refills    allopurinol (ZYLOPRIM) 300 MG tablet [Pharmacy Med Name: ALLOPURINOL 300 MG Tablet] 90 tablet 1     Sig: TAKE 1 TABLET EVERY DAY        Last Filled:      Patient Phone Number: [phone] (home)     Last appt: 5/1/2023   Next appt: 11/1/2023    Last OARRS: No flowsheet data found.

## 2023-07-06 ENCOUNTER — ANTI-COAG VISIT (OUTPATIENT)
Dept: PHARMACY | Age: 73
End: 2023-07-06
Payer: MEDICARE

## 2023-07-06 DIAGNOSIS — I48.3 TYPICAL ATRIAL FLUTTER (HCC): Primary | ICD-10-CM

## 2023-07-06 LAB — INTERNATIONAL NORMALIZATION RATIO, POC: 2.9

## 2023-07-06 PROCEDURE — 99211 OFF/OP EST MAY X REQ PHY/QHP: CPT

## 2023-07-06 PROCEDURE — 85610 PROTHROMBIN TIME: CPT

## 2023-07-06 NOTE — PROGRESS NOTES
Mr. Wilson Sánchez is a 68 y.o. y/o male with history of Atrial Fibrillation who presents today for anticoagulation monitoring and adjustment. Pt is retired and works part time at [employer]. Patient reports he has been on warfarin for almost 30 years now and was managed by his cardiologist who recently retired. Pt was then managed by Dr. Mane Tripathi prior to being established in Clinic  Pertinent PMH: HTN, DM, HLD, COPD, CHF    Patient Reported Findings:  Yes     No  [x]   []       Patient verifies current dosing regimen as listed- confirms   []   [x]       S/S bleeding/bruising/swelling/SOB -denies    []   [x]       Blood in urine or stool- denies  []   [x]       Procedures scheduled in the future at this time - denies upcoming   []   [x]       Missed Dose - Denies   []   [x]       Extra Dose - Denies  [x]   []       Change in medications-  tylenol prn---> euflexxa injection 10/5, 10/12, 10/19 is the plan for 3/3 doses---> d/c lisinopril, started entresto --> taking mucinex, dec amlodipine and entresto --> started jardiance. Stopped metformin---> extra tylenol     []   [x]       Change in health/diet/appetite - reports he eats about 2 meals/day. And states he will have salads about 1-2x/wk. (not a big fan of spinach or brocolli but may have other greens)--> Patient states 3 salads in past 2 weeks---> no greens, wants to add green beans in twice weekly, no NVD --> returned to vit k a few times a week (green beans and salad) --> denies changes, but has not had vit k in a while (only eats salads that have vit k)---> diarrhea resolved. Had small salad last week, no other vit k --> no changes, no NVD---> no greens, no NVD   []   [x]       Change in alcohol use - reports occasionally drinking beer (once every few months) but happens very infrequently. --> Patient reports no alcohol in past two weeks---> denies   []   [x]       Change in activity  []   [x]       Hospital admission Admitted 10/29-11/3 for SOB.      10/29-

## 2023-07-27 ENCOUNTER — ANTI-COAG VISIT (OUTPATIENT)
Dept: PHARMACY | Age: 73
End: 2023-07-27
Payer: MEDICARE

## 2023-07-27 DIAGNOSIS — I48.3 TYPICAL ATRIAL FLUTTER (HCC): Primary | ICD-10-CM

## 2023-07-27 LAB — INTERNATIONAL NORMALIZATION RATIO, POC: 2.8

## 2023-07-27 PROCEDURE — 99211 OFF/OP EST MAY X REQ PHY/QHP: CPT

## 2023-07-27 PROCEDURE — 85610 PROTHROMBIN TIME: CPT

## 2023-07-27 NOTE — PROGRESS NOTES
Mr. Jalil Romero is a 68 y.o. y/o male with history of Atrial Fibrillation who presents today for anticoagulation monitoring and adjustment. Pt is retired and works part time at [employer]. Patient reports he has been on warfarin for almost 30 years now and was managed by his cardiologist who recently retired. Pt was then managed by Dr. Basilio Vega prior to being established in Clinic  Pertinent PMH: HTN, DM, HLD, COPD, CHF    Patient Reported Findings:  Yes     No  [x]   []       Patient verifies current dosing regimen as listed- confirms   []   [x]       S/S bleeding/bruising/swelling/SOB -denies    []   [x]       Blood in urine or stool- denies  []   [x]       Procedures scheduled in the future at this time - denies upcoming   []   [x]       Missed Dose - Denies   []   [x]       Extra Dose - Denies  [x]   []       Change in medications-  tylenol prn---> euflexxa injection 10/5, 10/12, 10/19 is the plan for 3/3 doses---> d/c lisinopril, started entresto --> taking mucinex, dec amlodipine and entresto --> started jardiance. Stopped metformin---> extra tylenol     []   [x]       Change in health/diet/appetite - reports he eats about 2 meals/day. And states he will have salads about 1-2x/wk. (not a big fan of spinach or brocolli but may have other greens)--> Patient states 3 salads in past 2 weeks---> no greens, wants to add green beans in twice weekly, no NVD --> returned to vit k a few times a week (green beans and salad) --> denies changes, but has not had vit k in a while (only eats salads that have vit k)---> diarrhea resolved. Had small salad last week, no other vit k --> no changes, no NVD---> no greens, no NVD   []   [x]       Change in alcohol use - reports occasionally drinking beer (once every few months) but happens very infrequently. --> Patient reports no alcohol in past two weeks---> denies   []   [x]       Change in activity  []   [x]       Hospital admission Admitted 10/29-11/3 for SOB.      10/29-

## 2023-08-01 NOTE — TELEPHONE ENCOUNTER
Attempted to call patient to see who is prescribing his Warfarin . No answer and phone line is not accepting messages.

## 2023-08-03 ENCOUNTER — TELEPHONE (OUTPATIENT)
Dept: PHARMACY | Age: 73
End: 2023-08-03

## 2023-08-03 NOTE — TELEPHONE ENCOUNTER
----- Message from Félix Mejia sent at 8/3/2023  9:38 AM EDT -----  Regarding: Needs warfarin refill  Contact: patient  Please call-in 10mg warfarin refill to [address]

## 2023-08-03 NOTE — TELEPHONE ENCOUNTER
Warfarin prescription phoned into [facility] to Vitor Moralez, pharmacist [phone] under Dr. Kirstin Hi  Warfarin 10 mg tabs  Take 15mg on Mon and Fri and 10mg all other days  90 days   2 refills    Karsten Thomason, PharmD

## 2023-08-04 RX ORDER — WARFARIN SODIUM 10 MG/1
TABLET ORAL
Qty: 103 TABLET | Refills: 3 | Status: SHIPPED | OUTPATIENT
Start: 2023-08-04

## 2023-08-18 ENCOUNTER — TELEPHONE (OUTPATIENT)
Dept: FAMILY MEDICINE CLINIC | Age: 73
End: 2023-08-18

## 2023-08-18 NOTE — TELEPHONE ENCOUNTER
Patient called stating that he is not able to afford Jardiance medication. Patient is asking if he can take Metformin in replace of it or not. Patient states he still has a lot of metformin left but none of Jardiance and is not able to pay for it. Patient also stating he does not have his inhaler patient states he is unable to pay for another one Patient is asking if theres something else that can be sent in for him.       Last Office Visit: 05/01/2023

## 2023-08-24 ENCOUNTER — ANTI-COAG VISIT (OUTPATIENT)
Dept: PHARMACY | Age: 73
End: 2023-08-24
Payer: MEDICARE

## 2023-08-24 DIAGNOSIS — I48.3 TYPICAL ATRIAL FLUTTER (HCC): Primary | ICD-10-CM

## 2023-08-24 LAB — INTERNATIONAL NORMALIZATION RATIO, POC: 3

## 2023-08-24 PROCEDURE — 99211 OFF/OP EST MAY X REQ PHY/QHP: CPT

## 2023-08-24 PROCEDURE — 85610 PROTHROMBIN TIME: CPT

## 2023-08-24 NOTE — PROGRESS NOTES
Mr. Claudette Michelle is a 68 y.o. y/o male with history of Atrial Fibrillation who presents today for anticoagulation monitoring and adjustment. Pt is retired and works part time at [employer]. Patient reports he has been on warfarin for almost 30 years now and was managed by his cardiologist who recently retired. Pt was then managed by Dr. Suly Quevedo prior to being established in Clinic  Pertinent PMH: HTN, DM, HLD, COPD, CHF    Patient Reported Findings:  Yes     No  [x]   []       Patient verifies current dosing regimen as listed- confirms   []   [x]       S/S bleeding/bruising/swelling/SOB -denies    []   [x]       Blood in urine or stool- denies  []   [x]       Procedures scheduled in the future at this time - denies upcoming   []   [x]       Missed Dose - Denies   []   [x]       Extra Dose - Denies  []   [x]       Change in medications-  tylenol prn---> euflexxa injection 10/5, 10/12, 10/19 is the plan for 3/3 doses---> d/c lisinopril, started entresto --> taking mucinex, dec amlodipine and entresto --> started jardiance. Stopped metformin---> extra tylenol --> no changes     []   [x]       Change in health/diet/appetite - reports he eats about 2 meals/day. And states he will have salads about 1-2x/wk. (not a big fan of spinach or brocolli but may have other greens)--> Patient states 3 salads in past 2 weeks---> no greens, wants to add green beans in twice weekly, no NVD --> returned to vit k a few times a week (green beans and salad) --> denies changes, but has not had vit k in a while (only eats salads that have vit k)---> diarrhea resolved. Had small salad last week, no other vit kNVD---> no greens, no NVD --> no changes, no   []   [x]       Change in alcohol use - reports occasionally drinking beer (once every few months) but happens very infrequently. --> Patient reports no alcohol in past two weeks---> denies   []   [x]       Change in activity  []   [x]       Hospital admission Admitted 10/29-11/3 for

## 2023-08-30 RX ORDER — ALBUTEROL SULFATE 90 UG/1
2 AEROSOL, METERED RESPIRATORY (INHALATION) EVERY 6 HOURS PRN
Qty: 1 EACH | Refills: 5 | Status: SHIPPED | OUTPATIENT
Start: 2023-08-30

## 2023-08-30 RX ORDER — NEBULIZER ACCESSORIES
1 KIT MISCELLANEOUS DAILY PRN
Qty: 1 KIT | Refills: 0 | Status: SHIPPED | OUTPATIENT
Start: 2023-08-30

## 2023-08-30 RX ORDER — ALBUTEROL SULFATE 2.5 MG/3ML
2.5 SOLUTION RESPIRATORY (INHALATION) 4 TIMES DAILY PRN
Qty: 120 EACH | Refills: 5 | Status: SHIPPED | OUTPATIENT
Start: 2023-08-30

## 2023-08-30 RX ORDER — GUAIFENESIN 600 MG/1
600 TABLET, EXTENDED RELEASE ORAL 2 TIMES DAILY
Qty: 60 TABLET | Refills: 0 | Status: SHIPPED | OUTPATIENT
Start: 2023-08-30 | End: 2023-09-29

## 2023-08-30 NOTE — TELEPHONE ENCOUNTER
[id number] [facility] Request Rx's to be faxed to [phone], along with pt's med list.    Phone: [phone]    Medication:   Requested Prescriptions     Pending Prescriptions Disp Refills    Respiratory Therapy Supplies (NEBULIZER/TUBING/MOUTHPIECE) KIT 1 kit 0     Si kit by Does not apply route daily as needed    albuterol (PROVENTIL) (2.5 MG/3ML) 0.083% nebulizer solution 120 each 5     Sig: Take 3 mLs by nebulization 4 times daily as needed for Wheezing    albuterol sulfate HFA (PROVENTIL HFA) 108 (90 Base) MCG/ACT inhaler 1 each 5     Sig: Inhale 2 puffs into the lungs every 6 hours as needed for Wheezing    guaiFENesin (MUCINEX) 600 MG extended release tablet 60 tablet 3     Sig: Take 1 tablet by mouth 2 times daily        Last Filled:      Patient Phone Number: [phone] (home)     Last appt: 2023   Next appt: 2023    Last OARRS: No flowsheet data found.

## 2023-09-06 ENCOUNTER — TELEPHONE (OUTPATIENT)
Dept: FAMILY MEDICINE CLINIC | Age: 73
End: 2023-09-06

## 2023-09-06 DIAGNOSIS — E78.5 HYPERLIPIDEMIA LDL GOAL <100: ICD-10-CM

## 2023-09-06 RX ORDER — PRAVASTATIN SODIUM 80 MG/1
80 TABLET ORAL DAILY
Qty: 90 TABLET | Refills: 3 | Status: SHIPPED | OUTPATIENT
Start: 2023-09-06 | End: 2024-08-31

## 2023-09-06 NOTE — TELEPHONE ENCOUNTER
Patient called requesting refill on pravastatin 80 MG. Patient still uses Mail order for pharmacy.          Last Office Visit: 05/01/2023

## 2023-09-08 NOTE — PROGRESS NOTES
Remote transmission received from patient's CRT-P home monitor. Set to only BiV pace when he needs to RV pace. Transmission shows normal sensing and pacing function. No arrhythmias/ or events. Optivol is within normal range w slight elevation noted up to 20 (below 60 threshold) w TI trend slightly below reference line. TriageHF Heart Failure Risk Status on 18-Oct-2022 is Medium*     NP will review. See interrogation under cardiology tab in the [facility] [address] field for more details. Will continue to monitor remotely.     (End of 31-day monitoring period 10/19/22) Calcipotriene Counseling:  I discussed with the patient the risks of calcipotriene including but not limited to erythema, scaling, itching, and irritation.

## 2023-09-14 ENCOUNTER — TELEPHONE (OUTPATIENT)
Dept: FAMILY MEDICINE CLINIC | Age: 73
End: 2023-09-14

## 2023-09-14 RX ORDER — AMLODIPINE BESYLATE 5 MG/1
5 TABLET ORAL DAILY
Qty: 90 TABLET | Refills: 3 | Status: SHIPPED | OUTPATIENT
Start: 2023-09-14

## 2023-09-21 ENCOUNTER — ANTI-COAG VISIT (OUTPATIENT)
Dept: PHARMACY | Age: 73
End: 2023-09-21
Payer: MEDICARE

## 2023-09-21 DIAGNOSIS — I48.3 TYPICAL ATRIAL FLUTTER (HCC): Primary | ICD-10-CM

## 2023-09-21 LAB — INTERNATIONAL NORMALIZATION RATIO, POC: 3.8

## 2023-09-21 PROCEDURE — 99212 OFFICE O/P EST SF 10 MIN: CPT

## 2023-09-21 PROCEDURE — 85610 PROTHROMBIN TIME: CPT

## 2023-09-21 NOTE — PROGRESS NOTES
Mr. Wilson Sánchez is a 68 y.o. y/o male with history of Atrial Fibrillation who presents today for anticoagulation monitoring and adjustment. Pt is retired and works part time at [employer]. Patient reports he has been on warfarin for almost 30 years now and was managed by his cardiologist who recently retired. Pt was then managed by Dr. Mane Tripathi prior to being established in Clinic  Pertinent PMH: HTN, DM, HLD, COPD, CHF    Patient Reported Findings:  Yes     No  [x]   []       Patient verifies current dosing regimen as listed- confirms   []   [x]       S/S bleeding/bruising/swelling/SOB -denies    []   [x]       Blood in urine or stool- denies  []   [x]       Procedures scheduled in the future at this time - denies upcoming   []   [x]       Missed Dose - Denies   []   [x]       Extra Dose - Denies  []   [x]       Change in medications-  tylenol prn---> euflexxa injection 10/5, 10/12, 10/19 is the plan for 3/3 doses---> d/c lisinopril, started entresto --> taking mucinex, dec amlodipine and entresto --> started jardiance. Stopped metformin---> extra tylenol --> no changes     []   [x]       Change in health/diet/appetite - reports he eats about 2 meals/day. And states he will have salads about 1-2x/wk. (not a big fan of spinach or brocolli but may have other greens)--> Patient states 3 salads in past 2 weeks---> no greens, wants to add green beans in twice weekly, no NVD --> returned to vit k a few times a week (green beans and salad) --> denies changes, but has not had vit k in a while (only eats salads that have vit k)---> diarrhea resolved. Had small salad last week, no other vit kNVD---> no greens, no NVD --> no changes, no   []   [x]       Change in alcohol use - reports occasionally drinking beer (once every few months) but happens very infrequently. --> Patient reports no alcohol in past two weeks---> denies   []   [x]       Change in activity  []   [x]       Hospital admission Admitted 10/29-11/3 for

## 2023-09-27 ENCOUNTER — TELEPHONE (OUTPATIENT)
Dept: FAMILY MEDICINE CLINIC | Age: 73
End: 2023-09-27

## 2023-09-27 NOTE — TELEPHONE ENCOUNTER
Patient states he is out ofJaurdiance and did request samples.       Patient states [place] Shana isn't available @ [facility]   Patient seeking medical advice

## 2023-10-03 RX ORDER — FUROSEMIDE 40 MG/1
TABLET ORAL
Qty: 90 TABLET | Refills: 0 | OUTPATIENT
Start: 2023-10-03

## 2023-10-05 ENCOUNTER — ANTI-COAG VISIT (OUTPATIENT)
Dept: PHARMACY | Age: 73
End: 2023-10-05
Payer: MEDICARE

## 2023-10-05 ENCOUNTER — TELEPHONE (OUTPATIENT)
Dept: FAMILY MEDICINE CLINIC | Age: 73
End: 2023-10-05

## 2023-10-05 DIAGNOSIS — I48.3 TYPICAL ATRIAL FLUTTER (HCC): Primary | ICD-10-CM

## 2023-10-05 LAB — INTERNATIONAL NORMALIZATION RATIO, POC: 2.9

## 2023-10-05 PROCEDURE — 85610 PROTHROMBIN TIME: CPT

## 2023-10-05 PROCEDURE — 99211 OFF/OP EST MAY X REQ PHY/QHP: CPT

## 2023-10-05 NOTE — TELEPHONE ENCOUNTER
empagliflozin (JARDIANCE) 10 MG tablet     Pt states [facility] does not have 10MG tablets but they do have 25MG.   Patient is wanting to know if his script can be changed to this MG because he is able to get the medication complimentary

## 2023-10-05 NOTE — PROGRESS NOTES
Mr. Gianna Gonzalez is a 68 y.o. y/o male with history of Atrial Fibrillation who presents today for anticoagulation monitoring and adjustment. Pt is retired and works part time at [employer]. Patient reports he has been on warfarin for almost 30 years now and was managed by his cardiologist who recently retired. Pt was then managed by Dr. Karina Ramon prior to being established in Clinic  Pertinent PMH: HTN, DM, HLD, COPD, CHF    Patient Reported Findings:  Yes     No  [x]   []       Patient verifies current dosing regimen as listed- confirms   []   [x]       S/S bleeding/bruising/swelling/SOB -denies    []   [x]       Blood in urine or stool- denies  []   [x]       Procedures scheduled in the future at this time - denies upcoming   []   [x]       Missed Dose - Denies   []   [x]       Extra Dose - Denies  []   [x]       Change in medications-  tylenol prn---> euflexxa injection 10/5, 10/12, 10/19 is the plan for 3/3 doses---> d/c lisinopril, started entresto --> taking mucinex, dec amlodipine and entresto --> started jardiance. Stopped metformin---> extra tylenol --> stopped [employer] cost   []   [x]       Change in health/diet/appetite - reports he eats about 2 meals/day. And states he will have salads about 1-2x/wk. (not a big fan of spinach or brocolli but may have other greens)--> Patient states 3 salads in past 2 weeks---> no greens, wants to add green beans in twice weekly, no NVD --> returned to vit k a few times a week (green beans and salad) --> denies changes, but has not had vit k in a while (only eats salads that have vit k)---> diarrhea resolved. Had small salad last week, no other vit kNVD---> no greens, no NVD --> no changes   []   [x]       Change in alcohol use - reports occasionally drinking beer (once every few months) but happens very infrequently. --> Patient reports no alcohol in past two weeks---> denies   []   [x]       Change in activity  []   [x]       Hospital admission Admitted

## 2023-10-10 NOTE — TELEPHONE ENCOUNTER
empagliflozin (JARDIANCE) 10 MG tablet      Pt states [address] [place] [address] does not have 10MG tablets but they do have 25MG.   Patient is wanting to know if his script can be changed to 25MG because he is able to get the medication complimentary    Patient asked if he can cut them in half it would be 12.5mg a day     Please advise:     Last appointment 5/01/2023    Next appointment: 10/19/2023

## 2023-10-13 RX ORDER — ALLOPURINOL 300 MG/1
300 TABLET ORAL DAILY
Qty: 90 TABLET | Refills: 1 | Status: SHIPPED | OUTPATIENT
Start: 2023-10-13

## 2023-10-13 NOTE — TELEPHONE ENCOUNTER
Medication:   Requested Prescriptions     Pending Prescriptions Disp Refills    allopurinol (ZYLOPRIM) 300 MG tablet 90 tablet 1     Sig: Take 1 tablet by mouth daily        Last Filled:      Patient Phone Number: [phone] (home)     Last appt: 5/1/2023   Next appt: 11/1/2023    Last OARRS:        No data to display

## 2023-10-17 ENCOUNTER — HOSPITAL ENCOUNTER (OUTPATIENT)
Age: 73
Discharge: HOME | End: 2023-10-17
Payer: MEDICARE

## 2023-10-17 DIAGNOSIS — I50.9 CONGESTIVE HEART FAILURE, UNSPECIFIED HF CHRONICITY, UNSPECIFIED HEART FAILURE TYPE (HCC): ICD-10-CM

## 2023-10-17 LAB
ALBUMIN SERPL-MCNC: 4.6 G/DL (ref 3.4–5)
ANION GAP SERPL CALCULATED.3IONS-SCNC: 12 MMOL/L (ref 3–16)
BUN SERPL-MCNC: 29 MG/DL (ref 7–20)
CALCIUM SERPL-MCNC: 9.3 MG/DL (ref 8.3–10.6)
CHLORIDE SERPL-SCNC: 105 MMOL/L (ref 99–110)
CO2 SERPL-SCNC: 25 MMOL/L (ref 21–32)
CREAT SERPL-MCNC: 1.7 MG/DL (ref 0.8–1.3)
CREAT UR-MCNC: 76.8 MG/DL (ref 39–259)
GFR SERPLBLD CREATININE-BSD FMLA CKD-EPI: 42 ML/MIN/{1.73_M2}
GLUCOSE SERPL-MCNC: 168 MG/DL (ref 70–99)
MICROALBUMIN UR DL<=1MG/L-MCNC: 13.7 MG/DL
MICROALBUMIN/CREAT UR: 178.4 MG/G (ref 0–30)
NT-PROBNP SERPL-MCNC: 357 PG/ML (ref 0–124)
PHOSPHATE SERPL-MCNC: 3.8 MG/DL (ref 2.5–4.9)
POTASSIUM SERPL-SCNC: 4.6 MMOL/L (ref 3.5–5.1)
SODIUM SERPL-SCNC: 142 MMOL/L (ref 136–145)

## 2023-10-17 PROCEDURE — 36415 COLL VENOUS BLD VENIPUNCTURE: CPT

## 2023-10-17 PROCEDURE — 82570 ASSAY OF URINE CREATININE: CPT

## 2023-10-17 PROCEDURE — 80069 RENAL FUNCTION PANEL: CPT

## 2023-10-17 PROCEDURE — 82043 UR ALBUMIN QUANTITATIVE: CPT

## 2023-10-17 PROCEDURE — 83880 ASSAY OF NATRIURETIC PEPTIDE: CPT

## 2023-11-01 ENCOUNTER — OFFICE VISIT (OUTPATIENT)
Dept: FAMILY MEDICINE CLINIC | Age: 73
End: 2023-11-01

## 2023-11-01 VITALS
OXYGEN SATURATION: 93 % | HEART RATE: 86 BPM | RESPIRATION RATE: 16 BRPM | SYSTOLIC BLOOD PRESSURE: 134 MMHG | BODY MASS INDEX: 41.75 KG/M2 | TEMPERATURE: 97.2 F | WEIGHT: 315 LBS | DIASTOLIC BLOOD PRESSURE: 82 MMHG | HEIGHT: 73 IN

## 2023-11-01 DIAGNOSIS — N18.30 HYPERTENSION ASSOCIATED WITH STAGE 3 CHRONIC KIDNEY DISEASE DUE TO TYPE 2 DIABETES MELLITUS (HCC): ICD-10-CM

## 2023-11-01 DIAGNOSIS — F51.01 PRIMARY INSOMNIA: ICD-10-CM

## 2023-11-01 DIAGNOSIS — E78.5 HYPERLIPIDEMIA LDL GOAL <100: ICD-10-CM

## 2023-11-01 DIAGNOSIS — Z99.81 OXYGEN DEPENDENT: ICD-10-CM

## 2023-11-01 DIAGNOSIS — E11.22 HYPERTENSION ASSOCIATED WITH STAGE 3 CHRONIC KIDNEY DISEASE DUE TO TYPE 2 DIABETES MELLITUS (HCC): ICD-10-CM

## 2023-11-01 DIAGNOSIS — E11.29 TYPE 2 DIABETES MELLITUS WITH OTHER DIABETIC KIDNEY COMPLICATION, WITHOUT LONG-TERM CURRENT USE OF INSULIN (HCC): Primary | ICD-10-CM

## 2023-11-01 DIAGNOSIS — I12.9 HYPERTENSION ASSOCIATED WITH STAGE 3 CHRONIC KIDNEY DISEASE DUE TO TYPE 2 DIABETES MELLITUS (HCC): ICD-10-CM

## 2023-11-01 LAB — HBA1C MFR BLD: 6.9 %

## 2023-11-01 RX ORDER — TRAZODONE HYDROCHLORIDE 50 MG/1
TABLET ORAL
Qty: 90 TABLET | Refills: 1 | Status: SHIPPED | OUTPATIENT
Start: 2023-11-01

## 2023-11-01 SDOH — ECONOMIC STABILITY: FOOD INSECURITY: WITHIN THE PAST 12 MONTHS, THE FOOD YOU BOUGHT JUST DIDN'T LAST AND YOU DIDN'T HAVE MONEY TO GET MORE.: NEVER TRUE

## 2023-11-01 SDOH — ECONOMIC STABILITY: FOOD INSECURITY: WITHIN THE PAST 12 MONTHS, YOU WORRIED THAT YOUR FOOD WOULD RUN OUT BEFORE YOU GOT MONEY TO BUY MORE.: NEVER TRUE

## 2023-11-01 SDOH — ECONOMIC STABILITY: HOUSING INSECURITY
IN THE LAST 12 MONTHS, WAS THERE A TIME WHEN YOU DID NOT HAVE A STEADY PLACE TO SLEEP OR SLEPT IN A SHELTER (INCLUDING NOW)?: NO

## 2023-11-01 NOTE — PROGRESS NOTES
well-developed. He is obese. He is not ill-appearing, toxic-appearing or diaphoretic. HENT:      Head: Normocephalic. Eyes:      General: No scleral icterus. Extraocular Movements: Extraocular movements intact. Conjunctiva/sclera: Conjunctivae normal.      Pupils: Pupils are equal, round, and reactive to light. Neck:      Vascular: No carotid bruit. Comments: No carotid bruits    Cardiovascular:      Rate and Rhythm: Normal rate and regular rhythm. Pulses: Normal pulses. Heart sounds: Normal heart sounds. No murmur heard. No friction rub. Comments: No edema    Pulmonary:      Effort: Pulmonary effort is normal. No respiratory distress. Breath sounds: Normal breath sounds. No stridor. No wheezing or rales. Comments: Coarse breath sounds    Chest:      Chest wall: No tenderness. Musculoskeletal:      Cervical back: Normal range of motion and neck supple. Right lower leg: No edema. Left lower leg: No edema. Lymphadenopathy:      Cervical: No cervical adenopathy. Skin:     General: Skin is warm. Capillary Refill: Capillary refill takes less than 2 seconds. Coloration: Skin is not pale. Findings: No erythema or rash. Neurological:      General: No focal deficit present. Mental Status: He is alert and oriented to person, place, and time. Mental status is at baseline. Cranial Nerves: No cranial nerve deficit. Coordination: Coordination normal.   Psychiatric:         Mood and Affect: Mood normal.         Behavior: Behavior normal.         Thought Content: Thought content normal.         Assessment:       Diagnosis Orders   1. Type 2 diabetes mellitus with other diabetic kidney complication, without long-term current use of insulin ([facility])  POCT glycosylated hemoglobin (Hb A1C)      2. Hypertension associated with stage 3 chronic kidney disease due to type 2 diabetes mellitus ([facility])        3. Hyperlipidemia LDL goal <100        4.

## 2023-11-02 ENCOUNTER — ANTI-COAG VISIT (OUTPATIENT)
Dept: PHARMACY | Age: 73
End: 2023-11-02
Payer: MEDICARE

## 2023-11-02 DIAGNOSIS — I48.3 TYPICAL ATRIAL FLUTTER (HCC): Primary | ICD-10-CM

## 2023-11-02 LAB — INTERNATIONAL NORMALIZATION RATIO, POC: 2.8

## 2023-11-02 PROCEDURE — 99211 OFF/OP EST MAY X REQ PHY/QHP: CPT

## 2023-11-02 PROCEDURE — 85610 PROTHROMBIN TIME: CPT

## 2023-11-02 NOTE — PROGRESS NOTES
Mr. Naila Cox is a 68 y.o. y/o male with history of Atrial Fibrillation who presents today for anticoagulation monitoring and adjustment. Pt is retired and works part time at [employer]. Patient reports he has been on warfarin for almost 30 years now and was managed by his cardiologist who recently retired. Pt was then managed by Dr. Aide Noyola prior to being established in Clinic  Pertinent PMH: HTN, DM, HLD, COPD, CHF    Patient Reported Findings:  Yes     No  [x]   []       Patient verifies current dosing regimen as listed- confirms   []   [x]       S/S bleeding/bruising/swelling/SOB -denies    []   [x]       Blood in urine or stool- denies  []   [x]       Procedures scheduled in the future at this time - denies upcoming   []   [x]       Missed Dose - Denies   []   [x]       Extra Dose - Denies  []   [x]       Change in medications-  tylenol prn---> euflexxa injection 10/5, 10/12, 10/19 is the plan for 3/3 doses---> d/c lisinopril, started entresto --> taking mucinex, dec amlodipine and entresto --> started jardiance. Stopped metformin---> extra tylenol --> stopped [employer] cost----> back on Jardiance    []   [x]       Change in health/diet/appetite - reports he eats about 2 meals/day. And states he will have salads about 1-2x/wk. (not a big fan of spinach or brocolli but may have other greens)--> Patient states 3 salads in past 2 weeks---> no greens, wants to add green beans in twice weekly, no NVD --> returned to vit k a few times a week (green beans and salad) --> denies changes, but has not had vit k in a while (only eats salads that have vit k)---> diarrhea resolved. Had small salad last week, no other vit kNVD---> no greens, no NVD --> no changes   []   [x]       Change in alcohol use - reports occasionally drinking beer (once every few months) but happens very infrequently. --> Patient reports no alcohol in past two weeks---> denies   []   [x]       Change in activity  []   [x]       Hospital

## 2023-11-06 DIAGNOSIS — F51.01 PRIMARY INSOMNIA: ICD-10-CM

## 2023-11-06 RX ORDER — TRAZODONE HYDROCHLORIDE 50 MG/1
TABLET ORAL
Qty: 90 TABLET | Refills: 1 | Status: SHIPPED | OUTPATIENT
Start: 2023-11-06

## 2023-11-06 NOTE — TELEPHONE ENCOUNTER
Medication:   Requested Prescriptions     Pending Prescriptions Disp Refills    traZODone (DESYREL) 50 MG tablet 90 tablet 1     Sig: Take one or two pills prn for sleep        Last Filled:      Patient Phone Number: [phone] (home)     Last appt:10/23/23    Please advise.

## 2023-11-07 PROCEDURE — 93296 REM INTERROG EVL PM/IDS: CPT | Performed by: INTERNAL MEDICINE

## 2023-11-07 PROCEDURE — 93294 REM INTERROG EVL PM/LDLS PM: CPT | Performed by: INTERNAL MEDICINE

## 2023-11-10 ENCOUNTER — TELEPHONE (OUTPATIENT)
Dept: CARDIOLOGY CLINIC | Age: 73
End: 2023-11-10

## 2023-11-10 DIAGNOSIS — J44.9 COPD, SEVERITY TO BE DETERMINED (HCC): ICD-10-CM

## 2023-11-10 RX ORDER — SACUBITRIL AND VALSARTAN 49; 51 MG/1; MG/1
1 TABLET, FILM COATED ORAL 2 TIMES DAILY
Qty: 60 TABLET | Refills: 0 | Status: SHIPPED | OUTPATIENT
Start: 2023-11-10

## 2023-11-10 NOTE — TELEPHONE ENCOUNTER
Medication:   Requested Prescriptions     Pending Prescriptions Disp Refills    Budeson-Glycopyrrol-Formoterol (BREZTRI AEROSPHERE) 160-9-4.8 MCG/ACT AERO [Pharmacy Med Name: Alcides Downey 160/9/4.8(28)(S)]  0     Sig: INHALE 2 PUFFS INTO LUNGS 2 TIMES A DAY        Last Filled:      Patient Phone Number: [phone] (home)     Last appt: 11/1/2023   Next appt: 5/2/2024    Last OARRS:        No data to display

## 2023-11-10 NOTE — TELEPHONE ENCOUNTER
Last OV with RGNP  10/2022    Plan:   Patient Instructions   Increase lasix to 40 mg for 3 days and then go back to 20 mg daily  Samples of entresto 49-51 mg   Entresto sent to local pharmacy  Continue all other medications  Goal is to stop norvasc and increase entresto in follow up visit  RTO 3 months      Spoke to pt. Aware he will only get 1 month supply of Entresto as he missed his follow up appt with HF. Pt states he cannot make an appt in Nov.  Appt made for Dec 1. Pt agreeable.

## 2023-11-10 NOTE — TELEPHONE ENCOUNTER
Last OV: NPRG 11/9/22           ENTRESTO 49-51 MG per tablet  Date: 6/1/2023 Department: [facility] Documenting: Miguelina Wilkes MD Authorizing: Provider, MD Abraham     Outpatient Medication Detail     Disp Refills Start End    ENTRESTO 49-51 MG per tablet   5/24/2023     Class: Historical Med

## 2023-11-10 NOTE — TELEPHONE ENCOUNTER
Medication Refill    Medication needing refilled:  ENTRESTO    Dosage of the medication:  49-51 MG  How are you taking this medication (QD, BID, TID, QID, PRN):    30 or 90 day supply called in:  30  When will you run out of your medication:    Which Pharmacy are we sending the medication to?:  [facility], [address] [place] [address], [address] [place] [address] Drive [address]   Phone:  [phone]  Fax:  [phone]

## 2023-11-13 RX ORDER — BUDESONIDE, GLYCOPYRROLATE, AND FORMOTEROL FUMARATE 160; 9; 4.8 UG/1; UG/1; UG/1
AEROSOL, METERED RESPIRATORY (INHALATION)
Qty: 10.7 G | Refills: 0 | Status: SHIPPED | OUTPATIENT
Start: 2023-11-13

## 2023-11-28 PROCEDURE — 93297 REM INTERROG DEV EVAL ICPMS: CPT | Performed by: CLINICAL NURSE SPECIALIST

## 2023-11-28 PROCEDURE — G2066 INTER DEVC REMOTE 30D: HCPCS | Performed by: CLINICAL NURSE SPECIALIST

## 2023-11-30 ENCOUNTER — ANTI-COAG VISIT (OUTPATIENT)
Dept: PHARMACY | Age: 73
End: 2023-11-30
Payer: MEDICARE

## 2023-11-30 DIAGNOSIS — I48.3 TYPICAL ATRIAL FLUTTER (HCC): Primary | ICD-10-CM

## 2023-11-30 LAB — INTERNATIONAL NORMALIZATION RATIO, POC: 3

## 2023-11-30 PROCEDURE — 85610 PROTHROMBIN TIME: CPT

## 2023-11-30 PROCEDURE — 99211 OFF/OP EST MAY X REQ PHY/QHP: CPT

## 2023-11-30 NOTE — PROGRESS NOTES
Mr. Roman Johnson is a 68 y.o. y/o male with history of Atrial Fibrillation who presents today for anticoagulation monitoring and adjustment. Pt is retired and works part time at [employer]. Patient reports he has been on warfarin for almost 30 years now and was managed by his cardiologist who recently retired. Pt was then managed by Dr. Luna Katz prior to being established in Clinic  Pertinent PMH: HTN, DM, HLD, COPD, CHF    Patient Reported Findings:  Yes     No  [x]   []       Patient verifies current dosing regimen as listed- confirms   []   [x]       S/S bleeding/bruising/swelling/SOB -denies    []   [x]       Blood in urine or stool- denies  []   [x]       Procedures scheduled in the future at this time - denies upcoming   []   [x]       Missed Dose - Denies   []   [x]       Extra Dose - Denies  []   [x]       Change in medications-  tylenol prn---> euflexxa injection 10/5, 10/12, 10/19 is the plan for 3/3 doses---> taking mucinex, dec amlodipine and entresto --> started jardiance. Stopped metformin---> extra tylenol --> denies changes   []   [x]       Change in health/diet/appetite - reports he eats about 2 meals/day. And states he will have salads about 1-2x/wk. (not a big fan of spinach or brocolli but may have other greens)--> Patient states 3 salads in past 2 weeks---> no greens, wants to add green beans in twice weekly, no NVD --> returned to vit k a few times a week (green beans and salad) --> denies changes, but has not had vit k in a while (only eats salads that have vit k)---> diarrhea resolved. Had small salad last week, no other vit kNVD---> no greens, no NVD --> no changes   []   [x]       Change in alcohol use - reports occasionally drinking beer (once every few months) but happens very infrequently. --> Patient reports no alcohol in past two weeks---> denies   []   [x]       Change in activity  []   [x]       Hospital admission Admitted 10/29-11/3 for SOB.      10/29- INR was 2.21  10/30-

## 2023-12-01 ENCOUNTER — OFFICE VISIT (OUTPATIENT)
Dept: CARDIOLOGY CLINIC | Age: 73
End: 2023-12-01
Payer: MEDICARE

## 2023-12-01 VITALS
BODY MASS INDEX: 41.75 KG/M2 | HEART RATE: 72 BPM | DIASTOLIC BLOOD PRESSURE: 80 MMHG | SYSTOLIC BLOOD PRESSURE: 128 MMHG | HEIGHT: 73 IN | WEIGHT: 315 LBS | OXYGEN SATURATION: 93 %

## 2023-12-01 DIAGNOSIS — I50.23 ACUTE ON CHRONIC SYSTOLIC HEART FAILURE (HCC): Primary | ICD-10-CM

## 2023-12-01 DIAGNOSIS — G47.33 OSA (OBSTRUCTIVE SLEEP APNEA): ICD-10-CM

## 2023-12-01 DIAGNOSIS — I10 ESSENTIAL HYPERTENSION: ICD-10-CM

## 2023-12-01 DIAGNOSIS — E66.01 CLASS 3 SEVERE OBESITY DUE TO EXCESS CALORIES WITH SERIOUS COMORBIDITY AND BODY MASS INDEX (BMI) OF 40.0 TO 44.9 IN ADULT (HCC): ICD-10-CM

## 2023-12-01 PROCEDURE — 3079F DIAST BP 80-89 MM HG: CPT | Performed by: CLINICAL NURSE SPECIALIST

## 2023-12-01 PROCEDURE — 3017F COLORECTAL CA SCREEN DOC REV: CPT | Performed by: CLINICAL NURSE SPECIALIST

## 2023-12-01 PROCEDURE — 99214 OFFICE O/P EST MOD 30 MIN: CPT | Performed by: CLINICAL NURSE SPECIALIST

## 2023-12-01 PROCEDURE — 3074F SYST BP LT 130 MM HG: CPT | Performed by: CLINICAL NURSE SPECIALIST

## 2023-12-01 PROCEDURE — G8427 DOCREV CUR MEDS BY ELIG CLIN: HCPCS | Performed by: CLINICAL NURSE SPECIALIST

## 2023-12-01 PROCEDURE — G8484 FLU IMMUNIZE NO ADMIN: HCPCS | Performed by: CLINICAL NURSE SPECIALIST

## 2023-12-01 PROCEDURE — 1123F ACP DISCUSS/DSCN MKR DOCD: CPT | Performed by: CLINICAL NURSE SPECIALIST

## 2023-12-01 PROCEDURE — 1036F TOBACCO NON-USER: CPT | Performed by: CLINICAL NURSE SPECIALIST

## 2023-12-01 PROCEDURE — G8417 CALC BMI ABV UP PARAM F/U: HCPCS | Performed by: CLINICAL NURSE SPECIALIST

## 2023-12-01 RX ORDER — SACUBITRIL AND VALSARTAN 49; 51 MG/1; MG/1
0.5 TABLET, FILM COATED ORAL 2 TIMES DAILY
Qty: 60 TABLET | Refills: 0 | Status: SHIPPED
Start: 2023-12-01

## 2023-12-01 RX ORDER — FUROSEMIDE 40 MG/1
TABLET ORAL
Qty: 180 TABLET | Refills: 0 | Status: SHIPPED | OUTPATIENT
Start: 2023-12-01

## 2023-12-01 NOTE — PROGRESS NOTES
[facility]  Progress Note    Primary Care Doctor: Jaimee Tate MD    No chief complaint on file. History of Present Illness:  68 y.o. 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 He follows with Dr Lizett Batista with [facility]. 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 He follows with Dr Kapoor   12/30-1/2/2020 for worsening heart failure, sob and leg swelling. He was found to AFlutter and CV 1/2/20. BiV lead implant 3/23/2020  10/29-11/3/2022 for shortness of breath, HF and pneumonia. He was diuresed and then sent home on oxygen 2 L. Covid neg. I had the pleasure of seeing Rocco Mcdonough in follow up for systolic heart failure with improved LVEF to 50%. He is ambulatory and by his self. He was last seen a year ago. States he was afraid to come as his weight is up. His weight has gone from 289->320. He is taking his medications and not sure who has been filling them. I did send a script for entresto for 30 days until seen. He has bilateral lower leg edema, shortness of breath. No chest pain or palpitations. He gets some scripts from [facility] and some from pharmacy    Past Medical History:   has a past medical history of Acute congestive heart failure ([address] Allergic rhinitis, Atrial fibrillation ([address]), CKD (chronic kidney disease), Class 2 obesity due to excess calories without serious comorbidity with body mass index (BMI) of 37.0 to 37.9 in adult, Controlled type 2 diabetes mellitus without complication, without long-term current use of insulin ([address] COPD, Essential tremor, Gout, Hyperlipidemia, mixed, Hypertension, Long term current use of anticoagulants with INR goal of 2.0-3.0, Obstructive sleep apnea syndrome, Primary insomnia, Primary osteoarthritis of both knees, and Sleep apnea. Surgical History:   has a past surgical history that includes Aortic valve replacement (10/1996:

## 2023-12-04 ENCOUNTER — TELEPHONE (OUTPATIENT)
Dept: CARDIOLOGY CLINIC | Age: 73
End: 2023-12-04

## 2023-12-04 DIAGNOSIS — I50.23 ACUTE ON CHRONIC SYSTOLIC HEART FAILURE (HCC): Primary | ICD-10-CM

## 2023-12-04 NOTE — TELEPHONE ENCOUNTER
Ask him to stay on the 40 mg of lasix twice a day and on wed get blood work done  I have placed the order  thanks

## 2023-12-04 NOTE — TELEPHONE ENCOUNTER
Pt called to give his weigh:      319   down 11 pounds -  all the swelling is gone down from his  feet, ankle and legs are back to normal.  Please call discuss how to take the Lasix. Please advise.   Thank you

## 2023-12-04 NOTE — TELEPHONE ENCOUNTER
Tried to reach patient no voicemail picked up to leave a message. Will try tomorrow.   Spoke to patient he will get labs tomorrow

## 2023-12-07 DIAGNOSIS — I50.23 ACUTE ON CHRONIC SYSTOLIC HEART FAILURE (HCC): ICD-10-CM

## 2023-12-07 LAB
ANION GAP SERPL CALCULATED.3IONS-SCNC: 15 MMOL/L (ref 3–16)
BUN SERPL-MCNC: 34 MG/DL (ref 7–20)
CALCIUM SERPL-MCNC: 9.1 MG/DL (ref 8.3–10.6)
CHLORIDE SERPL-SCNC: 104 MMOL/L (ref 99–110)
CO2 SERPL-SCNC: 26 MMOL/L (ref 21–32)
CREAT SERPL-MCNC: 2 MG/DL (ref 0.8–1.3)
GFR SERPLBLD CREATININE-BSD FMLA CKD-EPI: 35 ML/MIN/{1.73_M2}
GLUCOSE SERPL-MCNC: 128 MG/DL (ref 70–99)
NT-PROBNP SERPL-MCNC: 255 PG/ML (ref 0–124)
POTASSIUM SERPL-SCNC: 5.1 MMOL/L (ref 3.5–5.1)
SODIUM SERPL-SCNC: 145 MMOL/L (ref 136–145)

## 2023-12-08 ENCOUNTER — TELEPHONE (OUTPATIENT)
Dept: CARDIOLOGY CLINIC | Age: 73
End: 2023-12-08

## 2023-12-08 RX ORDER — FUROSEMIDE 40 MG/1
TABLET ORAL
Qty: 180 TABLET | Refills: 0 | Status: SHIPPED
Start: 2023-12-08

## 2023-12-08 NOTE — TELEPHONE ENCOUNTER
----- Message from MEENU Britt - CNS sent at 12/8/2023  3:24 PM EST -----  Fluid level is better  If weight is coming down and swelling better  Let me know    Spoke to patient he is down about 13 pds. He has ankles and legs again!     Spoke to patient he will cut the lasix to 60 mg daily

## 2023-12-12 ENCOUNTER — OFFICE VISIT (OUTPATIENT)
Dept: CARDIOLOGY CLINIC | Age: 73
End: 2023-12-12
Payer: MEDICARE

## 2023-12-12 VITALS
OXYGEN SATURATION: 95 % | HEIGHT: 73 IN | BODY MASS INDEX: 41.48 KG/M2 | WEIGHT: 313 LBS | DIASTOLIC BLOOD PRESSURE: 64 MMHG | SYSTOLIC BLOOD PRESSURE: 112 MMHG | HEART RATE: 71 BPM

## 2023-12-12 DIAGNOSIS — I10 ESSENTIAL HYPERTENSION: ICD-10-CM

## 2023-12-12 DIAGNOSIS — E66.01 CLASS 3 SEVERE OBESITY DUE TO EXCESS CALORIES WITH SERIOUS COMORBIDITY AND BODY MASS INDEX (BMI) OF 40.0 TO 44.9 IN ADULT (HCC): ICD-10-CM

## 2023-12-12 DIAGNOSIS — I50.22 CHRONIC SYSTOLIC HF (HEART FAILURE) (HCC): Primary | ICD-10-CM

## 2023-12-12 PROCEDURE — G8484 FLU IMMUNIZE NO ADMIN: HCPCS | Performed by: CLINICAL NURSE SPECIALIST

## 2023-12-12 PROCEDURE — G8417 CALC BMI ABV UP PARAM F/U: HCPCS | Performed by: CLINICAL NURSE SPECIALIST

## 2023-12-12 PROCEDURE — 1123F ACP DISCUSS/DSCN MKR DOCD: CPT | Performed by: CLINICAL NURSE SPECIALIST

## 2023-12-12 PROCEDURE — G8427 DOCREV CUR MEDS BY ELIG CLIN: HCPCS | Performed by: CLINICAL NURSE SPECIALIST

## 2023-12-12 PROCEDURE — 3074F SYST BP LT 130 MM HG: CPT | Performed by: CLINICAL NURSE SPECIALIST

## 2023-12-12 PROCEDURE — 3017F COLORECTAL CA SCREEN DOC REV: CPT | Performed by: CLINICAL NURSE SPECIALIST

## 2023-12-12 PROCEDURE — 99214 OFFICE O/P EST MOD 30 MIN: CPT | Performed by: CLINICAL NURSE SPECIALIST

## 2023-12-12 PROCEDURE — 3078F DIAST BP <80 MM HG: CPT | Performed by: CLINICAL NURSE SPECIALIST

## 2023-12-12 PROCEDURE — 1036F TOBACCO NON-USER: CPT | Performed by: CLINICAL NURSE SPECIALIST

## 2023-12-12 NOTE — PATIENT INSTRUCTIONS
Needs an appt with Regan Pat in [address] [place] [address] all current medications  Check blood work in 2 months   RTO in 3 months

## 2023-12-26 PROBLEM — R91.8 LUNG INFILTRATE ON CT: Status: RESOLVED | Noted: 2022-11-02 | Resolved: 2023-12-26

## 2023-12-26 PROBLEM — I50.33 ACUTE ON CHRONIC DIASTOLIC (CONGESTIVE) HEART FAILURE (HCC): Status: RESOLVED | Noted: 2022-10-31 | Resolved: 2023-12-26

## 2023-12-26 PROBLEM — R06.02 SOB (SHORTNESS OF BREATH): Status: RESOLVED | Noted: 2022-10-31 | Resolved: 2023-12-26

## 2023-12-26 PROBLEM — N17.9 AKI (ACUTE KIDNEY INJURY) (HCC): Status: RESOLVED | Noted: 2022-11-02 | Resolved: 2023-12-26

## 2023-12-26 PROBLEM — J18.9 CAP (COMMUNITY ACQUIRED PNEUMONIA): Status: RESOLVED | Noted: 2022-10-30 | Resolved: 2023-12-26

## 2023-12-26 PROBLEM — I50.21 ACUTE SYSTOLIC CHF (CONGESTIVE HEART FAILURE) (HCC): Status: RESOLVED | Noted: 2022-10-30 | Resolved: 2023-12-26

## 2023-12-26 PROBLEM — J96.01 ACUTE RESPIRATORY FAILURE WITH HYPOXIA (HCC): Status: RESOLVED | Noted: 2022-10-30 | Resolved: 2023-12-26

## 2023-12-26 PROBLEM — E87.3 METABOLIC ALKALOSIS: Status: RESOLVED | Noted: 2022-11-02 | Resolved: 2023-12-26

## 2023-12-26 PROBLEM — R09.02 HYPOXIA: Status: RESOLVED | Noted: 2022-10-31 | Resolved: 2023-12-26

## 2023-12-28 ENCOUNTER — ANTI-COAG VISIT (OUTPATIENT)
Dept: PHARMACY | Age: 73
End: 2023-12-28
Payer: MEDICARE

## 2023-12-28 DIAGNOSIS — I48.3 TYPICAL ATRIAL FLUTTER (HCC): Primary | ICD-10-CM

## 2023-12-28 LAB — INTERNATIONAL NORMALIZATION RATIO, POC: 2.6

## 2023-12-28 PROCEDURE — 99211 OFF/OP EST MAY X REQ PHY/QHP: CPT

## 2023-12-28 PROCEDURE — 85610 PROTHROMBIN TIME: CPT

## 2023-12-28 NOTE — PROGRESS NOTES
Mr. Wilson Sánchez is a 68 y.o. y/o male with history of Atrial Fibrillation who presents today for anticoagulation monitoring and adjustment. Pt is retired and works part time at [employer]. Patient reports he has been on warfarin for almost 30 years now and was managed by his cardiologist who recently retired. Pt was then managed by Dr. Mane Tripathi prior to being established in Clinic  Pertinent PMH: HTN, DM, HLD, COPD, CHF    Patient Reported Findings:  Yes     No  [x]   []       Patient verifies current dosing regimen as listed- confirms   []   [x]       S/S bleeding/bruising/swelling/SOB -denies    []   [x]       Blood in urine or stool- denies  [x]   []       Procedures scheduled in the future at this time - having cataract surgery end of feb   []   [x]       Missed Dose - Denies   []   [x]       Extra Dose - Denies  []   [x]       Change in medications-  tylenol prn---> euflexxa injection 10/5, 10/12, 10/19 is the plan for 3/3 doses---> started jardiance. Stopped metformin---> extra tylenol --> denies changes   []   [x]       Change in health/diet/appetite - reports he eats about 2 meals/day. And states he will have salads about 1-2x/wk. (not a big fan of spinach or brocolli but may have other greens)--> Patient states 3 salads in past 2 weeks---> no greens, wants to add green beans in twice weekly, no NVD --> returned to vit k a few times a week (green beans and salad) --> denies changes, but has not had vit k in a while (only eats salads that have vit k)---> diarrhea resolved. Had small salad last week, no other vit kNVD---> no greens, no NVD --> no changes   []   [x]       Change in alcohol use - reports occasionally drinking beer (once every few months) but happens very infrequently. --> Patient reports no alcohol in past two weeks---> denies   []   [x]       Change in activity  []   [x]       Hospital admission Admitted 10/29-11/3 for SOB.    10/29- INR was 2.21  10/30- 10mg, INR 2.22  10/31- held, INR

## 2023-12-29 PROCEDURE — 93297 REM INTERROG DEV EVAL ICPMS: CPT | Performed by: CLINICAL NURSE SPECIALIST

## 2023-12-29 PROCEDURE — G2066 INTER DEVC REMOTE 30D: HCPCS | Performed by: CLINICAL NURSE SPECIALIST

## 2024-01-12 NOTE — TELEPHONE ENCOUNTER
Medication:   Requested Prescriptions     Pending Prescriptions Disp Refills    allopurinol (ZYLOPRIM) 300 MG tablet [Pharmacy Med Name: ALLOPURINOL 300 MG Tablet] 90 tablet 3     Sig: TAKE 1 TABLET EVERY DAY        Last Filled:      Patient Phone Number: [phone] (home)     Last appt: 11/1/2023   Next appt: 5/2/2024    Last OARRS:        No data to display

## 2024-01-15 ENCOUNTER — TELEPHONE (OUTPATIENT)
Dept: PHARMACY | Age: 74
End: 2024-01-15

## 2024-01-15 RX ORDER — ALLOPURINOL 300 MG/1
300 TABLET ORAL DAILY
Qty: 90 TABLET | Refills: 3 | Status: SHIPPED | OUTPATIENT
Start: 2024-01-15

## 2024-01-24 ENCOUNTER — ANTI-COAG VISIT (OUTPATIENT)
Dept: PHARMACY | Age: 74
End: 2024-01-24
Payer: MEDICARE

## 2024-01-24 DIAGNOSIS — I48.3 TYPICAL ATRIAL FLUTTER (HCC): Primary | ICD-10-CM

## 2024-01-24 LAB — INTERNATIONAL NORMALIZATION RATIO, POC: 2.8

## 2024-01-24 PROCEDURE — 99211 OFF/OP EST MAY X REQ PHY/QHP: CPT

## 2024-01-24 PROCEDURE — 85610 PROTHROMBIN TIME: CPT

## 2024-01-24 NOTE — PROGRESS NOTES
Mr. Valdemar Solis is a 73 y.o. y/o male with history of Atrial Fibrillation who presents today for anticoagulation monitoring and adjustment.    Pt is retired and works part time at [employer].     Patient reports he has been on warfarin for almost 30 years now and was managed by his cardiologist who recently retired. Pt was then managed by Dr. Ji prior to being established in Clinic  Pertinent PMH: HTN, DM, HLD, COPD, CHF    Patient Reported Findings:  Yes     No  [x]   []       Patient verifies current dosing regimen as listed- confirms   []   [x]       S/S bleeding/bruising/swelling/SOB -denies    []   [x]       Blood in urine or stool- denies  [x]   []       Procedures scheduled in the future at this time - having cataract surgery end of feb   []   [x]       Missed Dose - Denies   []   [x]       Extra Dose - Denies  []   [x]       Change in medications-  tylenol prn---> euflexxa injection 10/5, 10/12, 10/19 is the plan for 3/3 doses---> started jardiance. Stopped metformin---> extra tylenol --> denies changes   []   [x]       Change in health/diet/appetite - reports he eats about 2 meals/day. And states he will have salads about 1-2x/wk. (not a big fan of spinach or brocolli but may have other greens)--> Patient states 3 salads in past 2 weeks---> no greens, wants to add green beans in twice weekly, no NVD --> returned to vit k a few times a week (green beans and salad) --> denies changes, but has not had vit k in a while (only eats salads that have vit k)---> diarrhea resolved. Had small salad last week, no other vit kNVD---> no greens, no NVD --> no changes   []   [x]       Change in alcohol use - reports occasionally drinking beer (once every few months) but happens very infrequently.--> Patient reports no alcohol in past two weeks---> denies   []   [x]       Change in activity  []   [x]       Hospital admission    []   [x]       Emergency department visit  []   [x]       Other complaints     Clinical

## 2024-01-29 DIAGNOSIS — F51.01 PRIMARY INSOMNIA: ICD-10-CM

## 2024-01-29 PROCEDURE — 93297 REM INTERROG DEV EVAL ICPMS: CPT | Performed by: CLINICAL NURSE SPECIALIST

## 2024-01-30 ENCOUNTER — OFFICE VISIT (OUTPATIENT)
Dept: FAMILY MEDICINE CLINIC | Age: 74
End: 2024-01-30
Payer: MEDICARE

## 2024-01-30 VITALS
HEIGHT: 73 IN | HEART RATE: 88 BPM | OXYGEN SATURATION: 90 % | TEMPERATURE: 97.2 F | BODY MASS INDEX: 41.75 KG/M2 | DIASTOLIC BLOOD PRESSURE: 85 MMHG | WEIGHT: 315 LBS | SYSTOLIC BLOOD PRESSURE: 139 MMHG

## 2024-01-30 DIAGNOSIS — I50.22 CHRONIC SYSTOLIC HF (HEART FAILURE) (HCC): ICD-10-CM

## 2024-01-30 DIAGNOSIS — Z01.818 PRE-OP EXAM: Primary | ICD-10-CM

## 2024-01-30 LAB
ANION GAP SERPL CALCULATED.3IONS-SCNC: 13 MMOL/L (ref 3–16)
BUN SERPL-MCNC: 31 MG/DL (ref 7–20)
CALCIUM SERPL-MCNC: 9.6 MG/DL (ref 8.3–10.6)
CHLORIDE SERPL-SCNC: 105 MMOL/L (ref 99–110)
CO2 SERPL-SCNC: 26 MMOL/L (ref 21–32)
CREAT SERPL-MCNC: 1.7 MG/DL (ref 0.8–1.3)
GFR SERPLBLD CREATININE-BSD FMLA CKD-EPI: 42 ML/MIN/{1.73_M2}
GLUCOSE SERPL-MCNC: 148 MG/DL (ref 70–99)
NT-PROBNP SERPL-MCNC: 389 PG/ML (ref 0–124)
POTASSIUM SERPL-SCNC: 5.1 MMOL/L (ref 3.5–5.1)
SODIUM SERPL-SCNC: 144 MMOL/L (ref 136–145)
TSH SERPL DL<=0.005 MIU/L-ACNC: 0.34 UIU/ML (ref 0.27–4.2)

## 2024-01-30 PROCEDURE — 3079F DIAST BP 80-89 MM HG: CPT | Performed by: FAMILY MEDICINE

## 2024-01-30 PROCEDURE — G8427 DOCREV CUR MEDS BY ELIG CLIN: HCPCS | Performed by: FAMILY MEDICINE

## 2024-01-30 PROCEDURE — 1036F TOBACCO NON-USER: CPT | Performed by: FAMILY MEDICINE

## 2024-01-30 PROCEDURE — G8417 CALC BMI ABV UP PARAM F/U: HCPCS | Performed by: FAMILY MEDICINE

## 2024-01-30 PROCEDURE — 3017F COLORECTAL CA SCREEN DOC REV: CPT | Performed by: FAMILY MEDICINE

## 2024-01-30 PROCEDURE — 3075F SYST BP GE 130 - 139MM HG: CPT | Performed by: FAMILY MEDICINE

## 2024-01-30 PROCEDURE — 99213 OFFICE O/P EST LOW 20 MIN: CPT | Performed by: FAMILY MEDICINE

## 2024-01-30 PROCEDURE — G8484 FLU IMMUNIZE NO ADMIN: HCPCS | Performed by: FAMILY MEDICINE

## 2024-01-30 PROCEDURE — 1123F ACP DISCUSS/DSCN MKR DOCD: CPT | Performed by: FAMILY MEDICINE

## 2024-01-30 RX ORDER — TRAZODONE HYDROCHLORIDE 50 MG/1
TABLET ORAL
Qty: 90 TABLET | Refills: 3 | Status: SHIPPED | OUTPATIENT
Start: 2024-01-30

## 2024-01-30 SDOH — ECONOMIC STABILITY: FOOD INSECURITY: WITHIN THE PAST 12 MONTHS, THE FOOD YOU BOUGHT JUST DIDN'T LAST AND YOU DIDN'T HAVE MONEY TO GET MORE.: NEVER TRUE

## 2024-01-30 SDOH — ECONOMIC STABILITY: FOOD INSECURITY: WITHIN THE PAST 12 MONTHS, YOU WORRIED THAT YOUR FOOD WOULD RUN OUT BEFORE YOU GOT MONEY TO BUY MORE.: NEVER TRUE

## 2024-01-30 SDOH — ECONOMIC STABILITY: INCOME INSECURITY: HOW HARD IS IT FOR YOU TO PAY FOR THE VERY BASICS LIKE FOOD, HOUSING, MEDICAL CARE, AND HEATING?: VERY HARD

## 2024-01-30 ASSESSMENT — ENCOUNTER SYMPTOMS
ABDOMINAL PAIN: 0
EYE ITCHING: 0
COUGH: 0
EYE PAIN: 0
RHINORRHEA: 0
WHEEZING: 0
SHORTNESS OF BREATH: 0
EYES NEGATIVE: 1
SORE THROAT: 0
SINUS PRESSURE: 0

## 2024-01-30 ASSESSMENT — PATIENT HEALTH QUESTIONNAIRE - PHQ9
2. FEELING DOWN, DEPRESSED OR HOPELESS: 0
SUM OF ALL RESPONSES TO PHQ QUESTIONS 1-9: 0
1. LITTLE INTEREST OR PLEASURE IN DOING THINGS: 0
SUM OF ALL RESPONSES TO PHQ QUESTIONS 1-9: 0
SUM OF ALL RESPONSES TO PHQ9 QUESTIONS 1 & 2: 0

## 2024-01-30 NOTE — PROGRESS NOTES
sounds: Normal heart sounds, S1 normal and S2 normal. No murmur heard.  Pulmonary:      Effort: Pulmonary effort is normal. No respiratory distress.      Breath sounds: Normal breath sounds. No wheezing, rhonchi or rales.   Abdominal:      General: Bowel sounds are normal. There is no distension.      Palpations: Abdomen is soft.      Tenderness: There is no abdominal tenderness. There is no right CVA tenderness, left CVA tenderness, guarding or rebound.   Musculoskeletal:         General: No swelling or tenderness. Normal range of motion.      Cervical back: Normal range of motion and neck supple. No rigidity or tenderness.      Right lower leg: No edema.      Left lower leg: No edema.   Lymphadenopathy:      Cervical: No cervical adenopathy.   Skin:     General: Skin is warm and dry.      Findings: No bruising or erythema.   Neurological:      General: No focal deficit present.      Mental Status: He is alert and oriented to person, place, and time.   Psychiatric:         Mood and Affect: Mood normal.         Behavior: Behavior normal.                     ASSESSMENT/PLAN:  1. Pre-op exam  Per [facility]  paperwork- no workup needed for topical anesthesia and pt does not need to stop coumadin.  Clearance completed and faxed               An electronic signature was used to authenticate this note.    --Tammy Horne MD     This note was generated completely or in part utilizing Dragon dictation speech recognition software.  Occasionally, words are mistranscribed and despite editing, the text may contain inaccuracies due to incorrect word recognition.  If further clarification is needed please contact the office at [phone]

## 2024-01-31 ENCOUNTER — TELEPHONE (OUTPATIENT)
Dept: CARDIOLOGY CLINIC | Age: 74
End: 2024-01-31

## 2024-01-31 RX ORDER — SACUBITRIL AND VALSARTAN 49; 51 MG/1; MG/1
0.5 TABLET, FILM COATED ORAL 2 TIMES DAILY
Qty: 60 TABLET | Refills: 0 | Status: SHIPPED | OUTPATIENT
Start: 2024-01-31

## 2024-01-31 RX ORDER — SACUBITRIL AND VALSARTAN 49; 51 MG/1; MG/1
0.5 TABLET, FILM COATED ORAL 2 TIMES DAILY
Qty: 60 TABLET | Refills: 0 | Status: SHIPPED | OUTPATIENT
Start: 2024-01-31 | End: 2024-01-31 | Stop reason: SDUPTHER

## 2024-01-31 NOTE — TELEPHONE ENCOUNTER
Spoke to patient he uses [facility] pharmacy. Called to cancel [facility] prescription.

## 2024-01-31 NOTE — TELEPHONE ENCOUNTER
----- Message from MEENU Osullivan - CNS sent at 1/31/2024  8:49 AM EST -----  Fluid level is up a little   Please ask HF questions and no new medication changes  Might need to increase diuretic    Spoke to patient no swelling, no sob, current weight 317, no new medications, He has been drinking a bit more fluid, watching salt intake. Taking medications as prescribed.    Spoke to patient he verbalized understanding.

## 2024-01-31 NOTE — TELEPHONE ENCOUNTER
Pt calling stating he is trying to get entresto refilled but cannot,pt understands he is needing an appt and is already scheduled 3/18 and is wondering if he needs a sooner appt please advise

## 2024-01-31 NOTE — TELEPHONE ENCOUNTER
----- Message from MEENU Osullivan - CNS sent at 1/31/2024  8:49 AM EST -----  Fluid level is up a little   Please ask HF questions and no new medication changes  Might need to increase diuretic

## 2024-01-31 NOTE — TELEPHONE ENCOUNTER
Spoke to patient he has 11 days left of entresto. Can the prescription be sent to [facility] he will get the prescription in  2-3 days.

## 2024-02-01 NOTE — TELEPHONE ENCOUNTER
Pt states he is already taking 60mg of lasix and wanted to clearify he should take a total of 100mg for 3 days. Please call to advise.

## 2024-02-06 PROCEDURE — 93294 REM INTERROG EVL PM/LDLS PM: CPT | Performed by: INTERNAL MEDICINE

## 2024-02-06 PROCEDURE — 93296 REM INTERROG EVL PM/IDS: CPT | Performed by: INTERNAL MEDICINE

## 2024-02-21 ENCOUNTER — TELEPHONE (OUTPATIENT)
Dept: CASE MANAGEMENT | Age: 74
End: 2024-02-21

## 2024-02-21 ENCOUNTER — ANTI-COAG VISIT (OUTPATIENT)
Dept: PHARMACY | Age: 74
End: 2024-02-21
Payer: MEDICARE

## 2024-02-21 DIAGNOSIS — I48.3 TYPICAL ATRIAL FLUTTER (HCC): Primary | ICD-10-CM

## 2024-02-21 LAB — INTERNATIONAL NORMALIZATION RATIO, POC: 3.5

## 2024-02-21 PROCEDURE — 99212 OFFICE O/P EST SF 10 MIN: CPT

## 2024-02-21 PROCEDURE — 85610 PROTHROMBIN TIME: CPT

## 2024-02-21 NOTE — PROGRESS NOTES
Mr. Valdemar Solis is a 73 y.o. y/o male with history of Atrial Fibrillation who presents today for anticoagulation monitoring and adjustment.    Pt is retired and works part time at [employer].     Patient reports he has been on warfarin for almost 30 years now and was managed by his cardiologist who retired. Pt was then managed by Dr. Ji prior to being established in Clinic  Pertinent PMH: HTN, DM, HLD, COPD, CHF    Patient Reported Findings:  Yes     No  [x]   []       Patient verifies current dosing regimen as listed- confirms   []   [x]       S/S bleeding/bruising/swelling/SOB -denies    []   [x]       Blood in urine or stool- denies  [x]   []       Procedures scheduled in the future at this time - having cataract surgery 3/4  []   [x]       Missed Dose - Denies   []   [x]       Extra Dose - Denies  []   [x]       Change in medications-  tylenol prn---> started jardiance. Stopped metformin---> extra tylenol --> denies changes   []   [x]       Change in health/diet/appetite - reports he eats about 2 meals/day. And states he will have salads about 1-2x/wk. (not a big fan of spinach or brocolli but may have other greens)--> Patient states 3 salads in past 2 weeks---> no greens, wants to add green beans in twice weekly, no NVD --> returned to vit k a few times a week (green beans and salad) --> denies changes, but has not had vit k in a while (only eats salads that have vit k)---> diarrhea resolved. Had small salad last week, no other vit kNVD---> no greens, no NVD --> no changes   []   [x]       Change in alcohol use - reports occasionally drinking beer (once every few months) but happens very infrequently.--> Patient reports no alcohol in past two weeks---> denies   []   [x]       Change in activity  []   [x]       Hospital admission    []   [x]       Emergency department visit  []   [x]       Other complaints     Clinical Outcomes:  Yes     No  []   [x]       Major bleeding event  []   [x]

## 2024-02-26 ENCOUNTER — TELEPHONE (OUTPATIENT)
Dept: FAMILY MEDICINE CLINIC | Age: 74
End: 2024-02-26

## 2024-02-26 NOTE — TELEPHONE ENCOUNTER
Pharmacy cannot provide Jardiance 25mg at this time.  Please consider approving a switch to   Farxiga 10mg  Take one tablet by mouth once daily  #30 with 11 refills  The patient can obtain a consistent supply of this new prescription for no charge.

## 2024-02-28 DIAGNOSIS — J44.9 COPD, SEVERITY TO BE DETERMINED (HCC): ICD-10-CM

## 2024-02-28 RX ORDER — DAPAGLIFLOZIN 10 MG/1
10 TABLET, FILM COATED ORAL EVERY MORNING
Qty: 30 TABLET | Refills: 5 | Status: SHIPPED | OUTPATIENT
Start: 2024-02-28

## 2024-02-28 NOTE — TELEPHONE ENCOUNTER
Medication:   Requested Prescriptions     Pending Prescriptions Disp Refills    BREZTRI AEROSPHERE 160-9-4.8 MCG/ACT AERO [Pharmacy Med Name: BREZTRI 160/9/4.8(28)(S)]  0     Sig: INHALE 2 PUFFS INTO LUNGS 2 TIMES A DAY        Last Filled:      Patient Phone Number: [phone] (home)     Last appt: 1/30/2024   Next appt: 5/2/2024    Last OARRS:        No data to display

## 2024-02-28 NOTE — TELEPHONE ENCOUNTER
Medication:   Requested Prescriptions     Pending Prescriptions Disp Refills    BREZTRI AEROSPHERE 160-9-4.8 MCG/ACT AERO [Pharmacy Med Name: BREZTRI 160/9/4.8(28)(S)]  0     Sig: INHALE 2 PUFFS INTO LUNGS 2 TIMES A DAY        Last Filled:  11/13/2023    Patient Phone Number: [phone] (home)     Last appt: 1/30/2024   Next appt: 5/2/2024    Last OARRS:        No data to display

## 2024-02-29 RX ORDER — BUDESONIDE, GLYCOPYRROLATE, AND FORMOTEROL FUMARATE 160; 9; 4.8 UG/1; UG/1; UG/1
AEROSOL, METERED RESPIRATORY (INHALATION)
Qty: 1 EACH | Refills: 1 | Status: SHIPPED | OUTPATIENT
Start: 2024-02-29

## 2024-03-11 ENCOUNTER — TELEPHONE (OUTPATIENT)
Dept: FAMILY MEDICINE CLINIC | Age: 74
End: 2024-03-11

## 2024-03-11 NOTE — TELEPHONE ENCOUNTER
[facility] reccommends approving the following medications  Metoprolol Succinate 25mg Take 1 tablet by mouth once daily, Quantity 30 & 11 refills    Pharmacy asking to approve switching from amLODIPine (NORVASC) 5 MG tablet   To Spironolactone 25 mg Take 1 tablet by mouth once daily, Quantity 30 & 11 refills

## 2024-03-11 NOTE — TELEPHONE ENCOUNTER
Please advise    Last office visit: 1/30/2024 Pre-op (Ozzie)    Diabetes follow up : 11/01/ 2024

## 2024-03-13 ENCOUNTER — TELEPHONE (OUTPATIENT)
Dept: PHARMACY | Age: 74
End: 2024-03-13

## 2024-03-14 RX ORDER — FUROSEMIDE 40 MG/1
60 TABLET ORAL DAILY
Qty: 135 TABLET | Refills: 0 | Status: SHIPPED | OUTPATIENT
Start: 2024-03-14

## 2024-03-14 NOTE — TELEPHONE ENCOUNTER
Medication Refill    Medication needing refilled:  furosemide (LASIX)   Dosage of the medication:  40 MG tablet   How are you taking this medication (QD, BID, TID, QID, PRN):    30 or 90 day supply called in:  90  When will you run out of your medication:    Which Pharmacy are we sending the medication to?:  [facility] - [place], [place] - [address] - P [phone] - F [phone]  [address], [facility] [address]  Phone: [phone]  Fax: [phone]

## 2024-03-18 ENCOUNTER — TELEPHONE (OUTPATIENT)
Dept: PHARMACY | Age: 74
End: 2024-03-18

## 2024-03-18 NOTE — TELEPHONE ENCOUNTER
Called [facility] and verified INR 2.8 today prior to surgery.       Called and spoke to patient. Verified taking 10 mg daily. Started farxiga since last visit. Denies missed doses. R/s pt to RTC 4/4.

## 2024-03-18 NOTE — TELEPHONE ENCOUNTER
----- Message from Princess Slaughter sent at 3/18/2024 10:34 AM EDT -----  Regarding: INR  Contact: patient  Patient wants to reschedule his Wed. 3/20 appt.  States his INR was checked this morning @ [facility] and it was 2.8.  Patient wants to extend his appt out.  Please call him @ [phone]

## 2024-03-19 NOTE — TELEPHONE ENCOUNTER
I called pt and he stated he uses [facility] for his bp med due to it being free an [facility]

## 2024-03-25 ENCOUNTER — OFFICE VISIT (OUTPATIENT)
Dept: CARDIOLOGY CLINIC | Age: 74
End: 2024-03-25
Payer: MEDICARE

## 2024-03-25 VITALS
WEIGHT: 309 LBS | OXYGEN SATURATION: 93 % | HEIGHT: 73 IN | SYSTOLIC BLOOD PRESSURE: 116 MMHG | BODY MASS INDEX: 40.95 KG/M2 | DIASTOLIC BLOOD PRESSURE: 68 MMHG | HEART RATE: 72 BPM

## 2024-03-25 DIAGNOSIS — I50.22 CHRONIC SYSTOLIC HF (HEART FAILURE) (HCC): Primary | ICD-10-CM

## 2024-03-25 DIAGNOSIS — G47.33 OSA (OBSTRUCTIVE SLEEP APNEA): ICD-10-CM

## 2024-03-25 DIAGNOSIS — I10 ESSENTIAL HYPERTENSION: ICD-10-CM

## 2024-03-25 DIAGNOSIS — E55.9 VITAMIN D DEFICIENCY: ICD-10-CM

## 2024-03-25 DIAGNOSIS — E66.01 OBESITY, CLASS III, BMI 40-49.9 (MORBID OBESITY) (HCC): ICD-10-CM

## 2024-03-25 PROCEDURE — G8427 DOCREV CUR MEDS BY ELIG CLIN: HCPCS | Performed by: CLINICAL NURSE SPECIALIST

## 2024-03-25 PROCEDURE — 99214 OFFICE O/P EST MOD 30 MIN: CPT | Performed by: CLINICAL NURSE SPECIALIST

## 2024-03-25 PROCEDURE — 3078F DIAST BP <80 MM HG: CPT | Performed by: CLINICAL NURSE SPECIALIST

## 2024-03-25 PROCEDURE — G8484 FLU IMMUNIZE NO ADMIN: HCPCS | Performed by: CLINICAL NURSE SPECIALIST

## 2024-03-25 PROCEDURE — 1036F TOBACCO NON-USER: CPT | Performed by: CLINICAL NURSE SPECIALIST

## 2024-03-25 PROCEDURE — 3017F COLORECTAL CA SCREEN DOC REV: CPT | Performed by: CLINICAL NURSE SPECIALIST

## 2024-03-25 PROCEDURE — 3074F SYST BP LT 130 MM HG: CPT | Performed by: CLINICAL NURSE SPECIALIST

## 2024-03-25 PROCEDURE — 1123F ACP DISCUSS/DSCN MKR DOCD: CPT | Performed by: CLINICAL NURSE SPECIALIST

## 2024-03-25 PROCEDURE — G8417 CALC BMI ABV UP PARAM F/U: HCPCS | Performed by: CLINICAL NURSE SPECIALIST

## 2024-03-25 RX ORDER — AMLODIPINE BESYLATE 10 MG/1
10 TABLET ORAL DAILY
Qty: 90 TABLET | Refills: 0
Start: 2024-03-25

## 2024-03-25 NOTE — PATIENT INSTRUCTIONS
Continue all current medications  Watch your lower ankles for swelling as the norvasc may cause this  RTO in 6 months with an echo  Blood work in 3 months

## 2024-03-25 NOTE — PROGRESS NOTES
[facility]  Progress Note    Primary Care Doctor:  Yane Wei MD    Chief Complaint   Patient presents with    3 Month Follow-Up    Congestive Heart Failure        History of Present Illness:  73 y.o. 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  He follows with Dr Ji with [facility].  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  He follows with Dr Cisse   12/30-1/2/2020 for worsening heart failure, sob and leg swelling. He was found to AFlutter and CV 1/2/20.  BiV lead implant 3/23/2020  10/29-11/3/2022 for shortness of breath, HF and pneumonia.  He was diuresed and then sent home on oxygen 2 L.  Covid neg.    I had the pleasure of seeing Valdemar Solis in follow up for systolic heart failure with improved LVEF to 50%.  He is ambulatory and with his grand daughter who is 11.  His weight has gone down 320-309.  He feels good.  No chest pain, palpitations, lightheadedness, shortness of breath or edema.  He ran out of his norvasc 5 mg and had 10 mg at home so he is taking a full 10 mg now.  His BP is much improved.  Blood work is stable.  Optival is normal.  His PCP changed the jardiance to farxiga due to cost    Past Medical History:   has a past medical history of Acute congestive heart failure (HCC), Allergic rhinitis, Atrial fibrillation (HCC), CKD (chronic kidney disease), Class 2 obesity due to excess calories without serious comorbidity with body mass index (BMI) of 37.0 to 37.9 in adult, Controlled type 2 diabetes mellitus without complication, without long-term current use of insulin (HCC), COPD, COPD (chronic obstructive pulmonary disease) (HCC), Essential tremor, Gout, Hyperlipidemia, mixed, Hypertension, Long term current use of anticoagulants with INR goal of 2.0-3.0, Long term current use of anticoagulants with INR goal of 2.0-3.0, Obstructive sleep apnea syndrome, Primary insomnia, Primary

## 2024-03-28 ENCOUNTER — TELEPHONE (OUTPATIENT)
Dept: CASE MANAGEMENT | Age: 74
End: 2024-03-28

## 2024-04-04 ENCOUNTER — ANTI-COAG VISIT (OUTPATIENT)
Dept: PHARMACY | Age: 74
End: 2024-04-04
Payer: MEDICARE

## 2024-04-04 DIAGNOSIS — I48.3 TYPICAL ATRIAL FLUTTER (HCC): Primary | ICD-10-CM

## 2024-04-04 LAB — INTERNATIONAL NORMALIZATION RATIO, POC: 3.4

## 2024-04-04 PROCEDURE — 85610 PROTHROMBIN TIME: CPT

## 2024-04-04 PROCEDURE — 99211 OFF/OP EST MAY X REQ PHY/QHP: CPT

## 2024-04-04 NOTE — PROGRESS NOTES
Mr. Valdemar Solis is a 73 y.o. y/o male with history of Atrial Fibrillation who presents today for anticoagulation monitoring and adjustment.    Pt is retired and works part time at [employer].     Patient reports he has been on warfarin for almost 30 years now and was managed by his cardiologist who retired. Pt was then managed by Dr. Ji prior to being established in Clinic  Pertinent PMH: HTN, DM, HLD, COPD, CHF    Patient Reported Findings:  Yes     No  [x]   []       Patient verifies current dosing regimen as listed- confirms   []   [x]       S/S bleeding/bruising/swelling/SOB -denies    []   [x]       Blood in urine or stool- denies  [x]   []       Procedures scheduled in the future at this time - having cataract surgery 3/4, had INR at [facility] 3/18 and was 2.8  []   [x]       Missed Dose - Denies   []   [x]       Extra Dose - Denies  []   [x]       Change in medications-  tylenol prn---> started jardiance. Stopped metformin---> extra tylenol --> farxiga and tylenol    []   [x]       Change in health/diet/appetite - reports he eats about 2 meals/day. And states he will have salads about 1-2x/wk. (not a big fan of spinach or brocolli but may have other greens)--> Patient states 3 salads in past 2 weeks---> no greens, wants to add green beans in twice weekly, no NVD --> returned to vit k a few times a week (green beans and salad) --> denies changes, but has not had vit k in a while (only eats salads that have vit k)---> diarrhea resolved. Had small salad last week, no other vit kNVD---> no greens, no NVD --> no changes, only green beans    []   [x]       Change in alcohol use - reports occasionally drinking beer (once every few months) but happens very infrequently.--> Patient reports no alcohol in past two weeks---> denies   []   [x]       Change in activity  []   [x]       Hospital admission    []   [x]       Emergency department visit  []   [x]       Other complaints     Clinical Outcomes:  Yes     No  []

## 2024-04-08 NOTE — TELEPHONE ENCOUNTER
[facility]   PRE-ADMISSION TESTING GENERAL INSTRUCTIONS  PAT Phone Number: [phone]      GENERAL INSTRUCTIONS:    [x] Antibacterial Soap Shower Night before and/or AM of surgery.    [x] Do not wear makeup, lotions, powders, deodorant.   [x] Nothing by mouth after midnight; including gum, candy, mints, or water.    [x] You may brush your teeth, gargle, but do NOT swallow water.   [x] No tobacco products, illegal drugs, marijuana or alcohol within 24 hours of your surgery.  [x] Jewelry or valuables should not be brought to the hospital. All body and/or tongue piercing's must be removed prior to arriving to hospital. No contact lens or hair pins.   [x] Arrange transportation with a responsible adult  to and from the hospital. Arrange for someone to be with you for the remainder of the day and for 24 hours after your procedure due to having had anesthesia.          -Who will be your  for transportation?  Family member        -Who will be staying with you for 24 hrs after your procedure?  Family member  [x] Bring insurance card and photo ID.    PARKING INSTRUCTIONS:     [x] ARRIVAL DATE  4/15 & TIME   5:30 am:   [x]Surgery time may change, if it does we will call you the business day before your scheduled surgery.  [x] Enter into the [facility] Entrance. Two adults may accompany you.   [x] Parking lot '\"I\"  is located on [address] (the corner of [address] and [address]).  To enter the lot,you will need to get a parking ticket at the gate .  To exit you will be given a free parking voucher.  You will need both the ticket and parking voucher to exit the parking lot. One car per patient is allowed to park in this lot.   Walk up the front walk to the [place] Entrance, the door will be locked an employee will greet you and let you in.    EDUCATION INSTRUCTIONS:          [x] Pain: Post-op pain is normal and to be expected. You will be asked to rate your pain from  [address] we called him around 10 AM today. No voice mail left. Please call again.     Also requesting scripts for     allopurinol (ZYLOPRIM) 300 MG tablet  Take 1 tablet by mouth daily, Disp-90 tablet, R-0    metFORMIN (GLUCOPHAGE XR) 500 MG extended release tablet  Take 1 tablet by mouth daily (with breakfast), Disp-90 tablet, R-0    [address] [phone] [place] [phone]    PT last seen 5/8/2018    Please advise patient

## 2024-04-11 RX ORDER — SACUBITRIL AND VALSARTAN 49; 51 MG/1; MG/1
0.5 TABLET, FILM COATED ORAL 2 TIMES DAILY
Qty: 90 TABLET | Refills: 0 | Status: SHIPPED | OUTPATIENT
Start: 2024-04-11

## 2024-04-11 NOTE — TELEPHONE ENCOUNTER
Medication Refill    Medication needing refilled:   ENTRESTO 49-51 MG     Dosage of the medication:    How are you taking this medication (QD, BID, TID, QID, PRN):0.5 tablets by mouth 2 times daily     30 or 90 day supply called in: 90 day supply    When will you run out of your medication:    Which Pharmacy are we sending the medication to?: [facility] [address] [phone] -  [phone]

## 2024-04-25 ENCOUNTER — ANTI-COAG VISIT (OUTPATIENT)
Dept: PHARMACY | Age: 74
End: 2024-04-25
Payer: MEDICARE

## 2024-04-25 DIAGNOSIS — I48.3 TYPICAL ATRIAL FLUTTER (HCC): Primary | ICD-10-CM

## 2024-04-25 LAB — INTERNATIONAL NORMALIZATION RATIO, POC: 2.7

## 2024-04-25 PROCEDURE — 85610 PROTHROMBIN TIME: CPT

## 2024-04-25 PROCEDURE — 99211 OFF/OP EST MAY X REQ PHY/QHP: CPT

## 2024-04-25 NOTE — PROGRESS NOTES
Mr. Valdemar Solis is a 73 y.o. y/o male with history of Atrial Fibrillation who presents today for anticoagulation monitoring and adjustment.    Pt is retired and works part time at [employer].     Patient reports he has been on warfarin for almost 30 years now and was managed by his cardiologist who retired. Pt was then managed by Dr. Ji prior to being established in Clinic  Pertinent PMH: HTN, DM, HLD, COPD, CHF    Patient Reported Findings:  Yes     No  [x]   []       Patient verifies current dosing regimen as listed- confirms   []   [x]       S/S bleeding/bruising/swelling/SOB -denies    []   [x]       Blood in urine or stool- denies  []   [x]       Procedures scheduled in the future at this time - having cataract surgery 3/4, had INR at [facility] 3/18 and was 2.8 --> none upcoming   []   [x]       Missed Dose - Denies   []   [x]       Extra Dose - Denies  [x]   []       Change in medications-  tylenol prn---> started jardiance. Stopped metformin---> extra tylenol --> farxiga and tylenol --> stopped tylenol    [x]   []       Change in health/diet/appetite - reports he eats about 2 meals/day. And states he will have salads about 1-2x/wk. (not a big fan of spinach or brocolli but may have other greens)--> Patient states 3 salads in past 2 weeks---> no greens, wants to add green beans in twice weekly, no NVD --> returned to vit k a few times a week (green beans and salad) --> denies changes, but has not had vit k in a while (only eats salads that have vit k)---> diarrhea resolved. Had small salad last week, no other vit kNVD---> no greens, no NVD --> no changes, only green beans --> has had some salads recently    []   [x]       Change in alcohol use - reports occasionally drinking beer (once every few months) but happens very infrequently.--> Patient reports no alcohol in past two weeks---> denies   []   [x]       Change in activity  []   [x]       Hospital admission    []   [x]       Emergency department visit  []

## 2024-05-02 ENCOUNTER — OFFICE VISIT (OUTPATIENT)
Dept: FAMILY MEDICINE CLINIC | Age: 74
End: 2024-05-02

## 2024-05-02 VITALS
TEMPERATURE: 98 F | WEIGHT: 315 LBS | HEART RATE: 82 BPM | OXYGEN SATURATION: 93 % | SYSTOLIC BLOOD PRESSURE: 120 MMHG | HEIGHT: 73 IN | DIASTOLIC BLOOD PRESSURE: 70 MMHG | BODY MASS INDEX: 41.75 KG/M2

## 2024-05-02 DIAGNOSIS — I12.9 HYPERTENSION ASSOCIATED WITH STAGE 3 CHRONIC KIDNEY DISEASE DUE TO TYPE 2 DIABETES MELLITUS (HCC): ICD-10-CM

## 2024-05-02 DIAGNOSIS — E11.22 TYPE 2 DIABETES MELLITUS WITH STAGE 3B CHRONIC KIDNEY DISEASE, WITH LONG-TERM CURRENT USE OF INSULIN (HCC): ICD-10-CM

## 2024-05-02 DIAGNOSIS — N18.30 HYPERTENSION ASSOCIATED WITH STAGE 3 CHRONIC KIDNEY DISEASE DUE TO TYPE 2 DIABETES MELLITUS (HCC): ICD-10-CM

## 2024-05-02 DIAGNOSIS — Z00.00 MEDICARE ANNUAL WELLNESS VISIT, SUBSEQUENT: Primary | ICD-10-CM

## 2024-05-02 DIAGNOSIS — E78.5 HYPERLIPIDEMIA, UNSPECIFIED HYPERLIPIDEMIA TYPE: ICD-10-CM

## 2024-05-02 DIAGNOSIS — Z79.4 TYPE 2 DIABETES MELLITUS WITH STAGE 3B CHRONIC KIDNEY DISEASE, WITH LONG-TERM CURRENT USE OF INSULIN (HCC): ICD-10-CM

## 2024-05-02 DIAGNOSIS — E11.22 HYPERTENSION ASSOCIATED WITH STAGE 3 CHRONIC KIDNEY DISEASE DUE TO TYPE 2 DIABETES MELLITUS (HCC): ICD-10-CM

## 2024-05-02 DIAGNOSIS — J44.9 COPD, SEVERITY TO BE DETERMINED (HCC): ICD-10-CM

## 2024-05-02 DIAGNOSIS — I48.0 PAROXYSMAL ATRIAL FIBRILLATION (HCC): ICD-10-CM

## 2024-05-02 DIAGNOSIS — N18.32 TYPE 2 DIABETES MELLITUS WITH STAGE 3B CHRONIC KIDNEY DISEASE, WITH LONG-TERM CURRENT USE OF INSULIN (HCC): ICD-10-CM

## 2024-05-02 LAB — HBA1C MFR BLD: 7.8 %

## 2024-05-02 RX ORDER — PREDNISONE 20 MG/1
20 TABLET ORAL 2 TIMES DAILY
Qty: 14 TABLET | Refills: 0 | Status: SHIPPED | OUTPATIENT
Start: 2024-05-02 | End: 2024-05-09

## 2024-05-02 SDOH — ECONOMIC STABILITY: FOOD INSECURITY: WITHIN THE PAST 12 MONTHS, YOU WORRIED THAT YOUR FOOD WOULD RUN OUT BEFORE YOU GOT MONEY TO BUY MORE.: NEVER TRUE

## 2024-05-02 SDOH — ECONOMIC STABILITY: FOOD INSECURITY: WITHIN THE PAST 12 MONTHS, THE FOOD YOU BOUGHT JUST DIDN'T LAST AND YOU DIDN'T HAVE MONEY TO GET MORE.: NEVER TRUE

## 2024-05-02 SDOH — ECONOMIC STABILITY: INCOME INSECURITY: HOW HARD IS IT FOR YOU TO PAY FOR THE VERY BASICS LIKE FOOD, HOUSING, MEDICAL CARE, AND HEATING?: NOT HARD AT ALL

## 2024-05-02 ASSESSMENT — PATIENT HEALTH QUESTIONNAIRE - PHQ9
SUM OF ALL RESPONSES TO PHQ QUESTIONS 1-9: 0
SUM OF ALL RESPONSES TO PHQ QUESTIONS 1-9: 0
SUM OF ALL RESPONSES TO PHQ9 QUESTIONS 1 & 2: 0
SUM OF ALL RESPONSES TO PHQ QUESTIONS 1-9: 0
SUM OF ALL RESPONSES TO PHQ QUESTIONS 1-9: 0
2. FEELING DOWN, DEPRESSED OR HOPELESS: NOT AT ALL
1. LITTLE INTEREST OR PLEASURE IN DOING THINGS: NOT AT ALL

## 2024-05-02 NOTE — PROGRESS NOTES
and Weight:               There is no height or weight on file to calculate BMI. (!) Abnormal      Inactivity Interventions:  Not ready to start physical activity   Obesity Interventions:  Diet counseling                  Lung Cancer Screening:  Recommended                   Objective   Blood pressure 120/70, pulse 82, temperature 98 °F (36.7 °C), height 1.854 m (6' 1\"), weight (!) 143.8 kg (317 lb), SpO2 93 %.         General Appearance: alert and oriented to person, place and time, well developed and well- nourished, in no acute distress  Skin: warm and dry, no rash or erythema  Head: normocephalic and atraumatic  Eyes: pupils equal, round, and reactive to light, extraocular eye movements intact, conjunctivae normal  ENT: tympanic membrane, external ear and ear canal normal bilaterally, nose without deformity, nasal mucosa and turbinates normal without polyps  Neck: supple and non-tender without mass, no thyromegaly or thyroid nodules, no cervical lymphadenopathy  Pulmonary/Chest: coarse bs, mild scattered wheezing neg for rale no respiratory distress  Cardiovascular: normal rate, regular rhythm, normal S1 and S2, no murmurs, rubs, clicks, or gallops, distal pulses intact, no carotid bruits  Abdomen: soft, non-tender, non-distended, normal bowel sounds, no masses or organomegaly  Extremities: no cyanosis, clubbing ankles 1+ pitting edema  Musculoskeletal: normal range of motion, no joint swelling, deformity or tenderness  Neurologic: reflexes normal and symmetric, no cranial nerve deficit, gait, coordination and speech normal       No Known Allergies  Prior to Visit Medications    Medication Sig Taking? Authorizing Provider   sacubitril-valsartan (ENTRESTO) 49-51 MG per tablet Take 0.5 tablets by mouth 2 times daily  Zoraida Uribe APRN - CNS   amLODIPine (NORVASC) 10 MG tablet Take 1 tablet by mouth daily  Zoraida Uribe APRN - CNS   furosemide (LASIX) 40 MG tablet Take 1.5 tablets by mouth daily 60 mg

## 2024-05-23 ENCOUNTER — ANTI-COAG VISIT (OUTPATIENT)
Dept: PHARMACY | Age: 74
End: 2024-05-23
Payer: MEDICARE

## 2024-05-23 DIAGNOSIS — I48.3 TYPICAL ATRIAL FLUTTER (HCC): Primary | ICD-10-CM

## 2024-05-23 LAB — INTERNATIONAL NORMALIZATION RATIO, POC: 2.8

## 2024-05-23 PROCEDURE — 99211 OFF/OP EST MAY X REQ PHY/QHP: CPT

## 2024-05-23 PROCEDURE — 85610 PROTHROMBIN TIME: CPT

## 2024-05-23 NOTE — PROGRESS NOTES
Mr. Valdmear Solis is a 74 y.o. y/o male with history of Atrial Fibrillation who presents today for anticoagulation monitoring and adjustment.    Pt is retired and works part time at [employer].     Patient reports he has been on warfarin for almost 30 years now and was managed by his cardiologist who retired. Pt was then managed by Dr. Ji prior to being established in Clinic  Pertinent PMH: HTN, DM, HLD, COPD, CHF    Patient Reported Findings:  Yes     No  [x]   []       Patient verifies current dosing regimen as listed- confirms   []   [x]       S/S bleeding/bruising/swelling/SOB -denies    []   [x]       Blood in urine or stool- denies  []   [x]       Procedures scheduled in the future at this time - having cataract surgery 3/4, had INR at [facility] 3/18 and was 2.8 --> none upcoming   []   [x]       Missed Dose - Denies   []   [x]       Extra Dose - Denies  [x]   []       Change in medications-  tylenol prn---> started jardiance. Stopped metformin---> extra tylenol --> farxiga and tylenol --> stopped tylenol ---> was on pred for 7 days starting 5/2 but finished >1 week ago   [x]   []       Change in health/diet/appetite - reports he eats about 2 meals/day. And states he will have salads about 1-2x/wk. (not a big fan of spinach or brocolli but may have other greens)--> Patient states 3 salads in past 2 weeks---> no greens, wants to add green beans in twice weekly, no NVD --> returned to vit k a few times a week (green beans and salad) --> denies changes, but has not had vit k in a while (only eats salads that have vit k)---> diarrhea resolved. Had small salad last week, no other vit kNVD---> no greens, no NVD --> no changes, only green beans --> has had some salads recently    []   [x]       Change in alcohol use - reports occasionally drinking beer (once every few months) but happens very infrequently.--> Patient reports no alcohol in past two weeks---> denies   []   [x]       Change in activity  []   [x]

## 2024-05-31 DIAGNOSIS — J44.9 COPD, SEVERITY TO BE DETERMINED (HCC): ICD-10-CM

## 2024-05-31 RX ORDER — BUDESONIDE, GLYCOPYRROLATE, AND FORMOTEROL FUMARATE 160; 9; 4.8 UG/1; UG/1; UG/1
AEROSOL, METERED RESPIRATORY (INHALATION)
Qty: 1 G | Refills: 5 | Status: SHIPPED | OUTPATIENT
Start: 2024-05-31

## 2024-05-31 NOTE — TELEPHONE ENCOUNTER
Medication:   Requested Prescriptions     Pending Prescriptions Disp Refills    BREZTRI AEROSPHERE 160-9-4.8 MCG/ACT AERO [Pharmacy Med Name: BREZTRI 160/9/4.8(120)(PAP)] 1 g 0     Sig: INHALE 2 PUFFS INTO LUNGS 2 TIMES A DAY        Last Filled:  2/29/2024    Patient Phone Number: [phone] (home)     Last appt: 5/2/2024   Next appt: 11/1/2024    Last OARRS:        No data to display

## 2024-06-12 DIAGNOSIS — E78.5 HYPERLIPIDEMIA LDL GOAL <100: ICD-10-CM

## 2024-06-12 RX ORDER — FUROSEMIDE 40 MG/1
TABLET ORAL
Qty: 135 TABLET | Refills: 0 | Status: SHIPPED | OUTPATIENT
Start: 2024-06-12

## 2024-06-12 NOTE — TELEPHONE ENCOUNTER
Please Advise      Medication:   Requested Prescriptions     Pending Prescriptions Disp Refills    pravastatin (PRAVACHOL) 80 MG tablet [Pharmacy Med Name: PRAVASTATIN SODIUM 80 MG Tablet] 90 tablet 3     Sig: TAKE 1 TABLET EVERY DAY        Last Filled:  09/06/2023     Patient Phone Number: [phone] (home)     Last appt: 5/2/2024   Next appt: 11/1/2024    Last OARRS:        No data to display

## 2024-06-13 RX ORDER — PRAVASTATIN SODIUM 80 MG/1
80 TABLET ORAL DAILY
Qty: 90 TABLET | Refills: 3 | Status: SHIPPED | OUTPATIENT
Start: 2024-06-13

## 2024-06-18 NOTE — TELEPHONE ENCOUNTER
Last OV: 11-10-21 nprg  Last labs: 4-1-22 cmp  Appt scheduled : 6-6-22 nprg  Last refill:11-10-21 nprg
Medication Refill    Medication needing refilled: furosemide (LASIX)    Dosage of the medication: 40 MG tablet        How are you taking this medication (QD, BID, TID, QID, PRN): Take 1 tablet by mouth daily    30 or 90 day supply called in: 90 tablet    When will you run out of your medication:  Currently out    Which Pharmacy are we sending the medication to?:      [facility] [phone] - F [phone]   [address] [facility] [address]   Phone:  [phone]  Fax:  [phone]      Patient is requesting a call when refill has been submitted.     Patient can be reached at [phone]
(4) rarely moist

## 2024-06-21 ENCOUNTER — ANTI-COAG VISIT (OUTPATIENT)
Dept: PHARMACY | Age: 74
End: 2024-06-21
Payer: MEDICARE

## 2024-06-21 DIAGNOSIS — I48.3 TYPICAL ATRIAL FLUTTER (HCC): Primary | ICD-10-CM

## 2024-06-21 LAB — INTERNATIONAL NORMALIZATION RATIO, POC: 2.5

## 2024-06-21 PROCEDURE — 99211 OFF/OP EST MAY X REQ PHY/QHP: CPT

## 2024-06-21 PROCEDURE — 85610 PROTHROMBIN TIME: CPT

## 2024-06-21 NOTE — PROGRESS NOTES
Mr. Valdemar Solis is a 74 y.o. y/o male with history of Atrial Fibrillation who presents today for anticoagulation monitoring and adjustment.    Pt is retired and works part time at [employer].     Patient reports he has been on warfarin for almost 30 years now and was managed by his cardiologist who retired. Pt was then managed by Dr. Ji prior to being established in Clinic  Pertinent PMH: HTN, DM, HLD, COPD, CHF    Patient Reported Findings:  Yes     No  [x]   []       Patient verifies current dosing regimen as listed- confirms   []   [x]       S/S bleeding/bruising/swelling/SOB -denies    []   [x]       Blood in urine or stool- denies  []   [x]       Procedures scheduled in the future at this time - having cataract surgery 3/4, had INR at [facility] 3/18 and was 2.8 --> none upcoming   []   [x]       Missed Dose - Denies   []   [x]       Extra Dose - Denies  []   [x]       Change in medications-  tylenol prn---> started jardiance. Stopped metformin---> extra tylenol --> farxiga and tylenol --> stopped tylenol ---> was on pred for 7 days starting 5/2 but finished >1 week ago --> no changes   []   [x]       Change in health/diet/appetite - reports he eats about 2 meals/day. And states he will have salads about 1-2x/wk. (not a big fan of spinach or brocolli but may have other greens)--> Patient states 3 salads in past 2 weeks---> no greens, wants to add green beans in twice weekly, no NVD --> returned to vit k a few times a week (green beans and salad) --> denies changes, but has not had vit k in a while (only eats salads that have vit k)---> diarrhea resolved. Had small salad last week, no other vit kNVD---> no greens, no NVD --> no changes, only green beans --> has had some salads recently --> no changes    []   [x]       Change in alcohol use - reports occasionally drinking beer (once every few months) but happens very infrequently.--> Patient reports no alcohol in past two weeks---> denies   []   [x]       Change

## 2024-07-05 RX ORDER — SACUBITRIL AND VALSARTAN 49; 51 MG/1; MG/1
TABLET, FILM COATED ORAL
Qty: 30 TABLET | Refills: 0 | Status: SHIPPED | OUTPATIENT
Start: 2024-07-05

## 2024-07-05 NOTE — TELEPHONE ENCOUNTER
Medication Refill    Medication needing refilled:  ENTRESTO     Dosage of the medication:   49-51mg    How are you taking this medication (QD, BID, TID, QID, PRN):  Take 0.5 tablets by mouth 2 times daily     30 or 90 day supply called in:   90    When will you run out of your medication:    Which Pharmacy are we sending the medication to?:    [facility] [place] [address]  Phone:   [phone]   Fax: [phone]

## 2024-07-08 DIAGNOSIS — F51.01 PRIMARY INSOMNIA: ICD-10-CM

## 2024-07-08 RX ORDER — TRAZODONE HYDROCHLORIDE 50 MG/1
TABLET ORAL
Qty: 90 TABLET | Refills: 0 | Status: SHIPPED | OUTPATIENT
Start: 2024-07-08

## 2024-07-08 NOTE — TELEPHONE ENCOUNTER
Medication:   Requested Prescriptions     Pending Prescriptions Disp Refills    traZODone (DESYREL) 50 MG tablet [Pharmacy Med Name: TRAZODONE HYDROCHLORIDE 50 MG Tablet] 90 tablet 3     Sig: TAKE ONE OR TWO TABLETS AS NEEDED FOR SLEEP AS DIRECTED        Last Filled:  01/30/2024     Patient Phone Number: [phone] (home)     Last appt: 5/2/2024   Next appt: 11/1/2024    Last OARRS:        No data to display

## 2024-07-08 NOTE — TELEPHONE ENCOUNTER
Medication:   Requested Prescriptions     Pending Prescriptions Disp Refills    traZODone (DESYREL) 50 MG tablet [Pharmacy Med Name: TRAZODONE HYDROCHLORIDE 50 MG Tablet] 90 tablet 3     Sig: TAKE ONE OR TWO TABLETS AS NEEDED FOR SLEEP AS DIRECTED        Last Filled:  01/30/2024     Patient Phone Number: [phone] (home)     Last appt: 5/2/2024   Next appt: 11/1/2024    Last OARRS:        No data to display

## 2024-07-18 RX ORDER — WARFARIN SODIUM 10 MG/1
TABLET ORAL
Qty: 104 TABLET | Refills: 3 | Status: SHIPPED | OUTPATIENT
Start: 2024-07-18

## 2024-07-18 NOTE — TELEPHONE ENCOUNTER
Medication:   Requested Prescriptions     Pending Prescriptions Disp Refills    amLODIPine (NORVASC) 5 MG tablet [Pharmacy Med Name: AMLODIPINE BESYLATE 5 MG Tablet] 90 tablet 3     Sig: TAKE 1 TABLET EVERY DAY        Last Filled: 03/25/2024      Patient Phone Number: [phone] (home)     Last appt: 5/2/2024   Next appt: 11/1/2024    Last OARRS:        No data to display

## 2024-07-19 ENCOUNTER — ANTI-COAG VISIT (OUTPATIENT)
Dept: PHARMACY | Age: 74
End: 2024-07-19
Payer: MEDICARE

## 2024-07-19 DIAGNOSIS — I48.3 TYPICAL ATRIAL FLUTTER (HCC): Primary | ICD-10-CM

## 2024-07-19 LAB
INTERNATIONAL NORMALIZATION RATIO, POC: 2.9
PROTHROMBIN TIME, POC: NORMAL

## 2024-07-19 PROCEDURE — 85610 PROTHROMBIN TIME: CPT | Performed by: PHYSICIAN ASSISTANT

## 2024-07-19 PROCEDURE — 99211 OFF/OP EST MAY X REQ PHY/QHP: CPT | Performed by: PHYSICIAN ASSISTANT

## 2024-07-19 RX ORDER — AMLODIPINE BESYLATE 10 MG/1
10 TABLET ORAL DAILY
Qty: 90 TABLET | Refills: 3 | Status: SHIPPED | OUTPATIENT
Start: 2024-07-19

## 2024-07-19 RX ORDER — AMLODIPINE BESYLATE 5 MG/1
5 TABLET ORAL DAILY
Qty: 90 TABLET | Refills: 3 | Status: SHIPPED | OUTPATIENT
Start: 2024-07-19

## 2024-07-19 NOTE — PROGRESS NOTES
Mr. Valdemar Solis is a 74 y.o. y/o male with history of Atrial Fibrillation who presents today for anticoagulation monitoring and adjustment.    Pt is retired and works part time at [employer].     Patient reports he has been on warfarin for almost 30 years now and was managed by his cardiologist who retired. Pt was then managed by Dr. Ji prior to being established in Clinic  Pertinent PMH: HTN, DM, HLD, COPD, CHF    Patient Reported Findings:  Yes     No  [x]   []       Patient verifies current dosing regimen as listed- confirms   []   [x]       S/S bleeding/bruising/swelling/SOB -denies    []   [x]       Blood in urine or stool- denies  []   [x]       Procedures scheduled in the future at this time - none upcoming   []   [x]       Missed Dose - Denies   []   [x]       Extra Dose - Denies  []   [x]       Change in medications-  tylenol prn---> started jardiance. Stopped metformin---> extra tylenol --> farxiga and tylenol --> stopped tylenol ---> was on pred for 7 days starting 5/2 but finished >1 week ago --> no changes   []   [x]       Change in health/diet/appetite - reports he eats about 2 meals/day. And states he will have salads about 1-2x/wk. (not a big fan of spinach or brocolli but may have other greens)--> Patient states 3 salads in past 2 weeks---> no greens, wants to add green beans in twice weekly, no NVD --> returned to vit k a few times a week (green beans and salad) --> denies changes, but has not had vit k in a while (only eats salads that have vit k)---> diarrhea resolved. Had small salad last week, no other vit kNVD---> no greens, no NVD --> no changes, only green beans --> has had some salads recently --> no changes    []   [x]       Change in alcohol use - reports occasionally drinking beer (once every few months) but happens very infrequently.--> Patient reports no alcohol in past two weeks---> denies   []   [x]       Change in activity  []   [x]       Hospital admission    []   [x]

## 2024-07-23 ENCOUNTER — TELEPHONE (OUTPATIENT)
Dept: FAMILY MEDICINE CLINIC | Age: 74
End: 2024-07-23

## 2024-07-23 NOTE — TELEPHONE ENCOUNTER
Two rx for amLODIPine were sent to Pharmacy on 7/19/2024    Please advise which dosage is the correct dose    5MG     10MG

## 2024-07-23 NOTE — TELEPHONE ENCOUNTER
Pharmacy received Rx for   amLODIPine (NORVASC) 5 MG tablet  AND amLODIPine (NORVASC) 10 MG tablet   Pharmacy asking for clarity on current therapy    [facility]

## 2024-07-24 ENCOUNTER — TELEPHONE (OUTPATIENT)
Dept: CARDIOLOGY CLINIC | Age: 74
End: 2024-07-24

## 2024-07-24 RX ORDER — AMLODIPINE BESYLATE 10 MG/1
10 TABLET ORAL DAILY
Qty: 90 TABLET | Refills: 0 | Status: SHIPPED | OUTPATIENT
Start: 2024-07-24

## 2024-07-24 NOTE — TELEPHONE ENCOUNTER
Last ov:3/25/24 NPRG  Next ov:10/07/24 NPRG  Last EKG:10/29/22  Last labs:1/30/24  Last filled:   Disp Refills Start End    furosemide (LASIX) 40 MG tablet 135 tablet 0 6/12/2024 --    Sig: TAKE 1 AND 1/2 TABLETS EVERY DAY    Sent to pharmacy as: Furosemide 40 MG Oral Tablet (LASIX)    E-Prescribing Status: Receipt confirmed by pharmacy (6/12/2024 12:41 PM EDT)

## 2024-07-24 NOTE — TELEPHONE ENCOUNTER
Medication Refill  Pt is requesting additional refills     Medication needing refilled:  furosemide (LASIX) 40 MG tablet   Dosage of the medication:   As directed   How are you taking this medication (QD, BID, TID, QID, PRN):   Take 1 tablet and 1/2 tablets daily   30 or 90 day supply called in:   90 day supply     Which Pharmacy are we sending the medication to?:   [facility] - [facility] [address] - P [phone] - F [phone]  [address], [facility] [address]  Phone: [phone]  Fax: [phone]

## 2024-07-25 NOTE — TELEPHONE ENCOUNTER
Spoke with patient and he had forgotten about getting the blood work done in June.  He will get it done next Monday or Tuesday.

## 2024-07-29 DIAGNOSIS — I50.22 CHRONIC SYSTOLIC HF (HEART FAILURE) (HCC): ICD-10-CM

## 2024-07-29 DIAGNOSIS — E55.9 VITAMIN D DEFICIENCY: ICD-10-CM

## 2024-07-29 LAB
DEPRECATED RDW RBC AUTO: 15.7 % (ref 12.4–15.4)
HCT VFR BLD AUTO: 40.1 % (ref 40.5–52.5)
HGB BLD-MCNC: 13.2 G/DL (ref 13.5–17.5)
MCH RBC QN AUTO: 30.4 PG (ref 26–34)
MCHC RBC AUTO-ENTMCNC: 32.8 G/DL (ref 31–36)
MCV RBC AUTO: 92.6 FL (ref 80–100)
PLATELET # BLD AUTO: 153 K/UL (ref 135–450)
PMV BLD AUTO: 10.1 FL (ref 5–10.5)
RBC # BLD AUTO: 4.34 M/UL (ref 4.2–5.9)
WBC # BLD AUTO: 6.1 K/UL (ref 4–11)

## 2024-07-30 ENCOUNTER — TELEPHONE (OUTPATIENT)
Dept: FAMILY MEDICINE CLINIC | Age: 74
End: 2024-07-30

## 2024-07-30 ENCOUNTER — TELEPHONE (OUTPATIENT)
Dept: CARDIOLOGY CLINIC | Age: 74
End: 2024-07-30

## 2024-07-30 LAB
25(OH)D3 SERPL-MCNC: 25.9 NG/ML
ANION GAP SERPL CALCULATED.3IONS-SCNC: 13 MMOL/L (ref 3–16)
BUN SERPL-MCNC: 28 MG/DL (ref 7–20)
CALCIUM SERPL-MCNC: 9.4 MG/DL (ref 8.3–10.6)
CHLORIDE SERPL-SCNC: 102 MMOL/L (ref 99–110)
CHOLEST SERPL-MCNC: 169 MG/DL (ref 0–199)
CO2 SERPL-SCNC: 28 MMOL/L (ref 21–32)
CREAT SERPL-MCNC: 1.8 MG/DL (ref 0.8–1.3)
EST. AVERAGE GLUCOSE BLD GHB EST-MCNC: 168.6 MG/DL
GFR SERPLBLD CREATININE-BSD FMLA CKD-EPI: 39 ML/MIN/{1.73_M2}
GLUCOSE SERPL-MCNC: 177 MG/DL (ref 70–99)
HBA1C MFR BLD: 7.5 %
HDLC SERPL-MCNC: 40 MG/DL (ref 40–60)
LDLC SERPL CALC-MCNC: 101 MG/DL
NT-PROBNP SERPL-MCNC: 278 PG/ML (ref 0–124)
POTASSIUM SERPL-SCNC: 4.4 MMOL/L (ref 3.5–5.1)
SODIUM SERPL-SCNC: 143 MMOL/L (ref 136–145)
TRIGL SERPL-MCNC: 142 MG/DL (ref 0–150)
VLDLC SERPL CALC-MCNC: 28 MG/DL

## 2024-07-30 RX ORDER — CHOLECALCIFEROL (VITAMIN D3) 25 MCG
2000 TABLET ORAL DAILY
Qty: 180 TABLET | Refills: 1 | Status: SHIPPED | OUTPATIENT
Start: 2024-07-30

## 2024-07-30 RX ORDER — FUROSEMIDE 40 MG/1
60 TABLET ORAL DAILY
Qty: 135 TABLET | Refills: 0 | Status: SHIPPED | OUTPATIENT
Start: 2024-07-30

## 2024-07-30 RX ORDER — FUROSEMIDE 40 MG/1
TABLET ORAL
Qty: 135 TABLET | Refills: 3 | OUTPATIENT
Start: 2024-07-30

## 2024-07-30 NOTE — TELEPHONE ENCOUNTER
----- Message from MEENU Osullivan - CNS sent at 7/30/2024  8:43 AM EDT -----  Vitamin d is low and he needs to increase to 2000 iu once a day  Rest of blood work is normal  See if he needs refills    Spoke to patient he needs a refill on the furosemide. He will increase the vitamin D as instructed.

## 2024-07-30 NOTE — TELEPHONE ENCOUNTER
MA place call to [phone] (home)  unable to reach patient at this time due to possible disconnection of service. Will try again later.

## 2024-07-30 NOTE — TELEPHONE ENCOUNTER
----- Message from Yane Wei MD sent at 7/30/2024  8:46 AM EDT -----  Fu apt to discuss patients A1c results.

## 2024-07-30 NOTE — TELEPHONE ENCOUNTER
----- Message from Yane Wei MD sent at 7/30/2024 11:24 AM EDT -----  Chol : at goal   Continue Pravastatin

## 2024-07-31 ENCOUNTER — TELEPHONE (OUTPATIENT)
Dept: FAMILY MEDICINE CLINIC | Age: 74
End: 2024-07-31

## 2024-07-31 NOTE — TELEPHONE ENCOUNTER
For your situational awareness:      Patient called stating he needs rx for oxygen and machine that supplies o2       I reached out to find out where the patient should call for O2 refill    Patient is the responsible party and must call   [facility] : [phone] to have o2 tanks refilled.     The office cannot do it for him, I call with Mr. Solis and he expressed understanding.           Last appt: 5/2/2024   Next appt: 8/14/2024

## 2024-07-31 NOTE — TELEPHONE ENCOUNTER
MA place call to [phone] (home)  spoke with patient and informed him of Dr. Wei's message stated below. Patient stated understanding.

## 2024-08-01 ENCOUNTER — CARE COORDINATION (OUTPATIENT)
Dept: CARE COORDINATION | Age: 74
End: 2024-08-01

## 2024-08-01 NOTE — CARE COORDINATION
Pt was UTR at first phone number listed. Attempted to call his wife at the second contact number and a female answered the phone stating both pt and his wife were not available. SIGIFREDO asked if she could please have pt call back, explaining I'm the RN CM at Dr. Wei's office. She stated she would pass on the message. SIGIFREDO thanked her for her time.

## 2024-08-06 RX ORDER — SACUBITRIL AND VALSARTAN 49; 51 MG/1; MG/1
TABLET, FILM COATED ORAL
Qty: 30 TABLET | Refills: 1 | Status: SHIPPED | OUTPATIENT
Start: 2024-08-06

## 2024-08-06 NOTE — TELEPHONE ENCOUNTER
Medication:   Requested Prescriptions     Pending Prescriptions Disp Refills    sotalol (BETAPACE) 80 MG tablet 60 tablet      Sig: Take 1 tablet by mouth 2 times daily        Last Filled:  not seen    Patient Phone Number: [phone] (home)     Last appt: 5/2/2024   Next appt: 8/14/2024    Last OARRS:        No data to display

## 2024-08-06 NOTE — TELEPHONE ENCOUNTER
Medication and Quantity requested:   sotalol (BETAPACE) 80 MG tablet      Last Visit  5/2/24    Pharmacy and phone number updated in [facility]:  yes    [facility]

## 2024-08-07 RX ORDER — SOTALOL HYDROCHLORIDE 80 MG/1
80 TABLET ORAL 2 TIMES DAILY
Qty: 60 TABLET | Refills: 5 | Status: SHIPPED | OUTPATIENT
Start: 2024-08-07

## 2024-08-16 ENCOUNTER — ANTI-COAG VISIT (OUTPATIENT)
Dept: PHARMACY | Age: 74
End: 2024-08-16
Payer: MEDICARE

## 2024-08-16 DIAGNOSIS — I48.3 TYPICAL ATRIAL FLUTTER (HCC): Primary | ICD-10-CM

## 2024-08-16 LAB
INTERNATIONAL NORMALIZATION RATIO, POC: 2.5
PROTHROMBIN TIME, POC: 0

## 2024-08-16 PROCEDURE — 99211 OFF/OP EST MAY X REQ PHY/QHP: CPT

## 2024-08-16 PROCEDURE — 85610 PROTHROMBIN TIME: CPT

## 2024-08-16 NOTE — PROGRESS NOTES
[x]       Emergency department visit  []   [x]       Other complaints     Clinical Outcomes:  Yes     No  []   [x]       Major bleeding event  []   [x]       Thromboembolic event    Duration of warfarin Therapy: Indefinite   INR Range:  2.0-3.0   RF with [facility] mailorder.     INR today is 2.5  Take 10 mg daily   Encouraged patient to maintain a consistent diet.  Recheck INR again in 4 weeks, 9/13    **consent form signed 7/26/2023    Referring physician is Dr. Raza  INR (no units)   Date Value   11/03/2022 2.42 (H)   11/02/2022 2.28 (H)   11/01/2022 2.59 (H)   10/31/2022 3.02 (H)     INR,(POC) (no units)   Date Value   07/19/2024 2.9   06/21/2024 2.5   05/23/2024 2.8   04/25/2024 2.7     For Pharmacy Admin Tracking Only    Total # of Interventions Recommended: 0  Total # of Interventions Accepted: 0  Time Spent (min): 15

## 2024-08-19 DIAGNOSIS — F51.01 PRIMARY INSOMNIA: ICD-10-CM

## 2024-08-19 RX ORDER — TRAZODONE HYDROCHLORIDE 50 MG/1
TABLET ORAL
Qty: 90 TABLET | Refills: 0 | Status: SHIPPED | OUTPATIENT
Start: 2024-08-19

## 2024-08-19 NOTE — TELEPHONE ENCOUNTER
Medication:   Requested Prescriptions     Pending Prescriptions Disp Refills    traZODone (DESYREL) 50 MG tablet [Pharmacy Med Name: TRAZODONE HYDROCHLORIDE 50 MG Tablet] 90 tablet 0     Sig: TAKE ONE OR TWO TABLETS AS NEEDED FOR SLEEP AS DIRECTED        Last Filled:     07/08/2024        Patient Phone Number: [phone] (home)     Last appt: 5/2/2024   Next appt: 9/6/2024    Last OARRS:        No data to display

## 2024-08-20 ENCOUNTER — TELEPHONE (OUTPATIENT)
Dept: CARDIOLOGY CLINIC | Age: 74
End: 2024-08-20

## 2024-08-20 NOTE — TELEPHONE ENCOUNTER
Medication Refill    Medication needing refilled:   Valsartan    Dosage of the medication: 40mg    How are you taking this medication (QD, BID, TID, QID, PRN):  1 Tablet twice a day    30 or 90 day supply called in:   90    When will you run out of your medication:    Which Pharmacy are we sending the medication to?:    [facility]  [address] [address]  Phone:   [phone]   Fax:   [phone]     NOTE:  [facility] they are out of Entresto and not able to get it in.  Jorge wants to know if the Pt can be change to Valsartan 40mg instead.  Please advise.  Thank you

## 2024-08-21 NOTE — TELEPHONE ENCOUNTER
Tried to reach pharmacy to ask if they will be able to get Entresto. LMOM for them to call our office.     Tried to reach patient to ask how much entresto he has and if he would like to fill out the patient assistance paperwork for entresto and to give him samples.  No voicemail picked up to leave a message    Spoke to pharmacy shadia the pharmacist he stated they will not be able to get the entresto on a consistent basis to give refills monthly or even 3 months at a time.     Spoke to patient he has been out of entresto 49-51 for 1 week he will fill out the patient assistance paperwork can he have samples?

## 2024-08-23 ENCOUNTER — TELEPHONE (OUTPATIENT)
Dept: FAMILY MEDICINE CLINIC | Age: 74
End: 2024-08-23

## 2024-08-23 RX ORDER — SACUBITRIL AND VALSARTAN 49; 51 MG/1; MG/1
TABLET, FILM COATED ORAL
Qty: 30 TABLET | Refills: 1 | Status: CANCELLED | OUTPATIENT
Start: 2024-08-23

## 2024-08-23 NOTE — TELEPHONE ENCOUNTER
Medication and Quantity requested: ENTRESTO 49-51 MG per tablet      Last Visit  5/2/24    Pharmacy and phone number updated in [facility]:  yes  [facility]        Pt states his pharmacy does not have this medication all of the time and is asking Dr Wei if she can switch to something else.    To discuss with [facility] you can call  [phone] if needed    Please process

## 2024-08-23 NOTE — TELEPHONE ENCOUNTER
Medication:   Requested Prescriptions     Pending Prescriptions Disp Refills    FARXIGA 10 MG tablet [Pharmacy Med Name: FARXIGA 10MG T(PAP)] 30 tablet 0     Sig: TAKE 1 TABLET BY MOUTH EVERY MORNING        Last Filled:  02/28/2024     Patient Phone Number: [phone] (home)     Last appt: 5/2/2024   Next appt: 9/6/2024    Last OARRS:        No data to display

## 2024-08-25 RX ORDER — SACUBITRIL AND VALSARTAN 49; 51 MG/1; MG/1
0.5 TABLET, FILM COATED ORAL 2 TIMES DAILY
Qty: 56 TABLET | Refills: 0 | COMMUNITY
Start: 2024-08-25

## 2024-08-26 ENCOUNTER — TELEPHONE (OUTPATIENT)
Dept: CARDIOLOGY CLINIC | Age: 74
End: 2024-08-26

## 2024-08-26 RX ORDER — DAPAGLIFLOZIN 10 MG/1
10 TABLET, FILM COATED ORAL EVERY MORNING
Qty: 30 TABLET | Refills: 4 | Status: SHIPPED | OUTPATIENT
Start: 2024-08-26

## 2024-08-26 NOTE — TELEPHONE ENCOUNTER
MA place call to [phone] (home)  spoke with patient and he stated that he can not afford medication and that was why he needs Rx to go to [facility] because they give him the medication for free.    Patient stated that he is in the process of waiting due to Dr. Calvo's office had him fill out form in regards to the Rx. He stated that right now he is just waiting to hear back from the company that he filled out the forms for the med. He stated that he will keep our office posted.     He also stated that he still has about 6 weeks left of medication at this time until he will need refills.

## 2024-08-26 NOTE — TELEPHONE ENCOUNTER
He has heart failure   And there is no substitution for Entresto     If needed pend med to a different pharmacy

## 2024-08-26 NOTE — TELEPHONE ENCOUNTER
Pt called asking if they should take 1/2 tablet BID or 1 tablet BID for the ENTRESTO.     Pls advise pt

## 2024-08-28 NOTE — TELEPHONE ENCOUNTER
Faxed form received from patient pharmacy [facility] stated that patient has been experiencing uncontrolled symptoms. Recent A1c reading was 7.6. Please see paper in med records section in bin near MA desk.

## 2024-08-28 NOTE — TELEPHONE ENCOUNTER
Medication:   Requested Prescriptions     Pending Prescriptions Disp Refills    metFORMIN (GLUCOPHAGE) 500 MG tablet 60 tablet 0     Sig: Take 1 tablet by mouth 2 times daily (with meals)        Last Filled:  8/10/2020    Patient Phone Number: [phone] (home)     Last appt: 5/2/2024   Next appt: 9/6/2024    Last OARRS:        No data to display

## 2024-09-09 RX ORDER — DAPAGLIFLOZIN 10 MG/1
10 TABLET, FILM COATED ORAL EVERY MORNING
Qty: 30 TABLET | Refills: 0 | OUTPATIENT
Start: 2024-09-09

## 2024-09-13 ENCOUNTER — ANTI-COAG VISIT (OUTPATIENT)
Dept: PHARMACY | Age: 74
End: 2024-09-13
Payer: MEDICARE

## 2024-09-13 DIAGNOSIS — I48.3 TYPICAL ATRIAL FLUTTER (HCC): Primary | ICD-10-CM

## 2024-09-13 LAB
INTERNATIONAL NORMALIZATION RATIO, POC: 3.3
PROTHROMBIN TIME, POC: 0

## 2024-09-13 PROCEDURE — 85610 PROTHROMBIN TIME: CPT

## 2024-09-13 PROCEDURE — 99211 OFF/OP EST MAY X REQ PHY/QHP: CPT

## 2024-09-29 DIAGNOSIS — F51.01 PRIMARY INSOMNIA: ICD-10-CM

## 2024-09-30 RX ORDER — TRAZODONE HYDROCHLORIDE 50 MG/1
TABLET, FILM COATED ORAL
Qty: 90 TABLET | Refills: 0 | Status: SHIPPED | OUTPATIENT
Start: 2024-09-30

## 2024-09-30 NOTE — TELEPHONE ENCOUNTER
Medication:   Requested Prescriptions     Pending Prescriptions Disp Refills    traZODone (DESYREL) 50 MG tablet [Pharmacy Med Name: traZODone HCl Oral Tablet 50 MG] 90 tablet 0     Sig: TAKE 1 OR 2 TABLETS AS NEEDED FOR SLEEP AS DIRECTED        Last Filled:      Patient Phone Number: [phone] (home)     Last appt: 5/2/2024   Next appt: 11/1/2024    Last OARRS:        No data to display

## 2024-10-01 NOTE — TELEPHONE ENCOUNTER
Medication:   Requested Prescriptions     Pending Prescriptions Disp Refills    FARXIGA 10 MG tablet [Pharmacy Med Name: FARXIGA 10MG T(PAP)] 30 tablet 0     Sig: TAKE 1 TABLET BY MOUTH EVERY MORNING        Last Filled:  08/26/2024     Patient Phone Number: [phone] (home)     Last appt: 5/2/2024   Next appt: 11/1/2024    Last OARRS:        No data to display

## 2024-10-03 DIAGNOSIS — J44.9 COPD, SEVERITY TO BE DETERMINED (HCC): ICD-10-CM

## 2024-10-03 NOTE — TELEPHONE ENCOUNTER
Medication and Quantity requested:   Budeson-Glycopyrrol-Formoterol (BREZTRI AEROSPHERE) 160-9-4.8 MCG/ACT AERO      Last Visit  5/2/24    Pharmacy and phone number updated in [facility]:  yes  [facility]

## 2024-10-03 NOTE — TELEPHONE ENCOUNTER
Medication:   Requested Prescriptions     Pending Prescriptions Disp Refills    Budeson-Glycopyrrol-Formoterol (BREZTRI AEROSPHERE) 160-9-4.8 MCG/ACT AERO 1 g 5     Sig: INHALE 2 PUFFS INTO LUNGS 2 TIMES A DAY        Last Filled:  05/31/2024     Patient Phone Number: [phone] (home)     Last appt: 5/2/2024   Next appt: 11/1/2024    Last OARRS:        No data to display

## 2024-10-04 RX ORDER — DAPAGLIFLOZIN 10 MG/1
10 TABLET, FILM COATED ORAL EVERY MORNING
Qty: 30 TABLET | Refills: 0 | Status: SHIPPED | OUTPATIENT
Start: 2024-10-04

## 2024-10-04 RX ORDER — BUDESONIDE, GLYCOPYRROLATE, AND FORMOTEROL FUMARATE 160; 9; 4.8 UG/1; UG/1; UG/1
AEROSOL, METERED RESPIRATORY (INHALATION)
Qty: 1 G | Refills: 5 | Status: SHIPPED | OUTPATIENT
Start: 2024-10-04

## 2024-10-07 ENCOUNTER — OFFICE VISIT (OUTPATIENT)
Dept: CARDIOLOGY CLINIC | Age: 74
End: 2024-10-07
Payer: MEDICARE

## 2024-10-07 ENCOUNTER — HOSPITAL ENCOUNTER (OUTPATIENT)
Age: 74
Discharge: HOME | End: 2024-10-09
Payer: MEDICARE

## 2024-10-07 ENCOUNTER — ANTI-COAG VISIT (OUTPATIENT)
Dept: PHARMACY | Age: 74
End: 2024-10-07
Payer: MEDICARE

## 2024-10-07 VITALS
HEIGHT: 73 IN | DIASTOLIC BLOOD PRESSURE: 80 MMHG | SYSTOLIC BLOOD PRESSURE: 138 MMHG | WEIGHT: 315 LBS | BODY MASS INDEX: 41.75 KG/M2

## 2024-10-07 VITALS
OXYGEN SATURATION: 93 % | DIASTOLIC BLOOD PRESSURE: 70 MMHG | SYSTOLIC BLOOD PRESSURE: 118 MMHG | HEART RATE: 64 BPM | BODY MASS INDEX: 41.22 KG/M2 | HEIGHT: 73 IN | WEIGHT: 311 LBS

## 2024-10-07 DIAGNOSIS — I50.22 CHRONIC SYSTOLIC HF (HEART FAILURE) (HCC): ICD-10-CM

## 2024-10-07 DIAGNOSIS — E66.01 OBESITY, CLASS III, BMI 40-49.9 (MORBID OBESITY): ICD-10-CM

## 2024-10-07 DIAGNOSIS — I10 ESSENTIAL HYPERTENSION: ICD-10-CM

## 2024-10-07 DIAGNOSIS — G47.33 OSA (OBSTRUCTIVE SLEEP APNEA): ICD-10-CM

## 2024-10-07 DIAGNOSIS — I50.22 CHRONIC SYSTOLIC HF (HEART FAILURE) (HCC): Primary | ICD-10-CM

## 2024-10-07 DIAGNOSIS — E55.9 VITAMIN D DEFICIENCY: ICD-10-CM

## 2024-10-07 DIAGNOSIS — I48.3 TYPICAL ATRIAL FLUTTER (HCC): Primary | ICD-10-CM

## 2024-10-07 LAB
ECHO AO ASC DIAM: 4.5 CM
ECHO AO ASCENDING AORTA INDEX: 1.72 CM/M2
ECHO AO ROOT DIAM: 3.3 CM
ECHO AO ROOT INDEX: 1.26 CM/M2
ECHO AV ACCELERATION TIME: 95 MS
ECHO AV AREA PEAK VELOCITY: 0.7 CM2
ECHO AV AREA VTI: 1.8 CM2
ECHO AV AREA/BSA PEAK VELOCITY: 0.3 CM2/M2
ECHO AV AREA/BSA VTI: 0.7 CM2/M2
ECHO AV EFFECTIVE ORIFICE AREA (EOA) INDEX: 0.7 CM2/M2
ECHO AV EFFECTIVE ORIFICE AREA (EOA): 1.8 CM2
ECHO AV MEAN GRADIENT: 21 MMHG
ECHO AV MEAN VELOCITY: 212 M/S
ECHO AV PEAK GRADIENT: 41 MMHG
ECHO AV PEAK VELOCITY: 318 M/S
ECHO AV VELOCITY RATIO: 0
ECHO AV VTI: 67.1 CM
ECHO BSA: 2.72 M2
ECHO EST RA PRESSURE: 8 MMHG
ECHO IVC INSP: 0.7 CM
ECHO IVC PROX: 2.1 CM
ECHO LA AREA 2C: 24.6 CM2
ECHO LA AREA 4C: 24.5 CM2
ECHO LA DIAMETER INDEX: 1.72 CM/M2
ECHO LA DIAMETER: 4.5 CM
ECHO LA MAJOR AXIS: 6.7 CM
ECHO LA MINOR AXIS: 7.3 CM
ECHO LA TO AORTIC ROOT RATIO: 1.36
ECHO LA VOL BP: 73 ML (ref 18–58)
ECHO LA VOL MOD A2C: 66 ML (ref 18–58)
ECHO LA VOL MOD A4C: 74 ML (ref 18–58)
ECHO LA VOL/BSA BIPLANE: 28 ML/M2 (ref 16–34)
ECHO LA VOLUME INDEX MOD A2C: 25 ML/M2 (ref 16–34)
ECHO LA VOLUME INDEX MOD A4C: 28 ML/M2 (ref 16–34)
ECHO LV E' LATERAL VELOCITY: 10.8 CM/S
ECHO LV E' SEPTAL VELOCITY: 8.8 CM/S
ECHO LV EDV A2C: 183 ML
ECHO LV EDV A4C: 159 ML
ECHO LV EDV INDEX A4C: 61 ML/M2
ECHO LV EDV NDEX A2C: 70 ML/M2
ECHO LV EJECTION FRACTION A2C: 48 %
ECHO LV EJECTION FRACTION A4C: 56 %
ECHO LV EJECTION FRACTION BIPLANE: 51 % (ref 55–100)
ECHO LV ESV A2C: 96 ML
ECHO LV ESV A4C: 70 ML
ECHO LV ESV INDEX A2C: 37 ML/M2
ECHO LV ESV INDEX A4C: 27 ML/M2
ECHO LV FRACTIONAL SHORTENING: 27 % (ref 28–44)
ECHO LV INTERNAL DIMENSION DIASTOLE INDEX: 1.98 CM/M2
ECHO LV INTERNAL DIMENSION DIASTOLIC: 5.2 CM (ref 4.2–5.9)
ECHO LV INTERNAL DIMENSION SYSTOLIC INDEX: 1.45 CM/M2
ECHO LV INTERNAL DIMENSION SYSTOLIC: 3.8 CM
ECHO LV IVSD: 1.2 CM (ref 0.6–1)
ECHO LV MASS 2D: 248.8 G (ref 88–224)
ECHO LV MASS INDEX 2D: 95 G/M2 (ref 49–115)
ECHO LV POSTERIOR WALL DIASTOLIC: 1.2 CM (ref 0.6–1)
ECHO LV RELATIVE WALL THICKNESS RATIO: 0.46
ECHO LVOT AREA: 4.2 CM2
ECHO LVOT AV VTI INDEX: 0.44
ECHO LVOT DIAM: 2.3 CM
ECHO LVOT MEAN GRADIENT: 4 MMHG
ECHO LVOT PEAK GRADIENT: 7 MMHG
ECHO LVOT PEAK VELOCITY: 1.4 M/S
ECHO LVOT STROKE VOLUME INDEX: 46.6 ML/M2
ECHO LVOT SV: 122.1 ML
ECHO LVOT VTI: 29.4 CM
ECHO MV A VELOCITY: 0.89 M/S
ECHO MV AREA VTI: 2.5 CM2
ECHO MV E DECELERATION TIME (DT): 292 MS
ECHO MV E VELOCITY: 1.16 M/S
ECHO MV E/A RATIO: 1.3
ECHO MV E/E' LATERAL: 10.74
ECHO MV E/E' RATIO (AVERAGED): 11.96
ECHO MV E/E' SEPTAL: 13.18
ECHO MV LVOT VTI INDEX: 1.66
ECHO MV MAX VELOCITY: 1.3 M/S
ECHO MV MEAN GRADIENT: 3 MMHG
ECHO MV MEAN VELOCITY: 0.8 M/S
ECHO MV PEAK GRADIENT: 7 MMHG
ECHO MV REGURGITANT PEAK GRADIENT: 41 MMHG
ECHO MV REGURGITANT PEAK VELOCITY: 3.2 M/S
ECHO MV VTI: 48.8 CM
ECHO PV MAX VELOCITY: 1.2 M/S
ECHO PV PEAK GRADIENT: 6 MMHG
ECHO RA AREA 4C: 22.5 CM2
ECHO RA END SYSTOLIC VOLUME APICAL 4 CHAMBER INDEX BSA: 25 ML/M2
ECHO RA VOLUME: 66 ML
ECHO RIGHT VENTRICULAR SYSTOLIC PRESSURE (RVSP): 37 MMHG
ECHO RV BASAL DIMENSION: 5 CM
ECHO RV FREE WALL PEAK S': 6.7 CM/S
ECHO RV LONGITUDINAL DIMENSION: 7.5 CM
ECHO RV MID DIMENSION: 3.2 CM
ECHO RV TAPSE: 1.5 CM (ref 1.7–?)
ECHO TV REGURGITANT MAX VELOCITY: 2.7 M/S
ECHO TV REGURGITANT PEAK GRADIENT: 29 MMHG
INTERNATIONAL NORMALIZATION RATIO, POC: 2.7
PROTHROMBIN TIME, POC: 0

## 2024-10-07 PROCEDURE — 99211 OFF/OP EST MAY X REQ PHY/QHP: CPT

## 2024-10-07 PROCEDURE — 1036F TOBACCO NON-USER: CPT | Performed by: CLINICAL NURSE SPECIALIST

## 2024-10-07 PROCEDURE — 3017F COLORECTAL CA SCREEN DOC REV: CPT | Performed by: CLINICAL NURSE SPECIALIST

## 2024-10-07 PROCEDURE — G8484 FLU IMMUNIZE NO ADMIN: HCPCS | Performed by: CLINICAL NURSE SPECIALIST

## 2024-10-07 PROCEDURE — G8427 DOCREV CUR MEDS BY ELIG CLIN: HCPCS | Performed by: CLINICAL NURSE SPECIALIST

## 2024-10-07 PROCEDURE — 93306 TTE W/DOPPLER COMPLETE: CPT

## 2024-10-07 PROCEDURE — 99214 OFFICE O/P EST MOD 30 MIN: CPT | Performed by: CLINICAL NURSE SPECIALIST

## 2024-10-07 PROCEDURE — 85610 PROTHROMBIN TIME: CPT

## 2024-10-07 PROCEDURE — 3074F SYST BP LT 130 MM HG: CPT | Performed by: CLINICAL NURSE SPECIALIST

## 2024-10-07 PROCEDURE — 3078F DIAST BP <80 MM HG: CPT | Performed by: CLINICAL NURSE SPECIALIST

## 2024-10-07 PROCEDURE — 1123F ACP DISCUSS/DSCN MKR DOCD: CPT | Performed by: CLINICAL NURSE SPECIALIST

## 2024-10-07 PROCEDURE — 93306 TTE W/DOPPLER COMPLETE: CPT | Performed by: INTERNAL MEDICINE

## 2024-10-07 PROCEDURE — G8417 CALC BMI ABV UP PARAM F/U: HCPCS | Performed by: CLINICAL NURSE SPECIALIST

## 2024-10-07 RX ORDER — SACUBITRIL AND VALSARTAN 49; 51 MG/1; MG/1
1 TABLET, FILM COATED ORAL 2 TIMES DAILY
Qty: 60 TABLET | Refills: 0
Start: 2024-10-07

## 2024-10-07 RX ORDER — AMLODIPINE BESYLATE 5 MG/1
5 TABLET ORAL DAILY
Qty: 90 TABLET | Refills: 1 | Status: SHIPPED | OUTPATIENT
Start: 2024-10-07

## 2024-10-07 NOTE — PROGRESS NOTES
Mr. Valdemar Solis is a 74 y.o. y/o male with history of Atrial Fibrillation who presents today for anticoagulation monitoring and adjustment.    Pt is retired and works part time at [employer].     Patient reports he has been on warfarin for almost 30 years now and was managed by his cardiologist who retired. Pt was then managed by Dr. Ji prior to being established in Clinic  Pertinent PMH: HTN, DM, HLD, COPD, CHF    Patient Reported Findings:  Yes     No  [x]   []       Patient verifies current dosing regimen as listed- confirms   []   [x]       S/S bleeding/bruising/swelling/SOB -denies    []   [x]       Blood in urine or stool- denies  []   [x]       Procedures scheduled in the future at this time - none upcoming   []   [x]       Missed Dose - Denies   []   [x]       Extra Dose - Denies  []   [x]       Change in medications-  tylenol prn---> started jardiance. Stopped metformin---> extra tylenol --> farxiga and tylenol --> stopped tylenol ---> was on pred for 7 days starting 5/2 but finished >1 week ago --> increased vitamin D dose---> cut norvasc, inc entresto   []   [x]       Change in health/diet/appetite - reports he eats about 2 meals/day. And states he will have salads about 1-2x/wk. (not a big fan of spinach or brocolli but may have other greens)--> Patient states 3 salads in past 2 weeks---> no greens, wants to add green beans in twice weekly, no NVD --> returned to vit k a few times a week (green beans and salad) --> denies changes, but has not had vit k in a while (only eats salads that have vit k)---> diarrhea resolved. Had small salad last week, no other vit kNVD---> no greens, no NVD --> no changes, only green beans --> has had some salads recently --> no changes    []   [x]       Change in alcohol use - reports occasionally drinking beer (once every few months) but happens very infrequently.--> Patient reports no alcohol in past two weeks---> denies   []   [x]       Change in activity  []   [x]

## 2024-10-07 NOTE — PROGRESS NOTES
MD MANAV   sotalol (BETAPACE) 80 MG tablet Take 1 tablet by mouth 2 times daily 8/7/24  Yes Yane Wei MD   vitamin D3 (CHOLECALCIFEROL) 25 MCG (1000 UT) TABS tablet Take 2 tablets by mouth daily 7/30/24  Yes Zoraida Uribe APRN - CNS   furosemide (LASIX) 40 MG tablet Take 1.5 tablets by mouth daily 7/30/24  Yes Zoraida Uribe APRN - CNS   warfarin (COUMADIN) 10 MG tablet TAKE 1 TABLET EVERY DAY EXCEPT TAKE 1 AND 1/2 TABLETS ON MONDAY AND FRIDAY 7/18/24  Yes Jasper Raza MD   pravastatin (PRAVACHOL) 80 MG tablet TAKE 1 TABLET EVERY DAY 6/13/24  Yes Yane Wei MD   allopurinol (ZYLOPRIM) 300 MG tablet TAKE 1 TABLET EVERY DAY 1/15/24  Yes Yane Wei MD Handicap Placard MISC by Does not apply route 11/1/23  Yes Yane Wei MD Handicap Placard MISC by Does not apply route 11/1/23  Yes Yane Wei MD   Respiratory Therapy Supplies (NEBULIZER/TUBING/MOUTHPIECE) KIT 1 kit by Does not apply route daily as needed (wheezing / sob) 8/30/23  Yes Yane Wei MD   glucose monitoring kit (FREESTYLE) monitoring kit Ok to dispense what is covered by insurance. 7/9/20  Yes Benny Salas MD   blood glucose monitor strips Test 2 times a day & as needed for symptoms of irregular blood glucose. Dispense sufficient amount for indicated testing frequency plus additional to accommodate PRN testing needs. Ok to dispense what is covered by insurance 7/4/20  Yes Benny Salas MD   ACCU-CHEK COMPACT PLUS strip USE TO TEST 2 TIMES DAILY 6/23/20  Yes Benny Salas MD   Lancets MISC BID testing 2/24/17  Yes Benny Salas MD   aspirin 81 MG chewable tablet Take 1 tablet by mouth daily   Yes ProviderAbraham MD        Allergies:  Patient has no known allergies.     Review of Systems:   Constitutional: there has been no unanticipated weight loss. There's been no change in energy level, sleep pattern, or activity level.     Eyes: No visual changes or diplopia. No scleral icterus.  ENT: No

## 2024-10-07 NOTE — PATIENT INSTRUCTIONS
Cut norvasc to 5 mg once a day  Increase entresto to full 49-51 mg twice a day  Blood work today and again in 2 weeks  Weigh self daily and check BP record on sheet  RTO in 2 months  Will let you know echo results

## 2024-10-08 ENCOUNTER — TELEPHONE (OUTPATIENT)
Dept: CARDIOLOGY CLINIC | Age: 74
End: 2024-10-08

## 2024-10-08 LAB
ANION GAP SERPL CALCULATED.3IONS-SCNC: 12 MMOL/L (ref 3–16)
BUN SERPL-MCNC: 39 MG/DL (ref 7–20)
CALCIUM SERPL-MCNC: 9.9 MG/DL (ref 8.3–10.6)
CHLORIDE SERPL-SCNC: 102 MMOL/L (ref 99–110)
CO2 SERPL-SCNC: 28 MMOL/L (ref 21–32)
CREAT SERPL-MCNC: 1.9 MG/DL (ref 0.8–1.3)
GFR SERPLBLD CREATININE-BSD FMLA CKD-EPI: 37 ML/MIN/{1.73_M2}
GLUCOSE SERPL-MCNC: 154 MG/DL (ref 70–99)
NT-PROBNP SERPL-MCNC: 234 PG/ML (ref 0–124)
POTASSIUM SERPL-SCNC: 5 MMOL/L (ref 3.5–5.1)
SODIUM SERPL-SCNC: 142 MMOL/L (ref 136–145)

## 2024-10-08 NOTE — TELEPHONE ENCOUNTER
Called pt and relayed message below. Pt states verbal understanding.        Message from MEENU Seals sent at 10/8/2024 12:13 PM EDT -----  Echo showed LVEF 50-55%  Blood work is stable  Check blood work again in 2 weeks

## 2024-10-21 DIAGNOSIS — I50.22 CHRONIC SYSTOLIC HF (HEART FAILURE) (HCC): ICD-10-CM

## 2024-10-21 LAB
ANION GAP SERPL CALCULATED.3IONS-SCNC: 11 MMOL/L (ref 3–16)
BUN SERPL-MCNC: 44 MG/DL (ref 7–20)
CALCIUM SERPL-MCNC: 10 MG/DL (ref 8.3–10.6)
CHLORIDE SERPL-SCNC: 103 MMOL/L (ref 99–110)
CO2 SERPL-SCNC: 28 MMOL/L (ref 21–32)
CREAT SERPL-MCNC: 2.2 MG/DL (ref 0.8–1.3)
GFR SERPLBLD CREATININE-BSD FMLA CKD-EPI: 31 ML/MIN/{1.73_M2}
GLUCOSE SERPL-MCNC: 172 MG/DL (ref 70–99)
NT-PROBNP SERPL-MCNC: 231 PG/ML (ref 0–124)
POTASSIUM SERPL-SCNC: 5.1 MMOL/L (ref 3.5–5.1)
SODIUM SERPL-SCNC: 142 MMOL/L (ref 136–145)

## 2024-10-22 ENCOUNTER — TELEPHONE (OUTPATIENT)
Dept: CARDIOLOGY CLINIC | Age: 74
End: 2024-10-22

## 2024-10-22 DIAGNOSIS — I50.22 CHRONIC SYSTOLIC HF (HEART FAILURE) (HCC): Primary | ICD-10-CM

## 2024-10-22 RX ORDER — FUROSEMIDE 40 MG/1
40 TABLET ORAL DAILY
Qty: 135 TABLET | Refills: 0
Start: 2024-10-22

## 2024-10-22 NOTE — TELEPHONE ENCOUNTER
Updated cell phone number per pt's request.  He also requested cell phone be preferred phone number for us to contact him.

## 2024-10-22 NOTE — TELEPHONE ENCOUNTER
----- Message from MEENU Seals - CNS sent at 10/22/2024 12:13 PM EDT -----  His creat is up   Call and ask how his edema is in his legs since I cut back on the norvasc and increased his entresto  Need to cut his lasix to 40 mg once a day  Blood work again in 3 weeks    Tried to reach patient on all numbers on file no answer. Spoke to patient he verbalized understanding.

## 2024-11-01 NOTE — TELEPHONE ENCOUNTER
Medication:   Requested Prescriptions     Pending Prescriptions Disp Refills    albuterol sulfate HFA (PROVENTIL;VENTOLIN;PROAIR) 108 (90 Base) MCG/ACT inhaler [Pharmacy Med Name: ALBUTER HFA 90MCG(200)(D)] 1 g 0     Sig: INHALE 2 PUFFS INTO LUNGS EVERY 6 HOURS AS NEEDED (WHEEZING)        Last Filled:  5/15/2020    Patient Phone Number: [phone] (home)     Last appt: 5/2/2024   Next appt: 11/20/2024    Last OARRS:        No data to display

## 2024-11-04 ENCOUNTER — ANTI-COAG VISIT (OUTPATIENT)
Dept: PHARMACY | Age: 74
End: 2024-11-04
Payer: MEDICARE

## 2024-11-04 ENCOUNTER — TELEPHONE (OUTPATIENT)
Dept: PHARMACY | Age: 74
End: 2024-11-04

## 2024-11-04 DIAGNOSIS — I48.3 TYPICAL ATRIAL FLUTTER (HCC): Primary | ICD-10-CM

## 2024-11-04 LAB
INTERNATIONAL NORMALIZATION RATIO, POC: 2.3
PROTHROMBIN TIME, POC: 0

## 2024-11-04 PROCEDURE — 99211 OFF/OP EST MAY X REQ PHY/QHP: CPT | Performed by: PHYSICIAN ASSISTANT

## 2024-11-04 PROCEDURE — 85610 PROTHROMBIN TIME: CPT | Performed by: PHYSICIAN ASSISTANT

## 2024-11-04 RX ORDER — ALBUTEROL SULFATE 90 UG/1
INHALANT RESPIRATORY (INHALATION)
Qty: 1 G | Refills: 0 | Status: SHIPPED | OUTPATIENT
Start: 2024-11-04

## 2024-11-04 NOTE — PROGRESS NOTES
Mr. Valdemar Solis is a 74 y.o. y/o male with history of Atrial Fibrillation who presents today for anticoagulation monitoring and adjustment.    Pt is retired and works part time at [employer].     Patient reports he has been on warfarin for almost 30 years now and was managed by his cardiologist who retired. Pt was then managed by Dr. Ji prior to being established in Clinic  Pertinent PMH: HTN, DM, HLD, COPD, CHF    Patient Reported Findings:  Yes     No  [x]   []       Patient verifies current dosing regimen as listed- confirms   []   [x]       S/S bleeding/bruising/swelling/SOB -denies    []   [x]       Blood in urine or stool- denies  []   [x]       Procedures scheduled in the future at this time - none upcoming   []   [x]       Missed Dose - Denies   []   [x]       Extra Dose - Denies  []   [x]       Change in medications-  tylenol prn---> started jardiance. Stopped metformin---> extra tylenol --> farxiga and tylenol --> stopped tylenol ---> was on pred for 7 days starting 5/2 but finished >1 week ago --> increased vitamin D dose---> cut norvasc, inc entresto --> no changes   []   [x]       Change in health/diet/appetite - reports he eats about 2 meals/day. And states he will have salads about 1-2x/wk. (not a big fan of spinach or brocolli but may have other greens)--> Patient states 3 salads in past 2 weeks---> no greens, wants to add green beans in twice weekly, no NVD --> returned to vit k a few times a week (green beans and salad) --> denies changes, but has not had vit k in a while (only eats salads that have vit k)---> diarrhea resolved. Had small salad last week, no other vit kNVD---> no greens, no NVD --> no changes, only green beans --> has had some salads recently --> no changes    []   [x]       Change in alcohol use - reports occasionally drinking beer (once every few months) but happens very infrequently.--> Patient reports no alcohol in past two weeks---> denies   []   [x]       Change in

## 2024-11-06 RX ORDER — FUROSEMIDE 40 MG/1
TABLET ORAL
Qty: 135 TABLET | Refills: 1 | Status: SHIPPED | OUTPATIENT
Start: 2024-11-06

## 2024-11-08 ENCOUNTER — TELEPHONE (OUTPATIENT)
Dept: CARDIOLOGY CLINIC | Age: 74
End: 2024-11-08

## 2024-11-08 NOTE — TELEPHONE ENCOUNTER
[facility] patient assistance sent us a fax that the patient needs to sign up for the Extra help program through social security. Spoke to patient he will apply for LIS and let us know if he is denied and will bring the denial letter to our office to fax.

## 2024-11-11 DIAGNOSIS — F51.01 PRIMARY INSOMNIA: ICD-10-CM

## 2024-11-11 RX ORDER — TRAZODONE HYDROCHLORIDE 50 MG/1
TABLET, FILM COATED ORAL
Qty: 90 TABLET | Refills: 0 | Status: SHIPPED | OUTPATIENT
Start: 2024-11-11

## 2024-11-11 NOTE — TELEPHONE ENCOUNTER
Medication:   Requested Prescriptions     Pending Prescriptions Disp Refills    traZODone (DESYREL) 50 MG tablet [Pharmacy Med Name: traZODone HCl Oral Tablet 50 MG] 90 tablet 0     Sig: TAKE 1 OR 2 TABLETS AS NEEDED FOR SLEEP AS DIRECTED        Last Filled:  09/30/2024     Patient Phone Number: [phone] (home)     Last appt: 5/2/2024   Next appt: 11/20/2024    Last OARRS:        No data to display

## 2024-11-20 ENCOUNTER — OFFICE VISIT (OUTPATIENT)
Dept: FAMILY MEDICINE CLINIC | Age: 74
End: 2024-11-20

## 2024-11-20 VITALS
SYSTOLIC BLOOD PRESSURE: 128 MMHG | HEART RATE: 88 BPM | WEIGHT: 310 LBS | DIASTOLIC BLOOD PRESSURE: 70 MMHG | BODY MASS INDEX: 41.08 KG/M2 | OXYGEN SATURATION: 92 % | HEIGHT: 73 IN | TEMPERATURE: 98 F

## 2024-11-20 DIAGNOSIS — E78.5 HYPERLIPIDEMIA ASSOCIATED WITH TYPE 2 DIABETES MELLITUS (HCC): ICD-10-CM

## 2024-11-20 DIAGNOSIS — I50.20 HFREF (HEART FAILURE WITH REDUCED EJECTION FRACTION) (HCC): ICD-10-CM

## 2024-11-20 DIAGNOSIS — E11.29 TYPE 2 DIABETES MELLITUS WITH OTHER DIABETIC KIDNEY COMPLICATION, WITHOUT LONG-TERM CURRENT USE OF INSULIN (HCC): Primary | ICD-10-CM

## 2024-11-20 DIAGNOSIS — Z87.891 PERSONAL HISTORY OF TOBACCO USE: ICD-10-CM

## 2024-11-20 DIAGNOSIS — N18.30 HYPERTENSION ASSOCIATED WITH STAGE 3 CHRONIC KIDNEY DISEASE DUE TO TYPE 2 DIABETES MELLITUS (HCC): ICD-10-CM

## 2024-11-20 DIAGNOSIS — E11.69 HYPERLIPIDEMIA ASSOCIATED WITH TYPE 2 DIABETES MELLITUS (HCC): ICD-10-CM

## 2024-11-20 DIAGNOSIS — I12.9 HYPERTENSION ASSOCIATED WITH STAGE 3 CHRONIC KIDNEY DISEASE DUE TO TYPE 2 DIABETES MELLITUS (HCC): ICD-10-CM

## 2024-11-20 DIAGNOSIS — E11.22 HYPERTENSION ASSOCIATED WITH STAGE 3 CHRONIC KIDNEY DISEASE DUE TO TYPE 2 DIABETES MELLITUS (HCC): ICD-10-CM

## 2024-11-20 ASSESSMENT — ENCOUNTER SYMPTOMS
SHORTNESS OF BREATH: 0
BLURRED VISION: 0
ORTHOPNEA: 0

## 2024-11-20 NOTE — ASSESSMENT & PLAN NOTE
Check fu labs  Continue FArxiga   Encourage compliance with medication, life style changes      Orders:    Hemoglobin A1C; Future    Microalbumin / Creatinine Urine Ratio; Future

## 2024-11-20 NOTE — PROGRESS NOTES
7.5 last A1c done 7/2024  
ability to have lung surgery.    The patient  reports that he quit smoking about 5 years ago. His smoking use included cigarettes. He started smoking about 35 years ago. He has a 30 pack-year smoking history. He has never used smokeless tobacco.. Discussed with patient the risks and benefits of screening, including over-diagnosis, false positive rate, and total radiation exposure.  The patient currently exhibits no signs or symptoms suggestive of lung cancer.  Discussed with patient the importance of compliance with yearly annual lung cancer screenings and willingness to undergo diagnosis and treatment if screening scan is positive.  In addition, the patient was counseled regarding the importance of remaining smoke free and/or total smoking cessation.    Also reviewed the following if the patient has Medicare that as of February 10, 2022, Medicare only covers LDCT screening in patients aged 50-77 with at least a 20 pack-year smoking history who currently smoke or have quit in the last 15 years

## 2024-11-20 NOTE — ASSESSMENT & PLAN NOTE
Followed by cardiology   Continue : BB, entresto, furosemide   encourage compliance with low sodium diet

## 2024-11-27 DIAGNOSIS — E11.29 TYPE 2 DIABETES MELLITUS WITH OTHER DIABETIC KIDNEY COMPLICATION, WITHOUT LONG-TERM CURRENT USE OF INSULIN (HCC): ICD-10-CM

## 2024-11-27 DIAGNOSIS — I50.22 CHRONIC SYSTOLIC HF (HEART FAILURE) (HCC): ICD-10-CM

## 2024-11-27 LAB
ANION GAP SERPL CALCULATED.3IONS-SCNC: 11 MMOL/L (ref 3–16)
BUN SERPL-MCNC: 25 MG/DL (ref 7–20)
CALCIUM SERPL-MCNC: 9.6 MG/DL (ref 8.3–10.6)
CHLORIDE SERPL-SCNC: 107 MMOL/L (ref 99–110)
CO2 SERPL-SCNC: 26 MMOL/L (ref 21–32)
CREAT SERPL-MCNC: 1.6 MG/DL (ref 0.8–1.3)
GFR SERPLBLD CREATININE-BSD FMLA CKD-EPI: 45 ML/MIN/{1.73_M2}
GLUCOSE SERPL-MCNC: 148 MG/DL (ref 70–99)
NT-PROBNP SERPL-MCNC: 227 PG/ML (ref 0–124)
POTASSIUM SERPL-SCNC: 4.7 MMOL/L (ref 3.5–5.1)
SODIUM SERPL-SCNC: 144 MMOL/L (ref 136–145)

## 2024-11-27 RX ORDER — DAPAGLIFLOZIN 10 MG/1
10 TABLET, FILM COATED ORAL EVERY MORNING
Qty: 30 TABLET | Refills: 5 | Status: SHIPPED | OUTPATIENT
Start: 2024-11-27

## 2024-11-27 NOTE — TELEPHONE ENCOUNTER
Medication:   Requested Prescriptions     Pending Prescriptions Disp Refills    FARXIGA 10 MG tablet [Pharmacy Med Name: FARXIGA 10MG T(PAP)] 30 tablet 0     Sig: TAKE 1 TABLET BY MOUTH EVERY MORNING        Last Filled:  10/04/2024     Patient Phone Number: [phone] (home)     Last appt: 11/20/2024   Next appt: 5/20/2025    Last OARRS:        No data to display

## 2024-11-28 LAB
CREAT UR-MCNC: 86.1 MG/DL (ref 39–259)
EST. AVERAGE GLUCOSE BLD GHB EST-MCNC: 174.3 MG/DL
HBA1C MFR BLD: 7.7 %
MICROALBUMIN UR DL<=1MG/L-MCNC: 17.1 MG/DL
MICROALBUMIN/CREAT UR: 198.6 MG/G (ref 0–30)

## 2024-12-05 ENCOUNTER — TELEPHONE (OUTPATIENT)
Dept: PHARMACY | Age: 74
End: 2024-12-05

## 2024-12-09 ENCOUNTER — ANTI-COAG VISIT (OUTPATIENT)
Dept: PHARMACY | Age: 74
End: 2024-12-09
Payer: MEDICARE

## 2024-12-09 DIAGNOSIS — I48.3 TYPICAL ATRIAL FLUTTER (HCC): Primary | ICD-10-CM

## 2024-12-09 LAB
INTERNATIONAL NORMALIZATION RATIO, POC: 1.9
PROTHROMBIN TIME, POC: 0

## 2024-12-09 PROCEDURE — 85610 PROTHROMBIN TIME: CPT | Performed by: PHYSICIAN ASSISTANT

## 2024-12-09 PROCEDURE — 99211 OFF/OP EST MAY X REQ PHY/QHP: CPT | Performed by: PHYSICIAN ASSISTANT

## 2024-12-09 NOTE — PROGRESS NOTES
Mr. Valdemar Solis is a 74 y.o. y/o male with history of Atrial Fibrillation who presents today for anticoagulation monitoring and adjustment.    Pt is retired and works part time at [employer].     Patient reports he has been on warfarin for almost 30 years now and was managed by his cardiologist who retired. Pt was then managed by Dr. Ji prior to being established in Clinic  Pertinent PMH: HTN, DM, HLD, COPD, CHF    Patient Reported Findings:  Yes     No  [x]   []       Patient verifies current dosing regimen as listed- confirms   []   [x]       S/S bleeding/bruising/swelling/SOB -denies    []   [x]       Blood in urine or stool- denies  []   [x]       Procedures scheduled in the future at this time - none upcoming   []   [x]       Missed Dose - Denies   []   [x]       Extra Dose - Denies  []   [x]       Change in medications-  tylenol prn---> started jardiance. Stopped metformin---> extra tylenol --> farxiga and tylenol --> stopped tylenol ---> was on pred for 7 days starting 5/2 but finished >1 week ago --> increased vitamin D dose---> cut norvasc, inc entresto --> no changes   [x]   []       Change in health/diet/appetite - reports he eats about 2 meals/day. And states he will have salads about 1-2x/wk. (not a big fan of spinach or brocolli but may have other greens)--> Patient states 3 salads in past 2 weeks---> no greens, wants to add green beans in twice weekly, no NVD --> returned to vit k a few times a week (green beans and salad) --> denies changes, but has not had vit k in a while (only eats salads that have vit k)---> diarrhea resolved. Had small salad last week, no other vit kNVD---> no greens, no NVD --> no changes, only green beans --> has had some salads recently --> has been eating celery but no other vit k    []   [x]       Change in alcohol use - reports occasionally drinking beer (once every few months) but happens very infrequently.--> Patient reports no alcohol in past two weeks---> denies

## 2024-12-16 DIAGNOSIS — F51.01 PRIMARY INSOMNIA: ICD-10-CM

## 2024-12-16 RX ORDER — TRAZODONE HYDROCHLORIDE 50 MG/1
TABLET, FILM COATED ORAL
Qty: 90 TABLET | Refills: 0 | Status: SHIPPED | OUTPATIENT
Start: 2024-12-16

## 2024-12-16 NOTE — TELEPHONE ENCOUNTER
Patient called and request refill     Last office visit: 11/20/2024    Medication:   Requested Prescriptions     Pending Prescriptions Disp Refills    traZODone (DESYREL) 50 MG tablet 90 tablet 0     Sig: TAKE 1 OR 2 TABLETS AS NEEDED FOR SLEEP AS DIRECTED        Last Filled:      Patient Phone Number: [phone] (home)     Last appt: 11/20/2024   Next appt: 5/20/2025    Last OARRS:        No data to display

## 2024-12-30 RX ORDER — WARFARIN SODIUM 10 MG/1
10 TABLET ORAL DAILY
Qty: 30 TABLET | Refills: 3 | Status: SHIPPED | OUTPATIENT
Start: 2024-12-30

## 2024-12-30 NOTE — TELEPHONE ENCOUNTER
E-scribed warfarin 10 mg tablets 30 ds to [facility] [place].       Called and spoke to patient. Since took 5 mg on Sat and none yesterday, advised for him to take 15 mg tonight and tomorrow then continue to take 10 mg daily.

## 2024-12-30 NOTE — TELEPHONE ENCOUNTER
Medication:   Requested Prescriptions     Pending Prescriptions Disp Refills    allopurinol (ZYLOPRIM) 300 MG tablet [Pharmacy Med Name: Allopurinol Oral Tablet 300 MG] 90 tablet 3     Sig: TAKE 1 TABLET EVERY DAY        Last Filled:  01/15/2024     Patient Phone Number: [phone] (home)     Last appt: 11/20/2024   Next appt: 5/20/2025    Last OARRS:        No data to display

## 2025-01-03 RX ORDER — ALLOPURINOL 300 MG/1
300 TABLET ORAL DAILY
Qty: 90 TABLET | Refills: 3 | Status: SHIPPED | OUTPATIENT
Start: 2025-01-03

## 2025-01-08 ENCOUNTER — ANTI-COAG VISIT (OUTPATIENT)
Dept: PHARMACY | Age: 75
End: 2025-01-08
Payer: MEDICARE

## 2025-01-08 DIAGNOSIS — I48.3 TYPICAL ATRIAL FLUTTER (HCC): Primary | ICD-10-CM

## 2025-01-08 LAB
INTERNATIONAL NORMALIZATION RATIO, POC: 2.2
PROTHROMBIN TIME, POC: 0

## 2025-01-08 PROCEDURE — 85610 PROTHROMBIN TIME: CPT | Performed by: PHYSICIAN ASSISTANT

## 2025-01-08 PROCEDURE — 99211 OFF/OP EST MAY X REQ PHY/QHP: CPT | Performed by: PHYSICIAN ASSISTANT

## 2025-01-08 NOTE — PROGRESS NOTES
Mr. Valdemar Solis is a 74 y.o. y/o male with history of Atrial Fibrillation who presents today for anticoagulation monitoring and adjustment.    Pt is retired and works part time at [employer].     Patient reports he has been on warfarin for almost 30 years now and was managed by his cardiologist who retired. Pt was then managed by Dr. Ji prior to being established in Clinic  Pertinent PMH: HTN, DM, HLD, COPD, CHF    Patient Reported Findings:  Yes     No  [x]   []       Patient verifies current dosing regimen as listed- confirms   []   [x]       S/S bleeding/bruising/swelling/SOB -denies    []   [x]       Blood in urine or stool- denies  []   [x]       Procedures scheduled in the future at this time - none upcoming   [x]   []       Missed Dose - ran out of medicine 12/28, took 5 mg then missed one dose  []   [x]       Extra Dose - Denies  []   [x]       Change in medications-  tylenol prn---> started jardiance. Stopped metformin---> extra tylenol --> farxiga and tylenol --> stopped tylenol ---> was on pred for 7 days starting 5/2 but finished >1 week ago --> increased vitamin D dose---> cut norvasc, inc entresto --> no changes   []   [x]       Change in health/diet/appetite - reports he eats about 2 meals/day. And states he will have salads about 1-2x/wk. (not a big fan of spinach or brocolli but may have other greens)--> Patient states 3 salads in past 2 weeks---> no greens, wants to add green beans in twice weekly, no NVD --> returned to vit k a few times a week (green beans and salad) --> denies changes, but has not had vit k in a while (only eats salads that have vit k)---> diarrhea resolved. Had small salad last week, no other vit kNVD---> no greens, no NVD --> no changes, only green beans --> has had some salads recently --> has been eating celery but no other vit k --> no changes   []   [x]       Change in alcohol use - reports occasionally drinking beer (once every few months) but happens very

## 2025-01-10 ENCOUNTER — TELEPHONE (OUTPATIENT)
Dept: CARDIOLOGY CLINIC | Age: 75
End: 2025-01-10

## 2025-01-10 RX ORDER — SACUBITRIL AND VALSARTAN 49; 51 MG/1; MG/1
1 TABLET, FILM COATED ORAL 2 TIMES DAILY
Qty: 56 TABLET | Refills: 0 | COMMUNITY
Start: 2025-01-10

## 2025-01-10 NOTE — TELEPHONE ENCOUNTER
Spoke to [employer] patient assistance patient has a household of 5 and he needs to provide income for all family that has a income. [employer] stated he can have samples. Gave patient the phone number to call [email or web address]. Patient will  samples today.

## 2025-01-10 NOTE — TELEPHONE ENCOUNTER
Pt states he has not received any information from [employer] concerning his financial assistance for Entresto.  He has one does for tonight then he is out.    Please advise if we have received any information, and if he can have samples to keep him until his assistance is approved.    Medication Samples    Medication:  sacubitril-valsartan (ENTRESTO)   Dosage of the medication:  49-51 MG per tablet   How are you taking this medication (QD, BID, TID, QID, PRN):Take 1 tablet by mouth 2 times daily

## 2025-01-22 DIAGNOSIS — F51.01 PRIMARY INSOMNIA: ICD-10-CM

## 2025-01-23 NOTE — TELEPHONE ENCOUNTER
Medication:   Requested Prescriptions     Pending Prescriptions Disp Refills    traZODone (DESYREL) 50 MG tablet [Pharmacy Med Name: traZODone HCl Oral Tablet 50 MG] 90 tablet 0     Sig: TAKE 1 OR 2 TABLETS AS NEEDED FOR SLEEP AS DIRECTED        Last Filled:  12/16/2024     Patient Phone Number: [phone] (home)     Last appt: 11/20/2024   Next appt: 5/20/2025    Last OARRS:        No data to display

## 2025-01-24 RX ORDER — TRAZODONE HYDROCHLORIDE 50 MG/1
TABLET, FILM COATED ORAL
Qty: 90 TABLET | Refills: 0 | Status: SHIPPED | OUTPATIENT
Start: 2025-01-24

## 2025-01-28 ENCOUNTER — TELEPHONE (OUTPATIENT)
Dept: CARDIOLOGY CLINIC | Age: 75
End: 2025-01-28

## 2025-01-28 NOTE — TELEPHONE ENCOUNTER
Spoke to patient he will call [employer] patient assistance [phone] and select option#2 and speak to a representative and set up his first shipment. All refills needed he can use the automated selection.

## 2025-01-28 NOTE — TELEPHONE ENCOUNTER
Pt received letter form [facility] for the Entresto pt would like to know what the next step is how do they get the medications?    Pls advise

## 2025-02-05 ENCOUNTER — ANTI-COAG VISIT (OUTPATIENT)
Dept: PHARMACY | Age: 75
End: 2025-02-05
Payer: MEDICARE

## 2025-02-05 DIAGNOSIS — I48.3 TYPICAL ATRIAL FLUTTER (HCC): Primary | ICD-10-CM

## 2025-02-05 LAB
INTERNATIONAL NORMALIZATION RATIO, POC: 2.4
PROTHROMBIN TIME, POC: 0

## 2025-02-05 PROCEDURE — 99211 OFF/OP EST MAY X REQ PHY/QHP: CPT | Performed by: PHYSICIAN ASSISTANT

## 2025-02-05 PROCEDURE — 85610 PROTHROMBIN TIME: CPT | Performed by: PHYSICIAN ASSISTANT

## 2025-02-05 NOTE — PROGRESS NOTES
Mr. Valdemar Solis is a 74 y.o. y/o male with history of Atrial Fibrillation who presents today for anticoagulation monitoring and adjustment.    Pt is retired and works part time at [employer].     Patient reports he has been on warfarin for almost 30 years now and was managed by his cardiologist who retired. Pt was then managed by Dr. Ji prior to being established in Clinic  Pertinent PMH: HTN, DM, HLD, COPD, CHF    Patient Reported Findings:  Yes     No  [x]   []       Patient verifies current dosing regimen as listed- confirms   []   [x]       S/S bleeding/bruising/swelling/SOB -denies    []   [x]       Blood in urine or stool- denies  []   [x]       Procedures scheduled in the future at this time - none upcoming   [x]   []       Missed Dose - denies   []   [x]       Extra Dose - Denies  []   [x]       Change in medications-  tylenol prn---> extra tylenol --> increased vitamin D dose---> cut norvasc, inc entresto --> no changes   []   [x]       Change in health/diet/appetite - reports he eats about 2 meals/day. And states he will have salads about 1-2x/wk. (not a big fan of spinach or brocolli but may have other greens)--> Patient states 3 salads in past 2 weeks---> no greens, wants to add green beans in twice weekly, no NVD --> returned to vit k a few times a week (green beans and salad) --> denies changes, but has not had vit k in a while (only eats salads that have vit k)---> diarrhea resolved. Had small salad last week, no other vit kNVD---> no greens, no NVD --> no changes, only green beans --> has had some salads recently --> has been eating celery but no other vit k --> no changes   []   [x]       Change in alcohol use - reports occasionally drinking beer (once every few months) but happens very infrequently.--> Patient reports no alcohol in past two weeks---> denies recently   []   [x]       Change in activity  []   [x]       Hospital admission    []   [x]       Emergency department visit  []   [x]

## 2025-02-20 ENCOUNTER — TELEPHONE (OUTPATIENT)
Dept: CARDIOLOGY CLINIC | Age: 75
End: 2025-02-20

## 2025-02-20 RX ORDER — CHOLECALCIFEROL (VITAMIN D3) 25 MCG
2000 TABLET ORAL DAILY
Qty: 180 TABLET | Refills: 1 | Status: SHIPPED | OUTPATIENT
Start: 2025-02-20

## 2025-02-20 NOTE — TELEPHONE ENCOUNTER
Medication Refill    Medication needing refilled:    vitamin D3 (CHOLECALCIFEROL)   Dosage of the medication:  25 MCG (1000 UT) TABS tablet   How are you taking this medication (QD, BID, TID, QID, PRN):    30 or 90 day supply called in:  90  When will you run out of your medication:    Which Pharmacy are we sending the medication to?:  [facility] [place], [place] - [address] [place] [address] - P [phone] - F [phone]  [address] [place] [address]  Phone: [phone]  Fax: [phone]

## 2025-02-23 DIAGNOSIS — F51.01 PRIMARY INSOMNIA: ICD-10-CM

## 2025-02-24 RX ORDER — TRAZODONE HYDROCHLORIDE 50 MG/1
TABLET ORAL
Qty: 90 TABLET | Refills: 0 | Status: SHIPPED | OUTPATIENT
Start: 2025-02-24

## 2025-02-24 NOTE — TELEPHONE ENCOUNTER
Medication:   Requested Prescriptions     Pending Prescriptions Disp Refills    traZODone (DESYREL) 50 MG tablet [Pharmacy Med Name: traZODone HCl Oral Tablet 50 MG] 90 tablet 0     Sig: TAKE 1 OR 2 TABLETS AS NEEDED FOR SLEEP AS DIRECTED        Last Filled:  01/24/2025     Patient Phone Number: [phone] (home)     Last appt: 11/20/2024   Next appt: 5/20/2025    Last OARRS:        No data to display

## 2025-02-28 RX ORDER — SOTALOL HYDROCHLORIDE 80 MG/1
80 TABLET ORAL 2 TIMES DAILY
Qty: 60 TABLET | Refills: 2 | Status: SHIPPED | OUTPATIENT
Start: 2025-02-28

## 2025-02-28 NOTE — TELEPHONE ENCOUNTER
Medication:   Requested Prescriptions     Pending Prescriptions Disp Refills    sotalol (BETAPACE) 80 MG tablet [Pharmacy Med Name: SOTALOL 80MG T(D)] 60 tablet 2     Sig: TAKE 1 TABLET BY MOUTH 2 TIMES A DAY     Last Filled:  08/07/24    Last appt: 11/20/2024   Next appt: 5/20/2025    Last OARRS:        No data to display

## 2025-02-28 NOTE — TELEPHONE ENCOUNTER
rx transmitted electronically to the pharmacy via [employer]   Let patient know and verify pharmacy

## 2025-02-28 NOTE — TELEPHONE ENCOUNTER
Pt asked if Rx can be filled today.  He has to go downtown for other prescriptions tomorrow and would like to  all at one time

## 2025-03-05 ENCOUNTER — ANTI-COAG VISIT (OUTPATIENT)
Dept: PHARMACY | Age: 75
End: 2025-03-05
Payer: MEDICARE

## 2025-03-05 DIAGNOSIS — I48.3 TYPICAL ATRIAL FLUTTER (HCC): Primary | ICD-10-CM

## 2025-03-05 LAB
INTERNATIONAL NORMALIZATION RATIO, POC: 2.8
PROTHROMBIN TIME, POC: 0

## 2025-03-05 PROCEDURE — 99211 OFF/OP EST MAY X REQ PHY/QHP: CPT | Performed by: PHYSICIAN ASSISTANT

## 2025-03-05 PROCEDURE — 85610 PROTHROMBIN TIME: CPT | Performed by: PHYSICIAN ASSISTANT

## 2025-03-05 NOTE — PROGRESS NOTES
take 10 mg daily   Encouraged patient to maintain a consistent diet.  Recheck INR again in 4 weeks, 4/2    Did not want AVS (had MyChart)    **consent form signed 8/13/24    Referring physician is Dr. Raza  INR (no units)   Date Value   11/03/2022 2.42 (H)   11/02/2022 2.28 (H)   11/01/2022 2.59 (H)   10/31/2022 3.02 (H)     INR,(POC) (no units)   Date Value   02/05/2025 2.4   01/08/2025 2.2   12/09/2024 1.9   11/04/2024 2.3     For Pharmacy Admin Tracking Only    Total # of Interventions Recommended: 0  Total # of Interventions Accepted: 0  Time Spent (min): 15

## 2025-03-06 RX ORDER — AMLODIPINE BESYLATE 5 MG/1
5 TABLET ORAL DAILY
Qty: 30 TABLET | Refills: 0 | Status: SHIPPED | OUTPATIENT
Start: 2025-03-06

## 2025-03-06 NOTE — TELEPHONE ENCOUNTER
Talked with Drew and he states he gets this med free through [employer].  He did go ahead and make an appointment for 3/11 @ 10:30.

## 2025-03-11 ENCOUNTER — OFFICE VISIT (OUTPATIENT)
Dept: CARDIOLOGY CLINIC | Age: 75
End: 2025-03-11
Payer: MEDICARE

## 2025-03-11 ENCOUNTER — HOSPITAL ENCOUNTER (OUTPATIENT)
Age: 75
Discharge: HOME | End: 2025-03-11
Payer: MEDICARE

## 2025-03-11 ENCOUNTER — RESULTS FOLLOW-UP (OUTPATIENT)
Dept: GENERAL RADIOLOGY | Age: 75
End: 2025-03-11

## 2025-03-11 ENCOUNTER — HOSPITAL ENCOUNTER (OUTPATIENT)
Dept: GENERAL RADIOLOGY | Age: 75
Discharge: HOME | End: 2025-03-11
Payer: MEDICARE

## 2025-03-11 VITALS
WEIGHT: 314 LBS | HEART RATE: 67 BPM | OXYGEN SATURATION: 94 % | DIASTOLIC BLOOD PRESSURE: 70 MMHG | SYSTOLIC BLOOD PRESSURE: 118 MMHG | HEIGHT: 73 IN | BODY MASS INDEX: 41.62 KG/M2

## 2025-03-11 DIAGNOSIS — G47.33 OSA (OBSTRUCTIVE SLEEP APNEA): ICD-10-CM

## 2025-03-11 DIAGNOSIS — E55.9 VITAMIN D DEFICIENCY: ICD-10-CM

## 2025-03-11 DIAGNOSIS — D64.9 ANEMIA, UNSPECIFIED TYPE: ICD-10-CM

## 2025-03-11 DIAGNOSIS — I50.22 CHRONIC SYSTOLIC HF (HEART FAILURE) (HCC): Primary | ICD-10-CM

## 2025-03-11 DIAGNOSIS — E66.01 OBESITY, CLASS III, BMI 40-49.9 (MORBID OBESITY): ICD-10-CM

## 2025-03-11 DIAGNOSIS — R06.2 WHEEZING: ICD-10-CM

## 2025-03-11 DIAGNOSIS — I10 ESSENTIAL HYPERTENSION: ICD-10-CM

## 2025-03-11 DIAGNOSIS — I50.22 CHRONIC SYSTOLIC HF (HEART FAILURE) (HCC): ICD-10-CM

## 2025-03-11 PROCEDURE — 3078F DIAST BP <80 MM HG: CPT | Performed by: CLINICAL NURSE SPECIALIST

## 2025-03-11 PROCEDURE — 71046 X-RAY EXAM CHEST 2 VIEWS: CPT

## 2025-03-11 PROCEDURE — 1159F MED LIST DOCD IN RCRD: CPT | Performed by: CLINICAL NURSE SPECIALIST

## 2025-03-11 PROCEDURE — 3017F COLORECTAL CA SCREEN DOC REV: CPT | Performed by: CLINICAL NURSE SPECIALIST

## 2025-03-11 PROCEDURE — G8417 CALC BMI ABV UP PARAM F/U: HCPCS | Performed by: CLINICAL NURSE SPECIALIST

## 2025-03-11 PROCEDURE — 99214 OFFICE O/P EST MOD 30 MIN: CPT | Performed by: CLINICAL NURSE SPECIALIST

## 2025-03-11 PROCEDURE — 1036F TOBACCO NON-USER: CPT | Performed by: CLINICAL NURSE SPECIALIST

## 2025-03-11 PROCEDURE — G8427 DOCREV CUR MEDS BY ELIG CLIN: HCPCS | Performed by: CLINICAL NURSE SPECIALIST

## 2025-03-11 PROCEDURE — 3074F SYST BP LT 130 MM HG: CPT | Performed by: CLINICAL NURSE SPECIALIST

## 2025-03-11 PROCEDURE — 1123F ACP DISCUSS/DSCN MKR DOCD: CPT | Performed by: CLINICAL NURSE SPECIALIST

## 2025-03-11 PROCEDURE — 1160F RVW MEDS BY RX/DR IN RCRD: CPT | Performed by: CLINICAL NURSE SPECIALIST

## 2025-03-11 RX ORDER — FUROSEMIDE 40 MG/1
40 TABLET ORAL DAILY
Qty: 90 TABLET | Refills: 0
Start: 2025-03-11

## 2025-03-11 NOTE — PATIENT INSTRUCTIONS
Blood work and chest xray today  Continue all current medications  Needs an appt with Dr Cervantes  Talk with Dr Wei about zepbound for weight loss  RTO in 4 months

## 2025-03-11 NOTE — TELEPHONE ENCOUNTER
Medication and Quantity requested:   albuterol (PROVENTIL) (2.5 MG/3ML) 0.083% nebulizer solution      Last Visit  11/20/24    Pharmacy and phone number updated in [facility]:  yes  [facility]

## 2025-03-11 NOTE — PROGRESS NOTES
velocity of 2.1m/s and a mean gradient of 9mmHg.   -No evidence of aortic valve regurgitation.   -Trivial-mild mitral regurgitation.   -Mild tricuspid regurgitation.   -The right ventricle is normal in size with reduces function. TAPSE 1.9cm.   RVS velocity is 6.94 cm/s.   -Pacer/ICD right heart    Echo: 12/31/19   Summary   -Global hypokinesis with EF 40-45%.   -Abnormal septal motion.   -The aortic root and the ascending aorta are mildly dilated.   -The right ventricle appears to be dilated.   -RV systolic function appears to be reduced.   -The right atrium appears to be dilated.   -The mechanical artificial aortic valve appears well seated with a maximum   velocity of 2.40 m/s and a mean gradient of 12 mmHg. The aortic valve area   is estimated at 1.19 cm^2. No significant regurgitation noted.   -Thickened mitral valve without evidence of stenosis. There is   mild-to-moderate mitral regurgitation.   -There is moderate tricuspid regurgitation with a RVSP estimation of 43   mmHg.   -Pacer / ICD wire is visualized in the right heart.   -Indeterminate diastolic function.    Assessment:    1. Chronic systolic congestive heart failure (HCC) on arni, sglt2 and BB; no aldosterone antagonist due to hyperkalemia   2. Essential hypertension    3. ROSETTA (obstructive sleep apnea) bipap + 4 L of oxygen   4. Obesity (BMI 40.77)    5.    Vitamin d deficiency  6.    Anemia  7.    Wheezing     Plan:   Patient Instructions   Blood work and chest xray today  Continue all current medications  Needs an appt with Dr Cervantes  Talk with Dr Wei about zepbound for weight loss  RTO in 4 months    NYHA II    I appreciate the opportunity of cooperating in the care of this individual.    JOHN TAYLOR, MEENU - CNS, CNS, 3/11/2025, 12:31 PM

## 2025-03-12 ENCOUNTER — RESULTS FOLLOW-UP (OUTPATIENT)
Dept: CARDIOLOGY CLINIC | Age: 75
End: 2025-03-12

## 2025-03-12 LAB
ANION GAP SERPL CALCULATED.3IONS-SCNC: 11 MMOL/L (ref 3–16)
BUN SERPL-MCNC: 36 MG/DL (ref 7–20)
CALCIUM SERPL-MCNC: 10.1 MG/DL (ref 8.3–10.6)
CHLORIDE SERPL-SCNC: 104 MMOL/L (ref 99–110)
CO2 SERPL-SCNC: 28 MMOL/L (ref 21–32)
CREAT SERPL-MCNC: 2.1 MG/DL (ref 0.8–1.3)
DEPRECATED RDW RBC AUTO: 15.2 % (ref 12.4–15.4)
FERRITIN SERPL IA-MCNC: 57.8 NG/ML (ref 30–400)
GFR SERPLBLD CREATININE-BSD FMLA CKD-EPI: 32 ML/MIN/{1.73_M2}
GLUCOSE SERPL-MCNC: 156 MG/DL (ref 70–99)
HCT VFR BLD AUTO: 44.2 % (ref 40.5–52.5)
HGB BLD-MCNC: 14.4 G/DL (ref 13.5–17.5)
IRON SATN MFR SERPL: 23 % (ref 20–50)
IRON SERPL-MCNC: 81 UG/DL (ref 59–158)
MCH RBC QN AUTO: 30.9 PG (ref 26–34)
MCHC RBC AUTO-ENTMCNC: 32.6 G/DL (ref 31–36)
MCV RBC AUTO: 94.8 FL (ref 80–100)
NT-PROBNP SERPL-MCNC: 280 PG/ML (ref 0–124)
PLATELET # BLD AUTO: 178 K/UL (ref 135–450)
PMV BLD AUTO: 10.1 FL (ref 5–10.5)
POTASSIUM SERPL-SCNC: 4.8 MMOL/L (ref 3.5–5.1)
RBC # BLD AUTO: 4.66 M/UL (ref 4.2–5.9)
SODIUM SERPL-SCNC: 143 MMOL/L (ref 136–145)
TIBC SERPL-MCNC: 356 UG/DL (ref 260–445)
WBC # BLD AUTO: 8 K/UL (ref 4–11)

## 2025-03-12 NOTE — TELEPHONE ENCOUNTER
Medication:   Requested Prescriptions      No prescriptions requested or ordered in this encounter     Last Filled:  #1 on 11/04/24    Last appt: 11/20/2024   Next appt: 5/20/2025    Last OARRS:        No data to display

## 2025-03-12 NOTE — TELEPHONE ENCOUNTER
----- Message from MEENU Seals - CNS sent at 3/12/2025  9:54 AM EDT -----  He is iron deficient and recommend IV iron in infusion center  Other blood work and cxr were good  Thanks    Spoke to patient he is agreeable to the IV iron infusions.

## 2025-03-13 DIAGNOSIS — K90.9 INTESTINAL MALABSORPTION, UNSPECIFIED TYPE: ICD-10-CM

## 2025-03-13 DIAGNOSIS — D50.8 IRON DEFICIENCY ANEMIA SECONDARY TO INADEQUATE DIETARY IRON INTAKE: Primary | ICD-10-CM

## 2025-03-13 RX ORDER — SODIUM CHLORIDE 0.9 % (FLUSH) 0.9 %
5-40 SYRINGE (ML) INJECTION PRN
OUTPATIENT
Start: 2025-03-13

## 2025-03-13 RX ORDER — SODIUM CHLORIDE 9 MG/ML
5-250 INJECTION, SOLUTION INTRAVENOUS PRN
OUTPATIENT
Start: 2025-03-13

## 2025-03-13 RX ORDER — ALBUTEROL SULFATE 90 UG/1
2 INHALANT RESPIRATORY (INHALATION) EVERY 6 HOURS PRN
Qty: 1 EACH | Refills: 1 | Status: SHIPPED | OUTPATIENT
Start: 2025-03-13

## 2025-03-20 ENCOUNTER — OFFICE VISIT (OUTPATIENT)
Dept: PULMONOLOGY | Age: 75
End: 2025-03-20
Payer: MEDICARE

## 2025-03-20 VITALS
HEIGHT: 73 IN | WEIGHT: 315 LBS | SYSTOLIC BLOOD PRESSURE: 122 MMHG | DIASTOLIC BLOOD PRESSURE: 70 MMHG | OXYGEN SATURATION: 93 % | HEART RATE: 79 BPM | BODY MASS INDEX: 41.75 KG/M2

## 2025-03-20 DIAGNOSIS — J44.9 COPD, SEVERE (HCC): ICD-10-CM

## 2025-03-20 DIAGNOSIS — Z87.891 SMOKING HISTORY: Primary | ICD-10-CM

## 2025-03-20 PROCEDURE — G8427 DOCREV CUR MEDS BY ELIG CLIN: HCPCS | Performed by: INTERNAL MEDICINE

## 2025-03-20 PROCEDURE — 1123F ACP DISCUSS/DSCN MKR DOCD: CPT | Performed by: INTERNAL MEDICINE

## 2025-03-20 PROCEDURE — 1159F MED LIST DOCD IN RCRD: CPT | Performed by: INTERNAL MEDICINE

## 2025-03-20 PROCEDURE — 99214 OFFICE O/P EST MOD 30 MIN: CPT | Performed by: INTERNAL MEDICINE

## 2025-03-20 PROCEDURE — 1036F TOBACCO NON-USER: CPT | Performed by: INTERNAL MEDICINE

## 2025-03-20 PROCEDURE — 3074F SYST BP LT 130 MM HG: CPT | Performed by: INTERNAL MEDICINE

## 2025-03-20 PROCEDURE — 3078F DIAST BP <80 MM HG: CPT | Performed by: INTERNAL MEDICINE

## 2025-03-20 PROCEDURE — 3023F SPIROM DOC REV: CPT | Performed by: INTERNAL MEDICINE

## 2025-03-20 PROCEDURE — G8417 CALC BMI ABV UP PARAM F/U: HCPCS | Performed by: INTERNAL MEDICINE

## 2025-03-20 PROCEDURE — G2211 COMPLEX E/M VISIT ADD ON: HCPCS | Performed by: INTERNAL MEDICINE

## 2025-03-20 PROCEDURE — 3017F COLORECTAL CA SCREEN DOC REV: CPT | Performed by: INTERNAL MEDICINE

## 2025-03-20 RX ORDER — ALBUTEROL SULFATE 0.83 MG/ML
2.5 SOLUTION RESPIRATORY (INHALATION) EVERY 6 HOURS PRN
Qty: 120 EACH | Refills: 3 | Status: SHIPPED | OUTPATIENT
Start: 2025-03-20

## 2025-03-20 ASSESSMENT — ENCOUNTER SYMPTOMS
CHOKING: 0
CONSTIPATION: 0
BLOOD IN STOOL: 0
ABDOMINAL DISTENTION: 0
SINUS PRESSURE: 0
RHINORRHEA: 0
BACK PAIN: 0
VOICE CHANGE: 0
SORE THROAT: 0
WHEEZING: 0
COLOR CHANGE: 0
SHORTNESS OF BREATH: 1
COUGH: 1
CHEST TIGHTNESS: 0
STRIDOR: 0
ABDOMINAL PAIN: 0
APNEA: 0
DIARRHEA: 0
VOMITING: 0

## 2025-03-20 NOTE — PROGRESS NOTES
years Vaccine  Completed    AAA screen  Completed    Hepatitis A vaccine  Aged Out    Hepatitis B vaccine  Aged Out    Hib vaccine  Aged Out    Polio vaccine  Aged Out    Meningococcal (ACWY) vaccine  Aged Out    Meningococcal B vaccine  Aged Out    Hepatitis C screen  Discontinued        Assessment/Plan:     Diagnosis Orders   1. Smoking history  CT LUNG CANCER SCREENING (INITIAL/ANNUAL)      2. COPD, severe (HCC)  Full PFT Study With Bronchodilator         I suspect that patient's worsening dyspnea is most likely related to combination of severe COPD, CHF and morbid obesity.  Patient also appears very deconditioned.    Patient noted to have severe COPD based on PFT from December 2022, FEV1 of 1.52 L [43% predicted].  Currently already using Breztri inhaler 160, 2 puffs twice daily along with albuterol inhaler as needed.  In view of continued dyspnea, patient will benefit from a new nebulizer machine which will be organized for the patient along with albuterol.  Repeat PFT study will be requested in order to evaluate the current status of obstructive airway disease.    Patient will qualify for lung screening CT scan which will also be requested.    BiPAP compliance between December 2024 and March 2025, revealed 100% compliance with average usage of 8 hours and 16 minutes.  Average AHI of 1.3.    Return in about 1 month (around 4/20/2025).

## 2025-04-02 ENCOUNTER — ANTI-COAG VISIT (OUTPATIENT)
Dept: PHARMACY | Age: 75
End: 2025-04-02
Payer: MEDICARE

## 2025-04-02 ENCOUNTER — HOSPITAL ENCOUNTER (OUTPATIENT)
Dept: ONCOLOGY | Age: 75
Setting detail: INFUSION SERIES
Discharge: HOME | End: 2025-04-02
Payer: MEDICARE

## 2025-04-02 VITALS
RESPIRATION RATE: 16 BRPM | TEMPERATURE: 96 F | HEART RATE: 62 BPM | DIASTOLIC BLOOD PRESSURE: 72 MMHG | SYSTOLIC BLOOD PRESSURE: 113 MMHG

## 2025-04-02 DIAGNOSIS — I50.20 HFREF (HEART FAILURE WITH REDUCED EJECTION FRACTION) (HCC): ICD-10-CM

## 2025-04-02 DIAGNOSIS — K90.9 INTESTINAL MALABSORPTION, UNSPECIFIED TYPE: Primary | ICD-10-CM

## 2025-04-02 DIAGNOSIS — I48.3 TYPICAL ATRIAL FLUTTER (HCC): Primary | ICD-10-CM

## 2025-04-02 DIAGNOSIS — D50.8 IRON DEFICIENCY ANEMIA SECONDARY TO INADEQUATE DIETARY IRON INTAKE: ICD-10-CM

## 2025-04-02 LAB
INTERNATIONAL NORMALIZATION RATIO, POC: 2.3
PROTHROMBIN TIME, POC: 0

## 2025-04-02 PROCEDURE — 2580000003 HC RX 258: Performed by: CLINICAL NURSE SPECIALIST

## 2025-04-02 PROCEDURE — 96365 THER/PROPH/DIAG IV INF INIT: CPT

## 2025-04-02 PROCEDURE — 6360000002 HC RX W HCPCS: Performed by: CLINICAL NURSE SPECIALIST

## 2025-04-02 PROCEDURE — 85610 PROTHROMBIN TIME: CPT | Performed by: PHYSICIAN ASSISTANT

## 2025-04-02 PROCEDURE — 99211 OFF/OP EST MAY X REQ PHY/QHP: CPT

## 2025-04-02 PROCEDURE — 99211 OFF/OP EST MAY X REQ PHY/QHP: CPT | Performed by: PHYSICIAN ASSISTANT

## 2025-04-02 RX ORDER — SODIUM CHLORIDE 9 MG/ML
5-250 INJECTION, SOLUTION INTRAVENOUS PRN
Status: DISCONTINUED | OUTPATIENT
Start: 2025-04-02 | End: 2025-04-03 | Stop reason: HOSPADM

## 2025-04-02 RX ORDER — SODIUM CHLORIDE 9 MG/ML
5-250 INJECTION, SOLUTION INTRAVENOUS PRN
OUTPATIENT
Start: 2025-04-09

## 2025-04-02 RX ORDER — SODIUM CHLORIDE 0.9 % (FLUSH) 0.9 %
5-40 SYRINGE (ML) INJECTION PRN
Status: DISCONTINUED | OUTPATIENT
Start: 2025-04-02 | End: 2025-04-03 | Stop reason: HOSPADM

## 2025-04-02 RX ORDER — SODIUM CHLORIDE 0.9 % (FLUSH) 0.9 %
5-40 SYRINGE (ML) INJECTION PRN
OUTPATIENT
Start: 2025-04-09

## 2025-04-02 RX ADMIN — SODIUM CHLORIDE 125 MG: 9 INJECTION, SOLUTION INTRAVENOUS at 09:31

## 2025-04-02 RX ADMIN — SODIUM CHLORIDE 110 ML/HR: 9 INJECTION, SOLUTION INTRAVENOUS at 09:31

## 2025-04-02 NOTE — PROGRESS NOTES
Mr. Valdemar Solis is a 74 y.o. y/o male with history of Atrial Fibrillation who presents today for anticoagulation monitoring and adjustment.    Pt is retired and works part time at [employer].     Patient reports he has been on warfarin for almost 30 years now and was managed by his cardiologist who retired. Pt was then managed by Dr. Ji prior to being established in Clinic  Pertinent PMH: HTN, DM, HLD, COPD, CHF    Patient Reported Findings:  Yes     No  [x]   []       Patient verifies current dosing regimen as listed- confirms   []   [x]       S/S bleeding/bruising/swelling/SOB -denies    []   [x]       Blood in urine or stool- denies  []   [x]       Procedures scheduled in the future at this time - none upcoming   []   [x]       Missed Dose - denies   []   [x]       Extra Dose - Denies  []   [x]       Change in medications-  tylenol prn---> extra tylenol --> increased vitamin D dose---> cut norvasc, inc entresto --> no changes   []   [x]       Change in health/diet/appetite - reports he eats about 2 meals/day. And states he will have salads about 1-2x/wk. (not a big fan of spinach or brocolli but may have other greens)--> returned to vit k a few times a week (green beans and salad) --> no greens, no NVD --> no changes, only green beans --> has had some salads recently --> has been eating celery but no other vit k --> no changes   []   [x]       Change in alcohol use - reports occasionally drinking beer (once every few months) but happens very infrequently.--> Patient reports no alcohol in past two weeks---> denies recently   []   [x]       Change in activity  []   [x]       Hospital admission    []   [x]       Emergency department visit  []   [x]       Other complaints     Clinical Outcomes:  Yes     No  []   [x]       Major bleeding event  []   [x]       Thromboembolic event    Duration of warfarin Therapy: Indefinite   INR Range:  2.0-3.0   RF with [facility] mailorder.     INR today is 2.3  Continue to

## 2025-04-04 ENCOUNTER — RESULTS FOLLOW-UP (OUTPATIENT)
Dept: PULMONOLOGY | Age: 75
End: 2025-04-04

## 2025-04-04 ENCOUNTER — HOSPITAL ENCOUNTER (OUTPATIENT)
Dept: PULMONOLOGY | Age: 75
Discharge: HOME | End: 2025-04-04
Attending: INTERNAL MEDICINE
Payer: MEDICARE

## 2025-04-04 ENCOUNTER — HOSPITAL ENCOUNTER (OUTPATIENT)
Dept: CT IMAGING | Age: 75
Discharge: HOME | End: 2025-04-04
Attending: INTERNAL MEDICINE
Payer: MEDICARE

## 2025-04-04 VITALS — OXYGEN SATURATION: 96 % | HEART RATE: 65 BPM | RESPIRATION RATE: 18 BRPM

## 2025-04-04 DIAGNOSIS — J44.9 COPD, SEVERE (HCC): ICD-10-CM

## 2025-04-04 DIAGNOSIS — Z87.891 SMOKING HISTORY: ICD-10-CM

## 2025-04-04 LAB
DLCO %PRED: 54 %
DLCO PRED: NORMAL
DLCO/VA %PRED: NORMAL
DLCO/VA PRED: NORMAL
DLCO/VA: NORMAL
DLCO: NORMAL
EXPIRATORY TIME-POST: NORMAL
EXPIRATORY TIME: NORMAL
FEF 25-75 %CHNG: NORMAL
FEF 25-75 POST %PRED: NORMAL
FEF 25-75% %PRED-PRE: NORMAL
FEF 25-75% PRED: NORMAL
FEF 25-75-POST: NORMAL
FEF 25-75-PRE: NORMAL
FEV1 %PRED-POST: 40 %
FEV1 %PRED-PRE: 36 %
FEV1 PRED: NORMAL
FEV1-POST: NORMAL
FEV1-PRE: NORMAL
FEV1/FVC %PRED-POST: NORMAL
FEV1/FVC %PRED-PRE: NORMAL
FEV1/FVC PRED: NORMAL
FEV1/FVC-POST: 47 %
FEV1/FVC-PRE: 43 %
FVC %PRED-POST: NORMAL
FVC %PRED-PRE: NORMAL
FVC PRED: NORMAL
FVC-POST: NORMAL
FVC-PRE: NORMAL
GAW %PRED: NORMAL
GAW PRED: NORMAL
GAW: NORMAL
IC PRE %PRED: NORMAL
IC PRED: NORMAL
IC: NORMAL
MEP: NORMAL
MIP: NORMAL
MVV %PRED-PRE: NORMAL
MVV PRED: NORMAL
MVV-PRE: NORMAL
PEF %PRED-POST: NORMAL
PEF %PRED-PRE: NORMAL
PEF PRED: NORMAL
PEF%CHNG: NORMAL
PEF-POST: NORMAL
PEF-PRE: NORMAL
RAW %PRED: NORMAL
RAW PRED: NORMAL
RAW: NORMAL
RV PRE %PRED: NORMAL
RV PRED: NORMAL
RV: NORMAL
SVC %PRED: NORMAL
SVC PRED: NORMAL
SVC: NORMAL
TLC PRE %PRED: 65 %
TLC PRED: NORMAL
TLC: NORMAL
VA %PRED: NORMAL
VA PRED: NORMAL
VA: NORMAL
VTG %PRED: NORMAL
VTG PRED: NORMAL
VTG: NORMAL

## 2025-04-04 PROCEDURE — 94729 DIFFUSING CAPACITY: CPT

## 2025-04-04 PROCEDURE — 6370000000 HC RX 637 (ALT 250 FOR IP): Performed by: INTERNAL MEDICINE

## 2025-04-04 PROCEDURE — 94060 EVALUATION OF WHEEZING: CPT

## 2025-04-04 PROCEDURE — 71271 CT THORAX LUNG CANCER SCR C-: CPT

## 2025-04-04 PROCEDURE — 94726 PLETHYSMOGRAPHY LUNG VOLUMES: CPT

## 2025-04-04 PROCEDURE — 94760 N-INVAS EAR/PLS OXIMETRY 1: CPT

## 2025-04-04 RX ORDER — ALBUTEROL SULFATE 90 UG/1
4 INHALANT RESPIRATORY (INHALATION) ONCE
Status: COMPLETED | OUTPATIENT
Start: 2025-04-04 | End: 2025-04-04

## 2025-04-04 RX ADMIN — Medication 4 PUFF: at 07:41

## 2025-04-04 ASSESSMENT — PULMONARY FUNCTION TESTS
FEV1/FVC_POST: 47
FEV1_PERCENT_PREDICTED_PRE: 36
FEV1_PERCENT_PREDICTED_POST: 40
FEV1/FVC_PRE: 43

## 2025-04-09 ENCOUNTER — HOSPITAL ENCOUNTER (OUTPATIENT)
Dept: ONCOLOGY | Age: 75
Setting detail: INFUSION SERIES
Discharge: HOME | End: 2025-04-09
Payer: MEDICARE

## 2025-04-09 VITALS
HEART RATE: 62 BPM | SYSTOLIC BLOOD PRESSURE: 132 MMHG | TEMPERATURE: 97.2 F | RESPIRATION RATE: 16 BRPM | DIASTOLIC BLOOD PRESSURE: 74 MMHG

## 2025-04-09 DIAGNOSIS — F51.01 PRIMARY INSOMNIA: ICD-10-CM

## 2025-04-09 DIAGNOSIS — K90.9 INTESTINAL MALABSORPTION, UNSPECIFIED TYPE: Primary | ICD-10-CM

## 2025-04-09 DIAGNOSIS — D50.8 IRON DEFICIENCY ANEMIA SECONDARY TO INADEQUATE DIETARY IRON INTAKE: ICD-10-CM

## 2025-04-09 DIAGNOSIS — I50.20 HFREF (HEART FAILURE WITH REDUCED EJECTION FRACTION) (HCC): ICD-10-CM

## 2025-04-09 PROCEDURE — 96365 THER/PROPH/DIAG IV INF INIT: CPT

## 2025-04-09 PROCEDURE — 6360000002 HC RX W HCPCS: Performed by: CLINICAL NURSE SPECIALIST

## 2025-04-09 PROCEDURE — 99211 OFF/OP EST MAY X REQ PHY/QHP: CPT

## 2025-04-09 PROCEDURE — 2580000003 HC RX 258: Performed by: CLINICAL NURSE SPECIALIST

## 2025-04-09 PROCEDURE — 2500000003 HC RX 250 WO HCPCS: Performed by: CLINICAL NURSE SPECIALIST

## 2025-04-09 RX ORDER — SODIUM CHLORIDE 9 MG/ML
5-250 INJECTION, SOLUTION INTRAVENOUS PRN
Status: DISCONTINUED | OUTPATIENT
Start: 2025-04-09 | End: 2025-04-10 | Stop reason: HOSPADM

## 2025-04-09 RX ORDER — TRAZODONE HYDROCHLORIDE 50 MG/1
TABLET ORAL
Qty: 90 TABLET | Refills: 2 | Status: SHIPPED | OUTPATIENT
Start: 2025-04-09

## 2025-04-09 RX ORDER — SODIUM CHLORIDE 0.9 % (FLUSH) 0.9 %
5-40 SYRINGE (ML) INJECTION PRN
Status: CANCELLED | OUTPATIENT
Start: 2025-04-16

## 2025-04-09 RX ORDER — SODIUM CHLORIDE 9 MG/ML
5-250 INJECTION, SOLUTION INTRAVENOUS PRN
Status: CANCELLED | OUTPATIENT
Start: 2025-04-16

## 2025-04-09 RX ORDER — SODIUM CHLORIDE 0.9 % (FLUSH) 0.9 %
5-40 SYRINGE (ML) INJECTION PRN
Status: DISCONTINUED | OUTPATIENT
Start: 2025-04-09 | End: 2025-04-10 | Stop reason: HOSPADM

## 2025-04-09 RX ADMIN — SODIUM CHLORIDE 125 MG: 9 INJECTION, SOLUTION INTRAVENOUS at 09:25

## 2025-04-09 RX ADMIN — Medication 10 ML: at 09:24

## 2025-04-09 RX ADMIN — SODIUM CHLORIDE 110 ML/HR: 9 INJECTION, SOLUTION INTRAVENOUS at 09:24

## 2025-04-09 NOTE — PROGRESS NOTES
Pt to infusion center for 2nd of 5 doses of ferrlecit. Pt tolerated infusion well no s/s of an adverse reaction. Pt denied need for printed AVS. Ambulatory with cane at discharge. To return next week for same infusion.

## 2025-04-09 NOTE — TELEPHONE ENCOUNTER
Medication:   Requested Prescriptions     Pending Prescriptions Disp Refills    traZODone (DESYREL) 50 MG tablet [Pharmacy Med Name: traZODone HCl Oral Tablet 50 MG] 90 tablet 0     Sig: TAKE 1 OR 2 TABLETS AS NEEDED FOR SLEEP AS DIRECTED        Last Filled:  2/24/25    Patient Phone Number: [phone] (home)     Last appt: 11/20/2024   Next appt: 5/20/2025    Last OARRS:        No data to display

## 2025-04-16 ENCOUNTER — HOSPITAL ENCOUNTER (OUTPATIENT)
Dept: ONCOLOGY | Age: 75
Setting detail: INFUSION SERIES
Discharge: HOME | End: 2025-04-16
Payer: MEDICARE

## 2025-04-16 VITALS
TEMPERATURE: 97.1 F | DIASTOLIC BLOOD PRESSURE: 83 MMHG | SYSTOLIC BLOOD PRESSURE: 131 MMHG | RESPIRATION RATE: 16 BRPM | HEART RATE: 63 BPM

## 2025-04-16 DIAGNOSIS — D50.8 IRON DEFICIENCY ANEMIA SECONDARY TO INADEQUATE DIETARY IRON INTAKE: ICD-10-CM

## 2025-04-16 DIAGNOSIS — I50.20 HFREF (HEART FAILURE WITH REDUCED EJECTION FRACTION) (HCC): ICD-10-CM

## 2025-04-16 DIAGNOSIS — K90.9 INTESTINAL MALABSORPTION, UNSPECIFIED TYPE: Primary | ICD-10-CM

## 2025-04-16 PROCEDURE — 99211 OFF/OP EST MAY X REQ PHY/QHP: CPT

## 2025-04-16 PROCEDURE — 2580000003 HC RX 258: Performed by: CLINICAL NURSE SPECIALIST

## 2025-04-16 PROCEDURE — 96365 THER/PROPH/DIAG IV INF INIT: CPT

## 2025-04-16 PROCEDURE — 2500000003 HC RX 250 WO HCPCS: Performed by: CLINICAL NURSE SPECIALIST

## 2025-04-16 PROCEDURE — 6360000002 HC RX W HCPCS: Performed by: CLINICAL NURSE SPECIALIST

## 2025-04-16 RX ORDER — SODIUM CHLORIDE 9 MG/ML
5-250 INJECTION, SOLUTION INTRAVENOUS PRN
Status: CANCELLED | OUTPATIENT
Start: 2025-04-23

## 2025-04-16 RX ORDER — SODIUM CHLORIDE 9 MG/ML
5-250 INJECTION, SOLUTION INTRAVENOUS PRN
Status: DISCONTINUED | OUTPATIENT
Start: 2025-04-16 | End: 2025-04-17 | Stop reason: HOSPADM

## 2025-04-16 RX ORDER — SODIUM CHLORIDE 0.9 % (FLUSH) 0.9 %
5-40 SYRINGE (ML) INJECTION PRN
Status: CANCELLED | OUTPATIENT
Start: 2025-04-23

## 2025-04-16 RX ORDER — SODIUM CHLORIDE 0.9 % (FLUSH) 0.9 %
5-40 SYRINGE (ML) INJECTION PRN
Status: DISCONTINUED | OUTPATIENT
Start: 2025-04-16 | End: 2025-04-17 | Stop reason: HOSPADM

## 2025-04-16 RX ADMIN — SODIUM CHLORIDE 110 ML/HR: 9 INJECTION, SOLUTION INTRAVENOUS at 09:29

## 2025-04-16 RX ADMIN — SODIUM CHLORIDE 125 MG: 9 INJECTION, SOLUTION INTRAVENOUS at 09:30

## 2025-04-16 RX ADMIN — SODIUM CHLORIDE, PRESERVATIVE FREE 10 ML: 5 INJECTION INTRAVENOUS at 09:29

## 2025-04-16 NOTE — PROGRESS NOTES
Pt to infusion center for 3rd of 5 doses of ferrlecit. Pt tolerated infusion well no s/s of an adverse reaction. Pt denied need for printed AVS. Ambulatory with cane at discharge. To return next week for same infusion.

## 2025-04-22 ENCOUNTER — OFFICE VISIT (OUTPATIENT)
Dept: PULMONOLOGY | Age: 75
End: 2025-04-22
Payer: MEDICARE

## 2025-04-22 VITALS
HEIGHT: 73 IN | BODY MASS INDEX: 41.75 KG/M2 | HEART RATE: 85 BPM | OXYGEN SATURATION: 93 % | WEIGHT: 315 LBS | DIASTOLIC BLOOD PRESSURE: 74 MMHG | SYSTOLIC BLOOD PRESSURE: 122 MMHG

## 2025-04-22 DIAGNOSIS — J44.9 COPD, SEVERE (HCC): Primary | ICD-10-CM

## 2025-04-22 DIAGNOSIS — R06.09 DOE (DYSPNEA ON EXERTION): ICD-10-CM

## 2025-04-22 PROCEDURE — 3074F SYST BP LT 130 MM HG: CPT | Performed by: INTERNAL MEDICINE

## 2025-04-22 PROCEDURE — 99214 OFFICE O/P EST MOD 30 MIN: CPT | Performed by: INTERNAL MEDICINE

## 2025-04-22 PROCEDURE — 3023F SPIROM DOC REV: CPT | Performed by: INTERNAL MEDICINE

## 2025-04-22 PROCEDURE — 1036F TOBACCO NON-USER: CPT | Performed by: INTERNAL MEDICINE

## 2025-04-22 PROCEDURE — G8417 CALC BMI ABV UP PARAM F/U: HCPCS | Performed by: INTERNAL MEDICINE

## 2025-04-22 PROCEDURE — G2211 COMPLEX E/M VISIT ADD ON: HCPCS | Performed by: INTERNAL MEDICINE

## 2025-04-22 PROCEDURE — G8427 DOCREV CUR MEDS BY ELIG CLIN: HCPCS | Performed by: INTERNAL MEDICINE

## 2025-04-22 PROCEDURE — 3017F COLORECTAL CA SCREEN DOC REV: CPT | Performed by: INTERNAL MEDICINE

## 2025-04-22 PROCEDURE — 3078F DIAST BP <80 MM HG: CPT | Performed by: INTERNAL MEDICINE

## 2025-04-22 PROCEDURE — 1159F MED LIST DOCD IN RCRD: CPT | Performed by: INTERNAL MEDICINE

## 2025-04-22 PROCEDURE — 1123F ACP DISCUSS/DSCN MKR DOCD: CPT | Performed by: INTERNAL MEDICINE

## 2025-04-22 ASSESSMENT — ENCOUNTER SYMPTOMS
STRIDOR: 0
BACK PAIN: 0
SHORTNESS OF BREATH: 1
ABDOMINAL PAIN: 0
COLOR CHANGE: 0
BLOOD IN STOOL: 0
CHEST TIGHTNESS: 0
APNEA: 0
DIARRHEA: 0
SINUS PRESSURE: 0
VOMITING: 0
VOICE CHANGE: 0
CONSTIPATION: 0
RHINORRHEA: 0
WHEEZING: 0
ABDOMINAL DISTENTION: 0
SORE THROAT: 0
COUGH: 1
CHOKING: 0

## 2025-04-22 NOTE — PROGRESS NOTES
Valdemar Solis    YOB: 1950     Date of Service:  4/22/2025     Chief Complaint   Patient presents with    1 Month Follow-Up       HPI patient is here for the results of PFT.  Persistent dyspnea with exertion with poor exercise capacity and a cough with mucoid phlegm.  States that he has noticed some improvement though, particularly with cough and expectoration with use of albuterol nebulizer.    No Known Allergies  Outpatient Medications Marked as Taking for the 4/22/25 encounter (Office Visit) with Billy Ambrosio MD   Medication Sig Dispense Refill    traZODone (DESYREL) 50 MG tablet TAKE 1 OR 2 TABLETS AS NEEDED FOR SLEEP AS DIRECTED 90 tablet 2    albuterol (PROVENTIL) (2.5 MG/3ML) 0.083% nebulizer solution Take 3 mLs by nebulization every 6 hours as needed for Wheezing 120 each 3    albuterol sulfate HFA (PROVENTIL;VENTOLIN;PROAIR) 108 (90 Base) MCG/ACT inhaler Inhale 2 puffs into the lungs every 6 hours as needed for Wheezing 1 each 1    furosemide (LASIX) 40 MG tablet Take 1 tablet by mouth daily 90 tablet 0    amLODIPine (NORVASC) 5 MG tablet TAKE 1 TABLET EVERY DAY 30 tablet 0    sotalol (BETAPACE) 80 MG tablet TAKE 1 TABLET BY MOUTH 2 TIMES A DAY 60 tablet 2    vitamin D3 (CHOLECALCIFEROL) 25 MCG (1000 UT) TABS tablet Take 2 tablets by mouth daily 180 tablet 1    sacubitril-valsartan (ENTRESTO) 49-51 MG per tablet Take 1 tablet by mouth 2 times daily 56 tablet 0    allopurinol (ZYLOPRIM) 300 MG tablet TAKE 1 TABLET EVERY DAY 90 tablet 3    warfarin (COUMADIN) 10 MG tablet Take 1 tablet by mouth daily 30 tablet 3    dapagliflozin (FARXIGA) 10 MG tablet TAKE 1 TABLET BY MOUTH EVERY MORNING 30 tablet 5    Budeson-Glycopyrrol-Formoterol (BREZTRI AEROSPHERE) 160-9-4.8 MCG/ACT AERO INHALE 2 PUFFS INTO LUNGS 2 TIMES A DAY 1 g 5    warfarin (COUMADIN) 10 MG tablet TAKE 1 TABLET EVERY DAY EXCEPT TAKE 1 AND 1/2 TABLETS ON MONDAY AND FRIDAY 104 tablet 3    pravastatin (PRAVACHOL) 80 MG

## 2025-04-23 ENCOUNTER — HOSPITAL ENCOUNTER (OUTPATIENT)
Dept: ONCOLOGY | Age: 75
Setting detail: INFUSION SERIES
Discharge: HOME | End: 2025-04-23
Payer: MEDICARE

## 2025-04-23 VITALS
DIASTOLIC BLOOD PRESSURE: 77 MMHG | HEART RATE: 61 BPM | RESPIRATION RATE: 16 BRPM | TEMPERATURE: 96.9 F | SYSTOLIC BLOOD PRESSURE: 117 MMHG

## 2025-04-23 DIAGNOSIS — K90.9 INTESTINAL MALABSORPTION, UNSPECIFIED TYPE: Primary | ICD-10-CM

## 2025-04-23 DIAGNOSIS — D50.8 IRON DEFICIENCY ANEMIA SECONDARY TO INADEQUATE DIETARY IRON INTAKE: ICD-10-CM

## 2025-04-23 DIAGNOSIS — I50.20 HFREF (HEART FAILURE WITH REDUCED EJECTION FRACTION) (HCC): ICD-10-CM

## 2025-04-23 PROCEDURE — 96365 THER/PROPH/DIAG IV INF INIT: CPT

## 2025-04-23 PROCEDURE — 2500000003 HC RX 250 WO HCPCS: Performed by: CLINICAL NURSE SPECIALIST

## 2025-04-23 PROCEDURE — 99211 OFF/OP EST MAY X REQ PHY/QHP: CPT

## 2025-04-23 PROCEDURE — 2580000003 HC RX 258: Performed by: CLINICAL NURSE SPECIALIST

## 2025-04-23 PROCEDURE — 6360000002 HC RX W HCPCS: Performed by: CLINICAL NURSE SPECIALIST

## 2025-04-23 RX ORDER — SODIUM CHLORIDE 0.9 % (FLUSH) 0.9 %
5-40 SYRINGE (ML) INJECTION PRN
Status: DISCONTINUED | OUTPATIENT
Start: 2025-04-23 | End: 2025-04-24 | Stop reason: HOSPADM

## 2025-04-23 RX ORDER — SODIUM CHLORIDE 0.9 % (FLUSH) 0.9 %
5-40 SYRINGE (ML) INJECTION PRN
OUTPATIENT
Start: 2025-04-30

## 2025-04-23 RX ORDER — SODIUM CHLORIDE 9 MG/ML
5-250 INJECTION, SOLUTION INTRAVENOUS PRN
OUTPATIENT
Start: 2025-04-30

## 2025-04-23 RX ORDER — SODIUM CHLORIDE 9 MG/ML
5-250 INJECTION, SOLUTION INTRAVENOUS PRN
Status: DISCONTINUED | OUTPATIENT
Start: 2025-04-23 | End: 2025-04-24 | Stop reason: HOSPADM

## 2025-04-23 RX ADMIN — SODIUM CHLORIDE 110 ML/HR: 9 INJECTION, SOLUTION INTRAVENOUS at 09:27

## 2025-04-23 RX ADMIN — SODIUM CHLORIDE 125 MG: 9 INJECTION, SOLUTION INTRAVENOUS at 09:27

## 2025-04-23 RX ADMIN — Medication 10 ML: at 09:26

## 2025-04-23 NOTE — PROGRESS NOTES
Pt to infusion center for 4th of 5 doses of ferrlecit. Pt tolerated infusion well no s/s of an adverse reaction. Pt denied need for printed AVS. Ambulatory with cane at discharge. To return next week for same infusion.

## 2025-04-24 RX ORDER — SOTALOL HYDROCHLORIDE 80 MG/1
80 TABLET ORAL 2 TIMES DAILY
Qty: 60 TABLET | Refills: 3 | Status: SHIPPED | OUTPATIENT
Start: 2025-04-24

## 2025-04-24 RX ORDER — DAPAGLIFLOZIN 10 MG/1
10 TABLET, FILM COATED ORAL EVERY MORNING
Qty: 30 TABLET | Refills: 3 | Status: SHIPPED | OUTPATIENT
Start: 2025-04-24

## 2025-04-24 NOTE — TELEPHONE ENCOUNTER
Medication:   Requested Prescriptions     Pending Prescriptions Disp Refills    FARXIGA 10 MG tablet [Pharmacy Med Name: FARXIGA 10MG T(PAP)] 30 tablet 0     Sig: TAKE 1 TABLET BY MOUTH EVERY MORNING    sotalol (BETAPACE) 80 MG tablet [Pharmacy Med Name: SOTALOL 80MG T(D)] 60 tablet 0     Sig: TAKE 1 TABLET BY MOUTH 2 TIMES A DAY        Last Filled:  11/27/24 2/28/25    Patient Phone Number: [phone] (home)     Last appt: 11/20/2024   Next appt: 5/20/2025    Last OARRS:        No data to display

## 2025-04-30 ENCOUNTER — HOSPITAL ENCOUNTER (OUTPATIENT)
Dept: ONCOLOGY | Age: 75
Setting detail: INFUSION SERIES
End: 2025-04-30

## 2025-04-30 ENCOUNTER — ANTI-COAG VISIT (OUTPATIENT)
Dept: PHARMACY | Age: 75
End: 2025-04-30
Payer: MEDICARE

## 2025-04-30 DIAGNOSIS — I48.3 TYPICAL ATRIAL FLUTTER (HCC): Primary | ICD-10-CM

## 2025-04-30 LAB
INTERNATIONAL NORMALIZATION RATIO, POC: 2.3
PROTHROMBIN TIME, POC: 0

## 2025-04-30 PROCEDURE — 85610 PROTHROMBIN TIME: CPT | Performed by: PHYSICIAN ASSISTANT

## 2025-04-30 PROCEDURE — 99211 OFF/OP EST MAY X REQ PHY/QHP: CPT | Performed by: PHYSICIAN ASSISTANT

## 2025-04-30 NOTE — PROGRESS NOTES
Patients K+ 3.3. Ordered replacement per order set. 20 mEq IVPB ordered X 2. Will recheck once infused. daily   Encouraged patient to maintain a consistent diet.  Recheck INR again in 4 weeks, 5/28    Did not want AVS (had MyChart)    **consent form signed 8/13/24    Referring physician is Dr. Raza  INR (no units)   Date Value   11/03/2022 2.42 (H)   11/02/2022 2.28 (H)   11/01/2022 2.59 (H)   10/31/2022 3.02 (H)     INR,(POC) (no units)   Date Value   04/02/2025 2.3   03/05/2025 2.8   02/05/2025 2.4   01/08/2025 2.2     For Pharmacy Admin Tracking Only    Total # of Interventions Recommended: 0  Total # of Interventions Accepted: 0  Time Spent (min): 15

## 2025-05-21 RX ORDER — FUROSEMIDE 40 MG/1
TABLET ORAL
Qty: 135 TABLET | Refills: 0 | Status: SHIPPED | OUTPATIENT
Start: 2025-05-21

## 2025-05-21 RX ORDER — AMLODIPINE BESYLATE 5 MG/1
5 TABLET ORAL DAILY
Qty: 90 TABLET | Refills: 1 | Status: SHIPPED | OUTPATIENT
Start: 2025-05-21

## 2025-05-28 ENCOUNTER — APPOINTMENT (OUTPATIENT)
Dept: GENERAL RADIOLOGY | Age: 75
End: 2025-05-28
Payer: MEDICARE

## 2025-05-28 ENCOUNTER — APPOINTMENT (OUTPATIENT)
Dept: CT IMAGING | Age: 75
End: 2025-05-28
Payer: MEDICARE

## 2025-05-28 ENCOUNTER — ANTI-COAG VISIT (OUTPATIENT)
Dept: PHARMACY | Age: 75
End: 2025-05-28
Payer: MEDICARE

## 2025-05-28 ENCOUNTER — HOSPITAL ENCOUNTER (EMERGENCY)
Age: 75
Discharge: HOME | End: 2025-05-28
Attending: EMERGENCY MEDICINE
Payer: MEDICARE

## 2025-05-28 VITALS
OXYGEN SATURATION: 95 % | HEART RATE: 87 BPM | RESPIRATION RATE: 11 BRPM | WEIGHT: 315 LBS | DIASTOLIC BLOOD PRESSURE: 77 MMHG | BODY MASS INDEX: 42.27 KG/M2 | SYSTOLIC BLOOD PRESSURE: 139 MMHG | TEMPERATURE: 98 F

## 2025-05-28 DIAGNOSIS — R06.02 SHORTNESS OF BREATH: Primary | ICD-10-CM

## 2025-05-28 DIAGNOSIS — I48.3 TYPICAL ATRIAL FLUTTER (HCC): Primary | ICD-10-CM

## 2025-05-28 LAB
ALBUMIN SERPL-MCNC: 4.3 G/DL (ref 3.4–5)
ALBUMIN/GLOB SERPL: 1.2 {RATIO} (ref 1.1–2.2)
ALP SERPL-CCNC: 101 U/L (ref 40–129)
ALT SERPL-CCNC: 17 U/L (ref 10–40)
ANION GAP SERPL CALCULATED.3IONS-SCNC: 11 MMOL/L (ref 3–16)
AST SERPL-CCNC: 18 U/L (ref 15–37)
BASOPHILS # BLD: 0 K/UL (ref 0–0.2)
BASOPHILS NFR BLD: 0.5 %
BILIRUB SERPL-MCNC: 0.5 MG/DL (ref 0–1)
BUN SERPL-MCNC: 35 MG/DL (ref 7–20)
CALCIUM SERPL-MCNC: 10.1 MG/DL (ref 8.3–10.6)
CHLORIDE SERPL-SCNC: 101 MMOL/L (ref 99–110)
CO2 SERPL-SCNC: 27 MMOL/L (ref 21–32)
CREAT SERPL-MCNC: 1.9 MG/DL (ref 0.8–1.3)
DEPRECATED RDW RBC AUTO: 14.4 % (ref 12.4–15.4)
EOSINOPHIL # BLD: 0.1 K/UL (ref 0–0.6)
EOSINOPHIL NFR BLD: 0.8 %
GFR SERPLBLD CREATININE-BSD FMLA CKD-EPI: 37 ML/MIN/{1.73_M2}
GLUCOSE SERPL-MCNC: 163 MG/DL (ref 70–99)
HCT VFR BLD AUTO: 41 % (ref 40.5–52.5)
HGB BLD-MCNC: 13.8 G/DL (ref 13.5–17.5)
INR PPP: 2.56 (ref 0.85–1.15)
INTERNATIONAL NORMALIZATION RATIO, POC: 3.1
LYMPHOCYTES # BLD: 0.4 K/UL (ref 1–5.1)
LYMPHOCYTES NFR BLD: 5.3 %
MCH RBC QN AUTO: 30.9 PG (ref 26–34)
MCHC RBC AUTO-ENTMCNC: 33.7 G/DL (ref 31–36)
MCV RBC AUTO: 91.6 FL (ref 80–100)
MONOCYTES # BLD: 0.7 K/UL (ref 0–1.3)
MONOCYTES NFR BLD: 9.3 %
NEUTROPHILS # BLD: 6.2 K/UL (ref 1.7–7.7)
NEUTROPHILS NFR BLD: 84.1 %
NT-PROBNP SERPL-MCNC: 414 PG/ML (ref 0–449)
PLATELET # BLD AUTO: 142 K/UL (ref 135–450)
PMV BLD AUTO: 8.4 FL (ref 5–10.5)
POTASSIUM SERPL-SCNC: 4.4 MMOL/L (ref 3.5–5.1)
PROT SERPL-MCNC: 7.8 G/DL (ref 6.4–8.2)
PROTHROMBIN TIME, POC: 0
PROTHROMBIN TIME: 27.5 SEC (ref 11.9–14.9)
RBC # BLD AUTO: 4.47 M/UL (ref 4.2–5.9)
SODIUM SERPL-SCNC: 139 MMOL/L (ref 136–145)
TROPONIN, HIGH SENSITIVITY: 20 NG/L (ref 0–22)
TROPONIN, HIGH SENSITIVITY: 23 NG/L (ref 0–22)
WBC # BLD AUTO: 7.4 K/UL (ref 4–11)

## 2025-05-28 PROCEDURE — 85610 PROTHROMBIN TIME: CPT | Performed by: PHYSICIAN ASSISTANT

## 2025-05-28 PROCEDURE — 85025 COMPLETE CBC W/AUTO DIFF WBC: CPT

## 2025-05-28 PROCEDURE — 71250 CT THORAX DX C-: CPT

## 2025-05-28 PROCEDURE — 6370000000 HC RX 637 (ALT 250 FOR IP): Performed by: PHYSICIAN ASSISTANT

## 2025-05-28 PROCEDURE — 2500000003 HC RX 250 WO HCPCS: Performed by: PHYSICIAN ASSISTANT

## 2025-05-28 PROCEDURE — 99211 OFF/OP EST MAY X REQ PHY/QHP: CPT | Performed by: PHYSICIAN ASSISTANT

## 2025-05-28 PROCEDURE — 84484 ASSAY OF TROPONIN QUANT: CPT

## 2025-05-28 PROCEDURE — 71045 X-RAY EXAM CHEST 1 VIEW: CPT

## 2025-05-28 PROCEDURE — 80053 COMPREHEN METABOLIC PANEL: CPT

## 2025-05-28 PROCEDURE — 93005 ELECTROCARDIOGRAM TRACING: CPT | Performed by: EMERGENCY MEDICINE

## 2025-05-28 PROCEDURE — 83880 ASSAY OF NATRIURETIC PEPTIDE: CPT

## 2025-05-28 PROCEDURE — 6360000002 HC RX W HCPCS: Performed by: PHYSICIAN ASSISTANT

## 2025-05-28 PROCEDURE — 36415 COLL VENOUS BLD VENIPUNCTURE: CPT

## 2025-05-28 PROCEDURE — 85610 PROTHROMBIN TIME: CPT

## 2025-05-28 PROCEDURE — 94640 AIRWAY INHALATION TREATMENT: CPT

## 2025-05-28 RX ORDER — IPRATROPIUM BROMIDE AND ALBUTEROL SULFATE 2.5; .5 MG/3ML; MG/3ML
1 SOLUTION RESPIRATORY (INHALATION) ONCE
Status: COMPLETED | OUTPATIENT
Start: 2025-05-28 | End: 2025-05-28

## 2025-05-28 RX ORDER — PREDNISONE 10 MG/1
60 TABLET ORAL DAILY
Qty: 30 TABLET | Refills: 0 | Status: ON HOLD | OUTPATIENT
Start: 2025-05-28 | End: 2025-06-04 | Stop reason: HOSPADM

## 2025-05-28 RX ADMIN — IPRATROPIUM BROMIDE AND ALBUTEROL SULFATE 1 DOSE: .5; 3 SOLUTION RESPIRATORY (INHALATION) at 20:45

## 2025-05-28 RX ADMIN — WATER 125 MG: 1 INJECTION INTRAMUSCULAR; INTRAVENOUS; SUBCUTANEOUS at 20:21

## 2025-05-28 ASSESSMENT — ENCOUNTER SYMPTOMS
DIARRHEA: 0
COLOR CHANGE: 0
ABDOMINAL DISTENTION: 0
VOMITING: 0
ABDOMINAL PAIN: 0
BACK PAIN: 0
NAUSEA: 0
CONSTIPATION: 0
SHORTNESS OF BREATH: 1
CHEST TIGHTNESS: 1
COUGH: 1

## 2025-05-28 ASSESSMENT — PAIN DESCRIPTION - DESCRIPTORS: DESCRIPTORS: STABBING;SHARP

## 2025-05-28 ASSESSMENT — PAIN DESCRIPTION - ORIENTATION: ORIENTATION: LEFT

## 2025-05-28 ASSESSMENT — PAIN - FUNCTIONAL ASSESSMENT: PAIN_FUNCTIONAL_ASSESSMENT: 0-10

## 2025-05-28 ASSESSMENT — PAIN DESCRIPTION - LOCATION: LOCATION: CHEST

## 2025-05-28 ASSESSMENT — PAIN DESCRIPTION - PAIN TYPE: TYPE: ACUTE PAIN

## 2025-05-28 ASSESSMENT — PAIN SCALES - GENERAL: PAINLEVEL_OUTOF10: 9

## 2025-05-28 NOTE — PROGRESS NOTES
Mr. Valdemar Solis is a 75 y.o. y/o male with history of Atrial Fibrillation who presents today for anticoagulation monitoring and adjustment.    Pt is retired and works part time at [employer].     Patient reports he has been on warfarin for almost 30 years now and was managed by his cardiologist who retired. Pt was then managed by Dr. Ji prior to being established in Clinic  Pertinent PMH: HTN, DM, HLD, COPD, CHF    Patient Reported Findings:  Yes     No  [x]   []       Patient verifies current dosing regimen as listed- confirms   []   [x]       S/S bleeding/bruising/swelling/SOB -denies    []   [x]       Blood in urine or stool- denies  []   [x]       Procedures scheduled in the future at this time - none upcoming   []   [x]       Missed Dose - denies   []   [x]       Extra Dose - Denies  [x]   []       Change in medications-  tylenol prn---> increased vitamin D dose---> cut norvasc, inc entresto --> took tylenol twice this week   []   [x]       Change in health/diet/appetite - reports he eats about 2 meals/day. And states he will have salads about 1-2x/wk. (not a big fan of spinach or brocolli but may have other greens)--> returned to vit k a few times a week (green beans and salad) --> no greens, no NVD --> no changes, only green beans --> has had some salads recently --> has been eating celery but no other vit k --> no changes   []   [x]       Change in alcohol use - reports occasionally drinking beer (once every few months) but happens very infrequently.--> Patient reports no alcohol in past two weeks---> denies recently   []   [x]       Change in activity  []   [x]       Hospital admission    []   [x]       Emergency department visit  []   [x]       Other complaints     Clinical Outcomes:  Yes     No  []   [x]       Major bleeding event  []   [x]       Thromboembolic event    Duration of warfarin Therapy: Indefinite   INR Range:  2.0-3.0   RF with [facility].     INR today is 3.1  Continue to

## 2025-05-28 NOTE — ED NOTES
Uses home O2 at 4 liters. Portable tank ran out today. Placed on wall oxygen at 4 liters. Pt was not hypoxic on room air.

## 2025-05-28 NOTE — ED PROVIDER NOTES
The Ekg interpreted by me in the absence of a cardiologist shows.  Sinus rhythm with a ventricular rate of 77.  Left axis deviation.  QTc is appropriate.  There are right bundle branch block changes present.  There are T wave inversions in the high lateral leads.  There is no significant change in morphology from prior 10-.    I only performed EKG interpretation and was not otherwise involved in the care of this patient.       Yris Neal MD  05/28/25 0828

## 2025-05-29 NOTE — ED PROVIDER NOTES
[facility] EMERGENCY DEPARTMENT  EMERGENCY DEPARTMENT ENCOUNTER        Pt Name: Valdemar Solis  MRN: [id number]  Birthdate 1950  Date of evaluation: 5/28/2025  Provider: LUIS F Burnett  PCP: Yane Wei MD  Note Started: 10:59 PM EDT 5/28/25       I have seen and evaluated this patient with my supervising physician Diaz      CHIEF COMPLAINT       Chief Complaint   Patient presents with    Chest Pain     Left sided pain under arm across low back over last 3 days, long cardiac history. Short of breath at rest.       HISTORY OF PRESENT ILLNESS: 1 or more Elements     History From: Patient  Limitations to history : None    Valdemar Solis is a 75 y.o. male with past medical history of CHF, hypertension, atrial fibrillation, obstructive sleep apnea, hypertension, hyperlipidemia, COPD, prior aortic valve replacement on Coumadin, CKD who presents ED with complaint of chest pain.  He reports for the past 3 days he has had increasing shortness of breath.  Also some tightness under his left-sided chest.  He reports pain worse with coughing and palpation.  He denies any pleuritic discomfort or hemoptysis.  Denies orthopnea, pedal edema or calf tenderness.  Denies abdominal pain or distention.  Denies nausea or vomiting.  Denies urinary symptoms or changes in bowel movements.  No back pain or flank pain.  No lightheadedness, dizziness, syncope or near syncope.  No diaphoresis.  He reports he is anticoagulated with Coumadin.  INR just checked today reports was normal.  He denies any missed or skipped dosages of his Coumadin    Nursing Notes were all reviewed and agreed with or any disagreements were addressed in the HPI.    REVIEW OF SYSTEMS :      Review of Systems   Constitutional:  Negative for activity change, appetite change, chills, diaphoresis, fatigue and fever.   Respiratory:  Positive for cough, chest tightness and shortness of breath.    Cardiovascular:  Positive for chest pain.  reasonably achievable. COMPARISON: 04/04/2025 HISTORY: ORDERING SYSTEM PROVIDED HISTORY: abnrml cxr TECHNOLOGIST PROVIDED HISTORY: Reason for exam:->abnrml cxr Decision Support Exception - unselect if not a suspected or confirmed emergency medical condition->Emergency Medical Condition (MA) Reason for Exam: abnrml cxr FINDINGS: Mediastinum: There is no enlarged lymph node.  The heart and great vessels sizes are normal.  There is no pericardial effusion. There is an implanted cardiac conduction device.  Aortic valve replacement.  Pericardial scattered calcifications. Lungs/pleura: Severe collapse of the mainstem bronchi, most notably of the right mainstem bronchus.  Transverse collapse of the trachea by approximately 60%.  Subsegmental right lower lobe bronchial occlusions with subsegmental mild atelectasis.  Right basilar calcified pleural plaques.  Mild upper lobe predominant emphysema.  No acute consolidation.  No pleural effusion. Upper Abdomen: Cholelithiasis without evidence of acute cholecystitis. Soft Tissues/Bones: No acute osseous abnormality.     No acute pulmonary abnormality nor evidence of pneumonia. Chronic tracheobronchomalacia. Chronic right pleural plaques and basilar scarring/atelectasis.     XR CHEST PORTABLE  Result Date: 5/28/2025  EXAMINATION: ONE XRAY VIEW OF THE CHEST 5/28/2025 6:56 pm COMPARISON: 03/11/2025 HISTORY: ORDERING SYSTEM PROVIDED HISTORY: sob TECHNOLOGIST PROVIDED HISTORY: Reason for exam:->sob Reason for Exam: sob FINDINGS: There are hypoventilatory changes in the lung bases.  Basal pneumonia cannot be excluded.  Upper lung zones appear clear.  There is cardiomegaly.  Pacer leads are in good position.  Bony structures unremarkable.     1. Hypoventilatory changes in the lung bases. Basal pneumonia cannot be excluded. 2. Cardiomegaly.         ED Provider US Interpretation.    No results found.    PROCEDURES   Unless otherwise noted below, none     Procedures      CRITICAL CARE TIME

## 2025-05-29 NOTE — ED PROVIDER NOTES
I have personally performed a face to face diagnostic evaluation on this patient. I have fully participated in the care of this patient I personally saw the patient and performed a substantive portion of the visit including all aspects of the medical decision making.  I have reviewed and agree with all pertinent clinical information including history, physical exam, diagnostic tests, and the plan.      HPI: Valdemar Solis presented with shortness of breath and chest pain.  History of COPD, CHF, cardiac abnormality previous history of atrial flutter AICD in place.  Patient is also had some left-sided chest pain worse with coughing.  Patient is anticoagulated history of prior aortic valve replacement.  Symptoms are worse over the last 3 days.  Chief Complaint   Patient presents with    Chest Pain     Left sided pain under arm across low back over last 3 days, long cardiac history. Short of breath at rest.      Review of Systems: See SHERRON note  Vital Signs: /77   Pulse 87   Temp 98 °F (36.7 °C) (Oral)   Resp 11   Wt (!) 145.3 kg (320 lb 6.4 oz)   SpO2 95%   BMI 42.27 kg/m²     Alert 75 y.o. male who does not appear toxic or acutely ill  HENT: Atraumatic, oral mucosa moist  Neck: Grossly normal ROM  Chest/Lungs:, Coarse breath sounds bilaterally with expiratory wheezing  Musculoskeletal: Grossly normal ROM  Skin: No palor or diaphoresis    Medical Decision Making and Plan:  Pertinent Labs & Imaging studies reviewed. (See SHERRON chart for details)  I agree with SHERRON assessment and plan.  75-year-old male presents for shortness of breath and chest pain.  History of COPD CHF cardiac disease most recent cardiac cath was in 2020.  Does have left-sided reproducible chest pain initial troponin is elevated repeat is somewhat improved nontrending  chest x-ray does not appear severely fluid overloaded mild cardiomegaly renal function is at his baseline.  After single DuoNeb and Solu-Medrol patient still has  severely abnormal lung findings.  Patient is saturating well on 4 L but would recommend admission for further breathing treatments as well as cardiac observation.  Patient is choosing to leave AGAINST MEDICAL ADVICE    I have recommended continued evaluation, but Shante Ruff declines. The risks (including but not limited to suffering and death) as well as the benefits were explained to the patient. Questions were sought and answered, the patient voiced understanding and accepts these risks. I have encouraged the patient to return to have their evaluation completed as we are glad to do so. I have also instructed Shante Ruff on the importance of follow-up and to return for any worsening or worrisome concerns. Shante Ruff appears to have capacity to make medical decisions at this time. AMA form signed and placed on the chart.      I personally saw the patient and independently provided 0 minutes of nonconcurrent critical care out of the total shared critical care time provided    This includes multiple reevaluations, vital sign monitoring, pulse oximetry monitoring, telemetry monitoring, clinical response to the IV medications, reviewing the nursing notes, consultation time, dictation/documentation time, and interpretation of the labwork. (This time excludes time spent performing procedures).      EKG: All EKG's are interpreted by the Emergency Department Physician who either signs or Co-signs this chart in the absence of a cardiologist.    EKG Interpretation    Interpreted by emergency department physician    Rhythm:  Sinus rhythm with premature supraventricular complex  Rate: normal  Axis: left  Ectopy: none  Conduction: right bundle branch block (complete)  ST Segments: nonspecific changes  T Waves: non specific changes  Q Waves: none    Clinical Impression: sinus rhythm with premature ventricular complex with left axis deviation right bundle branch block with nonseptic T wave changes.   Interpreted by myself    MD Diaz Sebastian Stephen W, MD  05/28/25 2809

## 2025-05-30 ENCOUNTER — TELEPHONE (OUTPATIENT)
Dept: PHARMACY | Age: 75
End: 2025-05-30

## 2025-05-30 LAB
EKG ATRIAL RATE: 77 BPM
EKG DIAGNOSIS: NORMAL
EKG P-R INTERVAL: 168 MS
EKG Q-T INTERVAL: 406 MS
EKG QRS DURATION: 156 MS
EKG QTC CALCULATION (BAZETT): 459 MS
EKG R AXIS: -41 DEGREES
EKG T AXIS: 91 DEGREES
EKG VENTRICULAR RATE: 77 BPM

## 2025-05-30 PROCEDURE — 93010 ELECTROCARDIOGRAM REPORT: CPT | Performed by: INTERNAL MEDICINE

## 2025-05-30 NOTE — TELEPHONE ENCOUNTER
Pt in ER 5/28 for SOB and CP (scans all normal), INR was 2.56, received prednisone for 5 days. Called patient to discuss, take 5mg today, 5/30, then continue normal dose.     Earnestine Shay, PharmD, [facility]  For Pharmacy Admin Tracking Only    Intervention Detail: Dose Adjustment: 1, reason: Interaction  Total # of Interventions Recommended: 1  Total # of Interventions Accepted: 1  Time Spent (min): 15

## 2025-05-31 ENCOUNTER — APPOINTMENT (OUTPATIENT)
Dept: GENERAL RADIOLOGY | Age: 75
End: 2025-05-31
Payer: MEDICARE

## 2025-05-31 ENCOUNTER — HOSPITAL ENCOUNTER (INPATIENT)
Age: 75
LOS: 5 days | Discharge: HOME HEALTH | End: 2025-06-05
Attending: HOSPITALIST | Admitting: HOSPITALIST
Payer: MEDICARE

## 2025-05-31 DIAGNOSIS — N18.32 STAGE 3B CHRONIC KIDNEY DISEASE (HCC): ICD-10-CM

## 2025-05-31 DIAGNOSIS — N18.9 CHRONIC KIDNEY DISEASE, UNSPECIFIED CKD STAGE: ICD-10-CM

## 2025-05-31 DIAGNOSIS — R79.89 ELEVATED TROPONIN: ICD-10-CM

## 2025-05-31 DIAGNOSIS — I48.20 CHRONIC ATRIAL FIBRILLATION (HCC): ICD-10-CM

## 2025-05-31 DIAGNOSIS — R07.9 CHEST PAIN, UNSPECIFIED TYPE: Primary | ICD-10-CM

## 2025-05-31 DIAGNOSIS — I71.21 ANEURYSM OF ASCENDING AORTA WITHOUT RUPTURE: ICD-10-CM

## 2025-05-31 DIAGNOSIS — J44.1 COPD EXACERBATION (HCC): ICD-10-CM

## 2025-05-31 DIAGNOSIS — R73.9 HYPERGLYCEMIA: ICD-10-CM

## 2025-05-31 LAB
ALBUMIN SERPL-MCNC: 3.8 G/DL (ref 3.4–5)
ALBUMIN/GLOB SERPL: 1.1 {RATIO} (ref 1.1–2.2)
ALP SERPL-CCNC: 83 U/L (ref 40–129)
ALT SERPL-CCNC: 16 U/L (ref 10–40)
ANION GAP SERPL CALCULATED.3IONS-SCNC: 10 MMOL/L (ref 3–16)
AST SERPL-CCNC: 19 U/L (ref 15–37)
BASE EXCESS BLDV CALC-SCNC: 1.8 MMOL/L (ref -3–3)
BASOPHILS # BLD: 0 K/UL (ref 0–0.2)
BASOPHILS NFR BLD: 0.2 %
BILIRUB SERPL-MCNC: 0.4 MG/DL (ref 0–1)
BUN SERPL-MCNC: 39 MG/DL (ref 7–20)
CALCIUM SERPL-MCNC: 9.3 MG/DL (ref 8.3–10.6)
CHLORIDE SERPL-SCNC: 103 MMOL/L (ref 99–110)
CO2 BLDV-SCNC: 65 MMOL/L
CO2 SERPL-SCNC: 26 MMOL/L (ref 21–32)
COHGB MFR BLDV: 1.6 % (ref 0–1.5)
CREAT SERPL-MCNC: 1.7 MG/DL (ref 0.8–1.3)
DEPRECATED RDW RBC AUTO: 14.6 % (ref 12.4–15.4)
DO-HGB MFR BLDV: 3 %
EOSINOPHIL # BLD: 0 K/UL (ref 0–0.6)
EOSINOPHIL NFR BLD: 0 %
GFR SERPLBLD CREATININE-BSD FMLA CKD-EPI: 42 ML/MIN/{1.73_M2}
GLUCOSE BLD-MCNC: 217 MG/DL (ref 70–99)
GLUCOSE BLD-MCNC: 379 MG/DL (ref 70–99)
GLUCOSE SERPL-MCNC: 388 MG/DL (ref 70–99)
HCO3 BLDV-SCNC: 27.7 MMOL/L (ref 23–29)
HCT VFR BLD AUTO: 39.3 % (ref 40.5–52.5)
HGB BLD-MCNC: 13.3 G/DL (ref 13.5–17.5)
INR PPP: 3.03 (ref 0.85–1.15)
LYMPHOCYTES # BLD: 0.4 K/UL (ref 1–5.1)
LYMPHOCYTES NFR BLD: 3.5 %
MCH RBC QN AUTO: 30.9 PG (ref 26–34)
MCHC RBC AUTO-ENTMCNC: 33.8 G/DL (ref 31–36)
MCV RBC AUTO: 91.7 FL (ref 80–100)
METHGB MFR BLDV: 1.1 %
MONOCYTES # BLD: 0.6 K/UL (ref 0–1.3)
MONOCYTES NFR BLD: 5.9 %
NEUTROPHILS # BLD: 9.1 K/UL (ref 1.7–7.7)
NEUTROPHILS NFR BLD: 90.4 %
NT-PROBNP SERPL-MCNC: 817 PG/ML (ref 0–449)
O2 CT VFR BLDV CALC: 18 VOL %
O2 THERAPY: ABNORMAL
PCO2 BLDV: 47.5 MMHG (ref 40–50)
PERFORMED ON: ABNORMAL
PERFORMED ON: ABNORMAL
PH BLDV: 7.37 [PH] (ref 7.35–7.45)
PLATELET # BLD AUTO: 153 K/UL (ref 135–450)
PMV BLD AUTO: 9 FL (ref 5–10.5)
PO2 BLDV: 125 MMHG (ref 25–40)
POTASSIUM SERPL-SCNC: 4.3 MMOL/L (ref 3.5–5.1)
PROT SERPL-MCNC: 7.2 G/DL (ref 6.4–8.2)
PROTHROMBIN TIME: 31.2 SEC (ref 11.9–14.9)
RBC # BLD AUTO: 4.28 M/UL (ref 4.2–5.9)
SAO2 % BLDV: 97 %
SODIUM SERPL-SCNC: 139 MMOL/L (ref 136–145)
TROPONIN, HIGH SENSITIVITY: 20 NG/L (ref 0–22)
TROPONIN, HIGH SENSITIVITY: 30 NG/L (ref 0–22)
WBC # BLD AUTO: 10.1 K/UL (ref 4–11)

## 2025-05-31 PROCEDURE — 2700000000 HC OXYGEN THERAPY PER DAY

## 2025-05-31 PROCEDURE — 84484 ASSAY OF TROPONIN QUANT: CPT

## 2025-05-31 PROCEDURE — 94640 AIRWAY INHALATION TREATMENT: CPT

## 2025-05-31 PROCEDURE — 2500000003 HC RX 250 WO HCPCS: Performed by: HOSPITALIST

## 2025-05-31 PROCEDURE — 6370000000 HC RX 637 (ALT 250 FOR IP): Performed by: HOSPITALIST

## 2025-05-31 PROCEDURE — 93005 ELECTROCARDIOGRAM TRACING: CPT | Performed by: HOSPITALIST

## 2025-05-31 PROCEDURE — 1200000000 HC SEMI PRIVATE

## 2025-05-31 PROCEDURE — 83880 ASSAY OF NATRIURETIC PEPTIDE: CPT

## 2025-05-31 PROCEDURE — 2500000003 HC RX 250 WO HCPCS: Performed by: PHYSICIAN ASSISTANT

## 2025-05-31 PROCEDURE — 82803 BLOOD GASES ANY COMBINATION: CPT

## 2025-05-31 PROCEDURE — 99285 EMERGENCY DEPT VISIT HI MDM: CPT

## 2025-05-31 PROCEDURE — 80053 COMPREHEN METABOLIC PANEL: CPT

## 2025-05-31 PROCEDURE — 71045 X-RAY EXAM CHEST 1 VIEW: CPT

## 2025-05-31 PROCEDURE — 5A09357 ASSISTANCE WITH RESPIRATORY VENTILATION, LESS THAN 24 CONSECUTIVE HOURS, CONTINUOUS POSITIVE AIRWAY PRESSURE: ICD-10-PCS | Performed by: STUDENT IN AN ORGANIZED HEALTH CARE EDUCATION/TRAINING PROGRAM

## 2025-05-31 PROCEDURE — 6370000000 HC RX 637 (ALT 250 FOR IP): Performed by: PHYSICIAN ASSISTANT

## 2025-05-31 PROCEDURE — 85025 COMPLETE CBC W/AUTO DIFF WBC: CPT

## 2025-05-31 PROCEDURE — 6360000002 HC RX W HCPCS: Performed by: PHYSICIAN ASSISTANT

## 2025-05-31 PROCEDURE — 96374 THER/PROPH/DIAG INJ IV PUSH: CPT

## 2025-05-31 PROCEDURE — 94761 N-INVAS EAR/PLS OXIMETRY MLT: CPT

## 2025-05-31 PROCEDURE — 85610 PROTHROMBIN TIME: CPT

## 2025-05-31 RX ORDER — IPRATROPIUM BROMIDE AND ALBUTEROL SULFATE 2.5; .5 MG/3ML; MG/3ML
1 SOLUTION RESPIRATORY (INHALATION) ONCE
Status: COMPLETED | OUTPATIENT
Start: 2025-05-31 | End: 2025-05-31

## 2025-05-31 RX ORDER — WARFARIN SODIUM 5 MG/1
10 TABLET ORAL DAILY
Status: DISCONTINUED | OUTPATIENT
Start: 2025-06-01 | End: 2025-05-31

## 2025-05-31 RX ORDER — ONDANSETRON 4 MG/1
4 TABLET, ORALLY DISINTEGRATING ORAL EVERY 8 HOURS PRN
Status: DISCONTINUED | OUTPATIENT
Start: 2025-05-31 | End: 2025-06-05 | Stop reason: HOSPADM

## 2025-05-31 RX ORDER — DEXTROSE MONOHYDRATE 100 MG/ML
INJECTION, SOLUTION INTRAVENOUS CONTINUOUS PRN
Status: DISCONTINUED | OUTPATIENT
Start: 2025-05-31 | End: 2025-06-05 | Stop reason: HOSPADM

## 2025-05-31 RX ORDER — ACETAMINOPHEN 325 MG/1
650 TABLET ORAL EVERY 6 HOURS PRN
Status: DISCONTINUED | OUTPATIENT
Start: 2025-05-31 | End: 2025-06-05 | Stop reason: HOSPADM

## 2025-05-31 RX ORDER — FUROSEMIDE 10 MG/ML
20 INJECTION INTRAMUSCULAR; INTRAVENOUS 2 TIMES DAILY
Status: DISCONTINUED | OUTPATIENT
Start: 2025-05-31 | End: 2025-06-02

## 2025-05-31 RX ORDER — ASPIRIN 81 MG/1
324 TABLET, CHEWABLE ORAL ONCE
Status: DISCONTINUED | OUTPATIENT
Start: 2025-05-31 | End: 2025-05-31

## 2025-05-31 RX ORDER — POTASSIUM CHLORIDE 7.45 MG/ML
10 INJECTION INTRAVENOUS PRN
Status: DISCONTINUED | OUTPATIENT
Start: 2025-05-31 | End: 2025-06-05 | Stop reason: HOSPADM

## 2025-05-31 RX ORDER — IPRATROPIUM BROMIDE AND ALBUTEROL SULFATE 2.5; .5 MG/3ML; MG/3ML
1 SOLUTION RESPIRATORY (INHALATION)
Status: DISCONTINUED | OUTPATIENT
Start: 2025-05-31 | End: 2025-05-31

## 2025-05-31 RX ORDER — SOTALOL HYDROCHLORIDE 80 MG/1
80 TABLET ORAL 2 TIMES DAILY
Status: DISCONTINUED | OUTPATIENT
Start: 2025-05-31 | End: 2025-06-05 | Stop reason: HOSPADM

## 2025-05-31 RX ORDER — GLUCAGON 1 MG/ML
1 KIT INJECTION PRN
Status: DISCONTINUED | OUTPATIENT
Start: 2025-05-31 | End: 2025-06-05 | Stop reason: HOSPADM

## 2025-05-31 RX ORDER — ACETAMINOPHEN 650 MG/1
650 SUPPOSITORY RECTAL EVERY 6 HOURS PRN
Status: DISCONTINUED | OUTPATIENT
Start: 2025-05-31 | End: 2025-06-05 | Stop reason: HOSPADM

## 2025-05-31 RX ORDER — POTASSIUM CHLORIDE 1500 MG/1
40 TABLET, EXTENDED RELEASE ORAL PRN
Status: DISCONTINUED | OUTPATIENT
Start: 2025-05-31 | End: 2025-06-05 | Stop reason: HOSPADM

## 2025-05-31 RX ORDER — ASPIRIN 81 MG/1
81 TABLET, CHEWABLE ORAL DAILY
Status: DISCONTINUED | OUTPATIENT
Start: 2025-06-01 | End: 2025-06-05 | Stop reason: HOSPADM

## 2025-05-31 RX ORDER — SODIUM CHLORIDE 0.9 % (FLUSH) 0.9 %
5-40 SYRINGE (ML) INJECTION EVERY 12 HOURS SCHEDULED
Status: DISCONTINUED | OUTPATIENT
Start: 2025-05-31 | End: 2025-06-05 | Stop reason: HOSPADM

## 2025-05-31 RX ORDER — DOXYCYCLINE HYCLATE 100 MG
100 TABLET ORAL EVERY 12 HOURS SCHEDULED
Status: DISPENSED | OUTPATIENT
Start: 2025-05-31 | End: 2025-06-03

## 2025-05-31 RX ORDER — AMLODIPINE BESYLATE 5 MG/1
5 TABLET ORAL DAILY
Status: DISCONTINUED | OUTPATIENT
Start: 2025-06-01 | End: 2025-06-05 | Stop reason: HOSPADM

## 2025-05-31 RX ORDER — ALBUTEROL SULFATE 0.83 MG/ML
5 SOLUTION RESPIRATORY (INHALATION) ONCE
Status: COMPLETED | OUTPATIENT
Start: 2025-05-31 | End: 2025-05-31

## 2025-05-31 RX ORDER — WARFARIN SODIUM 2.5 MG/1
2.5 TABLET ORAL
Status: COMPLETED | OUTPATIENT
Start: 2025-05-31 | End: 2025-05-31

## 2025-05-31 RX ORDER — BUDESONIDE AND FORMOTEROL FUMARATE DIHYDRATE 160; 4.5 UG/1; UG/1
2 AEROSOL RESPIRATORY (INHALATION)
Status: DISCONTINUED | OUTPATIENT
Start: 2025-05-31 | End: 2025-06-05 | Stop reason: HOSPADM

## 2025-05-31 RX ORDER — MAGNESIUM SULFATE IN WATER 40 MG/ML
2000 INJECTION, SOLUTION INTRAVENOUS PRN
Status: DISCONTINUED | OUTPATIENT
Start: 2025-05-31 | End: 2025-06-05 | Stop reason: HOSPADM

## 2025-05-31 RX ORDER — IPRATROPIUM BROMIDE AND ALBUTEROL SULFATE 2.5; .5 MG/3ML; MG/3ML
1 SOLUTION RESPIRATORY (INHALATION)
Status: DISCONTINUED | OUTPATIENT
Start: 2025-06-01 | End: 2025-06-05 | Stop reason: HOSPADM

## 2025-05-31 RX ORDER — ONDANSETRON 2 MG/ML
4 INJECTION INTRAMUSCULAR; INTRAVENOUS EVERY 6 HOURS PRN
Status: DISCONTINUED | OUTPATIENT
Start: 2025-05-31 | End: 2025-05-31

## 2025-05-31 RX ORDER — SODIUM CHLORIDE 9 MG/ML
INJECTION, SOLUTION INTRAVENOUS PRN
Status: DISCONTINUED | OUTPATIENT
Start: 2025-05-31 | End: 2025-06-05 | Stop reason: HOSPADM

## 2025-05-31 RX ORDER — ENOXAPARIN SODIUM 100 MG/ML
30 INJECTION SUBCUTANEOUS 2 TIMES DAILY
Status: DISCONTINUED | OUTPATIENT
Start: 2025-06-01 | End: 2025-06-01

## 2025-05-31 RX ORDER — SODIUM CHLORIDE 0.9 % (FLUSH) 0.9 %
5-40 SYRINGE (ML) INJECTION PRN
Status: DISCONTINUED | OUTPATIENT
Start: 2025-05-31 | End: 2025-06-05 | Stop reason: HOSPADM

## 2025-05-31 RX ORDER — INSULIN LISPRO 100 [IU]/ML
0-8 INJECTION, SOLUTION INTRAVENOUS; SUBCUTANEOUS
Status: DISCONTINUED | OUTPATIENT
Start: 2025-05-31 | End: 2025-06-05 | Stop reason: HOSPADM

## 2025-05-31 RX ORDER — POLYETHYLENE GLYCOL 3350 17 G/17G
17 POWDER, FOR SOLUTION ORAL DAILY PRN
Status: DISCONTINUED | OUTPATIENT
Start: 2025-05-31 | End: 2025-06-05 | Stop reason: HOSPADM

## 2025-05-31 RX ORDER — PRAVASTATIN SODIUM 40 MG
80 TABLET ORAL DAILY
Status: DISCONTINUED | OUTPATIENT
Start: 2025-06-01 | End: 2025-06-05 | Stop reason: HOSPADM

## 2025-05-31 RX ORDER — FUROSEMIDE 40 MG/1
20 TABLET ORAL DAILY
Status: DISCONTINUED | OUTPATIENT
Start: 2025-05-31 | End: 2025-05-31 | Stop reason: SDUPTHER

## 2025-05-31 RX ADMIN — IPRATROPIUM BROMIDE AND ALBUTEROL SULFATE 1 DOSE: .5; 3 SOLUTION RESPIRATORY (INHALATION) at 13:42

## 2025-05-31 RX ADMIN — SOTALOL HYDROCHLORIDE 80 MG: 80 TABLET ORAL at 22:45

## 2025-05-31 RX ADMIN — ACETAMINOPHEN 650 MG: 325 TABLET ORAL at 23:05

## 2025-05-31 RX ADMIN — ALBUTEROL SULFATE 5 MG: 2.5 SOLUTION RESPIRATORY (INHALATION) at 13:44

## 2025-05-31 RX ADMIN — WARFARIN SODIUM 2.5 MG: 2.5 TABLET ORAL at 23:31

## 2025-05-31 RX ADMIN — SACUBITRIL AND VALSARTAN 1 TABLET: 49; 51 TABLET, FILM COATED ORAL at 22:45

## 2025-05-31 RX ADMIN — Medication 10 ML: at 23:02

## 2025-05-31 RX ADMIN — IPRATROPIUM BROMIDE AND ALBUTEROL SULFATE 1 DOSE: .5; 3 SOLUTION RESPIRATORY (INHALATION) at 21:51

## 2025-05-31 RX ADMIN — WATER 125 MG: 1 INJECTION INTRAMUSCULAR; INTRAVENOUS; SUBCUTANEOUS at 14:25

## 2025-05-31 RX ADMIN — INSULIN LISPRO 8 UNITS: 100 INJECTION, SOLUTION INTRAVENOUS; SUBCUTANEOUS at 23:14

## 2025-05-31 RX ADMIN — NITROGLYCERIN 1 INCH: 20 OINTMENT TOPICAL at 16:47

## 2025-05-31 ASSESSMENT — PAIN DESCRIPTION - PAIN TYPE
TYPE: ACUTE PAIN

## 2025-05-31 ASSESSMENT — PAIN SCALES - GENERAL
PAINLEVEL_OUTOF10: 10
PAINLEVEL_OUTOF10: 5
PAINLEVEL_OUTOF10: 8

## 2025-05-31 ASSESSMENT — PAIN DESCRIPTION - DESCRIPTORS
DESCRIPTORS: STABBING
DESCRIPTORS: SHOOTING;SHARP
DESCRIPTORS: SHOOTING;SHARP

## 2025-05-31 ASSESSMENT — PAIN - FUNCTIONAL ASSESSMENT: PAIN_FUNCTIONAL_ASSESSMENT: 0-10

## 2025-05-31 ASSESSMENT — PAIN DESCRIPTION - LOCATION
LOCATION: CHEST

## 2025-05-31 ASSESSMENT — PAIN DESCRIPTION - ONSET
ONSET: ON-GOING
ONSET: ON-GOING

## 2025-05-31 ASSESSMENT — PAIN DESCRIPTION - FREQUENCY
FREQUENCY: CONTINUOUS

## 2025-05-31 ASSESSMENT — PAIN DESCRIPTION - DIRECTION: RADIATING_TOWARDS: BACK

## 2025-05-31 ASSESSMENT — HEART SCORE: ECG: NON-SPECIFC REPOLARIZATION DISTURBANCE/LBTB/PM

## 2025-05-31 ASSESSMENT — PAIN DESCRIPTION - ORIENTATION
ORIENTATION: MID
ORIENTATION: MID
ORIENTATION: LEFT

## 2025-05-31 ASSESSMENT — PAIN SCALES - WONG BAKER: WONGBAKER_NUMERICALRESPONSE: HURTS A LITTLE BIT

## 2025-05-31 ASSESSMENT — LIFESTYLE VARIABLES: HOW OFTEN DO YOU HAVE A DRINK CONTAINING ALCOHOL: NEVER

## 2025-05-31 NOTE — ED NOTES
Patient Name: Valdemar Solis  : 1950 75 y.o.  MRN: [id number]  ED Room #: ED-0014/14     Chief complaint:   Chief Complaint   Patient presents with    Chest Pain     Pt c/o left sided CP wrapping to back. Pt seen here and left ama on the . Per pt they wanted to admit him for further testing and he refused. Pt states he is still feeling bad.      Hospital Problem/Diagnosis:   Hospital Problems           Last Modified POA    * (Principal) COPD exacerbation (HCC) 2025 Yes         O2 Flow Rate:O2 Device: Nasal cannula O2 Flow Rate (L/min): (S) 4 L/min (titrate to 3 liters) Patient states 4L/NC baseline at home  (if applicable)  Cardiac Rhythm:   (if applicable)  Active LDA's:   Peripheral IV 25 Right Antecubital (Active)            How does patient ambulate? Low Fall Risk (Ambulates by themselves without support    2. How does patient take pills? Unknown, no oral medications were given in the Emergency Department    3. Is patient alert? Alert    4. Is patient oriented? To Person, To Place, To Time, To Situation, and Follows Commands    5.   Patient arrived from:  home    6. Any specific patient or family belongings/needs/dynamics?   a. Wife at pts bedside         If there are any additional questions please reach out to the Emergency Department.

## 2025-05-31 NOTE — CONSULTS
Clinical Pharmacy Note: Pharmacy to Dose Warfarin    Pharmacy consulted by Dr. Meneses to dose warfarin.    Valdemar Solis is a 75 y.o. male  is receiving warfarin for indication: a-fib.    INR Goal Range: 2.0-3.0  Prior to admission warfarin dosing regimen: 10 mg daily  INR today:   Lab Results   Component Value Date/Time    INR 3.03 05/31/2025 01:17 PM       Assessment/Plan:  INR is supratherapeutic on prior to admission dosing regimen.   Possible concomitant drug-drug interactions include: APAP, methylprednisolone   Based on today's assessment, give a reduced dose tonight, then restart home dose when INR in range.  Daily INR is ordered. Pharmacy will continue to monitor and make adjustments to regimen as necessary.     Thank you for the consult,     Star Ko, PharmD, BCPS  [id number]

## 2025-05-31 NOTE — ED PROVIDER NOTES
[facility] EMERGENCY DEPARTMENT  EMERGENCY DEPARTMENT ENCOUNTER        Pt Name: Valdemar Solis  MRN: [id number]  Birthdate 1950  Date of evaluation: 5/31/2025  Provider: Olu Heller PA-C  PCP: Yane Wei MD  Note Started: 2:15 PM EDT 5/31/25      SHERRON. I have evaluated this patient.        CHIEF COMPLAINT       Chief Complaint   Patient presents with    Chest Pain     Pt c/o left sided CP wrapping to back. Pt seen here and left ama on the 28th. Per pt they wanted to admit him for further testing and he refused. Pt states he is still feeling bad.        HISTORY OF PRESENT ILLNESS: 1 or more Elements     History From: Patient     Limitations to history : None    Social Determinants Significantly Affecting Health : None    Chief Complaint: Chest pain, shortness of breath, coughing, wheezing and leg edema    Valdemar Solis is a 75 y.o. male with a history of hypertension, hyperlipidemia, CHF, atrial fibrillation, chronic anticoagulation with Coumadin, COPD, former tobacco abuse, diabetes, CKD and obesity who presents to the emergency department today complaining of worsening chest pain, shortness of breath, productive coughing, wheezing and bilateral lower extremity edema over the last 2 weeks.  He was seen at this emergency department on 5/28/2025 and admission was recommended but he left AGAINST MEDICAL ADVICE.  Patient describes his chest pain as a stabbing, constant, 10/10 pain that localizes to his left chest and radiates to the left side of his mid back.  He denies heart palpitations, diaphoresis, lightheadedness or dizziness.  He has no GI complaints.        Nursing Notes were all reviewed and agreed with or any disagreements were addressed in the HPI.    REVIEW OF SYSTEMS :      Review of Systems   Constitutional:  Negative for chills and fever.   Respiratory:  Positive for cough, shortness of breath and wheezing. Negative for chest tightness.    Cardiovascular:  Positive for chest  PEPTIDE - Abnormal; Notable for the following components:    NT Pro- (*)     All other components within normal limits   BLOOD GAS, VENOUS - Abnormal; Notable for the following components:    PO2, Mark 125.0 (*)     Carboxyhemoglobin 1.6 (*)     All other components within normal limits   TROPONIN       When ordered only abnormal lab results are displayed. All other labs were within normal range or not returned as of this dictation.    EKG: When ordered, EKG's are interpreted by the Emergency Department Physician in the absence of a cardiologist.  Please see their note for interpretation of EKG.    RADIOLOGY:   Non-plain film images such as CT, Ultrasound and MRI are read by the radiologist.       Interpretation per the Radiologist below, if available at the time of this note:    XR CHEST PORTABLE   Final Result   Mild improved aeration of the lungs when compared to the prior exam.      Persistent prominence of the central pulmonary vasculature.      Small right and possible small left-sided pleural effusion.           XR CHEST PORTABLE  Result Date: 5/31/2025  EXAMINATION: ONE XRAY VIEW OF THE CHEST 5/31/2025 1:16 pm COMPARISON: May 28, 2025 HISTORY: ORDERING SYSTEM PROVIDED HISTORY: SOB TECHNOLOGIST PROVIDED HISTORY: Reason for exam:->SOB Reason for Exam: CP FINDINGS: Pacemaker overlying the left chest.  Prior cardiothoracic surgery Elevation the right hemidiaphragm.  Mild improved aeration of the lungs when compared to the prior exam.  Persistent prominence of the central pulmonary vasculature.  Small right and possible small left-sided pleural effusion.  No pneumothorax.  Stable cardiac and mediastinal silhouettes.     Mild improved aeration of the lungs when compared to the prior exam. Persistent prominence of the central pulmonary vasculature. Small right and possible small left-sided pleural effusion.           PROCEDURES   Unless otherwise noted below, none     Procedures      CRITICAL CARE TIME

## 2025-05-31 NOTE — H&P
HOSPITALISTS HISTORY AND PHYSICAL    5/31/2025 4:43 PM    Patient Information:  SOPHIA SOLIS is a 75 y.o. male [id number]  PCP:  Yane Wei MD (Tel: [phone] )    Chief complaint:    Chief Complaint   Patient presents with    Chest Pain     Pt c/o left sided CP wrapping to back. Pt seen here and left ama on the 28th. Per pt they wanted to admit him for further testing and he refused. Pt states he is still feeling bad.         History of Present Illness:  Sophia Solis is a 75 y.o. male who presented with with complaints of increasing shortness of breath.  With chest pain.  Patient with history of chronic CHF A-fib on Coumadin former smoker diabetes chronic kidney disease.  Patient recently was seen here for increased wheezing shortness of breath productive sputum with bilateral extremity above.  Patient was advised admission further but left AGAINST MEDICAL ADVICE patient states since then patient has had increased pain 10 out of 10 left-sided radiating to left arm and neck.  Also with diaphoresis.  Patient denies any sick contacts also noted that his legs are more swollen than usual the lower extremities.  With some redness.  No history of cellulitis  REVIEW OF SYSTEMS:     Past Medical History:   has a past medical history of Acute congestive heart failure (HCC), Allergic rhinitis, Anticoagulant long-term use, Atrial fibrillation (HCC), CKD (chronic kidney disease), Class 2 obesity due to excess calories without serious comorbidity with body mass index (BMI) of 37.0 to 37.9 in adult, Controlled type 2 diabetes mellitus without complication, without long-term current use of insulin (HCC), COPD, COPD (chronic obstructive pulmonary disease) (HCC), Erectile dysfunction, Essential tremor, Gout, Hyperlipidemia, mixed, Hypertension, Long term current use of  strips     Discussed  with  Er attending    Problem List  Principal Problem:    COPD exacerbation (HCC)  Resolved Problems:    * No resolved hospital problems. *        Assessment/Plan:   COPD exacerbation.  Patient with diffuse wheezing noted.  Will start with Solu-Medrol breathing treatment pulmonary consult    Bilateral lower extremity swelling.  With some significant redness not convincing for infection  Check procalcitonin.  Given history of CHF we will do IV Lasix reassess tomorrow monitor renal function he has a chronic kidney disease    Diabetes.  Continue home dose Lantus monitor blood glucose while getting steroids.    Chronic kidney disease  Monitor closely.    Chronic A-fib.  Anticoagulated on warfarin.  Pharmacy to dose check INR daily        Carmina Meneses MD    5/31/2025 4:43 PM

## 2025-06-01 LAB
ANION GAP SERPL CALCULATED.3IONS-SCNC: 9 MMOL/L (ref 3–16)
BASOPHILS # BLD: 0 K/UL (ref 0–0.2)
BASOPHILS NFR BLD: 0.2 %
BUN SERPL-MCNC: 38 MG/DL (ref 7–20)
CALCIUM SERPL-MCNC: 9.2 MG/DL (ref 8.3–10.6)
CHLORIDE SERPL-SCNC: 104 MMOL/L (ref 99–110)
CO2 SERPL-SCNC: 25 MMOL/L (ref 21–32)
CREAT SERPL-MCNC: 1.6 MG/DL (ref 0.8–1.3)
DEPRECATED RDW RBC AUTO: 14.6 % (ref 12.4–15.4)
EKG ATRIAL RATE: 74 BPM
EKG DIAGNOSIS: NORMAL
EKG P-R INTERVAL: 116 MS
EKG Q-T INTERVAL: 478 MS
EKG QRS DURATION: 182 MS
EKG QTC CALCULATION (BAZETT): 530 MS
EKG R AXIS: -41 DEGREES
EKG T AXIS: 137 DEGREES
EKG VENTRICULAR RATE: 74 BPM
EOSINOPHIL # BLD: 0 K/UL (ref 0–0.6)
EOSINOPHIL NFR BLD: 0 %
EST. AVERAGE GLUCOSE BLD GHB EST-MCNC: 180 MG/DL
GFR SERPLBLD CREATININE-BSD FMLA CKD-EPI: 45 ML/MIN/{1.73_M2}
GLUCOSE BLD-MCNC: 197 MG/DL (ref 70–99)
GLUCOSE BLD-MCNC: 211 MG/DL (ref 70–99)
GLUCOSE BLD-MCNC: 228 MG/DL (ref 70–99)
GLUCOSE BLD-MCNC: 247 MG/DL (ref 70–99)
GLUCOSE SERPL-MCNC: 241 MG/DL (ref 70–99)
HBA1C MFR BLD: 7.9 %
HCT VFR BLD AUTO: 37.8 % (ref 40.5–52.5)
HGB BLD-MCNC: 12.7 G/DL (ref 13.5–17.5)
INR PPP: 2.36 (ref 0.85–1.15)
LYMPHOCYTES # BLD: 0.4 K/UL (ref 1–5.1)
LYMPHOCYTES NFR BLD: 3.9 %
MCH RBC QN AUTO: 30.6 PG (ref 26–34)
MCHC RBC AUTO-ENTMCNC: 33.7 G/DL (ref 31–36)
MCV RBC AUTO: 90.8 FL (ref 80–100)
MONOCYTES # BLD: 0.7 K/UL (ref 0–1.3)
MONOCYTES NFR BLD: 7.5 %
NEUTROPHILS # BLD: 8.2 K/UL (ref 1.7–7.7)
NEUTROPHILS NFR BLD: 88.4 %
PERFORMED ON: ABNORMAL
PLATELET # BLD AUTO: 137 K/UL (ref 135–450)
PMV BLD AUTO: 8.7 FL (ref 5–10.5)
POTASSIUM SERPL-SCNC: 4.6 MMOL/L (ref 3.5–5.1)
PROTHROMBIN TIME: 25.8 SEC (ref 11.9–14.9)
RBC # BLD AUTO: 4.16 M/UL (ref 4.2–5.9)
SODIUM SERPL-SCNC: 138 MMOL/L (ref 136–145)
WBC # BLD AUTO: 9.2 K/UL (ref 4–11)

## 2025-06-01 PROCEDURE — 94660 CPAP INITIATION&MGMT: CPT

## 2025-06-01 PROCEDURE — 94761 N-INVAS EAR/PLS OXIMETRY MLT: CPT

## 2025-06-01 PROCEDURE — 1200000000 HC SEMI PRIVATE

## 2025-06-01 PROCEDURE — 2500000003 HC RX 250 WO HCPCS: Performed by: HOSPITALIST

## 2025-06-01 PROCEDURE — 94640 AIRWAY INHALATION TREATMENT: CPT

## 2025-06-01 PROCEDURE — 83036 HEMOGLOBIN GLYCOSYLATED A1C: CPT

## 2025-06-01 PROCEDURE — 2700000000 HC OXYGEN THERAPY PER DAY

## 2025-06-01 PROCEDURE — 80048 BASIC METABOLIC PNL TOTAL CA: CPT

## 2025-06-01 PROCEDURE — 6370000000 HC RX 637 (ALT 250 FOR IP): Performed by: HOSPITALIST

## 2025-06-01 PROCEDURE — 85025 COMPLETE CBC W/AUTO DIFF WBC: CPT

## 2025-06-01 PROCEDURE — 6370000000 HC RX 637 (ALT 250 FOR IP): Performed by: NURSE PRACTITIONER

## 2025-06-01 PROCEDURE — 85610 PROTHROMBIN TIME: CPT

## 2025-06-01 PROCEDURE — 99223 1ST HOSP IP/OBS HIGH 75: CPT | Performed by: INTERNAL MEDICINE

## 2025-06-01 PROCEDURE — 36415 COLL VENOUS BLD VENIPUNCTURE: CPT

## 2025-06-01 PROCEDURE — 6360000002 HC RX W HCPCS: Performed by: HOSPITALIST

## 2025-06-01 PROCEDURE — 93010 ELECTROCARDIOGRAM REPORT: CPT | Performed by: INTERNAL MEDICINE

## 2025-06-01 RX ORDER — WARFARIN SODIUM 5 MG/1
10 TABLET ORAL DAILY
Status: DISCONTINUED | OUTPATIENT
Start: 2025-06-01 | End: 2025-06-05

## 2025-06-01 RX ORDER — HYDROCODONE BITARTRATE AND HOMATROPINE METHYLBROMIDE ORAL SOLUTION 5; 1.5 MG/5ML; MG/5ML
5 LIQUID ORAL EVERY 6 HOURS PRN
Refills: 0 | Status: DISCONTINUED | OUTPATIENT
Start: 2025-06-01 | End: 2025-06-01

## 2025-06-01 RX ORDER — CODEINE PHOSPHATE AND GUAIFENESIN 10; 100 MG/5ML; MG/5ML
5 SOLUTION ORAL EVERY 6 HOURS PRN
Status: DISCONTINUED | OUTPATIENT
Start: 2025-06-01 | End: 2025-06-05 | Stop reason: HOSPADM

## 2025-06-01 RX ADMIN — DOXYCYCLINE HYCLATE 100 MG: 100 TABLET, COATED ORAL at 21:00

## 2025-06-01 RX ADMIN — TIOTROPIUM BROMIDE INHALATION SPRAY 2 PUFF: 3.12 SPRAY, METERED RESPIRATORY (INHALATION) at 08:41

## 2025-06-01 RX ADMIN — INSULIN LISPRO 2 UNITS: 100 INJECTION, SOLUTION INTRAVENOUS; SUBCUTANEOUS at 09:13

## 2025-06-01 RX ADMIN — GUAIFENESIN AND CODEINE PHOSPHATE 5 ML: 100; 10 SOLUTION ORAL at 23:26

## 2025-06-01 RX ADMIN — Medication 10 ML: at 09:16

## 2025-06-01 RX ADMIN — FUROSEMIDE 20 MG: 10 INJECTION, SOLUTION INTRAMUSCULAR; INTRAVENOUS at 09:16

## 2025-06-01 RX ADMIN — WATER 40 MG: 1 INJECTION INTRAMUSCULAR; INTRAVENOUS; SUBCUTANEOUS at 03:04

## 2025-06-01 RX ADMIN — INSULIN LISPRO 2 UNITS: 100 INJECTION, SOLUTION INTRAVENOUS; SUBCUTANEOUS at 21:07

## 2025-06-01 RX ADMIN — BUDESONIDE AND FORMOTEROL FUMARATE DIHYDRATE 2 PUFF: 160; 4.5 AEROSOL RESPIRATORY (INHALATION) at 08:41

## 2025-06-01 RX ADMIN — AMLODIPINE BESYLATE 5 MG: 5 TABLET ORAL at 09:15

## 2025-06-01 RX ADMIN — ASPIRIN 81 MG: 81 TABLET, CHEWABLE ORAL at 09:16

## 2025-06-01 RX ADMIN — INSULIN LISPRO 2 UNITS: 100 INJECTION, SOLUTION INTRAVENOUS; SUBCUTANEOUS at 12:03

## 2025-06-01 RX ADMIN — SOTALOL HYDROCHLORIDE 80 MG: 80 TABLET ORAL at 09:16

## 2025-06-01 RX ADMIN — PRAVASTATIN SODIUM 80 MG: 40 TABLET ORAL at 09:15

## 2025-06-01 RX ADMIN — ACETAMINOPHEN 650 MG: 325 TABLET ORAL at 22:42

## 2025-06-01 RX ADMIN — Medication 10 ML: at 21:09

## 2025-06-01 RX ADMIN — SOTALOL HYDROCHLORIDE 80 MG: 80 TABLET ORAL at 21:01

## 2025-06-01 RX ADMIN — WARFARIN SODIUM 10 MG: 5 TABLET ORAL at 18:02

## 2025-06-01 RX ADMIN — SACUBITRIL AND VALSARTAN 1 TABLET: 49; 51 TABLET, FILM COATED ORAL at 09:15

## 2025-06-01 RX ADMIN — SACUBITRIL AND VALSARTAN 1 TABLET: 49; 51 TABLET, FILM COATED ORAL at 21:00

## 2025-06-01 RX ADMIN — ACETAMINOPHEN 650 MG: 325 TABLET ORAL at 16:47

## 2025-06-01 RX ADMIN — INSULIN LISPRO 2 UNITS: 100 INJECTION, SOLUTION INTRAVENOUS; SUBCUTANEOUS at 17:01

## 2025-06-01 RX ADMIN — IPRATROPIUM BROMIDE AND ALBUTEROL SULFATE 1 DOSE: .5; 3 SOLUTION RESPIRATORY (INHALATION) at 03:36

## 2025-06-01 RX ADMIN — FUROSEMIDE 20 MG: 10 INJECTION, SOLUTION INTRAMUSCULAR; INTRAVENOUS at 17:01

## 2025-06-01 RX ADMIN — IPRATROPIUM BROMIDE AND ALBUTEROL SULFATE 1 DOSE: .5; 3 SOLUTION RESPIRATORY (INHALATION) at 00:13

## 2025-06-01 RX ADMIN — ACETAMINOPHEN 650 MG: 325 TABLET ORAL at 07:04

## 2025-06-01 RX ADMIN — DOXYCYCLINE HYCLATE 100 MG: 100 TABLET, COATED ORAL at 09:15

## 2025-06-01 RX ADMIN — IPRATROPIUM BROMIDE AND ALBUTEROL SULFATE 1 DOSE: .5; 3 SOLUTION RESPIRATORY (INHALATION) at 08:40

## 2025-06-01 RX ADMIN — IPRATROPIUM BROMIDE AND ALBUTEROL SULFATE 1 DOSE: .5; 3 SOLUTION RESPIRATORY (INHALATION) at 12:55

## 2025-06-01 RX ADMIN — ONDANSETRON 4 MG: 4 TABLET, ORALLY DISINTEGRATING ORAL at 16:13

## 2025-06-01 RX ADMIN — IPRATROPIUM BROMIDE AND ALBUTEROL SULFATE 1 DOSE: .5; 3 SOLUTION RESPIRATORY (INHALATION) at 16:30

## 2025-06-01 ASSESSMENT — PAIN SCALES - WONG BAKER
WONGBAKER_NUMERICALRESPONSE: HURTS LITTLE MORE

## 2025-06-01 ASSESSMENT — PAIN SCALES - GENERAL
PAINLEVEL_OUTOF10: 7
PAINLEVEL_OUTOF10: 4
PAINLEVEL_OUTOF10: 8
PAINLEVEL_OUTOF10: 4
PAINLEVEL_OUTOF10: 10
PAINLEVEL_OUTOF10: 7
PAINLEVEL_OUTOF10: 7
PAINLEVEL_OUTOF10: 5
PAINLEVEL_OUTOF10: 6

## 2025-06-01 ASSESSMENT — PAIN DESCRIPTION - LOCATION
LOCATION: CHEST;BACK
LOCATION: CHEST;BACK
LOCATION: BACK;CHEST
LOCATION: CHEST

## 2025-06-01 ASSESSMENT — PAIN DESCRIPTION - DESCRIPTORS
DESCRIPTORS: STABBING
DESCRIPTORS: ACHING

## 2025-06-01 ASSESSMENT — PAIN DESCRIPTION - ORIENTATION
ORIENTATION: LEFT

## 2025-06-01 ASSESSMENT — PAIN DESCRIPTION - ONSET: ONSET: ON-GOING

## 2025-06-01 ASSESSMENT — PAIN DESCRIPTION - FREQUENCY: FREQUENCY: CONTINUOUS

## 2025-06-01 ASSESSMENT — PAIN DESCRIPTION - PAIN TYPE: TYPE: ACUTE PAIN

## 2025-06-01 ASSESSMENT — PAIN DESCRIPTION - DIRECTION: RADIATING_TOWARDS: BACK

## 2025-06-01 NOTE — PROGRESS NOTES
[facility]    6/1/2025 8:45 AM        Name: Valdemar Solis .              Admitted: 5/31/2025  Primary Care Provider: Yane Wei MD (Tel: [phone])                        Subjective:  .    No acute events overnight.   Pt stil complains of sob and cough   Getting breathing treatment prns  Still whezzing and sob on exertion.   No fevers  States improving but still not at baseline  Reviewed interval ancillary notes    Current Medications  warfarin (COUMADIN) tablet 10 mg, Daily  amLODIPine (NORVASC) tablet 5 mg, Daily  aspirin chewable tablet 81 mg, Daily  budesonide-formoterol (SYMBICORT) 160-4.5 MCG/ACT inhaler 2 puff, BID RT   And  tiotropium (SPIRIVA RESPIMAT) 2.5 MCG/ACT inhaler 2 puff, Daily RT  pravastatin (PRAVACHOL) tablet 80 mg, Daily  sacubitril-valsartan (ENTRESTO) 49-51 MG per tablet 1 tablet, BID  sotalol (BETAPACE) tablet 80 mg, BID  methylPREDNISolone sodium succ (SOLU-MEDROL) 40 mg in sterile water 1 mL injection, Q12H  doxycycline hyclate (VIBRA-TABS) tablet 100 mg, 2 times per day  dextrose bolus 10% 125 mL, PRN   Or  dextrose bolus 10% 250 mL, PRN  glucagon injection 1 mg, PRN  dextrose 10 % infusion, Continuous PRN  insulin lispro (HUMALOG,ADMELOG) injection vial 0-8 Units, 4x Daily AC & HS  furosemide (LASIX) injection 20 mg, BID  sodium chloride flush 0.9 % injection 5-40 mL, 2 times per day  sodium chloride flush 0.9 % injection 5-40 mL, PRN  0.9 % sodium chloride infusion, PRN  potassium chloride (KLOR-CON M) extended release tablet 40 mEq, PRN   Or  potassium bicarb-citric acid (EFFER-K) effervescent tablet 40 mEq, PRN   Or  potassium chloride 10 mEq/100 mL IVPB (Peripheral Line), PRN  magnesium sulfate 2000 mg in 50 mL IVPB premix, PRN  ondansetron (ZOFRAN-ODT) disintegrating tablet 4 mg, Q8H PRN  polyethylene glycol (GLYCOLAX) packet 17 g,  Daily PRN  acetaminophen (TYLENOL) tablet 650 mg, Q6H PRN   Or  acetaminophen (TYLENOL) suppository 650 mg, Q6H PRN  ipratropium 0.5 mg-albuterol 2.5 mg (DUONEB) nebulizer solution 1 Dose, Q4H RT    oxyCODONE-acetaminophen (PERCOCET) 5-325 MG per tablet 1 tablet, Once  lactated ringers infusion, Continuous  sodium chloride flush 0.9 % injection 10 mL, 2 times per day  sodium chloride flush 0.9 % injection 10 mL, PRN  0.9 % sodium chloride infusion, PRN        Objective:  BP (!) 158/78   Pulse 73   Temp 97.8 °F (36.6 °C) (Axillary)   Resp 20   Ht 1.854 m (6' 1\")   Wt (!) 145.8 kg (321 lb 6.4 oz)   SpO2 (!) 89%   BMI 42.40 kg/m²   No intake or output data in the 24 hours ending 06/01/25 0845   Wt Readings from Last 3 Encounters:   06/01/25 (!) 145.8 kg (321 lb 6.4 oz)   05/28/25 (!) 145.3 kg (320 lb 6.4 oz)   04/22/25 (!) 145.9 kg (321 lb 9.6 oz)       General appearance:  Appears comfortable  Eyes: Sclera clear. Pupils equal.  ENT: Moist oral mucosa. Trachea midline, no adenopathy.  Cardiovascular: Regular rhythm, normal S1, S2. No murmur. No edema in lower extremities  Respiratory: noted for wheezing and some tachypnea when asked to exert. No rales. No crackles ashley crackles.   GI: Abdomen soft, no tenderness, not distended, normal bowel sounds  Musculoskeletal: No cyanosis in digits, neck supple  Neurology: CN 2-12 grossly intact. No speech or motor deficits  Psych: Normal affect. Alert and oriented in time, place and person  Skin: Warm, dry, normal turgor    Labs and Tests:  CBC:   Recent Labs     05/31/25  1317 06/01/25  0610   WBC 10.1 9.2   HGB 13.3* 12.7*    137     BMP:    Recent Labs     05/31/25  1317 06/01/25  0610    138   K 4.3 4.6    104   CO2 26 25   BUN 39* 38*   CREATININE 1.7* 1.6*   GLUCOSE 388* 241*     Hepatic:   Recent Labs     05/31/25  1317   AST 19   ALT 16   BILITOT 0.4   ALKPHOS 83       Discussed care with family and patient             Spent 30  minutes with

## 2025-06-01 NOTE — CONSULTS
PULMONARY AND CRITICAL CARE CONSULTATION NOTE    CONSULTING PHYSICIAN:      REASON FOR CONSULT:   Chief Complaint   Patient presents with    Chest Pain     Pt c/o left sided CP wrapping to back. Pt seen here and left ama on the 28th. Per pt they wanted to admit him for further testing and he refused. Pt states he is still feeling bad.        DATE OF CONSULT: 6/1/2025    HISTORY OF PRESENT ILLNESS: 75 y.o. 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  He was seen in the ER on 5/28 for the same and was advised admission but he refused.  Now comes with similar complaints.  Patient has moderate to severe COPD.  He follows in pulmonary clinic and is on Breztri inhaler twice a day.  He also has CHF and A-fib and supposed to be on diuretics.  He stated that he did not take his diuretics yesterday.  Currently sitting comfortably in the bed.  States improving shortness of breath.  Still has lower extremity edema.  Wore BiPAP overnight for ROSETTA.  I reviewed the chest x-ray from 5/31/2025 and my interpretation is as follows.  No infiltrates noted.    REVIEW OF SYSTEMS:   CONSTITUTIONAL SYMPTOMS: The patient denies fever, fatigue, night sweats, weight loss or weight gain.   HEENT: No vision changes. No tinnitus, Denies sinus pain. No hoarseness, or dysphagia.   NECK: Patient denies swelling in the neck.   CARDIOVASCULAR: Positive for chest pain, denies palpitation, syncope.  RESPIRATORY: Positive for shortness of breath, denies cough.   GASTROINTESTINAL: Denies nausea, abdominal pain or change in bowel function.  GENITOURINARY: Denies obstructive symptoms. No history of incontinence.  BREASTS: No masses or lumps in the breasts.   SKIN: No rashes or  normocephalic.  NECK: Supple, without cervical or supraclavicular lymphadenopathy:  CARDIOVASCULAR: S1 S2 RRR. Without murmer  RESPIRATORY & CHEST: Lungs are clear to auscultation and percussion. No wheezing, no crackles. Good air movement  GASTROINTESTINAL & ABDOMEN: Soft, nontender, positive bowel sounds in all quadrants, non-distended, without hepatosplenomegaly.   GENITOURINARY: Deferred.   MUSCULOSKELETAL: No tenderness to palpation of the axial skeleton. There is no clubbing. No cyanosis.  Bilateral edema of the lower extremities.   SKIN OF BODY: No rash or jaundice.   PSYCHIATRIC EVALUATION: Normal affect. Patient answers questions appropriately.   HEMATOLOGIC/LYMPHATIC/ IMMUNOLOGIC: No palpable lymphadenopathy.  NEUROLOGIC: Alert and oriented x 3.Groslly non-focal. Motor strength is 5+/5 in all muscle groups. The patient has a normal sensorium globally.      LABS:  Lab Results   Component Value Date    WBC 9.2 06/01/2025    HGB 12.7 (L) 06/01/2025    HCT 37.8 (L) 06/01/2025     06/01/2025    CHOL 169 07/29/2024    TRIG 142 07/29/2024    HDL 40 07/29/2024    ALT 16 05/31/2025    AST 19 05/31/2025     06/01/2025    K 4.6 06/01/2025     06/01/2025    CREATININE 1.6 (H) 06/01/2025    BUN 38 (H) 06/01/2025    CO2 25 06/01/2025    TSH 0.34 01/30/2024    PSA 1.35 06/02/2020    INR 2.36 (H) 06/01/2025    MICROALBUR 13.70 (H) 10/17/2023       Lab Results   Component Value Date    GLUCOSE 241 (H) 06/01/2025    CALCIUM 9.2 06/01/2025     06/01/2025    K 4.6 06/01/2025    CO2 25 06/01/2025     06/01/2025    BUN 38 (H) 06/01/2025    CREATININE 1.6 (H) 06/01/2025         IMPRESSION:   Acute exacerbation of moderate to severe COPD  Acute on chronic diastolic heart failure  ROSETTA on BiPAP  Atrial fibrillation  Mechanical AVR, on Coumadin  CKD  Morbid obesity    RECOMMENDATION:   Patient is not wheezing.  Would recommend to switch Solu-Medrol to prednisone 40 mg daily with taper over 5 to 7

## 2025-06-01 NOTE — PROGRESS NOTES
4 Eyes Skin Assessment     NAME:  Valdemar Solis  YOB: 1950  MEDICAL RECORD NUMBER:  [id number]    The patient is being assessed for  Admission    I agree that at least one RN has performed a thorough Head to Toe Skin Assessment on the patient. ALL assessment sites listed below have been assessed.      Areas assessed by both nurses:    Head, Face, Ears, Shoulders, Back, Chest, Arms, Elbows, Hands, Sacrum. Buttock, Coccyx, Ischium, Legs. Feet and Heels, and Under Medical Devices         Does the Patient have a Wound? No noted wound(s)       Alli Prevention initiated by RN: No  Wound Care Orders initiated by RN: No    Pressure Injury (Stage 3,4, Unstageable, DTI, NWPT, and Complex wounds) if present, place Wound referral order by RN under : No    New Ostomies, if present place, Ostomy referral order under : No     Nurse 1 eSignature: Electronically signed by Kristina Mancera RN on 6/1/25 at 10:59 AM EDT    **SHARE this note so that the co-signing nurse can place an eSignature**    Nurse 2 eSignature: Electronically signed by Philly Lugo RN on 6/1/25 at 12:17 PM EDT

## 2025-06-01 NOTE — PROGRESS NOTES
Pharmacy to Dose Warfarin    Pharmacy consulted to dose warfarin for Afib.    INR Goal: 2-3    INR today: 2.36    Home dose: 10 mg daily  *patient got 5 mg on 5/30 and 2.5 mg on 5/31    Assessment/Plan:  - INR is therapeutic but there was a decent drop today, pt has taken lower dose than normal past 2 days  - continue home warfarin dose, 10 mg tonight   - Possible concomitant drug-drug interactions include: aspirin, pravastatin, methylprednisolone      Pharmacy will continue to follow.    Elisha Torre, PharmD, [facility]  [id number]  6/1/2025 8:08 AM

## 2025-06-02 ENCOUNTER — APPOINTMENT (OUTPATIENT)
Dept: GENERAL RADIOLOGY | Age: 75
End: 2025-06-02
Payer: MEDICARE

## 2025-06-02 PROBLEM — E87.8 DISORDERS OF FLUID, ELECTROLYTE, AND ACID-BASE BALANCE: Status: ACTIVE | Noted: 2025-06-02

## 2025-06-02 PROBLEM — I13.10 CARDIORENAL SYNDROME: Status: ACTIVE | Noted: 2025-06-02

## 2025-06-02 PROBLEM — R79.89 ELEVATED TROPONIN: Status: ACTIVE | Noted: 2025-06-02

## 2025-06-02 PROBLEM — I50.32 CHRONIC DIASTOLIC HEART FAILURE (HCC): Status: ACTIVE | Noted: 2022-10-31

## 2025-06-02 PROBLEM — N18.9 CHRONIC KIDNEY DISEASE: Status: ACTIVE | Noted: 2025-06-02

## 2025-06-02 PROBLEM — R07.9 CHEST PAIN: Status: ACTIVE | Noted: 2025-06-02

## 2025-06-02 LAB
ANION GAP SERPL CALCULATED.3IONS-SCNC: 10 MMOL/L (ref 3–16)
BASOPHILS # BLD: 0 K/UL (ref 0–0.2)
BASOPHILS NFR BLD: 0.2 %
BUN SERPL-MCNC: 41 MG/DL (ref 7–20)
CALCIUM SERPL-MCNC: 9 MG/DL (ref 8.3–10.6)
CHLORIDE SERPL-SCNC: 102 MMOL/L (ref 99–110)
CO2 SERPL-SCNC: 26 MMOL/L (ref 21–32)
CREAT SERPL-MCNC: 1.6 MG/DL (ref 0.8–1.3)
DEPRECATED RDW RBC AUTO: 14.8 % (ref 12.4–15.4)
EOSINOPHIL # BLD: 0 K/UL (ref 0–0.6)
EOSINOPHIL NFR BLD: 0.2 %
GFR SERPLBLD CREATININE-BSD FMLA CKD-EPI: 45 ML/MIN/{1.73_M2}
GLUCOSE BLD-MCNC: 184 MG/DL (ref 70–99)
GLUCOSE BLD-MCNC: 213 MG/DL (ref 70–99)
GLUCOSE BLD-MCNC: 245 MG/DL (ref 70–99)
GLUCOSE SERPL-MCNC: 190 MG/DL (ref 70–99)
HCT VFR BLD AUTO: 39 % (ref 40.5–52.5)
HGB BLD-MCNC: 13.1 G/DL (ref 13.5–17.5)
INR PPP: 2.21 (ref 0.85–1.15)
LYMPHOCYTES # BLD: 0.7 K/UL (ref 1–5.1)
LYMPHOCYTES NFR BLD: 6.7 %
MCH RBC QN AUTO: 30.8 PG (ref 26–34)
MCHC RBC AUTO-ENTMCNC: 33.6 G/DL (ref 31–36)
MCV RBC AUTO: 91.7 FL (ref 80–100)
MONOCYTES # BLD: 1.2 K/UL (ref 0–1.3)
MONOCYTES NFR BLD: 11.2 %
NEUTROPHILS # BLD: 8.7 K/UL (ref 1.7–7.7)
NEUTROPHILS NFR BLD: 81.7 %
NT-PROBNP SERPL-MCNC: 720 PG/ML (ref 0–449)
PERFORMED ON: ABNORMAL
PLATELET # BLD AUTO: 159 K/UL (ref 135–450)
PMV BLD AUTO: 9 FL (ref 5–10.5)
POTASSIUM SERPL-SCNC: 4.3 MMOL/L (ref 3.5–5.1)
PROTHROMBIN TIME: 24.5 SEC (ref 11.9–14.9)
RBC # BLD AUTO: 4.25 M/UL (ref 4.2–5.9)
SODIUM SERPL-SCNC: 138 MMOL/L (ref 136–145)
WBC # BLD AUTO: 10.6 K/UL (ref 4–11)

## 2025-06-02 PROCEDURE — 85610 PROTHROMBIN TIME: CPT

## 2025-06-02 PROCEDURE — 2700000000 HC OXYGEN THERAPY PER DAY

## 2025-06-02 PROCEDURE — 99223 1ST HOSP IP/OBS HIGH 75: CPT | Performed by: HOSPITALIST

## 2025-06-02 PROCEDURE — 6370000000 HC RX 637 (ALT 250 FOR IP): Performed by: INTERNAL MEDICINE

## 2025-06-02 PROCEDURE — 94660 CPAP INITIATION&MGMT: CPT

## 2025-06-02 PROCEDURE — 6360000002 HC RX W HCPCS: Performed by: INTERNAL MEDICINE

## 2025-06-02 PROCEDURE — 99223 1ST HOSP IP/OBS HIGH 75: CPT | Performed by: INTERNAL MEDICINE

## 2025-06-02 PROCEDURE — 2500000003 HC RX 250 WO HCPCS: Performed by: HOSPITALIST

## 2025-06-02 PROCEDURE — 83880 ASSAY OF NATRIURETIC PEPTIDE: CPT

## 2025-06-02 PROCEDURE — 85025 COMPLETE CBC W/AUTO DIFF WBC: CPT

## 2025-06-02 PROCEDURE — 6370000000 HC RX 637 (ALT 250 FOR IP): Performed by: HOSPITALIST

## 2025-06-02 PROCEDURE — 94761 N-INVAS EAR/PLS OXIMETRY MLT: CPT

## 2025-06-02 PROCEDURE — 94640 AIRWAY INHALATION TREATMENT: CPT

## 2025-06-02 PROCEDURE — 80048 BASIC METABOLIC PNL TOTAL CA: CPT

## 2025-06-02 PROCEDURE — 71045 X-RAY EXAM CHEST 1 VIEW: CPT

## 2025-06-02 PROCEDURE — 6360000002 HC RX W HCPCS: Performed by: HOSPITALIST

## 2025-06-02 PROCEDURE — 1200000000 HC SEMI PRIVATE

## 2025-06-02 PROCEDURE — 36415 COLL VENOUS BLD VENIPUNCTURE: CPT

## 2025-06-02 PROCEDURE — 99233 SBSQ HOSP IP/OBS HIGH 50: CPT | Performed by: INTERNAL MEDICINE

## 2025-06-02 PROCEDURE — 2580000003 HC RX 258: Performed by: INTERNAL MEDICINE

## 2025-06-02 RX ORDER — FUROSEMIDE 10 MG/ML
40 INJECTION INTRAMUSCULAR; INTRAVENOUS 2 TIMES DAILY
Status: DISCONTINUED | OUTPATIENT
Start: 2025-06-02 | End: 2025-06-03

## 2025-06-02 RX ORDER — OXYCODONE AND ACETAMINOPHEN 5; 325 MG/1; MG/1
1 TABLET ORAL EVERY 4 HOURS PRN
Refills: 0 | Status: DISCONTINUED | OUTPATIENT
Start: 2025-06-02 | End: 2025-06-05 | Stop reason: HOSPADM

## 2025-06-02 RX ORDER — FUROSEMIDE 10 MG/ML
40 INJECTION INTRAMUSCULAR; INTRAVENOUS 2 TIMES DAILY
Status: DISCONTINUED | OUTPATIENT
Start: 2025-06-02 | End: 2025-06-02

## 2025-06-02 RX ORDER — MORPHINE SULFATE 4 MG/ML
4 INJECTION, SOLUTION INTRAMUSCULAR; INTRAVENOUS EVERY 4 HOURS PRN
Status: DISCONTINUED | OUTPATIENT
Start: 2025-06-02 | End: 2025-06-05 | Stop reason: HOSPADM

## 2025-06-02 RX ORDER — LIDOCAINE 4 G/G
1 PATCH TOPICAL DAILY
Status: DISCONTINUED | OUTPATIENT
Start: 2025-06-02 | End: 2025-06-05 | Stop reason: HOSPADM

## 2025-06-02 RX ORDER — SPIRONOLACTONE 25 MG/1
12.5 TABLET ORAL
Status: DISCONTINUED | OUTPATIENT
Start: 2025-06-02 | End: 2025-06-05 | Stop reason: HOSPADM

## 2025-06-02 RX ADMIN — IPRATROPIUM BROMIDE AND ALBUTEROL SULFATE 1 DOSE: .5; 3 SOLUTION RESPIRATORY (INHALATION) at 15:31

## 2025-06-02 RX ADMIN — INSULIN LISPRO 2 UNITS: 100 INJECTION, SOLUTION INTRAVENOUS; SUBCUTANEOUS at 08:51

## 2025-06-02 RX ADMIN — SPIRONOLACTONE 12.5 MG: 25 TABLET ORAL at 17:28

## 2025-06-02 RX ADMIN — WARFARIN SODIUM 10 MG: 5 TABLET ORAL at 17:28

## 2025-06-02 RX ADMIN — IPRATROPIUM BROMIDE AND ALBUTEROL SULFATE 1 DOSE: .5; 3 SOLUTION RESPIRATORY (INHALATION) at 21:24

## 2025-06-02 RX ADMIN — ACETAMINOPHEN 650 MG: 325 TABLET ORAL at 06:02

## 2025-06-02 RX ADMIN — MORPHINE SULFATE 4 MG: 4 INJECTION INTRAVENOUS at 10:23

## 2025-06-02 RX ADMIN — IPRATROPIUM BROMIDE AND ALBUTEROL SULFATE 1 DOSE: .5; 3 SOLUTION RESPIRATORY (INHALATION) at 08:14

## 2025-06-02 RX ADMIN — OXYCODONE AND ACETAMINOPHEN 1 TABLET: 5; 325 TABLET ORAL at 14:08

## 2025-06-02 RX ADMIN — IPRATROPIUM BROMIDE AND ALBUTEROL SULFATE 1 DOSE: .5; 3 SOLUTION RESPIRATORY (INHALATION) at 00:08

## 2025-06-02 RX ADMIN — SOTALOL HYDROCHLORIDE 80 MG: 80 TABLET ORAL at 08:51

## 2025-06-02 RX ADMIN — SODIUM CHLORIDE 125 MG: 9 INJECTION, SOLUTION INTRAVENOUS at 17:44

## 2025-06-02 RX ADMIN — BUDESONIDE AND FORMOTEROL FUMARATE DIHYDRATE 2 PUFF: 160; 4.5 AEROSOL RESPIRATORY (INHALATION) at 08:14

## 2025-06-02 RX ADMIN — BUDESONIDE AND FORMOTEROL FUMARATE DIHYDRATE 2 PUFF: 160; 4.5 AEROSOL RESPIRATORY (INHALATION) at 21:24

## 2025-06-02 RX ADMIN — TIOTROPIUM BROMIDE INHALATION SPRAY 2 PUFF: 3.12 SPRAY, METERED RESPIRATORY (INHALATION) at 08:14

## 2025-06-02 RX ADMIN — FUROSEMIDE 20 MG: 10 INJECTION, SOLUTION INTRAMUSCULAR; INTRAVENOUS at 08:50

## 2025-06-02 RX ADMIN — SOTALOL HYDROCHLORIDE 80 MG: 80 TABLET ORAL at 20:19

## 2025-06-02 RX ADMIN — OXYCODONE AND ACETAMINOPHEN 1 TABLET: 5; 325 TABLET ORAL at 17:28

## 2025-06-02 RX ADMIN — IPRATROPIUM BROMIDE AND ALBUTEROL SULFATE 1 DOSE: .5; 3 SOLUTION RESPIRATORY (INHALATION) at 04:39

## 2025-06-02 RX ADMIN — PRAVASTATIN SODIUM 80 MG: 40 TABLET ORAL at 08:50

## 2025-06-02 RX ADMIN — ASPIRIN 81 MG: 81 TABLET, CHEWABLE ORAL at 08:51

## 2025-06-02 RX ADMIN — SACUBITRIL AND VALSARTAN 1 TABLET: 49; 51 TABLET, FILM COATED ORAL at 08:51

## 2025-06-02 RX ADMIN — INSULIN LISPRO 2 UNITS: 100 INJECTION, SOLUTION INTRAVENOUS; SUBCUTANEOUS at 17:28

## 2025-06-02 RX ADMIN — INSULIN LISPRO 2 UNITS: 100 INJECTION, SOLUTION INTRAVENOUS; SUBCUTANEOUS at 20:19

## 2025-06-02 RX ADMIN — DOXYCYCLINE HYCLATE 100 MG: 100 TABLET, COATED ORAL at 20:19

## 2025-06-02 RX ADMIN — SODIUM CHLORIDE 125 MG: 9 INJECTION, SOLUTION INTRAVENOUS at 20:27

## 2025-06-02 RX ADMIN — FUROSEMIDE 40 MG: 10 INJECTION, SOLUTION INTRAMUSCULAR; INTRAVENOUS at 17:28

## 2025-06-02 RX ADMIN — AMLODIPINE BESYLATE 5 MG: 5 TABLET ORAL at 08:51

## 2025-06-02 RX ADMIN — Medication 10 ML: at 20:20

## 2025-06-02 RX ADMIN — SACUBITRIL AND VALSARTAN 1 TABLET: 49; 51 TABLET, FILM COATED ORAL at 20:19

## 2025-06-02 RX ADMIN — DOXYCYCLINE HYCLATE 100 MG: 100 TABLET, COATED ORAL at 08:51

## 2025-06-02 RX ADMIN — PREDNISONE 30 MG: 20 TABLET ORAL at 08:51

## 2025-06-02 RX ADMIN — Medication 10 ML: at 08:52

## 2025-06-02 ASSESSMENT — PAIN SCALES - GENERAL
PAINLEVEL_OUTOF10: 8
PAINLEVEL_OUTOF10: 10

## 2025-06-02 ASSESSMENT — PAIN DESCRIPTION - LOCATION
LOCATION: CHEST
LOCATION: CHEST

## 2025-06-02 ASSESSMENT — PAIN DESCRIPTION - DESCRIPTORS
DESCRIPTORS: STABBING
DESCRIPTORS: STABBING

## 2025-06-02 NOTE — PLAN OF CARE
Problem: Chronic Conditions and Co-morbidities  Goal: Patient's chronic conditions and co-morbidity symptoms are monitored and maintained or improved  6/1/2025 2149 by Salome Elliott LPN  Outcome: Progressing     Problem: Safety - Adult  Goal: Free from fall injury  6/1/2025 2149 by Salome Elliott LPN  Outcome: Progressing     Problem: Pain  Goal: Verbalizes/displays adequate comfort level or baseline comfort level  6/1/2025 2149 by Salome Elliott LPN  Outcome: Progressing

## 2025-06-02 NOTE — PROGRESS NOTES
Pharmacy to Dose Warfarin    Pharmacy consulted to dose warfarin for Afib.    INR Goal: 2-3    INR today: 2.21    Assessment/Plan:  - INR is therapeutic  - Continue home dose regimen 10mg  - Possible concomitant drug-drug interactions include: Prednisone, ASA     Pharmacy will continue to follow.    Lorin Jay [facility]  [id number]

## 2025-06-02 NOTE — PROGRESS NOTES
PULMONARY AND CRITICAL CARE MEDICINE PROGRESS NOTE      SUBJECTIVE: Patient is complaining of left-sided chest pain radiating to back.  He has tenderness at left anterior chest.    REVIEW OF SYSTEMS:   CONSTITUTIONAL SYMPTOMS: The patient denies fever, fatigue, night sweats, weight loss or weight gain.   HEENT: No vision changes. No tinnitus, Denies sinus pain. No hoarseness, or dysphagia.   CARDIOVASCULAR: Denies palpitation, syncope.  RESPIRATORY: Denies shortness of breath or cough.   GASTROINTESTINAL: Denies nausea, abdominal pain or change in bowel function.  SKIN: No rashes or itching.   MUSCULOSKELETAL: Denies weakness or bone pain.   NEUROLOGICAL: No headaches or seizures.     MEDICATIONS:      lidocaine  1 patch TransDERmal Daily    furosemide  40 mg IntraVENous BID    warfarin  10 mg Oral Daily    predniSONE  30 mg Oral Daily    amLODIPine  5 mg Oral Daily    aspirin  81 mg Oral Daily    budesonide-formoterol  2 puff Inhalation BID RT    And    tiotropium  2 puff Inhalation Daily RT    pravastatin  80 mg Oral Daily    sacubitril-valsartan  1 tablet Oral BID    sotalol  80 mg Oral BID    doxycycline hyclate  100 mg Oral 2 times per day    insulin lispro  0-8 Units SubCUTAneous 4x Daily AC & HS    sodium chloride flush  5-40 mL IntraVENous 2 times per day    ipratropium 0.5 mg-albuterol 2.5 mg  1 Dose Inhalation Q4H RT      dextrose      sodium chloride       morphine, oxyCODONE-acetaminophen, guaiFENesin-codeine, dextrose bolus **OR** dextrose bolus, glucagon (rDNA), dextrose, sodium chloride flush, sodium chloride, potassium chloride **OR** potassium alternative oral replacement **OR** potassium chloride, magnesium sulfate, ondansetron **OR** [DISCONTINUED] ondansetron, polyethylene glycol, acetaminophen **OR** acetaminophen     ALLERGIES:   Allergies as of 05/31/2025    (No Known Allergies)        OBJECTIVE:   height is 1.854 m (6' 1\") and weight is 146.3 kg (322 lb 8 oz) (abnormal). 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    Patient is not wheezing.  Chest pain is not related to COPD exasperation.    Continue prednisone 40 mg daily with taper over 5 to 7 days.  Continue DuoNebs around-the-clock.  Continue Symbicort and Spiriva.  Has bilateral lower extremity edema.  Diurese with iv Lasix to achieve negative balance.  Monitor urine output while being diuresed.  Would recommend heart failure services to follow.  Continue BiPAP overnight for ROSETTA.  At baseline O2, 4 L/min      Sara Ennis MD  Pulmonary Critical Care and Sleep Medicine  6/2/2025, 12:24 PM    This note was completed using dragon medical speech recognition software. Grammatical errors, random word insertions, pronoun errors and incomplete sentences are occasional consequences of this technology due to software limitations. If there are questions or concerns about the content of this note of information contained within the body of this dictation they should be addressed with the provider for clarification.

## 2025-06-02 NOTE — CONSULTS
Nephrology Consult Note                                                                                                                                                                                                                                                                                                                                                               Office : [phone]     Fax :[phone]    Patient's Name: Valdemar Solis  2:44 PM  6/2/2025    Reason for Consult:  CKD 3  Requesting Physician:  Yane Wei MD  Chief Complaint:    Chief Complaint   Patient presents with    Chest Pain     Pt c/o left sided CP wrapping to back. Pt seen here and left ama on the 28th. Per pt they wanted to admit him for further testing and he refused. Pt states he is still feeling bad.        Assessment/Plan     # CLAUDIO on CKD 3b  # Cardiorenal  - Creatinine peaked 1.9 Now back to baseline   - Baseline Cr ~ 1.6  - Avoid nephrotoxins  - Monitor renal labs     # CHF  - repeat CXR today with mild airspace infiltrate  - IV lasix BID  - BNP ~ 800    # COPD exacerbation  - continue steroids per pulmonary    #HTN  - controlled on amlodipine, entresto, lasix, Sotalol  - monitor      History of Present Ilness:    Valdemar Solis is a 75 y.o. male with PMHx of CKD 3, CHF, and COPD who presents to the ER for evaluation of COPD exacerbation. He is admitted for further management. We are consulted for CKD 3.       Interval hx     Bps controlled  On 6 L supplemental O2  Great UOP on IV lasix    Past Medical History:   Diagnosis Date    Acute congestive heart failure (HCC) 12/30/2019    Allergic rhinitis 07/11/2016    Anticoagulant long-term use warrefen    Atrial fibrillation (HCC)     under care of cardiology:Dr. Scott Behrens([facility])    CKD (chronic kidney disease)     Nephro:Dr. Parker:stage 3    Class 2 obesity due to excess calories without serious comorbidity with body mass index (BMI) of 37.0 to 37.9 in adult  hours.  INR:   Recent Labs     05/31/25  1317 06/01/25  0610 06/02/25  0447   INR 3.03* 2.36* 2.21*     UA:No results for input(s): \"COLORU\", \"CLARITYU\", \"GLUCOSEU\", \"BILIRUBINUR\", \"KETUA\", \"SPECGRAV\", \"BLOODU\", \"PHUR\", \"PROTEINU\", \"UROBILINOGEN\", \"NITRU\", \"LEUKOCYTESUR\", \"URINETYPE\" in the last 72 hours.    Invalid input(s): \"LABMICR\"   Urine Microscopic: No results for input(s): \"LABCAST\", \"BACTERIA\", \"COMU\", \"HYALCAST\", \"WBCUA\", \"RBCUA\" in the last 72 hours.    Invalid input(s): \"EPIU\"  Urine Culture: No results for input(s): \"LABURIN\" in the last 72 hours.  Urine Chemistry: No results for input(s): \"CLUR\", \"LABCREA\", \"PROTEINUR\", \"NAUR\" in the last 72 hours.      IMAGING:  XR CHEST PORTABLE   Final Result   1.  Mild airspace infiltrate in the right lung base which could represent   atelectasis and/or pneumonia.      2.  Mild cardiomegaly         XR CHEST PORTABLE   Final Result   Mild improved aeration of the lungs when compared to the prior exam.      Persistent prominence of the central pulmonary vasculature.      Small right and possible small left-sided pleural effusion.               Medical Decision Making:  The following items were considered in medical decision making:  Discussion of patient care with other providers  Reviewed clinical lab tests  Reviewed radiology tests  Reviewed other diagnostic tests/interventions    Will be discussed w/  Primary team     Thank you for allowing us to participate in care of Valdemar Solis   Feel free to contact me,     Carlo Ennis MD   [facility]  Office : [phone] or through [employer]  Fax :[phone]

## 2025-06-02 NOTE — CONSULTS
[facility]  Advanced CHF/Pulmonary Hypertension   Cardiac Evaluation      Valdemar Solis  YOB: 1950    Requesting PHysician:  Dr. HAIM Meneses      Chief Complaint   Patient presents with    Chest Pain     Pt c/o left sided CP wrapping to back. Pt seen here and left ama on the 28th. Per pt they wanted to admit him for further testing and he refused. Pt states he is still feeling bad.         History of Present Illness:  Valdemar Solis is a 76 yo male with a history off hypertension, hyperlipidemia, chronic diastolic HF, congenital aortic stenosis with replacement x 3, atrial fibrillation, pacemaker, chronic anticoagulation with warfarin, COPD severe, diabetes, CKD, and obesity who presents to the ED complaining of chest pain, shortness of breath, productive cough, wheezing, and bilateral lower extremity edema for the past 2 weeks.  He was seen in the ED on 5/28/25 and admission was recommended but he left AMA.  Chest pain is a stabbing, constant 10/10 pain in the mid chest that radiates to his mid back.  He can point to the pain and says it hurts to palpate.  He denies heart palpitations, diaphoresis, lightheadedness, or dizziness.  No GI complaints.    His most recent echo shows LVEF 50%.  He is audibly wheezing today.  He follows with Dr. Ambrosio of pulmonary.  He was recently seen by him, and his PFTs show that his lung function has worsened.  He says that he has been compliant with his medications.    I reviewed his optivol from his device.  It does not show that he is holding water.  His BUN/Cre is 41/1.6.  normal creatinine is usually around 1.9.  his troponin is minimally elevated but not in a pattern of ischemia.  His BNP level is higher at 817.  He has mild anemia and has been receiving IV infusions of IV iron (missed the last dose due to the cost).  His echo 10/24 shows normal LVEF.  He has known right heart failure.  We are asked to see him for possible volume overload from    06/02/25 (!) 146.3 kg (322 lb 8 oz)   05/28/25 (!) 145.3 kg (320 lb 6.4 oz)   04/22/25 (!) 145.9 kg (321 lb 9.6 oz)     BP Readings from Last 3 Encounters:   06/02/25 107/66   05/28/25 139/77   04/23/25 117/77     Constitutional and General Appearance:   Chronically ill appearing  HEENT:  NC/AT  SHELBY  No problems with hearing  Skin:  Warm, dry  Respiratory:  Normal excursion and expansion without use of accessory muscles  Resp Auscultation: Normal breath sounds without dullness  Cardiovascular:  The apical impulses not displaced  Heart tones are crisp and normal  Cervical veins are not engorged  The carotid upstroke is normal in amplitude and contour without delay or bruit  JVP 10-11 cm H2O  RRR with nl S1 and S2 without m,r,g  Peripheral pulses are symmetrical and full  There is no clubbing, cyanosis of the extremities.  Trace bilateral edema  Femoral Arteries: 2+ and equal  Pedal Pulses: 2+ and equal   Neck:  No thyromegaly  Abdomen:  protuberant abdomen  No masses or tenderness  Liver/Spleen: No Abnormalities Noted  Neurological/Psychiatric:  Alert and oriented in all spheres  Moves all extremities well  Exhibits normal gait balance and coordination  No abnormalities of mood, affect, memory, mentation, or behavior are noted    Labs were reviewed including labs from other hospital systems through [facility] Every[facility].  Cardiac testing was reviewed including echos, nuclear scans, cardiac catheterization, including from other hospital systems through [facility].    Assessment:    1. Chest pain, unspecified type    2. Elevated troponin    3. COPD exacerbation (HCC)    4. Chronic kidney disease, unspecified CKD stage    5. Hyperglycemia    6.  Chronic diastolic HF:  no evidence by his optivol that he is volume overloaded.  He could be third-spacing fluid but his lower extremity edema is minimal.   7.  Paroxysmal atrial fibrillation    Plan:   He has received IV lasix today.  Give him bid dose today.  I do not

## 2025-06-02 NOTE — PROGRESS NOTES
V2.0    [facility] [place]      Name:  Valdemar Solis /Age/Sex: 1950  (75 y.o. male)   MRN & CSN:  [id number] & [id number] Encounter Date/Time: 2025 9:38 AM EDT   Location:  5T-5564/5564-01 PCP: Yane Wei MD     Attending:Carmina Meneses MD       Hospital Day: 3    Assessment and Recommendations   Valdemar Solis is a 75 y.o. male who presents with COPD exacerbation (HCC)      Plan:   COPD exacerbation.  Continue steroid breathing treatment appreciate pulmonary recommendation  Slow improvement.    CHF.  With volume overload getting IV Lasix improvement repeat checks x-ray.    Chest pain  Musculoskeletal  Patient pain controlled.    Chronic kidney disease creatinine stable monitor      Diet ADULT DIET; Regular; 5 carb choices (75 gm/meal)   DVT Prophylaxis    Code Status Full Code   Disposition          Personally reviewed Lab Studies and Imaging         Subjective:     Chief Complaint:     Valdemar Solis is a 75 y.o. male who presents with feeling shortness of breath still feeling some      Review of Systems:      Pertinent positives and negatives discussed in HPI    Objective:     Intake/Output Summary (Last 24 hours) at 2025 0938  Last data filed at 2025 0607  Gross per 24 hour   Intake 1700 ml   Output 2560 ml   Net -860 ml      Vitals:   Vitals:    25 0316 25 0521 25 0745 25 0816   BP: 113/77  107/66    Pulse: 63  60    Resp: 18  16    Temp: 99.1 °F (37.3 °C)  97.8 °F (36.6 °C)    TempSrc: Axillary  Oral    SpO2: 98%  97% 98%   Weight:  (!) 146.3 kg (322 lb 8 oz)     Height:             Physical Exam:      General: NAD  Eyes: EOMI  ENT: neck supple  Cardiovascular: Regular rate.  Respiratory: Clear to auscultation  Gastrointestinal: Soft, non tender  Genitourinary: no suprapubic tenderness  Musculoskeletal: No edema  Skin: warm, dry  Neuro: Alert.  Psych: Mood appropriate.         Medications:   Medications:    lidocaine  1 patch TransDERmal Daily     TECHNOLOGIST PROVIDED HISTORY: Reason for exam:->sob Reason for Exam: sob FINDINGS: There are hypoventilatory changes in the lung bases.  Basal pneumonia cannot be excluded.  Upper lung zones appear clear.  There is cardiomegaly.  Pacer leads are in good position.  Bony structures unremarkable.     1. Hypoventilatory changes in the lung bases. Basal pneumonia cannot be excluded. 2. Cardiomegaly.       CBC:   Recent Labs     05/31/25  1317 06/01/25  0610 06/02/25  0447   WBC 10.1 9.2 10.6   HGB 13.3* 12.7* 13.1*    137 159     BMP:    Recent Labs     05/31/25  1317 06/01/25  0610 06/02/25  0447    138 138   K 4.3 4.6 4.3    104 102   CO2 26 25 26   BUN 39* 38* 41*   CREATININE 1.7* 1.6* 1.6*   GLUCOSE 388* 241* 190*     Hepatic:   Recent Labs     05/31/25  1317   AST 19   ALT 16   BILITOT 0.4   ALKPHOS 83     Lipids:   Lab Results   Component Value Date/Time    CHOL 169 07/29/2024 08:42 AM    HDL 40 07/29/2024 08:42 AM    TRIG 142 07/29/2024 08:42 AM     Hemoglobin A1C:   Lab Results   Component Value Date/Time    LABA1C 7.9 06/01/2025 06:10 AM     TSH:   Lab Results   Component Value Date/Time    TSH 0.34 01/30/2024 09:37 AM     Troponin: No results found for: \"TROPONINT\"  Lactic Acid: No results for input(s): \"LACTA\" in the last 72 hours.  BNP:   Recent Labs     05/31/25  1317   PROBNP 817*     UA:  Lab Results   Component Value Date/Time    WBCUA 1 06/24/2019 10:27 AM    RBCUA 2 06/24/2019 10:27 AM     Urine Cultures: No results found for: \"LABURIN\"  Blood Cultures:   Lab Results   Component Value Date/Time    BC No Growth after 4 days of incubation. 10/30/2022 01:14 AM     Lab Results   Component Value Date/Time    BLOODCULT2 No Growth after 4 days of incubation. 10/30/2022 01:14 AM     Organism: No results found for: \"ORG\"      Electronically signed by Carmina Meneses MD on 6/2/2025 at 9:38 AM

## 2025-06-02 NOTE — CARE COORDINATION
Case Management Assessment  Initial Evaluation    Date/Time of Evaluation: 6/2/2025 12:17 PM  Assessment Completed by: Cheryl HINDS RN    If patient is discharged prior to next notation, then this note serves as note for discharge by case management.    Patient Name: Valdemar Solis                   YOB: 1950  Diagnosis: Hyperglycemia [R73.9]  Elevated troponin [R79.89]  COPD exacerbation (HCC) [J44.1]  Chest pain, unspecified type [R07.9]  Chronic kidney disease, unspecified CKD stage [N18.9]                   Date / Time: 5/31/2025 12:37 PM    Patient Admission Status: Inpatient   Readmission Risk (Low < 19, Mod (19-27), High > 27): Readmission Risk Score: 15    Current PCP: Yane Wei MD  PCP verified by CM? Yes (Yane Wei MD)    Chart Reviewed: Yes      History Provided by: Patient  Patient Orientation: Alert and Oriented    Patient Cognition: Alert    Hospitalization in the last 30 days (Readmission):  No    If yes, Readmission Assessment in  Navigator will be completed.    Advance Directives:      Code Status: Full Code   Patient's Primary Decision Maker is: Legal Next of Kin    Primary Decision Maker: Luisa Solis - Spouse - [phone]    Discharge Planning:    Patient lives with: Spouse/Significant Other Type of Home: House  Primary Care Giver: Self  Patient Support Systems include: Spouse/Significant Other   Current Financial resources: Medicare  Current community resources: None  Current services prior to admission: Oxygen Therapy, Home Bipap, Durable Medical Equipment (4 LITERS  BASELINE)            Current DME: Walker, Cane            Type of Home Care services:  None    ADLS  Prior functional level: Independent in ADLs/IADLs, Bathing, Dressing, Toileting, Feeding, Cooking, Housework, Mobility, Shopping  Current functional level: Independent in ADLs/IADLs, Mobility, Toileting, Dressing, Feeding    PT AM-PAC:   /24  OT AM-PAC:   /24    Family can provide assistance at  goals of Hyperglycemia [R73.9]  Elevated troponin [R79.89]  COPD exacerbation (HCC) [J44.1]  Chest pain, unspecified type [R07.9]  Chronic kidney disease, unspecified CKD stage [N18.9]    IF APPLICABLE: The Patient and/or patient representative Valdemar and his family were provided with a choice of provider and agrees with the discharge plan. Freedom of choice list with basic dialogue that supports the patient's individualized plan of care/goals and shares the quality data associated with the providers was provided to: Patient   Patient Representative Name:       The Patient and/or Patient Representative Agree with the Discharge Plan? Yes    Cheryl HINDS RN  Case Management Department  Ph: [phone]    Electronically signed by Cheryl HINDS RN on 6/2/25 at 12:26 PM EDT

## 2025-06-03 PROBLEM — I71.21 ANEURYSM OF ASCENDING AORTA WITHOUT RUPTURE: Status: ACTIVE | Noted: 2025-06-03

## 2025-06-03 LAB
ANION GAP SERPL CALCULATED.3IONS-SCNC: 12 MMOL/L (ref 3–16)
BASOPHILS # BLD: 0 K/UL (ref 0–0.2)
BASOPHILS NFR BLD: 0.2 %
BUN SERPL-MCNC: 42 MG/DL (ref 7–20)
CALCIUM SERPL-MCNC: 9.4 MG/DL (ref 8.3–10.6)
CHLORIDE SERPL-SCNC: 100 MMOL/L (ref 99–110)
CO2 SERPL-SCNC: 25 MMOL/L (ref 21–32)
CREAT SERPL-MCNC: 1.7 MG/DL (ref 0.8–1.3)
DEPRECATED RDW RBC AUTO: 14.6 % (ref 12.4–15.4)
EOSINOPHIL # BLD: 0.1 K/UL (ref 0–0.6)
EOSINOPHIL NFR BLD: 0.5 %
GFR SERPLBLD CREATININE-BSD FMLA CKD-EPI: 42 ML/MIN/{1.73_M2}
GLUCOSE BLD-MCNC: 168 MG/DL (ref 70–99)
GLUCOSE BLD-MCNC: 195 MG/DL (ref 70–99)
GLUCOSE BLD-MCNC: 204 MG/DL (ref 70–99)
GLUCOSE BLD-MCNC: 216 MG/DL (ref 70–99)
GLUCOSE BLD-MCNC: 223 MG/DL (ref 70–99)
GLUCOSE SERPL-MCNC: 162 MG/DL (ref 70–99)
HCT VFR BLD AUTO: 42.9 % (ref 40.5–52.5)
HGB BLD-MCNC: 14 G/DL (ref 13.5–17.5)
INR PPP: 2.08 (ref 0.85–1.15)
LYMPHOCYTES # BLD: 1 K/UL (ref 1–5.1)
LYMPHOCYTES NFR BLD: 9.3 %
MCH RBC QN AUTO: 30.4 PG (ref 26–34)
MCHC RBC AUTO-ENTMCNC: 32.6 G/DL (ref 31–36)
MCV RBC AUTO: 93.3 FL (ref 80–100)
MONOCYTES # BLD: 1.1 K/UL (ref 0–1.3)
MONOCYTES NFR BLD: 9.9 %
NEUTROPHILS # BLD: 9 K/UL (ref 1.7–7.7)
NEUTROPHILS NFR BLD: 80.1 %
PERFORMED ON: ABNORMAL
PLATELET # BLD AUTO: 182 K/UL (ref 135–450)
PMV BLD AUTO: 8.8 FL (ref 5–10.5)
POTASSIUM SERPL-SCNC: 4.7 MMOL/L (ref 3.5–5.1)
PROTHROMBIN TIME: 23.4 SEC (ref 11.9–14.9)
RBC # BLD AUTO: 4.6 M/UL (ref 4.2–5.9)
SODIUM SERPL-SCNC: 137 MMOL/L (ref 136–145)
WBC # BLD AUTO: 11.3 K/UL (ref 4–11)

## 2025-06-03 PROCEDURE — 97116 GAIT TRAINING THERAPY: CPT

## 2025-06-03 PROCEDURE — 94761 N-INVAS EAR/PLS OXIMETRY MLT: CPT

## 2025-06-03 PROCEDURE — 97530 THERAPEUTIC ACTIVITIES: CPT

## 2025-06-03 PROCEDURE — 1200000000 HC SEMI PRIVATE

## 2025-06-03 PROCEDURE — 6370000000 HC RX 637 (ALT 250 FOR IP): Performed by: INTERNAL MEDICINE

## 2025-06-03 PROCEDURE — 6370000000 HC RX 637 (ALT 250 FOR IP): Performed by: HOSPITALIST

## 2025-06-03 PROCEDURE — 85025 COMPLETE CBC W/AUTO DIFF WBC: CPT

## 2025-06-03 PROCEDURE — 94640 AIRWAY INHALATION TREATMENT: CPT

## 2025-06-03 PROCEDURE — 80048 BASIC METABOLIC PNL TOTAL CA: CPT

## 2025-06-03 PROCEDURE — 6370000000 HC RX 637 (ALT 250 FOR IP): Performed by: NURSE PRACTITIONER

## 2025-06-03 PROCEDURE — 99232 SBSQ HOSP IP/OBS MODERATE 35: CPT | Performed by: NURSE PRACTITIONER

## 2025-06-03 PROCEDURE — 2500000003 HC RX 250 WO HCPCS: Performed by: HOSPITALIST

## 2025-06-03 PROCEDURE — 97161 PT EVAL LOW COMPLEX 20 MIN: CPT

## 2025-06-03 PROCEDURE — 36415 COLL VENOUS BLD VENIPUNCTURE: CPT

## 2025-06-03 PROCEDURE — 2700000000 HC OXYGEN THERAPY PER DAY

## 2025-06-03 PROCEDURE — 97165 OT EVAL LOW COMPLEX 30 MIN: CPT

## 2025-06-03 PROCEDURE — 6360000002 HC RX W HCPCS: Performed by: INTERNAL MEDICINE

## 2025-06-03 PROCEDURE — 85610 PROTHROMBIN TIME: CPT

## 2025-06-03 PROCEDURE — 99233 SBSQ HOSP IP/OBS HIGH 50: CPT | Performed by: INTERNAL MEDICINE

## 2025-06-03 PROCEDURE — 99233 SBSQ HOSP IP/OBS HIGH 50: CPT | Performed by: HOSPITALIST

## 2025-06-03 PROCEDURE — 94660 CPAP INITIATION&MGMT: CPT

## 2025-06-03 RX ADMIN — SOTALOL HYDROCHLORIDE 80 MG: 80 TABLET ORAL at 19:54

## 2025-06-03 RX ADMIN — Medication 10 ML: at 07:58

## 2025-06-03 RX ADMIN — INSULIN LISPRO 2 UNITS: 100 INJECTION, SOLUTION INTRAVENOUS; SUBCUTANEOUS at 12:07

## 2025-06-03 RX ADMIN — ONDANSETRON 4 MG: 4 TABLET, ORALLY DISINTEGRATING ORAL at 20:53

## 2025-06-03 RX ADMIN — DOXYCYCLINE HYCLATE 100 MG: 100 TABLET, COATED ORAL at 07:57

## 2025-06-03 RX ADMIN — Medication 10 ML: at 19:55

## 2025-06-03 RX ADMIN — PRAVASTATIN SODIUM 80 MG: 40 TABLET ORAL at 07:56

## 2025-06-03 RX ADMIN — ASPIRIN 81 MG: 81 TABLET, CHEWABLE ORAL at 07:57

## 2025-06-03 RX ADMIN — OXYCODONE AND ACETAMINOPHEN 1 TABLET: 5; 325 TABLET ORAL at 01:01

## 2025-06-03 RX ADMIN — IPRATROPIUM BROMIDE AND ALBUTEROL SULFATE 1 DOSE: .5; 3 SOLUTION RESPIRATORY (INHALATION) at 05:20

## 2025-06-03 RX ADMIN — SACUBITRIL AND VALSARTAN 1 TABLET: 49; 51 TABLET, FILM COATED ORAL at 07:57

## 2025-06-03 RX ADMIN — EMPAGLIFLOZIN 10 MG: 10 TABLET, FILM COATED ORAL at 14:56

## 2025-06-03 RX ADMIN — SOTALOL HYDROCHLORIDE 80 MG: 80 TABLET ORAL at 08:02

## 2025-06-03 RX ADMIN — BUDESONIDE AND FORMOTEROL FUMARATE DIHYDRATE 2 PUFF: 160; 4.5 AEROSOL RESPIRATORY (INHALATION) at 08:21

## 2025-06-03 RX ADMIN — PREDNISONE 30 MG: 20 TABLET ORAL at 07:57

## 2025-06-03 RX ADMIN — IPRATROPIUM BROMIDE AND ALBUTEROL SULFATE 1 DOSE: .5; 3 SOLUTION RESPIRATORY (INHALATION) at 08:20

## 2025-06-03 RX ADMIN — OXYCODONE AND ACETAMINOPHEN 1 TABLET: 5; 325 TABLET ORAL at 20:53

## 2025-06-03 RX ADMIN — IPRATROPIUM BROMIDE AND ALBUTEROL SULFATE 1 DOSE: .5; 3 SOLUTION RESPIRATORY (INHALATION) at 16:12

## 2025-06-03 RX ADMIN — TIOTROPIUM BROMIDE INHALATION SPRAY 2 PUFF: 3.12 SPRAY, METERED RESPIRATORY (INHALATION) at 08:21

## 2025-06-03 RX ADMIN — IPRATROPIUM BROMIDE AND ALBUTEROL SULFATE 1 DOSE: .5; 3 SOLUTION RESPIRATORY (INHALATION) at 21:44

## 2025-06-03 RX ADMIN — BUDESONIDE AND FORMOTEROL FUMARATE DIHYDRATE 2 PUFF: 160; 4.5 AEROSOL RESPIRATORY (INHALATION) at 21:44

## 2025-06-03 RX ADMIN — IPRATROPIUM BROMIDE AND ALBUTEROL SULFATE 1 DOSE: .5; 3 SOLUTION RESPIRATORY (INHALATION) at 12:42

## 2025-06-03 RX ADMIN — INSULIN LISPRO 2 UNITS: 100 INJECTION, SOLUTION INTRAVENOUS; SUBCUTANEOUS at 18:02

## 2025-06-03 RX ADMIN — FUROSEMIDE 40 MG: 10 INJECTION, SOLUTION INTRAMUSCULAR; INTRAVENOUS at 07:56

## 2025-06-03 RX ADMIN — OXYCODONE AND ACETAMINOPHEN 1 TABLET: 5; 325 TABLET ORAL at 07:57

## 2025-06-03 RX ADMIN — IPRATROPIUM BROMIDE AND ALBUTEROL SULFATE 1 DOSE: .5; 3 SOLUTION RESPIRATORY (INHALATION) at 01:39

## 2025-06-03 RX ADMIN — WARFARIN SODIUM 10 MG: 5 TABLET ORAL at 18:02

## 2025-06-03 RX ADMIN — INSULIN LISPRO 2 UNITS: 100 INJECTION, SOLUTION INTRAVENOUS; SUBCUTANEOUS at 19:54

## 2025-06-03 RX ADMIN — SACUBITRIL AND VALSARTAN 1 TABLET: 49; 51 TABLET, FILM COATED ORAL at 19:54

## 2025-06-03 RX ADMIN — AMLODIPINE BESYLATE 5 MG: 5 TABLET ORAL at 07:57

## 2025-06-03 ASSESSMENT — PAIN DESCRIPTION - DIRECTION: RADIATING_TOWARDS: BACK

## 2025-06-03 ASSESSMENT — PAIN DESCRIPTION - LOCATION
LOCATION: CHEST;BACK
LOCATION: CHEST
LOCATION: CHEST;BACK

## 2025-06-03 ASSESSMENT — PAIN DESCRIPTION - FREQUENCY: FREQUENCY: INTERMITTENT

## 2025-06-03 ASSESSMENT — PAIN - FUNCTIONAL ASSESSMENT
PAIN_FUNCTIONAL_ASSESSMENT: ACTIVITIES ARE NOT PREVENTED
PAIN_FUNCTIONAL_ASSESSMENT: ACTIVITIES ARE NOT PREVENTED

## 2025-06-03 ASSESSMENT — PAIN DESCRIPTION - DESCRIPTORS
DESCRIPTORS: ACHING
DESCRIPTORS: ACHING

## 2025-06-03 ASSESSMENT — PAIN DESCRIPTION - ORIENTATION
ORIENTATION: LEFT
ORIENTATION: LEFT

## 2025-06-03 ASSESSMENT — PAIN SCALES - GENERAL
PAINLEVEL_OUTOF10: 8
PAINLEVEL_OUTOF10: 7
PAINLEVEL_OUTOF10: 6

## 2025-06-03 ASSESSMENT — PAIN DESCRIPTION - PAIN TYPE: TYPE: ACUTE PAIN

## 2025-06-03 NOTE — PROGRESS NOTES
Pharmacy to Dose Warfarin    Pharmacy consulted to dose warfarin for Afib.    INR Goal: 2-3    INR today: 2.08    Assessment/Plan:  - INR is therapeutic  - 10 mg today  - Possible concomitant drug-drug interactions include: ASA, Prednisone     Pharmacy will continue to follow.    Lorin Jay [facility]  [id number]

## 2025-06-03 NOTE — PROGRESS NOTES
[facility] - Inpatient Rehabilitation Department   Phone: [phone]    Physical Therapy    [x] Initial Evaluation            [] Daily Treatment Note         [x] Discharge Summary      Patient: Valdemar Solis   : 1950   MRN: [id number]   Date of Service:  6/3/2025  Admitting Diagnosis: COPD exacerbation (HCC)  Current Admission Summary:  Valdemar Solis is a 75 y.o. male who presented with with complaints of increasing shortness of breath.  With chest pain.  Patient with history of chronic CHF A-fib on Coumadin former smoker diabetes chronic kidney disease.  Patient recently was seen here for increased wheezing shortness of breath productive sputum with bilateral extremity above.  Patient was advised admission further but left AGAINST MEDICAL ADVICE patient states since then patient has had increased pain 10 out of 10 left-sided radiating to left arm and neck.  Also with diaphoresis.  Patient denies any sick contacts also noted that his legs are more swollen than usual the lower extremities.  With some redness.  No history of cellulitis   Past Medical History:  has a past medical history of Acute congestive heart failure (HCC), Allergic rhinitis, Anticoagulant long-term use, Atrial fibrillation (HCC), CKD (chronic kidney disease), Class 2 obesity due to excess calories without serious comorbidity with body mass index (BMI) of 37.0 to 37.9 in adult, Controlled type 2 diabetes mellitus without complication, without long-term current use of insulin (HCC), COPD, COPD (chronic obstructive pulmonary disease) (HCC), Erectile dysfunction, Essential tremor, Gout, Hyperlipidemia, mixed, Hypertension, Long term current use of anticoagulants with INR goal of 2.0-3.0, Long term current use of anticoagulants with INR goal of 2.0-3.0, Obstructive sleep apnea syndrome, Primary insomnia, Primary osteoarthritis of both knees, and Sleep apnea.  Past Surgical History:  has a past surgical history that includes

## 2025-06-03 NOTE — PROGRESS NOTES
[facility]  HEART FAILURE  Progress Note      Admit Date 5/31/2025     Reason for Consult:      Reason for Consultation/Chief Complaint: chest pain    HPI:    Valdemar Solis is a 75 y.o. male with PMH HTN, HLD, HFpEF, congenital aortic stenosis with replacement x 3, atrial fibrillation, pacemaker, chronic anticoagulation with warfarin, COPD severe, diabetes, CKD, and obesity admitted with CP, SOB and cough. He was seen in the ED on 5/28/25 and admission was recommended but he left AMA       Subjective:  Patient is being seen for CHF. There were no acute overnight cardiac events.   Today Mr. Solis is feeling better, less SOB/cough and wheezing although not back to baseline. He denies chest pain, palpitations, or dizziness  I spent 10 minutes educating the patient on heart failure, medications, lifestyle modifications and dietary guidelines.       Baseline Weight: 315   Wt Readings from Last 3 Encounters:   06/03/25 (!) 142.2 kg (313 lb 6.4 oz)   05/28/25 (!) 145.3 kg (320 lb 6.4 oz)   04/22/25 (!) 145.9 kg (321 lb 9.6 oz)         Cardiac Testing:   Echo:  10/7/24:    Left Ventricle: Low normal left ventricular systolic function with a visually estimated EF of 50 - 55%. Left ventricle size is normal. Increased wall thickness. Septal paradox. Diastolic septal bounce. Normal wall motion.    Right Ventricle: Right ventricle is dilated. Lead present in the right ventricle. Reduced systolic function. TAPSE is 1.5 cm.    Aortic Valve: [employer] Quique mechanical valve. AV mean gradient is 21 mmHg. No stenosis. AV mean gradient is 21 mmHg. AV peak velocity is 318.0 m/s. AV AT is 95.0 ms. Readings are at the upper limit of normal for a mechanical valve. Stable compared to prior    Mitral Valve: Mild annular calcification. Mildly thickened leaflets.    Tricuspid Valve: Mild regurgitation.    Right Atrium: Lead present in the right atrium. Right atrium is mildly dilated.    Aorta: Moderately dilated ascending aorta. Ao

## 2025-06-03 NOTE — PROGRESS NOTES
ADVANCED CARE PLANNING    Name:Valdemar Solis       :  1950              MRN:  [id number]        Purpose of Encounter: Advanced care planning in light of hospitalization  Parties In Attendance: Patient,    Decisional Capacity: Yes  Subjective: Patient  understand that this conversation is to address long term care goal  Objective:   Hypoxia  Copd exab  Chf ecab   Needing iv steprids and biapa  Goals of Care Determination: Patient would pursue CPR and Intubation if required. No tracheostomy or long term ventilation if required.       Advanced   Carmina Meneses MD  6/3/2025 1:20 PM    Plan: Will notify Yane eWi MD of change in care plan. Will look at further interventions as needed.   Code Status: At this time patient wishes to be Full Code  Time Spent with Patient: 21 minutes      Electronically signed by Carmina Meneses MD on 6/3/2025 at 1:20 PM  Thank you Yane Wei MD for the opportunity to be involved in this patient's care.

## 2025-06-03 NOTE — PROGRESS NOTES
V2.0    [facility] [place]      Name:  Valdemar Solis /Age/Sex: 1950  (75 y.o. male)   MRN & CSN:  [id number] & [id number] Encounter Date/Time: 6/3/2025 9:38 AM EDT   Location:  5T-5564/5564-01 PCP: Yane Wei MD     Attending:Carmina Meneses MD       Hospital Day: 4    Assessment and Recommendations   Valdemar Solis is a 75 y.o. male who presents with COPD exacerbation (HCC)      Plan:   COPD exacerbation.  Continue steroid breathing treatment appreciate pulmonary recommendation  Slow improvement.  Wean steroid       CHF.  With volume overload getting IV Lasix  Improving  Appreciate cardio recs    Lipoma of the back with possible cyst  Known previous hx  Outpatient follow up with surgery for removal      Chest pain  Musculoskeletal  Patient pain controlled.  CTPA negative  Adjsut pain meds    Chronic kidney disease creatinine stable monitor      Diet ADULT DIET; Regular; 5 carb choices (75 gm/meal)   DVT Prophylaxis    Code Status Full Code   Disposition 1-2 days pending improvement in breathing          Personally reviewed Lab Studies and Imaging         Subjective:     Chief Complaint:     Valdemar Solis is a 75 y.o. male who presents with feeling shortness of breath still feeling some      Review of Systems:      Pertinent positives and negatives discussed in HPI    Objective:     Intake/Output Summary (Last 24 hours) at 6/3/2025 0855  Last data filed at 2025 1859  Gross per 24 hour   Intake --   Output 1000 ml   Net -1000 ml      Vitals:   Vitals:    25 0143 25 0508 25 0745 25 0822   BP:   130/75    Pulse: 65  66    Resp:   18    Temp:   98.3 °F (36.8 °C)    TempSrc:   Oral    SpO2:   97% 97%   Weight:  (!) 142.2 kg (313 lb 6.4 oz)     Height:             Physical Exam:      General: NAD  Eyes: EOMI  ENT: neck supple  Cardiovascular: Regular rate.  Respiratory: Clear to auscultation  Gastrointestinal: Soft, non tender  Genitourinary: no suprapubic  tenderness  Musculoskeletal: No edema  Skin: warm, dry  Neuro: Alert.  Psych: Mood appropriate.         Medications:   Medications:    lidocaine  1 patch TransDERmal Daily    furosemide  40 mg IntraVENous BID    spironolactone  12.5 mg Oral Q MWF    warfarin  10 mg Oral Daily    predniSONE  30 mg Oral Daily    amLODIPine  5 mg Oral Daily    aspirin  81 mg Oral Daily    budesonide-formoterol  2 puff Inhalation BID RT    And    tiotropium  2 puff Inhalation Daily RT    pravastatin  80 mg Oral Daily    sacubitril-valsartan  1 tablet Oral BID    sotalol  80 mg Oral BID    doxycycline hyclate  100 mg Oral 2 times per day    insulin lispro  0-8 Units SubCUTAneous 4x Daily AC & HS    sodium chloride flush  5-40 mL IntraVENous 2 times per day    ipratropium 0.5 mg-albuterol 2.5 mg  1 Dose Inhalation Q4H RT      Infusions:    dextrose      sodium chloride       PRN Meds: morphine, 4 mg, Q4H PRN  oxyCODONE-acetaminophen, 1 tablet, Q4H PRN  guaiFENesin-codeine, 5 mL, Q6H PRN  dextrose bolus, 125 mL, PRN   Or  dextrose bolus, 250 mL, PRN  glucagon (rDNA), 1 mg, PRN  dextrose, , Continuous PRN  sodium chloride flush, 5-40 mL, PRN  sodium chloride, , PRN  potassium chloride, 40 mEq, PRN   Or  potassium alternative oral replacement, 40 mEq, PRN   Or  potassium chloride, 10 mEq, PRN  magnesium sulfate, 2,000 mg, PRN  ondansetron, 4 mg, Q8H PRN  polyethylene glycol, 17 g, Daily PRN  acetaminophen, 650 mg, Q6H PRN   Or  acetaminophen, 650 mg, Q6H PRN        Labs and Imaging   XR CHEST PORTABLE  Result Date: 5/31/2025  EXAMINATION: ONE XRAY VIEW OF THE CHEST 5/31/2025 1:16 pm COMPARISON: May 28, 2025 HISTORY: ORDERING SYSTEM PROVIDED HISTORY: SOB TECHNOLOGIST PROVIDED HISTORY: Reason for exam:->SOB Reason for Exam: CP FINDINGS: Pacemaker overlying the left chest.  Prior cardiothoracic surgery Elevation the right hemidiaphragm.  Mild improved aeration of the lungs when compared to the prior exam.  Persistent prominence of the central

## 2025-06-03 NOTE — PROGRESS NOTES
Assessment complete. VSS. Patient resting in bed. Respirations even and easy. Call light in reach.  Pt complain of pain in chest and back consistent with prior assessment PRN percocet given see mAR. Will continue to monitor.

## 2025-06-03 NOTE — PROGRESS NOTES
PULMONARY AND CRITICAL CARE MEDICINE PROGRESS NOTE      SUBJECTIVE: Patient states improvement in chest pain.  Denies any shortness of breath or cough or wheezing.    REVIEW OF SYSTEMS:   CONSTITUTIONAL SYMPTOMS: The patient denies fever, fatigue, night sweats, weight loss or weight gain.   HEENT: No vision changes. No tinnitus, Denies sinus pain. No hoarseness, or dysphagia.   CARDIOVASCULAR: Denies chest pain, palpitation, syncope.  RESPIRATORY: Denies shortness of breath or cough.   GASTROINTESTINAL: Denies nausea, abdominal pain or change in bowel function.  SKIN: No rashes or itching.   MUSCULOSKELETAL: Denies weakness or bone pain.   NEUROLOGICAL: No headaches or seizures.     MEDICATIONS:      lidocaine  1 patch TransDERmal Daily    furosemide  40 mg IntraVENous BID    spironolactone  12.5 mg Oral Q MWF    warfarin  10 mg Oral Daily    predniSONE  30 mg Oral Daily    amLODIPine  5 mg Oral Daily    aspirin  81 mg Oral Daily    budesonide-formoterol  2 puff Inhalation BID RT    And    tiotropium  2 puff Inhalation Daily RT    pravastatin  80 mg Oral Daily    sacubitril-valsartan  1 tablet Oral BID    sotalol  80 mg Oral BID    doxycycline hyclate  100 mg Oral 2 times per day    insulin lispro  0-8 Units SubCUTAneous 4x Daily AC & HS    sodium chloride flush  5-40 mL IntraVENous 2 times per day    ipratropium 0.5 mg-albuterol 2.5 mg  1 Dose Inhalation Q4H RT      dextrose      sodium chloride       morphine, oxyCODONE-acetaminophen, guaiFENesin-codeine, dextrose bolus **OR** dextrose bolus, glucagon (rDNA), dextrose, sodium chloride flush, sodium chloride, potassium chloride **OR** potassium alternative oral replacement **OR** potassium chloride, magnesium sulfate, ondansetron **OR** [DISCONTINUED] ondansetron, polyethylene glycol, acetaminophen **OR** acetaminophen     ALLERGIES:   Allergies as of 05/31/2025    (No Known Allergies)        OBJECTIVE:   height is 1.854 m (6' 1\") and weight is 142.2 kg (313 lb  6.4 oz) (abnormal). His oral temperature is 98.3 °F (36.8 °C). His blood pressure is 130/75 and his pulse is 66. His respiration is 18 and oxygen saturation is 97%.   No intake/output data recorded.     PHYSICAL EXAM:  CONSTITUTIONAL: He is a 75 y.o.-year-old who appears his stated age. He is alert and oriented x 3 and in no acute distress.   CARDIOVASCULAR: S1 S2 RRR. Without murmer  RESPIRATORY & CHEST: Lungs are clear to auscultation and percussion. No wheezing, no crackles. Good air movement  GASTROINTESTINAL & ABDOMEN: Soft, nontender, positive bowel sounds in all quadrants, non-distended, without hepatosplenomegaly.   GENITOURINARY: Deferred.   MUSCULOSKELETAL: No tenderness to palpation of the axial skeleton. There is no clubbing. No cyanosis. No edema of the lower extremities.   SKIN OF BODY: No rash or jaundice.   PSYCHIATRIC EVALUATION: Normal affect. Patient answers questions appropriately.   HEMATOLOGIC/LYMPHATIC/ IMMUNOLOGIC: No palpable lymphadenopathy.  NEUROLOGIC: Alert and oriented x 3.Groslly non-focal. Motor strength is 5+/5 in all muscle groups. The patient has a normal sensorium globally.      LABS:   Lab Results   Component Value Date    WBC 11.3 (H) 06/03/2025    HGB 14.0 06/03/2025    HCT 42.9 06/03/2025     06/03/2025    CHOL 169 07/29/2024    TRIG 142 07/29/2024    HDL 40 07/29/2024    ALT 16 05/31/2025    AST 19 05/31/2025     06/03/2025    K 4.7 06/03/2025     06/03/2025    CREATININE 1.7 (H) 06/03/2025    BUN 42 (H) 06/03/2025    CO2 25 06/03/2025    TSH 0.34 01/30/2024    PSA 1.35 06/02/2020    INR 2.08 (H) 06/03/2025    MICROALBUR 13.70 (H) 10/17/2023       Lab Results   Component Value Date    GLUCOSE 162 (H) 06/03/2025    CALCIUM 9.4 06/03/2025     06/03/2025    K 4.7 06/03/2025    CO2 25 06/03/2025     06/03/2025    BUN 42 (H) 06/03/2025    CREATININE 1.7 (H) 06/03/2025       Lab Results   Component Value Date    GLUCOSE 162 (H) 06/03/2025    CALCIUM

## 2025-06-03 NOTE — PROGRESS NOTES
[facility] - Inpatient Rehabilitation Department   Phone: [phone]    Occupational Therapy    [x] Initial Evaluation            [] Daily Treatment Note         [x] Discharge Summary      Patient: Valdemar Solis   : 1950   MRN: [id number]   Date of Service:  6/3/2025    Admitting Diagnosis:  COPD exacerbation (HCC)  Current Admission Summary: 75 y.o. male who presented with with complaints of increasing shortness of breath. With chest pain. Patient with history of chronic CHF A-fib on Coumadin former smoker diabetes chronic kidney disease. Patient recently was seen here for increased wheezing shortness of breath productive sputum with bilateral extremity above. Patient was advised admission further but left AGAINST MEDICAL ADVICE patient states since then patient has had increased pain 10 out of 10 left-sided radiating to left arm and neck. Also with diaphoresis. Patient denies any sick contacts also noted that his legs are more swollen than usual the lower extremities. With some redness. No history of cellulitis   Past Medical History:  has a past medical history of Acute congestive heart failure (HCC), Allergic rhinitis, Anticoagulant long-term use, Atrial fibrillation (HCC), CKD (chronic kidney disease), Class 2 obesity due to excess calories without serious comorbidity with body mass index (BMI) of 37.0 to 37.9 in adult, Controlled type 2 diabetes mellitus without complication, without long-term current use of insulin (HCC), COPD, COPD (chronic obstructive pulmonary disease) (HCC), Erectile dysfunction, Essential tremor, Gout, Hyperlipidemia, mixed, Hypertension, Long term current use of anticoagulants with INR goal of 2.0-3.0, Long term current use of anticoagulants with INR goal of 2.0-3.0, Obstructive sleep apnea syndrome, Primary insomnia, Primary osteoarthritis of both knees, and Sleep apnea.  Past Surgical History:  has a past surgical history that includes Aortic valve replacement  (10/1996:[employer]); Pacemaker insertion (1996); Atrial ablation surgery (03/23/2020); Cardiac pacemaker placement (03/23/2020); Knee arthroscopy (Right, 12/27/2021); knee surgery (Right, 04/06/2022); Knee Arthrocentesis (Right, 04/06/2022); and Cardiac valve replacement (AORTIC).    Discharge Recommendations: Valdemar Solis scored a 24/24 on the -[facility] ADL Inpatient form.  At this time, no further OT is recommended upon discharge due to patient at independent level.  Recommend patient returns to prior setting with prior services.      DME Required For Discharge: No DME required    Precautions/Restrictions:   Positional Restrictions:    Precautions/Restrictions: medium fall risk  Positional Restrictions:no positional restrictions     Pre-Admission Information   Lives With: spouse                And daughter   Type of Home: house  Home Layout: one level, ranch, no basement  Home Access: level entry  Bathroom Layout: tub/shower unit  Bathroom Equipment:  none  Toilet Height: standard height  Home Equipment: single point cane  Transfer Assistance: Independent without use of device, uses cane in community  Ambulation Assistance:Independent without use of device, cane in community  ADL Assistance: independent with all ADL's  IADL Assistance: independent with homemaking tasks, shares with wife  Active :        [x] Yes                 [] No  Hand Dominance: [] Left                 [x] Right  Current Employment: retired.  Occupation:   Hobbies: ride motorcycles when he was younger, watch movies  Recent Falls: No falls  Pt is on 4-5L O2 at home  Available Assistance at Discharge: 24 hr physical assistance available     Examination   Vision:   Vision Corrective Device: wears glasses for reading and Visual History: cataracts, surgery last year to correct  Hearing:   hard of hearing, left hearing aid, right hearing aid, hearing aides at home  Observation:   General Observation:  5L O2 97% at rest  Posture:

## 2025-06-03 NOTE — PROGRESS NOTES
Nephrology progress Note                                                                                                                                                                                                                                                                                                                                                               Office : [phone]     Fax :[phone]    Patient's Name: Valdemar Solis  10:50 AM  6/3/2025    Reason for Consult:  CKD 3  Requesting Physician:  Yane Wei MD  Chief Complaint:    Chief Complaint   Patient presents with    Chest Pain     Pt c/o left sided CP wrapping to back. Pt seen here and left ama on the 28th. Per pt they wanted to admit him for further testing and he refused. Pt states he is still feeling bad.        Assessment/Plan     # CLAUDIO on CKD 3b  # Cardiorenal  - Creatinine peaked 1.9- Now back to baseline. Mild fluctuation 2/2 diuresis.   - Baseline Cr ~ 1.6  - Avoid nephrotoxins  - Monitor renal labs     # CHF  - repeat CXR with mild airspace infiltrate  - IV lasix BID--> Transitioned to PO lasix today  - BNP ~ 700  - continue jardiance, entresto, spironolactone, sotalol  - Echo pending    # COPD exacerbation  - continue steroids per pulmonary    #HTN  - controlled on amlodipine, entresto, lasix, Sotalol, jardiance   - monitor      History of Present Ilness:    Valdemar Solis is a 75 y.o. male with PMHx of CKD 3, CHF, and COPD who presents to the ER for evaluation of COPD exacerbation. He is admitted for further management. We are consulted for CKD 3.       Interval hx     Bps controlled  On 5 L supplemental O2 (baseline)  Good UOP  Cr near baseline, Lytes stable  Weight trending down   States he feels well today, seen sitting up in bed eating lunch    Past Medical History:   Diagnosis Date    Acute congestive heart failure (HCC) 12/30/2019    Allergic rhinitis 07/11/2016    Anticoagulant long-term use grant         Troponin: No results for input(s): \"TROPONINI\" in the last 72 hours.  BNP: No results for input(s): \"BNP\" in the last 72 hours.  Lipids: No results for input(s): \"CHOL\", \"TRIG\", \"HDL\" in the last 72 hours.    Invalid input(s): \"LDLCALC\", \"LABVLDL\"  ABGs: No results for input(s): \"PHART\", \"PO2ART\", \"UKO2HZS\" in the last 72 hours.  INR:   Recent Labs     06/01/25  0610 06/02/25  0447 06/03/25  0523   INR 2.36* 2.21* 2.08*     UA:No results for input(s): \"COLORU\", \"CLARITYU\", \"GLUCOSEU\", \"BILIRUBINUR\", \"KETUA\", \"SPECGRAV\", \"BLOODU\", \"PHUR\", \"PROTEINU\", \"UROBILINOGEN\", \"NITRU\", \"LEUKOCYTESUR\", \"URINETYPE\" in the last 72 hours.    Invalid input(s): \"LABMICR\"   Urine Microscopic: No results for input(s): \"LABCAST\", \"BACTERIA\", \"COMU\", \"HYALCAST\", \"WBCUA\", \"RBCUA\" in the last 72 hours.    Invalid input(s): \"EPIU\"  Urine Culture: No results for input(s): \"LABURIN\" in the last 72 hours.  Urine Chemistry: No results for input(s): \"CLUR\", \"LABCREA\", \"PROTEINUR\", \"NAUR\" in the last 72 hours.      IMAGING:  XR CHEST PORTABLE   Final Result   1.  Mild airspace infiltrate in the right lung base which could represent   atelectasis and/or pneumonia.      2.  Mild cardiomegaly         XR CHEST PORTABLE   Final Result   Mild improved aeration of the lungs when compared to the prior exam.      Persistent prominence of the central pulmonary vasculature.      Small right and possible small left-sided pleural effusion.               Medical Decision Making:  The following items were considered in medical decision making:  Discussion of patient care with other providers  Reviewed clinical lab tests  Reviewed radiology tests  Reviewed other diagnostic tests/interventions    Will be discussed w/  Primary team     Thank you for allowing us to participate in care of Valdemar Solis   Feel free to contact me,     Umm Solis, APRN - CNP   [facility]  Office : [phone] or through [employer]  Fax

## 2025-06-03 NOTE — PLAN OF CARE
Problem: Chronic Conditions and Co-morbidities  Goal: Patient's chronic conditions and co-morbidity symptoms are monitored and maintained or improved  Outcome: Progressing     Problem: Safety - Adult  Goal: Free from fall injury  Outcome: Progressing     Problem: Pain  Goal: Verbalizes/displays adequate comfort level or baseline comfort level  Outcome: Progressing     Problem: Respiratory - Adult  Goal: Achieves optimal ventilation and oxygenation  Outcome: Progressing     Problem: Cardiovascular - Adult  Goal: Maintains optimal cardiac output and hemodynamic stability  Outcome: Progressing  Goal: Absence of cardiac dysrhythmias or at baseline  Outcome: Progressing     Problem: Metabolic/Fluid and Electrolytes - Adult  Goal: Electrolytes maintained within normal limits  Outcome: Progressing  Goal: Hemodynamic stability and optimal renal function maintained  Outcome: Progressing  Goal: Glucose maintained within prescribed range  Outcome: Progressing

## 2025-06-04 ENCOUNTER — APPOINTMENT (OUTPATIENT)
Age: 75
End: 2025-06-04
Payer: MEDICARE

## 2025-06-04 LAB
ALBUMIN SERPL-MCNC: 3.4 G/DL (ref 3.4–5)
ANION GAP SERPL CALCULATED.3IONS-SCNC: 10 MMOL/L (ref 3–16)
BUN SERPL-MCNC: 40 MG/DL (ref 7–20)
CALCIUM SERPL-MCNC: 9.3 MG/DL (ref 8.3–10.6)
CHLORIDE SERPL-SCNC: 101 MMOL/L (ref 99–110)
CO2 SERPL-SCNC: 27 MMOL/L (ref 21–32)
CREAT SERPL-MCNC: 1.7 MG/DL (ref 0.8–1.3)
ECHO AO ASC DIAM: 3.8 CM
ECHO AO ASCENDING AORTA INDEX: 1.47 CM/M2
ECHO AO ROOT DIAM: 3.2 CM
ECHO AO ROOT INDEX: 1.24 CM/M2
ECHO AV AREA PEAK VELOCITY: 1.8 CM2
ECHO AV AREA VTI: 2.1 CM2
ECHO AV AREA/BSA PEAK VELOCITY: 0.7 CM2/M2
ECHO AV AREA/BSA VTI: 0.8 CM2/M2
ECHO AV MEAN GRADIENT: 17 MMHG
ECHO AV MEAN VELOCITY: 2 M/S
ECHO AV PEAK GRADIENT: 28 MMHG
ECHO AV PEAK VELOCITY: 2.8 M/S
ECHO AV VELOCITY RATIO: 0.5
ECHO AV VTI: 58.8 CM
ECHO BSA: 2.69 M2
ECHO EST RA PRESSURE: 3 MMHG
ECHO IVC INSP: 0.5 CM
ECHO IVC PROX: 2 CM
ECHO LA AREA 2C: 21.4 CM2
ECHO LA AREA 4C: 23.3 CM2
ECHO LA MAJOR AXIS: 7.5 CM
ECHO LA MINOR AXIS: 6.6 CM
ECHO LA VOL BP: 60 ML (ref 18–58)
ECHO LA VOL MOD A2C: 55 ML (ref 18–58)
ECHO LA VOL MOD A4C: 65 ML (ref 18–58)
ECHO LA VOL/BSA BIPLANE: 23 ML/M2 (ref 16–34)
ECHO LA VOLUME INDEX MOD A2C: 21 ML/M2 (ref 16–34)
ECHO LA VOLUME INDEX MOD A4C: 25 ML/M2 (ref 16–34)
ECHO LV E' LATERAL VELOCITY: 9.81 CM/S
ECHO LV E' SEPTAL VELOCITY: 4.05 CM/S
ECHO LV EDV A2C: 105 ML
ECHO LV EDV A4C: 131 ML
ECHO LV EDV INDEX A4C: 51 ML/M2
ECHO LV EDV NDEX A2C: 41 ML/M2
ECHO LV EF PHYSICIAN: 65 %
ECHO LV EJECTION FRACTION A2C: 64 %
ECHO LV EJECTION FRACTION A4C: 70 %
ECHO LV EJECTION FRACTION BIPLANE: 69 % (ref 55–100)
ECHO LV ESV A2C: 37 ML
ECHO LV ESV A4C: 39 ML
ECHO LV ESV INDEX A2C: 14 ML/M2
ECHO LV ESV INDEX A4C: 15 ML/M2
ECHO LV FRACTIONAL SHORTENING: 44 % (ref 28–44)
ECHO LV INTERNAL DIMENSION DIASTOLE INDEX: 2.12 CM/M2
ECHO LV INTERNAL DIMENSION DIASTOLIC: 5.5 CM (ref 4.2–5.9)
ECHO LV INTERNAL DIMENSION SYSTOLIC INDEX: 1.2 CM/M2
ECHO LV INTERNAL DIMENSION SYSTOLIC: 3.1 CM
ECHO LV IVSD: 1.1 CM (ref 0.6–1)
ECHO LV MASS 2D: 227.4 G (ref 88–224)
ECHO LV MASS INDEX 2D: 87.8 G/M2 (ref 49–115)
ECHO LV POSTERIOR WALL DIASTOLIC: 1 CM (ref 0.6–1)
ECHO LV RELATIVE WALL THICKNESS RATIO: 0.36
ECHO LVOT AREA: 4.2 CM2
ECHO LVOT AV VTI INDEX: 0.5
ECHO LVOT DIAM: 2.3 CM
ECHO LVOT MEAN GRADIENT: 4 MMHG
ECHO LVOT PEAK GRADIENT: 8 MMHG
ECHO LVOT PEAK VELOCITY: 1.4 M/S
ECHO LVOT STROKE VOLUME INDEX: 47.1 ML/M2
ECHO LVOT SV: 122.1 ML
ECHO LVOT VTI: 29.4 CM
ECHO MV A VELOCITY: 0.98 M/S
ECHO MV E VELOCITY: 1.27 M/S
ECHO MV E/A RATIO: 1.3
ECHO MV E/E' LATERAL: 12.95
ECHO MV E/E' RATIO (AVERAGED): 22.15
ECHO MV E/E' SEPTAL: 31.36
ECHO PV MAX VELOCITY: 1.1 M/S
ECHO PV PEAK GRADIENT: 5 MMHG
ECHO RA AREA 4C: 26.6 CM2
ECHO RA END SYSTOLIC VOLUME APICAL 4 CHAMBER INDEX BSA: 41 ML/M2
ECHO RA VOLUME: 105 ML
ECHO RIGHT VENTRICULAR SYSTOLIC PRESSURE (RVSP): 35 MMHG
ECHO RV BASAL DIMENSION: 4.7 CM
ECHO RV LONGITUDINAL DIMENSION: 9.3 CM
ECHO RV MID DIMENSION: 2.7 CM
ECHO TV REGURGITANT MAX VELOCITY: 2.82 M/S
ECHO TV REGURGITANT PEAK GRADIENT: 32 MMHG
GFR SERPLBLD CREATININE-BSD FMLA CKD-EPI: 42 ML/MIN/{1.73_M2}
GLUCOSE BLD-MCNC: 129 MG/DL (ref 70–99)
GLUCOSE BLD-MCNC: 170 MG/DL (ref 70–99)
GLUCOSE BLD-MCNC: 202 MG/DL (ref 70–99)
GLUCOSE BLD-MCNC: 285 MG/DL (ref 70–99)
GLUCOSE SERPL-MCNC: 142 MG/DL (ref 70–99)
INR PPP: 2.31 (ref 0.85–1.15)
PERFORMED ON: ABNORMAL
PHOSPHATE SERPL-MCNC: 3 MG/DL (ref 2.5–4.9)
POTASSIUM SERPL-SCNC: 4.8 MMOL/L (ref 3.5–5.1)
PROTHROMBIN TIME: 25.4 SEC (ref 11.9–14.9)
SODIUM SERPL-SCNC: 138 MMOL/L (ref 136–145)

## 2025-06-04 PROCEDURE — 99232 SBSQ HOSP IP/OBS MODERATE 35: CPT | Performed by: NURSE PRACTITIONER

## 2025-06-04 PROCEDURE — C8929 TTE W OR WO FOL WCON,DOPPLER: HCPCS

## 2025-06-04 PROCEDURE — 2500000003 HC RX 250 WO HCPCS: Performed by: HOSPITALIST

## 2025-06-04 PROCEDURE — 2700000000 HC OXYGEN THERAPY PER DAY

## 2025-06-04 PROCEDURE — 6370000000 HC RX 637 (ALT 250 FOR IP): Performed by: INTERNAL MEDICINE

## 2025-06-04 PROCEDURE — 80069 RENAL FUNCTION PANEL: CPT

## 2025-06-04 PROCEDURE — 94761 N-INVAS EAR/PLS OXIMETRY MLT: CPT

## 2025-06-04 PROCEDURE — 6360000004 HC RX CONTRAST MEDICATION: Performed by: INTERNAL MEDICINE

## 2025-06-04 PROCEDURE — 36415 COLL VENOUS BLD VENIPUNCTURE: CPT

## 2025-06-04 PROCEDURE — 6370000000 HC RX 637 (ALT 250 FOR IP): Performed by: HOSPITALIST

## 2025-06-04 PROCEDURE — 99233 SBSQ HOSP IP/OBS HIGH 50: CPT | Performed by: HOSPITALIST

## 2025-06-04 PROCEDURE — 6370000000 HC RX 637 (ALT 250 FOR IP): Performed by: STUDENT IN AN ORGANIZED HEALTH CARE EDUCATION/TRAINING PROGRAM

## 2025-06-04 PROCEDURE — 94640 AIRWAY INHALATION TREATMENT: CPT

## 2025-06-04 PROCEDURE — 6370000000 HC RX 637 (ALT 250 FOR IP): Performed by: NURSE PRACTITIONER

## 2025-06-04 PROCEDURE — 1200000000 HC SEMI PRIVATE

## 2025-06-04 PROCEDURE — 93306 TTE W/DOPPLER COMPLETE: CPT | Performed by: INTERNAL MEDICINE

## 2025-06-04 PROCEDURE — 94660 CPAP INITIATION&MGMT: CPT

## 2025-06-04 PROCEDURE — 85610 PROTHROMBIN TIME: CPT

## 2025-06-04 RX ORDER — PREDNISONE 20 MG/1
TABLET ORAL
Qty: 9 TABLET | Refills: 0 | Status: SHIPPED | OUTPATIENT
Start: 2025-06-04 | End: 2025-06-10

## 2025-06-04 RX ORDER — SPIRONOLACTONE 25 MG/1
12.5 TABLET ORAL
Qty: 30 TABLET | Refills: 3 | Status: SHIPPED | OUTPATIENT
Start: 2025-06-04

## 2025-06-04 RX ORDER — METHOCARBAMOL 750 MG/1
750 TABLET, FILM COATED ORAL 3 TIMES DAILY
Qty: 21 TABLET | Refills: 1 | Status: SHIPPED | OUTPATIENT
Start: 2025-06-04 | End: 2025-06-18

## 2025-06-04 RX ORDER — PREDNISONE 20 MG/1
TABLET ORAL
Qty: 12 TABLET | Refills: 0 | Status: SHIPPED | OUTPATIENT
Start: 2025-06-04 | End: 2025-06-04

## 2025-06-04 RX ORDER — METHOCARBAMOL 750 MG/1
750 TABLET, FILM COATED ORAL 3 TIMES DAILY
Status: DISCONTINUED | OUTPATIENT
Start: 2025-06-04 | End: 2025-06-05 | Stop reason: HOSPADM

## 2025-06-04 RX ADMIN — Medication 10 ML: at 09:27

## 2025-06-04 RX ADMIN — SOTALOL HYDROCHLORIDE 80 MG: 80 TABLET ORAL at 09:28

## 2025-06-04 RX ADMIN — OXYCODONE AND ACETAMINOPHEN 1 TABLET: 5; 325 TABLET ORAL at 04:39

## 2025-06-04 RX ADMIN — TIOTROPIUM BROMIDE INHALATION SPRAY 2 PUFF: 3.12 SPRAY, METERED RESPIRATORY (INHALATION) at 08:46

## 2025-06-04 RX ADMIN — SOTALOL HYDROCHLORIDE 80 MG: 80 TABLET ORAL at 19:56

## 2025-06-04 RX ADMIN — SACUBITRIL AND VALSARTAN 1 TABLET: 49; 51 TABLET, FILM COATED ORAL at 09:28

## 2025-06-04 RX ADMIN — METHOCARBAMOL 750 MG: 750 TABLET ORAL at 17:13

## 2025-06-04 RX ADMIN — ASPIRIN 81 MG: 81 TABLET, CHEWABLE ORAL at 09:28

## 2025-06-04 RX ADMIN — METHOCARBAMOL 750 MG: 750 TABLET ORAL at 19:56

## 2025-06-04 RX ADMIN — METHOCARBAMOL 750 MG: 750 TABLET ORAL at 09:28

## 2025-06-04 RX ADMIN — INSULIN LISPRO 2 UNITS: 100 INJECTION, SOLUTION INTRAVENOUS; SUBCUTANEOUS at 17:12

## 2025-06-04 RX ADMIN — BUDESONIDE AND FORMOTEROL FUMARATE DIHYDRATE 2 PUFF: 160; 4.5 AEROSOL RESPIRATORY (INHALATION) at 08:45

## 2025-06-04 RX ADMIN — WARFARIN SODIUM 10 MG: 5 TABLET ORAL at 17:13

## 2025-06-04 RX ADMIN — IPRATROPIUM BROMIDE AND ALBUTEROL SULFATE 1 DOSE: .5; 3 SOLUTION RESPIRATORY (INHALATION) at 08:45

## 2025-06-04 RX ADMIN — PREDNISONE 30 MG: 20 TABLET ORAL at 09:27

## 2025-06-04 RX ADMIN — EMPAGLIFLOZIN 10 MG: 10 TABLET, FILM COATED ORAL at 09:28

## 2025-06-04 RX ADMIN — DICLOFENAC SODIUM 2 G: 10 GEL TOPICAL at 09:32

## 2025-06-04 RX ADMIN — PRAVASTATIN SODIUM 80 MG: 40 TABLET ORAL at 09:28

## 2025-06-04 RX ADMIN — INSULIN LISPRO 4 UNITS: 100 INJECTION, SOLUTION INTRAVENOUS; SUBCUTANEOUS at 19:56

## 2025-06-04 RX ADMIN — BUDESONIDE AND FORMOTEROL FUMARATE DIHYDRATE 2 PUFF: 160; 4.5 AEROSOL RESPIRATORY (INHALATION) at 21:00

## 2025-06-04 RX ADMIN — DICLOFENAC SODIUM 2 G: 10 GEL TOPICAL at 20:04

## 2025-06-04 RX ADMIN — SULFUR HEXAFLUORIDE 2 ML: 60.7; .19; .19 INJECTION, POWDER, LYOPHILIZED, FOR SUSPENSION INTRAVENOUS; INTRAVESICAL at 13:51

## 2025-06-04 RX ADMIN — SACUBITRIL AND VALSARTAN 1 TABLET: 49; 51 TABLET, FILM COATED ORAL at 19:57

## 2025-06-04 RX ADMIN — IPRATROPIUM BROMIDE AND ALBUTEROL SULFATE 1 DOSE: .5; 3 SOLUTION RESPIRATORY (INHALATION) at 20:52

## 2025-06-04 RX ADMIN — AMLODIPINE BESYLATE 5 MG: 5 TABLET ORAL at 09:28

## 2025-06-04 RX ADMIN — IPRATROPIUM BROMIDE AND ALBUTEROL SULFATE 1 DOSE: .5; 3 SOLUTION RESPIRATORY (INHALATION) at 00:27

## 2025-06-04 RX ADMIN — SPIRONOLACTONE 12.5 MG: 25 TABLET ORAL at 17:13

## 2025-06-04 RX ADMIN — IPRATROPIUM BROMIDE AND ALBUTEROL SULFATE 1 DOSE: .5; 3 SOLUTION RESPIRATORY (INHALATION) at 12:53

## 2025-06-04 RX ADMIN — IPRATROPIUM BROMIDE AND ALBUTEROL SULFATE 1 DOSE: .5; 3 SOLUTION RESPIRATORY (INHALATION) at 16:16

## 2025-06-04 RX ADMIN — FUROSEMIDE 60 MG: 40 TABLET ORAL at 09:27

## 2025-06-04 RX ADMIN — IPRATROPIUM BROMIDE AND ALBUTEROL SULFATE 1 DOSE: .5; 3 SOLUTION RESPIRATORY (INHALATION) at 04:28

## 2025-06-04 ASSESSMENT — PAIN DESCRIPTION - DESCRIPTORS
DESCRIPTORS: ACHING

## 2025-06-04 ASSESSMENT — PAIN SCALES - GENERAL
PAINLEVEL_OUTOF10: 2
PAINLEVEL_OUTOF10: 8
PAINLEVEL_OUTOF10: 7
PAINLEVEL_OUTOF10: 8
PAINLEVEL_OUTOF10: 0
PAINLEVEL_OUTOF10: 2
PAINLEVEL_OUTOF10: 4

## 2025-06-04 ASSESSMENT — PAIN DESCRIPTION - ORIENTATION
ORIENTATION: LEFT

## 2025-06-04 ASSESSMENT — PAIN DESCRIPTION - LOCATION
LOCATION: CHEST

## 2025-06-04 NOTE — PROGRESS NOTES
V2.0    [facility] Progress Note      Name:  Valdemar Solis /Age/Sex: 1950  (75 y.o. male)   MRN & CSN:  [id number] & [id number] Encounter Date/Time: 2025 9:38 AM EDT   Location:  5TN-5564/5564-01 PCP: Yane Wei MD     Attending:Markus Mcfarland MD       Hospital Day: 5    Assessment and Recommendations   Valdemar Solis is a 75 y.o. male who presents with COPD exacerbation (HCC)      Plan:   COPD exacerbation  Chronic hypoxic jaun failure.  Continue steroid breathing treatment appreciate pulmonary recommendation  Slow improvement, continue oral steroid and taper    Chronic HFpEF  Aortic stenosis congenital status post valve replacement  Continue oral Lasix, Jardiance,, Entresto.  Added Aldactone 12.5 Monday was a Friday   history of aortic valve repair, echocardiogram pending  Continue Coumadin with INR monitoring    CKD 3a: Levels on admission, monitor while on cardiac agents      Chest pain Musculoskeletal?  Costochondritis   add Robaxin, Voltaren gel, as needed oxycodone        Diet ADULT DIET; Regular; 5 carb choices (75 gm/meal); No Added Salt (3-4 gm)   DVT Prophylaxis    Code Status Full Code   Disposition 1-2 days pending improvement in breathing          Personally reviewed Lab Studies and Imaging         Subjective:     Chief Complaint:     Valdemar Solis is a 75 y.o. male who presents with feeling shortness of breath .  Breathing significantly improved, persistent left chest wall pain, worse with movement    Review of Systems:      Pertinent positives and negatives discussed in HPI    Objective:     Intake/Output Summary (Last 24 hours) at 2025 1611  Last data filed at 6/3/2025 1831  Gross per 24 hour   Intake 480 ml   Output --   Net 480 ml      Vitals:   Vitals:    25 0915 25 1233 25 1253 25 1319   BP: 114/69   114/69   Pulse: 70 69 72    Resp: 20  18    Temp: 97.8 °F (36.6 °C)      TempSrc: Oral      SpO2: 98%  98%    Weight:    (!) 140.6 kg (310 lb)  1 06/24/2019 10:27 AM    RBCUA 2 06/24/2019 10:27 AM     Urine Cultures: No results found for: \"LABURIN\"  Blood Cultures:   Lab Results   Component Value Date/Time    BC No Growth after 4 days of incubation. 10/30/2022 01:14 AM     Lab Results   Component Value Date/Time    BLOODCULT2 No Growth after 4 days of incubation. 10/30/2022 01:14 AM     Organism: No results found for: \"ORG\"      Electronically signed by Markus Mcfarland MD on 6/4/2025 at 4:11 PM

## 2025-06-04 NOTE — PROGRESS NOTES
CHF Nutrition Education    Consult received for heart failure diet education.  Education deferred or not appropriate at this time related to previous diet education provided on low sodium diet; patient expressed no additional education needed at this time.      Electronically signed by Ramya Horta RD, LD on 6/4/2025 at 11:56 AM

## 2025-06-04 NOTE — PROGRESS NOTES
PLAN OF CARE  P Priorities/Plan for the day 1.chest pain with touch  2.NA   L Location/Discharge Disposition  Home   A: Assessment (Vitals, Nursing Needs, Pain control, Changes to current Diet?)   Most Recent Vitals:  Vital Signs  Temp: 98.8 °F (37.1 °C)  Temp Source: Axillary  Pulse: 64  Heart Rate Source: Monitor  Respirations: 20  BP: 124/74  MAP (Calculated): 91  MAP (mmHg): 94  BP Location: Left upper arm  BP Method: Automatic  Patient Position: Sitting  Most Recent Weight:    Weight - Scale: (!) 140.7 kg (310 lb 3.2 oz)  Pain:   Pain Assessment  Pain Assessment: None - Denies Pain  Pain Level: 8  Dawn-Baker Pain Rating:  (Pt  is asleep)  Patient's Stated Pain Goal: 0 - No pain  Pain Location: Chest  Pain Orientation: Left  Pain Descriptors: Aching  Functional Pain Assessment: Activities are not prevented  Pain Type: Acute pain  Pain Radiating Towards: back  Pain Frequency: Intermittent  Pain Onset: On-going  Non-Pharmaceutical Pain Intervention(s): Rest  Response to Pain Intervention: Patient satisfied, Other (comment) (pt sleeping)  Side Effects: No side effects reported or observed    []PRN Pain medications available?     Nursing needs:   1.NA     N Needs (Need for repeat lab work, Need for repeat imagine?, additional consults? PT/OT/SLP) *Please review with provider.   [] Routine labs ordered for the following day?   [] Does the patient need any repeat imaging?    [] Does the patient need any additional consults:  na   C  A  R  E Care Team []Any additional care information?        *This note is completed during interdisciplinary rounds in collaboration with the attending Provider

## 2025-06-04 NOTE — PROGRESS NOTES
fibrillation (HCC)     under care of cardiology:Dr. Scott Behrens([facility])    CKD (chronic kidney disease)     Nephro:Dr. Parker:stage 3    Class 2 obesity due to excess calories without serious comorbidity with body mass index (BMI) of 37.0 to 37.9 in adult 11/07/2017    Controlled type 2 diabetes mellitus without complication, without long-term current use of insulin ([facility]) 02/24/2017    COPD     COPD (chronic obstructive pulmonary disease) ([facility]) 11/05/2018    Erectile dysfunction     Essential tremor     Gout     Hyperlipidemia, mixed 07/11/2016    Hypertension     under care of cardiology:Dr. Scott Behrens([facility])    Long term current use of anticoagulants with INR goal of 2.0-3.0     under care of cardiology:Dr. Scott Behrens([facility])    Long term current use of anticoagulants with INR goal of 2.0-3.0 07/11/2016    Obstructive sleep apnea syndrome 06/18/2019    per pulmo    Primary insomnia 01/25/2017    Primary osteoarthritis of both knees 09/14/2021    Sleep apnea     uses bipap       Past Surgical History:   Procedure Laterality Date    AORTIC VALVE REPLACEMENT  10/1996:St Quique    x3    ATRIAL ABLATION SURGERY  03/23/2020    CTI dependent atrial flutter ablation (Dr. Raza)    CARDIAC PACEMAKER PLACEMENT  03/23/2020    BIV upgrade    CARDIAC VALVE REPLACEMENT  AORTIC    KNEE ARTHROCENTESIS Right 04/06/2022    RIGHT KNEE GENICULAR NERVE BLOCK WITH INTRA ARTICULAR INJECTION SITE CONFIRMED BY FLUOROSCOPY performed by Orlando Rosenberg MD at [facility]    KNEE ARTHROSCOPY Right 12/27/2021    RIGHT KNEE DIAGNOSTIC ARTHROSCOPY WITH SUBCHONDROPLASTY TO MEDIAL FEMORAL CONDYLE AND TIBIAL PLATEAU, LEFT KNEE INJECTION. performed by Orlando Rosenberg MD at [facility]    KNEE SURGERY Right 04/06/2022    RIGHT KNEE GENICULAR NERVE BLOCK WITH INTRA ARTICULAR INJECTION SITE CONFIRMED BY FLUOROSCOPY    PACEMAKER INSERTION  1996       Family History   Problem Relation Age of Onset    Cancer Mother          lispro (HUMALOG,ADMELOG) injection vial 0-8 Units, 4x Daily AC & HS  sodium chloride flush 0.9 % injection 5-40 mL, 2 times per day  sodium chloride flush 0.9 % injection 5-40 mL, PRN  0.9 % sodium chloride infusion, PRN  potassium chloride (KLOR-CON M) extended release tablet 40 mEq, PRN   Or  potassium bicarb-citric acid (EFFER-K) effervescent tablet 40 mEq, PRN   Or  potassium chloride 10 mEq/100 mL IVPB (Peripheral Line), PRN  magnesium sulfate 2000 mg in 50 mL IVPB premix, PRN  ondansetron (ZOFRAN-ODT) disintegrating tablet 4 mg, Q8H PRN  polyethylene glycol (GLYCOLAX) packet 17 g, Daily PRN  acetaminophen (TYLENOL) tablet 650 mg, Q6H PRN   Or  acetaminophen (TYLENOL) suppository 650 mg, Q6H PRN  ipratropium 0.5 mg-albuterol 2.5 mg (DUONEB) nebulizer solution 1 Dose, Q4H RT    oxyCODONE-acetaminophen (PERCOCET) 5-325 MG per tablet 1 tablet, Once  sodium chloride flush 0.9 % injection 10 mL, 2 times per day  sodium chloride flush 0.9 % injection 10 mL, PRN  0.9 % sodium chloride infusion, PRN          Physical exam:     Vitals:  /69   Pulse 72   Temp 97.8 °F (36.6 °C) (Oral)   Resp 18   Ht 1.854 m (6' 1\")   Wt (!) 140.6 kg (310 lb)   SpO2 98%   BMI 40.90 kg/m²   Constitutional:  OAA X3 NAD Yes  Skin: no rash, turgor wnl  Heent:  eomi, mmm  Neck: no bruits or jvd noted  Cardiovascular:  S1, S2 without m/r/g  Respiratory: CTA B without w/r/r on supplemental O2  Abdomen:  , soft, nt, nd  Ext:  lower extremity edema Yes, L over R  Psychiatric: mood and affect stable   Musculoskeletal:  Rom, muscular strength intact    Data:   Labs:  CBC:   Recent Labs     06/02/25  0447 06/03/25  0522   WBC 10.6 11.3*   HGB 13.1* 14.0    182     BMP:    Recent Labs     06/02/25 0447 06/03/25  0522 06/04/25  0640    137 138   K 4.3 4.7 4.8    100 101   CO2 26 25 27   BUN 41* 42* 40*   CREATININE 1.6* 1.7* 1.7*   GLUCOSE 190* 162* 142*     Ca/Mg/Phos:   Recent Labs     06/02/25 0447 06/03/25  0522

## 2025-06-04 NOTE — PROGRESS NOTES
CLINICAL PHARMACY NOTE: MEDS TO BEDS    Total # of Prescriptions Filled: 4   The following medications were delivered to the patient:  Prednisone 20 mg   Methocarbamol 750 mg   Diclofenac sodium 1% gel  Spironolactone 25 mg     Additional Documentation: Pt's wife picked up in outpatient pharmacy.  Trupti Coreas [facility]

## 2025-06-04 NOTE — PROGRESS NOTES
06/04/25 0028   NIV Type   $NIV $Daily Charge   Ventilator ID [id number]   NIV Started/Stopped On   Equipment Type V60   Mode Bilevel   Mask Type Full face mask   Mask Size Large   Assessment   Pulse 66   Respirations 21   SpO2 100 %   Breath Sounds   Respiratory Pattern Regular   Breath Sounds Bilateral Diminished   Settings/Measurements   PIP Observed 10 cm H20   IPAP 10 cmH20   CPAP/EPAP 5 cmH2O   Vt (Measured) 433 mL   Rate Ordered 10   FiO2  35 %   I Time/ I Time % 0.9 s   Minute Volume (L/min) 10.1 Liters   Mask Leak (lpm) 6 lpm   Patient's Home Machine No   Alarm Settings   Alarms On Y   Low Pressure (cmH2O) 3 cmH2O   High Pressure (cmH2O) 30 cmH2O   RR Low (bpm) 11   RR High (bpm) 40 br/min   Oxygen Therapy/Pulse Ox   O2 Therapy Oxygen   O2 Device PAP (positive airway pressure)

## 2025-06-04 NOTE — PROGRESS NOTES
[facility]  HEART FAILURE  Progress Note      Admit Date 5/31/2025     Reason for Consult:      Reason for Consultation/Chief Complaint: chest pain    HPI:    Valdemar Solis is a 75 y.o. male with PMH HTN, HLD, HFpEF, congenital aortic stenosis with replacement x 3, atrial fibrillation, pacemaker, chronic anticoagulation with warfarin, COPD severe, diabetes, CKD, and obesity admitted with CP, SOB and cough. He was seen in the ED on 5/28/25 and admission was recommended but he left AMA. Admitted with COPD exacerbation and mild volume overload.        Subjective:  Patient is being seen for CHF. There were no acute overnight cardiac events.   Today Mr. Solis is feeling about the same.  He denies chest pain, palpitations, or dizziness, still with SOB and cough      Baseline Weight: 315   Wt Readings from Last 3 Encounters:   06/04/25 (!) 140.7 kg (310 lb 3.2 oz)   05/28/25 (!) 145.3 kg (320 lb 6.4 oz)   04/22/25 (!) 145.9 kg (321 lb 9.6 oz)         Cardiac Testing:   Echo:  10/7/24:    Left Ventricle: Low normal left ventricular systolic function with a visually estimated EF of 50 - 55%. Left ventricle size is normal. Increased wall thickness. Septal paradox. Diastolic septal bounce. Normal wall motion.    Right Ventricle: Right ventricle is dilated. Lead present in the right ventricle. Reduced systolic function. TAPSE is 1.5 cm.    Aortic Valve: St. Quique mechanical valve. AV mean gradient is 21 mmHg. No stenosis. AV mean gradient is 21 mmHg. AV peak velocity is 318.0 m/s. AV AT is 95.0 ms. Readings are at the upper limit of normal for a mechanical valve. Stable compared to prior    Mitral Valve: Mild annular calcification. Mildly thickened leaflets.    Tricuspid Valve: Mild regurgitation.    Right Atrium: Lead present in the right atrium. Right atrium is mildly dilated.    Aorta: Moderately dilated ascending aorta. Ao ascending diameter is 4.5 cm.    Image quality is adequate.    Device: Medtronic ICD with  TIBC 356 03/11/2025    FERRITIN 57.8 03/11/2025      Iron Deficiency Anemia:  Yes IV Iron Therapy:  Yes  2017 ACC/AHA HF Guidelines:   intravenous iron replacement in patients with New York Heart Association (NYHA) class II and III HF and iron deficiency(ferritin <100 ng/ml or 100-300 ng/ml if transferrin saturation <20%), to improve functional status and QoL.      1. WEIGHT: Admit Weight - Scale: (!) 145.2 kg (320 lb)      Today  Weight - Scale: (!) 140.7 kg (310 lb 3.2 oz)   2. I/O   Intake/Output Summary (Last 24 hours) at 6/4/2025 0920  Last data filed at 6/3/2025 1831  Gross per 24 hour   Intake 960 ml   Output --   Net 960 ml         Assessment/Plan:     Congestive Heart Failure:  ~compensated   ~ >720, 1800 out  ~Plan:continue po lasix, give jardiance while pt inpt - gets farxiga at home, continue entresto and kaci, IV iron, echo pending,  Monitor I&O's, Daily weights, Pt education  HTN:   ~controlled  ~Plan: continue norvasc and entresto  Atrial Fib   ~status: rate controlled   ~Plan: continue AC       All questions and concerns were addressed to the patient. Alternatives to my treatment were discussed. I have discussed the above stated plan with patient and the nurse. The patient verbalized understanding and agreed with the plan.    I appreciate the opportunity of cooperating in the care of this individual.    QUINTON CHAHAL, APRN - CNP, ACNP, AGPCNP 6/4/2025, 9:20 AM  Heart Failure  [facility] [place] [address]  Ph: [phone]

## 2025-06-04 NOTE — PROGRESS NOTES
Pharmacy to Dose Warfarin    Pharmacy consulted to dose warfarin for Afib.    INR Goal: 2-3    INR today: 2.31    Assessment/Plan:  - INR is therapeutic  - Continue home dose regimen 10mg  - Possible concomitant drug-drug interactions include: ASA, Prednisone     Pharmacy will continue to follow.    Lorin Jay [facility]  [id number]

## 2025-06-05 VITALS
HEART RATE: 74 BPM | HEIGHT: 73 IN | DIASTOLIC BLOOD PRESSURE: 68 MMHG | OXYGEN SATURATION: 96 % | SYSTOLIC BLOOD PRESSURE: 107 MMHG | RESPIRATION RATE: 18 BRPM | WEIGHT: 309 LBS | TEMPERATURE: 98.7 F | BODY MASS INDEX: 40.95 KG/M2

## 2025-06-05 LAB
ALBUMIN SERPL-MCNC: 3.5 G/DL (ref 3.4–5)
ANION GAP SERPL CALCULATED.3IONS-SCNC: 11 MMOL/L (ref 3–16)
BASOPHILS # BLD: 0 K/UL (ref 0–0.2)
BASOPHILS NFR BLD: 0.2 %
BUN SERPL-MCNC: 44 MG/DL (ref 7–20)
CALCIUM SERPL-MCNC: 9.5 MG/DL (ref 8.3–10.6)
CHLORIDE SERPL-SCNC: 100 MMOL/L (ref 99–110)
CO2 SERPL-SCNC: 25 MMOL/L (ref 21–32)
CREAT SERPL-MCNC: 1.7 MG/DL (ref 0.8–1.3)
DEPRECATED RDW RBC AUTO: 14.3 % (ref 12.4–15.4)
EOSINOPHIL # BLD: 0.1 K/UL (ref 0–0.6)
EOSINOPHIL NFR BLD: 0.7 %
GFR SERPLBLD CREATININE-BSD FMLA CKD-EPI: 42 ML/MIN/{1.73_M2}
GLUCOSE BLD-MCNC: 153 MG/DL (ref 70–99)
GLUCOSE BLD-MCNC: 190 MG/DL (ref 70–99)
GLUCOSE SERPL-MCNC: 154 MG/DL (ref 70–99)
HCT VFR BLD AUTO: 41.5 % (ref 40.5–52.5)
HGB BLD-MCNC: 13.9 G/DL (ref 13.5–17.5)
INR PPP: 2.7 (ref 0.85–1.15)
LYMPHOCYTES # BLD: 1.2 K/UL (ref 1–5.1)
LYMPHOCYTES NFR BLD: 11.6 %
MCH RBC QN AUTO: 30.5 PG (ref 26–34)
MCHC RBC AUTO-ENTMCNC: 33.5 G/DL (ref 31–36)
MCV RBC AUTO: 91.3 FL (ref 80–100)
MONOCYTES # BLD: 1.1 K/UL (ref 0–1.3)
MONOCYTES NFR BLD: 11 %
NEUTROPHILS # BLD: 7.8 K/UL (ref 1.7–7.7)
NEUTROPHILS NFR BLD: 76.5 %
PERFORMED ON: ABNORMAL
PERFORMED ON: ABNORMAL
PHOSPHATE SERPL-MCNC: 3.2 MG/DL (ref 2.5–4.9)
PLATELET # BLD AUTO: 195 K/UL (ref 135–450)
PMV BLD AUTO: 8.8 FL (ref 5–10.5)
POTASSIUM SERPL-SCNC: 4.2 MMOL/L (ref 3.5–5.1)
PROTHROMBIN TIME: 28.6 SEC (ref 11.9–14.9)
RBC # BLD AUTO: 4.55 M/UL (ref 4.2–5.9)
SODIUM SERPL-SCNC: 136 MMOL/L (ref 136–145)
WBC # BLD AUTO: 10.1 K/UL (ref 4–11)

## 2025-06-05 PROCEDURE — 6370000000 HC RX 637 (ALT 250 FOR IP): Performed by: HOSPITALIST

## 2025-06-05 PROCEDURE — 6370000000 HC RX 637 (ALT 250 FOR IP): Performed by: STUDENT IN AN ORGANIZED HEALTH CARE EDUCATION/TRAINING PROGRAM

## 2025-06-05 PROCEDURE — 85610 PROTHROMBIN TIME: CPT

## 2025-06-05 PROCEDURE — 94660 CPAP INITIATION&MGMT: CPT

## 2025-06-05 PROCEDURE — 6370000000 HC RX 637 (ALT 250 FOR IP): Performed by: INTERNAL MEDICINE

## 2025-06-05 PROCEDURE — 2700000000 HC OXYGEN THERAPY PER DAY

## 2025-06-05 PROCEDURE — 99232 SBSQ HOSP IP/OBS MODERATE 35: CPT | Performed by: HOSPITALIST

## 2025-06-05 PROCEDURE — 2500000003 HC RX 250 WO HCPCS: Performed by: HOSPITALIST

## 2025-06-05 PROCEDURE — 85025 COMPLETE CBC W/AUTO DIFF WBC: CPT

## 2025-06-05 PROCEDURE — 99232 SBSQ HOSP IP/OBS MODERATE 35: CPT | Performed by: NURSE PRACTITIONER

## 2025-06-05 PROCEDURE — 94761 N-INVAS EAR/PLS OXIMETRY MLT: CPT

## 2025-06-05 PROCEDURE — 80069 RENAL FUNCTION PANEL: CPT

## 2025-06-05 PROCEDURE — 6370000000 HC RX 637 (ALT 250 FOR IP): Performed by: NURSE PRACTITIONER

## 2025-06-05 PROCEDURE — 94640 AIRWAY INHALATION TREATMENT: CPT

## 2025-06-05 PROCEDURE — 36415 COLL VENOUS BLD VENIPUNCTURE: CPT

## 2025-06-05 RX ORDER — WARFARIN SODIUM 5 MG/1
10 TABLET ORAL DAILY
Status: DISCONTINUED | OUTPATIENT
Start: 2025-06-06 | End: 2025-06-05 | Stop reason: HOSPADM

## 2025-06-05 RX ORDER — BENZONATATE 100 MG/1
100 CAPSULE ORAL 2 TIMES DAILY PRN
Qty: 14 CAPSULE | Refills: 0 | Status: SHIPPED | OUTPATIENT
Start: 2025-06-05 | End: 2025-06-12

## 2025-06-05 RX ORDER — HYDROCODONE BITARTRATE AND ACETAMINOPHEN 5; 325 MG/1; MG/1
1 TABLET ORAL EVERY 6 HOURS PRN
Qty: 10 TABLET | Refills: 0 | Status: SHIPPED | OUTPATIENT
Start: 2025-06-05 | End: 2025-06-08

## 2025-06-05 RX ORDER — WARFARIN SODIUM 5 MG/1
5 TABLET ORAL
Status: DISCONTINUED | OUTPATIENT
Start: 2025-06-05 | End: 2025-06-05 | Stop reason: HOSPADM

## 2025-06-05 RX ADMIN — AMLODIPINE BESYLATE 5 MG: 5 TABLET ORAL at 09:11

## 2025-06-05 RX ADMIN — OXYCODONE AND ACETAMINOPHEN 1 TABLET: 5; 325 TABLET ORAL at 08:10

## 2025-06-05 RX ADMIN — IPRATROPIUM BROMIDE AND ALBUTEROL SULFATE 1 DOSE: .5; 3 SOLUTION RESPIRATORY (INHALATION) at 04:26

## 2025-06-05 RX ADMIN — Medication 10 ML: at 00:44

## 2025-06-05 RX ADMIN — EMPAGLIFLOZIN 10 MG: 10 TABLET, FILM COATED ORAL at 09:10

## 2025-06-05 RX ADMIN — PREDNISONE 30 MG: 20 TABLET ORAL at 09:10

## 2025-06-05 RX ADMIN — BUDESONIDE AND FORMOTEROL FUMARATE DIHYDRATE 2 PUFF: 160; 4.5 AEROSOL RESPIRATORY (INHALATION) at 08:00

## 2025-06-05 RX ADMIN — IPRATROPIUM BROMIDE AND ALBUTEROL SULFATE 1 DOSE: .5; 3 SOLUTION RESPIRATORY (INHALATION) at 08:00

## 2025-06-05 RX ADMIN — IPRATROPIUM BROMIDE AND ALBUTEROL SULFATE 1 DOSE: .5; 3 SOLUTION RESPIRATORY (INHALATION) at 00:35

## 2025-06-05 RX ADMIN — ASPIRIN 81 MG: 81 TABLET, CHEWABLE ORAL at 09:11

## 2025-06-05 RX ADMIN — FUROSEMIDE 60 MG: 40 TABLET ORAL at 09:09

## 2025-06-05 RX ADMIN — Medication 10 ML: at 09:15

## 2025-06-05 RX ADMIN — ONDANSETRON 4 MG: 4 TABLET, ORALLY DISINTEGRATING ORAL at 08:10

## 2025-06-05 RX ADMIN — INSULIN LISPRO 2 UNITS: 100 INJECTION, SOLUTION INTRAVENOUS; SUBCUTANEOUS at 12:06

## 2025-06-05 RX ADMIN — SOTALOL HYDROCHLORIDE 80 MG: 80 TABLET ORAL at 09:10

## 2025-06-05 RX ADMIN — DICLOFENAC SODIUM 2 G: 10 GEL TOPICAL at 09:11

## 2025-06-05 RX ADMIN — METHOCARBAMOL 750 MG: 750 TABLET ORAL at 09:10

## 2025-06-05 RX ADMIN — PRAVASTATIN SODIUM 80 MG: 40 TABLET ORAL at 09:10

## 2025-06-05 RX ADMIN — TIOTROPIUM BROMIDE INHALATION SPRAY 2 PUFF: 3.12 SPRAY, METERED RESPIRATORY (INHALATION) at 08:00

## 2025-06-05 RX ADMIN — GUAIFENESIN AND CODEINE PHOSPHATE 5 ML: 100; 10 SOLUTION ORAL at 00:44

## 2025-06-05 RX ADMIN — SACUBITRIL AND VALSARTAN 1 TABLET: 49; 51 TABLET, FILM COATED ORAL at 09:11

## 2025-06-05 ASSESSMENT — PAIN SCALES - WONG BAKER
WONGBAKER_NUMERICALRESPONSE: NO HURT

## 2025-06-05 ASSESSMENT — PAIN SCALES - GENERAL
PAINLEVEL_OUTOF10: 5
PAINLEVEL_OUTOF10: 0
PAINLEVEL_OUTOF10: 6
PAINLEVEL_OUTOF10: 0
PAINLEVEL_OUTOF10: 0

## 2025-06-05 ASSESSMENT — PAIN DESCRIPTION - ORIENTATION
ORIENTATION: MID
ORIENTATION: MID

## 2025-06-05 ASSESSMENT — PAIN DESCRIPTION - DESCRIPTORS
DESCRIPTORS: ACHING
DESCRIPTORS: ACHING

## 2025-06-05 ASSESSMENT — PAIN DESCRIPTION - LOCATION
LOCATION: CHEST
LOCATION: CHEST

## 2025-06-05 ASSESSMENT — PAIN DESCRIPTION - PAIN TYPE: TYPE: CHRONIC PAIN

## 2025-06-05 ASSESSMENT — PAIN DESCRIPTION - FREQUENCY: FREQUENCY: CONTINUOUS

## 2025-06-05 ASSESSMENT — PAIN DESCRIPTION - ONSET: ONSET: ON-GOING

## 2025-06-05 NOTE — PROGRESS NOTES
PLAN OF CARE  P Priorities/Plan for the day 1.Pain control  2.Pulm exercises   L Location/Discharge Disposition  Unsure at this time    A: Assessment (Vitals, Nursing Needs, Pain control, Changes to current Diet?)   Most Recent Vitals:  Vital Signs  Temp: 98.7 °F (37.1 °C)  Temp Source: Oral  Pulse: 74  Heart Rate Source: Monitor  Respirations: 18  BP: 107/68  MAP (Calculated): 81  MAP (mmHg): 94  BP Location: Right upper arm  BP Method: Automatic  Patient Position: Semi fowlers  Most Recent Weight:    Weight - Scale: (!) 140.2 kg (309 lb)  Pain:   Pain Assessment  Pain Assessment: 0-10  Pain Level: 5  Dawn-Baker Pain Rating: No hurt  Patient's Stated Pain Goal: 0 - No pain  Pain Location: Chest  Pain Orientation: Mid  Pain Descriptors: Aching  Functional Pain Assessment: Activities are not prevented  Pain Type: Chronic pain  Pain Radiating Towards: back  Pain Frequency: Continuous  Pain Onset: On-going  Non-Pharmaceutical Pain Intervention(s): Rest  Response to Pain Intervention: Patient satisfied  Side Effects: No side effects reported or observed    []PRN Pain medications available?     Nursing needs:   1.No needs at this time     N Needs (Need for repeat lab work, Need for repeat imagine?, additional consults? PT/OT/SLP) *Please review with provider.   [x] Routine labs ordered for the following day?   [] Does the patient need any repeat imaging?    [] Does the patient need any additional consults:  none   C  A  R  E Care Team []Any additional care information?        *This note is completed during interdisciplinary rounds in collaboration with the attending Provider

## 2025-06-05 NOTE — DISCHARGE INSTR - COC
Continuity of Care Form    Patient Name: Valdemar Solis   :  1950  MRN:  [id number]    Admit date:  2025  Discharge date:  ***    Code Status Order: Full Code   Advance Directives:     Admitting Physician:  Carmina Meneses MD  PCP: Yane Wei MD    Discharging Nurse: ***  Discharging Hospital Unit/Room#: 5TN-5564/5564-01  Discharging Unit Phone Number: ***    Emergency Contact:   Extended Emergency Contact Information  Primary Emergency Contact: Luisa Solis  Address: [address] [place], [place] [id number] [facility]  Home Phone: [phone]  Mobile Phone: [phone]  Relation: Spouse    Past Surgical History:  Past Surgical History:   Procedure Laterality Date    AORTIC VALVE REPLACEMENT  10/1996:St Quique    x3    ATRIAL ABLATION SURGERY  2020    CTI dependent atrial flutter ablation (Dr. Raza)    CARDIAC PACEMAKER PLACEMENT  2020    BIV upgrade    CARDIAC VALVE REPLACEMENT  AORTIC    KNEE ARTHROCENTESIS Right 2022    RIGHT KNEE GENICULAR NERVE BLOCK WITH INTRA ARTICULAR INJECTION SITE CONFIRMED BY FLUOROSCOPY performed by Orlando Rosenberg MD at [facility]    KNEE ARTHROSCOPY Right 2021    RIGHT KNEE DIAGNOSTIC ARTHROSCOPY WITH SUBCHONDROPLASTY TO MEDIAL FEMORAL CONDYLE AND TIBIAL PLATEAU, LEFT KNEE INJECTION. performed by Orlando Rosenberg MD at [facility]    KNEE SURGERY Right 2022    RIGHT KNEE GENICULAR NERVE BLOCK WITH INTRA ARTICULAR INJECTION SITE CONFIRMED BY FLUOROSCOPY    PACEMAKER INSERTION         Immunization History:   Immunization History   Administered Date(s) Administered    COVID-19, MODERNA BLUE border, Primary or Immunocompromised, (age 12y+), IM, 100 mcg/0.5mL 2021, 2021    Influenza, FLUAD, (age 65 y+), IM, Quadv, 0.5mL 2022    Pneumococcal, PCV20, PREVNAR 20, (age 6w+), IM, 0.5mL 2022    TDaP, ADACEL (age 10y-64y), BOOSTRIX (age 10y+), IM, 0.5mL 2017       Active  Treatments: ***  Oxygen Therapy:  {Therapy; copd oxygen:05729}  Ventilator:    {[facility] Vent List:439988346}    Rehab Therapies: {THERAPEUTIC INTERVENTION:1836672284}  Weight Bearing Status/Restrictions: {[facility] Weight Bearin}  Other Medical Equipment (for information only, NOT a DME order):  {EQUIPMENT:044389820}  Other Treatments: ***    Patient's personal belongings (please select all that are sent with patient):  {CHP DME Belongings:628707364}    RN SIGNATURE:  {Esignature:510567291}    CASE MANAGEMENT/SOCIAL WORK SECTION    Inpatient Status Date: ***    Readmission Risk Assessment Score:  [facility] RISK OF UNPLANNED READMISSION 2.0             15.2 Total Score        Discharging to Facility/ Agency   Name:   Address:  Phone:  Fax:    Dialysis Facility (if applicable)   Name:  Address:  Dialysis Schedule:  Phone:  Fax:    / signature: {Esignature:[id number]}    PHYSICIAN SECTION    Prognosis: Fair    Condition at Discharge: Stable    Rehab Potential (if transferring to Rehab): Fair    Recommended Labs or Other Treatments After Discharge:     Physician Certification: I certify the above information and transfer of Valdemar Solis  is necessary for the continuing treatment of the diagnosis listed and that he requires Home Care for less 30 days.     Update Admission H&P: No change in H&P    PHYSICIAN SIGNATURE:  Electronically signed by Markus Mcfarland MD on 25 at 10:34 AM EDT

## 2025-06-05 NOTE — PROGRESS NOTES
Discharge instructions given to patient and family. Both state understanding. IV removed and dressing applied. Belongings gathered and sent with patient. Telemetry returned to nurse's station. Pt's wife states she left portable O2 tank at home. Requested to bring tank in for  and both decline. This RN offered to set pt up with O2 tank from hospital to d/c home with . Both decline. Risks of leaving without tank explained and both state understanding. Pt states he lives [place] the road and rarely travels with it. Meds picked up from outpt pharmacy. Pt transported out by this RN and d/c to home with family.

## 2025-06-05 NOTE — PROGRESS NOTES
[facility]  HEART FAILURE  Progress Note      Admit Date 5/31/2025     Reason for Consult:      Reason for Consultation/Chief Complaint: chest pain    HPI:    Valdemar Solis is a 75 y.o. male with PMH HTN, HLD, HFpEF, congenital aortic stenosis with replacement x 3, atrial fibrillation, pacemaker, chronic anticoagulation with warfarin, COPD severe, diabetes, CKD, and obesity admitted with CP, SOB and cough. He was seen in the ED on 5/28/25 and admission was recommended but he left AMA. Admitted with COPD exacerbation and mild volume overload.        Subjective:  Patient is being seen for CHF. There were no acute overnight cardiac events.   Today Mr. Solis c/o nausea today but otherwise no complaints.  He denies chest pain, palpitations, SOB, or dizziness.       Baseline Weight: 315   Wt Readings from Last 3 Encounters:   06/05/25 (!) 140.2 kg (309 lb)   05/28/25 (!) 145.3 kg (320 lb 6.4 oz)   04/22/25 (!) 145.9 kg (321 lb 9.6 oz)         Cardiac Testing: Echo 6/3/25:    Left Ventricle: Hyperdynamic left ventricular systolic function with a visually estimated EF of 65 - 70%. EF by 2D Simpsons Biplane is 69%. Left ventricle size is normal. Septal thickening. Unable to assess wall motion due to poor endocardial definition. Grade II diastolic dysfunction with increased LAP.    Right Ventricle: Not well visualized. Right ventricle is mildly dilated. Lead present in the right ventricle. Mildly reduced systolic function.    Aortic Valve: Mechanical valve. AV mean gradient is 17 mmHg. Calcified cusps. Stenosis of the aortic valve. AV Mean Gradient is 17 mmHg. AV Peak Velocity is 2.8 m/s. AV Area by Peak Velocity is 1.8 cm2. LVOT:AV VTI Index is 0.50. LVOT Stroke Volume Index is 47.1 mL/m2.    Tricuspid Valve: Mild regurgitation.    Right Atrium: Right atrium is mildly dilated.    Aorta: The aortic root and ascending aorta are not well visualized. Recommend cross sectional imaging.  Echo:  10/7/24:    Left

## 2025-06-05 NOTE — PROGRESS NOTES
CLINICAL PHARMACY NOTE: MEDS TO BEDS    Total # of Prescriptions Filled: 2   The following medications were delivered to the patient:  HYDROCODONE/APAP 5  BENZONATATE 100MG    Additional Documentation:  Patients spouse picked up in outpatient pharmacy   Alyssa Curran CPhT

## 2025-06-05 NOTE — PROGRESS NOTES
Nephrology progress Note                                                                                                                                                                                                                                                                                                                                                               Office : [phone]     Fax :[phone]    Patient's Name: Valdemar Solis  4:40 PM  6/5/2025    Reason for Consult:  CKD 3  Requesting Physician:  Yane Wei MD  Chief Complaint:    Chief Complaint   Patient presents with    Chest Pain     Pt c/o left sided CP wrapping to back. Pt seen here and left ama on the 28th. Per pt they wanted to admit him for further testing and he refused. Pt states he is still feeling bad.        Assessment/Plan     # CLAUDIO on CKD 3b  # Cardiorenal  - Creatinine peaked 1.9 - Now back to baseline. Mild fluctuation 2/2 diuresis.   - Baseline Cr ~ 1.6  - Avoid nephrotoxins  - Monitor renal labs     # CHF  - repeat CXR with mild airspace infiltrate  - IV lasix BID--> Transitioned to PO lasix  - BNP ~ 700  - continue jardiance, entresto, spironolactone, sotalol  - Echo pending    # COPD exacerbation  - continue steroids per pulmonary    #HTN  - controlled on amlodipine, entresto, lasix, Sotalol, jardiance   - monitor      History of Present Ilness:    Valdemar Solis is a 75 y.o. male with PMHx of CKD 3, CHF, and COPD who presents to the ER for evaluation of COPD exacerbation. He is admitted for further management. We are consulted for CKD 3.       Interval hx     Bps controlled  On 5 L supplemental O2 (baseline)  Cr near baseline, Lytes stable  Weight trending down    Past Medical History:   Diagnosis Date    Acute congestive heart failure (HCC) 12/30/2019    Allergic rhinitis 07/11/2016    Anticoagulant long-term use warrefen    Atrial fibrillation (HCC)     under care of cardiology:Dr. Scott Behrens([facility])     [facility]  Office : [phone] or through [employer]  Fax :[phone]

## 2025-06-05 NOTE — PROGRESS NOTES
Pharmacy to Dose Warfarin    Pharmacy consulted to dose warfarin for Afib.    INR Goal: 2-3    INR today: 2.7    Assessment/Plan:  - INR is therapeutic. Large jump past two days  - Reduce dose 5mg today.   - Possible concomitant drug-drug interactions include: ASA, Prednisone     Pharmacy will continue to follow.    Lorin Jay [facility]  [id number]

## 2025-06-05 NOTE — PLAN OF CARE
Problem: Chronic Conditions and Co-morbidities  Goal: Patient's chronic conditions and co-morbidity symptoms are monitored and maintained or improved  6/5/2025 1310 by Gerber Hammer RN  Outcome: Adequate for Discharge  6/5/2025 0614 by Edna Riggins RN  Outcome: Progressing     Problem: Safety - Adult  Goal: Free from fall injury  6/5/2025 1310 by Gerber Hammer RN  Outcome: Adequate for Discharge  6/5/2025 0614 by Edna Riggins RN  Outcome: Progressing     Problem: Pain  Goal: Verbalizes/displays adequate comfort level or baseline comfort level  6/5/2025 1310 by Gerber Hammer RN  Outcome: Adequate for Discharge  Flowsheets (Taken 6/5/2025 0757)  Verbalizes/displays adequate comfort level or baseline comfort level: Assess pain using appropriate pain scale  6/5/2025 0614 by Edna Riggins RN  Outcome: Progressing     Problem: Respiratory - Adult  Goal: Achieves optimal ventilation and oxygenation  6/5/2025 1310 by Gerber Hammer RN  Outcome: Adequate for Discharge  Flowsheets (Taken 6/5/2025 0800)  Achieves optimal ventilation and oxygenation:   Assess for changes in respiratory status   Assess for changes in mentation and behavior  6/5/2025 0614 by Edna Riggins RN  Outcome: Progressing     Problem: Cardiovascular - Adult  Goal: Maintains optimal cardiac output and hemodynamic stability  6/5/2025 1310 by Gerber Hammer RN  Outcome: Adequate for Discharge  Flowsheets (Taken 6/5/2025 0800)  Maintains optimal cardiac output and hemodynamic stability: Assess for signs of decreased cardiac output  6/5/2025 0614 by Edna Riggins RN  Outcome: Progressing  Goal: Absence of cardiac dysrhythmias or at baseline  6/5/2025 1310 by Gerber Hammer RN  Outcome: Adequate for Discharge  Flowsheets (Taken 6/5/2025 0800)  Absence of cardiac dysrhythmias or at baseline: Assess for signs of decreased cardiac output  6/5/2025 0614 by Edna Riggins RN  Outcome: Progressing     Problem:  Metabolic/Fluid and Electrolytes - Adult  Goal: Electrolytes maintained within normal limits  6/5/2025 1310 by Gerber Hammer RN  Outcome: Adequate for Discharge  6/5/2025 0614 by Edna Riggins RN  Outcome: Progressing  Goal: Hemodynamic stability and optimal renal function maintained  6/5/2025 1310 by Gerber Hammer RN  Outcome: Adequate for Discharge  6/5/2025 0614 by Edna Riggins RN  Outcome: Progressing  Goal: Glucose maintained within prescribed range  6/5/2025 1310 by Gerber Hammer RN  Outcome: Adequate for Discharge  6/5/2025 0614 by Edna Riggins RN  Outcome: Progressing

## 2025-06-05 NOTE — CARE COORDINATION
06/05/25 1158   IMM Letter   IMM Letter given to Patient/Family/Significant other/Guardian/POA/by: IMM given   IMM Letter date given: 06/05/25   IMM Letter time given: 1158

## 2025-06-06 ENCOUNTER — TELEPHONE (OUTPATIENT)
Dept: OTHER | Age: 75
End: 2025-06-06

## 2025-06-06 NOTE — DISCHARGE SUMMARY
mouth daily     * warfarin 10 MG tablet  Commonly known as: COUMADIN  Take as directed. If you are unsure how to take this medication, talk to your nurse or doctor.  Original instructions: TAKE 1 TABLET EVERY DAY EXCEPT TAKE 1 AND 1/2 TABLETS ON MONDAY AND FRIDAY     * warfarin 10 MG tablet  Commonly known as: COUMADIN  Take as directed. If you are unsure how to take this medication, talk to your nurse or doctor.  Original instructions: Take 1 tablet by mouth daily           * This list has 6 medication(s) that are the same as other medications prescribed for you. Read the directions carefully, and ask your doctor or other care provider to review them with you.                   Where to Get Your Medications        These medications were sent to [facility] - [place], [place] - [address] [place] [address] - P [phone] - F [phone]  [facility] [place] [facility] [address]      Phone: [phone]   benzonatate 100 MG capsule  diclofenac sodium 1 % Gel  HYDROcodone-acetaminophen 5-325 MG per tablet  methocarbamol 750 MG tablet  predniSONE 20 MG tablet  spironolactone 25 MG tablet         34 Minutes spent on patient evaluation, counseling and discharge planning.     Signed:  Markus Mcfarland MD  6/6/2025, 8:51 AM **

## 2025-06-06 NOTE — TELEPHONE ENCOUNTER
[facility]  HEART FAILURE PROGRAM  TELEPHONE ENCOUNTER FORM    Valdemar Solis 1950    ASSESSMENT:   Baseline weight: 305 lbs  Current weight: 305 lbs  Patient weighing daily: Yes  What are your symptoms of heart failure: dyspnea, edema  Are you having any symptoms:  No  Patient knows who to call with symptoms: Yes  Diet history:      Patient states sodium limitation is : 3000 mg      Patient states fluid limitation is 64 oz  Patient following diet as instructed: Yes  Medication history: taking medications as instructed Yes; medication side effects noted No  Patient is being active at home: Yes  Patient knows date and time of follow up appointment: Yes   Patient has transportation to appointments: Yes    RECOMMENDATIONS:   Medication: taking as prescribed  Diet: no added salt diet   MD/ Clinic appointment: 6/10 with Zoraida Uribe  Other: Doing well. Encouraged to call with any questions or concerns.

## 2025-06-09 ENCOUNTER — TELEPHONE (OUTPATIENT)
Dept: FAMILY MEDICINE CLINIC | Age: 75
End: 2025-06-09

## 2025-06-09 RX ORDER — CALCIUM CITRATE/VITAMIN D3 200MG-6.25
1 TABLET ORAL DAILY
Qty: 100 EACH | Refills: 3 | Status: CANCELLED | OUTPATIENT
Start: 2025-06-09

## 2025-06-09 NOTE — TELEPHONE ENCOUNTER
[facility] Prescription Request    True Metrix Glucose Test Strip  Accu-Chek Kimmy Plus Test Strips   Accu-Chek Softclix Lancets     [facility] - [place], [place] - [address] - P [phone] - F [phone]  [address], [facility] [address]  Phone: [phone]  Fax: [phone]

## 2025-06-09 NOTE — TELEPHONE ENCOUNTER
Medication:   Requested Prescriptions     Pending Prescriptions Disp Refills    blood glucose test strips (TRUE METRIX BLOOD GLUCOSE TEST) strip 100 each 3     Si each by In Vitro route daily As needed.    blood glucose test strips (ASCENSIA AUTODISC VI;ONE TOUCH ULTRA TEST VI) strip 100 each 3     Si each by In Vitro route daily As needed.    Accu-Chek Softclix Lancets MISC 100 each 3     Sig: by Does not apply route 4 times daily        Last Filled:  2020    Patient Phone Number: [phone] (home)     Last appt: 2024   Next appt: 2025    Last OARRS:        No data to display

## 2025-06-10 RX ORDER — LANCETS
EACH MISCELLANEOUS
Qty: 300 EACH | Refills: 3 | Status: SHIPPED | OUTPATIENT
Start: 2025-06-10

## 2025-06-16 ENCOUNTER — APPOINTMENT (OUTPATIENT)
Dept: GENERAL RADIOLOGY | Age: 75
End: 2025-06-16
Payer: MEDICARE

## 2025-06-16 ENCOUNTER — HOSPITAL ENCOUNTER (INPATIENT)
Age: 75
LOS: 5 days | Discharge: HOME | End: 2025-06-21
Attending: INTERNAL MEDICINE | Admitting: STUDENT IN AN ORGANIZED HEALTH CARE EDUCATION/TRAINING PROGRAM
Payer: MEDICARE

## 2025-06-16 ENCOUNTER — APPOINTMENT (OUTPATIENT)
Dept: CT IMAGING | Age: 75
End: 2025-06-16
Payer: MEDICARE

## 2025-06-16 DIAGNOSIS — R79.1 SUPRATHERAPEUTIC INR: ICD-10-CM

## 2025-06-16 DIAGNOSIS — D62 ACUTE BLOOD LOSS ANEMIA: ICD-10-CM

## 2025-06-16 DIAGNOSIS — K92.2 UPPER GI BLEED: ICD-10-CM

## 2025-06-16 DIAGNOSIS — Z95.2 MECHANICAL HEART VALVE PRESENT: ICD-10-CM

## 2025-06-16 DIAGNOSIS — K92.2 GASTROINTESTINAL HEMORRHAGE, UNSPECIFIED GASTROINTESTINAL HEMORRHAGE TYPE: ICD-10-CM

## 2025-06-16 DIAGNOSIS — R57.8 HEMORRHAGIC SHOCK (HCC): Primary | ICD-10-CM

## 2025-06-16 DIAGNOSIS — R51.9 NONINTRACTABLE HEADACHE, UNSPECIFIED CHRONICITY PATTERN, UNSPECIFIED HEADACHE TYPE: ICD-10-CM

## 2025-06-16 DIAGNOSIS — D64.9 SYMPTOMATIC ANEMIA: ICD-10-CM

## 2025-06-16 LAB
ABO/RH: NORMAL
ALBUMIN SERPL-MCNC: 3.4 G/DL (ref 3.4–5)
ALP SERPL-CCNC: 50 U/L (ref 40–129)
ALT SERPL-CCNC: 15 U/L (ref 10–40)
ANION GAP SERPL CALCULATED.3IONS-SCNC: 14 MMOL/L (ref 3–16)
ANISOCYTOSIS BLD QL SMEAR: ABNORMAL
ANTIBODY SCREEN: NORMAL
APTT BLD: 33.5 SEC (ref 22.8–35.8)
AST SERPL-CCNC: 11 U/L (ref 15–37)
BASE EXCESS BLDV CALC-SCNC: -7 MMOL/L (ref -3–3)
BASO STIPL BLD QL SMEAR: ABNORMAL
BASOPHILS # BLD: 0.1 K/UL (ref 0–0.2)
BASOPHILS NFR BLD: 1 %
BILIRUB DIRECT SERPL-MCNC: <0.1 MG/DL (ref 0–0.3)
BILIRUB INDIRECT SERPL-MCNC: ABNORMAL MG/DL (ref 0–1)
BILIRUB SERPL-MCNC: <0.2 MG/DL (ref 0–1)
BUN SERPL-MCNC: 95 MG/DL (ref 7–20)
CALCIUM SERPL-MCNC: 9.1 MG/DL (ref 8.3–10.6)
CHLORIDE SERPL-SCNC: 103 MMOL/L (ref 99–110)
CO2 BLDV-SCNC: 49 MMOL/L
CO2 SERPL-SCNC: 19 MMOL/L (ref 21–32)
COHGB MFR BLDV: 3.1 % (ref 0–1.5)
CREAT SERPL-MCNC: 1.9 MG/DL (ref 0.8–1.3)
DEPRECATED RDW RBC AUTO: 14.5 % (ref 12.4–15.4)
DO-HGB MFR BLDV: 21 %
EOSINOPHIL # BLD: 0.3 K/UL (ref 0–0.6)
EOSINOPHIL NFR BLD: 2 %
GFR SERPLBLD CREATININE-BSD FMLA CKD-EPI: 37 ML/MIN/{1.73_M2}
GLUCOSE BLD-MCNC: 218 MG/DL (ref 70–99)
GLUCOSE SERPL-MCNC: 261 MG/DL (ref 70–99)
HCO3 BLDV-SCNC: 20.2 MMOL/L (ref 23–29)
HCT VFR BLD AUTO: 23.5 % (ref 40.5–52.5)
HCT VFR BLD AUTO: 24.1 % (ref 40.5–52.5)
HEMOCCULT STL QL: ABNORMAL
HGB BLD-MCNC: 7.9 G/DL (ref 13.5–17.5)
HGB BLD-MCNC: 8.1 G/DL (ref 13.5–17.5)
INR PPP: 4.5 (ref 0.86–1.14)
LACTATE BLDV-SCNC: 1.7 MMOL/L (ref 0.4–1.9)
LACTATE BLDV-SCNC: 3.3 MMOL/L (ref 0.4–1.9)
LYMPHOCYTES # BLD: 1.1 K/UL (ref 1–5.1)
LYMPHOCYTES NFR BLD: 9 %
MACROCYTES BLD QL SMEAR: ABNORMAL
MCH RBC QN AUTO: 30.5 PG (ref 26–34)
MCHC RBC AUTO-ENTMCNC: 33.6 G/DL (ref 31–36)
MCV RBC AUTO: 90.9 FL (ref 80–100)
METHGB MFR BLDV: 1.4 %
MONOCYTES # BLD: 0.4 K/UL (ref 0–1.3)
MONOCYTES NFR BLD: 3 %
NEUTROPHILS # BLD: 10.8 K/UL (ref 1.7–7.7)
NEUTROPHILS NFR BLD: 83 %
NEUTS BAND NFR BLD MANUAL: 2 % (ref 0–7)
NT-PROBNP SERPL-MCNC: 285 PG/ML (ref 0–449)
O2 CT VFR BLDV CALC: 8 VOL %
O2 THERAPY: ABNORMAL
PCO2 BLDV: 48.5 MMHG (ref 40–50)
PERFORMED ON: ABNORMAL
PH BLDV: 7.23 [PH] (ref 7.35–7.45)
PLATELET # BLD AUTO: 203 K/UL (ref 135–450)
PLATELET BLD QL SMEAR: ADEQUATE
PMV BLD AUTO: 9 FL (ref 5–10.5)
PO2 BLDV: 48.9 MMHG (ref 25–40)
POLYCHROMASIA BLD QL SMEAR: ABNORMAL
POTASSIUM SERPL-SCNC: 4.6 MMOL/L (ref 3.5–5.1)
PROT SERPL-MCNC: 5.5 G/DL (ref 6.4–8.2)
PROTHROMBIN TIME: 41.2 SEC (ref 12.1–14.9)
RBC # BLD AUTO: 2.65 M/UL (ref 4.2–5.9)
SAO2 % BLDV: 78 %
SLIDE REVIEW: ABNORMAL
SODIUM SERPL-SCNC: 136 MMOL/L (ref 136–145)
TROPONIN, HIGH SENSITIVITY: 29 NG/L (ref 0–22)
TROPONIN, HIGH SENSITIVITY: 31 NG/L (ref 0–22)
WBC # BLD AUTO: 12.7 K/UL (ref 4–11)

## 2025-06-16 PROCEDURE — 74174 CTA ABD&PLVS W/CONTRAST: CPT

## 2025-06-16 PROCEDURE — 93005 ELECTROCARDIOGRAM TRACING: CPT | Performed by: INTERNAL MEDICINE

## 2025-06-16 PROCEDURE — 6360000004 HC RX CONTRAST MEDICATION: Performed by: INTERNAL MEDICINE

## 2025-06-16 PROCEDURE — 30233N1 TRANSFUSION OF NONAUTOLOGOUS RED BLOOD CELLS INTO PERIPHERAL VEIN, PERCUTANEOUS APPROACH: ICD-10-PCS | Performed by: STUDENT IN AN ORGANIZED HEALTH CARE EDUCATION/TRAINING PROGRAM

## 2025-06-16 PROCEDURE — 83605 ASSAY OF LACTIC ACID: CPT

## 2025-06-16 PROCEDURE — 82803 BLOOD GASES ANY COMBINATION: CPT

## 2025-06-16 PROCEDURE — 86900 BLOOD TYPING SEROLOGIC ABO: CPT

## 2025-06-16 PROCEDURE — 71045 X-RAY EXAM CHEST 1 VIEW: CPT

## 2025-06-16 PROCEDURE — 85610 PROTHROMBIN TIME: CPT

## 2025-06-16 PROCEDURE — 86923 COMPATIBILITY TEST ELECTRIC: CPT

## 2025-06-16 PROCEDURE — 86901 BLOOD TYPING SEROLOGIC RH(D): CPT

## 2025-06-16 PROCEDURE — 6370000000 HC RX 637 (ALT 250 FOR IP): Performed by: STUDENT IN AN ORGANIZED HEALTH CARE EDUCATION/TRAINING PROGRAM

## 2025-06-16 PROCEDURE — 86850 RBC ANTIBODY SCREEN: CPT

## 2025-06-16 PROCEDURE — 6360000002 HC RX W HCPCS: Performed by: PHYSICIAN ASSISTANT

## 2025-06-16 PROCEDURE — 36415 COLL VENOUS BLD VENIPUNCTURE: CPT

## 2025-06-16 PROCEDURE — 94660 CPAP INITIATION&MGMT: CPT

## 2025-06-16 PROCEDURE — 85025 COMPLETE CBC W/AUTO DIFF WBC: CPT

## 2025-06-16 PROCEDURE — 84484 ASSAY OF TROPONIN QUANT: CPT

## 2025-06-16 PROCEDURE — 85730 THROMBOPLASTIN TIME PARTIAL: CPT

## 2025-06-16 PROCEDURE — 80048 BASIC METABOLIC PNL TOTAL CA: CPT

## 2025-06-16 PROCEDURE — 99285 EMERGENCY DEPT VISIT HI MDM: CPT

## 2025-06-16 PROCEDURE — 99291 CRITICAL CARE FIRST HOUR: CPT

## 2025-06-16 PROCEDURE — 83880 ASSAY OF NATRIURETIC PEPTIDE: CPT

## 2025-06-16 PROCEDURE — 82270 OCCULT BLOOD FECES: CPT

## 2025-06-16 PROCEDURE — 2000000000 HC ICU R&B

## 2025-06-16 PROCEDURE — P9016 RBC LEUKOCYTES REDUCED: HCPCS

## 2025-06-16 PROCEDURE — 85018 HEMOGLOBIN: CPT

## 2025-06-16 PROCEDURE — 80076 HEPATIC FUNCTION PANEL: CPT

## 2025-06-16 PROCEDURE — 2500000003 HC RX 250 WO HCPCS: Performed by: PHYSICIAN ASSISTANT

## 2025-06-16 PROCEDURE — 85014 HEMATOCRIT: CPT

## 2025-06-16 PROCEDURE — 96365 THER/PROPH/DIAG IV INF INIT: CPT

## 2025-06-16 PROCEDURE — 2580000003 HC RX 258: Performed by: PHYSICIAN ASSISTANT

## 2025-06-16 RX ORDER — SOTALOL HYDROCHLORIDE 80 MG/1
80 TABLET ORAL EVERY 12 HOURS
Status: DISCONTINUED | OUTPATIENT
Start: 2025-06-17 | End: 2025-06-21 | Stop reason: HOSPADM

## 2025-06-16 RX ORDER — IOPAMIDOL 755 MG/ML
75 INJECTION, SOLUTION INTRAVASCULAR
Status: COMPLETED | OUTPATIENT
Start: 2025-06-16 | End: 2025-06-16

## 2025-06-16 RX ORDER — ALLOPURINOL 100 MG/1
300 TABLET ORAL DAILY
Status: DISCONTINUED | OUTPATIENT
Start: 2025-06-17 | End: 2025-06-21 | Stop reason: HOSPADM

## 2025-06-16 RX ORDER — SODIUM CHLORIDE 9 MG/ML
5 INJECTION, SOLUTION INTRAVENOUS CONTINUOUS
Status: DISCONTINUED | OUTPATIENT
Start: 2025-06-16 | End: 2025-06-16

## 2025-06-16 RX ORDER — TRAZODONE HYDROCHLORIDE 50 MG/1
50 TABLET ORAL NIGHTLY PRN
Status: DISCONTINUED | OUTPATIENT
Start: 2025-06-16 | End: 2025-06-21 | Stop reason: HOSPADM

## 2025-06-16 RX ORDER — PRAVASTATIN SODIUM 40 MG
80 TABLET ORAL DAILY
Status: DISCONTINUED | OUTPATIENT
Start: 2025-06-17 | End: 2025-06-21 | Stop reason: HOSPADM

## 2025-06-16 RX ORDER — DEXTROSE MONOHYDRATE 100 MG/ML
INJECTION, SOLUTION INTRAVENOUS CONTINUOUS PRN
Status: DISCONTINUED | OUTPATIENT
Start: 2025-06-16 | End: 2025-06-21 | Stop reason: HOSPADM

## 2025-06-16 RX ORDER — INSULIN LISPRO 100 [IU]/ML
0-4 INJECTION, SOLUTION INTRAVENOUS; SUBCUTANEOUS EVERY 6 HOURS SCHEDULED
Status: DISCONTINUED | OUTPATIENT
Start: 2025-06-17 | End: 2025-06-17

## 2025-06-16 RX ORDER — GLUCAGON 1 MG/ML
1 KIT INJECTION PRN
Status: DISCONTINUED | OUTPATIENT
Start: 2025-06-16 | End: 2025-06-21 | Stop reason: HOSPADM

## 2025-06-16 RX ORDER — FUROSEMIDE 40 MG/1
40 TABLET ORAL DAILY
Status: DISCONTINUED | OUTPATIENT
Start: 2025-06-17 | End: 2025-06-21 | Stop reason: HOSPADM

## 2025-06-16 RX ORDER — BUDESONIDE AND FORMOTEROL FUMARATE DIHYDRATE 160; 4.5 UG/1; UG/1
2 AEROSOL RESPIRATORY (INHALATION)
Status: DISCONTINUED | OUTPATIENT
Start: 2025-06-17 | End: 2025-06-21 | Stop reason: HOSPADM

## 2025-06-16 RX ORDER — SPIRONOLACTONE 25 MG/1
12.5 TABLET ORAL
Status: DISCONTINUED | OUTPATIENT
Start: 2025-06-18 | End: 2025-06-20

## 2025-06-16 RX ORDER — LIDOCAINE 50 MG/G
OINTMENT TOPICAL EVERY 30 MIN PRN
Status: DISCONTINUED | OUTPATIENT
Start: 2025-06-16 | End: 2025-06-16

## 2025-06-16 RX ORDER — AMLODIPINE BESYLATE 5 MG/1
5 TABLET ORAL DAILY
Status: DISCONTINUED | OUTPATIENT
Start: 2025-06-17 | End: 2025-06-21 | Stop reason: HOSPADM

## 2025-06-16 RX ORDER — ALBUTEROL SULFATE 0.83 MG/ML
2.5 SOLUTION RESPIRATORY (INHALATION) EVERY 6 HOURS PRN
Status: DISCONTINUED | OUTPATIENT
Start: 2025-06-16 | End: 2025-06-21 | Stop reason: HOSPADM

## 2025-06-16 RX ORDER — ALBUTEROL SULFATE 90 UG/1
2 INHALANT RESPIRATORY (INHALATION) EVERY 6 HOURS PRN
Status: DISCONTINUED | OUTPATIENT
Start: 2025-06-16 | End: 2025-06-21 | Stop reason: HOSPADM

## 2025-06-16 RX ORDER — SODIUM CHLORIDE 9 MG/ML
INJECTION, SOLUTION INTRAVENOUS PRN
Status: DISCONTINUED | OUTPATIENT
Start: 2025-06-16 | End: 2025-06-21 | Stop reason: HOSPADM

## 2025-06-16 RX ORDER — ASPIRIN 81 MG/1
81 TABLET, CHEWABLE ORAL DAILY
Status: DISCONTINUED | OUTPATIENT
Start: 2025-06-17 | End: 2025-06-21 | Stop reason: HOSPADM

## 2025-06-16 RX ADMIN — IOPAMIDOL 75 ML: 755 INJECTION, SOLUTION INTRAVENOUS at 20:01

## 2025-06-16 RX ADMIN — PANTOPRAZOLE SODIUM 8 MG/HR: 40 INJECTION, POWDER, FOR SOLUTION INTRAVENOUS at 19:53

## 2025-06-16 RX ADMIN — PHYTONADIONE 10 MG: 10 INJECTION, EMULSION INTRAMUSCULAR; INTRAVENOUS; SUBCUTANEOUS at 21:37

## 2025-06-16 RX ADMIN — PANTOPRAZOLE SODIUM 80 MG: 40 INJECTION, POWDER, FOR SOLUTION INTRAVENOUS at 19:47

## 2025-06-16 RX ADMIN — INSULIN LISPRO 1 UNITS: 100 INJECTION, SOLUTION INTRAVENOUS; SUBCUTANEOUS at 23:52

## 2025-06-16 ASSESSMENT — PAIN DESCRIPTION - LOCATION: LOCATION: BACK

## 2025-06-16 ASSESSMENT — PAIN SCALES - GENERAL: PAINLEVEL_OUTOF10: 4

## 2025-06-16 ASSESSMENT — PAIN DESCRIPTION - ORIENTATION: ORIENTATION: UPPER

## 2025-06-16 ASSESSMENT — PAIN DESCRIPTION - DESCRIPTORS: DESCRIPTORS: DISCOMFORT

## 2025-06-16 ASSESSMENT — PAIN - FUNCTIONAL ASSESSMENT: PAIN_FUNCTIONAL_ASSESSMENT: 0-10

## 2025-06-17 ENCOUNTER — ANESTHESIA (OUTPATIENT)
Dept: ENDOSCOPY | Age: 75
End: 2025-06-17
Payer: MEDICARE

## 2025-06-17 ENCOUNTER — ANESTHESIA EVENT (OUTPATIENT)
Dept: ENDOSCOPY | Age: 75
End: 2025-06-17
Payer: MEDICARE

## 2025-06-17 ENCOUNTER — APPOINTMENT (OUTPATIENT)
Dept: GENERAL RADIOLOGY | Age: 75
End: 2025-06-17
Payer: MEDICARE

## 2025-06-17 LAB
ALBUMIN SERPL-MCNC: 3.4 G/DL (ref 3.4–5)
ALBUMIN/GLOB SERPL: 1.5 {RATIO} (ref 1.1–2.2)
ALP SERPL-CCNC: 52 U/L (ref 40–129)
ALT SERPL-CCNC: 16 U/L (ref 10–40)
ANION GAP SERPL CALCULATED.3IONS-SCNC: 11 MMOL/L (ref 3–16)
AST SERPL-CCNC: 11 U/L (ref 15–37)
BASE EXCESS BLDA CALC-SCNC: -6 MMOL/L (ref -3–3)
BILIRUB SERPL-MCNC: 0.4 MG/DL (ref 0–1)
BLOOD BANK DISPENSE STATUS: NORMAL
BLOOD BANK DISPENSE STATUS: NORMAL
BLOOD BANK PRODUCT CODE: NORMAL
BLOOD BANK PRODUCT CODE: NORMAL
BPU ID: NORMAL
BPU ID: NORMAL
BUN SERPL-MCNC: 82 MG/DL (ref 7–20)
CA-I BLD-SCNC: 1.27 MMOL/L (ref 1.12–1.32)
CALCIUM SERPL-MCNC: 9.1 MG/DL (ref 8.3–10.6)
CHLORIDE SERPL-SCNC: 106 MMOL/L (ref 99–110)
CO2 BLDA-SCNC: 20 MMOL/L
CO2 SERPL-SCNC: 19 MMOL/L (ref 21–32)
CREAT SERPL-MCNC: 1.8 MG/DL (ref 0.8–1.3)
DESCRIPTION BLOOD BANK: NORMAL
DESCRIPTION BLOOD BANK: NORMAL
EKG ATRIAL RATE: 76 BPM
EKG ATRIAL RATE: 87 BPM
EKG DIAGNOSIS: NORMAL
EKG DIAGNOSIS: NORMAL
EKG P-R INTERVAL: 130 MS
EKG P-R INTERVAL: 136 MS
EKG Q-T INTERVAL: 468 MS
EKG Q-T INTERVAL: 480 MS
EKG QRS DURATION: 176 MS
EKG QRS DURATION: 178 MS
EKG QTC CALCULATION (BAZETT): 540 MS
EKG QTC CALCULATION (BAZETT): 563 MS
EKG R AXIS: -38 DEGREES
EKG R AXIS: 214 DEGREES
EKG T AXIS: 128 DEGREES
EKG T AXIS: 63 DEGREES
EKG VENTRICULAR RATE: 76 BPM
EKG VENTRICULAR RATE: 87 BPM
EST. AVERAGE GLUCOSE BLD GHB EST-MCNC: 165.7 MG/DL
GFR SERPLBLD CREATININE-BSD FMLA CKD-EPI: 39 ML/MIN/{1.73_M2}
GLUCOSE BLD-MCNC: 180 MG/DL (ref 70–99)
GLUCOSE BLD-MCNC: 241 MG/DL (ref 70–99)
GLUCOSE BLD-MCNC: 244 MG/DL (ref 70–99)
GLUCOSE BLD-MCNC: 252 MG/DL (ref 70–99)
GLUCOSE BLD-MCNC: 260 MG/DL (ref 70–99)
GLUCOSE BLD-MCNC: 262 MG/DL (ref 70–99)
GLUCOSE SERPL-MCNC: 201 MG/DL (ref 70–99)
HBA1C MFR BLD: 7.4 %
HCO3 BLDA-SCNC: 19.1 MMOL/L (ref 21–29)
HCT VFR BLD AUTO: 24 % (ref 40.5–52.5)
HCT VFR BLD AUTO: 24.5 % (ref 40.5–52.5)
HCT VFR BLD AUTO: 28.1 % (ref 40.5–52.5)
HCT VFR BLD AUTO: 28.8 % (ref 40.5–52.5)
HGB BLD CALC-MCNC: 8 GM/DL (ref 13.5–17.5)
HGB BLD-MCNC: 8.4 G/DL (ref 13.5–17.5)
HGB BLD-MCNC: 9.6 G/DL (ref 13.5–17.5)
HGB BLD-MCNC: 9.7 G/DL (ref 13.5–17.5)
INR PPP: 2.17 (ref 0.86–1.14)
PCO2 BLDA: 30.3 MM HG (ref 35–45)
PERFORMED ON: ABNORMAL
PH BLDA: 7.41 [PH] (ref 7.35–7.45)
PO2 BLDA: 128.8 MM HG (ref 75–108)
POC SAMPLE TYPE: ABNORMAL
POTASSIUM BLD-SCNC: 4.6 MMOL/L (ref 3.5–5.1)
POTASSIUM SERPL-SCNC: 4.2 MMOL/L (ref 3.5–5.1)
PROT SERPL-MCNC: 5.6 G/DL (ref 6.4–8.2)
PROTHROMBIN TIME: 24 SEC (ref 12.1–14.9)
SAO2 % BLDA: 99 % (ref 93–100)
SODIUM BLD-SCNC: 140 MMOL/L (ref 136–145)
SODIUM SERPL-SCNC: 136 MMOL/L (ref 136–145)
TROPONIN, HIGH SENSITIVITY: 33 NG/L (ref 0–22)

## 2025-06-17 PROCEDURE — 6360000002 HC RX W HCPCS: Performed by: INTERNAL MEDICINE

## 2025-06-17 PROCEDURE — 3700000001 HC ADD 15 MINUTES (ANESTHESIA): Performed by: INTERNAL MEDICINE

## 2025-06-17 PROCEDURE — 6360000002 HC RX W HCPCS: Performed by: STUDENT IN AN ORGANIZED HEALTH CARE EDUCATION/TRAINING PROGRAM

## 2025-06-17 PROCEDURE — 82947 ASSAY GLUCOSE BLOOD QUANT: CPT

## 2025-06-17 PROCEDURE — 2500000003 HC RX 250 WO HCPCS: Performed by: STUDENT IN AN ORGANIZED HEALTH CARE EDUCATION/TRAINING PROGRAM

## 2025-06-17 PROCEDURE — 36430 TRANSFUSION BLD/BLD COMPNT: CPT

## 2025-06-17 PROCEDURE — 6360000002 HC RX W HCPCS: Performed by: NURSE ANESTHETIST, CERTIFIED REGISTERED

## 2025-06-17 PROCEDURE — 85014 HEMATOCRIT: CPT

## 2025-06-17 PROCEDURE — 6360000002 HC RX W HCPCS

## 2025-06-17 PROCEDURE — 3609012400 HC EGD TRANSORAL BIOPSY SINGLE/MULTIPLE: Performed by: INTERNAL MEDICINE

## 2025-06-17 PROCEDURE — 85018 HEMOGLOBIN: CPT

## 2025-06-17 PROCEDURE — 93005 ELECTROCARDIOGRAM TRACING: CPT | Performed by: ANESTHESIOLOGY

## 2025-06-17 PROCEDURE — 2580000003 HC RX 258: Performed by: ANESTHESIOLOGY

## 2025-06-17 PROCEDURE — 6370000000 HC RX 637 (ALT 250 FOR IP)

## 2025-06-17 PROCEDURE — 2500000003 HC RX 250 WO HCPCS: Performed by: NURSE ANESTHETIST, CERTIFIED REGISTERED

## 2025-06-17 PROCEDURE — 6370000000 HC RX 637 (ALT 250 FOR IP): Performed by: STUDENT IN AN ORGANIZED HEALTH CARE EDUCATION/TRAINING PROGRAM

## 2025-06-17 PROCEDURE — 6360000002 HC RX W HCPCS: Performed by: PHYSICIAN ASSISTANT

## 2025-06-17 PROCEDURE — 2700000000 HC OXYGEN THERAPY PER DAY

## 2025-06-17 PROCEDURE — 36620 INSERTION CATHETER ARTERY: CPT

## 2025-06-17 PROCEDURE — 94640 AIRWAY INHALATION TREATMENT: CPT

## 2025-06-17 PROCEDURE — 37799 UNLISTED PX VASCULAR SURGERY: CPT

## 2025-06-17 PROCEDURE — 88305 TISSUE EXAM BY PATHOLOGIST: CPT

## 2025-06-17 PROCEDURE — 86803 HEPATITIS C AB TEST: CPT

## 2025-06-17 PROCEDURE — 2580000003 HC RX 258: Performed by: NURSE ANESTHETIST, CERTIFIED REGISTERED

## 2025-06-17 PROCEDURE — 3700000000 HC ANESTHESIA ATTENDED CARE: Performed by: INTERNAL MEDICINE

## 2025-06-17 PROCEDURE — 71045 X-RAY EXAM CHEST 1 VIEW: CPT

## 2025-06-17 PROCEDURE — 85610 PROTHROMBIN TIME: CPT

## 2025-06-17 PROCEDURE — 6360000002 HC RX W HCPCS: Performed by: ANESTHESIOLOGY

## 2025-06-17 PROCEDURE — 83036 HEMOGLOBIN GLYCOSYLATED A1C: CPT

## 2025-06-17 PROCEDURE — 7100000000 HC PACU RECOVERY - FIRST 15 MIN: Performed by: INTERNAL MEDICINE

## 2025-06-17 PROCEDURE — 7100000001 HC PACU RECOVERY - ADDTL 15 MIN: Performed by: INTERNAL MEDICINE

## 2025-06-17 PROCEDURE — 80053 COMPREHEN METABOLIC PANEL: CPT

## 2025-06-17 PROCEDURE — 93010 ELECTROCARDIOGRAM REPORT: CPT | Performed by: INTERNAL MEDICINE

## 2025-06-17 PROCEDURE — 03HY32Z INSERTION OF MONITORING DEVICE INTO UPPER ARTERY, PERCUTANEOUS APPROACH: ICD-10-PCS | Performed by: ANESTHESIOLOGY

## 2025-06-17 PROCEDURE — 36415 COLL VENOUS BLD VENIPUNCTURE: CPT

## 2025-06-17 PROCEDURE — 5A09357 ASSISTANCE WITH RESPIRATORY VENTILATION, LESS THAN 24 CONSECUTIVE HOURS, CONTINUOUS POSITIVE AIRWAY PRESSURE: ICD-10-PCS | Performed by: STUDENT IN AN ORGANIZED HEALTH CARE EDUCATION/TRAINING PROGRAM

## 2025-06-17 PROCEDURE — 99223 1ST HOSP IP/OBS HIGH 75: CPT | Performed by: INTERNAL MEDICINE

## 2025-06-17 PROCEDURE — 2709999900 HC NON-CHARGEABLE SUPPLY: Performed by: INTERNAL MEDICINE

## 2025-06-17 PROCEDURE — 82803 BLOOD GASES ANY COMBINATION: CPT

## 2025-06-17 PROCEDURE — 84132 ASSAY OF SERUM POTASSIUM: CPT

## 2025-06-17 PROCEDURE — 6370000000 HC RX 637 (ALT 250 FOR IP): Performed by: ANESTHESIOLOGY

## 2025-06-17 PROCEDURE — 82330 ASSAY OF CALCIUM: CPT

## 2025-06-17 PROCEDURE — 84295 ASSAY OF SERUM SODIUM: CPT

## 2025-06-17 PROCEDURE — 0W3P8ZZ CONTROL BLEEDING IN GASTROINTESTINAL TRACT, VIA NATURAL OR ARTIFICIAL OPENING ENDOSCOPIC: ICD-10-PCS | Performed by: INTERNAL MEDICINE

## 2025-06-17 PROCEDURE — 2720000010 HC SURG SUPPLY STERILE: Performed by: INTERNAL MEDICINE

## 2025-06-17 PROCEDURE — 74018 RADEX ABDOMEN 1 VIEW: CPT

## 2025-06-17 PROCEDURE — 6370000000 HC RX 637 (ALT 250 FOR IP): Performed by: INTERNAL MEDICINE

## 2025-06-17 PROCEDURE — 3609013000 HC EGD TRANSORAL CONTROL BLEEDING ANY METHOD: Performed by: INTERNAL MEDICINE

## 2025-06-17 PROCEDURE — 84484 ASSAY OF TROPONIN QUANT: CPT

## 2025-06-17 PROCEDURE — 94761 N-INVAS EAR/PLS OXIMETRY MLT: CPT

## 2025-06-17 PROCEDURE — 2000000000 HC ICU R&B

## 2025-06-17 PROCEDURE — 0DD78ZX EXTRACTION OF STOMACH, PYLORUS, VIA NATURAL OR ARTIFICIAL OPENING ENDOSCOPIC, DIAGNOSTIC: ICD-10-PCS | Performed by: INTERNAL MEDICINE

## 2025-06-17 DEVICE — CLIP
Type: IMPLANTABLE DEVICE | Site: DUODENUM | Status: FUNCTIONAL
Brand: RESOLUTION 360™ ULTRA CLIP

## 2025-06-17 RX ORDER — SODIUM CHLORIDE 9 MG/ML
INJECTION, SOLUTION INTRAVENOUS CONTINUOUS
Status: DISCONTINUED | OUTPATIENT
Start: 2025-06-17 | End: 2025-06-17

## 2025-06-17 RX ORDER — PROPOFOL 10 MG/ML
INJECTION, EMULSION INTRAVENOUS
Status: DISCONTINUED | OUTPATIENT
Start: 2025-06-17 | End: 2025-06-17 | Stop reason: SDUPTHER

## 2025-06-17 RX ORDER — FENTANYL CITRATE 50 UG/ML
25 INJECTION, SOLUTION INTRAMUSCULAR; INTRAVENOUS ONCE
Status: COMPLETED | OUTPATIENT
Start: 2025-06-17 | End: 2025-06-17

## 2025-06-17 RX ORDER — INSULIN GLARGINE 100 [IU]/ML
0.15 INJECTION, SOLUTION SUBCUTANEOUS DAILY
Status: DISCONTINUED | OUTPATIENT
Start: 2025-06-17 | End: 2025-06-17

## 2025-06-17 RX ORDER — NOREPINEPHRINE BITARTRATE 0.06 MG/ML
1-100 INJECTION, SOLUTION INTRAVENOUS CONTINUOUS
Status: DISCONTINUED | OUTPATIENT
Start: 2025-06-17 | End: 2025-06-18

## 2025-06-17 RX ORDER — FENTANYL CITRATE 50 UG/ML
INJECTION, SOLUTION INTRAMUSCULAR; INTRAVENOUS
Status: COMPLETED
Start: 2025-06-17 | End: 2025-06-17

## 2025-06-17 RX ORDER — ALBUTEROL SULFATE 0.83 MG/ML
2.5 SOLUTION RESPIRATORY (INHALATION)
Status: DISCONTINUED | OUTPATIENT
Start: 2025-06-17 | End: 2025-06-21 | Stop reason: HOSPADM

## 2025-06-17 RX ORDER — LIDOCAINE HYDROCHLORIDE 20 MG/ML
INJECTION, SOLUTION INFILTRATION; PERINEURAL
Status: DISCONTINUED | OUTPATIENT
Start: 2025-06-17 | End: 2025-06-17 | Stop reason: SDUPTHER

## 2025-06-17 RX ORDER — SODIUM CHLORIDE 9 MG/ML
INJECTION, SOLUTION INTRAVENOUS PRN
Status: DISCONTINUED | OUTPATIENT
Start: 2025-06-17 | End: 2025-06-21 | Stop reason: HOSPADM

## 2025-06-17 RX ORDER — NITROGLYCERIN 0.4 MG/1
0.4 TABLET SUBLINGUAL EVERY 5 MIN PRN
Status: COMPLETED | OUTPATIENT
Start: 2025-06-17 | End: 2025-06-17

## 2025-06-17 RX ORDER — EPHEDRINE SULFATE 50 MG/ML
INJECTION INTRAVENOUS
Status: DISCONTINUED | OUTPATIENT
Start: 2025-06-17 | End: 2025-06-17 | Stop reason: SDUPTHER

## 2025-06-17 RX ORDER — PROPOFOL 10 MG/ML
INJECTION, EMULSION INTRAVENOUS
Status: DISCONTINUED | OUTPATIENT
Start: 2025-06-17 | End: 2025-06-17

## 2025-06-17 RX ORDER — NITROGLYCERIN 0.4 MG/1
TABLET SUBLINGUAL
Status: COMPLETED
Start: 2025-06-17 | End: 2025-06-17

## 2025-06-17 RX ORDER — INSULIN LISPRO 100 [IU]/ML
0-8 INJECTION, SOLUTION INTRAVENOUS; SUBCUTANEOUS EVERY 6 HOURS
Status: DISCONTINUED | OUTPATIENT
Start: 2025-06-17 | End: 2025-06-18

## 2025-06-17 RX ORDER — HYDROMORPHONE HYDROCHLORIDE 2 MG/ML
0.5 INJECTION, SOLUTION INTRAMUSCULAR; INTRAVENOUS; SUBCUTANEOUS
Status: DISCONTINUED | OUTPATIENT
Start: 2025-06-17 | End: 2025-06-21 | Stop reason: HOSPADM

## 2025-06-17 RX ORDER — METOCLOPRAMIDE HYDROCHLORIDE 5 MG/ML
10 INJECTION INTRAMUSCULAR; INTRAVENOUS EVERY 6 HOURS PRN
Status: DISCONTINUED | OUTPATIENT
Start: 2025-06-17 | End: 2025-06-21 | Stop reason: HOSPADM

## 2025-06-17 RX ORDER — PANTOPRAZOLE SODIUM 40 MG/10ML
40 INJECTION, POWDER, LYOPHILIZED, FOR SOLUTION INTRAVENOUS 2 TIMES DAILY
Status: DISCONTINUED | OUTPATIENT
Start: 2025-06-17 | End: 2025-06-20

## 2025-06-17 RX ORDER — EPINEPHRINE 1 MG/ML
INJECTION, SOLUTION, CONCENTRATE INTRAVENOUS PRN
Status: DISCONTINUED | OUTPATIENT
Start: 2025-06-17 | End: 2025-06-17 | Stop reason: ALTCHOICE

## 2025-06-17 RX ADMIN — INSULIN LISPRO 2 UNITS: 100 INJECTION, SOLUTION INTRAVENOUS; SUBCUTANEOUS at 06:08

## 2025-06-17 RX ADMIN — ALUMINUM HYDROXIDE AND MAGNESIUM CARBONATE: 95; 358 LIQUID ORAL at 11:48

## 2025-06-17 RX ADMIN — INSULIN LISPRO 4 UNITS: 100 INJECTION, SOLUTION INTRAVENOUS; SUBCUTANEOUS at 17:21

## 2025-06-17 RX ADMIN — NITROGLYCERIN 0.4 MG: 0.4 TABLET SUBLINGUAL at 11:28

## 2025-06-17 RX ADMIN — ALBUTEROL SULFATE 2.5 MG: 2.5 SOLUTION RESPIRATORY (INHALATION) at 08:46

## 2025-06-17 RX ADMIN — METOCLOPRAMIDE HYDROCHLORIDE 10 MG: 5 INJECTION, SOLUTION INTRAMUSCULAR; INTRAVENOUS at 20:46

## 2025-06-17 RX ADMIN — Medication 2 PUFF: at 08:46

## 2025-06-17 RX ADMIN — ALBUTEROL SULFATE 2.5 MG: 2.5 SOLUTION RESPIRATORY (INHALATION) at 20:43

## 2025-06-17 RX ADMIN — PHENYLEPHRINE HYDROCHLORIDE 100 MCG: 10 INJECTION INTRAVENOUS at 10:38

## 2025-06-17 RX ADMIN — PANTOPRAZOLE SODIUM 8 MG/HR: 40 INJECTION, POWDER, FOR SOLUTION INTRAVENOUS at 06:19

## 2025-06-17 RX ADMIN — PROPOFOL 140 MCG/KG/MIN: 10 INJECTION, EMULSION INTRAVENOUS at 10:38

## 2025-06-17 RX ADMIN — PANTOPRAZOLE SODIUM 40 MG: 40 INJECTION, POWDER, FOR SOLUTION INTRAVENOUS at 12:54

## 2025-06-17 RX ADMIN — SODIUM CHLORIDE: 0.9 INJECTION, SOLUTION INTRAVENOUS at 10:14

## 2025-06-17 RX ADMIN — EPHEDRINE SULFATE 10 MG: 50 INJECTION, SOLUTION INTRAVENOUS at 10:43

## 2025-06-17 RX ADMIN — PRAVASTATIN SODIUM 80 MG: 40 TABLET ORAL at 08:19

## 2025-06-17 RX ADMIN — LIDOCAINE HYDROCHLORIDE 100 MG: 20 INJECTION, SOLUTION INFILTRATION; PERINEURAL at 10:38

## 2025-06-17 RX ADMIN — SOTALOL HYDROCHLORIDE 80 MG: 80 TABLET ORAL at 20:24

## 2025-06-17 RX ADMIN — INSULIN LISPRO 4 UNITS: 100 INJECTION, SOLUTION INTRAVENOUS; SUBCUTANEOUS at 12:53

## 2025-06-17 RX ADMIN — SOTALOL HYDROCHLORIDE 80 MG: 80 TABLET ORAL at 08:19

## 2025-06-17 RX ADMIN — PROPOFOL 50 MG: 10 INJECTION, EMULSION INTRAVENOUS at 10:40

## 2025-06-17 RX ADMIN — PHENYLEPHRINE HYDROCHLORIDE 200 MCG: 10 INJECTION INTRAVENOUS at 10:40

## 2025-06-17 RX ADMIN — INSULIN LISPRO 2 UNITS: 100 INJECTION, SOLUTION INTRAVENOUS; SUBCUTANEOUS at 22:29

## 2025-06-17 RX ADMIN — HYDROMORPHONE HYDROCHLORIDE 0.5 MG: 2 INJECTION, SOLUTION INTRAMUSCULAR; INTRAVENOUS; SUBCUTANEOUS at 11:46

## 2025-06-17 RX ADMIN — PANTOPRAZOLE SODIUM 40 MG: 40 INJECTION, POWDER, FOR SOLUTION INTRAVENOUS at 20:24

## 2025-06-17 RX ADMIN — PHENYLEPHRINE HYDROCHLORIDE 300 MCG: 10 INJECTION INTRAVENOUS at 10:41

## 2025-06-17 RX ADMIN — EPHEDRINE SULFATE 10 MG: 50 INJECTION, SOLUTION INTRAVENOUS at 10:52

## 2025-06-17 RX ADMIN — EPHEDRINE SULFATE 10 MG: 50 INJECTION, SOLUTION INTRAVENOUS at 10:44

## 2025-06-17 RX ADMIN — FENTANYL CITRATE 25 MCG: 50 INJECTION, SOLUTION INTRAMUSCULAR; INTRAVENOUS at 11:31

## 2025-06-17 RX ADMIN — ALLOPURINOL 300 MG: 100 TABLET ORAL at 08:19

## 2025-06-17 RX ADMIN — Medication 5 MCG/MIN: at 01:29

## 2025-06-17 RX ADMIN — PROPOFOL 50 MG: 10 INJECTION, EMULSION INTRAVENOUS at 10:38

## 2025-06-17 RX ADMIN — ALBUTEROL SULFATE 2.5 MG: 2.5 SOLUTION RESPIRATORY (INHALATION) at 00:54

## 2025-06-17 RX ADMIN — FENTANYL CITRATE 25 MCG: 50 INJECTION, SOLUTION INTRAMUSCULAR; INTRAVENOUS at 11:41

## 2025-06-17 RX ADMIN — TIOTROPIUM BROMIDE INHALATION SPRAY 5 MCG: 3.12 SPRAY, METERED RESPIRATORY (INHALATION) at 08:46

## 2025-06-17 RX ADMIN — FENTANYL CITRATE 25 MCG: 50 INJECTION INTRAMUSCULAR; INTRAVENOUS at 11:31

## 2025-06-17 RX ADMIN — Medication 2 PUFF: at 20:44

## 2025-06-17 ASSESSMENT — ENCOUNTER SYMPTOMS
ABDOMINAL PAIN: 0
NAUSEA: 0
COUGH: 0
VOMITING: 0
DIARRHEA: 0
SHORTNESS OF BREATH: 1
RHINORRHEA: 0
BLOOD IN STOOL: 1

## 2025-06-17 ASSESSMENT — PAIN SCALES - GENERAL
PAINLEVEL_OUTOF10: 2
PAINLEVEL_OUTOF10: 0
PAINLEVEL_OUTOF10: 0
PAINLEVEL_OUTOF10: 10
PAINLEVEL_OUTOF10: 0
PAINLEVEL_OUTOF10: 0
PAINLEVEL_OUTOF10: 10
PAINLEVEL_OUTOF10: 0
PAINLEVEL_OUTOF10: 5

## 2025-06-17 ASSESSMENT — PAIN DESCRIPTION - DESCRIPTORS
DESCRIPTORS: SHARP
DESCRIPTORS: DISCOMFORT;SORE;TENDER;SHARP

## 2025-06-17 ASSESSMENT — PAIN - FUNCTIONAL ASSESSMENT
PAIN_FUNCTIONAL_ASSESSMENT: 0-10
PAIN_FUNCTIONAL_ASSESSMENT: ACTIVITIES ARE NOT PREVENTED
PAIN_FUNCTIONAL_ASSESSMENT: 0-10
PAIN_FUNCTIONAL_ASSESSMENT: NONE - DENIES PAIN
PAIN_FUNCTIONAL_ASSESSMENT: 0-10

## 2025-06-17 ASSESSMENT — PAIN DESCRIPTION - ONSET: ONSET: SUDDEN

## 2025-06-17 ASSESSMENT — PAIN DESCRIPTION - LOCATION
LOCATION: CHEST
LOCATION: CHEST
LOCATION: ABDOMEN
LOCATION: CHEST

## 2025-06-17 ASSESSMENT — PAIN DESCRIPTION - ORIENTATION
ORIENTATION: MID
ORIENTATION: MID
ORIENTATION: LEFT;LOWER

## 2025-06-17 ASSESSMENT — PAIN DESCRIPTION - PAIN TYPE: TYPE: ACUTE PAIN

## 2025-06-17 NOTE — RT PROTOCOL NOTE
RT Inhaler-Nebulizer Bronchodilator Protocol Note    There is a bronchodilator order in the chart from a provider indicating to follow the RT Bronchodilator Protocol and there is an “Initiate RT Inhaler-Nebulizer Bronchodilator Protocol” order as well (see protocol at bottom of note).    CXR Findings:  XR CHEST PORTABLE  Result Date: 6/16/2025  No acute process. Stable cardiomegaly       The findings from the last RT Protocol Assessment were as follows:   History Pulmonary Disease: Chronic pulmonary disease  Respiratory Pattern: Regular pattern and RR 12-20 bpm  Breath Sounds: Intermittent or unilateral wheezes  Cough: Strong, spontaneous, non-productive  Indication for Bronchodilator Therapy: Decreased or absent breath sounds, On home bronchodilators, Wheezing associated with pulm disorder  Bronchodilator Assessment Score: 6    Aerosolized bronchodilator medication orders have been revised according to the RT Inhaler-Nebulizer Bronchodilator Protocol below.    Respiratory Therapist to perform RT Therapy Protocol Assessment initially then follow the protocol.  Repeat RT Therapy Protocol Assessment PRN for score 0-3 or on second treatment, BID, and PRN for scores above 3.    No Indications - adjust the frequency to every 6 hours PRN wheezing or bronchospasm, if no treatments needed after 48 hours then discontinue using Per Protocol order mode.     If indication present, adjust the RT bronchodilator orders based on the Bronchodilator Assessment Score as indicated below.  Use Inhaler orders unless patient has one or more of the following: on home nebulizer, not able to hold breath for 10 seconds, is not alert and oriented, cannot activate and use MDI correctly, or respiratory rate 25 breaths per minute or more, then use the equivalent nebulizer order(s) with same Frequency and PRN reasons based on the score.  If a patient is on this medication at home then do not decrease Frequency below that used at home.    0-3 -

## 2025-06-17 NOTE — PROGRESS NOTES
06/17/25 0041   RT Protocol   History Pulmonary Disease 2   Respiratory pattern 0   Breath sounds 4   Cough 0   Indications for Bronchodilator Therapy Decreased or absent breath sounds;On home bronchodilators;Wheezing associated with pulm disorder   Bronchodilator Assessment Score 6

## 2025-06-17 NOTE — PROGRESS NOTES
4 Eyes Skin Assessment     The patient is being assess for  Admission    I agree that 2 RN's have performed a thorough Head to Toe Skin Assessment on the patient. ALL assessment sites listed below have been assessed.       Areas assessed by both nurses: All  [x]   Head, Face, and Ears   [x]   Shoulders, Back, and Chest  [x]   Arms, Elbows, and Hands   [x]   Coccyx, Sacrum, and Ischum  [x]   Legs, Feet, and Heels        Does the Patient have Skin Breakdown?  No         Alli Prevention initiated:  No   Wound Care Orders initiated:  NA      [facility] nurse consulted for Pressure Injury (Stage 3,4, Unstageable, DTI, NWPT, and Complex wounds):  No      Nurse 1 eSignature: Electronically signed by MARISSA GATES RN on 6/17/25 at 3:06 AM EDT    **SHARE this note so that the co-signing nurse is able to place an eSignature**    Nurse 2 eSignature: Electronically signed by RADHA LARKIN RN on 6/17/25 at 6:04 AM EDT

## 2025-06-17 NOTE — CARE COORDINATION
Case Management Assessment  Initial Evaluation    Date/Time of Evaluation: 6/17/2025 9:06 AM  Assessment Completed by: Sandra Wallis RN    If patient is discharged prior to next notation, then this note serves as note for discharge by case management.    Patient Name: Valdemar Solis                   YOB: 1950  Diagnosis: Acute blood loss anemia [D62]  GI bleed [K92.2]  Upper GI bleed [K92.2]  Supratherapeutic INR [R79.1]  Mechanical heart valve present [Z95.2]  Symptomatic anemia [D64.9]                   Date / Time: 6/16/2025  6:37 PM    Patient Admission Status: Inpatient   Readmission Risk (Low < 19, Mod (19-27), High > 27): Readmission Risk Score: 24.7    Current PCP: Yane Wei MD  PCP verified by CM? Yes    Chart Reviewed: Yes      History Provided by: Patient, Medical Record  Patient Orientation: Alert and Oriented    Patient Cognition: Alert    Hospitalization in the last 30 days (Readmission):  Yes    If yes, Readmission Assessment in  Navigator will be completed.    Readmission Assessment  Number of Days since last admission?: 8-30 days  Previous Disposition: Home with Home Health  Who is being Interviewed: Patient  What was the patient's/caregiver's perception as to why they think they needed to return back to the hospital?: Other (Comment) (SOB and black stool)  Did you visit your Primary Care Physician after you left the hospital, before you returned this time?: No  Why weren't you able to visit your PCP?: Did not have an appointment  Did you see a specialist, such as Cardiac, Pulmonary, Orthopedic Physician, etc. after you left the hospital?: Yes (nephrology)  Who advised the patient to return to the hospital?: Self-referral  Does the patient report anything that got in the way of taking their medications?: No  In our efforts to provide the best possible care to you and others like you, can you think of anything that we could have done to help you after you left the  [phone]  [address]  [facility] [address]  Phone: [phone] Fax: [phone]      Notes:    Factors facilitating achievement of predicted outcomes: Family support and Cooperative    Barriers to discharge: Medical w/u    Additional Case Management Notes: Patient is a 75 yr old male from home w/spouse, indep PLOF, has home O2 4L baseline with [employer] as well as Bi-pap.       The Plan for Transition of Care is related to the following treatment goals of Acute blood loss anemia [D62]  GI bleed [K92.2]  Upper GI bleed [K92.2]  Supratherapeutic INR [R79.1]  Mechanical heart valve present [Z95.2]  Symptomatic anemia [D64.9]    IF APPLICABLE: The Patient and/or patient representative Valdemar and his family were provided with a choice of provider and agrees with the discharge plan. Freedom of choice list with basic dialogue that supports the patient's individualized plan of care/goals and shares the quality data associated with the providers was provided to:     Patient Representative Name:       The Patient and/or Patient Representative Agree with the Discharge Plan?      Sandra Wallis RN  Case Management Department  Ph: [phone]

## 2025-06-17 NOTE — CONSENT
Informed Consent for Blood Component Transfusion Note    I have discussed with the patient the rationale for blood component transfusion; its benefits in treating or preventing fatigue, organ damage, or death; and its risk which includes mild transfusion reactions, rare risk of blood borne infection, or more serious but rare reactions. I have discussed the alternatives to transfusion, including the risk and consequences of not receiving transfusion. The patient had an opportunity to ask questions and had agreed to proceed with transfusion of blood components.    Electronically signed by Pablito Posada DO on 6/17/25 at 11:53 AM EDT

## 2025-06-17 NOTE — CONSENT
Informed Consent for Blood Component Transfusion Note    I have discussed with the patient the rationale for blood component transfusion; its benefits in treating or preventing fatigue, organ damage, or death; and its risk which includes mild transfusion reactions, rare risk of blood borne infection, or more serious but rare reactions. I have discussed the alternatives to transfusion, including the risk and consequences of not receiving transfusion. The patient had an opportunity to ask questions and had agreed to proceed with transfusion of blood components.    Electronically signed by Melodie Bearden PA-C on 6/16/25 at 8:46 PM EDT

## 2025-06-17 NOTE — ANESTHESIA POSTPROCEDURE EVALUATION
Department of Anesthesiology  Postprocedure Note    Patient: Valdemar Solis  MRN: [id number]  YOB: 1950  Date of evaluation: 6/17/2025    Procedure Summary       Date: 06/17/25 Room / Location: [place] [address] / [facility]    Anesthesia Start: 1030 Anesthesia Stop: 1103    Procedures:       ESOPHAGOGASTRODUODENOSCOPY CONTROL HEMORRHAGE (Abdomen)      ESOPHAGOGASTRODUODENOSCOPY BIOPSY (Abdomen) Diagnosis:       Gastrointestinal hemorrhage, unspecified gastrointestinal hemorrhage type      (Gastrointestinal hemorrhage, unspecified gastrointestinal hemorrhage type [K92.2])    Surgeons: Alfred Mcfadden MD Responsible Provider: Alan Melendez MD    Anesthesia Type: MAC ASA Status: 4            Anesthesia Type: No value filed.    Karlene Phase I: Karlene Score: 9    Karlene Phase II:      Anesthesia Post Evaluation    Patient location during evaluation: PACU  Patient participation: complete - patient participated  Level of consciousness: awake  Pain score: 3  Airway patency: patent  Nausea & Vomiting: no nausea and no vomiting  Cardiovascular status: hemodynamically stable  Respiratory status: nasal cannula  Hydration status: euvolemic  Pain management: adequate    No notable events documented.

## 2025-06-17 NOTE — PROGRESS NOTES
Pharmacy Home Medication Reconciliation Note    A medication reconciliation has been completed for Valdemar Solis 1950    Pharmacy: [facility], [address], [place], [place]  Information provided by: patient    The patient's home medication list is as follows:  Current Facility-Administered Medications on File Prior to Encounter   Medication Dose Route Frequency Provider Last Rate Last Admin    oxyCODONE-acetaminophen (PERCOCET) 5-325 MG per tablet 1 tablet  1 tablet Oral Once Orlando Rosenberg MD        sodium chloride flush 0.9 % injection 10 mL  10 mL IntraVENous 2 times per day Pablito Guthrie MD        sodium chloride flush 0.9 % injection 10 mL  10 mL IntraVENous PRN Pablito Guthrie MD        0.9 % sodium chloride infusion  25 mL IntraVENous PRN Pablito Guthrie MD         Current Outpatient Medications on File Prior to Encounter   Medication Sig Dispense Refill    vitamin D 25 MCG (1000 UT) CAPS Take 1 capsule by mouth daily      diclofenac sodium (VOLTAREN) 1 % GEL Apply 2 g topically in the morning, at noon, and at bedtime 20 g 0    spironolactone (ALDACTONE) 25 MG tablet Take 0.5 tablets by mouth Every Monday, Wednesday, and Friday 30 tablet 3    amLODIPine (NORVASC) 5 MG tablet TAKE 1 TABLET EVERY DAY 90 tablet 1    furosemide (LASIX) 40 MG tablet TAKE 1 AND 1/2 TABLETS EVERY DAY (Patient taking differently: Take 1 tablet by mouth daily) 135 tablet 0    dapagliflozin (FARXIGA) 10 MG tablet TAKE 1 TABLET BY MOUTH EVERY MORNING 30 tablet 3    sotalol (BETAPACE) 80 MG tablet TAKE 1 TABLET BY MOUTH 2 TIMES A DAY 60 tablet 3    traZODone (DESYREL) 50 MG tablet TAKE 1 OR 2 TABLETS AS NEEDED FOR SLEEP AS DIRECTED (Patient taking differently: Take 1-2 tablets by mouth nightly as needed for Sleep TAKE 1 OR 2 TABLETS AS NEEDED FOR SLEEP AS DIRECTED) 90 tablet 2    albuterol (PROVENTIL) (2.5 MG/3ML) 0.083% nebulizer solution Take 3 mLs by nebulization every 6 hours as needed for Wheezing 120 each 3

## 2025-06-17 NOTE — ED PROVIDER NOTES
[facility] emergency department   EMERGENCY DEPARTMENT ENCOUNTER        Pt Name: Valdemar Solis  MRN: [id number]  Birthdate 1950  Date of evaluation: 6/16/2025  Provider: Melodie Bearden PA-C  PCP: Yane Wei MD  Note Started: 12:33 AM EDT 6/17/25       I have seen and evaluated this patient with my supervising physician Crispin Koroma MD.      CHIEF COMPLAINT       Chief Complaint   Patient presents with    Shortness of Breath     SOB and black stool x 1 week.        HISTORY OF PRESENT ILLNESS: 1 or more Elements     History From: Patient  Limitations to history : None    Valdemar Solis is a 75 y.o. male who presents to the emergency department today for evaluation for concerns of shortness of breath.  The patient reports that he has been experiencing shortness of breath and this is progressively worse over the past week.  Patient reports that he has a history of COPD, CHF and is on 4 L of oxygen continuously.  Patient reports that he is feeling worsening shortness of breath particularly with exertion.  He reports over the past week as well his stools been \"black\" in color.  Also reports that it has a foul odor.  The patient states that he is anticoagulated on Coumadin secondary to a history of mechanical aortic valve.  Patient tells me that he was told many years ago that he had a bleeding ulcer but has not had any recent EGD.  He has no chest pain or chest tightness.  He has no abdominal pain.  He has not had any fevers.  No vomiting.  No other complaints    Nursing Notes were all reviewed and agreed with or any disagreements were addressed in the HPI.    REVIEW OF SYSTEMS :      Review of Systems   Constitutional:  Positive for fatigue. Negative for activity change, appetite change, chills and fever.   HENT:  Negative for congestion and rhinorrhea.    Respiratory:  Positive for shortness of breath. Negative for cough.    Cardiovascular:  Negative for chest pain.   Gastrointestinal:  Positive  Automatically Held 6/20/25 0900)   aspirin chewable tablet 81 mg ( Oral Automatically Held 6/20/25 0900)   budesonide-formoterol (SYMBICORT) 160-4.5 MCG/ACT inhaler 2 puff (has no administration in time range)     And   tiotropium (SPIRIVA RESPIMAT) 2.5 MCG/ACT inhaler 5 mcg (has no administration in time range)   empagliflozin (JARDIANCE) tablet 10 mg ( Oral Automatically Held 6/20/25 0900)   furosemide (LASIX) tablet 40 mg ( Oral Automatically Held 6/20/25 0900)   pravastatin (PRAVACHOL) tablet 80 mg (has no administration in time range)   sacubitril-valsartan (ENTRESTO) 49-51 MG per tablet 1 tablet ( Oral Automatically Held 6/20/25 2100)   sotalol (BETAPACE) tablet 80 mg (has no administration in time range)   spironolactone (ALDACTONE) tablet 12.5 mg ( Oral Automatically Held 6/23/25 1700)   traZODone (DESYREL) tablet 50 mg (has no administration in time range)   dextrose bolus 10% 125 mL (has no administration in time range)     Or   dextrose bolus 10% 250 mL (has no administration in time range)   glucagon injection 1 mg (has no administration in time range)   dextrose 10 % infusion (has no administration in time range)   insulin lispro (HUMALOG,ADMELOG) injection vial 0-4 Units (1 Units SubCUTAneous Given 6/16/25 2352)   insulin glargine (LANTUS) injection vial 21 Units (has no administration in time range)   pantoprazole (PROTONIX) 80 mg in sodium chloride (PF) 0.9 % 20 mL injection (80 mg IntraVENous Given 6/16/25 1947)   iopamidol (ISOVUE-370) 76 % injection 75 mL (75 mLs IntraVENous Given 6/16/25 2001)   phytonadione (ADULT) (VITAMIN K) 10 mg in sodium chloride 0.9 % 100 mL IVPB (0 mg IntraVENous Stopped 6/16/25 2251)             Chronic Conditions affecting care:  has a past medical history of Acute congestive heart failure (HCC) (12/30/2019), Allergic rhinitis (07/11/2016), Anticoagulant long-term use (grant), Atrial fibrillation (HCC), CKD (chronic kidney disease), Class 2 obesity due to excess

## 2025-06-17 NOTE — BRIEF OP NOTE
Brief Postoperative Note      Patient: Valdemar Solis  YOB: 1950  MRN: [id number]    Date of Procedure: 6/17/2025    Pre-Op Diagnosis Codes:      * Gastrointestinal hemorrhage, unspecified gastrointestinal hemorrhage type [K92.2]         Procedure(s):  ESOPHAGOGASTRODUODENOSCOPY CONTROL HEMORRHAGE  ESOPHAGOGASTRODUODENOSCOPY BIOPSY    Surgeon(s):  Alfred Mcfadden MD    Anesthesia: Monitor Anesthesia Care    Estimated Blood Loss (mL): Minimal    Complications: None    Specimens:   ID Type Source Tests Collected by Time Destination   A : gastric bx r/o h. pylori Tissue Stomach SURGICAL PATHOLOGY Alfred Mcfadden MD 6/17/2025 1054        Implants:  Implant Name Type Inv. Item Serial No.  Lot No. LRB No. Used Action   CLIP BRAIDED 360 CATHETER RESOLUTION ULTRA â€“ UOM BOX OF 10 - [id number]  CLIP BRAIDED 360 CATHETER RESOLUTION ULTRA â€“ UOM BOX OF 10  [employer] [id number] N/A 2 Implanted     Impression:         -  Normal esophagus.         -  Gastritis, characterized by erythema.  Biopsied.         -  Non-bleeding duodenal ulcer with a clean ulcer base (Caden            Class III).  Injected.         - Oozing duodenal ulcer with an adherent clot (Caden Class IIb).            Injected.  2 clips were placed.         -  Normal second portion of the duodenum.  Recommendation:         - PATHOLOGY RESULTS: will be available in the [employer] portal or            [employer] phone irving within 2 weeks. Please go to            [email or web address]. There are instructions there how to            access your medical records online and how to download the [employer]            phone irving. You can also call the GI office at  to get            your pathology results.         - No NSAIDS         - IV PPI BID X 72 hours then switch to oral PPI BID X 8 weeks and            then daily thereafter         - Monitor H&H, transfuse to keep hemoglobin above 8         - If H/H stable, may resume

## 2025-06-17 NOTE — ANESTHESIA PRE PROCEDURE
Department of Anesthesiology  Preprocedure Note       Name:  Valdemar Solis   Age:  75 y.o.  :  1950                                          MRN:  [id number]         Date:  2025      Surgeon: Surgeon(s):  Alfred Mcfadden MD    Procedure: Procedure(s):  ESOPHAGOGASTRODUODENOSCOPY DIAGNOSTIC ONLY    Medications prior to admission:   Prior to Admission medications    Medication Sig Start Date End Date Taking? Authorizing Provider   vitamin D 25 MCG (1000 UT) CAPS Take 1 capsule by mouth daily   Yes ProviderAbraham MD   diclofenac sodium (VOLTAREN) 1 % GEL Apply 2 g topically in the morning, at noon, and at bedtime 25  Yes Markus Mcfarland MD   spironolactone (ALDACTONE) 25 MG tablet Take 0.5 tablets by mouth Every Monday, Wednesday, and 25  Yes Markus Mcfarland MD   amLODIPine (NORVASC) 5 MG tablet TAKE 1 TABLET EVERY DAY 25  Yes Zoraida Uribe APRN - CNS   furosemide (LASIX) 40 MG tablet TAKE 1 AND 1/2 TABLETS EVERY DAY  Patient taking differently: Take 1 tablet by mouth daily 25  Yes Zoraida Uribe APRN - CNS   dapagliflozin (FARXIGA) 10 MG tablet TAKE 1 TABLET BY MOUTH EVERY MORNING 25  Yes Yane Wei MD   sotalol (BETAPACE) 80 MG tablet TAKE 1 TABLET BY MOUTH 2 TIMES A DAY 25  Yes Yane Wei MD   traZODone (DESYREL) 50 MG tablet TAKE 1 OR 2 TABLETS AS NEEDED FOR SLEEP AS DIRECTED  Patient taking differently: Take 1-2 tablets by mouth nightly as needed for Sleep TAKE 1 OR 2 TABLETS AS NEEDED FOR SLEEP AS DIRECTED 25  Yes Yane Wei MD   albuterol (PROVENTIL) (2.5 MG/3ML) 0.083% nebulizer solution Take 3 mLs by nebulization every 6 hours as needed for Wheezing 3/20/25  Yes Billy Ambrosio MD   albuterol sulfate HFA (PROVENTIL;VENTOLIN;PROAIR) 108 (90 Base) MCG/ACT inhaler Inhale 2 puffs into the lungs every 6 hours as needed for Wheezing 3/13/25  Yes Yane Wei MD   sacubitril-valsartan (ENTRESTO) 49-51 MG per tablet

## 2025-06-17 NOTE — PROGRESS NOTES
Hospitalist Progress Note    Name:  Valdemar Solis    /Age/Sex: 1950  (75 y.o. male)  MRN & CSN:  [id number] & [id number]    PCP: Yane Wei MD    Date of Admission: 2025    Patient Status:  Inpatient     Chief Complaint:   Chief Complaint   Patient presents with    Shortness of Breath     SOB and black stool x 1 week.        Hospital Course:   Patient admitted for melena and shortness of breath.  Found to be in hemorrhagic shock.  Started on pressors and PPI infusion.  GI consulted.  Given 2 units PRBC on .    Patient is chronically on warfarin outpatient for mechanical mitral valve.  Cardiology consulted to help manage anticoagulation.    Subjective:  Today is:  Hospital Day: 2.  Patient seen and examined in ICU-390/390.     Sitting up in bed. No CP or SOB. Had some bleeding from art line site. No pain.      Medications:  Reviewed    Infusion Medications    norepinephrine 9 mcg/min (25)    pantoprazole 8 mg/hr (25)    sodium chloride      dextrose       Scheduled Medications    albuterol  2.5 mg Nebulization BID RT    insulin lispro  0-8 Units SubCUTAneous Q6H    allopurinol  300 mg Oral Daily    [Held by provider] amLODIPine  5 mg Oral Daily    [Held by provider] aspirin  81 mg Oral Daily    budesonide-formoterol  2 puff Inhalation BID RT    And    tiotropium  2 puff Inhalation Daily RT    [Held by provider] empagliflozin  10 mg Oral Daily    [Held by provider] furosemide  40 mg Oral Daily    pravastatin  80 mg Oral Daily    [Held by provider] sacubitril-valsartan  1 tablet Oral BID    sotalol  80 mg Oral Q12H    [Held by provider] spironolactone  12.5 mg Oral Once per day on      PRN Meds: sodium chloride, albuterol, albuterol sulfate HFA, traZODone, dextrose bolus **OR** dextrose bolus, glucagon (rDNA), dextrose      Intake/Output Summary (Last 24 hours) at 2025 0732  Last data filed at 2025 0630  Gross per 24 hour   Intake

## 2025-06-17 NOTE — ED PROVIDER NOTES
In addition to the advanced practice provider, I personally saw Valdemar Solis and performed a substantive portion of the visit including all aspects of the medical decision making.    Medical Decision Making  75-year-old male comes in today with shortness of breath and dark stool for a week.  Patient denies any chest pain.  Patient states he is progressively getting weaker.  He has not iron supplements.  Patient denies nausea and vomiting.  Patient is on warfarin.  Heart has a regular rhythm and rate.  Lungs are clear bilaterally.  Patient does appear to be generally pale.  When I was in the room the blood pressure is 130 systolic.  Abdomen is protuberant but not distended.  There is no pain with palpation of the abdomen.  Blood pressure initially was 156/98.  pH is 7.229 with a normal pCO2 and elevated PO2.  The stool was found to be positive for blood.  INR is found to be quite elevated at 4.5.  Creatinine is 1.9 and baseline is about 1.6-1.7.  BUN was found to be 95 and this is an increase in the baseline of about 40.  Lactic is elevated at 3.3 and this is most likely secondary to the GI bleed.  Troponin was initially 31 and dropped to 29 and baseline is about 20.  This is most likely due to the blood loss.  EKG did not show any acute findings.  CT angio of the abdomen did not show any extravasation of the IV contrast.  I do believe the patient does have a GI bleed and needs to be admitted.  Patient's blood pressure did drop to 90 systolic.  A call was placed to the hospitalist who accepted the admission.  I agree with the assessment and plan of the nurse practitioner.  Condition is critical.        EKG  The Ekg interpreted by me in the absence of a cardiologist shows.  normal sinus rhythm with a rate of 87  Axis is   Left axis deviation  QTc is  Prolonged  Intervals and Durations are unremarkable.      No specific ST-T wave changes appreciated.  No evidence of acute ischemia.   No significant change from prior

## 2025-06-17 NOTE — CARE COORDINATION
06/17/25 0852   Readmission Assessment   Number of Days since last admission? 8-30 days   Previous Disposition Home with Home Health   Who is being Interviewed Patient   What was the patient's/caregiver's perception as to why they think they needed to return back to the hospital? Other (Comment)  (SOB and black stool)   Did you visit your Primary Care Physician after you left the hospital, before you returned this time? No   Why weren't you able to visit your PCP? Did not have an appointment   Did you see a specialist, such as Cardiac, Pulmonary, Orthopedic Physician, etc. after you left the hospital? Yes  (nephrology)   Who advised the patient to return to the hospital? Self-referral   Does the patient report anything that got in the way of taking their medications? No   In our efforts to provide the best possible care to you and others like you, can you think of anything that we could have done to help you after you left the hospital the first time, so that you might not have needed to return so soon? Identify patient's health literacy needs

## 2025-06-17 NOTE — PROGRESS NOTES
Pt returned to ICU room 3904 from PACU, S/P EGD. Vitals stable. Levophed infusion remains off. Pt ambulated to bathroom upon return. Voiding without difficulty. Still no BM since admission.

## 2025-06-17 NOTE — H&P
empagliflozin  10 mg Oral Daily    [Held by provider] furosemide  40 mg Oral Daily    pravastatin  80 mg Oral Daily    [Held by provider] sacubitril-valsartan  1 tablet Oral BID    sotalol  80 mg Oral Q12H    [Held by provider] spironolactone  12.5 mg Oral Once per day on Monday Wednesday Friday    insulin lispro  0-4 Units SubCUTAneous 4 times per day      Infusions:    norepinephrine 6 mcg/min (06/17/25 0147)    pantoprazole 8 mg/hr (06/17/25 0130)    sodium chloride      dextrose       PRN Meds: sodium chloride, , PRN  albuterol, 2.5 mg, Q6H PRN  albuterol sulfate HFA, 2 puff, Q6H PRN  traZODone, 50 mg, Nightly PRN  dextrose bolus, 125 mL, PRN   Or  dextrose bolus, 250 mL, PRN  glucagon (rDNA), 1 mg, PRN  dextrose, , Continuous PRN        Labs      CBC:   Recent Labs     06/16/25 1910 06/16/25 2004   WBC 12.7*  --    HGB 8.1* 7.9*     --      BMP:    Recent Labs     06/16/25 1910      K 4.6      CO2 19*   BUN 95*   CREATININE 1.9*   GLUCOSE 261*     Hepatic:   Recent Labs     06/16/25 1910   AST 11*   ALT 15   BILITOT <0.2   ALKPHOS 50     BNP:   Recent Labs     06/16/25 1910   PROBNP 285     Imaging/Diagnostics Last 24 Hours     CTA ABDOMEN PELVIS W WO CONTRAST  Result Date: 6/16/2025  EXAMINATION: CTA OF THE ABDOMEN AND PELVIS WITH AND WITHOUT CONTRAST 6/16/2025 8:00 pm: TECHNIQUE: CTA of the abdomen and pelvis was performed without and with the administration of intravenous contrast. Multiplanar reformatted images are provided for review.  MIP images are provided for review. Automated exposure control, iterative reconstruction, and/or weight based adjustment of the mA/kV was utilized to reduce the radiation dose to as low as reasonably achievable. COMPARISON: None. HISTORY: ORDERING SYSTEM PROVIDED HISTORY: gi bleed TECHNOLOGIST PROVIDED HISTORY: Reason for exam:->gi bleed Additional Contrast?->1 Reason for Exam: gi bleed FINDINGS: CTA ABDOMEN: Lower chest: Linear opacities at the lung  bases likely represent atelectasis or scarring.  There is no pleural effusion. Vascular: Moderate to advanced multifocal atheromatous plaque involves the abdominal aorta is branches.  There is no aortic aneurysm or dissection. There is normal patency of the mesenteric and renal arteries.  No peripheral aneurysm or vascular occlusion is identified. Organs: The liver, spleen, adrenal glands and pancreas are unremarkable. There gallstones in the gallbladder.  There is bilateral renal cortical thinning.  There are simple bilateral renal cysts.  The largest on the right measures 4 cm at the upper pole and the largest on the left measures 3.5 cm at the midportion of the kidney.  There is no ureteral stone or hydronephrosis. GI/bowel: No bowel dilatation or bowel wall thickening is identified.  There are colonic diverticula.  The appendix and stomach appear normal.  No extravasation of contrast is identified in the bowel or stomach. Retroperitoneum/peritoneum: There is no lymphadenopathy.  No fat stranding, free fluid, free air or focal fluid collection is identified. Soft tissue/bones: No fracture or destructive bone lesion is identified. There is a fat containing umbilical hernia. CTA PELVIS: Vascular: The bilateral iliac and visualized femoral arteries are normal in course and caliber.  No dissection or stenosis is identified. Nonvascular: The bladder appears normal.  Prostate gland is mildly enlarged measuring 4.9 cm.  No free fluid or focal fluid collection is identified. Soft tissue/bones: No fracture or suspicious bone lesion is identified.     1. No extravasation of contrast in the bowel or stomach to suggest active GI bleeding. 2. No acute findings in the abdomen or pelvis. 3. Moderate to advanced multifocal atheromatous plaque involving the abdominal aorta and its branches without aneurysm or dissection. 4. Cholelithiasis. 5. Bilateral renal cortical thinning suggesting chronic medical renal disease.  There also

## 2025-06-17 NOTE — RT PROTOCOL NOTE
RT Nebulizer Bronchodilator Protocol Note    There is a bronchodilator order in the chart from a provider indicating to follow the RT Bronchodilator Protocol and there is an “Initiate RT Bronchodilator Protocol” order as well (see protocol at bottom of note).    CXR Findings:  XR CHEST PORTABLE  Result Date: 6/16/2025  No acute process. Stable cardiomegaly       The findings from the last RT Protocol Assessment were as follows:  Smoking: Chronic pulmonary disease  Respiratory Pattern: Regular pattern and RR 12-20 bpm  Breath Sounds: Intermittent or unilateral wheezes  Cough: Strong, spontaneous, non-productive  Indication for Bronchodilator Therapy: Decreased or absent breath sounds, On home bronchodilators, Wheezing associated with pulm disorder  Bronchodilator Assessment Score: 6    Aerosolized bronchodilator medication orders have been revised according to the RT Nebulizer Bronchodilator Protocol below.    Respiratory Therapist to perform RT Therapy Protocol Assessment initially then follow the protocol.  Repeat RT Therapy Protocol Assessment PRN for score 0-3 or on second treatment, BID, and PRN for scores above 3.    No Indications - adjust the frequency to every 6 hours PRN wheezing or bronchospasm, if no treatments needed after 48 hours then discontinue using Per Protocol order mode.     If indication present, adjust the RT bronchodilator orders based on the Bronchodilator Assessment Score as indicated below.  If a patient is on this medication at home then do not decrease Frequency below that used at home.    0-3 - enter or revise RT bronchodilator order(s) to equivalent RT Bronchodilator order with Frequency of every 4 hours PRN for wheezing or increased work of breathing using Per Protocol order mode.       4-6 - enter or revise RT Bronchodilator order(s) to two equivalent RT bronchodilator orders with one order with BID Frequency and one order with Frequency of every 4 hours PRN wheezing or increased work

## 2025-06-17 NOTE — CONSULTS
[facility]  Cardiology Note  [phone]      Chief Complaint   Patient presents with    Shortness of Breath     SOB and black stool x 1 week.         History of Present Illness:  Valdemar Solis is a 75 y.o. patient PMHx HFPEF, mechanical AVR x3, COPD, CKD, chronic AF/SSS s/p PPM who presented to the hospital with complaints of shortness of breath and melena    Hgb at D/C 6/5/25 was 13.9 on presentation measured 8.1 with INR 4.5. He received 2u PRBC with stable Hgb and briefly required levophed. GDMT is on hold. He is to undergo EGD today    Past Medical History:   has a past medical history of Acute congestive heart failure (HCC), Allergic rhinitis, Anticoagulant long-term use, Atrial fibrillation (HCC), CKD (chronic kidney disease), Class 2 obesity due to excess calories without serious comorbidity with body mass index (BMI) of 37.0 to 37.9 in adult, Controlled type 2 diabetes mellitus without complication, without long-term current use of insulin (HCC), COPD, COPD (chronic obstructive pulmonary disease) (HCC), Erectile dysfunction, Essential tremor, Gout, Hyperlipidemia, mixed, Hypertension, Long term current use of anticoagulants with INR goal of 2.0-3.0, Long term current use of anticoagulants with INR goal of 2.0-3.0, Obstructive sleep apnea syndrome, Primary insomnia, Primary osteoarthritis of both knees, and Sleep apnea.    Surgical History:   has a past surgical history that includes Aortic valve replacement (10/1996:[facility]); Pacemaker insertion (1996); Atrial ablation surgery (03/23/2020); Cardiac pacemaker placement (03/23/2020); Knee arthroscopy (Right, 12/27/2021); knee surgery (Right, 04/06/2022); Knee Arthrocentesis (Right, 04/06/2022); and Cardiac valve replacement ([facility]).     Social History:   reports that he quit smoking about 5 years ago. His smoking use included cigarettes. He started smoking about 36 years ago. He has a 30 pack-year smoking history. He has never used smokeless

## 2025-06-17 NOTE — PROGRESS NOTES
Patient c/o 10/10 sharp pain in mid lower chest. Dr. Mcfadden and Dr. Melendez at bedside. Orders for chest xray, KUB, and EKG. Xray at bedside.

## 2025-06-17 NOTE — CONSULTS
Gastroenterology Consult Note        Patient: Valdemar Solis  : 1950  Acct#:      Date:  2025      1. Hemorrhagic shock (HCC)    2. Acute blood loss anemia    3. Symptomatic anemia    4. Supratherapeutic INR    5. Mechanical heart valve present    6. Upper GI bleed        Subjective:       History of Present Illness  Patient is a 75 y.o.  male admitted with Acute blood loss anemia [D62]  GI bleed [K92.2]  Upper GI bleed [K92.2]  Supratherapeutic INR [R79.1]  Mechanical heart valve present [Z95.2]  Symptomatic anemia [D64.9] who is seen in consult for GI bleed.  Drew has a history of HTN, HLD, COPD/chronic respiratory failure, Afib, CKD, IDDM, ROSETTA admitted with hemorrhagic shock due to GI bleed. He had shortness of breath and noticed dark black bowel movements for 5 days prior to admission. He reports some nausea but denies epigastric pain or reflux. He has a history of bleeding ulcers in . He does not take anything regularly for reflux. He denies fever or chest pains.   On coumadin prior to admission for h/o mechanical aortic valve.  No previous colonoscopy. EGD in .    Past Medical History:   Diagnosis Date    Acute congestive heart failure (HCC) 2019    Allergic rhinitis 2016    Anticoagulant long-term use warrefen    Atrial fibrillation ([facility])     under care of cardiology:Dr. Scott Behrens([facility])    CKD (chronic kidney disease)     Nephro:Dr. Parker:stage 3    Class 2 obesity due to excess calories without serious comorbidity with body mass index (BMI) of 37.0 to 37.9 in adult 2017    Controlled type 2 diabetes mellitus without complication, without long-term current use of insulin ([facility]) 2017    COPD     COPD (chronic obstructive pulmonary disease) ([facility]) 2018    Erectile dysfunction     Essential tremor     Gout     Hyperlipidemia, mixed 2016    Hypertension     under care of cardiology:Dr. Scott Behrens([facility])    Long

## 2025-06-17 NOTE — PLAN OF CARE
Problem: Chronic Conditions and Co-morbidities  Goal: Patient's chronic conditions and co-morbidity symptoms are monitored and maintained or improved  Outcome: Progressing  Flowsheets (Taken 6/17/2025 0305)  Care Plan - Patient's Chronic Conditions and Co-Morbidity Symptoms are Monitored and Maintained or Improved: Monitor and assess patient's chronic conditions and comorbid symptoms for stability, deterioration, or improvement     Problem: Discharge Planning  Goal: Discharge to home or other facility with appropriate resources  Outcome: Progressing  Flowsheets (Taken 6/17/2025 0305)  Discharge to home or other facility with appropriate resources: Identify discharge learning needs (meds, wound care, etc)     Problem: Pain  Goal: Verbalizes/displays adequate comfort level or baseline comfort level  Outcome: Progressing  Flowsheets (Taken 6/17/2025 0305)  Verbalizes/displays adequate comfort level or baseline comfort level:   Encourage patient to monitor pain and request assistance   Assess pain using appropriate pain scale   Administer analgesics based on type and severity of pain and evaluate response   Implement non-pharmacological measures as appropriate and evaluate response     Problem: Safety - Adult  Goal: Free from fall injury  Outcome: Progressing     Problem: ABCDS Injury Assessment  Goal: Absence of physical injury  Outcome: Progressing  Flowsheets (Taken 6/17/2025 0305)  Absence of Physical Injury: Implement safety measures based on patient assessment

## 2025-06-18 PROBLEM — R57.8 HEMORRHAGIC SHOCK (HCC): Status: ACTIVE | Noted: 2025-06-18

## 2025-06-18 PROBLEM — D62 ACUTE BLOOD LOSS ANEMIA: Status: ACTIVE | Noted: 2025-06-18

## 2025-06-18 LAB
ALBUMIN SERPL-MCNC: 3 G/DL (ref 3.4–5)
ANION GAP SERPL CALCULATED.3IONS-SCNC: 11 MMOL/L (ref 3–16)
BASOPHILS # BLD: 0 K/UL (ref 0–0.2)
BASOPHILS NFR BLD: 0.4 %
BLOOD BANK DISPENSE STATUS: NORMAL
BLOOD BANK DISPENSE STATUS: NORMAL
BLOOD BANK PRODUCT CODE: NORMAL
BLOOD BANK PRODUCT CODE: NORMAL
BPU ID: NORMAL
BPU ID: NORMAL
BUN SERPL-MCNC: 51 MG/DL (ref 7–20)
CALCIUM SERPL-MCNC: 7.9 MG/DL (ref 8.3–10.6)
CHLORIDE SERPL-SCNC: 111 MMOL/L (ref 99–110)
CO2 SERPL-SCNC: 18 MMOL/L (ref 21–32)
CREAT SERPL-MCNC: 1.5 MG/DL (ref 0.8–1.3)
DEPRECATED RDW RBC AUTO: 14.8 % (ref 12.4–15.4)
DESCRIPTION BLOOD BANK: NORMAL
DESCRIPTION BLOOD BANK: NORMAL
EOSINOPHIL # BLD: 0 K/UL (ref 0–0.6)
EOSINOPHIL NFR BLD: 0.6 %
EST. AVERAGE GLUCOSE BLD GHB EST-MCNC: 162.8 MG/DL
GFR SERPLBLD CREATININE-BSD FMLA CKD-EPI: 49 ML/MIN/{1.73_M2}
GLUCOSE BLD-MCNC: 140 MG/DL (ref 70–99)
GLUCOSE BLD-MCNC: 154 MG/DL (ref 70–99)
GLUCOSE BLD-MCNC: 186 MG/DL (ref 70–99)
GLUCOSE BLD-MCNC: 224 MG/DL (ref 70–99)
GLUCOSE SERPL-MCNC: 123 MG/DL (ref 70–99)
HBA1C MFR BLD: 7.3 %
HCT VFR BLD AUTO: 20.1 % (ref 40.5–52.5)
HCT VFR BLD AUTO: 28.7 % (ref 40.5–52.5)
HCV AB SERPL QL IA: NORMAL
HGB BLD-MCNC: 6.8 G/DL (ref 13.5–17.5)
HGB BLD-MCNC: 9.7 G/DL (ref 13.5–17.5)
INR PPP: 1.24 (ref 0.86–1.14)
LYMPHOCYTES # BLD: 0.7 K/UL (ref 1–5.1)
LYMPHOCYTES NFR BLD: 9 %
MCH RBC QN AUTO: 31.2 PG (ref 26–34)
MCHC RBC AUTO-ENTMCNC: 33.9 G/DL (ref 31–36)
MCV RBC AUTO: 92.2 FL (ref 80–100)
MONOCYTES # BLD: 0.6 K/UL (ref 0–1.3)
MONOCYTES NFR BLD: 8.1 %
NEUTROPHILS # BLD: 6.1 K/UL (ref 1.7–7.7)
NEUTROPHILS NFR BLD: 81.9 %
PERFORMED ON: ABNORMAL
PHOSPHATE SERPL-MCNC: 2.7 MG/DL (ref 2.5–4.9)
PLATELET # BLD AUTO: 123 K/UL (ref 135–450)
PMV BLD AUTO: 8.4 FL (ref 5–10.5)
POTASSIUM SERPL-SCNC: 3.8 MMOL/L (ref 3.5–5.1)
PROTHROMBIN TIME: 15.9 SEC (ref 12.1–14.9)
RBC # BLD AUTO: 2.18 M/UL (ref 4.2–5.9)
SODIUM SERPL-SCNC: 140 MMOL/L (ref 136–145)
WBC # BLD AUTO: 7.4 K/UL (ref 4–11)

## 2025-06-18 PROCEDURE — 37799 UNLISTED PX VASCULAR SURGERY: CPT

## 2025-06-18 PROCEDURE — 94761 N-INVAS EAR/PLS OXIMETRY MLT: CPT

## 2025-06-18 PROCEDURE — 85018 HEMOGLOBIN: CPT

## 2025-06-18 PROCEDURE — 2000000000 HC ICU R&B

## 2025-06-18 PROCEDURE — 6370000000 HC RX 637 (ALT 250 FOR IP): Performed by: INTERNAL MEDICINE

## 2025-06-18 PROCEDURE — 85025 COMPLETE CBC W/AUTO DIFF WBC: CPT

## 2025-06-18 PROCEDURE — 6370000000 HC RX 637 (ALT 250 FOR IP): Performed by: STUDENT IN AN ORGANIZED HEALTH CARE EDUCATION/TRAINING PROGRAM

## 2025-06-18 PROCEDURE — 2700000000 HC OXYGEN THERAPY PER DAY

## 2025-06-18 PROCEDURE — 36430 TRANSFUSION BLD/BLD COMPNT: CPT

## 2025-06-18 PROCEDURE — 83036 HEMOGLOBIN GLYCOSYLATED A1C: CPT

## 2025-06-18 PROCEDURE — 6360000002 HC RX W HCPCS: Performed by: INTERNAL MEDICINE

## 2025-06-18 PROCEDURE — 94660 CPAP INITIATION&MGMT: CPT

## 2025-06-18 PROCEDURE — 85610 PROTHROMBIN TIME: CPT

## 2025-06-18 PROCEDURE — 80069 RENAL FUNCTION PANEL: CPT

## 2025-06-18 PROCEDURE — 94640 AIRWAY INHALATION TREATMENT: CPT

## 2025-06-18 PROCEDURE — 85014 HEMATOCRIT: CPT

## 2025-06-18 PROCEDURE — 99232 SBSQ HOSP IP/OBS MODERATE 35: CPT | Performed by: NURSE PRACTITIONER

## 2025-06-18 RX ORDER — INSULIN LISPRO 100 [IU]/ML
0-8 INJECTION, SOLUTION INTRAVENOUS; SUBCUTANEOUS
Status: DISCONTINUED | OUTPATIENT
Start: 2025-06-18 | End: 2025-06-21 | Stop reason: HOSPADM

## 2025-06-18 RX ORDER — SODIUM CHLORIDE 9 MG/ML
INJECTION, SOLUTION INTRAVENOUS PRN
Status: DISCONTINUED | OUTPATIENT
Start: 2025-06-18 | End: 2025-06-18

## 2025-06-18 RX ORDER — SODIUM CHLORIDE 9 MG/ML
INJECTION, SOLUTION INTRAVENOUS PRN
Status: DISCONTINUED | OUTPATIENT
Start: 2025-06-18 | End: 2025-06-21 | Stop reason: HOSPADM

## 2025-06-18 RX ADMIN — Medication 2 PUFF: at 20:04

## 2025-06-18 RX ADMIN — ALBUTEROL SULFATE 2.5 MG: 2.5 SOLUTION RESPIRATORY (INHALATION) at 08:36

## 2025-06-18 RX ADMIN — INSULIN LISPRO 2 UNITS: 100 INJECTION, SOLUTION INTRAVENOUS; SUBCUTANEOUS at 10:56

## 2025-06-18 RX ADMIN — INSULIN LISPRO 2 UNITS: 100 INJECTION, SOLUTION INTRAVENOUS; SUBCUTANEOUS at 17:55

## 2025-06-18 RX ADMIN — PANTOPRAZOLE SODIUM 40 MG: 40 INJECTION, POWDER, FOR SOLUTION INTRAVENOUS at 10:57

## 2025-06-18 RX ADMIN — PANTOPRAZOLE SODIUM 40 MG: 40 INJECTION, POWDER, FOR SOLUTION INTRAVENOUS at 20:28

## 2025-06-18 RX ADMIN — ALLOPURINOL 300 MG: 100 TABLET ORAL at 12:15

## 2025-06-18 RX ADMIN — ALBUTEROL SULFATE 2.5 MG: 2.5 SOLUTION RESPIRATORY (INHALATION) at 20:04

## 2025-06-18 RX ADMIN — SOTALOL HYDROCHLORIDE 80 MG: 80 TABLET ORAL at 12:15

## 2025-06-18 RX ADMIN — PRAVASTATIN SODIUM 80 MG: 40 TABLET ORAL at 12:15

## 2025-06-18 RX ADMIN — TIOTROPIUM BROMIDE INHALATION SPRAY 5 MCG: 3.12 SPRAY, METERED RESPIRATORY (INHALATION) at 08:36

## 2025-06-18 RX ADMIN — Medication 2 PUFF: at 08:36

## 2025-06-18 ASSESSMENT — PAIN SCALES - GENERAL
PAINLEVEL_OUTOF10: 0

## 2025-06-18 NOTE — PROGRESS NOTES
Gastroenterology Progress Note    Valdemar Solis is a 75 y.o. male patient.  1. Hemorrhagic shock (HCC)    2. Acute blood loss anemia    3. Symptomatic anemia    4. Supratherapeutic INR    5. Mechanical heart valve present    6. Upper GI bleed    7. Gastrointestinal hemorrhage, unspecified gastrointestinal hemorrhage type        Admission Date: 2025  Hospital Day: Hospital Day: 3  Attending: Pablito Posada DO  Date of service: 25    SUBJECTIVE:    Had total of 2 black Bms (yesterday and this AM)  Patient feels well. No abdominal pain, N/V  Hb dropped to 6.8 this AM, received 1 U pRBC     ROS:  Cardiovascular ROS: no chest pain or dyspnea on exertion  Gastrointestinal ROS: see above  Respiratory ROS: no cough, shortness of breath, or wheezing    Physical    VITALS:  BP (!) 148/69   Pulse 78   Temp (!) 96 °F (35.6 °C) (Temporal)   Resp 20   Ht 1.854 m (6' 1\")   Wt (!) 139.3 kg (307 lb 1.6 oz)   SpO2 100%   BMI 40.52 kg/m²   TEMPERATURE:  Current - Temp: (!) 96 °F (35.6 °C); Max - Temp  Av.3 °F (35.7 °C)  Min: 96 °F (35.6 °C)  Max: 97.9 °F (36.6 °C)    NAD  RRR, Nl s1s2  Lungs CTA Bilaterally, normal effort  Abdomen soft, ND, NT, no HSM, Bowel sounds normal  AAOx3, No asterixis     Data      CBC:   Recent Labs     25  1910 25  2004 25  2226 25  0410 25  1100   WBC 12.7*  --   --  7.4  --    RBC 2.65*  --   --  2.18*  --    HGB 8.1*   < > 8.4* 6.8* 9.7*   HCT 24.1*   < > 24.5* 20.1* 28.7*     --   --  123*  --    MCV 90.9  --   --  92.2  --    MCH 30.5  --   --  31.2  --    MCHC 33.6  --   --  33.9  --    RDW 14.5  --   --  14.8  --    BANDSPCT 2  --   --   --   --     < > = values in this interval not displayed.        BMP:  Recent Labs     25  1910 25  0357 25  0410    136 140   K 4.6 4.2 3.8    106 111*   CO2 19* 19* 18*   BUN 95* 82* 51*   CREATININE 1.9* 1.8* 1.5*   CALCIUM 9.1 9.1 7.9*   GLUCOSE 261* 201* 123*

## 2025-06-18 NOTE — PROGRESS NOTES
06/18/25 0112   NIV Type   $NIV $Daily Charge   Ventilator ID [id number]   NIV Started/Stopped On   Equipment Type V60   Mode Bilevel   Mask Type Full face mask   Mask Size Large   Assessment   Pulse 70   Respirations 20   SpO2 99 %   Breath Sounds   Respiratory Pattern Tachypneic   Breath Sounds Bilateral Clear   Settings/Measurements   PIP Observed 15 cm H20   IPAP 14 cmH20   CPAP/EPAP 8 cmH2O   Vt (Measured) 576 mL   Rate Ordered 10   FiO2  30 %   I Time/ I Time % 1 s   Minute Volume (L/min) 12.5 Liters   Mask Leak (lpm) 8 lpm   Patient's Home Machine No   Alarm Settings   Alarms On Y   Low Pressure (cmH2O) 3 cmH2O   High Pressure (cmH2O) 30 cmH2O   RR Low (bpm) 11   RR High (bpm) 40 br/min

## 2025-06-18 NOTE — PROGRESS NOTES
[facility]  HEART FAILURE  Progress Note      Admit Date 6/16/2025     Reason for Consult:      Reason for Consultation/Chief Complaint: SOB    HPI:    Valdemar Solis is a 75 y.o. male with PMH mechanical AVR3, hfpEF, AF/SSS s/p PPM, COPD, CKD DM, admitted with SOB and melena.       Subjective:  Patient is being seen for CMP/CHF. There were no acute overnight cardiac events.   Today Mr. Solis c/o continue SOB but denies chest pain, palpitations, or dizziness        Baseline Weight: 315 previously   Wt Readings from Last 3 Encounters:   06/18/25 (!) 139.3 kg (307 lb 1.6 oz)   06/12/25 (!) 140.7 kg (310 lb 3.2 oz)   06/05/25 (!) 140.2 kg (309 lb)         Cardiac Testing:   ECHO 6/4/2025    Left Ventricle: Hyperdynamic left ventricular systolic function with a visually estimated EF of 65 - 70%. Grade II diastolic dysfunction with increased LAP.    Right Ventricle: Not well visualized. Right ventricle is mildly dilated. Lead present in the right ventricle. Mildly reduced systolic function.    Aortic Valve: Mechanical valve. AV mean gradient is 17 mmHg. Calcified cusps. Stenosis of the aortic valve. AV Mean Gradient is 17 mmHg. AV Peak Velocity is 2.8 m/s. AV Area by Peak Velocity is 1.8 cm2. LVOT:AV VTI Index is 0.50. LVOT Stroke Volume Index is 47.1 mL/m2.    Tricuspid Valve: Mild regurgitation.    Right Atrium: Right atrium is mildly dilated.    Aorta: The aortic root and ascending aorta are not well visualized    Device: Medtronic ICD with optivol   Optivol at Bedside:     NYHA Class III    Objective:   BP (!) 148/69   Pulse 78   Temp (!) 96 °F (35.6 °C) (Temporal)   Resp 20   Ht 1.854 m (6' 1\")   Wt (!) 139.3 kg (307 lb 1.6 oz)   SpO2 100%   BMI 40.52 kg/m²     Intake/Output Summary (Last 24 hours) at 6/18/2025 1032  Last data filed at 6/18/2025 0828  Gross per 24 hour   Intake 1909 ml   Output 2175 ml   Net -266 ml      In: 1909 [P.O.:1080; I.V.:200; Blood:629]  Out: 2575       Physical

## 2025-06-18 NOTE — PLAN OF CARE
Problem: Chronic Conditions and Co-morbidities  Goal: Patient's chronic conditions and co-morbidity symptoms are monitored and maintained or improved  Outcome: Progressing  Flowsheets (Taken 6/18/2025 0150)  Care Plan - Patient's Chronic Conditions and Co-Morbidity Symptoms are Monitored and Maintained or Improved: Monitor and assess patient's chronic conditions and comorbid symptoms for stability, deterioration, or improvement     Problem: Discharge Planning  Goal: Discharge to home or other facility with appropriate resources  Outcome: Progressing  Flowsheets (Taken 6/18/2025 0150)  Discharge to home or other facility with appropriate resources: Identify discharge learning needs (meds, wound care, etc)     Problem: Pain  Goal: Verbalizes/displays adequate comfort level or baseline comfort level  Outcome: Progressing  Flowsheets (Taken 6/18/2025 0150)  Verbalizes/displays adequate comfort level or baseline comfort level:   Encourage patient to monitor pain and request assistance   Assess pain using appropriate pain scale   Administer analgesics based on type and severity of pain and evaluate response   Implement non-pharmacological measures as appropriate and evaluate response     Problem: Safety - Adult  Goal: Free from fall injury  Outcome: Progressing     Problem: ABCDS Injury Assessment  Goal: Absence of physical injury  Outcome: Progressing  Flowsheets (Taken 6/18/2025 0150)  Absence of Physical Injury: Implement safety measures based on patient assessment      Pt would like the results of her Cat Scan from yesterday  sent to her via her [facility] she cannot see it .

## 2025-06-18 NOTE — PROGRESS NOTES
Hospitalist Progress Note    Name:  Valdemar Solis    /Age/Sex: 1950  (75 y.o. male)  MRN & CSN:  [id number] & [id number]    PCP: Yane Wei MD    Date of Admission: 2025    Patient Status:  Inpatient     Chief Complaint:   Chief Complaint   Patient presents with    Shortness of Breath     SOB and black stool x 1 week.        Hospital Course:   Patient admitted for melena and shortness of breath.  Found to be in hemorrhagic shock.  Started on pressors and PPI infusion.  GI consulted.  Given 2 units PRBC on .    Patient is chronically on warfarin outpatient for mechanical mitral valve.  Cardiology consulted to help manage anticoagulation.    S/p EGD on  showed oozing duodenal ulcer with an adherent clot, 2 clips were placed. Non-bleeding duodenal ulcer with a clean base noted and injected    Hemoglobin dropped again on .  Patient received 1 unit PRBC.    Subjective:  Today is:  Hospital Day: 3.  Patient seen and examined in ICU-3904/3904-01.     Lying in bed.  On BiPAP.  No shortness of breath.  No signs of bleeding.      Medications:  Reviewed    Infusion Medications    sodium chloride      sodium chloride      pantoprazole 8 mg/hr (25 1030)    sodium chloride      dextrose       Scheduled Medications    insulin lispro  0-8 Units SubCUTAneous 4x Daily AC & HS    albuterol  2.5 mg Nebulization BID RT    pantoprazole  40 mg IntraVENous BID    allopurinol  300 mg Oral Daily    [Held by provider] amLODIPine  5 mg Oral Daily    [Held by provider] aspirin  81 mg Oral Daily    budesonide-formoterol  2 puff Inhalation BID RT    And    tiotropium  2 puff Inhalation Daily RT    [Held by provider] empagliflozin  10 mg Oral Daily    [Held by provider] furosemide  40 mg Oral Daily    pravastatin  80 mg Oral Daily    [Held by provider] sacubitril-valsartan  1 tablet Oral BID    sotalol  80 mg Oral Q12H    [Held by provider] spironolactone  12.5 mg Oral Once per day on

## 2025-06-18 NOTE — CONSULTS
Nephrology Consult Note                                                                                                                                                                                                                                                                                                                                                               Office : [phone]     Fax :[phone]    Patient's Name: Valdemar Solis  6/18/2025    Reason for Consult:  CKD 3  Requesting Physician:  Yane Wei MD  Chief Complaint:    Chief Complaint   Patient presents with    Shortness of Breath     SOB and black stool x 1 week.        Assessment/Plan     # CKD 3b  - likely 2/2 NSAID use  - Baseline Cr ~ 1.7. Cr near baseline.   - high risk of CLAUDIO 2/2 hemodynamic changes   - Avoid nephrotoxins  - Monitor renal labs     #Hemorrhagic shock   #Acute Anemia  - s/p PRBCS x 4 units total   - EGD 06/17 with clips and epi   - path pending  - PPI BID  - GI on board     # CHF  - sotalol resumed  - aldactone, lasix, jardiance, entresto, amlodipine on hold d/t shock   - Baseline weight 310 pounds  - on warfarin for hx of mechanical mitral valve  - cards consulted     # COPD  - On chronic oxygen      #HTN  - s/p pressor support  - anti-hypertensive's on hold 2/2 shock  - Bps in better control  - on amlodipine, entresto, lasix, Sotalol, Farxiga at home  - monitor    History of Present Ilness:    Valdemar Solis is a 75 y.o. male with PMHx of CHF, COPD, HTN, CKD 3, who presented to the ER with complaints of shortness of breath and melena. In the ER, patient found to be in hemorrhagic shock. He was started on pressors and IV PPI infusion. He was given 2 units of PRBCs. He is admitted for further workup. We are consulted for CKD 3.       Interval hx     Past Medical History:   Diagnosis Date    Acute congestive heart failure (HCC) 12/30/2019    Allergic rhinitis 07/11/2016    Anticoagulant long-term use grant  261* 201* 123*     Ca/Mg/Phos:   Recent Labs     06/16/25 1910 06/17/25 0357 06/18/25  0410   CALCIUM 9.1 9.1 7.9*   PHOS  --   --  2.7     Hepatic:   Recent Labs     06/16/25 1910 06/17/25 0357   AST 11* 11*   ALT 15 16   BILITOT <0.2 0.4   ALKPHOS 50 52     Troponin: No results for input(s): \"TROPONINI\" in the last 72 hours.  BNP: No results for input(s): \"BNP\" in the last 72 hours.  Lipids: No results for input(s): \"CHOL\", \"TRIG\", \"HDL\" in the last 72 hours.    Invalid input(s): \"LDLCALC\", \"LABVLDL\"  ABGs:   Recent Labs     06/17/25  1147   PHART 7.408   PO2ART 128.8*   [id number] 30.3*     INR:   Recent Labs     06/16/25 1910 06/17/25 0357   INR 4.50* 2.17*     UA:No results for input(s): \"COLORU\", \"CLARITYU\", \"GLUCOSEU\", \"BILIRUBINUR\", \"KETUA\", \"SPECGRAV\", \"BLOODU\", \"PHUR\", \"PROTEINU\", \"UROBILINOGEN\", \"NITRU\", \"LEUKOCYTESUR\", \"URINETYPE\" in the last 72 hours.    Invalid input(s): \"LABMICR\"   Urine Microscopic: No results for input(s): \"LABCAST\", \"BACTERIA\", \"COMU\", \"HYALCAST\", \"WBCUA\", \"RBCUA\" in the last 72 hours.    Invalid input(s): \"EPIU\"  Urine Culture: No results for input(s): \"LABURIN\" in the last 72 hours.  Urine Chemistry: No results for input(s): \"CLUR\", \"LABCREA\", \"PROTEINUR\", \"NAUR\" in the last 72 hours.      IMAGING:  XR CHEST PORTABLE   Final Result   No acute process.         XR ABDOMEN (KUB) (SINGLE AP VIEW)   Final Result   Nonobstructive bowel gas pattern.         CTA ABDOMEN PELVIS W WO CONTRAST   Final Result   1. No extravasation of contrast in the bowel or stomach to suggest active GI   bleeding.   2. No acute findings in the abdomen or pelvis.   3. Moderate to advanced multifocal atheromatous plaque involving the   abdominal aorta and its branches without aneurysm or dissection.   4. Cholelithiasis.   5. Bilateral renal cortical thinning suggesting chronic medical renal   disease.  There also simple bilateral renal cysts.  No follow-up imaging is   recommended.   6. Colonic

## 2025-06-19 LAB
ALBUMIN SERPL-MCNC: 3.4 G/DL (ref 3.4–5)
ANION GAP SERPL CALCULATED.3IONS-SCNC: 11 MMOL/L (ref 3–16)
BASOPHILS # BLD: 0 K/UL (ref 0–0.2)
BASOPHILS NFR BLD: 0.2 %
BUN SERPL-MCNC: 31 MG/DL (ref 7–20)
CALCIUM SERPL-MCNC: 8.8 MG/DL (ref 8.3–10.6)
CHLORIDE SERPL-SCNC: 105 MMOL/L (ref 99–110)
CO2 SERPL-SCNC: 21 MMOL/L (ref 21–32)
CREAT SERPL-MCNC: 1.6 MG/DL (ref 0.8–1.3)
DEPRECATED RDW RBC AUTO: 15.4 % (ref 12.4–15.4)
EOSINOPHIL # BLD: 0.1 K/UL (ref 0–0.6)
EOSINOPHIL NFR BLD: 0.8 %
GFR SERPLBLD CREATININE-BSD FMLA CKD-EPI: 45 ML/MIN/{1.73_M2}
GLUCOSE BLD-MCNC: 148 MG/DL (ref 70–99)
GLUCOSE BLD-MCNC: 188 MG/DL (ref 70–99)
GLUCOSE BLD-MCNC: 193 MG/DL (ref 70–99)
GLUCOSE BLD-MCNC: 201 MG/DL (ref 70–99)
GLUCOSE SERPL-MCNC: 130 MG/DL (ref 70–99)
HCT VFR BLD AUTO: 27 % (ref 40.5–52.5)
HGB BLD-MCNC: 9.4 G/DL (ref 13.5–17.5)
INR PPP: 1.19 (ref 0.86–1.14)
LYMPHOCYTES # BLD: 0.7 K/UL (ref 1–5.1)
LYMPHOCYTES NFR BLD: 10.7 %
MCH RBC QN AUTO: 31.1 PG (ref 26–34)
MCHC RBC AUTO-ENTMCNC: 34.7 G/DL (ref 31–36)
MCV RBC AUTO: 89.9 FL (ref 80–100)
MONOCYTES # BLD: 0.6 K/UL (ref 0–1.3)
MONOCYTES NFR BLD: 8.8 %
NEUTROPHILS # BLD: 5.3 K/UL (ref 1.7–7.7)
NEUTROPHILS NFR BLD: 79.5 %
PERFORMED ON: ABNORMAL
PHOSPHATE SERPL-MCNC: 2.8 MG/DL (ref 2.5–4.9)
PLATELET # BLD AUTO: 132 K/UL (ref 135–450)
PMV BLD AUTO: 8.8 FL (ref 5–10.5)
POTASSIUM SERPL-SCNC: 4 MMOL/L (ref 3.5–5.1)
PROTHROMBIN TIME: 15.4 SEC (ref 12.1–14.9)
RBC # BLD AUTO: 3.01 M/UL (ref 4.2–5.9)
SODIUM SERPL-SCNC: 137 MMOL/L (ref 136–145)
WBC # BLD AUTO: 6.6 K/UL (ref 4–11)

## 2025-06-19 PROCEDURE — 94761 N-INVAS EAR/PLS OXIMETRY MLT: CPT

## 2025-06-19 PROCEDURE — 85025 COMPLETE CBC W/AUTO DIFF WBC: CPT

## 2025-06-19 PROCEDURE — 80069 RENAL FUNCTION PANEL: CPT

## 2025-06-19 PROCEDURE — 99232 SBSQ HOSP IP/OBS MODERATE 35: CPT | Performed by: NURSE PRACTITIONER

## 2025-06-19 PROCEDURE — 6370000000 HC RX 637 (ALT 250 FOR IP): Performed by: NURSE PRACTITIONER

## 2025-06-19 PROCEDURE — 94660 CPAP INITIATION&MGMT: CPT

## 2025-06-19 PROCEDURE — 36415 COLL VENOUS BLD VENIPUNCTURE: CPT

## 2025-06-19 PROCEDURE — 6370000000 HC RX 637 (ALT 250 FOR IP): Performed by: STUDENT IN AN ORGANIZED HEALTH CARE EDUCATION/TRAINING PROGRAM

## 2025-06-19 PROCEDURE — 1200000000 HC SEMI PRIVATE

## 2025-06-19 PROCEDURE — 6370000000 HC RX 637 (ALT 250 FOR IP): Performed by: INTERNAL MEDICINE

## 2025-06-19 PROCEDURE — 76937 US GUIDE VASCULAR ACCESS: CPT

## 2025-06-19 PROCEDURE — 94640 AIRWAY INHALATION TREATMENT: CPT

## 2025-06-19 PROCEDURE — 6360000002 HC RX W HCPCS: Performed by: INTERNAL MEDICINE

## 2025-06-19 PROCEDURE — 2700000000 HC OXYGEN THERAPY PER DAY

## 2025-06-19 PROCEDURE — 85610 PROTHROMBIN TIME: CPT

## 2025-06-19 RX ORDER — WARFARIN SODIUM 5 MG/1
10 TABLET ORAL DAILY
Status: DISCONTINUED | OUTPATIENT
Start: 2025-06-20 | End: 2025-06-21 | Stop reason: HOSPADM

## 2025-06-19 RX ORDER — WARFARIN SODIUM 7.5 MG/1
15 TABLET ORAL
Status: COMPLETED | OUTPATIENT
Start: 2025-06-19 | End: 2025-06-19

## 2025-06-19 RX ADMIN — TIOTROPIUM BROMIDE INHALATION SPRAY 5 MCG: 3.12 SPRAY, METERED RESPIRATORY (INHALATION) at 10:56

## 2025-06-19 RX ADMIN — Medication 2 PUFF: at 10:56

## 2025-06-19 RX ADMIN — PRAVASTATIN SODIUM 80 MG: 40 TABLET ORAL at 08:14

## 2025-06-19 RX ADMIN — SOTALOL HYDROCHLORIDE 80 MG: 80 TABLET ORAL at 08:15

## 2025-06-19 RX ADMIN — PANTOPRAZOLE SODIUM 40 MG: 40 INJECTION, POWDER, FOR SOLUTION INTRAVENOUS at 21:06

## 2025-06-19 RX ADMIN — Medication 2 PUFF: at 20:02

## 2025-06-19 RX ADMIN — SACUBITRIL AND VALSARTAN 1 TABLET: 24; 26 TABLET, FILM COATED ORAL at 15:12

## 2025-06-19 RX ADMIN — INSULIN LISPRO 2 UNITS: 100 INJECTION, SOLUTION INTRAVENOUS; SUBCUTANEOUS at 17:27

## 2025-06-19 RX ADMIN — ALBUTEROL SULFATE 2.5 MG: 2.5 SOLUTION RESPIRATORY (INHALATION) at 20:02

## 2025-06-19 RX ADMIN — INSULIN LISPRO 2 UNITS: 100 INJECTION, SOLUTION INTRAVENOUS; SUBCUTANEOUS at 21:07

## 2025-06-19 RX ADMIN — INSULIN LISPRO 2 UNITS: 100 INJECTION, SOLUTION INTRAVENOUS; SUBCUTANEOUS at 12:01

## 2025-06-19 RX ADMIN — SACUBITRIL AND VALSARTAN 1 TABLET: 24; 26 TABLET, FILM COATED ORAL at 21:07

## 2025-06-19 RX ADMIN — WARFARIN SODIUM 15 MG: 7.5 TABLET ORAL at 17:30

## 2025-06-19 RX ADMIN — SOTALOL HYDROCHLORIDE 80 MG: 80 TABLET ORAL at 21:15

## 2025-06-19 RX ADMIN — PANTOPRAZOLE SODIUM 40 MG: 40 INJECTION, POWDER, FOR SOLUTION INTRAVENOUS at 08:14

## 2025-06-19 RX ADMIN — ALBUTEROL SULFATE 2.5 MG: 2.5 SOLUTION RESPIRATORY (INHALATION) at 10:55

## 2025-06-19 RX ADMIN — ALLOPURINOL 300 MG: 100 TABLET ORAL at 08:14

## 2025-06-19 ASSESSMENT — PAIN SCALES - GENERAL
PAINLEVEL_OUTOF10: 0

## 2025-06-19 NOTE — PROGRESS NOTES
Pharmacy to Dose Warfarin    Pharmacy consulted to dose warfarin for Afib, AVR.    INR Goal: 2-3    INR today: 1.19  *received 10 mg vitamin K 6/16, admission INR 4.5  *warfarin held 6/16-6/18 due to GI Bleed from ulcers    Home dose: 10 mg daily -- pt has been very stable on this dose    Assessment/Plan:  - INR subtherapeutic   - give warfarin 15 mg x1 tonight, resume home regimen tomorrow of 10 mg daily  - bridge discussed with provider, would like to hold off for now given bleed  - Possible concomitant drug-drug interactions include: allopurinol, pravastatin, aspirin    Pharmacy will continue to follow.    Elisha Torre, PharmD, BCPS  [id number]  6/19/2025 10:25 AM

## 2025-06-19 NOTE — PROGRESS NOTES
Spiritual Health History and Assessment/Progress Note  [facility]    (P) Spiritual/Emotional Needs,  ,  ,      Name: Valdemar Solis MRN: [id number]    Age: 75 y.o.     Sex: male   Language: English   Muslim: Jain   GI bleed     Date: 6/19/2025            Total Time Calculated: (P) 30 min              Spiritual Assessment began in [facility] ICU        Referral/Consult From: (P) Rounding   Encounter Overview/Reason: (P) Spiritual/Emotional Needs  Service Provided For: (P) Patient    Yanni, Belief, Meaning:   Patient identifies as spiritual, is connected with a yanni tradition or spiritual practice, and has beliefs or practices that help with coping during difficult times  Family/Friends No family/friends present      Importance and Influence:  Patient has spiritual/personal beliefs that influence decisions regarding their health  Family/Friends No family/friends present    Community:  Patient is connected with a spiritual community and feels well-supported. Support system includes: Children and Extended family  Family/Friends No family/friends present    Assessment and Plan of Care:     Patient Interventions include: Facilitated expression of thoughts and feelings, Explored spiritual coping/struggle/distress, Affirmed coping skills/support systems, and Facilitated life review and/ or legacy  Family/Friends Interventions include: No family/friends present    Patient Plan of Care: No spiritual needs identified for follow-up and Spiritual Care available upon further referral  Family/Friends Plan of Care: No family/friends present    Electronically signed by Chaplain Angelita on 6/19/2025 at 11:08 AM

## 2025-06-19 NOTE — PROGRESS NOTES
[facility]  HEART FAILURE  Progress Note      Admit Date 6/16/2025     Reason for Consult:      Reason for Consultation/Chief Complaint: SOB    HPI:    Valdemar Solis is a 75 y.o. male with PMH mechanical AVR3, hfpEF, AF/SSS s/p PPM, COPD, CKD DM, admitted with SOB and melena.       Subjective:  Patient is being seen for CMP/CHF. There were no acute overnight cardiac events.   Today Mr. Solis is feeling much better, less SOB and  denies chest pain, palpitations, or dizziness        Baseline Weight: 315 previously   Wt Readings from Last 3 Encounters:   06/19/25 (!) 136.3 kg (300 lb 6.4 oz)   06/12/25 (!) 140.7 kg (310 lb 3.2 oz)   06/05/25 (!) 140.2 kg (309 lb)         Cardiac Testing:   ECHO 6/4/2025    Left Ventricle: Hyperdynamic left ventricular systolic function with a visually estimated EF of 65 - 70%. Grade II diastolic dysfunction with increased LAP.    Right Ventricle: Not well visualized. Right ventricle is mildly dilated. Lead present in the right ventricle. Mildly reduced systolic function.    Aortic Valve: Mechanical valve. AV mean gradient is 17 mmHg. Calcified cusps. Stenosis of the aortic valve. AV Mean Gradient is 17 mmHg. AV Peak Velocity is 2.8 m/s. AV Area by Peak Velocity is 1.8 cm2. LVOT:AV VTI Index is 0.50. LVOT Stroke Volume Index is 47.1 mL/m2.    Tricuspid Valve: Mild regurgitation.    Right Atrium: Right atrium is mildly dilated.    Aorta: The aortic root and ascending aorta are not well visualized    Device: Medtronic ICD with optivol   Optivol at Bedside: over baseline today    NYHA Class III    Objective:   /68   Pulse 87   Temp 97 °F (36.1 °C) (Temporal)   Resp 18   Ht 1.854 m (6' 1\")   Wt (!) 136.3 kg (300 lb 6.4 oz)   SpO2 100%   BMI 39.63 kg/m²   No intake or output data in the 24 hours ending 06/19/25 0946     In: 329 [Blood:329]  Out: 200       Physical Exam:  General Appearance:  Well Nourished  Respiratory:  Resp Auscultation: Normal breath sounds  without dullness  Cardiovascular:  Auscultation: Regular rate and rhythm, normal S1S2, no m/g/r/c  Palpation: Normal    Pedal Pulses: 2+ and equal   Abdomen:  Soft, NT, ND, + bs  Extremities:  No Cyanosis or Clubbing  Extremities: negative  Neurological/Psychiatric:  Oriented to time, place, and person  Non-anxious    Pertinent labs, diagnostic, device, and imaging results reviewed as a part of this visit    MEDICATIONS:   Scheduled Meds:   Scheduled Meds:   insulin lispro  0-8 Units SubCUTAneous 4x Daily AC & HS    albuterol  2.5 mg Nebulization BID RT    pantoprazole  40 mg IntraVENous BID    allopurinol  300 mg Oral Daily    [Held by provider] amLODIPine  5 mg Oral Daily    [Held by provider] aspirin  81 mg Oral Daily    budesonide-formoterol  2 puff Inhalation BID RT    And    tiotropium  2 puff Inhalation Daily RT    [Held by provider] empagliflozin  10 mg Oral Daily    [Held by provider] furosemide  40 mg Oral Daily    pravastatin  80 mg Oral Daily    [Held by provider] sacubitril-valsartan  1 tablet Oral BID    sotalol  80 mg Oral Q12H    [Held by provider] spironolactone  12.5 mg Oral Once per day on Monday Wednesday Friday     Continuous Infusions:   sodium chloride      sodium chloride      sodium chloride      dextrose       PRN Meds:.sodium chloride, HYDROmorphone, sodium chloride, metoclopramide, sodium chloride, albuterol, albuterol sulfate HFA, traZODone, dextrose bolus **OR** dextrose bolus, glucagon (rDNA), dextrose  Continuous Infusions:   sodium chloride      sodium chloride      sodium chloride      dextrose       No intake or output data in the 24 hours ending 06/19/25 0946      Lab Data:  CBC:   Lab Results   Component Value Date/Time    WBC 6.6 06/19/2025 04:23 AM    HGB 9.4 06/19/2025 04:23 AM     06/19/2025 04:23 AM     BMP:  Lab Results   Component Value Date/Time     06/19/2025 04:23 AM    K 4.0 06/19/2025 04:23 AM    K 4.2 06/17/2025 03:57 AM     06/19/2025 04:23 AM

## 2025-06-19 NOTE — PROGRESS NOTES
82* 51* 31*   CREATININE 1.8* 1.5* 1.6*   GLUCOSE 201* 123* 130*     Ca/Mg/Phos:   Recent Labs     06/17/25  0357 06/18/25  0410 06/19/25  0423   CALCIUM 9.1 7.9* 8.8   PHOS  --  2.7 2.8     Hepatic:   Recent Labs     06/16/25  1910 06/17/25  0357   AST 11* 11*   ALT 15 16   BILITOT <0.2 0.4   ALKPHOS 50 52     Troponin: No results for input(s): \"TROPONINI\" in the last 72 hours.  BNP: No results for input(s): \"BNP\" in the last 72 hours.  Lipids: No results for input(s): \"CHOL\", \"TRIG\", \"HDL\" in the last 72 hours.    Invalid input(s): \"LDLCALC\", \"LABVLDL\"  ABGs:   Recent Labs     06/17/25  1147   PHART 7.408   PO2ART 128.8*   [id number] 30.3*     INR:   Recent Labs     06/17/25  0357 06/18/25  1100 06/19/25  0423   INR 2.17* 1.24* 1.19*     UA:No results for input(s): \"COLORU\", \"CLARITYU\", \"GLUCOSEU\", \"BILIRUBINUR\", \"KETUA\", \"SPECGRAV\", \"BLOODU\", \"PHUR\", \"PROTEINU\", \"UROBILINOGEN\", \"NITRU\", \"LEUKOCYTESUR\", \"URINETYPE\" in the last 72 hours.    Invalid input(s): \"LABMICR\"   Urine Microscopic: No results for input(s): \"LABCAST\", \"BACTERIA\", \"COMU\", \"HYALCAST\", \"WBCUA\", \"RBCUA\" in the last 72 hours.    Invalid input(s): \"EPIU\"  Urine Culture: No results for input(s): \"LABURIN\" in the last 72 hours.  Urine Chemistry: No results for input(s): \"CLUR\", \"LABCREA\", \"PROTEINUR\", \"NAUR\" in the last 72 hours.      IMAGING:  XR CHEST PORTABLE   Final Result   No acute process.         XR ABDOMEN (KUB) (SINGLE AP VIEW)   Final Result   Nonobstructive bowel gas pattern.         CTA ABDOMEN PELVIS W WO CONTRAST   Final Result   1. No extravasation of contrast in the bowel or stomach to suggest active GI   bleeding.   2. No acute findings in the abdomen or pelvis.   3. Moderate to advanced multifocal atheromatous plaque involving the   abdominal aorta and its branches without aneurysm or dissection.   4. Cholelithiasis.   5. Bilateral renal cortical thinning suggesting chronic medical renal   disease.  There also simple bilateral renal  cysts.  No follow-up imaging is   recommended.   6. Colonic diverticulosis.   7. Mild prostatomegaly.         XR CHEST PORTABLE   Final Result   No acute process.      Stable cardiomegaly               Medical Decision Making:  The following items were considered in medical decision making:  Discussion of patient care with other providers  Reviewed clinical lab tests  Reviewed radiology tests  Reviewed other diagnostic tests/interventions    Will be discussed w/  Primary team     Thank you for allowing us to participate in care of Valdemar Solis   Feel free to contact me,     Carlo Ennis MD   [facility]  Office : [phone] or through [facility]  Fax :[phone]

## 2025-06-19 NOTE — PLAN OF CARE
Problem: Chronic Conditions and Co-morbidities  Goal: Patient's chronic conditions and co-morbidity symptoms are monitored and maintained or improved  Outcome: Progressing  Flowsheets (Taken 6/18/2025 0150)  Care Plan - Patient's Chronic Conditions and Co-Morbidity Symptoms are Monitored and Maintained or Improved: Monitor and assess patient's chronic conditions and comorbid symptoms for stability, deterioration, or improvement     Problem: Discharge Planning  Goal: Discharge to home or other facility with appropriate resources  Outcome: Progressing  Flowsheets (Taken 6/18/2025 2140)  Discharge to home or other facility with appropriate resources: Identify discharge learning needs (meds, wound care, etc)     Problem: Pain  Goal: Verbalizes/displays adequate comfort level or baseline comfort level  Outcome: Progressing  Flowsheets (Taken 6/18/2025 2140)  Verbalizes/displays adequate comfort level or baseline comfort level:   Encourage patient to monitor pain and request assistance   Assess pain using appropriate pain scale   Administer analgesics based on type and severity of pain and evaluate response   Implement non-pharmacological measures as appropriate and evaluate response     Problem: Safety - Adult  Goal: Free from fall injury  Outcome: Progressing     Problem: ABCDS Injury Assessment  Goal: Absence of physical injury  Outcome: Progressing  Flowsheets (Taken 6/18/2025 0150)  Absence of Physical Injury: Implement safety measures based on patient assessment

## 2025-06-19 NOTE — PROGRESS NOTES
Hospitalist Progress Note    Name:  Valdemar Solis    /Age/Sex: 1950  (75 y.o. male)  MRN & CSN:  [id number] & [id number]    PCP: Yane Wei MD    Date of Admission: 2025    Patient Status:  Inpatient     Chief Complaint:   Chief Complaint   Patient presents with    Shortness of Breath     SOB and black stool x 1 week.        Hospital Course:   Patient admitted for melena and shortness of breath.  Found to be in hemorrhagic shock.  Started on pressors and PPI infusion.  GI consulted.  Given 2 units PRBC on .    Patient is chronically on warfarin outpatient for mechanical mitral valve.  Cardiology consulted to help manage anticoagulation.    S/p EGD on  showed oozing duodenal ulcer with an adherent clot, 2 clips were placed. Non-bleeding duodenal ulcer with a clean base noted and injected    Hemoglobin dropped again on .  Patient received 1 unit PRBC.    Hgb stable on . Warfarin restarted. Diet increased to full diet.    Subjective:  Today is:  Hospital Day: 4.  Patient seen and examined in ICU-3904/3904-01.     Sitting up. Eating breakfast. No CP or SOB.      Medications:  Reviewed    Infusion Medications    sodium chloride      sodium chloride      sodium chloride      dextrose       Scheduled Medications    insulin lispro  0-8 Units SubCUTAneous 4x Daily AC & HS    albuterol  2.5 mg Nebulization BID RT    pantoprazole  40 mg IntraVENous BID    allopurinol  300 mg Oral Daily    [Held by provider] amLODIPine  5 mg Oral Daily    [Held by provider] aspirin  81 mg Oral Daily    budesonide-formoterol  2 puff Inhalation BID RT    And    tiotropium  2 puff Inhalation Daily RT    [Held by provider] empagliflozin  10 mg Oral Daily    [Held by provider] furosemide  40 mg Oral Daily    pravastatin  80 mg Oral Daily    [Held by provider] sacubitril-valsartan  1 tablet Oral BID    sotalol  80 mg Oral Q12H    [Held by provider] spironolactone  12.5 mg Oral Once per day on   significance   [] Appropriate follow-up labs were ordered  ----------------------------------------------------------------------    D. Time  [] >25 min (low)  [] >35 min (mod)  [] >50 min (high)  [] Critical care time (>30 min)  ----------------------------------------------------------------------      Active Hospital Problems    Diagnosis     Chronic atrial fibrillation ([facility]) [I48.20]      Priority: Medium    Paroxysmal atrial fibrillation ([facility]) [I48.0]      Priority: Medium    Chronic diastolic heart failure ([facility]) [I50.32]      Priority: Medium    HFrEF (heart failure with reduced ejection fraction) ([facility]) [I50.20]      Priority: Medium    Hemorrhagic shock ([facility]) [R57.8]     Acute blood loss anemia [D62]     GI bleed [K92.2]     Stage 3a chronic kidney disease ([facility]) [N18.31]     COPD (chronic obstructive pulmonary disease) ([facility]) [J44.9]     Type 2 diabetes mellitus, without long-term current use of insulin ([facility]) [E11.9]     Hyperlipidemia associated with type 2 diabetes mellitus ([facility]) [E11.69, E78.5]     Primary hypertension [I10]          Hospital Day: 4    This is a 75 y.o. male who presented to [facility] on 6/16/2025 and is being treated for:    ABLA - stable  GI Bleed  -Hgb 7.9 on admission  -Has received, in total, 4 units PRBC since admission  -Trend H/H  -Transfuse to keep hemoglobin >7  -Stop PPI drip; start PPI BID IV for 2 more days and then switch to PPI PO for 8 weeks on DC  -s/p EGD on 6/17 showed oozing duodenal ulcer with an adherent clot, 2 clips were placed; nonbleeding duodenal ulcer with a clean base noted and injected  -GI consulted    Hypovolemic shock - resolved  -2/2 above  - Off pressors; monitor BP  -Daily electrolyte panel and CBC ordered and monitoring; replace electrolytes as needed     Chronic A-fib  -On warfarin outpatient; restarted on 6/19  - Cardiology consulted for anticoagulation guidance    CKD 3  -Baseline Cr 1.5-1.9    HFrEF  -Not in acute exacerbation  -Continue

## 2025-06-19 NOTE — CARE COORDINATION
CM reviewed chart for discharge planning.    Per MD note will be restarting Coumadin and monitoring Hgb.    No therapy ordered at this time.    CM will continue to follow for discharge plan and needs.    Beatriz Newby RN, BSN  [phone]

## 2025-06-20 LAB
ALBUMIN SERPL-MCNC: 3.3 G/DL (ref 3.4–5)
ANION GAP SERPL CALCULATED.3IONS-SCNC: 9 MMOL/L (ref 3–16)
ANISOCYTOSIS BLD QL SMEAR: ABNORMAL
BASOPHILS # BLD: 0 K/UL (ref 0–0.2)
BASOPHILS NFR BLD: 0 %
BLOOD BANK DISPENSE STATUS: NORMAL
BLOOD BANK PRODUCT CODE: NORMAL
BPU ID: NORMAL
BUN SERPL-MCNC: 25 MG/DL (ref 7–20)
CALCIUM SERPL-MCNC: 8.8 MG/DL (ref 8.3–10.6)
CHLORIDE SERPL-SCNC: 107 MMOL/L (ref 99–110)
CO2 SERPL-SCNC: 22 MMOL/L (ref 21–32)
CREAT SERPL-MCNC: 1.5 MG/DL (ref 0.8–1.3)
DEPRECATED RDW RBC AUTO: 14.8 % (ref 12.4–15.4)
DESCRIPTION BLOOD BANK: NORMAL
EOSINOPHIL # BLD: 0.2 K/UL (ref 0–0.6)
EOSINOPHIL NFR BLD: 3 %
GFR SERPLBLD CREATININE-BSD FMLA CKD-EPI: 49 ML/MIN/{1.73_M2}
GLUCOSE BLD-MCNC: 149 MG/DL (ref 70–99)
GLUCOSE BLD-MCNC: 163 MG/DL (ref 70–99)
GLUCOSE BLD-MCNC: 182 MG/DL (ref 70–99)
GLUCOSE BLD-MCNC: 209 MG/DL (ref 70–99)
GLUCOSE SERPL-MCNC: 167 MG/DL (ref 70–99)
HCT VFR BLD AUTO: 26.4 % (ref 40.5–52.5)
HGB BLD-MCNC: 8.8 G/DL (ref 13.5–17.5)
INR PPP: 1.13 (ref 0.86–1.14)
LYMPHOCYTES # BLD: 1 K/UL (ref 1–5.1)
LYMPHOCYTES NFR BLD: 15 %
MACROCYTES BLD QL SMEAR: ABNORMAL
MCH RBC QN AUTO: 30.8 PG (ref 26–34)
MCHC RBC AUTO-ENTMCNC: 33.3 G/DL (ref 31–36)
MCV RBC AUTO: 92.6 FL (ref 80–100)
MONOCYTES # BLD: 0.3 K/UL (ref 0–1.3)
MONOCYTES NFR BLD: 5 %
NEUTROPHILS # BLD: 4.9 K/UL (ref 1.7–7.7)
NEUTROPHILS NFR BLD: 77 %
NT-PROBNP SERPL-MCNC: 810 PG/ML (ref 0–449)
PERFORMED ON: ABNORMAL
PHOSPHATE SERPL-MCNC: 2.5 MG/DL (ref 2.5–4.9)
PLATELET # BLD AUTO: 126 K/UL (ref 135–450)
PLATELET BLD QL SMEAR: ABNORMAL
PMV BLD AUTO: 8.4 FL (ref 5–10.5)
POLYCHROMASIA BLD QL SMEAR: ABNORMAL
POTASSIUM SERPL-SCNC: 4.4 MMOL/L (ref 3.5–5.1)
PROTHROMBIN TIME: 14.8 SEC (ref 12.1–14.9)
RBC # BLD AUTO: 2.85 M/UL (ref 4.2–5.9)
SLIDE REVIEW: ABNORMAL
SODIUM SERPL-SCNC: 138 MMOL/L (ref 136–145)
WBC # BLD AUTO: 6.4 K/UL (ref 4–11)

## 2025-06-20 PROCEDURE — 94640 AIRWAY INHALATION TREATMENT: CPT

## 2025-06-20 PROCEDURE — 6370000000 HC RX 637 (ALT 250 FOR IP): Performed by: STUDENT IN AN ORGANIZED HEALTH CARE EDUCATION/TRAINING PROGRAM

## 2025-06-20 PROCEDURE — 80069 RENAL FUNCTION PANEL: CPT

## 2025-06-20 PROCEDURE — 94761 N-INVAS EAR/PLS OXIMETRY MLT: CPT

## 2025-06-20 PROCEDURE — 6370000000 HC RX 637 (ALT 250 FOR IP): Performed by: INTERNAL MEDICINE

## 2025-06-20 PROCEDURE — 6360000002 HC RX W HCPCS: Performed by: INTERNAL MEDICINE

## 2025-06-20 PROCEDURE — 2700000000 HC OXYGEN THERAPY PER DAY

## 2025-06-20 PROCEDURE — 94660 CPAP INITIATION&MGMT: CPT

## 2025-06-20 PROCEDURE — 36415 COLL VENOUS BLD VENIPUNCTURE: CPT

## 2025-06-20 PROCEDURE — 85610 PROTHROMBIN TIME: CPT

## 2025-06-20 PROCEDURE — 85025 COMPLETE CBC W/AUTO DIFF WBC: CPT

## 2025-06-20 PROCEDURE — 1200000000 HC SEMI PRIVATE

## 2025-06-20 PROCEDURE — 99232 SBSQ HOSP IP/OBS MODERATE 35: CPT | Performed by: NURSE PRACTITIONER

## 2025-06-20 PROCEDURE — 83880 ASSAY OF NATRIURETIC PEPTIDE: CPT

## 2025-06-20 PROCEDURE — 6370000000 HC RX 637 (ALT 250 FOR IP): Performed by: NURSE PRACTITIONER

## 2025-06-20 RX ORDER — PANTOPRAZOLE SODIUM 40 MG/1
40 TABLET, DELAYED RELEASE ORAL
Status: DISCONTINUED | OUTPATIENT
Start: 2025-06-20 | End: 2025-06-21 | Stop reason: HOSPADM

## 2025-06-20 RX ORDER — BUTALBITAL, ACETAMINOPHEN AND CAFFEINE 50; 325; 40 MG/1; MG/1; MG/1
1 TABLET ORAL EVERY 6 HOURS PRN
Status: DISCONTINUED | OUTPATIENT
Start: 2025-06-20 | End: 2025-06-21 | Stop reason: HOSPADM

## 2025-06-20 RX ORDER — SPIRONOLACTONE 25 MG/1
12.5 TABLET ORAL
Status: DISCONTINUED | OUTPATIENT
Start: 2025-06-23 | End: 2025-06-21 | Stop reason: HOSPADM

## 2025-06-20 RX ADMIN — INSULIN LISPRO 2 UNITS: 100 INJECTION, SOLUTION INTRAVENOUS; SUBCUTANEOUS at 11:06

## 2025-06-20 RX ADMIN — TIOTROPIUM BROMIDE INHALATION SPRAY 5 MCG: 3.12 SPRAY, METERED RESPIRATORY (INHALATION) at 10:36

## 2025-06-20 RX ADMIN — WARFARIN SODIUM 10 MG: 5 TABLET ORAL at 17:40

## 2025-06-20 RX ADMIN — ALBUTEROL SULFATE 2.5 MG: 2.5 SOLUTION RESPIRATORY (INHALATION) at 10:36

## 2025-06-20 RX ADMIN — PANTOPRAZOLE SODIUM 40 MG: 40 TABLET, DELAYED RELEASE ORAL at 09:02

## 2025-06-20 RX ADMIN — SACUBITRIL AND VALSARTAN 1 TABLET: 24; 26 TABLET, FILM COATED ORAL at 09:01

## 2025-06-20 RX ADMIN — ALBUTEROL SULFATE 2.5 MG: 2.5 SOLUTION RESPIRATORY (INHALATION) at 20:43

## 2025-06-20 RX ADMIN — SOTALOL HYDROCHLORIDE 80 MG: 80 TABLET ORAL at 21:18

## 2025-06-20 RX ADMIN — FUROSEMIDE 40 MG: 40 TABLET ORAL at 09:02

## 2025-06-20 RX ADMIN — Medication 2 PUFF: at 20:43

## 2025-06-20 RX ADMIN — Medication 2 PUFF: at 10:36

## 2025-06-20 RX ADMIN — SACUBITRIL AND VALSARTAN 1 TABLET: 24; 26 TABLET, FILM COATED ORAL at 21:21

## 2025-06-20 RX ADMIN — SOTALOL HYDROCHLORIDE 80 MG: 80 TABLET ORAL at 09:01

## 2025-06-20 RX ADMIN — EMPAGLIFLOZIN 10 MG: 10 TABLET, FILM COATED ORAL at 09:01

## 2025-06-20 RX ADMIN — ALLOPURINOL 300 MG: 100 TABLET ORAL at 09:02

## 2025-06-20 RX ADMIN — BUTALBITAL, ACETAMINOPHEN, AND CAFFEINE 1 TABLET: 325; 50; 40 TABLET ORAL at 16:36

## 2025-06-20 RX ADMIN — PANTOPRAZOLE SODIUM 40 MG: 40 TABLET, DELAYED RELEASE ORAL at 16:37

## 2025-06-20 RX ADMIN — INSULIN LISPRO 2 UNITS: 100 INJECTION, SOLUTION INTRAVENOUS; SUBCUTANEOUS at 21:21

## 2025-06-20 RX ADMIN — PRAVASTATIN SODIUM 80 MG: 40 TABLET ORAL at 09:02

## 2025-06-20 ASSESSMENT — PAIN SCALES - GENERAL
PAINLEVEL_OUTOF10: 8
PAINLEVEL_OUTOF10: 3

## 2025-06-20 ASSESSMENT — PAIN DESCRIPTION - ORIENTATION: ORIENTATION: LOWER;POSTERIOR

## 2025-06-20 ASSESSMENT — PAIN DESCRIPTION - LOCATION: LOCATION: HEAD

## 2025-06-20 ASSESSMENT — PAIN DESCRIPTION - DESCRIPTORS: DESCRIPTORS: ACHING

## 2025-06-20 NOTE — PROGRESS NOTES
06/20/25 0047   RT Protocol   History Pulmonary Disease 2   Respiratory pattern 0   Breath sounds 2   Cough 0   Indications for Bronchodilator Therapy On home bronchodilators;Decreased or absent breath sounds   Bronchodilator Assessment Score 4

## 2025-06-20 NOTE — PROGRESS NOTES
Pharmacy to Dose Warfarin    Pharmacy consulted to dose warfarin for Afib, AVR.    INR Goal: 2-3    INR today: 1.19  *received 10 mg vitamin K 6/16, admission INR 4.5  *warfarin held 6/16-6/18 due to GI Bleed from ulcers    Home dose: 10 mg daily -- pt has been very stable on this dose    Assessment/Plan:  - INR subtherapeutic as expected  - resume home regimen, warfarin 10 mg tonight  - bridge discussed with provider, would like to hold off for now given bleed  - Possible concomitant drug-drug interactions include: allopurinol, pravastatin, aspirin    Pharmacy will continue to follow.    Elisha Torre, PharmD, [facility]  [id number]  6/20/2025 8:14 AM

## 2025-06-20 NOTE — PROGRESS NOTES
Nephrology Progress Note                                                                                                                                                                                                                                                                                                                                                               Office : [phone]     Fax :[phone]    Patient's Name: Valdemar Solis  6/20/2025    Reason for Consult:  CKD 3  Requesting Physician:  Yane Wei MD  Chief Complaint:    Chief Complaint   Patient presents with    Shortness of Breath     SOB and black stool x 1 week.        Assessment/Plan     # CKD 3b  - likely 2/2 NSAID use  - Baseline Cr ~ 1.7. Cr near baseline.   - Avoid nephrotoxins  - Monitor renal labs     #Hemorrhagic shock   #Acute Anemia  - s/p PRBCS x 4 units total   - EGD 06/17 with evidence of oozing duodenal ulcer, s/p clips and epi   - path pending  - PPI BID  - GI on board     # CHF  - home meds resumed  - Baseline weight 310 pounds  - on warfarin for hx of mechanical mitral valve  - cards following     # COPD  - On chronic oxygen      #HTN  - s/p pressor support  - Bps soft but stable. CCB remains held  - monitor    #A-fib  - resumed warfarin    History of Present Ilness:    Valdemar Solis is a 75 y.o. male with PMHx of CHF, COPD, HTN, CKD 3, who presented to the ER with complaints of shortness of breath and melena. In the ER, patient found to be in hemorrhagic shock. He was started on pressors and IV PPI infusion. He was given 2 units of PRBCs. He is admitted for further workup. We are consulted for CKD 3.       Interval hx     Cr, lytes stable  Hgb stable  Bps soft but stable      Past Medical History:   Diagnosis Date    Acute congestive heart failure (HCC) 12/30/2019    Allergic rhinitis 07/11/2016    Anticoagulant long-term use warrefen    Atrial fibrillation (HCC)     under care of cardiology:Dr. Padilla  Behrens([facility])    CKD (chronic kidney disease)     Nephro:Dr. Parker:stage 3    Class 2 obesity due to excess calories without serious comorbidity with body mass index (BMI) of 37.0 to 37.9 in adult 11/07/2017    Controlled type 2 diabetes mellitus without complication, without long-term current use of insulin ([facility]) 02/24/2017    COPD     COPD (chronic obstructive pulmonary disease) ([facility]) 11/05/2018    Erectile dysfunction     Essential tremor     Gout     Hyperlipidemia, mixed 07/11/2016    Hypertension     under care of cardiology:Dr. Scott Behrens([facility])    Long term current use of anticoagulants with INR goal of 2.0-3.0     under care of cardiology:Dr. Scott Behrens([facility])    Long term current use of anticoagulants with INR goal of 2.0-3.0 07/11/2016    Obstructive sleep apnea syndrome 06/18/2019    per pulmo    Primary insomnia 01/25/2017    Primary osteoarthritis of both knees 09/14/2021    Sleep apnea     uses bipap       Past Surgical History:   Procedure Laterality Date    AORTIC VALVE REPLACEMENT  10/1996:[facility]    x3    ATRIAL ABLATION SURGERY  03/23/2020    CTI dependent atrial flutter ablation (Dr. Raza)    CARDIAC PACEMAKER PLACEMENT  03/23/2020    BIV upgrade    CARDIAC VALVE REPLACEMENT  AORTIC    KNEE ARTHROCENTESIS Right 04/06/2022    RIGHT KNEE GENICULAR NERVE BLOCK WITH INTRA ARTICULAR INJECTION SITE CONFIRMED BY FLUOROSCOPY performed by Orlando Rosenberg MD at [facility]    KNEE ARTHROSCOPY Right 12/27/2021    RIGHT KNEE DIAGNOSTIC ARTHROSCOPY WITH SUBCHONDROPLASTY TO MEDIAL FEMORAL CONDYLE AND TIBIAL PLATEAU, LEFT KNEE INJECTION. performed by Orlando Rosenberg MD at [facility]    KNEE SURGERY Right 04/06/2022    RIGHT KNEE GENICULAR NERVE BLOCK WITH INTRA ARTICULAR INJECTION SITE CONFIRMED BY FLUOROSCOPY    PACEMAKER INSERTION  1996    UPPER GASTROINTESTINAL ENDOSCOPY N/A 6/17/2025    ESOPHAGOGASTRODUODENOSCOPY CONTROL HEMORRHAGE performed by Alfred Mcfadden MD at [facility]

## 2025-06-20 NOTE — PROGRESS NOTES
Patient brought his home Bipap unit in as instructed to do so, Dr. Posada aware. Will have patient use his unit tonight. Leticia with Resp. notified the patient's Bipap unit is here at the bedside and he is to use tonight.

## 2025-06-20 NOTE — PROGRESS NOTES
[facility]  HEART FAILURE  Progress Note      Admit Date 6/16/2025     Reason for Consult:      Reason for Consultation/Chief Complaint: SOB    HPI:    Valdemar Solis is a 75 y.o. male with PMH mechanical AVR3, HFpEF, AF/SSS s/p PPM, COPD, CKD DM, admitted with SOB and melena.       Subjective:  Patient is being seen for CMP/CHF. There were no acute overnight cardiac events.   Today Mr. Solis is on more O2, denies SOB, chest pain, palpitations, or dizziness        Baseline Weight: 315 previously   Wt Readings from Last 3 Encounters:   06/20/25 (!) 137.4 kg (303 lb)   06/12/25 (!) 140.7 kg (310 lb 3.2 oz)   06/05/25 (!) 140.2 kg (309 lb)         Cardiac Testing:   ECHO 6/4/2025    Left Ventricle: Hyperdynamic left ventricular systolic function with a visually estimated EF of 65 - 70%. Grade II diastolic dysfunction with increased LAP.    Right Ventricle: Not well visualized. Right ventricle is mildly dilated. Lead present in the right ventricle. Mildly reduced systolic function.    Aortic Valve: Mechanical valve. AV mean gradient is 17 mmHg. Calcified cusps. Stenosis of the aortic valve. AV Mean Gradient is 17 mmHg. AV Peak Velocity is 2.8 m/s. AV Area by Peak Velocity is 1.8 cm2. LVOT:AV VTI Index is 0.50. LVOT Stroke Volume Index is 47.1 mL/m2.    Tricuspid Valve: Mild regurgitation.    Right Atrium: Right atrium is mildly dilated.    Aorta: The aortic root and ascending aorta are not well visualized    Device: Medtronic ICD with optivol   Optivol at Bedside: over baseline Thursday 6/19/25    NYHA Class III    Objective:   BP 97/68   Pulse 94   Temp (!) 96.6 °F (35.9 °C) (Temporal)   Resp 20   Ht 1.854 m (6' 1\")   Wt (!) 137.4 kg (303 lb)   SpO2 100%   BMI 39.98 kg/m²     Intake/Output Summary (Last 24 hours) at 6/20/2025 1005  Last data filed at 6/20/2025 0900  Gross per 24 hour   Intake 1320 ml   Output --   Net 1320 ml        In: 1800 [P.O.:1800]  Out: -       Physical Exam:  General

## 2025-06-20 NOTE — PROGRESS NOTES
06/20/25 0426   NIV Type   $NIV $Daily Charge   Ventilator ID [id number]   NIV Started/Stopped On   Equipment Type V60   Mode Bilevel   Mask Type Full face mask   Mask Size Large   Assessment   Pulse 66   Level of Consciousness 0   Comfort Level Good   Using Accessory Muscles No   Mask Compliance Good   Settings/Measurements   PIP Observed 15 cm H20   IPAP 14 cmH20   CPAP/EPAP 8 cmH2O   Vt (Measured) 691 mL   Rate Ordered 10   Insp Rise Time (%) 3 %   FiO2  30 %   I Time/ I Time % 1 s   Minute Volume (L/min) 12.2 Liters   Mask Leak (lpm) 12 lpm   Patient's Home Machine No   Alarm Settings   Alarms On Y   Low Pressure (cmH2O) 3 cmH2O   High Pressure (cmH2O) 30 cmH2O   Apnea (secs) 20 secs   RR Low (bpm) 11   RR High (bpm) 40 br/min

## 2025-06-20 NOTE — RT PROTOCOL NOTE
RT Nebulizer Bronchodilator Protocol Note    There is a bronchodilator order in the chart from a provider indicating to follow the RT Bronchodilator Protocol and there is an “Initiate RT Bronchodilator Protocol” order as well (see protocol at bottom of note).    CXR Findings:  No results found.    The findings from the last RT Protocol Assessment were as follows:  Smoking: Chronic pulmonary disease  Respiratory Pattern: Regular pattern and RR 12-20 bpm  Breath Sounds: Slightly diminished and/or crackles  Cough: Strong, spontaneous, non-productive  Indication for Bronchodilator Therapy: On home bronchodilators, Decreased or absent breath sounds  Bronchodilator Assessment Score: 4    Aerosolized bronchodilator medication orders have been revised according to the RT Nebulizer Bronchodilator Protocol below.    Respiratory Therapist to perform RT Therapy Protocol Assessment initially then follow the protocol.  Repeat RT Therapy Protocol Assessment PRN for score 0-3 or on second treatment, BID, and PRN for scores above 3.    No Indications - adjust the frequency to every 6 hours PRN wheezing or bronchospasm, if no treatments needed after 48 hours then discontinue using Per Protocol order mode.     If indication present, adjust the RT bronchodilator orders based on the Bronchodilator Assessment Score as indicated below.  If a patient is on this medication at home then do not decrease Frequency below that used at home.    0-3 - enter or revise RT bronchodilator order(s) to equivalent RT Bronchodilator order with Frequency of every 4 hours PRN for wheezing or increased work of breathing using Per Protocol order mode.       4-6 - enter or revise RT Bronchodilator order(s) to two equivalent RT bronchodilator orders with one order with BID Frequency and one order with Frequency of every 4 hours PRN wheezing or increased work of breathing using Per Protocol order mode.         7-10 - enter or revise RT Bronchodilator order(s) to

## 2025-06-20 NOTE — PROGRESS NOTES
Hospitalist Progress Note    Name:  Valdemar Solis    /Age/Sex: 1950  (75 y.o. male)  MRN & CSN:  [id number] & [id number]    PCP: Yane Wei MD    Date of Admission: 2025    Patient Status:  Inpatient     Chief Complaint:   Chief Complaint   Patient presents with    Shortness of Breath     SOB and black stool x 1 week.        Hospital Course:   Patient admitted for melena and shortness of breath.  Found to be in hemorrhagic shock.  Started on pressors and PPI infusion.  GI consulted.  Given 2 units PRBC on .    Patient is chronically on warfarin outpatient for mechanical mitral valve.  Cardiology consulted to help manage anticoagulation.    S/p EGD on  showed oozing duodenal ulcer with an adherent clot, 2 clips were placed. Non-bleeding duodenal ulcer with a clean base noted and injected    Hemoglobin dropped again on .  Patient received 1 unit PRBC.    Hgb stable on . Warfarin restarted. Diet increased to full diet.    Subjective:  Today is:  Hospital Day: 5.  Patient seen and examined in ICU-3904/3904-01.     Sitting up in bed. Eating breakfast. No pain. No signs of bleeding.      Medications:  Reviewed    Infusion Medications    sodium chloride      sodium chloride      sodium chloride      dextrose       Scheduled Medications    pantoprazole  40 mg Oral BID AC    [START ON 2025] spironolactone  12.5 mg Oral Once per day on     warfarin  10 mg Oral Daily    sacubitril-valsartan  1 tablet Oral BID    insulin lispro  0-8 Units SubCUTAneous 4x Daily AC & HS    albuterol  2.5 mg Nebulization BID RT    allopurinol  300 mg Oral Daily    [Held by provider] amLODIPine  5 mg Oral Daily    [Held by provider] aspirin  81 mg Oral Daily    budesonide-formoterol  2 puff Inhalation BID RT    And    tiotropium  2 puff Inhalation Daily RT    empagliflozin  10 mg Oral Daily    furosemide  40 mg Oral Daily    pravastatin  80 mg Oral Daily    sotalol  80 mg Oral

## 2025-06-21 VITALS
HEART RATE: 70 BPM | SYSTOLIC BLOOD PRESSURE: 102 MMHG | DIASTOLIC BLOOD PRESSURE: 77 MMHG | BODY MASS INDEX: 40.29 KG/M2 | OXYGEN SATURATION: 100 % | RESPIRATION RATE: 16 BRPM | TEMPERATURE: 96.9 F | WEIGHT: 304.01 LBS | HEIGHT: 73 IN

## 2025-06-21 PROBLEM — R51.9 HEADACHE: Status: ACTIVE | Noted: 2025-06-21

## 2025-06-21 PROBLEM — R57.8 HEMORRHAGIC SHOCK (HCC): Status: RESOLVED | Noted: 2025-06-18 | Resolved: 2025-06-21

## 2025-06-21 LAB
ALBUMIN SERPL-MCNC: 3.5 G/DL (ref 3.4–5)
ANION GAP SERPL CALCULATED.3IONS-SCNC: 12 MMOL/L (ref 3–16)
ANISOCYTOSIS BLD QL SMEAR: ABNORMAL
BASOPHILS # BLD: 0.1 K/UL (ref 0–0.2)
BASOPHILS NFR BLD: 1 %
BUN SERPL-MCNC: 24 MG/DL (ref 7–20)
CALCIUM SERPL-MCNC: 8.5 MG/DL (ref 8.3–10.6)
CHLORIDE SERPL-SCNC: 104 MMOL/L (ref 99–110)
CO2 SERPL-SCNC: 22 MMOL/L (ref 21–32)
CREAT SERPL-MCNC: 1.7 MG/DL (ref 0.8–1.3)
DEPRECATED RDW RBC AUTO: 15.4 % (ref 12.4–15.4)
EOSINOPHIL # BLD: 0.1 K/UL (ref 0–0.6)
EOSINOPHIL NFR BLD: 2 %
GFR SERPLBLD CREATININE-BSD FMLA CKD-EPI: 42 ML/MIN/{1.73_M2}
GLUCOSE BLD-MCNC: 173 MG/DL (ref 70–99)
GLUCOSE SERPL-MCNC: 153 MG/DL (ref 70–99)
HCT VFR BLD AUTO: 27.1 % (ref 40.5–52.5)
HGB BLD-MCNC: 9.3 G/DL (ref 13.5–17.5)
INR PPP: 1.22 (ref 0.86–1.14)
LYMPHOCYTES # BLD: 0.8 K/UL (ref 1–5.1)
LYMPHOCYTES NFR BLD: 13 %
MACROCYTES BLD QL SMEAR: ABNORMAL
MCH RBC QN AUTO: 31.1 PG (ref 26–34)
MCHC RBC AUTO-ENTMCNC: 34.2 G/DL (ref 31–36)
MCV RBC AUTO: 90.9 FL (ref 80–100)
MONOCYTES # BLD: 0.5 K/UL (ref 0–1.3)
MONOCYTES NFR BLD: 8 %
NEUTROPHILS # BLD: 4.8 K/UL (ref 1.7–7.7)
NEUTROPHILS NFR BLD: 74 %
NEUTS BAND NFR BLD MANUAL: 2 % (ref 0–7)
PERFORMED ON: ABNORMAL
PHOSPHATE SERPL-MCNC: 2.9 MG/DL (ref 2.5–4.9)
PLATELET # BLD AUTO: 150 K/UL (ref 135–450)
PLATELET BLD QL SMEAR: ADEQUATE
PMV BLD AUTO: 8.7 FL (ref 5–10.5)
POLYCHROMASIA BLD QL SMEAR: ABNORMAL
POTASSIUM SERPL-SCNC: 3.9 MMOL/L (ref 3.5–5.1)
PROTHROMBIN TIME: 15.6 SEC (ref 12.1–14.9)
RBC # BLD AUTO: 2.98 M/UL (ref 4.2–5.9)
SLIDE REVIEW: ABNORMAL
SODIUM SERPL-SCNC: 138 MMOL/L (ref 136–145)
WBC # BLD AUTO: 6.3 K/UL (ref 4–11)

## 2025-06-21 PROCEDURE — 80069 RENAL FUNCTION PANEL: CPT

## 2025-06-21 PROCEDURE — 94640 AIRWAY INHALATION TREATMENT: CPT

## 2025-06-21 PROCEDURE — 6370000000 HC RX 637 (ALT 250 FOR IP): Performed by: NURSE PRACTITIONER

## 2025-06-21 PROCEDURE — 85025 COMPLETE CBC W/AUTO DIFF WBC: CPT

## 2025-06-21 PROCEDURE — 94760 N-INVAS EAR/PLS OXIMETRY 1: CPT

## 2025-06-21 PROCEDURE — 6370000000 HC RX 637 (ALT 250 FOR IP): Performed by: INTERNAL MEDICINE

## 2025-06-21 PROCEDURE — 36415 COLL VENOUS BLD VENIPUNCTURE: CPT

## 2025-06-21 PROCEDURE — 6370000000 HC RX 637 (ALT 250 FOR IP): Performed by: STUDENT IN AN ORGANIZED HEALTH CARE EDUCATION/TRAINING PROGRAM

## 2025-06-21 PROCEDURE — 85610 PROTHROMBIN TIME: CPT

## 2025-06-21 PROCEDURE — 2700000000 HC OXYGEN THERAPY PER DAY

## 2025-06-21 RX ORDER — PANTOPRAZOLE SODIUM 40 MG/1
40 TABLET, DELAYED RELEASE ORAL
Qty: 30 TABLET | Refills: 3 | Status: SHIPPED | OUTPATIENT
Start: 2025-06-21 | End: 2025-06-24 | Stop reason: SDUPTHER

## 2025-06-21 RX ORDER — BUTALBITAL, ACETAMINOPHEN AND CAFFEINE 50; 325; 40 MG/1; MG/1; MG/1
1 TABLET ORAL EVERY 6 HOURS PRN
Qty: 84 TABLET | Refills: 0 | Status: SHIPPED | OUTPATIENT
Start: 2025-06-21 | End: 2025-06-24

## 2025-06-21 RX ADMIN — Medication 2 PUFF: at 12:26

## 2025-06-21 RX ADMIN — ALLOPURINOL 300 MG: 100 TABLET ORAL at 08:48

## 2025-06-21 RX ADMIN — SACUBITRIL AND VALSARTAN 1 TABLET: 24; 26 TABLET, FILM COATED ORAL at 08:49

## 2025-06-21 RX ADMIN — FUROSEMIDE 40 MG: 40 TABLET ORAL at 08:49

## 2025-06-21 RX ADMIN — SOTALOL HYDROCHLORIDE 80 MG: 80 TABLET ORAL at 08:45

## 2025-06-21 RX ADMIN — EMPAGLIFLOZIN 10 MG: 10 TABLET, FILM COATED ORAL at 08:50

## 2025-06-21 RX ADMIN — TIOTROPIUM BROMIDE INHALATION SPRAY 5 MCG: 3.12 SPRAY, METERED RESPIRATORY (INHALATION) at 12:26

## 2025-06-21 RX ADMIN — PANTOPRAZOLE SODIUM 40 MG: 40 TABLET, DELAYED RELEASE ORAL at 08:45

## 2025-06-21 RX ADMIN — PRAVASTATIN SODIUM 80 MG: 40 TABLET ORAL at 08:45

## 2025-06-21 ASSESSMENT — PAIN SCALES - GENERAL: PAINLEVEL_OUTOF10: 0

## 2025-06-21 NOTE — DISCHARGE SUMMARY
Hospital Medicine Discharge Summary    Name:  Valdemar Solis  Gender: male  : 1950  75 y.o.  MRN: [id number]    PCP: Yane Wei MD     Date of Admission:  2025  6:37 PM  Discharge Date: 2025    Admitting Physician: Jaye Epps MD  Discharge Physician: Pablito Posada DO    Communication to PCP  -Needs PPI BID until patient can follow up with GI  -Hold ASA until   -Stop norvasc; restart after PCP follow up      Discharge Diagnoses:       Active Hospital Problems    Diagnosis     Chronic atrial fibrillation (HCC) [I48.20]      Priority: Medium    Paroxysmal atrial fibrillation (HCC) [I48.0]      Priority: Medium    Chronic diastolic heart failure (HCC) [I50.32]      Priority: Medium    HFrEF (heart failure with reduced ejection fraction) (HCC) [I50.20]      Priority: Medium    Acute blood loss anemia [D62]     GI bleed [K92.2]     Stage 3a chronic kidney disease ([facility]) [N18.31]     COPD (chronic obstructive pulmonary disease) ([facility]) [J44.9]     Type 2 diabetes mellitus, without long-term current use of insulin (HCC) [E11.9]     Hyperlipidemia associated with type 2 diabetes mellitus ([facility]) [E11.69, E78.5]     Primary hypertension [I10]        The patient was seen and examined on day of discharge and this discharge summary is in conjunction with any daily progress note from day of discharge.    Hospital Course:  Valdemar Solis is a 75 y.o. year old male who presented to [facility] on 2025  6:37 PM.      Patient admitted for melena and shortness of breath.  Found to be in hemorrhagic shock.  Started on pressors and PPI infusion.  GI consulted.  Given 2 units PRBC on .     Patient is chronically on warfarin outpatient for mechanical mitral valve.  Cardiology consulted to help manage anticoagulation.     S/p EGD on  showed oozing duodenal ulcer with an adherent clot, 2 clips were placed. Non-bleeding duodenal ulcer with a clean base noted and injected     Hemoglobin  daily      diclofenac sodium (VOLTAREN) 1 % GEL Apply 2 g topically in the morning, at noon, and at bedtime  Qty: 20 g, Refills: 0      spironolactone (ALDACTONE) 25 MG tablet Take 0.5 tablets by mouth Every Monday, Wednesday, and Friday  Qty: 30 tablet, Refills: 3      furosemide (LASIX) 40 MG tablet TAKE 1 AND 1/2 TABLETS EVERY DAY  Qty: 135 tablet, Refills: 0      dapagliflozin (FARXIGA) 10 MG tablet TAKE 1 TABLET BY MOUTH EVERY MORNING  Qty: 30 tablet, Refills: 3      sotalol (BETAPACE) 80 MG tablet TAKE 1 TABLET BY MOUTH 2 TIMES A DAY  Qty: 60 tablet, Refills: 3      traZODone (DESYREL) 50 MG tablet TAKE 1 OR 2 TABLETS AS NEEDED FOR SLEEP AS DIRECTED  Qty: 90 tablet, Refills: 2    Associated Diagnoses: Primary insomnia      albuterol (PROVENTIL) (2.5 MG/3ML) 0.083% nebulizer solution Take 3 mLs by nebulization every 6 hours as needed for Wheezing  Qty: 120 each, Refills: 3      albuterol sulfate HFA (PROVENTIL;VENTOLIN;PROAIR) 108 (90 Base) MCG/ACT inhaler Inhale 2 puffs into the lungs every 6 hours as needed for Wheezing  Qty: 1 each, Refills: 1      sacubitril-valsartan (ENTRESTO) 49-51 MG per tablet Take 1 tablet by mouth 2 times daily  Qty: 56 tablet, Refills: 0    Comments: Lot#[id number]  Exp:11/30/2026  2 bottles given      allopurinol (ZYLOPRIM) 300 MG tablet TAKE 1 TABLET EVERY DAY  Qty: 90 tablet, Refills: 3      !! warfarin (COUMADIN) 10 MG tablet Take 1 tablet by mouth daily  Qty: 30 tablet, Refills: 3      Budeson-Glycopyrrol-Formoterol (BREZTRI AEROSPHERE) 160-9-4.8 MCG/ACT AERO INHALE 2 PUFFS INTO LUNGS 2 TIMES A DAY  Qty: 1 g, Refills: 5    Associated Diagnoses: COPD, severity to be determined (HCC)      pravastatin (PRAVACHOL) 80 MG tablet TAKE 1 TABLET EVERY DAY  Qty: 90 tablet, Refills: 3    Associated Diagnoses: Hyperlipidemia LDL goal <100      aspirin 81 MG chewable tablet Take 1 tablet by mouth daily      !! blood glucose test strips (ASCENSIA AUTODISC VI;ONE TOUCH ULTRA TEST VI) strip Check

## 2025-06-21 NOTE — PROGRESS NOTES
Patient discharge papers ready, IV access and tele removed, no complications. Waiting for [family to take patient home. Patient denies mariam home needs

## 2025-06-21 NOTE — PLAN OF CARE
Problem: Chronic Conditions and Co-morbidities  Goal: Patient's chronic conditions and co-morbidity symptoms are monitored and maintained or improved  6/21/2025 1135 by Monica Fontana RN  Outcome: Completed  6/20/2025 2349 by Orion Henriquez, RN  Outcome: Progressing     Problem: Discharge Planning  Goal: Discharge to home or other facility with appropriate resources  6/21/2025 1135 by Monica Fontana RN  Outcome: Completed  6/20/2025 2349 by Orion Henriquez, RN  Outcome: Progressing     Problem: Pain  Goal: Verbalizes/displays adequate comfort level or baseline comfort level  6/21/2025 1135 by Monica Fontana RN  Outcome: Completed  6/20/2025 2349 by Orion Henriquez, RN  Outcome: Adequate for Discharge     Problem: Safety - Adult  Goal: Free from fall injury  6/21/2025 1135 by Monica Fontana RN  Outcome: Completed  6/20/2025 2349 by Orion Henriquez, RN  Outcome: Progressing     Problem: ABCDS Injury Assessment  Goal: Absence of physical injury  Outcome: Completed

## 2025-06-21 NOTE — DISCHARGE INSTR - COC
CASE MANAGEMENT/SOCIAL WORK SECTION    Inpatient Status Date: ***    Readmission Risk Assessment Score:  [facility] RISK OF UNPLANNED READMISSION 2.0             23.9 Total Score        Discharging to Facility/ Agency   Name:   Address:  Phone:  Fax:    Dialysis Facility (if applicable)   Name:  Address:  Dialysis Schedule:  Phone:  Fax:    / signature: {Esignature:[id number]}    PHYSICIAN SECTION    Prognosis: {Prognosis:2596071811}    Condition at Discharge: { Patient Condition:888914005}    Rehab Potential (if transferring to Rehab): {Prognosis:0254767558}    Recommended Labs or Other Treatments After Discharge: ***    Physician Certification: I certify the above information and transfer of Valdemar Solis  is necessary for the continuing treatment of the diagnosis listed and that he requires {Admit to Appropriate Level of Care:32679} for {GREATER/LESS:699876043} 30 days.     Update Admission H&P: {CHP DME Changes in HandP:199334687}    PHYSICIAN SIGNATURE:  {Esignature:890264820}

## 2025-06-21 NOTE — CARE COORDINATION
06/21/25 0929   IMM Letter   IMM Letter given to Patient/Family/Significant other/Guardian/POA/by: case management   IMM Letter date given: 06/21/25   IMM Letter time given: 0920     Electronically signed by Alejandro Lopez on 6/21/2025 at 9:29 AM

## 2025-06-21 NOTE — CARE COORDINATION
Case Management -  Discharge Note      Patient Name: Valdemar Solis                   YOB: 1950  Room: [facility] [place] [address]            Readmission Risk (Low < 19, Mod (19-27), High > 27): Readmission Risk Score: 23.9    Current PCP: Yane Wei MD      (IMM) Important Message from Medicare:    Has pt received appropriate compliance notices before being discharged if required: yes  Compliance doc:  [x] 2nd IMM; [] Code 44 [] [place]  Date Given: 6/21/25 Given By: ADONIS    PT AM-PAC:   /24  OT AM-PAC:   /24    Patient/patient representative has been educated on the benefits of home and O2 as well as the possible risks of declining recommended services. Patient/patient representative has acknowledged the information provided and decided on the following discharge plan. Patient/ patient representative has been provided freedom of choice regarding service provider, supported by basic dialogue that supports the patient's individualized plan of care/goals.       [facility] agency notified of discharge:  [] Yes [] No  [x] NA    Family notified of discharge:  [] Yes  [x] No  [] NA    Facility notified of discharge:  [] Yes  [] No  [x] NA    Pt is being discharged with Outpt IV Antibiotics  [] Yes [] No  [x] NA  If yes, make sure MARYSE is faxed to [facility] agency, and meds are called in to pharmacy by RN from MARYSE orders only.      Financial    Payor: [employer] MEDICARE / Plan: [employer] GOLD PLUS HMO / Product Type: *No Product type* /     Pharmacy:  Potential assistance Purchasing Medications: No  Meds-to-Beds request:        [facility] [place], [place] - [address] -  [phone] - F [phone]  [facility] [address]  Phone: [phone] Fax: [phone]    [facility] - [facility] [address] P [phone] - F [phone]  [address] [facility] [address]  Phone: [phone] Fax:

## 2025-06-23 ENCOUNTER — TELEPHONE (OUTPATIENT)
Dept: PHARMACY | Age: 75
End: 2025-06-23

## 2025-06-24 ENCOUNTER — OFFICE VISIT (OUTPATIENT)
Dept: FAMILY MEDICINE CLINIC | Age: 75
End: 2025-06-24

## 2025-06-24 VITALS
SYSTOLIC BLOOD PRESSURE: 109 MMHG | DIASTOLIC BLOOD PRESSURE: 68 MMHG | WEIGHT: 301 LBS | TEMPERATURE: 97.6 F | HEIGHT: 73 IN | HEART RATE: 80 BPM | BODY MASS INDEX: 39.89 KG/M2 | OXYGEN SATURATION: 98 %

## 2025-06-24 DIAGNOSIS — E11.22 HYPERTENSION ASSOCIATED WITH STAGE 3 CHRONIC KIDNEY DISEASE DUE TO TYPE 2 DIABETES MELLITUS (HCC): ICD-10-CM

## 2025-06-24 DIAGNOSIS — I50.20 HFREF (HEART FAILURE WITH REDUCED EJECTION FRACTION) (HCC): ICD-10-CM

## 2025-06-24 DIAGNOSIS — M62.838 MUSCLE SPASM: ICD-10-CM

## 2025-06-24 DIAGNOSIS — D62 ACUTE BLOOD LOSS ANEMIA: Primary | ICD-10-CM

## 2025-06-24 DIAGNOSIS — N40.0 ENLARGED PROSTATE: ICD-10-CM

## 2025-06-24 DIAGNOSIS — I48.0 PAROXYSMAL ATRIAL FIBRILLATION (HCC): ICD-10-CM

## 2025-06-24 DIAGNOSIS — K26.9 DUODENAL ULCER: ICD-10-CM

## 2025-06-24 DIAGNOSIS — N18.30 HYPERTENSION ASSOCIATED WITH STAGE 3 CHRONIC KIDNEY DISEASE DUE TO TYPE 2 DIABETES MELLITUS (HCC): ICD-10-CM

## 2025-06-24 DIAGNOSIS — Z09 HOSPITAL DISCHARGE FOLLOW-UP: ICD-10-CM

## 2025-06-24 DIAGNOSIS — I12.9 HYPERTENSION ASSOCIATED WITH STAGE 3 CHRONIC KIDNEY DISEASE DUE TO TYPE 2 DIABETES MELLITUS (HCC): ICD-10-CM

## 2025-06-24 DIAGNOSIS — E11.29 TYPE 2 DIABETES MELLITUS WITH OTHER DIABETIC KIDNEY COMPLICATION, WITHOUT LONG-TERM CURRENT USE OF INSULIN (HCC): ICD-10-CM

## 2025-06-24 DIAGNOSIS — Z92.89 HISTORY OF TRANSFUSION: ICD-10-CM

## 2025-06-24 DIAGNOSIS — Z79.01 ANTICOAGULATED: ICD-10-CM

## 2025-06-24 DIAGNOSIS — K80.20 CALCULUS OF GALLBLADDER WITHOUT CHOLECYSTITIS WITHOUT OBSTRUCTION: ICD-10-CM

## 2025-06-24 DIAGNOSIS — Z87.891 FORMER SMOKER: ICD-10-CM

## 2025-06-24 RX ORDER — METHOCARBAMOL 750 MG/1
750 TABLET, FILM COATED ORAL 2 TIMES DAILY PRN
Qty: 20 TABLET | Refills: 0 | Status: ON HOLD | OUTPATIENT
Start: 2025-06-24 | End: 2025-07-04

## 2025-06-24 RX ORDER — PANTOPRAZOLE SODIUM 40 MG/1
40 TABLET, DELAYED RELEASE ORAL
Qty: 180 TABLET | Refills: 1 | Status: ON HOLD | OUTPATIENT
Start: 2025-06-24

## 2025-06-24 NOTE — PROGRESS NOTES
Post-Discharge Transitional Care  Follow Up      Valdemar Solis   YOB: 1950    Date of Office Visit:  6/24/2025  Date of Hospital Admission: 6/16/25  Date of Hospital Discharge: 6/21/25  Risk of hospital readmission (high >=14%. Medium >=10%) :Readmission Risk Score: 24.1      Care management risk score Rising risk (score 2-5) and Complex Care (Scores >=6): No Risk Score On File     Non face to face  following discharge, date last encounter closed (first attempt may have been earlier): *No documented post hospital discharge outreach found in the last 14 days    Call initiated 2 business days of discharge: *No response recorded in the last 14 days    ASSESSMENT/PLAN:   Acute blood loss anemia sec to GI bleed   Dx with hemorrhagic shock,   Tx with pressors and PPI infusion ,   S/p 2 units of PRBC (June 16) , HH drop again and another units of RBC was given on June 18 .   GI consulted: EGC on June 17 showed: oozing duodenal ulcer , two clips were placed.  He is to continue PPI and fu with GI . Hold ASA until 6/27   Will call to schedule fu apt with GI.    His HH at [facility] was 9.3/27.1   Check fu cbc in 2 weeks.       Paroxysmal atrial fibrillation : after HH stabilized anticoagulation was re started with Warfarine .      Diabetes:   A1C at goal   Continue same medications no changes needed at this time       CKD: consulted with nephrology while in hospital:   Likely sec to NSAID use   Creatinine at baseline 1.7   Avoid nephrotoxins   Fu with nephrology     HFrEF:    Echo showed hyperdynamic LV with preserved ED (65-70%) . Shakila II DD with increase LAP. Mechanical Ao valve, stenosis , (gradient 17 mmHg),   Continue current med , fu with cardiology     HTN : Norvasc was held sec to hypotension,   Re evaluated today :     Cholelithiases on CT, monitor sx     Prostamegaly on CT   Check PSA :   Asymptomatic       Atherosclerosis: on CT involving Aorta and its branches:   Check AAA doppler     Anticoagulated

## 2025-06-25 ENCOUNTER — OFFICE VISIT (OUTPATIENT)
Dept: CARDIOLOGY CLINIC | Age: 75
End: 2025-06-25
Payer: MEDICARE

## 2025-06-25 ENCOUNTER — ANTI-COAG VISIT (OUTPATIENT)
Dept: PHARMACY | Age: 75
End: 2025-06-25
Payer: MEDICARE

## 2025-06-25 VITALS
BODY MASS INDEX: 40.02 KG/M2 | HEART RATE: 79 BPM | SYSTOLIC BLOOD PRESSURE: 138 MMHG | DIASTOLIC BLOOD PRESSURE: 78 MMHG | OXYGEN SATURATION: 98 % | HEIGHT: 73 IN | WEIGHT: 302 LBS

## 2025-06-25 DIAGNOSIS — D64.9 ANEMIA, UNSPECIFIED TYPE: ICD-10-CM

## 2025-06-25 DIAGNOSIS — I50.22 CHRONIC SYSTOLIC HF (HEART FAILURE) (HCC): Primary | ICD-10-CM

## 2025-06-25 DIAGNOSIS — R06.2 WHEEZING: ICD-10-CM

## 2025-06-25 DIAGNOSIS — E55.9 VITAMIN D DEFICIENCY: ICD-10-CM

## 2025-06-25 DIAGNOSIS — I10 ESSENTIAL HYPERTENSION: ICD-10-CM

## 2025-06-25 DIAGNOSIS — E66.813 OBESITY, CLASS III, BMI 40-49.9 (MORBID OBESITY) (HCC): ICD-10-CM

## 2025-06-25 DIAGNOSIS — G47.33 OSA (OBSTRUCTIVE SLEEP APNEA): ICD-10-CM

## 2025-06-25 DIAGNOSIS — I48.3 TYPICAL ATRIAL FLUTTER (HCC): Primary | ICD-10-CM

## 2025-06-25 LAB
INTERNATIONAL NORMALIZATION RATIO, POC: 2.1
PROTHROMBIN TIME, POC: 0

## 2025-06-25 PROCEDURE — 85610 PROTHROMBIN TIME: CPT | Performed by: PHYSICIAN ASSISTANT

## 2025-06-25 PROCEDURE — 99215 OFFICE O/P EST HI 40 MIN: CPT | Performed by: CLINICAL NURSE SPECIALIST

## 2025-06-25 PROCEDURE — 1123F ACP DISCUSS/DSCN MKR DOCD: CPT | Performed by: CLINICAL NURSE SPECIALIST

## 2025-06-25 PROCEDURE — G8417 CALC BMI ABV UP PARAM F/U: HCPCS | Performed by: CLINICAL NURSE SPECIALIST

## 2025-06-25 PROCEDURE — G8427 DOCREV CUR MEDS BY ELIG CLIN: HCPCS | Performed by: CLINICAL NURSE SPECIALIST

## 2025-06-25 PROCEDURE — 99212 OFFICE O/P EST SF 10 MIN: CPT | Performed by: PHYSICIAN ASSISTANT

## 2025-06-25 PROCEDURE — 1111F DSCHRG MED/CURRENT MED MERGE: CPT | Performed by: CLINICAL NURSE SPECIALIST

## 2025-06-25 PROCEDURE — 3078F DIAST BP <80 MM HG: CPT | Performed by: CLINICAL NURSE SPECIALIST

## 2025-06-25 PROCEDURE — 1159F MED LIST DOCD IN RCRD: CPT | Performed by: CLINICAL NURSE SPECIALIST

## 2025-06-25 PROCEDURE — 3017F COLORECTAL CA SCREEN DOC REV: CPT | Performed by: CLINICAL NURSE SPECIALIST

## 2025-06-25 PROCEDURE — 3075F SYST BP GE 130 - 139MM HG: CPT | Performed by: CLINICAL NURSE SPECIALIST

## 2025-06-25 PROCEDURE — 1036F TOBACCO NON-USER: CPT | Performed by: CLINICAL NURSE SPECIALIST

## 2025-06-25 PROCEDURE — 1160F RVW MEDS BY RX/DR IN RCRD: CPT | Performed by: CLINICAL NURSE SPECIALIST

## 2025-06-25 RX ORDER — FUROSEMIDE 40 MG/1
TABLET ORAL
Qty: 135 TABLET | Refills: 0 | Status: ON HOLD | OUTPATIENT
Start: 2025-06-25

## 2025-06-25 NOTE — PROGRESS NOTES
Mr. Valdemar Solis is a 75 y.o. y/o male with history of Atrial Fibrillation who presents today for anticoagulation monitoring and adjustment.    Pt is retired and works part time at [employer].     Patient reports he has been on warfarin for almost 30 years now and was managed by his cardiologist who retired. Pt was then managed by Dr. Ji prior to being established in Clinic  Pertinent PMH: HTN, DM, HLD, COPD, CHF    Patient Reported Findings:  Yes     No  [x]   []       Patient verifies current dosing regimen as listed- confirms --> returned to historic weekly dose at d/c from hospital   []   [x]       S/S bleeding/bruising/swelling/SOB -denies    []   [x]       Blood in urine or stool- denies  []   [x]       Procedures scheduled in the future at this time - none upcoming   []   [x]       Missed Dose - denies   []   [x]       Extra Dose - Denies  [x]   []       Change in medications-  tylenol prn---> increased vitamin D dose---> cut norvasc, inc entresto --> took tylenol twice this week --> PPI BID, fioricet, inc lasix. Holding asa until 6/27.  [x]   []       Change in health/diet/appetite - reports he eats about 2 meals/day. And states he will have salads about 1-2x/wk. (not a big fan of spinach or broccoli but may have other greens)--> returned to vit k a few times a week (green beans and salad) --> no greens, no NVD --> no changes, only green beans --> has had some salads recently --> has been eating celery but no other vit k --> appetite slightly diminished. Is starting to lose weight. Has had non vit k vegetables    []   [x]       Change in alcohol use - reports occasionally drinking beer (once every few months) but happens very infrequently.--> Patient reports no alcohol in past two weeks---> denies recently   []   [x]       Change in activity  [x]   []       Hospital admission In hospital 5/31-6/5 for COPD exacerbation. D/c on prednisone taper (40 mg x 3 d, 20 mg x 3 d)  5/31- 2.5 mg  6/1-6/4- 10

## 2025-06-25 NOTE — PROGRESS NOTES
[facility]  Progress Note    Primary Care Doctor:  Yane Wei MD    Chief Complaint   Patient presents with    Follow-up     Chest tenderness SoB        History of Present Illness:  75 y.o. 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  He follows with Dr Ji with [facility].  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  He follows with Dr Cisse   12/30-1/2/2020 for worsening heart failure, sob and leg swelling. He was found to AFlutter and CV 1/2/20.  BiV lead implant 3/23/2020  10/29-11/3/2022 for shortness of breath, HF and pneumonia.  He was diuresed and then sent home on oxygen 2 L.  Covid neg.    I had the pleasure of seeing Valdemar Solis in follow up for systolic heart failure with improved LVEF to 50%.  He is ambulatory and by his self.  He is on 4 L of oxygen    History of Present Illness    He reports a recent hospitalization 6/16-21/2025 due to a gastrointestinal bleed, which was managed with two clips. He experienced black, sticky stools for approximately 4 to 5 days prior to his hospital admission. His current medication regimen includes Protonix, Farxiga, Lasix (1.5 tablets daily), Entresto (49/51), sotalol, spironolactone, and warfarin. During his hospital stay, amlodipine was discontinued, and he was advised to stop taking baby aspirin.  He is scheduled for blood work next week and has an appointment with a nephrologist in July 2025. He has not had his thyroid checked since 2024. No further black stools.  No chest pain, increased shortness of breath, palpitations or lightheadedness.  He does have some swelling in his L>R ankles      Past Medical History:   has a past medical history of Acute congestive heart failure (HCC), Allergic rhinitis, Anticoagulant long-term use, Atrial fibrillation (HCC), CKD (chronic kidney disease), Class 2 obesity due to excess calories without serious comorbidity

## 2025-06-25 NOTE — PATIENT INSTRUCTIONS
Make an appt with Dr Alfred Mcfadden [phone]  Blood work next week  Increase lasix to 60 mg alternating with 80 mg  Continue all other medications  Keep July 15 th appt with me

## 2025-06-26 ENCOUNTER — APPOINTMENT (OUTPATIENT)
Dept: PHARMACY | Age: 75
End: 2025-06-26
Payer: MEDICARE

## 2025-06-28 ENCOUNTER — APPOINTMENT (OUTPATIENT)
Dept: GENERAL RADIOLOGY | Age: 75
DRG: 228 | End: 2025-06-28
Payer: MEDICARE

## 2025-06-28 ENCOUNTER — HOSPITAL ENCOUNTER (INPATIENT)
Age: 75
LOS: 24 days | Discharge: HOME HEALTH | DRG: 228 | End: 2025-07-23
Attending: EMERGENCY MEDICINE | Admitting: INTERNAL MEDICINE
Payer: MEDICARE

## 2025-06-28 DIAGNOSIS — A41.02 SEPSIS DUE TO METHICILLIN RESISTANT STAPHYLOCOCCUS AUREUS (MRSA) WITHOUT ACUTE ORGAN DYSFUNCTION (HCC): ICD-10-CM

## 2025-06-28 DIAGNOSIS — R78.81 BACTEREMIA: ICD-10-CM

## 2025-06-28 DIAGNOSIS — I44.2 CHB (COMPLETE HEART BLOCK) (HCC): ICD-10-CM

## 2025-06-28 DIAGNOSIS — R78.81 BACTEREMIA DUE TO METHICILLIN RESISTANT STAPHYLOCOCCUS AUREUS: ICD-10-CM

## 2025-06-28 DIAGNOSIS — I95.9 HYPOTENSION, UNSPECIFIED HYPOTENSION TYPE: ICD-10-CM

## 2025-06-28 DIAGNOSIS — Z95.0 PACEMAKER: ICD-10-CM

## 2025-06-28 DIAGNOSIS — A41.9 SEPSIS, DUE TO UNSPECIFIED ORGANISM, UNSPECIFIED WHETHER ACUTE ORGAN DYSFUNCTION PRESENT (HCC): Primary | ICD-10-CM

## 2025-06-28 DIAGNOSIS — J18.9 PNEUMONIA DUE TO INFECTIOUS ORGANISM, UNSPECIFIED LATERALITY, UNSPECIFIED PART OF LUNG: ICD-10-CM

## 2025-06-28 DIAGNOSIS — R09.02 HYPOXIA: ICD-10-CM

## 2025-06-28 DIAGNOSIS — I44.2 COMPLETE HEART BLOCK (HCC): ICD-10-CM

## 2025-06-28 DIAGNOSIS — B95.62 BACTEREMIA DUE TO METHICILLIN RESISTANT STAPHYLOCOCCUS AUREUS: ICD-10-CM

## 2025-06-28 DIAGNOSIS — I33.0 ACUTE BACTERIAL ENDOCARDITIS: ICD-10-CM

## 2025-06-28 DIAGNOSIS — I44.39 HIGH-GRADE ATRIOVENTRICULAR BLOCK: ICD-10-CM

## 2025-06-28 LAB
ALBUMIN SERPL-MCNC: 3.3 G/DL (ref 3.4–5)
ALBUMIN/GLOB SERPL: 1.1 {RATIO} (ref 1.1–2.2)
ALP SERPL-CCNC: 83 U/L (ref 40–129)
ALT SERPL-CCNC: 22 U/L (ref 10–40)
ANION GAP SERPL CALCULATED.3IONS-SCNC: 15 MMOL/L (ref 3–16)
AST SERPL-CCNC: 33 U/L (ref 15–37)
BASE EXCESS BLDV CALC-SCNC: -2 MMOL/L (ref -3–3)
BASOPHILS # BLD: 0 K/UL (ref 0–0.2)
BASOPHILS NFR BLD: 0.3 %
BILIRUB SERPL-MCNC: 0.5 MG/DL (ref 0–1)
BUN SERPL-MCNC: 25 MG/DL (ref 7–20)
CALCIUM SERPL-MCNC: 8.8 MG/DL (ref 8.3–10.6)
CHLORIDE SERPL-SCNC: 101 MMOL/L (ref 99–110)
CO2 BLDV-SCNC: 57 MMOL/L
CO2 SERPL-SCNC: 20 MMOL/L (ref 21–32)
COHGB MFR BLDV: 2.8 % (ref 0–1.5)
CREAT SERPL-MCNC: 2.4 MG/DL (ref 0.8–1.3)
DEPRECATED RDW RBC AUTO: 16.3 % (ref 12.4–15.4)
DO-HGB MFR BLDV: 27 %
EOSINOPHIL # BLD: 0 K/UL (ref 0–0.6)
EOSINOPHIL NFR BLD: 0 %
GFR SERPLBLD CREATININE-BSD FMLA CKD-EPI: 28 ML/MIN/{1.73_M2}
GLUCOSE SERPL-MCNC: 178 MG/DL (ref 70–99)
HCO3 BLDV-SCNC: 23.9 MMOL/L (ref 23–29)
HCT VFR BLD AUTO: 25.7 % (ref 40.5–52.5)
HGB BLD-MCNC: 8.6 G/DL (ref 13.5–17.5)
INR PPP: 1.84 (ref 0.86–1.14)
LACTATE BLDV-SCNC: 1.4 MMOL/L (ref 0.4–1.9)
LYMPHOCYTES # BLD: 0.2 K/UL (ref 1–5.1)
LYMPHOCYTES NFR BLD: 3 %
MCH RBC QN AUTO: 31.1 PG (ref 26–34)
MCHC RBC AUTO-ENTMCNC: 33.6 G/DL (ref 31–36)
MCV RBC AUTO: 92.6 FL (ref 80–100)
METHGB MFR BLDV: 0.5 %
MONOCYTES # BLD: 0.5 K/UL (ref 0–1.3)
MONOCYTES NFR BLD: 6.9 %
NEUTROPHILS # BLD: 6 K/UL (ref 1.7–7.7)
NEUTROPHILS NFR BLD: 89.8 %
NT-PROBNP SERPL-MCNC: ABNORMAL PG/ML (ref 0–449)
O2 CT VFR BLDV CALC: 8 VOL %
O2 THERAPY: ABNORMAL
PCO2 BLDV: 45.4 MMHG (ref 40–50)
PH BLDV: 7.33 [PH] (ref 7.35–7.45)
PLATELET # BLD AUTO: 130 K/UL (ref 135–450)
PMV BLD AUTO: 8.2 FL (ref 5–10.5)
PO2 BLDV: 40.6 MMHG (ref 25–40)
POTASSIUM SERPL-SCNC: 4.5 MMOL/L (ref 3.5–5.1)
PROT SERPL-MCNC: 6.4 G/DL (ref 6.4–8.2)
PROTHROMBIN TIME: 21.2 SEC (ref 12.1–14.9)
RBC # BLD AUTO: 2.78 M/UL (ref 4.2–5.9)
SAO2 % BLDV: 72 %
SODIUM SERPL-SCNC: 136 MMOL/L (ref 136–145)
TROPONIN, HIGH SENSITIVITY: 103 NG/L (ref 0–22)
WBC # BLD AUTO: 6.6 K/UL (ref 4–11)

## 2025-06-28 PROCEDURE — 85025 COMPLETE CBC W/AUTO DIFF WBC: CPT

## 2025-06-28 PROCEDURE — 99285 EMERGENCY DEPT VISIT HI MDM: CPT

## 2025-06-28 PROCEDURE — 94761 N-INVAS EAR/PLS OXIMETRY MLT: CPT

## 2025-06-28 PROCEDURE — 2700000000 HC OXYGEN THERAPY PER DAY

## 2025-06-28 PROCEDURE — 82803 BLOOD GASES ANY COMBINATION: CPT

## 2025-06-28 PROCEDURE — 87150 DNA/RNA AMPLIFIED PROBE: CPT

## 2025-06-28 PROCEDURE — 96365 THER/PROPH/DIAG IV INF INIT: CPT

## 2025-06-28 PROCEDURE — 6360000002 HC RX W HCPCS: Performed by: PHYSICIAN ASSISTANT

## 2025-06-28 PROCEDURE — 87040 BLOOD CULTURE FOR BACTERIA: CPT

## 2025-06-28 PROCEDURE — 2580000003 HC RX 258: Performed by: PHYSICIAN ASSISTANT

## 2025-06-28 PROCEDURE — 83605 ASSAY OF LACTIC ACID: CPT

## 2025-06-28 PROCEDURE — 99291 CRITICAL CARE FIRST HOUR: CPT

## 2025-06-28 PROCEDURE — 71045 X-RAY EXAM CHEST 1 VIEW: CPT

## 2025-06-28 PROCEDURE — 85610 PROTHROMBIN TIME: CPT

## 2025-06-28 PROCEDURE — 94660 CPAP INITIATION&MGMT: CPT

## 2025-06-28 PROCEDURE — 93005 ELECTROCARDIOGRAM TRACING: CPT | Performed by: EMERGENCY MEDICINE

## 2025-06-28 PROCEDURE — 83880 ASSAY OF NATRIURETIC PEPTIDE: CPT

## 2025-06-28 PROCEDURE — 6370000000 HC RX 637 (ALT 250 FOR IP): Performed by: PHYSICIAN ASSISTANT

## 2025-06-28 PROCEDURE — 94640 AIRWAY INHALATION TREATMENT: CPT

## 2025-06-28 PROCEDURE — 80053 COMPREHEN METABOLIC PANEL: CPT

## 2025-06-28 PROCEDURE — 84484 ASSAY OF TROPONIN QUANT: CPT

## 2025-06-28 PROCEDURE — 5A09557 ASSISTANCE WITH RESPIRATORY VENTILATION, GREATER THAN 96 CONSECUTIVE HOURS, CONTINUOUS POSITIVE AIRWAY PRESSURE: ICD-10-PCS | Performed by: INTERNAL MEDICINE

## 2025-06-28 PROCEDURE — 87186 SC STD MICRODIL/AGAR DIL: CPT

## 2025-06-28 RX ORDER — ALBUTEROL SULFATE 0.83 MG/ML
2.5 SOLUTION RESPIRATORY (INHALATION) ONCE
Status: COMPLETED | OUTPATIENT
Start: 2025-06-28 | End: 2025-06-28

## 2025-06-28 RX ORDER — ACETAMINOPHEN 500 MG
1000 TABLET ORAL ONCE
Status: COMPLETED | OUTPATIENT
Start: 2025-06-28 | End: 2025-06-28

## 2025-06-28 RX ORDER — IPRATROPIUM BROMIDE AND ALBUTEROL SULFATE 2.5; .5 MG/3ML; MG/3ML
SOLUTION RESPIRATORY (INHALATION)
Status: DISCONTINUED
Start: 2025-06-28 | End: 2025-06-29

## 2025-06-28 RX ORDER — IPRATROPIUM BROMIDE AND ALBUTEROL SULFATE 2.5; .5 MG/3ML; MG/3ML
1 SOLUTION RESPIRATORY (INHALATION) ONCE
Status: COMPLETED | OUTPATIENT
Start: 2025-06-28 | End: 2025-06-28

## 2025-06-28 RX ADMIN — CEFEPIME 2000 MG: 2 INJECTION, POWDER, FOR SOLUTION INTRAVENOUS at 23:36

## 2025-06-28 RX ADMIN — ALBUTEROL SULFATE 2.5 MG: 2.5 SOLUTION RESPIRATORY (INHALATION) at 23:08

## 2025-06-28 RX ADMIN — ACETAMINOPHEN 1000 MG: 500 TABLET ORAL at 23:32

## 2025-06-28 RX ADMIN — IPRATROPIUM BROMIDE AND ALBUTEROL SULFATE 1 DOSE: .5; 3 SOLUTION RESPIRATORY (INHALATION) at 23:08

## 2025-06-28 ASSESSMENT — PAIN - FUNCTIONAL ASSESSMENT: PAIN_FUNCTIONAL_ASSESSMENT: NONE - DENIES PAIN

## 2025-06-29 PROBLEM — Z79.01 WARFARIN ANTICOAGULATION: Status: ACTIVE | Noted: 2025-06-29

## 2025-06-29 PROBLEM — Z79.899 LONG TERM CURRENT USE OF ANTIARRHYTHMIC DRUG: Status: ACTIVE | Noted: 2025-06-29

## 2025-06-29 PROBLEM — D64.9 CHRONIC ANEMIA: Status: ACTIVE | Noted: 2025-06-29

## 2025-06-29 PROBLEM — J18.9 PNEUMONIA DUE TO INFECTIOUS ORGANISM: Status: ACTIVE | Noted: 2025-06-29

## 2025-06-29 PROBLEM — Z95.2 H/O MECHANICAL AORTIC VALVE REPLACEMENT: Status: ACTIVE | Noted: 2025-06-29

## 2025-06-29 PROBLEM — N17.9 ACUTE KIDNEY INJURY: Status: ACTIVE | Noted: 2025-06-29

## 2025-06-29 PROBLEM — Z87.19 HISTORY OF GI BLEED: Status: ACTIVE | Noted: 2025-06-29

## 2025-06-29 PROBLEM — J15.9 COMMUNITY ACQUIRED BACTERIAL PNEUMONIA: Status: ACTIVE | Noted: 2025-06-29

## 2025-06-29 PROBLEM — A41.9 SEPSIS (HCC): Status: ACTIVE | Noted: 2025-06-29

## 2025-06-29 PROBLEM — I95.9 HYPOTENSION: Status: ACTIVE | Noted: 2025-06-29

## 2025-06-29 LAB
AMORPHOUS: PRESENT
BACTERIA URNS QL MICRO: ABNORMAL /HPF
BILIRUB UR QL STRIP.AUTO: NEGATIVE
CLARITY UR: CLEAR
COLOR UR: YELLOW
EPI CELLS #/AREA URNS AUTO: 3 /HPF (ref 0–5)
FLUAV RNA RESP QL NAA+PROBE: NOT DETECTED
FLUBV RNA RESP QL NAA+PROBE: NOT DETECTED
GLUCOSE BLD-MCNC: 248 MG/DL (ref 70–99)
GLUCOSE BLD-MCNC: 256 MG/DL (ref 70–99)
GLUCOSE BLD-MCNC: 274 MG/DL (ref 70–99)
GLUCOSE BLD-MCNC: 282 MG/DL (ref 70–99)
GLUCOSE UR STRIP.AUTO-MCNC: >=1000 MG/DL
HGB UR QL STRIP.AUTO: ABNORMAL
HYALINE CASTS #/AREA URNS AUTO: 3 /LPF (ref 0–8)
INR PPP: 1.99 (ref 0.86–1.14)
KETONES UR STRIP.AUTO-MCNC: ABNORMAL MG/DL
LEUKOCYTE ESTERASE UR QL STRIP.AUTO: NEGATIVE
MAGNESIUM SERPL-MCNC: 1.63 MG/DL (ref 1.8–2.4)
NITRITE UR QL STRIP.AUTO: NEGATIVE
PERFORMED ON: ABNORMAL
PH UR STRIP.AUTO: 5 [PH] (ref 5–8)
PROCALCITONIN SERPL IA-MCNC: 30.3 NG/ML (ref 0–0.15)
PROT UR STRIP.AUTO-MCNC: 30 MG/DL
PROTHROMBIN TIME: 22.5 SEC (ref 12.1–14.9)
RBC CLUMPS #/AREA URNS AUTO: 1 /HPF (ref 0–4)
REPORT: NORMAL
REPORT: NORMAL
RESP PATH DNA+RNA PNL NPH NAA+NON-PROBE: NORMAL
RSV AG NOSE QL: NEGATIVE
SARS-COV-2 RNA RESP QL NAA+PROBE: NOT DETECTED
SP GR UR STRIP.AUTO: 1.02 (ref 1–1.03)
TROPONIN, HIGH SENSITIVITY: 96 NG/L (ref 0–22)
UA COMPLETE W REFLEX CULTURE PNL UR: ABNORMAL
UA DIPSTICK W REFLEX MICRO PNL UR: YES
URN SPEC COLLECT METH UR: ABNORMAL
UROBILINOGEN UR STRIP-ACNC: 0.2 E.U./DL
WBC #/AREA URNS AUTO: 1 /HPF (ref 0–5)

## 2025-06-29 PROCEDURE — 94761 N-INVAS EAR/PLS OXIMETRY MLT: CPT

## 2025-06-29 PROCEDURE — 6370000000 HC RX 637 (ALT 250 FOR IP)

## 2025-06-29 PROCEDURE — 2700000000 HC OXYGEN THERAPY PER DAY

## 2025-06-29 PROCEDURE — 84145 PROCALCITONIN (PCT): CPT

## 2025-06-29 PROCEDURE — 6360000002 HC RX W HCPCS: Performed by: INTERNAL MEDICINE

## 2025-06-29 PROCEDURE — 99223 1ST HOSP IP/OBS HIGH 75: CPT | Performed by: INTERNAL MEDICINE

## 2025-06-29 PROCEDURE — 36415 COLL VENOUS BLD VENIPUNCTURE: CPT

## 2025-06-29 PROCEDURE — 85610 PROTHROMBIN TIME: CPT

## 2025-06-29 PROCEDURE — 2500000003 HC RX 250 WO HCPCS: Performed by: INTERNAL MEDICINE

## 2025-06-29 PROCEDURE — 6370000000 HC RX 637 (ALT 250 FOR IP): Performed by: INTERNAL MEDICINE

## 2025-06-29 PROCEDURE — P9047 ALBUMIN (HUMAN), 25%, 50ML: HCPCS | Performed by: INTERNAL MEDICINE

## 2025-06-29 PROCEDURE — 96367 TX/PROPH/DG ADDL SEQ IV INF: CPT

## 2025-06-29 PROCEDURE — 6360000002 HC RX W HCPCS: Performed by: PHYSICIAN ASSISTANT

## 2025-06-29 PROCEDURE — 2580000003 HC RX 258: Performed by: INTERNAL MEDICINE

## 2025-06-29 PROCEDURE — 94640 AIRWAY INHALATION TREATMENT: CPT

## 2025-06-29 PROCEDURE — 96374 THER/PROPH/DIAG INJ IV PUSH: CPT

## 2025-06-29 PROCEDURE — 93005 ELECTROCARDIOGRAM TRACING: CPT | Performed by: INTERNAL MEDICINE

## 2025-06-29 PROCEDURE — 2580000003 HC RX 258: Performed by: PHYSICIAN ASSISTANT

## 2025-06-29 PROCEDURE — 0202U NFCT DS 22 TRGT SARS-COV-2: CPT

## 2025-06-29 PROCEDURE — 6370000000 HC RX 637 (ALT 250 FOR IP): Performed by: STUDENT IN AN ORGANIZED HEALTH CARE EDUCATION/TRAINING PROGRAM

## 2025-06-29 PROCEDURE — 84484 ASSAY OF TROPONIN QUANT: CPT

## 2025-06-29 PROCEDURE — 81001 URINALYSIS AUTO W/SCOPE: CPT

## 2025-06-29 PROCEDURE — 87807 RSV ASSAY W/OPTIC: CPT

## 2025-06-29 PROCEDURE — 2060000000 HC ICU INTERMEDIATE R&B

## 2025-06-29 PROCEDURE — 87636 SARSCOV2 & INF A&B AMP PRB: CPT

## 2025-06-29 PROCEDURE — 83735 ASSAY OF MAGNESIUM: CPT

## 2025-06-29 PROCEDURE — 6360000002 HC RX W HCPCS: Performed by: STUDENT IN AN ORGANIZED HEALTH CARE EDUCATION/TRAINING PROGRAM

## 2025-06-29 RX ORDER — IPRATROPIUM BROMIDE AND ALBUTEROL SULFATE 2.5; .5 MG/3ML; MG/3ML
1 SOLUTION RESPIRATORY (INHALATION)
Status: DISCONTINUED | OUTPATIENT
Start: 2025-06-29 | End: 2025-07-03

## 2025-06-29 RX ORDER — ACETAMINOPHEN 325 MG/1
650 TABLET ORAL EVERY 6 HOURS PRN
Status: DISCONTINUED | OUTPATIENT
Start: 2025-06-29 | End: 2025-07-23 | Stop reason: HOSPADM

## 2025-06-29 RX ORDER — LINEZOLID 2 MG/ML
600 INJECTION, SOLUTION INTRAVENOUS EVERY 12 HOURS
Status: DISCONTINUED | OUTPATIENT
Start: 2025-06-29 | End: 2025-06-30

## 2025-06-29 RX ORDER — IPRATROPIUM BROMIDE AND ALBUTEROL SULFATE 2.5; .5 MG/3ML; MG/3ML
SOLUTION RESPIRATORY (INHALATION)
Status: COMPLETED
Start: 2025-06-29 | End: 2025-06-29

## 2025-06-29 RX ORDER — GLUCAGON 1 MG/ML
1 KIT INJECTION PRN
Status: DISCONTINUED | OUTPATIENT
Start: 2025-06-29 | End: 2025-07-23 | Stop reason: HOSPADM

## 2025-06-29 RX ORDER — ACETAMINOPHEN 650 MG/1
650 SUPPOSITORY RECTAL EVERY 6 HOURS PRN
Status: DISCONTINUED | OUTPATIENT
Start: 2025-06-29 | End: 2025-07-23 | Stop reason: HOSPADM

## 2025-06-29 RX ORDER — WARFARIN SODIUM 5 MG/1
10 TABLET ORAL DAILY
Status: DISCONTINUED | OUTPATIENT
Start: 2025-06-29 | End: 2025-07-01

## 2025-06-29 RX ORDER — SOTALOL HYDROCHLORIDE 80 MG/1
80 TABLET ORAL 2 TIMES DAILY
Status: DISCONTINUED | OUTPATIENT
Start: 2025-06-29 | End: 2025-07-01

## 2025-06-29 RX ORDER — SODIUM CHLORIDE, SODIUM LACTATE, POTASSIUM CHLORIDE, AND CALCIUM CHLORIDE .6; .31; .03; .02 G/100ML; G/100ML; G/100ML; G/100ML
500 INJECTION, SOLUTION INTRAVENOUS ONCE
Status: COMPLETED | OUTPATIENT
Start: 2025-06-29 | End: 2025-06-29

## 2025-06-29 RX ORDER — PRAVASTATIN SODIUM 40 MG
80 TABLET ORAL DAILY
Status: DISCONTINUED | OUTPATIENT
Start: 2025-06-29 | End: 2025-07-23 | Stop reason: HOSPADM

## 2025-06-29 RX ORDER — MIDODRINE HYDROCHLORIDE 5 MG/1
10 TABLET ORAL ONCE
Status: COMPLETED | OUTPATIENT
Start: 2025-06-29 | End: 2025-06-29

## 2025-06-29 RX ORDER — IPRATROPIUM BROMIDE AND ALBUTEROL SULFATE 2.5; .5 MG/3ML; MG/3ML
1 SOLUTION RESPIRATORY (INHALATION)
Status: DISCONTINUED | OUTPATIENT
Start: 2025-06-29 | End: 2025-06-29

## 2025-06-29 RX ORDER — DEXTROSE MONOHYDRATE 100 MG/ML
INJECTION, SOLUTION INTRAVENOUS CONTINUOUS PRN
Status: DISCONTINUED | OUTPATIENT
Start: 2025-06-29 | End: 2025-07-23 | Stop reason: HOSPADM

## 2025-06-29 RX ORDER — POTASSIUM CHLORIDE 7.45 MG/ML
10 INJECTION INTRAVENOUS PRN
Status: DISCONTINUED | OUTPATIENT
Start: 2025-06-29 | End: 2025-07-23 | Stop reason: HOSPADM

## 2025-06-29 RX ORDER — MAGNESIUM SULFATE IN WATER 40 MG/ML
2000 INJECTION, SOLUTION INTRAVENOUS PRN
Status: DISCONTINUED | OUTPATIENT
Start: 2025-06-29 | End: 2025-07-23 | Stop reason: HOSPADM

## 2025-06-29 RX ORDER — ALBUMIN (HUMAN) 12.5 G/50ML
50 SOLUTION INTRAVENOUS ONCE
Status: COMPLETED | OUTPATIENT
Start: 2025-06-29 | End: 2025-06-29

## 2025-06-29 RX ORDER — SODIUM CHLORIDE 9 MG/ML
INJECTION, SOLUTION INTRAVENOUS PRN
Status: DISCONTINUED | OUTPATIENT
Start: 2025-06-29 | End: 2025-07-23 | Stop reason: HOSPADM

## 2025-06-29 RX ORDER — POTASSIUM CHLORIDE 1500 MG/1
40 TABLET, EXTENDED RELEASE ORAL PRN
Status: DISCONTINUED | OUTPATIENT
Start: 2025-06-29 | End: 2025-07-23 | Stop reason: HOSPADM

## 2025-06-29 RX ORDER — BUDESONIDE AND FORMOTEROL FUMARATE DIHYDRATE 160; 4.5 UG/1; UG/1
2 AEROSOL RESPIRATORY (INHALATION)
Status: DISCONTINUED | OUTPATIENT
Start: 2025-06-29 | End: 2025-07-23 | Stop reason: HOSPADM

## 2025-06-29 RX ORDER — SODIUM CHLORIDE 0.9 % (FLUSH) 0.9 %
5-40 SYRINGE (ML) INJECTION PRN
Status: DISCONTINUED | OUTPATIENT
Start: 2025-06-29 | End: 2025-07-23 | Stop reason: HOSPADM

## 2025-06-29 RX ORDER — WARFARIN SODIUM 5 MG/1
10 TABLET ORAL
Status: COMPLETED | OUTPATIENT
Start: 2025-06-29 | End: 2025-06-29

## 2025-06-29 RX ORDER — ONDANSETRON 4 MG/1
4 TABLET, ORALLY DISINTEGRATING ORAL EVERY 8 HOURS PRN
Status: DISCONTINUED | OUTPATIENT
Start: 2025-06-29 | End: 2025-07-23 | Stop reason: HOSPADM

## 2025-06-29 RX ORDER — ONDANSETRON 2 MG/ML
4 INJECTION INTRAMUSCULAR; INTRAVENOUS EVERY 6 HOURS PRN
Status: DISCONTINUED | OUTPATIENT
Start: 2025-06-29 | End: 2025-07-23 | Stop reason: HOSPADM

## 2025-06-29 RX ORDER — POLYETHYLENE GLYCOL 3350 17 G/17G
17 POWDER, FOR SOLUTION ORAL DAILY PRN
Status: DISCONTINUED | OUTPATIENT
Start: 2025-06-29 | End: 2025-07-23 | Stop reason: HOSPADM

## 2025-06-29 RX ORDER — INSULIN GLARGINE 100 [IU]/ML
6 INJECTION, SOLUTION SUBCUTANEOUS DAILY
Status: DISCONTINUED | OUTPATIENT
Start: 2025-06-29 | End: 2025-06-30

## 2025-06-29 RX ORDER — MIDODRINE HYDROCHLORIDE 5 MG/1
10 TABLET ORAL ONCE
Status: COMPLETED | OUTPATIENT
Start: 2025-06-30 | End: 2025-06-29

## 2025-06-29 RX ORDER — 0.9 % SODIUM CHLORIDE 0.9 %
500 INTRAVENOUS SOLUTION INTRAVENOUS ONCE
Status: DISCONTINUED | OUTPATIENT
Start: 2025-06-29 | End: 2025-07-02

## 2025-06-29 RX ORDER — SODIUM CHLORIDE 0.9 % (FLUSH) 0.9 %
5-40 SYRINGE (ML) INJECTION EVERY 12 HOURS SCHEDULED
Status: DISCONTINUED | OUTPATIENT
Start: 2025-06-29 | End: 2025-07-23 | Stop reason: HOSPADM

## 2025-06-29 RX ORDER — INSULIN LISPRO 100 [IU]/ML
0-4 INJECTION, SOLUTION INTRAVENOUS; SUBCUTANEOUS
Status: DISCONTINUED | OUTPATIENT
Start: 2025-06-29 | End: 2025-07-23 | Stop reason: HOSPADM

## 2025-06-29 RX ADMIN — Medication 10 ML: at 19:54

## 2025-06-29 RX ADMIN — LINEZOLID 600 MG: 600 INJECTION, SOLUTION INTRAVENOUS at 13:06

## 2025-06-29 RX ADMIN — INSULIN LISPRO 2 UNITS: 100 INJECTION, SOLUTION INTRAVENOUS; SUBCUTANEOUS at 20:00

## 2025-06-29 RX ADMIN — IPRATROPIUM BROMIDE AND ALBUTEROL SULFATE 1 DOSE: .5; 3 SOLUTION RESPIRATORY (INHALATION) at 16:12

## 2025-06-29 RX ADMIN — DICLOFENAC 2 G: 10 GEL TOPICAL at 17:37

## 2025-06-29 RX ADMIN — TIOTROPIUM BROMIDE INHALATION SPRAY 5 MCG: 3.12 SPRAY, METERED RESPIRATORY (INHALATION) at 08:01

## 2025-06-29 RX ADMIN — VANCOMYCIN HYDROCHLORIDE 1000 MG: 1 INJECTION, POWDER, LYOPHILIZED, FOR SOLUTION INTRAVENOUS at 00:41

## 2025-06-29 RX ADMIN — AZITHROMYCIN MONOHYDRATE 500 MG: 500 INJECTION, POWDER, LYOPHILIZED, FOR SOLUTION INTRAVENOUS at 04:17

## 2025-06-29 RX ADMIN — INSULIN LISPRO 2 UNITS: 100 INJECTION, SOLUTION INTRAVENOUS; SUBCUTANEOUS at 17:23

## 2025-06-29 RX ADMIN — IPRATROPIUM BROMIDE AND ALBUTEROL SULFATE 1 DOSE: .5; 3 SOLUTION RESPIRATORY (INHALATION) at 12:07

## 2025-06-29 RX ADMIN — ALBUMIN (HUMAN) 50 G: 0.25 INJECTION, SOLUTION INTRAVENOUS at 01:13

## 2025-06-29 RX ADMIN — PRAVASTATIN SODIUM 80 MG: 80 TABLET ORAL at 07:49

## 2025-06-29 RX ADMIN — IPRATROPIUM BROMIDE AND ALBUTEROL SULFATE 1 DOSE: .5; 3 SOLUTION RESPIRATORY (INHALATION) at 04:29

## 2025-06-29 RX ADMIN — INSULIN LISPRO 1 UNITS: 100 INJECTION, SOLUTION INTRAVENOUS; SUBCUTANEOUS at 11:32

## 2025-06-29 RX ADMIN — MIDODRINE HYDROCHLORIDE 10 MG: 5 TABLET ORAL at 23:53

## 2025-06-29 RX ADMIN — WARFARIN SODIUM 10 MG: 5 TABLET ORAL at 05:51

## 2025-06-29 RX ADMIN — IPRATROPIUM BROMIDE AND ALBUTEROL SULFATE 3 ML: .5; 3 SOLUTION RESPIRATORY (INHALATION) at 08:00

## 2025-06-29 RX ADMIN — MIDODRINE HYDROCHLORIDE 10 MG: 5 TABLET ORAL at 00:57

## 2025-06-29 RX ADMIN — MELATONIN TAB 3 MG 3 MG: 3 TAB at 19:54

## 2025-06-29 RX ADMIN — Medication 10 ML: at 07:49

## 2025-06-29 RX ADMIN — IPRATROPIUM BROMIDE AND ALBUTEROL SULFATE 1 DOSE: .5; 3 SOLUTION RESPIRATORY (INHALATION) at 21:17

## 2025-06-29 RX ADMIN — INSULIN GLARGINE 6 UNITS: 100 INJECTION, SOLUTION SUBCUTANEOUS at 11:32

## 2025-06-29 RX ADMIN — Medication 2 PUFF: at 21:16

## 2025-06-29 RX ADMIN — WARFARIN SODIUM 10 MG: 5 TABLET ORAL at 17:23

## 2025-06-29 RX ADMIN — SODIUM CHLORIDE, SODIUM LACTATE, POTASSIUM CHLORIDE, AND CALCIUM CHLORIDE 500 ML: .6; .31; .03; .02 INJECTION, SOLUTION INTRAVENOUS at 05:52

## 2025-06-29 RX ADMIN — WATER 1000 MG: 1 INJECTION INTRAMUSCULAR; INTRAVENOUS; SUBCUTANEOUS at 04:06

## 2025-06-29 RX ADMIN — Medication 2 PUFF: at 08:01

## 2025-06-29 ASSESSMENT — PAIN SCALES - GENERAL
PAINLEVEL_OUTOF10: 2
PAINLEVEL_OUTOF10: 0

## 2025-06-29 NOTE — RT PROTOCOL NOTE
RT Nebulizer Bronchodilator Protocol Note    There is a bronchodilator order in the chart from a provider indicating to follow the RT Bronchodilator Protocol and there is an “Initiate RT Bronchodilator Protocol” order as well (see protocol at bottom of note).    CXR Findings:  XR CHEST PORTABLE  Result Date: 6/28/2025  1. No acute process.       The findings from the last RT Protocol Assessment were as follows:  Smoking: Chronic pulmonary disease  Respiratory Pattern: Dyspnea on exertion or RR 21-25 bpm  Breath Sounds: Intermittent or unilateral wheezes  Cough: Weak, productive  Indication for Bronchodilator Therapy:    Bronchodilator Assessment Score: 10    Aerosolized bronchodilator medication orders have been revised according to the RT Nebulizer Bronchodilator Protocol below.    Respiratory Therapist to perform RT Therapy Protocol Assessment initially then follow the protocol.  Repeat RT Therapy Protocol Assessment PRN for score 0-3 or on second treatment, BID, and PRN for scores above 3.    No Indications - adjust the frequency to every 6 hours PRN wheezing or bronchospasm, if no treatments needed after 48 hours then discontinue using Per Protocol order mode.     If indication present, adjust the RT bronchodilator orders based on the Bronchodilator Assessment Score as indicated below.  If a patient is on this medication at home then do not decrease Frequency below that used at home.    0-3 - enter or revise RT bronchodilator order(s) to equivalent RT Bronchodilator order with Frequency of every 4 hours PRN for wheezing or increased work of breathing using Per Protocol order mode.       4-6 - enter or revise RT Bronchodilator order(s) to two equivalent RT bronchodilator orders with one order with BID Frequency and one order with Frequency of every 4 hours PRN wheezing or increased work of breathing using Per Protocol order mode.         7-10 - enter or revise RT Bronchodilator order(s) to two equivalent RT

## 2025-06-29 NOTE — ED NOTES
Patient Name: Valdemar Solis  : 1950 75 y.o.  MRN: [id number]  ED Room #: ED-0001/     Chief complaint:   Chief Complaint   Patient presents with    Shortness of Breath     Pt arrived via [facility] from home w c/o SOB. Pt arrived on CPAP.     Hospital Problem/Diagnosis:   Hospital Problems           Last Modified POA    * (Principal) Community acquired bacterial pneumonia 2025 Yes         O2 Flow Rate:O2 Device: PAP (positive airway pressure)   (if applicable)  Cardiac Rhythm:   (if applicable)  Active LDA's:   Peripheral IV 25 Left Antecubital (Active)       Peripheral IV Distal;Right Cephalic (Active)            How does patient ambulate? Unknown, did not assess in the Emergency Department. Walk independently at home    2. How does patient take pills? Whole with Water    3. Is patient alert? Alert    4. Is patient oriented? To Person, To Place, To Time, and To Situation    5.   Patient arrived from:  home  Facility Name: ___________________________________________    6. If patient is disoriented or from a Skill Nursing Facility has family been notified of admission? No    7. Patient belongings? Belongings: Clothing    Disposition of belongings? Kept with Patient     8. Any specific patient or family belongings/needs/dynamics?   a. N/a    9. Miscellaneous comments/pending orders?  a. ED orders complete      If there are any additional questions please reach out to the Emergency Department.

## 2025-06-29 NOTE — ED PROVIDER NOTES
EMERGENCY MEDICINE ATTENDING NOTE  Pradip Sargent Jr., ISABELL LIMA, FAASHEILA        I personally saw the patient and made/approved the management plan and take responsibility for the patient management on Valdemar Solis.  All diagnostic, treatment, and disposition decisions were made by myself in conjunction with the advanced practice provider.  I have participated in the medical decision making and directed the treatment plan and disposition of the patient.    For further details of Valdemar Solis's emergency department encounter, please see the advanced practice provider's documentation.    I, Pradip Sargent Jr., ISABELL LIMA, ROSENDO, am the primary physician provider of record.      CHIEF COMPLAINT  Chief Complaint   Patient presents with    Shortness of Breath     Pt arrived via [facility] from home w c/o SOB. Pt arrived on CPAP.       BRIEF HISTORY  Valdemar Solis is a 75 y.o. male  who presents to the ED for evaluation of shortness of breath and cough.  Patient was recently admitted for GI bleed and hemorrhagic shock due to this and was discharged about a week ago.  States his symptoms have worsened over the last few days.  Was hypoxic for EMS.    BRIEF/FOCUSED PHYSICAL EXAMINATION  VITAL SIGNS:    ED Triage Vitals   Encounter Vitals Group      BP 06/28/25 2230 (!) 151/130      Systolic BP Percentile --       Diastolic BP Percentile --       Pulse 06/28/25 2230 (!) 102      Respirations 06/28/25 2230 20      Temp 06/28/25 2242 (!) 101.1 °F (38.4 °C)      Temp Source 06/28/25 2242 Axillary      SpO2 06/28/25 2230 91 %      Weight - Scale 06/28/25 2230 (!) 137 kg (302 lb)      Height 06/28/25 2230 1.854 m (6' 1\")      Head Circumference --       Peak Flow --       Pain Score --       Pain Loc --       Pain Education --       Exclude from Growth Chart --      Physical Exam  Vitals and nursing note reviewed.   Constitutional:       General: He is in acute distress.   Cardiovascular:      Rate and

## 2025-06-29 NOTE — ED PROVIDER NOTES
[facility] EMERGENCY DEPARTMENT  EMERGENCY DEPARTMENT ENCOUNTER        Pt Name: Valdemar Solis  MRN: [id number]  Birthdate 1950  Date of evaluation: 6/28/2025  Provider: Melodie Bearden PA-C  PCP: Yane Wei MD  Note Started: 12:51 AM EDT 6/29/25       I have seen and evaluated this patient with my supervising physician Pradip Sargent DO.      CHIEF COMPLAINT       Chief Complaint   Patient presents with    Shortness of Breath     Pt arrived via [facility] from home w c/o SOB. Pt arrived on CPAP.       HISTORY OF PRESENT ILLNESS: 1 or more Elements     History From: Patient  Limitations to history : None    Valdemar Solis is a 75 y.o. male who presents to the emergency department today for evaluation for concerns of shortness of breath.  The patient has a past medical history of CHF, ICD in place, history of CLAUDIO, diabetes, COPD.  Status post mechanical valve and is on Coumadin.  Was recently admitted for septic shock and did receive blood transfusion.  Patient reports that he says shortness of breath and as well as a cough today.  He reports that his cough is dry, there is no sputum production or hemoptysis.  Patient reports that his shortness of breath worsened, which prompted his visit to the ED.  Patient initially arrives on CPAP.  He denies any chest pain or chest tightness.  He has no abdominal pain, vomiting or diarrhea.  No urinary symptoms no other complaints    Nursing Notes were all reviewed and agreed with or any disagreements were addressed in the HPI.    REVIEW OF SYSTEMS :      Review of Systems   Constitutional:  Negative for activity change, appetite change, chills and fever.   HENT:  Negative for congestion and rhinorrhea.    Respiratory:  Positive for cough and shortness of breath.    Cardiovascular:  Negative for chest pain.   Gastrointestinal:  Negative for abdominal pain, diarrhea, nausea and vomiting.   Genitourinary:  Negative for difficulty urinating, dysuria

## 2025-06-29 NOTE — H&P
[facility] Medicine History & Physical    V 5.1    Date of Admission: 6/28/2025    Date of Service:  Pt seen/examined on 6/29/25 at 0030    [x]Admitted to Inpatient with expected LOS greater than two midnights due to medical therapy.  []Placed in Observation status.    Assessment/Plan:      Acute on Chronic Hypoxic Respiratory Failure  ?Pneumonia, possible  ?Acute on Chronic Heart Failure with preserved EF  Patient presents with shortness of breath, encephalopathy  On arrival he was noted to be in respiratory distress, placed on BiPAP  He has had some clinical improvement since this time  Workup is nonspecific  Chest x-ray concerning for left lower lobe opacity however ultimately it is not a slam dunk for pneumonia  He is febrile on arrival with some hypotension as well  Notably, patient does have heart failure with preserved EF, last echo June 2025 with EF 65 to 70%  NT proBNP on arrival is 11,000 which is significantly higher than baseline  He additionally also has a mildly elevated troponin at 100  I suspect that this is accommodation of possible pneumonia as well as heart failure with reduced EF  I will start him on antibiotics with Rocephin and azithromycin  Diuretic use will be difficult given his hypotension  I will give a dose of albumin now as well as midodrine  Consult to cardiology for recommendations regarding continued diuresis    CLAUDIO  Likely intravascular depletion  Patient with recent blood loss anemia  Now with mild elevation of creatinine  Unable to give significant fluids due to heart failure  Will give a dose of albumin  Repeat in morning    NSTEMI  Patient with elevated troponin of 103 on arrival, downtrending 96  Suspecting this is primarily a demand ischemia in the setting of heart failure pneumonia  EKG shows A-fib with a right bundle branch block. some lateral ST changes however these appear to be consistent with prior EKG  Will continue aspirin and beta-blocker  Consult to cardiology for

## 2025-06-30 ENCOUNTER — TELEPHONE (OUTPATIENT)
Dept: PHARMACY | Age: 75
End: 2025-06-30

## 2025-06-30 PROBLEM — B99.9 INFECTION REQUIRING CONTACT ISOLATION PRECAUTIONS: Status: ACTIVE | Noted: 2025-06-30

## 2025-06-30 PROBLEM — R78.81 BACTEREMIA DUE TO METHICILLIN RESISTANT STAPHYLOCOCCUS AUREUS: Status: ACTIVE | Noted: 2025-06-30

## 2025-06-30 PROBLEM — N18.9 ACUTE KIDNEY INJURY SUPERIMPOSED ON CKD: Status: ACTIVE | Noted: 2025-06-29

## 2025-06-30 PROBLEM — B95.62 BACTEREMIA DUE TO METHICILLIN RESISTANT STAPHYLOCOCCUS AUREUS: Status: ACTIVE | Noted: 2025-06-30

## 2025-06-30 PROBLEM — E66.01 MORBID OBESITY DUE TO EXCESS CALORIES (HCC): Status: ACTIVE | Noted: 2025-06-30

## 2025-06-30 PROBLEM — I44.30 AV BLOCK: Status: ACTIVE | Noted: 2025-06-30

## 2025-06-30 PROBLEM — M10.00 IDIOPATHIC GOUT: Status: ACTIVE | Noted: 2025-06-30

## 2025-06-30 LAB
ALBUMIN SERPL-MCNC: 3.1 G/DL (ref 3.4–5)
ALBUMIN/GLOB SERPL: 1.1 {RATIO} (ref 1.1–2.2)
ALP SERPL-CCNC: 70 U/L (ref 40–129)
ALT SERPL-CCNC: 18 U/L (ref 10–40)
ANION GAP SERPL CALCULATED.3IONS-SCNC: 11 MMOL/L (ref 3–16)
AST SERPL-CCNC: 24 U/L (ref 15–37)
BASOPHILS # BLD: 0 K/UL (ref 0–0.2)
BASOPHILS NFR BLD: 0.3 %
BILIRUB SERPL-MCNC: <0.2 MG/DL (ref 0–1)
BUN SERPL-MCNC: 44 MG/DL (ref 7–20)
CALCIUM SERPL-MCNC: 8.7 MG/DL (ref 8.3–10.6)
CHLORIDE SERPL-SCNC: 102 MMOL/L (ref 99–110)
CK SERPL-CCNC: 281 U/L (ref 39–308)
CK SERPL-CCNC: 309 U/L (ref 39–308)
CO2 SERPL-SCNC: 20 MMOL/L (ref 21–32)
CREAT SERPL-MCNC: 2.7 MG/DL (ref 0.8–1.3)
DEPRECATED RDW RBC AUTO: 15.7 % (ref 12.4–15.4)
EKG ATRIAL RATE: 71 BPM
EKG DIAGNOSIS: NORMAL
EKG DIAGNOSIS: NORMAL
EKG P-R INTERVAL: 160 MS
EKG Q-T INTERVAL: 418 MS
EKG Q-T INTERVAL: 524 MS
EKG QRS DURATION: 180 MS
EKG QRS DURATION: 180 MS
EKG QTC CALCULATION (BAZETT): 516 MS
EKG QTC CALCULATION (BAZETT): 569 MS
EKG R AXIS: -40 DEGREES
EKG R AXIS: -42 DEGREES
EKG T AXIS: 131 DEGREES
EKG T AXIS: 187 DEGREES
EKG VENTRICULAR RATE: 71 BPM
EKG VENTRICULAR RATE: 92 BPM
EOSINOPHIL # BLD: 0 K/UL (ref 0–0.6)
EOSINOPHIL NFR BLD: 0 %
FERRITIN SERPL IA-MCNC: 292 NG/ML (ref 30–400)
GFR SERPLBLD CREATININE-BSD FMLA CKD-EPI: 24 ML/MIN/{1.73_M2}
GLUCOSE BLD-MCNC: 184 MG/DL (ref 70–99)
GLUCOSE BLD-MCNC: 213 MG/DL (ref 70–99)
GLUCOSE BLD-MCNC: 215 MG/DL (ref 70–99)
GLUCOSE BLD-MCNC: 224 MG/DL (ref 70–99)
GLUCOSE SERPL-MCNC: 234 MG/DL (ref 70–99)
HCT VFR BLD AUTO: 23 % (ref 40.5–52.5)
HGB BLD-MCNC: 7.7 G/DL (ref 13.5–17.5)
INR PPP: 2.13 (ref 0.86–1.14)
IRON SATN MFR SERPL: 17 % (ref 20–50)
IRON SERPL-MCNC: 33 UG/DL (ref 59–158)
LYMPHOCYTES # BLD: 0.4 K/UL (ref 1–5.1)
LYMPHOCYTES NFR BLD: 7.4 %
MCH RBC QN AUTO: 30.9 PG (ref 26–34)
MCHC RBC AUTO-ENTMCNC: 33.7 G/DL (ref 31–36)
MCV RBC AUTO: 91.8 FL (ref 80–100)
MONOCYTES # BLD: 0.8 K/UL (ref 0–1.3)
MONOCYTES NFR BLD: 13.3 %
NEUTROPHILS # BLD: 4.6 K/UL (ref 1.7–7.7)
NEUTROPHILS NFR BLD: 79 %
PERFORMED ON: ABNORMAL
PHOSPHATE SERPL-MCNC: 4.1 MG/DL (ref 2.5–4.9)
PLATELET # BLD AUTO: 135 K/UL (ref 135–450)
PMV BLD AUTO: 8 FL (ref 5–10.5)
POTASSIUM SERPL-SCNC: 4.5 MMOL/L (ref 3.5–5.1)
PROT SERPL-MCNC: 5.8 G/DL (ref 6.4–8.2)
PROTHROMBIN TIME: 23.6 SEC (ref 12.1–14.9)
RBC # BLD AUTO: 2.5 M/UL (ref 4.2–5.9)
SODIUM SERPL-SCNC: 133 MMOL/L (ref 136–145)
TIBC SERPL-MCNC: 196 UG/DL (ref 260–445)
WBC # BLD AUTO: 5.8 K/UL (ref 4–11)

## 2025-06-30 PROCEDURE — 2580000003 HC RX 258: Performed by: INTERNAL MEDICINE

## 2025-06-30 PROCEDURE — 85025 COMPLETE CBC W/AUTO DIFF WBC: CPT

## 2025-06-30 PROCEDURE — 99233 SBSQ HOSP IP/OBS HIGH 50: CPT | Performed by: HOSPITALIST

## 2025-06-30 PROCEDURE — 83540 ASSAY OF IRON: CPT

## 2025-06-30 PROCEDURE — 6370000000 HC RX 637 (ALT 250 FOR IP): Performed by: INTERNAL MEDICINE

## 2025-06-30 PROCEDURE — 94761 N-INVAS EAR/PLS OXIMETRY MLT: CPT

## 2025-06-30 PROCEDURE — 84100 ASSAY OF PHOSPHORUS: CPT

## 2025-06-30 PROCEDURE — 99223 1ST HOSP IP/OBS HIGH 75: CPT | Performed by: INTERNAL MEDICINE

## 2025-06-30 PROCEDURE — 2060000000 HC ICU INTERMEDIATE R&B

## 2025-06-30 PROCEDURE — G0545 PR INHERENT VISIT TO INPT: HCPCS | Performed by: INTERNAL MEDICINE

## 2025-06-30 PROCEDURE — 6370000000 HC RX 637 (ALT 250 FOR IP): Performed by: STUDENT IN AN ORGANIZED HEALTH CARE EDUCATION/TRAINING PROGRAM

## 2025-06-30 PROCEDURE — 87040 BLOOD CULTURE FOR BACTERIA: CPT

## 2025-06-30 PROCEDURE — 94640 AIRWAY INHALATION TREATMENT: CPT

## 2025-06-30 PROCEDURE — 83550 IRON BINDING TEST: CPT

## 2025-06-30 PROCEDURE — 6360000002 HC RX W HCPCS: Performed by: STUDENT IN AN ORGANIZED HEALTH CARE EDUCATION/TRAINING PROGRAM

## 2025-06-30 PROCEDURE — 6360000002 HC RX W HCPCS: Performed by: INTERNAL MEDICINE

## 2025-06-30 PROCEDURE — 94660 CPAP INITIATION&MGMT: CPT

## 2025-06-30 PROCEDURE — 82728 ASSAY OF FERRITIN: CPT

## 2025-06-30 PROCEDURE — 93010 ELECTROCARDIOGRAM REPORT: CPT | Performed by: INTERNAL MEDICINE

## 2025-06-30 PROCEDURE — 80053 COMPREHEN METABOLIC PANEL: CPT

## 2025-06-30 PROCEDURE — 2700000000 HC OXYGEN THERAPY PER DAY

## 2025-06-30 PROCEDURE — 6370000000 HC RX 637 (ALT 250 FOR IP)

## 2025-06-30 PROCEDURE — 99232 SBSQ HOSP IP/OBS MODERATE 35: CPT | Performed by: NURSE PRACTITIONER

## 2025-06-30 PROCEDURE — 82550 ASSAY OF CK (CPK): CPT

## 2025-06-30 PROCEDURE — 2500000003 HC RX 250 WO HCPCS: Performed by: INTERNAL MEDICINE

## 2025-06-30 PROCEDURE — 36415 COLL VENOUS BLD VENIPUNCTURE: CPT

## 2025-06-30 PROCEDURE — 85610 PROTHROMBIN TIME: CPT

## 2025-06-30 RX ORDER — INSULIN GLARGINE 100 [IU]/ML
10 INJECTION, SOLUTION SUBCUTANEOUS DAILY
Status: DISCONTINUED | OUTPATIENT
Start: 2025-06-30 | End: 2025-07-23 | Stop reason: HOSPADM

## 2025-06-30 RX ADMIN — Medication 10 ML: at 20:32

## 2025-06-30 RX ADMIN — INSULIN LISPRO 1 UNITS: 100 INJECTION, SOLUTION INTRAVENOUS; SUBCUTANEOUS at 08:51

## 2025-06-30 RX ADMIN — IPRATROPIUM BROMIDE AND ALBUTEROL SULFATE 1 DOSE: .5; 3 SOLUTION RESPIRATORY (INHALATION) at 22:05

## 2025-06-30 RX ADMIN — IPRATROPIUM BROMIDE AND ALBUTEROL SULFATE 1 DOSE: .5; 3 SOLUTION RESPIRATORY (INHALATION) at 08:04

## 2025-06-30 RX ADMIN — MELATONIN TAB 3 MG 3 MG: 3 TAB at 20:31

## 2025-06-30 RX ADMIN — AZITHROMYCIN MONOHYDRATE 500 MG: 500 INJECTION, POWDER, LYOPHILIZED, FOR SOLUTION INTRAVENOUS at 03:46

## 2025-06-30 RX ADMIN — SODIUM CHLORIDE, PRESERVATIVE FREE 10 ML: 5 INJECTION INTRAVENOUS at 14:30

## 2025-06-30 RX ADMIN — LINEZOLID 600 MG: 600 INJECTION, SOLUTION INTRAVENOUS at 01:04

## 2025-06-30 RX ADMIN — PRAVASTATIN SODIUM 80 MG: 80 TABLET ORAL at 08:52

## 2025-06-30 RX ADMIN — WARFARIN SODIUM 10 MG: 5 TABLET ORAL at 18:47

## 2025-06-30 RX ADMIN — DAPTOMYCIN 625 MG: 500 INJECTION, POWDER, LYOPHILIZED, FOR SOLUTION INTRAVENOUS at 14:30

## 2025-06-30 RX ADMIN — SOTALOL HYDROCHLORIDE 80 MG: 80 TABLET ORAL at 08:52

## 2025-06-30 RX ADMIN — INSULIN LISPRO 2 UNITS: 100 INJECTION, SOLUTION INTRAVENOUS; SUBCUTANEOUS at 20:31

## 2025-06-30 RX ADMIN — Medication 2 PUFF: at 22:09

## 2025-06-30 RX ADMIN — INSULIN LISPRO 1 UNITS: 100 INJECTION, SOLUTION INTRAVENOUS; SUBCUTANEOUS at 18:46

## 2025-06-30 RX ADMIN — INSULIN LISPRO 1 UNITS: 100 INJECTION, SOLUTION INTRAVENOUS; SUBCUTANEOUS at 14:30

## 2025-06-30 RX ADMIN — WATER 1000 MG: 1 INJECTION INTRAMUSCULAR; INTRAVENOUS; SUBCUTANEOUS at 03:42

## 2025-06-30 RX ADMIN — TIOTROPIUM BROMIDE INHALATION SPRAY 5 MCG: 3.12 SPRAY, METERED RESPIRATORY (INHALATION) at 08:05

## 2025-06-30 RX ADMIN — Medication 10 ML: at 08:52

## 2025-06-30 RX ADMIN — INSULIN GLARGINE 10 UNITS: 100 INJECTION, SOLUTION SUBCUTANEOUS at 08:51

## 2025-06-30 RX ADMIN — IPRATROPIUM BROMIDE AND ALBUTEROL SULFATE 1 DOSE: .5; 3 SOLUTION RESPIRATORY (INHALATION) at 13:00

## 2025-06-30 RX ADMIN — Medication 2 PUFF: at 08:04

## 2025-06-30 RX ADMIN — DICLOFENAC 2 G: 10 GEL TOPICAL at 08:56

## 2025-06-30 RX ADMIN — IPRATROPIUM BROMIDE AND ALBUTEROL SULFATE 1 DOSE: .5; 3 SOLUTION RESPIRATORY (INHALATION) at 15:30

## 2025-06-30 ASSESSMENT — PAIN SCALES - GENERAL: PAINLEVEL_OUTOF10: 0

## 2025-06-30 NOTE — TELEPHONE ENCOUNTER
Pt called to cancel appt for 7/2 as he will be hospitalized. Asked for pt to contact clinic when d/c.

## 2025-06-30 NOTE — CARE COORDINATION
Case Management Assessment  Initial Evaluation    Date/Time of Evaluation: 6/30/2025 3:54 PM  Assessment Completed by: Alejandro Lopez    If patient is discharged prior to next notation, then this note serves as note for discharge by case management.    Patient Name: Valdemar Solis                   YOB: 1950  Diagnosis: Hypoxia [R09.02]  Community acquired bacterial pneumonia [J15.9]  Pneumonia due to infectious organism, unspecified laterality, unspecified part of lung [J18.9]  Sepsis, due to unspecified organism, unspecified whether acute organ dysfunction present (HCC) [A41.9]                   Date / Time: 6/28/2025 10:22 PM    Patient Admission Status: Inpatient   Readmission Risk (Low < 19, Mod (19-27), High > 27): Readmission Risk Score: 29    Current PCP: Yane Wei MD  PCP verified by CM? (P) Yes    Chart Reviewed: Yes      History Provided by: (P) Patient  Patient Orientation: (P) Alert and Oriented    Patient Cognition: (P) Alert    Hospitalization in the last 30 days (Readmission):  Yes    If yes, Readmission Assessment in  Navigator will be completed.    Advance Directives:      Code Status: Limited   Patient's Primary Decision Maker is: (P) Legal Next of Kin    Primary Decision Maker: Luisa Solis - Spouse - [phone]    Discharge Planning:    Patient lives with: (P) Spouse/Significant Other, Children Type of Home: (P) House  Primary Care Giver: (P) Self  Patient Support Systems include: (P) Spouse/Significant Other, Children   Current Financial resources: (P) Medicare  Current community resources: (P) None  Current services prior to admission: (P) Durable Medical Equipment            Current DME: (P) Oxygen Therapy (Comment), Cane (4-5L baseline)            Type of Home Care services:  (P) OT, PT, Nursing Services    ADLS  Prior functional level: (P) Independent in ADLs/IADLs  Current functional level: (P) Independent in ADLs/IADLs    PT AM-PAC:   /24  OT AM-PAC:

## 2025-06-30 NOTE — CARE COORDINATION
06/30/25 1552   Readmission Assessment   Number of Days since last admission? 8-30 days   Previous Disposition Home with Family   Who is being Interviewed Unable to Complete  (chart review)   What was the patient's/caregiver's perception as to why they think they needed to return back to the hospital? Other (Comment)  (Pt is admitted for sepsis)   Did you visit your Primary Care Physician after you left the hospital, before you returned this time? Yes   Did you see a specialist, such as Cardiac, Pulmonary, Orthopedic Physician, etc. after you left the hospital? No   Who advised the patient to return to the hospital? Self-referral   Does the patient report anything that got in the way of taking their medications? No   In our efforts to provide the best possible care to you and others like you, can you think of anything that we could have done to help you after you left the hospital the first time, so that you might not have needed to return so soon? Other (Comment)  (Pt is admitted for sepsis)     Electronically signed by Alejandro Lopez on 6/30/2025 at 3:53 PM

## 2025-06-30 NOTE — CARE COORDINATION
Discharge Planning:    CM called Shirley with [employer] [phone] and provided pt's information and medications, she will call back with benefits.    Electronically signed by Alejandro Lopez on 6/30/2025 at 12:43 PM

## 2025-07-01 ENCOUNTER — HOSPITAL ENCOUNTER (INPATIENT)
Age: 75
Discharge: HOME | DRG: 228 | End: 2025-07-03
Attending: INTERNAL MEDICINE
Payer: MEDICARE

## 2025-07-01 ENCOUNTER — ANESTHESIA (OUTPATIENT)
Age: 75
End: 2025-07-01
Payer: MEDICARE

## 2025-07-01 ENCOUNTER — ANESTHESIA EVENT (OUTPATIENT)
Age: 75
End: 2025-07-01
Payer: MEDICARE

## 2025-07-01 VITALS
RESPIRATION RATE: 22 BRPM | HEART RATE: 70 BPM | DIASTOLIC BLOOD PRESSURE: 66 MMHG | SYSTOLIC BLOOD PRESSURE: 101 MMHG | OXYGEN SATURATION: 99 %

## 2025-07-01 LAB
ANION GAP SERPL CALCULATED.3IONS-SCNC: 11 MMOL/L (ref 3–16)
BACTERIA BLD CULT ORG #2: ABNORMAL
BACTERIA BLD CULT: ABNORMAL
BACTERIA BLD CULT: ABNORMAL
BASOPHILS # BLD: 0 K/UL (ref 0–0.2)
BASOPHILS NFR BLD: 0.1 %
BUN SERPL-MCNC: 43 MG/DL (ref 7–20)
CALCIUM SERPL-MCNC: 9.2 MG/DL (ref 8.3–10.6)
CHLORIDE SERPL-SCNC: 103 MMOL/L (ref 99–110)
CO2 SERPL-SCNC: 22 MMOL/L (ref 21–32)
CREAT SERPL-MCNC: 2.4 MG/DL (ref 0.8–1.3)
DEPRECATED RDW RBC AUTO: 16.1 % (ref 12.4–15.4)
ECHO AO ASC DIAM: 4.6 CM
ECHO AO ASCENDING AORTA INDEX: 1.77 CM/M2
ECHO AO ROOT DIAM: 3.3 CM
ECHO AO ROOT INDEX: 1.27 CM/M2
ECHO AV ACCELERATION TIME: 58 MS
ECHO AV AREA PEAK VELOCITY: 1.3 CM2
ECHO AV AREA VTI: 1.4 CM2
ECHO AV AREA/BSA PEAK VELOCITY: 0.5 CM2/M2
ECHO AV AREA/BSA VTI: 0.5 CM2/M2
ECHO AV MEAN GRADIENT: 10 MMHG
ECHO AV MEAN VELOCITY: 1.5 M/S
ECHO AV PEAK GRADIENT: 17 MMHG
ECHO AV PEAK VELOCITY: 2.1 M/S
ECHO AV VELOCITY RATIO: 0.38
ECHO AV VTI: 42.8 CM
ECHO BSA: 2.66 M2
ECHO LVOT AREA: 3.5 CM2
ECHO LVOT AV VTI INDEX: 0.41
ECHO LVOT DIAM: 2.1 CM
ECHO LVOT MEAN GRADIENT: 1 MMHG
ECHO LVOT PEAK GRADIENT: 2 MMHG
ECHO LVOT PEAK VELOCITY: 0.8 M/S
ECHO LVOT STROKE VOLUME INDEX: 23.3 ML/M2
ECHO LVOT SV: 60.6 ML
ECHO LVOT VTI: 17.5 CM
EOSINOPHIL # BLD: 0 K/UL (ref 0–0.6)
EOSINOPHIL NFR BLD: 0.5 %
GFR SERPLBLD CREATININE-BSD FMLA CKD-EPI: 28 ML/MIN/{1.73_M2}
GLUCOSE BLD-MCNC: 141 MG/DL (ref 70–99)
GLUCOSE BLD-MCNC: 142 MG/DL (ref 70–99)
GLUCOSE BLD-MCNC: 213 MG/DL (ref 70–99)
GLUCOSE BLD-MCNC: 217 MG/DL (ref 70–99)
GLUCOSE SERPL-MCNC: 135 MG/DL (ref 70–99)
HCT VFR BLD AUTO: 27.4 % (ref 40.5–52.5)
HGB BLD-MCNC: 9.1 G/DL (ref 13.5–17.5)
INR PPP: 2.58 (ref 0.86–1.14)
LYMPHOCYTES # BLD: 0.6 K/UL (ref 1–5.1)
LYMPHOCYTES NFR BLD: 10.1 %
MCH RBC QN AUTO: 30.6 PG (ref 26–34)
MCHC RBC AUTO-ENTMCNC: 33.1 G/DL (ref 31–36)
MCV RBC AUTO: 92.3 FL (ref 80–100)
MONOCYTES # BLD: 0.6 K/UL (ref 0–1.3)
MONOCYTES NFR BLD: 9.9 %
NEUTROPHILS # BLD: 4.5 K/UL (ref 1.7–7.7)
NEUTROPHILS NFR BLD: 79.4 %
ORGANISM: ABNORMAL
PERFORMED ON: ABNORMAL
PLATELET # BLD AUTO: 167 K/UL (ref 135–450)
PMV BLD AUTO: 8.6 FL (ref 5–10.5)
POTASSIUM SERPL-SCNC: 4.7 MMOL/L (ref 3.5–5.1)
PROTHROMBIN TIME: 27.2 SEC (ref 12.1–14.9)
RBC # BLD AUTO: 2.97 M/UL (ref 4.2–5.9)
SODIUM SERPL-SCNC: 136 MMOL/L (ref 136–145)
WBC # BLD AUTO: 5.7 K/UL (ref 4–11)

## 2025-07-01 PROCEDURE — G0545 PR INHERENT VISIT TO INPT: HCPCS | Performed by: INTERNAL MEDICINE

## 2025-07-01 PROCEDURE — 99233 SBSQ HOSP IP/OBS HIGH 50: CPT | Performed by: INTERNAL MEDICINE

## 2025-07-01 PROCEDURE — 2580000003 HC RX 258: Performed by: INTERNAL MEDICINE

## 2025-07-01 PROCEDURE — 3700000000 HC ANESTHESIA ATTENDED CARE: Performed by: INTERNAL MEDICINE

## 2025-07-01 PROCEDURE — 6370000000 HC RX 637 (ALT 250 FOR IP): Performed by: INTERNAL MEDICINE

## 2025-07-01 PROCEDURE — 93319 3D ECHO IMG CGEN CAR ANOMAL: CPT | Performed by: INTERNAL MEDICINE

## 2025-07-01 PROCEDURE — 6360000002 HC RX W HCPCS: Performed by: HOSPITALIST

## 2025-07-01 PROCEDURE — 85610 PROTHROMBIN TIME: CPT

## 2025-07-01 PROCEDURE — 6360000002 HC RX W HCPCS: Performed by: INTERNAL MEDICINE

## 2025-07-01 PROCEDURE — 93312 ECHO TRANSESOPHAGEAL: CPT

## 2025-07-01 PROCEDURE — 36415 COLL VENOUS BLD VENIPUNCTURE: CPT

## 2025-07-01 PROCEDURE — 99233 SBSQ HOSP IP/OBS HIGH 50: CPT | Performed by: HOSPITALIST

## 2025-07-01 PROCEDURE — 94640 AIRWAY INHALATION TREATMENT: CPT

## 2025-07-01 PROCEDURE — 7100000010 HC PHASE II RECOVERY - FIRST 15 MIN: Performed by: INTERNAL MEDICINE

## 2025-07-01 PROCEDURE — 93320 DOPPLER ECHO COMPLETE: CPT | Performed by: INTERNAL MEDICINE

## 2025-07-01 PROCEDURE — 99232 SBSQ HOSP IP/OBS MODERATE 35: CPT | Performed by: NURSE PRACTITIONER

## 2025-07-01 PROCEDURE — 93312 ECHO TRANSESOPHAGEAL: CPT | Performed by: INTERNAL MEDICINE

## 2025-07-01 PROCEDURE — 85025 COMPLETE CBC W/AUTO DIFF WBC: CPT

## 2025-07-01 PROCEDURE — 3700000001 HC ADD 15 MINUTES (ANESTHESIA): Performed by: INTERNAL MEDICINE

## 2025-07-01 PROCEDURE — 6360000002 HC RX W HCPCS: Performed by: NURSE ANESTHETIST, CERTIFIED REGISTERED

## 2025-07-01 PROCEDURE — 6370000000 HC RX 637 (ALT 250 FOR IP)

## 2025-07-01 PROCEDURE — 2500000003 HC RX 250 WO HCPCS: Performed by: INTERNAL MEDICINE

## 2025-07-01 PROCEDURE — 99233 SBSQ HOSP IP/OBS HIGH 50: CPT | Performed by: NURSE PRACTITIONER

## 2025-07-01 PROCEDURE — 2700000000 HC OXYGEN THERAPY PER DAY

## 2025-07-01 PROCEDURE — 6370000000 HC RX 637 (ALT 250 FOR IP): Performed by: STUDENT IN AN ORGANIZED HEALTH CARE EDUCATION/TRAINING PROGRAM

## 2025-07-01 PROCEDURE — 7100000011 HC PHASE II RECOVERY - ADDTL 15 MIN: Performed by: INTERNAL MEDICINE

## 2025-07-01 PROCEDURE — 2060000000 HC ICU INTERMEDIATE R&B

## 2025-07-01 PROCEDURE — 94660 CPAP INITIATION&MGMT: CPT

## 2025-07-01 PROCEDURE — 94761 N-INVAS EAR/PLS OXIMETRY MLT: CPT

## 2025-07-01 PROCEDURE — 80048 BASIC METABOLIC PNL TOTAL CA: CPT

## 2025-07-01 RX ORDER — HYDROMORPHONE HYDROCHLORIDE 2 MG/ML
0.25 INJECTION, SOLUTION INTRAMUSCULAR; INTRAVENOUS; SUBCUTANEOUS EVERY 5 MIN PRN
Refills: 0 | Status: CANCELLED | OUTPATIENT
Start: 2025-07-01

## 2025-07-01 RX ORDER — SODIUM CHLORIDE 9 MG/ML
INJECTION, SOLUTION INTRAVENOUS PRN
Status: CANCELLED | OUTPATIENT
Start: 2025-07-01

## 2025-07-01 RX ORDER — SODIUM CHLORIDE 0.9 % (FLUSH) 0.9 %
5-40 SYRINGE (ML) INJECTION PRN
Status: CANCELLED | OUTPATIENT
Start: 2025-07-01

## 2025-07-01 RX ORDER — WARFARIN SODIUM 7.5 MG/1
7.5 TABLET ORAL
Status: COMPLETED | OUTPATIENT
Start: 2025-07-01 | End: 2025-07-01

## 2025-07-01 RX ORDER — WARFARIN SODIUM 5 MG/1
10 TABLET ORAL DAILY
Status: DISCONTINUED | OUTPATIENT
Start: 2025-07-02 | End: 2025-07-02

## 2025-07-01 RX ORDER — SODIUM CHLORIDE 9 MG/ML
INJECTION, SOLUTION INTRAVENOUS PRN
Status: DISCONTINUED | OUTPATIENT
Start: 2025-07-01 | End: 2025-07-09 | Stop reason: HOSPADM

## 2025-07-01 RX ORDER — FUROSEMIDE 10 MG/ML
40 INJECTION INTRAMUSCULAR; INTRAVENOUS ONCE
Status: COMPLETED | OUTPATIENT
Start: 2025-07-01 | End: 2025-07-01

## 2025-07-01 RX ORDER — HYDROMORPHONE HYDROCHLORIDE 2 MG/ML
0.5 INJECTION, SOLUTION INTRAMUSCULAR; INTRAVENOUS; SUBCUTANEOUS EVERY 5 MIN PRN
Refills: 0 | Status: CANCELLED | OUTPATIENT
Start: 2025-07-01

## 2025-07-01 RX ORDER — SODIUM CHLORIDE 0.9 % (FLUSH) 0.9 %
5-40 SYRINGE (ML) INJECTION EVERY 12 HOURS SCHEDULED
Status: CANCELLED | OUTPATIENT
Start: 2025-07-01

## 2025-07-01 RX ORDER — ONDANSETRON 2 MG/ML
4 INJECTION INTRAMUSCULAR; INTRAVENOUS
Status: CANCELLED | OUTPATIENT
Start: 2025-07-01

## 2025-07-01 RX ORDER — PROPOFOL 10 MG/ML
INJECTION, EMULSION INTRAVENOUS
Status: DISCONTINUED | OUTPATIENT
Start: 2025-07-01 | End: 2025-07-01 | Stop reason: SDUPTHER

## 2025-07-01 RX ORDER — SODIUM CHLORIDE 0.9 % (FLUSH) 0.9 %
5-40 SYRINGE (ML) INJECTION EVERY 12 HOURS SCHEDULED
Status: DISCONTINUED | OUTPATIENT
Start: 2025-07-01 | End: 2025-07-09 | Stop reason: HOSPADM

## 2025-07-01 RX ORDER — DIPHENHYDRAMINE HYDROCHLORIDE 50 MG/ML
12.5 INJECTION, SOLUTION INTRAMUSCULAR; INTRAVENOUS
Status: CANCELLED | OUTPATIENT
Start: 2025-07-01

## 2025-07-01 RX ORDER — SOTALOL HYDROCHLORIDE 80 MG/1
80 TABLET ORAL
Status: DISCONTINUED | OUTPATIENT
Start: 2025-07-02 | End: 2025-07-23 | Stop reason: HOSPADM

## 2025-07-01 RX ORDER — SODIUM CHLORIDE 0.9 % (FLUSH) 0.9 %
5-40 SYRINGE (ML) INJECTION PRN
Status: DISCONTINUED | OUTPATIENT
Start: 2025-07-01 | End: 2025-07-09 | Stop reason: HOSPADM

## 2025-07-01 RX ADMIN — Medication 10 ML: at 09:28

## 2025-07-01 RX ADMIN — INSULIN GLARGINE 10 UNITS: 100 INJECTION, SOLUTION SUBCUTANEOUS at 12:48

## 2025-07-01 RX ADMIN — AZITHROMYCIN MONOHYDRATE 500 MG: 500 INJECTION, POWDER, LYOPHILIZED, FOR SOLUTION INTRAVENOUS at 03:08

## 2025-07-01 RX ADMIN — PROPOFOL 30 MG: 10 INJECTION, EMULSION INTRAVENOUS at 10:13

## 2025-07-01 RX ADMIN — SODIUM CHLORIDE, PRESERVATIVE FREE 10 ML: 5 INJECTION INTRAVENOUS at 09:27

## 2025-07-01 RX ADMIN — IPRATROPIUM BROMIDE AND ALBUTEROL SULFATE 1 DOSE: .5; 3 SOLUTION RESPIRATORY (INHALATION) at 16:24

## 2025-07-01 RX ADMIN — SOTALOL HYDROCHLORIDE 80 MG: 80 TABLET ORAL at 09:23

## 2025-07-01 RX ADMIN — PHENYLEPHRINE HYDROCHLORIDE 100 MCG: 10 INJECTION INTRAVENOUS at 10:14

## 2025-07-01 RX ADMIN — WARFARIN SODIUM 7.5 MG: 7.5 TABLET ORAL at 17:22

## 2025-07-01 RX ADMIN — INSULIN LISPRO 1 UNITS: 100 INJECTION, SOLUTION INTRAVENOUS; SUBCUTANEOUS at 17:22

## 2025-07-01 RX ADMIN — FUROSEMIDE 40 MG: 10 INJECTION, SOLUTION INTRAMUSCULAR; INTRAVENOUS at 14:25

## 2025-07-01 RX ADMIN — PROPOFOL 30 MG: 10 INJECTION, EMULSION INTRAVENOUS at 10:24

## 2025-07-01 RX ADMIN — DICLOFENAC 2 G: 10 GEL TOPICAL at 14:32

## 2025-07-01 RX ADMIN — PROPOFOL 30 MG: 10 INJECTION, EMULSION INTRAVENOUS at 10:15

## 2025-07-01 RX ADMIN — IPRATROPIUM BROMIDE AND ALBUTEROL SULFATE 1 DOSE: .5; 3 SOLUTION RESPIRATORY (INHALATION) at 11:47

## 2025-07-01 RX ADMIN — IPRATROPIUM BROMIDE AND ALBUTEROL SULFATE 1 DOSE: .5; 3 SOLUTION RESPIRATORY (INHALATION) at 21:02

## 2025-07-01 RX ADMIN — INSULIN LISPRO 1 UNITS: 100 INJECTION, SOLUTION INTRAVENOUS; SUBCUTANEOUS at 20:21

## 2025-07-01 RX ADMIN — PHENYLEPHRINE HYDROCHLORIDE 200 MCG: 10 INJECTION INTRAVENOUS at 10:23

## 2025-07-01 RX ADMIN — PROPOFOL 30 MG: 10 INJECTION, EMULSION INTRAVENOUS at 10:10

## 2025-07-01 RX ADMIN — ACETAMINOPHEN 650 MG: 325 TABLET ORAL at 12:48

## 2025-07-01 RX ADMIN — PROPOFOL 30 MG: 10 INJECTION, EMULSION INTRAVENOUS at 10:07

## 2025-07-01 RX ADMIN — PROPOFOL 30 MG: 10 INJECTION, EMULSION INTRAVENOUS at 10:21

## 2025-07-01 RX ADMIN — PROPOFOL 50 MG: 10 INJECTION, EMULSION INTRAVENOUS at 10:04

## 2025-07-01 RX ADMIN — MELATONIN TAB 3 MG 3 MG: 3 TAB at 20:21

## 2025-07-01 RX ADMIN — Medication 10 ML: at 20:22

## 2025-07-01 RX ADMIN — PROPOFOL 20 MG: 10 INJECTION, EMULSION INTRAVENOUS at 10:18

## 2025-07-01 RX ADMIN — DAPTOMYCIN 625 MG: 500 INJECTION, POWDER, LYOPHILIZED, FOR SOLUTION INTRAVENOUS at 14:25

## 2025-07-01 RX ADMIN — Medication 2 PUFF: at 21:05

## 2025-07-01 ASSESSMENT — PAIN SCALES - GENERAL
PAINLEVEL_OUTOF10: 3
PAINLEVEL_OUTOF10: 3

## 2025-07-01 ASSESSMENT — PAIN DESCRIPTION - LOCATION: LOCATION: HEAD

## 2025-07-01 NOTE — H&P
Department of Cardiology Pre-operative History and Physical        DIAGNOSIS:  endocarditis    INDICATION:  endocarditis    PROCEDURE:  PRESTON    CHIEF COMPLAINT:  staph bacteremia    History Obtained From:  patient    HISTORY OF PRESENT ILLNESS:      The patient is a 75 y.o. male with significant past medical history of AVR and BI-V who presents with staph bacteremia. PRESTON was recommended to evaluate for endocarditis.     Past Medical History:        Diagnosis Date    Acute congestive heart failure (HCC) 12/30/2019    Allergic rhinitis 07/11/2016    Anticoagulant long-term use warrefen    Atrial fibrillation ([facility])     under care of cardiology:Dr. Scott Behrens([facility])    CKD (chronic kidney disease)     Nephro:Dr. Parker:stage 3    Class 2 obesity due to excess calories without serious comorbidity with body mass index (BMI) of 37.0 to 37.9 in adult 11/07/2017    Controlled type 2 diabetes mellitus without complication, without long-term current use of insulin ([facility]) 02/24/2017    COPD     COPD (chronic obstructive pulmonary disease) ([facility]) 11/05/2018    Erectile dysfunction     Essential tremor     Gout     Headache 6/21/2025    Hyperlipidemia, mixed 07/11/2016    Hypertension     under care of cardiology:Dr. Scott Behrens([facility])    Long term current use of anticoagulants with INR goal of 2.0-3.0     under care of cardiology:Dr. Scott Behrens([facility])    Long term current use of anticoagulants with INR goal of 2.0-3.0 07/11/2016    Obstructive sleep apnea syndrome 06/18/2019    per pulmo    Primary insomnia 01/25/2017    Primary osteoarthritis of both knees 09/14/2021    Sleep apnea     uses bipap     Past Surgical History:        Procedure Laterality Date    AORTIC VALVE REPLACEMENT  10/1996:St Quique    x3    ATRIAL ABLATION SURGERY  03/23/2020    CTI dependent atrial flutter ablation (Dr. Raza)    CARDIAC PACEMAKER PLACEMENT  03/23/2020    BIV upgrade    CARDIAC VALVE REPLACEMENT  AORTIC    KNEE

## 2025-07-01 NOTE — CARE COORDINATION
Discharge Planning:    Simon $40 a week  Dapto $284 a week    Electronically signed by Alejandro Lopez on 7/1/2025 at 8:54 AM

## 2025-07-01 NOTE — ANESTHESIA PRE PROCEDURE
warfarin (COUMADIN) tablet 7.5 mg  7.5 mg Oral Once Quan Almanza MD       • insulin glargine (LANTUS) injection vial 10 Units  10 Units SubCUTAneous Daily Markus Mcfarland MD   10 Units at 06/30/25 0851   • DAPTOmycin (CUBICIN) 625 mg in sodium chloride (PF) 0.9 % 12.5 mL IV syringe  625 mg IntraVENous Q24H Tres Ramachandran MD   625 mg at 06/30/25 1430   • sodium chloride 0.9 % bolus 500 mL  500 mL IntraVENous Once Melodie Bearden PA-C   Held at 06/29/25 0120   • sodium chloride flush 0.9 % injection 5-40 mL  5-40 mL IntraVENous 2 times per day Roldan Larson DO   10 mL at 06/30/25 2032   • sodium chloride flush 0.9 % injection 5-40 mL  5-40 mL IntraVENous PRN Roldan Larson DO   10 mL at 06/30/25 1430   • 0.9 % sodium chloride infusion   IntraVENous PRN Roldan Larson DO       • potassium chloride (KLOR-CON M) extended release tablet 40 mEq  40 mEq Oral PRN Roldan Larson DO        Or   • potassium bicarb-citric acid (EFFER-K) effervescent tablet 40 mEq  40 mEq Oral PRN Roldan Larson DO        Or   • potassium chloride 10 mEq/100 mL IVPB (Peripheral Line)  10 mEq IntraVENous PRN Roldan Larson DO       • magnesium sulfate 2000 mg in 50 mL IVPB premix  2,000 mg IntraVENous PRN Roldan Larson DO       • ondansetron (ZOFRAN-ODT) disintegrating tablet 4 mg  4 mg Oral Q8H PRN Roldan Larson DO        Or   • ondansetron (ZOFRAN) injection 4 mg  4 mg IntraVENous Q6H PRN Roldan Larson DO       • melatonin tablet 3 mg  3 mg Oral Nightly Roldan Larson DO   3 mg at 06/30/25 2031   • polyethylene glycol (GLYCOLAX) packet 17 g  17 g Oral Daily PRN Roldan Larson, DO       • acetaminophen (TYLENOL) tablet 650 mg  650 mg Oral Q6H PRN Roldan Larson DO        Or   • acetaminophen (TYLENOL) suppository 650 mg  650 mg Rectal Q6H PRN Roldan Larson, DO       • azithromycin (ZITHROMAX) 500 mg in sodium chloride 0.9 % 250 mL IVPB ([id number])  500 mg

## 2025-07-01 NOTE — ANESTHESIA POSTPROCEDURE EVALUATION
Department of Anesthesiology  Postprocedure Note    Patient: Valdemar Solis  MRN: [id number]  YOB: 1950  Date of evaluation: 7/1/2025    Procedure Summary       Date: 07/01/25 Room / Location: [facility]    Anesthesia Start: 1000 Anesthesia Stop: 1032    Procedure: PRESTON (PRN CONTRAST/BUBBLE/3D) Diagnosis: Bacteremia due to methicillin resistant Staphylococcus aureus    Scheduled Providers: Raymond Benjamin MD Responsible Provider: Raymond Benjamin MD    Anesthesia Type: MAC ASA Status: 4            Anesthesia Type: No value filed.    Karlene Phase I: Karlene Score: 10    Karlene Phase II:      Anesthesia Post Evaluation    Patient location during evaluation: PACU  Patient participation: complete - patient participated  Level of consciousness: awake and alert  Airway patency: patent  Nausea & Vomiting: no nausea and no vomiting  Cardiovascular status: hemodynamically stable  Respiratory status: acceptable  Multimodal analgesia pain management approach  Pain management: satisfactory to patient    No notable events documented.

## 2025-07-02 PROBLEM — Z71.89 COMPLEX CARE COORDINATION: Status: ACTIVE | Noted: 2025-07-02

## 2025-07-02 PROBLEM — Z79.2 RECEIVING INTRAVENOUS ANTIBIOTIC TREATMENT AS OUTPATIENT: Status: ACTIVE | Noted: 2025-07-02

## 2025-07-02 LAB
ANION GAP SERPL CALCULATED.3IONS-SCNC: 12 MMOL/L (ref 3–16)
BASOPHILS # BLD: 0 K/UL (ref 0–0.2)
BASOPHILS NFR BLD: 0.3 %
BUN SERPL-MCNC: 36 MG/DL (ref 7–20)
CALCIUM SERPL-MCNC: 9.2 MG/DL (ref 8.3–10.6)
CHLORIDE SERPL-SCNC: 106 MMOL/L (ref 99–110)
CK SERPL-CCNC: 71 U/L (ref 39–308)
CO2 SERPL-SCNC: 22 MMOL/L (ref 21–32)
CREAT SERPL-MCNC: 2.1 MG/DL (ref 0.8–1.3)
DEPRECATED RDW RBC AUTO: 16.2 % (ref 12.4–15.4)
EOSINOPHIL # BLD: 0 K/UL (ref 0–0.6)
EOSINOPHIL NFR BLD: 0.5 %
GFR SERPLBLD CREATININE-BSD FMLA CKD-EPI: 32 ML/MIN/{1.73_M2}
GLUCOSE BLD-MCNC: 164 MG/DL (ref 70–99)
GLUCOSE BLD-MCNC: 167 MG/DL (ref 70–99)
GLUCOSE BLD-MCNC: 195 MG/DL (ref 70–99)
GLUCOSE BLD-MCNC: 252 MG/DL (ref 70–99)
GLUCOSE SERPL-MCNC: 152 MG/DL (ref 70–99)
HCT VFR BLD AUTO: 25.7 % (ref 40.5–52.5)
HGB BLD-MCNC: 8.6 G/DL (ref 13.5–17.5)
INR PPP: 3.4 (ref 0.86–1.14)
LYMPHOCYTES # BLD: 0.6 K/UL (ref 1–5.1)
LYMPHOCYTES NFR BLD: 12.2 %
MAGNESIUM SERPL-MCNC: 2.04 MG/DL (ref 1.8–2.4)
MCH RBC QN AUTO: 30.4 PG (ref 26–34)
MCHC RBC AUTO-ENTMCNC: 33.5 G/DL (ref 31–36)
MCV RBC AUTO: 90.9 FL (ref 80–100)
MONOCYTES # BLD: 0.5 K/UL (ref 0–1.3)
MONOCYTES NFR BLD: 12 %
NEUTROPHILS # BLD: 3.4 K/UL (ref 1.7–7.7)
NEUTROPHILS NFR BLD: 75 %
PERFORMED ON: ABNORMAL
PLATELET # BLD AUTO: 161 K/UL (ref 135–450)
PMV BLD AUTO: 8.5 FL (ref 5–10.5)
POTASSIUM SERPL-SCNC: 4.4 MMOL/L (ref 3.5–5.1)
PROTHROMBIN TIME: 33.4 SEC (ref 12.1–14.9)
RBC # BLD AUTO: 2.82 M/UL (ref 4.2–5.9)
SODIUM SERPL-SCNC: 140 MMOL/L (ref 136–145)
WBC # BLD AUTO: 4.5 K/UL (ref 4–11)

## 2025-07-02 PROCEDURE — 99232 SBSQ HOSP IP/OBS MODERATE 35: CPT | Performed by: NURSE PRACTITIONER

## 2025-07-02 PROCEDURE — 6360000002 HC RX W HCPCS: Performed by: INTERNAL MEDICINE

## 2025-07-02 PROCEDURE — 82550 ASSAY OF CK (CPK): CPT

## 2025-07-02 PROCEDURE — 2500000003 HC RX 250 WO HCPCS: Performed by: INTERNAL MEDICINE

## 2025-07-02 PROCEDURE — 85025 COMPLETE CBC W/AUTO DIFF WBC: CPT

## 2025-07-02 PROCEDURE — 2700000000 HC OXYGEN THERAPY PER DAY

## 2025-07-02 PROCEDURE — 36415 COLL VENOUS BLD VENIPUNCTURE: CPT

## 2025-07-02 PROCEDURE — 6370000000 HC RX 637 (ALT 250 FOR IP): Performed by: STUDENT IN AN ORGANIZED HEALTH CARE EDUCATION/TRAINING PROGRAM

## 2025-07-02 PROCEDURE — 6370000000 HC RX 637 (ALT 250 FOR IP): Performed by: INTERNAL MEDICINE

## 2025-07-02 PROCEDURE — 2060000000 HC ICU INTERMEDIATE R&B

## 2025-07-02 PROCEDURE — 99233 SBSQ HOSP IP/OBS HIGH 50: CPT | Performed by: HOSPITALIST

## 2025-07-02 PROCEDURE — G0545 PR INHERENT VISIT TO INPT: HCPCS | Performed by: INTERNAL MEDICINE

## 2025-07-02 PROCEDURE — 94761 N-INVAS EAR/PLS OXIMETRY MLT: CPT

## 2025-07-02 PROCEDURE — 99233 SBSQ HOSP IP/OBS HIGH 50: CPT | Performed by: INTERNAL MEDICINE

## 2025-07-02 PROCEDURE — 83735 ASSAY OF MAGNESIUM: CPT

## 2025-07-02 PROCEDURE — 94640 AIRWAY INHALATION TREATMENT: CPT

## 2025-07-02 PROCEDURE — 6370000000 HC RX 637 (ALT 250 FOR IP): Performed by: NURSE PRACTITIONER

## 2025-07-02 PROCEDURE — 80048 BASIC METABOLIC PNL TOTAL CA: CPT

## 2025-07-02 PROCEDURE — 85610 PROTHROMBIN TIME: CPT

## 2025-07-02 RX ORDER — SODIUM CHLORIDE, SODIUM LACTATE, POTASSIUM CHLORIDE, AND CALCIUM CHLORIDE .6; .31; .03; .02 G/100ML; G/100ML; G/100ML; G/100ML
500 INJECTION, SOLUTION INTRAVENOUS ONCE
Status: DISCONTINUED | OUTPATIENT
Start: 2025-07-02 | End: 2025-07-02

## 2025-07-02 RX ORDER — WARFARIN SODIUM 5 MG/1
10 TABLET ORAL DAILY
Status: COMPLETED | OUTPATIENT
Start: 2025-07-03 | End: 2025-07-05

## 2025-07-02 RX ADMIN — ACETAMINOPHEN 650 MG: 325 TABLET ORAL at 07:21

## 2025-07-02 RX ADMIN — INSULIN LISPRO 1 UNITS: 100 INJECTION, SOLUTION INTRAVENOUS; SUBCUTANEOUS at 13:12

## 2025-07-02 RX ADMIN — INSULIN GLARGINE 10 UNITS: 100 INJECTION, SOLUTION SUBCUTANEOUS at 07:38

## 2025-07-02 RX ADMIN — Medication 10 ML: at 19:59

## 2025-07-02 RX ADMIN — INSULIN LISPRO 2 UNITS: 100 INJECTION, SOLUTION INTRAVENOUS; SUBCUTANEOUS at 19:59

## 2025-07-02 RX ADMIN — IPRATROPIUM BROMIDE AND ALBUTEROL SULFATE 1 DOSE: .5; 3 SOLUTION RESPIRATORY (INHALATION) at 08:58

## 2025-07-02 RX ADMIN — IPRATROPIUM BROMIDE AND ALBUTEROL SULFATE 1 DOSE: .5; 3 SOLUTION RESPIRATORY (INHALATION) at 12:33

## 2025-07-02 RX ADMIN — Medication 2 PUFF: at 09:01

## 2025-07-02 RX ADMIN — SOTALOL HYDROCHLORIDE 80 MG: 80 TABLET ORAL at 07:38

## 2025-07-02 RX ADMIN — IPRATROPIUM BROMIDE AND ALBUTEROL SULFATE 1 DOSE: .5; 3 SOLUTION RESPIRATORY (INHALATION) at 16:20

## 2025-07-02 RX ADMIN — MELATONIN TAB 3 MG 3 MG: 3 TAB at 19:59

## 2025-07-02 RX ADMIN — IPRATROPIUM BROMIDE AND ALBUTEROL SULFATE 1 DOSE: .5; 3 SOLUTION RESPIRATORY (INHALATION) at 20:22

## 2025-07-02 RX ADMIN — ACETAMINOPHEN 650 MG: 325 TABLET ORAL at 14:15

## 2025-07-02 RX ADMIN — DAPTOMYCIN 625 MG: 500 INJECTION, POWDER, LYOPHILIZED, FOR SOLUTION INTRAVENOUS at 14:57

## 2025-07-02 RX ADMIN — TIOTROPIUM BROMIDE INHALATION SPRAY 5 MCG: 3.12 SPRAY, METERED RESPIRATORY (INHALATION) at 09:01

## 2025-07-02 RX ADMIN — Medication 2 PUFF: at 20:22

## 2025-07-02 ASSESSMENT — PAIN DESCRIPTION - DESCRIPTORS
DESCRIPTORS: ACHING
DESCRIPTORS: ACHING

## 2025-07-02 ASSESSMENT — PAIN SCALES - GENERAL
PAINLEVEL_OUTOF10: 8
PAINLEVEL_OUTOF10: 5
PAINLEVEL_OUTOF10: 8

## 2025-07-02 ASSESSMENT — PAIN DESCRIPTION - LOCATION
LOCATION: HEAD
LOCATION: HEAD

## 2025-07-02 NOTE — DISCHARGE INSTR - COC
Continuity of Care Form    Patient Name: Valdemar Solis   :  1950  MRN:  [id number]    Admit date:  2025  Discharge date:  ***    Code Status Order: Limited   Advance Directives:     Admitting Physician:  Roldan Larson DO  PCP: Yane Wei MD    Discharging Nurse: ***  Discharging Hospital Unit/Room#: [id number]/3377-01  Discharging Unit Phone Number: ***    Emergency Contact:   Extended Emergency Contact Information  Primary Emergency Contact: Luisa Solis  Address: [address] [place] [address]  Home Phone: [phone]  Mobile Phone: [phone]  Relation: Spouse    Past Surgical History:  Past Surgical History:   Procedure Laterality Date    AORTIC VALVE REPLACEMENT  10/1996:St Quique    x3    ATRIAL ABLATION SURGERY  2020    CTI dependent atrial flutter ablation (Dr. Raza)    CARDIAC PACEMAKER PLACEMENT  2020    BIV upgrade    CARDIAC VALVE REPLACEMENT  AORTIC    KNEE ARTHROCENTESIS Right 2022    RIGHT KNEE GENICULAR NERVE BLOCK WITH INTRA ARTICULAR INJECTION SITE CONFIRMED BY FLUOROSCOPY performed by Orlando Rosenberg MD at [facility]    KNEE ARTHROSCOPY Right 2021    RIGHT KNEE DIAGNOSTIC ARTHROSCOPY WITH SUBCHONDROPLASTY TO MEDIAL FEMORAL CONDYLE AND TIBIAL PLATEAU, LEFT KNEE INJECTION. performed by Orlando Rosenberg MD at [facility]    KNEE SURGERY Right 2022    RIGHT KNEE GENICULAR NERVE BLOCK WITH INTRA ARTICULAR INJECTION SITE CONFIRMED BY FLUOROSCOPY    PACEMAKER INSERTION      Device is NOT MRI compatible    UPPER GASTROINTESTINAL ENDOSCOPY N/A 2025    ESOPHAGOGASTRODUODENOSCOPY CONTROL HEMORRHAGE performed by Alfred Mcfadden MD at [facility] ENDOSCOPY    UPPER GASTROINTESTINAL ENDOSCOPY N/A 2025    ESOPHAGOGASTRODUODENOSCOPY BIOPSY performed by Alfred Mcfadden MD at [facility] ENDOSCOPY       Immunization History:   Immunization History   Administered Date(s) Administered    COVID-19, MODERNA BLUE

## 2025-07-03 DIAGNOSIS — I38 ENDOCARDITIS, UNSPECIFIED CHRONICITY, UNSPECIFIED ENDOCARDITIS TYPE: Primary | ICD-10-CM

## 2025-07-03 LAB
ANION GAP SERPL CALCULATED.3IONS-SCNC: 11 MMOL/L (ref 3–16)
BASOPHILS # BLD: 0 K/UL (ref 0–0.2)
BASOPHILS NFR BLD: 0.5 %
BUN SERPL-MCNC: 33 MG/DL (ref 7–20)
CALCIUM SERPL-MCNC: 9.4 MG/DL (ref 8.3–10.6)
CHLORIDE SERPL-SCNC: 106 MMOL/L (ref 99–110)
CO2 SERPL-SCNC: 22 MMOL/L (ref 21–32)
CREAT SERPL-MCNC: 2.1 MG/DL (ref 0.8–1.3)
DEPRECATED RDW RBC AUTO: 16.2 % (ref 12.4–15.4)
EOSINOPHIL # BLD: 0 K/UL (ref 0–0.6)
EOSINOPHIL NFR BLD: 0.4 %
GFR SERPLBLD CREATININE-BSD FMLA CKD-EPI: 32 ML/MIN/{1.73_M2}
GLUCOSE BLD-MCNC: 149 MG/DL (ref 70–99)
GLUCOSE BLD-MCNC: 164 MG/DL (ref 70–99)
GLUCOSE BLD-MCNC: 171 MG/DL (ref 70–99)
GLUCOSE BLD-MCNC: 183 MG/DL (ref 70–99)
GLUCOSE SERPL-MCNC: 144 MG/DL (ref 70–99)
HCT VFR BLD AUTO: 25.2 % (ref 40.5–52.5)
HGB BLD-MCNC: 8.4 G/DL (ref 13.5–17.5)
INR PPP: 2.5 (ref 0.86–1.14)
LYMPHOCYTES # BLD: 0.7 K/UL (ref 1–5.1)
LYMPHOCYTES NFR BLD: 13.3 %
MCH RBC QN AUTO: 30.6 PG (ref 26–34)
MCHC RBC AUTO-ENTMCNC: 33.4 G/DL (ref 31–36)
MCV RBC AUTO: 91.6 FL (ref 80–100)
MONOCYTES # BLD: 0.6 K/UL (ref 0–1.3)
MONOCYTES NFR BLD: 10.9 %
NEUTROPHILS # BLD: 3.9 K/UL (ref 1.7–7.7)
NEUTROPHILS NFR BLD: 74.9 %
NT-PROBNP SERPL-MCNC: 3150 PG/ML (ref 0–449)
PERFORMED ON: ABNORMAL
PLATELET # BLD AUTO: 164 K/UL (ref 135–450)
PMV BLD AUTO: 8.2 FL (ref 5–10.5)
POTASSIUM SERPL-SCNC: 4.3 MMOL/L (ref 3.5–5.1)
PROTHROMBIN TIME: 26.6 SEC (ref 12.1–14.9)
RBC # BLD AUTO: 2.75 M/UL (ref 4.2–5.9)
SODIUM SERPL-SCNC: 139 MMOL/L (ref 136–145)
WBC # BLD AUTO: 5.2 K/UL (ref 4–11)

## 2025-07-03 PROCEDURE — 99233 SBSQ HOSP IP/OBS HIGH 50: CPT | Performed by: HOSPITALIST

## 2025-07-03 PROCEDURE — 2060000000 HC ICU INTERMEDIATE R&B

## 2025-07-03 PROCEDURE — 85610 PROTHROMBIN TIME: CPT

## 2025-07-03 PROCEDURE — 6370000000 HC RX 637 (ALT 250 FOR IP): Performed by: NURSE PRACTITIONER

## 2025-07-03 PROCEDURE — 99222 1ST HOSP IP/OBS MODERATE 55: CPT | Performed by: PHYSICIAN ASSISTANT

## 2025-07-03 PROCEDURE — 87040 BLOOD CULTURE FOR BACTERIA: CPT

## 2025-07-03 PROCEDURE — 2500000003 HC RX 250 WO HCPCS: Performed by: INTERNAL MEDICINE

## 2025-07-03 PROCEDURE — 99233 SBSQ HOSP IP/OBS HIGH 50: CPT | Performed by: INTERNAL MEDICINE

## 2025-07-03 PROCEDURE — 83880 ASSAY OF NATRIURETIC PEPTIDE: CPT

## 2025-07-03 PROCEDURE — 6370000000 HC RX 637 (ALT 250 FOR IP)

## 2025-07-03 PROCEDURE — 94660 CPAP INITIATION&MGMT: CPT

## 2025-07-03 PROCEDURE — 6370000000 HC RX 637 (ALT 250 FOR IP): Performed by: STUDENT IN AN ORGANIZED HEALTH CARE EDUCATION/TRAINING PROGRAM

## 2025-07-03 PROCEDURE — 85025 COMPLETE CBC W/AUTO DIFF WBC: CPT

## 2025-07-03 PROCEDURE — 6370000000 HC RX 637 (ALT 250 FOR IP): Performed by: INTERNAL MEDICINE

## 2025-07-03 PROCEDURE — 6360000002 HC RX W HCPCS: Performed by: INTERNAL MEDICINE

## 2025-07-03 PROCEDURE — 80048 BASIC METABOLIC PNL TOTAL CA: CPT

## 2025-07-03 PROCEDURE — 36415 COLL VENOUS BLD VENIPUNCTURE: CPT

## 2025-07-03 PROCEDURE — 99232 SBSQ HOSP IP/OBS MODERATE 35: CPT | Performed by: NURSE PRACTITIONER

## 2025-07-03 PROCEDURE — 94640 AIRWAY INHALATION TREATMENT: CPT

## 2025-07-03 PROCEDURE — 2700000000 HC OXYGEN THERAPY PER DAY

## 2025-07-03 RX ORDER — SODIUM CHLORIDE 9 MG/ML
INJECTION, SOLUTION INTRAVENOUS PRN
Status: DISCONTINUED | OUTPATIENT
Start: 2025-07-03 | End: 2025-07-23 | Stop reason: HOSPADM

## 2025-07-03 RX ORDER — IPRATROPIUM BROMIDE AND ALBUTEROL SULFATE 2.5; .5 MG/3ML; MG/3ML
1 SOLUTION RESPIRATORY (INHALATION)
Status: DISCONTINUED | OUTPATIENT
Start: 2025-07-03 | End: 2025-07-09

## 2025-07-03 RX ORDER — FUROSEMIDE 40 MG/1
40 TABLET ORAL DAILY
Status: DISCONTINUED | OUTPATIENT
Start: 2025-07-03 | End: 2025-07-07

## 2025-07-03 RX ADMIN — SODIUM CHLORIDE, PRESERVATIVE FREE 10 ML: 5 INJECTION INTRAVENOUS at 20:26

## 2025-07-03 RX ADMIN — TIOTROPIUM BROMIDE INHALATION SPRAY 5 MCG: 3.12 SPRAY, METERED RESPIRATORY (INHALATION) at 08:36

## 2025-07-03 RX ADMIN — FUROSEMIDE 40 MG: 40 TABLET ORAL at 13:50

## 2025-07-03 RX ADMIN — MELATONIN TAB 3 MG 3 MG: 3 TAB at 20:26

## 2025-07-03 RX ADMIN — SOTALOL HYDROCHLORIDE 80 MG: 80 TABLET ORAL at 07:53

## 2025-07-03 RX ADMIN — WARFARIN SODIUM 10 MG: 5 TABLET ORAL at 16:21

## 2025-07-03 RX ADMIN — IPRATROPIUM BROMIDE AND ALBUTEROL SULFATE 1 DOSE: .5; 3 SOLUTION RESPIRATORY (INHALATION) at 08:36

## 2025-07-03 RX ADMIN — DAPTOMYCIN 625 MG: 500 INJECTION, POWDER, LYOPHILIZED, FOR SOLUTION INTRAVENOUS at 15:53

## 2025-07-03 RX ADMIN — INSULIN LISPRO 1 UNITS: 100 INJECTION, SOLUTION INTRAVENOUS; SUBCUTANEOUS at 13:50

## 2025-07-03 RX ADMIN — ACETAMINOPHEN 650 MG: 325 TABLET ORAL at 05:20

## 2025-07-03 RX ADMIN — INSULIN GLARGINE 10 UNITS: 100 INJECTION, SOLUTION SUBCUTANEOUS at 07:52

## 2025-07-03 RX ADMIN — Medication 2 PUFF: at 21:15

## 2025-07-03 RX ADMIN — IPRATROPIUM BROMIDE AND ALBUTEROL SULFATE 1 DOSE: .5; 3 SOLUTION RESPIRATORY (INHALATION) at 21:15

## 2025-07-03 RX ADMIN — Medication 2 PUFF: at 08:36

## 2025-07-03 ASSESSMENT — PAIN SCALES - GENERAL: PAINLEVEL_OUTOF10: 7

## 2025-07-03 ASSESSMENT — PAIN DESCRIPTION - DESCRIPTORS: DESCRIPTORS: ACHING

## 2025-07-03 ASSESSMENT — PAIN DESCRIPTION - LOCATION: LOCATION: HEAD

## 2025-07-03 NOTE — RT PROTOCOL NOTE
RT Nebulizer Bronchodilator Protocol Note    There is a bronchodilator order in the chart from a provider indicating to follow the RT Bronchodilator Protocol and there is an “Initiate RT Bronchodilator Protocol” order as well (see protocol at bottom of note).    CXR Findings:  No results found.    The findings from the last RT Protocol Assessment were as follows:  Smoking: Chronic pulmonary disease  Respiratory Pattern: Regular pattern and RR 12-20 bpm  Breath Sounds: Slightly diminished and/or crackles  Cough: Strong, spontaneous, non-productive  Indication for Bronchodilator Therapy: Decreased or absent breath sounds, On home bronchodilators, Wheezing associated with pulm disorder  Bronchodilator Assessment Score: 4    Aerosolized bronchodilator medication orders have been revised according to the RT Nebulizer Bronchodilator Protocol below.    Respiratory Therapist to perform RT Therapy Protocol Assessment initially then follow the protocol.  Repeat RT Therapy Protocol Assessment PRN for score 0-3 or on second treatment, BID, and PRN for scores above 3.    No Indications - adjust the frequency to every 6 hours PRN wheezing or bronchospasm, if no treatments needed after 48 hours then discontinue using Per Protocol order mode.     If indication present, adjust the RT bronchodilator orders based on the Bronchodilator Assessment Score as indicated below.  If a patient is on this medication at home then do not decrease Frequency below that used at home.    0-3 - enter or revise RT bronchodilator order(s) to equivalent RT Bronchodilator order with Frequency of every 4 hours PRN for wheezing or increased work of breathing using Per Protocol order mode.       4-6 - enter or revise RT Bronchodilator order(s) to two equivalent RT bronchodilator orders with one order with BID Frequency and one order with Frequency of every 4 hours PRN wheezing or increased work of breathing using Per Protocol order mode.         7-10 - enter

## 2025-07-04 PROBLEM — R09.02 HYPOXIA: Status: RESOLVED | Noted: 2022-10-31 | Resolved: 2025-07-04

## 2025-07-04 PROBLEM — I95.9 HYPOTENSION: Status: RESOLVED | Noted: 2025-06-29 | Resolved: 2025-07-04

## 2025-07-04 PROBLEM — A41.9 SEPSIS (HCC): Status: RESOLVED | Noted: 2025-06-29 | Resolved: 2025-07-04

## 2025-07-04 LAB
ANION GAP SERPL CALCULATED.3IONS-SCNC: 10 MMOL/L (ref 3–16)
BACTERIA BLD CULT ORG #2: ABNORMAL
BACTERIA BLD CULT: ABNORMAL
BASOPHILS # BLD: 0 K/UL (ref 0–0.2)
BASOPHILS NFR BLD: 0.3 %
BUN SERPL-MCNC: 31 MG/DL (ref 7–20)
CALCIUM SERPL-MCNC: 9.2 MG/DL (ref 8.3–10.6)
CHLORIDE SERPL-SCNC: 105 MMOL/L (ref 99–110)
CO2 SERPL-SCNC: 23 MMOL/L (ref 21–32)
CREAT SERPL-MCNC: 2.1 MG/DL (ref 0.8–1.3)
DEPRECATED RDW RBC AUTO: 16 % (ref 12.4–15.4)
EOSINOPHIL # BLD: 0 K/UL (ref 0–0.6)
EOSINOPHIL NFR BLD: 0.6 %
GFR SERPLBLD CREATININE-BSD FMLA CKD-EPI: 32 ML/MIN/{1.73_M2}
GLUCOSE BLD-MCNC: 145 MG/DL (ref 70–99)
GLUCOSE BLD-MCNC: 154 MG/DL (ref 70–99)
GLUCOSE BLD-MCNC: 176 MG/DL (ref 70–99)
GLUCOSE BLD-MCNC: 179 MG/DL (ref 70–99)
GLUCOSE SERPL-MCNC: 139 MG/DL (ref 70–99)
HCT VFR BLD AUTO: 23.6 % (ref 40.5–52.5)
HGB BLD-MCNC: 7.8 G/DL (ref 13.5–17.5)
INR PPP: 2.46 (ref 0.86–1.14)
LYMPHOCYTES # BLD: 0.6 K/UL (ref 1–5.1)
LYMPHOCYTES NFR BLD: 11 %
MCH RBC QN AUTO: 30.5 PG (ref 26–34)
MCHC RBC AUTO-ENTMCNC: 33.2 G/DL (ref 31–36)
MCV RBC AUTO: 91.9 FL (ref 80–100)
MONOCYTES # BLD: 0.6 K/UL (ref 0–1.3)
MONOCYTES NFR BLD: 10.8 %
NEUTROPHILS # BLD: 4.4 K/UL (ref 1.7–7.7)
NEUTROPHILS NFR BLD: 77.3 %
ORGANISM: ABNORMAL
ORGANISM: ABNORMAL
PERFORMED ON: ABNORMAL
PLATELET # BLD AUTO: 152 K/UL (ref 135–450)
PMV BLD AUTO: 8.1 FL (ref 5–10.5)
POTASSIUM SERPL-SCNC: 4.2 MMOL/L (ref 3.5–5.1)
PROTHROMBIN TIME: 26.3 SEC (ref 12.1–14.9)
RBC # BLD AUTO: 2.57 M/UL (ref 4.2–5.9)
SODIUM SERPL-SCNC: 138 MMOL/L (ref 136–145)
WBC # BLD AUTO: 5.7 K/UL (ref 4–11)

## 2025-07-04 PROCEDURE — 94761 N-INVAS EAR/PLS OXIMETRY MLT: CPT

## 2025-07-04 PROCEDURE — 2060000000 HC ICU INTERMEDIATE R&B

## 2025-07-04 PROCEDURE — 94660 CPAP INITIATION&MGMT: CPT

## 2025-07-04 PROCEDURE — 94640 AIRWAY INHALATION TREATMENT: CPT

## 2025-07-04 PROCEDURE — 6370000000 HC RX 637 (ALT 250 FOR IP): Performed by: STUDENT IN AN ORGANIZED HEALTH CARE EDUCATION/TRAINING PROGRAM

## 2025-07-04 PROCEDURE — 2700000000 HC OXYGEN THERAPY PER DAY

## 2025-07-04 PROCEDURE — 2500000003 HC RX 250 WO HCPCS: Performed by: INTERNAL MEDICINE

## 2025-07-04 PROCEDURE — 6360000002 HC RX W HCPCS: Performed by: INTERNAL MEDICINE

## 2025-07-04 PROCEDURE — 6370000000 HC RX 637 (ALT 250 FOR IP)

## 2025-07-04 PROCEDURE — 6370000000 HC RX 637 (ALT 250 FOR IP): Performed by: INTERNAL MEDICINE

## 2025-07-04 PROCEDURE — 6370000000 HC RX 637 (ALT 250 FOR IP): Performed by: NURSE PRACTITIONER

## 2025-07-04 PROCEDURE — 85610 PROTHROMBIN TIME: CPT

## 2025-07-04 PROCEDURE — 99232 SBSQ HOSP IP/OBS MODERATE 35: CPT | Performed by: INTERNAL MEDICINE

## 2025-07-04 PROCEDURE — 85025 COMPLETE CBC W/AUTO DIFF WBC: CPT

## 2025-07-04 PROCEDURE — 36415 COLL VENOUS BLD VENIPUNCTURE: CPT

## 2025-07-04 PROCEDURE — 80048 BASIC METABOLIC PNL TOTAL CA: CPT

## 2025-07-04 PROCEDURE — 99233 SBSQ HOSP IP/OBS HIGH 50: CPT | Performed by: HOSPITALIST

## 2025-07-04 RX ADMIN — ACETAMINOPHEN 650 MG: 325 TABLET ORAL at 16:13

## 2025-07-04 RX ADMIN — IPRATROPIUM BROMIDE AND ALBUTEROL SULFATE 1 DOSE: .5; 3 SOLUTION RESPIRATORY (INHALATION) at 21:41

## 2025-07-04 RX ADMIN — IPRATROPIUM BROMIDE AND ALBUTEROL SULFATE 1 DOSE: .5; 3 SOLUTION RESPIRATORY (INHALATION) at 09:12

## 2025-07-04 RX ADMIN — Medication 2 PUFF: at 09:12

## 2025-07-04 RX ADMIN — SODIUM CHLORIDE, PRESERVATIVE FREE 10 ML: 5 INJECTION INTRAVENOUS at 22:35

## 2025-07-04 RX ADMIN — Medication 10 ML: at 15:13

## 2025-07-04 RX ADMIN — TIOTROPIUM BROMIDE INHALATION SPRAY 5 MCG: 3.12 SPRAY, METERED RESPIRATORY (INHALATION) at 09:13

## 2025-07-04 RX ADMIN — WARFARIN SODIUM 10 MG: 5 TABLET ORAL at 18:25

## 2025-07-04 RX ADMIN — Medication 2 PUFF: at 21:41

## 2025-07-04 RX ADMIN — SOTALOL HYDROCHLORIDE 80 MG: 80 TABLET ORAL at 08:54

## 2025-07-04 RX ADMIN — FUROSEMIDE 40 MG: 40 TABLET ORAL at 08:54

## 2025-07-04 RX ADMIN — INSULIN GLARGINE 10 UNITS: 100 INJECTION, SOLUTION SUBCUTANEOUS at 08:54

## 2025-07-04 RX ADMIN — MELATONIN TAB 3 MG 3 MG: 3 TAB at 20:55

## 2025-07-04 RX ADMIN — DAPTOMYCIN 625 MG: 500 INJECTION, POWDER, LYOPHILIZED, FOR SOLUTION INTRAVENOUS at 15:13

## 2025-07-04 ASSESSMENT — PAIN SCALES - GENERAL: PAINLEVEL_OUTOF10: 7

## 2025-07-04 ASSESSMENT — PAIN DESCRIPTION - DESCRIPTORS: DESCRIPTORS: ACHING

## 2025-07-04 ASSESSMENT — PAIN DESCRIPTION - LOCATION: LOCATION: HEAD

## 2025-07-05 ENCOUNTER — APPOINTMENT (OUTPATIENT)
Dept: CT IMAGING | Age: 75
DRG: 228 | End: 2025-07-05
Payer: MEDICARE

## 2025-07-05 LAB
ANION GAP SERPL CALCULATED.3IONS-SCNC: 13 MMOL/L (ref 3–16)
BASOPHILS # BLD: 0 K/UL (ref 0–0.2)
BASOPHILS NFR BLD: 0.3 %
BUN SERPL-MCNC: 32 MG/DL (ref 7–20)
CALCIUM SERPL-MCNC: 9.1 MG/DL (ref 8.3–10.6)
CHLORIDE SERPL-SCNC: 105 MMOL/L (ref 99–110)
CO2 SERPL-SCNC: 22 MMOL/L (ref 21–32)
CREAT SERPL-MCNC: 2.2 MG/DL (ref 0.8–1.3)
DEPRECATED RDW RBC AUTO: 15.8 % (ref 12.4–15.4)
EOSINOPHIL # BLD: 0 K/UL (ref 0–0.6)
EOSINOPHIL NFR BLD: 0.6 %
GFR SERPLBLD CREATININE-BSD FMLA CKD-EPI: 31 ML/MIN/{1.73_M2}
GLUCOSE BLD-MCNC: 124 MG/DL (ref 70–99)
GLUCOSE BLD-MCNC: 145 MG/DL (ref 70–99)
GLUCOSE BLD-MCNC: 150 MG/DL (ref 70–99)
GLUCOSE BLD-MCNC: 213 MG/DL (ref 70–99)
GLUCOSE SERPL-MCNC: 147 MG/DL (ref 70–99)
HCT VFR BLD AUTO: 22.8 % (ref 40.5–52.5)
HGB BLD-MCNC: 7.7 G/DL (ref 13.5–17.5)
INR PPP: 2.19 (ref 0.86–1.14)
LYMPHOCYTES # BLD: 0.7 K/UL (ref 1–5.1)
LYMPHOCYTES NFR BLD: 12.7 %
MCH RBC QN AUTO: 31 PG (ref 26–34)
MCHC RBC AUTO-ENTMCNC: 34 G/DL (ref 31–36)
MCV RBC AUTO: 91.1 FL (ref 80–100)
MONOCYTES # BLD: 0.6 K/UL (ref 0–1.3)
MONOCYTES NFR BLD: 11.9 %
NEUTROPHILS # BLD: 4 K/UL (ref 1.7–7.7)
NEUTROPHILS NFR BLD: 74.5 %
PERFORMED ON: ABNORMAL
PLATELET # BLD AUTO: 157 K/UL (ref 135–450)
PMV BLD AUTO: 7.7 FL (ref 5–10.5)
POTASSIUM SERPL-SCNC: 4.2 MMOL/L (ref 3.5–5.1)
PROTHROMBIN TIME: 24.1 SEC (ref 12.1–14.9)
RBC # BLD AUTO: 2.5 M/UL (ref 4.2–5.9)
SODIUM SERPL-SCNC: 140 MMOL/L (ref 136–145)
WBC # BLD AUTO: 5.3 K/UL (ref 4–11)

## 2025-07-05 PROCEDURE — 99233 SBSQ HOSP IP/OBS HIGH 50: CPT | Performed by: HOSPITALIST

## 2025-07-05 PROCEDURE — 2700000000 HC OXYGEN THERAPY PER DAY

## 2025-07-05 PROCEDURE — 6360000002 HC RX W HCPCS: Performed by: INTERNAL MEDICINE

## 2025-07-05 PROCEDURE — 71250 CT THORAX DX C-: CPT

## 2025-07-05 PROCEDURE — 6370000000 HC RX 637 (ALT 250 FOR IP): Performed by: INTERNAL MEDICINE

## 2025-07-05 PROCEDURE — 85610 PROTHROMBIN TIME: CPT

## 2025-07-05 PROCEDURE — 6370000000 HC RX 637 (ALT 250 FOR IP)

## 2025-07-05 PROCEDURE — 6370000000 HC RX 637 (ALT 250 FOR IP): Performed by: NURSE PRACTITIONER

## 2025-07-05 PROCEDURE — 36415 COLL VENOUS BLD VENIPUNCTURE: CPT

## 2025-07-05 PROCEDURE — 2500000003 HC RX 250 WO HCPCS: Performed by: INTERNAL MEDICINE

## 2025-07-05 PROCEDURE — 99232 SBSQ HOSP IP/OBS MODERATE 35: CPT | Performed by: NURSE PRACTITIONER

## 2025-07-05 PROCEDURE — 2060000000 HC ICU INTERMEDIATE R&B

## 2025-07-05 PROCEDURE — 6370000000 HC RX 637 (ALT 250 FOR IP): Performed by: STUDENT IN AN ORGANIZED HEALTH CARE EDUCATION/TRAINING PROGRAM

## 2025-07-05 PROCEDURE — 94761 N-INVAS EAR/PLS OXIMETRY MLT: CPT

## 2025-07-05 PROCEDURE — 94640 AIRWAY INHALATION TREATMENT: CPT

## 2025-07-05 PROCEDURE — 94660 CPAP INITIATION&MGMT: CPT

## 2025-07-05 PROCEDURE — 80048 BASIC METABOLIC PNL TOTAL CA: CPT

## 2025-07-05 PROCEDURE — 85025 COMPLETE CBC W/AUTO DIFF WBC: CPT

## 2025-07-05 RX ORDER — METHOCARBAMOL 750 MG/1
750 TABLET, FILM COATED ORAL 2 TIMES DAILY PRN
Status: DISCONTINUED | OUTPATIENT
Start: 2025-07-05 | End: 2025-07-23 | Stop reason: HOSPADM

## 2025-07-05 RX ADMIN — INSULIN LISPRO 1 UNITS: 100 INJECTION, SOLUTION INTRAVENOUS; SUBCUTANEOUS at 21:30

## 2025-07-05 RX ADMIN — Medication 2 PUFF: at 20:32

## 2025-07-05 RX ADMIN — DAPTOMYCIN 625 MG: 500 INJECTION, POWDER, LYOPHILIZED, FOR SOLUTION INTRAVENOUS at 13:51

## 2025-07-05 RX ADMIN — IPRATROPIUM BROMIDE AND ALBUTEROL SULFATE 1 DOSE: .5; 3 SOLUTION RESPIRATORY (INHALATION) at 07:48

## 2025-07-05 RX ADMIN — Medication 10 ML: at 10:11

## 2025-07-05 RX ADMIN — MELATONIN TAB 3 MG 3 MG: 3 TAB at 21:29

## 2025-07-05 RX ADMIN — Medication 2 PUFF: at 07:48

## 2025-07-05 RX ADMIN — INSULIN GLARGINE 10 UNITS: 100 INJECTION, SOLUTION SUBCUTANEOUS at 10:11

## 2025-07-05 RX ADMIN — SOTALOL HYDROCHLORIDE 80 MG: 80 TABLET ORAL at 10:11

## 2025-07-05 RX ADMIN — WARFARIN SODIUM 10 MG: 5 TABLET ORAL at 21:29

## 2025-07-05 RX ADMIN — IPRATROPIUM BROMIDE AND ALBUTEROL SULFATE 1 DOSE: .5; 3 SOLUTION RESPIRATORY (INHALATION) at 20:32

## 2025-07-05 RX ADMIN — TIOTROPIUM BROMIDE INHALATION SPRAY 5 MCG: 3.12 SPRAY, METERED RESPIRATORY (INHALATION) at 07:48

## 2025-07-05 RX ADMIN — METHOCARBAMOL 750 MG: 750 TABLET ORAL at 13:51

## 2025-07-05 RX ADMIN — FUROSEMIDE 40 MG: 40 TABLET ORAL at 10:11

## 2025-07-05 RX ADMIN — Medication 10 ML: at 21:29

## 2025-07-05 RX ADMIN — SODIUM CHLORIDE, PRESERVATIVE FREE 10 ML: 5 INJECTION INTRAVENOUS at 13:51

## 2025-07-05 ASSESSMENT — PAIN DESCRIPTION - LOCATION: LOCATION: BACK

## 2025-07-05 ASSESSMENT — PAIN SCALES - GENERAL
PAINLEVEL_OUTOF10: 10
PAINLEVEL_OUTOF10: 0

## 2025-07-06 LAB
ANION GAP SERPL CALCULATED.3IONS-SCNC: 13 MMOL/L (ref 3–16)
ANISOCYTOSIS BLD QL SMEAR: ABNORMAL
BASOPHILS # BLD: 0 K/UL (ref 0–0.2)
BASOPHILS NFR BLD: 0 %
BUN SERPL-MCNC: 29 MG/DL (ref 7–20)
CALCIUM SERPL-MCNC: 9.3 MG/DL (ref 8.3–10.6)
CHLORIDE SERPL-SCNC: 104 MMOL/L (ref 99–110)
CO2 SERPL-SCNC: 23 MMOL/L (ref 21–32)
CREAT SERPL-MCNC: 2.3 MG/DL (ref 0.8–1.3)
DEPRECATED RDW RBC AUTO: 16 % (ref 12.4–15.4)
EOSINOPHIL # BLD: 0.1 K/UL (ref 0–0.6)
EOSINOPHIL NFR BLD: 1 %
GFR SERPLBLD CREATININE-BSD FMLA CKD-EPI: 29 ML/MIN/{1.73_M2}
GLUCOSE BLD-MCNC: 119 MG/DL (ref 70–99)
GLUCOSE BLD-MCNC: 125 MG/DL (ref 70–99)
GLUCOSE BLD-MCNC: 161 MG/DL (ref 70–99)
GLUCOSE BLD-MCNC: 209 MG/DL (ref 70–99)
GLUCOSE SERPL-MCNC: 119 MG/DL (ref 70–99)
HCT VFR BLD AUTO: 23.6 % (ref 40.5–52.5)
HGB BLD-MCNC: 8 G/DL (ref 13.5–17.5)
INR PPP: 2.75 (ref 0.86–1.14)
LYMPHOCYTES # BLD: 1.1 K/UL (ref 1–5.1)
LYMPHOCYTES NFR BLD: 21 %
MCH RBC QN AUTO: 30.9 PG (ref 26–34)
MCHC RBC AUTO-ENTMCNC: 33.8 G/DL (ref 31–36)
MCV RBC AUTO: 91.6 FL (ref 80–100)
METAMYELOCYTES NFR BLD MANUAL: 2 %
MONOCYTES # BLD: 0.6 K/UL (ref 0–1.3)
MONOCYTES NFR BLD: 12 %
MYELOCYTES NFR BLD MANUAL: 2 %
NEUTROPHILS # BLD: 3.3 K/UL (ref 1.7–7.7)
NEUTROPHILS NFR BLD: 58 %
NEUTS BAND NFR BLD MANUAL: 4 % (ref 0–7)
PERFORMED ON: ABNORMAL
PLATELET # BLD AUTO: 175 K/UL (ref 135–450)
PLATELET BLD QL SMEAR: ADEQUATE
PMV BLD AUTO: 8.2 FL (ref 5–10.5)
POTASSIUM SERPL-SCNC: 4.2 MMOL/L (ref 3.5–5.1)
PROTHROMBIN TIME: 28.6 SEC (ref 12.1–14.9)
RBC # BLD AUTO: 2.58 M/UL (ref 4.2–5.9)
SLIDE REVIEW: ABNORMAL
SODIUM SERPL-SCNC: 140 MMOL/L (ref 136–145)
WBC # BLD AUTO: 5 K/UL (ref 4–11)

## 2025-07-06 PROCEDURE — 2500000003 HC RX 250 WO HCPCS: Performed by: INTERNAL MEDICINE

## 2025-07-06 PROCEDURE — 94640 AIRWAY INHALATION TREATMENT: CPT

## 2025-07-06 PROCEDURE — 6360000002 HC RX W HCPCS: Performed by: NURSE PRACTITIONER

## 2025-07-06 PROCEDURE — 94660 CPAP INITIATION&MGMT: CPT

## 2025-07-06 PROCEDURE — 85025 COMPLETE CBC W/AUTO DIFF WBC: CPT

## 2025-07-06 PROCEDURE — 80048 BASIC METABOLIC PNL TOTAL CA: CPT

## 2025-07-06 PROCEDURE — 94761 N-INVAS EAR/PLS OXIMETRY MLT: CPT

## 2025-07-06 PROCEDURE — 99232 SBSQ HOSP IP/OBS MODERATE 35: CPT | Performed by: NURSE PRACTITIONER

## 2025-07-06 PROCEDURE — 36415 COLL VENOUS BLD VENIPUNCTURE: CPT

## 2025-07-06 PROCEDURE — 6370000000 HC RX 637 (ALT 250 FOR IP): Performed by: INTERNAL MEDICINE

## 2025-07-06 PROCEDURE — 6360000002 HC RX W HCPCS: Performed by: INTERNAL MEDICINE

## 2025-07-06 PROCEDURE — 6370000000 HC RX 637 (ALT 250 FOR IP): Performed by: NURSE PRACTITIONER

## 2025-07-06 PROCEDURE — 99233 SBSQ HOSP IP/OBS HIGH 50: CPT | Performed by: HOSPITALIST

## 2025-07-06 PROCEDURE — 2060000000 HC ICU INTERMEDIATE R&B

## 2025-07-06 PROCEDURE — 6370000000 HC RX 637 (ALT 250 FOR IP): Performed by: STUDENT IN AN ORGANIZED HEALTH CARE EDUCATION/TRAINING PROGRAM

## 2025-07-06 PROCEDURE — 85610 PROTHROMBIN TIME: CPT

## 2025-07-06 PROCEDURE — 2700000000 HC OXYGEN THERAPY PER DAY

## 2025-07-06 RX ORDER — ENOXAPARIN SODIUM 150 MG/ML
1 INJECTION SUBCUTANEOUS 2 TIMES DAILY
Status: DISCONTINUED | OUTPATIENT
Start: 2025-07-06 | End: 2025-07-09

## 2025-07-06 RX ADMIN — Medication 2 PUFF: at 20:56

## 2025-07-06 RX ADMIN — SODIUM CHLORIDE, PRESERVATIVE FREE 10 ML: 5 INJECTION INTRAVENOUS at 15:41

## 2025-07-06 RX ADMIN — TIOTROPIUM BROMIDE INHALATION SPRAY 5 MCG: 3.12 SPRAY, METERED RESPIRATORY (INHALATION) at 07:59

## 2025-07-06 RX ADMIN — DAPTOMYCIN 625 MG: 500 INJECTION, POWDER, LYOPHILIZED, FOR SOLUTION INTRAVENOUS at 15:41

## 2025-07-06 RX ADMIN — MELATONIN TAB 3 MG 3 MG: 3 TAB at 21:57

## 2025-07-06 RX ADMIN — INSULIN GLARGINE 10 UNITS: 100 INJECTION, SOLUTION SUBCUTANEOUS at 10:15

## 2025-07-06 RX ADMIN — FUROSEMIDE 40 MG: 40 TABLET ORAL at 10:15

## 2025-07-06 RX ADMIN — Medication 2 PUFF: at 07:59

## 2025-07-06 RX ADMIN — Medication 10 ML: at 10:16

## 2025-07-06 RX ADMIN — ENOXAPARIN SODIUM 135 MG: 150 INJECTION SUBCUTANEOUS at 10:15

## 2025-07-06 RX ADMIN — IPRATROPIUM BROMIDE AND ALBUTEROL SULFATE 1 DOSE: .5; 3 SOLUTION RESPIRATORY (INHALATION) at 07:59

## 2025-07-06 RX ADMIN — SODIUM CHLORIDE, PRESERVATIVE FREE 10 ML: 5 INJECTION INTRAVENOUS at 21:59

## 2025-07-06 RX ADMIN — ACETAMINOPHEN 650 MG: 325 TABLET ORAL at 10:14

## 2025-07-06 RX ADMIN — IPRATROPIUM BROMIDE AND ALBUTEROL SULFATE 1 DOSE: .5; 3 SOLUTION RESPIRATORY (INHALATION) at 20:56

## 2025-07-06 RX ADMIN — SOTALOL HYDROCHLORIDE 80 MG: 80 TABLET ORAL at 10:14

## 2025-07-06 ASSESSMENT — PAIN DESCRIPTION - DESCRIPTORS: DESCRIPTORS: ACHING

## 2025-07-06 ASSESSMENT — PAIN SCALES - GENERAL
PAINLEVEL_OUTOF10: 7
PAINLEVEL_OUTOF10: 0

## 2025-07-06 ASSESSMENT — PAIN DESCRIPTION - LOCATION: LOCATION: HEAD

## 2025-07-06 NOTE — RT PROTOCOL NOTE
RT Nebulizer Bronchodilator Protocol Note    There is a bronchodilator order in the chart from a provider indicating to follow the RT Bronchodilator Protocol and there is an “Initiate RT Bronchodilator Protocol” order as well (see protocol at bottom of note).    CXR Findings:  No results found.    The findings from the last RT Protocol Assessment were as follows:  Smoking: Chronic pulmonary disease  Respiratory Pattern: Regular pattern and RR 12-20 bpm  Breath Sounds: Clear breath sounds  Cough: Strong, spontaneous, non-productive  Indication for Bronchodilator Therapy: Decreased or absent breath sounds, On home bronchodilators, Wheezing associated with pulm disorder  Bronchodilator Assessment Score: 2    Aerosolized bronchodilator medication orders have been revised according to the RT Nebulizer Bronchodilator Protocol below.    Respiratory Therapist to perform RT Therapy Protocol Assessment initially then follow the protocol.  Repeat RT Therapy Protocol Assessment PRN for score 0-3 or on second treatment, BID, and PRN for scores above 3.    No Indications - adjust the frequency to every 6 hours PRN wheezing or bronchospasm, if no treatments needed after 48 hours then discontinue using Per Protocol order mode.     If indication present, adjust the RT bronchodilator orders based on the Bronchodilator Assessment Score as indicated below.  If a patient is on this medication at home then do not decrease Frequency below that used at home.    0-3 - enter or revise RT bronchodilator order(s) to equivalent RT Bronchodilator order with Frequency of every 4 hours PRN for wheezing or increased work of breathing using Per Protocol order mode.       4-6 - enter or revise RT Bronchodilator order(s) to two equivalent RT bronchodilator orders with one order with BID Frequency and one order with Frequency of every 4 hours PRN wheezing or increased work of breathing using Per Protocol order mode.         7-10 - enter or revise RT

## 2025-07-06 NOTE — RT PROTOCOL NOTE
RT Inhaler-Nebulizer Bronchodilator Protocol Note    There is a bronchodilator order in the chart from a provider indicating to follow the RT Bronchodilator Protocol and there is an “Initiate RT Inhaler-Nebulizer Bronchodilator Protocol” order as well (see protocol at bottom of note).    CXR Findings:  No results found.    The findings from the last RT Protocol Assessment were as follows:   History Pulmonary Disease: Chronic pulmonary disease  Respiratory Pattern: Regular pattern and RR 12-20 bpm  Breath Sounds: Clear breath sounds  Cough: Strong, spontaneous, non-productive  Indication for Bronchodilator Therapy: Decreased or absent breath sounds, On home bronchodilators, Wheezing associated with pulm disorder  Bronchodilator Assessment Score: 2    Aerosolized bronchodilator medication orders have been revised according to the RT Inhaler-Nebulizer Bronchodilator Protocol below.    Respiratory Therapist to perform RT Therapy Protocol Assessment initially then follow the protocol.  Repeat RT Therapy Protocol Assessment PRN for score 0-3 or on second treatment, BID, and PRN for scores above 3.    No Indications - adjust the frequency to every 6 hours PRN wheezing or bronchospasm, if no treatments needed after 48 hours then discontinue using Per Protocol order mode.     If indication present, adjust the RT bronchodilator orders based on the Bronchodilator Assessment Score as indicated below.  Use Inhaler orders unless patient has one or more of the following: on home nebulizer, not able to hold breath for 10 seconds, is not alert and oriented, cannot activate and use MDI correctly, or respiratory rate 25 breaths per minute or more, then use the equivalent nebulizer order(s) with same Frequency and PRN reasons based on the score.  If a patient is on this medication at home then do not decrease Frequency below that used at home.    0-3 - enter or revise RT bronchodilator order(s) to equivalent RT Bronchodilator order

## 2025-07-07 PROBLEM — R78.81 BACTEREMIA: Status: ACTIVE | Noted: 2025-06-28

## 2025-07-07 PROBLEM — A41.02 SEPSIS DUE TO METHICILLIN RESISTANT STAPHYLOCOCCUS AUREUS (MRSA) WITHOUT ACUTE ORGAN DYSFUNCTION (HCC): Status: ACTIVE | Noted: 2025-07-07

## 2025-07-07 LAB
ABO/RH: NORMAL
ANION GAP SERPL CALCULATED.3IONS-SCNC: 13 MMOL/L (ref 3–16)
ANISOCYTOSIS BLD QL SMEAR: ABNORMAL
ANTIBODY SCREEN: NORMAL
BACTERIA BLD CULT ORG #2: NORMAL
BACTERIA BLD CULT: NORMAL
BASOPHILS # BLD: 0 K/UL (ref 0–0.2)
BASOPHILS NFR BLD: 0 %
BUN SERPL-MCNC: 31 MG/DL (ref 7–20)
CALCIUM SERPL-MCNC: 8.9 MG/DL (ref 8.3–10.6)
CHLORIDE SERPL-SCNC: 104 MMOL/L (ref 99–110)
CK SERPL-CCNC: 25 U/L (ref 39–308)
CO2 SERPL-SCNC: 21 MMOL/L (ref 21–32)
CREAT SERPL-MCNC: 2.3 MG/DL (ref 0.8–1.3)
DEPRECATED RDW RBC AUTO: 15.7 % (ref 12.4–15.4)
EOSINOPHIL # BLD: 0 K/UL (ref 0–0.6)
EOSINOPHIL NFR BLD: 0 %
GFR SERPLBLD CREATININE-BSD FMLA CKD-EPI: 29 ML/MIN/{1.73_M2}
GLUCOSE BLD-MCNC: 108 MG/DL (ref 70–99)
GLUCOSE BLD-MCNC: 115 MG/DL (ref 70–99)
GLUCOSE BLD-MCNC: 124 MG/DL (ref 70–99)
GLUCOSE BLD-MCNC: 199 MG/DL (ref 70–99)
GLUCOSE SERPL-MCNC: 114 MG/DL (ref 70–99)
HCT VFR BLD AUTO: 22.8 % (ref 40.5–52.5)
HGB BLD-MCNC: 7.8 G/DL (ref 13.5–17.5)
INR PPP: 2.71 (ref 0.86–1.14)
LYMPHOCYTES # BLD: 0.7 K/UL (ref 1–5.1)
LYMPHOCYTES NFR BLD: 18 %
MCH RBC QN AUTO: 31 PG (ref 26–34)
MCHC RBC AUTO-ENTMCNC: 34.4 G/DL (ref 31–36)
MCV RBC AUTO: 90.1 FL (ref 80–100)
METAMYELOCYTES NFR BLD MANUAL: 1 %
MONOCYTES # BLD: 0.4 K/UL (ref 0–1.3)
MONOCYTES NFR BLD: 11 %
NEUTROPHILS # BLD: 2.8 K/UL (ref 1.7–7.7)
NEUTROPHILS NFR BLD: 68 %
NEUTS BAND NFR BLD MANUAL: 2 % (ref 0–7)
PERFORMED ON: ABNORMAL
PLATELET # BLD AUTO: 162 K/UL (ref 135–450)
PLATELET BLD QL SMEAR: ADEQUATE
PMV BLD AUTO: 8.3 FL (ref 5–10.5)
POTASSIUM SERPL-SCNC: 4 MMOL/L (ref 3.5–5.1)
PROTHROMBIN TIME: 28.2 SEC (ref 12.1–14.9)
RBC # BLD AUTO: 2.53 M/UL (ref 4.2–5.9)
SLIDE REVIEW: ABNORMAL
SODIUM SERPL-SCNC: 138 MMOL/L (ref 136–145)
WBC # BLD AUTO: 4 K/UL (ref 4–11)

## 2025-07-07 PROCEDURE — 6370000000 HC RX 637 (ALT 250 FOR IP): Performed by: STUDENT IN AN ORGANIZED HEALTH CARE EDUCATION/TRAINING PROGRAM

## 2025-07-07 PROCEDURE — 6360000002 HC RX W HCPCS: Performed by: INTERNAL MEDICINE

## 2025-07-07 PROCEDURE — 86923 COMPATIBILITY TEST ELECTRIC: CPT

## 2025-07-07 PROCEDURE — 94640 AIRWAY INHALATION TREATMENT: CPT

## 2025-07-07 PROCEDURE — 6370000000 HC RX 637 (ALT 250 FOR IP): Performed by: NURSE PRACTITIONER

## 2025-07-07 PROCEDURE — G0545 PR INHERENT VISIT TO INPT: HCPCS | Performed by: INTERNAL MEDICINE

## 2025-07-07 PROCEDURE — 94761 N-INVAS EAR/PLS OXIMETRY MLT: CPT

## 2025-07-07 PROCEDURE — P9016 RBC LEUKOCYTES REDUCED: HCPCS

## 2025-07-07 PROCEDURE — 99232 SBSQ HOSP IP/OBS MODERATE 35: CPT | Performed by: CLINICAL NURSE SPECIALIST

## 2025-07-07 PROCEDURE — 85025 COMPLETE CBC W/AUTO DIFF WBC: CPT

## 2025-07-07 PROCEDURE — 99232 SBSQ HOSP IP/OBS MODERATE 35: CPT

## 2025-07-07 PROCEDURE — 94660 CPAP INITIATION&MGMT: CPT

## 2025-07-07 PROCEDURE — 82550 ASSAY OF CK (CPK): CPT

## 2025-07-07 PROCEDURE — 6370000000 HC RX 637 (ALT 250 FOR IP): Performed by: INTERNAL MEDICINE

## 2025-07-07 PROCEDURE — 2500000003 HC RX 250 WO HCPCS: Performed by: INTERNAL MEDICINE

## 2025-07-07 PROCEDURE — 86901 BLOOD TYPING SEROLOGIC RH(D): CPT

## 2025-07-07 PROCEDURE — 30233N1 TRANSFUSION OF NONAUTOLOGOUS RED BLOOD CELLS INTO PERIPHERAL VEIN, PERCUTANEOUS APPROACH: ICD-10-PCS | Performed by: INTERNAL MEDICINE

## 2025-07-07 PROCEDURE — 86900 BLOOD TYPING SEROLOGIC ABO: CPT

## 2025-07-07 PROCEDURE — 2060000000 HC ICU INTERMEDIATE R&B

## 2025-07-07 PROCEDURE — 99232 SBSQ HOSP IP/OBS MODERATE 35: CPT | Performed by: INTERNAL MEDICINE

## 2025-07-07 PROCEDURE — 80048 BASIC METABOLIC PNL TOTAL CA: CPT

## 2025-07-07 PROCEDURE — 85610 PROTHROMBIN TIME: CPT

## 2025-07-07 PROCEDURE — 99233 SBSQ HOSP IP/OBS HIGH 50: CPT | Performed by: INTERNAL MEDICINE

## 2025-07-07 PROCEDURE — 6370000000 HC RX 637 (ALT 250 FOR IP): Performed by: CLINICAL NURSE SPECIALIST

## 2025-07-07 PROCEDURE — 36415 COLL VENOUS BLD VENIPUNCTURE: CPT

## 2025-07-07 PROCEDURE — 86850 RBC ANTIBODY SCREEN: CPT

## 2025-07-07 PROCEDURE — 2700000000 HC OXYGEN THERAPY PER DAY

## 2025-07-07 PROCEDURE — 6360000002 HC RX W HCPCS

## 2025-07-07 RX ORDER — FUROSEMIDE 40 MG/1
40 TABLET ORAL 2 TIMES DAILY
Status: DISCONTINUED | OUTPATIENT
Start: 2025-07-07 | End: 2025-07-14

## 2025-07-07 RX ORDER — FUROSEMIDE 40 MG/1
40 TABLET ORAL 2 TIMES DAILY
Status: DISCONTINUED | OUTPATIENT
Start: 2025-07-07 | End: 2025-07-07

## 2025-07-07 RX ADMIN — INSULIN LISPRO 1 UNITS: 100 INJECTION, SOLUTION INTRAVENOUS; SUBCUTANEOUS at 21:24

## 2025-07-07 RX ADMIN — Medication 2 PUFF: at 08:00

## 2025-07-07 RX ADMIN — INSULIN GLARGINE 10 UNITS: 100 INJECTION, SOLUTION SUBCUTANEOUS at 12:25

## 2025-07-07 RX ADMIN — TIOTROPIUM BROMIDE INHALATION SPRAY 5 MCG: 3.12 SPRAY, METERED RESPIRATORY (INHALATION) at 08:00

## 2025-07-07 RX ADMIN — Medication 10 ML: at 12:25

## 2025-07-07 RX ADMIN — Medication 10 ML: at 21:24

## 2025-07-07 RX ADMIN — MELATONIN TAB 3 MG 3 MG: 3 TAB at 21:24

## 2025-07-07 RX ADMIN — ENOXAPARIN SODIUM 135 MG: 150 INJECTION SUBCUTANEOUS at 21:23

## 2025-07-07 RX ADMIN — Medication 2 SPRAY: at 21:24

## 2025-07-07 RX ADMIN — Medication 2 PUFF: at 20:06

## 2025-07-07 RX ADMIN — DAPTOMYCIN 625 MG: 500 INJECTION, POWDER, LYOPHILIZED, FOR SOLUTION INTRAVENOUS at 15:44

## 2025-07-07 RX ADMIN — IPRATROPIUM BROMIDE AND ALBUTEROL SULFATE 1 DOSE: .5; 3 SOLUTION RESPIRATORY (INHALATION) at 20:06

## 2025-07-07 RX ADMIN — FUROSEMIDE 40 MG: 40 TABLET ORAL at 20:00

## 2025-07-07 RX ADMIN — SOTALOL HYDROCHLORIDE 80 MG: 80 TABLET ORAL at 12:25

## 2025-07-07 RX ADMIN — FUROSEMIDE 40 MG: 40 TABLET ORAL at 12:38

## 2025-07-07 RX ADMIN — IPRATROPIUM BROMIDE AND ALBUTEROL SULFATE 1 DOSE: .5; 3 SOLUTION RESPIRATORY (INHALATION) at 08:00

## 2025-07-07 RX ADMIN — ACETAMINOPHEN 650 MG: 325 TABLET ORAL at 20:00

## 2025-07-07 ASSESSMENT — PAIN - FUNCTIONAL ASSESSMENT: PAIN_FUNCTIONAL_ASSESSMENT: ACTIVITIES ARE NOT PREVENTED

## 2025-07-07 ASSESSMENT — PAIN DESCRIPTION - LOCATION: LOCATION: HEAD

## 2025-07-07 ASSESSMENT — PAIN SCALES - GENERAL
PAINLEVEL_OUTOF10: 2
PAINLEVEL_OUTOF10: 0
PAINLEVEL_OUTOF10: 0

## 2025-07-07 ASSESSMENT — PAIN DESCRIPTION - DESCRIPTORS: DESCRIPTORS: ACHING

## 2025-07-08 ENCOUNTER — ANESTHESIA EVENT (OUTPATIENT)
Age: 75
End: 2025-07-08
Payer: MEDICARE

## 2025-07-08 LAB
ANISOCYTOSIS BLD QL SMEAR: ABNORMAL
BASOPHILS # BLD: 0 K/UL (ref 0–0.2)
BASOPHILS NFR BLD: 0 %
DEPRECATED RDW RBC AUTO: 16.1 % (ref 12.4–15.4)
EOSINOPHIL # BLD: 0 K/UL (ref 0–0.6)
EOSINOPHIL NFR BLD: 1 %
GLUCOSE BLD-MCNC: 127 MG/DL (ref 70–99)
GLUCOSE BLD-MCNC: 155 MG/DL (ref 70–99)
GLUCOSE BLD-MCNC: 188 MG/DL (ref 70–99)
HCT VFR BLD AUTO: 23.5 % (ref 40.5–52.5)
HCT VFR BLD AUTO: 27.8 % (ref 40.5–52.5)
HGB BLD-MCNC: 7.9 G/DL (ref 13.5–17.5)
HGB BLD-MCNC: 9.4 G/DL (ref 13.5–17.5)
INR PPP: 1.97 (ref 0.86–1.14)
INR PPP: 2.35 (ref 0.86–1.14)
LYMPHOCYTES # BLD: 0.7 K/UL (ref 1–5.1)
LYMPHOCYTES NFR BLD: 18 %
MCH RBC QN AUTO: 30.4 PG (ref 26–34)
MCHC RBC AUTO-ENTMCNC: 33.4 G/DL (ref 31–36)
MCV RBC AUTO: 90.9 FL (ref 80–100)
MONOCYTES # BLD: 0.5 K/UL (ref 0–1.3)
MONOCYTES NFR BLD: 12 %
NEUTROPHILS # BLD: 2.8 K/UL (ref 1.7–7.7)
NEUTROPHILS NFR BLD: 64 %
NEUTS BAND NFR BLD MANUAL: 5 % (ref 0–7)
PERFORMED ON: ABNORMAL
PLATELET # BLD AUTO: 178 K/UL (ref 135–450)
PLATELET BLD QL SMEAR: ADEQUATE
PMV BLD AUTO: 8.7 FL (ref 5–10.5)
PROTHROMBIN TIME: 22.3 SEC (ref 12.1–14.9)
PROTHROMBIN TIME: 25.4 SEC (ref 12.1–14.9)
RBC # BLD AUTO: 2.59 M/UL (ref 4.2–5.9)
SLIDE REVIEW: ABNORMAL
WBC # BLD AUTO: 4.1 K/UL (ref 4–11)

## 2025-07-08 PROCEDURE — 6360000002 HC RX W HCPCS: Performed by: NURSE PRACTITIONER

## 2025-07-08 PROCEDURE — 6370000000 HC RX 637 (ALT 250 FOR IP): Performed by: INTERNAL MEDICINE

## 2025-07-08 PROCEDURE — 2580000003 HC RX 258: Performed by: NURSE PRACTITIONER

## 2025-07-08 PROCEDURE — 6370000000 HC RX 637 (ALT 250 FOR IP): Performed by: STUDENT IN AN ORGANIZED HEALTH CARE EDUCATION/TRAINING PROGRAM

## 2025-07-08 PROCEDURE — 85014 HEMATOCRIT: CPT

## 2025-07-08 PROCEDURE — 36430 TRANSFUSION BLD/BLD COMPNT: CPT

## 2025-07-08 PROCEDURE — 6360000002 HC RX W HCPCS: Performed by: CLINICAL NURSE SPECIALIST

## 2025-07-08 PROCEDURE — 94640 AIRWAY INHALATION TREATMENT: CPT

## 2025-07-08 PROCEDURE — 85018 HEMOGLOBIN: CPT

## 2025-07-08 PROCEDURE — 6360000002 HC RX W HCPCS: Performed by: INTERNAL MEDICINE

## 2025-07-08 PROCEDURE — 85610 PROTHROMBIN TIME: CPT

## 2025-07-08 PROCEDURE — 36415 COLL VENOUS BLD VENIPUNCTURE: CPT

## 2025-07-08 PROCEDURE — 99233 SBSQ HOSP IP/OBS HIGH 50: CPT | Performed by: INTERNAL MEDICINE

## 2025-07-08 PROCEDURE — 85025 COMPLETE CBC W/AUTO DIFF WBC: CPT

## 2025-07-08 PROCEDURE — 99232 SBSQ HOSP IP/OBS MODERATE 35: CPT | Performed by: CLINICAL NURSE SPECIALIST

## 2025-07-08 PROCEDURE — 2060000000 HC ICU INTERMEDIATE R&B

## 2025-07-08 PROCEDURE — 2500000003 HC RX 250 WO HCPCS: Performed by: INTERNAL MEDICINE

## 2025-07-08 PROCEDURE — G0545 PR INHERENT VISIT TO INPT: HCPCS | Performed by: INTERNAL MEDICINE

## 2025-07-08 PROCEDURE — 2500000003 HC RX 250 WO HCPCS

## 2025-07-08 PROCEDURE — 94761 N-INVAS EAR/PLS OXIMETRY MLT: CPT

## 2025-07-08 PROCEDURE — 94660 CPAP INITIATION&MGMT: CPT

## 2025-07-08 PROCEDURE — 6370000000 HC RX 637 (ALT 250 FOR IP): Performed by: NURSE PRACTITIONER

## 2025-07-08 PROCEDURE — 99233 SBSQ HOSP IP/OBS HIGH 50: CPT | Performed by: NURSE PRACTITIONER

## 2025-07-08 PROCEDURE — 99232 SBSQ HOSP IP/OBS MODERATE 35: CPT | Performed by: INTERNAL MEDICINE

## 2025-07-08 PROCEDURE — 2700000000 HC OXYGEN THERAPY PER DAY

## 2025-07-08 RX ORDER — SODIUM CHLORIDE 0.9 % (FLUSH) 0.9 %
5-40 SYRINGE (ML) INJECTION EVERY 12 HOURS SCHEDULED
Status: DISCONTINUED | OUTPATIENT
Start: 2025-07-08 | End: 2025-07-09 | Stop reason: HOSPADM

## 2025-07-08 RX ORDER — SODIUM CHLORIDE 9 MG/ML
INJECTION, SOLUTION INTRAVENOUS PRN
Status: DISCONTINUED | OUTPATIENT
Start: 2025-07-08 | End: 2025-07-09 | Stop reason: HOSPADM

## 2025-07-08 RX ORDER — SODIUM CHLORIDE 0.9 % (FLUSH) 0.9 %
5-40 SYRINGE (ML) INJECTION PRN
Status: DISCONTINUED | OUTPATIENT
Start: 2025-07-08 | End: 2025-07-09 | Stop reason: HOSPADM

## 2025-07-08 RX ORDER — SODIUM CHLORIDE 9 MG/ML
INJECTION, SOLUTION INTRAVENOUS PRN
Status: COMPLETED | OUTPATIENT
Start: 2025-07-08 | End: 2025-07-09

## 2025-07-08 RX ORDER — FUROSEMIDE 10 MG/ML
40 INJECTION INTRAMUSCULAR; INTRAVENOUS ONCE
Status: COMPLETED | OUTPATIENT
Start: 2025-07-08 | End: 2025-07-08

## 2025-07-08 RX ADMIN — Medication 10 ML: at 23:19

## 2025-07-08 RX ADMIN — TIOTROPIUM BROMIDE INHALATION SPRAY 5 MCG: 3.12 SPRAY, METERED RESPIRATORY (INHALATION) at 08:11

## 2025-07-08 RX ADMIN — FUROSEMIDE 40 MG: 40 TABLET ORAL at 09:54

## 2025-07-08 RX ADMIN — IPRATROPIUM BROMIDE AND ALBUTEROL SULFATE 1 DOSE: .5; 3 SOLUTION RESPIRATORY (INHALATION) at 19:29

## 2025-07-08 RX ADMIN — Medication 10 ML: at 09:55

## 2025-07-08 RX ADMIN — Medication 2 PUFF: at 08:11

## 2025-07-08 RX ADMIN — INSULIN LISPRO 1 UNITS: 100 INJECTION, SOLUTION INTRAVENOUS; SUBCUTANEOUS at 12:00

## 2025-07-08 RX ADMIN — DAPTOMYCIN 625 MG: 500 INJECTION, POWDER, LYOPHILIZED, FOR SOLUTION INTRAVENOUS at 16:18

## 2025-07-08 RX ADMIN — INSULIN GLARGINE 10 UNITS: 100 INJECTION, SOLUTION SUBCUTANEOUS at 09:54

## 2025-07-08 RX ADMIN — FUROSEMIDE 40 MG: 10 INJECTION, SOLUTION INTRAMUSCULAR; INTRAVENOUS at 16:18

## 2025-07-08 RX ADMIN — SOTALOL HYDROCHLORIDE 80 MG: 80 TABLET ORAL at 09:54

## 2025-07-08 RX ADMIN — IPRATROPIUM BROMIDE AND ALBUTEROL SULFATE 1 DOSE: .5; 3 SOLUTION RESPIRATORY (INHALATION) at 08:11

## 2025-07-08 RX ADMIN — PHYTONADIONE 1 MG: 10 INJECTION, EMULSION INTRAMUSCULAR; INTRAVENOUS; SUBCUTANEOUS at 14:46

## 2025-07-08 RX ADMIN — Medication 2 PUFF: at 19:29

## 2025-07-08 NOTE — CONSENT
Informed Consent for Blood Component Transfusion Note    I have discussed with the patient the rationale for blood component transfusion; its benefits in treating or preventing fatigue, organ damage, or death; and its risk which includes mild transfusion reactions, rare risk of blood borne infection, or more serious but rare reactions. I have discussed the alternatives to transfusion, including the risk and consequences of not receiving transfusion. The patient had an opportunity to ask questions and had agreed to proceed with transfusion of blood components.    Electronically signed by MEENU Guerrier CNP on 7/8/25 at 1:41 PM EDT

## 2025-07-09 ENCOUNTER — ANESTHESIA (OUTPATIENT)
Age: 75
End: 2025-07-09
Payer: MEDICARE

## 2025-07-09 ENCOUNTER — APPOINTMENT (OUTPATIENT)
Dept: GENERAL RADIOLOGY | Age: 75
DRG: 228 | End: 2025-07-09
Payer: MEDICARE

## 2025-07-09 ENCOUNTER — APPOINTMENT (OUTPATIENT)
Age: 75
DRG: 228 | End: 2025-07-09
Attending: INTERNAL MEDICINE
Payer: MEDICARE

## 2025-07-09 PROBLEM — I44.2 CHB (COMPLETE HEART BLOCK) (HCC): Status: ACTIVE | Noted: 2025-06-28

## 2025-07-09 LAB
ALBUMIN SERPL-MCNC: 3.4 G/DL (ref 3.4–5)
ALBUMIN/GLOB SERPL: 1.1 {RATIO} (ref 1.1–2.2)
ALP SERPL-CCNC: 82 U/L (ref 40–129)
ALT SERPL-CCNC: 21 U/L (ref 10–40)
ANION GAP SERPL CALCULATED.3IONS-SCNC: 13 MMOL/L (ref 3–16)
AST SERPL-CCNC: 18 U/L (ref 15–37)
BILIRUB SERPL-MCNC: 0.3 MG/DL (ref 0–1)
BUN SERPL-MCNC: 30 MG/DL (ref 7–20)
CALCIUM SERPL-MCNC: 9.1 MG/DL (ref 8.3–10.6)
CHLORIDE SERPL-SCNC: 103 MMOL/L (ref 99–110)
CO2 SERPL-SCNC: 23 MMOL/L (ref 21–32)
CREAT SERPL-MCNC: 2.3 MG/DL (ref 0.8–1.3)
DEPRECATED RDW RBC AUTO: 15.7 % (ref 12.4–15.4)
GFR SERPLBLD CREATININE-BSD FMLA CKD-EPI: 29 ML/MIN/{1.73_M2}
GLUCOSE BLD-MCNC: 167 MG/DL (ref 70–99)
GLUCOSE BLD-MCNC: 178 MG/DL (ref 70–99)
GLUCOSE SERPL-MCNC: 123 MG/DL (ref 70–99)
HCT VFR BLD AUTO: 25.3 % (ref 40.5–52.5)
HCT VFR BLD AUTO: 26.4 % (ref 40.5–52.5)
HGB BLD-MCNC: 8.7 G/DL (ref 13.5–17.5)
HGB BLD-MCNC: 8.8 G/DL (ref 13.5–17.5)
INR PPP: 1.65 (ref 0.86–1.14)
MAGNESIUM SERPL-MCNC: 1.88 MG/DL (ref 1.8–2.4)
MCH RBC QN AUTO: 30.9 PG (ref 26–34)
MCHC RBC AUTO-ENTMCNC: 34.3 G/DL (ref 31–36)
MCV RBC AUTO: 90 FL (ref 80–100)
PERFORMED ON: ABNORMAL
PERFORMED ON: ABNORMAL
PHOSPHATE SERPL-MCNC: 3.5 MG/DL (ref 2.5–4.9)
PLATELET # BLD AUTO: 175 K/UL (ref 135–450)
PMV BLD AUTO: 8 FL (ref 5–10.5)
POTASSIUM SERPL-SCNC: 4 MMOL/L (ref 3.5–5.1)
PROT SERPL-MCNC: 6.5 G/DL (ref 6.4–8.2)
PROTHROMBIN TIME: 19.5 SEC (ref 12.1–14.9)
RBC # BLD AUTO: 2.81 M/UL (ref 4.2–5.9)
SODIUM SERPL-SCNC: 139 MMOL/L (ref 136–145)
WBC # BLD AUTO: 4.8 K/UL (ref 4–11)

## 2025-07-09 PROCEDURE — 6370000000 HC RX 637 (ALT 250 FOR IP): Performed by: INTERNAL MEDICINE

## 2025-07-09 PROCEDURE — 6360000002 HC RX W HCPCS: Performed by: NURSE ANESTHETIST, CERTIFIED REGISTERED

## 2025-07-09 PROCEDURE — 0JPT0PZ REMOVAL OF CARDIAC RHYTHM RELATED DEVICE FROM TRUNK SUBCUTANEOUS TISSUE AND FASCIA, OPEN APPROACH: ICD-10-PCS | Performed by: INTERNAL MEDICINE

## 2025-07-09 PROCEDURE — C1892 INTRO/SHEATH,FIXED,PEEL-AWAY: HCPCS | Performed by: INTERNAL MEDICINE

## 2025-07-09 PROCEDURE — 2500000003 HC RX 250 WO HCPCS: Performed by: INTERNAL MEDICINE

## 2025-07-09 PROCEDURE — 3E0132A INTRODUCTION OF ANTI-INFECTIVE ENVELOPE INTO SUBCUTANEOUS TISSUE, PERCUTANEOUS APPROACH: ICD-10-PCS | Performed by: INTERNAL MEDICINE

## 2025-07-09 PROCEDURE — 3700000000 HC ANESTHESIA ATTENDED CARE: Performed by: INTERNAL MEDICINE

## 2025-07-09 PROCEDURE — 2580000003 HC RX 258

## 2025-07-09 PROCEDURE — 02H63JZ INSERTION OF PACEMAKER LEAD INTO RIGHT ATRIUM, PERCUTANEOUS APPROACH: ICD-10-PCS | Performed by: INTERNAL MEDICINE

## 2025-07-09 PROCEDURE — 33216 INSERT 1 ELECTRODE PM-DEFIB: CPT | Performed by: INTERNAL MEDICINE

## 2025-07-09 PROCEDURE — 99233 SBSQ HOSP IP/OBS HIGH 50: CPT | Performed by: INTERNAL MEDICINE

## 2025-07-09 PROCEDURE — C1769 GUIDE WIRE: HCPCS | Performed by: INTERNAL MEDICINE

## 2025-07-09 PROCEDURE — 2580000003 HC RX 258: Performed by: NURSE ANESTHETIST, CERTIFIED REGISTERED

## 2025-07-09 PROCEDURE — C1773 RET DEV, INSERTABLE: HCPCS | Performed by: INTERNAL MEDICINE

## 2025-07-09 PROCEDURE — G0545 PR INHERENT VISIT TO INPT: HCPCS | Performed by: INTERNAL MEDICINE

## 2025-07-09 PROCEDURE — 6360000002 HC RX W HCPCS: Performed by: INTERNAL MEDICINE

## 2025-07-09 PROCEDURE — 87070 CULTURE OTHR SPECIMN AEROBIC: CPT

## 2025-07-09 PROCEDURE — 7100000000 HC PACU RECOVERY - FIRST 15 MIN: Performed by: INTERNAL MEDICINE

## 2025-07-09 PROCEDURE — 2140000000 HC CCU INTERMEDIATE R&B

## 2025-07-09 PROCEDURE — 33999 UNLISTED PX CARDIAC SURGERY: CPT | Performed by: INTERNAL MEDICINE

## 2025-07-09 PROCEDURE — 84100 ASSAY OF PHOSPHORUS: CPT

## 2025-07-09 PROCEDURE — C1730 CATH, EP, 19 OR FEW ELECT: HCPCS | Performed by: INTERNAL MEDICINE

## 2025-07-09 PROCEDURE — 99233 SBSQ HOSP IP/OBS HIGH 50: CPT | Performed by: NURSE PRACTITIONER

## 2025-07-09 PROCEDURE — C1898 LEAD, PMKR, OTHER THAN TRANS: HCPCS | Performed by: INTERNAL MEDICINE

## 2025-07-09 PROCEDURE — 33206 INSERT HEART PM ATRIAL: CPT | Performed by: INTERNAL MEDICINE

## 2025-07-09 PROCEDURE — 94660 CPAP INITIATION&MGMT: CPT

## 2025-07-09 PROCEDURE — 99232 SBSQ HOSP IP/OBS MODERATE 35: CPT | Performed by: INTERNAL MEDICINE

## 2025-07-09 PROCEDURE — 85610 PROTHROMBIN TIME: CPT

## 2025-07-09 PROCEDURE — 7100000001 HC PACU RECOVERY - ADDTL 15 MIN: Performed by: INTERNAL MEDICINE

## 2025-07-09 PROCEDURE — 36556 INSERT NON-TUNNEL CV CATH: CPT | Performed by: INTERNAL MEDICINE

## 2025-07-09 PROCEDURE — 2720000010 HC SURG SUPPLY STERILE: Performed by: INTERNAL MEDICINE

## 2025-07-09 PROCEDURE — 36415 COLL VENOUS BLD VENIPUNCTURE: CPT

## 2025-07-09 PROCEDURE — 33235 REMOVAL PACEMAKER ELECTRODE: CPT | Performed by: INTERNAL MEDICINE

## 2025-07-09 PROCEDURE — 3700000001 HC ADD 15 MINUTES (ANESTHESIA): Performed by: INTERNAL MEDICINE

## 2025-07-09 PROCEDURE — 83735 ASSAY OF MAGNESIUM: CPT

## 2025-07-09 PROCEDURE — C2628 CATHETER, OCCLUSION: HCPCS | Performed by: INTERNAL MEDICINE

## 2025-07-09 PROCEDURE — B24BZZ4 ULTRASONOGRAPHY OF HEART WITH AORTA, TRANSESOPHAGEAL: ICD-10-PCS | Performed by: INTERNAL MEDICINE

## 2025-07-09 PROCEDURE — 2500000003 HC RX 250 WO HCPCS: Performed by: NURSE ANESTHETIST, CERTIFIED REGISTERED

## 2025-07-09 PROCEDURE — 36620 INSERTION CATHETER ARTERY: CPT | Performed by: INTERNAL MEDICINE

## 2025-07-09 PROCEDURE — 85018 HEMOGLOBIN: CPT

## 2025-07-09 PROCEDURE — 02PA3MZ REMOVAL OF CARDIAC LEAD FROM HEART, PERCUTANEOUS APPROACH: ICD-10-PCS | Performed by: INTERNAL MEDICINE

## 2025-07-09 PROCEDURE — 2580000003 HC RX 258: Performed by: NURSE PRACTITIONER

## 2025-07-09 PROCEDURE — 80053 COMPREHEN METABOLIC PANEL: CPT

## 2025-07-09 PROCEDURE — 93325 DOPPLER ECHO COLOR FLOW MAPG: CPT | Performed by: INTERNAL MEDICINE

## 2025-07-09 PROCEDURE — 33233 REMOVAL OF PM GENERATOR: CPT | Performed by: INTERNAL MEDICINE

## 2025-07-09 PROCEDURE — 94640 AIRWAY INHALATION TREATMENT: CPT

## 2025-07-09 PROCEDURE — 6370000000 HC RX 637 (ALT 250 FOR IP): Performed by: CLINICAL NURSE SPECIALIST

## 2025-07-09 PROCEDURE — 94761 N-INVAS EAR/PLS OXIMETRY MLT: CPT

## 2025-07-09 PROCEDURE — C1889 IMPLANT/INSERT DEVICE, NOC: HCPCS | Performed by: INTERNAL MEDICINE

## 2025-07-09 PROCEDURE — 93312 ECHO TRANSESOPHAGEAL: CPT | Performed by: INTERNAL MEDICINE

## 2025-07-09 PROCEDURE — 2700000000 HC OXYGEN THERAPY PER DAY

## 2025-07-09 PROCEDURE — 85014 HEMATOCRIT: CPT

## 2025-07-09 PROCEDURE — 93320 DOPPLER ECHO COMPLETE: CPT | Performed by: INTERNAL MEDICINE

## 2025-07-09 PROCEDURE — 2709999900 HC NON-CHARGEABLE SUPPLY: Performed by: INTERNAL MEDICINE

## 2025-07-09 PROCEDURE — 71045 X-RAY EXAM CHEST 1 VIEW: CPT

## 2025-07-09 PROCEDURE — 11043 DBRDMT MUSC&/FSCA 1ST 20/<: CPT | Performed by: INTERNAL MEDICINE

## 2025-07-09 PROCEDURE — 85027 COMPLETE CBC AUTOMATED: CPT

## 2025-07-09 PROCEDURE — C1894 INTRO/SHEATH, NON-LASER: HCPCS | Performed by: INTERNAL MEDICINE

## 2025-07-09 DEVICE — ENVELOPE CMRM6133 ABSORB LRG MR
Type: IMPLANTABLE DEVICE | Site: CHEST  WALL | Status: FUNCTIONAL
Brand: TYRX™

## 2025-07-09 DEVICE — LEAD 5076-58 MRI US RCMCRD
Type: IMPLANTABLE DEVICE | Site: CHEST  WALL | Status: FUNCTIONAL
Brand: CAPSUREFIX NOVUS MRI™ SURESCAN®

## 2025-07-09 RX ORDER — EPHEDRINE SULFATE 50 MG/ML
INJECTION INTRAVENOUS
Status: DISCONTINUED | OUTPATIENT
Start: 2025-07-09 | End: 2025-07-09 | Stop reason: SDUPTHER

## 2025-07-09 RX ORDER — DEXAMETHASONE SODIUM PHOSPHATE 4 MG/ML
INJECTION, SOLUTION INTRA-ARTICULAR; INTRALESIONAL; INTRAMUSCULAR; INTRAVENOUS; SOFT TISSUE
Status: DISCONTINUED | OUTPATIENT
Start: 2025-07-09 | End: 2025-07-09 | Stop reason: SDUPTHER

## 2025-07-09 RX ORDER — IPRATROPIUM BROMIDE AND ALBUTEROL SULFATE 2.5; .5 MG/3ML; MG/3ML
1 SOLUTION RESPIRATORY (INHALATION)
Status: DISCONTINUED | OUTPATIENT
Start: 2025-07-09 | End: 2025-07-23 | Stop reason: HOSPADM

## 2025-07-09 RX ORDER — SUCCINYLCHOLINE/SOD CL,ISO/PF 200MG/10ML
SYRINGE (ML) INTRAVENOUS
Status: DISCONTINUED | OUTPATIENT
Start: 2025-07-09 | End: 2025-07-09 | Stop reason: SDUPTHER

## 2025-07-09 RX ORDER — ONDANSETRON 2 MG/ML
4 INJECTION INTRAMUSCULAR; INTRAVENOUS
Status: DISCONTINUED | OUTPATIENT
Start: 2025-07-09 | End: 2025-07-09 | Stop reason: HOSPADM

## 2025-07-09 RX ORDER — FENTANYL CITRATE 50 UG/ML
INJECTION, SOLUTION INTRAMUSCULAR; INTRAVENOUS
Status: DISCONTINUED | OUTPATIENT
Start: 2025-07-09 | End: 2025-07-09 | Stop reason: SDUPTHER

## 2025-07-09 RX ORDER — HYDRALAZINE HYDROCHLORIDE 20 MG/ML
10 INJECTION INTRAMUSCULAR; INTRAVENOUS
Status: DISCONTINUED | OUTPATIENT
Start: 2025-07-09 | End: 2025-07-09 | Stop reason: HOSPADM

## 2025-07-09 RX ORDER — ENOXAPARIN SODIUM 150 MG/ML
1 INJECTION SUBCUTANEOUS 2 TIMES DAILY
Status: DISCONTINUED | OUTPATIENT
Start: 2025-07-09 | End: 2025-07-13

## 2025-07-09 RX ORDER — PROPOFOL 10 MG/ML
INJECTION, EMULSION INTRAVENOUS
Status: DISCONTINUED | OUTPATIENT
Start: 2025-07-09 | End: 2025-07-09 | Stop reason: SDUPTHER

## 2025-07-09 RX ORDER — LABETALOL HYDROCHLORIDE 5 MG/ML
10 INJECTION, SOLUTION INTRAVENOUS
Status: DISCONTINUED | OUTPATIENT
Start: 2025-07-09 | End: 2025-07-09 | Stop reason: HOSPADM

## 2025-07-09 RX ORDER — SODIUM CHLORIDE 9 MG/ML
INJECTION, SOLUTION INTRAVENOUS PRN
Status: DISCONTINUED | OUTPATIENT
Start: 2025-07-09 | End: 2025-07-09 | Stop reason: HOSPADM

## 2025-07-09 RX ORDER — SODIUM CHLORIDE 0.9 % (FLUSH) 0.9 %
5-40 SYRINGE (ML) INJECTION EVERY 12 HOURS SCHEDULED
Status: DISCONTINUED | OUTPATIENT
Start: 2025-07-09 | End: 2025-07-09 | Stop reason: HOSPADM

## 2025-07-09 RX ORDER — FENTANYL CITRATE 50 UG/ML
50 INJECTION, SOLUTION INTRAMUSCULAR; INTRAVENOUS EVERY 5 MIN PRN
Status: DISCONTINUED | OUTPATIENT
Start: 2025-07-09 | End: 2025-07-09 | Stop reason: HOSPADM

## 2025-07-09 RX ORDER — FENTANYL CITRATE 50 UG/ML
25 INJECTION, SOLUTION INTRAMUSCULAR; INTRAVENOUS EVERY 5 MIN PRN
Status: DISCONTINUED | OUTPATIENT
Start: 2025-07-09 | End: 2025-07-09 | Stop reason: HOSPADM

## 2025-07-09 RX ORDER — PHENYLEPHRINE HYDROCHLORIDE 10 MG/ML
INJECTION INTRAVENOUS
Status: DISCONTINUED | OUTPATIENT
Start: 2025-07-09 | End: 2025-07-09 | Stop reason: SDUPTHER

## 2025-07-09 RX ORDER — ONDANSETRON 2 MG/ML
INJECTION INTRAMUSCULAR; INTRAVENOUS
Status: DISCONTINUED | OUTPATIENT
Start: 2025-07-09 | End: 2025-07-09 | Stop reason: SDUPTHER

## 2025-07-09 RX ORDER — OXYCODONE HYDROCHLORIDE 5 MG/1
5 TABLET ORAL
Status: DISCONTINUED | OUTPATIENT
Start: 2025-07-09 | End: 2025-07-09 | Stop reason: HOSPADM

## 2025-07-09 RX ORDER — OXYCODONE HYDROCHLORIDE 5 MG/1
10 TABLET ORAL EVERY 4 HOURS PRN
Status: DISCONTINUED | OUTPATIENT
Start: 2025-07-09 | End: 2025-07-23 | Stop reason: HOSPADM

## 2025-07-09 RX ORDER — WARFARIN SODIUM 5 MG/1
10 TABLET ORAL DAILY
Status: DISCONTINUED | OUTPATIENT
Start: 2025-07-09 | End: 2025-07-13

## 2025-07-09 RX ORDER — SODIUM CHLORIDE 0.9 % (FLUSH) 0.9 %
5-40 SYRINGE (ML) INJECTION PRN
Status: DISCONTINUED | OUTPATIENT
Start: 2025-07-09 | End: 2025-07-09 | Stop reason: HOSPADM

## 2025-07-09 RX ORDER — PROCHLORPERAZINE EDISYLATE 5 MG/ML
5 INJECTION INTRAMUSCULAR; INTRAVENOUS
Status: DISCONTINUED | OUTPATIENT
Start: 2025-07-09 | End: 2025-07-09 | Stop reason: HOSPADM

## 2025-07-09 RX ORDER — BUPIVACAINE HYDROCHLORIDE 5 MG/ML
INJECTION, SOLUTION EPIDURAL; INTRACAUDAL PRN
Status: DISCONTINUED | OUTPATIENT
Start: 2025-07-09 | End: 2025-07-09 | Stop reason: HOSPADM

## 2025-07-09 RX ORDER — LIDOCAINE HYDROCHLORIDE 20 MG/ML
INJECTION, SOLUTION EPIDURAL; INFILTRATION; INTRACAUDAL; PERINEURAL
Status: DISCONTINUED | OUTPATIENT
Start: 2025-07-09 | End: 2025-07-09 | Stop reason: SDUPTHER

## 2025-07-09 RX ORDER — SODIUM CHLORIDE, SODIUM LACTATE, POTASSIUM CHLORIDE, CALCIUM CHLORIDE 600; 310; 30; 20 MG/100ML; MG/100ML; MG/100ML; MG/100ML
INJECTION, SOLUTION INTRAVENOUS
Status: DISCONTINUED | OUTPATIENT
Start: 2025-07-09 | End: 2025-07-09 | Stop reason: SDUPTHER

## 2025-07-09 RX ORDER — ROCURONIUM BROMIDE 10 MG/ML
INJECTION, SOLUTION INTRAVENOUS
Status: DISCONTINUED | OUTPATIENT
Start: 2025-07-09 | End: 2025-07-09 | Stop reason: SDUPTHER

## 2025-07-09 RX ORDER — OXYCODONE HYDROCHLORIDE 5 MG/1
5 TABLET ORAL EVERY 4 HOURS PRN
Status: DISCONTINUED | OUTPATIENT
Start: 2025-07-09 | End: 2025-07-23 | Stop reason: HOSPADM

## 2025-07-09 RX ADMIN — FUROSEMIDE 40 MG: 40 TABLET ORAL at 16:21

## 2025-07-09 RX ADMIN — Medication 2 PUFF: at 20:26

## 2025-07-09 RX ADMIN — ROCURONIUM BROMIDE 10 MG: 10 INJECTION, SOLUTION INTRAVENOUS at 09:08

## 2025-07-09 RX ADMIN — ONDANSETRON 4 MG: 2 INJECTION, SOLUTION INTRAMUSCULAR; INTRAVENOUS at 07:35

## 2025-07-09 RX ADMIN — ROCURONIUM BROMIDE 20 MG: 10 INJECTION, SOLUTION INTRAVENOUS at 09:51

## 2025-07-09 RX ADMIN — ROCURONIUM BROMIDE 10 MG: 10 INJECTION, SOLUTION INTRAVENOUS at 07:27

## 2025-07-09 RX ADMIN — EPHEDRINE SULFATE 10 MG: 50 INJECTION, SOLUTION INTRAVENOUS at 07:33

## 2025-07-09 RX ADMIN — MELATONIN TAB 3 MG 3 MG: 3 TAB at 20:53

## 2025-07-09 RX ADMIN — PHENYLEPHRINE HYDROCHLORIDE 100 MCG: 10 INJECTION INTRAVENOUS at 07:44

## 2025-07-09 RX ADMIN — PHENYLEPHRINE HYDROCHLORIDE 100 MCG: 10 INJECTION INTRAVENOUS at 08:08

## 2025-07-09 RX ADMIN — LIDOCAINE HYDROCHLORIDE 100 MG: 20 INJECTION, SOLUTION EPIDURAL; INFILTRATION; INTRACAUDAL; PERINEURAL at 07:27

## 2025-07-09 RX ADMIN — FENTANYL CITRATE 50 MCG: 50 INJECTION, SOLUTION INTRAMUSCULAR; INTRAVENOUS at 12:00

## 2025-07-09 RX ADMIN — ROCURONIUM BROMIDE 20 MG: 10 INJECTION, SOLUTION INTRAVENOUS at 10:45

## 2025-07-09 RX ADMIN — METHOCARBAMOL 750 MG: 750 TABLET ORAL at 16:21

## 2025-07-09 RX ADMIN — SODIUM CHLORIDE, POTASSIUM CHLORIDE, SODIUM LACTATE AND CALCIUM CHLORIDE: 600; 310; 30; 20 INJECTION, SOLUTION INTRAVENOUS at 07:50

## 2025-07-09 RX ADMIN — ROCURONIUM BROMIDE 20 MG: 10 INJECTION, SOLUTION INTRAVENOUS at 08:20

## 2025-07-09 RX ADMIN — IPRATROPIUM BROMIDE AND ALBUTEROL SULFATE 1 DOSE: .5; 3 SOLUTION RESPIRATORY (INHALATION) at 20:18

## 2025-07-09 RX ADMIN — Medication 160 MG: at 07:27

## 2025-07-09 RX ADMIN — DEXAMETHASONE SODIUM PHOSPHATE 4 MG: 4 INJECTION, SOLUTION INTRAMUSCULAR; INTRAVENOUS at 07:35

## 2025-07-09 RX ADMIN — PHENYLEPHRINE HYDROCHLORIDE 100 MCG: 10 INJECTION INTRAVENOUS at 07:31

## 2025-07-09 RX ADMIN — DAPTOMYCIN 625 MG: 500 INJECTION, POWDER, LYOPHILIZED, FOR SOLUTION INTRAVENOUS at 14:00

## 2025-07-09 RX ADMIN — WARFARIN SODIUM 10 MG: 5 TABLET ORAL at 18:30

## 2025-07-09 RX ADMIN — PHENYLEPHRINE HYDROCHLORIDE 100 MCG: 10 INJECTION INTRAVENOUS at 07:54

## 2025-07-09 RX ADMIN — ENOXAPARIN SODIUM 135 MG: 150 INJECTION SUBCUTANEOUS at 18:30

## 2025-07-09 RX ADMIN — ROCURONIUM BROMIDE 40 MG: 10 INJECTION, SOLUTION INTRAVENOUS at 07:36

## 2025-07-09 RX ADMIN — SODIUM CHLORIDE, PRESERVATIVE FREE 10 ML: 5 INJECTION INTRAVENOUS at 05:49

## 2025-07-09 RX ADMIN — Medication 10 ML: at 21:00

## 2025-07-09 RX ADMIN — PROPOFOL 150 MG: 10 INJECTION, EMULSION INTRAVENOUS at 07:27

## 2025-07-09 RX ADMIN — PHENYLEPHRINE HYDROCHLORIDE 25 MCG/MIN: 10 INJECTION INTRAVENOUS at 08:18

## 2025-07-09 RX ADMIN — SODIUM CHLORIDE: 9 INJECTION, SOLUTION INTRAVENOUS at 07:15

## 2025-07-09 RX ADMIN — PHENYLEPHRINE HYDROCHLORIDE 100 MCG: 10 INJECTION INTRAVENOUS at 08:18

## 2025-07-09 RX ADMIN — SODIUM CHLORIDE: 9 INJECTION, SOLUTION INTRAVENOUS at 08:39

## 2025-07-09 RX ADMIN — SUGAMMADEX 200 MG: 100 INJECTION, SOLUTION INTRAVENOUS at 12:04

## 2025-07-09 RX ADMIN — OXYCODONE 10 MG: 5 TABLET ORAL at 16:21

## 2025-07-09 ASSESSMENT — PAIN SCALES - GENERAL
PAINLEVEL_OUTOF10: 0
PAINLEVEL_OUTOF10: 8

## 2025-07-09 ASSESSMENT — PAIN DESCRIPTION - LOCATION: LOCATION: BACK

## 2025-07-09 NOTE — CODE DOCUMENTATION
Today cardiac surgery team, including myself, were available and at standby for coverage of a high risk intracardiac lead extraction performed by the electrophysiology team.  Cardiac surgery was consulted for standby given the high complexity of the procedure with the lead implantation time over 29 years and severe calcification    I was available and at standby from 7:30 until 10:00 am when all the 3 cardiac leads were safely removed. Fortunately, the procedure was uneventful and our service was not required

## 2025-07-09 NOTE — ANESTHESIA POSTPROCEDURE EVALUATION
Department of Anesthesiology  Postprocedure Note    Patient: Valdemar Solis  MRN: [id number]  YOB: 1950  Date of evaluation: 7/9/2025    Procedure Summary       Date: 07/09/25 Room / Location: [facility] EP LAB 5 / [facility] CARDIAC CATH LAB    Anesthesia Start: 0708 Anesthesia Stop: 1232    Procedure: ICD lead extraction transvenous Diagnosis:       Bacteremia      (Bacteremia [R78.81])    Providers: Jasper Raza MD Responsible Provider: Raymond Benjamin MD    Anesthesia Type: general ASA Status: 4            Anesthesia Type: No value filed.    Karlene Phase I: Karlene Score: 8    Karlene Phase II:      Anesthesia Post Evaluation    Patient location during evaluation: PACU  Patient participation: complete - patient participated  Level of consciousness: awake and alert  Airway patency: patent  Nausea & Vomiting: no nausea and no vomiting  Cardiovascular status: hemodynamically stable  Respiratory status: acceptable  Hydration status: euvolemic  Multimodal analgesia pain management approach  Pain management: satisfactory to patient    No notable events documented.

## 2025-07-09 NOTE — ANESTHESIA PRE PROCEDURE
dependent atrial flutter ablation (Dr. Raza)   • CARDIAC PACEMAKER PLACEMENT  2020    BIV upgrade   • CARDIAC VALVE REPLACEMENT  AORTIC   • KNEE ARTHROCENTESIS Right 2022    RIGHT KNEE GENICULAR NERVE BLOCK WITH INTRA ARTICULAR INJECTION SITE CONFIRMED BY FLUOROSCOPY performed by Orlando Rosenberg MD [facility]   • KNEE ARTHROSCOPY Right 2021    RIGHT KNEE DIAGNOSTIC ARTHROSCOPY WITH SUBCHONDROPLASTY TO MEDIAL FEMORAL CONDYLE AND TIBIAL PLATEAU, LEFT KNEE INJECTION. performed by Orlando Rosenberg MD at [facility]   • KNEE SURGERY Right 2022    RIGHT KNEE GENICULAR NERVE BLOCK WITH INTRA ARTICULAR INJECTION SITE CONFIRMED BY FLUOROSCOPY   • PACEMAKER INSERTION  1996    Device is NOT MRI compatible   • UPPER GASTROINTESTINAL ENDOSCOPY N/A 2025    ESOPHAGOGASTRODUODENOSCOPY CONTROL HEMORRHAGE performed by Alfred Mcfadden MD at [facility] ENDOSCOPY   • UPPER GASTROINTESTINAL ENDOSCOPY N/A 2025    ESOPHAGOGASTRODUODENOSCOPY BIOPSY performed by Alfred Mcfadden MD at [facility] ENDOSCOPY       Social History:    Social History     Tobacco Use   • Smoking status: Former     Current packs/day: 0.00     Average packs/day: 1 pack/day for 30.0 years (30.0 ttl pk-yrs)     Types: Cigarettes     Start date: 1989     Quit date: 2019     Years since quittin.0   • Smokeless tobacco: Never   • Tobacco comments:         Substance Use Topics   • Alcohol use: Not Currently     Alcohol/week: 2.0 standard drinks of alcohol     Types: 2 Cans of beer per week     Comment: 3-4 beers per year                                Counseling given: Not Answered  Tobacco comments:        Vital Signs (Current):   Vitals:    25 0004 25 0051 25 0408 25 0530   BP:  (!) 109/56  126/75   Pulse: 69 69 70 77   Resp:  20   Temp:  97.8 °F (36.6 °C)  98.3 °F (36.8 °C)   TempSrc:  Axillary  Oral   SpO2: 100% 100% 100% 99%   Weight:  135.1 kg (297 lb 12.8 oz)     Height:

## 2025-07-09 NOTE — RT PROTOCOL NOTE
RT Nebulizer Bronchodilator Protocol Note    There is a bronchodilator order in the chart from a provider indicating to follow the RT Bronchodilator Protocol and there is an “Initiate RT Bronchodilator Protocol” order as well (see protocol at bottom of note).    CXR Findings:  No results found.    The findings from the last RT Protocol Assessment were as follows:  Smoking: Chronic pulmonary disease  Respiratory Pattern: Dyspnea on exertion or RR 21-25 bpm  Breath Sounds: Intermittent or unilateral wheezes  Cough: Strong, spontaneous, non-productive  Indication for Bronchodilator Therapy: Decreased or absent breath sounds, Wheezing associated with pulm disorder, On home bronchodilators  Bronchodilator Assessment Score: 8    Aerosolized bronchodilator medication orders have been revised according to the RT Nebulizer Bronchodilator Protocol below.    Respiratory Therapist to perform RT Therapy Protocol Assessment initially then follow the protocol.  Repeat RT Therapy Protocol Assessment PRN for score 0-3 or on second treatment, BID, and PRN for scores above 3.    No Indications - adjust the frequency to every 6 hours PRN wheezing or bronchospasm, if no treatments needed after 48 hours then discontinue using Per Protocol order mode.     If indication present, adjust the RT bronchodilator orders based on the Bronchodilator Assessment Score as indicated below.  If a patient is on this medication at home then do not decrease Frequency below that used at home.    0-3 - enter or revise RT bronchodilator order(s) to equivalent RT Bronchodilator order with Frequency of every 4 hours PRN for wheezing or increased work of breathing using Per Protocol order mode.       4-6 - enter or revise RT Bronchodilator order(s) to two equivalent RT bronchodilator orders with one order with BID Frequency and one order with Frequency of every 4 hours PRN wheezing or increased work of breathing using Per Protocol order mode.         7-10 -

## 2025-07-09 NOTE — PROCEDURES
PROCEDURE NOTE  Date: 7/9/2025   Name: Valdemar Solis  YOB: 1950    Procedures    [facility]     Electrophysiology Procedure Note       Date of Procedure: 7/9/2025  Patient's Name: Valdemar Solis  YOB: 1950   Medical Record Number: [id number]  Procedure Performed by: Jasper Raza MD    Procedures performed:     lead extraction of the right ventricular lead   Lead extraction of the right atrial pacing lead   Lead extraction of left ventricular lead  Removal of pacemaker pulse generator   Pocket debridement  Insertion of a temproary pacemaker   Insertion of right femoral venous central line   Insertion of right femoral arterial line   Removal of temporary pacing wire     Electronic analysis of lead and device   Ultrasound for venous and arterial access  Insertion of Bridge balloon      This was an extremely difficult and complicated procedure which required more than normal effort.  The leads were more than 29 years old with passive leads and significant calcification and.  We had to use multiple tools including replacement of tools that became nonfunctional due to the significance of calcification.  External sheath and use of lead extenders were needed.    Indication of the procedure:Valdemar Solis is 75 y.o. 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 The patient was in a fasting and non-sedated state. The risks, benefits and alternatives of the lead extraction were discussed with patient and his family members. The risks including, but not limited to, the risks of bleeding, infection, radiation exposure, injury to vascular, cardiac and surrounding structures (including pneumothorax), stroke, cardiac perforation, tamponade, need for emergent open heart surgery, myocardial infarction and death were discussed in detail. The patient

## 2025-07-09 NOTE — PROCEDURES
PROCEDURE NOTE  Date: 7/9/2025   Name: Valdemar Solis  YOB: 1950    Procedures    Insertion of active-fixation pacemaker lead via right IJ      Patient is completely pacemaker dependent.  Due to the current infection, we decided to put in an active-fixation lead until a new implant can be done.    Neck area was prepped and draped in the usual sterile fashion.  After injection of 2% lidocaine using ultrasound we accessed the right IJ.  Sutab be advanced that she is.  Then a 5076 [employer] active-fixation lead was advanced into the right atrium.  Using curved stylette it was positioned in the apical septum.  R waves measured 6 mV, impedance 617 and threshold 1.1 V at 0.4 ms.    Sheath was removed.  The lead was sutured to the neck.  Lead was connected to a pacemaker generator and set at 70 bpm.    Area was covered with dressing.  Conclusion  Insertion of active-fixation lead via right IJ.  Blood loss 5 cc  No complication  Plan  Will proceed with Micra implant on Friday.

## 2025-07-09 NOTE — H&P
H&P Update    I have reviewed the history and physical and examined the patient and updated with relevant changes.     Consent: I have discussed with the patient and family the indication, alternatives, and the possible risks and/or complications of the planned procedure,  and the sedation / anesthesia methods. The patient verbalized understanding and agree to proceed.    Vitals:    07/09/25 0530   BP: 126/75   Pulse: 77   Resp: 20   Temp: 98.3 °F (36.8 °C)   SpO2: 99%     Prior to Admission medications    Medication Sig Start Date End Date Taking? Authorizing Provider   furosemide (LASIX) 40 MG tablet 60 mg alternating with 80 mg 6/25/25  Yes Zoraida Uribe APRN - CNS   pantoprazole (PROTONIX) 40 MG tablet Take 1 tablet by mouth 2 times daily (before meals) 6/24/25  Yes Yane Wei MD   vitamin D 25 MCG (1000 UT) CAPS Take 1 capsule by mouth daily   Yes ProviderAbraham MD   blood glucose test strips (ASCENSIA AUTODISC VI;ONE TOUCH ULTRA TEST VI) strip Check glucose tid and prn 6/10/25  Yes Yane Wei MD   Accu-Chek Softclix Lancets MISC Check glucose tid and prn 6/10/25  Yes Yane Wei MD   diclofenac sodium (VOLTAREN) 1 % GEL Apply 2 g topically in the morning, at noon, and at bedtime 6/4/25  Yes Markus Mcfarland MD   spironolactone (ALDACTONE) 25 MG tablet Take 0.5 tablets by mouth Every Monday, Wednesday, and Friday 6/4/25  Yes Markus Mcfarland MD   dapagliflozin (FARXIGA) 10 MG tablet TAKE 1 TABLET BY MOUTH EVERY MORNING 4/24/25  Yes Yane Wei MD   sotalol (BETAPACE) 80 MG tablet TAKE 1 TABLET BY MOUTH 2 TIMES A DAY 4/24/25  Yes Yane Wei MD   traZODone (DESYREL) 50 MG tablet TAKE 1 OR 2 TABLETS AS NEEDED FOR SLEEP AS DIRECTED 4/9/25  Yes Yane Wei MD   albuterol (PROVENTIL) (2.5 MG/3ML) 0.083% nebulizer solution Take 3 mLs by nebulization every 6 hours as needed for Wheezing 3/20/25  Yes Billy Ambrosio MD   albuterol sulfate HFA

## 2025-07-09 NOTE — RT PROTOCOL NOTE
RT Inhaler-Nebulizer Bronchodilator Protocol Note    There is a bronchodilator order in the chart from a provider indicating to follow the RT Bronchodilator Protocol and there is an “Initiate RT Inhaler-Nebulizer Bronchodilator Protocol” order as well (see protocol at bottom of note).    CXR Findings:  No results found.    The findings from the last RT Protocol Assessment were as follows:   History Pulmonary Disease: Chronic pulmonary disease  Respiratory Pattern: Dyspnea on exertion or RR 21-25 bpm  Breath Sounds: Intermittent or unilateral wheezes  Cough: Strong, spontaneous, non-productive  Indication for Bronchodilator Therapy: Decreased or absent breath sounds, Wheezing associated with pulm disorder, On home bronchodilators  Bronchodilator Assessment Score: 8    Aerosolized bronchodilator medication orders have been revised according to the RT Inhaler-Nebulizer Bronchodilator Protocol below.    Respiratory Therapist to perform RT Therapy Protocol Assessment initially then follow the protocol.  Repeat RT Therapy Protocol Assessment PRN for score 0-3 or on second treatment, BID, and PRN for scores above 3.    No Indications - adjust the frequency to every 6 hours PRN wheezing or bronchospasm, if no treatments needed after 48 hours then discontinue using Per Protocol order mode.     If indication present, adjust the RT bronchodilator orders based on the Bronchodilator Assessment Score as indicated below.  Use Inhaler orders unless patient has one or more of the following: on home nebulizer, not able to hold breath for 10 seconds, is not alert and oriented, cannot activate and use MDI correctly, or respiratory rate 25 breaths per minute or more, then use the equivalent nebulizer order(s) with same Frequency and PRN reasons based on the score.  If a patient is on this medication at home then do not decrease Frequency below that used at home.    0-3 - enter or revise RT bronchodilator order(s) to equivalent RT  Opzelura Counseling:  I discussed with the patient the risks of Opzelura including but not limited to nasopharngitis, bronchitis, ear infection, eosinophila, hives, diarrhea, folliculitis, tonsillitis, and rhinorrhea.  Taken orally, this medication has been linked to serious infections; higher rate of mortality; malignancy and lymphoproliferative disorders; major adverse cardiovascular events; thrombosis; thrombocytopenia, anemia, and neutropenia; and lipid elevations.

## 2025-07-10 PROBLEM — I33.0 ACUTE BACTERIAL ENDOCARDITIS: Status: ACTIVE | Noted: 2025-07-10

## 2025-07-10 LAB
ANION GAP SERPL CALCULATED.3IONS-SCNC: 15 MMOL/L (ref 3–16)
BUN SERPL-MCNC: 32 MG/DL (ref 7–20)
CALCIUM SERPL-MCNC: 9.5 MG/DL (ref 8.3–10.6)
CHLORIDE SERPL-SCNC: 101 MMOL/L (ref 99–110)
CO2 SERPL-SCNC: 22 MMOL/L (ref 21–32)
CREAT SERPL-MCNC: 2.4 MG/DL (ref 0.8–1.3)
DEPRECATED RDW RBC AUTO: 15.7 % (ref 12.4–15.4)
GFR SERPLBLD CREATININE-BSD FMLA CKD-EPI: 28 ML/MIN/{1.73_M2}
GLUCOSE BLD-MCNC: 147 MG/DL (ref 70–99)
GLUCOSE BLD-MCNC: 168 MG/DL (ref 70–99)
GLUCOSE BLD-MCNC: 174 MG/DL (ref 70–99)
GLUCOSE BLD-MCNC: 194 MG/DL (ref 70–99)
GLUCOSE SERPL-MCNC: 140 MG/DL (ref 70–99)
HCT VFR BLD AUTO: 29.1 % (ref 40.5–52.5)
HGB BLD-MCNC: 10.2 G/DL (ref 13.5–17.5)
INR PPP: 1.42 (ref 0.86–1.14)
MCH RBC QN AUTO: 31.5 PG (ref 26–34)
MCHC RBC AUTO-ENTMCNC: 34.9 G/DL (ref 31–36)
MCV RBC AUTO: 90.2 FL (ref 80–100)
PERFORMED ON: ABNORMAL
PLATELET # BLD AUTO: 249 K/UL (ref 135–450)
PMV BLD AUTO: 8.3 FL (ref 5–10.5)
POTASSIUM SERPL-SCNC: 4.8 MMOL/L (ref 3.5–5.1)
PROTHROMBIN TIME: 17.5 SEC (ref 12.1–14.9)
RBC # BLD AUTO: 3.23 M/UL (ref 4.2–5.9)
REASON FOR REJECTION: NORMAL
REJECTED TEST: NORMAL
SODIUM SERPL-SCNC: 138 MMOL/L (ref 136–145)
WBC # BLD AUTO: 10.1 K/UL (ref 4–11)

## 2025-07-10 PROCEDURE — 94761 N-INVAS EAR/PLS OXIMETRY MLT: CPT

## 2025-07-10 PROCEDURE — 6360000002 HC RX W HCPCS: Performed by: INTERNAL MEDICINE

## 2025-07-10 PROCEDURE — 6370000000 HC RX 637 (ALT 250 FOR IP): Performed by: CLINICAL NURSE SPECIALIST

## 2025-07-10 PROCEDURE — 99233 SBSQ HOSP IP/OBS HIGH 50: CPT | Performed by: INTERNAL MEDICINE

## 2025-07-10 PROCEDURE — 2500000003 HC RX 250 WO HCPCS: Performed by: INTERNAL MEDICINE

## 2025-07-10 PROCEDURE — 2700000000 HC OXYGEN THERAPY PER DAY

## 2025-07-10 PROCEDURE — 6370000000 HC RX 637 (ALT 250 FOR IP): Performed by: INTERNAL MEDICINE

## 2025-07-10 PROCEDURE — 6370000000 HC RX 637 (ALT 250 FOR IP): Performed by: NURSE PRACTITIONER

## 2025-07-10 PROCEDURE — B24BZZ4 ULTRASONOGRAPHY OF HEART WITH AORTA, TRANSESOPHAGEAL: ICD-10-PCS | Performed by: INTERNAL MEDICINE

## 2025-07-10 PROCEDURE — 80048 BASIC METABOLIC PNL TOTAL CA: CPT

## 2025-07-10 PROCEDURE — 94640 AIRWAY INHALATION TREATMENT: CPT

## 2025-07-10 PROCEDURE — 6370000000 HC RX 637 (ALT 250 FOR IP): Performed by: STUDENT IN AN ORGANIZED HEALTH CARE EDUCATION/TRAINING PROGRAM

## 2025-07-10 PROCEDURE — 94660 CPAP INITIATION&MGMT: CPT

## 2025-07-10 PROCEDURE — 85610 PROTHROMBIN TIME: CPT

## 2025-07-10 PROCEDURE — 85027 COMPLETE CBC AUTOMATED: CPT

## 2025-07-10 PROCEDURE — 99232 SBSQ HOSP IP/OBS MODERATE 35: CPT | Performed by: CLINICAL NURSE SPECIALIST

## 2025-07-10 PROCEDURE — 99232 SBSQ HOSP IP/OBS MODERATE 35: CPT | Performed by: INTERNAL MEDICINE

## 2025-07-10 PROCEDURE — 2140000000 HC CCU INTERMEDIATE R&B

## 2025-07-10 PROCEDURE — G0545 PR INHERENT VISIT TO INPT: HCPCS | Performed by: INTERNAL MEDICINE

## 2025-07-10 RX ORDER — CHLORHEXIDINE GLUCONATE 40 MG/ML
SOLUTION TOPICAL ONCE
Status: DISCONTINUED | OUTPATIENT
Start: 2025-07-10 | End: 2025-07-11 | Stop reason: HOSPADM

## 2025-07-10 RX ADMIN — IPRATROPIUM BROMIDE AND ALBUTEROL SULFATE 1 DOSE: .5; 3 SOLUTION RESPIRATORY (INHALATION) at 08:45

## 2025-07-10 RX ADMIN — METHOCARBAMOL 750 MG: 750 TABLET ORAL at 10:41

## 2025-07-10 RX ADMIN — ENOXAPARIN SODIUM 135 MG: 150 INJECTION SUBCUTANEOUS at 08:30

## 2025-07-10 RX ADMIN — INSULIN LISPRO 1 UNITS: 100 INJECTION, SOLUTION INTRAVENOUS; SUBCUTANEOUS at 16:09

## 2025-07-10 RX ADMIN — Medication 2 PUFF: at 19:32

## 2025-07-10 RX ADMIN — SOTALOL HYDROCHLORIDE 80 MG: 80 TABLET ORAL at 08:30

## 2025-07-10 RX ADMIN — IPRATROPIUM BROMIDE AND ALBUTEROL SULFATE 1 DOSE: .5; 3 SOLUTION RESPIRATORY (INHALATION) at 19:32

## 2025-07-10 RX ADMIN — DAPTOMYCIN 625 MG: 500 INJECTION, POWDER, LYOPHILIZED, FOR SOLUTION INTRAVENOUS at 14:26

## 2025-07-10 RX ADMIN — OXYCODONE 10 MG: 5 TABLET ORAL at 00:43

## 2025-07-10 RX ADMIN — TIOTROPIUM BROMIDE INHALATION SPRAY 5 MCG: 3.12 SPRAY, METERED RESPIRATORY (INHALATION) at 08:45

## 2025-07-10 RX ADMIN — Medication 10 ML: at 20:56

## 2025-07-10 RX ADMIN — IPRATROPIUM BROMIDE AND ALBUTEROL SULFATE 1 DOSE: .5; 3 SOLUTION RESPIRATORY (INHALATION) at 14:35

## 2025-07-10 RX ADMIN — Medication 2 PUFF: at 08:45

## 2025-07-10 RX ADMIN — Medication 10 ML: at 08:31

## 2025-07-10 RX ADMIN — INSULIN GLARGINE 10 UNITS: 100 INJECTION, SOLUTION SUBCUTANEOUS at 08:30

## 2025-07-10 RX ADMIN — MELATONIN TAB 3 MG 3 MG: 3 TAB at 20:55

## 2025-07-10 RX ADMIN — ONDANSETRON 4 MG: 2 INJECTION INTRAMUSCULAR; INTRAVENOUS at 19:35

## 2025-07-10 RX ADMIN — ONDANSETRON 4 MG: 2 INJECTION INTRAMUSCULAR; INTRAVENOUS at 10:41

## 2025-07-10 RX ADMIN — ACETAMINOPHEN 650 MG: 325 TABLET ORAL at 10:41

## 2025-07-10 RX ADMIN — WARFARIN SODIUM 10 MG: 5 TABLET ORAL at 18:59

## 2025-07-10 RX ADMIN — FUROSEMIDE 40 MG: 40 TABLET ORAL at 16:09

## 2025-07-10 RX ADMIN — FUROSEMIDE 40 MG: 40 TABLET ORAL at 08:30

## 2025-07-10 RX ADMIN — ENOXAPARIN SODIUM 135 MG: 150 INJECTION SUBCUTANEOUS at 20:55

## 2025-07-10 ASSESSMENT — PAIN SCALES - GENERAL
PAINLEVEL_OUTOF10: 6
PAINLEVEL_OUTOF10: 8

## 2025-07-10 ASSESSMENT — PAIN DESCRIPTION - ORIENTATION: ORIENTATION: LEFT

## 2025-07-10 ASSESSMENT — PAIN DESCRIPTION - LOCATION: LOCATION: LEG

## 2025-07-10 ASSESSMENT — PAIN DESCRIPTION - DESCRIPTORS: DESCRIPTORS: ACHING

## 2025-07-10 NOTE — PROCEDURES
PROCEDURE NOTE  Date: 7/10/2025   Name: Valdemar Solis  YOB: 1950    Procedures    PERSTON showed soft valvular vegetation measuring 0.8 x 0.7  centimeter.  LV function is normal.

## 2025-07-10 NOTE — CARE COORDINATION
Pt will have Pacemaker placed tomorrow.    ID final recs are on MARYSE.    CM left  for Shirley with [email or web address] [phone] to follow up and update on IV home infusion.    Referral also sent over Careport.    Cm spoke to pt and discussed the above. He has no hc preference.    CM sent referrals over careport to [facility] and [facility].    Pt still need PICC placed and will need IV abx called into [facility] pharmacist before DC.     Beatriz Newby RN, BSN  [phone]

## 2025-07-11 ENCOUNTER — ANESTHESIA EVENT (OUTPATIENT)
Age: 75
End: 2025-07-11
Payer: MEDICARE

## 2025-07-11 ENCOUNTER — ANESTHESIA (OUTPATIENT)
Age: 75
End: 2025-07-11
Payer: MEDICARE

## 2025-07-11 LAB
ANION GAP SERPL CALCULATED.3IONS-SCNC: 14 MMOL/L (ref 3–16)
BLOOD BANK DISPENSE STATUS: NORMAL
BLOOD BANK PRODUCT CODE: NORMAL
BPU ID: NORMAL
BUN SERPL-MCNC: 37 MG/DL (ref 7–20)
CALCIUM SERPL-MCNC: 9.3 MG/DL (ref 8.3–10.6)
CHLORIDE SERPL-SCNC: 100 MMOL/L (ref 99–110)
CO2 SERPL-SCNC: 23 MMOL/L (ref 21–32)
CREAT SERPL-MCNC: 2.8 MG/DL (ref 0.8–1.3)
DEPRECATED RDW RBC AUTO: 15.7 % (ref 12.4–15.4)
DESCRIPTION BLOOD BANK: NORMAL
GFR SERPLBLD CREATININE-BSD FMLA CKD-EPI: 23 ML/MIN/{1.73_M2}
GLUCOSE BLD-MCNC: 161 MG/DL (ref 70–99)
GLUCOSE BLD-MCNC: 161 MG/DL (ref 70–99)
GLUCOSE BLD-MCNC: 208 MG/DL (ref 70–99)
GLUCOSE SERPL-MCNC: 151 MG/DL (ref 70–99)
HCT VFR BLD AUTO: 28.5 % (ref 40.5–52.5)
HGB BLD-MCNC: 9.7 G/DL (ref 13.5–17.5)
INR PPP: 1.42 (ref 0.86–1.14)
MCH RBC QN AUTO: 30.8 PG (ref 26–34)
MCHC RBC AUTO-ENTMCNC: 33.9 G/DL (ref 31–36)
MCV RBC AUTO: 90.8 FL (ref 80–100)
NT-PROBNP SERPL-MCNC: 4182 PG/ML (ref 0–449)
PERFORMED ON: ABNORMAL
PLATELET # BLD AUTO: 206 K/UL (ref 135–450)
PMV BLD AUTO: 8.6 FL (ref 5–10.5)
POTASSIUM SERPL-SCNC: 4.6 MMOL/L (ref 3.5–5.1)
PROTHROMBIN TIME: 17.5 SEC (ref 12.1–14.9)
RBC # BLD AUTO: 3.13 M/UL (ref 4.2–5.9)
SODIUM SERPL-SCNC: 137 MMOL/L (ref 136–145)
TSH SERPL DL<=0.005 MIU/L-ACNC: 0.2 UIU/ML (ref 0.27–4.2)
WBC # BLD AUTO: 9.1 K/UL (ref 4–11)

## 2025-07-11 PROCEDURE — 2500000003 HC RX 250 WO HCPCS: Performed by: NURSE ANESTHETIST, CERTIFIED REGISTERED

## 2025-07-11 PROCEDURE — 94660 CPAP INITIATION&MGMT: CPT

## 2025-07-11 PROCEDURE — 3700000000 HC ANESTHESIA ATTENDED CARE: Performed by: INTERNAL MEDICINE

## 2025-07-11 PROCEDURE — 84443 ASSAY THYROID STIM HORMONE: CPT

## 2025-07-11 PROCEDURE — 6370000000 HC RX 637 (ALT 250 FOR IP): Performed by: CLINICAL NURSE SPECIALIST

## 2025-07-11 PROCEDURE — 36415 COLL VENOUS BLD VENIPUNCTURE: CPT

## 2025-07-11 PROCEDURE — 2580000003 HC RX 258: Performed by: INTERNAL MEDICINE

## 2025-07-11 PROCEDURE — 6370000000 HC RX 637 (ALT 250 FOR IP): Performed by: INTERNAL MEDICINE

## 2025-07-11 PROCEDURE — 6360000002 HC RX W HCPCS: Performed by: INTERNAL MEDICINE

## 2025-07-11 PROCEDURE — 99232 SBSQ HOSP IP/OBS MODERATE 35: CPT | Performed by: CLINICAL NURSE SPECIALIST

## 2025-07-11 PROCEDURE — C1769 GUIDE WIRE: HCPCS | Performed by: INTERNAL MEDICINE

## 2025-07-11 PROCEDURE — G0545 PR INHERENT VISIT TO INPT: HCPCS | Performed by: INTERNAL MEDICINE

## 2025-07-11 PROCEDURE — 33274 TCAT INSJ/RPL PERM LDLS PM: CPT | Performed by: INTERNAL MEDICINE

## 2025-07-11 PROCEDURE — 99233 SBSQ HOSP IP/OBS HIGH 50: CPT | Performed by: INTERNAL MEDICINE

## 2025-07-11 PROCEDURE — 6370000000 HC RX 637 (ALT 250 FOR IP): Performed by: STUDENT IN AN ORGANIZED HEALTH CARE EDUCATION/TRAINING PROGRAM

## 2025-07-11 PROCEDURE — 94761 N-INVAS EAR/PLS OXIMETRY MLT: CPT

## 2025-07-11 PROCEDURE — 99232 SBSQ HOSP IP/OBS MODERATE 35: CPT | Performed by: INTERNAL MEDICINE

## 2025-07-11 PROCEDURE — 84439 ASSAY OF FREE THYROXINE: CPT

## 2025-07-11 PROCEDURE — 2500000003 HC RX 250 WO HCPCS: Performed by: INTERNAL MEDICINE

## 2025-07-11 PROCEDURE — 94640 AIRWAY INHALATION TREATMENT: CPT

## 2025-07-11 PROCEDURE — 2140000000 HC CCU INTERMEDIATE R&B

## 2025-07-11 PROCEDURE — 6370000000 HC RX 637 (ALT 250 FOR IP): Performed by: NURSE PRACTITIONER

## 2025-07-11 PROCEDURE — 85027 COMPLETE CBC AUTOMATED: CPT

## 2025-07-11 PROCEDURE — 3700000001 HC ADD 15 MINUTES (ANESTHESIA): Performed by: INTERNAL MEDICINE

## 2025-07-11 PROCEDURE — 6360000002 HC RX W HCPCS: Performed by: NURSE ANESTHETIST, CERTIFIED REGISTERED

## 2025-07-11 PROCEDURE — 85610 PROTHROMBIN TIME: CPT

## 2025-07-11 PROCEDURE — 80048 BASIC METABOLIC PNL TOTAL CA: CPT

## 2025-07-11 PROCEDURE — 02HK3NZ INSERTION OF INTRACARDIAC PACEMAKER INTO RIGHT VENTRICLE, PERCUTANEOUS APPROACH: ICD-10-PCS | Performed by: INTERNAL MEDICINE

## 2025-07-11 PROCEDURE — C1760 CLOSURE DEV, VASC: HCPCS | Performed by: INTERNAL MEDICINE

## 2025-07-11 PROCEDURE — C1786 PMKR, SINGLE, RATE-RESP: HCPCS | Performed by: INTERNAL MEDICINE

## 2025-07-11 PROCEDURE — 2580000003 HC RX 258: Performed by: NURSE ANESTHETIST, CERTIFIED REGISTERED

## 2025-07-11 PROCEDURE — 2709999900 HC NON-CHARGEABLE SUPPLY: Performed by: INTERNAL MEDICINE

## 2025-07-11 PROCEDURE — 6360000004 HC RX CONTRAST MEDICATION: Performed by: INTERNAL MEDICINE

## 2025-07-11 PROCEDURE — C1894 INTRO/SHEATH, NON-LASER: HCPCS | Performed by: INTERNAL MEDICINE

## 2025-07-11 PROCEDURE — 83880 ASSAY OF NATRIURETIC PEPTIDE: CPT

## 2025-07-11 PROCEDURE — 2700000000 HC OXYGEN THERAPY PER DAY

## 2025-07-11 DEVICE — TPS MC2AVR1 MICRA AV2 US
Type: IMPLANTABLE DEVICE | Status: FUNCTIONAL
Brand: MICRA™ AV2

## 2025-07-11 RX ORDER — LABETALOL HYDROCHLORIDE 5 MG/ML
10 INJECTION, SOLUTION INTRAVENOUS
Status: DISCONTINUED | OUTPATIENT
Start: 2025-07-11 | End: 2025-07-11 | Stop reason: HOSPADM

## 2025-07-11 RX ORDER — TRAMADOL HYDROCHLORIDE 50 MG/1
100 TABLET ORAL PRN
Status: DISCONTINUED | OUTPATIENT
Start: 2025-07-11 | End: 2025-07-11 | Stop reason: HOSPADM

## 2025-07-11 RX ORDER — HYDROMORPHONE HYDROCHLORIDE 2 MG/ML
0.5 INJECTION, SOLUTION INTRAMUSCULAR; INTRAVENOUS; SUBCUTANEOUS EVERY 5 MIN PRN
Status: DISCONTINUED | OUTPATIENT
Start: 2025-07-11 | End: 2025-07-11 | Stop reason: HOSPADM

## 2025-07-11 RX ORDER — ACETAMINOPHEN 325 MG/1
650 TABLET ORAL
Status: DISCONTINUED | OUTPATIENT
Start: 2025-07-11 | End: 2025-07-11 | Stop reason: HOSPADM

## 2025-07-11 RX ORDER — PROPOFOL 10 MG/ML
INJECTION, EMULSION INTRAVENOUS
Status: DISCONTINUED | OUTPATIENT
Start: 2025-07-11 | End: 2025-07-11 | Stop reason: SDUPTHER

## 2025-07-11 RX ORDER — SUCCINYLCHOLINE/SOD CL,ISO/PF 200MG/10ML
SYRINGE (ML) INTRAVENOUS
Status: DISCONTINUED | OUTPATIENT
Start: 2025-07-11 | End: 2025-07-11 | Stop reason: SDUPTHER

## 2025-07-11 RX ORDER — IODIXANOL 320 MG/ML
INJECTION, SOLUTION INTRAVASCULAR PRN
Status: DISCONTINUED | OUTPATIENT
Start: 2025-07-11 | End: 2025-07-11 | Stop reason: HOSPADM

## 2025-07-11 RX ORDER — LORAZEPAM 2 MG/ML
0.5 INJECTION INTRAMUSCULAR
Status: DISCONTINUED | OUTPATIENT
Start: 2025-07-11 | End: 2025-07-11 | Stop reason: HOSPADM

## 2025-07-11 RX ORDER — TRAZODONE HYDROCHLORIDE 50 MG/1
100 TABLET ORAL NIGHTLY
Status: DISCONTINUED | OUTPATIENT
Start: 2025-07-11 | End: 2025-07-23 | Stop reason: HOSPADM

## 2025-07-11 RX ORDER — SODIUM CHLORIDE 9 MG/ML
INJECTION, SOLUTION INTRAVENOUS PRN
Status: DISCONTINUED | OUTPATIENT
Start: 2025-07-11 | End: 2025-07-23 | Stop reason: HOSPADM

## 2025-07-11 RX ORDER — SODIUM CHLORIDE 0.9 % (FLUSH) 0.9 %
5-40 SYRINGE (ML) INJECTION EVERY 12 HOURS SCHEDULED
Status: DISCONTINUED | OUTPATIENT
Start: 2025-07-11 | End: 2025-07-11 | Stop reason: HOSPADM

## 2025-07-11 RX ORDER — SODIUM CHLORIDE 9 MG/ML
INJECTION, SOLUTION INTRAVENOUS
Status: DISCONTINUED | OUTPATIENT
Start: 2025-07-11 | End: 2025-07-11 | Stop reason: SDUPTHER

## 2025-07-11 RX ORDER — 0.9 % SODIUM CHLORIDE 0.9 %
500 INTRAVENOUS SOLUTION INTRAVENOUS ONCE
Status: COMPLETED | OUTPATIENT
Start: 2025-07-11 | End: 2025-07-11

## 2025-07-11 RX ORDER — HYDRALAZINE HYDROCHLORIDE 20 MG/ML
10 INJECTION INTRAMUSCULAR; INTRAVENOUS
Status: DISCONTINUED | OUTPATIENT
Start: 2025-07-11 | End: 2025-07-11 | Stop reason: HOSPADM

## 2025-07-11 RX ORDER — FENTANYL CITRATE 50 UG/ML
INJECTION, SOLUTION INTRAMUSCULAR; INTRAVENOUS
Status: DISCONTINUED | OUTPATIENT
Start: 2025-07-11 | End: 2025-07-11 | Stop reason: SDUPTHER

## 2025-07-11 RX ORDER — SODIUM CHLORIDE 0.9 % (FLUSH) 0.9 %
5-40 SYRINGE (ML) INJECTION PRN
Status: DISCONTINUED | OUTPATIENT
Start: 2025-07-11 | End: 2025-07-11 | Stop reason: HOSPADM

## 2025-07-11 RX ORDER — PROCHLORPERAZINE EDISYLATE 5 MG/ML
5 INJECTION INTRAMUSCULAR; INTRAVENOUS
Status: DISCONTINUED | OUTPATIENT
Start: 2025-07-11 | End: 2025-07-11 | Stop reason: HOSPADM

## 2025-07-11 RX ORDER — ONDANSETRON 2 MG/ML
4 INJECTION INTRAMUSCULAR; INTRAVENOUS
Status: DISCONTINUED | OUTPATIENT
Start: 2025-07-11 | End: 2025-07-11 | Stop reason: HOSPADM

## 2025-07-11 RX ORDER — SODIUM CHLORIDE 0.9 % (FLUSH) 0.9 %
5-40 SYRINGE (ML) INJECTION EVERY 12 HOURS SCHEDULED
Status: DISCONTINUED | OUTPATIENT
Start: 2025-07-11 | End: 2025-07-17

## 2025-07-11 RX ORDER — ROCURONIUM BROMIDE 10 MG/ML
INJECTION, SOLUTION INTRAVENOUS
Status: DISCONTINUED | OUTPATIENT
Start: 2025-07-11 | End: 2025-07-11 | Stop reason: SDUPTHER

## 2025-07-11 RX ORDER — ZOLPIDEM TARTRATE 5 MG/1
5 TABLET ORAL NIGHTLY PRN
Status: DISCONTINUED | OUTPATIENT
Start: 2025-07-11 | End: 2025-07-23 | Stop reason: HOSPADM

## 2025-07-11 RX ORDER — SODIUM CHLORIDE 9 MG/ML
INJECTION, SOLUTION INTRAVENOUS PRN
Status: DISCONTINUED | OUTPATIENT
Start: 2025-07-11 | End: 2025-07-11 | Stop reason: HOSPADM

## 2025-07-11 RX ORDER — EPHEDRINE SULFATE 50 MG/ML
INJECTION INTRAVENOUS
Status: DISCONTINUED | OUTPATIENT
Start: 2025-07-11 | End: 2025-07-11 | Stop reason: SDUPTHER

## 2025-07-11 RX ORDER — HEPARIN SODIUM 1000 [USP'U]/ML
INJECTION, SOLUTION INTRAVENOUS; SUBCUTANEOUS
Status: DISCONTINUED | OUTPATIENT
Start: 2025-07-11 | End: 2025-07-11 | Stop reason: SDUPTHER

## 2025-07-11 RX ORDER — SODIUM CHLORIDE 0.9 % (FLUSH) 0.9 %
5-40 SYRINGE (ML) INJECTION PRN
Status: DISCONTINUED | OUTPATIENT
Start: 2025-07-11 | End: 2025-07-23 | Stop reason: HOSPADM

## 2025-07-11 RX ORDER — HYDROMORPHONE HYDROCHLORIDE 2 MG/ML
0.25 INJECTION, SOLUTION INTRAMUSCULAR; INTRAVENOUS; SUBCUTANEOUS EVERY 5 MIN PRN
Status: DISCONTINUED | OUTPATIENT
Start: 2025-07-11 | End: 2025-07-11 | Stop reason: HOSPADM

## 2025-07-11 RX ORDER — TRAMADOL HYDROCHLORIDE 50 MG/1
50 TABLET ORAL PRN
Status: DISCONTINUED | OUTPATIENT
Start: 2025-07-11 | End: 2025-07-11 | Stop reason: HOSPADM

## 2025-07-11 RX ORDER — LIDOCAINE HYDROCHLORIDE 20 MG/ML
INJECTION, SOLUTION EPIDURAL; INFILTRATION; INTRACAUDAL; PERINEURAL
Status: DISCONTINUED | OUTPATIENT
Start: 2025-07-11 | End: 2025-07-11 | Stop reason: SDUPTHER

## 2025-07-11 RX ADMIN — TIOTROPIUM BROMIDE INHALATION SPRAY 5 MCG: 3.12 SPRAY, METERED RESPIRATORY (INHALATION) at 07:53

## 2025-07-11 RX ADMIN — ROCURONIUM BROMIDE 50 MG: 10 INJECTION, SOLUTION INTRAVENOUS at 15:34

## 2025-07-11 RX ADMIN — MELATONIN TAB 3 MG 6 MG: 3 TAB at 21:11

## 2025-07-11 RX ADMIN — EPHEDRINE SULFATE 10 MG: 50 INJECTION, SOLUTION INTRAVENOUS at 16:04

## 2025-07-11 RX ADMIN — Medication 2 PUFF: at 07:53

## 2025-07-11 RX ADMIN — FUROSEMIDE 40 MG: 40 TABLET ORAL at 07:49

## 2025-07-11 RX ADMIN — SODIUM CHLORIDE 500 ML: 0.9 INJECTION, SOLUTION INTRAVENOUS at 11:14

## 2025-07-11 RX ADMIN — Medication 2 PUFF: at 19:26

## 2025-07-11 RX ADMIN — SOTALOL HYDROCHLORIDE 80 MG: 80 TABLET ORAL at 07:49

## 2025-07-11 RX ADMIN — TRAZODONE HYDROCHLORIDE 100 MG: 50 TABLET ORAL at 21:11

## 2025-07-11 RX ADMIN — PHENYLEPHRINE HYDROCHLORIDE 200 MCG: 10 INJECTION INTRAVENOUS at 15:37

## 2025-07-11 RX ADMIN — ROCURONIUM BROMIDE 10 MG: 10 INJECTION, SOLUTION INTRAVENOUS at 16:21

## 2025-07-11 RX ADMIN — HEPARIN SODIUM 2000 UNITS: 1000 INJECTION INTRAVENOUS; SUBCUTANEOUS at 16:16

## 2025-07-11 RX ADMIN — SODIUM CHLORIDE, PRESERVATIVE FREE 10 ML: 5 INJECTION INTRAVENOUS at 21:00

## 2025-07-11 RX ADMIN — SODIUM CHLORIDE: 9 INJECTION, SOLUTION INTRAVENOUS at 15:12

## 2025-07-11 RX ADMIN — EPHEDRINE SULFATE 10 MG: 50 INJECTION, SOLUTION INTRAVENOUS at 16:31

## 2025-07-11 RX ADMIN — FENTANYL CITRATE 25 MCG: 50 INJECTION, SOLUTION INTRAMUSCULAR; INTRAVENOUS at 16:07

## 2025-07-11 RX ADMIN — FENTANYL CITRATE 50 MCG: 50 INJECTION, SOLUTION INTRAMUSCULAR; INTRAVENOUS at 15:22

## 2025-07-11 RX ADMIN — LIDOCAINE HYDROCHLORIDE 100 MG: 20 INJECTION, SOLUTION EPIDURAL; INFILTRATION; INTRACAUDAL; PERINEURAL at 15:22

## 2025-07-11 RX ADMIN — SUGAMMADEX 200 MG: 100 INJECTION, SOLUTION INTRAVENOUS at 17:00

## 2025-07-11 RX ADMIN — PHENYLEPHRINE HYDROCHLORIDE 200 MCG: 10 INJECTION INTRAVENOUS at 15:29

## 2025-07-11 RX ADMIN — EPHEDRINE SULFATE 10 MG: 50 INJECTION, SOLUTION INTRAVENOUS at 15:46

## 2025-07-11 RX ADMIN — PHENYLEPHRINE HYDROCHLORIDE 50 MCG/MIN: 10 INJECTION INTRAVENOUS at 15:31

## 2025-07-11 RX ADMIN — Medication 160 MG: at 15:22

## 2025-07-11 RX ADMIN — WARFARIN SODIUM 10 MG: 5 TABLET ORAL at 17:50

## 2025-07-11 RX ADMIN — IPRATROPIUM BROMIDE AND ALBUTEROL SULFATE 1 DOSE: .5; 3 SOLUTION RESPIRATORY (INHALATION) at 19:28

## 2025-07-11 RX ADMIN — Medication 10 ML: at 07:50

## 2025-07-11 RX ADMIN — Medication 10 ML: at 21:00

## 2025-07-11 RX ADMIN — DAPTOMYCIN 625 MG: 500 INJECTION, POWDER, LYOPHILIZED, FOR SOLUTION INTRAVENOUS at 14:19

## 2025-07-11 RX ADMIN — ENOXAPARIN SODIUM 135 MG: 150 INJECTION SUBCUTANEOUS at 21:10

## 2025-07-11 RX ADMIN — IPRATROPIUM BROMIDE AND ALBUTEROL SULFATE 1 DOSE: .5; 3 SOLUTION RESPIRATORY (INHALATION) at 07:53

## 2025-07-11 RX ADMIN — OXYCODONE 10 MG: 5 TABLET ORAL at 21:11

## 2025-07-11 RX ADMIN — INSULIN LISPRO 1 UNITS: 100 INJECTION, SOLUTION INTRAVENOUS; SUBCUTANEOUS at 21:11

## 2025-07-11 RX ADMIN — EPHEDRINE SULFATE 10 MG: 50 INJECTION, SOLUTION INTRAVENOUS at 16:25

## 2025-07-11 RX ADMIN — PROPOFOL 100 MG: 10 INJECTION, EMULSION INTRAVENOUS at 15:22

## 2025-07-11 ASSESSMENT — PAIN SCALES - GENERAL
PAINLEVEL_OUTOF10: 0
PAINLEVEL_OUTOF10: 5
PAINLEVEL_OUTOF10: 7

## 2025-07-11 ASSESSMENT — PAIN DESCRIPTION - ONSET: ONSET: ON-GOING

## 2025-07-11 ASSESSMENT — PAIN DESCRIPTION - ORIENTATION: ORIENTATION: RIGHT

## 2025-07-11 ASSESSMENT — PAIN DESCRIPTION - PAIN TYPE: TYPE: ACUTE PAIN

## 2025-07-11 ASSESSMENT — PAIN DESCRIPTION - DESCRIPTORS: DESCRIPTORS: ACHING

## 2025-07-11 ASSESSMENT — PAIN DESCRIPTION - LOCATION: LOCATION: GROIN

## 2025-07-11 NOTE — ANESTHESIA PRE PROCEDURE
Department of Anesthesiology  Preprocedure Note       Name:  Valdemar Solis   Age:  75 y.o.  :  1950                                          MRN:  [id number]         Date:  2025      Surgeon: Surgeon(s):  Jasper Raza MD    Procedure: Procedure(s):  Insert or replace transcath PPM leadless    Medications prior to admission:   Prior to Admission medications    Medication Sig Start Date End Date Taking? Authorizing Provider   furosemide (LASIX) 40 MG tablet 60 mg alternating with 80 mg 25  Yes Zoraida Uribe, APRN - CNS   pantoprazole (PROTONIX) 40 MG tablet Take 1 tablet by mouth 2 times daily (before meals) 25  Yes Yane Wei MD   vitamin D 25 MCG (1000 UT) CAPS Take 1 capsule by mouth daily   Yes ProviderAbraham MD   blood glucose test strips (ASCENSIA AUTODISC VI;ONE TOUCH ULTRA TEST VI) strip Check glucose tid and prn 6/10/25  Yes Yane Wei MD   Accu-Chek Softclix Lancets MISC Check glucose tid and prn 6/10/25  Yes Yane Wei MD   diclofenac sodium (VOLTAREN) 1 % GEL Apply 2 g topically in the morning, at noon, and at bedtime 25  Yes Markus Mcfarland MD   spironolactone (ALDACTONE) 25 MG tablet Take 0.5 tablets by mouth Every Monday, Wednesday, and 25  Yes Markus Mcfarland MD   dapagliflozin (FARXIGA) 10 MG tablet TAKE 1 TABLET BY MOUTH EVERY MORNING 25  Yes Yane Wei MD   sotalol (BETAPACE) 80 MG tablet TAKE 1 TABLET BY MOUTH 2 TIMES A DAY 25  Yes Yane Wei MD   traZODone (DESYREL) 50 MG tablet TAKE 1 OR 2 TABLETS AS NEEDED FOR SLEEP AS DIRECTED 25  Yes Yane Wei MD   albuterol (PROVENTIL) (2.5 MG/3ML) 0.083% nebulizer solution Take 3 mLs by nebulization every 6 hours as needed for Wheezing 3/20/25  Yes Billy Ambrosio MD   albuterol sulfate HFA (PROVENTIL;VENTOLIN;PROAIR) 108 (90 Base) MCG/ACT inhaler Inhale 2 puffs into the lungs every 6 hours as needed for Wheezing 3/13/25  Yes Yane Wei,  no back pain/no neck pain/no calf pain/no limited range of motion

## 2025-07-11 NOTE — PROCEDURES
PROCEDURE NOTE  Date: 7/11/2025   Name: Valdemar Solis  YOB: 1950    Procedures  [facility]     Electrophysiology Procedure Note       Date of Procedure: 7/11/2025  Patient's Name: Valdemar Solis  YOB: 1950   Medical Record Number: [id number]  Referring Physician: Salena Roldan MD  Procedure Performed by: Jasper Raza MD    Procedures performed:     Insertion of MRI compatible right ventricular  Leadless pacemaker via Rt femoral vein(MICRA)  General anesthesia  Programming and analysis of the device     Mallampati3  ASA 3    Indication of the procedure:    Valdemar Solis is a 75 y.o. male with   presented to the hospital with  MRSA bacteremia , s/p device removal , Complete heart block          Details of procedure:     The patient was brought to the electrophysiology laboratory in stable condition. The patient was in a fasting and non-sedated state. The risks, benefits and alternatives of the procedure were discussed with the patient ]. The risks including, but not limited to, the risks of vascular injury, bleeding, infection, device malfunction, lead dislodgement, radiation exposure, injury to cardiac and surrounding structures (including pneumothorax), stroke, myocardial infarction and death were discussed in detail. Patient opted to proceed with the device implantation. Written informed consent was signed and placed in the chart.  Prophylactic antibiotic was given.          The patient was prepped and draped in a sterile fashion. A timeout protocol was completed to identify the patient and the procedure being performed. Pre-sedation evaluation and airway assessment (Mallampatti classification, class llI) was completed.  general anesthesia.. Central venous access into the right femoral vein was obtained using the modified Seldinger technique. After central venous access was obtained, a sheath was placed in the  Vein and then after serial dilators the Micra delivery

## 2025-07-11 NOTE — H&P
H&P Update    I have reviewed the history and physical and examined the patient and updated with relevant changes.     Consent: I have discussed with the patient and family the indication, alternatives, and the possible risks and/or complications of the planned procedure,  and the sedation / anesthesia methods. The patient verbalized understanding and agree to proceed.    Vitals:    07/11/25 1230   BP: 109/64   Pulse: 80   Resp:    Temp:    SpO2:      Prior to Admission medications    Medication Sig Start Date End Date Taking? Authorizing Provider   furosemide (LASIX) 40 MG tablet 60 mg alternating with 80 mg 6/25/25  Yes Zoraida Uribe APRN - CNS   pantoprazole (PROTONIX) 40 MG tablet Take 1 tablet by mouth 2 times daily (before meals) 6/24/25  Yes Yane Wei MD   vitamin D 25 MCG (1000 UT) CAPS Take 1 capsule by mouth daily   Yes Abraham Andrade MD   blood glucose test strips (ASCENSIA AUTODISC VI;ONE TOUCH ULTRA TEST VI) strip Check glucose tid and prn 6/10/25  Yes Yane Wei MD   Accu-Chek Softclix Lancets MISC Check glucose tid and prn 6/10/25  Yes Yane Wei MD   diclofenac sodium (VOLTAREN) 1 % GEL Apply 2 g topically in the morning, at noon, and at bedtime 6/4/25  Yes Markus Mcfarland MD   spironolactone (ALDACTONE) 25 MG tablet Take 0.5 tablets by mouth Every Monday, Wednesday, and Friday 6/4/25  Yes Markus Mcfarland MD   dapagliflozin (FARXIGA) 10 MG tablet TAKE 1 TABLET BY MOUTH EVERY MORNING 4/24/25  Yes Yane Wei MD   sotalol (BETAPACE) 80 MG tablet TAKE 1 TABLET BY MOUTH 2 TIMES A DAY 4/24/25  Yes Yane Wei MD   traZODone (DESYREL) 50 MG tablet TAKE 1 OR 2 TABLETS AS NEEDED FOR SLEEP AS DIRECTED 4/9/25  Yes Yane Wei MD   albuterol (PROVENTIL) (2.5 MG/3ML) 0.083% nebulizer solution Take 3 mLs by nebulization every 6 hours as needed for Wheezing 3/20/25  Yes Billy Ambrosio MD   albuterol sulfate HFA (PROVENTIL;VENTOLIN;PROAIR) 108 (90 Base)

## 2025-07-12 LAB
ANION GAP SERPL CALCULATED.3IONS-SCNC: 14 MMOL/L (ref 3–16)
BUN SERPL-MCNC: 40 MG/DL (ref 7–20)
CALCIUM SERPL-MCNC: 9 MG/DL (ref 8.3–10.6)
CHLORIDE SERPL-SCNC: 102 MMOL/L (ref 99–110)
CO2 SERPL-SCNC: 22 MMOL/L (ref 21–32)
CREAT SERPL-MCNC: 3 MG/DL (ref 0.8–1.3)
DEPRECATED RDW RBC AUTO: 15.4 % (ref 12.4–15.4)
ECHO BSA: 2.66 M2
GFR SERPLBLD CREATININE-BSD FMLA CKD-EPI: 21 ML/MIN/{1.73_M2}
GLUCOSE BLD-MCNC: 174 MG/DL (ref 70–99)
GLUCOSE BLD-MCNC: 176 MG/DL (ref 70–99)
GLUCOSE BLD-MCNC: 195 MG/DL (ref 70–99)
GLUCOSE SERPL-MCNC: 126 MG/DL (ref 70–99)
HCT VFR BLD AUTO: 26.4 % (ref 40.5–52.5)
HGB BLD-MCNC: 8.8 G/DL (ref 13.5–17.5)
INR PPP: 1.63 (ref 0.86–1.14)
MCH RBC QN AUTO: 30.5 PG (ref 26–34)
MCHC RBC AUTO-ENTMCNC: 33.4 G/DL (ref 31–36)
MCV RBC AUTO: 91.4 FL (ref 80–100)
NT-PROBNP SERPL-MCNC: 4898 PG/ML (ref 0–449)
PERFORMED ON: ABNORMAL
PLATELET # BLD AUTO: 170 K/UL (ref 135–450)
PMV BLD AUTO: 9 FL (ref 5–10.5)
POTASSIUM SERPL-SCNC: 4.3 MMOL/L (ref 3.5–5.1)
PROTHROMBIN TIME: 19.4 SEC (ref 12.1–14.9)
RBC # BLD AUTO: 2.89 M/UL (ref 4.2–5.9)
SODIUM SERPL-SCNC: 138 MMOL/L (ref 136–145)
WBC # BLD AUTO: 6.3 K/UL (ref 4–11)

## 2025-07-12 PROCEDURE — 85027 COMPLETE CBC AUTOMATED: CPT

## 2025-07-12 PROCEDURE — 2140000000 HC CCU INTERMEDIATE R&B

## 2025-07-12 PROCEDURE — 83880 ASSAY OF NATRIURETIC PEPTIDE: CPT

## 2025-07-12 PROCEDURE — 6360000002 HC RX W HCPCS: Performed by: INTERNAL MEDICINE

## 2025-07-12 PROCEDURE — 2500000003 HC RX 250 WO HCPCS: Performed by: INTERNAL MEDICINE

## 2025-07-12 PROCEDURE — 80048 BASIC METABOLIC PNL TOTAL CA: CPT

## 2025-07-12 PROCEDURE — 6370000000 HC RX 637 (ALT 250 FOR IP): Performed by: INTERNAL MEDICINE

## 2025-07-12 PROCEDURE — 2700000000 HC OXYGEN THERAPY PER DAY

## 2025-07-12 PROCEDURE — 6370000000 HC RX 637 (ALT 250 FOR IP): Performed by: STUDENT IN AN ORGANIZED HEALTH CARE EDUCATION/TRAINING PROGRAM

## 2025-07-12 PROCEDURE — 2580000003 HC RX 258: Performed by: INTERNAL MEDICINE

## 2025-07-12 PROCEDURE — 99233 SBSQ HOSP IP/OBS HIGH 50: CPT | Performed by: INTERNAL MEDICINE

## 2025-07-12 PROCEDURE — 99232 SBSQ HOSP IP/OBS MODERATE 35: CPT | Performed by: CLINICAL NURSE SPECIALIST

## 2025-07-12 PROCEDURE — 94761 N-INVAS EAR/PLS OXIMETRY MLT: CPT

## 2025-07-12 PROCEDURE — 85610 PROTHROMBIN TIME: CPT

## 2025-07-12 PROCEDURE — 94640 AIRWAY INHALATION TREATMENT: CPT

## 2025-07-12 PROCEDURE — 94660 CPAP INITIATION&MGMT: CPT

## 2025-07-12 RX ORDER — 0.9 % SODIUM CHLORIDE 0.9 %
500 INTRAVENOUS SOLUTION INTRAVENOUS ONCE
Status: COMPLETED | OUTPATIENT
Start: 2025-07-12 | End: 2025-07-12

## 2025-07-12 RX ADMIN — DAPTOMYCIN 625 MG: 500 INJECTION, POWDER, LYOPHILIZED, FOR SOLUTION INTRAVENOUS at 14:39

## 2025-07-12 RX ADMIN — IPRATROPIUM BROMIDE AND ALBUTEROL SULFATE 1 DOSE: .5; 3 SOLUTION RESPIRATORY (INHALATION) at 20:34

## 2025-07-12 RX ADMIN — Medication 2 PUFF: at 20:34

## 2025-07-12 RX ADMIN — TIOTROPIUM BROMIDE INHALATION SPRAY 5 MCG: 3.12 SPRAY, METERED RESPIRATORY (INHALATION) at 08:02

## 2025-07-12 RX ADMIN — Medication 10 ML: at 21:11

## 2025-07-12 RX ADMIN — IPRATROPIUM BROMIDE AND ALBUTEROL SULFATE 1 DOSE: .5; 3 SOLUTION RESPIRATORY (INHALATION) at 13:25

## 2025-07-12 RX ADMIN — MELATONIN TAB 3 MG 6 MG: 3 TAB at 21:11

## 2025-07-12 RX ADMIN — ENOXAPARIN SODIUM 135 MG: 150 INJECTION SUBCUTANEOUS at 21:10

## 2025-07-12 RX ADMIN — IPRATROPIUM BROMIDE AND ALBUTEROL SULFATE 1 DOSE: .5; 3 SOLUTION RESPIRATORY (INHALATION) at 08:02

## 2025-07-12 RX ADMIN — WARFARIN SODIUM 10 MG: 5 TABLET ORAL at 16:40

## 2025-07-12 RX ADMIN — OXYCODONE 10 MG: 5 TABLET ORAL at 19:26

## 2025-07-12 RX ADMIN — TRAZODONE HYDROCHLORIDE 100 MG: 50 TABLET ORAL at 21:11

## 2025-07-12 RX ADMIN — Medication 2 PUFF: at 08:02

## 2025-07-12 RX ADMIN — INSULIN LISPRO 1 UNITS: 100 INJECTION, SOLUTION INTRAVENOUS; SUBCUTANEOUS at 16:39

## 2025-07-12 RX ADMIN — ENOXAPARIN SODIUM 135 MG: 150 INJECTION SUBCUTANEOUS at 08:17

## 2025-07-12 RX ADMIN — INSULIN GLARGINE 10 UNITS: 100 INJECTION, SOLUTION SUBCUTANEOUS at 08:18

## 2025-07-12 RX ADMIN — SODIUM CHLORIDE, PRESERVATIVE FREE 10 ML: 5 INJECTION INTRAVENOUS at 21:11

## 2025-07-12 RX ADMIN — SODIUM CHLORIDE 500 ML: 0.9 INJECTION, SOLUTION INTRAVENOUS at 15:57

## 2025-07-12 ASSESSMENT — PAIN SCALES - GENERAL: PAINLEVEL_OUTOF10: 7

## 2025-07-12 NOTE — RT PROTOCOL NOTE
RT Nebulizer Bronchodilator Protocol Note    There is a bronchodilator order in the chart from a provider indicating to follow the RT Bronchodilator Protocol and there is an “Initiate RT Bronchodilator Protocol” order as well (see protocol at bottom of note).    CXR Findings:  No results found.    The findings from the last RT Protocol Assessment were as follows:  Smoking: Chronic pulmonary disease  Respiratory Pattern: Dyspnea on exertion or RR 21-25 bpm  Breath Sounds: Slightly diminished and/or crackles  Cough: Strong, productive  Indication for Bronchodilator Therapy: Decreased or absent breath sounds, On home bronchodilators  Bronchodilator Assessment Score: 7    Aerosolized bronchodilator medication orders have been revised according to the RT Nebulizer Bronchodilator Protocol below.    Respiratory Therapist to perform RT Therapy Protocol Assessment initially then follow the protocol.  Repeat RT Therapy Protocol Assessment PRN for score 0-3 or on second treatment, BID, and PRN for scores above 3.    No Indications - adjust the frequency to every 6 hours PRN wheezing or bronchospasm, if no treatments needed after 48 hours then discontinue using Per Protocol order mode.     If indication present, adjust the RT bronchodilator orders based on the Bronchodilator Assessment Score as indicated below.  If a patient is on this medication at home then do not decrease Frequency below that used at home.    0-3 - enter or revise RT bronchodilator order(s) to equivalent RT Bronchodilator order with Frequency of every 4 hours PRN for wheezing or increased work of breathing using Per Protocol order mode.       4-6 - enter or revise RT Bronchodilator order(s) to two equivalent RT bronchodilator orders with one order with BID Frequency and one order with Frequency of every 4 hours PRN wheezing or increased work of breathing using Per Protocol order mode.         7-10 - enter or revise RT Bronchodilator order(s) to two

## 2025-07-13 LAB
ANION GAP SERPL CALCULATED.3IONS-SCNC: 13 MMOL/L (ref 3–16)
BACTERIA CATH TIP CULT: NORMAL
BASOPHILS # BLD: 0 K/UL (ref 0–0.2)
BASOPHILS NFR BLD: 0.9 %
BUN SERPL-MCNC: 43 MG/DL (ref 7–20)
CALCIUM SERPL-MCNC: 8.6 MG/DL (ref 8.3–10.6)
CHLORIDE SERPL-SCNC: 101 MMOL/L (ref 99–110)
CO2 SERPL-SCNC: 22 MMOL/L (ref 21–32)
CREAT SERPL-MCNC: 3 MG/DL (ref 0.8–1.3)
DEPRECATED RDW RBC AUTO: 15.6 % (ref 12.4–15.4)
EOSINOPHIL # BLD: 0 K/UL (ref 0–0.6)
EOSINOPHIL NFR BLD: 0.3 %
GFR SERPLBLD CREATININE-BSD FMLA CKD-EPI: 21 ML/MIN/{1.73_M2}
GLUCOSE BLD-MCNC: 154 MG/DL (ref 70–99)
GLUCOSE BLD-MCNC: 182 MG/DL (ref 70–99)
GLUCOSE BLD-MCNC: 197 MG/DL (ref 70–99)
GLUCOSE SERPL-MCNC: 145 MG/DL (ref 70–99)
HCT VFR BLD AUTO: 22.3 % (ref 40.5–52.5)
HGB BLD-MCNC: 7.6 G/DL (ref 13.5–17.5)
INR PPP: 2.12 (ref 0.86–1.14)
LYMPHOCYTES # BLD: 0.7 K/UL (ref 1–5.1)
LYMPHOCYTES NFR BLD: 14.9 %
MCH RBC QN AUTO: 31.1 PG (ref 26–34)
MCHC RBC AUTO-ENTMCNC: 34.1 G/DL (ref 31–36)
MCV RBC AUTO: 91.2 FL (ref 80–100)
MONOCYTES # BLD: 0.5 K/UL (ref 0–1.3)
MONOCYTES NFR BLD: 10 %
NEUTROPHILS # BLD: 3.7 K/UL (ref 1.7–7.7)
NEUTROPHILS NFR BLD: 73.9 %
PERFORMED ON: ABNORMAL
PLATELET # BLD AUTO: 140 K/UL (ref 135–450)
PMV BLD AUTO: 8.8 FL (ref 5–10.5)
POTASSIUM SERPL-SCNC: 4.3 MMOL/L (ref 3.5–5.1)
PROTHROMBIN TIME: 23.6 SEC (ref 12.1–14.9)
RBC # BLD AUTO: 2.44 M/UL (ref 4.2–5.9)
SODIUM SERPL-SCNC: 136 MMOL/L (ref 136–145)
T4 FREE SERPL-MCNC: 1.2 NG/DL (ref 0.9–1.8)
WBC # BLD AUTO: 5 K/UL (ref 4–11)

## 2025-07-13 PROCEDURE — 85610 PROTHROMBIN TIME: CPT

## 2025-07-13 PROCEDURE — 2500000003 HC RX 250 WO HCPCS: Performed by: INTERNAL MEDICINE

## 2025-07-13 PROCEDURE — 94640 AIRWAY INHALATION TREATMENT: CPT

## 2025-07-13 PROCEDURE — 99233 SBSQ HOSP IP/OBS HIGH 50: CPT | Performed by: INTERNAL MEDICINE

## 2025-07-13 PROCEDURE — 6370000000 HC RX 637 (ALT 250 FOR IP): Performed by: INTERNAL MEDICINE

## 2025-07-13 PROCEDURE — 2700000000 HC OXYGEN THERAPY PER DAY

## 2025-07-13 PROCEDURE — 94761 N-INVAS EAR/PLS OXIMETRY MLT: CPT

## 2025-07-13 PROCEDURE — 2140000000 HC CCU INTERMEDIATE R&B

## 2025-07-13 PROCEDURE — 6360000002 HC RX W HCPCS: Performed by: INTERNAL MEDICINE

## 2025-07-13 PROCEDURE — 6370000000 HC RX 637 (ALT 250 FOR IP): Performed by: STUDENT IN AN ORGANIZED HEALTH CARE EDUCATION/TRAINING PROGRAM

## 2025-07-13 PROCEDURE — 85025 COMPLETE CBC W/AUTO DIFF WBC: CPT

## 2025-07-13 PROCEDURE — 6370000000 HC RX 637 (ALT 250 FOR IP): Performed by: NURSE PRACTITIONER

## 2025-07-13 PROCEDURE — 80048 BASIC METABOLIC PNL TOTAL CA: CPT

## 2025-07-13 PROCEDURE — 99232 SBSQ HOSP IP/OBS MODERATE 35: CPT | Performed by: CLINICAL NURSE SPECIALIST

## 2025-07-13 PROCEDURE — 94660 CPAP INITIATION&MGMT: CPT

## 2025-07-13 RX ORDER — ENOXAPARIN SODIUM 150 MG/ML
1 INJECTION SUBCUTANEOUS 2 TIMES DAILY
Status: DISCONTINUED | OUTPATIENT
Start: 2025-07-14 | End: 2025-07-15

## 2025-07-13 RX ORDER — WARFARIN SODIUM 7.5 MG/1
7.5 TABLET ORAL DAILY
Status: DISCONTINUED | OUTPATIENT
Start: 2025-07-13 | End: 2025-07-13

## 2025-07-13 RX ADMIN — Medication 2 PUFF: at 13:57

## 2025-07-13 RX ADMIN — IPRATROPIUM BROMIDE AND ALBUTEROL SULFATE 1 DOSE: .5; 3 SOLUTION RESPIRATORY (INHALATION) at 20:32

## 2025-07-13 RX ADMIN — TRAZODONE HYDROCHLORIDE 100 MG: 50 TABLET ORAL at 21:03

## 2025-07-13 RX ADMIN — Medication 10 ML: at 21:03

## 2025-07-13 RX ADMIN — MELATONIN TAB 3 MG 6 MG: 3 TAB at 21:03

## 2025-07-13 RX ADMIN — TIOTROPIUM BROMIDE INHALATION SPRAY 5 MCG: 3.12 SPRAY, METERED RESPIRATORY (INHALATION) at 13:57

## 2025-07-13 RX ADMIN — INSULIN LISPRO 1 UNITS: 100 INJECTION, SOLUTION INTRAVENOUS; SUBCUTANEOUS at 21:06

## 2025-07-13 RX ADMIN — IPRATROPIUM BROMIDE AND ALBUTEROL SULFATE 1 DOSE: .5; 3 SOLUTION RESPIRATORY (INHALATION) at 13:56

## 2025-07-13 RX ADMIN — DAPTOMYCIN 625 MG: 500 INJECTION, POWDER, LYOPHILIZED, FOR SOLUTION INTRAVENOUS at 15:36

## 2025-07-13 RX ADMIN — INSULIN GLARGINE 10 UNITS: 100 INJECTION, SOLUTION SUBCUTANEOUS at 08:32

## 2025-07-13 RX ADMIN — INSULIN LISPRO 1 UNITS: 100 INJECTION, SOLUTION INTRAVENOUS; SUBCUTANEOUS at 12:05

## 2025-07-13 RX ADMIN — Medication 2 PUFF: at 20:32

## 2025-07-13 RX ADMIN — SOTALOL HYDROCHLORIDE 80 MG: 80 TABLET ORAL at 08:31

## 2025-07-13 ASSESSMENT — PAIN SCALES - GENERAL: PAINLEVEL_OUTOF10: 0

## 2025-07-14 ENCOUNTER — APPOINTMENT (OUTPATIENT)
Dept: INTERVENTIONAL RADIOLOGY/VASCULAR | Age: 75
DRG: 228 | End: 2025-07-14
Payer: MEDICARE

## 2025-07-14 ENCOUNTER — APPOINTMENT (OUTPATIENT)
Dept: GENERAL RADIOLOGY | Age: 75
DRG: 228 | End: 2025-07-14
Payer: MEDICARE

## 2025-07-14 ENCOUNTER — ANESTHESIA EVENT (OUTPATIENT)
Age: 75
End: 2025-07-14
Payer: MEDICARE

## 2025-07-14 ENCOUNTER — ANESTHESIA (OUTPATIENT)
Age: 75
End: 2025-07-14
Payer: MEDICARE

## 2025-07-14 PROBLEM — Z71.3 WEIGHT LOSS COUNSELING, ENCOUNTER FOR: Status: ACTIVE | Noted: 2025-07-14

## 2025-07-14 PROBLEM — Z95.0 PACEMAKER: Status: ACTIVE | Noted: 2025-07-14

## 2025-07-14 PROBLEM — I44.39 HIGH-GRADE ATRIOVENTRICULAR BLOCK: Status: ACTIVE | Noted: 2025-06-28

## 2025-07-14 LAB
ALBUMIN SERPL-MCNC: 3.3 G/DL (ref 3.4–5)
ALBUMIN/GLOB SERPL: 1.1 {RATIO} (ref 1.1–2.2)
ALP SERPL-CCNC: 87 U/L (ref 40–129)
ALT SERPL-CCNC: 26 U/L (ref 10–40)
ANION GAP SERPL CALCULATED.3IONS-SCNC: 14 MMOL/L (ref 3–16)
AST SERPL-CCNC: 30 U/L (ref 15–37)
BASOPHILS # BLD: 0 K/UL (ref 0–0.2)
BASOPHILS NFR BLD: 0.6 %
BILIRUB SERPL-MCNC: 0.3 MG/DL (ref 0–1)
BUN SERPL-MCNC: 47 MG/DL (ref 7–20)
CALCIUM SERPL-MCNC: 8.9 MG/DL (ref 8.3–10.6)
CHLORIDE SERPL-SCNC: 104 MMOL/L (ref 99–110)
CK SERPL-CCNC: 111 U/L (ref 39–308)
CO2 SERPL-SCNC: 20 MMOL/L (ref 21–32)
CREAT SERPL-MCNC: 2.8 MG/DL (ref 0.8–1.3)
DEPRECATED RDW RBC AUTO: 15.9 % (ref 12.4–15.4)
ECHO BSA: 2.66 M2
ECHO BSA: 2.66 M2
EOSINOPHIL # BLD: 0 K/UL (ref 0–0.6)
EOSINOPHIL NFR BLD: 0.4 %
GFR SERPLBLD CREATININE-BSD FMLA CKD-EPI: 23 ML/MIN/{1.73_M2}
GLUCOSE BLD-MCNC: 146 MG/DL (ref 70–99)
GLUCOSE BLD-MCNC: 155 MG/DL (ref 70–99)
GLUCOSE BLD-MCNC: 187 MG/DL (ref 70–99)
GLUCOSE BLD-MCNC: 251 MG/DL (ref 70–99)
GLUCOSE SERPL-MCNC: 140 MG/DL (ref 70–99)
HCT VFR BLD AUTO: 23 % (ref 40.5–52.5)
HGB BLD-MCNC: 7.7 G/DL (ref 13.5–17.5)
INR PPP: 1.87 (ref 0.86–1.14)
LYMPHOCYTES # BLD: 0.8 K/UL (ref 1–5.1)
LYMPHOCYTES NFR BLD: 14.3 %
MCH RBC QN AUTO: 30.6 PG (ref 26–34)
MCHC RBC AUTO-ENTMCNC: 33.3 G/DL (ref 31–36)
MCV RBC AUTO: 92.1 FL (ref 80–100)
MONOCYTES # BLD: 0.5 K/UL (ref 0–1.3)
MONOCYTES NFR BLD: 8.9 %
NEUTROPHILS # BLD: 4 K/UL (ref 1.7–7.7)
NEUTROPHILS NFR BLD: 75.8 %
NT-PROBNP SERPL-MCNC: 3614 PG/ML (ref 0–449)
PERFORMED ON: ABNORMAL
PLATELET # BLD AUTO: 164 K/UL (ref 135–450)
PMV BLD AUTO: 9 FL (ref 5–10.5)
POTASSIUM SERPL-SCNC: 4.2 MMOL/L (ref 3.5–5.1)
PROT SERPL-MCNC: 6.4 G/DL (ref 6.4–8.2)
PROTHROMBIN TIME: 21.5 SEC (ref 12.1–14.9)
RBC # BLD AUTO: 2.5 M/UL (ref 4.2–5.9)
SODIUM SERPL-SCNC: 138 MMOL/L (ref 136–145)
WBC # BLD AUTO: 5.3 K/UL (ref 4–11)

## 2025-07-14 PROCEDURE — C1894 INTRO/SHEATH, NON-LASER: HCPCS | Performed by: INTERNAL MEDICINE

## 2025-07-14 PROCEDURE — 99232 SBSQ HOSP IP/OBS MODERATE 35: CPT | Performed by: NURSE PRACTITIONER

## 2025-07-14 PROCEDURE — 83880 ASSAY OF NATRIURETIC PEPTIDE: CPT

## 2025-07-14 PROCEDURE — 99232 SBSQ HOSP IP/OBS MODERATE 35: CPT | Performed by: INTERNAL MEDICINE

## 2025-07-14 PROCEDURE — 2700000000 HC OXYGEN THERAPY PER DAY

## 2025-07-14 PROCEDURE — 71045 X-RAY EXAM CHEST 1 VIEW: CPT

## 2025-07-14 PROCEDURE — 2000000000 HC ICU R&B

## 2025-07-14 PROCEDURE — 3700000000 HC ANESTHESIA ATTENDED CARE: Performed by: INTERNAL MEDICINE

## 2025-07-14 PROCEDURE — 85025 COMPLETE CBC W/AUTO DIFF WBC: CPT

## 2025-07-14 PROCEDURE — 94640 AIRWAY INHALATION TREATMENT: CPT

## 2025-07-14 PROCEDURE — 2500000003 HC RX 250 WO HCPCS: Performed by: INTERNAL MEDICINE

## 2025-07-14 PROCEDURE — 6370000000 HC RX 637 (ALT 250 FOR IP): Performed by: INTERNAL MEDICINE

## 2025-07-14 PROCEDURE — 94761 N-INVAS EAR/PLS OXIMETRY MLT: CPT

## 2025-07-14 PROCEDURE — 80053 COMPREHEN METABOLIC PANEL: CPT

## 2025-07-14 PROCEDURE — 2720000010 HC SURG SUPPLY STERILE: Performed by: INTERNAL MEDICINE

## 2025-07-14 PROCEDURE — 2580000003 HC RX 258: Performed by: NURSE ANESTHETIST, CERTIFIED REGISTERED

## 2025-07-14 PROCEDURE — 94660 CPAP INITIATION&MGMT: CPT

## 2025-07-14 PROCEDURE — 5A1223Z PERFORMANCE OF CARDIAC PACING, CONTINUOUS: ICD-10-PCS | Performed by: INTERNAL MEDICINE

## 2025-07-14 PROCEDURE — 2500000003 HC RX 250 WO HCPCS: Performed by: NURSE ANESTHETIST, CERTIFIED REGISTERED

## 2025-07-14 PROCEDURE — 6360000002 HC RX W HCPCS: Performed by: INTERNAL MEDICINE

## 2025-07-14 PROCEDURE — 2709999900 HC NON-CHARGEABLE SUPPLY: Performed by: INTERNAL MEDICINE

## 2025-07-14 PROCEDURE — 33210 INSERT ELECTRD/PM CATH SNGL: CPT | Performed by: INTERNAL MEDICINE

## 2025-07-14 PROCEDURE — 3700000001 HC ADD 15 MINUTES (ANESTHESIA): Performed by: INTERNAL MEDICINE

## 2025-07-14 PROCEDURE — G0545 PR INHERENT VISIT TO INPT: HCPCS | Performed by: INTERNAL MEDICINE

## 2025-07-14 PROCEDURE — 5A0935A ASSISTANCE WITH RESPIRATORY VENTILATION, LESS THAN 24 CONSECUTIVE HOURS, HIGH NASAL FLOW/VELOCITY: ICD-10-PCS | Performed by: INTERNAL MEDICINE

## 2025-07-14 PROCEDURE — 6370000000 HC RX 637 (ALT 250 FOR IP): Performed by: STUDENT IN AN ORGANIZED HEALTH CARE EDUCATION/TRAINING PROGRAM

## 2025-07-14 PROCEDURE — 82550 ASSAY OF CK (CPK): CPT

## 2025-07-14 PROCEDURE — 99233 SBSQ HOSP IP/OBS HIGH 50: CPT | Performed by: HOSPITALIST

## 2025-07-14 PROCEDURE — 6370000000 HC RX 637 (ALT 250 FOR IP): Performed by: HOSPITALIST

## 2025-07-14 PROCEDURE — 6360000002 HC RX W HCPCS: Performed by: NURSE ANESTHETIST, CERTIFIED REGISTERED

## 2025-07-14 PROCEDURE — 85610 PROTHROMBIN TIME: CPT

## 2025-07-14 RX ORDER — SUCCINYLCHOLINE/SOD CL,ISO/PF 200MG/10ML
SYRINGE (ML) INTRAVENOUS
Status: DISCONTINUED | OUTPATIENT
Start: 2025-07-14 | End: 2025-07-14 | Stop reason: SDUPTHER

## 2025-07-14 RX ORDER — EPHEDRINE SULFATE 50 MG/ML
INJECTION INTRAVENOUS
Status: DISCONTINUED | OUTPATIENT
Start: 2025-07-14 | End: 2025-07-14 | Stop reason: SDUPTHER

## 2025-07-14 RX ORDER — SODIUM CHLORIDE 9 MG/ML
INJECTION, SOLUTION INTRAVENOUS
Status: DISCONTINUED | OUTPATIENT
Start: 2025-07-14 | End: 2025-07-14 | Stop reason: SDUPTHER

## 2025-07-14 RX ORDER — SODIUM CHLORIDE 0.9 % (FLUSH) 0.9 %
5-40 SYRINGE (ML) INJECTION PRN
Status: DISCONTINUED | OUTPATIENT
Start: 2025-07-14 | End: 2025-07-16 | Stop reason: HOSPADM

## 2025-07-14 RX ORDER — SODIUM CHLORIDE 9 MG/ML
INJECTION, SOLUTION INTRAVENOUS PRN
Status: DISCONTINUED | OUTPATIENT
Start: 2025-07-14 | End: 2025-07-16 | Stop reason: HOSPADM

## 2025-07-14 RX ORDER — SODIUM CHLORIDE 0.9 % (FLUSH) 0.9 %
5-40 SYRINGE (ML) INJECTION EVERY 12 HOURS SCHEDULED
Status: DISCONTINUED | OUTPATIENT
Start: 2025-07-14 | End: 2025-07-16 | Stop reason: HOSPADM

## 2025-07-14 RX ORDER — DEXAMETHASONE SODIUM PHOSPHATE 4 MG/ML
INJECTION, SOLUTION INTRA-ARTICULAR; INTRALESIONAL; INTRAMUSCULAR; INTRAVENOUS; SOFT TISSUE
Status: DISCONTINUED | OUTPATIENT
Start: 2025-07-14 | End: 2025-07-14 | Stop reason: SDUPTHER

## 2025-07-14 RX ORDER — FENTANYL CITRATE 50 UG/ML
25 INJECTION, SOLUTION INTRAMUSCULAR; INTRAVENOUS EVERY 5 MIN PRN
Status: DISCONTINUED | OUTPATIENT
Start: 2025-07-14 | End: 2025-07-16 | Stop reason: HOSPADM

## 2025-07-14 RX ORDER — FENTANYL CITRATE 50 UG/ML
INJECTION, SOLUTION INTRAMUSCULAR; INTRAVENOUS
Status: DISCONTINUED | OUTPATIENT
Start: 2025-07-14 | End: 2025-07-14 | Stop reason: SDUPTHER

## 2025-07-14 RX ORDER — SODIUM CHLORIDE 0.9 % (FLUSH) 0.9 %
5-40 SYRINGE (ML) INJECTION PRN
Status: DISCONTINUED | OUTPATIENT
Start: 2025-07-14 | End: 2025-07-14 | Stop reason: HOSPADM

## 2025-07-14 RX ORDER — OXYCODONE HYDROCHLORIDE 5 MG/1
10 TABLET ORAL PRN
Status: ACTIVE | OUTPATIENT
Start: 2025-07-14 | End: 2025-07-14

## 2025-07-14 RX ORDER — SODIUM CHLORIDE 9 MG/ML
INJECTION, SOLUTION INTRAVENOUS PRN
Status: DISCONTINUED | OUTPATIENT
Start: 2025-07-14 | End: 2025-07-14 | Stop reason: HOSPADM

## 2025-07-14 RX ORDER — OXYCODONE HYDROCHLORIDE 5 MG/1
5 TABLET ORAL PRN
Status: ACTIVE | OUTPATIENT
Start: 2025-07-14 | End: 2025-07-14

## 2025-07-14 RX ORDER — PROPOFOL 10 MG/ML
INJECTION, EMULSION INTRAVENOUS
Status: DISCONTINUED | OUTPATIENT
Start: 2025-07-14 | End: 2025-07-14 | Stop reason: SDUPTHER

## 2025-07-14 RX ORDER — HYDROMORPHONE HYDROCHLORIDE 2 MG/ML
0.5 INJECTION, SOLUTION INTRAMUSCULAR; INTRAVENOUS; SUBCUTANEOUS EVERY 5 MIN PRN
Status: DISCONTINUED | OUTPATIENT
Start: 2025-07-14 | End: 2025-07-16 | Stop reason: HOSPADM

## 2025-07-14 RX ORDER — PROCHLORPERAZINE EDISYLATE 5 MG/ML
5 INJECTION INTRAMUSCULAR; INTRAVENOUS
Status: DISCONTINUED | OUTPATIENT
Start: 2025-07-14 | End: 2025-07-16 | Stop reason: HOSPADM

## 2025-07-14 RX ORDER — FUROSEMIDE 20 MG/1
20 TABLET ORAL
Status: DISCONTINUED | OUTPATIENT
Start: 2025-07-14 | End: 2025-07-19

## 2025-07-14 RX ORDER — LIDOCAINE HYDROCHLORIDE 20 MG/ML
INJECTION, SOLUTION EPIDURAL; INFILTRATION; INTRACAUDAL; PERINEURAL
Status: DISCONTINUED | OUTPATIENT
Start: 2025-07-14 | End: 2025-07-14 | Stop reason: SDUPTHER

## 2025-07-14 RX ORDER — ONDANSETRON 2 MG/ML
INJECTION INTRAMUSCULAR; INTRAVENOUS
Status: DISCONTINUED | OUTPATIENT
Start: 2025-07-14 | End: 2025-07-14 | Stop reason: SDUPTHER

## 2025-07-14 RX ORDER — SODIUM CHLORIDE 0.9 % (FLUSH) 0.9 %
5-40 SYRINGE (ML) INJECTION EVERY 12 HOURS SCHEDULED
Status: DISCONTINUED | OUTPATIENT
Start: 2025-07-14 | End: 2025-07-14 | Stop reason: HOSPADM

## 2025-07-14 RX ADMIN — Medication 2 PUFF: at 21:15

## 2025-07-14 RX ADMIN — EPHEDRINE SULFATE 10 MG: 50 INJECTION, SOLUTION INTRAVENOUS at 16:20

## 2025-07-14 RX ADMIN — EPHEDRINE SULFATE 10 MG: 50 INJECTION, SOLUTION INTRAVENOUS at 16:23

## 2025-07-14 RX ADMIN — PHENYLEPHRINE HYDROCHLORIDE 100 MCG: 10 INJECTION INTRAVENOUS at 16:19

## 2025-07-14 RX ADMIN — LIDOCAINE HYDROCHLORIDE 100 MG: 20 INJECTION, SOLUTION EPIDURAL; INFILTRATION; INTRACAUDAL; PERINEURAL at 16:11

## 2025-07-14 RX ADMIN — IPRATROPIUM BROMIDE AND ALBUTEROL SULFATE 1 DOSE: .5; 3 SOLUTION RESPIRATORY (INHALATION) at 21:07

## 2025-07-14 RX ADMIN — Medication 200 MG: at 16:11

## 2025-07-14 RX ADMIN — PHENYLEPHRINE HYDROCHLORIDE 100 MCG: 10 INJECTION INTRAVENOUS at 16:15

## 2025-07-14 RX ADMIN — FENTANYL CITRATE 100 MCG: 50 INJECTION, SOLUTION INTRAMUSCULAR; INTRAVENOUS at 16:09

## 2025-07-14 RX ADMIN — PROPOFOL 100 MG: 10 INJECTION, EMULSION INTRAVENOUS at 16:11

## 2025-07-14 RX ADMIN — IPRATROPIUM BROMIDE AND ALBUTEROL SULFATE 1 DOSE: .5; 3 SOLUTION RESPIRATORY (INHALATION) at 07:57

## 2025-07-14 RX ADMIN — IPRATROPIUM BROMIDE AND ALBUTEROL SULFATE 1 DOSE: .5; 3 SOLUTION RESPIRATORY (INHALATION) at 12:38

## 2025-07-14 RX ADMIN — DAPTOMYCIN 625 MG: 500 INJECTION, POWDER, LYOPHILIZED, FOR SOLUTION INTRAVENOUS at 14:26

## 2025-07-14 RX ADMIN — SODIUM CHLORIDE, PRESERVATIVE FREE 10 ML: 5 INJECTION INTRAVENOUS at 09:00

## 2025-07-14 RX ADMIN — TIOTROPIUM BROMIDE INHALATION SPRAY 5 MCG: 3.12 SPRAY, METERED RESPIRATORY (INHALATION) at 09:45

## 2025-07-14 RX ADMIN — INSULIN LISPRO 1 UNITS: 100 INJECTION, SOLUTION INTRAVENOUS; SUBCUTANEOUS at 18:02

## 2025-07-14 RX ADMIN — SODIUM CHLORIDE: 0.9 INJECTION, SOLUTION INTRAVENOUS at 16:24

## 2025-07-14 RX ADMIN — FUROSEMIDE 20 MG: 20 TABLET ORAL at 13:57

## 2025-07-14 RX ADMIN — Medication 2 PUFF: at 09:45

## 2025-07-14 RX ADMIN — ONDANSETRON 4 MG: 2 INJECTION INTRAMUSCULAR; INTRAVENOUS at 16:20

## 2025-07-14 RX ADMIN — PHENYLEPHRINE HYDROCHLORIDE 100 MCG: 10 INJECTION INTRAVENOUS at 16:11

## 2025-07-14 RX ADMIN — ENOXAPARIN SODIUM 135 MG: 150 INJECTION SUBCUTANEOUS at 13:55

## 2025-07-14 RX ADMIN — Medication 10 ML: at 09:00

## 2025-07-14 RX ADMIN — ENOXAPARIN SODIUM 135 MG: 150 INJECTION SUBCUTANEOUS at 21:15

## 2025-07-14 RX ADMIN — INSULIN LISPRO 2 UNITS: 100 INJECTION, SOLUTION INTRAVENOUS; SUBCUTANEOUS at 21:15

## 2025-07-14 RX ADMIN — DEXAMETHASONE SODIUM PHOSPHATE 4 MG: 4 INJECTION, SOLUTION INTRAMUSCULAR; INTRAVENOUS at 16:20

## 2025-07-14 RX ADMIN — PHENYLEPHRINE HYDROCHLORIDE 100 MCG: 10 INJECTION INTRAVENOUS at 16:24

## 2025-07-14 RX ADMIN — EPHEDRINE SULFATE 20 MG: 50 INJECTION, SOLUTION INTRAVENOUS at 16:11

## 2025-07-14 RX ADMIN — SODIUM CHLORIDE, PRESERVATIVE FREE 10 ML: 5 INJECTION INTRAVENOUS at 21:17

## 2025-07-14 RX ADMIN — INSULIN GLARGINE 10 UNITS: 100 INJECTION, SOLUTION SUBCUTANEOUS at 13:57

## 2025-07-14 RX ADMIN — TRAZODONE HYDROCHLORIDE 100 MG: 50 TABLET ORAL at 21:16

## 2025-07-14 RX ADMIN — Medication 10 ML: at 21:17

## 2025-07-14 RX ADMIN — EPHEDRINE SULFATE 10 MG: 50 INJECTION, SOLUTION INTRAVENOUS at 16:16

## 2025-07-14 RX ADMIN — MELATONIN TAB 3 MG 6 MG: 3 TAB at 21:16

## 2025-07-14 ASSESSMENT — PAIN SCALES - GENERAL: PAINLEVEL_OUTOF10: 0

## 2025-07-14 NOTE — ANESTHESIA POSTPROCEDURE EVALUATION
Department of Anesthesiology  Postprocedure Note    Patient: Valdemar Solis  MRN: [id number]  YOB: 1950  Date of evaluation: 7/14/2025    Procedure Summary       Date: 07/14/25 Room / Location: [facility] EP LAB 5 / [facility] CARDIAC CATH LAB    Anesthesia Start: 1606 Anesthesia Stop: 1720    Procedure: Insert temporary pacemaker Diagnosis:       High-grade atrioventricular block      (High-grade atrioventricular block [I44.39])    Providers: Jasper Raza MD Responsible Provider: Foster Varela DO    Anesthesia Type: general ASA Status: 4 - Emergent            Anesthesia Type: No value filed.    Karlene Phase I: Karlene Score: 9    Karlene Phase II:      Anesthesia Post Evaluation    Patient location during evaluation: ICU  Patient participation: complete - patient participated  Level of consciousness: awake and alert  Airway patency: patent  Nausea & Vomiting: no nausea  Cardiovascular status: hemodynamically stable  Respiratory status: acceptable  Hydration status: stable  Pain management: adequate    No notable events documented.

## 2025-07-14 NOTE — H&P
H&P Update    I have reviewed the history and physical and examined the patient and updated with relevant changes.     Consent: I have discussed with the patient and family the indication, alternatives, and the possible risks and/or complications of the planned procedure,  and the sedation / anesthesia methods. The patient verbalized understanding and agree to proceed.    Vitals:    07/14/25 1356   BP: 93/67   Pulse: 87   Resp: 18   Temp:    SpO2: 100%     Prior to Admission medications    Medication Sig Start Date End Date Taking? Authorizing Provider   furosemide (LASIX) 40 MG tablet 60 mg alternating with 80 mg 6/25/25  Yes Zoraida Uribe, APRN - CNS   pantoprazole (PROTONIX) 40 MG tablet Take 1 tablet by mouth 2 times daily (before meals) 6/24/25  Yes Yane Wei MD   vitamin D 25 MCG (1000 UT) CAPS Take 1 capsule by mouth daily   Yes Abraham Andrade MD   blood glucose test strips (ASCENSIA AUTODISC VI;ONE TOUCH ULTRA TEST VI) strip Check glucose tid and prn 6/10/25  Yes Yane Wei MD   Accu-Chek Softclix Lancets MISC Check glucose tid and prn 6/10/25  Yes Yane Wei MD   diclofenac sodium (VOLTAREN) 1 % GEL Apply 2 g topically in the morning, at noon, and at bedtime 6/4/25  Yes Markus Mcfarland MD   spironolactone (ALDACTONE) 25 MG tablet Take 0.5 tablets by mouth Every Monday, Wednesday, and Friday 6/4/25  Yes Markus Mcfarland MD   dapagliflozin (FARXIGA) 10 MG tablet TAKE 1 TABLET BY MOUTH EVERY MORNING 4/24/25  Yes Yane Wei MD   sotalol (BETAPACE) 80 MG tablet TAKE 1 TABLET BY MOUTH 2 TIMES A DAY 4/24/25  Yes Yane Wei MD   traZODone (DESYREL) 50 MG tablet TAKE 1 OR 2 TABLETS AS NEEDED FOR SLEEP AS DIRECTED 4/9/25  Yes Yane Wei MD   albuterol (PROVENTIL) (2.5 MG/3ML) 0.083% nebulizer solution Take 3 mLs by nebulization every 6 hours as needed for Wheezing 3/20/25  Yes Billy Ambrosio MD   albuterol sulfate HFA (PROVENTIL;VENTOLIN;PROAIR) 108 (92

## 2025-07-14 NOTE — ANESTHESIA PRE PROCEDURE
Department of Anesthesiology  Preprocedure Note       Name:  Valdemar Solis   Age:  75 y.o.  :  1950                                          MRN:  [id number]         Date:  2025      Surgeon: Surgeon(s):  Jasper Raza MD    Procedure: Procedure(s):  Insert temporary pacemaker    Medications prior to admission:   Prior to Admission medications    Medication Sig Start Date End Date Taking? Authorizing Provider   furosemide (LASIX) 40 MG tablet 60 mg alternating with 80 mg 25  Yes Zoraida Uribe APRN - CNS   pantoprazole (PROTONIX) 40 MG tablet Take 1 tablet by mouth 2 times daily (before meals) 25  Yes Yane Wei MD   vitamin D 25 MCG (1000 UT) CAPS Take 1 capsule by mouth daily   Yes Provider, MD Abraham   blood glucose test strips (ASCENSIA AUTODISC VI;ONE TOUCH ULTRA TEST VI) strip Check glucose tid and prn 6/10/25  Yes Yane Wei MD   Accu-Chek Softclix Lancets MISC Check glucose tid and prn 6/10/25  Yes Yane Wei MD   diclofenac sodium (VOLTAREN) 1 % GEL Apply 2 g topically in the morning, at noon, and at bedtime 25  Yes Markus Mcfarland MD   spironolactone (ALDACTONE) 25 MG tablet Take 0.5 tablets by mouth Every Monday, Wednesday, and 25  Yes Markus Mcfarland MD   dapagliflozin (FARXIGA) 10 MG tablet TAKE 1 TABLET BY MOUTH EVERY MORNING 25  Yes Yane Wei MD   sotalol (BETAPACE) 80 MG tablet TAKE 1 TABLET BY MOUTH 2 TIMES A DAY 25  Yes Yane Wei MD   traZODone (DESYREL) 50 MG tablet TAKE 1 OR 2 TABLETS AS NEEDED FOR SLEEP AS DIRECTED 25  Yes Yane Wei MD   albuterol (PROVENTIL) (2.5 MG/3ML) 0.083% nebulizer solution Take 3 mLs by nebulization every 6 hours as needed for Wheezing 3/20/25  Yes Billy Ambrosio MD   albuterol sulfate HFA (PROVENTIL;VENTOLIN;PROAIR) 108 (90 Base) MCG/ACT inhaler Inhale 2 puffs into the lungs every 6 hours as needed for Wheezing 3/13/25  Yes Yane Wei MD

## 2025-07-14 NOTE — PROCEDURES
PROCEDURE NOTE  Date: 7/14/2025   Name: Valdemar Solis  YOB: 1950    Procedures      Temporary pacemaker implant via right IJ    General anesthesia    The risks, benefits and alternatives of the procedure were discussed with the patient. The risks including, but not limited to, the risks of bleeding, infection, pain, device malfunction, lead dislodgement, radiation exposure, injury to cardiac and surrounding structures (including pneumothorax), stroke, cardiac perforation, tamponade, need for emergent heart surgery, myocardial infarction and cardiac arrest were discussed in detail.         The patient opted to proceed with the lead implantation.     Patient was put under general anesthesia.  Right neck was prepped.  Under ultrasound access into the right IJ was achieved.    Temporary wire through a 6 [place] sheath was advanced into the right   ventricular apex.  Threshold was tested.  Sheath was sutured to the skin.  Device was positioned and patient tolerated procedure and there were no complications.  Patient was extubated in stable condition.    Blood loss less than 10 cc  Complications none    Plan     will consider replacement of Micra.

## 2025-07-15 ENCOUNTER — ANESTHESIA EVENT (OUTPATIENT)
Age: 75
End: 2025-07-15
Payer: MEDICARE

## 2025-07-15 ENCOUNTER — APPOINTMENT (OUTPATIENT)
Dept: INTERVENTIONAL RADIOLOGY/VASCULAR | Age: 75
DRG: 228 | End: 2025-07-15
Payer: MEDICARE

## 2025-07-15 PROBLEM — I44.2 COMPLETE HEART BLOCK (HCC): Status: ACTIVE | Noted: 2025-06-28

## 2025-07-15 LAB
ALBUMIN SERPL-MCNC: 3.5 G/DL (ref 3.4–5)
ALBUMIN/GLOB SERPL: 1.3 {RATIO} (ref 1.1–2.2)
ALP SERPL-CCNC: 84 U/L (ref 40–129)
ALT SERPL-CCNC: 19 U/L (ref 10–40)
ANION GAP SERPL CALCULATED.3IONS-SCNC: 12 MMOL/L (ref 3–16)
AST SERPL-CCNC: 18 U/L (ref 15–37)
BASOPHILS # BLD: 0 K/UL (ref 0–0.2)
BASOPHILS NFR BLD: 0.4 %
BILIRUB SERPL-MCNC: 0.3 MG/DL (ref 0–1)
BUN SERPL-MCNC: 43 MG/DL (ref 7–20)
CALCIUM SERPL-MCNC: 8.9 MG/DL (ref 8.3–10.6)
CHLORIDE SERPL-SCNC: 104 MMOL/L (ref 99–110)
CO2 SERPL-SCNC: 22 MMOL/L (ref 21–32)
CREAT SERPL-MCNC: 2.7 MG/DL (ref 0.8–1.3)
DEPRECATED RDW RBC AUTO: 15.9 % (ref 12.4–15.4)
EOSINOPHIL # BLD: 0 K/UL (ref 0–0.6)
EOSINOPHIL NFR BLD: 0 %
GFR SERPLBLD CREATININE-BSD FMLA CKD-EPI: 24 ML/MIN/{1.73_M2}
GLUCOSE BLD-MCNC: 144 MG/DL (ref 70–99)
GLUCOSE BLD-MCNC: 156 MG/DL (ref 70–99)
GLUCOSE BLD-MCNC: 227 MG/DL (ref 70–99)
GLUCOSE SERPL-MCNC: 176 MG/DL (ref 70–99)
HCT VFR BLD AUTO: 23.7 % (ref 40.5–52.5)
HGB BLD-MCNC: 8 G/DL (ref 13.5–17.5)
LYMPHOCYTES # BLD: 0.5 K/UL (ref 1–5.1)
LYMPHOCYTES NFR BLD: 9.9 %
MCH RBC QN AUTO: 31.4 PG (ref 26–34)
MCHC RBC AUTO-ENTMCNC: 33.9 G/DL (ref 31–36)
MCV RBC AUTO: 92.6 FL (ref 80–100)
MONOCYTES # BLD: 0.3 K/UL (ref 0–1.3)
MONOCYTES NFR BLD: 5.8 %
NEUTROPHILS # BLD: 4.2 K/UL (ref 1.7–7.7)
NEUTROPHILS NFR BLD: 83.9 %
PERFORMED ON: ABNORMAL
PLATELET # BLD AUTO: 151 K/UL (ref 135–450)
PMV BLD AUTO: 8.9 FL (ref 5–10.5)
POTASSIUM SERPL-SCNC: 5 MMOL/L (ref 3.5–5.1)
PROT SERPL-MCNC: 6.3 G/DL (ref 6.4–8.2)
RBC # BLD AUTO: 2.56 M/UL (ref 4.2–5.9)
SODIUM SERPL-SCNC: 138 MMOL/L (ref 136–145)
WBC # BLD AUTO: 5 K/UL (ref 4–11)

## 2025-07-15 PROCEDURE — 85025 COMPLETE CBC W/AUTO DIFF WBC: CPT

## 2025-07-15 PROCEDURE — 2500000003 HC RX 250 WO HCPCS: Performed by: INTERNAL MEDICINE

## 2025-07-15 PROCEDURE — 6360000002 HC RX W HCPCS: Performed by: INTERNAL MEDICINE

## 2025-07-15 PROCEDURE — 6370000000 HC RX 637 (ALT 250 FOR IP): Performed by: INTERNAL MEDICINE

## 2025-07-15 PROCEDURE — 80053 COMPREHEN METABOLIC PANEL: CPT

## 2025-07-15 PROCEDURE — 6370000000 HC RX 637 (ALT 250 FOR IP): Performed by: NURSE PRACTITIONER

## 2025-07-15 PROCEDURE — 94640 AIRWAY INHALATION TREATMENT: CPT

## 2025-07-15 PROCEDURE — 2700000000 HC OXYGEN THERAPY PER DAY

## 2025-07-15 PROCEDURE — 99232 SBSQ HOSP IP/OBS MODERATE 35: CPT | Performed by: NURSE PRACTITIONER

## 2025-07-15 PROCEDURE — 6370000000 HC RX 637 (ALT 250 FOR IP): Performed by: STUDENT IN AN ORGANIZED HEALTH CARE EDUCATION/TRAINING PROGRAM

## 2025-07-15 PROCEDURE — 94761 N-INVAS EAR/PLS OXIMETRY MLT: CPT

## 2025-07-15 PROCEDURE — 2500000003 HC RX 250 WO HCPCS: Performed by: STUDENT IN AN ORGANIZED HEALTH CARE EDUCATION/TRAINING PROGRAM

## 2025-07-15 PROCEDURE — 2000000000 HC ICU R&B

## 2025-07-15 PROCEDURE — 99233 SBSQ HOSP IP/OBS HIGH 50: CPT | Performed by: HOSPITALIST

## 2025-07-15 PROCEDURE — 94660 CPAP INITIATION&MGMT: CPT

## 2025-07-15 RX ORDER — ENOXAPARIN SODIUM 150 MG/ML
120 INJECTION SUBCUTANEOUS DAILY
Status: DISCONTINUED | OUTPATIENT
Start: 2025-07-16 | End: 2025-07-23 | Stop reason: HOSPADM

## 2025-07-15 RX ADMIN — DAPTOMYCIN 625 MG: 500 INJECTION, POWDER, LYOPHILIZED, FOR SOLUTION INTRAVENOUS at 13:51

## 2025-07-15 RX ADMIN — MELATONIN TAB 3 MG 6 MG: 3 TAB at 21:11

## 2025-07-15 RX ADMIN — TIOTROPIUM BROMIDE INHALATION SPRAY 5 MCG: 3.12 SPRAY, METERED RESPIRATORY (INHALATION) at 08:01

## 2025-07-15 RX ADMIN — Medication 10 ML: at 08:52

## 2025-07-15 RX ADMIN — IPRATROPIUM BROMIDE AND ALBUTEROL SULFATE 1 DOSE: .5; 3 SOLUTION RESPIRATORY (INHALATION) at 19:46

## 2025-07-15 RX ADMIN — Medication 10 ML: at 21:13

## 2025-07-15 RX ADMIN — SODIUM CHLORIDE, PRESERVATIVE FREE 10 ML: 5 INJECTION INTRAVENOUS at 08:51

## 2025-07-15 RX ADMIN — IPRATROPIUM BROMIDE AND ALBUTEROL SULFATE 1 DOSE: .5; 3 SOLUTION RESPIRATORY (INHALATION) at 14:46

## 2025-07-15 RX ADMIN — Medication 2 PUFF: at 08:00

## 2025-07-15 RX ADMIN — SOTALOL HYDROCHLORIDE 80 MG: 80 TABLET ORAL at 11:26

## 2025-07-15 RX ADMIN — ENOXAPARIN SODIUM 135 MG: 150 INJECTION SUBCUTANEOUS at 08:48

## 2025-07-15 RX ADMIN — INSULIN LISPRO 1 UNITS: 100 INJECTION, SOLUTION INTRAVENOUS; SUBCUTANEOUS at 11:27

## 2025-07-15 RX ADMIN — Medication 2 PUFF: at 19:54

## 2025-07-15 RX ADMIN — TRAZODONE HYDROCHLORIDE 100 MG: 50 TABLET ORAL at 21:11

## 2025-07-15 RX ADMIN — IPRATROPIUM BROMIDE AND ALBUTEROL SULFATE 1 DOSE: .5; 3 SOLUTION RESPIRATORY (INHALATION) at 08:00

## 2025-07-15 RX ADMIN — Medication 10 ML: at 08:51

## 2025-07-15 RX ADMIN — INSULIN GLARGINE 10 UNITS: 100 INJECTION, SOLUTION SUBCUTANEOUS at 08:49

## 2025-07-15 ASSESSMENT — PAIN SCALES - GENERAL: PAINLEVEL_OUTOF10: 0

## 2025-07-15 NOTE — RT PROTOCOL NOTE
RT Nebulizer Bronchodilator Protocol Note    There is a bronchodilator order in the chart from a provider indicating to follow the RT Bronchodilator Protocol and there is an “Initiate RT Bronchodilator Protocol” order as well (see protocol at bottom of note).    CXR Findings:  XR CHEST PORTABLE  Result Date: 7/14/2025  No acute process. Stable cardiomegaly       The findings from the last RT Protocol Assessment were as follows:  Smoking: Chronic pulmonary disease  Respiratory Pattern: Dyspnea on exertion or RR 21-25 bpm  Breath Sounds: Slightly diminished and/or crackles  Cough: Strong, productive  Indication for Bronchodilator Therapy: Decreased or absent breath sounds, On home bronchodilators  Bronchodilator Assessment Score: 7    Aerosolized bronchodilator medication orders have been revised according to the RT Nebulizer Bronchodilator Protocol below.    Respiratory Therapist to perform RT Therapy Protocol Assessment initially then follow the protocol.  Repeat RT Therapy Protocol Assessment PRN for score 0-3 or on second treatment, BID, and PRN for scores above 3.    No Indications - adjust the frequency to every 6 hours PRN wheezing or bronchospasm, if no treatments needed after 48 hours then discontinue using Per Protocol order mode.     If indication present, adjust the RT bronchodilator orders based on the Bronchodilator Assessment Score as indicated below.  If a patient is on this medication at home then do not decrease Frequency below that used at home.    0-3 - enter or revise RT bronchodilator order(s) to equivalent RT Bronchodilator order with Frequency of every 4 hours PRN for wheezing or increased work of breathing using Per Protocol order mode.       4-6 - enter or revise RT Bronchodilator order(s) to two equivalent RT bronchodilator orders with one order with BID Frequency and one order with Frequency of every 4 hours PRN wheezing or increased work of breathing using Per Protocol order mode.

## 2025-07-16 ENCOUNTER — ANESTHESIA (OUTPATIENT)
Age: 75
End: 2025-07-16
Payer: MEDICARE

## 2025-07-16 ENCOUNTER — APPOINTMENT (OUTPATIENT)
Dept: GENERAL RADIOLOGY | Age: 75
DRG: 228 | End: 2025-07-16
Payer: MEDICARE

## 2025-07-16 ENCOUNTER — APPOINTMENT (OUTPATIENT)
Age: 75
DRG: 228 | End: 2025-07-16
Attending: INTERNAL MEDICINE
Payer: MEDICARE

## 2025-07-16 DIAGNOSIS — E78.5 HYPERLIPIDEMIA LDL GOAL <100: ICD-10-CM

## 2025-07-16 LAB
ANION GAP SERPL CALCULATED.3IONS-SCNC: 13 MMOL/L (ref 3–16)
BUN SERPL-MCNC: 43 MG/DL (ref 7–20)
CALCIUM SERPL-MCNC: 8.6 MG/DL (ref 8.3–10.6)
CHLORIDE SERPL-SCNC: 105 MMOL/L (ref 99–110)
CO2 SERPL-SCNC: 20 MMOL/L (ref 21–32)
CREAT SERPL-MCNC: 2.6 MG/DL (ref 0.8–1.3)
DEPRECATED RDW RBC AUTO: 16.5 % (ref 12.4–15.4)
DEPRECATED RDW RBC AUTO: 16.5 % (ref 12.4–15.4)
ECHO BSA: 2.66 M2
GFR SERPLBLD CREATININE-BSD FMLA CKD-EPI: 25 ML/MIN/{1.73_M2}
GLUCOSE BLD-MCNC: 117 MG/DL (ref 70–99)
GLUCOSE BLD-MCNC: 126 MG/DL (ref 70–99)
GLUCOSE BLD-MCNC: 134 MG/DL (ref 70–99)
GLUCOSE BLD-MCNC: 149 MG/DL (ref 70–99)
GLUCOSE SERPL-MCNC: 117 MG/DL (ref 70–99)
HCT VFR BLD AUTO: 23.9 % (ref 40.5–52.5)
HCT VFR BLD AUTO: 25.2 % (ref 40.5–52.5)
HGB BLD-MCNC: 7.8 G/DL (ref 13.5–17.5)
HGB BLD-MCNC: 8.3 G/DL (ref 13.5–17.5)
INR PPP: 1.77 (ref 0.86–1.14)
MCH RBC QN AUTO: 30.7 PG (ref 26–34)
MCH RBC QN AUTO: 31.1 PG (ref 26–34)
MCHC RBC AUTO-ENTMCNC: 32.6 G/DL (ref 31–36)
MCHC RBC AUTO-ENTMCNC: 33 G/DL (ref 31–36)
MCV RBC AUTO: 94.4 FL (ref 80–100)
MCV RBC AUTO: 94.4 FL (ref 80–100)
PERFORMED ON: ABNORMAL
PLATELET # BLD AUTO: 174 K/UL (ref 135–450)
PLATELET # BLD AUTO: 207 K/UL (ref 135–450)
PMV BLD AUTO: 8.5 FL (ref 5–10.5)
PMV BLD AUTO: 8.7 FL (ref 5–10.5)
POTASSIUM SERPL-SCNC: 4.3 MMOL/L (ref 3.5–5.1)
PROTHROMBIN TIME: 20.6 SEC (ref 12.1–14.9)
RBC # BLD AUTO: 2.53 M/UL (ref 4.2–5.9)
RBC # BLD AUTO: 2.66 M/UL (ref 4.2–5.9)
SODIUM SERPL-SCNC: 138 MMOL/L (ref 136–145)
WBC # BLD AUTO: 5.4 K/UL (ref 4–11)
WBC # BLD AUTO: 6.1 K/UL (ref 4–11)

## 2025-07-16 PROCEDURE — 2500000003 HC RX 250 WO HCPCS: Performed by: STUDENT IN AN ORGANIZED HEALTH CARE EDUCATION/TRAINING PROGRAM

## 2025-07-16 PROCEDURE — 33274 TCAT INSJ/RPL PERM LDLS PM: CPT | Performed by: INTERNAL MEDICINE

## 2025-07-16 PROCEDURE — C1730 CATH, EP, 19 OR FEW ELECT: HCPCS | Performed by: INTERNAL MEDICINE

## 2025-07-16 PROCEDURE — 2580000003 HC RX 258

## 2025-07-16 PROCEDURE — 6360000002 HC RX W HCPCS: Performed by: INTERNAL MEDICINE

## 2025-07-16 PROCEDURE — 2500000003 HC RX 250 WO HCPCS: Performed by: INTERNAL MEDICINE

## 2025-07-16 PROCEDURE — 02HK3NZ INSERTION OF INTRACARDIAC PACEMAKER INTO RIGHT VENTRICLE, PERCUTANEOUS APPROACH: ICD-10-PCS | Performed by: INTERNAL MEDICINE

## 2025-07-16 PROCEDURE — 99232 SBSQ HOSP IP/OBS MODERATE 35: CPT | Performed by: NURSE PRACTITIONER

## 2025-07-16 PROCEDURE — 2580000003 HC RX 258: Performed by: NURSE ANESTHETIST, CERTIFIED REGISTERED

## 2025-07-16 PROCEDURE — 2709999900 HC NON-CHARGEABLE SUPPLY: Performed by: INTERNAL MEDICINE

## 2025-07-16 PROCEDURE — 6370000000 HC RX 637 (ALT 250 FOR IP): Performed by: STUDENT IN AN ORGANIZED HEALTH CARE EDUCATION/TRAINING PROGRAM

## 2025-07-16 PROCEDURE — C1769 GUIDE WIRE: HCPCS | Performed by: INTERNAL MEDICINE

## 2025-07-16 PROCEDURE — 3700000000 HC ANESTHESIA ATTENDED CARE: Performed by: INTERNAL MEDICINE

## 2025-07-16 PROCEDURE — 6370000000 HC RX 637 (ALT 250 FOR IP): Performed by: INTERNAL MEDICINE

## 2025-07-16 PROCEDURE — 85027 COMPLETE CBC AUTOMATED: CPT

## 2025-07-16 PROCEDURE — 2000000000 HC ICU R&B

## 2025-07-16 PROCEDURE — 3700000001 HC ADD 15 MINUTES (ANESTHESIA): Performed by: INTERNAL MEDICINE

## 2025-07-16 PROCEDURE — C1786 PMKR, SINGLE, RATE-RESP: HCPCS | Performed by: INTERNAL MEDICINE

## 2025-07-16 PROCEDURE — 94761 N-INVAS EAR/PLS OXIMETRY MLT: CPT

## 2025-07-16 PROCEDURE — C1894 INTRO/SHEATH, NON-LASER: HCPCS | Performed by: INTERNAL MEDICINE

## 2025-07-16 PROCEDURE — C1773 RET DEV, INSERTABLE: HCPCS | Performed by: INTERNAL MEDICINE

## 2025-07-16 PROCEDURE — C1760 CLOSURE DEV, VASC: HCPCS | Performed by: INTERNAL MEDICINE

## 2025-07-16 PROCEDURE — 71045 X-RAY EXAM CHEST 1 VIEW: CPT

## 2025-07-16 PROCEDURE — 80048 BASIC METABOLIC PNL TOTAL CA: CPT

## 2025-07-16 PROCEDURE — 85610 PROTHROMBIN TIME: CPT

## 2025-07-16 PROCEDURE — 6360000002 HC RX W HCPCS: Performed by: NURSE ANESTHETIST, CERTIFIED REGISTERED

## 2025-07-16 PROCEDURE — 94640 AIRWAY INHALATION TREATMENT: CPT

## 2025-07-16 PROCEDURE — 6370000000 HC RX 637 (ALT 250 FOR IP): Performed by: HOSPITALIST

## 2025-07-16 PROCEDURE — 6370000000 HC RX 637 (ALT 250 FOR IP): Performed by: NURSE PRACTITIONER

## 2025-07-16 PROCEDURE — 02PA3MZ REMOVAL OF CARDIAC LEAD FROM HEART, PERCUTANEOUS APPROACH: ICD-10-PCS | Performed by: INTERNAL MEDICINE

## 2025-07-16 PROCEDURE — 2500000003 HC RX 250 WO HCPCS: Performed by: NURSE ANESTHETIST, CERTIFIED REGISTERED

## 2025-07-16 PROCEDURE — 99233 SBSQ HOSP IP/OBS HIGH 50: CPT | Performed by: HOSPITALIST

## 2025-07-16 PROCEDURE — 36415 COLL VENOUS BLD VENIPUNCTURE: CPT

## 2025-07-16 RX ORDER — SUCCINYLCHOLINE CHLORIDE 20 MG/ML
INJECTION INTRAMUSCULAR; INTRAVENOUS
Status: DISCONTINUED | OUTPATIENT
Start: 2025-07-16 | End: 2025-07-17 | Stop reason: SDUPTHER

## 2025-07-16 RX ORDER — LIDOCAINE HYDROCHLORIDE 20 MG/ML
INJECTION, SOLUTION EPIDURAL; INFILTRATION; INTRACAUDAL; PERINEURAL
Status: DISCONTINUED | OUTPATIENT
Start: 2025-07-16 | End: 2025-07-17 | Stop reason: SDUPTHER

## 2025-07-16 RX ORDER — ROCURONIUM BROMIDE 10 MG/ML
INJECTION, SOLUTION INTRAVENOUS
Status: DISCONTINUED | OUTPATIENT
Start: 2025-07-16 | End: 2025-07-17 | Stop reason: SDUPTHER

## 2025-07-16 RX ORDER — SODIUM CHLORIDE 0.9 % (FLUSH) 0.9 %
5-40 SYRINGE (ML) INJECTION PRN
Status: DISCONTINUED | OUTPATIENT
Start: 2025-07-16 | End: 2025-07-23 | Stop reason: HOSPADM

## 2025-07-16 RX ORDER — OXYCODONE HYDROCHLORIDE 5 MG/1
5 TABLET ORAL EVERY 4 HOURS PRN
Status: DISCONTINUED | OUTPATIENT
Start: 2025-07-16 | End: 2025-07-23 | Stop reason: HOSPADM

## 2025-07-16 RX ORDER — HEPARIN SODIUM 200 [USP'U]/100ML
INJECTION, SOLUTION INTRAVENOUS
Status: DISCONTINUED | OUTPATIENT
Start: 2025-07-16 | End: 2025-07-17 | Stop reason: SDUPTHER

## 2025-07-16 RX ORDER — PHENYLEPHRINE HCL IN 0.9% NACL 1 MG/10 ML
SYRINGE (ML) INTRAVENOUS
Status: DISCONTINUED | OUTPATIENT
Start: 2025-07-16 | End: 2025-07-17 | Stop reason: SDUPTHER

## 2025-07-16 RX ORDER — SODIUM CHLORIDE 0.9 % (FLUSH) 0.9 %
5-40 SYRINGE (ML) INJECTION PRN
Status: DISCONTINUED | OUTPATIENT
Start: 2025-07-16 | End: 2025-07-16 | Stop reason: HOSPADM

## 2025-07-16 RX ORDER — SODIUM CHLORIDE 9 MG/ML
INJECTION, SOLUTION INTRAVENOUS PRN
Status: DISCONTINUED | OUTPATIENT
Start: 2025-07-16 | End: 2025-07-16 | Stop reason: HOSPADM

## 2025-07-16 RX ORDER — ONDANSETRON 2 MG/ML
INJECTION INTRAMUSCULAR; INTRAVENOUS
Status: DISCONTINUED | OUTPATIENT
Start: 2025-07-16 | End: 2025-07-17 | Stop reason: SDUPTHER

## 2025-07-16 RX ORDER — SODIUM CHLORIDE 0.9 % (FLUSH) 0.9 %
5-40 SYRINGE (ML) INJECTION EVERY 12 HOURS SCHEDULED
Status: DISCONTINUED | OUTPATIENT
Start: 2025-07-16 | End: 2025-07-17

## 2025-07-16 RX ORDER — SODIUM CHLORIDE 0.9 % (FLUSH) 0.9 %
5-40 SYRINGE (ML) INJECTION EVERY 12 HOURS SCHEDULED
Status: DISCONTINUED | OUTPATIENT
Start: 2025-07-16 | End: 2025-07-16 | Stop reason: HOSPADM

## 2025-07-16 RX ORDER — FENTANYL CITRATE 50 UG/ML
INJECTION, SOLUTION INTRAMUSCULAR; INTRAVENOUS
Status: DISCONTINUED | OUTPATIENT
Start: 2025-07-16 | End: 2025-07-17 | Stop reason: SDUPTHER

## 2025-07-16 RX ORDER — PROPOFOL 10 MG/ML
INJECTION, EMULSION INTRAVENOUS
Status: DISCONTINUED | OUTPATIENT
Start: 2025-07-16 | End: 2025-07-17 | Stop reason: SDUPTHER

## 2025-07-16 RX ORDER — OXYCODONE HYDROCHLORIDE 5 MG/1
10 TABLET ORAL EVERY 4 HOURS PRN
Status: DISCONTINUED | OUTPATIENT
Start: 2025-07-16 | End: 2025-07-23 | Stop reason: HOSPADM

## 2025-07-16 RX ORDER — SODIUM CHLORIDE 9 MG/ML
INJECTION, SOLUTION INTRAVENOUS PRN
Status: DISCONTINUED | OUTPATIENT
Start: 2025-07-16 | End: 2025-07-23 | Stop reason: HOSPADM

## 2025-07-16 RX ADMIN — SODIUM CHLORIDE, PRESERVATIVE FREE 10 ML: 5 INJECTION INTRAVENOUS at 08:56

## 2025-07-16 RX ADMIN — DAPTOMYCIN 625 MG: 500 INJECTION, POWDER, LYOPHILIZED, FOR SOLUTION INTRAVENOUS at 14:07

## 2025-07-16 RX ADMIN — Medication 2 PUFF: at 20:17

## 2025-07-16 RX ADMIN — ROCURONIUM BROMIDE 50 MG: 10 INJECTION, SOLUTION INTRAVENOUS at 15:07

## 2025-07-16 RX ADMIN — Medication 2 PUFF: at 12:58

## 2025-07-16 RX ADMIN — SUGAMMADEX 200 MG: 100 INJECTION, SOLUTION INTRAVENOUS at 16:54

## 2025-07-16 RX ADMIN — ROCURONIUM BROMIDE 30 MG: 10 INJECTION, SOLUTION INTRAVENOUS at 15:57

## 2025-07-16 RX ADMIN — MELATONIN TAB 3 MG 6 MG: 3 TAB at 21:20

## 2025-07-16 RX ADMIN — HEPARIN SODIUM IN SODIUM CHLORIDE 5000 ML: 200 INJECTION INTRAVENOUS at 16:09

## 2025-07-16 RX ADMIN — Medication 100 MCG: at 15:33

## 2025-07-16 RX ADMIN — ENOXAPARIN SODIUM 120 MG: 150 INJECTION SUBCUTANEOUS at 07:35

## 2025-07-16 RX ADMIN — PROPOFOL 100 MG: 10 INJECTION, EMULSION INTRAVENOUS at 14:57

## 2025-07-16 RX ADMIN — OXYCODONE 10 MG: 5 TABLET ORAL at 17:35

## 2025-07-16 RX ADMIN — TIOTROPIUM BROMIDE INHALATION SPRAY 5 MCG: 3.12 SPRAY, METERED RESPIRATORY (INHALATION) at 12:58

## 2025-07-16 RX ADMIN — Medication 10 ML: at 08:57

## 2025-07-16 RX ADMIN — OXYCODONE 10 MG: 5 TABLET ORAL at 23:24

## 2025-07-16 RX ADMIN — SODIUM CHLORIDE, PRESERVATIVE FREE 10 ML: 5 INJECTION INTRAVENOUS at 21:21

## 2025-07-16 RX ADMIN — ONDANSETRON 4 MG: 2 INJECTION INTRAMUSCULAR; INTRAVENOUS at 16:50

## 2025-07-16 RX ADMIN — SODIUM CHLORIDE: 9 INJECTION, SOLUTION INTRAVENOUS at 14:52

## 2025-07-16 RX ADMIN — IPRATROPIUM BROMIDE AND ALBUTEROL SULFATE 1 DOSE: .5; 3 SOLUTION RESPIRATORY (INHALATION) at 20:18

## 2025-07-16 RX ADMIN — SOTALOL HYDROCHLORIDE 80 MG: 80 TABLET ORAL at 08:42

## 2025-07-16 RX ADMIN — IPRATROPIUM BROMIDE AND ALBUTEROL SULFATE 1 DOSE: .5; 3 SOLUTION RESPIRATORY (INHALATION) at 12:58

## 2025-07-16 RX ADMIN — INSULIN GLARGINE 10 UNITS: 100 INJECTION, SOLUTION SUBCUTANEOUS at 08:42

## 2025-07-16 RX ADMIN — FENTANYL CITRATE 25 MCG: 50 INJECTION, SOLUTION INTRAMUSCULAR; INTRAVENOUS at 15:26

## 2025-07-16 RX ADMIN — LIDOCAINE HYDROCHLORIDE 50 MG: 20 INJECTION, SOLUTION EPIDURAL; INFILTRATION; INTRACAUDAL; PERINEURAL at 14:57

## 2025-07-16 RX ADMIN — PHENYLEPHRINE HYDROCHLORIDE 20 MCG/MIN: 10 INJECTION INTRAVENOUS at 15:10

## 2025-07-16 RX ADMIN — Medication 100 MCG: at 15:37

## 2025-07-16 RX ADMIN — SUCCINYLCHOLINE CHLORIDE 100 MG: 20 INJECTION, SOLUTION INTRAMUSCULAR; INTRAVENOUS at 14:58

## 2025-07-16 RX ADMIN — FUROSEMIDE 20 MG: 20 TABLET ORAL at 12:13

## 2025-07-16 RX ADMIN — TRAZODONE HYDROCHLORIDE 100 MG: 50 TABLET ORAL at 21:20

## 2025-07-16 ASSESSMENT — PAIN SCALES - WONG BAKER
WONGBAKER_NUMERICALRESPONSE: NO HURT

## 2025-07-16 ASSESSMENT — PAIN DESCRIPTION - ORIENTATION: ORIENTATION: LEFT

## 2025-07-16 ASSESSMENT — PAIN DESCRIPTION - DESCRIPTORS
DESCRIPTORS: ACHING;THROBBING;DISCOMFORT
DESCRIPTORS: ACHING

## 2025-07-16 ASSESSMENT — PAIN DESCRIPTION - LOCATION
LOCATION: GROIN;NECK
LOCATION: GROIN;NECK
LOCATION: GROIN
LOCATION: GROIN;NECK

## 2025-07-16 ASSESSMENT — PAIN SCALES - GENERAL
PAINLEVEL_OUTOF10: 0
PAINLEVEL_OUTOF10: 0
PAINLEVEL_OUTOF10: 5
PAINLEVEL_OUTOF10: 10
PAINLEVEL_OUTOF10: 0
PAINLEVEL_OUTOF10: 10
PAINLEVEL_OUTOF10: 0
PAINLEVEL_OUTOF10: 7
PAINLEVEL_OUTOF10: 2
PAINLEVEL_OUTOF10: 0

## 2025-07-16 NOTE — FLOWSHEET NOTE
07/16/25 1720   Vitals   Temp 97.5 °F (36.4 °C)   Temp Source Temporal   Pulse 70   Heart Rate Source Monitor   Respirations 15   /79   MAP (Calculated) 97   MAP (mmHg) 94   BP Location Right upper arm   BP Upper/Lower Upper   BP Method Automatic   Patient Position Supine   Cardiac Rhythm Ventricular paced   Pain Assessment   Pain Assessment Dawn-Kimbrough FACES   Pain Level 0   Dawn-Baker Pain Rating 0   Patient's Stated Pain Goal 0 - No pain   RASS   De Agitation Sedation Scale (RASS) -3   Oxygen Therapy   SpO2 99 %   Pulse Oximeter Device Mode Intermittent   Pulse Oximeter Device Location Finger   Oximetry Probe Site Changed Yes   O2 Device Nasal cannula   O2 Flow Rate (L/min) 2 L/min   Skin Assessment Clean, dry, & intact   Ventilator Associated Pneumonia Bundle   Anti-Embolism Devices Elastic wraps   Anti-Embolism Intervention Medication   Laterality Bilateral     Patient admitted from cath lab after Leadless pacemaker via Rt femoral vein(MICRA) Removal of leadless pacemaker/Micra and removal of right IJ temp. Pacer. VSS, patient denies pain at this time, all surgical sites WNL.

## 2025-07-16 NOTE — TELEPHONE ENCOUNTER
Medication:   Requested Prescriptions     Pending Prescriptions Disp Refills    pravastatin (PRAVACHOL) 80 MG tablet [Pharmacy Med Name: PRAVASTATIN SODIUM 80 MG Oral Tablet] 90 tablet 3     Sig: TAKE 1 TABLET EVERY DAY        Last Filled:  6/13/24    Patient Phone Number: [phone] (home)     Last appt: 6/24/2025   Next appt: 7/24/2025    Last OARRS:        No data to display

## 2025-07-16 NOTE — ANESTHESIA PRE PROCEDURE
Department of Anesthesiology  Preprocedure Note       Name:  Valdemar Solis   Age:  75 y.o.  :  1950                                          MRN:  [id number]         Date:  2025      Surgeon: Surgeon(s):  Jasper Raza MD Mehzad, Reza, MD    Procedure: Procedure(s):  Insert or replace transcath PPM leadless    Medications prior to admission:   Prior to Admission medications    Medication Sig Start Date End Date Taking? Authorizing Provider   furosemide (LASIX) 40 MG tablet 60 mg alternating with 80 mg 25  Yes Zoraida Uribe, APRN - CNS   pantoprazole (PROTONIX) 40 MG tablet Take 1 tablet by mouth 2 times daily (before meals) 25  Yes Yane Wei MD   vitamin D 25 MCG (1000 UT) CAPS Take 1 capsule by mouth daily   Yes Abraham Andrade MD   blood glucose test strips (ASCENSIA AUTODISC VI;ONE TOUCH ULTRA TEST VI) strip Check glucose tid and prn 6/10/25  Yes Yane Wei MD   Accu-Chek Softclix Lancets MISC Check glucose tid and prn 6/10/25  Yes Yane Wei MD   diclofenac sodium (VOLTAREN) 1 % GEL Apply 2 g topically in the morning, at noon, and at bedtime 25  Yes Markus Mcfarland MD   spironolactone (ALDACTONE) 25 MG tablet Take 0.5 tablets by mouth Every Monday, Wednesday, and 25  Yes Markus Mcfarland MD   dapagliflozin (FARXIGA) 10 MG tablet TAKE 1 TABLET BY MOUTH EVERY MORNING 25  Yes Yane Wei MD   sotalol (BETAPACE) 80 MG tablet TAKE 1 TABLET BY MOUTH 2 TIMES A DAY 25  Yes Yane Wei MD   traZODone (DESYREL) 50 MG tablet TAKE 1 OR 2 TABLETS AS NEEDED FOR SLEEP AS DIRECTED 25  Yes Yane Wei MD   albuterol (PROVENTIL) (2.5 MG/3ML) 0.083% nebulizer solution Take 3 mLs by nebulization every 6 hours as needed for Wheezing 3/20/25  Yes Billy Ambrosio MD   albuterol sulfate HFA (PROVENTIL;VENTOLIN;PROAIR) 108 (90 Base) MCG/ACT inhaler Inhale 2 puffs into the lungs every 6 hours as needed for Wheezing 3/13/25  Yes 
Department of Anesthesiology  Preprocedure Note       Name:  Valdemar Solis   Age:  75 y.o.  :  1950                                          MRN:  [id number]         Date:  7/15/2025      Surgeon: Surgeon(s):  Jasper Raza MD    Procedure: Procedure(s):  Insert or replace transcath PPM leadless    Medications prior to admission:   Prior to Admission medications    Medication Sig Start Date End Date Taking? Authorizing Provider   furosemide (LASIX) 40 MG tablet 60 mg alternating with 80 mg 25  Yes Zoraida Uribe APRN - CNS   pantoprazole (PROTONIX) 40 MG tablet Take 1 tablet by mouth 2 times daily (before meals) 25  Yes Yane Wei MD   vitamin D 25 MCG (1000 UT) CAPS Take 1 capsule by mouth daily   Yes ProviderAbraham MD   blood glucose test strips (ASCENSIA AUTODISC VI;ONE TOUCH ULTRA TEST VI) strip Check glucose tid and prn 6/10/25  Yes Yane Wei MD   Accu-Chek Softclix Lancets MISC Check glucose tid and prn 6/10/25  Yes Yane Wei MD   diclofenac sodium (VOLTAREN) 1 % GEL Apply 2 g topically in the morning, at noon, and at bedtime 25  Yes Markus Mcfarland MD   spironolactone (ALDACTONE) 25 MG tablet Take 0.5 tablets by mouth Every Monday, Wednesday, and 25  Yes Markus Mcfarland MD   dapagliflozin (FARXIGA) 10 MG tablet TAKE 1 TABLET BY MOUTH EVERY MORNING 25  Yes Yane Wei MD   sotalol (BETAPACE) 80 MG tablet TAKE 1 TABLET BY MOUTH 2 TIMES A DAY 25  Yes Yane Wei MD   traZODone (DESYREL) 50 MG tablet TAKE 1 OR 2 TABLETS AS NEEDED FOR SLEEP AS DIRECTED 25  Yes Yane Wei MD   albuterol (PROVENTIL) (2.5 MG/3ML) 0.083% nebulizer solution Take 3 mLs by nebulization every 6 hours as needed for Wheezing 3/20/25  Yes Billy Ambrosio MD   albuterol sulfate HFA (PROVENTIL;VENTOLIN;PROAIR) 108 (90 Base) MCG/ACT inhaler Inhale 2 puffs into the lungs every 6 hours as needed for Wheezing 3/13/25  Yes Yane Wei, 
05:15 AM    BUN 43 07/16/2025 05:15 AM    CREATININE 2.6 07/16/2025 05:15 AM    GFRAA >60 09/29/2022 08:32 AM    GFRAA >60 02/23/2011 09:55 AM    AGRATIO 1.3 07/15/2025 04:43 AM    LABGLOM 25 07/16/2025 05:15 AM    LABGLOM 42 01/30/2024 09:37 AM    GLUCOSE 117 07/16/2025 05:15 AM    CALCIUM 8.6 07/16/2025 05:15 AM    BILITOT 0.3 07/15/2025 04:43 AM    ALKPHOS 84 07/15/2025 04:43 AM    AST 18 07/15/2025 04:43 AM    ALT 19 07/15/2025 04:43 AM       POC Tests:   Recent Labs     07/16/25  1153   POCGLU 117*       Coags:   Lab Results   Component Value Date/Time    PROTIME 20.6 07/16/2025 09:00 AM    PROTIME 0.0 06/25/2025 01:49 PM    INR 1.77 07/16/2025 09:00 AM    APTT 33.5 06/16/2025 07:10 PM       HCG (If Applicable): No results found for: \"PREGTESTUR\", \"PREGSERUM\", \"HCG\", \"HCGQUANT\"     ABGs:   Lab Results   Component Value Date/Time    PHART 7.408 06/17/2025 11:47 AM    PO2ART 128.8 06/17/2025 11:47 AM    OXD5IPQ 30.3 06/17/2025 11:47 AM    UFQ0IOU 19.1 06/17/2025 11:47 AM    BEART -6 06/17/2025 11:47 AM    X4EXBUHF 99 06/17/2025 11:47 AM        Type & Screen (If Applicable):  Lab Results   Component Value Date    ABORH A NEG 07/07/2025    LABANTI NEG 07/07/2025       Drug/Infectious Status (If Applicable):  No results found for: \"HIV\", \"HEPCAB\"    COVID-19 Screening (If Applicable):   Lab Results   Component Value Date/Time    COVID19 NOT DETECTED 06/29/2025 12:42 AM           Anesthesia Evaluation    Airway: Mallampati: II          Dental:          Pulmonary:   (+) pneumonia:  COPD:    sleep apnea:                                  Cardiovascular:    (+) hypertension:, pacemaker:, CHF:                  Neuro/Psych:   (+) headaches:            GI/Hepatic/Renal:             Endo/Other:    (+) DiabetesType II DM.                 Abdominal:             Vascular:          Other Findings:       Anesthesia Plan      ASA 3                               Panchito Dietz MD   7/16/2025

## 2025-07-16 NOTE — PROCEDURES
PROCEDURE NOTE  Date: 7/16/2025   Name: Valdemar Solis  YOB: 1950    Procedures    [facility]     Electrophysiology Procedure Note       Date of Procedure: 7/16/2025  Patient's Name: Valdemar Solis  YOB: 1950   Medical Record Number: [id number]  Referring Physician: Salena Roldan MD  Procedure Performed by: Jasper Raza MD, Dr. Ash    Procedures performed:     Insertion of MRI compatible right ventricular  Leadless pacemaker via Rt femoral vein(MICRA)  Removal of leadless pacemaker/Micra  Temporary pacemaker  General anesthesia  Programming and analysis of the device     Mallampati3  ASA 3    Indication of the procedure:     Valdemar Solis is a 75 y.o. male with complete heart block status post Micra implant with the device intermittent losing capture.      Details of procedure:     The patient was brought to the electrophysiology laboratory in stable condition. The patient was in a fasting and non-sedated state. The risks, benefits and alternatives of the procedure were discussed with the patient ]. The risks including, but not limited to, the risks of vascular injury, bleeding, infection, device malfunction, lead dislodgement, radiation exposure, injury to cardiac and surrounding structures (including pneumothorax), stroke, myocardial infarction and death were discussed in detail. Patient opted to proceed with the device implantation. Written informed consent was signed and placed in the chart.  Prophylactic antibiotic was given.          The patient was prepped and draped in a sterile fashion. A timeout protocol was completed to identify the patient and the procedure being performed. Pre-sedation evaluation and airway assessment (Mallampatti classification, class llI) was completed.   Patient was put under general anesthesia    Both groins were prepped and draped in usual sterile fashion.  Using ultrasound access to left femoral vein was achieved and kei Skinner

## 2025-07-16 NOTE — H&P
H&P Update    I have reviewed the history and physical and examined the patient and updated with relevant changes.     Consent: I have discussed with the patient and family the indication, alternatives, and the possible risks and/or complications of the planned procedure,  and the sedation / anesthesia methods. The patient verbalized understanding and agree to proceed.    Vitals:    07/16/25 1300   BP:    Pulse:    Resp:    Temp:    SpO2: 100%     Prior to Admission medications    Medication Sig Start Date End Date Taking? Authorizing Provider   furosemide (LASIX) 40 MG tablet 60 mg alternating with 80 mg 6/25/25  Yes Zoraida Uribe APRN - CNS   pantoprazole (PROTONIX) 40 MG tablet Take 1 tablet by mouth 2 times daily (before meals) 6/24/25  Yes Yane Wei MD   vitamin D 25 MCG (1000 UT) CAPS Take 1 capsule by mouth daily   Yes Abraham Andrade MD   blood glucose test strips (ASCENSIA AUTODISC VI;ONE TOUCH ULTRA TEST VI) strip Check glucose tid and prn 6/10/25  Yes Yane Wei MD   Accu-Chek Softclix Lancets MISC Check glucose tid and prn 6/10/25  Yes Yane Wei MD   diclofenac sodium (VOLTAREN) 1 % GEL Apply 2 g topically in the morning, at noon, and at bedtime 6/4/25  Yes Markus Mcfarland MD   spironolactone (ALDACTONE) 25 MG tablet Take 0.5 tablets by mouth Every Monday, Wednesday, and Friday 6/4/25  Yes Markus Mcfarland MD   dapagliflozin (FARXIGA) 10 MG tablet TAKE 1 TABLET BY MOUTH EVERY MORNING 4/24/25  Yes Yane Wei MD   sotalol (BETAPACE) 80 MG tablet TAKE 1 TABLET BY MOUTH 2 TIMES A DAY 4/24/25  Yes Yane Wei MD   traZODone (DESYREL) 50 MG tablet TAKE 1 OR 2 TABLETS AS NEEDED FOR SLEEP AS DIRECTED 4/9/25  Yes Yane Wei MD   albuterol (PROVENTIL) (2.5 MG/3ML) 0.083% nebulizer solution Take 3 mLs by nebulization every 6 hours as needed for Wheezing 3/20/25  Yes Billy Ambrosio MD   albuterol sulfate HFA (PROVENTIL;VENTOLIN;PROAIR) 108 (90 Base)

## 2025-07-17 ENCOUNTER — APPOINTMENT (OUTPATIENT)
Dept: INTERVENTIONAL RADIOLOGY/VASCULAR | Age: 75
DRG: 228 | End: 2025-07-17
Payer: MEDICARE

## 2025-07-17 LAB
ANION GAP SERPL CALCULATED.3IONS-SCNC: 15 MMOL/L (ref 3–16)
BUN SERPL-MCNC: 40 MG/DL (ref 7–20)
CALCIUM SERPL-MCNC: 8.7 MG/DL (ref 8.3–10.6)
CHLORIDE SERPL-SCNC: 104 MMOL/L (ref 99–110)
CO2 SERPL-SCNC: 17 MMOL/L (ref 21–32)
CREAT SERPL-MCNC: 2.6 MG/DL (ref 0.8–1.3)
DEPRECATED RDW RBC AUTO: 17.4 % (ref 12.4–15.4)
ECHO BSA: 2.66 M2
GFR SERPLBLD CREATININE-BSD FMLA CKD-EPI: 25 ML/MIN/{1.73_M2}
GLUCOSE BLD-MCNC: 120 MG/DL (ref 70–99)
GLUCOSE BLD-MCNC: 145 MG/DL (ref 70–99)
GLUCOSE BLD-MCNC: 166 MG/DL (ref 70–99)
GLUCOSE BLD-MCNC: 168 MG/DL (ref 70–99)
GLUCOSE SERPL-MCNC: 130 MG/DL (ref 70–99)
HCT VFR BLD AUTO: 27.5 % (ref 40.5–52.5)
HGB BLD-MCNC: 8.9 G/DL (ref 13.5–17.5)
INR PPP: 1.43 (ref 0.86–1.14)
MCH RBC QN AUTO: 31.2 PG (ref 26–34)
MCHC RBC AUTO-ENTMCNC: 32.6 G/DL (ref 31–36)
MCV RBC AUTO: 95.9 FL (ref 80–100)
PERFORMED ON: ABNORMAL
PLATELET # BLD AUTO: 219 K/UL (ref 135–450)
PMV BLD AUTO: 8.8 FL (ref 5–10.5)
POTASSIUM SERPL-SCNC: 4.4 MMOL/L (ref 3.5–5.1)
PROTHROMBIN TIME: 17.6 SEC (ref 12.1–14.9)
RBC # BLD AUTO: 2.86 M/UL (ref 4.2–5.9)
SODIUM SERPL-SCNC: 136 MMOL/L (ref 136–145)
WBC # BLD AUTO: 6.1 K/UL (ref 4–11)

## 2025-07-17 PROCEDURE — 6370000000 HC RX 637 (ALT 250 FOR IP): Performed by: INTERNAL MEDICINE

## 2025-07-17 PROCEDURE — 6360000002 HC RX W HCPCS: Performed by: INTERNAL MEDICINE

## 2025-07-17 PROCEDURE — 0JH63XZ INSERTION OF TUNNELED VASCULAR ACCESS DEVICE INTO CHEST SUBCUTANEOUS TISSUE AND FASCIA, PERCUTANEOUS APPROACH: ICD-10-PCS | Performed by: INTERNAL MEDICINE

## 2025-07-17 PROCEDURE — 36558 INSERT TUNNELED CV CATH: CPT

## 2025-07-17 PROCEDURE — 85027 COMPLETE CBC AUTOMATED: CPT

## 2025-07-17 PROCEDURE — 94660 CPAP INITIATION&MGMT: CPT

## 2025-07-17 PROCEDURE — 2500000003 HC RX 250 WO HCPCS: Performed by: INTERNAL MEDICINE

## 2025-07-17 PROCEDURE — 77001 FLUOROGUIDE FOR VEIN DEVICE: CPT

## 2025-07-17 PROCEDURE — 94640 AIRWAY INHALATION TREATMENT: CPT

## 2025-07-17 PROCEDURE — 6360000002 HC RX W HCPCS

## 2025-07-17 PROCEDURE — 6370000000 HC RX 637 (ALT 250 FOR IP): Performed by: STUDENT IN AN ORGANIZED HEALTH CARE EDUCATION/TRAINING PROGRAM

## 2025-07-17 PROCEDURE — 94761 N-INVAS EAR/PLS OXIMETRY MLT: CPT

## 2025-07-17 PROCEDURE — 76937 US GUIDE VASCULAR ACCESS: CPT

## 2025-07-17 PROCEDURE — 80048 BASIC METABOLIC PNL TOTAL CA: CPT

## 2025-07-17 PROCEDURE — 99233 SBSQ HOSP IP/OBS HIGH 50: CPT | Performed by: HOSPITALIST

## 2025-07-17 PROCEDURE — 2700000000 HC OXYGEN THERAPY PER DAY

## 2025-07-17 PROCEDURE — 2000000000 HC ICU R&B

## 2025-07-17 PROCEDURE — 99232 SBSQ HOSP IP/OBS MODERATE 35: CPT | Performed by: NURSE PRACTITIONER

## 2025-07-17 PROCEDURE — 6370000000 HC RX 637 (ALT 250 FOR IP): Performed by: NURSE PRACTITIONER

## 2025-07-17 PROCEDURE — C1751 CATH, INF, PER/CENT/MIDLINE: HCPCS

## 2025-07-17 PROCEDURE — 85610 PROTHROMBIN TIME: CPT

## 2025-07-17 RX ORDER — SODIUM CHLORIDE 0.9 % (FLUSH) 0.9 %
5-40 SYRINGE (ML) INJECTION EVERY 12 HOURS SCHEDULED
Status: DISCONTINUED | OUTPATIENT
Start: 2025-07-17 | End: 2025-07-17

## 2025-07-17 RX ORDER — OXYCODONE HYDROCHLORIDE 5 MG/1
10 TABLET ORAL PRN
Status: DISCONTINUED | OUTPATIENT
Start: 2025-07-17 | End: 2025-07-23 | Stop reason: HOSPADM

## 2025-07-17 RX ORDER — PRAVASTATIN SODIUM 80 MG/1
80 TABLET ORAL DAILY
Qty: 90 TABLET | Refills: 3 | Status: SHIPPED | OUTPATIENT
Start: 2025-07-17

## 2025-07-17 RX ORDER — LORAZEPAM 2 MG/ML
0.5 INJECTION INTRAMUSCULAR
Status: DISCONTINUED | OUTPATIENT
Start: 2025-07-17 | End: 2025-07-23 | Stop reason: HOSPADM

## 2025-07-17 RX ORDER — HYDRALAZINE HYDROCHLORIDE 20 MG/ML
10 INJECTION INTRAMUSCULAR; INTRAVENOUS
Status: DISCONTINUED | OUTPATIENT
Start: 2025-07-17 | End: 2025-07-23 | Stop reason: HOSPADM

## 2025-07-17 RX ORDER — FENTANYL CITRATE 50 UG/ML
25 INJECTION, SOLUTION INTRAMUSCULAR; INTRAVENOUS EVERY 5 MIN PRN
Status: DISCONTINUED | OUTPATIENT
Start: 2025-07-17 | End: 2025-07-23 | Stop reason: HOSPADM

## 2025-07-17 RX ORDER — LIDOCAINE HYDROCHLORIDE 10 MG/ML
10 INJECTION, SOLUTION EPIDURAL; INFILTRATION; INTRACAUDAL; PERINEURAL ONCE
Status: COMPLETED | OUTPATIENT
Start: 2025-07-17 | End: 2025-07-17

## 2025-07-17 RX ORDER — LIDOCAINE HYDROCHLORIDE AND EPINEPHRINE 10; 10 MG/ML; UG/ML
7.5 INJECTION, SOLUTION INFILTRATION; PERINEURAL ONCE
Status: COMPLETED | OUTPATIENT
Start: 2025-07-17 | End: 2025-07-17

## 2025-07-17 RX ORDER — METOCLOPRAMIDE HYDROCHLORIDE 5 MG/ML
10 INJECTION INTRAMUSCULAR; INTRAVENOUS
Status: DISCONTINUED | OUTPATIENT
Start: 2025-07-17 | End: 2025-07-23 | Stop reason: HOSPADM

## 2025-07-17 RX ORDER — OXYCODONE HYDROCHLORIDE 5 MG/1
5 TABLET ORAL PRN
Status: DISCONTINUED | OUTPATIENT
Start: 2025-07-17 | End: 2025-07-23 | Stop reason: HOSPADM

## 2025-07-17 RX ORDER — ONDANSETRON 2 MG/ML
4 INJECTION INTRAMUSCULAR; INTRAVENOUS
Status: DISCONTINUED | OUTPATIENT
Start: 2025-07-17 | End: 2025-07-23 | Stop reason: HOSPADM

## 2025-07-17 RX ORDER — SODIUM CHLORIDE 9 MG/ML
INJECTION, SOLUTION INTRAVENOUS PRN
Status: DISCONTINUED | OUTPATIENT
Start: 2025-07-17 | End: 2025-07-23 | Stop reason: HOSPADM

## 2025-07-17 RX ORDER — DIGOXIN 0.25 MG/ML
500 INJECTION INTRAMUSCULAR; INTRAVENOUS ONCE
Status: COMPLETED | OUTPATIENT
Start: 2025-07-17 | End: 2025-07-17

## 2025-07-17 RX ORDER — SODIUM CHLORIDE 0.9 % (FLUSH) 0.9 %
5-40 SYRINGE (ML) INJECTION PRN
Status: DISCONTINUED | OUTPATIENT
Start: 2025-07-17 | End: 2025-07-23 | Stop reason: HOSPADM

## 2025-07-17 RX ORDER — HYDROMORPHONE HYDROCHLORIDE 2 MG/ML
0.5 INJECTION, SOLUTION INTRAMUSCULAR; INTRAVENOUS; SUBCUTANEOUS EVERY 5 MIN PRN
Status: DISCONTINUED | OUTPATIENT
Start: 2025-07-17 | End: 2025-07-23 | Stop reason: HOSPADM

## 2025-07-17 RX ORDER — LABETALOL HYDROCHLORIDE 5 MG/ML
10 INJECTION, SOLUTION INTRAVENOUS
Status: DISCONTINUED | OUTPATIENT
Start: 2025-07-17 | End: 2025-07-23 | Stop reason: HOSPADM

## 2025-07-17 RX ADMIN — IPRATROPIUM BROMIDE AND ALBUTEROL SULFATE 1 DOSE: .5; 3 SOLUTION RESPIRATORY (INHALATION) at 20:27

## 2025-07-17 RX ADMIN — Medication 10 ML: at 08:30

## 2025-07-17 RX ADMIN — MELATONIN TAB 3 MG 6 MG: 3 TAB at 20:48

## 2025-07-17 RX ADMIN — IPRATROPIUM BROMIDE AND ALBUTEROL SULFATE 1 DOSE: .5; 3 SOLUTION RESPIRATORY (INHALATION) at 15:27

## 2025-07-17 RX ADMIN — TRAZODONE HYDROCHLORIDE 100 MG: 50 TABLET ORAL at 20:47

## 2025-07-17 RX ADMIN — OXYCODONE 10 MG: 5 TABLET ORAL at 08:44

## 2025-07-17 RX ADMIN — IPRATROPIUM BROMIDE AND ALBUTEROL SULFATE 1 DOSE: .5; 3 SOLUTION RESPIRATORY (INHALATION) at 08:10

## 2025-07-17 RX ADMIN — ENOXAPARIN SODIUM 120 MG: 150 INJECTION SUBCUTANEOUS at 08:30

## 2025-07-17 RX ADMIN — DAPTOMYCIN 625 MG: 500 INJECTION, POWDER, LYOPHILIZED, FOR SOLUTION INTRAVENOUS at 15:01

## 2025-07-17 RX ADMIN — Medication 2 PUFF: at 08:10

## 2025-07-17 RX ADMIN — TIOTROPIUM BROMIDE INHALATION SPRAY 5 MCG: 3.12 SPRAY, METERED RESPIRATORY (INHALATION) at 08:10

## 2025-07-17 RX ADMIN — LIDOCAINE HYDROCHLORIDE,EPINEPHRINE BITARTRATE 7.5 ML: 10; .01 INJECTION, SOLUTION INFILTRATION; PERINEURAL at 14:49

## 2025-07-17 RX ADMIN — INSULIN GLARGINE 10 UNITS: 100 INJECTION, SOLUTION SUBCUTANEOUS at 08:30

## 2025-07-17 RX ADMIN — Medication 2 PUFF: at 20:27

## 2025-07-17 RX ADMIN — LIDOCAINE HYDROCHLORIDE 10 ML: 10 INJECTION, SOLUTION EPIDURAL; INFILTRATION; INTRACAUDAL; PERINEURAL at 14:49

## 2025-07-17 RX ADMIN — SOTALOL HYDROCHLORIDE 80 MG: 80 TABLET ORAL at 11:35

## 2025-07-17 RX ADMIN — Medication 10 ML: at 20:48

## 2025-07-17 RX ADMIN — DIGOXIN 500 MCG: 0.25 INJECTION INTRAMUSCULAR; INTRAVENOUS at 15:34

## 2025-07-17 ASSESSMENT — PAIN SCALES - GENERAL
PAINLEVEL_OUTOF10: 3
PAINLEVEL_OUTOF10: 0
PAINLEVEL_OUTOF10: 4
PAINLEVEL_OUTOF10: 7

## 2025-07-17 ASSESSMENT — PAIN DESCRIPTION - LOCATION
LOCATION: GROIN
LOCATION: GROIN

## 2025-07-17 ASSESSMENT — PAIN DESCRIPTION - ORIENTATION: ORIENTATION: LEFT

## 2025-07-17 NOTE — FLOWSHEET NOTE
Patient returned from getting tunneled CVC placed.       07/17/25 1509   Vitals   Pulse (!) 108   Heart Rate Source Monitor   Respirations 16   /80   MAP (Calculated) 88   BP Location Right upper arm   BP Upper/Lower Upper   BP Method Automatic   Patient Position Semi fowlers   Cardiac Rhythm Ventricular paced   Opioid-Induced Sedation   POSS Score 1   RASS   De Agitation Sedation Scale (RASS) 0   Oxygen Therapy   SpO2 100 %   O2 Device Nasal cannula   O2 Flow Rate (L/min) 1 L/min

## 2025-07-17 NOTE — BRIEF OP NOTE
Brief Postoperative Note    Valdemar Solis  YOB: 1950  [id number]    Pre-operative Diagnosis: Long term antibiotics requirement    Post-operative Diagnosis: Same    Procedure: Tunneled central venous catheter placement    Anesthesia: Local    Surgeons/Assistants: Andre Snider M.D.    Estimated Blood Loss: less than 50     Complications: None    Specimens: Was Not Obtained    Findings: Successful right IJ approach tunneled central venous catheter placement. Ready for immediate use.    Electronically signed by Andre Snider MD on 7/17/2025 at 2:50 PM

## 2025-07-17 NOTE — ANESTHESIA POSTPROCEDURE EVALUATION
Department of Anesthesiology  Postprocedure Note    Patient: Valdemar Solis  MRN: [id number]  YOB: 1950  Date of evaluation: 7/17/2025    Procedure Summary       Date: 07/16/25 Room / Location: [facility] EP LAB  / [facility] CARDIAC CATH LAB    Anesthesia Start: 1452 Anesthesia Stop:     Procedure: Insert or replace transcath PPM leadless Diagnosis:       Complete heart block (HCC)      (Complete heart block (HCC) [I44.2])    Providers: Jasper Raza MD Responsible Provider: Panchito Dietz MD    Anesthesia Type: general ASA Status: 4            Anesthesia Type: No value filed.    Karlene Phase I: Karlene Score: 9    Karlene Phase II:      Anesthesia Post Evaluation    Level of consciousness: awake  Airway patency: patent    There were no known notable events for this encounter.

## 2025-07-17 NOTE — CARE COORDINATION
ADONIS sent messages to MD to ask if pt is discharging today and if so if he could place hc orders and complete MARYSE d/t Ariadne [facility] liaison [phone] needs to know by 3pm so they can plan to staff to see pt, otherwise pt may need to discharge tomorrow.     Also in message that [facility] infusion center is ready to deliver medication tonight but need to also know if pt is discharging.     Deb with [employer] [phone] states she spoke to spouse earlier today about IV abx and is ready to deliver this evening but is waiting to hear that pt will be discharged today. CM informed her waiting to hear about discharge.     ADONIS did call [employer] [phone] and spoke to pharmacist Heath and gave the following orders in case patient discharges later today :     IV daptomycin 650 mg every 24 hours       Antimicrobial start date: calculated from June 30, 2025       Antimicrobial completion date planned: August 15, 2025.  May need secondary oral antibiotic prophylaxis afterwards         Lab monitoring: CBC with differential, LFT, Serum BUN and creatinine, CK once a week, to be collected every Monday or Tuesday morning until the patient is off IV  antibiotics. Fax weekly lab results to Tres Ramachandran MD's office at        [phone].      Also that pt was off unit getting  Tunneled central venous catheter placement.    Beatriz Newby RN, BSN  [phone]     Updated at 3:08 PM    ADONIS notified by [facility] liasion Ariadne that pt will not discharge today per her message received from MD.    ADONIS notified RN and updated Deb with [employer] so that medication would not be delivered tonight.    Beatriz Newby RN, BSN  [phone]

## 2025-07-17 NOTE — PRE SEDATION
glargine  10 Units SubCUTAneous Daily    DAPTOmycin (CUBICIN) 625 mg in sodium chloride (PF) 0.9 % 12.5 mL IV syringe  625 mg IntraVENous Q24H    sodium chloride flush  5-40 mL IntraVENous 2 times per day    budesonide-formoterol  2 puff Inhalation BID RT    And    tiotropium  2 puff Inhalation Daily RT    [Held by provider] pravastatin  80 mg Oral Daily    insulin lispro  0-4 Units SubCUTAneous 4x Daily AC & HS     Home Meds:   Prior to Visit Medications    Medication Sig Taking? Authorizing Provider   furosemide (LASIX) 40 MG tablet 60 mg alternating with 80 mg Yes Zoraida Uribe, APRN - CNS   pantoprazole (PROTONIX) 40 MG tablet Take 1 tablet by mouth 2 times daily (before meals) Yes Yane Wei MD   vitamin D 25 MCG (1000 UT) CAPS Take 1 capsule by mouth daily Yes ProviderAbraham MD   blood glucose test strips (ASCENSIA AUTODISC VI;ONE TOUCH ULTRA TEST VI) strip Check glucose tid and prn Yes Yane Wei MD   Accu-Chek Softclix Lancets MISC Check glucose tid and prn Yes Yane Wei MD   diclofenac sodium (VOLTAREN) 1 % GEL Apply 2 g topically in the morning, at noon, and at bedtime Yes Markus Mcfarland MD   spironolactone (ALDACTONE) 25 MG tablet Take 0.5 tablets by mouth Every Monday, Wednesday, and Friday Yes Markus Mcfarland MD   dapagliflozin (FARXIGA) 10 MG tablet TAKE 1 TABLET BY MOUTH EVERY MORNING Yes Yane Wei MD   sotalol (BETAPACE) 80 MG tablet TAKE 1 TABLET BY MOUTH 2 TIMES A DAY Yes Yane Wei MD   traZODone (DESYREL) 50 MG tablet TAKE 1 OR 2 TABLETS AS NEEDED FOR SLEEP AS DIRECTED Yes Yane Wei MD   albuterol (PROVENTIL) (2.5 MG/3ML) 0.083% nebulizer solution Take 3 mLs by nebulization every 6 hours as needed for Wheezing Yes Billy Ambrosio MD   albuterol sulfate HFA (PROVENTIL;VENTOLIN;PROAIR) 108 (90 Base) MCG/ACT inhaler Inhale 2 puffs into the lungs every 6 hours as needed for Wheezing Yes Yane Wei MD   sacubitril-valsartan

## 2025-07-18 ENCOUNTER — APPOINTMENT (OUTPATIENT)
Dept: GENERAL RADIOLOGY | Age: 75
DRG: 228 | End: 2025-07-18
Payer: MEDICARE

## 2025-07-18 ENCOUNTER — TELEPHONE (OUTPATIENT)
Dept: PHARMACY | Age: 75
End: 2025-07-18

## 2025-07-18 LAB
ANION GAP SERPL CALCULATED.3IONS-SCNC: 12 MMOL/L (ref 3–16)
BUN SERPL-MCNC: 42 MG/DL (ref 7–20)
CALCIUM SERPL-MCNC: 8.6 MG/DL (ref 8.3–10.6)
CHLORIDE SERPL-SCNC: 104 MMOL/L (ref 99–110)
CO2 SERPL-SCNC: 22 MMOL/L (ref 21–32)
CREAT SERPL-MCNC: 2.9 MG/DL (ref 0.8–1.3)
DEPRECATED RDW RBC AUTO: 17.2 % (ref 12.4–15.4)
GFR SERPLBLD CREATININE-BSD FMLA CKD-EPI: 22 ML/MIN/{1.73_M2}
GLUCOSE BLD-MCNC: 136 MG/DL (ref 70–99)
GLUCOSE BLD-MCNC: 138 MG/DL (ref 70–99)
GLUCOSE BLD-MCNC: 153 MG/DL (ref 70–99)
GLUCOSE BLD-MCNC: 223 MG/DL (ref 70–99)
GLUCOSE SERPL-MCNC: 131 MG/DL (ref 70–99)
HCT VFR BLD AUTO: 23.3 % (ref 40.5–52.5)
HGB BLD-MCNC: 7.7 G/DL (ref 13.5–17.5)
INR PPP: 1.36 (ref 0.86–1.14)
MCH RBC QN AUTO: 31 PG (ref 26–34)
MCHC RBC AUTO-ENTMCNC: 33 G/DL (ref 31–36)
MCV RBC AUTO: 94 FL (ref 80–100)
NT-PROBNP SERPL-MCNC: 3213 PG/ML (ref 0–449)
PERFORMED ON: ABNORMAL
PLATELET # BLD AUTO: 154 K/UL (ref 135–450)
PMV BLD AUTO: 8.7 FL (ref 5–10.5)
POTASSIUM SERPL-SCNC: 4.8 MMOL/L (ref 3.5–5.1)
PROTHROMBIN TIME: 16.9 SEC (ref 12.1–14.9)
RBC # BLD AUTO: 2.48 M/UL (ref 4.2–5.9)
SODIUM SERPL-SCNC: 138 MMOL/L (ref 136–145)
WBC # BLD AUTO: 4.3 K/UL (ref 4–11)

## 2025-07-18 PROCEDURE — 6370000000 HC RX 637 (ALT 250 FOR IP): Performed by: INTERNAL MEDICINE

## 2025-07-18 PROCEDURE — 99232 SBSQ HOSP IP/OBS MODERATE 35: CPT | Performed by: NURSE PRACTITIONER

## 2025-07-18 PROCEDURE — 2500000003 HC RX 250 WO HCPCS: Performed by: INTERNAL MEDICINE

## 2025-07-18 PROCEDURE — 6360000002 HC RX W HCPCS: Performed by: INTERNAL MEDICINE

## 2025-07-18 PROCEDURE — 83880 ASSAY OF NATRIURETIC PEPTIDE: CPT

## 2025-07-18 PROCEDURE — 94660 CPAP INITIATION&MGMT: CPT

## 2025-07-18 PROCEDURE — 94761 N-INVAS EAR/PLS OXIMETRY MLT: CPT

## 2025-07-18 PROCEDURE — 94640 AIRWAY INHALATION TREATMENT: CPT

## 2025-07-18 PROCEDURE — 2000000000 HC ICU R&B

## 2025-07-18 PROCEDURE — 6360000002 HC RX W HCPCS: Performed by: HOSPITALIST

## 2025-07-18 PROCEDURE — 85610 PROTHROMBIN TIME: CPT

## 2025-07-18 PROCEDURE — 85027 COMPLETE CBC AUTOMATED: CPT

## 2025-07-18 PROCEDURE — 6370000000 HC RX 637 (ALT 250 FOR IP): Performed by: STUDENT IN AN ORGANIZED HEALTH CARE EDUCATION/TRAINING PROGRAM

## 2025-07-18 PROCEDURE — 71045 X-RAY EXAM CHEST 1 VIEW: CPT

## 2025-07-18 PROCEDURE — 80048 BASIC METABOLIC PNL TOTAL CA: CPT

## 2025-07-18 PROCEDURE — 6370000000 HC RX 637 (ALT 250 FOR IP): Performed by: NURSE PRACTITIONER

## 2025-07-18 PROCEDURE — 2700000000 HC OXYGEN THERAPY PER DAY

## 2025-07-18 PROCEDURE — 36415 COLL VENOUS BLD VENIPUNCTURE: CPT

## 2025-07-18 PROCEDURE — 99233 SBSQ HOSP IP/OBS HIGH 50: CPT | Performed by: HOSPITALIST

## 2025-07-18 RX ORDER — WARFARIN SODIUM 5 MG/1
10 TABLET ORAL
Status: COMPLETED | OUTPATIENT
Start: 2025-07-18 | End: 2025-07-18

## 2025-07-18 RX ORDER — FUROSEMIDE 10 MG/ML
80 INJECTION INTRAMUSCULAR; INTRAVENOUS ONCE
Status: COMPLETED | OUTPATIENT
Start: 2025-07-18 | End: 2025-07-18

## 2025-07-18 RX ADMIN — OXYCODONE 10 MG: 5 TABLET ORAL at 08:49

## 2025-07-18 RX ADMIN — MELATONIN TAB 3 MG 6 MG: 3 TAB at 21:08

## 2025-07-18 RX ADMIN — TRAZODONE HYDROCHLORIDE 100 MG: 50 TABLET ORAL at 21:08

## 2025-07-18 RX ADMIN — Medication 10 ML: at 21:08

## 2025-07-18 RX ADMIN — OXYCODONE 10 MG: 5 TABLET ORAL at 21:08

## 2025-07-18 RX ADMIN — Medication 2 PUFF: at 20:01

## 2025-07-18 RX ADMIN — IPRATROPIUM BROMIDE AND ALBUTEROL SULFATE 1 DOSE: .5; 3 SOLUTION RESPIRATORY (INHALATION) at 19:53

## 2025-07-18 RX ADMIN — WARFARIN SODIUM 10 MG: 5 TABLET ORAL at 17:46

## 2025-07-18 RX ADMIN — Medication 2 PUFF: at 08:10

## 2025-07-18 RX ADMIN — ENOXAPARIN SODIUM 120 MG: 150 INJECTION SUBCUTANEOUS at 08:50

## 2025-07-18 RX ADMIN — FUROSEMIDE 80 MG: 10 INJECTION, SOLUTION INTRAMUSCULAR; INTRAVENOUS at 12:15

## 2025-07-18 RX ADMIN — Medication 10 ML: at 08:46

## 2025-07-18 RX ADMIN — DAPTOMYCIN 625 MG: 500 INJECTION, POWDER, LYOPHILIZED, FOR SOLUTION INTRAVENOUS at 14:00

## 2025-07-18 RX ADMIN — IPRATROPIUM BROMIDE AND ALBUTEROL SULFATE 1 DOSE: .5; 3 SOLUTION RESPIRATORY (INHALATION) at 14:49

## 2025-07-18 RX ADMIN — INSULIN GLARGINE 10 UNITS: 100 INJECTION, SOLUTION SUBCUTANEOUS at 08:50

## 2025-07-18 RX ADMIN — SOTALOL HYDROCHLORIDE 80 MG: 80 TABLET ORAL at 08:50

## 2025-07-18 RX ADMIN — INSULIN LISPRO 1 UNITS: 100 INJECTION, SOLUTION INTRAVENOUS; SUBCUTANEOUS at 17:09

## 2025-07-18 RX ADMIN — IPRATROPIUM BROMIDE AND ALBUTEROL SULFATE 1 DOSE: .5; 3 SOLUTION RESPIRATORY (INHALATION) at 08:09

## 2025-07-18 RX ADMIN — TIOTROPIUM BROMIDE INHALATION SPRAY 5 MCG: 3.12 SPRAY, METERED RESPIRATORY (INHALATION) at 08:10

## 2025-07-18 ASSESSMENT — PAIN DESCRIPTION - PAIN TYPE
TYPE: ACUTE PAIN

## 2025-07-18 ASSESSMENT — PAIN DESCRIPTION - ORIENTATION
ORIENTATION: RIGHT

## 2025-07-18 ASSESSMENT — PAIN DESCRIPTION - DESCRIPTORS
DESCRIPTORS: DISCOMFORT

## 2025-07-18 ASSESSMENT — PAIN DESCRIPTION - LOCATION
LOCATION: NECK

## 2025-07-18 ASSESSMENT — PAIN SCALES - GENERAL
PAINLEVEL_OUTOF10: 5
PAINLEVEL_OUTOF10: 5
PAINLEVEL_OUTOF10: 4
PAINLEVEL_OUTOF10: 7
PAINLEVEL_OUTOF10: 7
PAINLEVEL_OUTOF10: 5

## 2025-07-18 ASSESSMENT — PAIN DESCRIPTION - FREQUENCY
FREQUENCY: CONTINUOUS

## 2025-07-18 ASSESSMENT — PAIN DESCRIPTION - ONSET
ONSET: ON-GOING

## 2025-07-18 NOTE — CARE COORDINATION
ADONIS was notified by Ariadne [facility] liaison [phone] that she sent message to MD inquiring if pt would discharge today and received message back that no pt would not discharge and wanted CM to be aware.     CM left VM for Deb with [employer] [phone] that pt is not discharging today.    Beatriz Newby RN, BSN  [phone]      Updated at 3:18 PM    Message from Deb with "[email or web address] she received my message and medication at pt's house so if he discharges over weekend nothing that needs to be done with is iv/meds or supplies.    Beatriz Newby RN, BSN  [phone]

## 2025-07-18 NOTE — CARE COORDINATION
07/18/25 1344   IMM Letter   IMM Letter given to Patient/Family/Significant other/Guardian/POA/by: Beatriz Newby RN CM   IMM Letter date given: 07/18/25   IMM Letter time given: 5337  (copy made for hard chart)

## 2025-07-18 NOTE — CARE COORDINATION
ADONIS sent PS message to MD to see if pt will discharge today so that CM can have [facility] deliver medication to home today and HC can prepare for Start of care tomorrow.    Waiting on response.    Beatriz Newby RN, BSN  [phone]     Updated at 11:30 AM    PS message received back from MD that pt will discharge today.     ADONIS notified Ariad[facility] liaison [phone] that pt will discharge.    ADONIS left vm for Deb with [employer] [phone] that pt will discharge so she can organize delivery of medication with spouse.    [facility] see CM note from yesterday.    Beatriz Newby RN, BSN  [phone]     Updated at 12:17 pm    Ariadne with [facility] seeing pt on unit. Per her and pt's RN nephrology ordered chest x ray and more lasix. ADONIS sent this to MD in perfect serve message so he is aware and can talk to nephrologist about pt discharging today.     Deb with [employer] states abx was delivered yesterday evening.    All is set up for pt as far as IV abx  and HC.     Beatriz Newby RN, BSN  [phone]

## 2025-07-18 NOTE — TELEPHONE ENCOUNTER
Ariadne called to let us know that patient may be going home today and will have Critical access hospital when he does.  She called to be sure he was our patient and will call back if he is discharged today.

## 2025-07-19 LAB
ALBUMIN SERPL-MCNC: 3.5 G/DL (ref 3.4–5)
ANION GAP SERPL CALCULATED.3IONS-SCNC: 11 MMOL/L (ref 3–16)
BUN SERPL-MCNC: 40 MG/DL (ref 7–20)
CALCIUM SERPL-MCNC: 9 MG/DL (ref 8.3–10.6)
CHLORIDE SERPL-SCNC: 102 MMOL/L (ref 99–110)
CO2 SERPL-SCNC: 24 MMOL/L (ref 21–32)
CREAT SERPL-MCNC: 3 MG/DL (ref 0.8–1.3)
ECHO BSA: 2.66 M2
GFR SERPLBLD CREATININE-BSD FMLA CKD-EPI: 21 ML/MIN/{1.73_M2}
GLUCOSE BLD-MCNC: 151 MG/DL (ref 70–99)
GLUCOSE BLD-MCNC: 155 MG/DL (ref 70–99)
GLUCOSE BLD-MCNC: 166 MG/DL (ref 70–99)
GLUCOSE SERPL-MCNC: 136 MG/DL (ref 70–99)
INR PPP: 1.31 (ref 0.86–1.14)
PERFORMED ON: ABNORMAL
PHOSPHATE SERPL-MCNC: 3.9 MG/DL (ref 2.5–4.9)
POTASSIUM SERPL-SCNC: 4.7 MMOL/L (ref 3.5–5.1)
PROTHROMBIN TIME: 16.4 SEC (ref 12.1–14.9)
SODIUM SERPL-SCNC: 137 MMOL/L (ref 136–145)

## 2025-07-19 PROCEDURE — 80069 RENAL FUNCTION PANEL: CPT

## 2025-07-19 PROCEDURE — 2700000000 HC OXYGEN THERAPY PER DAY

## 2025-07-19 PROCEDURE — 99233 SBSQ HOSP IP/OBS HIGH 50: CPT | Performed by: INTERNAL MEDICINE

## 2025-07-19 PROCEDURE — 94640 AIRWAY INHALATION TREATMENT: CPT

## 2025-07-19 PROCEDURE — 2000000000 HC ICU R&B

## 2025-07-19 PROCEDURE — 85610 PROTHROMBIN TIME: CPT

## 2025-07-19 PROCEDURE — 6360000002 HC RX W HCPCS: Performed by: INTERNAL MEDICINE

## 2025-07-19 PROCEDURE — 6370000000 HC RX 637 (ALT 250 FOR IP): Performed by: STUDENT IN AN ORGANIZED HEALTH CARE EDUCATION/TRAINING PROGRAM

## 2025-07-19 PROCEDURE — 2500000003 HC RX 250 WO HCPCS: Performed by: INTERNAL MEDICINE

## 2025-07-19 PROCEDURE — 6370000000 HC RX 637 (ALT 250 FOR IP): Performed by: INTERNAL MEDICINE

## 2025-07-19 PROCEDURE — 94761 N-INVAS EAR/PLS OXIMETRY MLT: CPT

## 2025-07-19 PROCEDURE — 6370000000 HC RX 637 (ALT 250 FOR IP): Performed by: NURSE PRACTITIONER

## 2025-07-19 RX ORDER — FUROSEMIDE 10 MG/ML
20 INJECTION INTRAMUSCULAR; INTRAVENOUS 2 TIMES DAILY
Status: DISCONTINUED | OUTPATIENT
Start: 2025-07-19 | End: 2025-07-20

## 2025-07-19 RX ORDER — WARFARIN SODIUM 5 MG/1
10 TABLET ORAL DAILY
Status: DISCONTINUED | OUTPATIENT
Start: 2025-07-19 | End: 2025-07-22

## 2025-07-19 RX ADMIN — IPRATROPIUM BROMIDE AND ALBUTEROL SULFATE 1 DOSE: .5; 3 SOLUTION RESPIRATORY (INHALATION) at 20:36

## 2025-07-19 RX ADMIN — Medication 10 ML: at 19:40

## 2025-07-19 RX ADMIN — IPRATROPIUM BROMIDE AND ALBUTEROL SULFATE 1 DOSE: .5; 3 SOLUTION RESPIRATORY (INHALATION) at 08:47

## 2025-07-19 RX ADMIN — SOTALOL HYDROCHLORIDE 80 MG: 80 TABLET ORAL at 09:37

## 2025-07-19 RX ADMIN — TRAZODONE HYDROCHLORIDE 100 MG: 50 TABLET ORAL at 19:31

## 2025-07-19 RX ADMIN — MELATONIN TAB 3 MG 6 MG: 3 TAB at 19:31

## 2025-07-19 RX ADMIN — INSULIN GLARGINE 10 UNITS: 100 INJECTION, SOLUTION SUBCUTANEOUS at 09:39

## 2025-07-19 RX ADMIN — ENOXAPARIN SODIUM 120 MG: 150 INJECTION SUBCUTANEOUS at 09:38

## 2025-07-19 RX ADMIN — WARFARIN SODIUM 10 MG: 5 TABLET ORAL at 17:53

## 2025-07-19 RX ADMIN — IPRATROPIUM BROMIDE AND ALBUTEROL SULFATE 1 DOSE: .5; 3 SOLUTION RESPIRATORY (INHALATION) at 15:49

## 2025-07-19 RX ADMIN — TIOTROPIUM BROMIDE INHALATION SPRAY 5 MCG: 3.12 SPRAY, METERED RESPIRATORY (INHALATION) at 08:47

## 2025-07-19 RX ADMIN — Medication 10 ML: at 09:39

## 2025-07-19 RX ADMIN — ACETAMINOPHEN 650 MG: 325 TABLET ORAL at 13:10

## 2025-07-19 RX ADMIN — Medication 2 PUFF: at 20:36

## 2025-07-19 RX ADMIN — Medication 2 PUFF: at 08:47

## 2025-07-19 RX ADMIN — DAPTOMYCIN 625 MG: 500 INJECTION, POWDER, LYOPHILIZED, FOR SOLUTION INTRAVENOUS at 15:13

## 2025-07-19 ASSESSMENT — PAIN SCALES - GENERAL
PAINLEVEL_OUTOF10: 0
PAINLEVEL_OUTOF10: 7

## 2025-07-19 ASSESSMENT — PAIN DESCRIPTION - LOCATION: LOCATION: HEAD

## 2025-07-19 ASSESSMENT — PAIN DESCRIPTION - DESCRIPTORS: DESCRIPTORS: ACHING

## 2025-07-20 LAB
ALBUMIN SERPL-MCNC: 3.3 G/DL (ref 3.4–5)
ANION GAP SERPL CALCULATED.3IONS-SCNC: 9 MMOL/L (ref 3–16)
BASOPHILS # BLD: 0 K/UL (ref 0–0.2)
BASOPHILS NFR BLD: 0.7 %
BUN SERPL-MCNC: 41 MG/DL (ref 7–20)
CALCIUM SERPL-MCNC: 8.8 MG/DL (ref 8.3–10.6)
CHLORIDE SERPL-SCNC: 104 MMOL/L (ref 99–110)
CO2 SERPL-SCNC: 25 MMOL/L (ref 21–32)
CREAT SERPL-MCNC: 2.8 MG/DL (ref 0.8–1.3)
DEPRECATED RDW RBC AUTO: 17.3 % (ref 12.4–15.4)
EOSINOPHIL # BLD: 0 K/UL (ref 0–0.6)
EOSINOPHIL NFR BLD: 0.9 %
GFR SERPLBLD CREATININE-BSD FMLA CKD-EPI: 23 ML/MIN/{1.73_M2}
GLUCOSE BLD-MCNC: 149 MG/DL (ref 70–99)
GLUCOSE BLD-MCNC: 166 MG/DL (ref 70–99)
GLUCOSE BLD-MCNC: 219 MG/DL (ref 70–99)
GLUCOSE SERPL-MCNC: 158 MG/DL (ref 70–99)
HCT VFR BLD AUTO: 23.2 % (ref 40.5–52.5)
HGB BLD-MCNC: 7.8 G/DL (ref 13.5–17.5)
INR PPP: 1.42 (ref 0.86–1.14)
LYMPHOCYTES # BLD: 0.7 K/UL (ref 1–5.1)
LYMPHOCYTES NFR BLD: 16.7 %
MAGNESIUM SERPL-MCNC: 2.16 MG/DL (ref 1.8–2.4)
MCH RBC QN AUTO: 31.8 PG (ref 26–34)
MCHC RBC AUTO-ENTMCNC: 33.6 G/DL (ref 31–36)
MCV RBC AUTO: 94.7 FL (ref 80–100)
MONOCYTES # BLD: 0.4 K/UL (ref 0–1.3)
MONOCYTES NFR BLD: 9.9 %
NEUTROPHILS # BLD: 3.1 K/UL (ref 1.7–7.7)
NEUTROPHILS NFR BLD: 71.8 %
PERFORMED ON: ABNORMAL
PHOSPHATE SERPL-MCNC: 3.8 MG/DL (ref 2.5–4.9)
PLATELET # BLD AUTO: 153 K/UL (ref 135–450)
PMV BLD AUTO: 8.7 FL (ref 5–10.5)
POTASSIUM SERPL-SCNC: 4.6 MMOL/L (ref 3.5–5.1)
PROTHROMBIN TIME: 17.5 SEC (ref 12.1–14.9)
RBC # BLD AUTO: 2.45 M/UL (ref 4.2–5.9)
SODIUM SERPL-SCNC: 138 MMOL/L (ref 136–145)
WBC # BLD AUTO: 4.4 K/UL (ref 4–11)

## 2025-07-20 PROCEDURE — 6370000000 HC RX 637 (ALT 250 FOR IP): Performed by: STUDENT IN AN ORGANIZED HEALTH CARE EDUCATION/TRAINING PROGRAM

## 2025-07-20 PROCEDURE — 6370000000 HC RX 637 (ALT 250 FOR IP): Performed by: INTERNAL MEDICINE

## 2025-07-20 PROCEDURE — 6360000002 HC RX W HCPCS: Performed by: INTERNAL MEDICINE

## 2025-07-20 PROCEDURE — 99233 SBSQ HOSP IP/OBS HIGH 50: CPT | Performed by: INTERNAL MEDICINE

## 2025-07-20 PROCEDURE — 94660 CPAP INITIATION&MGMT: CPT

## 2025-07-20 PROCEDURE — 94761 N-INVAS EAR/PLS OXIMETRY MLT: CPT

## 2025-07-20 PROCEDURE — 6370000000 HC RX 637 (ALT 250 FOR IP): Performed by: NURSE PRACTITIONER

## 2025-07-20 PROCEDURE — 2500000003 HC RX 250 WO HCPCS: Performed by: INTERNAL MEDICINE

## 2025-07-20 PROCEDURE — 2700000000 HC OXYGEN THERAPY PER DAY

## 2025-07-20 PROCEDURE — 85610 PROTHROMBIN TIME: CPT

## 2025-07-20 PROCEDURE — 2000000000 HC ICU R&B

## 2025-07-20 PROCEDURE — 80069 RENAL FUNCTION PANEL: CPT

## 2025-07-20 PROCEDURE — 85025 COMPLETE CBC W/AUTO DIFF WBC: CPT

## 2025-07-20 PROCEDURE — 94640 AIRWAY INHALATION TREATMENT: CPT

## 2025-07-20 PROCEDURE — 83735 ASSAY OF MAGNESIUM: CPT

## 2025-07-20 RX ORDER — FUROSEMIDE 10 MG/ML
40 INJECTION INTRAMUSCULAR; INTRAVENOUS 2 TIMES DAILY
Status: DISCONTINUED | OUTPATIENT
Start: 2025-07-21 | End: 2025-07-23 | Stop reason: HOSPADM

## 2025-07-20 RX ADMIN — SOTALOL HYDROCHLORIDE 80 MG: 80 TABLET ORAL at 08:51

## 2025-07-20 RX ADMIN — TIOTROPIUM BROMIDE INHALATION SPRAY 5 MCG: 3.12 SPRAY, METERED RESPIRATORY (INHALATION) at 08:31

## 2025-07-20 RX ADMIN — FUROSEMIDE 20 MG: 10 INJECTION, SOLUTION INTRAMUSCULAR; INTRAVENOUS at 16:04

## 2025-07-20 RX ADMIN — MELATONIN TAB 3 MG 6 MG: 3 TAB at 20:31

## 2025-07-20 RX ADMIN — ENOXAPARIN SODIUM 120 MG: 150 INJECTION SUBCUTANEOUS at 08:51

## 2025-07-20 RX ADMIN — FUROSEMIDE 20 MG: 10 INJECTION, SOLUTION INTRAMUSCULAR; INTRAVENOUS at 08:51

## 2025-07-20 RX ADMIN — TRAZODONE HYDROCHLORIDE 100 MG: 50 TABLET ORAL at 20:31

## 2025-07-20 RX ADMIN — IPRATROPIUM BROMIDE AND ALBUTEROL SULFATE 1 DOSE: .5; 3 SOLUTION RESPIRATORY (INHALATION) at 08:30

## 2025-07-20 RX ADMIN — IPRATROPIUM BROMIDE AND ALBUTEROL SULFATE 1 DOSE: .5; 3 SOLUTION RESPIRATORY (INHALATION) at 16:01

## 2025-07-20 RX ADMIN — INSULIN LISPRO 1 UNITS: 100 INJECTION, SOLUTION INTRAVENOUS; SUBCUTANEOUS at 16:10

## 2025-07-20 RX ADMIN — Medication 10 ML: at 20:31

## 2025-07-20 RX ADMIN — Medication 2 PUFF: at 19:56

## 2025-07-20 RX ADMIN — Medication 10 ML: at 08:51

## 2025-07-20 RX ADMIN — Medication 2 PUFF: at 08:31

## 2025-07-20 RX ADMIN — DAPTOMYCIN 625 MG: 500 INJECTION, POWDER, LYOPHILIZED, FOR SOLUTION INTRAVENOUS at 15:04

## 2025-07-20 RX ADMIN — INSULIN GLARGINE 10 UNITS: 100 INJECTION, SOLUTION SUBCUTANEOUS at 08:51

## 2025-07-20 RX ADMIN — WARFARIN SODIUM 10 MG: 5 TABLET ORAL at 17:51

## 2025-07-20 RX ADMIN — IPRATROPIUM BROMIDE AND ALBUTEROL SULFATE 1 DOSE: .5; 3 SOLUTION RESPIRATORY (INHALATION) at 19:56

## 2025-07-20 NOTE — CONSULTS
Consultation H&P    Date of Admission:  6/28/2025 10:22 PM  Date of Consultation:  7/3/2025    PCP:  Yane Wei MD   Referring Physician: Dr Bustamante       Reason for consult: ? Endocarditis, backup for lead extraction on 7/9/25      History of Present Illness:      Valdemar Solis is a 75 y.o. male with a recent admission for GIB and hemorrhagic shock requiring pressors. EGD 6/17/25 showed a oozing duodenal ulcer that was clipped. He was discharged 6/21/25. He also has a significant past medical history of DM II, Oxygen dependent COPD (4L), CKD, ROSETTA, CHF, AVS,  S/P AVR /c [place] mechanical aortic valve, on coumadin(1996), and Aflutter. He underwent DCCV (1/2020), ablation with upgrade to BIV ICD with addition of an LV lead (3/2020). Patient presented 6/28/25 with confusion, fatigue, sob and cough. Denies any C/P, Fever, Chills, PND. Upon arrival, noted to be febrile 101.1, hypotensive and in respiratory distress. Placed on BiPAP. Given albumin and midodrine. Pro BNP 11K. Troponin 103. Creatinine 2.4. CXR concerning for pneumonia. Patient was started on antibiotics. Blood cultures grew MRSA. ID consulted and patient is now on Daptomycin. PRESTON performed on 7/1/25 showed a well functioning mechanical aortic valve and did not reveal any valvular vegetations. Has AICD/Pacer in place. CVTS was consulted for backup for lead extraction next Wednesday.        Past Medical History:  Past Medical History:   Diagnosis Date    Acute congestive heart failure (HCC) 12/30/2019    Allergic rhinitis 07/11/2016    Anticoagulant long-term use warrefen    Atrial fibrillation (HCC)     under care of cardiology:Dr. Scott Behrens([facility])    CKD (chronic kidney disease)     Nephro:Dr. Parker:stage 3    Class 2 obesity due to excess calories without serious comorbidity with body mass index (BMI) of 37.0 to 37.9 in adult 11/07/2017    Controlled type 2 diabetes mellitus without complication, without long-term 
    Pharmacy to Dose Warfarin    Pharmacy consulted to dose warfarin for MVR.    INR Goal: 2-3    INR today: 1.31-->1.42 after 2 days 10 mg    Assessment/Plan:  - Plan continue 10 mg, may need to increase dose if does not shift tomorrow.  - Possible concomitant drug-drug interactions include: n/a     Pharmacy will continue to follow.    Wilber Carroll PharmD, [facility]  Clinical Pharmacy Specialist  [id number]        
    Pharmacy to Dose Warfarin    Pharmacy consulted to dose warfarin for afib and mechanical aortic valve.    INR Goal: 2-3    INR today: 1.31 from 1.36 after 10 mg x1.     Assessment/Plan:  - Continue lovenox bridge. 10 mg daily.  - Possible concomitant drug-drug interactions include: LMWH. APAP      Pharmacy will continue to follow.    Wilber BhattD, [facility]  Clinical Pharmacy Specialist  [id number]        
[facility]   EP Consult    Date: 6/30/2025  Admit Date: 6/28/2025     Reason for consultation: Atrial fibrillation    Chief Complaint:   Chief Complaint   Patient presents with    Shortness of Breath     Pt arrived via [facility] from home w c/o SOB. Pt arrived on CPAP.       History of Present Illness: History obtained from patient and medical record.     Valdemar Solis is a 75 y.o. male with a past medical history of COPD on oxygen, HTN, ROSETTA, CHF, CKD, and aortic stenosis s/p mechanical AVR on coumadin (1996). In December of 2019, he was admitted for CHF exacerbation and found to be in atrial flutter. S/p DCCV (1/2/20). S/p CTI dependent atrial flutter ablation with Biv AICD upgrade with addition of LV lead (3/23/20).    Pt presented to hospital due to fatigue, weakness, and poor appetite. He was found to be hypotensive with mild CLAUDIO. EP consulted for PAF management.    Denies having chest pain, palpitations, shortness of breath, orthopnea/PND, cough, or dizziness at the time of this visit.    Assessment and Plan:     1. Paroxysmal Atrial Fibrillation/flutter              - S/p DCCV (1/2/20)              - S/p CTI dependent atrial flutter ablation (3/23/20)                 - Currently in NSR              - Continue sotalol 80 mg BID                          ~ QTc stable                 - [id number] score: 4 (Age, HTN, CHF, DM) ; [id number] Vasc score and anticoagulation discussed. High risk for stroke and thromboembolism. Anticoagulation is recommended. Risk of bleeding was discussed.                          ~ Continue coumadin. Monitor for bleeding  A-fib does not seem to be very symptomatic.  Continue to monitor for burden.  If he becomes symptomatic or burden significantly increases, ablation may be considered.      2.   Sepsis and bacteremia with MRSA    Unknown source.  ID is following.  Given the presence of mechanical valve and device and leads, PRESTON is recommended to rule out presence of 
Nephrology Consult Note                                                                                                                                                                                                                                                                                                                                                               Office : [phone]     Fax :[phone]    Patient's Name: Valdemar Solis  2:13 PM  6/29/2025    Reason for Consult:  CLAUDIO on CKD 3  Requesting Physician:  Yane Wei MD  Chief Complaint:    Chief Complaint   Patient presents with    Shortness of Breath     Pt arrived via [facility] from home w c/o SOB. Pt arrived on CPAP.       Assessment/Plan     # CLAUDIO on CKD 3b  - likely 2/2 hemodynamic changes, hypotension (bacteremia)  - diuretics on hold for today   - Baseline Cr ~ 1.7. Cr elevated at 2.4 on admission.   - Avoid nephrotoxins  - Monitor renal labs     #Acute hypoxic respiratory failure  #Suspect pneumonia  - started on abx per primary  - BC - MRSA      #Acute on chronic CHF  - Baseline weight 310 pounds  - BNP ~ 11k  - on lasix, aldactone, farxiga at home for diuresis  - on warfarin for hx of mechanical mitral valve  - cards consulted     # COPD  - On chronic oxygen      #HTN  - now hypotensive  - s/p IV albumin, NS bolus, midodrine  - septic workup in progress  - hold anti-hypertensive's for now  - monitor     #A-fib  - on warfarin, sotalol    #DM 2  - management per primary     History of Present Ilness:    Valdemar Solis is a 75 y.o. male with PMHx of CHF, COPD, HTN, CKD 3, DM 2 who presents to the ER with confusion and shortness of breath. He states that his wife said that he was getting more confused over the past several days although he is not really aware of this. He does state that he has been more short of breath recently. Also having cough. He denies fever at home and also denies chills or sweats. He has not been 
Ventricle: Hyperdynamic left ventricular systolic function with a visually estimated EF of 65 - 70%. EF by 2D Simpsons Biplane is 69%. Left ventricle size is normal. Septal thickening. Unable to assess wall motion due to poor endocardial definition. Grade II diastolic dysfunction with increased LAP.    Right Ventricle: Not well visualized. Right ventricle is mildly dilated. Lead present in the right ventricle. Mildly reduced systolic function.    Aortic Valve: Mechanical valve. AV mean gradient is 17 mmHg. Calcified cusps. Stenosis of the aortic valve. AV Mean Gradient is 17 mmHg. AV Peak Velocity is 2.8 m/s. AV Area by Peak Velocity is 1.8 cm2. LVOT:AV VTI Index is 0.50. LVOT Stroke Volume Index is 47.1 mL/m2.    Tricuspid Valve: Mild regurgitation.    Right Atrium: Right atrium is mildly dilated.    Aorta: The aortic root and ascending aorta are not well visualized. Recommend cross sectional imaging.    Signed by: Heath Bustamante DO on 6/4/2025  5:08 PM      
Exam  Vitals and nursing note reviewed.   Constitutional:       Appearance: He is well-developed. He is not diaphoretic.      Comments: The patient was seen earlier today.   HENT:      Head: Normocephalic and atraumatic.      Right Ear: External ear normal. There is no impacted cerumen.      Left Ear: External ear normal. There is no impacted cerumen.      Nose: Nose normal.      Mouth/Throat:      Mouth: Mucous membranes are moist.      Pharynx: Oropharynx is clear. No oropharyngeal exudate.   Eyes:      General: No scleral icterus.        Right eye: No discharge.         Left eye: No discharge.      Conjunctiva/sclera: Conjunctivae normal.      Pupils: Pupils are equal, round, and reactive to light.   Neck:      Thyroid: No thyromegaly.   Cardiovascular:      Rate and Rhythm: Normal rate and regular rhythm.      Heart sounds: Normal heart sounds. No murmur heard.     No friction rub.   Pulmonary:      Effort: No respiratory distress.      Breath sounds: No stridor. No wheezing or rales.   Abdominal:      General: Bowel sounds are normal.      Palpations: Abdomen is soft.      Tenderness: There is no abdominal tenderness. There is no guarding or rebound.   Musculoskeletal:         General: No swelling, tenderness or deformity. Normal range of motion.      Cervical back: Normal range of motion and neck supple.      Right lower leg: No edema.      Left lower leg: No edema.   Lymphadenopathy:      Cervical: No cervical adenopathy.   Skin:     General: Skin is warm and dry.      Coloration: Skin is not jaundiced.      Findings: No bruising, erythema or rash.   Neurological:      General: No focal deficit present.      Mental Status: He is alert and oriented to person, place, and time. Mental status is at baseline.      Motor: No abnormal muscle tone.   Psychiatric:         Mood and Affect: Mood normal.         Behavior: Behavior normal.           Lines and drains: All vascular access sites are healthy with no local

## 2025-07-21 LAB
ANION GAP SERPL CALCULATED.3IONS-SCNC: 12 MMOL/L (ref 3–16)
BUN SERPL-MCNC: 42 MG/DL (ref 7–20)
CALCIUM SERPL-MCNC: 8.8 MG/DL (ref 8.3–10.6)
CHLORIDE SERPL-SCNC: 103 MMOL/L (ref 99–110)
CO2 SERPL-SCNC: 24 MMOL/L (ref 21–32)
CREAT SERPL-MCNC: 2.8 MG/DL (ref 0.8–1.3)
GFR SERPLBLD CREATININE-BSD FMLA CKD-EPI: 23 ML/MIN/{1.73_M2}
GLUCOSE BLD-MCNC: 142 MG/DL (ref 70–99)
GLUCOSE BLD-MCNC: 154 MG/DL (ref 70–99)
GLUCOSE BLD-MCNC: 206 MG/DL (ref 70–99)
GLUCOSE SERPL-MCNC: 139 MG/DL (ref 70–99)
INR PPP: 1.5 (ref 0.86–1.14)
PERFORMED ON: ABNORMAL
POTASSIUM SERPL-SCNC: 4.5 MMOL/L (ref 3.5–5.1)
PROTHROMBIN TIME: 18.2 SEC (ref 12.1–14.9)
SODIUM SERPL-SCNC: 139 MMOL/L (ref 136–145)

## 2025-07-21 PROCEDURE — 2500000003 HC RX 250 WO HCPCS: Performed by: INTERNAL MEDICINE

## 2025-07-21 PROCEDURE — 6370000000 HC RX 637 (ALT 250 FOR IP): Performed by: INTERNAL MEDICINE

## 2025-07-21 PROCEDURE — 80048 BASIC METABOLIC PNL TOTAL CA: CPT

## 2025-07-21 PROCEDURE — 6360000002 HC RX W HCPCS

## 2025-07-21 PROCEDURE — 85610 PROTHROMBIN TIME: CPT

## 2025-07-21 PROCEDURE — 6360000002 HC RX W HCPCS: Performed by: INTERNAL MEDICINE

## 2025-07-21 PROCEDURE — 6370000000 HC RX 637 (ALT 250 FOR IP): Performed by: NURSE PRACTITIONER

## 2025-07-21 PROCEDURE — 99233 SBSQ HOSP IP/OBS HIGH 50: CPT | Performed by: HOSPITALIST

## 2025-07-21 PROCEDURE — 94761 N-INVAS EAR/PLS OXIMETRY MLT: CPT

## 2025-07-21 PROCEDURE — 2700000000 HC OXYGEN THERAPY PER DAY

## 2025-07-21 PROCEDURE — 94660 CPAP INITIATION&MGMT: CPT

## 2025-07-21 PROCEDURE — 94640 AIRWAY INHALATION TREATMENT: CPT

## 2025-07-21 PROCEDURE — 1200000000 HC SEMI PRIVATE

## 2025-07-21 PROCEDURE — 6370000000 HC RX 637 (ALT 250 FOR IP): Performed by: STUDENT IN AN ORGANIZED HEALTH CARE EDUCATION/TRAINING PROGRAM

## 2025-07-21 RX ORDER — FUROSEMIDE 10 MG/ML
80 INJECTION INTRAMUSCULAR; INTRAVENOUS ONCE
Status: COMPLETED | OUTPATIENT
Start: 2025-07-21 | End: 2025-07-21

## 2025-07-21 RX ADMIN — Medication 2 PUFF: at 20:37

## 2025-07-21 RX ADMIN — FUROSEMIDE 40 MG: 10 INJECTION, SOLUTION INTRAMUSCULAR; INTRAVENOUS at 09:10

## 2025-07-21 RX ADMIN — IPRATROPIUM BROMIDE AND ALBUTEROL SULFATE 1 DOSE: .5; 3 SOLUTION RESPIRATORY (INHALATION) at 15:16

## 2025-07-21 RX ADMIN — Medication 2 PUFF: at 08:10

## 2025-07-21 RX ADMIN — TIOTROPIUM BROMIDE INHALATION SPRAY 5 MCG: 3.12 SPRAY, METERED RESPIRATORY (INHALATION) at 08:10

## 2025-07-21 RX ADMIN — ENOXAPARIN SODIUM 120 MG: 150 INJECTION SUBCUTANEOUS at 09:10

## 2025-07-21 RX ADMIN — IPRATROPIUM BROMIDE AND ALBUTEROL SULFATE 1 DOSE: .5; 3 SOLUTION RESPIRATORY (INHALATION) at 20:37

## 2025-07-21 RX ADMIN — WARFARIN SODIUM 10 MG: 5 TABLET ORAL at 18:16

## 2025-07-21 RX ADMIN — OXYCODONE 10 MG: 5 TABLET ORAL at 04:58

## 2025-07-21 RX ADMIN — Medication 10 ML: at 09:10

## 2025-07-21 RX ADMIN — IPRATROPIUM BROMIDE AND ALBUTEROL SULFATE 1 DOSE: .5; 3 SOLUTION RESPIRATORY (INHALATION) at 08:10

## 2025-07-21 RX ADMIN — SOTALOL HYDROCHLORIDE 80 MG: 80 TABLET ORAL at 09:09

## 2025-07-21 RX ADMIN — INSULIN GLARGINE 10 UNITS: 100 INJECTION, SOLUTION SUBCUTANEOUS at 09:10

## 2025-07-21 RX ADMIN — TRAZODONE HYDROCHLORIDE 100 MG: 50 TABLET ORAL at 20:29

## 2025-07-21 RX ADMIN — FUROSEMIDE 80 MG: 10 INJECTION, SOLUTION INTRAMUSCULAR; INTRAVENOUS at 14:55

## 2025-07-21 RX ADMIN — Medication 10 ML: at 20:29

## 2025-07-21 RX ADMIN — MELATONIN TAB 3 MG 6 MG: 3 TAB at 20:29

## 2025-07-21 RX ADMIN — DAPTOMYCIN 625 MG: 500 INJECTION, POWDER, LYOPHILIZED, FOR SOLUTION INTRAVENOUS at 15:34

## 2025-07-21 ASSESSMENT — PAIN SCALES - GENERAL
PAINLEVEL_OUTOF10: 3
PAINLEVEL_OUTOF10: 8

## 2025-07-21 ASSESSMENT — PAIN DESCRIPTION - DESCRIPTORS: DESCRIPTORS: ACHING

## 2025-07-21 ASSESSMENT — PAIN DESCRIPTION - LOCATION
LOCATION: HEAD
LOCATION: HEAD

## 2025-07-22 LAB
ANION GAP SERPL CALCULATED.3IONS-SCNC: 13 MMOL/L (ref 3–16)
BUN SERPL-MCNC: 43 MG/DL (ref 7–20)
CALCIUM SERPL-MCNC: 8.8 MG/DL (ref 8.3–10.6)
CHLORIDE SERPL-SCNC: 102 MMOL/L (ref 99–110)
CK SERPL-CCNC: 63 U/L (ref 39–308)
CO2 SERPL-SCNC: 25 MMOL/L (ref 21–32)
CREAT SERPL-MCNC: 3 MG/DL (ref 0.8–1.3)
GFR SERPLBLD CREATININE-BSD FMLA CKD-EPI: 21 ML/MIN/{1.73_M2}
GLUCOSE BLD-MCNC: 140 MG/DL (ref 70–99)
GLUCOSE BLD-MCNC: 155 MG/DL (ref 70–99)
GLUCOSE BLD-MCNC: 170 MG/DL (ref 70–99)
GLUCOSE BLD-MCNC: 172 MG/DL (ref 70–99)
GLUCOSE SERPL-MCNC: 139 MG/DL (ref 70–99)
INR PPP: 1.62 (ref 0.86–1.14)
PERFORMED ON: ABNORMAL
POTASSIUM SERPL-SCNC: 4.1 MMOL/L (ref 3.5–5.1)
PROTHROMBIN TIME: 19.3 SEC (ref 12.1–14.9)
SODIUM SERPL-SCNC: 140 MMOL/L (ref 136–145)

## 2025-07-22 PROCEDURE — 6370000000 HC RX 637 (ALT 250 FOR IP): Performed by: INTERNAL MEDICINE

## 2025-07-22 PROCEDURE — 94761 N-INVAS EAR/PLS OXIMETRY MLT: CPT

## 2025-07-22 PROCEDURE — 36592 COLLECT BLOOD FROM PICC: CPT

## 2025-07-22 PROCEDURE — 85610 PROTHROMBIN TIME: CPT

## 2025-07-22 PROCEDURE — 6360000002 HC RX W HCPCS: Performed by: INTERNAL MEDICINE

## 2025-07-22 PROCEDURE — 1200000000 HC SEMI PRIVATE

## 2025-07-22 PROCEDURE — 82550 ASSAY OF CK (CPK): CPT

## 2025-07-22 PROCEDURE — 94660 CPAP INITIATION&MGMT: CPT

## 2025-07-22 PROCEDURE — 6370000000 HC RX 637 (ALT 250 FOR IP): Performed by: STUDENT IN AN ORGANIZED HEALTH CARE EDUCATION/TRAINING PROGRAM

## 2025-07-22 PROCEDURE — 2500000003 HC RX 250 WO HCPCS: Performed by: INTERNAL MEDICINE

## 2025-07-22 PROCEDURE — 94640 AIRWAY INHALATION TREATMENT: CPT

## 2025-07-22 PROCEDURE — 6370000000 HC RX 637 (ALT 250 FOR IP): Performed by: NURSE PRACTITIONER

## 2025-07-22 PROCEDURE — 80048 BASIC METABOLIC PNL TOTAL CA: CPT

## 2025-07-22 PROCEDURE — 99233 SBSQ HOSP IP/OBS HIGH 50: CPT | Performed by: HOSPITALIST

## 2025-07-22 PROCEDURE — 2700000000 HC OXYGEN THERAPY PER DAY

## 2025-07-22 RX ORDER — WARFARIN SODIUM 5 MG/1
10 TABLET ORAL DAILY
Status: DISCONTINUED | OUTPATIENT
Start: 2025-07-23 | End: 2025-07-23 | Stop reason: HOSPADM

## 2025-07-22 RX ADMIN — ENOXAPARIN SODIUM 120 MG: 150 INJECTION SUBCUTANEOUS at 09:21

## 2025-07-22 RX ADMIN — IPRATROPIUM BROMIDE AND ALBUTEROL SULFATE 1 DOSE: .5; 3 SOLUTION RESPIRATORY (INHALATION) at 21:10

## 2025-07-22 RX ADMIN — Medication 2 PUFF: at 21:10

## 2025-07-22 RX ADMIN — IPRATROPIUM BROMIDE AND ALBUTEROL SULFATE 1 DOSE: .5; 3 SOLUTION RESPIRATORY (INHALATION) at 08:17

## 2025-07-22 RX ADMIN — DAPTOMYCIN 625 MG: 500 INJECTION, POWDER, LYOPHILIZED, FOR SOLUTION INTRAVENOUS at 15:38

## 2025-07-22 RX ADMIN — INSULIN GLARGINE 10 UNITS: 100 INJECTION, SOLUTION SUBCUTANEOUS at 09:20

## 2025-07-22 RX ADMIN — Medication 10 ML: at 09:20

## 2025-07-22 RX ADMIN — FUROSEMIDE 40 MG: 10 INJECTION, SOLUTION INTRAMUSCULAR; INTRAVENOUS at 16:41

## 2025-07-22 RX ADMIN — WARFARIN SODIUM 12.5 MG: 7.5 TABLET ORAL at 18:40

## 2025-07-22 RX ADMIN — Medication 10 ML: at 20:07

## 2025-07-22 RX ADMIN — Medication 2 PUFF: at 08:17

## 2025-07-22 RX ADMIN — TIOTROPIUM BROMIDE INHALATION SPRAY 5 MCG: 3.12 SPRAY, METERED RESPIRATORY (INHALATION) at 08:17

## 2025-07-22 RX ADMIN — MELATONIN TAB 3 MG 6 MG: 3 TAB at 20:06

## 2025-07-22 RX ADMIN — IPRATROPIUM BROMIDE AND ALBUTEROL SULFATE 1 DOSE: .5; 3 SOLUTION RESPIRATORY (INHALATION) at 14:08

## 2025-07-22 RX ADMIN — SOTALOL HYDROCHLORIDE 80 MG: 80 TABLET ORAL at 09:20

## 2025-07-22 RX ADMIN — FUROSEMIDE 40 MG: 10 INJECTION, SOLUTION INTRAMUSCULAR; INTRAVENOUS at 09:20

## 2025-07-22 RX ADMIN — TRAZODONE HYDROCHLORIDE 100 MG: 50 TABLET ORAL at 20:06

## 2025-07-22 ASSESSMENT — PAIN SCALES - GENERAL: PAINLEVEL_OUTOF10: 0

## 2025-07-22 NOTE — CARE COORDINATION
Discharge Planning:     (CM) reviewed patient's chart.     Discharge Planning Assessment  RN/CM  discharge planner met with patient/ (and family member) to discuss reason for admission, current living situation, and potential needs at the time of discharge       Pt still needing acute level of care , RN CM will continue it follow for discharge planning needs.    Electronically signed by Cheryl HINDS RN on 7/22/25 at 4:31 PM EDT

## 2025-07-23 VITALS
BODY MASS INDEX: 39.63 KG/M2 | RESPIRATION RATE: 21 BRPM | DIASTOLIC BLOOD PRESSURE: 63 MMHG | HEART RATE: 80 BPM | TEMPERATURE: 98.2 F | OXYGEN SATURATION: 98 % | SYSTOLIC BLOOD PRESSURE: 99 MMHG | HEIGHT: 73 IN | WEIGHT: 299 LBS

## 2025-07-23 LAB
ANION GAP SERPL CALCULATED.3IONS-SCNC: 15 MMOL/L (ref 3–16)
BUN SERPL-MCNC: 47 MG/DL (ref 7–20)
CALCIUM SERPL-MCNC: 9.1 MG/DL (ref 8.3–10.6)
CHLORIDE SERPL-SCNC: 101 MMOL/L (ref 99–110)
CO2 SERPL-SCNC: 25 MMOL/L (ref 21–32)
CREAT SERPL-MCNC: 3.2 MG/DL (ref 0.8–1.3)
GFR SERPLBLD CREATININE-BSD FMLA CKD-EPI: 20 ML/MIN/{1.73_M2}
GLUCOSE BLD-MCNC: 140 MG/DL (ref 70–99)
GLUCOSE BLD-MCNC: 179 MG/DL (ref 70–99)
GLUCOSE BLD-MCNC: 218 MG/DL (ref 70–99)
GLUCOSE BLD-MCNC: 271 MG/DL (ref 70–99)
GLUCOSE SERPL-MCNC: 141 MG/DL (ref 70–99)
INR PPP: 1.78 (ref 0.86–1.14)
PERFORMED ON: ABNORMAL
POTASSIUM SERPL-SCNC: 4 MMOL/L (ref 3.5–5.1)
PROTHROMBIN TIME: 20.7 SEC (ref 12.1–14.9)
SODIUM SERPL-SCNC: 141 MMOL/L (ref 136–145)

## 2025-07-23 PROCEDURE — 6370000000 HC RX 637 (ALT 250 FOR IP): Performed by: NURSE PRACTITIONER

## 2025-07-23 PROCEDURE — 2700000000 HC OXYGEN THERAPY PER DAY

## 2025-07-23 PROCEDURE — 85610 PROTHROMBIN TIME: CPT

## 2025-07-23 PROCEDURE — 99233 SBSQ HOSP IP/OBS HIGH 50: CPT | Performed by: HOSPITALIST

## 2025-07-23 PROCEDURE — 6370000000 HC RX 637 (ALT 250 FOR IP): Performed by: STUDENT IN AN ORGANIZED HEALTH CARE EDUCATION/TRAINING PROGRAM

## 2025-07-23 PROCEDURE — 6370000000 HC RX 637 (ALT 250 FOR IP): Performed by: INTERNAL MEDICINE

## 2025-07-23 PROCEDURE — 6370000000 HC RX 637 (ALT 250 FOR IP): Performed by: CLINICAL NURSE SPECIALIST

## 2025-07-23 PROCEDURE — 94761 N-INVAS EAR/PLS OXIMETRY MLT: CPT

## 2025-07-23 PROCEDURE — 2500000003 HC RX 250 WO HCPCS: Performed by: INTERNAL MEDICINE

## 2025-07-23 PROCEDURE — 94660 CPAP INITIATION&MGMT: CPT

## 2025-07-23 PROCEDURE — 80048 BASIC METABOLIC PNL TOTAL CA: CPT

## 2025-07-23 PROCEDURE — 6360000002 HC RX W HCPCS: Performed by: INTERNAL MEDICINE

## 2025-07-23 PROCEDURE — 94640 AIRWAY INHALATION TREATMENT: CPT

## 2025-07-23 RX ORDER — ENOXAPARIN SODIUM 100 MG/ML
120 INJECTION SUBCUTANEOUS DAILY
Qty: 8 ML | Refills: 0 | Status: SHIPPED | OUTPATIENT
Start: 2025-07-23 | End: 2025-07-28

## 2025-07-23 RX ORDER — MIDODRINE HYDROCHLORIDE 5 MG/1
2.5 TABLET ORAL
Status: DISCONTINUED | OUTPATIENT
Start: 2025-07-23 | End: 2025-07-23 | Stop reason: HOSPADM

## 2025-07-23 RX ORDER — MIDODRINE HYDROCHLORIDE 2.5 MG/1
2.5 TABLET ORAL
Qty: 90 TABLET | Refills: 3 | Status: SHIPPED | OUTPATIENT
Start: 2025-07-23

## 2025-07-23 RX ORDER — INSULIN GLARGINE 100 [IU]/ML
10 INJECTION, SOLUTION SUBCUTANEOUS NIGHTLY
Qty: 3 ADJUSTABLE DOSE PRE-FILLED PEN SYRINGE | Refills: 3 | Status: SHIPPED | OUTPATIENT
Start: 2025-07-23

## 2025-07-23 RX ADMIN — DAPTOMYCIN 625 MG: 500 INJECTION, POWDER, LYOPHILIZED, FOR SOLUTION INTRAVENOUS at 16:07

## 2025-07-23 RX ADMIN — SOTALOL HYDROCHLORIDE 80 MG: 80 TABLET ORAL at 08:25

## 2025-07-23 RX ADMIN — IPRATROPIUM BROMIDE AND ALBUTEROL SULFATE 1 DOSE: .5; 3 SOLUTION RESPIRATORY (INHALATION) at 07:54

## 2025-07-23 RX ADMIN — MIDODRINE HYDROCHLORIDE 2.5 MG: 5 TABLET ORAL at 16:07

## 2025-07-23 RX ADMIN — ACETAMINOPHEN 650 MG: 325 TABLET ORAL at 08:24

## 2025-07-23 RX ADMIN — TIOTROPIUM BROMIDE INHALATION SPRAY 5 MCG: 3.12 SPRAY, METERED RESPIRATORY (INHALATION) at 07:54

## 2025-07-23 RX ADMIN — INSULIN GLARGINE 10 UNITS: 100 INJECTION, SOLUTION SUBCUTANEOUS at 08:24

## 2025-07-23 RX ADMIN — IPRATROPIUM BROMIDE AND ALBUTEROL SULFATE 1 DOSE: .5; 3 SOLUTION RESPIRATORY (INHALATION) at 14:05

## 2025-07-23 RX ADMIN — Medication 2 PUFF: at 07:54

## 2025-07-23 ASSESSMENT — PAIN SCALES - GENERAL: PAINLEVEL_OUTOF10: 2

## 2025-07-23 NOTE — PLAN OF CARE
Problem: Chronic Conditions and Co-morbidities  Goal: Patient's chronic conditions and co-morbidity symptoms are monitored and maintained or improved  7/1/2025 1115 by Anjelica De La Cruz RN  Outcome: Progressing  7/1/2025 0445 by Jovita Lopez RN  Outcome: Progressing  Flowsheets (Taken 6/30/2025 2019)  Care Plan - Patient's Chronic Conditions and Co-Morbidity Symptoms are Monitored and Maintained or Improved: Monitor and assess patient's chronic conditions and comorbid symptoms for stability, deterioration, or improvement     Problem: Discharge Planning  Goal: Discharge to home or other facility with appropriate resources  7/1/2025 1115 by Anjelica De La Cruz RN  Outcome: Progressing  7/1/2025 0445 by Jovita Lopez RN  Outcome: Progressing  Flowsheets (Taken 6/30/2025 2019)  Discharge to home or other facility with appropriate resources: Identify barriers to discharge with patient and caregiver     Problem: Safety - Adult  Goal: Free from fall injury  7/1/2025 1115 by Anjelica De La Cruz RN  Outcome: Progressing  7/1/2025 0445 by Jovita Lopez RN  Outcome: Progressing     Problem: Pain  Goal: Verbalizes/displays adequate comfort level or baseline comfort level  7/1/2025 1115 by Anjelica De La Cruz RN  Outcome: Progressing  7/1/2025 0445 by Jovita Lopez RN  Outcome: Progressing     Problem: ABCDS Injury Assessment  Goal: Absence of physical injury  Outcome: Progressing     Problem: Skin/Tissue Integrity  Goal: Skin integrity remains intact  Description: 1.  Monitor for areas of redness and/or skin breakdown  2.  Assess vascular access sites hourly  3.  Every 4-6 hours minimum:  Change oxygen saturation probe site  4.  Every 4-6 hours:  If on nasal continuous positive airway pressure, respiratory therapy assess nares and determine need for appliance change or resting period  Outcome: Progressing     Problem: Cardiovascular - Adult  Goal: Maintains optimal cardiac output and hemodynamic 
  Problem: Chronic Conditions and Co-morbidities  Goal: Patient's chronic conditions and co-morbidity symptoms are monitored and maintained or improved  7/1/2025 2311 by Barbra Gann RN  Outcome: Progressing     Problem: Discharge Planning  Goal: Discharge to home or other facility with appropriate resources  7/1/2025 2311 by Barbra Gann RN  Outcome: Progressing  Flowsheets (Taken 7/1/2025 2020)  Discharge to home or other facility with appropriate resources: Identify barriers to discharge with patient and caregiver     Problem: Safety - Adult  Goal: Free from fall injury  7/1/2025 2311 by Barbra Gann RN  Outcome: Progressing     Problem: Pain  Goal: Verbalizes/displays adequate comfort level or baseline comfort level  7/1/2025 2311 by Barbra Gann RN  Outcome: Progressing     Problem: ABCDS Injury Assessment  Goal: Absence of physical injury  7/1/2025 2311 by Barbra Gann RN  Outcome: Progressing     Problem: Skin/Tissue Integrity  Goal: Skin integrity remains intact  Description: 1.  Monitor for areas of redness and/or skin breakdown  2.  Assess vascular access sites hourly  3.  Every 4-6 hours minimum:  Change oxygen saturation probe site  4.  Every 4-6 hours:  If on nasal continuous positive airway pressure, respiratory therapy assess nares and determine need for appliance change or resting period  7/1/2025 2311 by Barbra Gann RN  Outcome: Progressing  Flowsheets (Taken 7/1/2025 2020)  Skin Integrity Remains Intact: Monitor for areas of redness and/or skin breakdown     Problem: Cardiovascular - Adult  Goal: Maintains optimal cardiac output and hemodynamic stability  7/1/2025 2311 by Barbra Gann RN  Outcome: Progressing  Flowsheets (Taken 7/1/2025 2020)  Maintains optimal cardiac output and hemodynamic stability: Monitor blood pressure and heart rate     Problem: Cardiovascular - Adult  Goal: Absence of cardiac dysrhythmias or at baseline  7/1/2025 2311 by Barbra Gann 
  Problem: Chronic Conditions and Co-morbidities  Goal: Patient's chronic conditions and co-morbidity symptoms are monitored and maintained or improved  7/13/2025 0128 by Ashlee Dozier RN  Outcome: Progressing  7/12/2025 1728 by Florence Mei RN  Outcome: Progressing     Problem: Discharge Planning  Goal: Discharge to home or other facility with appropriate resources  7/13/2025 0128 by Ashlee Dozier RN  Outcome: Progressing  7/12/2025 1728 by Florence Mei RN  Outcome: Progressing     Problem: Safety - Adult  Goal: Free from fall injury  7/13/2025 0128 by Ashlee Dozier RN  Outcome: Progressing  7/12/2025 1728 by Florence Mei RN  Outcome: Progressing     Problem: Pain  Goal: Verbalizes/displays adequate comfort level or baseline comfort level  7/13/2025 0128 by Ashlee Dozier RN  Outcome: Progressing  7/12/2025 1728 by Florence Mei RN  Outcome: Progressing     Problem: ABCDS Injury Assessment  Goal: Absence of physical injury  7/13/2025 0128 by Ashlee Dozier RN  Outcome: Progressing  7/12/2025 1728 by Florence Mei RN  Outcome: Progressing     Problem: Skin/Tissue Integrity  Goal: Skin integrity remains intact  Description: 1.  Monitor for areas of redness and/or skin breakdown  2.  Assess vascular access sites hourly  3.  Every 4-6 hours minimum:  Change oxygen saturation probe site  4.  Every 4-6 hours:  If on nasal continuous positive airway pressure, respiratory therapy assess nares and determine need for appliance change or resting period  7/13/2025 0128 by Ashlee Dozier RN  Outcome: Progressing  7/12/2025 1728 by Florence Mei RN  Outcome: Progressing     Problem: Cardiovascular - Adult  Goal: Maintains optimal cardiac output and hemodynamic stability  7/13/2025 0128 by Ashlee Dozier RN  Outcome: Progressing  7/12/2025 1728 by Florence Mei RN  Outcome: Progressing  Goal: Absence of cardiac dysrhythmias or at baseline  7/13/2025 0128 by Tasia 
  Problem: Chronic Conditions and Co-morbidities  Goal: Patient's chronic conditions and co-morbidity symptoms are monitored and maintained or improved  7/13/2025 1747 by Florence Mei RN  Outcome: Progressing     Problem: Discharge Planning  Goal: Discharge to home or other facility with appropriate resources  7/13/2025 1747 by Florence Mei RN  Outcome: Progressing     Problem: Safety - Adult  Goal: Free from fall injury  7/13/2025 2054 by Shante Bailey RN  Outcome: Progressing  Flowsheets (Taken 7/13/2025 2054)  Free From Fall Injury: Instruct family/caregiver on patient safety  7/13/2025 1747 by Florence Mei RN  Outcome: Progressing     Problem: Pain  Goal: Verbalizes/displays adequate comfort level or baseline comfort level  7/13/2025 1747 by Florence Mei RN  Outcome: Progressing     Problem: ABCDS Injury Assessment  Goal: Absence of physical injury  7/13/2025 1747 by Florence Mei RN  Outcome: Progressing     Problem: Skin/Tissue Integrity  Goal: Skin integrity remains intact  Description: 1.  Monitor for areas of redness and/or skin breakdown  2.  Assess vascular access sites hourly  3.  Every 4-6 hours minimum:  Change oxygen saturation probe site  4.  Every 4-6 hours:  If on nasal continuous positive airway pressure, respiratory therapy assess nares and determine need for appliance change or resting period  7/13/2025 1747 by Florence Mei RN  Outcome: Progressing     Problem: Cardiovascular - Adult  Goal: Maintains optimal cardiac output and hemodynamic stability  7/13/2025 1747 by Florence Mei RN  Outcome: Progressing  Goal: Absence of cardiac dysrhythmias or at baseline  7/13/2025 1747 by Florence Mei RN  Outcome: Progressing     Problem: Gastrointestinal - Adult  Goal: Minimal or absence of nausea and vomiting  7/13/2025 1747 by Florence Mei RN  Outcome: Progressing  Goal: Maintains or returns to baseline bowel function  7/13/2025 1747 by Florence Mei 
  Problem: Chronic Conditions and Co-morbidities  Goal: Patient's chronic conditions and co-morbidity symptoms are monitored and maintained or improved  Outcome: Progressing     Problem: Discharge Planning  Goal: Discharge to home or other facility with appropriate resources  Outcome: Progressing     Problem: Safety - Adult  Goal: Free from fall injury  Outcome: Progressing     
  Problem: Chronic Conditions and Co-morbidities  Goal: Patient's chronic conditions and co-morbidity symptoms are monitored and maintained or improved  Outcome: Progressing     Problem: Discharge Planning  Goal: Discharge to home or other facility with appropriate resources  Outcome: Progressing     Problem: Safety - Adult  Goal: Free from fall injury  Outcome: Progressing     Problem: Pain  Goal: Verbalizes/displays adequate comfort level or baseline comfort level  Outcome: Progressing     Problem: ABCDS Injury Assessment  Goal: Absence of physical injury  Outcome: Progressing     Problem: Skin/Tissue Integrity  Goal: Skin integrity remains intact  Description: 1.  Monitor for areas of redness and/or skin breakdown  2.  Assess vascular access sites hourly  3.  Every 4-6 hours minimum:  Change oxygen saturation probe site  4.  Every 4-6 hours:  If on nasal continuous positive airway pressure, respiratory therapy assess nares and determine need for appliance change or resting period  Outcome: Progressing     Problem: Cardiovascular - Adult  Goal: Maintains optimal cardiac output and hemodynamic stability  Outcome: Progressing     Problem: Cardiovascular - Adult  Goal: Absence of cardiac dysrhythmias or at baseline  Outcome: Progressing     Problem: Gastrointestinal - Adult  Goal: Minimal or absence of nausea and vomiting  Outcome: Progressing     Problem: Gastrointestinal - Adult  Goal: Maintains or returns to baseline bowel function  Outcome: Progressing     Problem: Gastrointestinal - Adult  Goal: Maintains adequate nutritional intake  Outcome: Progressing     Problem: Infection - Adult  Goal: Absence of infection at discharge  Outcome: Progressing     Problem: Metabolic/Fluid and Electrolytes - Adult  Goal: Electrolytes maintained within normal limits  Outcome: Progressing     Problem: Metabolic/Fluid and Electrolytes - Adult  Goal: Hemodynamic stability and optimal renal function maintained  Outcome: 
  Problem: Chronic Conditions and Co-morbidities  Goal: Patient's chronic conditions and co-morbidity symptoms are monitored and maintained or improved  Outcome: Progressing     Problem: Discharge Planning  Goal: Discharge to home or other facility with appropriate resources  Outcome: Progressing     Problem: Safety - Adult  Goal: Free from fall injury  Outcome: Progressing     Problem: Pain  Goal: Verbalizes/displays adequate comfort level or baseline comfort level  Outcome: Progressing     Problem: ABCDS Injury Assessment  Goal: Absence of physical injury  Outcome: Progressing     Problem: Skin/Tissue Integrity  Goal: Skin integrity remains intact  Description: 1.  Monitor for areas of redness and/or skin breakdown  2.  Assess vascular access sites hourly  3.  Every 4-6 hours minimum:  Change oxygen saturation probe site  4.  Every 4-6 hours:  If on nasal continuous positive airway pressure, respiratory therapy assess nares and determine need for appliance change or resting period  Outcome: Progressing     Problem: Cardiovascular - Adult  Goal: Maintains optimal cardiac output and hemodynamic stability  Outcome: Progressing     Problem: Cardiovascular - Adult  Goal: Absence of cardiac dysrhythmias or at baseline  Outcome: Progressing     Problem: Gastrointestinal - Adult  Goal: Minimal or absence of nausea and vomiting  Outcome: Progressing     Problem: Gastrointestinal - Adult  Goal: Maintains or returns to baseline bowel function  Outcome: Progressing     Problem: Gastrointestinal - Adult  Goal: Maintains adequate nutritional intake  Outcome: Progressing     Problem: Infection - Adult  Goal: Absence of infection at discharge  Outcome: Progressing     Problem: Metabolic/Fluid and Electrolytes - Adult  Goal: Electrolytes maintained within normal limits  Outcome: Progressing     Problem: Metabolic/Fluid and Electrolytes - Adult  Goal: Hemodynamic stability and optimal renal function maintained  Outcome: 
  Problem: Chronic Conditions and Co-morbidities  Goal: Patient's chronic conditions and co-morbidity symptoms are monitored and maintained or improved  Outcome: Progressing     Problem: Discharge Planning  Goal: Discharge to home or other facility with appropriate resources  Outcome: Progressing     Problem: Safety - Adult  Goal: Free from fall injury  Outcome: Progressing     Problem: Pain  Goal: Verbalizes/displays adequate comfort level or baseline comfort level  Outcome: Progressing     Problem: ABCDS Injury Assessment  Goal: Absence of physical injury  Outcome: Progressing     Problem: Skin/Tissue Integrity  Goal: Skin integrity remains intact  Description: 1.  Monitor for areas of redness and/or skin breakdown  2.  Assess vascular access sites hourly  3.  Every 4-6 hours minimum:  Change oxygen saturation probe site  4.  Every 4-6 hours:  If on nasal continuous positive airway pressure, respiratory therapy assess nares and determine need for appliance change or resting period  Outcome: Progressing     Problem: Cardiovascular - Adult  Goal: Maintains optimal cardiac output and hemodynamic stability  Outcome: Progressing  Goal: Absence of cardiac dysrhythmias or at baseline  Outcome: Progressing     Problem: Gastrointestinal - Adult  Goal: Minimal or absence of nausea and vomiting  Outcome: Progressing  Goal: Maintains or returns to baseline bowel function  Outcome: Progressing  Goal: Maintains adequate nutritional intake  Outcome: Not Progressing     Problem: Infection - Adult  Goal: Absence of infection at discharge  Outcome: Progressing     Problem: Metabolic/Fluid and Electrolytes - Adult  Goal: Electrolytes maintained within normal limits  Outcome: Progressing  Goal: Hemodynamic stability and optimal renal function maintained  Outcome: Progressing     Problem: Hematologic - Adult  Goal: Maintains hematologic stability  Outcome: Progressing     Problem: Respiratory - Adult  Goal: Achieves optimal 
  Problem: Chronic Conditions and Co-morbidities  Goal: Patient's chronic conditions and co-morbidity symptoms are monitored and maintained or improved  Outcome: Progressing     Problem: Discharge Planning  Goal: Discharge to home or other facility with appropriate resources  Outcome: Progressing     Problem: Safety - Adult  Goal: Free from fall injury  Outcome: Progressing     Problem: Pain  Goal: Verbalizes/displays adequate comfort level or baseline comfort level  Outcome: Progressing     Problem: ABCDS Injury Assessment  Goal: Absence of physical injury  Outcome: Progressing     Problem: Skin/Tissue Integrity  Goal: Skin integrity remains intact  Description: 1.  Monitor for areas of redness and/or skin breakdown  2.  Assess vascular access sites hourly  3.  Every 4-6 hours minimum:  Change oxygen saturation probe site  4.  Every 4-6 hours:  If on nasal continuous positive airway pressure, respiratory therapy assess nares and determine need for appliance change or resting period  Outcome: Progressing     Problem: Cardiovascular - Adult  Goal: Maintains optimal cardiac output and hemodynamic stability  Outcome: Progressing  Goal: Absence of cardiac dysrhythmias or at baseline  Outcome: Progressing     Problem: Gastrointestinal - Adult  Goal: Minimal or absence of nausea and vomiting  Outcome: Progressing  Goal: Maintains or returns to baseline bowel function  Outcome: Progressing  Goal: Maintains adequate nutritional intake  Outcome: Progressing     Problem: Infection - Adult  Goal: Absence of infection at discharge  Outcome: Progressing     Problem: Metabolic/Fluid and Electrolytes - Adult  Goal: Electrolytes maintained within normal limits  Outcome: Progressing  Goal: Hemodynamic stability and optimal renal function maintained  Outcome: Progressing     Problem: Hematologic - Adult  Goal: Maintains hematologic stability  Outcome: Progressing     Problem: Respiratory - Adult  Goal: Achieves optimal ventilation 
  Problem: Chronic Conditions and Co-morbidities  Goal: Patient's chronic conditions and co-morbidity symptoms are monitored and maintained or improved  Outcome: Progressing     Problem: Discharge Planning  Goal: Discharge to home or other facility with appropriate resources  Outcome: Progressing     Problem: Safety - Adult  Goal: Free from fall injury  Outcome: Progressing     Problem: Pain  Goal: Verbalizes/displays adequate comfort level or baseline comfort level  Outcome: Progressing     Problem: ABCDS Injury Assessment  Goal: Absence of physical injury  Outcome: Progressing     Problem: Skin/Tissue Integrity  Goal: Skin integrity remains intact  Description: 1.  Monitor for areas of redness and/or skin breakdown  2.  Assess vascular access sites hourly  3.  Every 4-6 hours minimum:  Change oxygen saturation probe site  4.  Every 4-6 hours:  If on nasal continuous positive airway pressure, respiratory therapy assess nares and determine need for appliance change or resting period  Outcome: Progressing     Problem: Cardiovascular - Adult  Goal: Maintains optimal cardiac output and hemodynamic stability  Outcome: Progressing  Goal: Absence of cardiac dysrhythmias or at baseline  Outcome: Progressing    Problem: Gastrointestinal - Adult  Goal: Minimal or absence of nausea and vomiting  Outcome: Progressing  Goal: Maintains or returns to baseline bowel function  Outcome: Progressing  Goal: Maintains adequate nutritional intake  Outcome: Progressing     Problem: Infection - Adult  Goal: Absence of infection at discharge  Outcome: Progressing     Problem: Metabolic/Fluid and Electrolytes - Adult  Goal: Electrolytes maintained within normal limits  Outcome: Progressing  Goal: Hemodynamic stability and optimal renal function maintained  Outcome: Progressing     Problem: Hematologic - Adult  Goal: Maintains hematologic stability  Outcome: Progressing     Problem: Respiratory - Adult  Goal: Achieves optimal ventilation and 
  Problem: Chronic Conditions and Co-morbidities  Goal: Patient's chronic conditions and co-morbidity symptoms are monitored and maintained or improved  Outcome: Progressing     Problem: Discharge Planning  Goal: Discharge to home or other facility with appropriate resources  Outcome: Progressing  Flowsheets (Taken 7/3/2025 2022)  Discharge to home or other facility with appropriate resources: Identify barriers to discharge with patient and caregiver     Problem: Safety - Adult  Goal: Free from fall injury  Outcome: Progressing     Problem: Pain  Goal: Verbalizes/displays adequate comfort level or baseline comfort level  Outcome: Progressing     Problem: ABCDS Injury Assessment  Goal: Absence of physical injury  Outcome: Progressing     Problem: Skin/Tissue Integrity  Goal: Skin integrity remains intact  Description: 1.  Monitor for areas of redness and/or skin breakdown  2.  Assess vascular access sites hourly  3.  Every 4-6 hours minimum:  Change oxygen saturation probe site  4.  Every 4-6 hours:  If on nasal continuous positive airway pressure, respiratory therapy assess nares and determine need for appliance change or resting period  Outcome: Progressing  Flowsheets (Taken 7/3/2025 2022)  Skin Integrity Remains Intact: Monitor for areas of redness and/or skin breakdown     Problem: Cardiovascular - Adult  Goal: Maintains optimal cardiac output and hemodynamic stability  Outcome: Progressing  Flowsheets (Taken 7/3/2025 2022)  Maintains optimal cardiac output and hemodynamic stability: Monitor blood pressure and heart rate     
  Problem: Chronic Conditions and Co-morbidities  Goal: Patient's chronic conditions and co-morbidity symptoms are monitored and maintained or improved  Outcome: Progressing  Flowsheets (Taken 6/29/2025 1942)  Care Plan - Patient's Chronic Conditions and Co-Morbidity Symptoms are Monitored and Maintained or Improved: Monitor and assess patient's chronic conditions and comorbid symptoms for stability, deterioration, or improvement     Problem: Safety - Adult  Goal: Free from fall injury  Outcome: Progressing     Problem: Pain  Goal: Verbalizes/displays adequate comfort level or baseline comfort level  Outcome: Progressing     Problem: ABCDS Injury Assessment  Goal: Absence of physical injury  Outcome: Progressing  Flowsheets (Taken 6/29/2025 1738 by Jasson March, RN)  Absence of Physical Injury: Implement safety measures based on patient assessment     
  Problem: Chronic Conditions and Co-morbidities  Goal: Patient's chronic conditions and co-morbidity symptoms are monitored and maintained or improved  Outcome: Progressing  Flowsheets (Taken 7/2/2025 1957)  Care Plan - Patient's Chronic Conditions and Co-Morbidity Symptoms are Monitored and Maintained or Improved: Monitor and assess patient's chronic conditions and comorbid symptoms for stability, deterioration, or improvement     Problem: Discharge Planning  Goal: Discharge to home or other facility with appropriate resources  Outcome: Progressing  Flowsheets (Taken 7/2/2025 1957)  Discharge to home or other facility with appropriate resources: Identify barriers to discharge with patient and caregiver     Problem: Safety - Adult  Goal: Free from fall injury  Outcome: Progressing     Problem: Pain  Goal: Verbalizes/displays adequate comfort level or baseline comfort level  Outcome: Progressing     Problem: ABCDS Injury Assessment  Goal: Absence of physical injury  Outcome: Progressing     Problem: Skin/Tissue Integrity  Goal: Skin integrity remains intact  Description: 1.  Monitor for areas of redness and/or skin breakdown  2.  Assess vascular access sites hourly  3.  Every 4-6 hours minimum:  Change oxygen saturation probe site  4.  Every 4-6 hours:  If on nasal continuous positive airway pressure, respiratory therapy assess nares and determine need for appliance change or resting period  Outcome: Progressing  Flowsheets (Taken 7/2/2025 1957)  Skin Integrity Remains Intact: Monitor for areas of redness and/or skin breakdown     
  Problem: Discharge Planning  Goal: Discharge to home or other facility with appropriate resources  Outcome: Progressing     Problem: Safety - Adult  Goal: Free from fall injury  Outcome: Progressing     Problem: Pain  Goal: Verbalizes/displays adequate comfort level or baseline comfort level  Outcome: Progressing     Problem: Skin/Tissue Integrity  Goal: Skin integrity remains intact  Description: 1.  Monitor for areas of redness and/or skin breakdown  2.  Assess vascular access sites hourly  3.  Every 4-6 hours minimum:  Change oxygen saturation probe site  4.  Every 4-6 hours:  If on nasal continuous positive airway pressure, respiratory therapy assess nares and determine need for appliance change or resting period  Outcome: Progressing     Problem: Cardiovascular - Adult  Goal: Maintains optimal cardiac output and hemodynamic stability  Outcome: Progressing  Goal: Absence of cardiac dysrhythmias or at baseline  Outcome: Progressing     
PT free from falls and injuries this shift.     Problem: Chronic Conditions and Co-morbidities  Goal: Patient's chronic conditions and co-morbidity symptoms are monitored and maintained or improved  7/1/2025 0445 by Jovita Lopez RN  Outcome: Progressing  Flowsheets (Taken 6/30/2025 2019)  Care Plan - Patient's Chronic Conditions and Co-Morbidity Symptoms are Monitored and Maintained or Improved: Monitor and assess patient's chronic conditions and comorbid symptoms for stability, deterioration, or improvement  6/30/2025 1747 by Merced Julien RN  Outcome: Progressing     Problem: Discharge Planning  Goal: Discharge to home or other facility with appropriate resources  7/1/2025 0445 by Jovita Lopez RN  Outcome: Progressing  Flowsheets (Taken 6/30/2025 2019)  Discharge to home or other facility with appropriate resources: Identify barriers to discharge with patient and caregiver  6/30/2025 1747 by Merced Julien RN  Outcome: Progressing     Problem: Safety - Adult  Goal: Free from fall injury  7/1/2025 0445 by Jovita Lopez RN  Outcome: Progressing  6/30/2025 1747 by Merced Julien RN  Outcome: Progressing     Problem: Pain  Goal: Verbalizes/displays adequate comfort level or baseline comfort level  7/1/2025 0445 by Jovita Lopez RN  Outcome: Progressing  6/30/2025 1747 by Merced Julien RN  Outcome: Progressing     Problem: ABCDS Injury Assessment  Goal: Absence of physical injury  6/30/2025 1747 by Merced Julien RN  Outcome: Progressing     Problem: Skin/Tissue Integrity  Goal: Skin integrity remains intact  Description: 1.  Monitor for areas of redness and/or skin breakdown  2.  Assess vascular access sites hourly  3.  Every 4-6 hours minimum:  Change oxygen saturation probe site  4.  Every 4-6 hours:  If on nasal continuous positive airway pressure, respiratory therapy assess nares and determine need for appliance change or resting period  Recent Flowsheet Documentation  Taken 6/30/2025 2019 by 
at discharge:   Assess and monitor for signs and symptoms of infection   Monitor lab/diagnostic results  7/17/2025 0125 by Mary Avila RN  Outcome: Progressing     Problem: Metabolic/Fluid and Electrolytes - Adult  Goal: Electrolytes maintained within normal limits  7/17/2025 1258 by Kimber Juarez RN  Outcome: Progressing  Flowsheets (Taken 7/17/2025 0830)  Electrolytes maintained within normal limits:   Monitor labs and assess patient for signs and symptoms of electrolyte imbalances   Administer electrolyte replacement as ordered  7/17/2025 0125 by Mary Avila RN  Outcome: Progressing  Goal: Hemodynamic stability and optimal renal function maintained  7/17/2025 1258 by Kimber Juarez RN  Outcome: Progressing  Flowsheets (Taken 7/17/2025 0830)  Hemodynamic stability and optimal renal function maintained:   Monitor labs and assess for signs and symptoms of volume excess or deficit   Monitor intake, output and patient weight  7/17/2025 0125 by Mary Avila RN  Outcome: Progressing     Problem: Hematologic - Adult  Goal: Maintains hematologic stability  7/17/2025 1258 by Kimber Juarez RN  Outcome: Progressing  Flowsheets (Taken 7/17/2025 0830)  Maintains hematologic stability:   Assess for signs and symptoms of bleeding or hemorrhage   Monitor labs for bleeding or clotting disorders  7/17/2025 0125 by Mary Avila RN  Outcome: Progressing     Problem: Respiratory - Adult  Goal: Achieves optimal ventilation and oxygenation  7/17/2025 1258 by Kimber Juarez RN  Outcome: Progressing  7/17/2025 0125 by Mary Avila RN  Outcome: Progressing     Problem: Musculoskeletal - Adult  Goal: Return mobility to safest level of function  7/17/2025 1258 by Kimber Juarez RN  Outcome: Progressing  Flowsheets (Taken 7/17/2025 0830)  Return Mobility to Safest Level of Function:   Assist with transfers and ambulation using safe patient handling equipment as needed   Assess patient stability and activity tolerance for standing,

## 2025-07-23 NOTE — PROGRESS NOTES
[facility]    7/17/2025 1:59 PM        Name: Valdemar Solis .              Admitted: 6/28/2025  Primary Care Provider: Yane Wei MD (Tel: [phone])        Subjective:    No n/v or chest pian     Current Medications  Reviewed    Objective:  BP (!) 86/66   Pulse (!) 108   Temp 97.7 °F (36.5 °C) (Temporal)   Resp 16   Ht 1.854 m (6' 1\")   Wt (!) 138 kg (304 lb 3.8 oz)   SpO2 100%   BMI 40.14 kg/m²     Intake/Output Summary (Last 24 hours) at 7/17/2025 1359  Last data filed at 7/17/2025 1320  Gross per 24 hour   Intake 600 ml   Output 1145 ml   Net -545 ml        Wt Readings from Last 3 Encounters:   07/17/25 (!) 138 kg (304 lb 3.8 oz)   06/25/25 (!) 137 kg (302 lb)   06/24/25 (!) 136.5 kg (301 lb)       General appearance:  Appears comfortable. AAOx3  HEENT: atraumatic, Pupils equal, muscous membranes moist, no masses appreciated  Cardiovascular: Regular rate and rhythm no murmurs appreciated  Respiratory: CTAB no wheezing  Gastrointestinal: Abdomen soft, non-tender, BS+  EXT: no edema  Neurology: no gross focal deficts  Psychiatry: Appropriate affect. Not agitated  Skin: Warm, dry, no rashes appreciated  No change in physical exam from 7/12  Labs and Tests:  CBC:   Recent Labs     07/16/25  0515 07/16/25  1644 07/17/25  0415   WBC 5.4 6.1 6.1   HGB 7.8* 8.3* 8.9*    207 219     BMP:    Recent Labs     07/15/25  0443 07/16/25  0515 07/17/25  0415    138 136   K 5.0 4.3 4.4    105 104   CO2 22 20* 17*   BUN 43* 43* 40*   CREATININE 2.7* 2.6* 2.6*   GLUCOSE 176* 117* 130*     Hepatic:   Recent Labs     07/15/25  0443   AST 18   ALT 19   BILITOT 0.3   ALKPHOS 84       XR CHEST PORTABLE   Final Result   No acute process.      Stable cardiomegaly         XR CHEST PORTABLE   Final Result   No acute process.      Stable cardiomegaly         XR CHEST PORTABLE   Final Result   1. Interval 
                                                                  [facility]    7/12/2025 4:11 PM        Name: Valdemar Solis .              Admitted: 6/28/2025  Primary Care Provider: Yane Wei MD (Tel: [phone])        Subjective:    No chest pain sob or fevers  S/p pacemaker placement yesterday      Current Medications  Reviewed      Objective:  /60   Pulse 78   Temp 97 °F (36.1 °C) (Temporal)   Resp 20   Ht 1.854 m (6' 1\")   Wt 133.4 kg (294 lb 3.3 oz)   SpO2 100%   BMI 38.82 kg/m²     Intake/Output Summary (Last 24 hours) at 7/12/2025 1611  Last data filed at 7/12/2025 1446  Gross per 24 hour   Intake 480 ml   Output 1110 ml   Net -630 ml      Wt Readings from Last 3 Encounters:   07/12/25 133.4 kg (294 lb 3.3 oz)   06/25/25 (!) 137 kg (302 lb)   06/24/25 (!) 136.5 kg (301 lb)       General appearance:  Appears comfortable. AAOx3  HEENT: atraumatic, Pupils equal, muscous membranes moist, no masses appreciated  Cardiovascular: Regular rate and rhythm no murmurs appreciated  Respiratory: CTAB no wheezing  Gastrointestinal: Abdomen soft, non-tender, BS+  EXT: no edema  Neurology: no gross focal deficts  Psychiatry: Appropriate affect. Not agitated  Skin: Warm, dry, no rashes appreciated    Labs and Tests:  CBC:   Recent Labs     07/10/25  0543 07/11/25  0350 07/12/25  0550   WBC 10.1 9.1 6.3   HGB 10.2* 9.7* 8.8*    206 170     BMP:    Recent Labs     07/10/25  0543 07/11/25  0350 07/12/25  0550    137 138   K 4.8 4.6 4.3    100 102   CO2 22 23 22   BUN 32* 37* 40*   CREATININE 2.4* 2.8* 3.0*   GLUCOSE 140* 151* 126*     Hepatic:   No results for input(s): \"AST\", \"ALT\", \"BILITOT\", \"ALKPHOS\" in the last 72 hours.    Invalid input(s): \"ALB\"    XR CHEST PORTABLE   Final Result   1. Interval placement of a right single lead pacemaker with no pneumothorax.   2. Stable cardiomegaly without evidence of CHF.   3. No confluent airspace 
                                                                  [facility]      Name: Valdemar Solis .              Admitted: 6/28/2025  Primary Care Provider: Yane Wei MD (Tel: [phone])        Subjective:    Le edema improving    Current Medications  Reviewed    Objective:  /72   Pulse 80   Temp 97.1 °F (36.2 °C) (Temporal)   Resp 18   Ht 1.854 m (6' 1\")   Wt (!) 138.6 kg (305 lb 7.2 oz)   SpO2 100%   BMI 40.30 kg/m²     Intake/Output Summary (Last 24 hours) at 7/20/2025 1116  Last data filed at 7/20/2025 0755  Gross per 24 hour   Intake 500 ml   Output 1000 ml   Net -500 ml        Wt Readings from Last 3 Encounters:   07/20/25 (!) 138.6 kg (305 lb 7.2 oz)   06/25/25 (!) 137 kg (302 lb)   06/24/25 (!) 136.5 kg (301 lb)       General appearance:  Appears comfortable. AAOx3  HEENT: atraumatic, Pupils equal, muscous membranes moist, no masses appreciated  Cardiovascular: Regular rate and rhythm no murmurs appreciated  Respiratory: CTAB no wheezing  Gastrointestinal: Abdomen soft, non-tender, BS+  EXT: 2+ edema  Neurology: no gross focal deficts  Psychiatry: Appropriate affect. Not agitated  Skin: Warm, dry, no rashes appreciated  No change in physical exam from 7/12  Labs and Tests:  CBC:   Recent Labs     07/18/25  0437 07/20/25  0456   WBC 4.3 4.4   HGB 7.7* 7.8*    153     BMP:    Recent Labs     07/18/25  0437 07/19/25  0414 07/20/25  0456    137 138   K 4.8 4.7 4.6    102 104   CO2 22 24 25   BUN 42* 40* 41*   CREATININE 2.9* 3.0* 2.8*   GLUCOSE 131* 136* 158*     Hepatic:   No results for input(s): \"AST\", \"ALT\", \"BILITOT\", \"ALKPHOS\" in the last 72 hours.    Invalid input(s): \"ALB\"      XR CHEST PORTABLE   Final Result   Right-sided central venous catheter terminating in the right atrium      Right basilar linear opacities suggesting subsegmental atelectasis         IR TUNNELED CVC PLACE WO SQ PORT/PUMP > 5 YEARS   Final Result 
                                                                  [facility]      Name: Valdemar Solis .              Admitted: 6/28/2025  Primary Care Provider: Yane Wei MD (Tel: [phone])        Subjective:    Edema improving no n.v    Current Medications  Reviewed    Objective:  /70   Pulse 80   Temp 97.5 °F (36.4 °C) (Temporal)   Resp 18   Ht 1.854 m (6' 1\")   Wt (!) 138.5 kg (305 lb 5.4 oz)   SpO2 96%   BMI 40.28 kg/m²     Intake/Output Summary (Last 24 hours) at 7/21/2025 1011  Last data filed at 7/21/2025 0600  Gross per 24 hour   Intake 480 ml   Output 1450 ml   Net -970 ml        Wt Readings from Last 3 Encounters:   07/21/25 (!) 138.5 kg (305 lb 5.4 oz)   06/25/25 (!) 137 kg (302 lb)   06/24/25 (!) 136.5 kg (301 lb)       General appearance:  Appears comfortable. AAOx3  HEENT: atraumatic, Pupils equal, muscous membranes moist, no masses appreciated  Cardiovascular: Regular rate and rhythm no murmurs appreciated  Respiratory: CTAB no wheezing  Gastrointestinal: Abdomen soft, non-tender, BS+  EXT: 1+ edema  Neurology: no gross focal deficts  Psychiatry: Appropriate affect. Not agitated  Skin: Warm, dry, no rashes appreciated  No change in physical exam from 7/12  Labs and Tests:  CBC:   Recent Labs     07/20/25  0456   WBC 4.4   HGB 7.8*        BMP:    Recent Labs     07/19/25  0414 07/20/25  0456 07/21/25  0428    138 139   K 4.7 4.6 4.5    104 103   CO2 24 25 24   BUN 40* 41* 42*   CREATININE 3.0* 2.8* 2.8*   GLUCOSE 136* 158* 139*     Hepatic:   No results for input(s): \"AST\", \"ALT\", \"BILITOT\", \"ALKPHOS\" in the last 72 hours.    Invalid input(s): \"ALB\"      XR CHEST PORTABLE   Final Result   Right-sided central venous catheter terminating in the right atrium      Right basilar linear opacities suggesting subsegmental atelectasis         IR TUNNELED CVC PLACE WO SQ PORT/PUMP > 5 YEARS   Final Result   Successful ultrasound and 
                                                                  [facility]      Name: Valdemar Soils .              Admitted: 6/28/2025  Primary Care Provider: Yane Wei MD (Tel: [phone])        Subjective:    Having leg edema no chest pain sob    Current Medications  Reviewed    Objective:  /72   Pulse 80   Temp 97.1 °F (36.2 °C) (Temporal)   Resp 18   Ht 1.854 m (6' 1\")   Wt (!) 138.6 kg (305 lb 7.2 oz)   SpO2 100%   BMI 40.30 kg/m²     Intake/Output Summary (Last 24 hours) at 7/20/2025 1111  Last data filed at 7/20/2025 0755  Gross per 24 hour   Intake 500 ml   Output 1000 ml   Net -500 ml        Wt Readings from Last 3 Encounters:   07/20/25 (!) 138.6 kg (305 lb 7.2 oz)   06/25/25 (!) 137 kg (302 lb)   06/24/25 (!) 136.5 kg (301 lb)       General appearance:  Appears comfortable. AAOx3  HEENT: atraumatic, Pupils equal, muscous membranes moist, no masses appreciated  Cardiovascular: Regular rate and rhythm no murmurs appreciated  Respiratory: CTAB no wheezing  Gastrointestinal: Abdomen soft, non-tender, BS+  EXT: 2+ edema  Neurology: no gross focal deficts  Psychiatry: Appropriate affect. Not agitated  Skin: Warm, dry, no rashes appreciated  No change in physical exam from 7/12  Labs and Tests:  CBC:   Recent Labs     07/18/25  0437 07/20/25  0456   WBC 4.3 4.4   HGB 7.7* 7.8*    153     BMP:    Recent Labs     07/18/25  0437 07/19/25  0414 07/20/25  0456    137 138   K 4.8 4.7 4.6    102 104   CO2 22 24 25   BUN 42* 40* 41*   CREATININE 2.9* 3.0* 2.8*   GLUCOSE 131* 136* 158*     Hepatic:   No results for input(s): \"AST\", \"ALT\", \"BILITOT\", \"ALKPHOS\" in the last 72 hours.    Invalid input(s): \"ALB\"      XR CHEST PORTABLE   Final Result   Right-sided central venous catheter terminating in the right atrium      Right basilar linear opacities suggesting subsegmental atelectasis         IR TUNNELED CVC PLACE WO SQ PORT/PUMP > 5 YEARS 
                                                                  [facility]      Name: Valdemar Solis .              Admitted: 6/28/2025  Primary Care Provider: Yane Wei MD (Tel: [phone])        Subjective:    Edema still 2+ no n/v or sob    Current Medications  Reviewed    Objective:  BP 97/63   Pulse 82   Temp 98 °F (36.7 °C) (Oral)   Resp 18   Ht 1.854 m (6' 1\")   Wt 136.1 kg (300 lb)   SpO2 100%   BMI 39.58 kg/m²     Intake/Output Summary (Last 24 hours) at 7/22/2025 1106  Last data filed at 7/22/2025 0918  Gross per 24 hour   Intake 240 ml   Output 1100 ml   Net -860 ml        Wt Readings from Last 3 Encounters:   07/22/25 136.1 kg (300 lb)   06/25/25 (!) 137 kg (302 lb)   06/24/25 (!) 136.5 kg (301 lb)       General appearance:  Appears comfortable. AAOx3  HEENT: atraumatic, Pupils equal, muscous membranes moist, no masses appreciated  Cardiovascular: Regular rate and rhythm no murmurs appreciated  Respiratory: CTAB no wheezing  Gastrointestinal: Abdomen soft, non-tender, BS+  EXT: 1+ edema  Neurology: no gross focal deficts  Psychiatry: Appropriate affect. Not agitated  Skin: Warm, dry, no rashes appreciated  No change in physical exam from 7/12  Labs and Tests:  CBC:   Recent Labs     07/20/25  0456   WBC 4.4   HGB 7.8*        BMP:    Recent Labs     07/20/25  0456 07/21/25  0428 07/22/25  0750    139 140   K 4.6 4.5 4.1    103 102   CO2 25 24 25   BUN 41* 42* 43*   CREATININE 2.8* 2.8* 3.0*   GLUCOSE 158* 139* 139*     Hepatic:   No results for input(s): \"AST\", \"ALT\", \"BILITOT\", \"ALKPHOS\" in the last 72 hours.    Invalid input(s): \"ALB\"      XR CHEST PORTABLE   Final Result   Right-sided central venous catheter terminating in the right atrium      Right basilar linear opacities suggesting subsegmental atelectasis         IR TUNNELED CVC PLACE WO SQ PORT/PUMP > 5 YEARS   Final Result   Successful ultrasound and fluoroscopy guided 
                                                                  [facility]    7/13/2025 3:03 PM        Name: Valdemar Solis .              Admitted: 6/28/2025  Primary Care Provider: Yane Wei MD (Tel: [phone])        Subjective:    No chest pain sob or fevers  S/p pacemaker placement   No new complaints      Current Medications  Reviewed    Objective:  /76   Pulse 75   Temp 96.8 °F (36 °C) (Temporal)   Resp 20   Ht 1.854 m (6' 1\")   Wt 133.5 kg (294 lb 5 oz)   SpO2 100%   BMI 38.83 kg/m²     Intake/Output Summary (Last 24 hours) at 7/13/2025 1503  Last data filed at 7/12/2025 1729  Gross per 24 hour   Intake 240 ml   Output --   Net 240 ml      Wt Readings from Last 3 Encounters:   07/13/25 133.5 kg (294 lb 5 oz)   06/25/25 (!) 137 kg (302 lb)   06/24/25 (!) 136.5 kg (301 lb)       General appearance:  Appears comfortable. AAOx3  HEENT: atraumatic, Pupils equal, muscous membranes moist, no masses appreciated  Cardiovascular: Regular rate and rhythm no murmurs appreciated  Respiratory: CTAB no wheezing  Gastrointestinal: Abdomen soft, non-tender, BS+  EXT: no edema  Neurology: no gross focal deficts  Psychiatry: Appropriate affect. Not agitated  Skin: Warm, dry, no rashes appreciated  No change in physical exam from 7/12  Labs and Tests:  CBC:   Recent Labs     07/11/25  0350 07/12/25  0550 07/13/25  0420   WBC 9.1 6.3 5.0   HGB 9.7* 8.8* 7.6*    170 140     BMP:    Recent Labs     07/11/25  0350 07/12/25  0550 07/13/25  0420    138 136   K 4.6 4.3 4.3    102 101   CO2 23 22 22   BUN 37* 40* 43*   CREATININE 2.8* 3.0* 3.0*   GLUCOSE 151* 126* 145*     Hepatic:   No results for input(s): \"AST\", \"ALT\", \"BILITOT\", \"ALKPHOS\" in the last 72 hours.    Invalid input(s): \"ALB\"    XR CHEST PORTABLE   Final Result   1. Interval placement of a right single lead pacemaker with no pneumothorax.   2. Stable cardiomegaly without evidence of CHF.   3. No 
                                                                  [facility]    7/16/2025 9:31 AM        Name: Valdemar Solis .              Admitted: 6/28/2025  Primary Care Provider: Yane Wei MD (Tel: [phone])        Subjective:    Lying in bed no chest pain sob or fevers    Current Medications  Reviewed    Objective:  /70   Pulse 76   Temp 97.7 °F (36.5 °C) (Temporal)   Resp 18   Ht 1.854 m (6' 1\")   Wt (!) 138.2 kg (304 lb 10.8 oz)   SpO2 100%   BMI 40.20 kg/m²     Intake/Output Summary (Last 24 hours) at 7/16/2025 0931  Last data filed at 7/16/2025 0514  Gross per 24 hour   Intake 990 ml   Output 600 ml   Net 390 ml        Wt Readings from Last 3 Encounters:   07/16/25 (!) 138.2 kg (304 lb 10.8 oz)   06/25/25 (!) 137 kg (302 lb)   06/24/25 (!) 136.5 kg (301 lb)       General appearance:  Appears comfortable. AAOx3  HEENT: atraumatic, Pupils equal, muscous membranes moist, no masses appreciated  Cardiovascular: Regular rate and rhythm no murmurs appreciated  Respiratory: CTAB no wheezing  Gastrointestinal: Abdomen soft, non-tender, BS+  EXT: no edema  Neurology: no gross focal deficts  Psychiatry: Appropriate affect. Not agitated  Skin: Warm, dry, no rashes appreciated  No change in physical exam from 7/12  Labs and Tests:  CBC:   Recent Labs     07/14/25  0300 07/15/25  0443 07/16/25  0515   WBC 5.3 5.0 5.4   HGB 7.7* 8.0* 7.8*    151 174     BMP:    Recent Labs     07/14/25  0300 07/15/25  0443 07/16/25  0515    138 138   K 4.2 5.0 4.3    104 105   CO2 20* 22 20*   BUN 47* 43* 43*   CREATININE 2.8* 2.7* 2.6*   GLUCOSE 140* 176* 117*     Hepatic:   Recent Labs     07/14/25  0300 07/15/25  0443   AST 30 18   ALT 26 19   BILITOT 0.3 0.3   ALKPHOS 87 84       XR CHEST PORTABLE   Final Result   No acute process.      Stable cardiomegaly         XR CHEST PORTABLE   Final Result   1. Interval placement of a right single lead pacemaker with 
                                                                  [facility]      Name: Valdemar Solis .              Admitted: 6/28/2025  Primary Care Provider: Yane Wei MD (Tel: [phone])        Subjective:    Stil lhaving low ex edema diureisng    Current Medications  Reviewed    Objective:  /72   Pulse 80   Temp 97.1 °F (36.2 °C) (Temporal)   Resp 18   Ht 1.854 m (6' 1\")   Wt (!) 138.6 kg (305 lb 7.2 oz)   SpO2 100%   BMI 40.30 kg/m²     Intake/Output Summary (Last 24 hours) at 7/20/2025 1113  Last data filed at 7/20/2025 0755  Gross per 24 hour   Intake 500 ml   Output 1000 ml   Net -500 ml        Wt Readings from Last 3 Encounters:   07/20/25 (!) 138.6 kg (305 lb 7.2 oz)   06/25/25 (!) 137 kg (302 lb)   06/24/25 (!) 136.5 kg (301 lb)       General appearance:  Appears comfortable. AAOx3  HEENT: atraumatic, Pupils equal, muscous membranes moist, no masses appreciated  Cardiovascular: Regular rate and rhythm no murmurs appreciated  Respiratory: CTAB no wheezing  Gastrointestinal: Abdomen soft, non-tender, BS+  EXT: 2+ edema  Neurology: no gross focal deficts  Psychiatry: Appropriate affect. Not agitated  Skin: Warm, dry, no rashes appreciated  No change in physical exam from 7/12  Labs and Tests:  CBC:   Recent Labs     07/18/25  0437 07/20/25  0456   WBC 4.3 4.4   HGB 7.7* 7.8*    153     BMP:    Recent Labs     07/18/25  0437 07/19/25  0414 07/20/25  0456    137 138   K 4.8 4.7 4.6    102 104   CO2 22 24 25   BUN 42* 40* 41*   CREATININE 2.9* 3.0* 2.8*   GLUCOSE 131* 136* 158*     Hepatic:   No results for input(s): \"AST\", \"ALT\", \"BILITOT\", \"ALKPHOS\" in the last 72 hours.    Invalid input(s): \"ALB\"      XR CHEST PORTABLE   Final Result   Right-sided central venous catheter terminating in the right atrium      Right basilar linear opacities suggesting subsegmental atelectasis         IR TUNNELED CVC PLACE WO SQ PORT/PUMP > 5 YEARS 
                                                                  [facility]    7/14/2025 10:55 AM        Name: Valdemar Solis .              Admitted: 6/28/2025  Primary Care Provider: Yane Wei MD (Tel: [phone])        Subjective:      Bed no nausea vomiting chest pain    Current Medications  Reviewed    Objective:  BP (!) 111/59   Pulse 76   Temp 97.6 °F (36.4 °C) (Temporal)   Resp 18   Ht 1.854 m (6' 1\")   Wt (!) 136.2 kg (300 lb 4.8 oz)   SpO2 98%   BMI 39.62 kg/m²   No intake or output data in the 24 hours ending 07/14/25 1055     Wt Readings from Last 3 Encounters:   07/14/25 (!) 136.2 kg (300 lb 4.8 oz)   06/25/25 (!) 137 kg (302 lb)   06/24/25 (!) 136.5 kg (301 lb)       General appearance:  Appears comfortable. AAOx3  HEENT: atraumatic, Pupils equal, muscous membranes moist, no masses appreciated  Cardiovascular: Regular rate and rhythm no murmurs appreciated  Respiratory: CTAB no wheezing  Gastrointestinal: Abdomen soft, non-tender, BS+  EXT: no edema  Neurology: no gross focal deficts  Psychiatry: Appropriate affect. Not agitated  Skin: Warm, dry, no rashes appreciated  No change in physical exam from 7/12  Labs and Tests:  CBC:   Recent Labs     07/12/25  0550 07/13/25  0420 07/14/25  0300   WBC 6.3 5.0 5.3   HGB 8.8* 7.6* 7.7*    140 164     BMP:    Recent Labs     07/12/25  0550 07/13/25  0420 07/14/25  0300    136 138   K 4.3 4.3 4.2    101 104   CO2 22 22 20*   BUN 40* 43* 47*   CREATININE 3.0* 3.0* 2.8*   GLUCOSE 126* 145* 140*     Hepatic:   Recent Labs     07/14/25  0300   AST 30   ALT 26   BILITOT 0.3   ALKPHOS 87       XR CHEST PORTABLE   Final Result   1. Interval placement of a right single lead pacemaker with no pneumothorax.   2. Stable cardiomegaly without evidence of CHF.   3. No confluent airspace consolidation.         CT CHEST WO CONTRAST   Final Result   1. Status post aortic valve replacement.   2. Chronic 
                                                                  [facility]    7/11/2025 10:26 AM        Name: Valdemar Solis .              Admitted: 6/28/2025  Primary Care Provider: Yane Wei MD (Tel: [phone])        Subjective:    No chest pain sob or fevers    Reviewed interval ancillary notes    Current Medications  chlorhexidine gluconate (ANTISEPTIC SKIN CLEANSER) 4 % solution, Once  ipratropium 0.5 mg-albuterol 2.5 mg (DUONEB) nebulizer solution 1 Dose, TID RT  oxyCODONE (ROXICODONE) immediate release tablet 5 mg, Q4H PRN   Or  oxyCODONE (ROXICODONE) immediate release tablet 10 mg, Q4H PRN  enoxaparin (LOVENOX) injection 135 mg, BID  warfarin (COUMADIN) tablet 10 mg, Daily  sodium chloride (OCEAN, BABY AYR) 0.65 % nasal spray 2 spray, Q6H PRN  furosemide (LASIX) tablet 40 mg, BID  methocarbamol (ROBAXIN) tablet 750 mg, BID PRN  0.9 % sodium chloride infusion, PRN  sotalol (BETAPACE) tablet 80 mg, Daily  insulin glargine (LANTUS) injection vial 10 Units, Daily  DAPTOmycin (CUBICIN) 625 mg in sodium chloride (PF) 0.9 % 12.5 mL IV syringe, Q24H  sodium chloride flush 0.9 % injection 5-40 mL, 2 times per day  sodium chloride flush 0.9 % injection 5-40 mL, PRN  0.9 % sodium chloride infusion, PRN  potassium chloride (KLOR-CON M) extended release tablet 40 mEq, PRN   Or  potassium bicarb-citric acid (EFFER-K) effervescent tablet 40 mEq, PRN   Or  potassium chloride 10 mEq/100 mL IVPB (Peripheral Line), PRN  magnesium sulfate 2000 mg in 50 mL IVPB premix, PRN  ondansetron (ZOFRAN-ODT) disintegrating tablet 4 mg, Q8H PRN   Or  ondansetron (ZOFRAN) injection 4 mg, Q6H PRN  melatonin tablet 3 mg, Nightly  polyethylene glycol (GLYCOLAX) packet 17 g, Daily PRN  acetaminophen (TYLENOL) tablet 650 mg, Q6H PRN   Or  acetaminophen (TYLENOL) suppository 650 mg, Q6H PRN  budesonide-formoterol (SYMBICORT) 160-4.5 MCG/ACT inhaler 2 puff, BID RT   And  tiotropium (SPIRIVA RESPIMAT) 
      [facility]              Progress Note      Admit Date 6/28/2025  HPI:    presents with hypotensive blood pressures and CLAUDIO on CKD stage III.  Etiology may be in part secondary to volume depletion as patient reports not eating or drinking over the last 2 days.  In this setting, would hold IV diuresis.  Historically, LVEF has been preserved on echo June 4, 2025.  Incidentally, RV was not well-visualized but with possible mildly reduced systolic function.  Troponin noted to be elevated at 103 then 96.  Recommend checking echo Monday.  No obvious acute ECG findings on presenting EKG.  Recommend repeat ECG today.  Patient denies chest pain or dyspnea associated with this event.     Elevated troponin is chronic, higher than prior report.  Check echo for structure, function and wall motion.  Aspirin was recently stopped at most recent hospitalization due to GI bleed.  Patient is on warfarin for mechanical aortic valve.  Presumed ischemic evaluation at time of valve replacement.  Unable to find any thing recent.  However, given recent GI bleed, conservative approach would be in patient's best interest.     Long-term use of warfarin due to mechanical aortic valve, underlying PAF.  Most recent INR 1.99.  Management per pharmacy.     Patient is on long-term antiarrhythmic drug therapy with sotalol.  Presenting EKG was a poor quality but possible underlying atrial fibrillation.  May warrant device interrogation for burden and EP consultation.  Potassium 4.5, check serum magnesium.     Diagnosed with acute on chronic hypoxic respiratory failure and possible pneumonia.  Patient has a productive cough.  Presenting fever has defervesced.  Management per hospitalist.    Interval history: no events over night    Mr. Solis denies chest pain, shortness of breath, palpitations  Subjective:  Sleeps using a BiPAP. Up in chair. No c/o ; swelling in Lt leg stated as always, Rt down       Scheduled Meds:   ipratropium 0.5 
      [facility]   Daily Progress Note      Admit Date:  6/28/2025    HPI:    Mr. Solis is a 75 year old male with history of mechanical AVR, diastolic heart failure, SSS status post pacemaker, BiV ICD 3/2020,. COPD on home oxygen, chronic kidney disease    He presented to the hospital with hypoxia and hypotension along with confusion.  He is septic, PRESTON with no evidence of endocarditis on valve or leads.  2 blood cultures one of them positive for MRSA and 1 is negative.      ICD removed 7/09/2025       Subjective:  Patient is being seen for acute on chronic diastolic heart failure. There were no acute overnight cardiac events.  He is sitting up in the chair and feeling good.  He is starting to have a little edema in lower legs, no support stockings in place     Creat 2.4-3.0 bnp -[id number] weight [phone] tsh with reflex 0.20     Objective:   BP (!) 86/50   Pulse 77   Temp 97 °F (36.1 °C) (Temporal)   Resp 18   Ht 1.854 m (6' 1\")   Wt 133.5 kg (294 lb 5 oz)   SpO2 100%   BMI 38.83 kg/m²     Intake/Output Summary (Last 24 hours) at 7/13/2025 0747  Last data filed at 7/12/2025 1729  Gross per 24 hour   Intake 720 ml   Output 525 ml   Net 195 ml          Physical Exam:  General:  Awake, alert, oriented in NAD large dressing on left subclavian and temp device right neck  Skin:  Warm and dry.  No unusual bruising or rash  Neck:  Supple.  No JVD or carotid bruit appreciated  Chest:  Normal effort.  No rales, rhonchi or wheezing  Cardiovascular:  RRR, S1/S2, no murmur/gallop/rub  Abdomen:  Soft, nontender, +bowel sounds, morbidly obese  Extremities:  bilateral lower leg edema L>R   Neurological: No focal deficits  Psychological: Normal mood and affect      Medications:    melatonin  6 mg Oral Nightly    traZODone  100 mg Oral Nightly    sodium chloride flush  5-40 mL IntraVENous 2 times per day    ipratropium 0.5 mg-albuterol 2.5 mg  1 Dose Inhalation TID RT    enoxaparin  1 mg/kg SubCUTAneous BID 
      [facility]   Daily Progress Note      Admit Date:  6/28/2025    HPI:    Mr. Solis is a 75 year old male with history of mechanical AVR, diastolic heart failure, SSS status post pacemaker, BiV ICD 3/2020,. COPD on home oxygen, chronic kidney disease    He presented to the hospital with hypoxia and hypotension along with confusion.  He is septic, PRESTON with no evidence of endocarditis on valve or leads.  2 blood cultures one of them positive for MRSA and 1 is negative.      ICD removed 7/09/2025       Subjective:  Patient is being seen for acute on chronic diastolic heart failure. There were no acute overnight cardiac events.  He is sitting up in the chair with bilateral ankle to knee edema, no support stockings on.      Creat 2.4-3.0-2.8 bnp -[id number] weight [phone]     Objective:   /70   Pulse 80   Temp 97.5 °F (36.4 °C) (Temporal)   Resp 18   Ht 1.854 m (6' 1\")   Wt (!) 138.5 kg (305 lb 5.4 oz)   SpO2 96%   BMI 40.28 kg/m²     Intake/Output Summary (Last 24 hours) at 7/21/2025 0932  Last data filed at 7/21/2025 0600  Gross per 24 hour   Intake 480 ml   Output 1450 ml   Net -970 ml          Physical Exam:  General:  Awake, alert, oriented in NAD large dressing on left subclavian and temp device right neck  Skin:  Warm and dry.  No unusual bruising or rash  Neck:  Supple.  No JVD or carotid bruit appreciated  Chest:  Normal effort.  No rales, rhonchi or wheezing  Cardiovascular:  RRR, S1/S2, no murmur/gallop/rub  Abdomen:  Soft, nontender, +bowel sounds, morbidly obese  Extremities:  bilateral lower leg edema L>R   Neurological: No focal deficits  Psychological: Normal mood and affect      Medications:    furosemide  40 mg IntraVENous BID    warfarin  10 mg Oral Daily    enoxaparin  120 mg SubCUTAneous Daily    warfarin placeholder: dosing by pharmacy   Oral RX Placeholder    melatonin  6 mg Oral Nightly    traZODone  100 mg Oral Nightly    ipratropium 0.5 mg-albuterol 2.5 mg  
      Hospitalist Progress Note      PCP: Yane Wei MD    Date of Admission: 6/28/2025    LOS: 2    Chief Complaint:   Chief Complaint   Patient presents with    Shortness of Breath     Pt arrived via [facility] from home w c/o SOB. Pt arrived on CPAP.       Case Summary:   75-year-old gentleman with history of type 2 diabetes, MVR, COPD, atrial fibrillation, history of combined heart failure, sick sinus syndrome status post BiV ICD, CKD who presented with shortness of breath and confusion and was found to have sepsis with MRSA bacteremia.        Principal Problem:    Sepsis due to Bacteremia due to methicillin resistant Staphylococcus aureus: Unclear source.  Remains afebrile with no leukocytosis.  Patient was consulted by ID.  Patient underwent PRESTON today with no valvulopathy or anything seen on pacemaker leads.  Repeat blood culture 6/30/2025 no growth to date  - Continue IV antibiotics.  On daptomycin  - ID following with recommendations and will await recommendations today in light of PRESTON with no vegetation      Mechanical mitral valve: Echocardiogram with no evidence of vegetation.  Continue anticoagulation with Coumadin and monitor INR      Acute on chronic kidney injury: Baseline creatinine around 1.6-1.8.  Creatinine peaked at 2.7.      Active Problems:    Hypoxia: Continue O2 supplementation and wean off with target sats greater than 90%    PAF (paroxysmal atrial fibrillation)/SSS with Biventricular cardiac pacemaker in situ    Essential hypertension: Stable.  Will monitor.  Currently on the lower side of normal    Chronic systolic (congestive) heart failure (HCC): Euvolemic.  Will monitor    Chronic anemia: Stable    Idiopathic gout: Stable    Morbid obesity due to excess calories (HCC)    Resolved Problems:    Pneumonia ruled out        Medications:  Reviewed  Infusion Medications    sodium chloride      sodium chloride      dextrose       Scheduled Medications    sodium chloride flush  5-40 mL 
      Hospitalist Progress Note      PCP: Yane Wei MD    Date of Admission: 6/28/2025    LOS: 3    Chief Complaint:   Chief Complaint   Patient presents with    Shortness of Breath     Pt arrived via [facility] from home w c/o SOB. Pt arrived on CPAP.       Case Summary:   75-year-old gentleman with history of type 2 diabetes, MVR, COPD, atrial fibrillation, history of combined heart failure, sick sinus syndrome status post BiV ICD, CKD who presented with shortness of breath and confusion and was found to have sepsis with MRSA bacteremia.      Principal Problem:    Sepsis due to Bacteremia due to methicillin resistant Staphylococcus aureus: Unclear source.  Remains afebrile with no leukocytosis.  Patient was consulted by ID.  Patient underwent PRESTON with no valvulopathy or anything seen on pacemaker leads.  Repeat blood culture 6/30/2025 no growth to date.  - If cultures remain negative in 72 hours, will proceed with PICC line placement for outpatient IV antibiotics  - Continue on daptomycin as per ID  - Social work consult in view of discharge planning      Mechanical mitral valve: Echocardiogram with no evidence of vegetation.  Continue anticoagulation with Coumadin and monitor INR      Acute on chronic kidney injury: Baseline creatinine around 1.6-1.8.  Creatinine peaked at 2.7.  Trending down at 2.1 this morning.  - Encourage oral hydration  - Monitor renal function electrolytes  - Nephrology following with recommendations      Active Problems:    Hypoxia: Continue O2 supplementation and wean off with target sats greater than 90%    PAF (paroxysmal atrial fibrillation)/SSS with Biventricular cardiac pacemaker in situ    Essential hypertension: Stable.  Will monitor.  Currently on the lower side of normal    Chronic systolic (congestive) heart failure (HCC): Euvolemic.  Will monitor    Chronic anemia: Stable    Idiopathic gout: Stable    Morbid obesity due to excess calories (HCC)    Resolved 
      Hospitalist Progress Note      PCP: Yane Wei MD    Date of Admission: 6/28/2025    LOS: 4    Chief Complaint:   Chief Complaint   Patient presents with    Shortness of Breath     Pt arrived via [facility] from home w c/o SOB. Pt arrived on CPAP.       Case Summary:   75-year-old gentleman with history of type 2 diabetes, mechanical aortic valve, COPD, atrial fibrillation, history of combined heart failure, sick sinus syndrome status post BiV ICD, CKD who presented with shortness of breath and confusion and was found to have sepsis with MRSA bacteremia.      Principal Problem:    Sepsis due to Bacteremia due to methicillin resistant Staphylococcus aureus: Unclear source.  Remains afebrile with no leukocytosis.  Patient was consulted by ID.  Patient underwent PRESTON with no valvulopathy, vegetation or anything seen on pacemaker leads.  Repeat blood culture 6/30/2025 came back positive today.  Discussed with ID.  - Patient seen cardiology this morning and in light of persistently positive cultures we will plan on pacemaker lead extraction planned for next Wednesday  -Continue IV antibiotics  - Repeat 2 sets of blood cultures today  - No PICC line until cultures proven negative  -ID following with recommendation      Mechanical aortic valve: Echocardiogram with no evidence of vegetation.  Continue anticoagulation with Coumadin and monitor INR.  May need to hold anticoagulation and switch to IV heparin on Sunday in view of pacemaker extraction on Wednesday      Acute on chronic kidney injury: Baseline creatinine around 1.6-1.8.  Creatinine peaked at 2.7.  Stable at 2.1 .  - Encourage oral hydration  - Monitor renal function electrolytes  - Nephrology following with recommendations      Active Problems:    Hypoxia: Continue O2 supplementation and wean off with target sats greater than 90%    PAF (paroxysmal atrial fibrillation)/SSS with Biventricular cardiac pacemaker in situ    Essential hypertension: 
      Hospitalist Progress Note      PCP: Yane Wei MD    Date of Admission: 6/28/2025    LOS: 5    Chief Complaint:   Chief Complaint   Patient presents with    Shortness of Breath     Pt arrived via [facility] from home w c/o SOB. Pt arrived on CPAP.       Principal Problem:    Bacteremia due to methicillin resistant Staphylococcus aureus  Active Problems:    PAF (paroxysmal atrial fibrillation) ([facility])    Essential hypertension    Chronic systolic (congestive) heart failure ([facility])    Biventricular cardiac pacemaker in situ    Chronic anemia    Warfarin anticoagulation    Acute kidney injury    H/O mechanical aortic valve replacement    Morbid obesity due to excess calories ([facility])    AV block    Receiving intravenous antibiotic treatment as outpatient  Resolved Problems:    Hypoxia    Sepsis ([facility])    Hypotension  Feels well this morning.  Remains afebrile with no leukocytosis.  Patient with persistent positive cultures with MRSA  Concern for pacemaker lead infection  Patient was seen by EP with plans for pacemaker lead extraction  Due for repeat PRESTON on Monday as per EP  Hold Coumadin on Sunday in view of pacemaker procedure  Continue IV antibiotics as per ID  Follow-up repeat blood cultures  ID following with recommendations  Nephrology following recommendations  Renal function stable with creatinine 2.1        Medications:  Reviewed  Infusion Medications    sodium chloride      sodium chloride      sodium chloride      dextrose       Scheduled Medications    ipratropium 0.5 mg-albuterol 2.5 mg  1 Dose Inhalation BID RT    furosemide  40 mg Oral Daily    warfarin  10 mg Oral Daily    sodium chloride flush  5-40 mL IntraVENous 2 times per day    sotalol  80 mg Oral Daily    insulin glargine  10 Units SubCUTAneous Daily    DAPTOmycin (CUBICIN) 625 mg in sodium chloride (PF) 0.9 % 12.5 mL IV syringe  625 mg IntraVENous Q24H    sodium chloride flush  5-40 mL IntraVENous 2 times per day    melatonin  3 mg Oral 
      Hospitalist Progress Note      PCP: Yane Wei MD    Date of Admission: 6/28/2025    LOS: 6    Chief Complaint:   Chief Complaint   Patient presents with    Shortness of Breath     Pt arrived via [facility] from home w c/o SOB. Pt arrived on CPAP.       Principal Problem:    Bacteremia due to methicillin resistant Staphylococcus aureus  Active Problems:    PAF (paroxysmal atrial fibrillation) ([facility])    Essential hypertension    Chronic systolic (congestive) heart failure ([facility])    Biventricular cardiac pacemaker in situ    Chronic anemia    Warfarin anticoagulation    Acute kidney injury    H/O mechanical aortic valve replacement    Morbid obesity due to excess calories ([facility])    AV block    Receiving intravenous antibiotic treatment as outpatient  Resolved Problems:    Hypoxia    Sepsis (HCC)    Hypotension  Persistently positive blood cultures with MRSA  Repeat cultures 7/3/2024 no growth to date at 48 hours  -Stable renal function with creatinine at 2.2.  - There is concern for possible pacemaker lead infection.  EP planning on repeating PRESTON on Monday and possible lead extractions on Wednesday  - Continue antibiotics  - Follow-up repeat cultures  - Hold anticoagulation on Sunday in view of extraction on Wednesday  - ID, cardiology, nephrology following with recommendation      Medications:  Reviewed  Infusion Medications    sodium chloride      sodium chloride      sodium chloride      dextrose       Scheduled Medications    ipratropium 0.5 mg-albuterol 2.5 mg  1 Dose Inhalation BID RT    furosemide  40 mg Oral Daily    warfarin  10 mg Oral Daily    sodium chloride flush  5-40 mL IntraVENous 2 times per day    sotalol  80 mg Oral Daily    insulin glargine  10 Units SubCUTAneous Daily    DAPTOmycin (CUBICIN) 625 mg in sodium chloride (PF) 0.9 % 12.5 mL IV syringe  625 mg IntraVENous Q24H    sodium chloride flush  5-40 mL IntraVENous 2 times per day    melatonin  3 mg Oral Nightly    
      Hospitalist Progress Note      PCP: Yane Wei MD    Date of Admission: 6/28/2025    LOS: 7    Chief Complaint:   Chief Complaint   Patient presents with    Shortness of Breath     Pt arrived via [facility] from home w c/o SOB. Pt arrived on CPAP.       Principal Problem:    Bacteremia due to methicillin resistant Staphylococcus aureus  Active Problems:    PAF (paroxysmal atrial fibrillation) ([facility])    Essential hypertension    Chronic systolic (congestive) heart failure ([facility])    Biventricular cardiac pacemaker in situ    Chronic anemia    Warfarin anticoagulation    Acute kidney injury    H/O mechanical aortic valve replacement    Morbid obesity due to excess calories ([facility])    AV block    Receiving intravenous antibiotic treatment as outpatient  Resolved Problems:    Hypoxia    Sepsis ([facility])    Hypotension  Persistently positive blood cultures with MRSA  Repeat cultures 7/3/2024 no growth to date at 48 hours  Stable renal function.  Reports feeling well with no complaints today.  There is concern for possible pacemaker related infection.  Given negative repeat cultures as of 7/3/2024, if they remain negative it could imply clearing off of the infection and we may be able to salvage his pacemaker.  Will discuss with ID based on culture results  - Continue IV antibiotics as per ID  - Follow-up blood culture results  - Tentative lead extraction date is Wednesday  - Cardiology planning on repeat PRESTON tomorrow  - Will hold anticoagulation from tonight  - ID following with recommendation  -Monitor renal function and electrolytes      Medications:  Reviewed  Infusion Medications    sodium chloride      sodium chloride      sodium chloride      dextrose       Scheduled Medications    [Held by provider] enoxaparin  1 mg/kg SubCUTAneous BID    ipratropium 0.5 mg-albuterol 2.5 mg  1 Dose Inhalation BID RT    furosemide  40 mg Oral Daily    sodium chloride flush  5-40 mL IntraVENous 2 times per day    sotalol  80 
      Hospitalist Progress Note      PCP: Yane Wei MD    Date of Admission: 6/28/2025    LOS: 8    Chief Complaint:   Chief Complaint   Patient presents with    Shortness of Breath     Pt arrived via [facility] from home w c/o SOB. Pt arrived on CPAP.       Case Summary:   75-year-old gentleman with history of type 2 diabetes, mechanical aortic valve, COPD, atrial fibrillation, history of combined heart failure, sick sinus syndrome status post BiV ICD, CKD who presented with shortness of breath and confusion and was found to have sepsis with MRSA bacteremia.      Principal Problem:    Sepsis due to Bacteremia due to methicillin resistant Staphylococcus aureus: Unclear source.  Remains afebrile with no leukocytosis.  Patient was consulted by ID.  Patient underwent PRESTON with no valvulopathy, vegetation or anything seen on pacemaker leads.  Repeat blood culture 6/30/2025 came back positive  Blood draws were done for repeat cultures 7/3/2025--NGTD  Patient was planned for PRESTON today, however that was canceled.  He is planned for pacemaker lead extraction on Wednesday, 7/9/2025  In light of negative cultures at day 4, I will touch base with cardiology to see if pacer extraction is still warranted.  - In the interim continue current antibiotic regimen as per ID  - Will await ID recommendations for PICC line insertion  - ID and cardiology following with recommendations      Mechanical aortic valve: Echocardiogram with no evidence of vegetation.  - Anticoagulation on hold in view of procedure on Wednesday  - Monitor INR and will start short acting anticoagulation once INR below 2.0      Acute on chronic kidney injury: Baseline creatinine around 1.6-1.8.  Creatinine peaked at 2.7.  Stable at 2.3 .  - Encourage oral hydration  - Monitor renal function electrolytes  - Nephrology following with recommendations      Active Problems:    Hypoxia: Continue O2 supplementation and wean off with target sats greater than 
      Hospitalist Progress Note      PCP: Yane Wei MD    Date of Admission: 6/28/2025    LOS: 9    Chief Complaint:   Chief Complaint   Patient presents with    Shortness of Breath     Pt arrived via [facility] from home w c/o SOB. Pt arrived on CPAP.       Case Summary:   75-year-old gentleman with history of type 2 diabetes, mechanical aortic valve, COPD, atrial fibrillation, history of combined heart failure, sick sinus syndrome status post BiV ICD, CKD who presented with shortness of breath and confusion and was found to have sepsis with MRSA bacteremia.  Patient had blood cultures that were not clearing.  However repeat blood cultures on 3/7/2025 no growth to date.      Principal Problem:    Sepsis due to Bacteremia due to methicillin resistant Staphylococcus aureus: Unclear source.  Remains afebrile with no leukocytosis.    PRESTON with no valvulopathy, vegetation or anything seen on pacemaker leads.  Blood culture 6/30/2025 came back positive  Repeat blood cultures 7/3/2025--NGTD  Discussed with cardiology EP this morning.  Given MRSA, would be indication for pacer extraction mostly given history of mechanical valve.  - Continue IV antibiotics  - For pacemaker extraction 7/9/2025  - ID following with recommendation  - Follow blood cultures to discharge      Mechanical aortic valve: Echocardiogram with no evidence of vegetation.  - Start heparin drip if INR below 2.0.  - Coumadin on hold since 7/6/2025 in view of pacer extraction with      Acute on chronic kidney injury: Baseline creatinine around 1.6-1.8.  Creatinine peaked at 2.7.  Stable at 2.3 .  Encourage hydration.  Monitor renal function and electrolytes.  Nephrology following with recommendations.    Active Problems:    Hypoxia: Continue O2 supplementation and wean off with target sats greater than 90%    PAF (paroxysmal atrial fibrillation)/SSS with Biventricular cardiac pacemaker in situ    Essential hypertension: Stable.  Will monitor.  
      [facility]   Daily Progress Note      Admit Date:  6/28/2025    HPI:    Mr. Solis is a 75 year old male with history of mechanical AVR, diastolic heart failure, SSS status post pacemaker, BiV ICD 3/2020,. COPD on home oxygen, chronic kidney disease    He presented to the hospital with hypoxia and hypotension along with confusion.  He is septic, PRESTON with no evidence of endocarditis on valve or leads.  2 blood cultures one of them positive for MRSA and 1 is negative.      ICD removed 7/09/2025       Subjective:  Patient is being seen for acute on chronic diastolic heart failure. There were no acute overnight cardiac events.  He is sleeping soundly on bipap this morning.  Lasix held this morning due to low BP    Creat 2.4-3.2 bnp -[id number] weight [phone] and -9 L out    Objective:   BP 99/63   Pulse 80   Temp 98.2 °F (36.8 °C)   Resp 16   Ht 1.854 m (6' 1\")   Wt 135.6 kg (299 lb)   SpO2 99%   BMI 39.45 kg/m²     Intake/Output Summary (Last 24 hours) at 7/23/2025 0948  Last data filed at 7/22/2025 1608  Gross per 24 hour   Intake --   Output 1100 ml   Net -1100 ml          Physical Exam:  General:  Awake, alert, oriented in NAD   Skin:  Warm and dry.  No unusual bruising or rash  Neck:  Supple.  No JVD or carotid bruit appreciated  Chest:  Normal effort.  No rales, rhonchi or wheezing  Cardiovascular:  RRR, S1/S2, no murmur/gallop/rub  Abdomen:  Soft, nontender, +bowel sounds, morbidly obese  Extremities:  bilateral lower leg edema L>R wrapped and improving  Neurological: No focal deficits  Psychological: Normal mood and affect      Medications:    warfarin  10 mg Oral Daily    furosemide  40 mg IntraVENous BID    enoxaparin  120 mg SubCUTAneous Daily    melatonin  6 mg Oral Nightly    traZODone  100 mg Oral Nightly    ipratropium 0.5 mg-albuterol 2.5 mg  1 Dose Inhalation TID RT    sotalol  80 mg Oral Daily    insulin glargine  10 Units SubCUTAneous Daily    DAPTOmycin (CUBICIN) 625 mg 
      [facility]   Progress Note  Cardiology    CC:  Sepsis, pacemaker, mechanical aortic valve, acute on chronic diastolic heart failure    HPI:  He is feeling somewhat better.  Breathing is slightly improved but remains on O2.  Still has edema but is improving.      Medications/Labs all Reviewed    Lab Results   Component Value Date    WBC 5.7 07/04/2025    HGB 7.8 (L) 07/04/2025    HCT 23.6 (L) 07/04/2025    MCV 91.9 07/04/2025     07/04/2025     Lab Results   Component Value Date    CREATININE 2.1 (H) 07/04/2025    BUN 31 (H) 07/04/2025     07/04/2025    K 4.2 07/04/2025     07/04/2025    CO2 23 07/04/2025     Lab Results   Component Value Date    INR 2.46 (H) 07/04/2025    PROTIME 26.3 (H) 07/04/2025        PHYSICAL EXAM   /64   Pulse 80   Temp 98 °F (36.7 °C) (Axillary)   Resp 20   Ht 1.854 m (6' 1\")   Wt (!) 139.9 kg (308 lb 6.4 oz)   SpO2 97%   BMI 40.69 kg/m²      Respiratory:  Resp Assessment: Normal respiratory effort  Resp Auscultation: Clear to auscultation bilaterally   Cardiovascular:  Auscultation: regular rate and rhythm, normal S1S2, no murmur, rub or gallop  Palpation:  Nl PMI  JVP:  normal  Extremities: No Edema  Abdomen:  Soft, non-tender  Normal bowel sounds  Extremities:   No Cyanosis or Clubbing  Neurological/Psychiatric:  Oriented to time, place, and person  Non-anxious  Skin:   Warm and dry    PRESTON 7/1    Left Ventricle: Normal left ventricular systolic function. Left ventricle size is normal. Unable to assess wall motion.    Right Ventricle: Right ventricle is mildly dilated. Lead present in the right ventricle. Mildly reduced systolic function.    Aortic Valve: Not well visualized. Mechanical valve that is well-functioning. AV mean gradient is 10 mmHg. No evidence of aortic root abscess (measures 4 mm). No evidence of vegetation. No regurgitation. Trace paravalvular regurgitation. No stenosis. AV AT is 58.00 ms. Peak velocity of 2.07 cm/s.    
      [facility]   Daily Progress Note      Admit Date:  6/28/2025    HPI:    Mr. Solis is a 75 year old male with history of mechanical AVR, diastolic heart failure, SSS status post pacemaker, BiV ICD 3/2020,. COPD on home oxygen, chronic kidney disease    He presented to the hospital with hypoxia and hypotension along with confusion.  He is septic, PRESTON with no evidence of endocarditis on valve or leads.  2 blood cultures one of them positive for MRSA and 1 is negative.          Subjective:  Patient is being seen for acute on chronic diastolic heart failure. There were no acute overnight cardiac events.  He is sitting up in the chair on 5 L of oxygen with his lower legs edematous L>R and not wrapped.  He is angry this morning as he is wanting to eat but is not sure PRESTON is going to be done.  No increased shortness of breath or chest pain.    Creat 2.3 bnp  weight 311-302    Objective:   /76   Pulse 66   Temp 97.9 °F (36.6 °C) (Oral)   Resp 22   Ht 1.854 m (6' 1\")   Wt (!) 137.3 kg (302 lb 12.8 oz)   SpO2 100%   BMI 39.95 kg/m²   No intake or output data in the 24 hours ending 07/07/25 0941       Physical Exam:  General:  Awake, alert, oriented in NAD  Skin:  Warm and dry.  No unusual bruising or rash  Neck:  Supple.  No JVD or carotid bruit appreciated  Chest:  Normal effort.  Bilateral wheezing  Cardiovascular:  RRR, S1/S2, no murmur/gallop/rub  Abdomen:  Soft, nontender, +bowel sounds, morbidly obese  Extremities:  bilateral lower leg edema L>R  Neurological: No focal deficits  Psychological: Normal mood and affect      Medications:    [Held by provider] enoxaparin  1 mg/kg SubCUTAneous BID    ipratropium 0.5 mg-albuterol 2.5 mg  1 Dose Inhalation BID RT    furosemide  40 mg Oral Daily    sodium chloride flush  5-40 mL IntraVENous 2 times per day    sotalol  80 mg Oral Daily    insulin glargine  10 Units SubCUTAneous Daily    DAPTOmycin (CUBICIN) 625 mg in sodium chloride (PF) 0.9 % 
      [facility]   Daily Progress Note      Admit Date:  6/28/2025    HPI:    Mr. Solis is a 75 year old male with history of mechanical AVR, diastolic heart failure, SSS status post pacemaker, BiV ICD 3/2020,. COPD on home oxygen, chronic kidney disease    He presented to the hospital with hypoxia and hypotension along with confusion.  He is septic, PRESTON with no evidence of endocarditis on valve or leads.  2 blood cultures one of them positive for MRSA and 1 is negative.      ICD removed 7/09/2025       Subjective:  Patient is being seen for acute on chronic diastolic heart failure. There were no acute overnight cardiac events.  He is sitting up in the chair with bilateral ankle to knee edema, and lower legs wrapped but to mid calf.  His breathing is improved but still with significant swelling     Creat 2.4-3.0 bnp -7915-3213 weight 311-300     Objective:   BP 97/63   Pulse 82   Temp 98 °F (36.7 °C) (Oral)   Resp 18   Ht 1.854 m (6' 1\")   Wt 136.1 kg (300 lb)   SpO2 100%   BMI 39.58 kg/m²     Intake/Output Summary (Last 24 hours) at 7/22/2025 0920  Last data filed at 7/21/2025 1817  Gross per 24 hour   Intake --   Output 1100 ml   Net -1100 ml          Physical Exam:  General:  Awake, alert, oriented in NAD   Skin:  Warm and dry.  No unusual bruising or rash  Neck:  Supple.  No JVD or carotid bruit appreciated  Chest:  Normal effort.  No rales, rhonchi or wheezing  Cardiovascular:  RRR, S1/S2, no murmur/gallop/rub  Abdomen:  Soft, nontender, +bowel sounds, morbidly obese  Extremities:  bilateral lower leg edema L>R   Neurological: No focal deficits  Psychological: Normal mood and affect      Medications:    furosemide  40 mg IntraVENous BID    warfarin  10 mg Oral Daily    enoxaparin  120 mg SubCUTAneous Daily    warfarin placeholder: dosing by pharmacy   Oral RX Placeholder    melatonin  6 mg Oral Nightly    traZODone  100 mg Oral Nightly    ipratropium 0.5 mg-albuterol 2.5 mg  1 Dose 
      [facility]   Daily Progress Note      Admit Date:  6/28/2025    HPI:    Mr. Solis is a 75 year old male with history of mechanical AVR, diastolic heart failure, SSS status post pacemaker, BiV ICD 3/2020,. COPD on home oxygen, chronic kidney disease    He presented to the hospital with hypoxia and hypotension along with confusion.  He is septic, PRESTON with no evidence of endocarditis on valve or leads.  2 blood cultures one of them positive for MRSA and 1 is negative.          Subjective:  Patient is being seen for acute on chronic diastolic heart failure. There were no acute overnight cardiac events.  He is sitting up in the chair on 4 L of oxygen.  His breathing is improved and no chest pain,  He is going to receive a unit of blood today.  Lower legs wrapped and he understands to keep them elevated    Creat 2.3 bnp  weight 311-299    Objective:   BP (!) 105/54   Pulse 76   Temp 97.7 °F (36.5 °C) (Axillary)   Resp 20   Ht 1.854 m (6' 1\")   Wt 135.7 kg (299 lb 3.2 oz)   SpO2 100%   BMI 39.47 kg/m²   No intake or output data in the 24 hours ending 07/08/25 1015       Physical Exam:  General:  Awake, alert, oriented in NAD  Skin:  Warm and dry.  No unusual bruising or rash  Neck:  Supple.  No JVD or carotid bruit appreciated  Chest:  Normal effort.  No rales, rhonchi or wheezing  Cardiovascular:  RRR, S1/S2, no murmur/gallop/rub  Abdomen:  Soft, nontender, +bowel sounds, morbidly obese  Extremities:  bilateral lower leg edema L>R wrapped  Neurological: No focal deficits  Psychological: Normal mood and affect      Medications:    phytonadione (ADULT) (VITAMIN K) 1 mg in sodium chloride 0.9 % 100 mL IVPB  1 mg IntraVENous Once    furosemide  40 mg Oral BID    [Held by provider] enoxaparin  1 mg/kg SubCUTAneous BID    ipratropium 0.5 mg-albuterol 2.5 mg  1 Dose Inhalation BID RT    sodium chloride flush  5-40 mL IntraVENous 2 times per day    sotalol  80 mg Oral Daily    insulin glargine  10 
      [facility]   Daily Progress Note      Admit Date:  6/28/2025    HPI:    Mr. Solis is a 75 year old male with history of mechanical AVR, diastolic heart failure, SSS status post pacemaker, BiV ICD 3/2020,. COPD on home oxygen, chronic kidney disease    He presented to the hospital with hypoxia and hypotension along with confusion.  He is septic, PRESTON with no evidence of endocarditis on valve or leads.  2 blood cultures one of them positive for MRSA and 1 is negative.      ICD removed 7/09/2025       Subjective:  Patient is being seen for acute on chronic diastolic heart failure. There were no acute overnight cardiac events.  He is sitting up in the chair and just can't get comfortable to sleep.  No dizziness or shortness of breath.  Creat bumped and receiving IV fluids    Creat 2.4-2.8 bnp -4871 weight 311-293    Objective:   /73   Pulse 80   Temp 97.2 °F (36.2 °C)   Resp 16   Ht 1.854 m (6' 1\")   Wt 133.2 kg (293 lb 8.7 oz)   SpO2 100%   BMI 38.73 kg/m²     Intake/Output Summary (Last 24 hours) at 7/11/2025 0910  Last data filed at 7/11/2025 0600  Gross per 24 hour   Intake --   Output 1125 ml   Net -1125 ml          Physical Exam:  General:  Awake, alert, oriented in NAD large dressing on left subclavian and temp device right neck  Skin:  Warm and dry.  No unusual bruising or rash  Neck:  Supple.  No JVD or carotid bruit appreciated  Chest:  Normal effort.  No rales, rhonchi or wheezing  Cardiovascular:  RRR, S1/S2, no murmur/gallop/rub  Abdomen:  Soft, nontender, +bowel sounds, morbidly obese  Extremities:  bilateral lower leg edema L>R wrapped much improved   Neurological: No focal deficits  Psychological: Normal mood and affect      Medications:    chlorhexidine gluconate   Topical Once    ipratropium 0.5 mg-albuterol 2.5 mg  1 Dose Inhalation TID RT    enoxaparin  1 mg/kg SubCUTAneous BID    warfarin  10 mg Oral Daily    furosemide  40 mg Oral BID    sotalol  80 mg Oral 
    Clinical Pharmacy Note: Pharmacy to Dose Warfarin    Pharmacy consulted by VALERIE Bailey  to dose warfarin.    Valdemar Solis is a 75 y.o. male  is receiving warfarin for indication: A Fib/A Flutter     INR Goal Range: 2-3  Prior to admission warfarin dosing regimen: 10 mg daily per last appointment on 6/25. Patient did not take dose on 6/28/25.    INR today:   Lab Results   Component Value Date/Time    INR 1.84 06/28/2025 10:39 PM       Assessment/Plan:  INR is subtherapeutic on prior to admission dosing regimen.   Possible concomitant drug-drug interactions include: azithromycin   Based on today's assessment, dose warfarin 10 mg once  Daily INR is ordered. Pharmacy will continue to monitor and make adjustments to regimen as necessary.     Thank you for the consult,     Brigido Sheridan, NovaD     
    Clinical Pharmacy Note: Positive Blood Culture Documentation    Pharmacy was notified by lab that Valdemar Solis has positive blood cultures.    Patient is positive for MRSA in One sets.  Resistance markers present: none    Name of lab caller: Corazon  Name of receiving pharmacist: Lane Hodgson, PharmD.   Time: 12:48    Patient's current antimicrobial regimen of Linezolid & CTX does provide appropriate empiric coverage.      Dr. Mcfarland was notified of the positive result at 12:50.    New antimicrobials initiated after physician discussion: none.    Thank you,  Lane Hodgson, PharmD.     
    Infectious Diseases   Progress Note      Admission Date: 6/28/2025  Hospital Day: Hospital Day: 10   Attending: Shantell Almanza MD  Date of service: 7/7/2025     Chief complaint/ Reason for consult:     Sepsis on admission with high-grade fever, tachycardia, tachypnea, hypotension  Methicillin-resistant Staphylococcus aureus bacteremia  Need for contact isolation  Has a mechanical aortic valve in place, transesophageal echo done on July 1, 2025 did not show any obvious valvular vegetations  AICD/pacemaker in place    Microbiology:      I have reviewed allavailable micro lab data and cultures    Results       Procedure Component Value Units Date/Time    Culture, Blood 1 [[id number]] Collected: 07/03/25 0939    Order Status: Completed Specimen: Blood Updated: 07/07/25 1015     Blood Culture, Routine No Growth after 4 days of incubation.    Narrative:      ORDER#: [id number]                          ORDERED BY: SHANTELL ALMANZA  SOURCE: Blood Hand, Left                   COLLECTED:  07/03/25 09:39  ANTIBIOTICS AT JOSE.:                      RECEIVED :  07/03/25 14:15  If child <=2 yrs old please draw pediatric bottle.~Blood Culture 1    Culture, Blood 2 [[id number]] Collected: 07/03/25 0939    Order Status: Completed Specimen: Blood Updated: 07/07/25 1015     Culture, Blood 2 No Growth after 4 days of incubation.    Narrative:      ORDER#: [id number]                          ORDERED BY: SHANTELL ALMANZA  SOURCE: Blood Hand, Right                  COLLECTED:  07/03/25 09:39  ANTIBIOTICS AT JOSE.:                      RECEIVED :  07/03/25 14:15  If child <=2 yrs old please draw pediatric bottle.~Blood Culture #2    Culture, Blood 1 [[id number]]  (Abnormal) Collected: 06/30/25 1141    Order Status: Completed Specimen: Blood Updated: 07/04/25 0712     Organism Staph aureus MRSA     Blood Culture, Routine --     POSITIVE  No further workup  CONTACT PRECAUTIONS INDICATED  Refer to culture [id number] for 
    Infectious Diseases   Progress Note      Admission Date: 6/28/2025  Hospital Day: Hospital Day: 14   Attending: Salena Roldan MD  Date of service: 7/11/2025     Chief complaint/ Reason for consult:     Sepsis on admission with high-grade fever, tachycardia, tachypnea, hypotension  Methicillin-resistant Staphylococcus aureus bacteremia  Need for contact isolation  Has a mechanical aortic valve in place, transesophageal echo done on July 1, 2025 did not show any obvious valvular vegetations  AICD/pacemaker in place    Microbiology:      I have reviewed allavailable micro lab data and cultures    Results       Procedure Component Value Units Date/Time    Culture, Tip [[id number]] Collected: 07/09/25 1130    Order Status: Completed Specimen: Pacemaker from Catheter Tip Updated: 07/11/25 0613     Culture Catheter Tip --     No growth to date  No growth 36 to 48 hours      Narrative:      ORDER#: [id number]                          ORDERED BY: DIMA BORJAS  SOURCE: Catheter Tip RV Pacemaker Lead Tip COLLECTED:  07/09/25 11:30  ANTIBIOTICS AT JOSE.:                      RECEIVED :  07/09/25 21:08    Culture, Blood 1 [[id number]] Collected: 07/03/25 0939    Order Status: Completed Specimen: Blood Updated: 07/07/25 1015     Blood Culture, Routine No Growth after 4 days of incubation.    Narrative:      ORDER#: [id number]                          ORDERED BY: SHANTELL LEON  SOURCE: Blood Hand, Left                   COLLECTED:  07/03/25 09:39  ANTIBIOTICS AT JOSE.:                      RECEIVED :  07/03/25 14:15  If child <=2 yrs old please draw pediatric bottle.~Blood Culture 1    Culture, Blood 2 [[id number]] Collected: 07/03/25 0939    Order Status: Completed Specimen: Blood Updated: 07/07/25 1015     Culture, Blood 2 No Growth after 4 days of incubation.    Narrative:      ORDER#: [id number]                          ORDERED BY: SHANTELL LEON  SOURCE: Blood Hand, Right                  COLLECTED:  
    Low Risk Nutrition Note     Reason for Visit:   follow up    Nutrition Assessment:  Pt currentlu NPO for pacemaker placement. Previously on CCC / SHANNEN / 2 L fluid restriction and was eating 76 - 100%. Pt remains low risk for nutrition compromise.     Current Nutrition Therapies:    Diet NPO Exceptions are: Sips of Water with Meds    Anthropometrics:   Current Height: 185.4 cm (6' 1\")  Current Weight - Scale: (!) 138.2 kg (304 lb 10.8 oz)      Monitoring and Evaluation:  No nutrition diagnosis. Patient will be monitored per nutrition standards of care.     Consult Dietitian if nutrition intervention essential to patient care is needed.     Discharge Planning:  No needs    KOTA GARCIA, RD, LD       
    Pharmacy to Dose Warfarin    Pharmacy consulted to dose warfarin for Afib and mechanical aortic valve.    INR Goal: 2-3    INR today: 1.42    Assessment/Plan:  - INR is subtherapeutic after being resumed yesterday   - 10 mg today  - LMWH bridge cont  - Possible concomitant drug-drug interactions include: LMWH. APAP     Pharmacy will continue to follow.    Lorin Jay [facility]  [id number]      
    Pharmacy to Dose Warfarin    Pharmacy consulted to dose warfarin for Afib and mechanical aortic valve.    INR Goal: 2-3    INR today: 1.42 (from 1.42)    Assessment/Plan:  - INR is currently subtherapeutic after holding warfarin for 3 days and giving 1mg of vitamin K on 7/8   - continue lovenox bridge   - 10 mg today  - LMWH bridge cont  - Possible concomitant drug-drug interactions include: LMWH. APAP     Pharmacy will continue to follow.    Jennifer Bustillos, PharmD   [id number]        
    Pharmacy to Dose Warfarin    Pharmacy consulted to dose warfarin for Afib and mechanical aortic valve.    INR Goal: 2-3    INR today: 1.63 (from 1.42)    Assessment/Plan:  - INR is currently subtherapeutic after holding warfarin for 3 days and giving 1mg of vitamin K on 7/8   - 10 mg again today  - LMWH bridge cont  - Possible concomitant drug-drug interactions include: LMWH. APAP     Pharmacy will continue to follow.    Jennifer Bustillos, PharmD   [id number]          
    Pharmacy to Dose Warfarin    Pharmacy consulted to dose warfarin for Afib and mechanical aortic valve.    INR Goal: 2-3    INR today: 1.65    Assessment/Plan:  - INR is subtherapeutic after Vitamin K and being held for multiple days  - LMWH bridge started  - 10mg today   - Possible concomitant drug-drug interactions include: LMWH, APAP     Pharmacy will continue to follow.    Lorin Jay [facility]  [id number]      
    Pharmacy to Dose Warfarin    Pharmacy consulted to dose warfarin for Afib and mechanical aortic valve.    INR Goal: 2-3    INR today: 1.87 (from 2.12)    Assessment/Plan:  - INR is currently subtherapeutic  - Continue to hold warfarin in anticipation for tunneled line   - Continue Enoxaparin 135 mg BID. Once crcl <30, decrease dose to 135 mg daily   - Possible concomitant drug-drug interactions include: LMWH. APAP     Pharmacy will continue to follow.    Jennifer Bustillos, PharmD   [id number]              
    Pharmacy to Dose Warfarin    Pharmacy consulted to dose warfarin for Afib and mechanical aortic valve.    INR Goal: 2-3  Home regimen: 10 mg daily     INR today: 1.36 (from 1.43)    Assessment/Plan:  - INR is currently subtherapeutic after holding warfarin for 6 days for tunneled cath   - Will continue home regimen and give 10 mg tonight   - Continue therapeutic Enoxaparin 120 daily  - Possible concomitant drug-drug interactions include: LMWH. APAP     Of note, patient has a follow up appointment scheduled with coumadin clinic on 7/22 at 7AM    Pharmacy will continue to follow.    Jennifer Bustillos, PharmD   [id number]                
    Pharmacy to Dose Warfarin    Pharmacy consulted to dose warfarin for Afib and mechanical aortic valve.    INR Goal: 2-3  Home regimen: 10 mg daily     INR today: 2.12 (from 1.63)    Assessment/Plan:  - INR is currently therapeutic   - Will hold warfarin in anticipation for tunneled line   - Continue lovenox bridge, current dose appropriate with kidney function  - Possible concomitant drug-drug interactions include: LMWH. APAP     Pharmacy will continue to follow.    Jennifer Bustillos, PharmD   [id number]            
    Pharmacy to Dose Warfarin    Pharmacy consulted to dose warfarin for Afib, Mechanical Aortic Valve.    INR Goal: 2-3    INR today: 2.58    Assessment/Plan:  - INR is therapeutic  - Large jump past two days. 7.5mg today  - Possible concomitant drug-drug interactions include: Azithromycin     Pharmacy will continue to follow.    Lorin Jay [facility]  [id number]      
    Pharmacy to Dose Warfarin    Pharmacy consulted to dose warfarin for Afib, Mechanical Aortic Valve.    INR Goal: 2-3    INR today: 3.4    Assessment/Plan:  - INR is supratherapeutic  - Hold dose   - Possible concomitant drug-drug interactions include: APAP     Pharmacy will continue to follow.    Lorin Jay [facility]  [id number]      
    Pharmacy to Dose Warfarin    Pharmacy consulted to dose warfarin for Afib, Mechanical aortic valve.    INR Goal: 2-3    INR today: 2.13    Assessment/Plan:  - INR is therapeutic  - Continue home dose regimen 10 mg today  - Possible concomitant drug-drug interactions include: Azithromycin, Ceftriaxone     Pharmacy will continue to follow.    Lorin Jay [facility]  [id number]      
    Pharmacy to Dose Warfarin    Pharmacy consulted to dose warfarin for MVR.    INR Goal: 2-3    INR today: 1.50 (from 1.42)    Assessment/Plan:  - INR is currently subtherapeutic but trending in the right direction after resuming warfarin 3 days ago  - Plan continue 10 mg  - Continue lovenox bridge with 120 mg daily   - Possible concomitant drug-drug interactions include: n/a     Pharmacy will continue to follow.    Jennifer Bustillos, PharmD   [id number]           
    Pharmacy to Dose Warfarin    Pharmacy consulted to dose warfarin for afib, mechanical aortic valve.    INR Goal: 2-3 (follows with [facility])    INR today: 1.99    Assessment/Plan:  - Just barely subtherapeutic. Expect INR to rise with drug drug interactions  - Continue home regimen of 10mg daily  - Possible concomitant drug-drug interactions include: azithromycin, ceftriaxone     Pharmacy will continue to follow.    Nenita Mckay, PharmD, BCCCP     
    Pharmacy to Dose Warfarin    Pharmacy consulted to dose warfarin for atrial fibrillation and mechanical aortic valve replacement.    INR Goal: 2 - 3  INR today: 2.19    Prior to admission warfarin dosing regimen: 10 mg daily   Warfarin managed outpatient by: [facility]    Assessment/Plan:  - INR is therapeutic today. Will continue home dose regimen of warfarin 10 mg daily.   - noted that plan is to hold warfarin starting Sunday for anticipate procedure, stop date in place  - Possible concomitant drug-drug interactions include: APAP     Pharmacy will continue to follow.    Elisha Torre PharmD, BCPS  [id number]  7/5/2025 12:47 PM        
    Pharmacy to Dose Warfarin    Pharmacy consulted to dose warfarin for atrial fibrillation and mechanical aortic valve replacement.    INR Goal: 2 - 3  INR today: 2.46    Prior to admission warfarin dosing regimen: 10 mg daily   Warfarin managed outpatient by: [facility]    Assessment/Plan:  - INR is therapeutic today. Will continue home dose regimen of warfarin 10 mg daily.   - noted that plan is to hold warfarin starting Sunday for anticipate procedure  - Possible concomitant drug-drug interactions include: APAP     Pharmacy will continue to follow.    Elisha Torre, PharmD, [facility]  [id number]  7/4/2025 11:54 AM        
    Pharmacy to Dose Warfarin    Pharmacy consulted to dose warfarin for atrial fibrillation and mechanical aortic valve replacement.    INR Goal: 2 - 3  INR today: 2.5    Prior to admission warfarin dosing regimen: 10 mg daily   Warfarin managed outpatient by: [facility]; last visit 6/25/25, INR 2.1    Assessment/Plan:  - INR is therapeutic at 2.5 today. Will continue home dose regimen of warfarin 10 mg daily.   - Possible concomitant drug-drug interactions include: APAP     Pharmacy will continue to follow.    Nova DasilvaD, BCPS      
    Pharmacy to Dose Warfarin    Pharmacy consulted to dose warfarin for mechanical AVR and Afib with INR Goal: 2.5-3.5 by Dr. DIEGO Raza    INR today: 1.62    Assessment/Plan:  - INR is subtherapeutic after 4 days of warfarin 10mg daily.   - Give warfarin booster dose of 12.5mg today.  - Continue lovenox bridge renally dosed at 120mg daily.   -Pharmacist will continue to follow.     Thank you for allowing pharmacy to participate in the care of this patient.  Tameka Horta, PharmD #[id number]         
   06/29/25 0605   RT Protocol   History Pulmonary Disease 2   Respiratory pattern 2   Breath sounds 4   Cough 2   Bronchodilator Assessment Score 10       
   06/30/25 0358   NIV Type   Ventilator ID [id number]   NIV Started/Stopped On   Equipment Type V60   Mode Bilevel   Mask Type Full face mask   Assessment   Pulse 70   Respirations 20   SpO2 100 %   Settings/Measurements   PIP Observed 14 cm H20   IPAP 14 cmH20   CPAP/EPAP 6 cmH2O   Vt (Measured) 870 mL   Rate Ordered 14   FiO2  50 %   Minute Volume (L/min) 16.9 Liters   Mask Leak (lpm) 1 lpm   Patient's Home Machine No   Alarm Settings   Alarms On Y   Low Pressure (cmH2O) 3 cmH2O   High Pressure (cmH2O) 30 cmH2O   RR Low (bpm) 15   RR High (bpm) 40 br/min       
   07/01/25 2711   NIV Type   $NIV $Daily Charge   Ventilator ID [id number]   NIV Started/Stopped On   Equipment Type v60   Mode Bilevel   Mask Type Full face mask   Settings/Measurements   PIP Observed 14 cm H20   IPAP 14 cmH20   CPAP/EPAP 6 cmH2O   Vt (Measured) 890 mL   Rate Ordered 14   FiO2  50 %   Minute Volume (L/min) 16.5 Liters   Mask Leak (lpm) 13 lpm   Patient's Home Machine No   Alarm Settings   Alarms On Y   Low Pressure (cmH2O) 3 cmH2O   High Pressure (cmH2O) 30 cmH2O   Apnea (secs) 20 secs   RR Low (bpm) 15   RR High (bpm) 40 br/min       
   07/03/25 0224   RT Protocol   History Pulmonary Disease 2   Respiratory pattern 0   Breath sounds 2   Cough 0   Indications for Bronchodilator Therapy Decreased or absent breath sounds;On home bronchodilators;Wheezing associated with pulm disorder   Bronchodilator Assessment Score 4       
   07/05/25 2328   RT Protocol   History Pulmonary Disease 2   Respiratory pattern 0   Breath sounds 0   Cough 0   Bronchodilator Assessment Score 2       
   07/05/25 2328   RT Protocol   History Pulmonary Disease 2   Respiratory pattern 0   Breath sounds 2   Cough 0   Bronchodilator Assessment Score 4       
   07/09/25 0006   RT Protocol   History Pulmonary Disease 2   Respiratory pattern 2   Breath sounds 4   Cough 0   Indications for Bronchodilator Therapy Decreased or absent breath sounds;Wheezing associated with pulm disorder;On home bronchodilators   Bronchodilator Assessment Score 8       
   07/11/25 0108   NIV Type   $NIV $Daily Charge   Ventilator ID [id number]   NIV Started/Stopped On   Equipment Type V60   Mode Bilevel   Mask Type Full face mask   Mask Size Extra large   Assessment   Pulse 80   Respirations 27   SpO2 100 %   Breath Sounds   Respiratory Pattern Regular   Breath Sounds Bilateral Diminished   Settings/Measurements   PIP Observed 15 cm H20   IPAP 14 cmH20   CPAP/EPAP 6 cmH2O   Vt (Measured) 611 mL   Rate Ordered 14   FiO2  40 %   I Time/ I Time % 1 s   Minute Volume (L/min) 17.1 Liters   Mask Leak (lpm) 16 lpm   Patient's Home Machine No   Alarm Settings   Alarms On Y   Low Pressure (cmH2O) 3 cmH2O   High Pressure (cmH2O) 30 cmH2O   RR Low (bpm) 15   RR High (bpm) 40 br/min   Oxygen Therapy/Pulse Ox   O2 Therapy Oxygen   O2 Device PAP (positive airway pressure)       
   07/12/25 0136   RT Protocol   History Pulmonary Disease 2   Respiratory pattern 2   Breath sounds 2   Cough 1   Indications for Bronchodilator Therapy Decreased or absent breath sounds;On home bronchodilators   Bronchodilator Assessment Score 7       
   07/15/25 0108   RT Protocol   History Pulmonary Disease 2   Respiratory pattern 2   Breath sounds 2   Cough 1   Indications for Bronchodilator Therapy Decreased or absent breath sounds;On home bronchodilators   Bronchodilator Assessment Score 7       
   07/15/25 1832   RT Protocol   History Pulmonary Disease 2   Respiratory pattern 0   Breath sounds 2   Cough 0   Bronchodilator Assessment Score 4       
   07/21/25 2148   NIV Type   $NIV $Daily Charge   Ventilator ID [id number]   NIV Started/Stopped On   Equipment Type V60   Mode Bilevel   Mask Type Full face mask   Mask Size Extra large   Assessment   Pulse 79   Respirations 20   SpO2 95 %   Comfort Level Good   Using Accessory Muscles No   Mask Compliance Good   Settings/Measurements   PIP Observed 15 cm H20   IPAP 14 cmH20   CPAP/EPAP 6 cmH2O   Vt (Measured) 723 mL   Rate Ordered 10   Insp Rise Time (%) 3 %   FiO2  40 %   I Time/ I Time % 1 s   Minute Volume (L/min) 18.5 Liters   Mask Leak (lpm) 19 lpm   Patient's Home Machine No   Alarm Settings   Alarms On Y   Low Pressure (cmH2O) 3 cmH2O   High Pressure (cmH2O) 30 cmH2O   Apnea (secs) 20 secs   RR Low (bpm) 11   RR High (bpm) 40 br/min       
  Physician Progress Note      PATIENT:               SOPHIA ZACARIAS  CSN #:                  [id number]  :                       1950  ADMIT DATE:       2025 10:22 PM  DISCH DATE:  RESPONDING  PROVIDER #:        Quan Almanza MD          QUERY TEXT:    The attending physician is required to clarify conflicting documentation in   the medical record.  Noted documentation of Acute on Chronic Heart Failure   with preserved EF in H&P on 25 and Chronic combined diastolic and   systolic heart failure in Cardiology consult on 25.  Based on your   medical judgement, please clarify the following:    The clinical indicators include:  76yo male with history of CHF, ICD in place, history of CLAUDIO, diabetes, COPD.    Status post mechanical valve and is on Coumadin.    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 Plan:  diuretics held for hypotension, CLAUDIO, recheck iron studies, entresto, kaci and   jardiance all on hold, no need for repeat echo, just done 6/3/25, Monitor   I&O's, Daily weights, Pt education    Cardiology consult, Continue with BB, ARNI, spironolactone, Farxiga, - Monitor   I&Os, daily weights  Options provided:  -- After Study, Acute on Chronic Heart Failure with preserved EF confirmed and   Chronic combined diastolic and systolic heart failure ruled out  -- After Study, Chronic combined diastolic and systolic heart failure   confirmed and Acute on Chronic Heart Failure with preserved EF ruled out  -- Other - I will add my own 
  Physician Progress Note      PATIENT:               SOPHIA ZACARIAS  CSN #:                  [id number]  :                       1950  ADMIT DATE:       2025 10:22 PM  DISCH DATE:  RESPONDING  PROVIDER #:        Salena Roldan MD          QUERY TEXT:    NSTEMI was documented in the attending pn on 25.  The diagnosis was not   noted in subsequent documentation.  Please clarify the status of this   condition.    The clinical indicators include:  74yo male with history of -CHF, ICD in place, history of CLAUDIO, diabetes, COPD.    Status post mechanical valve and is on Coumadin.    Trop 103, 96,  ECHO 25- EF 65-70%  Per attending pn on 25- #NSTEMI : elev trop  but not c/w ACS.  Likely   secondary to sepsis/bacteremia    Cardiology consult  Options provided:  -- NSTEMI confirmed after study  -- NSTEMI ruled out after study  -- Other - I will add my own diagnosis  -- Disagree - Not applicable / Not valid  -- Disagree - Clinically unable to determine / Unknown  -- Refer to Clinical Documentation Reviewer    PROVIDER RESPONSE TEXT:    NSTEMI ruled out after study.    Query created by: Evelyn Herring on 7/10/2025 12:29 PM      Electronically signed by:  Salena Roldan MD 2025 5:39 PM          
Blood tranfusion complete. Tolerated well. Denies any discomfort at this time.   
CLINICAL PHARMACY NOTE: MEDS TO BEDS    Total # of Prescriptions Filled: 4   The following medications were delivered to the patient:  Midodrine 2.5mg  Lantus solostar  Pen needles  Enoxaparin 120mg/0.8ml    Additional Documentation:    COLIN Landeros approved medication delivery    Medications were delivered to patient's room and patient signed for    Jacy Barboza CPhT   
CVTS Thoracic Progress Note:              Recent history:  Mr. Solis is a pleasant 75-year-old male who is well-known to our service and was seen in consultation on July 3, 2025 for the purpose for backup for high risk of lead extraction.  He has a history of aortic valve replacement with [place] mechanical valve, congestive heart failure, sick sinus syndrome with prior placement of BiV ICD in March 2020, chronic kidney disease and COPD who was admitted with sepsis.  Blood cultures positive for MRSA.  He underwent a PRESTON on July 1 which demonstrated that his mechanical arctic valve was functioning well and there was no evidence of any valvular vegetation.  Yesterday he underwent high risk intracardiac lead extraction performed by electrophysiology team.  All 3 cardiac leads were safely removed and the procedure was uneventful.       Subj: Denies any issues    Obj:    Blood pressure (!) 87/65, pulse 80, temperature 97.3 °F (36.3 °C), temperature source Bladder, resp. rate 25, height 1.854 m (6' 1\"), weight 133.4 kg (294 lb 3.3 oz), SpO2 100%.              A&O             V-Paced 80. Pacer RT IJ   Lungs CTA   Abdomen soft, NT +BS             2+ pitting lower extremity edema      Diagnostics:     Recent Labs     07/08/25  0456 07/08/25 1930 07/09/25  0433 07/09/25  1100 07/10/25  0543   WBC 4.1  --  4.8  --  10.1   HGB 7.9*   < > 8.7* 8.8* 10.2*   HCT 23.5*   < > 25.3* 26.4* 29.1*     --  175  --  249    < > = values in this interval not displayed.                                                                  Recent Labs     07/09/25 0433 07/10/25  0543    138   K 4.0 4.8    101   CO2 23 22   BUN 30* 32*   CREATININE 2.3* 2.4*   GLUCOSE 123* 140*   PHOS 3.5  --             Recent Labs     07/09/25  0433   MG 1.88        Recent Labs     07/08/25 1930 07/09/25 0433 07/10/25  0628   INR 1.97* 1.65* 1.42*       Assess:  Sepsis with bacteremia  PAF  CHF  CKD  H/o AVR with Mechanical valve  S/p 
Cath lab staff came and retrieved the patient. Transported per wheelchair. Family went with the patient and took all of his belongings.   
Chart reviewed post TDC placement. Please call IR w/ any questions/ concerns at [phone].  
Consent for blood products obtained. Pt verbalizes understanding and agreement as well.   
Consent signed. Patient requests to be a full code for this procedure. CHG bath complete. Pre-op checklist completed. Wife is at the bedside.  
Dr. Raza to bedside to review telemetry and function of pacemaker. Stopped external pacer, discussed POC and options for going forward. Patient is NPO at this time, and plan for temp pacer as a backup.   
EKG completed and placed in pt chart  
[facility]  HEART FAILURE  Progress Note      Admit Date 6/28/2025     Reason for Consult:      Reason for Consultation/Chief Complaint: SOB    HPI:    Valdemar Solis is a 75 y.o. male with PMH  PMH mechanical AVR3, HFpEF, AF/SSS s/p BiVICD 3/23/20,  COPD, CKD DM admitted with SOB. He was seen 6/25 in HF office for hosp f/u and at that time lasix increased. He presented to the hospital with hypoxia and hypotension along with confusion.  He was septic, PRESTON with no evidence of endocarditis on valve or leads.  2 blood cultures one of them positive for MRSA and 1 is negative.      ICD removed 7/09/202, temp pacer placed yesterday      Subjective:  Patient is being seen for CHF. Symptomatic chrissy yesterday  Today Mr. Solis feels well and denies chest pain, SOB, palpitations, or dizziness today      Baseline Weight:    Wt Readings from Last 3 Encounters:   07/15/25 (!) 137.1 kg (302 lb 3.2 oz)   06/25/25 (!) 137 kg (302 lb)   06/24/25 (!) 136.5 kg (301 lb)         Cardiac Testing: PRESTON 7/1/25:     Left Ventricle: Normal left ventricular systolic function. Left ventricle size is normal. Unable to assess wall motion.    Right Ventricle: Right ventricle is mildly dilated. Lead present in the right ventricle. Mildly reduced systolic function.    Aortic Valve: Not well visualized. Mechanical valve that is well-functioning. AV mean gradient is 10 mmHg. No evidence of aortic root abscess (measures 4 mm). No evidence of vegetation. No regurgitation. Trace paravalvular regurgitation. No stenosis. AV AT is 58.00 ms. Peak velocity of 2.07 cm/s.    Mitral Valve: Trace regurgitation. No evidence of vegetation    Tricuspid Valve: Mild to moderate regurgitation. No evidence of vegetation    Right Atrium: Lead present in the right atrium. Right atrium size is normal. No evidence of vegetation leads.    Aorta: Normal sized aortic root. Dilated ascending aorta. Ao ascending diameter is 4.6 cm. Grade 2 atherosclerosis.    No 
Gutierrez removed. Pt instructed need to void within 6 hours. Will continue to monitor. Jose Manuel Butler RN     
I certify this patient under my care for Home Health with a face-to-face encounter on 7/23/2025.     I further certify that my clinical findings support that this patient is confined to home due to:  [] Fall Risk   [x] Dyspnea with minimal exertion   [] Confined to wheelchair   [] Angina with minimal exertion  [] Angina at rest   [] Amputation   [] Bowel/Bladder incontinence   [] Contractures  [] CVA/Hemiparalysis/Dysphonia   [] Hearing impairment   [] Legally Blind  [] Mental status impairment   [] Paralysis   [] Speech impairment   [] Other:    Initial Plan of Care: The patient’s Home Care needs include:  [] Administration of Medications   [] Complex care plan management  [x] PT  [x] OT  [] SLP   [] Teaching/Training  [] Wound Care   [] Observe/Assess   [] NG and Trach Aspiration/Care  [] Catheter Placement/Care   [] Ostomy Care   [] Psychiatric Eval/Therapy  [] Rehabilitation Nursing  [] Tube Feeding  [] Other: _    Due to endocarditis.     Ongoing plan development and review by PCP - Yane Wei MD   ,    
[employer] vendor at bedside interrogating pacemaker. Fransico NP at bedside  
[facility], [facility] [place]   Electrophysiology   Date: 7/15/2025  Reason for Follow up: Complete heart block   Chief Complaint   Patient presents with    Shortness of Breath     Pt arrived via [facility] from home w c/o SOB. Pt arrived on CPAP.       HPI: Valdemar Solis is a 75 y.o. male with h/o COPD on oxygen, HTN, ROSETTA, CHF, CKD, and aortic stenosis s/p mechanical AVR on coumadin (1996). In December of 2019, he was admitted for CHF exacerbation and found to be in atrial flutter. S/p DCCV (1/2/20). S/p CTI dependent atrial flutter ablation with Biv AICD upgrade with addition of LV lead (3/23/20).     Pt presented to hospital due to fatigue, weakness, and poor appetite. He was found to be hypotensive with mild CLAUDIO. EP consulted for PAF management.    Pt had lead extraction performed d/t MRSA bacteremia and had Micra leadless pacemaker placed 7/11      Interval History:    Pt seen at bedside for follow up. Clinical notes and telemetry reviewed.       Assessment/Plan:     MRSA bacteremia  - S/p lead extraction  - Setting of prosthetic valve  - Receiving antibiotics    Complete heart block  - S/p Micra leadless pacemaker 7/11, external IJ pacemaker removed yesterday  - Device not capturing on telemetry yesterday, s/p temporary venous pacer  - CXR with Micra in position  - Will have Micra removal and reimplantation performed tomorrow, NPO after midnight    Paroxysmal atrial fibrillation  - In ASVP  - S/p DCCV 2020  - S/p CTI flutter ablation 3/2020  - High risk [id number] score, anticoagulation recommended. Continue therapeutic Lovenox  - Continue sotalol 80 mg BID     HF with recovered EF  - EF 65% per last echo   - Continue medical therapy per HF team  - Monitor electrolytes and renal function with use of diuretics  - Monitor I/O's and daily weights, fluid and salt restriction    Aortic stenosis  - S/p mechanical AVR 1996  - Continue Lovenox at this time        Relevant available labs, and cardiovascular 
[facility]  HEART FAILURE  Progress Note      Admit Date 6/28/2025     Reason for Consult:      Reason for Consultation/Chief Complaint: SOB    HPI:    Valdemar Solis is a 75 y.o. male with PMH  PMH mechanical AVR3, HFpEF, AF/SSS s/p BiVICD 3/23/20,  COPD, CKD DM admitted with SOB. He was seen 6/25 in HF office for hosp f/u and at that time lasix increased. He has been hypotensive and hypoxic, tells me he came in because he was confused.       Subjective:  Patient is being seen for CHF. There were no acute overnight cardiac events.   Today Mr. Solis is just back from PRESTON, c/o GRIFFIN and edema but denies chest pain, , palpitations, or dizziness      Baseline Weight:    Wt Readings from Last 3 Encounters:   06/30/25 (!) 141.3 kg (311 lb 9.6 oz)   06/25/25 (!) 137 kg (302 lb)   06/24/25 (!) 136.5 kg (301 lb)         Cardiac Testing:   ECHO 6/4/2025    Left Ventricle: Hyperdynamic left ventricular systolic function with a visually estimated EF of 65 - 70%. Grade II diastolic dysfunction with increased LAP.    Right Ventricle: Not well visualized. Right ventricle is mildly dilated. Lead present in the right ventricle. Mildly reduced systolic function.    Aortic Valve: Mechanical valve. AV mean gradient is 17 mmHg. Calcified cusps. Stenosis of the aortic valve. AV Mean Gradient is 17 mmHg. AV Peak Velocity is 2.8 m/s. AV Area by Peak Velocity is 1.8 cm2. LVOT:AV VTI Index is 0.50. LVOT Stroke Volume Index is 47.1 mL/m2.    Tricuspid Valve: Mild regurgitation.    Right Atrium: Right atrium is mildly dilated.    Aorta: The aortic root and ascending aorta are not well visualized     Device: Medtronic ICD with optivol               Device read 6/30/25: optivol under baseline    NYHA Class III    Objective:   BP (!) 85/56   Pulse 68   Temp 97.3 °F (36.3 °C) (Skin)   Resp 19   Ht 1.854 m (6' 1\")   Wt (!) 141.3 kg (311 lb 9.6 oz)   SpO2 100%   BMI 41.11 kg/m²     Intake/Output Summary (Last 24 hours) at 7/1/2025 
Nephrology Note                                                                                                                                                                                                                                                                                                                                                               Office : [phone]     Fax :[phone]    Patient's Name: Valdemar Solis  7/4/2025    Reason for Consult:  CLAUDIO on CKD 3  Requesting Physician:  Yane Wei MD  Chief Complaint:    Chief Complaint   Patient presents with    Shortness of Breath     Pt arrived via [facility] from home w c/o SOB. Pt arrived on CPAP.       Assessment/Plan     # CLAUDIO on CKD 3b  - Baseline Cr ~ 1.7  - Cr 2.4 on admission --> 2.7 --> 2.4--> 2.1  - likely 2/2 hemodynamic changes, hypotension (bacteremia)  - diuretics as tolerated   - Avoid nephrotoxins  - Monitor renal labs     #Acute hypoxic respiratory failure  #Bacteremia/Sepsis  - on abx per ID  - BC - MRSA +     #Acute on chronic CHF  - Baseline weight 300 to 310 pounds  - BNP ~ 11k  - lasix  - sotalol  - on lasix, aldactone, farxiga at home  - on warfarin for hx of mechanical mitral valve  - cards consulted- PRESTON 07/01 with no obvious vegetations     # COPD  - On chronic oxygen      #HTN  - now hypotensive  - s/p IV albumin, NS bolus, midodrine  - monitor     #A-fib  - on warfarin, sotalol    #DM 2  - management per primary     History of Present Ilness:    Valdemar Solis is a 75 y.o. male with PMHx of CHF, COPD, HTN, CKD 3, DM 2 who presents to the ER with confusion and shortness of breath. He states that his wife said that he was getting more confused over the past several days although he is not really aware of this. He does state that he has been more short of breath recently. Also having cough. He denies fever at home and also denies chills or sweats. He has not been taking anything for this. He says he has 
Nephrology Note                                                                                                                                                                                                                                                                                                                                                               Office : [phone]     Fax :[phone]    Patient's Name: Valdemar Solis  7/7/2025    Reason for Consult:  CLAUDIO on CKD 3  Requesting Physician:  Yane Wei MD  Chief Complaint:    Chief Complaint   Patient presents with    Shortness of Breath     Pt arrived via [facility] from home w c/o SOB. Pt arrived on CPAP.       Assessment/Plan     # CLAUDIO on CKD 3b  - Baseline Cr ~ 1.7  - Cr 2.4 on admission --> 2.7 --> 2.4--> 2.1--> 2.2 --> 2.3   - likely 2/2 hemodynamic changes, hypotension, bacteremia  - diuretics as tolerated   - Avoid nephrotoxins  - Monitor renal labs     #Acute hypoxic respiratory failure  #Bacteremia/Sepsis  - on abx per ID  - BC - MRSA +  - lead / device removal per EP schedueled for Wednesday      #Acute on chronic CHF  - Baseline weight around 300  - BNP ~ 11k--> 3k  - lasix BID  - sotalol  - on lasix, aldactone, farxiga at home  - on warfarin for hx of mechanical mitral valve  - cards consulted- PRESTON 07/01 with no obvious vegetations     # COPD  - On chronic oxygen      #HTN  - now hypotensive  - s/p IV albumin, NS bolus, midodrine  - monitor     #A-fib  - on warfarin, sotalol    #DM 2  - management per primary     History of Present Ilness:    Valdemar Solis is a 75 y.o. male with PMHx of CHF, COPD, HTN, CKD 3, DM 2 who presents to the ER with confusion and shortness of breath. He states that his wife said that he was getting more confused over the past several days although he is not really aware of this. He does state that he has been more short of breath recently. Also having cough. He denies fever at home and also denies 
Nephrology Note                                                                                                                                                                                                                                                                                                                                                               Office : [phone]     Fax :[phone]    Patient's Name: Valdemar Solis  7/3/2025    Reason for Consult:  CLAUDIO on CKD 3  Requesting Physician:  Yane Wei MD  Chief Complaint:    Chief Complaint   Patient presents with    Shortness of Breath     Pt arrived via [facility] from home w c/o SOB. Pt arrived on CPAP.       Assessment/Plan     # CLAUDIO on CKD 3b  - Baseline Cr ~ 1.7  - Cr 2.4 on admission --> 2.7 --> 2.4--> 2.1  - likely 2/2 hemodynamic changes, hypotension (bacteremia)  - diuretics as tolerated   - Avoid nephrotoxins  - Monitor renal labs     #Acute hypoxic respiratory failure  #Bacteremia/Sepsis  - on abx per ID  - BC - MRSA +     #Acute on chronic CHF  - Baseline weight 300 to 310 pounds  - BNP ~ 11k  - lasix IV x 1 07/01  - on lasix, aldactone, farxiga at home for diuresis  - on warfarin for hx of mechanical mitral valve  - cards consulted- PRESTON 07/01 with no obvious vegetations     # COPD  - On chronic oxygen      #HTN  - now hypotensive  - s/p IV albumin, NS bolus, midodrine  - hold anti-hypertensive's for now  - monitor     #A-fib  - on warfarin, sotalol    #DM 2  - management per primary     History of Present Ilness:    Valdemar Solis is a 75 y.o. male with PMHx of CHF, COPD, HTN, CKD 3, DM 2 who presents to the ER with confusion and shortness of breath. He states that his wife said that he was getting more confused over the past several days although he is not really aware of this. He does state that he has been more short of breath recently. Also having cough. He denies fever at home and also denies chills or sweats. He has 
Nephrology Note                                                                                                                                                                                                                                                                                                                                                               Office : [phone]     Fax :[phone]    Patient's Name: Valdemar Solis  7/5/2025    Reason for Consult:  CLAUDIO on CKD 3  Requesting Physician:  Yane Wei MD  Chief Complaint:    Chief Complaint   Patient presents with    Shortness of Breath     Pt arrived via [facility] from home w c/o SOB. Pt arrived on CPAP.       Assessment/Plan     # CLAUDIO on CKD 3b  - Baseline Cr ~ 1.7  - Cr 2.4 on admission --> 2.7 --> 2.4--> 2.1--> 2.2   - likely 2/2 hemodynamic changes, hypotension (bacteremia)  - diuretics as tolerated   - Avoid nephrotoxins  - Monitor renal labs     #Acute hypoxic respiratory failure  #Bacteremia/Sepsis  - on abx per ID  - BC - MRSA +  - lead / device removal per EP       #Acute on chronic CHF  - Baseline weight around 300  - BNP ~ 11k  - lasix  - sotalol  - on lasix, aldactone, farxiga at home  - on warfarin for hx of mechanical mitral valve  - cards consulted- PRESTON 07/01 with no obvious vegetations     # COPD  - On chronic oxygen      #HTN  - now hypotensive  - s/p IV albumin, NS bolus, midodrine  - monitor     #A-fib  - on warfarin, sotalol    #DM 2  - management per primary     History of Present Ilness:    Valdemar Solis is a 75 y.o. male with PMHx of CHF, COPD, HTN, CKD 3, DM 2 who presents to the ER with confusion and shortness of breath. He states that his wife said that he was getting more confused over the past several days although he is not really aware of this. He does state that he has been more short of breath recently. Also having cough. He denies fever at home and also denies chills or sweats. He has not been taking 
Nephrology Note                                                                                                                                                                                                                                                                                                                                                               Office : [phone]     Fax :[phone]    Patient's Name: Valdemar Solis  7/6/2025    Reason for Consult:  CLAUDIO on CKD 3  Requesting Physician:  Yane Wei MD  Chief Complaint:    Chief Complaint   Patient presents with    Shortness of Breath     Pt arrived via [facility] from home w c/o SOB. Pt arrived on CPAP.       Assessment/Plan     # CLAUDIO on CKD 3b  - Baseline Cr ~ 1.7  - Cr 2.4 on admission --> 2.7 --> 2.4--> 2.1--> 2.2 --> 2.3   - likely 2/2 hemodynamic changes, hypotension, bacteremia  - diuretics as tolerated   - Avoid nephrotoxins  - Monitor renal labs     #Acute hypoxic respiratory failure  #Bacteremia/Sepsis  - on abx per ID  - BC - MRSA +  - lead / device removal per EP       #Acute on chronic CHF  - Baseline weight around 300  - BNP ~ 11k  - lasix  - sotalol  - on lasix, aldactone, farxiga at home  - on warfarin for hx of mechanical mitral valve  - cards consulted- PRESTON 07/01 with no obvious vegetations     # COPD  - On chronic oxygen      #HTN  - now hypotensive  - s/p IV albumin, NS bolus, midodrine  - monitor     #A-fib  - on warfarin, sotalol    #DM 2  - management per primary     History of Present Ilness:    Valdemar Solis is a 75 y.o. male with PMHx of CHF, COPD, HTN, CKD 3, DM 2 who presents to the ER with confusion and shortness of breath. He states that his wife said that he was getting more confused over the past several days although he is not really aware of this. He does state that he has been more short of breath recently. Also having cough. He denies fever at home and also denies chills or sweats. He has not been 
Nephrology Note                                                                                                                                                                                                                                                                                                                                                               Office : [phone]     Fax :[phone]    Patient's Name: Valdemar Solis  7/1/2025    Reason for Consult:  CLAUDIO on CKD 3  Requesting Physician:  Yane Wei MD  Chief Complaint:    Chief Complaint   Patient presents with    Shortness of Breath     Pt arrived via [facility] from home w c/o SOB. Pt arrived on CPAP.       Assessment/Plan     # CLAUDIO on CKD 3b  - likely 2/2 hemodynamic changes, hypotension (bacteremia)  - diuretics as tolerated   - Baseline Cr ~ 1.7. Cr elevated at 2.4 on admission --> 2.7 --> 2.4  - Avoid nephrotoxins  - Monitor renal labs     #Acute hypoxic respiratory failure  #Bacteremia/Sepsis  - on abx per ID  - BC - MRSA +     #Acute on chronic CHF  - Baseline weight 300 to 310 pounds  - BNP ~ 11k  - on lasix, aldactone, farxiga at home for diuresis  - on warfarin for hx of mechanical mitral valve  - cards consulted- PRESTON today     # COPD  - On chronic oxygen      #HTN  - now hypotensive  - s/p IV albumin, NS bolus, midodrine  - hold anti-hypertensive's for now  - monitor     #A-fib  - on warfarin, sotalol    #DM 2  - management per primary     History of Present Ilness:    Valdemar Solis is a 75 y.o. male with PMHx of CHF, COPD, HTN, CKD 3, DM 2 who presents to the ER with confusion and shortness of breath. He states that his wife said that he was getting more confused over the past several days although he is not really aware of this. He does state that he has been more short of breath recently. Also having cough. He denies fever at home and also denies chills or sweats. He has not been taking anything for this. He says he 
Nephrology Note                                                                                                                                                                                                                                                                                                                                                               Office : [phone]     Fax :[phone]    Patient's Name: Valdemar Solis  11:15 AM  6/30/2025    Reason for Consult:  CLAUDIO on CKD 3  Requesting Physician:  Yane Wei MD  Chief Complaint:    Chief Complaint   Patient presents with    Shortness of Breath     Pt arrived via [facility] from home w c/o SOB. Pt arrived on CPAP.       Assessment/Plan     # CLAUDIO on CKD 3b  - likely 2/2 hemodynamic changes, hypotension (bacteremia)  - diuretics on hold  - Baseline Cr ~ 1.7. Cr elevated at 2.4 on admission --> 2.7   - Avoid nephrotoxins  - Monitor renal labs     #Acute hypoxic respiratory failure  #Suspect pneumonia  - on abx per primary  - BC - MRSA      #Acute on chronic CHF  - Baseline weight 310 pounds  - BNP ~ 11k  - on lasix, aldactone, farxiga at home for diuresis  - on warfarin for hx of mechanical mitral valve  - cards consulted     # COPD  - On chronic oxygen      #HTN  - now hypotensive  - s/p IV albumin, NS bolus, midodrine  - septic workup in progress  - hold anti-hypertensive's for now  - monitor     #A-fib  - on warfarin, sotalol    #DM 2  - management per primary     History of Present Ilness:    Valdemar Solis is a 75 y.o. male with PMHx of CHF, COPD, HTN, CKD 3, DM 2 who presents to the ER with confusion and shortness of breath. He states that his wife said that he was getting more confused over the past several days although he is not really aware of this. He does state that he has been more short of breath recently. Also having cough. He denies fever at home and also denies chills or sweats. He has not been taking anything for 
Nephrology Progress  Note                                                                                                                                                                                                                                                                                                                                                               Office : [phone]     Fax :[phone]    Patient's Name: Valdemar Solis  7/20/2025    Reason for Consult:  CLAUDIO  Requesting Physician:  Yane Wei MD  Chief Complaint:    Chief Complaint   Patient presents with    Shortness of Breath     Pt arrived via [facility] from home w c/o SOB. Pt arrived on CPAP.       ASSESSMENT/PLAN    CLAUDIO on CKD 3b likely secondary to hemodynamic changes, hypotension, bacteremia - improving  I/O: net  4L   Creatinine baseline: ~1.7  Creatinine since admission: 2.7 ? 2.4 ? 2.1 ? 2.2 ? 2.3 ? 2.4 ? 2.8 ? 3.0 ? 3.0 ? 2.8 ? 2.7 ? 2.6 ? 2.6 ? 2.9--> 3--> 2.8   Severe edema , known G2DD allyson CRS  S/p heart cath on 7/11 - likely led to hemodynamic changes  Lasix 20mg --> I.v bid --> 40 mg bid I.v   Avoid nephrotoxins  Monitor renal function with daily RFP      Acute hypoxic respiratory failure- resolved   Sepsis likely secondary to MRSA bacteremia  Infective endocarditis of mechanical aortic valve  MRSA +   Aortic valve vegetation - no CTS intervention per CTS  Other infectious workup NGTD  ICD removal on 7/9  IV Abx per ID via central catheter per CVU team     Acute on chronic CHF  Baseline weight ~ 300lbs  BNP: 11,000 ? 1350 ? 4182 ? 4898 ? 3614 ? 3213  ECHO on 6/25: EF of 65%  Hodling home Entresto, Aldactone, and Farxiga  Continue Lasix --> increase dose to 40  mg bid iv   Hx of AVR  Lovenox  AFIB  Complete heart block (new)  S/p micra leadless pacemaker exchange on 7/16  DAVIS Belcher  Cardiology, CTS, EP following      HTN  Controlled   Monitor      T2DM  Management per primary        HISTORY OF PRESENT 
Nephrology Progress  Note                                                                                                                                                                                                                                                                                                                                                               Office : [phone]     Fax :[phone]    Patient's Name: Valdemar Solis  7/17/2025    Reason for Consult:  CLAUDIO  Requesting Physician:  Yane Wei MD  Chief Complaint:    Chief Complaint   Patient presents with    Shortness of Breath     Pt arrived via [facility] from home w c/o SOB. Pt arrived on CPAP.       ASSESSMENT/PLAN    CLAUDIO on CKD 3b likely secondary to hemodynamic changes, hypotension, bacteremia - improving  I/O: net +240 mL  eGFR: 25  Creatinine baseline: ~1.7  Creatinine since admission: 2.7 ? 2.4 ? 2.1 ? 2.2 ? 2.3 ? 2.4 ? 2.8 ? 3.0 ? 3.0 ? 2.8 ? 2.7 ? 2.6 ? 2.6  Creatinine was trending down and then went back up likely due to all the heart procedures that patient has undergone  S/p heart cath on 7/11 - likely lead to hemodynamic changes  Lasix 20mg MWF  Avoid nephrotoxins  Monitor renal function with daily RFP      Acute hypoxic respiratory failure  Sepsis likely secondary to bacteremia due to MRSA  MRSA +  Other infectious workup NGTD  ICD removal on 7/9  ABX per ID via central line      Acute on chronic CHF  AFIB  Complete heart block (new)  AVR  Baseline weight ~ 300lbs  BNP: 11,000 ? 1350 ? 4182 ? 4898 ? 3614  ECHO on 7/17: EF of 50%-55%  Hodling home Entresto, Aldactone, and Farxiga  Continue Lasix MWF  Sotolol, AC  S/p pacemaker exchange on 7/16  On Warfarin for Hx of mechanical valve  Cardiology, CTS, EP following      HTN  Monitor      T2DM  Management per primary        HISTORY OF PRESENT ILLNESS:      Valdemar Solis is a 75 y.o. male with a PMHx of CHF, COPD, HTN, CKD 3, T2DM who presented to the ED with 
Nephrology Progress  Note                                                                                                                                                                                                                                                                                                                                                               Office : [phone]     Fax :[phone]    Patient's Name: Valdemar Solis  7/18/2025    Reason for Consult:  CLAUDIO  Requesting Physician:  Yane Wei MD  Chief Complaint:    Chief Complaint   Patient presents with    Shortness of Breath     Pt arrived via [facility] from home w c/o SOB. Pt arrived on CPAP.       ASSESSMENT/PLAN    CLAUDIO on CKD 3b likely secondary to hemodynamic changes, hypotension, bacteremia - improving  I/O: net -30 mL  eGFR: 25  Creatinine baseline: ~1.7  Creatinine since admission: 2.7 ? 2.4 ? 2.1 ? 2.2 ? 2.3 ? 2.4 ? 2.8 ? 3.0 ? 3.0 ? 2.8 ? 2.7 ? 2.6 ? 2.6 ? 2.9  Creatinine was trending down and then went back up likely due to all the heart procedures that patient has undergone  S/p heart cath on 7/11 - likely led to hemodynamic changes  Lasix 20mg MWF  Avoid nephrotoxins  Monitor renal function with daily RFP      Acute hypoxic respiratory failure  Sepsis likely secondary to MRSA bacteremia  Infective endocarditis of mechanical aortic valve  MRSA +  Aortic valve vegetation - no CTS intervention per CTS  Other infectious workup NGTD  ICD removal on 7/9  IV Abx per ID via central catheter      Acute on chronic CHF  Baseline weight ~ 300lbs  BNP: 11,000 ? 1350 ? 4182 ? 4898 ? 3614 ? 3213  ECHO on 6/25: EF of 65%  Hodling home Entresto, Aldactone, and Farxiga  Continue Lasix MWF  Hx of AVR  Lovenox  AFIB  Complete heart block (new)  S/p micra leadless pacemaker exchange on 7/16  DAVIS Belcher  Cardiology, CTS, EP following      HTN  Monitor      T2DM  Management per primary        HISTORY OF PRESENT ILLNESS:    
Nephrology Progress  Note                                                                                                                                                                                                                                                                                                                                                               Office : [phone]     Fax :[phone]    Patient's Name: Valdemar Solis  7/16/2025    Reason for Consult:  CLAUDIO  Requesting Physician:  Yane Wei MD  Chief Complaint:    Chief Complaint   Patient presents with    Shortness of Breath     Pt arrived via [facility] from home w c/o SOB. Pt arrived on CPAP.       ASSESSMENT/PLAN    CLAUDIO on CKD 3b likely secondary to hemodynamic changes, hypotension, bacteremia - improving  I/O: net 250  eGFR: 24  Creatinine baseline: ~1.7  Creatinine since admission: 2.7 ? 2.4 ? 2.1 ? 2.2 ? 2.3 ? 2.4 ? 2.8 ? 3.0 ? 3.0 ? 2.8  ? 2.7  ? 2.6  Creatinine was trending down and then went back up likely due to all the heart procedures that patient has undergone  S/p heart cath on 7/11 - likely lead to hemodynamic changes  Lasix 20mg MWF  Avoid nephrotoxins  Monitor renal function with daily RFP      Acute hypoxic respiratory failure  Sepsis likely secondary to bacteremia due to MRSA  MRSA +  Other infectious workup NGTD  ICD removal on 7/9  ABX per ID via central line      Acute on chronic CHF  AFIB  Complete heart block (new)  AVR  Baseline weight ~ 300lbs  BNP: 11,000 ? 1350 ? 4182 ? 4898 ? 3614  Hodling home Entresto, Aldactone, and Farxiga  Continue Lasix MWF  Sotolol, AC  S/p pacemaker  On Warfarin for Hx of mechanical valve  Cardiology, CTS, EP following      HTN  Monitor      T2DM  Management per primary        HISTORY OF PRESENT ILLNESS:      Valdemar Solis is a 75 y.o. male with a PMHx of CHF, COPD, HTN, CKD 3, T2DM who presented to the ED with confusion and SOB. He states that wife claims that he is 
Nephrology Progress  Note                                                                                                                                                                                                                                                                                                                                                               Office : [phone]     Fax :[phone]    Patient's Name: Valdemar Solis  7/19/2025    Reason for Consult:  CLAUDIO  Requesting Physician:  Yane Wei MD  Chief Complaint:    Chief Complaint   Patient presents with    Shortness of Breath     Pt arrived via [facility] from home w c/o SOB. Pt arrived on CPAP.       ASSESSMENT/PLAN    CLAUDIO on CKD 3b likely secondary to hemodynamic changes, hypotension, bacteremia - improving  I/O: net  4L   Creatinine baseline: ~1.7  Creatinine since admission: 2.7 ? 2.4 ? 2.1 ? 2.2 ? 2.3 ? 2.4 ? 2.8 ? 3.0 ? 3.0 ? 2.8 ? 2.7 ? 2.6 ? 2.6 ? 2.9--> 3  Severe edema , known G2DD allyson CRS  S/p heart cath on 7/11 - likely led to hemodynamic changes  Lasix 20mg --> I.v bid   Avoid nephrotoxins  Monitor renal function with daily RFP      Acute hypoxic respiratory failure- resolved   Sepsis likely secondary to MRSA bacteremia  Infective endocarditis of mechanical aortic valve  MRSA +   Aortic valve vegetation - no CTS intervention per CTS  Other infectious workup NGTD  ICD removal on 7/9  IV Abx per ID via central catheter per CVU team       Acute on chronic CHF  Baseline weight ~ 300lbs  BNP: 11,000 ? 1350 ? 4182 ? 4898 ? 3614 ? 3213  ECHO on 6/25: EF of 65%  Hodling home Entresto, Aldactone, and Farxiga  Continue Lasix --> increase dose to 20 mg bid iv   Hx of AVR  Lovenox  AFIB  Complete heart block (new)  S/p micra leadless pacemaker exchange on 7/16  DAVIS Belcher  Cardiology, CTS, EP following      HTN  Controlled   Monitor      T2DM  Management per primary        HISTORY OF PRESENT ILLNESS:      Valdemar Solis 
Nephrology Progress  Note                                                                                                                                                                                                                                                                                                                                                               Office : [phone]     Fax :[phone]    Patient's Name: Valdemar Solis  7/21/2025    Reason for Consult:  CLAUDIO  Requesting Physician:  Yane Wei MD  Chief Complaint:    Chief Complaint   Patient presents with    Shortness of Breath     Pt arrived via [facility] from home w c/o SOB. Pt arrived on CPAP.       ASSESSMENT/PLAN    CLAUDIO on CKD 3b likely secondary to hemodynamic changes, hypotension, bacteremia - improving  I/O: net -730 mL  Creatinine baseline: ~1.7  Creatinine since admission: 2.7 ? 2.4 ? 2.1 ? 2.2 ? 2.3 ? 2.4 ? 2.8 ? 3.0 ? 3.0 ? 2.8 ? 2.7 ? 2.6 ? 2.6 ? 2.9 ? 3.0 ? 2.8 ? 2.8  Severe edema, known G2DD allyson CRS  S/p heart cath on 7/11 - likely led to hemodynamic changes  Avoid nephrotoxins  Lasix 20mg PO MWF ?  Lasix 20mg IV BID  ?  Lasix 40mg IV BID  Gave a one time dose of Lasix 80mg IV  Monitor renal function with daily RFP      Acute hypoxic respiratory failure- resolved   Sepsis likely secondary to MRSA bacteremia  Infective endocarditis of mechanical aortic valve  MRSA +   Aortic valve vegetation - no CTS intervention per CTS  Other infectious workup NGTD  ICD removal on 7/9  IV Abx per CVU team via central catheter      Acute on chronic CHF  Baseline weight ~ 300lbs  BNP: 11,000 ? 1350 ? 4182 ? 4898 ? 3614 ? 3213  ECHO on 6/25: EF of 65%  Hodling home Entresto, Aldactone, and Farxiga  Continue Lasix  ?  increased dose to 40 mg bid IV  Hx of AVR  Lovenox  AFIB  Complete heart block (new)  S/p micra leadless pacemaker exchange on 7/16  Sotolol, Lovenox  Cardiology, CTS, EP following      HTN  Controlled 
Nephrology Progress  Note                                                                                                                                                                                                                                                                                                                                                               Office : [phone]     Fax :[phone]    Patient's Name: Valdemar Solis  7/23/2025    Reason for Consult:  CLAUDIO  Requesting Physician:  Yane Wei MD  Chief Complaint:    Chief Complaint   Patient presents with    Shortness of Breath     Pt arrived via [facility] from home w c/o SOB. Pt arrived on CPAP.       ASSESSMENT/PLAN    CLAUDIO on CKD 3b likely secondary to hemodynamic changes, hypotension, bacteremia - improving  I/O: net -1.8 L  Creatinine baseline: ~1.7  Creatinine since admission: 2.7 ? 2.4 ? 2.1 ? 2.2 ? 2.3 ? 2.4 ? 2.8 ? 3.0 ? 3.0 ? 2.8 ? 2.7 ? 2.6 ? 2.6 ? 2.9 ? 3.0 ? 2.8 ? 2.8 ? 3.0 ? 3.2  Severe edema, known G2DD allyson CRS  S/p heart cath on 7/11 - likely led to hemodynamic changes  Avoid nephrotoxins  Lasix 40mg IV BID  Ok to dc on Oral lasix as per home dose   Monitor renal function with daily RFP      Acute hypoxic respiratory failure- resolved   Sepsis likely secondary to MRSA bacteremia  Infective endocarditis of mechanical aortic valve  MRSA +   Aortic valve vegetation - no CTS intervention per CTS  Other infectious workup NGTD  ICD removal on 7/9  IV Abx via central catheter      Acute on chronic CHF  Baseline weight ~ 300lbs  BNP: 11,000 ? 1350 ? 4182 ? 4898 ? 3614 ? 3213  ECHO on 6/25: EF of 65%  Hodling home Entresto, Aldactone, and Farxiga  Continue Lasix  ?  increased dose to 40 mg bid IV  Hx of AVR  Lovenox  AFIB  Complete heart block (new)  S/p micra leadless pacemaker exchange on 7/16  Sotolol, Lovenox  Cardiology, CTS, EP following      HTN - controlled   Monitor  Cardiology started midodrine 2.5mg TID for 
Nephrology Progress  Note                                                                                                                                                                                                                                                                                                                                                               Office : [phone]     Fax :[phone]    Patient's Name: Valdemar Solis  7/22/2025    Reason for Consult:  CLAUDIO  Requesting Physician:  Yane Wei MD  Chief Complaint:    Chief Complaint   Patient presents with    Shortness of Breath     Pt arrived via [facility] from home w c/o SOB. Pt arrived on CPAP.       ASSESSMENT/PLAN    CLAUDIO on CKD 3b likely secondary to hemodynamic changes, hypotension, bacteremia - improving  I/O: net -1.1 L  Creatinine baseline: ~1.7  Creatinine since admission: 2.7 ? 2.4 ? 2.1 ? 2.2 ? 2.3 ? 2.4 ? 2.8 ? 3.0 ? 3.0 ? 2.8 ? 2.7 ? 2.6 ? 2.6 ? 2.9 ? 3.0 ? 2.8 ? 2.8 ? 3.0  Severe edema, known G2DD allyson CRS  S/p heart cath on 7/11 - likely led to hemodynamic changes  Avoid nephrotoxins  Lasix 40mg IV BID  Monitor renal function with daily RFP      Acute hypoxic respiratory failure- resolved   Sepsis likely secondary to MRSA bacteremia  Infective endocarditis of mechanical aortic valve  MRSA +   Aortic valve vegetation - no CTS intervention per CTS  Other infectious workup NGTD  ICD removal on 7/9  IV Abx per CVU team via central catheter      Acute on chronic CHF  Baseline weight ~ 300lbs  BNP: 11,000 ? 1350 ? 4182 ? 4898 ? 3614 ? 3213  ECHO on 6/25: EF of 65%  Hodling home Entresto, Aldactone, and Farxiga  Continue Lasix  ?  increased dose to 40 mg bid IV  Hx of AVR  Lovenox  AFIB  Complete heart block (new)  S/p micra leadless pacemaker exchange on 7/16  Sotolol, Lovenox  Cardiology, CTS, EP following      HTN  Controlled   Monitor      T2DM  Management per primary        HISTORY OF PRESENT ILLNESS:    
Nephrology Progress Note                                                                                                                                                                                                                                                                                                                                                               Office : [phone]     Fax :[phone]    Patient's Name: Valdemar Solis  7/10/2025    Reason for Consult:  CLAUDIO on CKD 3  Requesting Physician:  Yane Wei MD  Chief Complaint:    Chief Complaint   Patient presents with    Shortness of Breath     Pt arrived via [facility] from home w c/o SOB. Pt arrived on CPAP.       Assessment/Plan     # CLAUDIO on CKD 3b  - Baseline Cr ~ 1.7  - Cr 2.4 on admission --> 2.7 --> 2.4--> 2.1--> 2.2 --> 2.3 --> 2.4  - CLAUDIO Likely 2/2 hemodynamic changes, hypotension, bacteremia  - Diuretics as tolerated   - Avoid nephrotoxins  - Monitor renal labs     # Acute hypoxic respiratory failure  # Sepsis   # Bacteremia  - Abx per ID  - BC - MRSA +, repeat cultures negative  - Icd removal 07/09     # Acute on chronic CHF  # A-fib  #Complete heart block (new)  - Baseline weight around 300 lbs   - BNP ~ 11k--> 3k  - IV Lasix BID --> now on hold   - On lasix, aldactone, farxiga at home  - On warfarin for hx of mechanical mitral valve  - Cards, CTS, EP on board  - will need pacemaker     # HTN  - BP's soft but stable   - Monitor      # DM 2  - Management per primary       History of Present Ilness:    Valdemar Solis is a 75 y.o. male with PMHx of CHF, COPD, HTN, CKD 3, DM 2 who presents to the ER with confusion and shortness of breath. He states that his wife said that he was getting more confused over the past several days although he is not really aware of this. He does state that he has been more short of breath recently. Also having cough. He denies fever at home and also denies chills or sweats. He has not been 
Nephrology Progress Note                                                                                                                                                                                                                                                                                                                                                               Office : [phone]     Fax :[phone]    Patient's Name: Valdemar Solis  7/14/2025    Reason for Consult:  CLAUDIO on CKD 3  Requesting Physician:  Yane Wei MD  Chief Complaint:    Chief Complaint   Patient presents with    Shortness of Breath     Pt arrived via [facility] from home w c/o SOB. Pt arrived on CPAP.       Assessment/Plan     # CLAUDIO on CKD 3b  - Baseline Cr ~ 1.7  - Cr 2.4 on admission --> 2.7 --> 2.4--> 2.1--> 2.2 --> 2.3 --> 2.4--> 2.8 --> 3.0--->3.0--> 2.8  - S/p heart cath on 7/11  - CLAUDIO Likely 2/2 hemodynamic changes, hypotension, bacteremia  - Lasix 20 MWF   - Avoid nephrotoxins  - Monitor renal labs     # Acute hypoxic respiratory failure  # Sepsis   # Bacteremia  - Abx per ID  - BC - MRSA +, repeat cultures negative  - ICD removal 07/09  - Central line for antibiotics      # Acute on chronic CHF  # A-fib  # Complete heart block (new)  - Baseline weight around 300 lbs   - BNP ~ 11k--> 3k  - Lasix  - On lasix, aldactone, farxiga at home  - On warfarin for hx of mechanical mitral valve  - Cards, CTS, EP on board  - Pacemaker placed     # HTN  - Monitor      # DM 2  - Management per primary       History of Present Ilness:    Valdemar Solis is a 75 y.o. male with PMHx of CHF, COPD, HTN, CKD 3, DM 2 who presents to the ER with confusion and shortness of breath. He states that his wife said that he was getting more confused over the past several days although he is not really aware of this. He does state that he has been more short of breath recently. Also having cough. He denies fever at home and also denies chills or 
Nephrology Progress Note                                                                                                                                                                                                                                                                                                                                                               Office : [phone]     Fax :[phone]    Patient's Name: Valdemar Solis  7/12/2025    Reason for Consult:  CLAUDIO on CKD 3  Requesting Physician:  Yane Wei MD  Chief Complaint:    Chief Complaint   Patient presents with    Shortness of Breath     Pt arrived via [facility] from home w c/o SOB. Pt arrived on CPAP.       Assessment/Plan     # CLAUDIO on CKD 3b  - Baseline Cr ~ 1.7  - Cr 2.4 on admission --> 2.7 --> 2.4--> 2.1--> 2.2 --> 2.3 --> 2.4--> 2.8 --> 3.0  - S/p heart cath on 7/11  - CLAUDIO Likely 2/2 hemodynamic changes, hypotension, bacteremia  - Diuretics on hold   - Avoid nephrotoxins  - Monitor renal labs     # Acute hypoxic respiratory failure  # Sepsis   # Bacteremia  - Abx per ID  - BC - MRSA +, repeat cultures negative  - ICD removal 07/09     # Acute on chronic CHF  # A-fib  # Complete heart block (new)  - Baseline weight around 300 lbs   - BNP ~ 11k--> 3k  - Lasix on hold, as above   - On lasix, aldactone, farxiga at home  - On warfarin for hx of mechanical mitral valve  - Cards, CTS, EP on board  - Will need pacemaker     # HTN  - BP's soft   - Fluid bolus PRN  - Monitor      # DM 2  - Management per primary       History of Present Ilness:    Valdemar Solis is a 75 y.o. male with PMHx of CHF, COPD, HTN, CKD 3, DM 2 who presents to the ER with confusion and shortness of breath. He states that his wife said that he was getting more confused over the past several days although he is not really aware of this. He does state that he has been more short of breath recently. Also having cough. He denies fever at home and also 
Nephrology Progress Note                                                                                                                                                                                                                                                                                                                                                               Office : [phone]     Fax :[phone]    Patient's Name: Valdemar Solis  7/11/2025    Reason for Consult:  CLAUDIO on CKD 3  Requesting Physician:  Yane Wei MD  Chief Complaint:    Chief Complaint   Patient presents with    Shortness of Breath     Pt arrived via [facility] from home w c/o SOB. Pt arrived on CPAP.       Assessment/Plan     # CLAUDIO on CKD 3b  - Baseline Cr ~ 1.7  - Cr 2.4 on admission --> 2.7 --> 2.4--> 2.1--> 2.2 --> 2.3 --> 2.4--> 2.8  - CLAUDIO Likely 2/2 hemodynamic changes, hypotension, bacteremia  - Diuretics on hold   - Avoid nephrotoxins  - Monitor renal labs     # Acute hypoxic respiratory failure  # Sepsis   # Bacteremia  - Abx per ID  - BC - MRSA +, repeat cultures negative  - Icd removal 07/09     # Acute on chronic CHF  # A-fib  #Complete heart block (new)  - Baseline weight around 300 lbs   - BNP ~ 11k--> 3k  - Lasix 40 mg BID PO   - On lasix, aldactone, farxiga at home  - On warfarin for hx of mechanical mitral valve  - Cards, CTS, EP on board  - will need pacemaker     # HTN  - BP's soft but stable   - Monitor      # DM 2  - Management per primary       History of Present Ilness:    Valdemar Solis is a 75 y.o. male with PMHx of CHF, COPD, HTN, CKD 3, DM 2 who presents to the ER with confusion and shortness of breath. He states that his wife said that he was getting more confused over the past several days although he is not really aware of this. He does state that he has been more short of breath recently. Also having cough. He denies fever at home and also denies chills or sweats. He has not been taking 
Nephrology Progress Note                                                                                                                                                                                                                                                                                                                                                               Office : [phone]     Fax :[phone]    Patient's Name: Valdemar Solis  7/8/2025    Reason for Consult:  CLAUDIO on CKD 3  Requesting Physician:  Yane Wei MD  Chief Complaint:    Chief Complaint   Patient presents with    Shortness of Breath     Pt arrived via [facility] from home w c/o SOB. Pt arrived on CPAP.       Assessment/Plan     # CLAUDIO on CKD 3b  - Baseline Cr ~ 1.7  - Cr 2.4 on admission --> 2.7 --> 2.4--> 2.1--> 2.2 --> 2.3   - Likely 2/2 hemodynamic changes, hypotension, bacteremia  - Diuretics as tolerated   - Avoid nephrotoxins  - Monitor renal labs     # Acute hypoxic respiratory failure  # Sepsis   # Bacteremia  - Abx per ID  - BC - MRSA +  - Plan for pacemaker extraction tomorrow      # Acute on chronic CHF  # A-fib  - Baseline weight around 300  - BNP ~ 11k--> 3k  - IV Lasix BID  - On lasix, aldactone, farxiga at home  - On warfarin for hx of mechanical mitral valve  - Cards consulted- PRESTON 07/01 with no obvious vegetations     # HTN  - BP's soft but stable   - Monitor      # DM 2  - Management per primary       History of Present Ilness:    Valdemar Solis is a 75 y.o. male with PMHx of CHF, COPD, HTN, CKD 3, DM 2 who presents to the ER with confusion and shortness of breath. He states that his wife said that he was getting more confused over the past several days although he is not really aware of this. He does state that he has been more short of breath recently. Also having cough. He denies fever at home and also denies chills or sweats. He has not been taking anything for this. He says he has been taking all of 
[facility]  HEART FAILURE  Progress Note      Admit Date 6/28/2025     Reason for Consult:      Reason for Consultation/Chief Complaint: SOB    HPI:    Valdemar Solis is a 75 y.o. male with PMH  PMH mechanical AVR3, HFpEF, AF/SSS s/p BiVICD 3/23/20,  COPD, CKD DM admitted with SOB. He was seen 6/25 in HF office for hosp f/u and at that time lasix increased. He presented to the hospital with hypoxia and hypotension along with confusion.  He was septic, PRESTON with no evidence of endocarditis on valve or leads.  2 blood cultures one of them positive for MRSA and 1 is negative.      ICD removed 7/09/202      Subjective:  Patient is being seen for CHF. There were no acute overnight cardiac events.   Today Mr. Solis  feels better than he has in a while, he slept well last night and denies chest pain, SOB, palpitations, or dizziness today      Baseline Weight:    Wt Readings from Last 3 Encounters:   07/14/25 (!) 136.2 kg (300 lb 4.8 oz)   06/25/25 (!) 137 kg (302 lb)   06/24/25 (!) 136.5 kg (301 lb)         Cardiac Testing: PRESTON 7/1/25:     Left Ventricle: Normal left ventricular systolic function. Left ventricle size is normal. Unable to assess wall motion.    Right Ventricle: Right ventricle is mildly dilated. Lead present in the right ventricle. Mildly reduced systolic function.    Aortic Valve: Not well visualized. Mechanical valve that is well-functioning. AV mean gradient is 10 mmHg. No evidence of aortic root abscess (measures 4 mm). No evidence of vegetation. No regurgitation. Trace paravalvular regurgitation. No stenosis. AV AT is 58.00 ms. Peak velocity of 2.07 cm/s.    Mitral Valve: Trace regurgitation. No evidence of vegetation    Tricuspid Valve: Mild to moderate regurgitation. No evidence of vegetation    Right Atrium: Lead present in the right atrium. Right atrium size is normal. No evidence of vegetation leads.    Aorta: Normal sized aortic root. Dilated ascending aorta. Ao ascending diameter is 
[facility]  HEART FAILURE  Progress Note      Admit Date 6/28/2025     Reason for Consult:      Reason for Consultation/Chief Complaint: SOB    HPI:    Valdemar Solis is a 75 y.o. male with PMH  PMH mechanical AVR3, HFpEF, AF/SSS s/p BiVICD 3/23/20,  COPD, CKD DM admitted with SOB. He was seen 6/25 in HF office for hosp f/u and at that time lasix increased. He presented to the hospital with hypoxia and hypotension along with confusion.  He was septic, PRESTON with no evidence of endocarditis on valve or leads.  2 blood cultures one of them positive for MRSA and 1 is negative.      ICD removed 7/09/202, temp pacer placed yesterday,  new PPM yesterday      Subjective:  Patient is being seen for CHF.   Today Mr. Solis does not feel well, lightheaded earlier today and hypotensive and denies chest pain, SOB, palpitations, or dizziness today      Baseline Weight:    Wt Readings from Last 3 Encounters:   07/17/25 (!) 138 kg (304 lb 3.8 oz)   06/25/25 (!) 137 kg (302 lb)   06/24/25 (!) 136.5 kg (301 lb)         Cardiac Testing: PRESTON 7/1/25:     Left Ventricle: Normal left ventricular systolic function. Left ventricle size is normal. Unable to assess wall motion.    Right Ventricle: Right ventricle is mildly dilated. Lead present in the right ventricle. Mildly reduced systolic function.    Aortic Valve: Not well visualized. Mechanical valve that is well-functioning. AV mean gradient is 10 mmHg. No evidence of aortic root abscess (measures 4 mm). No evidence of vegetation. No regurgitation. Trace paravalvular regurgitation. No stenosis. AV AT is 58.00 ms. Peak velocity of 2.07 cm/s.    Mitral Valve: Trace regurgitation. No evidence of vegetation    Tricuspid Valve: Mild to moderate regurgitation. No evidence of vegetation    Right Atrium: Lead present in the right atrium. Right atrium size is normal. No evidence of vegetation leads.    Aorta: Normal sized aortic root. Dilated ascending aorta. Ao ascending diameter is 
Patient and spouse verbalized understanding of discharge instructions.  Return demonstration for insulin and lovenox injections.They state they are in possession of all belongings.  Staff escorted patient to exit.  
Patient educated and given educational materials regarding sepsis and post sepsis syndrome.  
Patient returned from CATH lab, temporary pacer placed in right IJ. Dr. Raza at bedside to update family.   
Patient transferred to [facility] via wheelchair with all personal belongings.  LEIF HAND RN    
Patient transferred to room 3313 from CVU. Report received from COLIN Newman. Patient oriented to room and call light.   
Patient underwent removal of temporary pacemaker and insertion of a new Micra leadless pacemaker.    Continue current management.  
Perfect Serve and call placed to CARDS, patient became short of breath, and heart rate dropped to the 30's without pacemaker capturing, pauses present, externally paced at this time, and called again for CARD/NP to come to bedside, patient continues to be diaphoretic, and pale. Fransico NP at bedside reviewing telemetry.   
Phase 1 discharge criteria met.  VSS, pt is V-Paced on monitor O2 sats 95% on 4L NC.  Left chest surgical dressing CD&I.  Right femoral puncture site soft, without hematoma, scant drainage noted to dressing.  Pt denies pain.   Pt transferred to cvu in stable condition.      
Please hold Coumadin on Sunday forward.  Check daily INR.  Start Lovenox at therapeutic dose once INR is 2 or lower.  
[facility], [facility] [place]   Electrophysiology   Date: 7/14/2025  Reason for Follow up: Complete heart block   Chief Complaint   Patient presents with    Shortness of Breath     Pt arrived via [facility] from home w c/o SOB. Pt arrived on CPAP.       HPI: Valdemar Solis is a 75 y.o. male with h/o COPD on oxygen, HTN, ROSETTA, CHF, CKD, and aortic stenosis s/p mechanical AVR on coumadin (1996). In December of 2019, he was admitted for CHF exacerbation and found to be in atrial flutter. S/p DCCV (1/2/20). S/p CTI dependent atrial flutter ablation with Biv AICD upgrade with addition of LV lead (3/23/20).     Pt presented to hospital due to fatigue, weakness, and poor appetite. He was found to be hypotensive with mild CLAUDIO. EP consulted for PAF management.    Pt had lead extraction performed d/t MRSA bacteremia and had Micra leadless pacemaker placed 7/11      Interval History:    Pt seen at bedside for follow up. Clinical notes and telemetry reviewed.       Assessment/Plan:     MRSA bacteremia  - S/p lead extraction  - Setting of prosthetic valve  - Receiving antibiotics    Complete heart block  - S/p Micra leadless pacemaker 7/11, external IJ pacemaker removed today  - Device not capturing on telemetry this afternoon and pt getting bradycardic and symptomatic with dizziness and SOB  - Obtain CXR  - Plan for insertion of temporary venous pacer this afternoon    Paroxysmal atrial fibrillation  - In V paced rhythm, atrial activity present during non-capture  - S/p DCCV 2020  - S/p CTI flutter ablation 3/2020  - High risk [id number] score, anticoagulation recommended. Continue therapeutic Lovenox  - Continue sotalol 80 mg BID     HF with recovered EF  - EF 65% per last echo   - Continue medical therapy per HF team  - Monitor electrolytes and renal function with use of diuretics  - Monitor I/O's and daily weights, fluid and salt restriction    Aortic stenosis  - S/p mechanical AVR 1996  - Continue Lovenox at this 
Pt arrive to pacu form cath lab, drowsy.  VSS, O2 sats 100% on 6L simple mask.  Temporary pacer to left neck. Right femoral puncture site with scant drainage. Left chest pressure dressing CD&I.  Will monitor.  
Pt arrived to CVU 2912 from PACU. Pt attached to CVU monitors. POC updated with pt and educated the need to lay flat for 4 hours. All questions answered. V/U. Bed alarm activated. Call light within reach.   
Pt at 2 hour delmis post cath lab procedure. HOB raised to 45 degrees. R groin site intact with minimal sanguinous drainage at dressing site. RLE warm and dry, pedal pulse +1.  Will continue to monitor.   
Pt blood transfusion has been initiated. He is tolerating well.   
Pt had previously reported that he \"moves a bit\" while asleep but RN observed that pt had not moved from 0000 to 0200 during night checks. Wedge was placed after initial reluctance was addressed by RN. Pt explained the benefits of repositioning (prevention of pressure sores) and accepted repositioning. Will continue to monitor.   
Pt remains . Needs to restart Coumadin for AF and mechanical valve. Continue sotalol at daily dosing d/t CrCL 43. May need to readjust dose at follow up.    No further recommendations, pt is stable for discharge from EP standpoint. Follow up for device check in 10 days and in EP office as scheduled 9/2    
Pt reported feelings of nausea subsided. VS remain stable.   
Pt reports feeling nauseated since lunch time. Denies producing emesis. VS stable. Zofran 4 mg IV given. Will continue to monitor.   
Pt seen this morning during rounds    Sub  : reports breathing better     Obj :  bl equal BS. Clicking sound mitral area      A/P    Bacteremia : Blood cx  MrSA. Source unclear but pt has pacemaker and mechanical MV. Obtain echo TTE vs PRESTON    Chronic hypoxic RF: on 4 L 02. Home Bipap hs    CLAUDIO : cr 2.4 bl ~1.6-1.8    NSTEMI : elev trop  but not c/w ACS    DM : add lantus , ss. Hold   sglt I. A1c 7.3  
Pt with soft Bps. Provider notified, see Orders.   
RFP stable from yesterday   
Report called to 3A, spoke with COLIN Watters.  No further questions.    
[facility]  HEART FAILURE  Progress Note      Admit Date 6/28/2025     Reason for Consult:      Reason for Consultation/Chief Complaint: SOB    HPI:    Valdemar Solis is a 75 y.o. male with PMH  PMH mechanical AVR3, HFpEF, AF/SSS s/p BiVICD 3/23/20,  COPD, CKD DM admitted with SOB. He was seen 6/25 in HF office for hosp f/u and at that time lasix increased. He presented to the hospital with hypoxia and hypotension along with confusion.  He was septic, PRESTON with no evidence of endocarditis on valve or leads.  2 blood cultures one of them positive for MRSA and 1 is negative.      ICD removed 7/09/202, temp pacer placed yesterday, to have new PPM today      Subjective:  Patient is being seen for CHF.   Today Mr. Solis feels well and denies chest pain, SOB, palpitations, or dizziness today      Baseline Weight:    Wt Readings from Last 3 Encounters:   07/16/25 (!) 138.2 kg (304 lb 10.8 oz)   06/25/25 (!) 137 kg (302 lb)   06/24/25 (!) 136.5 kg (301 lb)         Cardiac Testing: PRESTON 7/1/25:     Left Ventricle: Normal left ventricular systolic function. Left ventricle size is normal. Unable to assess wall motion.    Right Ventricle: Right ventricle is mildly dilated. Lead present in the right ventricle. Mildly reduced systolic function.    Aortic Valve: Not well visualized. Mechanical valve that is well-functioning. AV mean gradient is 10 mmHg. No evidence of aortic root abscess (measures 4 mm). No evidence of vegetation. No regurgitation. Trace paravalvular regurgitation. No stenosis. AV AT is 58.00 ms. Peak velocity of 2.07 cm/s.    Mitral Valve: Trace regurgitation. No evidence of vegetation    Tricuspid Valve: Mild to moderate regurgitation. No evidence of vegetation    Right Atrium: Lead present in the right atrium. Right atrium size is normal. No evidence of vegetation leads.    Aorta: Normal sized aortic root. Dilated ascending aorta. Ao ascending diameter is 4.6 cm. Grade 2 atherosclerosis.    No 
[facility]  HEART FAILURE  Progress Note      Admit Date 6/28/2025     Reason for Consult:      Reason for Consultation/Chief Complaint: SOB    HPI:    Valdemar Solis is a 75 y.o. male with PMH  PMH mechanical AVR3, HFpEF, AF/SSS s/p BiVICD 3/23/20,  COPD, CKD DM admitted with SOB. He was seen 6/25 in HF office for hosp f/u and at that time lasix increased. He has been hypotensive and hypoxic, tells me he came in because he was confused. He is septic, PRESTON yesterday with no evidence of endocarditis      Subjective:  Patient is being seen for CHF. There were no acute overnight cardiac events.   Today Mr. Solis  feels ok, denies chest pain, SOB, palpitations, or dizziness      Baseline Weight:    Wt Readings from Last 3 Encounters:   07/03/25 (!) 139.5 kg (307 lb 8 oz)   06/25/25 (!) 137 kg (302 lb)   06/24/25 (!) 136.5 kg (301 lb)         Cardiac Testing: PRESTON 7/1/25:     Left Ventricle: Normal left ventricular systolic function. Left ventricle size is normal. Unable to assess wall motion.    Right Ventricle: Right ventricle is mildly dilated. Lead present in the right ventricle. Mildly reduced systolic function.    Aortic Valve: Not well visualized. Mechanical valve that is well-functioning. AV mean gradient is 10 mmHg. No evidence of aortic root abscess (measures 4 mm). No evidence of vegetation. No regurgitation. Trace paravalvular regurgitation. No stenosis. AV AT is 58.00 ms. Peak velocity of 2.07 cm/s.    Mitral Valve: Trace regurgitation. No evidence of vegetation    Tricuspid Valve: Mild to moderate regurgitation. No evidence of vegetation    Right Atrium: Lead present in the right atrium. Right atrium size is normal. No evidence of vegetation leads.    Aorta: Normal sized aortic root. Dilated ascending aorta. Ao ascending diameter is 4.6 cm. Grade 2 atherosclerosis.    No evidence of endocaridits.  ECHO 6/4/2025    Left Ventricle: Hyperdynamic left ventricular systolic function with a visually 
[facility]  HEART FAILURE  Progress Note      Admit Date 6/28/2025     Reason for Consult:      Reason for Consultation/Chief Complaint: SOB    HPI:    Valdemar Solis is a 75 y.o. male with PMH  PMH mechanical AVR3, HFpEF, AF/SSS s/p BiVICD 3/23/20,  COPD, CKD DM admitted with SOB. He was seen 6/25 in HF office for hosp f/u and at that time lasix increased. He presented to the hospital with hypoxia and hypotension along with confusion.  He was septic, PRESTON with no evidence of endocarditis on valve or leads.  2 blood cultures one of them positive for MRSA and 1 is negative.      ICD removed 7/09/202, temp pacer replaced,  new PPM 7/16      Subjective:  Patient is being seen for CHF.   Today Mr. Solis does not feel well again today, c/o SOB but denies chest pain, palpitations, or dizziness.  Creatinine remains elevated.  Slightly worse anemia.  On po lasix.      INR 1.3  BUN/Cre 40/3.0  H/H 7.7/23.3    CHF meds prior to admission:  entresto, Farxiga, spironolactone  EF 65-70%      Baseline Weight: 300-305  Wt Readings from Last 3 Encounters:   07/19/25 (!) 138.5 kg (305 lb 5.4 oz)   06/25/25 (!) 137 kg (302 lb)   06/24/25 (!) 136.5 kg (301 lb)         Cardiac Testing: PRESTON 7/1/25:     Left Ventricle: Normal left ventricular systolic function. Left ventricle size is normal. Unable to assess wall motion.    Right Ventricle: Right ventricle is mildly dilated. Lead present in the right ventricle. Mildly reduced systolic function.    Aortic Valve: Not well visualized. Mechanical valve that is well-functioning. AV mean gradient is 10 mmHg. No evidence of aortic root abscess (measures 4 mm). No evidence of vegetation. No regurgitation. Trace paravalvular regurgitation. No stenosis. AV AT is 58.00 ms. Peak velocity of 2.07 cm/s.    Mitral Valve: Trace regurgitation. No evidence of vegetation    Tricuspid Valve: Mild to moderate regurgitation. No evidence of vegetation    Right Atrium: Lead present in the right 
[facility]  HEART FAILURE  Progress Note      Admit Date 6/28/2025     Reason for Consult:      Reason for Consultation/Chief Complaint: SOB    HPI:    Valdemar Solis is a 75 y.o. male with PMH  PMH mechanical AVR3, HFpEF, AF/SSS s/p BiVICD 3/23/20,  COPD, CKD DM admitted with SOB. He was seen 6/25 in HF office for hosp f/u and at that time lasix increased. He has been hypotensive and hypoxic, tells me he came in because he was confused. He is septic, PRESTON yesterday with no evidence of endocarditis      Subjective:  Patient is being seen for CHF. There were no acute overnight cardiac events.   Today Mr. Solis  feels ok, denies chest pain, SOB, palpitations, or dizziness      Baseline Weight:    Wt Readings from Last 3 Encounters:   07/02/25 (!) 136.5 kg (301 lb)   06/25/25 (!) 137 kg (302 lb)   06/24/25 (!) 136.5 kg (301 lb)         Cardiac Testing: PRESTON 7/1/25:     Left Ventricle: Normal left ventricular systolic function. Left ventricle size is normal. Unable to assess wall motion.    Right Ventricle: Right ventricle is mildly dilated. Lead present in the right ventricle. Mildly reduced systolic function.    Aortic Valve: Not well visualized. Mechanical valve that is well-functioning. AV mean gradient is 10 mmHg. No evidence of aortic root abscess (measures 4 mm). No evidence of vegetation. No regurgitation. Trace paravalvular regurgitation. No stenosis. AV AT is 58.00 ms. Peak velocity of 2.07 cm/s.    Mitral Valve: Trace regurgitation. No evidence of vegetation    Tricuspid Valve: Mild to moderate regurgitation. No evidence of vegetation    Right Atrium: Lead present in the right atrium. Right atrium size is normal. No evidence of vegetation leads.    Aorta: Normal sized aortic root. Dilated ascending aorta. Ao ascending diameter is 4.6 cm. Grade 2 atherosclerosis.    No evidence of endocaridits.  ECHO 6/4/2025    Left Ventricle: Hyperdynamic left ventricular systolic function with a visually estimated 
[facility], [facility] Heart [place]   Electrophysiology   Date: 7/17/2025  Reason for Follow up: Complete heart block   Chief Complaint   Patient presents with    Shortness of Breath     Pt arrived via [facility] from home w c/o SOB. Pt arrived on CPAP.       HPI: Valdemar Solis is a 75 y.o. male with h/o COPD on oxygen, HTN, ROSETTA, CHF, CKD, and aortic stenosis s/p mechanical AVR on coumadin (1996). In December of 2019, he was admitted for CHF exacerbation and found to be in atrial flutter. S/p DCCV (1/2/20). S/p CTI dependent atrial flutter ablation with Biv AICD upgrade with addition of LV lead (3/23/20).     Pt presented to hospital due to fatigue, weakness, and poor appetite. He was found to be hypotensive with mild CLAUDIO. EP consulted for PAF management.    Pt had lead extraction performed d/t MRSA bacteremia and had Micra leadless pacemaker placed 7/11      Interval History:    Pt seen at bedside for follow up. Clinical notes and telemetry reviewed. Having tunneled central catheter placed today      Assessment/Plan:     MRSA bacteremia  - S/p lead extraction  - Setting of prosthetic valve  - Receiving antibiotics    Complete heart block  - S/p Micra leadless pacemaker 7/11 and removal and replacement yesterday d/t loss of capture  - CXR with Micra in position  - Device functioning appropriately this AM, lower rate set to 70 d/t pt feeling poorly at 50. Device possibly overtracking atrium, settings changed and pt now pacing at 70 bpm    Paroxysmal atrial fibrillation/tachycardia  - In ASVP, has been mildly tachycardic today  - S/p DCCV 2020  - S/p CTI flutter ablation 3/2020  - High risk [id number] score, anticoagulation recommended. Continue therapeutic Lovenox and plan to switch back to PO anticoagulation before discharge  - Continue sotalol 80 mg daily     HF with recovered EF  - EF 65% per last echo   - Continue medical therapy per HF team  - Monitor electrolytes and renal function with use of diuretics  - 
Slept well. Remains NPO since MN. Agreeable to CHG bath at 0530. Pr-op check list started. CHB bath was given last evening as well. No complaints.  
[facility]   EP progress note    Date: 6/30/2025  Admit Date: 6/28/2025     Reason for consultation: Atrial fibrillation    Chief Complaint:   Chief Complaint   Patient presents with    Shortness of Breath     Pt arrived via [facility] from home w c/o SOB. Pt arrived on CPAP.       History of Present Illness: History obtained from patient and medical record.     Valdemar Solis is a 75 y.o. male with a past medical history of COPD on oxygen, HTN, ROSETTA, CHF, CKD, and aortic stenosis s/p mechanical AVR on coumadin (1996). In December of 2019, he was admitted for CHF exacerbation and found to be in atrial flutter. S/p DCCV (1/2/20). S/p CTI dependent atrial flutter ablation with Biv AICD upgrade with addition of LV lead (3/23/20).    Pt presented to hospital due to fatigue, weakness, and poor appetite. He was found to be hypotensive with mild CLAUDIO. EP consulted for PAF management.    Denies having chest pain, palpitations, shortness of breath, orthopnea/PND, cough, or dizziness at the time of this visit.  No new complaints.  HPI and Interval History:   Patient seen and examined. Clinical notes reviewed.   Telemetry reviewed. No new complaint today.   No major events overnight.   Denies having chest pain, shortness of breath, dyspnea on exertion, Orthopnea, PND at the time of this visit.      Assessment and Plan:     1.  Sepsis and bacteremia with MRSA    Unknown source.  ID is following.      PRESTON did not show any vegetation on the leads or valve.  After the first 2 positive blood cultures from 6/28/2025, he has had another 2 sets of blood culture from 6/30/2025.  The last 2 cultures from 6/30/2025 one of them is positive for MRSA and 1 is negative.    Presence of lead and mechanical valve complicated diagnosis.  In all likelihood, patient needs his device removed.  Management of aortic valve prosthetic valve as per cardiothoracic surgery.    I will work on finding a proper time and date for removal of the 
Spiritual Health History and Assessment/Progress Note  [facility]    (P) Interdisciplinary rounds,  ,  ,      Name: Valdemar Solis MRN: [id number]    Age: 75 y.o.     Sex: male   Language: English   Worship: Cheondoism   Bacteremia due to methicillin resistant Staphylococcus aureus     Date: 7/22/2025            Total Time Calculated: (P) 10 min              Spiritual Assessment continued in [facility] 3A NURSING        Referral/Consult From: (P) Rounding   Encounter Overview/Reason: (P) Interdisciplinary rounds  Service Provided For: (P) Patient    Yanni, Belief, Meaning:   Patient identifies as spiritual, is connected with a yanni tradition or spiritual practice, and has beliefs or practices that help with coping during difficult times  Family/Friends No family/friends present      Importance and Influence:  Patient has spiritual/personal beliefs that influence decisions regarding their health  Family/Friends No family/friends present    Community:  Patient feels well-supported. Support system includes: Spouse/Partner, Children, and Extended family  Family/Friends No family/friends present    Assessment and Plan of Care:     Patient Interventions include: Facilitated expression of thoughts and feelings, Explored spiritual coping/struggle/distress, Engaged in theological reflection, and Affirmed coping skills/support systems  Family/Friends Interventions include: No family/friends present    Patient Plan of Care: Spiritual Care available upon further referral  Family/Friends Plan of Care: No family/friends present    Electronically signed by Chaplain Klaus on 7/22/2025 at 2:54 PM   
[facility]  HEART FAILURE  Progress Note      Admit Date 6/28/2025     Reason for Consult:      Reason for Consultation/Chief Complaint: SOB    HPI:    Valdemar Solis is a 75 y.o. male with PMH  PMH mechanical AVR3, HFpEF, AF/SSS s/p BiVICD 3/23/20,  COPD, CKD DM admitted with SOB. He was seen 6/25 in HF office for hosp f/u and at that time lasix increased. He has been hypotensive and hypoxia, tells me he came in because he was confused.       Subjective:  Patient is being seen for CHF. There were no acute overnight cardiac events.   Today Mr. Solis is feeling better, c/o edema but denies chest pain, shortness of breath, palpitations, or dizziness  I spent 10 minutes educating the patient on heart failure, medications, lifestyle modifications and dietary guidelines.       Baseline Weight:    Wt Readings from Last 3 Encounters:   06/30/25 (!) 141.3 kg (311 lb 9.6 oz)   06/25/25 (!) 137 kg (302 lb)   06/24/25 (!) 136.5 kg (301 lb)         Cardiac Testing:   ECHO 6/4/2025    Left Ventricle: Hyperdynamic left ventricular systolic function with a visually estimated EF of 65 - 70%. Grade II diastolic dysfunction with increased LAP.    Right Ventricle: Not well visualized. Right ventricle is mildly dilated. Lead present in the right ventricle. Mildly reduced systolic function.    Aortic Valve: Mechanical valve. AV mean gradient is 17 mmHg. Calcified cusps. Stenosis of the aortic valve. AV Mean Gradient is 17 mmHg. AV Peak Velocity is 2.8 m/s. AV Area by Peak Velocity is 1.8 cm2. LVOT:AV VTI Index is 0.50. LVOT Stroke Volume Index is 47.1 mL/m2.    Tricuspid Valve: Mild regurgitation.    Right Atrium: Right atrium is mildly dilated.    Aorta: The aortic root and ascending aorta are not well visualized     Device: Medtronic ICD with optivol               Device read today: optivol under baseline    NYHA Class III    Objective:   /71   Pulse 64   Temp 97.6 °F (36.4 °C) (Oral)   Resp 24   Ht 1.854 m (6' 
[facility]  HEART FAILURE  Progress Note      Admit Date 6/28/2025     Reason for Consult:      Reason for Consultation/Chief Complaint: SOB    HPI:    Valdemar Solis is a 75 y.o. male with PMH  PMH mechanical AVR3, HFpEF, AF/SSS s/p BiVICD 3/23/20,  COPD, CKD DM admitted with SOB. He was seen 6/25 in HF office for hosp f/u and at that time lasix increased. He presented to the hospital with hypoxia and hypotension along with confusion.  He was septic, PRESTON with no evidence of endocarditis on valve or leads.  2 blood cultures one of them positive for MRSA and 1 is negative.      ICD removed 7/09/202, temp pacer placed yesterday,  new PPM yesterday      Subjective:  Patient is being seen for CHF.   Today Mr. Solis does not feel well again today, c/o SOB but denies chest pain, palpitations, or dizziness       Baseline Weight: 300-305  Wt Readings from Last 3 Encounters:   07/18/25 (!) 138.3 kg (304 lb 14.3 oz)   06/25/25 (!) 137 kg (302 lb)   06/24/25 (!) 136.5 kg (301 lb)         Cardiac Testing: PRESTON 7/1/25:     Left Ventricle: Normal left ventricular systolic function. Left ventricle size is normal. Unable to assess wall motion.    Right Ventricle: Right ventricle is mildly dilated. Lead present in the right ventricle. Mildly reduced systolic function.    Aortic Valve: Not well visualized. Mechanical valve that is well-functioning. AV mean gradient is 10 mmHg. No evidence of aortic root abscess (measures 4 mm). No evidence of vegetation. No regurgitation. Trace paravalvular regurgitation. No stenosis. AV AT is 58.00 ms. Peak velocity of 2.07 cm/s.    Mitral Valve: Trace regurgitation. No evidence of vegetation    Tricuspid Valve: Mild to moderate regurgitation. No evidence of vegetation    Right Atrium: Lead present in the right atrium. Right atrium size is normal. No evidence of vegetation leads.    Aorta: Normal sized aortic root. Dilated ascending aorta. Ao ascending diameter is 4.6 cm. Grade 2 
Will give 1 unit of blood today for prep for surgery tomorrow. Will order an additional 4 units of PRBCs to be on standby for the procedure tomorrow. Pt VU and agrees to unit of blood. Will discuss giving dose of IV lasix post transfusion with CHF team.     
IntraVENous 2 times per day    sotalol  80 mg Oral Daily    insulin glargine  10 Units SubCUTAneous Daily    DAPTOmycin (CUBICIN) 625 mg in sodium chloride (PF) 0.9 % 12.5 mL IV syringe  625 mg IntraVENous Q24H    sodium chloride flush  5-40 mL IntraVENous 2 times per day    melatonin  3 mg Oral Nightly    budesonide-formoterol  2 puff Inhalation BID RT    And    tiotropium  2 puff Inhalation Daily RT    [Held by provider] pravastatin  80 mg Oral Daily    insulin lispro  0-4 Units SubCUTAneous 4x Daily AC & HS      sodium chloride      sodium chloride      sodium chloride      sodium chloride      sodium chloride      dextrose         Lab Data:  CBC:   Recent Labs     07/07/25 0453 07/08/25 0456 07/08/25 1930 07/09/25  0433   WBC 4.0 4.1  --  4.8   HGB 7.8* 7.9* 9.4* 8.7*    178  --  175     BMP:    Recent Labs     07/07/25 0453 07/09/25  0433    139   K 4.0 4.0   CO2 21 23   BUN 31* 30*   CREATININE 2.3* 2.3*     INR:    Recent Labs     07/08/25 0456 07/08/25 1930 07/09/25  0433   INR 2.35* 1.97* 1.65*     BNP:  No results for input(s): \"PROBNP\" in the last 72 hours.      Diagnostics:  PRESTON 7/1/2025    Left Ventricle: Normal left ventricular systolic function. Left ventricle size is normal. Unable to assess wall motion.    Right Ventricle: Right ventricle is mildly dilated. Lead present in the right ventricle. Mildly reduced systolic function.    Aortic Valve: Not well visualized. Mechanical valve that is well-functioning. AV mean gradient is 10 mmHg. No evidence of aortic root abscess (measures 4 mm). No evidence of vegetation. No regurgitation. Trace paravalvular regurgitation. No stenosis. AV AT is 58.00 ms. Peak velocity of 2.07 cm/s.    Mitral Valve: Trace regurgitation. No evidence of vegetation    Tricuspid Valve: Mild to moderate regurgitation. No evidence of vegetation    Right Atrium: Lead present in the right atrium. Right atrium size is normal. No evidence of vegetation leads.    
follow-up.  Continue Lovenox.    Sepsis and bacteremia with MRSA    Unknown source.  ID is following.       After the first 2 positive blood cultures from 6/28/2025, he has had another 2 sets of blood culture from 6/30/2025.  The last 2 cultures from 6/30/2025 one of them is positive for MRSA and 1 is negative.  Status post lead extraction with no complications.      Complete heart block    This is new finding.  Patient needs pacemaker.  Given the ongoing infection and vegetation, we will proceed with Micra AV at this point and consider a full BiV device in the future assuming that the infection completely clears out.  His LV function is normal at this point.      .  Paroxysmal Atrial Fibrillation/flutter              - S/p DCCV (1/2/20)              - S/p CTI dependent atrial flutter ablation (3/23/20)                 - Currently in NSR              - Continue sotalol 80 mg BID                          ~ QTc stable                 - [id number] score: 4 (Age, HTN, CHF, DM) ; [id number] Vasc score and anticoagulation discussed. High risk for stroke and thromboembolism. Anticoagulation is recommended. Risk of bleeding was discussed.                          ~ Continue coumadin. Monitor for bleeding  A-fib does not seem to be very symptomatic.  Continue to monitor for burden.  If he becomes symptomatic or burden significantly increases, ablation may be considered.             . Chronic combined diastolic and systolic heart failure (NYHA Class II), dilated cardiomyopathy              - Appears compensated with recovered EF                          ~ EF 65% per echo (6/25)              - Continue with BB, ARNI, spironolactone, Farxiga  - Monitor I&Os, daily weights     . ROSETTA              - Stable: Uses CPAP              - Encourage to use CPAP to prevent long term effects of untreated ROSETTA     . HTN              - Uncontrolled: Goal <130/80              - Continue current medications and adjust as needed     . Aortic 
has been taking all of his medications at home as prescribed. He denies any other acute complaints. Patient being admitted for further management. Upon further workup, patient found to have CLAUDIO, for which we were consulted.     Interval hx:    Creatinine stable  Electrolytes stable  BP's controlled  Weight down to 297 lbs   OR today for pacemaker extraction       Past Medical History:   Diagnosis Date    Acute congestive heart failure (HCC) 12/30/2019    Allergic rhinitis 07/11/2016    Anticoagulant long-term use warrefen    Atrial fibrillation ([facility])     under care of cardiology:Dr. Scott Behrens([facility])    CKD (chronic kidney disease)     Nephro:Dr. Parker:stage 3    Class 2 obesity due to excess calories without serious comorbidity with body mass index (BMI) of 37.0 to 37.9 in adult 11/07/2017    Controlled type 2 diabetes mellitus without complication, without long-term current use of insulin ([facility]) 02/24/2017    COPD     COPD (chronic obstructive pulmonary disease) ([facility]) 11/05/2018    Erectile dysfunction     Essential tremor     Gout     Headache 6/21/2025    Hyperlipidemia, mixed 07/11/2016    Hypertension     under care of cardiology:Dr. Scott Behrens([facility])    Long term current use of anticoagulants with INR goal of 2.0-3.0     under care of cardiology:Dr. Scott Behrens([facility])    Long term current use of anticoagulants with INR goal of 2.0-3.0 07/11/2016    Obstructive sleep apnea syndrome 06/18/2019    per pulmo    Primary insomnia 01/25/2017    Primary osteoarthritis of both knees 09/14/2021    Sleep apnea     uses bipap       Past Surgical History:   Procedure Laterality Date    AORTIC VALVE REPLACEMENT  10/1996:[employer]    x3    ATRIAL ABLATION SURGERY  03/23/2020    CTI dependent atrial flutter ablation (Dr. Raza)    CARDIAC PACEMAKER PLACEMENT  03/23/2020    BIV upgrade    CARDIAC VALVE REPLACEMENT  AORTIC    KNEE ARTHROCENTESIS Right 04/06/2022    RIGHT KNEE GENICULAR NERVE BLOCK WITH 
resistant Staphylococcus aureus [R78.81, B95.62]     Community acquired bacterial pneumonia [J15.9]     Sepsis (HCC) [A41.9]     Hypotension [I95.9]     Chronic anemia [D64.9]     History of GI bleed [Z87.19]     Long term current use of antiarrhythmic drug [Z79.899]     Warfarin anticoagulation [Z79.01]     Acute kidney injury superimposed on CKD [N17.9, N18.9]     H/O mechanical aortic valve replacement [Z95.2]     Pneumonia due to infectious organism [J18.9]     Elevated procalcitonin [R79.89]     Essential hypertension [I10]            DVT Prophylaxis:    Diet: ADULT DIET; Regular; 5 carb choices (75 gm/meal); Low Sodium (2 gm); 2000 ml  Code Status: Limited      Dispo - HOME  .  Expected DC  in/on   .3-4 D  Barrier to discharge           Chief Complaint:   Chief Complaint   Patient presents with    Shortness of Breath     Pt arrived via [facility] from home w c/o SOB. Pt arrived on CPAP.             Medications:  Reviewed    Infusion Medications    sodium chloride      dextrose       Scheduled Medications    insulin glargine  10 Units SubCUTAneous Daily    DAPTOmycin IV orderable  6 mg/kg (Adjusted) IntraVENous Q24H    sodium chloride  500 mL IntraVENous Once    sodium chloride flush  5-40 mL IntraVENous 2 times per day    melatonin  3 mg Oral Nightly    azithromycin  500 mg IntraVENous Q24H    budesonide-formoterol  2 puff Inhalation BID RT    And    tiotropium  2 puff Inhalation Daily RT    [Held by provider] pravastatin  80 mg Oral Daily    sotalol  80 mg Oral BID    ipratropium 0.5 mg-albuterol 2.5 mg  1 Dose Inhalation 4x Daily RT    warfarin  10 mg Oral Daily    insulin lispro  0-4 Units SubCUTAneous 4x Daily AC & HS     PRN Meds: sodium chloride flush, sodium chloride, potassium chloride **OR** potassium alternative oral replacement **OR** potassium chloride, magnesium sulfate, ondansetron **OR** ondansetron, polyethylene glycol, acetaminophen **OR** acetaminophen, dextrose bolus **OR** dextrose 
results  Isolated two of two sets      Narrative:      ORDER#: [id number]                          ORDERED BY: STEPHON YOON  SOURCE: Blood left ac                      COLLECTED:  06/30/25 11:41  ANTIBIOTICS AT JOSE.:                      RECEIVED :  06/30/25 21:06  CALL  [place]  [facility] tel. [phone],  Previous panic on this admission - call not needed per SOP, 07/03/2025 08:24,  by PAT  If child <=2 yrs old please draw pediatric bottle.~Blood Culture 1    Culture, Blood 2 [[id number]]  (Abnormal) Collected: 06/30/25 1141    Order Status: Completed Specimen: Blood Updated: 07/04/25 0713     Organism Staph aureus MRSA     Culture, Blood 2 --     POSITIVE  No further workup  CONTACT PRECAUTIONS INDICATED  Isolated two of two sets  Refer to culture [id number] for sensitivity results      Narrative:      ORDER#: [id number]                          ORDERED BY: STEPHON YOON  SOURCE: Blood right ac                     COLLECTED:  06/30/25 11:41  ANTIBIOTICS AT JOSE.:                      RECEIVED :  06/30/25 21:06  If child <=2 yrs old please draw pediatric bottle.~Blood Culture #2    Respiratory Panel, Molecular, with COVID-19 (Restricted: peds pts or suitable admitted adults) [[id number]] Collected: 06/29/25 1145    Order Status: Completed Specimen: Nares from Nasopharyngeal Updated: 06/29/25 2006     Respiratory Panel PCR --     Respiratory Pathogens Panel PCR Result: Not Detected  See additional report for complete Respiratory Pathogens Panel      Narrative:      ORDER#: [id number]                          ORDERED BY: SANDIP VALDIVIA  SOURCE: Nasopharyngeal                     COLLECTED:  06/29/25 11:45  ANTIBIOTICS AT JOSE.:                      RECEIVED :  06/29/25 18:38    Respiratory Panel Film Array Report [[id number]] Collected: 06/29/25 1145    Order Status: Completed Updated: 06/29/25 2007     Report SEE IMAGE    COVID-19 & Influenza Combo [[id number]] Collected: 06/29/25 0042    Order Status: Completed 
  1. Status post aortic valve replacement.   2. Chronic tracheal bronchomalacia with right lower lobe bronchial wall   thickening with scattered endobronchial plugging.  There may be a component   of superimposed bronchitis.   3. Chronic right pleural plaques.   4. Cholelithiasis without acute cholecystitis.   5. Stable 3 mm subpleural nodule in the right lower lobe.         XR CHEST PORTABLE   Final Result   1. No acute process.                Recent imaging reviewed    Problem List  Principal Problem:    Bacteremia due to methicillin resistant Staphylococcus aureus  Active Problems:    PAF (paroxysmal atrial fibrillation) ([facility])    Primary hypertension    ROSETTA on CPAP    Chronic systolic (congestive) heart failure ([facility])    Biventricular cardiac pacemaker in situ    Elevated procalcitonin    Acute anemia    Warfarin anticoagulation    Acute kidney injury    H/O mechanical aortic valve replacement    Morbid obesity due to excess calories ([facility])    AV block    Receiving intravenous antibiotic treatment as outpatient    Sepsis due to methicillin resistant Staphylococcus aureus (MRSA) without acute organ dysfunction ([facility])    CHB (complete heart block) ([facility])  Resolved Problems:    Hypoxia    Sepsis ([facility])    Hypotension       Assessment & Plan:   Sepsis due to Bacteremia due to methicillin resistant Staphylococcus aureus   - repeat cutlures negative  Continue iv atbx  Pacemaker extraction 7/9/2025  ? Valve vegetation: await cards recs  Cbc in am      Mechanical aortic valve  Coumadin  Lovenox bridge for now    Oleg on ckd3 baseline 1.6-1.8  Bmp in am  Nephro onboard      PAF   Sotalol  Couamdin    Chronic combined diastolic and systolic heart failure (NYHA Class II), dilated cardiomyopathy              - Appears decompensated                          ~ EF 65% per echo (6/25)              - Continue with BB, lasix    ROSETTA              - Stable: Uses CPAP    Chronic hypoxic resp failure with copd  On 4L o2 at home  Continue 
PPXlovenox        Salena Roldan MD   7/15/2025 10:04 AM    
cm. Grade 2 atherosclerosis.    No evidence of endocaridits.    Echo 6/4/2025    Left Ventricle: Hyperdynamic left ventricular systolic function with a visually estimated EF of 65 - 70%. EF by 2D Simpsons Biplane is 69%. Left ventricle size is normal. Septal thickening. Unable to assess wall motion due to poor endocardial definition. Grade II diastolic dysfunction with increased LAP.    Right Ventricle: Not well visualized. Right ventricle is mildly dilated. Lead present in the right ventricle. Mildly reduced systolic function.    Aortic Valve: Mechanical valve. AV mean gradient is 17 mmHg. Calcified cusps. Stenosis of the aortic valve. AV Mean Gradient is 17 mmHg. AV Peak Velocity is 2.8 m/s. AV Area by Peak Velocity is 1.8 cm2. LVOT:AV VTI Index is 0.50. LVOT Stroke Volume Index is 47.1 mL/m2.    Tricuspid Valve: Mild regurgitation.    Right Atrium: Right atrium is mildly dilated.    Aorta: The aortic root and ascending aorta are not well visualized. Recommend cross sectional imaging.    Assessment:    1.  Sepsis and bacteremia  2.  PAF  3.  Chronic combined systolic and diastolic heart failure   4.  ROSETTA on cpap  5.  Hypertension   6.  Aortic stenosis status post AVR  7.  ICD removed 7/9/2025      Plan:    EP, CTS and nephrology following  Wrap lower legs and keep elevated  Resume lasix 40 mg twice a day today    Hold entresto for now due to increased creat  Hold aldactone and farxiga for now due to elevated creat   CHF nurse following for diet education, fluid restriction and daily weights    NYHA III    Discussed with patient who is agreeable with plan of care.     Thank you for allowing me to participate in the care of your patient.    JOHN TAYLOR, APRN - CNS, CNS      
difficult and need to consider risk/benefit, will not add  Considered adding Gentamicin with device, poss BC (known to clear BC sooner) yet increase nephrotoxicity, will not add    Device extraction - risk / benefit considered, risk for complication of procedure, arrhythmias without device paceing.  If extracted, still with AoVR and may need chronic suppression regardless.  Extraction tentatively scheduled for 7/9    Further BC - sent 7/3      Medical Decision Making:  The following items were considered in medical decision making:  Discussion of patient care with other providers  Reviewed clinical lab tests  Reviewed radiology tests  Reviewed other diagnostic tests/interventions  Microbiology cultures and other micro tests reviewed      Discussed with pt  Call with ID issues over weekend    Evangelista Pennington MD    
extraction transvenous performed by Jasper Raza MD at [facility] CARDIAC CATH LAB    KNEE ARTHROCENTESIS Right 04/06/2022    RIGHT KNEE GENICULAR NERVE BLOCK WITH INTRA ARTICULAR INJECTION SITE CONFIRMED BY FLUOROSCOPY performed by Orlando Rosenberg MD at [facility] EG OR    KNEE ARTHROSCOPY Right 12/27/2021    RIGHT KNEE DIAGNOSTIC ARTHROSCOPY WITH SUBCHONDROPLASTY TO MEDIAL FEMORAL CONDYLE AND TIBIAL PLATEAU, LEFT KNEE INJECTION. performed by Orlando Rosenberg MD at [facility] EG OR    KNEE SURGERY Right 04/06/2022    RIGHT KNEE GENICULAR NERVE BLOCK WITH INTRA ARTICULAR INJECTION SITE CONFIRMED BY FLUOROSCOPY    PACEMAKER INSERTION  1996    Device is NOT MRI compatible    UPPER GASTROINTESTINAL ENDOSCOPY N/A 06/17/2025    ESOPHAGOGASTRODUODENOSCOPY CONTROL HEMORRHAGE performed by Alfred Mcfadden MD at [facility] ASC ENDOSCOPY    UPPER GASTROINTESTINAL ENDOSCOPY N/A 06/17/2025    ESOPHAGOGASTRODUODENOSCOPY BIOPSY performed by Alfred Mcfadden MD at [facility] ENDOSCOPY       Family History   Problem Relation Age of Onset    Cancer Mother         Gallbladder    Asthma Father     Emphysema Father     No Known Problems Sister     No Known Problems Brother     No Known Problems Maternal Grandmother     No Known Problems Maternal Grandfather     No Known Problems Paternal Grandmother     No Known Problems Paternal Grandfather         reports that he quit smoking about 6 years ago. His smoking use included cigarettes. He started smoking about 36 years ago. He has a 30 pack-year smoking history. He has never used smokeless tobacco. He reports that he does not currently use alcohol after a past usage of about 2.0 standard drinks of alcohol per week. He reports that he does not use drugs.    Allergies:  Patient has no known allergies.    Current Medications:    warfarin placeholder: dosing by pharmacy, RX Placeholder  [START ON 7/14/2025] enoxaparin (LOVENOX) injection 135 mg, BID  sodium chloride (OCEAN, BABY AYR) 0.65 % nasal spray 1 spray, Q2H 
vegetation leads.    Aorta: Normal sized aortic root. Dilated ascending aorta. Ao ascending diameter is 4.6 cm. Grade 2 atherosclerosis.    No evidence of endocaridits.    Echo 6/4/2025    Left Ventricle: Hyperdynamic left ventricular systolic function with a visually estimated EF of 65 - 70%. EF by 2D Simpsons Biplane is 69%. Left ventricle size is normal. Septal thickening. Unable to assess wall motion due to poor endocardial definition. Grade II diastolic dysfunction with increased LAP.    Right Ventricle: Not well visualized. Right ventricle is mildly dilated. Lead present in the right ventricle. Mildly reduced systolic function.    Aortic Valve: Mechanical valve. AV mean gradient is 17 mmHg. Calcified cusps. Stenosis of the aortic valve. AV Mean Gradient is 17 mmHg. AV Peak Velocity is 2.8 m/s. AV Area by Peak Velocity is 1.8 cm2. LVOT:AV VTI Index is 0.50. LVOT Stroke Volume Index is 47.1 mL/m2.    Tricuspid Valve: Mild regurgitation.    Right Atrium: Right atrium is mildly dilated.    Aorta: The aortic root and ascending aorta are not well visualized. Recommend cross sectional imaging.    Assessment:    1.  Sepsis and bacteremia  2.  PAF  3.  Chronic combined systolic and diastolic heart failure   4.  ROSETTA on cpap  5.  Hypertension   6.  Aortic stenosis status post AVR  7.  ICD removed 7/9/2025      Plan:    EP, CTS and nephrology following  Hospitalist to address tsh   Hold lasix   Hold entresto for now due to increased creat  Hold aldactone and farxiga for now due to elevated creat   CHF nurse following for diet education, fluid restriction and daily weights    Discussed with bedside nurse    NYHA III    Discussed with patient who is agreeable with plan of care.     Thank you for allowing me to participate in the care of your patient.    JOHN TAYLOR, APRN - CNS, CNS      
   CHOL 169 07/29/2024 08:42 AM    HDL 40 07/29/2024 08:42 AM    TRIG 142 07/29/2024 08:42 AM     Cardiac Enzymes:    Lab Results   Component Value Date/Time    CKTOTAL 71 07/02/2025 04:56 AM    TROPONINI <0.01 10/30/2022 12:03 AM     PT/ INR   Lab Results   Component Value Date/Time    INR 2.19 07/05/2025 04:26 AM    INR 2.46 07/04/2025 04:39 AM    INR 2.50 07/03/2025 05:20 AM    PROTIME 24.1 07/05/2025 04:26 AM    PROTIME 26.3 07/04/2025 04:39 AM    PROTIME 26.6 07/03/2025 05:20 AM    PROTIME 0.0 06/25/2025 01:49 PM    PROTIME 0.0 05/28/2025 07:05 AM    PROTIME 0.0 04/30/2025 08:59 AM     PTT No components found for: \"PTT\"   Lab Results   Component Value Date/Time    MG 2.04 07/02/2025 09:43 PM      Lab Results   Component Value Date/Time    TSH 0.34 01/30/2024 09:37 AM       Assessment/Plan:     Patient Active Problem List   Diagnosis    Essential hypertension    Hyperlipidemia associated with type 2 diabetes mellitus ([facility])    Long term current use of anticoagulants with INR goal of 2.0-3.0    Primary insomnia    Type 2 diabetes mellitus, without long-term current use of insulin ([facility])    Class 3 severe obesity due to excess calories with serious comorbidity in adult ([facility])    COPD (chronic obstructive pulmonary disease) ([facility])    ROSETTA (obstructive sleep apnea)    Congestive heart failure ([facility])    Cardiomyopathy, dilated ([facility])    Chronic systolic (congestive) heart failure ([facility])    S/P AVR    Biventricular cardiac pacemaker in situ    Typical atrial flutter ([facility])    Primary osteoarthritis of both knees    Status post arthroscopic surgery of right knee    Stage 3a chronic kidney disease ([facility])    Chronic pain of right knee    HFrEF (heart failure with reduced ejection fraction) ([facility])    Hypertension associated with stage 3 chronic kidney disease due to type 2 diabetes mellitus ([facility])    Chronic diastolic heart failure ([facility])    PAF (paroxysmal atrial fibrillation) ([facility])    ICD (implantable cardioverter-defibrillator) in 
**OR** potassium alternative oral replacement **OR** potassium chloride, magnesium sulfate, ondansetron **OR** ondansetron, polyethylene glycol, acetaminophen **OR** acetaminophen, dextrose bolus **OR** dextrose bolus, glucagon (rDNA), dextrose, diclofenac sodium     Past Medical History:  Past Medical History:   Diagnosis Date    Acute congestive heart failure (HCC) 12/30/2019    Allergic rhinitis 07/11/2016    Anticoagulant long-term use warrefen    Atrial fibrillation ([facility])     under care of cardiology:Dr. Scott Behrens([facility])    CKD (chronic kidney disease)     Nephro:Dr. Parker:stage 3    Class 2 obesity due to excess calories without serious comorbidity with body mass index (BMI) of 37.0 to 37.9 in adult 11/07/2017    Controlled type 2 diabetes mellitus without complication, without long-term current use of insulin ([facility]) 02/24/2017    COPD     COPD (chronic obstructive pulmonary disease) ([facility]) 11/05/2018    Erectile dysfunction     Essential tremor     Gout     Headache 6/21/2025    Hyperlipidemia, mixed 07/11/2016    Hypertension     under care of cardiology:Dr. Scott Behrens([facility])    Long term current use of anticoagulants with INR goal of 2.0-3.0     under care of cardiology:Dr. Scott Behrens([facility])    Long term current use of anticoagulants with INR goal of 2.0-3.0 07/11/2016    Obstructive sleep apnea syndrome 06/18/2019    per pulmo    Primary insomnia 01/25/2017    Primary osteoarthritis of both knees 09/14/2021    Sleep apnea     uses bipap        Past Surgical History:    has a past surgical history that includes Aortic valve replacement (10/1996:[employer]ique); Pacemaker insertion (1996); Atrial ablation surgery (03/23/2020); Cardiac pacemaker placement (03/23/2020); Knee arthroscopy (Right, 12/27/2021); knee surgery (Right, 04/06/2022); Knee Arthrocentesis (Right, 04/06/2022); Cardiac valve replacement (AORTIC); Upper gastrointestinal endoscopy (N/A, 06/17/2025); and Upper gastrointestinal 
  Recent Labs     07/14/25  0300 07/15/25  0443   AST 30 18   ALT 26 19   BILITOT 0.3 0.3   ALKPHOS 87 84     Troponin: No results for input(s): \"TROPONINI\" in the last 72 hours.  BNP: No results for input(s): \"BNP\" in the last 72 hours.  Lipids: No results for input(s): \"CHOL\", \"TRIG\", \"HDL\" in the last 72 hours.    Invalid input(s): \"LDLCALC\", \"LABVLDL\"  ABGs: No results for input(s): \"PHART\", \"PO2ART\", \"IBB3MIC\" in the last 72 hours.  INR:   Recent Labs     07/13/25 0420 07/14/25  0300   INR 2.12* 1.87*     UA:No results for input(s): \"COLORU\", \"CLARITYU\", \"GLUCOSEU\", \"BILIRUBINUR\", \"KETUA\", \"SPECGRAV\", \"BLOODU\", \"PHUR\", \"PROTEINU\", \"UROBILINOGEN\", \"NITRU\", \"LEUKOCYTESUR\", \"URINETYPE\" in the last 72 hours.    Invalid input(s): \"LABMICR\"   Urine Microscopic: No results for input(s): \"LABCAST\", \"BACTERIA\", \"COMU\", \"HYALCAST\", \"WBCUA\", \"RBCUA\" in the last 72 hours.    Invalid input(s): \"EPIU\"  Urine Culture: No results for input(s): \"LABURIN\" in the last 72 hours.  Urine Chemistry: No results for input(s): \"CLUR\", \"LABCREA\", \"PROTEINUR\", \"NAUR\" in the last 72 hours.      IMAGING:  XR CHEST PORTABLE   Final Result   No acute process.      Stable cardiomegaly         XR CHEST PORTABLE   Final Result   1. Interval placement of a right single lead pacemaker with no pneumothorax.   2. Stable cardiomegaly without evidence of CHF.   3. No confluent airspace consolidation.         CT CHEST WO CONTRAST   Final Result   1. Status post aortic valve replacement.   2. Chronic tracheal bronchomalacia with right lower lobe bronchial wall   thickening with scattered endobronchial plugging.  There may be a component   of superimposed bronchitis.   3. Chronic right pleural plaques.   4. Cholelithiasis without acute cholecystitis.   5. Stable 3 mm subpleural nodule in the right lower lobe.         XR CHEST PORTABLE   Final Result   1. No acute process.            IR TUNNELED CVC PLACE WO SQ PORT/PUMP > 5 YEARS    (Results Pending) 
us to participate in care of Valdemar Solis   Feel free to contact me,     Carlo Ennis MD   [facility]  Office : [phone] or through [employer]  Fax :[phone]    
(chronic kidney disease)     Nephro:Dr. Parker:stage 3    Class 2 obesity due to excess calories without serious comorbidity with body mass index (BMI) of 37.0 to 37.9 in adult 11/07/2017    Controlled type 2 diabetes mellitus without complication, without long-term current use of insulin ([facility]) 02/24/2017    COPD     COPD (chronic obstructive pulmonary disease) ([facility]) 11/05/2018    Erectile dysfunction     Essential tremor     Gout     Headache 6/21/2025    Hyperlipidemia, mixed 07/11/2016    Hypertension     under care of cardiology:Dr. Scott Behrens([facility])    Long term current use of anticoagulants with INR goal of 2.0-3.0     under care of cardiology:Dr. Scott Behrens([facility])    Long term current use of anticoagulants with INR goal of 2.0-3.0 07/11/2016    Obstructive sleep apnea syndrome 06/18/2019    per pulmo    Primary insomnia 01/25/2017    Primary osteoarthritis of both knees 09/14/2021    Sleep apnea     uses bipap        Past Surgical History:   Procedure Laterality Date    AORTIC VALVE REPLACEMENT  10/1996:[employer]    x3    ATRIAL ABLATION SURGERY  03/23/2020    CTI dependent atrial flutter ablation (Dr. Raza)    CARDIAC PACEMAKER PLACEMENT  03/23/2020    BIV upgrade    CARDIAC VALVE REPLACEMENT  AORTIC    KNEE ARTHROCENTESIS Right 04/06/2022    RIGHT KNEE GENICULAR NERVE BLOCK WITH INTRA ARTICULAR INJECTION SITE CONFIRMED BY FLUOROSCOPY performed by Orlando Rosenberg MD at [facility] OR    KNEE ARTHROSCOPY Right 12/27/2021    RIGHT KNEE DIAGNOSTIC ARTHROSCOPY WITH SUBCHONDROPLASTY TO MEDIAL FEMORAL CONDYLE AND TIBIAL PLATEAU, LEFT KNEE INJECTION. performed by Orlando Rosenberg MD at [facility]    KNEE SURGERY Right 04/06/2022    RIGHT KNEE GENICULAR NERVE BLOCK WITH INTRA ARTICULAR INJECTION SITE CONFIRMED BY FLUOROSCOPY    PACEMAKER INSERTION  1996    Device is NOT MRI compatible    UPPER GASTROINTESTINAL ENDOSCOPY N/A 06/17/2025    ESOPHAGOGASTRODUODENOSCOPY CONTROL HEMORRHAGE performed by 
History: All past medical history reviewed today.    Past Medical History:   Diagnosis Date    Acute congestive heart failure (HCC) 12/30/2019    Allergic rhinitis 07/11/2016    Anticoagulant long-term use warrefen    Atrial fibrillation ([facility])     under care of cardiology:Dr. Scott Behrens([facility])    CKD (chronic kidney disease)     Nephro:Dr. Parker:stage 3    Class 2 obesity due to excess calories without serious comorbidity with body mass index (BMI) of 37.0 to 37.9 in adult 11/07/2017    Controlled type 2 diabetes mellitus without complication, without long-term current use of insulin ([facility]) 02/24/2017    COPD     COPD (chronic obstructive pulmonary disease) ([facility]) 11/05/2018    Erectile dysfunction     Essential tremor     Gout     Headache 6/21/2025    Hyperlipidemia, mixed 07/11/2016    Hypertension     under care of cardiology:Dr. Scott Behrens([facility])    Long term current use of anticoagulants with INR goal of 2.0-3.0     under care of cardiology:Dr. Scott Behrens([facility])    Long term current use of anticoagulants with INR goal of 2.0-3.0 07/11/2016    Obstructive sleep apnea syndrome 06/18/2019    per pulmo    Primary insomnia 01/25/2017    Primary osteoarthritis of both knees 09/14/2021    Sleep apnea     uses bipap       Past Surgical History: All past surgical history was reviewed today.    Past Surgical History:   Procedure Laterality Date    AORTIC VALVE REPLACEMENT  10/1996:[employer]ique    x3    ATRIAL ABLATION SURGERY  03/23/2020    CTI dependent atrial flutter ablation (Dr. Raza)    CARDIAC PACEMAKER PLACEMENT  03/23/2020    BIV upgrade    CARDIAC VALVE REPLACEMENT  AORTIC    KNEE ARTHROCENTESIS Right 04/06/2022    RIGHT KNEE GENICULAR NERVE BLOCK WITH INTRA ARTICULAR INJECTION SITE CONFIRMED BY FLUOROSCOPY performed by Orlando Rosenberg MD at [facility]    KNEE ARTHROSCOPY Right 12/27/2021    RIGHT KNEE DIAGNOSTIC ARTHROSCOPY WITH SUBCHONDROPLASTY TO MEDIAL FEMORAL CONDYLE AND TIBIAL 
Morbid obesity due to excess calories ([facility]) 06/30/2025    Infection requiring contact isolation precautions 06/30/2025    Bacteremia due to methicillin resistant Staphylococcus aureus 06/30/2025    AV block 06/30/2025    Community acquired bacterial pneumonia 06/29/2025    Chronic anemia 06/29/2025    History of GI bleed 06/29/2025    Long term current use of antiarrhythmic drug 06/29/2025    Warfarin anticoagulation 06/29/2025    Acute kidney injury superimposed on CKD 06/29/2025    H/O mechanical aortic valve replacement 06/29/2025    Pneumonia due to infectious organism 06/29/2025    Bacteremia 06/28/2025    Headache 06/21/2025    Acute blood loss anemia 06/18/2025    GI bleed 06/16/2025    Aneurysm of ascending aorta without rupture 06/03/2025    Cardiorenal syndrome 06/02/2025    Disorders of fluid, electrolyte, and acid-base balance 06/02/2025    Chest pain 06/02/2025    Chronic kidney disease 06/02/2025    Elevated troponin 06/02/2025    COPD exacerbation ([facility]) 05/31/2025    Iron deficiency anemia secondary to inadequate dietary iron intake 03/13/2025    Intestinal malabsorption 03/13/2025    Chronic pain of right knee 03/29/2022    Stage 3a chronic kidney disease ([facility]) 01/28/2022    Status post arthroscopic surgery of right knee 01/25/2022    Primary osteoarthritis of both knees     Typical atrial flutter ([facility]) 03/23/2020    Cardiomyopathy, dilated ([facility])     Chronic combined systolic and diastolic heart failure ([facility])     S/P AVR     Biventricular cardiac pacemaker in situ     Congestive heart failure ([facility]) 12/30/2019    ROSETTA on CPAP 06/18/2019    COPD (chronic obstructive pulmonary disease) ([facility]) 11/05/2018    Class 3 severe obesity due to excess calories with serious comorbidity in adult ([facility]) 11/07/2017    Type 2 diabetes mellitus, without long-term current use of insulin ([facility]) 02/24/2017    Primary insomnia 01/25/2017    Essential hypertension 07/11/2016    Hyperlipidemia associated with type 2 diabetes 
guarding or rebound.   Musculoskeletal:         General: No swelling, tenderness or deformity. Normal range of motion.      Cervical back: Normal range of motion and neck supple.      Right lower leg: No edema.      Left lower leg: No edema.   Lymphadenopathy:      Cervical: No cervical adenopathy.   Skin:     General: Skin is warm and dry.      Coloration: Skin is not jaundiced.      Findings: No bruising, erythema or rash.   Neurological:      General: No focal deficit present.      Mental Status: He is alert and oriented to person, place, and time. Mental status is at baseline.      Motor: No abnormal muscle tone.   Psychiatric:         Mood and Affect: Mood normal.         Behavior: Behavior normal.       *    Lines and drains: All vascular access sites are healthy with no local erythema, discharge or tenderness.      Intake and output:    No intake/output data recorded.    Lab Data:   All available labs and old records have been reviewed by me.    CBC:  Recent Labs     07/12/25  0550 07/13/25  0420 07/14/25  0300   WBC 6.3 5.0 5.3   RBC 2.89* 2.44* 2.50*   HGB 8.8* 7.6* 7.7*   HCT 26.4* 22.3* 23.0*    140 164   MCV 91.4 91.2 92.1   MCH 30.5 31.1 30.6   MCHC 33.4 34.1 33.3   RDW 15.4 15.6* 15.9*        BMP:  Recent Labs     07/12/25  0550 07/13/25  0420 07/14/25  0300    136 138   K 4.3 4.3 4.2    101 104   CO2 22 22 20*   BUN 40* 43* 47*   CREATININE 3.0* 3.0* 2.8*   CALCIUM 9.0 8.6 8.9   GLUCOSE 126* 145* 140*        Hepatic Function Panel:   Lab Results   Component Value Date/Time    ALKPHOS 87 07/14/2025 03:00 AM    ALT 26 07/14/2025 03:00 AM    AST 30 07/14/2025 03:00 AM    BILITOT 0.3 07/14/2025 03:00 AM    BILIDIR <0.1 06/16/2025 07:10 PM    IBILI see below 06/16/2025 07:10 PM       CPK:   Lab Results   Component Value Date    CKTOTAL 111 07/14/2025     ESR:   Lab Results   Component Value Date    SEDRATE 69 (H) 10/30/2022     CRP:   Lab Results   Component Value Date    CRP 48.2 (H) 
was seen for >50 minutes. I reviewed interval history, physical exam, review of data including labs, imaging, development and implementation of treatment plan and coordination of complex care. More than 50% of the time was devoted to counseling the patient on their diagnoses/treatments, as well as coordination of their care    MEENU Guerrier-CNP  [facility]   Office: [phone]    
normal.      Mouth/Throat:      Mouth: Mucous membranes are moist.      Pharynx: Oropharynx is clear. No oropharyngeal exudate.   Eyes:      General: No scleral icterus.        Right eye: No discharge.         Left eye: No discharge.      Conjunctiva/sclera: Conjunctivae normal.      Pupils: Pupils are equal, round, and reactive to light.   Neck:      Thyroid: No thyromegaly.   Cardiovascular:      Rate and Rhythm: Normal rate and regular rhythm.      Heart sounds: Normal heart sounds. No murmur heard.     No friction rub.   Pulmonary:      Effort: No respiratory distress.      Breath sounds: No stridor. No wheezing or rales.   Abdominal:      General: Bowel sounds are normal.      Palpations: Abdomen is soft.      Tenderness: There is no abdominal tenderness. There is no guarding or rebound.   Musculoskeletal:         General: No swelling, tenderness or deformity. Normal range of motion.      Cervical back: Normal range of motion and neck supple.      Right lower leg: No edema.      Left lower leg: No edema.   Lymphadenopathy:      Cervical: No cervical adenopathy.   Skin:     General: Skin is warm and dry.      Coloration: Skin is not jaundiced.      Findings: No bruising, erythema or rash.   Neurological:      General: No focal deficit present.      Mental Status: He is alert and oriented to person, place, and time. Mental status is at baseline.      Motor: No abnormal muscle tone.   Psychiatric:         Mood and Affect: Mood normal.         Behavior: Behavior normal.            Lines and drains: All vascular access sites are healthy with no local erythema, discharge or tenderness.      Intake and output:    I/O last 3 completed shifts:  In: -   Out: 450 [Urine:450]    Lab Data:   All available labs and old records have been reviewed by me.    CBC:  Recent Labs     06/30/25  0459 07/01/25  0505 07/02/25  0456   WBC 5.8 5.7 4.5   RBC 2.50* 2.97* 2.82*   HGB 7.7* 9.1* 8.6*   HCT 23.0* 27.4* 25.7*    167 161 
(rDNA), dextrose, diclofenac sodium      Problem list:       Patient Active Problem List   Diagnosis Code    Essential hypertension I10    Hyperlipidemia associated with type 2 diabetes mellitus ([facility]) E11.69, E78.5    Long term current use of anticoagulants with INR goal of 2.0-3.0 Z79.01    Primary insomnia F51.01    Type 2 diabetes mellitus, without long-term current use of insulin ([facility]) E11.9    Class 3 severe obesity due to excess calories with serious comorbidity in adult ([facility]) E66.813    COPD (chronic obstructive pulmonary disease) ([facility]) J44.9    ROSETTA (obstructive sleep apnea) G47.33    Congestive heart failure ([facility]) I50.9    Cardiomyopathy, dilated ([facility]) I42.0    Chronic systolic (congestive) heart failure ([facility]) I50.22    S/P AVR Z95.2    Biventricular cardiac pacemaker in situ Z95.0    Typical atrial flutter ([facility]) I48.3    Primary osteoarthritis of both knees M17.0    Status post arthroscopic surgery of right knee Z98.890    Stage 3a chronic kidney disease ([facility]) N18.31    Chronic pain of right knee M25.561, G89.29    HFrEF (heart failure with reduced ejection fraction) ([facility]) I50.20    Hypertension associated with stage 3 chronic kidney disease due to type 2 diabetes mellitus ([facility]) E11.22, I12.9, N18.30    Chronic diastolic heart failure ([facility]) I50.32    Hypoxia R09.02    PAF (paroxysmal atrial fibrillation) ([facility]) I48.0    ICD (implantable cardioverter-defibrillator) in place Z95.810    Chronic atrial fibrillation ([facility]) I48.20    CLAUDIO (acute kidney injury) N17.9    Iron deficiency anemia secondary to inadequate dietary iron intake D50.8    Intestinal malabsorption K90.9    COPD exacerbation ([facility]) J44.1    Cardiorenal syndrome I13.10    Disorders of fluid, electrolyte, and acid-base balance E87.8    Chest pain R07.9    Chronic kidney disease N18.9    Elevated procalcitonin R79.89    Aneurysm of ascending aorta without rupture I71.21    GI bleed K92.2    Acute blood loss anemia D62    Headache R51.9    Community 
right lower lobe.         XR CHEST PORTABLE   Final Result   1. No acute process.                Medications: All current and past medications were reviewed.     chlorhexidine gluconate   Topical Once    ipratropium 0.5 mg-albuterol 2.5 mg  1 Dose Inhalation TID RT    enoxaparin  1 mg/kg SubCUTAneous BID    warfarin  10 mg Oral Daily    furosemide  40 mg Oral BID    sotalol  80 mg Oral Daily    insulin glargine  10 Units SubCUTAneous Daily    DAPTOmycin (CUBICIN) 625 mg in sodium chloride (PF) 0.9 % 12.5 mL IV syringe  625 mg IntraVENous Q24H    sodium chloride flush  5-40 mL IntraVENous 2 times per day    melatonin  3 mg Oral Nightly    budesonide-formoterol  2 puff Inhalation BID RT    And    tiotropium  2 puff Inhalation Daily RT    [Held by provider] pravastatin  80 mg Oral Daily    insulin lispro  0-4 Units SubCUTAneous 4x Daily AC & HS        sodium chloride      sodium chloride      dextrose         oxyCODONE **OR** oxyCODONE, sodium chloride, methocarbamol, sodium chloride, sodium chloride flush, sodium chloride, potassium chloride **OR** potassium alternative oral replacement **OR** potassium chloride, magnesium sulfate, ondansetron **OR** ondansetron, polyethylene glycol, acetaminophen **OR** acetaminophen, dextrose bolus **OR** dextrose bolus, glucagon (rDNA), dextrose, diclofenac sodium      Problem list:       Patient Active Problem List   Diagnosis Code    Primary hypertension I10    Hyperlipidemia associated with type 2 diabetes mellitus ([facility]) E11.69, E78.5    Long term current use of anticoagulants with INR goal of 2.0-3.0 Z79.01    Primary insomnia F51.01    Type 2 diabetes mellitus, without long-term current use of insulin ([facility]) E11.9    Class 3 severe obesity due to excess calories with serious comorbidity in adult ([facility]) E66.813    COPD (chronic obstructive pulmonary disease) ([facility]) J44.9    ROSETTA on CPAP G47.33    Congestive heart failure ([facility]) I50.9    Cardiomyopathy, dilated ([facility]) I42.0    Chronic

## 2025-07-23 NOTE — DISCHARGE SUMMARY
Hospital Medicine Discharge Summary    Patient: Valdemar Solis     Gender: male  : 1950   Age: 75 y.o.  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.0] 07/10/2025    Sepsis due to methicillin resistant Staphylococcus aureus (MRSA) without acute organ dysfunction (HCC) [A41.02] 2025    Receiving intravenous antibiotic treatment as outpatient [Z79.2] 2025    Morbid obesity due to excess calories (HCC) [E66.01] 2025    Bacteremia due to methicillin resistant Staphylococcus aureus [R78.81, B95.62] 2025    AV block [I44.30] 2025    Arterial hypotension [I95.9] 2025    Chronic anemia [D64.9] 2025    Warfarin anticoagulation [Z79.01] 2025    Acute kidney injury [N17.9] 2025    H/O mechanical aortic valve replacement [Z95.2] 2025    CHB (complete heart block) (HCC) [I44.2] 2025    High-grade atrioventricular block [I44.39] 2025    Complete heart block (HCC) [I44.2] 2025    Elevated troponin [R79.89] 2025    Chronic combined systolic and diastolic heart failure (HCC) [I50.42]     Biventricular cardiac pacemaker in situ [Z95.0]     ROSETTA on CPAP [G47.33] 2019    Primary hypertension [I10] 2016         Condition at Discharge:  Stable    Hospital Course:   Sepsis due to Bacteremia due to methicillin resistant Staphylococcus aureus vegetation and 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

## 2025-07-23 NOTE — CARE COORDINATION
Case Management -  Discharge Note      Patient Name: Valdemar Solis                   YOB: 1950  Room: [address] [place] [address]            Readmission Risk (Low < 19, Mod (19-27), High > 27): Readmission Risk Score: 25.9    Current PCP: Yane Wei MD      ([facility]) Important Message from Medicare:    Has pt received appropriate compliance notices before being discharged if required: yes  Compliance doc:  [] 2nd IMM; [] Code 44 [] [place]  Date Given: 07/23/25   Given By: MARTIN     PT AM-PAC:   /24  OT AM-PAC:   /24    Patient/patient representative has been educated on the benefits of HHC  as well as the possible risks of declining recommended services. Patient/patient representative has acknowledged the information provided and decided on the following discharge plan. Patient/ patient representative has been provided freedom of choice regarding service provider, supported by basic dialogue that supports the patient's individualized plan of care/goals.    Home Care Information:   Is patient resuming current home health care services: Yes -     [facility]  Phone: [phone]  Fax: [phone]     Home Care Agency:   FACILITY:     [facility]  ADDRESS:   [address] [place] [address]   PHONE:        [phone]  FAX:              [phone]             Services: [facility]  PT OT   Home Health Order Obtained: yes     Home health agency notified of discharge.       C agency notified of discharge:  [x] Yes [] No  [] NA    Family notified of discharge:  [x] Yes  [] No  [] NA    Pt is being discharged with Outpt IV Antibiotics  [x] Yes [] No  [] NA      If yes, make sure MARYSE is faxed to C agency, and meds are called in to pharmacy by RN from MARYSE orders only.      Financial    Payor: [employer] MEDICARE / Plan: [employer] GOLD PLUS HMO / Product Type: *No Product type* /     Pharmacy:  Potential assistance Purchasing Medications: No  Meds-to-Beds request:

## 2025-07-24 ENCOUNTER — TELEPHONE (OUTPATIENT)
Dept: FAMILY MEDICINE CLINIC | Age: 75
End: 2025-07-24

## 2025-07-24 ENCOUNTER — CLINICAL DOCUMENTATION (OUTPATIENT)
Dept: INFECTIOUS DISEASES | Age: 75
End: 2025-07-24

## 2025-07-24 ENCOUNTER — TELEPHONE (OUTPATIENT)
Dept: INFECTIOUS DISEASES | Age: 75
End: 2025-07-24

## 2025-07-24 ENCOUNTER — ENROLLMENT (OUTPATIENT)
Dept: INFECTIOUS DISEASES | Age: 75
End: 2025-07-24

## 2025-07-24 DIAGNOSIS — B95.62 BACTEREMIA DUE TO METHICILLIN RESISTANT STAPHYLOCOCCUS AUREUS: Primary | ICD-10-CM

## 2025-07-24 DIAGNOSIS — R78.81 BACTEREMIA DUE TO METHICILLIN RESISTANT STAPHYLOCOCCUS AUREUS: Primary | ICD-10-CM

## 2025-07-24 NOTE — TELEPHONE ENCOUNTER
Spoke with Heath at [facility] and [place] at [facility] hospital and verified OPAT orders as follow:    Name and dose of Antimicrobial: IV daptomycin 650 mg every 24 hours   Antimicrobial start date: calculated from June 30, 2025   Antimicrobial completion date planned: August 15, 2025  Lab monitoring: CBC with differential, LFT, Serum BUN and creatinine, CK once a week       Spoke with patient's wife who states [facility] nurse just arrived. She v/u of OPAT plan, f/u appt and office contact info

## 2025-07-24 NOTE — TELEPHONE ENCOUNTER
Spoke with Sherine at [facility] and advised of new dose 800 mg Dapto and she v/u. She states patient is due for new delivery of meds tomorrow as his first batch was delivered last week. She will call the patient and advise of new dose to start tomorrow   I have reviewed and confirmed nurses' notes...

## 2025-07-24 NOTE — PROGRESS NOTES
Dr. Ramachandran has placed a referral order for pharmacist to manage Outpatient Parental Antimicrobial Therapy (OPAT) pursuant the ID Collaborative Practice Agreement.     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, AV block with PM, CKD, AAA without rupture, obesity     Hospital admission 25-25 for hemorrhagic shock 2/2 warfarin and duodenal ulcer     Presents  with SOB, cough, hypoxia     Pertinent Objective Data:    Wt Readings from Last 1 Encounters:   25 135.6 kg (299 lb)      BMI Readings from Last 1 Encounters:   25 39.45 kg/m²      Serum creatinine: 3.2 mg/dL (H) 25 0500  Estimated creatinine clearance: 29 mL/min (A)  Baseline SCr was ~1.7 before  hospitalizations        Lab Results   Component Value Date    CKTOTAL 63 2025       Imagin/1 PRESTON:    Left Ventricle: Normal left ventricular systolic function. Left ventricle size is normal. Unable to assess wall motion.    Right Ventricle: Right ventricle is mildly dilated. Lead present in the right ventricle. Mildly reduced systolic function.    Aortic Valve: Not well visualized. Mechanical valve that is well-functioning. AV mean gradient is 10 mmHg. No evidence of aortic root abscess (measures 4 mm). No evidence of vegetation. No regurgitation. Trace paravalvular regurgitation. No stenosis. AV AT is 58.00 ms. Peak velocity of 2.07 cm/s.    Mitral Valve: Trace regurgitation. No evidence of vegetation    Tricuspid Valve: Mild to moderate regurgitation. No evidence of vegetation    Right Atrium: Lead present in the right atrium. Right atrium size is normal. No evidence of vegetation leads.    Aorta: Normal sized aortic root. Dilated ascending aorta. Ao ascending diameter is 4.6 cm. Grade 2 atherosclerosis.    No evidence of endocaridits.       Micro:    blood: MRSA  Methicillin-Resistant Staphylococcus aureus (2)    Antibiotic

## 2025-07-24 NOTE — TELEPHONE ENCOUNTER
----- Message from Leena Trevino [facility] sent at 7/24/2025  9:59 AM EDT -----  Please inc dose- can send additional syringes until next shipment. No need for LFTs. I addressed this with inpatient pharmacy as he was only on 6 mg/kg this entire time despite being high grade and high risk.

## 2025-07-24 NOTE — TELEPHONE ENCOUNTER
[facility] Transitions Initial Follow Up Call    Outreach made within 2 business days of discharge: Yes    Patient: Valdemar Solis Patient : 1950   MRN: [id number]  Reason for Admission: Confusion  Discharge Date: 25       Spoke with: patient    Discharge department/facility: [facility]    TCM Interactive Patient Contact:  Was patient able to fill all prescriptions: Yes  Was patient instructed to bring all medications to the follow-up visit: Yes   Is patient taking all medications as directed in the discharge summary? Yes  Does patient understand their discharge instructions: Yes  Does patient have questions or concerns that need addressed prior to 7-14 day follow up office visit: yes - 25    Scheduled appointment with PCP within 7-14 days    Follow Up  Future Appointments   Date Time Provider Department Center   2025  3:00 PM [facility] ANTICOAGULATION CLINIC [facility]   2025  1:30 PM SCHEDULE, Vendor DEVICE CHECK FF Cardio MMA   2025 10:45 AM Heath Bustamante DO FF Cardio MMA   2025 11:45 AM Carlo Ennis MD [facility] NEPH [facility] Nephrolo   2025 12:00 PM Tres Ramachandran MD FF INFCT DIS MMA   2025 11:00 AM Yane Wei MD [facility]   2025  9:30 AM Billy Ambrosio MD PULM & CC MMA   2025  4:00 PM Jasper Raza MD FF Cardio MMA   10/14/2025  9:30 AM Carlo Ennis MD [facility] NEPH [facility] Nephrolo       Sonal Jones MA

## 2025-07-24 NOTE — TELEPHONE ENCOUNTER
Will [facility] requesting PCP to  follow pt for PT OT and skilled nursing?      Please Angelica LAZO is ok (secure)

## 2025-07-25 ENCOUNTER — TELEPHONE (OUTPATIENT)
Dept: OTHER | Age: 75
End: 2025-07-25

## 2025-07-25 ENCOUNTER — ANTI-COAG VISIT (OUTPATIENT)
Dept: PHARMACY | Age: 75
End: 2025-07-25

## 2025-07-25 DIAGNOSIS — I48.3 TYPICAL ATRIAL FLUTTER (HCC): Primary | ICD-10-CM

## 2025-07-25 LAB
INR BLD: 5.8
PROTIME: NORMAL

## 2025-07-25 NOTE — PROGRESS NOTES
PAIGE Hawk called in INR of 5.8, he has been on 10mg daily and lovenox. On ASA and IV Daptomycin.     -- 10mg  - 10mg  -- 10mg   - 12.5mg    Called PAIGE back, stop lovenox shots. Hold tomorrow then take 5mg  and continue 10mg daily. Rechecking Monday so will check INR as well.  Earnestine Shay, PharmD, [facility]    For Pharmacy Admin Tracking Only    Intervention Detail: Adherence Monitorin and Dose Adjustment: 1, reason: Therapy Optimization  Total # of Interventions Recommended: 2  Total # of Interventions Accepted: 2  Time Spent (min): 15

## 2025-07-25 NOTE — TELEPHONE ENCOUNTER
Called and spoke with patient spouse. Patient doing okay. Weight stable 299 lb at home however having lower extremity edema. Patient's Entresto, Farxiga, and Aldactone all held on discharge. Patient resumed lasix alternating 60/80 mg daily. Wife states he had elevated INR, pharmacy called with instructions regarding this already.  Patient scheduled 7/30 with Dr. Bustamante. Home care has been out to see patient yesterday and today. They are back on Monday for visit.

## 2025-07-25 NOTE — TELEPHONE ENCOUNTER
Called and spoke with spouse. Relayed instruction to wear support stockings and continue bipap. Wife would like to see Zoraida- rescheduled visit for 7/30 at 830 with Zoraida

## 2025-07-28 ENCOUNTER — HOSPITAL ENCOUNTER (OUTPATIENT)
Age: 75
Setting detail: SPECIMEN
Discharge: HOME | End: 2025-07-28
Payer: MEDICARE

## 2025-07-28 ENCOUNTER — ANTI-COAG VISIT (OUTPATIENT)
Dept: PHARMACY | Age: 75
End: 2025-07-28

## 2025-07-28 DIAGNOSIS — I48.3 TYPICAL ATRIAL FLUTTER (HCC): Primary | ICD-10-CM

## 2025-07-28 LAB
BASOPHILS # BLD: 0 K/UL (ref 0–0.2)
BASOPHILS NFR BLD: 0.2 %
BUN SERPL-MCNC: 53 MG/DL (ref 7–20)
CK SERPL-CCNC: 152 U/L (ref 39–308)
CREAT SERPL-MCNC: 3.4 MG/DL (ref 0.8–1.3)
CRP SERPL-MCNC: 30.1 MG/L (ref 0–5.1)
DEPRECATED RDW RBC AUTO: 18.1 % (ref 12.4–15.4)
EOSINOPHIL # BLD: 0.1 K/UL (ref 0–0.6)
EOSINOPHIL NFR BLD: 1.1 %
GFR SERPLBLD CREATININE-BSD FMLA CKD-EPI: 18 ML/MIN/{1.73_M2}
HCT VFR BLD AUTO: 29.5 % (ref 40.5–52.5)
HGB BLD-MCNC: 9.6 G/DL (ref 13.5–17.5)
INR BLD: 2.8
LYMPHOCYTES # BLD: 0.6 K/UL (ref 1–5.1)
LYMPHOCYTES NFR BLD: 9.8 %
MCH RBC QN AUTO: 31.1 PG (ref 26–34)
MCHC RBC AUTO-ENTMCNC: 32.7 G/DL (ref 31–36)
MCV RBC AUTO: 95.3 FL (ref 80–100)
MONOCYTES # BLD: 0.6 K/UL (ref 0–1.3)
MONOCYTES NFR BLD: 9 %
NEUTROPHILS # BLD: 4.9 K/UL (ref 1.7–7.7)
NEUTROPHILS NFR BLD: 79.9 %
PLATELET # BLD AUTO: 175 K/UL (ref 135–450)
PMV BLD AUTO: 9.9 FL (ref 5–10.5)
PROTIME: NORMAL
RBC # BLD AUTO: 3.1 M/UL (ref 4.2–5.9)
WBC # BLD AUTO: 6.1 K/UL (ref 4–11)

## 2025-07-28 PROCEDURE — 82550 ASSAY OF CK (CPK): CPT

## 2025-07-28 PROCEDURE — 85025 COMPLETE CBC W/AUTO DIFF WBC: CPT

## 2025-07-28 PROCEDURE — 82565 ASSAY OF CREATININE: CPT

## 2025-07-28 PROCEDURE — 86140 C-REACTIVE PROTEIN: CPT

## 2025-07-28 PROCEDURE — 84520 ASSAY OF UREA NITROGEN: CPT

## 2025-07-28 PROCEDURE — 36415 COLL VENOUS BLD VENIPUNCTURE: CPT

## 2025-07-28 NOTE — PROGRESS NOTES
Kenn GIBSON from [facility] called in results for patient. INR 2.8. Denies changes in medications or diet. Verified holding dose then taking 5 mg.       Instructed for patient to take 10 mg tonight and Wed and 5 mg on Tues. Recheck INR on Thurs 7/31.       For Pharmacy Admin Tracking Only    Intervention Detail: Dose Adjustment: 1, reason: Therapy Optimization  Total # of Interventions Recommended: 1  Total # of Interventions Accepted: 1  Time Spent (min): 15

## 2025-07-29 ENCOUNTER — TELEPHONE (OUTPATIENT)
Dept: INFECTIOUS DISEASES | Age: 75
End: 2025-07-29

## 2025-07-29 ENCOUNTER — CLINICAL SUPPORT (OUTPATIENT)
Dept: CARDIOLOGY CLINIC | Age: 75
End: 2025-07-29

## 2025-07-29 DIAGNOSIS — I44.2 COMPLETE HEART BLOCK (HCC): ICD-10-CM

## 2025-07-29 DIAGNOSIS — Z95.0 PACEMAKER: Primary | ICD-10-CM

## 2025-07-29 NOTE — TELEPHONE ENCOUNTER
OPAT patient on IV dapto q24hr.  Estimated Creatinine Clearance: 28 mL/min (A) (based on SCr of 3.4 mg/dL (H)).  No change in frequency as he is borderline.  Sees neph soon.  Please do twice weekly CK as he is high risk with CKD and high dapto dose.

## 2025-07-29 NOTE — PATIENT INSTRUCTIONS
New Cardiac Device Implant (Pacemaker and/or Defibrillator) Post Op Instructions    Appointments to expect going forward:  Post operatively the patient will have had a 1-week post op check, a 1 month follow up with NP/MD, and a 3 month follow up with NP/MD and the device clinic.       Remote Monitoring Instructions    Within 2-3 weeks of your device being implanted, you will receive a call from the  of your device. Please answer this call as it is to set up remote monitoring for your device. Once you receive your in-home monitor, please follow the instructions provided to sync the home monitor to your implanted device. Once you have paired your home monitor to your implanted device, keep your monitor plugged in within 6 feet of where you sleep.     Please do not send additional routine transmissions unless specifically requested by the office staff - the steps to send a manual transmission are the same as when you paired your in-home monitor to your device for the first time.     Please be aware that the remote device transmission sites are periodically monitored only during regular business hours during which simultaneous in-office device clinics are being conducted. If your transmission requires attention, we will contact you as soon as possible.    Your in-home monitor will do a full report on your device every 3 months (recurring appointments that run parallel to in office checks). You will receive a reminder phone call to send your scheduled transmission approximately one week prior to the appointment. You do not need to initiate a transmission or be awake at the appointment time listed - this is solely for office purposes.     Our office utilizes the \"no news is good news\" narrative regarding remote monitoring. A Device Specialist or RN will contact you with any critical findings from your remote monitoring.    Going forward, if you experience issues with your in-home monitor, please verify that it

## 2025-07-29 NOTE — TELEPHONE ENCOUNTER
LMOM for Manpreet at [facility] with order for twice weekly CK, next draw this Thursday.      OPAT Nurse Coordinator Weekly Update Note    Current OPAT plan:   IV daptomycin 800 mg every 24 hours through 8/15    Diagnosis:   MRSA bacteremia, has mechanical aortic valve, pacemaker, PRESTON negative for endocarditis     Assessment:  Spoke with patient who states he is getting IV dapto at this time. Afebrile and states his energy level is still low, appetite remains fair and trying to drink water. Discussed adequate hydration and lab results and he v/u. He v/u of twice weekly CK levels and will notify this office of muscle cramps/pain.     Follow up appts:  Cardio 7/30  Neph 8/7  Dr Ramachandran 8/14

## 2025-07-29 NOTE — PROGRESS NOTES
Incision is closed, clean, and dry with all dressings/steri strips removed. Site left open to the air. Incision well approximated. No s/s of infection. (Previous device removal)    Patient education was provided about site care, device functionality, in home monitoring, and any other patient questions and/or concerns were addressed. Aftercare and remote monitoring literature was provided. Patient is to call with any signs or symptoms of infection. Patient voices understanding.

## 2025-07-30 ENCOUNTER — TELEPHONE (OUTPATIENT)
Dept: CARDIOLOGY CLINIC | Age: 75
End: 2025-07-30

## 2025-07-30 NOTE — TELEPHONE ENCOUNTER
Pt called requesting r/f vitamin D 25 MCG (1000 UT) CAPS sent to [facility] [address] [facility] [employer] - [place], [place] - [facility] [address] - P [phone] - F [phone]

## 2025-07-30 NOTE — TELEPHONE ENCOUNTER
Medication:   Requested Prescriptions     Pending Prescriptions Disp Refills    sotalol (BETAPACE) 80 MG tablet 60 tablet 3     Sig: Take 1 tablet by mouth 2 times daily        Last Filled:      04/24/2025       Patient Phone Number: [phone] (home)     Last appt: 6/24/2025   Next appt: 8/21/2025    Last OARRS:        No data to display              Please send to [facility] pharmacy has been changed

## 2025-07-31 ENCOUNTER — HOSPITAL ENCOUNTER (OUTPATIENT)
Age: 75
Setting detail: SPECIMEN
Discharge: HOME | End: 2025-07-31
Payer: MEDICARE

## 2025-07-31 ENCOUNTER — ANTI-COAG VISIT (OUTPATIENT)
Dept: PHARMACY | Age: 75
End: 2025-07-31

## 2025-07-31 DIAGNOSIS — I48.3 TYPICAL ATRIAL FLUTTER (HCC): Primary | ICD-10-CM

## 2025-07-31 LAB
BASOPHILS # BLD: 0 K/UL (ref 0–0.2)
BASOPHILS NFR BLD: 0.3 %
BUN SERPL-MCNC: 54 MG/DL (ref 7–20)
CK SERPL-CCNC: 196 U/L (ref 39–308)
CRP SERPL-MCNC: 25.1 MG/L (ref 0–5.1)
DEPRECATED RDW RBC AUTO: 18 % (ref 12.4–15.4)
EOSINOPHIL # BLD: 0.1 K/UL (ref 0–0.6)
EOSINOPHIL NFR BLD: 1 %
HCT VFR BLD AUTO: 30.2 % (ref 40.5–52.5)
HGB BLD-MCNC: 10.1 G/DL (ref 13.5–17.5)
INR BLD: 2.3
LYMPHOCYTES # BLD: 0.6 K/UL (ref 1–5.1)
LYMPHOCYTES NFR BLD: 10.2 %
MCH RBC QN AUTO: 31.8 PG (ref 26–34)
MCHC RBC AUTO-ENTMCNC: 33.5 G/DL (ref 31–36)
MCV RBC AUTO: 95.1 FL (ref 80–100)
MONOCYTES # BLD: 0.6 K/UL (ref 0–1.3)
MONOCYTES NFR BLD: 11.2 %
NEUTROPHILS # BLD: 4.3 K/UL (ref 1.7–7.7)
NEUTROPHILS NFR BLD: 77.3 %
PLATELET # BLD AUTO: 162 K/UL (ref 135–450)
PMV BLD AUTO: 9.7 FL (ref 5–10.5)
PROTIME: NORMAL
RBC # BLD AUTO: 3.18 M/UL (ref 4.2–5.9)
WBC # BLD AUTO: 5.6 K/UL (ref 4–11)

## 2025-07-31 PROCEDURE — 85025 COMPLETE CBC W/AUTO DIFF WBC: CPT

## 2025-07-31 PROCEDURE — 86140 C-REACTIVE PROTEIN: CPT

## 2025-07-31 PROCEDURE — 84520 ASSAY OF UREA NITROGEN: CPT

## 2025-07-31 PROCEDURE — 36415 COLL VENOUS BLD VENIPUNCTURE: CPT

## 2025-07-31 PROCEDURE — 82550 ASSAY OF CK (CPK): CPT

## 2025-07-31 PROCEDURE — 82465 ASSAY BLD/SERUM CHOLESTEROL: CPT

## 2025-07-31 RX ORDER — SOTALOL HYDROCHLORIDE 80 MG/1
80 TABLET ORAL 2 TIMES DAILY
Qty: 60 TABLET | Refills: 3 | Status: SHIPPED | OUTPATIENT
Start: 2025-07-31

## 2025-07-31 NOTE — PROGRESS NOTES
PT 27.3 / INR 2.3 Patient took 5mg warfarin on Tuesday and 10mg Mon/Wed.  Please call Kenn [facility] [phone] with warfarin dosing and order for next INR check.     Called PAIGE, still on IV abx. Take 10mg Thurs and Sat and 5mg Fri and Sun. Check Monday 8/4. Will be going out Mon and Thurs.     Earnestine Shay, PharmD, [facility]  For Pharmacy Admin Tracking Only    Intervention Detail: Dose Adjustment: 1, reason: Therapy Optimization  Total # of Interventions Recommended: 1  Total # of Interventions Accepted: 1  Time Spent (min): 15

## 2025-08-01 LAB — CHOLEST SERPL-MCNC: 150 MG/DL (ref 0–199)

## 2025-08-04 ENCOUNTER — ANTI-COAG VISIT (OUTPATIENT)
Dept: PHARMACY | Age: 75
End: 2025-08-04

## 2025-08-04 ENCOUNTER — HOSPITAL ENCOUNTER (OUTPATIENT)
Age: 75
Setting detail: SPECIMEN
Discharge: HOME | End: 2025-08-04
Payer: MEDICARE

## 2025-08-04 DIAGNOSIS — I48.3 TYPICAL ATRIAL FLUTTER (HCC): Primary | ICD-10-CM

## 2025-08-04 LAB
BASOPHILS # BLD: 0 K/UL (ref 0–0.2)
BASOPHILS NFR BLD: 0.3 %
BUN SERPL-MCNC: 48 MG/DL (ref 7–20)
CK SERPL-CCNC: 146 U/L (ref 39–308)
CREAT SERPL-MCNC: 2.9 MG/DL (ref 0.8–1.3)
CRP SERPL-MCNC: 26 MG/L (ref 0–5.1)
DEPRECATED RDW RBC AUTO: 18.3 % (ref 12.4–15.4)
EOSINOPHIL # BLD: 0.1 K/UL (ref 0–0.6)
EOSINOPHIL NFR BLD: 1.2 %
GFR SERPLBLD CREATININE-BSD FMLA CKD-EPI: 22 ML/MIN/{1.73_M2}
HCT VFR BLD AUTO: 33.4 % (ref 40.5–52.5)
HGB BLD-MCNC: 10.8 G/DL (ref 13.5–17.5)
INR BLD: 2.4
LYMPHOCYTES # BLD: 0.5 K/UL (ref 1–5.1)
LYMPHOCYTES NFR BLD: 10 %
MCH RBC QN AUTO: 31.1 PG (ref 26–34)
MCHC RBC AUTO-ENTMCNC: 32.4 G/DL (ref 31–36)
MCV RBC AUTO: 96 FL (ref 80–100)
MONOCYTES # BLD: 0.6 K/UL (ref 0–1.3)
MONOCYTES NFR BLD: 10.6 %
NEUTROPHILS # BLD: 4.2 K/UL (ref 1.7–7.7)
NEUTROPHILS NFR BLD: 77.9 %
PLATELET # BLD AUTO: 123 K/UL (ref 135–450)
PMV BLD AUTO: 9.5 FL (ref 5–10.5)
PROTIME: NORMAL
RBC # BLD AUTO: 3.48 M/UL (ref 4.2–5.9)
WBC # BLD AUTO: 5.4 K/UL (ref 4–11)

## 2025-08-04 PROCEDURE — 85025 COMPLETE CBC W/AUTO DIFF WBC: CPT

## 2025-08-04 PROCEDURE — 82565 ASSAY OF CREATININE: CPT

## 2025-08-04 PROCEDURE — 84520 ASSAY OF UREA NITROGEN: CPT

## 2025-08-04 PROCEDURE — 82550 ASSAY OF CK (CPK): CPT

## 2025-08-04 PROCEDURE — 86140 C-REACTIVE PROTEIN: CPT

## 2025-08-04 PROCEDURE — 36415 COLL VENOUS BLD VENIPUNCTURE: CPT

## 2025-08-05 ENCOUNTER — TELEPHONE (OUTPATIENT)
Dept: INFECTIOUS DISEASES | Age: 75
End: 2025-08-05

## 2025-08-07 ENCOUNTER — TELEPHONE (OUTPATIENT)
Dept: CARDIOLOGY CLINIC | Age: 75
End: 2025-08-07

## 2025-08-08 ENCOUNTER — APPOINTMENT (OUTPATIENT)
Dept: GENERAL RADIOLOGY | Age: 75
DRG: 314 | End: 2025-08-08
Payer: MEDICARE

## 2025-08-08 ENCOUNTER — HOSPITAL ENCOUNTER (INPATIENT)
Age: 75
LOS: 11 days | Discharge: HOME HEALTH | DRG: 314 | End: 2025-08-19
Attending: EMERGENCY MEDICINE | Admitting: STUDENT IN AN ORGANIZED HEALTH CARE EDUCATION/TRAINING PROGRAM
Payer: MEDICARE

## 2025-08-08 DIAGNOSIS — T82.6XXA ENDOCARDITIS OF PROSTHETIC AORTIC VALVE: ICD-10-CM

## 2025-08-08 DIAGNOSIS — E88.09 HYPOALBUMINEMIA: ICD-10-CM

## 2025-08-08 DIAGNOSIS — E77.8 HYPOPROTEINEMIA: ICD-10-CM

## 2025-08-08 DIAGNOSIS — N18.9 CHRONIC KIDNEY DISEASE, UNSPECIFIED CKD STAGE: ICD-10-CM

## 2025-08-08 DIAGNOSIS — R60.0 BILATERAL LOWER EXTREMITY EDEMA: ICD-10-CM

## 2025-08-08 DIAGNOSIS — I33.0 ENDOCARDITIS OF PROSTHETIC AORTIC VALVE: ICD-10-CM

## 2025-08-08 DIAGNOSIS — I50.9 ACUTE ON CHRONIC CONGESTIVE HEART FAILURE, UNSPECIFIED HEART FAILURE TYPE (HCC): Primary | ICD-10-CM

## 2025-08-08 DIAGNOSIS — Z79.01 ANTICOAGULATED ON COUMADIN: ICD-10-CM

## 2025-08-08 PROBLEM — M10.9 GOUT: Status: ACTIVE | Noted: 2025-06-30

## 2025-08-08 PROBLEM — I50.33 ACUTE ON CHRONIC HEART FAILURE WITH PRESERVED EJECTION FRACTION (HFPEF) (HCC): Status: ACTIVE | Noted: 2025-08-08

## 2025-08-08 PROBLEM — E11.69 TYPE 2 DIABETES MELLITUS WITH OTHER SPECIFIED COMPLICATION (HCC): Status: ACTIVE | Noted: 2017-02-24

## 2025-08-08 PROBLEM — J96.21 ACUTE ON CHRONIC RESPIRATORY FAILURE WITH HYPOXIA (HCC): Status: ACTIVE | Noted: 2022-10-30

## 2025-08-08 LAB
ABO/RH: NORMAL
ALBUMIN SERPL-MCNC: 3.2 G/DL (ref 3.4–5)
ALBUMIN/GLOB SERPL: 1.1 {RATIO} (ref 1.1–2.2)
ALP SERPL-CCNC: 95 U/L (ref 40–129)
ALT SERPL-CCNC: 15 U/L (ref 10–40)
ANION GAP SERPL CALCULATED.3IONS-SCNC: 11 MMOL/L (ref 3–16)
ANTIBODY SCREEN: NORMAL
AST SERPL-CCNC: 24 U/L (ref 15–37)
BASE EXCESS BLDV CALC-SCNC: 3.4 MMOL/L (ref -3–3)
BASOPHILS # BLD: 0 K/UL (ref 0–0.2)
BASOPHILS NFR BLD: 0.9 %
BILIRUB SERPL-MCNC: 0.3 MG/DL (ref 0–1)
BUN SERPL-MCNC: 37 MG/DL (ref 7–20)
CALCIUM SERPL-MCNC: 8.6 MG/DL (ref 8.3–10.6)
CHLORIDE SERPL-SCNC: 105 MMOL/L (ref 99–110)
CK SERPL-CCNC: 127 U/L (ref 39–308)
CO2 BLDV-SCNC: 67 MMOL/L
CO2 SERPL-SCNC: 26 MMOL/L (ref 21–32)
COHGB MFR BLDV: 3.9 % (ref 0–1.5)
CREAT SERPL-MCNC: 2.6 MG/DL (ref 0.8–1.3)
DEPRECATED RDW RBC AUTO: 17.3 % (ref 12.4–15.4)
EKG ATRIAL RATE: 82 BPM
EKG DIAGNOSIS: NORMAL
EKG Q-T INTERVAL: 452 MS
EKG QRS DURATION: 176 MS
EKG QTC CALCULATION (BAZETT): 528 MS
EKG R AXIS: -67 DEGREES
EKG T AXIS: 121 DEGREES
EKG VENTRICULAR RATE: 82 BPM
EOSINOPHIL # BLD: 0.1 K/UL (ref 0–0.6)
EOSINOPHIL NFR BLD: 1.5 %
GFR SERPLBLD CREATININE-BSD FMLA CKD-EPI: 25 ML/MIN/{1.73_M2}
GLUCOSE BLD-MCNC: 148 MG/DL (ref 70–99)
GLUCOSE BLD-MCNC: 165 MG/DL (ref 70–99)
GLUCOSE BLD-MCNC: 182 MG/DL (ref 70–99)
GLUCOSE SERPL-MCNC: 169 MG/DL (ref 70–99)
HCO3 BLDV-SCNC: 28.4 MMOL/L (ref 23–29)
HCT VFR BLD AUTO: 32.9 % (ref 40.5–52.5)
HGB BLD-MCNC: 10.7 G/DL (ref 13.5–17.5)
INR PPP: 2.05 (ref 0.86–1.14)
LYMPHOCYTES # BLD: 0.7 K/UL (ref 1–5.1)
LYMPHOCYTES NFR BLD: 15.6 %
MCH RBC QN AUTO: 30.8 PG (ref 26–34)
MCHC RBC AUTO-ENTMCNC: 32.5 G/DL (ref 31–36)
MCV RBC AUTO: 94.6 FL (ref 80–100)
METHGB MFR BLDV: 0.2 %
MONOCYTES # BLD: 0.5 K/UL (ref 0–1.3)
MONOCYTES NFR BLD: 12.2 %
NEUTROPHILS # BLD: 2.9 K/UL (ref 1.7–7.7)
NEUTROPHILS NFR BLD: 69.8 %
NT-PROBNP SERPL-MCNC: 3926 PG/ML (ref 0–449)
O2 CT VFR BLDV CALC: 14 VOL %
O2 THERAPY: ABNORMAL
PCO2 BLDV: 44.3 MMHG (ref 40–50)
PERFORMED ON: ABNORMAL
PH BLDV: 7.42 [PH] (ref 7.35–7.45)
PLATELET # BLD AUTO: 133 K/UL (ref 135–450)
PMV BLD AUTO: 10 FL (ref 5–10.5)
PO2 BLDV: 99.6 MMHG (ref 25–40)
POTASSIUM SERPL-SCNC: 4.4 MMOL/L (ref 3.5–5.1)
PROT SERPL-MCNC: 6.2 G/DL (ref 6.4–8.2)
PROTHROMBIN TIME: 22.9 SEC (ref 12.1–14.9)
RBC # BLD AUTO: 3.48 M/UL (ref 4.2–5.9)
SAO2 % BLDV: 99 %
SODIUM SERPL-SCNC: 142 MMOL/L (ref 136–145)
TROPONIN, HIGH SENSITIVITY: 53 NG/L (ref 0–22)
TROPONIN, HIGH SENSITIVITY: 55 NG/L (ref 0–22)
WBC # BLD AUTO: 4.2 K/UL (ref 4–11)

## 2025-08-08 PROCEDURE — 86850 RBC ANTIBODY SCREEN: CPT

## 2025-08-08 PROCEDURE — 80053 COMPREHEN METABOLIC PANEL: CPT

## 2025-08-08 PROCEDURE — 96374 THER/PROPH/DIAG INJ IV PUSH: CPT

## 2025-08-08 PROCEDURE — 6370000000 HC RX 637 (ALT 250 FOR IP): Performed by: STUDENT IN AN ORGANIZED HEALTH CARE EDUCATION/TRAINING PROGRAM

## 2025-08-08 PROCEDURE — 71045 X-RAY EXAM CHEST 1 VIEW: CPT

## 2025-08-08 PROCEDURE — 94761 N-INVAS EAR/PLS OXIMETRY MLT: CPT

## 2025-08-08 PROCEDURE — 85610 PROTHROMBIN TIME: CPT

## 2025-08-08 PROCEDURE — 2700000000 HC OXYGEN THERAPY PER DAY

## 2025-08-08 PROCEDURE — 94660 CPAP INITIATION&MGMT: CPT

## 2025-08-08 PROCEDURE — 6360000002 HC RX W HCPCS: Performed by: STUDENT IN AN ORGANIZED HEALTH CARE EDUCATION/TRAINING PROGRAM

## 2025-08-08 PROCEDURE — 99285 EMERGENCY DEPT VISIT HI MDM: CPT

## 2025-08-08 PROCEDURE — 82803 BLOOD GASES ANY COMBINATION: CPT

## 2025-08-08 PROCEDURE — 93005 ELECTROCARDIOGRAM TRACING: CPT | Performed by: EMERGENCY MEDICINE

## 2025-08-08 PROCEDURE — 84484 ASSAY OF TROPONIN QUANT: CPT

## 2025-08-08 PROCEDURE — 99223 1ST HOSP IP/OBS HIGH 75: CPT | Performed by: INTERNAL MEDICINE

## 2025-08-08 PROCEDURE — 93010 ELECTROCARDIOGRAM REPORT: CPT | Performed by: INTERNAL MEDICINE

## 2025-08-08 PROCEDURE — 85025 COMPLETE CBC W/AUTO DIFF WBC: CPT

## 2025-08-08 PROCEDURE — 2060000000 HC ICU INTERMEDIATE R&B

## 2025-08-08 PROCEDURE — 82550 ASSAY OF CK (CPK): CPT

## 2025-08-08 PROCEDURE — 2500000003 HC RX 250 WO HCPCS: Performed by: STUDENT IN AN ORGANIZED HEALTH CARE EDUCATION/TRAINING PROGRAM

## 2025-08-08 PROCEDURE — 83880 ASSAY OF NATRIURETIC PEPTIDE: CPT

## 2025-08-08 PROCEDURE — G0545 PR INHERENT VISIT TO INPT: HCPCS | Performed by: INTERNAL MEDICINE

## 2025-08-08 PROCEDURE — 5A09357 ASSISTANCE WITH RESPIRATORY VENTILATION, LESS THAN 24 CONSECUTIVE HOURS, CONTINUOUS POSITIVE AIRWAY PRESSURE: ICD-10-PCS | Performed by: STUDENT IN AN ORGANIZED HEALTH CARE EDUCATION/TRAINING PROGRAM

## 2025-08-08 PROCEDURE — 36415 COLL VENOUS BLD VENIPUNCTURE: CPT

## 2025-08-08 PROCEDURE — 94640 AIRWAY INHALATION TREATMENT: CPT

## 2025-08-08 PROCEDURE — 86900 BLOOD TYPING SEROLOGIC ABO: CPT

## 2025-08-08 PROCEDURE — 86901 BLOOD TYPING SEROLOGIC RH(D): CPT

## 2025-08-08 PROCEDURE — 6360000002 HC RX W HCPCS: Performed by: PHYSICIAN ASSISTANT

## 2025-08-08 RX ORDER — MIDODRINE HYDROCHLORIDE 5 MG/1
2.5 TABLET ORAL 2 TIMES DAILY WITH MEALS
Status: DISCONTINUED | OUTPATIENT
Start: 2025-08-08 | End: 2025-08-08

## 2025-08-08 RX ORDER — POLYETHYLENE GLYCOL 3350 17 G/17G
17 POWDER, FOR SOLUTION ORAL DAILY PRN
Status: DISCONTINUED | OUTPATIENT
Start: 2025-08-08 | End: 2025-08-19 | Stop reason: HOSPADM

## 2025-08-08 RX ORDER — GLUCAGON 1 MG/ML
1 KIT INJECTION PRN
Status: DISCONTINUED | OUTPATIENT
Start: 2025-08-08 | End: 2025-08-19 | Stop reason: HOSPADM

## 2025-08-08 RX ORDER — SODIUM CHLORIDE 0.9 % (FLUSH) 0.9 %
5-40 SYRINGE (ML) INJECTION PRN
Status: DISCONTINUED | OUTPATIENT
Start: 2025-08-08 | End: 2025-08-19 | Stop reason: HOSPADM

## 2025-08-08 RX ORDER — ACETAMINOPHEN 650 MG/1
650 SUPPOSITORY RECTAL EVERY 6 HOURS PRN
Status: DISCONTINUED | OUTPATIENT
Start: 2025-08-08 | End: 2025-08-19 | Stop reason: HOSPADM

## 2025-08-08 RX ORDER — SODIUM CHLORIDE 0.9 % (FLUSH) 0.9 %
5-40 SYRINGE (ML) INJECTION EVERY 12 HOURS SCHEDULED
Status: DISCONTINUED | OUTPATIENT
Start: 2025-08-08 | End: 2025-08-19 | Stop reason: HOSPADM

## 2025-08-08 RX ORDER — ALBUTEROL SULFATE 0.83 MG/ML
2.5 SOLUTION RESPIRATORY (INHALATION)
Status: DISCONTINUED | OUTPATIENT
Start: 2025-08-08 | End: 2025-08-19 | Stop reason: HOSPADM

## 2025-08-08 RX ORDER — WARFARIN SODIUM 5 MG/1
5 TABLET ORAL
Status: DISCONTINUED | OUTPATIENT
Start: 2025-08-10 | End: 2025-08-15

## 2025-08-08 RX ORDER — ONDANSETRON 4 MG/1
4 TABLET, ORALLY DISINTEGRATING ORAL EVERY 8 HOURS PRN
Status: DISCONTINUED | OUTPATIENT
Start: 2025-08-08 | End: 2025-08-19 | Stop reason: HOSPADM

## 2025-08-08 RX ORDER — POTASSIUM CHLORIDE 1500 MG/1
40 TABLET, EXTENDED RELEASE ORAL PRN
Status: DISCONTINUED | OUTPATIENT
Start: 2025-08-08 | End: 2025-08-19 | Stop reason: HOSPADM

## 2025-08-08 RX ORDER — MAGNESIUM SULFATE IN WATER 40 MG/ML
2000 INJECTION, SOLUTION INTRAVENOUS PRN
Status: DISCONTINUED | OUTPATIENT
Start: 2025-08-08 | End: 2025-08-19 | Stop reason: HOSPADM

## 2025-08-08 RX ORDER — FUROSEMIDE 10 MG/ML
80 INJECTION INTRAMUSCULAR; INTRAVENOUS 2 TIMES DAILY
Status: DISCONTINUED | OUTPATIENT
Start: 2025-08-08 | End: 2025-08-08

## 2025-08-08 RX ORDER — DOXEPIN HYDROCHLORIDE 10 MG/1
10 CAPSULE ORAL NIGHTLY PRN
Status: DISCONTINUED | OUTPATIENT
Start: 2025-08-08 | End: 2025-08-19 | Stop reason: HOSPADM

## 2025-08-08 RX ORDER — DEXTROSE MONOHYDRATE 100 MG/ML
INJECTION, SOLUTION INTRAVENOUS CONTINUOUS PRN
Status: DISCONTINUED | OUTPATIENT
Start: 2025-08-08 | End: 2025-08-19 | Stop reason: HOSPADM

## 2025-08-08 RX ORDER — POTASSIUM CHLORIDE 7.45 MG/ML
10 INJECTION INTRAVENOUS PRN
Status: DISCONTINUED | OUTPATIENT
Start: 2025-08-08 | End: 2025-08-19 | Stop reason: HOSPADM

## 2025-08-08 RX ORDER — FUROSEMIDE 10 MG/ML
80 INJECTION INTRAMUSCULAR; INTRAVENOUS 2 TIMES DAILY
Status: DISCONTINUED | OUTPATIENT
Start: 2025-08-08 | End: 2025-08-11

## 2025-08-08 RX ORDER — PRAVASTATIN SODIUM 80 MG/1
80 TABLET ORAL DAILY
Status: DISCONTINUED | OUTPATIENT
Start: 2025-08-09 | End: 2025-08-19 | Stop reason: HOSPADM

## 2025-08-08 RX ORDER — SODIUM CHLORIDE 9 MG/ML
INJECTION, SOLUTION INTRAVENOUS PRN
Status: DISCONTINUED | OUTPATIENT
Start: 2025-08-08 | End: 2025-08-19 | Stop reason: HOSPADM

## 2025-08-08 RX ORDER — ENOXAPARIN SODIUM 100 MG/ML
30 INJECTION SUBCUTANEOUS 2 TIMES DAILY
Status: DISCONTINUED | OUTPATIENT
Start: 2025-08-08 | End: 2025-08-08

## 2025-08-08 RX ORDER — ACETAMINOPHEN 325 MG/1
650 TABLET ORAL EVERY 6 HOURS PRN
Status: DISCONTINUED | OUTPATIENT
Start: 2025-08-08 | End: 2025-08-19 | Stop reason: HOSPADM

## 2025-08-08 RX ORDER — FUROSEMIDE 10 MG/ML
40 INJECTION INTRAMUSCULAR; INTRAVENOUS ONCE
Status: COMPLETED | OUTPATIENT
Start: 2025-08-08 | End: 2025-08-08

## 2025-08-08 RX ORDER — ALBUTEROL SULFATE 0.83 MG/ML
2.5 SOLUTION RESPIRATORY (INHALATION) EVERY 6 HOURS PRN
Status: DISCONTINUED | OUTPATIENT
Start: 2025-08-08 | End: 2025-08-19 | Stop reason: HOSPADM

## 2025-08-08 RX ORDER — ASPIRIN 81 MG/1
81 TABLET, CHEWABLE ORAL DAILY
Status: DISCONTINUED | OUTPATIENT
Start: 2025-08-09 | End: 2025-08-19 | Stop reason: HOSPADM

## 2025-08-08 RX ORDER — INSULIN LISPRO 100 [IU]/ML
0-4 INJECTION, SOLUTION INTRAVENOUS; SUBCUTANEOUS
Status: DISCONTINUED | OUTPATIENT
Start: 2025-08-08 | End: 2025-08-19 | Stop reason: HOSPADM

## 2025-08-08 RX ORDER — SOTALOL HYDROCHLORIDE 80 MG/1
80 TABLET ORAL 2 TIMES DAILY
Status: DISCONTINUED | OUTPATIENT
Start: 2025-08-08 | End: 2025-08-09

## 2025-08-08 RX ORDER — ONDANSETRON 2 MG/ML
4 INJECTION INTRAMUSCULAR; INTRAVENOUS EVERY 6 HOURS PRN
Status: DISCONTINUED | OUTPATIENT
Start: 2025-08-08 | End: 2025-08-19 | Stop reason: HOSPADM

## 2025-08-08 RX ORDER — WARFARIN SODIUM 5 MG/1
10 TABLET ORAL
Status: DISCONTINUED | OUTPATIENT
Start: 2025-08-08 | End: 2025-08-13

## 2025-08-08 RX ADMIN — SOTALOL HYDROCHLORIDE 80 MG: 80 TABLET ORAL at 22:17

## 2025-08-08 RX ADMIN — FUROSEMIDE 40 MG: 10 INJECTION, SOLUTION INTRAMUSCULAR; INTRAVENOUS at 09:41

## 2025-08-08 RX ADMIN — DAPTOMYCIN 800 MG: 500 INJECTION, POWDER, LYOPHILIZED, FOR SOLUTION INTRAVENOUS at 14:38

## 2025-08-08 RX ADMIN — SODIUM CHLORIDE, PRESERVATIVE FREE 10 ML: 5 INJECTION INTRAVENOUS at 14:46

## 2025-08-08 RX ADMIN — FUROSEMIDE 80 MG: 10 INJECTION, SOLUTION INTRAMUSCULAR; INTRAVENOUS at 17:50

## 2025-08-08 RX ADMIN — SODIUM CHLORIDE, PRESERVATIVE FREE 10 ML: 5 INJECTION INTRAVENOUS at 22:17

## 2025-08-08 RX ADMIN — WARFARIN SODIUM 10 MG: 5 TABLET ORAL at 17:51

## 2025-08-08 RX ADMIN — ALBUTEROL SULFATE 2.5 MG: 2.5 SOLUTION RESPIRATORY (INHALATION) at 21:24

## 2025-08-08 ASSESSMENT — PAIN - FUNCTIONAL ASSESSMENT: PAIN_FUNCTIONAL_ASSESSMENT: NONE - DENIES PAIN

## 2025-08-09 LAB
ALBUMIN SERPL-MCNC: 3.2 G/DL (ref 3.4–5)
ALP SERPL-CCNC: 102 U/L (ref 40–129)
ALT SERPL-CCNC: 19 U/L (ref 10–40)
ANION GAP SERPL CALCULATED.3IONS-SCNC: 11 MMOL/L (ref 3–16)
AST SERPL-CCNC: 23 U/L (ref 15–37)
BASOPHILS # BLD: 0.1 K/UL (ref 0–0.2)
BASOPHILS NFR BLD: 1.1 %
BILIRUB DIRECT SERPL-MCNC: 0.2 MG/DL (ref 0–0.3)
BILIRUB INDIRECT SERPL-MCNC: 0.2 MG/DL (ref 0–1)
BILIRUB SERPL-MCNC: 0.4 MG/DL (ref 0–1)
BUN SERPL-MCNC: 37 MG/DL (ref 7–20)
CALCIUM SERPL-MCNC: 9 MG/DL (ref 8.3–10.6)
CHLORIDE SERPL-SCNC: 103 MMOL/L (ref 99–110)
CO2 SERPL-SCNC: 27 MMOL/L (ref 21–32)
CREAT SERPL-MCNC: 2.6 MG/DL (ref 0.8–1.3)
DEPRECATED RDW RBC AUTO: 17.4 % (ref 12.4–15.4)
EOSINOPHIL # BLD: 0.1 K/UL (ref 0–0.6)
EOSINOPHIL NFR BLD: 1.8 %
EST. AVERAGE GLUCOSE BLD GHB EST-MCNC: 139.9 MG/DL
FERRITIN SERPL IA-MCNC: 41.1 NG/ML (ref 30–400)
GFR SERPLBLD CREATININE-BSD FMLA CKD-EPI: 25 ML/MIN/{1.73_M2}
GLUCOSE BLD-MCNC: 145 MG/DL (ref 70–99)
GLUCOSE BLD-MCNC: 158 MG/DL (ref 70–99)
GLUCOSE BLD-MCNC: 175 MG/DL (ref 70–99)
GLUCOSE BLD-MCNC: 234 MG/DL (ref 70–99)
GLUCOSE SERPL-MCNC: 162 MG/DL (ref 70–99)
HBA1C MFR BLD: 6.5 %
HCT VFR BLD AUTO: 32 % (ref 40.5–52.5)
HGB BLD-MCNC: 10.6 G/DL (ref 13.5–17.5)
INR PPP: 2.21 (ref 0.86–1.14)
IRON SATN MFR SERPL: 12 % (ref 20–50)
IRON SERPL-MCNC: 34 UG/DL (ref 59–158)
LYMPHOCYTES # BLD: 0.9 K/UL (ref 1–5.1)
LYMPHOCYTES NFR BLD: 19 %
MAGNESIUM SERPL-MCNC: 2.04 MG/DL (ref 1.8–2.4)
MCH RBC QN AUTO: 30.9 PG (ref 26–34)
MCHC RBC AUTO-ENTMCNC: 33 G/DL (ref 31–36)
MCV RBC AUTO: 93.6 FL (ref 80–100)
MONOCYTES # BLD: 0.6 K/UL (ref 0–1.3)
MONOCYTES NFR BLD: 12.4 %
NEUTROPHILS # BLD: 3 K/UL (ref 1.7–7.7)
NEUTROPHILS NFR BLD: 65.7 %
PERFORMED ON: ABNORMAL
PLATELET # BLD AUTO: 152 K/UL (ref 135–450)
PMV BLD AUTO: 9.7 FL (ref 5–10.5)
POTASSIUM SERPL-SCNC: 4.3 MMOL/L (ref 3.5–5.1)
PROT SERPL-MCNC: 6.2 G/DL (ref 6.4–8.2)
PROTHROMBIN TIME: 24.3 SEC (ref 12.1–14.9)
RBC # BLD AUTO: 3.42 M/UL (ref 4.2–5.9)
SODIUM SERPL-SCNC: 141 MMOL/L (ref 136–145)
TIBC SERPL-MCNC: 286 UG/DL (ref 260–445)
WBC # BLD AUTO: 4.5 K/UL (ref 4–11)

## 2025-08-09 PROCEDURE — 80076 HEPATIC FUNCTION PANEL: CPT

## 2025-08-09 PROCEDURE — 2500000003 HC RX 250 WO HCPCS: Performed by: STUDENT IN AN ORGANIZED HEALTH CARE EDUCATION/TRAINING PROGRAM

## 2025-08-09 PROCEDURE — 83550 IRON BINDING TEST: CPT

## 2025-08-09 PROCEDURE — 6360000002 HC RX W HCPCS: Performed by: STUDENT IN AN ORGANIZED HEALTH CARE EDUCATION/TRAINING PROGRAM

## 2025-08-09 PROCEDURE — 83735 ASSAY OF MAGNESIUM: CPT

## 2025-08-09 PROCEDURE — 94660 CPAP INITIATION&MGMT: CPT

## 2025-08-09 PROCEDURE — 85610 PROTHROMBIN TIME: CPT

## 2025-08-09 PROCEDURE — 83036 HEMOGLOBIN GLYCOSYLATED A1C: CPT

## 2025-08-09 PROCEDURE — 80048 BASIC METABOLIC PNL TOTAL CA: CPT

## 2025-08-09 PROCEDURE — 6370000000 HC RX 637 (ALT 250 FOR IP): Performed by: STUDENT IN AN ORGANIZED HEALTH CARE EDUCATION/TRAINING PROGRAM

## 2025-08-09 PROCEDURE — 94640 AIRWAY INHALATION TREATMENT: CPT

## 2025-08-09 PROCEDURE — 2060000000 HC ICU INTERMEDIATE R&B

## 2025-08-09 PROCEDURE — 85025 COMPLETE CBC W/AUTO DIFF WBC: CPT

## 2025-08-09 PROCEDURE — 82728 ASSAY OF FERRITIN: CPT

## 2025-08-09 PROCEDURE — 99232 SBSQ HOSP IP/OBS MODERATE 35: CPT | Performed by: NURSE PRACTITIONER

## 2025-08-09 PROCEDURE — 6370000000 HC RX 637 (ALT 250 FOR IP): Performed by: NURSE PRACTITIONER

## 2025-08-09 PROCEDURE — 83540 ASSAY OF IRON: CPT

## 2025-08-09 RX ORDER — METOLAZONE 2.5 MG/1
2.5 TABLET ORAL DAILY
Status: DISCONTINUED | OUTPATIENT
Start: 2025-08-09 | End: 2025-08-13

## 2025-08-09 RX ORDER — PANTOPRAZOLE SODIUM 40 MG/1
40 TABLET, DELAYED RELEASE ORAL
Status: DISCONTINUED | OUTPATIENT
Start: 2025-08-09 | End: 2025-08-19 | Stop reason: HOSPADM

## 2025-08-09 RX ORDER — SOTALOL HYDROCHLORIDE 80 MG/1
80 TABLET ORAL DAILY
Status: DISCONTINUED | OUTPATIENT
Start: 2025-08-09 | End: 2025-08-15

## 2025-08-09 RX ADMIN — METOLAZONE 2.5 MG: 2.5 TABLET ORAL at 14:14

## 2025-08-09 RX ADMIN — SODIUM CHLORIDE, PRESERVATIVE FREE 10 ML: 5 INJECTION INTRAVENOUS at 17:32

## 2025-08-09 RX ADMIN — PANTOPRAZOLE SODIUM 40 MG: 40 TABLET, DELAYED RELEASE ORAL at 08:58

## 2025-08-09 RX ADMIN — PRAVASTATIN SODIUM 80 MG: 80 TABLET ORAL at 08:20

## 2025-08-09 RX ADMIN — WARFARIN SODIUM 10 MG: 5 TABLET ORAL at 17:33

## 2025-08-09 RX ADMIN — ALBUTEROL SULFATE 2.5 MG: 2.5 SOLUTION RESPIRATORY (INHALATION) at 20:34

## 2025-08-09 RX ADMIN — DAPTOMYCIN 800 MG: 500 INJECTION, POWDER, LYOPHILIZED, FOR SOLUTION INTRAVENOUS at 14:14

## 2025-08-09 RX ADMIN — FUROSEMIDE 80 MG: 10 INJECTION, SOLUTION INTRAMUSCULAR; INTRAVENOUS at 08:20

## 2025-08-09 RX ADMIN — FUROSEMIDE 80 MG: 10 INJECTION, SOLUTION INTRAMUSCULAR; INTRAVENOUS at 17:32

## 2025-08-09 RX ADMIN — SOTALOL HYDROCHLORIDE 80 MG: 80 TABLET ORAL at 08:20

## 2025-08-09 RX ADMIN — PANTOPRAZOLE SODIUM 40 MG: 40 TABLET, DELAYED RELEASE ORAL at 14:14

## 2025-08-09 RX ADMIN — ASPIRIN 81 MG: 81 TABLET, CHEWABLE ORAL at 08:20

## 2025-08-09 RX ADMIN — ACETAMINOPHEN 650 MG: 325 TABLET ORAL at 08:20

## 2025-08-09 RX ADMIN — SODIUM CHLORIDE, PRESERVATIVE FREE 10 ML: 5 INJECTION INTRAVENOUS at 08:20

## 2025-08-09 RX ADMIN — INSULIN LISPRO 1 UNITS: 100 INJECTION, SOLUTION INTRAVENOUS; SUBCUTANEOUS at 22:26

## 2025-08-09 ASSESSMENT — PAIN DESCRIPTION - DESCRIPTORS: DESCRIPTORS: ACHING

## 2025-08-09 ASSESSMENT — PAIN SCALES - GENERAL
PAINLEVEL_OUTOF10: 0
PAINLEVEL_OUTOF10: 6
PAINLEVEL_OUTOF10: 0

## 2025-08-09 ASSESSMENT — PAIN DESCRIPTION - PAIN TYPE: TYPE: ACUTE PAIN

## 2025-08-09 ASSESSMENT — PAIN DESCRIPTION - LOCATION: LOCATION: HEAD

## 2025-08-09 ASSESSMENT — PAIN - FUNCTIONAL ASSESSMENT
PAIN_FUNCTIONAL_ASSESSMENT: 0-10
PAIN_FUNCTIONAL_ASSESSMENT: ACTIVITIES ARE NOT PREVENTED

## 2025-08-10 DIAGNOSIS — F51.01 PRIMARY INSOMNIA: ICD-10-CM

## 2025-08-10 LAB
ANION GAP SERPL CALCULATED.3IONS-SCNC: 11 MMOL/L (ref 3–16)
BUN SERPL-MCNC: 39 MG/DL (ref 7–20)
CALCIUM SERPL-MCNC: 9.1 MG/DL (ref 8.3–10.6)
CHLORIDE SERPL-SCNC: 101 MMOL/L (ref 99–110)
CO2 SERPL-SCNC: 30 MMOL/L (ref 21–32)
CREAT SERPL-MCNC: 2.9 MG/DL (ref 0.8–1.3)
GFR SERPLBLD CREATININE-BSD FMLA CKD-EPI: 22 ML/MIN/{1.73_M2}
GLUCOSE BLD-MCNC: 134 MG/DL (ref 70–99)
GLUCOSE BLD-MCNC: 160 MG/DL (ref 70–99)
GLUCOSE BLD-MCNC: 197 MG/DL (ref 70–99)
GLUCOSE BLD-MCNC: 197 MG/DL (ref 70–99)
GLUCOSE SERPL-MCNC: 165 MG/DL (ref 70–99)
INR PPP: 2.25 (ref 0.86–1.14)
MAGNESIUM SERPL-MCNC: 2.28 MG/DL (ref 1.8–2.4)
NT-PROBNP SERPL-MCNC: 4761 PG/ML (ref 0–449)
PERFORMED ON: ABNORMAL
POTASSIUM SERPL-SCNC: 4.1 MMOL/L (ref 3.5–5.1)
PROTHROMBIN TIME: 24.6 SEC (ref 12.1–14.9)
SODIUM SERPL-SCNC: 142 MMOL/L (ref 136–145)

## 2025-08-10 PROCEDURE — 6360000002 HC RX W HCPCS: Performed by: STUDENT IN AN ORGANIZED HEALTH CARE EDUCATION/TRAINING PROGRAM

## 2025-08-10 PROCEDURE — 83735 ASSAY OF MAGNESIUM: CPT

## 2025-08-10 PROCEDURE — 94640 AIRWAY INHALATION TREATMENT: CPT

## 2025-08-10 PROCEDURE — 6370000000 HC RX 637 (ALT 250 FOR IP): Performed by: STUDENT IN AN ORGANIZED HEALTH CARE EDUCATION/TRAINING PROGRAM

## 2025-08-10 PROCEDURE — 80048 BASIC METABOLIC PNL TOTAL CA: CPT

## 2025-08-10 PROCEDURE — 83880 ASSAY OF NATRIURETIC PEPTIDE: CPT

## 2025-08-10 PROCEDURE — 2700000000 HC OXYGEN THERAPY PER DAY

## 2025-08-10 PROCEDURE — 2060000000 HC ICU INTERMEDIATE R&B

## 2025-08-10 PROCEDURE — 2500000003 HC RX 250 WO HCPCS: Performed by: STUDENT IN AN ORGANIZED HEALTH CARE EDUCATION/TRAINING PROGRAM

## 2025-08-10 PROCEDURE — 6370000000 HC RX 637 (ALT 250 FOR IP): Performed by: HOSPITALIST

## 2025-08-10 PROCEDURE — 6370000000 HC RX 637 (ALT 250 FOR IP): Performed by: NURSE PRACTITIONER

## 2025-08-10 PROCEDURE — 94761 N-INVAS EAR/PLS OXIMETRY MLT: CPT

## 2025-08-10 PROCEDURE — 99232 SBSQ HOSP IP/OBS MODERATE 35: CPT | Performed by: NURSE PRACTITIONER

## 2025-08-10 PROCEDURE — 85610 PROTHROMBIN TIME: CPT

## 2025-08-10 PROCEDURE — 94660 CPAP INITIATION&MGMT: CPT

## 2025-08-10 RX ORDER — TRAZODONE HYDROCHLORIDE 50 MG/1
50 TABLET ORAL NIGHTLY
Status: DISCONTINUED | OUTPATIENT
Start: 2025-08-10 | End: 2025-08-14

## 2025-08-10 RX ADMIN — WARFARIN SODIUM 5 MG: 5 TABLET ORAL at 17:43

## 2025-08-10 RX ADMIN — PANTOPRAZOLE SODIUM 40 MG: 40 TABLET, DELAYED RELEASE ORAL at 17:43

## 2025-08-10 RX ADMIN — INSULIN LISPRO 1 UNITS: 100 INJECTION, SOLUTION INTRAVENOUS; SUBCUTANEOUS at 21:08

## 2025-08-10 RX ADMIN — SOTALOL HYDROCHLORIDE 80 MG: 80 TABLET ORAL at 08:46

## 2025-08-10 RX ADMIN — INSULIN LISPRO 1 UNITS: 100 INJECTION, SOLUTION INTRAVENOUS; SUBCUTANEOUS at 17:43

## 2025-08-10 RX ADMIN — ALBUTEROL SULFATE 2.5 MG: 2.5 SOLUTION RESPIRATORY (INHALATION) at 21:55

## 2025-08-10 RX ADMIN — ASPIRIN 81 MG: 81 TABLET, CHEWABLE ORAL at 08:46

## 2025-08-10 RX ADMIN — DAPTOMYCIN 800 MG: 500 INJECTION, POWDER, LYOPHILIZED, FOR SOLUTION INTRAVENOUS at 13:46

## 2025-08-10 RX ADMIN — SODIUM CHLORIDE, PRESERVATIVE FREE 10 ML: 5 INJECTION INTRAVENOUS at 21:09

## 2025-08-10 RX ADMIN — ACETAMINOPHEN 650 MG: 325 TABLET ORAL at 21:08

## 2025-08-10 RX ADMIN — METOLAZONE 2.5 MG: 2.5 TABLET ORAL at 08:46

## 2025-08-10 RX ADMIN — TRAZODONE HYDROCHLORIDE 50 MG: 50 TABLET ORAL at 21:08

## 2025-08-10 RX ADMIN — PRAVASTATIN SODIUM 80 MG: 80 TABLET ORAL at 08:46

## 2025-08-10 RX ADMIN — SODIUM CHLORIDE, PRESERVATIVE FREE 10 ML: 5 INJECTION INTRAVENOUS at 00:46

## 2025-08-10 RX ADMIN — SODIUM CHLORIDE, PRESERVATIVE FREE 10 ML: 5 INJECTION INTRAVENOUS at 13:46

## 2025-08-10 RX ADMIN — FUROSEMIDE 80 MG: 10 INJECTION, SOLUTION INTRAMUSCULAR; INTRAVENOUS at 17:43

## 2025-08-10 RX ADMIN — PANTOPRAZOLE SODIUM 40 MG: 40 TABLET, DELAYED RELEASE ORAL at 08:46

## 2025-08-10 RX ADMIN — SODIUM CHLORIDE, PRESERVATIVE FREE 10 ML: 5 INJECTION INTRAVENOUS at 08:46

## 2025-08-10 RX ADMIN — FUROSEMIDE 80 MG: 10 INJECTION, SOLUTION INTRAMUSCULAR; INTRAVENOUS at 08:46

## 2025-08-10 RX ADMIN — DOXEPIN HYDROCHLORIDE 10 MG: 10 CAPSULE ORAL at 00:46

## 2025-08-10 ASSESSMENT — PAIN - FUNCTIONAL ASSESSMENT
PAIN_FUNCTIONAL_ASSESSMENT: 0-10
PAIN_FUNCTIONAL_ASSESSMENT: 0-10
PAIN_FUNCTIONAL_ASSESSMENT: PREVENTS OR INTERFERES SOME ACTIVE ACTIVITIES AND ADLS
PAIN_FUNCTIONAL_ASSESSMENT: 0-10

## 2025-08-10 ASSESSMENT — PAIN DESCRIPTION - ORIENTATION: ORIENTATION: RIGHT;LEFT

## 2025-08-10 ASSESSMENT — PAIN SCALES - GENERAL
PAINLEVEL_OUTOF10: 1
PAINLEVEL_OUTOF10: 1
PAINLEVEL_OUTOF10: 2
PAINLEVEL_OUTOF10: 6

## 2025-08-10 ASSESSMENT — PAIN DESCRIPTION - DESCRIPTORS: DESCRIPTORS: BURNING;SORE

## 2025-08-10 ASSESSMENT — PAIN DESCRIPTION - LOCATION: LOCATION: LEG

## 2025-08-11 ENCOUNTER — APPOINTMENT (OUTPATIENT)
Age: 75
DRG: 314 | End: 2025-08-11
Attending: INTERNAL MEDICINE
Payer: MEDICARE

## 2025-08-11 PROBLEM — D50.9 IRON DEFICIENCY ANEMIA: Status: ACTIVE | Noted: 2025-03-13

## 2025-08-11 PROBLEM — Z91.199 NON-COMPLIANCE: Status: ACTIVE | Noted: 2025-08-11

## 2025-08-11 LAB
ANION GAP SERPL CALCULATED.3IONS-SCNC: 12 MMOL/L (ref 3–16)
BUN SERPL-MCNC: 41 MG/DL (ref 7–20)
CALCIUM SERPL-MCNC: 9.3 MG/DL (ref 8.3–10.6)
CHLORIDE SERPL-SCNC: 98 MMOL/L (ref 99–110)
CO2 SERPL-SCNC: 32 MMOL/L (ref 21–32)
CREAT SERPL-MCNC: 3 MG/DL (ref 0.8–1.3)
ECHO AO ASC DIAM: 4.7 CM
ECHO AO ASCENDING AORTA INDEX: 1.83 CM/M2
ECHO AO ROOT DIAM: 2.6 CM
ECHO AO ROOT INDEX: 1.01 CM/M2
ECHO AV AREA PEAK VELOCITY: 2.3 CM2
ECHO AV AREA VTI: 2.4 CM2
ECHO AV AREA/BSA PEAK VELOCITY: 0.9 CM2/M2
ECHO AV AREA/BSA VTI: 0.9 CM2/M2
ECHO AV MEAN GRADIENT: 14 MMHG
ECHO AV MEAN VELOCITY: 1.8 M/S
ECHO AV PEAK GRADIENT: 23 MMHG
ECHO AV PEAK VELOCITY: 2.4 M/S
ECHO AV VELOCITY RATIO: 0.42
ECHO AV VTI: 34.8 CM
ECHO BSA: 2.66 M2
ECHO EST RA PRESSURE: 15 MMHG
ECHO LA AREA 2C: 29 CM2
ECHO LA AREA 4C: 25 CM2
ECHO LA DIAMETER INDEX: 2.02 CM/M2
ECHO LA DIAMETER: 5.2 CM
ECHO LA MAJOR AXIS: 7.2 CM
ECHO LA MINOR AXIS: 8.2 CM
ECHO LA TO AORTIC ROOT RATIO: 2
ECHO LA VOL BP: 81 ML (ref 18–58)
ECHO LA VOL MOD A2C: 83 ML (ref 18–58)
ECHO LA VOL MOD A4C: 72 ML (ref 18–58)
ECHO LA VOL/BSA BIPLANE: 32 ML/M2 (ref 16–34)
ECHO LA VOLUME INDEX MOD A2C: 32 ML/M2 (ref 16–34)
ECHO LA VOLUME INDEX MOD A4C: 28 ML/M2 (ref 16–34)
ECHO LV E' SEPTAL VELOCITY: 7.07 CM/S
ECHO LV EDV A2C: 134 ML
ECHO LV EDV A4C: 132 ML
ECHO LV EDV INDEX A4C: 51 ML/M2
ECHO LV EDV NDEX A2C: 52 ML/M2
ECHO LV EF PHYSICIAN: 50 %
ECHO LV EJECTION FRACTION A2C: 52 %
ECHO LV EJECTION FRACTION A4C: 57 %
ECHO LV EJECTION FRACTION BIPLANE: 54 % (ref 55–100)
ECHO LV ESV A2C: 65 ML
ECHO LV ESV A4C: 56 ML
ECHO LV ESV INDEX A2C: 25 ML/M2
ECHO LV ESV INDEX A4C: 22 ML/M2
ECHO LV FRACTIONAL SHORTENING: 24 % (ref 28–44)
ECHO LV INTERNAL DIMENSION DIASTOLE INDEX: 1.91 CM/M2
ECHO LV INTERNAL DIMENSION DIASTOLIC: 4.9 CM (ref 4.2–5.9)
ECHO LV INTERNAL DIMENSION SYSTOLIC INDEX: 1.44 CM/M2
ECHO LV INTERNAL DIMENSION SYSTOLIC: 3.7 CM
ECHO LV IVSD: 1.2 CM (ref 0.6–1)
ECHO LV MASS 2D: 239.9 G (ref 88–224)
ECHO LV MASS INDEX 2D: 93.3 G/M2 (ref 49–115)
ECHO LV POSTERIOR WALL DIASTOLIC: 1.3 CM (ref 0.6–1)
ECHO LV RELATIVE WALL THICKNESS RATIO: 0.53
ECHO LVOT AREA: 5.3 CM2
ECHO LVOT AV VTI INDEX: 0.44
ECHO LVOT DIAM: 2.6 CM
ECHO LVOT MEAN GRADIENT: 2 MMHG
ECHO LVOT PEAK GRADIENT: 4 MMHG
ECHO LVOT PEAK VELOCITY: 1 M/S
ECHO LVOT STROKE VOLUME INDEX: 31.8 ML/M2
ECHO LVOT SV: 81.7 ML
ECHO LVOT VTI: 15.4 CM
ECHO MV A VELOCITY: 0.47 M/S
ECHO MV E VELOCITY: 1.25 M/S
ECHO MV E/A RATIO: 2.66
ECHO MV E/E' SEPTAL: 17.68
ECHO PULMONARY ARTERY END DIASTOLIC PRESSURE: 7 MMHG
ECHO PV MAX VELOCITY: 1 M/S
ECHO PV PEAK GRADIENT: 4 MMHG
ECHO PV REGURGITANT MAX VELOCITY: 1.3 M/S
ECHO RA AREA 4C: 29.9 CM2
ECHO RA END SYSTOLIC VOLUME APICAL 4 CHAMBER INDEX BSA: 39 ML/M2
ECHO RA VOLUME: 101 ML
ECHO RIGHT VENTRICULAR SYSTOLIC PRESSURE (RVSP): 40 MMHG
ECHO RV BASAL DIMENSION: 5.7 CM
ECHO RV FREE WALL PEAK S': 5.6 CM/S
ECHO RV LONGITUDINAL DIMENSION: 9.5 CM
ECHO RV TAPSE: 1.1 CM (ref 1.7–?)
ECHO TV REGURGITANT MAX VELOCITY: 2.51 M/S
ECHO TV REGURGITANT PEAK GRADIENT: 25 MMHG
GFR SERPLBLD CREATININE-BSD FMLA CKD-EPI: 21 ML/MIN/{1.73_M2}
GLUCOSE BLD-MCNC: 138 MG/DL (ref 70–99)
GLUCOSE BLD-MCNC: 173 MG/DL (ref 70–99)
GLUCOSE BLD-MCNC: 175 MG/DL (ref 70–99)
GLUCOSE BLD-MCNC: 250 MG/DL (ref 70–99)
GLUCOSE SERPL-MCNC: 144 MG/DL (ref 70–99)
INR PPP: 2.03 (ref 0.86–1.14)
MAGNESIUM SERPL-MCNC: 2.04 MG/DL (ref 1.8–2.4)
PERFORMED ON: ABNORMAL
POTASSIUM SERPL-SCNC: 3.7 MMOL/L (ref 3.5–5.1)
PROTHROMBIN TIME: 22.8 SEC (ref 12.1–14.9)
SODIUM SERPL-SCNC: 142 MMOL/L (ref 136–145)

## 2025-08-11 PROCEDURE — 2580000003 HC RX 258: Performed by: INTERNAL MEDICINE

## 2025-08-11 PROCEDURE — 6360000002 HC RX W HCPCS: Performed by: STUDENT IN AN ORGANIZED HEALTH CARE EDUCATION/TRAINING PROGRAM

## 2025-08-11 PROCEDURE — 94761 N-INVAS EAR/PLS OXIMETRY MLT: CPT

## 2025-08-11 PROCEDURE — 99233 SBSQ HOSP IP/OBS HIGH 50: CPT | Performed by: INTERNAL MEDICINE

## 2025-08-11 PROCEDURE — 94640 AIRWAY INHALATION TREATMENT: CPT

## 2025-08-11 PROCEDURE — 93306 TTE W/DOPPLER COMPLETE: CPT | Performed by: INTERNAL MEDICINE

## 2025-08-11 PROCEDURE — 82610 CYSTATIN C: CPT

## 2025-08-11 PROCEDURE — 99232 SBSQ HOSP IP/OBS MODERATE 35: CPT | Performed by: CLINICAL NURSE SPECIALIST

## 2025-08-11 PROCEDURE — G0545 PR INHERENT VISIT TO INPT: HCPCS | Performed by: INTERNAL MEDICINE

## 2025-08-11 PROCEDURE — 2500000003 HC RX 250 WO HCPCS: Performed by: STUDENT IN AN ORGANIZED HEALTH CARE EDUCATION/TRAINING PROGRAM

## 2025-08-11 PROCEDURE — 80048 BASIC METABOLIC PNL TOTAL CA: CPT

## 2025-08-11 PROCEDURE — 94660 CPAP INITIATION&MGMT: CPT

## 2025-08-11 PROCEDURE — 6360000004 HC RX CONTRAST MEDICATION: Performed by: INTERNAL MEDICINE

## 2025-08-11 PROCEDURE — C8929 TTE W OR WO FOL WCON,DOPPLER: HCPCS

## 2025-08-11 PROCEDURE — 83735 ASSAY OF MAGNESIUM: CPT

## 2025-08-11 PROCEDURE — 2060000000 HC ICU INTERMEDIATE R&B

## 2025-08-11 PROCEDURE — 99223 1ST HOSP IP/OBS HIGH 75: CPT | Performed by: INTERNAL MEDICINE

## 2025-08-11 PROCEDURE — 6370000000 HC RX 637 (ALT 250 FOR IP): Performed by: HOSPITALIST

## 2025-08-11 PROCEDURE — 2700000000 HC OXYGEN THERAPY PER DAY

## 2025-08-11 PROCEDURE — 85610 PROTHROMBIN TIME: CPT

## 2025-08-11 PROCEDURE — 6360000002 HC RX W HCPCS: Performed by: INTERNAL MEDICINE

## 2025-08-11 PROCEDURE — 6370000000 HC RX 637 (ALT 250 FOR IP): Performed by: STUDENT IN AN ORGANIZED HEALTH CARE EDUCATION/TRAINING PROGRAM

## 2025-08-11 PROCEDURE — 6370000000 HC RX 637 (ALT 250 FOR IP): Performed by: NURSE PRACTITIONER

## 2025-08-11 RX ORDER — TRAZODONE HYDROCHLORIDE 50 MG/1
TABLET ORAL
Qty: 90 TABLET | Refills: 2 | Status: SHIPPED | OUTPATIENT
Start: 2025-08-11

## 2025-08-11 RX ORDER — HYDROCODONE BITARTRATE AND ACETAMINOPHEN 5; 325 MG/1; MG/1
1 TABLET ORAL EVERY 6 HOURS PRN
Status: DISCONTINUED | OUTPATIENT
Start: 2025-08-11 | End: 2025-08-19 | Stop reason: HOSPADM

## 2025-08-11 RX ADMIN — PANTOPRAZOLE SODIUM 40 MG: 40 TABLET, DELAYED RELEASE ORAL at 17:54

## 2025-08-11 RX ADMIN — ACETAMINOPHEN 650 MG: 325 TABLET ORAL at 08:26

## 2025-08-11 RX ADMIN — FUROSEMIDE 10 MG/HR: 10 INJECTION, SOLUTION INTRAMUSCULAR; INTRAVENOUS at 22:05

## 2025-08-11 RX ADMIN — WARFARIN SODIUM 10 MG: 5 TABLET ORAL at 17:53

## 2025-08-11 RX ADMIN — SOTALOL HYDROCHLORIDE 80 MG: 80 TABLET ORAL at 08:26

## 2025-08-11 RX ADMIN — FUROSEMIDE 80 MG: 10 INJECTION, SOLUTION INTRAMUSCULAR; INTRAVENOUS at 08:25

## 2025-08-11 RX ADMIN — ASPIRIN 81 MG: 81 TABLET, CHEWABLE ORAL at 08:26

## 2025-08-11 RX ADMIN — HYDROCODONE BITARTRATE AND ACETAMINOPHEN 1 TABLET: 5; 325 TABLET ORAL at 17:53

## 2025-08-11 RX ADMIN — INSULIN LISPRO 2 UNITS: 100 INJECTION, SOLUTION INTRAVENOUS; SUBCUTANEOUS at 20:01

## 2025-08-11 RX ADMIN — METOLAZONE 2.5 MG: 2.5 TABLET ORAL at 08:26

## 2025-08-11 RX ADMIN — PRAVASTATIN SODIUM 80 MG: 80 TABLET ORAL at 08:26

## 2025-08-11 RX ADMIN — SULFUR HEXAFLUORIDE 2 ML: 60.7; .19; .19 INJECTION, POWDER, LYOPHILIZED, FOR SUSPENSION INTRAVENOUS; INTRAVESICAL at 10:41

## 2025-08-11 RX ADMIN — SODIUM CHLORIDE, PRESERVATIVE FREE 10 ML: 5 INJECTION INTRAVENOUS at 20:01

## 2025-08-11 RX ADMIN — HYDROCODONE BITARTRATE AND ACETAMINOPHEN 1 TABLET: 5; 325 TABLET ORAL at 10:38

## 2025-08-11 RX ADMIN — FUROSEMIDE 10 MG/HR: 10 INJECTION, SOLUTION INTRAMUSCULAR; INTRAVENOUS at 13:54

## 2025-08-11 RX ADMIN — TRAZODONE HYDROCHLORIDE 50 MG: 50 TABLET ORAL at 20:01

## 2025-08-11 RX ADMIN — SODIUM CHLORIDE, PRESERVATIVE FREE 10 ML: 5 INJECTION INTRAVENOUS at 08:26

## 2025-08-11 RX ADMIN — ALBUTEROL SULFATE 2.5 MG: 2.5 SOLUTION RESPIRATORY (INHALATION) at 12:15

## 2025-08-11 RX ADMIN — PANTOPRAZOLE SODIUM 40 MG: 40 TABLET, DELAYED RELEASE ORAL at 08:26

## 2025-08-11 RX ADMIN — ALBUTEROL SULFATE 2.5 MG: 2.5 SOLUTION RESPIRATORY (INHALATION) at 21:12

## 2025-08-11 RX ADMIN — DAPTOMYCIN 800 MG: 500 INJECTION, POWDER, LYOPHILIZED, FOR SOLUTION INTRAVENOUS at 13:52

## 2025-08-11 ASSESSMENT — PAIN - FUNCTIONAL ASSESSMENT
PAIN_FUNCTIONAL_ASSESSMENT: 0-10

## 2025-08-11 ASSESSMENT — PAIN DESCRIPTION - LOCATION
LOCATION: LEG

## 2025-08-11 ASSESSMENT — PAIN SCALES - GENERAL
PAINLEVEL_OUTOF10: 10
PAINLEVEL_OUTOF10: 6
PAINLEVEL_OUTOF10: 0
PAINLEVEL_OUTOF10: 0
PAINLEVEL_OUTOF10: 5
PAINLEVEL_OUTOF10: 10
PAINLEVEL_OUTOF10: 0

## 2025-08-11 ASSESSMENT — PAIN DESCRIPTION - DESCRIPTORS: DESCRIPTORS: STABBING

## 2025-08-11 ASSESSMENT — PAIN DESCRIPTION - ORIENTATION
ORIENTATION: LEFT

## 2025-08-12 LAB
ANION GAP SERPL CALCULATED.3IONS-SCNC: 12 MMOL/L (ref 3–16)
BUN SERPL-MCNC: 45 MG/DL (ref 7–20)
CALCIUM SERPL-MCNC: 9.4 MG/DL (ref 8.3–10.6)
CHLORIDE SERPL-SCNC: 94 MMOL/L (ref 99–110)
CO2 SERPL-SCNC: 33 MMOL/L (ref 21–32)
CREAT SERPL-MCNC: 3.09 MG/DL (ref 0.69–1.22)
CREAT SERPL-MCNC: 3.3 MG/DL (ref 0.8–1.3)
CYSTATIN C: 2.68 MG/L (ref 0.61–0.95)
EGFR BY CYSTATIN C: 20
GFR SERPLBLD CREATININE-BSD FMLA CKD-EPI: 19 ML/MIN/{1.73_M2}
GLUCOSE BLD-MCNC: 177 MG/DL (ref 70–99)
GLUCOSE BLD-MCNC: 179 MG/DL (ref 70–99)
GLUCOSE BLD-MCNC: 184 MG/DL (ref 70–99)
GLUCOSE BLD-MCNC: 204 MG/DL (ref 70–99)
GLUCOSE SERPL-MCNC: 155 MG/DL (ref 70–99)
INR PPP: 2.03 (ref 0.86–1.14)
MAGNESIUM SERPL-MCNC: 2.03 MG/DL (ref 1.8–2.4)
NT-PROBNP SERPL-MCNC: 4484 PG/ML (ref 0–449)
PERFORMED ON: ABNORMAL
POTASSIUM SERPL-SCNC: 3.9 MMOL/L (ref 3.5–5.1)
PROTHROMBIN TIME: 22.8 SEC (ref 12.1–14.9)
SODIUM SERPL-SCNC: 139 MMOL/L (ref 136–145)

## 2025-08-12 PROCEDURE — 99233 SBSQ HOSP IP/OBS HIGH 50: CPT | Performed by: HOSPITALIST

## 2025-08-12 PROCEDURE — 2580000003 HC RX 258: Performed by: INTERNAL MEDICINE

## 2025-08-12 PROCEDURE — 83880 ASSAY OF NATRIURETIC PEPTIDE: CPT

## 2025-08-12 PROCEDURE — 94640 AIRWAY INHALATION TREATMENT: CPT

## 2025-08-12 PROCEDURE — 97530 THERAPEUTIC ACTIVITIES: CPT

## 2025-08-12 PROCEDURE — 6370000000 HC RX 637 (ALT 250 FOR IP): Performed by: NURSE PRACTITIONER

## 2025-08-12 PROCEDURE — 2060000000 HC ICU INTERMEDIATE R&B

## 2025-08-12 PROCEDURE — 97535 SELF CARE MNGMENT TRAINING: CPT

## 2025-08-12 PROCEDURE — 97165 OT EVAL LOW COMPLEX 30 MIN: CPT

## 2025-08-12 PROCEDURE — 85610 PROTHROMBIN TIME: CPT

## 2025-08-12 PROCEDURE — 97161 PT EVAL LOW COMPLEX 20 MIN: CPT

## 2025-08-12 PROCEDURE — 2500000003 HC RX 250 WO HCPCS: Performed by: STUDENT IN AN ORGANIZED HEALTH CARE EDUCATION/TRAINING PROGRAM

## 2025-08-12 PROCEDURE — 6360000002 HC RX W HCPCS: Performed by: INTERNAL MEDICINE

## 2025-08-12 PROCEDURE — 6370000000 HC RX 637 (ALT 250 FOR IP): Performed by: STUDENT IN AN ORGANIZED HEALTH CARE EDUCATION/TRAINING PROGRAM

## 2025-08-12 PROCEDURE — 2700000000 HC OXYGEN THERAPY PER DAY

## 2025-08-12 PROCEDURE — 6360000002 HC RX W HCPCS: Performed by: STUDENT IN AN ORGANIZED HEALTH CARE EDUCATION/TRAINING PROGRAM

## 2025-08-12 PROCEDURE — 94761 N-INVAS EAR/PLS OXIMETRY MLT: CPT

## 2025-08-12 PROCEDURE — 83735 ASSAY OF MAGNESIUM: CPT

## 2025-08-12 PROCEDURE — G0545 PR INHERENT VISIT TO INPT: HCPCS | Performed by: INTERNAL MEDICINE

## 2025-08-12 PROCEDURE — 80048 BASIC METABOLIC PNL TOTAL CA: CPT

## 2025-08-12 PROCEDURE — 94660 CPAP INITIATION&MGMT: CPT

## 2025-08-12 PROCEDURE — 99233 SBSQ HOSP IP/OBS HIGH 50: CPT | Performed by: INTERNAL MEDICINE

## 2025-08-12 PROCEDURE — 6370000000 HC RX 637 (ALT 250 FOR IP): Performed by: HOSPITALIST

## 2025-08-12 PROCEDURE — 99232 SBSQ HOSP IP/OBS MODERATE 35: CPT | Performed by: CLINICAL NURSE SPECIALIST

## 2025-08-12 RX ADMIN — ASPIRIN 81 MG: 81 TABLET, CHEWABLE ORAL at 09:38

## 2025-08-12 RX ADMIN — HYDROCODONE BITARTRATE AND ACETAMINOPHEN 1 TABLET: 5; 325 TABLET ORAL at 00:33

## 2025-08-12 RX ADMIN — SOTALOL HYDROCHLORIDE 80 MG: 80 TABLET ORAL at 09:38

## 2025-08-12 RX ADMIN — WARFARIN SODIUM 5 MG: 5 TABLET ORAL at 17:37

## 2025-08-12 RX ADMIN — INSULIN LISPRO 1 UNITS: 100 INJECTION, SOLUTION INTRAVENOUS; SUBCUTANEOUS at 12:32

## 2025-08-12 RX ADMIN — SODIUM CHLORIDE, PRESERVATIVE FREE 10 ML: 5 INJECTION INTRAVENOUS at 09:38

## 2025-08-12 RX ADMIN — ALBUTEROL SULFATE 2.5 MG: 2.5 SOLUTION RESPIRATORY (INHALATION) at 20:54

## 2025-08-12 RX ADMIN — ALBUTEROL SULFATE 2.5 MG: 2.5 SOLUTION RESPIRATORY (INHALATION) at 08:20

## 2025-08-12 RX ADMIN — PRAVASTATIN SODIUM 80 MG: 80 TABLET ORAL at 09:38

## 2025-08-12 RX ADMIN — FUROSEMIDE 10 MG/HR: 10 INJECTION, SOLUTION INTRAMUSCULAR; INTRAVENOUS at 06:15

## 2025-08-12 RX ADMIN — TRAZODONE HYDROCHLORIDE 50 MG: 50 TABLET ORAL at 20:44

## 2025-08-12 RX ADMIN — PANTOPRAZOLE SODIUM 40 MG: 40 TABLET, DELAYED RELEASE ORAL at 15:59

## 2025-08-12 RX ADMIN — SODIUM CHLORIDE, PRESERVATIVE FREE 10 ML: 5 INJECTION INTRAVENOUS at 20:44

## 2025-08-12 RX ADMIN — INSULIN LISPRO 1 UNITS: 100 INJECTION, SOLUTION INTRAVENOUS; SUBCUTANEOUS at 17:37

## 2025-08-12 RX ADMIN — ONDANSETRON 4 MG: 2 INJECTION, SOLUTION INTRAMUSCULAR; INTRAVENOUS at 18:37

## 2025-08-12 RX ADMIN — PANTOPRAZOLE SODIUM 40 MG: 40 TABLET, DELAYED RELEASE ORAL at 06:17

## 2025-08-12 RX ADMIN — FUROSEMIDE 10 MG/HR: 10 INJECTION, SOLUTION INTRAMUSCULAR; INTRAVENOUS at 16:26

## 2025-08-12 RX ADMIN — HYDROCODONE BITARTRATE AND ACETAMINOPHEN 1 TABLET: 5; 325 TABLET ORAL at 06:18

## 2025-08-12 RX ADMIN — METOLAZONE 2.5 MG: 2.5 TABLET ORAL at 09:38

## 2025-08-12 ASSESSMENT — PAIN SCALES - GENERAL
PAINLEVEL_OUTOF10: 0
PAINLEVEL_OUTOF10: 7

## 2025-08-12 ASSESSMENT — PAIN - FUNCTIONAL ASSESSMENT: PAIN_FUNCTIONAL_ASSESSMENT: 0-10

## 2025-08-13 PROBLEM — R79.89 ELEVATED TROPONIN: Status: RESOLVED | Noted: 2025-06-02 | Resolved: 2025-08-13

## 2025-08-13 LAB
ANION GAP SERPL CALCULATED.3IONS-SCNC: 14 MMOL/L (ref 3–16)
BASOPHILS # BLD: 0.1 K/UL (ref 0–0.2)
BASOPHILS NFR BLD: 1 %
BUN SERPL-MCNC: 50 MG/DL (ref 7–20)
CALCIUM SERPL-MCNC: 9.5 MG/DL (ref 8.3–10.6)
CHLORIDE SERPL-SCNC: 90 MMOL/L (ref 99–110)
CO2 SERPL-SCNC: 34 MMOL/L (ref 21–32)
CREAT SERPL-MCNC: 3.4 MG/DL (ref 0.8–1.3)
DEPRECATED RDW RBC AUTO: 17 % (ref 12.4–15.4)
EOSINOPHIL # BLD: 0.1 K/UL (ref 0–0.6)
EOSINOPHIL NFR BLD: 1.2 %
GFR SERPLBLD CREATININE-BSD FMLA CKD-EPI: 18 ML/MIN/{1.73_M2}
GLUCOSE BLD-MCNC: 173 MG/DL (ref 70–99)
GLUCOSE BLD-MCNC: 180 MG/DL (ref 70–99)
GLUCOSE BLD-MCNC: 241 MG/DL (ref 70–99)
GLUCOSE BLD-MCNC: 277 MG/DL (ref 70–99)
GLUCOSE SERPL-MCNC: 189 MG/DL (ref 70–99)
HCT VFR BLD AUTO: 33.8 % (ref 40.5–52.5)
HGB BLD-MCNC: 11.2 G/DL (ref 13.5–17.5)
INR PPP: 1.98 (ref 0.86–1.14)
LYMPHOCYTES # BLD: 1.1 K/UL (ref 1–5.1)
LYMPHOCYTES NFR BLD: 18.3 %
MAGNESIUM SERPL-MCNC: 2.07 MG/DL (ref 1.8–2.4)
MCH RBC QN AUTO: 30.8 PG (ref 26–34)
MCHC RBC AUTO-ENTMCNC: 33 G/DL (ref 31–36)
MCV RBC AUTO: 93.2 FL (ref 80–100)
MONOCYTES # BLD: 0.8 K/UL (ref 0–1.3)
MONOCYTES NFR BLD: 13.4 %
NEUTROPHILS # BLD: 3.9 K/UL (ref 1.7–7.7)
NEUTROPHILS NFR BLD: 66.1 %
PERFORMED ON: ABNORMAL
PLATELET # BLD AUTO: 178 K/UL (ref 135–450)
PMV BLD AUTO: 9.1 FL (ref 5–10.5)
POTASSIUM SERPL-SCNC: 3.8 MMOL/L (ref 3.5–5.1)
PROTHROMBIN TIME: 22.4 SEC (ref 12.1–14.9)
RBC # BLD AUTO: 3.63 M/UL (ref 4.2–5.9)
SODIUM SERPL-SCNC: 138 MMOL/L (ref 136–145)
WBC # BLD AUTO: 5.9 K/UL (ref 4–11)

## 2025-08-13 PROCEDURE — 6370000000 HC RX 637 (ALT 250 FOR IP): Performed by: STUDENT IN AN ORGANIZED HEALTH CARE EDUCATION/TRAINING PROGRAM

## 2025-08-13 PROCEDURE — 99233 SBSQ HOSP IP/OBS HIGH 50: CPT | Performed by: HOSPITALIST

## 2025-08-13 PROCEDURE — 99232 SBSQ HOSP IP/OBS MODERATE 35: CPT | Performed by: CLINICAL NURSE SPECIALIST

## 2025-08-13 PROCEDURE — 2700000000 HC OXYGEN THERAPY PER DAY

## 2025-08-13 PROCEDURE — 99232 SBSQ HOSP IP/OBS MODERATE 35: CPT | Performed by: INTERNAL MEDICINE

## 2025-08-13 PROCEDURE — 85025 COMPLETE CBC W/AUTO DIFF WBC: CPT

## 2025-08-13 PROCEDURE — 80048 BASIC METABOLIC PNL TOTAL CA: CPT

## 2025-08-13 PROCEDURE — 6370000000 HC RX 637 (ALT 250 FOR IP): Performed by: HOSPITALIST

## 2025-08-13 PROCEDURE — 85610 PROTHROMBIN TIME: CPT

## 2025-08-13 PROCEDURE — 94640 AIRWAY INHALATION TREATMENT: CPT

## 2025-08-13 PROCEDURE — 2580000003 HC RX 258: Performed by: CLINICAL NURSE SPECIALIST

## 2025-08-13 PROCEDURE — 83735 ASSAY OF MAGNESIUM: CPT

## 2025-08-13 PROCEDURE — 6360000002 HC RX W HCPCS: Performed by: INTERNAL MEDICINE

## 2025-08-13 PROCEDURE — 6370000000 HC RX 637 (ALT 250 FOR IP): Performed by: NURSE PRACTITIONER

## 2025-08-13 PROCEDURE — 6360000002 HC RX W HCPCS: Performed by: CLINICAL NURSE SPECIALIST

## 2025-08-13 PROCEDURE — 2060000000 HC ICU INTERMEDIATE R&B

## 2025-08-13 PROCEDURE — 94761 N-INVAS EAR/PLS OXIMETRY MLT: CPT

## 2025-08-13 PROCEDURE — 2500000003 HC RX 250 WO HCPCS: Performed by: STUDENT IN AN ORGANIZED HEALTH CARE EDUCATION/TRAINING PROGRAM

## 2025-08-13 PROCEDURE — 2500000003 HC RX 250 WO HCPCS: Performed by: INTERNAL MEDICINE

## 2025-08-13 PROCEDURE — G0545 PR INHERENT VISIT TO INPT: HCPCS | Performed by: INTERNAL MEDICINE

## 2025-08-13 PROCEDURE — 2580000003 HC RX 258: Performed by: INTERNAL MEDICINE

## 2025-08-13 PROCEDURE — 6370000000 HC RX 637 (ALT 250 FOR IP): Performed by: INTERNAL MEDICINE

## 2025-08-13 PROCEDURE — 6360000002 HC RX W HCPCS: Performed by: STUDENT IN AN ORGANIZED HEALTH CARE EDUCATION/TRAINING PROGRAM

## 2025-08-13 RX ORDER — WARFARIN SODIUM 5 MG/1
10 TABLET ORAL
Status: DISCONTINUED | OUTPATIENT
Start: 2025-08-15 | End: 2025-08-15

## 2025-08-13 RX ADMIN — METOLAZONE 2.5 MG: 2.5 TABLET ORAL at 08:16

## 2025-08-13 RX ADMIN — TRAZODONE HYDROCHLORIDE 50 MG: 50 TABLET ORAL at 20:29

## 2025-08-13 RX ADMIN — INSULIN LISPRO 1 UNITS: 100 INJECTION, SOLUTION INTRAVENOUS; SUBCUTANEOUS at 20:29

## 2025-08-13 RX ADMIN — HYDROCODONE BITARTRATE AND ACETAMINOPHEN 1 TABLET: 5; 325 TABLET ORAL at 15:33

## 2025-08-13 RX ADMIN — FUROSEMIDE 10 MG/HR: 10 INJECTION, SOLUTION INTRAMUSCULAR; INTRAVENOUS at 02:54

## 2025-08-13 RX ADMIN — HYDROCODONE BITARTRATE AND ACETAMINOPHEN 1 TABLET: 5; 325 TABLET ORAL at 08:16

## 2025-08-13 RX ADMIN — PRAVASTATIN SODIUM 80 MG: 80 TABLET ORAL at 08:15

## 2025-08-13 RX ADMIN — PANTOPRAZOLE SODIUM 40 MG: 40 TABLET, DELAYED RELEASE ORAL at 06:24

## 2025-08-13 RX ADMIN — WARFARIN SODIUM 12.5 MG: 5 TABLET ORAL at 18:49

## 2025-08-13 RX ADMIN — ALBUTEROL SULFATE 2.5 MG: 2.5 SOLUTION RESPIRATORY (INHALATION) at 20:52

## 2025-08-13 RX ADMIN — HYDROCODONE BITARTRATE AND ACETAMINOPHEN 1 TABLET: 5; 325 TABLET ORAL at 02:48

## 2025-08-13 RX ADMIN — DAPTOMYCIN 800 MG: 500 INJECTION, POWDER, LYOPHILIZED, FOR SOLUTION INTRAVENOUS at 13:31

## 2025-08-13 RX ADMIN — SODIUM CHLORIDE, PRESERVATIVE FREE 10 ML: 5 INJECTION INTRAVENOUS at 20:29

## 2025-08-13 RX ADMIN — SODIUM CHLORIDE, PRESERVATIVE FREE 10 ML: 5 INJECTION INTRAVENOUS at 08:16

## 2025-08-13 RX ADMIN — ALBUTEROL SULFATE 2.5 MG: 2.5 SOLUTION RESPIRATORY (INHALATION) at 09:15

## 2025-08-13 RX ADMIN — ASPIRIN 81 MG: 81 TABLET, CHEWABLE ORAL at 08:15

## 2025-08-13 RX ADMIN — FUROSEMIDE 5 MG/HR: 10 INJECTION, SOLUTION INTRAMUSCULAR; INTRAVENOUS at 13:33

## 2025-08-13 RX ADMIN — PANTOPRAZOLE SODIUM 40 MG: 40 TABLET, DELAYED RELEASE ORAL at 15:33

## 2025-08-13 RX ADMIN — SOTALOL HYDROCHLORIDE 80 MG: 80 TABLET ORAL at 08:16

## 2025-08-13 RX ADMIN — INSULIN LISPRO 1 UNITS: 100 INJECTION, SOLUTION INTRAVENOUS; SUBCUTANEOUS at 18:27

## 2025-08-13 RX ADMIN — INSULIN LISPRO 2 UNITS: 100 INJECTION, SOLUTION INTRAVENOUS; SUBCUTANEOUS at 11:51

## 2025-08-13 ASSESSMENT — PAIN SCALES - GENERAL
PAINLEVEL_OUTOF10: 6
PAINLEVEL_OUTOF10: 9
PAINLEVEL_OUTOF10: 8
PAINLEVEL_OUTOF10: 9
PAINLEVEL_OUTOF10: 9

## 2025-08-13 ASSESSMENT — PAIN DESCRIPTION - LOCATION: LOCATION: LEG

## 2025-08-13 ASSESSMENT — PAIN - FUNCTIONAL ASSESSMENT
PAIN_FUNCTIONAL_ASSESSMENT: 0-10
PAIN_FUNCTIONAL_ASSESSMENT: ACTIVITIES ARE NOT PREVENTED
PAIN_FUNCTIONAL_ASSESSMENT: 0-10

## 2025-08-13 ASSESSMENT — PAIN DESCRIPTION - DESCRIPTORS: DESCRIPTORS: ACHING

## 2025-08-13 ASSESSMENT — PAIN DESCRIPTION - ORIENTATION: ORIENTATION: LEFT

## 2025-08-14 LAB
ANION GAP SERPL CALCULATED.3IONS-SCNC: 15 MMOL/L (ref 3–16)
BASOPHILS # BLD: 0 K/UL (ref 0–0.2)
BASOPHILS NFR BLD: 0.7 %
BUN SERPL-MCNC: 59 MG/DL (ref 7–20)
CALCIUM SERPL-MCNC: 9.6 MG/DL (ref 8.3–10.6)
CHLORIDE SERPL-SCNC: 88 MMOL/L (ref 99–110)
CO2 SERPL-SCNC: 33 MMOL/L (ref 21–32)
CREAT SERPL-MCNC: 3.9 MG/DL (ref 0.8–1.3)
DEPRECATED RDW RBC AUTO: 16.8 % (ref 12.4–15.4)
EOSINOPHIL # BLD: 0.1 K/UL (ref 0–0.6)
EOSINOPHIL NFR BLD: 1.4 %
GFR SERPLBLD CREATININE-BSD FMLA CKD-EPI: 15 ML/MIN/{1.73_M2}
GLUCOSE BLD-MCNC: 181 MG/DL (ref 70–99)
GLUCOSE BLD-MCNC: 219 MG/DL (ref 70–99)
GLUCOSE BLD-MCNC: 221 MG/DL (ref 70–99)
GLUCOSE BLD-MCNC: 223 MG/DL (ref 70–99)
GLUCOSE SERPL-MCNC: 244 MG/DL (ref 70–99)
HCT VFR BLD AUTO: 35 % (ref 40.5–52.5)
HGB BLD-MCNC: 11.4 G/DL (ref 13.5–17.5)
INR PPP: 2.12 (ref 0.86–1.14)
LYMPHOCYTES # BLD: 1.1 K/UL (ref 1–5.1)
LYMPHOCYTES NFR BLD: 18.5 %
MCH RBC QN AUTO: 30.5 PG (ref 26–34)
MCHC RBC AUTO-ENTMCNC: 32.7 G/DL (ref 31–36)
MCV RBC AUTO: 93.2 FL (ref 80–100)
MONOCYTES # BLD: 0.8 K/UL (ref 0–1.3)
MONOCYTES NFR BLD: 13 %
NEUTROPHILS # BLD: 3.9 K/UL (ref 1.7–7.7)
NEUTROPHILS NFR BLD: 66.4 %
NT-PROBNP SERPL-MCNC: 3237 PG/ML (ref 0–449)
PERFORMED ON: ABNORMAL
PLATELET # BLD AUTO: 184 K/UL (ref 135–450)
PMV BLD AUTO: 9.3 FL (ref 5–10.5)
POTASSIUM SERPL-SCNC: 3.7 MMOL/L (ref 3.5–5.1)
PROTHROMBIN TIME: 23.6 SEC (ref 12.1–14.9)
RBC # BLD AUTO: 3.75 M/UL (ref 4.2–5.9)
SODIUM SERPL-SCNC: 136 MMOL/L (ref 136–145)
WBC # BLD AUTO: 5.9 K/UL (ref 4–11)

## 2025-08-14 PROCEDURE — 6370000000 HC RX 637 (ALT 250 FOR IP): Performed by: NURSE PRACTITIONER

## 2025-08-14 PROCEDURE — 6360000002 HC RX W HCPCS: Performed by: STUDENT IN AN ORGANIZED HEALTH CARE EDUCATION/TRAINING PROGRAM

## 2025-08-14 PROCEDURE — 85610 PROTHROMBIN TIME: CPT

## 2025-08-14 PROCEDURE — 2500000003 HC RX 250 WO HCPCS: Performed by: STUDENT IN AN ORGANIZED HEALTH CARE EDUCATION/TRAINING PROGRAM

## 2025-08-14 PROCEDURE — G0545 PR INHERENT VISIT TO INPT: HCPCS | Performed by: INTERNAL MEDICINE

## 2025-08-14 PROCEDURE — 83880 ASSAY OF NATRIURETIC PEPTIDE: CPT

## 2025-08-14 PROCEDURE — 99233 SBSQ HOSP IP/OBS HIGH 50: CPT | Performed by: HOSPITALIST

## 2025-08-14 PROCEDURE — 99232 SBSQ HOSP IP/OBS MODERATE 35: CPT | Performed by: INTERNAL MEDICINE

## 2025-08-14 PROCEDURE — 6370000000 HC RX 637 (ALT 250 FOR IP): Performed by: INTERNAL MEDICINE

## 2025-08-14 PROCEDURE — 2700000000 HC OXYGEN THERAPY PER DAY

## 2025-08-14 PROCEDURE — 6370000000 HC RX 637 (ALT 250 FOR IP): Performed by: STUDENT IN AN ORGANIZED HEALTH CARE EDUCATION/TRAINING PROGRAM

## 2025-08-14 PROCEDURE — 94761 N-INVAS EAR/PLS OXIMETRY MLT: CPT

## 2025-08-14 PROCEDURE — 80048 BASIC METABOLIC PNL TOTAL CA: CPT

## 2025-08-14 PROCEDURE — 2060000000 HC ICU INTERMEDIATE R&B

## 2025-08-14 PROCEDURE — 2580000003 HC RX 258: Performed by: CLINICAL NURSE SPECIALIST

## 2025-08-14 PROCEDURE — 94640 AIRWAY INHALATION TREATMENT: CPT

## 2025-08-14 PROCEDURE — 97110 THERAPEUTIC EXERCISES: CPT

## 2025-08-14 PROCEDURE — 97116 GAIT TRAINING THERAPY: CPT

## 2025-08-14 PROCEDURE — 85025 COMPLETE CBC W/AUTO DIFF WBC: CPT

## 2025-08-14 PROCEDURE — 99232 SBSQ HOSP IP/OBS MODERATE 35: CPT | Performed by: NURSE PRACTITIONER

## 2025-08-14 PROCEDURE — 6360000002 HC RX W HCPCS: Performed by: CLINICAL NURSE SPECIALIST

## 2025-08-14 RX ORDER — TRAZODONE HYDROCHLORIDE 50 MG/1
100 TABLET ORAL NIGHTLY
Status: DISCONTINUED | OUTPATIENT
Start: 2025-08-14 | End: 2025-08-19 | Stop reason: HOSPADM

## 2025-08-14 RX ADMIN — FUROSEMIDE 5 MG/HR: 10 INJECTION, SOLUTION INTRAMUSCULAR; INTRAVENOUS at 09:05

## 2025-08-14 RX ADMIN — TRAZODONE HYDROCHLORIDE 100 MG: 50 TABLET ORAL at 20:39

## 2025-08-14 RX ADMIN — MELATONIN TAB 3 MG 6 MG: 3 TAB at 00:26

## 2025-08-14 RX ADMIN — ALBUTEROL SULFATE 2.5 MG: 2.5 SOLUTION RESPIRATORY (INHALATION) at 20:22

## 2025-08-14 RX ADMIN — HYDROCODONE BITARTRATE AND ACETAMINOPHEN 1 TABLET: 5; 325 TABLET ORAL at 06:24

## 2025-08-14 RX ADMIN — PRAVASTATIN SODIUM 80 MG: 80 TABLET ORAL at 08:43

## 2025-08-14 RX ADMIN — INSULIN LISPRO 1 UNITS: 100 INJECTION, SOLUTION INTRAVENOUS; SUBCUTANEOUS at 08:43

## 2025-08-14 RX ADMIN — ONDANSETRON 4 MG: 2 INJECTION, SOLUTION INTRAMUSCULAR; INTRAVENOUS at 19:35

## 2025-08-14 RX ADMIN — HYDROCODONE BITARTRATE AND ACETAMINOPHEN 1 TABLET: 5; 325 TABLET ORAL at 23:05

## 2025-08-14 RX ADMIN — SODIUM CHLORIDE, PRESERVATIVE FREE 10 ML: 5 INJECTION INTRAVENOUS at 19:35

## 2025-08-14 RX ADMIN — INSULIN LISPRO 1 UNITS: 100 INJECTION, SOLUTION INTRAVENOUS; SUBCUTANEOUS at 20:39

## 2025-08-14 RX ADMIN — SODIUM CHLORIDE, PRESERVATIVE FREE 10 ML: 5 INJECTION INTRAVENOUS at 08:44

## 2025-08-14 RX ADMIN — SOTALOL HYDROCHLORIDE 80 MG: 80 TABLET ORAL at 08:43

## 2025-08-14 RX ADMIN — WARFARIN SODIUM 5 MG: 5 TABLET ORAL at 17:01

## 2025-08-14 RX ADMIN — SODIUM CHLORIDE, PRESERVATIVE FREE 10 ML: 5 INJECTION INTRAVENOUS at 20:39

## 2025-08-14 RX ADMIN — ASPIRIN 81 MG: 81 TABLET, CHEWABLE ORAL at 08:43

## 2025-08-14 RX ADMIN — PANTOPRAZOLE SODIUM 40 MG: 40 TABLET, DELAYED RELEASE ORAL at 06:24

## 2025-08-14 RX ADMIN — INSULIN LISPRO 1 UNITS: 100 INJECTION, SOLUTION INTRAVENOUS; SUBCUTANEOUS at 17:00

## 2025-08-14 RX ADMIN — PANTOPRAZOLE SODIUM 40 MG: 40 TABLET, DELAYED RELEASE ORAL at 17:01

## 2025-08-14 RX ADMIN — HYDROCODONE BITARTRATE AND ACETAMINOPHEN 1 TABLET: 5; 325 TABLET ORAL at 00:26

## 2025-08-14 ASSESSMENT — PAIN SCALES - GENERAL
PAINLEVEL_OUTOF10: 7
PAINLEVEL_OUTOF10: 0
PAINLEVEL_OUTOF10: 0
PAINLEVEL_OUTOF10: 7
PAINLEVEL_OUTOF10: 4
PAINLEVEL_OUTOF10: 9

## 2025-08-14 ASSESSMENT — PAIN DESCRIPTION - DESCRIPTORS
DESCRIPTORS: STABBING

## 2025-08-14 ASSESSMENT — PAIN DESCRIPTION - LOCATION
LOCATION: LEG

## 2025-08-14 ASSESSMENT — PAIN - FUNCTIONAL ASSESSMENT: PAIN_FUNCTIONAL_ASSESSMENT: 0-10

## 2025-08-14 ASSESSMENT — PAIN DESCRIPTION - ORIENTATION
ORIENTATION: LEFT

## 2025-08-15 LAB
ANION GAP SERPL CALCULATED.3IONS-SCNC: 13 MMOL/L (ref 3–16)
BASOPHILS # BLD: 0 K/UL (ref 0–0.2)
BASOPHILS NFR BLD: 0.7 %
BUN SERPL-MCNC: 63 MG/DL (ref 7–20)
CALCIUM SERPL-MCNC: 9.3 MG/DL (ref 8.3–10.6)
CHLORIDE SERPL-SCNC: 88 MMOL/L (ref 99–110)
CO2 SERPL-SCNC: 35 MMOL/L (ref 21–32)
CREAT SERPL-MCNC: 3.8 MG/DL (ref 0.8–1.3)
DEPRECATED RDW RBC AUTO: 17.1 % (ref 12.4–15.4)
EOSINOPHIL # BLD: 0.1 K/UL (ref 0–0.6)
EOSINOPHIL NFR BLD: 1.4 %
GFR SERPLBLD CREATININE-BSD FMLA CKD-EPI: 16 ML/MIN/{1.73_M2}
GLUCOSE BLD-MCNC: 179 MG/DL (ref 70–99)
GLUCOSE BLD-MCNC: 203 MG/DL (ref 70–99)
GLUCOSE BLD-MCNC: 222 MG/DL (ref 70–99)
GLUCOSE BLD-MCNC: 224 MG/DL (ref 70–99)
GLUCOSE SERPL-MCNC: 171 MG/DL (ref 70–99)
HCT VFR BLD AUTO: 32.2 % (ref 40.5–52.5)
HGB BLD-MCNC: 10.6 G/DL (ref 13.5–17.5)
INR PPP: 2.41 (ref 0.86–1.14)
LYMPHOCYTES # BLD: 1 K/UL (ref 1–5.1)
LYMPHOCYTES NFR BLD: 18.7 %
MCH RBC QN AUTO: 30.6 PG (ref 26–34)
MCHC RBC AUTO-ENTMCNC: 32.9 G/DL (ref 31–36)
MCV RBC AUTO: 92.9 FL (ref 80–100)
MONOCYTES # BLD: 0.8 K/UL (ref 0–1.3)
MONOCYTES NFR BLD: 14.7 %
NEUTROPHILS # BLD: 3.3 K/UL (ref 1.7–7.7)
NEUTROPHILS NFR BLD: 64.5 %
PERFORMED ON: ABNORMAL
PLATELET # BLD AUTO: 163 K/UL (ref 135–450)
PMV BLD AUTO: 9 FL (ref 5–10.5)
POTASSIUM SERPL-SCNC: 3.5 MMOL/L (ref 3.5–5.1)
PROTHROMBIN TIME: 25.9 SEC (ref 12.1–14.9)
RBC # BLD AUTO: 3.47 M/UL (ref 4.2–5.9)
SODIUM SERPL-SCNC: 136 MMOL/L (ref 136–145)
WBC # BLD AUTO: 5.2 K/UL (ref 4–11)

## 2025-08-15 PROCEDURE — 6360000002 HC RX W HCPCS: Performed by: INTERNAL MEDICINE

## 2025-08-15 PROCEDURE — 6370000000 HC RX 637 (ALT 250 FOR IP): Performed by: INTERNAL MEDICINE

## 2025-08-15 PROCEDURE — 36592 COLLECT BLOOD FROM PICC: CPT

## 2025-08-15 PROCEDURE — 99232 SBSQ HOSP IP/OBS MODERATE 35: CPT | Performed by: INTERNAL MEDICINE

## 2025-08-15 PROCEDURE — 6360000002 HC RX W HCPCS: Performed by: STUDENT IN AN ORGANIZED HEALTH CARE EDUCATION/TRAINING PROGRAM

## 2025-08-15 PROCEDURE — 6370000000 HC RX 637 (ALT 250 FOR IP): Performed by: HOSPITALIST

## 2025-08-15 PROCEDURE — 85025 COMPLETE CBC W/AUTO DIFF WBC: CPT

## 2025-08-15 PROCEDURE — 94761 N-INVAS EAR/PLS OXIMETRY MLT: CPT

## 2025-08-15 PROCEDURE — 94660 CPAP INITIATION&MGMT: CPT

## 2025-08-15 PROCEDURE — 2060000000 HC ICU INTERMEDIATE R&B

## 2025-08-15 PROCEDURE — 2500000003 HC RX 250 WO HCPCS: Performed by: INTERNAL MEDICINE

## 2025-08-15 PROCEDURE — 2500000003 HC RX 250 WO HCPCS: Performed by: STUDENT IN AN ORGANIZED HEALTH CARE EDUCATION/TRAINING PROGRAM

## 2025-08-15 PROCEDURE — 85610 PROTHROMBIN TIME: CPT

## 2025-08-15 PROCEDURE — 94640 AIRWAY INHALATION TREATMENT: CPT

## 2025-08-15 PROCEDURE — 99232 SBSQ HOSP IP/OBS MODERATE 35: CPT | Performed by: NURSE PRACTITIONER

## 2025-08-15 PROCEDURE — 5A0935A ASSISTANCE WITH RESPIRATORY VENTILATION, LESS THAN 24 CONSECUTIVE HOURS, HIGH NASAL FLOW/VELOCITY: ICD-10-PCS | Performed by: STUDENT IN AN ORGANIZED HEALTH CARE EDUCATION/TRAINING PROGRAM

## 2025-08-15 PROCEDURE — 6370000000 HC RX 637 (ALT 250 FOR IP): Performed by: STUDENT IN AN ORGANIZED HEALTH CARE EDUCATION/TRAINING PROGRAM

## 2025-08-15 PROCEDURE — 99233 SBSQ HOSP IP/OBS HIGH 50: CPT | Performed by: HOSPITALIST

## 2025-08-15 PROCEDURE — 80048 BASIC METABOLIC PNL TOTAL CA: CPT

## 2025-08-15 PROCEDURE — 6370000000 HC RX 637 (ALT 250 FOR IP): Performed by: NURSE PRACTITIONER

## 2025-08-15 PROCEDURE — 2700000000 HC OXYGEN THERAPY PER DAY

## 2025-08-15 RX ORDER — WARFARIN SODIUM 5 MG/1
5 TABLET ORAL DAILY
Status: DISCONTINUED | OUTPATIENT
Start: 2025-08-15 | End: 2025-08-19 | Stop reason: HOSPADM

## 2025-08-15 RX ORDER — METOPROLOL SUCCINATE 25 MG/1
12.5 TABLET, EXTENDED RELEASE ORAL 2 TIMES DAILY
Status: DISCONTINUED | OUTPATIENT
Start: 2025-08-16 | End: 2025-08-19 | Stop reason: HOSPADM

## 2025-08-15 RX ORDER — TORSEMIDE 20 MG/1
50 TABLET ORAL DAILY
Status: DISCONTINUED | OUTPATIENT
Start: 2025-08-15 | End: 2025-08-17

## 2025-08-15 RX ADMIN — TRAZODONE HYDROCHLORIDE 100 MG: 50 TABLET ORAL at 20:38

## 2025-08-15 RX ADMIN — WARFARIN SODIUM 5 MG: 5 TABLET ORAL at 18:44

## 2025-08-15 RX ADMIN — ALBUTEROL SULFATE 2.5 MG: 2.5 SOLUTION RESPIRATORY (INHALATION) at 20:55

## 2025-08-15 RX ADMIN — TORSEMIDE 50 MG: 20 TABLET ORAL at 12:39

## 2025-08-15 RX ADMIN — DAPTOMYCIN 800 MG: 500 INJECTION, POWDER, LYOPHILIZED, FOR SOLUTION INTRAVENOUS at 15:28

## 2025-08-15 RX ADMIN — ASPIRIN 81 MG: 81 TABLET, CHEWABLE ORAL at 08:14

## 2025-08-15 RX ADMIN — SODIUM CHLORIDE, PRESERVATIVE FREE 10 ML: 5 INJECTION INTRAVENOUS at 08:14

## 2025-08-15 RX ADMIN — ALBUTEROL SULFATE 2.5 MG: 2.5 SOLUTION RESPIRATORY (INHALATION) at 07:50

## 2025-08-15 RX ADMIN — INSULIN LISPRO 1 UNITS: 100 INJECTION, SOLUTION INTRAVENOUS; SUBCUTANEOUS at 20:51

## 2025-08-15 RX ADMIN — INSULIN LISPRO 1 UNITS: 100 INJECTION, SOLUTION INTRAVENOUS; SUBCUTANEOUS at 18:44

## 2025-08-15 RX ADMIN — PANTOPRAZOLE SODIUM 40 MG: 40 TABLET, DELAYED RELEASE ORAL at 18:44

## 2025-08-15 RX ADMIN — HYDROCODONE BITARTRATE AND ACETAMINOPHEN 1 TABLET: 5; 325 TABLET ORAL at 13:31

## 2025-08-15 RX ADMIN — SODIUM CHLORIDE, PRESERVATIVE FREE 10 ML: 5 INJECTION INTRAVENOUS at 20:38

## 2025-08-15 RX ADMIN — PANTOPRAZOLE SODIUM 40 MG: 40 TABLET, DELAYED RELEASE ORAL at 08:14

## 2025-08-15 RX ADMIN — SOTALOL HYDROCHLORIDE 80 MG: 80 TABLET ORAL at 08:14

## 2025-08-15 RX ADMIN — INSULIN LISPRO 1 UNITS: 100 INJECTION, SOLUTION INTRAVENOUS; SUBCUTANEOUS at 12:39

## 2025-08-15 RX ADMIN — PRAVASTATIN SODIUM 80 MG: 80 TABLET ORAL at 08:14

## 2025-08-15 RX ADMIN — HYDROCODONE BITARTRATE AND ACETAMINOPHEN 1 TABLET: 5; 325 TABLET ORAL at 18:43

## 2025-08-15 RX ADMIN — HYDROCODONE BITARTRATE AND ACETAMINOPHEN 1 TABLET: 5; 325 TABLET ORAL at 08:14

## 2025-08-15 ASSESSMENT — PAIN SCALES - GENERAL
PAINLEVEL_OUTOF10: 7
PAINLEVEL_OUTOF10: 0
PAINLEVEL_OUTOF10: 9
PAINLEVEL_OUTOF10: 5
PAINLEVEL_OUTOF10: 5
PAINLEVEL_OUTOF10: 6

## 2025-08-15 ASSESSMENT — PAIN DESCRIPTION - ORIENTATION
ORIENTATION: LEFT

## 2025-08-15 ASSESSMENT — PAIN DESCRIPTION - LOCATION
LOCATION: LEG

## 2025-08-15 ASSESSMENT — PAIN - FUNCTIONAL ASSESSMENT
PAIN_FUNCTIONAL_ASSESSMENT: 0-10

## 2025-08-16 LAB
ANION GAP SERPL CALCULATED.3IONS-SCNC: 16 MMOL/L (ref 3–16)
BACTERIA URNS QL MICRO: NORMAL /HPF
BILIRUB UR QL STRIP.AUTO: NEGATIVE
BUN SERPL-MCNC: 70 MG/DL (ref 7–20)
CALCIUM SERPL-MCNC: 9.6 MG/DL (ref 8.3–10.6)
CHLORIDE SERPL-SCNC: 88 MMOL/L (ref 99–110)
CLARITY UR: CLEAR
CO2 SERPL-SCNC: 33 MMOL/L (ref 21–32)
COLOR UR: YELLOW
CREAT SERPL-MCNC: 4.3 MG/DL (ref 0.8–1.3)
CREAT UR-MCNC: 58.7 MG/DL (ref 39–259)
EPI CELLS #/AREA URNS AUTO: 0 /HPF (ref 0–5)
GFR SERPLBLD CREATININE-BSD FMLA CKD-EPI: 14 ML/MIN/{1.73_M2}
GLUCOSE BLD-MCNC: 193 MG/DL (ref 70–99)
GLUCOSE BLD-MCNC: 213 MG/DL (ref 70–99)
GLUCOSE BLD-MCNC: 246 MG/DL (ref 70–99)
GLUCOSE BLD-MCNC: 264 MG/DL (ref 70–99)
GLUCOSE SERPL-MCNC: 209 MG/DL (ref 70–99)
GLUCOSE UR STRIP.AUTO-MCNC: NEGATIVE MG/DL
HGB UR QL STRIP.AUTO: NEGATIVE
HYALINE CASTS #/AREA URNS AUTO: 1 /LPF (ref 0–8)
INR PPP: 2.46 (ref 0.86–1.14)
KETONES UR STRIP.AUTO-MCNC: NEGATIVE MG/DL
LEUKOCYTE ESTERASE UR QL STRIP.AUTO: NEGATIVE
NITRITE UR QL STRIP.AUTO: NEGATIVE
PERFORMED ON: ABNORMAL
PH UR STRIP.AUTO: 6 [PH] (ref 5–8)
POTASSIUM SERPL-SCNC: 3.8 MMOL/L (ref 3.5–5.1)
PROT UR STRIP.AUTO-MCNC: NEGATIVE MG/DL
PROT UR-MCNC: 11.6 MG/DL
PROT/CREAT UR-RTO: 0.2 MG/DL
PROTHROMBIN TIME: 26.3 SEC (ref 12.1–14.9)
RBC CLUMPS #/AREA URNS AUTO: 0 /HPF (ref 0–4)
SODIUM SERPL-SCNC: 137 MMOL/L (ref 136–145)
SP GR UR STRIP.AUTO: 1.01 (ref 1–1.03)
UA DIPSTICK W REFLEX MICRO PNL UR: NORMAL
URN SPEC COLLECT METH UR: NORMAL
UROBILINOGEN UR STRIP-ACNC: 1 E.U./DL
WBC #/AREA URNS AUTO: 0 /HPF (ref 0–5)

## 2025-08-16 PROCEDURE — 94640 AIRWAY INHALATION TREATMENT: CPT

## 2025-08-16 PROCEDURE — 81001 URINALYSIS AUTO W/SCOPE: CPT

## 2025-08-16 PROCEDURE — 6370000000 HC RX 637 (ALT 250 FOR IP): Performed by: INTERNAL MEDICINE

## 2025-08-16 PROCEDURE — 99233 SBSQ HOSP IP/OBS HIGH 50: CPT | Performed by: INTERNAL MEDICINE

## 2025-08-16 PROCEDURE — 2500000003 HC RX 250 WO HCPCS: Performed by: STUDENT IN AN ORGANIZED HEALTH CARE EDUCATION/TRAINING PROGRAM

## 2025-08-16 PROCEDURE — 85610 PROTHROMBIN TIME: CPT

## 2025-08-16 PROCEDURE — 80048 BASIC METABOLIC PNL TOTAL CA: CPT

## 2025-08-16 PROCEDURE — 2060000000 HC ICU INTERMEDIATE R&B

## 2025-08-16 PROCEDURE — 6370000000 HC RX 637 (ALT 250 FOR IP): Performed by: HOSPITALIST

## 2025-08-16 PROCEDURE — 82570 ASSAY OF URINE CREATININE: CPT

## 2025-08-16 PROCEDURE — 82043 UR ALBUMIN QUANTITATIVE: CPT

## 2025-08-16 PROCEDURE — 6360000002 HC RX W HCPCS: Performed by: STUDENT IN AN ORGANIZED HEALTH CARE EDUCATION/TRAINING PROGRAM

## 2025-08-16 PROCEDURE — 99232 SBSQ HOSP IP/OBS MODERATE 35: CPT | Performed by: INTERNAL MEDICINE

## 2025-08-16 PROCEDURE — 2700000000 HC OXYGEN THERAPY PER DAY

## 2025-08-16 PROCEDURE — 94761 N-INVAS EAR/PLS OXIMETRY MLT: CPT

## 2025-08-16 PROCEDURE — 6370000000 HC RX 637 (ALT 250 FOR IP): Performed by: STUDENT IN AN ORGANIZED HEALTH CARE EDUCATION/TRAINING PROGRAM

## 2025-08-16 PROCEDURE — 84156 ASSAY OF PROTEIN URINE: CPT

## 2025-08-16 PROCEDURE — 6370000000 HC RX 637 (ALT 250 FOR IP): Performed by: NURSE PRACTITIONER

## 2025-08-16 RX ADMIN — WARFARIN SODIUM 5 MG: 5 TABLET ORAL at 17:17

## 2025-08-16 RX ADMIN — PRAVASTATIN SODIUM 80 MG: 80 TABLET ORAL at 09:09

## 2025-08-16 RX ADMIN — INSULIN LISPRO 1 UNITS: 100 INJECTION, SOLUTION INTRAVENOUS; SUBCUTANEOUS at 09:10

## 2025-08-16 RX ADMIN — SODIUM CHLORIDE, PRESERVATIVE FREE 10 ML: 5 INJECTION INTRAVENOUS at 20:07

## 2025-08-16 RX ADMIN — TORSEMIDE 50 MG: 20 TABLET ORAL at 09:09

## 2025-08-16 RX ADMIN — INSULIN LISPRO 1 UNITS: 100 INJECTION, SOLUTION INTRAVENOUS; SUBCUTANEOUS at 12:56

## 2025-08-16 RX ADMIN — INSULIN LISPRO 1 UNITS: 100 INJECTION, SOLUTION INTRAVENOUS; SUBCUTANEOUS at 17:17

## 2025-08-16 RX ADMIN — ASPIRIN 81 MG: 81 TABLET, CHEWABLE ORAL at 09:09

## 2025-08-16 RX ADMIN — HYDROCODONE BITARTRATE AND ACETAMINOPHEN 1 TABLET: 5; 325 TABLET ORAL at 13:56

## 2025-08-16 RX ADMIN — TRAZODONE HYDROCHLORIDE 100 MG: 50 TABLET ORAL at 20:07

## 2025-08-16 RX ADMIN — METOPROLOL SUCCINATE 12.5 MG: 25 TABLET, EXTENDED RELEASE ORAL at 09:09

## 2025-08-16 RX ADMIN — PANTOPRAZOLE SODIUM 40 MG: 40 TABLET, DELAYED RELEASE ORAL at 17:17

## 2025-08-16 RX ADMIN — METOPROLOL SUCCINATE 12.5 MG: 25 TABLET, EXTENDED RELEASE ORAL at 20:07

## 2025-08-16 RX ADMIN — INSULIN LISPRO 2 UNITS: 100 INJECTION, SOLUTION INTRAVENOUS; SUBCUTANEOUS at 20:06

## 2025-08-16 RX ADMIN — PANTOPRAZOLE SODIUM 40 MG: 40 TABLET, DELAYED RELEASE ORAL at 05:16

## 2025-08-16 RX ADMIN — HYDROCODONE BITARTRATE AND ACETAMINOPHEN 1 TABLET: 5; 325 TABLET ORAL at 05:16

## 2025-08-16 RX ADMIN — ALBUTEROL SULFATE 2.5 MG: 2.5 SOLUTION RESPIRATORY (INHALATION) at 20:22

## 2025-08-16 RX ADMIN — ALBUTEROL SULFATE 2.5 MG: 2.5 SOLUTION RESPIRATORY (INHALATION) at 08:30

## 2025-08-16 RX ADMIN — SODIUM CHLORIDE, PRESERVATIVE FREE 10 ML: 5 INJECTION INTRAVENOUS at 09:09

## 2025-08-16 ASSESSMENT — PAIN SCALES - GENERAL
PAINLEVEL_OUTOF10: 0
PAINLEVEL_OUTOF10: 5
PAINLEVEL_OUTOF10: 7
PAINLEVEL_OUTOF10: 0
PAINLEVEL_OUTOF10: 7

## 2025-08-16 ASSESSMENT — PAIN SCALES - WONG BAKER: WONGBAKER_NUMERICALRESPONSE: NO HURT

## 2025-08-16 ASSESSMENT — PAIN DESCRIPTION - ORIENTATION
ORIENTATION: LEFT;OUTER
ORIENTATION: RIGHT;LEFT

## 2025-08-16 ASSESSMENT — PAIN DESCRIPTION - LOCATION
LOCATION: LEG
LOCATION: LEG

## 2025-08-16 ASSESSMENT — PAIN - FUNCTIONAL ASSESSMENT
PAIN_FUNCTIONAL_ASSESSMENT: 0-10

## 2025-08-16 ASSESSMENT — PAIN DESCRIPTION - DESCRIPTORS
DESCRIPTORS: SORE;ACHING
DESCRIPTORS: ACHING

## 2025-08-16 ASSESSMENT — PAIN DESCRIPTION - PAIN TYPE: TYPE: ACUTE PAIN

## 2025-08-17 LAB
ANION GAP SERPL CALCULATED.3IONS-SCNC: 17 MMOL/L (ref 3–16)
BUN SERPL-MCNC: 73 MG/DL (ref 7–20)
CALCIUM SERPL-MCNC: 9.5 MG/DL (ref 8.3–10.6)
CHLORIDE SERPL-SCNC: 87 MMOL/L (ref 99–110)
CO2 SERPL-SCNC: 31 MMOL/L (ref 21–32)
CREAT SERPL-MCNC: 4.2 MG/DL (ref 0.8–1.3)
CREAT UR-MCNC: 58 MG/DL (ref 39–259)
GFR SERPLBLD CREATININE-BSD FMLA CKD-EPI: 14 ML/MIN/{1.73_M2}
GLUCOSE BLD-MCNC: 166 MG/DL (ref 70–99)
GLUCOSE BLD-MCNC: 243 MG/DL (ref 70–99)
GLUCOSE BLD-MCNC: 250 MG/DL (ref 70–99)
GLUCOSE BLD-MCNC: 285 MG/DL (ref 70–99)
GLUCOSE SERPL-MCNC: 193 MG/DL (ref 70–99)
INR PPP: 2.47 (ref 0.86–1.14)
MICROALBUMIN UR DL<=1MG/L-MCNC: 2.55 MG/DL
MICROALBUMIN/CREAT UR: 44 MG/G (ref 0–30)
PERFORMED ON: ABNORMAL
POTASSIUM SERPL-SCNC: 3.6 MMOL/L (ref 3.5–5.1)
PROTHROMBIN TIME: 26.4 SEC (ref 12.1–14.9)
SODIUM SERPL-SCNC: 135 MMOL/L (ref 136–145)

## 2025-08-17 PROCEDURE — 6370000000 HC RX 637 (ALT 250 FOR IP): Performed by: STUDENT IN AN ORGANIZED HEALTH CARE EDUCATION/TRAINING PROGRAM

## 2025-08-17 PROCEDURE — 2500000003 HC RX 250 WO HCPCS: Performed by: STUDENT IN AN ORGANIZED HEALTH CARE EDUCATION/TRAINING PROGRAM

## 2025-08-17 PROCEDURE — 6370000000 HC RX 637 (ALT 250 FOR IP): Performed by: NURSE PRACTITIONER

## 2025-08-17 PROCEDURE — 99233 SBSQ HOSP IP/OBS HIGH 50: CPT | Performed by: INTERNAL MEDICINE

## 2025-08-17 PROCEDURE — 94640 AIRWAY INHALATION TREATMENT: CPT

## 2025-08-17 PROCEDURE — 85610 PROTHROMBIN TIME: CPT

## 2025-08-17 PROCEDURE — 94761 N-INVAS EAR/PLS OXIMETRY MLT: CPT

## 2025-08-17 PROCEDURE — 2060000000 HC ICU INTERMEDIATE R&B

## 2025-08-17 PROCEDURE — 2700000000 HC OXYGEN THERAPY PER DAY

## 2025-08-17 PROCEDURE — 6370000000 HC RX 637 (ALT 250 FOR IP): Performed by: INTERNAL MEDICINE

## 2025-08-17 PROCEDURE — 2500000003 HC RX 250 WO HCPCS: Performed by: INTERNAL MEDICINE

## 2025-08-17 PROCEDURE — 80048 BASIC METABOLIC PNL TOTAL CA: CPT

## 2025-08-17 PROCEDURE — 6360000002 HC RX W HCPCS: Performed by: STUDENT IN AN ORGANIZED HEALTH CARE EDUCATION/TRAINING PROGRAM

## 2025-08-17 PROCEDURE — 6360000002 HC RX W HCPCS: Performed by: INTERNAL MEDICINE

## 2025-08-17 PROCEDURE — 6370000000 HC RX 637 (ALT 250 FOR IP): Performed by: HOSPITALIST

## 2025-08-17 RX ORDER — TORSEMIDE 100 MG/1
100 TABLET ORAL DAILY
Status: DISCONTINUED | OUTPATIENT
Start: 2025-08-18 | End: 2025-08-18

## 2025-08-17 RX ADMIN — ASPIRIN 81 MG: 81 TABLET, CHEWABLE ORAL at 09:09

## 2025-08-17 RX ADMIN — ALBUTEROL SULFATE 2.5 MG: 2.5 SOLUTION RESPIRATORY (INHALATION) at 21:16

## 2025-08-17 RX ADMIN — SODIUM CHLORIDE, PRESERVATIVE FREE 10 ML: 5 INJECTION INTRAVENOUS at 09:10

## 2025-08-17 RX ADMIN — DAPTOMYCIN 800 MG: 500 INJECTION, POWDER, LYOPHILIZED, FOR SOLUTION INTRAVENOUS at 13:40

## 2025-08-17 RX ADMIN — ALBUTEROL SULFATE 2.5 MG: 2.5 SOLUTION RESPIRATORY (INHALATION) at 08:03

## 2025-08-17 RX ADMIN — HYDROCODONE BITARTRATE AND ACETAMINOPHEN 1 TABLET: 5; 325 TABLET ORAL at 03:47

## 2025-08-17 RX ADMIN — PANTOPRAZOLE SODIUM 40 MG: 40 TABLET, DELAYED RELEASE ORAL at 05:05

## 2025-08-17 RX ADMIN — METOPROLOL SUCCINATE 12.5 MG: 25 TABLET, EXTENDED RELEASE ORAL at 21:05

## 2025-08-17 RX ADMIN — TRAZODONE HYDROCHLORIDE 100 MG: 50 TABLET ORAL at 21:06

## 2025-08-17 RX ADMIN — SODIUM CHLORIDE, PRESERVATIVE FREE 10 ML: 5 INJECTION INTRAVENOUS at 13:40

## 2025-08-17 RX ADMIN — SODIUM CHLORIDE, PRESERVATIVE FREE 10 ML: 5 INJECTION INTRAVENOUS at 21:11

## 2025-08-17 RX ADMIN — INSULIN LISPRO 1 UNITS: 100 INJECTION, SOLUTION INTRAVENOUS; SUBCUTANEOUS at 12:34

## 2025-08-17 RX ADMIN — PANTOPRAZOLE SODIUM 40 MG: 40 TABLET, DELAYED RELEASE ORAL at 16:46

## 2025-08-17 RX ADMIN — METOPROLOL SUCCINATE 12.5 MG: 25 TABLET, EXTENDED RELEASE ORAL at 09:10

## 2025-08-17 RX ADMIN — WARFARIN SODIUM 5 MG: 5 TABLET ORAL at 16:46

## 2025-08-17 RX ADMIN — TORSEMIDE 50 MG: 20 TABLET ORAL at 09:09

## 2025-08-17 RX ADMIN — HYDROCODONE BITARTRATE AND ACETAMINOPHEN 1 TABLET: 5; 325 TABLET ORAL at 23:08

## 2025-08-17 RX ADMIN — INSULIN LISPRO 2 UNITS: 100 INJECTION, SOLUTION INTRAVENOUS; SUBCUTANEOUS at 21:06

## 2025-08-17 RX ADMIN — PRAVASTATIN SODIUM 80 MG: 80 TABLET ORAL at 09:10

## 2025-08-17 RX ADMIN — INSULIN LISPRO 2 UNITS: 100 INJECTION, SOLUTION INTRAVENOUS; SUBCUTANEOUS at 16:46

## 2025-08-17 ASSESSMENT — PAIN SCALES - GENERAL
PAINLEVEL_OUTOF10: 5
PAINLEVEL_OUTOF10: 3
PAINLEVEL_OUTOF10: 6
PAINLEVEL_OUTOF10: 0
PAINLEVEL_OUTOF10: 10
PAINLEVEL_OUTOF10: 8

## 2025-08-17 ASSESSMENT — PAIN DESCRIPTION - LOCATION
LOCATION: LEG
LOCATION: LEG

## 2025-08-17 ASSESSMENT — PAIN DESCRIPTION - ORIENTATION
ORIENTATION: LEFT
ORIENTATION: LEFT;RIGHT

## 2025-08-17 ASSESSMENT — PAIN DESCRIPTION - PAIN TYPE: TYPE: ACUTE PAIN

## 2025-08-17 ASSESSMENT — PAIN - FUNCTIONAL ASSESSMENT
PAIN_FUNCTIONAL_ASSESSMENT: 0-10
PAIN_FUNCTIONAL_ASSESSMENT: ACTIVITIES ARE NOT PREVENTED
PAIN_FUNCTIONAL_ASSESSMENT: 0-10
PAIN_FUNCTIONAL_ASSESSMENT: 0-10

## 2025-08-17 ASSESSMENT — PAIN DESCRIPTION - DESCRIPTORS
DESCRIPTORS: ACHING;DISCOMFORT
DESCRIPTORS: ACHING

## 2025-08-17 ASSESSMENT — PAIN DESCRIPTION - ONSET: ONSET: PROGRESSIVE

## 2025-08-17 ASSESSMENT — PAIN DESCRIPTION - FREQUENCY: FREQUENCY: INTERMITTENT

## 2025-08-18 LAB
ANION GAP SERPL CALCULATED.3IONS-SCNC: 15 MMOL/L (ref 3–16)
BUN SERPL-MCNC: 79 MG/DL (ref 7–20)
CALCIUM SERPL-MCNC: 10.1 MG/DL (ref 8.3–10.6)
CHLORIDE SERPL-SCNC: 89 MMOL/L (ref 99–110)
CO2 SERPL-SCNC: 34 MMOL/L (ref 21–32)
CREAT SERPL-MCNC: 4 MG/DL (ref 0.8–1.3)
GFR SERPLBLD CREATININE-BSD FMLA CKD-EPI: 15 ML/MIN/{1.73_M2}
GLUCOSE BLD-MCNC: 215 MG/DL (ref 70–99)
GLUCOSE BLD-MCNC: 224 MG/DL (ref 70–99)
GLUCOSE BLD-MCNC: 231 MG/DL (ref 70–99)
GLUCOSE BLD-MCNC: 290 MG/DL (ref 70–99)
GLUCOSE SERPL-MCNC: 200 MG/DL (ref 70–99)
INR PPP: 2.5 (ref 0.86–1.14)
PERFORMED ON: ABNORMAL
POTASSIUM SERPL-SCNC: 3.5 MMOL/L (ref 3.5–5.1)
PROTHROMBIN TIME: 26.6 SEC (ref 12.1–14.9)
SODIUM SERPL-SCNC: 138 MMOL/L (ref 136–145)

## 2025-08-18 PROCEDURE — 99233 SBSQ HOSP IP/OBS HIGH 50: CPT | Performed by: HOSPITALIST

## 2025-08-18 PROCEDURE — 97110 THERAPEUTIC EXERCISES: CPT

## 2025-08-18 PROCEDURE — 85610 PROTHROMBIN TIME: CPT

## 2025-08-18 PROCEDURE — 94660 CPAP INITIATION&MGMT: CPT

## 2025-08-18 PROCEDURE — 6370000000 HC RX 637 (ALT 250 FOR IP): Performed by: HOSPITALIST

## 2025-08-18 PROCEDURE — 6370000000 HC RX 637 (ALT 250 FOR IP): Performed by: NURSE PRACTITIONER

## 2025-08-18 PROCEDURE — 94761 N-INVAS EAR/PLS OXIMETRY MLT: CPT

## 2025-08-18 PROCEDURE — 94640 AIRWAY INHALATION TREATMENT: CPT

## 2025-08-18 PROCEDURE — 2500000003 HC RX 250 WO HCPCS: Performed by: STUDENT IN AN ORGANIZED HEALTH CARE EDUCATION/TRAINING PROGRAM

## 2025-08-18 PROCEDURE — 97116 GAIT TRAINING THERAPY: CPT

## 2025-08-18 PROCEDURE — 2060000000 HC ICU INTERMEDIATE R&B

## 2025-08-18 PROCEDURE — 99232 SBSQ HOSP IP/OBS MODERATE 35: CPT | Performed by: CLINICAL NURSE SPECIALIST

## 2025-08-18 PROCEDURE — 80048 BASIC METABOLIC PNL TOTAL CA: CPT

## 2025-08-18 PROCEDURE — 2700000000 HC OXYGEN THERAPY PER DAY

## 2025-08-18 PROCEDURE — 6370000000 HC RX 637 (ALT 250 FOR IP): Performed by: STUDENT IN AN ORGANIZED HEALTH CARE EDUCATION/TRAINING PROGRAM

## 2025-08-18 PROCEDURE — 6360000002 HC RX W HCPCS: Performed by: STUDENT IN AN ORGANIZED HEALTH CARE EDUCATION/TRAINING PROGRAM

## 2025-08-18 PROCEDURE — 6370000000 HC RX 637 (ALT 250 FOR IP): Performed by: INTERNAL MEDICINE

## 2025-08-18 RX ORDER — METOLAZONE 2.5 MG/1
2.5 TABLET ORAL DAILY PRN
Status: DISCONTINUED | OUTPATIENT
Start: 2025-08-18 | End: 2025-08-19 | Stop reason: HOSPADM

## 2025-08-18 RX ORDER — TORSEMIDE 20 MG/1
20 TABLET ORAL DAILY
Status: DISCONTINUED | OUTPATIENT
Start: 2025-08-18 | End: 2025-08-19 | Stop reason: HOSPADM

## 2025-08-18 RX ADMIN — TORSEMIDE 20 MG: 20 TABLET ORAL at 09:09

## 2025-08-18 RX ADMIN — INSULIN LISPRO 1 UNITS: 100 INJECTION, SOLUTION INTRAVENOUS; SUBCUTANEOUS at 11:47

## 2025-08-18 RX ADMIN — SODIUM CHLORIDE, PRESERVATIVE FREE 10 ML: 5 INJECTION INTRAVENOUS at 09:07

## 2025-08-18 RX ADMIN — SODIUM CHLORIDE, PRESERVATIVE FREE 10 ML: 5 INJECTION INTRAVENOUS at 19:33

## 2025-08-18 RX ADMIN — METOPROLOL SUCCINATE 12.5 MG: 25 TABLET, EXTENDED RELEASE ORAL at 19:33

## 2025-08-18 RX ADMIN — INSULIN LISPRO 2 UNITS: 100 INJECTION, SOLUTION INTRAVENOUS; SUBCUTANEOUS at 19:38

## 2025-08-18 RX ADMIN — INSULIN LISPRO 1 UNITS: 100 INJECTION, SOLUTION INTRAVENOUS; SUBCUTANEOUS at 09:07

## 2025-08-18 RX ADMIN — ALBUTEROL SULFATE 2.5 MG: 2.5 SOLUTION RESPIRATORY (INHALATION) at 23:17

## 2025-08-18 RX ADMIN — PANTOPRAZOLE SODIUM 40 MG: 40 TABLET, DELAYED RELEASE ORAL at 05:03

## 2025-08-18 RX ADMIN — PANTOPRAZOLE SODIUM 40 MG: 40 TABLET, DELAYED RELEASE ORAL at 16:25

## 2025-08-18 RX ADMIN — HYDROCODONE BITARTRATE AND ACETAMINOPHEN 1 TABLET: 5; 325 TABLET ORAL at 21:55

## 2025-08-18 RX ADMIN — HYDROCODONE BITARTRATE AND ACETAMINOPHEN 1 TABLET: 5; 325 TABLET ORAL at 05:02

## 2025-08-18 RX ADMIN — WARFARIN SODIUM 5 MG: 5 TABLET ORAL at 16:25

## 2025-08-18 RX ADMIN — PRAVASTATIN SODIUM 80 MG: 80 TABLET ORAL at 09:07

## 2025-08-18 RX ADMIN — ALBUTEROL SULFATE 2.5 MG: 2.5 SOLUTION RESPIRATORY (INHALATION) at 08:13

## 2025-08-18 RX ADMIN — METOPROLOL SUCCINATE 12.5 MG: 25 TABLET, EXTENDED RELEASE ORAL at 09:07

## 2025-08-18 RX ADMIN — TRAZODONE HYDROCHLORIDE 100 MG: 50 TABLET ORAL at 19:34

## 2025-08-18 RX ADMIN — INSULIN LISPRO 1 UNITS: 100 INJECTION, SOLUTION INTRAVENOUS; SUBCUTANEOUS at 17:19

## 2025-08-18 RX ADMIN — ASPIRIN 81 MG: 81 TABLET, CHEWABLE ORAL at 09:06

## 2025-08-18 ASSESSMENT — PAIN DESCRIPTION - ONSET
ONSET: ON-GOING
ONSET: ON-GOING

## 2025-08-18 ASSESSMENT — PAIN DESCRIPTION - DESCRIPTORS
DESCRIPTORS: SHARP
DESCRIPTORS: ACHING
DESCRIPTORS: ACHING

## 2025-08-18 ASSESSMENT — PAIN - FUNCTIONAL ASSESSMENT
PAIN_FUNCTIONAL_ASSESSMENT: 0-10
PAIN_FUNCTIONAL_ASSESSMENT: ACTIVITIES ARE NOT PREVENTED
PAIN_FUNCTIONAL_ASSESSMENT: 0-10
PAIN_FUNCTIONAL_ASSESSMENT: 0-10
PAIN_FUNCTIONAL_ASSESSMENT: ACTIVITIES ARE NOT PREVENTED

## 2025-08-18 ASSESSMENT — PAIN SCALES - GENERAL
PAINLEVEL_OUTOF10: 0
PAINLEVEL_OUTOF10: 3
PAINLEVEL_OUTOF10: 2
PAINLEVEL_OUTOF10: 10
PAINLEVEL_OUTOF10: 7
PAINLEVEL_OUTOF10: 9
PAINLEVEL_OUTOF10: 0

## 2025-08-18 ASSESSMENT — PAIN DESCRIPTION - FREQUENCY
FREQUENCY: CONTINUOUS
FREQUENCY: CONTINUOUS

## 2025-08-18 ASSESSMENT — PAIN DESCRIPTION - ORIENTATION
ORIENTATION: LEFT

## 2025-08-18 ASSESSMENT — PAIN DESCRIPTION - LOCATION
LOCATION: LEG

## 2025-08-19 ENCOUNTER — APPOINTMENT (OUTPATIENT)
Dept: INTERVENTIONAL RADIOLOGY/VASCULAR | Age: 75
DRG: 314 | End: 2025-08-19
Payer: MEDICARE

## 2025-08-19 VITALS
TEMPERATURE: 97.4 F | WEIGHT: 284.83 LBS | RESPIRATION RATE: 17 BRPM | OXYGEN SATURATION: 98 % | HEART RATE: 85 BPM | BODY MASS INDEX: 37.75 KG/M2 | SYSTOLIC BLOOD PRESSURE: 113 MMHG | DIASTOLIC BLOOD PRESSURE: 74 MMHG | HEIGHT: 73 IN

## 2025-08-19 LAB
ANION GAP SERPL CALCULATED.3IONS-SCNC: 15 MMOL/L (ref 3–16)
BUN SERPL-MCNC: 79 MG/DL (ref 7–20)
CALCIUM SERPL-MCNC: 9.6 MG/DL (ref 8.3–10.6)
CHLORIDE SERPL-SCNC: 89 MMOL/L (ref 99–110)
CO2 SERPL-SCNC: 32 MMOL/L (ref 21–32)
CREAT SERPL-MCNC: 3.8 MG/DL (ref 0.8–1.3)
GFR SERPLBLD CREATININE-BSD FMLA CKD-EPI: 16 ML/MIN/{1.73_M2}
GLUCOSE BLD-MCNC: 182 MG/DL (ref 70–99)
GLUCOSE BLD-MCNC: 212 MG/DL (ref 70–99)
GLUCOSE SERPL-MCNC: 169 MG/DL (ref 70–99)
INR PPP: 2.35 (ref 0.86–1.14)
PERFORMED ON: ABNORMAL
PERFORMED ON: ABNORMAL
POTASSIUM SERPL-SCNC: 3.6 MMOL/L (ref 3.5–5.1)
PROTHROMBIN TIME: 25.4 SEC (ref 12.1–14.9)
SODIUM SERPL-SCNC: 136 MMOL/L (ref 136–145)

## 2025-08-19 PROCEDURE — 6360000002 HC RX W HCPCS: Performed by: STUDENT IN AN ORGANIZED HEALTH CARE EDUCATION/TRAINING PROGRAM

## 2025-08-19 PROCEDURE — 36589 REMOVAL TUNNELED CV CATH: CPT

## 2025-08-19 PROCEDURE — 99232 SBSQ HOSP IP/OBS MODERATE 35: CPT | Performed by: CLINICAL NURSE SPECIALIST

## 2025-08-19 PROCEDURE — 94640 AIRWAY INHALATION TREATMENT: CPT

## 2025-08-19 PROCEDURE — 2500000003 HC RX 250 WO HCPCS: Performed by: STUDENT IN AN ORGANIZED HEALTH CARE EDUCATION/TRAINING PROGRAM

## 2025-08-19 PROCEDURE — 97530 THERAPEUTIC ACTIVITIES: CPT

## 2025-08-19 PROCEDURE — 6370000000 HC RX 637 (ALT 250 FOR IP): Performed by: HOSPITALIST

## 2025-08-19 PROCEDURE — 2700000000 HC OXYGEN THERAPY PER DAY

## 2025-08-19 PROCEDURE — 0JPTXXZ REMOVAL OF TUNNELED VASCULAR ACCESS DEVICE FROM TRUNK SUBCUTANEOUS TISSUE AND FASCIA, EXTERNAL APPROACH: ICD-10-PCS | Performed by: STUDENT IN AN ORGANIZED HEALTH CARE EDUCATION/TRAINING PROGRAM

## 2025-08-19 PROCEDURE — 85610 PROTHROMBIN TIME: CPT

## 2025-08-19 PROCEDURE — 94761 N-INVAS EAR/PLS OXIMETRY MLT: CPT

## 2025-08-19 PROCEDURE — 80048 BASIC METABOLIC PNL TOTAL CA: CPT

## 2025-08-19 PROCEDURE — 6370000000 HC RX 637 (ALT 250 FOR IP): Performed by: STUDENT IN AN ORGANIZED HEALTH CARE EDUCATION/TRAINING PROGRAM

## 2025-08-19 PROCEDURE — 36592 COLLECT BLOOD FROM PICC: CPT

## 2025-08-19 PROCEDURE — 6370000000 HC RX 637 (ALT 250 FOR IP): Performed by: NURSE PRACTITIONER

## 2025-08-19 RX ORDER — TORSEMIDE 20 MG/1
20 TABLET ORAL DAILY
Qty: 30 TABLET | Refills: 3 | Status: SHIPPED | OUTPATIENT
Start: 2025-08-20 | End: 2025-08-21 | Stop reason: SDUPTHER

## 2025-08-19 RX ORDER — METOPROLOL SUCCINATE 25 MG/1
12.5 TABLET, EXTENDED RELEASE ORAL 2 TIMES DAILY
Qty: 30 TABLET | Refills: 3 | Status: SHIPPED | OUTPATIENT
Start: 2025-08-19

## 2025-08-19 RX ADMIN — ALBUTEROL SULFATE 2.5 MG: 2.5 SOLUTION RESPIRATORY (INHALATION) at 09:02

## 2025-08-19 RX ADMIN — PRAVASTATIN SODIUM 80 MG: 80 TABLET ORAL at 08:10

## 2025-08-19 RX ADMIN — HYDROCODONE BITARTRATE AND ACETAMINOPHEN 1 TABLET: 5; 325 TABLET ORAL at 05:17

## 2025-08-19 RX ADMIN — PANTOPRAZOLE SODIUM 40 MG: 40 TABLET, DELAYED RELEASE ORAL at 05:17

## 2025-08-19 RX ADMIN — METOPROLOL SUCCINATE 12.5 MG: 25 TABLET, EXTENDED RELEASE ORAL at 08:10

## 2025-08-19 RX ADMIN — SODIUM CHLORIDE, PRESERVATIVE FREE 10 ML: 5 INJECTION INTRAVENOUS at 08:10

## 2025-08-19 RX ADMIN — ASPIRIN 81 MG: 81 TABLET, CHEWABLE ORAL at 08:10

## 2025-08-19 RX ADMIN — TORSEMIDE 20 MG: 20 TABLET ORAL at 08:10

## 2025-08-19 ASSESSMENT — PAIN DESCRIPTION - DESCRIPTORS
DESCRIPTORS: ACHING
DESCRIPTORS: ACHING

## 2025-08-19 ASSESSMENT — PAIN - FUNCTIONAL ASSESSMENT
PAIN_FUNCTIONAL_ASSESSMENT: ACTIVITIES ARE NOT PREVENTED
PAIN_FUNCTIONAL_ASSESSMENT: ACTIVITIES ARE NOT PREVENTED
PAIN_FUNCTIONAL_ASSESSMENT: 0-10

## 2025-08-19 ASSESSMENT — PAIN SCALES - WONG BAKER: WONGBAKER_NUMERICALRESPONSE: HURTS EVEN MORE

## 2025-08-19 ASSESSMENT — PAIN SCALES - GENERAL
PAINLEVEL_OUTOF10: 8
PAINLEVEL_OUTOF10: 6
PAINLEVEL_OUTOF10: 10
PAINLEVEL_OUTOF10: 7

## 2025-08-19 ASSESSMENT — PAIN DESCRIPTION - LOCATION
LOCATION: LEG
LOCATION: LEG

## 2025-08-19 ASSESSMENT — PAIN DESCRIPTION - ORIENTATION
ORIENTATION: RIGHT;LEFT
ORIENTATION: RIGHT;LEFT

## 2025-08-20 ENCOUNTER — TELEPHONE (OUTPATIENT)
Dept: OTHER | Age: 75
End: 2025-08-20

## 2025-08-20 ENCOUNTER — TELEPHONE (OUTPATIENT)
Dept: PHARMACY | Age: 75
End: 2025-08-20

## 2025-08-22 ENCOUNTER — OFFICE VISIT (OUTPATIENT)
Dept: CARDIOLOGY CLINIC | Age: 75
End: 2025-08-22

## 2025-08-22 VITALS
OXYGEN SATURATION: 98 % | DIASTOLIC BLOOD PRESSURE: 56 MMHG | HEART RATE: 83 BPM | HEIGHT: 73 IN | BODY MASS INDEX: 38.04 KG/M2 | SYSTOLIC BLOOD PRESSURE: 104 MMHG | WEIGHT: 287 LBS

## 2025-08-22 DIAGNOSIS — G47.33 OSA (OBSTRUCTIVE SLEEP APNEA): ICD-10-CM

## 2025-08-22 DIAGNOSIS — I10 ESSENTIAL HYPERTENSION: ICD-10-CM

## 2025-08-22 DIAGNOSIS — E66.813 OBESITY, CLASS III, BMI 40-49.9 (MORBID OBESITY) (HCC): ICD-10-CM

## 2025-08-22 DIAGNOSIS — I50.22 CHRONIC SYSTOLIC HF (HEART FAILURE) (HCC): Primary | ICD-10-CM

## 2025-08-22 DIAGNOSIS — E55.9 VITAMIN D DEFICIENCY: ICD-10-CM

## 2025-08-25 ENCOUNTER — HOSPITAL ENCOUNTER (OUTPATIENT)
Age: 75
Setting detail: SPECIMEN
Discharge: HOME | End: 2025-08-25

## 2025-08-25 ENCOUNTER — ANTI-COAG VISIT (OUTPATIENT)
Dept: PHARMACY | Age: 75
End: 2025-08-25

## 2025-08-25 DIAGNOSIS — I48.3 TYPICAL ATRIAL FLUTTER (HCC): Primary | ICD-10-CM

## 2025-08-25 LAB
INR BLD: 3.4
PROTIME: NORMAL

## 2025-08-26 ENCOUNTER — TELEPHONE (OUTPATIENT)
Dept: FAMILY MEDICINE CLINIC | Age: 75
End: 2025-08-26

## 2025-08-28 ENCOUNTER — ANTI-COAG VISIT (OUTPATIENT)
Dept: PHARMACY | Age: 75
End: 2025-08-28

## 2025-08-28 ENCOUNTER — OFFICE VISIT (OUTPATIENT)
Dept: PULMONOLOGY | Age: 75
End: 2025-08-28
Payer: MEDICARE

## 2025-08-28 ENCOUNTER — HOSPITAL ENCOUNTER (OUTPATIENT)
Age: 75
Setting detail: SPECIMEN
Discharge: HOME | End: 2025-08-28
Payer: MEDICARE

## 2025-08-28 VITALS
HEART RATE: 110 BPM | BODY MASS INDEX: 41.19 KG/M2 | WEIGHT: 310.8 LBS | SYSTOLIC BLOOD PRESSURE: 104 MMHG | OXYGEN SATURATION: 99 % | HEIGHT: 73 IN | DIASTOLIC BLOOD PRESSURE: 66 MMHG

## 2025-08-28 DIAGNOSIS — R06.09 DOE (DYSPNEA ON EXERTION): ICD-10-CM

## 2025-08-28 DIAGNOSIS — I48.3 TYPICAL ATRIAL FLUTTER (HCC): Primary | ICD-10-CM

## 2025-08-28 DIAGNOSIS — J44.9 COPD, SEVERE (HCC): Primary | ICD-10-CM

## 2025-08-28 LAB
ANION GAP SERPL CALCULATED.3IONS-SCNC: 10 MMOL/L (ref 3–16)
BUN SERPL-MCNC: 62 MG/DL (ref 7–20)
CALCIUM SERPL-MCNC: 8.8 MG/DL (ref 8.3–10.6)
CHLORIDE SERPL-SCNC: 100 MMOL/L (ref 99–110)
CO2 SERPL-SCNC: 29 MMOL/L (ref 21–32)
CREAT SERPL-MCNC: 2.6 MG/DL (ref 0.8–1.3)
GFR SERPLBLD CREATININE-BSD FMLA CKD-EPI: 25 ML/MIN/{1.73_M2}
GLUCOSE SERPL-MCNC: 278 MG/DL (ref 70–99)
INR BLD: 1.8
NT-PROBNP SERPL-MCNC: 1863 PG/ML (ref 0–449)
POTASSIUM SERPL-SCNC: 3.5 MMOL/L (ref 3.5–5.1)
PROTIME: NORMAL
SODIUM SERPL-SCNC: 139 MMOL/L (ref 136–145)

## 2025-08-28 PROCEDURE — 3023F SPIROM DOC REV: CPT | Performed by: INTERNAL MEDICINE

## 2025-08-28 PROCEDURE — 3017F COLORECTAL CA SCREEN DOC REV: CPT | Performed by: INTERNAL MEDICINE

## 2025-08-28 PROCEDURE — G2211 COMPLEX E/M VISIT ADD ON: HCPCS | Performed by: INTERNAL MEDICINE

## 2025-08-28 PROCEDURE — 1111F DSCHRG MED/CURRENT MED MERGE: CPT | Performed by: INTERNAL MEDICINE

## 2025-08-28 PROCEDURE — 1123F ACP DISCUSS/DSCN MKR DOCD: CPT | Performed by: INTERNAL MEDICINE

## 2025-08-28 PROCEDURE — G8427 DOCREV CUR MEDS BY ELIG CLIN: HCPCS | Performed by: INTERNAL MEDICINE

## 2025-08-28 PROCEDURE — 3078F DIAST BP <80 MM HG: CPT | Performed by: INTERNAL MEDICINE

## 2025-08-28 PROCEDURE — G8417 CALC BMI ABV UP PARAM F/U: HCPCS | Performed by: INTERNAL MEDICINE

## 2025-08-28 PROCEDURE — 3074F SYST BP LT 130 MM HG: CPT | Performed by: INTERNAL MEDICINE

## 2025-08-28 PROCEDURE — 1036F TOBACCO NON-USER: CPT | Performed by: INTERNAL MEDICINE

## 2025-08-28 PROCEDURE — 36415 COLL VENOUS BLD VENIPUNCTURE: CPT

## 2025-08-28 PROCEDURE — 80048 BASIC METABOLIC PNL TOTAL CA: CPT

## 2025-08-28 PROCEDURE — 1159F MED LIST DOCD IN RCRD: CPT | Performed by: INTERNAL MEDICINE

## 2025-08-28 PROCEDURE — 99214 OFFICE O/P EST MOD 30 MIN: CPT | Performed by: INTERNAL MEDICINE

## 2025-08-28 PROCEDURE — 83880 ASSAY OF NATRIURETIC PEPTIDE: CPT

## 2025-08-28 ASSESSMENT — ENCOUNTER SYMPTOMS
DIARRHEA: 0
STRIDOR: 0
COLOR CHANGE: 0
COUGH: 0
SHORTNESS OF BREATH: 1
BACK PAIN: 0
APNEA: 0
SINUS PRESSURE: 0
CHEST TIGHTNESS: 0
BLOOD IN STOOL: 0
WHEEZING: 0
RHINORRHEA: 0
VOMITING: 0
VOICE CHANGE: 0
CONSTIPATION: 0
ABDOMINAL PAIN: 0
ABDOMINAL DISTENTION: 0
CHOKING: 0
SORE THROAT: 0

## 2025-08-30 PROBLEM — A41.9 SEVERE SEPSIS (HCC): Status: ACTIVE | Noted: 2025-08-30

## 2025-08-30 PROBLEM — R65.20 SEVERE SEPSIS (HCC): Status: ACTIVE | Noted: 2025-08-30

## 2025-08-30 PROBLEM — J96.01 ACUTE HYPOXIC RESPIRATORY FAILURE (HCC): Status: ACTIVE | Noted: 2025-08-30

## 2025-08-31 PROBLEM — E66.813 CLASS 3 SEVERE OBESITY DUE TO EXCESS CALORIES IN ADULT (HCC): Status: ACTIVE | Noted: 2017-11-07

## 2025-08-31 PROBLEM — I50.9 ACUTE DECOMPENSATED HEART FAILURE (HCC): Status: ACTIVE | Noted: 2025-08-31

## 2025-08-31 PROBLEM — Z97.8 ENDOTRACHEALLY INTUBATED: Status: ACTIVE | Noted: 2025-08-31

## 2025-08-31 PROBLEM — R65.21 SEPTIC SHOCK (HCC): Status: ACTIVE | Noted: 2025-08-30

## 2025-08-31 PROBLEM — Z99.11 ON MECHANICALLY ASSISTED VENTILATION (HCC): Status: ACTIVE | Noted: 2025-08-31

## 2025-08-31 PROBLEM — D69.6 THROMBOCYTOPENIA: Status: ACTIVE | Noted: 2025-08-31

## 2025-08-31 PROBLEM — N18.30 STAGE 3 CHRONIC KIDNEY DISEASE (HCC): Status: ACTIVE | Noted: 2025-06-02

## 2025-08-31 PROBLEM — J96.20 ACUTE ON CHRONIC RESPIRATORY FAILURE (HCC): Status: ACTIVE | Noted: 2022-10-30

## 2025-08-31 PROBLEM — A41.9 SEVERE SEPSIS (HCC): Status: ACTIVE | Noted: 2025-08-31

## 2025-08-31 PROBLEM — R65.20 SEVERE SEPSIS (HCC): Status: ACTIVE | Noted: 2025-08-31

## 2025-09-02 ENCOUNTER — HOSPITAL ENCOUNTER (OUTPATIENT)
Age: 75
Setting detail: SPECIMEN
Discharge: HOME | End: 2025-09-02
Payer: MEDICARE

## 2025-09-03 ENCOUNTER — TELEPHONE (OUTPATIENT)
Dept: CARDIOLOGY CLINIC | Age: 75
End: 2025-09-03

## 2025-09-05 PROBLEM — I38 ENDOCARDITIS: Status: ACTIVE | Noted: 2025-09-05

## 2025-09-06 PROBLEM — I50.23 ACUTE ON CHRONIC SYSTOLIC HEART FAILURE (HCC): Status: ACTIVE | Noted: 2025-09-06

## 2025-09-06 PROBLEM — Z95.2 S/P HEART VALVE REPLACEMENT WITH MECHANICAL VALVE: Status: ACTIVE | Noted: 2025-09-06

## (undated) DEVICE — Device

## (undated) DEVICE — PERCUTANEOUS ENTRY THINWALL NEEDLE  ONE-PART: Brand: COOK

## (undated) DEVICE — PINNACLE INTRODUCER SHEATH: Brand: PINNACLE

## (undated) DEVICE — Device: Brand: NOMOLINE™ LH ADULT NASAL CO2 CANNULA WITH O2 4M

## (undated) DEVICE — Device: Brand: LLD EZ LEAD LOCKING DEVICE

## (undated) DEVICE — LEAD CUTTER: Brand: LEAD CUTTER

## (undated) DEVICE — PADDING CAST N ADH 12X6 IN CRIMPED FINISH 100% COTTON WBRLII

## (undated) DEVICE — PERCLOSE™ PROSTYLE™ SUTURE-MEDIATED CLOSURE AND REPAIR SYSTEM: Brand: PERCLOSE™ PROSTYLE™

## (undated) DEVICE — ELECTRODE PT RET AD L9FT HI MOIST COND ADH HYDRGEL CORDED

## (undated) DEVICE — MASIMOSET LNCS ADTX SPO2 ADULT PULSE OXIMETER ADHESIVE SENSOR: Brand: MASIMOSET® LNCS® ADTX SPO2 ADULT PULSE OXIMETER ADHESIVE SENSOR

## (undated) DEVICE — KIT CATH 5FR L91CM GWIRE INTRO SHTH SET SYR STPCOCK BRDG

## (undated) DEVICE — ELECTRODE PACE S STL BPLR BLLN TEMP CATH

## (undated) DEVICE — MANIFOLD SURG NEPTUNE WST MGMT

## (undated) DEVICE — COVER LT HNDL UNIV FOR LNG HNDL KOVER 1 PER PK

## (undated) DEVICE — THE PHOTONBLADE IS AN RF DEVICE COUPLED WITH ILLUMINATION THAT IS INDICATED FOR CUTTING AND COAGULATION OF SOFT TISSUE DURING SURGICAL PROCEDURES. IT IS INTENDED TO BE USED WITH A VARIETY OF STANDARD COMMERCIALLY AVAILABLE ELECTROSURGICAL UNITS.: Brand: PHOTONBLADE

## (undated) DEVICE — WEREWOLF FLOW 50 COBLATION WAND: Brand: COBLATION

## (undated) DEVICE — LLD ACCESSORY KIT: Brand: LLD ACCESSORY KIT

## (undated) DEVICE — INTRODUCER CATH 8.5FR L71CM 8.5FR DIL L180CM DIA0.032IN W/

## (undated) DEVICE — GUIDEWIRE VASC L180CM DIA0.035IN L7CM DIA3MM J TIP PTFE S

## (undated) DEVICE — SMALL VOLUME BONE MARROW ASPIRATION KIT: Brand: SPINESMITH FUSIONARY GRAFT DELIVERY SYSTEM

## (undated) DEVICE — CATH LAB PACK: Brand: MEDLINE INDUSTRIES, INC.

## (undated) DEVICE — PACEMAKER PACK: Brand: MEDLINE INDUSTRIES, INC.

## (undated) DEVICE — AMPLATZ EXTRA STIFF WIRE GUIDE: Brand: AMPLATZ

## (undated) DEVICE — STERILE LATEX POWDER-FREE SURGICAL GLOVES: Brand: PROTEXIS

## (undated) DEVICE — RESTRAINT EXT ANK WRST LT BLU FOAM 2 STRP SIDE BCKL HK AND

## (undated) DEVICE — SLEEVE CONTAMINATION SFSHTH

## (undated) DEVICE — TUBING PMP IRRIG GOFLO

## (undated) DEVICE — Device: Brand: LLD #2 LEAD LOCKING DEVICE

## (undated) DEVICE — Device: Brand: TIGHTRAIL SUB-C ROTATING DILATOR SHEATH

## (undated) DEVICE — BW-412T DISP COMBO CLEANING BRUSH: Brand: SINGLE USE COMBINATION CLEANING BRUSH

## (undated) DEVICE — SOLUTION IV IRRIG 0.9% NACL 3000ML BAG 2B7477

## (undated) DEVICE — GLOVE ORTHO 8   MSG9480

## (undated) DEVICE — TUBING IRRIG COMPATIBLE W ERBE MEDIVATOR PMP HYDR

## (undated) DEVICE — SUTURE ETHLN SZ 2-0 L18IN NONABSORBABLE BLK L19MM PS-2 PRIM 593H

## (undated) DEVICE — SUTURE MCRYL SZ 4-0 L18IN ABSRB UD L19MM PS-2 3/8 CIR PRIM Y496G

## (undated) DEVICE — SOLUTION IRRIG 500ML STRL H2O NONPYROGENIC

## (undated) DEVICE — PRESSURE TUBING: Brand: TRUWAVE

## (undated) DEVICE — STERILE LATEX POWDER-FREE SURGICAL GLOVESWITH NITRILE COATING: Brand: PROTEXIS

## (undated) DEVICE — DILATOR COONS TAPER: Brand: COONS

## (undated) DEVICE — AIR/WATER CLEANING ADAPTER FOR OLYMPUS® GI ENDOSCOPE: Brand: BULLDOG®

## (undated) DEVICE — Z CONVERTED USE 2273232 BANDAGE COMPR W6INXL11YD E KNIT DBL SELF CLSR EZE-BAND

## (undated) DEVICE — SINGLE USE AIR/WATER, SUCTION AND BIOPSY VALVES SET: Brand: ORCAPOD™

## (undated) DEVICE — Device: Brand: TIGHTRAIL ROTATING DILATOR SHEATH

## (undated) DEVICE — SYRINGE 10ML HYPO W/O NDL W/ LUER SLP TP

## (undated) DEVICE — KIT SNR L120CM LOOP DIA20MM CATH 6FR L102CM NIT CBL G PLT

## (undated) DEVICE — INTRODUCER SHTH W51XH73XL557MM D13MM DIA7.8MM HYDRPHLC

## (undated) DEVICE — DRESSING HYDROFIBER AQUACEL AG ADVANTAGE 3.5X6 IN

## (undated) DEVICE — INTRODUCER SHTH L13CM OD7FR SH ORNG HUB SEAMLESS SAFSHTH

## (undated) DEVICE — HI-TORQUE WHISPER MS GUIDE WIRE .014 J TIP 3.0 CM X 190 CM: Brand: HI-TORQUE WHISPER

## (undated) DEVICE — ENDOSCOPIC KIT 6X3/16 FT COLON W/ 1.1 OZ 2 GWN W/O BRSH

## (undated) DEVICE — MOUTHPIECE ENDOSCP L CTRL OPN AND SIDE PORTS DISP

## (undated) DEVICE — 3M™ TEGADERM™ CHG DRESSING 25/CARTON 4 CARTONS/CASE 1658: Brand: TEGADERM™

## (undated) DEVICE — SHEATH INTRO 26FR L33CM OD9.5MM ID8.7MM HYDRPHLC KINK

## (undated) DEVICE — WORKING LENGTH 235CM, WORKING CHANNEL 2.8MM
Type: IMPLANTABLE DEVICE | Site: DUODENUM | Status: NON-FUNCTIONAL
Brand: RESOLUTION 360 CLIP

## (undated) DEVICE — DYONICS 3.5 MM ABRADER BURRS, 7 CM                                    LENGTH, AQUA, PACKAGED 6 PER BOX, STERILE

## (undated) DEVICE — 60 ML SYRINGE REGULAR TIP: Brand: MONOJECT

## (undated) DEVICE — KIT AT-X65 ANGIOTOUCH HAND CONTROLLER

## (undated) DEVICE — UNIVERSAL ADAPTER

## (undated) DEVICE — INTRODUCER SHTH DIA8FR CANN L23CM BLU VASC CATH W/O MINI

## (undated) DEVICE — BULLDOG LEAD EXTENDER: Brand: BULLDOG

## (undated) DEVICE — DRAPE 33X23IN INCISE ANTIMICROB IOBAN 2

## (undated) DEVICE — ART BMC PLUS PROCESSING KIT: Brand: CELLING ART BMC SYSTEM

## (undated) DEVICE — SMART SLEEVE SURGICAL GOWN, XL, POLYREINFORCED FRONT: Brand: CONVERTORS

## (undated) DEVICE — GOWN AURORA NONREINF LG: Brand: MEDLINE INDUSTRIES, INC.

## (undated) DEVICE — STRIP,CLOSURE,WOUND,MEDI-STRIP,1/2X4: Brand: MEDLINE

## (undated) DEVICE — NEEDLE INJ 25GA P5MM SHFT L230CM SHTH DIA2.5MM S STL TEF

## (undated) DEVICE — WIRE GUID DIAG PTFE COAT FIX COR 3MM J TIP .035INX80CM

## (undated) DEVICE — PROBE COVER KIT: Brand: MEDLINE INDUSTRIES, INC.

## (undated) DEVICE — BRIDGE OCCLUSION CATHETER - 80 MM LENGTH BALLOON: Brand: BRIDGE™ OCCLUSION BALLOON

## (undated) DEVICE — CATHETER EP 6FR L120CM 5MM SPC 1MM BND QPLR TORQ CTRL

## (undated) DEVICE — PAD, DEFIB, ADULT, RADIOTRANS, PHYSIO: Brand: MEDLINE

## (undated) DEVICE — AVANOS* UNIVERSAL BLOCK TRAYS: Brand: AVANOS

## (undated) DEVICE — PACK PROCEDURE SURG SURGERYARTHROSCOPY KNEE

## (undated) DEVICE — CAP WATER BTL AIR TBNG L 16 IN CO2 TBNG L 48 IN ENDOSCP

## (undated) DEVICE — FORCEPS BX L240CM WRK CHN 2.8MM STD CAP W/ NDL MIC MESH

## (undated) DEVICE — DRAPE,ANGIO,BRACH,STERILE,38X44: Brand: MEDLINE

## (undated) DEVICE — GLOVE NON LATEX  SIZE 8.0